# Patient Record
Sex: FEMALE | Race: WHITE | NOT HISPANIC OR LATINO | Employment: FULL TIME | URBAN - METROPOLITAN AREA
[De-identification: names, ages, dates, MRNs, and addresses within clinical notes are randomized per-mention and may not be internally consistent; named-entity substitution may affect disease eponyms.]

---

## 2017-01-05 ENCOUNTER — POSTPARTUM VISIT (OUTPATIENT)
Dept: OBSTETRICS AND GYNECOLOGY | Facility: CLINIC | Age: 32
End: 2017-01-05
Attending: OBSTETRICS & GYNECOLOGY
Payer: COMMERCIAL

## 2017-01-05 VITALS
SYSTOLIC BLOOD PRESSURE: 110 MMHG | DIASTOLIC BLOOD PRESSURE: 78 MMHG | HEIGHT: 64 IN | BODY MASS INDEX: 33.69 KG/M2 | WEIGHT: 197.31 LBS

## 2017-01-05 PROCEDURE — 0503F POSTPARTUM CARE VISIT: CPT | Mod: S$GLB,,, | Performed by: OBSTETRICS & GYNECOLOGY

## 2017-01-05 PROCEDURE — 99999 PR PBB SHADOW E&M-EST. PATIENT-LVL III: CPT | Mod: PBBFAC,,, | Performed by: OBSTETRICS & GYNECOLOGY

## 2017-01-05 RX ORDER — LORATADINE 10 MG/1
10 TABLET ORAL DAILY
COMMUNITY

## 2017-01-05 NOTE — PROGRESS NOTES
Yolanda Win is a 31 y.o. female  presents for a postpartum visit.  She is status post C/S 6 weeks ago.  Her hospitalization was not complicated.  She is breastfeeding.  She desires IUD for contraception.  She denies postpartum depression.      Past Medical History   Diagnosis Date    Abnormal Pap smear of cervix     Allergy      seasonal    Anorexia     Bulimia     Fever blister     Migraine headache      Past Surgical History   Procedure Laterality Date    Shoulder surgery Left     Tonsillectomy      Bone marrow aspiration       x 3    Dilation and curettage of uterus      Colposcopy      Cervical biopsy  w/ loop electrode excision       section  2016     Review of patient's allergies indicates:   Allergen Reactions    Amoxicillin      hives    Pcn [penicillins] Rash    Valium [diazepam] Anxiety       Current Outpatient Prescriptions:     ERGOCALCIFEROL, VITAMIN D2, (VITAMIN D ORAL), Take by mouth., Disp: , Rfl:     loratadine (CLARITIN) 10 mg tablet, Take 10 mg by mouth once daily., Disp: , Rfl:     magnesium oxide (MAG-OX) 400 mg tablet, Take 400 mg by mouth once daily., Disp: , Rfl:     PRENATAL VIT/IRON FUMARATE/FA (PRENATAL 1+1 ORAL), Take by mouth., Disp: , Rfl:     valacyclovir (VALTREX) 1000 MG tablet, Take 1,000 mg by mouth continuous prn., Disp: , Rfl:     ibuprofen (ADVIL,MOTRIN) 600 MG tablet, Take 1 tablet (600 mg total) by mouth every 6 (six) hours., Disp: 30 tablet, Rfl: 2      Vitals:    17 0926   BP: 110/78       GENERAL: healthy  ABDOMEN: no masses, hepatosplenomegaly, no hernias  EXTERNAL GENITALIA POSTPARTUM: normal, well-healed, without lesions or masses   VAGINA POSTPARTUM: normal, well-healed, physiologic discharge, without lesions   CERVIX POSTPARTUM: normal, well-healed, without lesions   UTERUS POSTPARTUM: normal size, well involuted, firm, non-tender, ADNEXA POSTPARTUM: no masses palpable and nontender    Assessment:  Normal postpartum  exam    Plan:  Routine follow up.

## 2017-01-05 NOTE — MR AVS SNAPSHOT
[facility] [address]  [address]  [facility] [address]  Phone: [phone]  Fax: [phone]                  Yolanda Win   2017 9:30 AM   Postpartum Visit    Description:  Female : 1985   Provider:  Kaleigh Polanco MD   Department:  [facility] [address]           Reason for Visit     Postpartum Care                To Do List           Goals (5 Years of Data)     None      [facility]     [facility] Care [facility] -  Assistance  Registered nurses in the [facility] Call Center provide clinical advisement, health education, appointment booking, and other advisory services.  Call for this free service at [phone].             Medications           Message regarding Medications     Verify the changes and/or additions to your medication regime listed below are the same as discussed with your clinician today.  If any of these changes or additions are incorrect, please notify your healthcare provider.             Verify that the below list of medications is an accurate representation of the medications you are currently taking.  If none reported, the list may be blank. If incorrect, please contact your healthcare provider. Carry this list with you in case of emergency.           Current Medications     ERGOCALCIFEROL, VITAMIN D2, (VITAMIN D ORAL) Take by mouth.    magnesium oxide (MAG-OX) 400 mg tablet Take 400 mg by mouth once daily.    PRENATAL VIT/IRON FUMARATE/FA (PRENATAL 1+1 ORAL) Take by mouth.    valacyclovir (VALTREX) 1000 MG tablet Take 1,000 mg by mouth continuous prn.    ibuprofen (ADVIL,MOTRIN) 600 MG tablet Take 1 tablet (600 mg total) by mouth every 6 (six) hours.           Clinical Reference Information           Prenatal Vitals     Enc. Date GA Prenatal Vitals Prenatal Pulse Pain Level Urine Albumin/Glucose Edema Presentation Dilation/Effacement/Station    17 38w1d 110/78 / 89.5 kg (197 lb 5 oz)           16 38w1d 108/78  "/ 92.8 kg (204 lb 9.4 oz)           16 38w1d Admission Dept: [facility] L&D    16 38w0d Admission Dx: Normal labor and delivery Dept: [facility] ZAINAB    16 37w4d 124/78 / 103.8 kg (228 lb 13.4 oz) 39 cm / 152 / Present  0 Negative / Negative +1 / +1  0 / 20 / -4    16 37w1d 152/90 (A) / 103.9 kg (229 lb 0.9 oz) 38 cm / 150 / Present  0 Negative / Negative +2 / +2 / None / No  0 / 20 / -4    16 36w3d 152/86 (A) / 104.7 kg (230 lb 13.2 oz) 38 cm / 144 / Present  0 Negative / Negative +2 / +2  0 / 20 / -4    10/31/16 35w1d 138/80 / 103.5 kg (228 lb 2.8 oz) 37 cm / 152 / Present  0 Negative / Negative +2 / +2  0 / 10 / -4       TW.2 kg (68 lb 13.4 oz)   Pregravid weight: 72.6 kg (160 lb)   Number of babies: 1   Height: 5' 4" (1.626 m)   BMI: 27.5       Vital Signs - Last Recorded  Most recent update: 2017  9:31 AM by Brina Beverly MA    BP Ht Wt BMI       110/78 5' 4" (1.626 m) 89.5 kg (197 lb 5 oz) 33.87 kg/m2       Allergies as of 2017     Amoxicillin    Pcn [Penicillins]    Valium [Diazepam]      Immunizations Administered on Date of Encounter - 2017     None      "

## 2017-01-16 ENCOUNTER — PATIENT MESSAGE (OUTPATIENT)
Dept: OBSTETRICS AND GYNECOLOGY | Facility: CLINIC | Age: 32
End: 2017-01-16

## 2017-01-18 ENCOUNTER — PATIENT MESSAGE (OUTPATIENT)
Dept: OBSTETRICS AND GYNECOLOGY | Facility: CLINIC | Age: 32
End: 2017-01-18

## 2017-01-19 ENCOUNTER — PATIENT MESSAGE (OUTPATIENT)
Dept: OBSTETRICS AND GYNECOLOGY | Facility: CLINIC | Age: 32
End: 2017-01-19

## 2017-01-30 ENCOUNTER — PATIENT MESSAGE (OUTPATIENT)
Dept: OBSTETRICS AND GYNECOLOGY | Facility: CLINIC | Age: 32
End: 2017-01-30

## 2017-02-13 ENCOUNTER — PATIENT MESSAGE (OUTPATIENT)
Dept: OBSTETRICS AND GYNECOLOGY | Facility: CLINIC | Age: 32
End: 2017-02-13

## 2017-02-15 ENCOUNTER — PROCEDURE VISIT (OUTPATIENT)
Dept: OBSTETRICS AND GYNECOLOGY | Facility: CLINIC | Age: 32
End: 2017-02-15
Attending: OBSTETRICS & GYNECOLOGY
Payer: COMMERCIAL

## 2017-02-15 VITALS
HEIGHT: 64 IN | DIASTOLIC BLOOD PRESSURE: 80 MMHG | SYSTOLIC BLOOD PRESSURE: 126 MMHG | WEIGHT: 189.81 LBS | BODY MASS INDEX: 32.41 KG/M2

## 2017-02-15 DIAGNOSIS — Z30.9 ENCOUNTER FOR CONTRACEPTIVE MANAGEMENT, UNSPECIFIED CONTRACEPTIVE ENCOUNTER TYPE: Primary | ICD-10-CM

## 2017-02-15 DIAGNOSIS — Z30.430 ENCOUNTER FOR IUD INSERTION: ICD-10-CM

## 2017-02-15 DIAGNOSIS — Z30.431 IUD CHECK UP: ICD-10-CM

## 2017-02-15 LAB
B-HCG UR QL: NEGATIVE
CTP QC/QA: YES

## 2017-02-15 PROCEDURE — 58300 INSERT INTRAUTERINE DEVICE: CPT | Mod: S$GLB,,, | Performed by: OBSTETRICS & GYNECOLOGY

## 2017-02-15 PROCEDURE — 81025 URINE PREGNANCY TEST: CPT | Mod: S$GLB,,, | Performed by: OBSTETRICS & GYNECOLOGY

## 2017-02-15 NOTE — MR AVS SNAPSHOT
[facility] [address]  [address]  [facility] [address]  Phone: [phone]  Fax: [phone]                  Yolanda Win   2/15/2017 3:15 PM   Procedure visit    Description:  Female : 1985   Provider:  Kaleigh Polanco MD   Department:  [facility] - [facility] [address]           Reason for Visit     Procedure           Diagnoses this Visit        Comments    Encounter for contraceptive management, unspecified contraceptive encounter type    -  Primary            To Do List           Goals (5 Years of Data)     None      [facility]     [facility] Care [facility] -  Assistance  Registered nurses in the [facility] Call Center provide clinical advisement, health education, appointment booking, and other advisory services.  Call for this free service at [phone].             Medications           Message regarding Medications     Verify the changes and/or additions to your medication regime listed below are the same as discussed with your clinician today.  If any of these changes or additions are incorrect, please notify your healthcare provider.             Verify that the below list of medications is an accurate representation of the medications you are currently taking.  If none reported, the list may be blank. If incorrect, please contact your healthcare provider. Carry this list with you in case of emergency.           Current Medications     ERGOCALCIFEROL, VITAMIN D2, (VITAMIN D ORAL) Take by mouth.    ibuprofen (ADVIL,MOTRIN) 600 MG tablet Take 1 tablet (600 mg total) by mouth every 6 (six) hours.    loratadine (CLARITIN) 10 mg tablet Take 10 mg by mouth once daily.    magnesium oxide (MAG-OX) 400 mg tablet Take 400 mg by mouth once daily.    PRENATAL VIT/IRON FUMARATE/FA (PRENATAL 1+1 ORAL) Take by mouth.    valacyclovir (VALTREX) 1000 MG tablet Take 1,000 mg by mouth continuous prn.           Clinical Reference Information           Your Vitals Were  "    BP Height Weight BMI       126/80 5' 4" (1.626 m) 86.1 kg (189 lb 13.1 oz) 32.58 kg/m2       Blood Pressure          Most Recent Value    BP  126/80      Allergies as of 2/15/2017     Amoxicillin    Pcn [Penicillins]    Valium [Diazepam]      Immunizations Administered on Date of Encounter - 2/15/2017     None      Orders Placed During Today's Visit      Normal Orders This Visit    POCT Urine Pregnancy       Language Assistance Services     ATTENTION: Language assistance services are available, free of charge. Please call [phone].      ATENCIÓN: Si habla christine, tiene a reynoso disposición servicios gratuitos de asistencia lingüística. Llame al [phone].     CHÚ Ý: N?u b?n nói Ti?ng Vi?t, có các d?ch v? h? tr? ngôn ng? mi?n phí dành cho b?n. G?i s? [phone].         [facility] [address] complies with applicable Federal civil rights laws and does not discriminate on the basis of race, color, national origin, age, disability, or sex.        "

## 2017-02-15 NOTE — PROCEDURES
Insertin of IUD-Today  Date/Time: 2/15/2017 5:01 PM  Performed by: TINA BADILLO  Authorized by: TINA BADILLO   Preparation: Patient was prepped and draped in the usual sterile fashion.  Local anesthesia used: no    Anesthesia:  Local anesthesia used: no  Sedation:  Patient sedated: no    Patient tolerance: Patient tolerated the procedure well with no immediate complications  Comments: Yolanda Win is a 31 y.o. female  presents for IUD placement.  No LMP recorded..  She desires Dorcas.  UPT is negative.      She was counseled on the risks, benefits, indications, and alternatives to IUD use.  She understands that with insertion there is a risk of bleeding, infection, and uterine perforation.  All questions are answered.  Consents signed.  Cervical cultures were not performed.    Procedure:  Time out performed.  The cervix was visualized with a speculum.  A single tooth tenaculum was placed on the anterior lip of the cervix.  The uterus sounds to 9cm using sterile technique.  A Dorcas was loaded and placed high in the uterine fundus without difficulty using sterile technique.  The string was was then cut.  The tenaculum and speculum were removed.  The patient tolerated the procedure well.  First IUD expelled when strings were cut. 2nd IUD placed without difficutly    Assessment:  1.  Contraceptive management/IUD insertion    Post IUD placement counseling:  Manage post IUD placement pain with NSAIDS, Tylenol or Rx per Medcard.  IUD danger signs and how to check for strings were discussed.  The IUD needs to be removed in 5 years for Mirena and 10 years for Copper IUD.    Counseling lasted approximately 15 minutes and all her questions were answered.    Follow up:  2 weeks.

## 2017-03-01 ENCOUNTER — PATIENT MESSAGE (OUTPATIENT)
Dept: OBSTETRICS AND GYNECOLOGY | Facility: CLINIC | Age: 32
End: 2017-03-01

## 2017-03-02 ENCOUNTER — PATIENT MESSAGE (OUTPATIENT)
Dept: OBSTETRICS AND GYNECOLOGY | Facility: CLINIC | Age: 32
End: 2017-03-02

## 2017-03-02 DIAGNOSIS — O92.5 LACTATING, SUPPRESSED: Primary | ICD-10-CM

## 2017-03-03 ENCOUNTER — PATIENT MESSAGE (OUTPATIENT)
Dept: OBSTETRICS AND GYNECOLOGY | Facility: CLINIC | Age: 32
End: 2017-03-03

## 2017-03-03 RX ORDER — METOCLOPRAMIDE 10 MG/1
TABLET ORAL
Qty: 33 TABLET | Refills: 0 | OUTPATIENT
Start: 2017-03-03 | End: 2017-03-06 | Stop reason: SDUPTHER

## 2017-03-04 ENCOUNTER — PATIENT MESSAGE (OUTPATIENT)
Dept: OBSTETRICS AND GYNECOLOGY | Facility: CLINIC | Age: 32
End: 2017-03-04

## 2017-03-04 DIAGNOSIS — O92.5 LACTATING, SUPPRESSED: Primary | ICD-10-CM

## 2017-03-06 ENCOUNTER — PATIENT MESSAGE (OUTPATIENT)
Dept: OBSTETRICS AND GYNECOLOGY | Facility: CLINIC | Age: 32
End: 2017-03-06

## 2017-03-06 ENCOUNTER — PATIENT MESSAGE (OUTPATIENT)
Dept: INTERNAL MEDICINE | Facility: CLINIC | Age: 32
End: 2017-03-06

## 2017-03-06 ENCOUNTER — OFFICE VISIT (OUTPATIENT)
Dept: INTERNAL MEDICINE | Facility: CLINIC | Age: 32
End: 2017-03-06
Payer: COMMERCIAL

## 2017-03-06 VITALS
TEMPERATURE: 98 F | HEIGHT: 64 IN | DIASTOLIC BLOOD PRESSURE: 80 MMHG | WEIGHT: 185.19 LBS | BODY MASS INDEX: 31.62 KG/M2 | HEART RATE: 67 BPM | SYSTOLIC BLOOD PRESSURE: 100 MMHG

## 2017-03-06 DIAGNOSIS — J06.9 UPPER RESPIRATORY INFECTION, ACUTE: Primary | ICD-10-CM

## 2017-03-06 LAB
DEPRECATED S PYO AG THROAT QL EIA: NEGATIVE
FLUAV AG SPEC QL IA: NEGATIVE
FLUBV AG SPEC QL IA: NEGATIVE
SPECIMEN SOURCE: NORMAL

## 2017-03-06 PROCEDURE — 99203 OFFICE O/P NEW LOW 30 MIN: CPT | Mod: S$GLB,,, | Performed by: INTERNAL MEDICINE

## 2017-03-06 PROCEDURE — 87081 CULTURE SCREEN ONLY: CPT

## 2017-03-06 PROCEDURE — 99999 PR PBB SHADOW E&M-EST. PATIENT-LVL III: CPT | Mod: PBBFAC,,, | Performed by: INTERNAL MEDICINE

## 2017-03-06 PROCEDURE — 87400 INFLUENZA A/B EACH AG IA: CPT | Mod: 59

## 2017-03-06 PROCEDURE — 87880 STREP A ASSAY W/OPTIC: CPT

## 2017-03-06 RX ORDER — METOCLOPRAMIDE 10 MG/1
TABLET ORAL
Qty: 33 TABLET | Refills: 0 | Status: SHIPPED | OUTPATIENT
Start: 2017-03-06 | End: 2017-05-17

## 2017-03-06 NOTE — PROGRESS NOTES
Subjective:       Patient ID: Yolanda Win is a 31 y.o. female.    Chief Complaint: URI and Sore Throat    HPI Comments: She is new to primary care. Presents urgently today c/o upper respiratory infection. It began four days ago with a sore throat. This has persisted, and yesterday she developed sneezing, runny nose and nasal congestion. Also has a mild cough usually non-productive. Temp peaked at 100.1 yesterday evening. Has malaise, but no chills, sweats or body aches. Sick contacts include a family member with Strep nine days ago, and a co-worker with Influenza A one week ago. She is using Tylenol Cold medicine and Robitussin DM with some relief. Missing work today, but thinks she will be able to return tomorrow.    PMH: No chronic medical conditions. Currently breastfeeding her three month old son.   Allergies: include PCN.   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   Medications: reviewed.       Review of Systems    Objective:    /80, Pulse 67, Temp 98.0  Physical Exam   Constitutional: She is oriented to person, place, and time. She appears well-developed and well-nourished. No distress.   Sounds nasally congested when speaking, mild sniffles, otherwise not ill-appearing.    HENT:   Right Ear: External ear normal.   Left Ear: External ear normal.   Oropharynx mildly injected, tonsils surgically absent, no exudate. Edema of nasal turbinates with watery discharge. Both ear canals clear and TMs normal with no injection or effusions.    Eyes: Conjunctivae are normal. Right eye exhibits no discharge. Left eye exhibits no discharge.   Neck: Normal range of motion. Neck supple.   Very mild submandibular adenopathy left >right.   Cardiovascular: Normal rate, regular rhythm and normal heart sounds.    Pulmonary/Chest: Effort normal and breath sounds normal. No accessory muscle usage. No tachypnea. No respiratory distress. She has no decreased breath sounds. She has no wheezes. She  has no rhonchi. She has no rales.   Neurological: She is alert and oriented to person, place, and time.   Skin: Skin is warm and dry. She is not diaphoretic.       Assessment:       1. Upper respiratory infection, acute        Plan:       Upper respiratory infection, acute  -     THROAT SCREEN, RAPID  -     Influenza antigen Nasopharyngeal Swab  Symptoms consistent with cold virus, OTC meds as needed for symptoms, stay well hydrated.   Work excuse provided for today.

## 2017-03-06 NOTE — MR AVS SNAPSHOT
[facility]  [address]  [facility] [address]  Phone: [phone]  Fax: [phone]                  Yolanda Win   3/6/2017 9:30 AM   Office Visit    Description:  Female : 1985   Provider:  Prisca Mullen MD   Department:  [facility]           Reason for Visit     URI     Sore Throat           Diagnoses this Visit        Comments    Upper respiratory infection, acute    -  Primary            To Do List           Future Appointments        Provider Department Dept Phone    3/10/2017 4:30 PM [facility] [phone]      Goals (5 Years of Data)     None      [facility]     [facility] Nurse Care Line -  Assistance  Registered nurses in the [facility] provide clinical advisement, health education, appointment booking, and other advisory services.  Call for this free service at [phone].             Medications           Message regarding Medications     Verify the changes and/or additions to your medication regime listed below are the same as discussed with your clinician today.  If any of these changes or additions are incorrect, please notify your healthcare provider.             Verify that the below list of medications is an accurate representation of the medications you are currently taking.  If none reported, the list may be blank. If incorrect, please contact your healthcare provider. Carry this list with you in case of emergency.           Current Medications     ERGOCALCIFEROL, VITAMIN D2, (VITAMIN D ORAL) Take by mouth.    ibuprofen (ADVIL,MOTRIN) 600 MG tablet Take 1 tablet (600 mg total) by mouth every 6 (six) hours.    loratadine (CLARITIN) 10 mg tablet Take 10 mg by mouth once daily.    magnesium oxide (MAG-OX) 400 mg tablet Take 400 mg by mouth once daily.    PRENATAL VIT/IRON FUMARATE/FA (PRENATAL 1+1 ORAL) Take by mouth.    metoclopramide HCl (REGLAN) 10 MG tablet TAKE  "REGLAN BY MOUTH AS OUTLINED BELOW FOR 13 DAYS TO INCREASE MILK PRODUCTION:  DAY 1 1 TABLET   DAY 2 1 TABLET EVERY 12 HOURS  DAY 3 1 TABLET EVERY 8 HOURS  DAY 4-11 CONTINUE 1 TABLET EVERY 8 HOURS  DAY 12 1 TABLET EVERY 12 HOURS  DAY 13 1 TABLET    valacyclovir (VALTREX) 1000 MG tablet Take 1,000 mg by mouth continuous prn.           Clinical Reference Information           Your Vitals Were     BP Pulse Temp Height Weight BMI    100/80 67 98 °F (36.7 °C) 5' 4" (1.626 m) 84 kg (185 lb 3.2 oz) 31.79 kg/m2      Blood Pressure          Most Recent Value    BP  100/80      Allergies as of 3/6/2017     Amoxicillin    Pcn [Penicillins]    Valium [Diazepam]      Immunizations Administered on Date of Encounter - 3/6/2017     None      Orders Placed During Today's Visit      Normal Orders This Visit    Influenza antigen Nasopharyngeal Swab     THROAT SCREEN, RAPID       Language Assistance Services     ATTENTION: Language assistance services are available, free of charge. Please call [phone].      ATENCIÓN: Si habla español, tiene a reynoso disposición servicios gratuitos de asistencia lingüística. Llame al [phone].     LES Ý: N?u b?n nói Ti?ng Vi?t, có các d?ch v? h? tr? ngôn ng? mi?n phí dành cho b?n. G?i s? [phone].         [facility] complies with applicable Federal civil rights laws and does not discriminate on the basis of race, color, national origin, age, disability, or sex.        "

## 2017-03-06 NOTE — LETTER
March 6, 2017      Yolanda Win   [address] [place] [address]             [facility]  [address]  [place] [address]  Phone: [phone]  Fax: [phone] Yolanda Win    Was treated here on 03/06/2017.    May Return to work/school on Tuesday, 03/07/17.    No Restrictions.            Prisca Mullen MD

## 2017-03-08 LAB — BACTERIA THROAT CULT: NORMAL

## 2017-03-10 ENCOUNTER — HOSPITAL ENCOUNTER (OUTPATIENT)
Dept: RADIOLOGY | Facility: OTHER | Age: 32
Discharge: HOME | End: 2017-03-10
Attending: OBSTETRICS & GYNECOLOGY
Payer: COMMERCIAL

## 2017-03-10 DIAGNOSIS — Z30.431 IUD CHECK UP: ICD-10-CM

## 2017-03-10 PROCEDURE — 76856 US EXAM PELVIC COMPLETE: CPT | Mod: TC

## 2017-03-10 PROCEDURE — 76830 TRANSVAGINAL US NON-OB: CPT | Mod: 26,,, | Performed by: INTERNAL MEDICINE

## 2017-03-10 PROCEDURE — 76856 US EXAM PELVIC COMPLETE: CPT | Mod: 26,,, | Performed by: INTERNAL MEDICINE

## 2017-03-15 ENCOUNTER — RESEARCH ENCOUNTER (OUTPATIENT)
Dept: RESEARCH | Facility: HOSPITAL | Age: 32
End: 2017-03-15

## 2017-03-15 NOTE — PROGRESS NOTES
Subject was consented for the  [facility] Protocol (IRB # [id number],PI -Lucita).  The study was explained to the subject in  detail, and the Informed Consent was  reviewed, and discussed with the subject prior to consenting. All risks, and benefits, were discussed. Adequate time was given for the subject to ask questions and receive answers. Subject confirmed that all questions had been answered and  verbalized desire to participate in study, understanding that participation is voluntary and he/she can withdraw at any time. Subject signed Informed consent and a signed copy was provided. This Informed Consent process along with completion of Eligibility criteria was conducted prior to inititation of any study procedures. Subject signed the employee non-coercion statement.

## 2017-04-07 ENCOUNTER — PATIENT MESSAGE (OUTPATIENT)
Dept: OBSTETRICS AND GYNECOLOGY | Facility: CLINIC | Age: 32
End: 2017-04-07

## 2017-05-17 ENCOUNTER — PATIENT MESSAGE (OUTPATIENT)
Dept: OBSTETRICS AND GYNECOLOGY | Facility: CLINIC | Age: 32
End: 2017-05-17

## 2017-05-17 ENCOUNTER — OFFICE VISIT (OUTPATIENT)
Dept: PSYCHIATRY | Facility: CLINIC | Age: 32
End: 2017-05-17
Payer: COMMERCIAL

## 2017-05-17 VITALS
HEIGHT: 64 IN | WEIGHT: 178.13 LBS | DIASTOLIC BLOOD PRESSURE: 67 MMHG | HEART RATE: 70 BPM | SYSTOLIC BLOOD PRESSURE: 106 MMHG | BODY MASS INDEX: 30.41 KG/M2

## 2017-05-17 DIAGNOSIS — Z87.898 HISTORY OF INSOMNIA: ICD-10-CM

## 2017-05-17 DIAGNOSIS — F41.9 ANXIETY DISORDER, UNSPECIFIED TYPE: ICD-10-CM

## 2017-05-17 DIAGNOSIS — Z86.59 HISTORY OF POSTTRAUMATIC STRESS DISORDER (PTSD): ICD-10-CM

## 2017-05-17 DIAGNOSIS — F90.0 ADHD (ATTENTION DEFICIT HYPERACTIVITY DISORDER), INATTENTIVE TYPE: ICD-10-CM

## 2017-05-17 PROCEDURE — 90792 PSYCH DIAG EVAL W/MED SRVCS: CPT | Mod: S$GLB,,, | Performed by: PSYCHIATRY & NEUROLOGY

## 2017-05-17 PROCEDURE — 99999 PR PBB SHADOW E&M-EST. PATIENT-LVL II: CPT | Mod: PBBFAC,,, | Performed by: PSYCHIATRY & NEUROLOGY

## 2017-05-17 RX ORDER — DEXTROAMPHETAMINE SACCHARATE, AMPHETAMINE ASPARTATE MONOHYDRATE, DEXTROAMPHETAMINE SULFATE AND AMPHETAMINE SULFATE 3.75; 3.75; 3.75; 3.75 MG/1; MG/1; MG/1; MG/1
15 CAPSULE, EXTENDED RELEASE ORAL EVERY MORNING
Qty: 30 CAPSULE | Refills: 0 | Status: SHIPPED | OUTPATIENT
Start: 2017-05-17 | End: 2017-06-28 | Stop reason: DRUGHIGH
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
## 2017-05-25 ENCOUNTER — PATIENT MESSAGE (OUTPATIENT)
Dept: PSYCHIATRY | Facility: CLINIC | Age: 32
End: 2017-05-25

## 2017-06-21 ENCOUNTER — OFFICE VISIT (OUTPATIENT)
Dept: OPTOMETRY | Facility: CLINIC | Age: 32
End: 2017-06-21
Payer: COMMERCIAL

## 2017-06-21 DIAGNOSIS — Z01.00 ROUTINE EYE EXAM: Primary | ICD-10-CM

## 2017-06-21 DIAGNOSIS — H52.13 MYOPIA, BILATERAL: Primary | ICD-10-CM

## 2017-06-21 DIAGNOSIS — H52.13 MYOPIA, BILATERAL: ICD-10-CM

## 2017-06-21 PROCEDURE — 92004 COMPRE OPH EXAM NEW PT 1/>: CPT | Mod: S$GLB,,, | Performed by: OPTOMETRIST

## 2017-06-21 PROCEDURE — 99499 UNLISTED E&M SERVICE: CPT | Mod: S$GLB,,, | Performed by: OPTOMETRIST

## 2017-06-21 PROCEDURE — 92310 CONTACT LENS FITTING OU: CPT | Mod: S$GLB,,, | Performed by: OPTOMETRIST

## 2017-06-21 PROCEDURE — 99999 PR PBB SHADOW E&M-EST. PATIENT-LVL I: CPT | Mod: PBBFAC,,, | Performed by: OPTOMETRIST

## 2017-06-21 PROCEDURE — 92015 DETERMINE REFRACTIVE STATE: CPT | Mod: S$GLB,,, | Performed by: OPTOMETRIST

## 2017-06-21 NOTE — PROGRESS NOTES
HPI     Concerns About Ocular Health    Additional comments: glasses and ctl rx           Comments   Patient's last dilated exam was: April 2016  Pt states: here for glasses and ctl rx, would like to establish care at   [facility]. Patient denies flashes, floaters, pain and double vision.   Occasional dry eyes, not using any gtts. Had a baby 7 months ago, no   change in vision. Was told she was not  A candidiate for lasik sx due to   being on topamax changing her topography. Stopped taking Topamax 6/2014           Last edited by Christen Najera, PCT on 6/21/2017  2:43 PM.   (History)        ROS     Negative for: Constitutional, Gastrointestinal, Neurological, Skin,   Genitourinary, Musculoskeletal, HENT, Endocrine, Cardiovascular, Eyes,   Respiratory, Psychiatric, Allergic/Imm, Heme/Lymph    Last edited by Swapna Espinoza, OD on 6/21/2017  3:21 PM. (History)        Assessment /Plan     For exam results, see Encounter Report.    Routine eye exam    Myopia, bilateral          1-2.  Glasses and contact lens rx given. Eye health normal OU.   Discussed LASIK.  RTC 1 year for routine exam.

## 2017-06-21 NOTE — PROGRESS NOTES
Assessment /Plan     For exam results, see Encounter Report.    Myopia, bilateral          1. See note with same date.

## 2017-06-28 ENCOUNTER — OFFICE VISIT (OUTPATIENT)
Dept: PSYCHIATRY | Facility: CLINIC | Age: 32
End: 2017-06-28
Payer: COMMERCIAL

## 2017-06-28 VITALS
HEIGHT: 64 IN | BODY MASS INDEX: 29.4 KG/M2 | DIASTOLIC BLOOD PRESSURE: 71 MMHG | SYSTOLIC BLOOD PRESSURE: 107 MMHG | WEIGHT: 172.19 LBS | HEART RATE: 72 BPM

## 2017-06-28 DIAGNOSIS — F90.0 ADHD (ATTENTION DEFICIT HYPERACTIVITY DISORDER), INATTENTIVE TYPE: Primary | ICD-10-CM

## 2017-06-28 DIAGNOSIS — Z87.898 HISTORY OF INSOMNIA: ICD-10-CM

## 2017-06-28 DIAGNOSIS — F41.9 ANXIETY DISORDER, UNSPECIFIED TYPE: ICD-10-CM

## 2017-06-28 DIAGNOSIS — Z86.59 HISTORY OF POSTTRAUMATIC STRESS DISORDER (PTSD): ICD-10-CM

## 2017-06-28 PROCEDURE — 99214 OFFICE O/P EST MOD 30 MIN: CPT | Mod: S$GLB,,, | Performed by: PSYCHIATRY & NEUROLOGY

## 2017-06-28 PROCEDURE — 99999 PR PBB SHADOW E&M-EST. PATIENT-LVL II: CPT | Mod: PBBFAC,,, | Performed by: PSYCHIATRY & NEUROLOGY

## 2017-06-28 PROCEDURE — 90833 PSYTX W PT W E/M 30 MIN: CPT | Mod: S$GLB,,, | Performed by: PSYCHIATRY & NEUROLOGY

## 2017-06-28 RX ORDER — DEXTROAMPHETAMINE SACCHARATE, AMPHETAMINE ASPARTATE MONOHYDRATE, DEXTROAMPHETAMINE SULFATE AND AMPHETAMINE SULFATE 6.25; 6.25; 6.25; 6.25 MG/1; MG/1; MG/1; MG/1
25 CAPSULE, EXTENDED RELEASE ORAL EVERY MORNING
Qty: 30 CAPSULE | Refills: 0 | Status: SHIPPED | OUTPATIENT
Start: 2017-06-28 | End: 2017-07-21 | Stop reason: SDUPTHER
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
## 2017-07-14 ENCOUNTER — PATIENT MESSAGE (OUTPATIENT)
Dept: DERMATOLOGY | Facility: CLINIC | Age: 32
End: 2017-07-14

## 2017-07-17 ENCOUNTER — OFFICE VISIT (OUTPATIENT)
Dept: PODIATRY | Facility: CLINIC | Age: 32
End: 2017-07-17
Payer: COMMERCIAL

## 2017-07-17 VITALS
BODY MASS INDEX: 29.14 KG/M2 | WEIGHT: 170.69 LBS | HEIGHT: 64 IN | SYSTOLIC BLOOD PRESSURE: 147 MMHG | HEART RATE: 88 BPM | DIASTOLIC BLOOD PRESSURE: 88 MMHG

## 2017-07-17 DIAGNOSIS — G57.53 TARSAL TUNNEL SYNDROME OF BOTH LOWER EXTREMITIES: ICD-10-CM

## 2017-07-17 DIAGNOSIS — M21.6X2 ACQUIRED EQUINUS DEFORMITY OF BOTH FEET: Primary | ICD-10-CM

## 2017-07-17 DIAGNOSIS — M21.40 ACQUIRED FLEXIBLE FLAT FOOT, UNSPECIFIED LATERALITY: ICD-10-CM

## 2017-07-17 DIAGNOSIS — M21.6X1 ACQUIRED EQUINUS DEFORMITY OF BOTH FEET: Primary | ICD-10-CM

## 2017-07-17 DIAGNOSIS — M72.2 PLANTAR FASCIITIS, BILATERAL: ICD-10-CM

## 2017-07-17 PROCEDURE — 99999 PR PBB SHADOW E&M-EST. PATIENT-LVL III: CPT | Mod: PBBFAC,,, | Performed by: PODIATRIST

## 2017-07-17 PROCEDURE — 99203 OFFICE O/P NEW LOW 30 MIN: CPT | Mod: S$GLB,,, | Performed by: PODIATRIST

## 2017-07-17 RX ORDER — METHYLPREDNISOLONE 4 MG/1
TABLET ORAL
Qty: 1 PACKAGE | Refills: 0 | Status: SHIPPED | OUTPATIENT
Start: 2017-07-17 | End: 2017-08-07

## 2017-07-17 NOTE — PROGRESS NOTES
Subjective:      Patient ID: Yolanda Win is a 32 y.o. female.    Chief Complaint: Foot Problem (bilateral) and Foot Pain    Yolanda is a 32 y.o. female who presents to the podiatry clinic  with complaint of  bilateral foot pain. Onset of the symptoms was several months ago. Precipitating event: none known, she did have a baby about 8 months ago and has been trying to get back into shape running over the last several months. Current symptoms include: ability to bear weight, but with some pain, worsening symptoms after a period of activity and Pain is described as aching throughout the medial ankle, arches as well as occasional numbness. Aggravating factors: any weight bearing. Symptoms have waxed and waned but are worse overall. Patient has had prior foot problems when she started running a few years ago and had ankle pain, after resting that resolved. Evaluation to date: none. Treatment to date: She has tried different running shoes and a spenco insert with minimal help. Patients rates pain 4/10 on pain scale.        Review of Systems   Constitution: Negative for chills, fever, weakness and malaise/fatigue.   Cardiovascular: Negative for claudication and leg swelling.   Respiratory: Negative for shortness of breath.    Skin: Negative for itching, nail changes, poor wound healing and rash.   Musculoskeletal: Negative for arthritis, muscle cramps, muscle weakness and myalgias.   Gastrointestinal: Negative for nausea and vomiting.   Neurological: Positive for numbness. Negative for focal weakness, loss of balance and paresthesias.           Objective:      Physical Exam   Constitutional: She is oriented to person, place, and time. She appears well-developed and well-nourished. No distress.   Cardiovascular:   Pulses:       Dorsalis pedis pulses are 2+ on the right side, and 2+ on the left side.        Posterior tibial pulses are 2+ on the right side, and 2+ on the left side.   < 3 sec capillary refill time to toes  1-5 bilateral. Toes and feet are warm to touch proximally with normal distal cooling b/l. There is some hair growth on the feet and toes b/l. There is no edema b/l. No spider veins or varicosities present b/l.      Musculoskeletal:   Discomfort with palpation along the tarsal tunnel, abductor muscle belly and origin of the plantar fascia on the heel bilateral. There is also mild tenderness to the PT tendon distally bilateral. Able to double and single heel rise without pain and with noted inversion of heels.     When standing she has collapsing medial arches bilateral with everting heel RCSP 5-6 degrees everted. Negative too many toes sign.     Equinus noted b/l ankles with < 5 deg DF noted with knee extended and <10 with knee flexed. MMT 5/5 in DF/PF/Inv/Ev resistance with no reproduction of pain in any direction. Passive range of motion of ankle and pedal joints is painless b/l.    Full biomechanical exam with gait analysis done by resident will be on  form in patient's media file tab.    Neurological: She is alert and oriented to person, place, and time. She has normal strength. She displays no atrophy and no tremor. No sensory deficit. She exhibits normal muscle tone.   Positive tinel sign bilateral.   Skin: Skin is warm, dry and intact. No abrasion, no bruising, no burn, no ecchymosis, no laceration, no lesion, no petechiae and no rash noted. She is not diaphoretic. No cyanosis or erythema. No pallor. Nails show no clubbing.   Skin temperature, texture and turgor within normal limits.   Psychiatric: She has a normal mood and affect. Her behavior is normal.             Assessment:       Encounter Diagnoses   Name Primary?    Acquired equinus deformity of both feet Yes    Tarsal tunnel syndrome of both lower extremities     Plantar fasciitis, bilateral     Acquired flexible flat foot, unspecified laterality          Plan:       Yolanda was seen today for foot problem and foot pain.    Diagnoses and  all orders for this visit:    Acquired equinus deformity of both feet    Tarsal tunnel syndrome of both lower extremities    Plantar fasciitis, bilateral    Acquired flexible flat foot, unspecified laterality    Other orders  -     methylPREDNISolone (MEDROL DOSEPACK) 4 mg tablet; use as directed      I counseled the patient on her conditions, their implications and medical management.    Discussed importance of wearing shoes with adequate room in toe box and with motion control arch supports such as wilder adrenaline, or Cruzito for example. Related that she may need to break it in and allow her foot to get used to the extra support.    Patient will stretch the tendo achilles complex three times daily as demonstrated in the office.  Literature was dispensed illustrating proper stretching technique.    Medrol dose pack for acute pain and inflammation    Patient instructed on adequate icing techniques. Patient should ice the affected area at least once per day x 10 minutes for 10 days .     Consider ankle brace: ASO ankle stabilizer found on [employer].     Also discussed possible steroid injection, PT, MRI as needed if the pain continues or acutely worsens in the future    Jeff Venegas DPM PGY-3    I have reviewed and concur with the resident's history, physical, assessment, and plan.  I have personally interviewed and examined the patient at bedside.  See below addendum for my evaluation and additional findings.

## 2017-07-17 NOTE — PATIENT INSTRUCTIONS
1. Stretch 10x per day for 30 sec and before getting out of bed.  2. Supportive shoes at all times (wilder, new balance, asics) motion control. Consider HOKA if all else fails.  3. Orthotic (superfeet or spenco from [employer] or Sof Sole Fit Series from [employer])  4. ICE massage with frozen water bottle 2x per day for 30 minutes.  5. Consider night splint and/or steroid injection    What Is Plantar Fasciitis?   The plantar fascia is a ligament-like band running from your heel to the ball of your foot. This band pulls on the heel bone, raising the arch of your foot as it pushes off the ground. But if your foot moves incorrectly, the plantar fascia may become strained. The fascia may swell and its tiny fibers may begin to fray, causing plantar fasciitis.  Causes  Plantar fasciitis is often caused by poor foot mechanics. If your foot flattens too much, the fascia may overstretch and swell. If your foot flattens too little, the fascia may ache from being pulled too tight.      Symptoms  With plantar fasciitis, the bottom of your foot may hurt when you stand, especially first thing in the morning. Pain usually occurs on the inside of the foot, near the spot where your heel and arch meet. Pain may lessen after a few steps, but it comes back after rest or with prolonged movement.  Related Problems  A heel spur is extra bone that may grow near the spot where the plantar fascia attaches to the heel. The heel spur may form in response to the plantar fascias tug on the heel bone.  Bursitis is the swelling of a bursa, a fluid-filled sac that reduces friction between a ligament and a bone. Bursitis may develop if a swollen plantar fascia presses against a plantar bursa.  © 2516-6837 [employer]. [address] [place] [address], [place] [address]. All rights reserved. This information is not intended as a substitute for professional medical care. Always follow your healthcare professional's  instructions.    Treating Plantar Fasciitis    First, your doctor relieves pain. Then, the cause of your problem may be found and corrected. If your pain is due to poor foot mechanics, custom-made shoe inserts (orthoses) may help.    Reduce Symptoms:  · To relieve mild symptoms, try aspirin, ibuprofen, or other medications as directed. Rubbing ice on the affected area may also help.  · To reduce severe pain and swelling, your doctor may prescribe pills or injections or a walking cast in some instances. Physical therapy, such as ultrasound or a daily stretching program, may also be recommended. Surgery is rarely required.  · To reduce symptoms caused by poor foot mechanics, your foot may be taped. This supports the arch and temporarily controls movement. Night splints may also help by stretching the fascia.    Control Movement  If taping helps, your doctor may prescribe orthoses. Built from plaster casts of your feet, these inserts control the way your foot moves. As a result, your symptoms should go away.  If Surgery Is Needed  Your doctor may consider surgery if other types of treatment don't control your pain. During surgery, the plantar fascia is partially cut to release tension. As you heal, fibrous tissue fills the space between the heel bone and the plantar fascia.   Reduce Overuse  Every time your foot strikes the ground, the plantar fascia is stretched. You can reduce the strain on the plantar fascia and the possibility of overuse by following these suggestions:  · Lose any excess weight.  · Avoid running on hard or uneven ground.  · Use orthoses at all times in your shoes and house slippers.  © 4424-1098 [employer]. [address] [place] [address], [place] [address]. All rights reserved. This information is not intended as a substitute for professional medical care. Always follow your healthcare professional's instructions.            Lower Body Exercises: Calf Stretch    This exercise both stretches  and strengthens your lower body to help your back. Do the exercise as often as suggested by your health care provider. As you work out, dont rush or strain. Use an exercise mat, pillow, or folded towel to protect your knees and other sensitive areas.  · Face a wall 2 feet away. Step toward the wall with one foot.  · Place both palms on the wall and bend your front knee.  · Lean forward, keeping the back leg straight and the heel on the floor.  · Hold for 20 seconds. Switch legs.  © 6014-0612 [employer]. [address] [place] [address]. All rights reserved. This information is not intended as a substitute for professional medical care. Always follow your healthcare professional's instructions.                                                        Consider ankle brace: ASO ankle stabilizer   https://www.[employer]/NLN-258659-R-Ankle-Stabilizer/dp/G30TR2IU6E/ref=pd_sim_200_1?ie=UTF8&dpID=[email or web address]&dpSrc=carranza&preST=[email or web address]_&refRID=[email or web address]

## 2017-07-21 RX ORDER — DEXTROAMPHETAMINE SACCHARATE, AMPHETAMINE ASPARTATE MONOHYDRATE, DEXTROAMPHETAMINE SULFATE AND AMPHETAMINE SULFATE 6.25; 6.25; 6.25; 6.25 MG/1; MG/1; MG/1; MG/1
25 CAPSULE, EXTENDED RELEASE ORAL EVERY MORNING
Qty: 30 CAPSULE | Refills: 0 | Status: SHIPPED | OUTPATIENT
Start: 2017-07-21 | End: 2017-08-24 | Stop reason: SDUPTHER

## 2017-07-26 ENCOUNTER — PATIENT MESSAGE (OUTPATIENT)
Dept: PODIATRY | Facility: CLINIC | Age: 32
End: 2017-07-26

## 2017-07-26 RX ORDER — DICLOFENAC SODIUM 10 MG/G
2 GEL TOPICAL 3 TIMES DAILY
Qty: 100 G | Refills: 3 | Status: SHIPPED | OUTPATIENT
Start: 2017-07-26 | End: 2017-08-24 | Stop reason: SDUPTHER

## 2017-08-03 ENCOUNTER — CLINICAL SUPPORT (OUTPATIENT)
Dept: REHABILITATION | Facility: HOSPITAL | Age: 32
End: 2017-08-03
Attending: PODIATRIST
Payer: COMMERCIAL

## 2017-08-03 DIAGNOSIS — M72.2 PLANTAR FASCIITIS: ICD-10-CM

## 2017-08-03 PROCEDURE — 97110 THERAPEUTIC EXERCISES: CPT

## 2017-08-03 PROCEDURE — 97161 PT EVAL LOW COMPLEX 20 MIN: CPT

## 2017-08-03 NOTE — PROGRESS NOTES
[facility]   PATIENT EVALUATION    Date: 08/03/2017  Start Time: 305  Stop Time: 415    Patient Name: Yolanda Win  Clinic Number: [id number]  Age: 32 y.o.  Gender: female    Diagnosis:   Encounter Diagnosis   Name Primary?    Plantar fasciitis        Referring Physician: Babak Chamorro DPM  Treatment Orders: PT Eval and Treat      History     Past Medical History:   Diagnosis Date    Abnormal Pap smear of cervix     Allergy     seasonal    Anorexia     Bulimia     Fever blister     Migraine headache        Current Outpatient Prescriptions   Medication Sig    dextroamphetamine-amphetamine (ADDERALL XR) 25 MG 24 hr capsule Take 1 capsule (25 mg total) by mouth every morning.    diclofenac sodium 1 % Gel Apply 2 g topically 3 (three) times daily.    ibuprofen (ADVIL,MOTRIN) 600 MG tablet Take 1 tablet (600 mg total) by mouth every 6 (six) hours.    levonorgestrel (MIRENA) 20 mcg/24 hr (5 years) IUD 1 each by Intrauterine route once.    loratadine (CLARITIN) 10 mg tablet Take 10 mg by mouth once daily.    magnesium oxide (MAG-OX) 400 mg tablet Take 400 mg by mouth once daily.    methylPREDNISolone (MEDROL DOSEPACK) 4 mg tablet use as directed    valacyclovir (VALTREX) 1000 MG tablet Take 1,000 mg by mouth continuous prn.     No current facility-administered medications for this visit.        Review of patient's allergies indicates:   Allergen Reactions    Amoxicillin      hives    Pcn [penicillins] Rash    Valium [diazepam] Anxiety         Subjective     History of Present Condition: Patient reports in 2014 after she got  she gained some weight and hurt her back. After she was cleared to start working out she started running without a warm up, stretching and started at 3.5 miles. It got to the point where she was getting swelling and stopped running. After everything calmed down she started slowly starting run again, but stopped when she was pregnant.  After she stopped breastfeeding she returned to running in bad/old shoes. She bought a neutral running shoe and after a few hours at work her feet started getting gradually worse. She saw the podiatrist and was given mid stability shoe. She states recently her feet have been a little better.    Onset Date: 3 years ago, returned a few weeks prior  Precautions: standard    Mechanism of Injury: gradual    Pain Location: B plantar fasciia  Pain Description: Aching and Throbbing  Current Pain: 1/10  Least Pain: 1/10  Worst Pain: 8/10  Aggravating Factors: running  Relieving Factors: none    Diagnostic Tests: none  Prior Therapy: yes    Occupation: research coordinator  Job Status: full time  Job Duties: prolonged standing, walking    Sports/Recreational Activities: running, swimming, lifting weights    Prior Level of Function: Independent  Functional Deficits Leading to Referral/Nature of Injury: none  Patient Therapy Goals: return to PLOF  Cultural/Environmental/Spiritual Barriers to Treatment or Learning: none      Objective     Observation: patient appears stated age  Posture: pronated feet B  Gait: increased pronation with SL stance    Good toe walking, weak heel walking    Dermatomes: WNL  Myotomes: WNL  DTRs: NT    Palpation: TTP B medial arches, TTP L ASIS    Range of Motion   Left ankle:  PF: 70 deg  DF: 15 deg  IV: 30 deg  EV: 12 deg    Right ankle:  PF: 70 deg  DF: 15 deg  IV: 38 deg  EV: 20 deg    Joint Mobility: hypermobile  Flexibility: hypermobile    Strength   R hip: 3+/5 abd, 4/5 add  L hip: 4/5 abd, 5/5 add    Left ankle:  PF: 5/5  DF: 5/5  IV: 5/5  EV: 5/5    Right ankle:  PF: 5/5  DF: 5/5  IV: 5/5  EV: 5/5      Special Tests:   + SL squat B  + R SI ant rotation        Treatment:   Foot intrinsic btb x30 B  Lateral walking otb x1 lap  Push/pull 3x10 sec (R ant rot)  *assess deep core next visit      Functional Limitations Reports - G Codes  Category: Mobility  Tool: FOTO  Score: 55       Current  ():  CK  Goal (): CI  Discharge ():  NA      History  Co-morbidities and personal factors that may impact the plan of care Low    Stable Clinical Presentation   Co-morbidities:       Personal Factors:     Body regions    Body systems    Activity Limitations    Participation Restrictons   No personal factors and/ or  comorbidites          Address 1-2 elements:     ROM impaired  MMT impaired  Gait impaired  Decreased FOTO score              Assessment     This is a 32 y.o. female referred to outpatient physical therapy and presents with a medical diagnosis of B plantar fasciitis secondary to weak hips and demonstrates limitations as described in the problem list. Pt demonstrates good rehab potential. Pt will benefit from physcial therapy services in order to maximize pain free and/or functional use of bilateral feet. The following goals were discussed with the patient and patient is in agreement with them as to be addressed in the treatment plan. Pt was given a HEP consisting of treatment this visit. Pt verbally understood the instructions as they were given and demonstrated proper form and technique during therapy. Pt was advised to perform these exercises free of pain, and to stop performing them if pain occurs.     Medical necessity is demonstrated by the following problem list:   - Pain limits function of effected part for all activities  - Unable to participate in daily activities         Short Term Goals (4 Weeks):  - Pt will increase strength to 4+/5 B hip abd  - Decrease Pain to 4/10 as worst  - Pt to self correct posture with minimal cues  - Pt independent with HEP with progressions.     Long Term Goals (6-8 Weeks):  - Pt will tolerate jogging 2 miles painfree  - Pt will increase strength to 5/5 B hip abd  - Decrease Pain to 0/10  - Pt to return to 90% PLOF      Plan     Pt will be treated by physical therapy 1 times a week for 6-8 weeks for manual therapy, therapeutic exercise, home exercise  program, patient education, and modalities PRN to achieve established goals. Yolanda may at times be seen by a PTA as part of the Rehab Team.     Therapist: Orin Montenegro, PT, DPT    I CERTIFY THE NEED FOR THESE SERVICES FURNISHED UNDER THIS PLAN OF TREATMENT AND WHILE UNDER MY CARE    Physician's comments: ____________________________________________________________________________________________________________________________________________    Physician's Name: ___________________________________

## 2017-08-04 ENCOUNTER — OFFICE VISIT (OUTPATIENT)
Dept: OBSTETRICS AND GYNECOLOGY | Facility: CLINIC | Age: 32
End: 2017-08-04
Attending: OBSTETRICS & GYNECOLOGY
Payer: COMMERCIAL

## 2017-08-04 VITALS
WEIGHT: 168 LBS | SYSTOLIC BLOOD PRESSURE: 140 MMHG | BODY MASS INDEX: 28.68 KG/M2 | DIASTOLIC BLOOD PRESSURE: 98 MMHG | HEIGHT: 64 IN

## 2017-08-04 DIAGNOSIS — Z01.419 ENCOUNTER FOR GYNECOLOGICAL EXAMINATION WITHOUT ABNORMAL FINDING: ICD-10-CM

## 2017-08-04 DIAGNOSIS — Z12.4 SCREENING FOR MALIGNANT NEOPLASM OF THE CERVIX: Primary | ICD-10-CM

## 2017-08-04 PROCEDURE — 88175 CYTOPATH C/V AUTO FLUID REDO: CPT

## 2017-08-04 PROCEDURE — 99999 PR PBB SHADOW E&M-EST. PATIENT-LVL III: CPT | Mod: PBBFAC,,, | Performed by: OBSTETRICS & GYNECOLOGY

## 2017-08-04 PROCEDURE — 99395 PREV VISIT EST AGE 18-39: CPT | Mod: S$GLB,,, | Performed by: OBSTETRICS & GYNECOLOGY

## 2017-08-04 NOTE — PROGRESS NOTES
SUBJECTIVE:   32 y.o. female   for annual routine Pap and checkup. No LMP recorded. Patient is not currently having periods (Reason: Birth Control)..  She reports having some break through bleeding with her IUD and is considering whether she should have it removed and go back to [facility].        Past Medical History:   Diagnosis Date    Abnormal Pap smear of cervix     Allergy     seasonal    Anorexia     Bulimia     Fever blister     Migraine headache      Past Surgical History:   Procedure Laterality Date    BONE MARROW ASPIRATION      x 3    CERVICAL BIOPSY  W/ LOOP ELECTRODE EXCISION       SECTION  2016    COLPOSCOPY      DILATION AND CURETTAGE OF UTERUS      SHOULDER SURGERY Left     TONSILLECTOMY       Social History     Social History    Marital status:      Spouse name: N/A    Number of children: N/A    Years of education: N/A     Occupational History    Not on file.     Social History Main Topics    Smoking status: Never Smoker    Smokeless tobacco: Never Used    Alcohol use Yes      Comment: pre pregnancy     Drug use: No    Sexual activity: Yes     Partners: Male     Birth control/ protection: None     Other Topics Concern    Not on file     Social History Narrative    No narrative on file     Family History   Problem Relation Age of Onset    Cancer Maternal Grandmother 40     cervical    Breast cancer Neg Hx     Colon cancer Neg Hx     Ovarian cancer Neg Hx     Melanoma Neg Hx      OB History    Para Term  AB Living   1 1 1     1   SAB TAB Ectopic Multiple Live Births         0 1      # Outcome Date GA Lbr Bull/2nd Weight Sex Delivery Anes PTL Lv   1 Term 16 38w1d  2.96 kg (6 lb 8.4 oz) M CS-LTranv EPI N JOLLY      Complications: Failure to Progress in First Stage            Current Outpatient Prescriptions   Medication Sig Dispense Refill    dextroamphetamine-amphetamine (ADDERALL XR) 25 MG 24 hr capsule Take 1 capsule (25 mg  total) by mouth every morning. 30 capsule 0    diclofenac sodium 1 % Gel Apply 2 g topically 3 (three) times daily. 100 g 3    loratadine (CLARITIN) 10 mg tablet Take 10 mg by mouth once daily.      magnesium oxide (MAG-OX) 400 mg tablet Take 400 mg by mouth once daily.      ibuprofen (ADVIL,MOTRIN) 600 MG tablet Take 1 tablet (600 mg total) by mouth every 6 (six) hours. 30 tablet 2    levonorgestrel (MIRENA) 20 mcg/24 hr (5 years) IUD 1 each by Intrauterine route once.      methylPREDNISolone (MEDROL DOSEPACK) 4 mg tablet use as directed 1 Package 0    valacyclovir (VALTREX) 1000 MG tablet Take 1,000 mg by mouth continuous prn.       No current facility-administered medications for this visit.      Allergies: Amoxicillin; Pcn [penicillins]; and Valium [diazepam]     ROS:  Constitutional: no weight loss, weight gain, fever, fatigue  Eyes:  No vision changes, glasses/contacts  ENT/Mouth: No ulcers, sinus problems, ears ringing, headache  Cardiovascular: No inability to lie flat, chest pain, exercise intolerance, swelling, heart palpitations  Respiratory: No wheezing, coughing blood, shortness of breath, or cough  Gastrointestinal: No diarrhea, bloody stool, nausea/vomiting, constipation, gas, hemorrhoids  Genitourinary: No blood in urine, painful urination, urgency of urination, frequency of urination, incomplete emptying, incontinence, abnormal bleeding, painful periods, heavy periods, vaginal discharge, vaginal odor, painful intercourse, sexual problems, bleeding after intercourse, +irregular bleeding.  Musculoskeletal: No muscle weakness  Skin/Breast: No painful breasts, nipple discharge, masses, rash, ulcers  Neurological: No passing out, seizures, numbness, headache  Endocrine: No diabetes, hypothyroid, hyperthyroid, hot flashes, hair loss, abnormal hair growth, acne  Psychiatric: No depression, crying  Hematologic: No bruises, bleeding, swollen lymph nodes, anemia.      Physical Exam:   Constitutional:  She is oriented to person, place, and time. She appears well-developed and well-nourished.      Neck: Normal range of motion. No tracheal deviation present. No thyromegaly present.    Cardiovascular: Exam reveals no edema.     Pulmonary/Chest: Effort normal. She exhibits no mass, no tenderness, no deformity and no retraction. Right breast exhibits no inverted nipple, no mass, no nipple discharge, no skin change, no tenderness, presence, no bleeding and no swelling. Left breast exhibits no inverted nipple, no mass, no nipple discharge, no skin change, no tenderness, presence, no bleeding and no swelling. Breasts are symmetrical.        Abdominal: Soft. She exhibits no distension and no mass. There is no tenderness. There is no rebound and no guarding. No hernia. Hernia confirmed negative in the left inguinal area.     Genitourinary: Vagina normal and uterus normal. Rectal exam shows no external hemorrhoid. There is no rash, tenderness or lesion on the right labia. There is no rash, tenderness or lesion on the left labia. Uterus is not deviated. Cervix is normal. No no adexnal prolapse. Right adnexum displays no mass, no tenderness and no fullness. Left adnexum displays no mass, no tenderness and no fullness. No tenderness, bleeding, rectocele, cystocele or unspecified prolapse of vaginal walls in the vagina. No vaginal discharge found. Cervix exhibits no motion tenderness, no discharge and no friability.           Musculoskeletal: Normal range of motion and moves all extremeties. She exhibits no edema.      Lymphadenopathy:        Right: No inguinal adenopathy present.        Left: No inguinal adenopathy present.    Neurological: She is alert and oriented to person, place, and time.    Skin: No rash noted. No erythema. No pallor.    Psychiatric: She has a normal mood and affect. Her behavior is normal. Judgment and thought content normal.     IUD strings visible at os    ASSESSMENT:   well woman    PLAN:   pap  smear  return annually or prn  Counseled patient on BTB with Mirena- this can be normal

## 2017-08-11 ENCOUNTER — CLINICAL SUPPORT (OUTPATIENT)
Dept: REHABILITATION | Facility: HOSPITAL | Age: 32
End: 2017-08-11
Attending: PODIATRIST
Payer: COMMERCIAL

## 2017-08-11 DIAGNOSIS — M72.2 PLANTAR FASCIITIS: ICD-10-CM

## 2017-08-11 PROCEDURE — 97110 THERAPEUTIC EXERCISES: CPT

## 2017-08-11 PROCEDURE — 97140 MANUAL THERAPY 1/> REGIONS: CPT

## 2017-08-11 NOTE — PROGRESS NOTES
Physical Therapy Daily Note     Date: 08/11/2017  Name: Yolanda Win  Clinic Number: [id number]  Diagnosis:   Encounter Diagnosis   Name Primary?    Plantar fasciitis      Physician: Babak Chamorro DPM    Evaluation Date: 8/3/17  Visit #: 2   Start Time:  730  Stop Time:  830  Total Treatment Time: 60    Precautions: standard      Subjective     Pt reports she has been compliant with HEP.    Pain: 0/10    Objective     Measurements taken: none this visit    Patient received group therapy to increase strength, endurance, flexibility and core stabilization with activities as follows:     Yolanda received therapeutic exercises to develop strength, endurance, flexibility and core stabilization for 60 minutes including:   Stat bike x10 min  Monster walk otb x1 lap  Foot intrinsic btb x30 B  Lateral walking otb x1 lap  Push/pull 3x10 sec (R ant rot)-np  Clamshells gtb x30 B  Reverse clams x30 1.5#  Piriformis stretch 2x 30 sec B  SL bridges 3x10 B  TA with pelvic floor 10 sec x5  STM B plantar fascia  x10  Cupping B plantar fascia x15 min    *assess deep core next visit      Written Home Exercises Provided: provided at initial eval  Pt demo good understanding of the education provided. Yolanda demonstrated good return demonstration of activities.     Education provided:  Yolanda verbalized good understanding of education provided.   No spiritual or educational barriers to learning identified.    Assessment     This is a 32 y.o. female referred to outpatient physical therapy and presents with a medical diagnosis of B plantar fasciitis and demonstrates limitations as described in the problem list Pt prognosis is Good. Pt will continue to benefit from skilled outpatient physical therapy to address the deficits listed below in the problem list, provide pt/family education and to maximize pt's level of independence in the home and community environment.    Will the patient  continue to benefit from skilled PT intervention? yes        Medical necessity is demonstrated by:   - Pain limits function of effected part for all activities  - Unable to participate in daily activities     Progress towards goals: good    New/Revised Goals:none this visit       Plan   Continue with established Plan of Care towards PT goals.      Therapist: Orin Montenegro, PT, DPT

## 2017-08-17 ENCOUNTER — CLINICAL SUPPORT (OUTPATIENT)
Dept: REHABILITATION | Facility: HOSPITAL | Age: 32
End: 2017-08-17
Attending: PODIATRIST
Payer: COMMERCIAL

## 2017-08-17 ENCOUNTER — OFFICE VISIT (OUTPATIENT)
Dept: URGENT CARE | Facility: CLINIC | Age: 32
End: 2017-08-17
Payer: COMMERCIAL

## 2017-08-17 VITALS
HEART RATE: 87 BPM | SYSTOLIC BLOOD PRESSURE: 152 MMHG | OXYGEN SATURATION: 98 % | TEMPERATURE: 98 F | BODY MASS INDEX: 27.31 KG/M2 | DIASTOLIC BLOOD PRESSURE: 98 MMHG | RESPIRATION RATE: 18 BRPM | WEIGHT: 160 LBS | HEIGHT: 64 IN

## 2017-08-17 DIAGNOSIS — L20.9 ATOPIC DERMATITIS, UNSPECIFIED TYPE: Primary | ICD-10-CM

## 2017-08-17 DIAGNOSIS — M72.2 PLANTAR FASCIITIS: ICD-10-CM

## 2017-08-17 PROCEDURE — 99203 OFFICE O/P NEW LOW 30 MIN: CPT | Mod: 25,S$GLB,, | Performed by: PHYSICIAN ASSISTANT

## 2017-08-17 PROCEDURE — 97110 THERAPEUTIC EXERCISES: CPT

## 2017-08-17 PROCEDURE — 3008F BODY MASS INDEX DOCD: CPT | Mod: S$GLB,,, | Performed by: PHYSICIAN ASSISTANT

## 2017-08-17 PROCEDURE — 97140 MANUAL THERAPY 1/> REGIONS: CPT

## 2017-08-17 RX ORDER — DOXYCYCLINE 40 MG/1
40 CAPSULE ORAL DAILY
Qty: 30 CAPSULE | Refills: 1 | Status: SHIPPED | OUTPATIENT
Start: 2017-08-17 | End: 2018-01-19

## 2017-08-17 RX ORDER — PIMECROLIMUS 10 MG/G
CREAM TOPICAL 2 TIMES DAILY
Qty: 1 TUBE | Refills: 1 | Status: ON HOLD | OUTPATIENT
Start: 2017-08-17 | End: 2019-04-04 | Stop reason: HOSPADM

## 2017-08-17 NOTE — PATIENT INSTRUCTIONS
- Rest.    - Drink plenty of fluids.    - Tylenol or Ibuprofen as directed as needed for fever/pain.    - Follow up with your PCP or specialty clinic as directed in the next 1-2 weeks if not improved or as needed.  You can call [phone] to schedule an appointment with the appropriate provider.    - Go to the ED if your symptoms worsen.    Atopic Dermatitis (Adult)  Atopic dermatitis is a dry, itchy, red rash. Its also called eczema. The rash is chronic, or ongoing. It can come and go over time. The disease is often passed down in families. It causes a problem with the skin barrier that makes the skin more sensitive to the environment and other factors. The increased skin sensitivity causes an itch, which causes scratching. Scratching can worsen the itching or also break the skin. This can put the skin at risk of infection.  The condition is most common in people with asthma, hay fever, hives, or dry or sensitive skin. The rash may be caused by extreme heat or heavy sweating. Skin irritants can cause the rash to flare up. These can include wool or silk clothing, grease, oils, some medicines, and harsh soaps and detergents. Emotional stress can also be a trigger.  Treatment is done to relieve the itching and inflammation of the skin.  Home care  Follow these tips to care for your condition:  · Keep the areas of rash clean by bathing at least every other day. Use lukewarm water to bathe. Dont use hot water, which can dry out the skin.  · Dont use soaps with strong detergents. Use mild soaps made for sensitive skin.  · Apply a cream or ointment to damp skin right after bathing.  · Avoid things that irritate your skin. Wear absorbent, soft fabrics next to the skin rather than rough or scratchy materials.  · Use mild laundry soap free of scents and perfumes. Make sure to rinse all the soap out of your clothes.  · Treat any skin infection as directed.  · Use oral diphenhydramine to help reduce itching. This is an  antihistamine you can buy at drug and grocery stores. It can make you sleepy, so use lower doses during the daytime. Or you can use loratadine. This is an antihistamine that will not make you sleepy. Do not use diphenhydramine if you have glaucoma or have trouble urinating due to an enlarged prostate.  Follow-up care  See your healthcare provider, or as advised. If your symptoms dont get better or if they get worse in the next 7 days, make an appointment with your healthcare provider.  When to seek medical advice  Call your healthcare provider right away  if any of these occur:  · Increasing area of redness or pain in the skin  · Yellow crusts or wet drainage from the rash  · Fever of 100.4°F (38°C) or higher, or as directed by your healthcare provider  Date Last Reviewed: 9/1/2016  © 0776-7103 [employer]. [address] [place] [address], [place] [address]. All rights reserved. This information is not intended as a substitute for professional medical care. Always follow your healthcare professional's instructions.

## 2017-08-17 NOTE — PROGRESS NOTES
Physical Therapy Daily Note     Date: 08/17/2017  Name: Yolanda Win  Clinic Number: [id number]  Diagnosis:   Encounter Diagnosis   Name Primary?    Plantar fasciitis      Physician: Babak Chamorro DPM    Evaluation Date: 8/3/17  Visit #: 3  Start Time:  450  Stop Time:  550  Total Treatment Time: 60    Precautions: standard      Subjective     Pt reports she felt better after last visit, but pain returned yesterday.    Pain: 0/10    Objective     Measurements taken: none this visit    Patient received group therapy to increase strength, endurance, flexibility and core stabilization with activities as follows:     Yolanda received therapeutic exercises to develop strength, endurance, flexibility and core stabilization for 60 minutes including:   Stat bike x10 min  Monster walk otb x1 lap-np  Foot intrinsic btb x30 B-np  Lateral walking gtb x1 lap  Push/pull 3x10 sec (R ant rot)-np  Clamshells tb x30 B  Reverse clagms x30 2#  Piriformis stretch 2x 30 sec B  Half knee foam balance tandem x1 min B  SL bridges 3x10 B  RDL with foam roller x20 B  TA with pelvic floor 30 sec x3  STM B plantar fascia  x10-np  Cupping B plantar fascia x15 min    *assess deep core next visit      Written Home Exercises Provided: provided at initial eval  Pt demo good understanding of the education provided. Yolanda demonstrated good return demonstration of activities.     Education provided:  Yolanda verbalized good understanding of education provided.   No spiritual or educational barriers to learning identified.    Assessment   Patient showed improved hip strengthening this visit, but continues to present with weak TA and pelvic floor. Patient progressing well with treatment and shows good response to cupping. Patient would continue to benefit from PT intervention to progress toward functional goals.  This is a 32 y.o. female referred to outpatient physical therapy and presents with a  medical diagnosis of B plantar fasciitis and demonstrates limitations as described in the problem list Pt prognosis is Good. Pt will continue to benefit from skilled outpatient physical therapy to address the deficits listed below in the problem list, provide pt/family education and to maximize pt's level of independence in the home and community environment.    Will the patient continue to benefit from skilled PT intervention? yes        Medical necessity is demonstrated by:   - Pain limits function of effected part for all activities  - Unable to participate in daily activities     Progress towards goals: good    New/Revised Goals:none this visit       Plan   Continue with established Plan of Care towards PT goals.      Therapist: Orin Montenegro, PT, DPT

## 2017-08-17 NOTE — PROGRESS NOTES
"Subjective:       Patient ID: Yolanda Win is a 32 y.o. female.    Vitals:  height is 5' 4" (1.626 m) and weight is 72.6 kg (160 lb). Her temperature is 98.2 °F (36.8 °C). Her blood pressure is 152/98 (abnormal) and her pulse is 87. Her respiration is 18 and oxygen saturation is 98%.     Chief Complaint: Rash    This is a 32 y.o. female with Past Medical History:  No date: Abnormal Pap smear of cervix  No date: Allergy      Comment: seasonal  No date: Anorexia  No date: Bulimia  No date: Fever blister  No date: Migraine headache   who presents today with a chief complaint of Rash.      Rash   This is a new problem. The current episode started more than 1 month ago. The problem has been gradually worsening since onset. The affected locations include the face. The rash is characterized by itchiness, dryness and redness. She was exposed to nothing. Pertinent negatives include no congestion, fever, joint pain, shortness of breath or sore throat.     Review of Systems   Constitution: Negative for chills and fever.   HENT: Negative for congestion and sore throat.    Cardiovascular: Negative for chest pain.   Respiratory: Negative for shortness of breath.    Skin: Positive for dry skin, itching and rash.   Musculoskeletal: Negative for joint pain.       Objective:      Physical Exam   Constitutional: She is oriented to person, place, and time. She appears well-developed and well-nourished.   HENT:   Head: Normocephalic and atraumatic.   Eyes: Conjunctivae are normal.   Neck: Normal range of motion. Neck supple. No thyromegaly present.   Cardiovascular: Normal rate and regular rhythm.  Exam reveals no gallop and no friction rub.    No murmur heard.  Pulmonary/Chest: Effort normal and breath sounds normal. She has no wheezes. She has no rales.   Musculoskeletal: Normal range of motion.   Lymphadenopathy:     She has no cervical adenopathy.   Neurological: She is alert and oriented to person, place, and time.   Skin: Skin " is warm and dry. Rash noted. Rash is papular (around the nose). No erythema.   Psychiatric: She has a normal mood and affect. Her behavior is normal. Judgment and thought content normal.   Nursing note and vitals reviewed.      Assessment:       1. Atopic dermatitis, unspecified type        Plan:         Atopic dermatitis, unspecified type  -     pimecrolimus (ELIDEL) 1 % cream; Apply topically 2 (two) times daily.  Dispense: 1 Tube; Refill: 1  -     doxycycline (ORACEA) 40 mg capsule; Take 1 capsule (40 mg total) by mouth once daily.  Dispense: 30 capsule; Refill: 1      Yolanda was seen today for rash.    Diagnoses and all orders for this visit:    Atopic dermatitis, unspecified type  -     pimecrolimus (ELIDEL) 1 % cream; Apply topically 2 (two) times daily.  -     doxycycline (ORACEA) 40 mg capsule; Take 1 capsule (40 mg total) by mouth once daily.    6:48 PM - Pharmacy called and her RX was changed to Doxycycline monohydrate 50 mg daily as her insurance would not cover oracea.    Patient Instructions   - Rest.    - Drink plenty of fluids.    - Tylenol or Ibuprofen as directed as needed for fever/pain.    - Follow up with your PCP or specialty clinic as directed in the next 1-2 weeks if not improved or as needed.  You can call [phone] to schedule an appointment with the appropriate provider.    - Go to the ED if your symptoms worsen.    Atopic Dermatitis (Adult)  Atopic dermatitis is a dry, itchy, red rash. Its also called eczema. The rash is chronic, or ongoing. It can come and go over time. The disease is often passed down in families. It causes a problem with the skin barrier that makes the skin more sensitive to the environment and other factors. The increased skin sensitivity causes an itch, which causes scratching. Scratching can worsen the itching or also break the skin. This can put the skin at risk of infection.  The condition is most common in people with asthma, hay fever, hives, or dry or  sensitive skin. The rash may be caused by extreme heat or heavy sweating. Skin irritants can cause the rash to flare up. These can include wool or silk clothing, grease, oils, some medicines, and harsh soaps and detergents. Emotional stress can also be a trigger.  Treatment is done to relieve the itching and inflammation of the skin.  Home care  Follow these tips to care for your condition:  · Keep the areas of rash clean by bathing at least every other day. Use lukewarm water to bathe. Dont use hot water, which can dry out the skin.  · Dont use soaps with strong detergents. Use mild soaps made for sensitive skin.  · Apply a cream or ointment to damp skin right after bathing.  · Avoid things that irritate your skin. Wear absorbent, soft fabrics next to the skin rather than rough or scratchy materials.  · Use mild laundry soap free of scents and perfumes. Make sure to rinse all the soap out of your clothes.  · Treat any skin infection as directed.  · Use oral diphenhydramine to help reduce itching. This is an antihistamine you can buy at drug and grocery stores. It can make you sleepy, so use lower doses during the daytime. Or you can use loratadine. This is an antihistamine that will not make you sleepy. Do not use diphenhydramine if you have glaucoma or have trouble urinating due to an enlarged prostate.  Follow-up care  See your healthcare provider, or as advised. If your symptoms dont get better or if they get worse in the next 7 days, make an appointment with your healthcare provider.  When to seek medical advice  Call your healthcare provider right away  if any of these occur:  · Increasing area of redness or pain in the skin  · Yellow crusts or wet drainage from the rash  · Fever of 100.4°F (38°C) or higher, or as directed by your healthcare provider  Date Last Reviewed: 9/1/2016 © 2000-2016 [employer]. [address] [place] [address], [place] [address]. All rights reserved. This information is not  intended as a substitute for professional medical care. Always follow your healthcare professional's instructions.

## 2017-08-22 ENCOUNTER — CLINICAL SUPPORT (OUTPATIENT)
Dept: REHABILITATION | Facility: HOSPITAL | Age: 32
End: 2017-08-22
Attending: PODIATRIST
Payer: COMMERCIAL

## 2017-08-22 DIAGNOSIS — M72.2 PLANTAR FASCIITIS: ICD-10-CM

## 2017-08-22 PROCEDURE — 97110 THERAPEUTIC EXERCISES: CPT

## 2017-08-22 PROCEDURE — 97140 MANUAL THERAPY 1/> REGIONS: CPT

## 2017-08-22 NOTE — PROGRESS NOTES
Physical Therapy Daily Note     Date: 08/22/2017  Name: Yolanda Win  Clinic Number: [id number]  Diagnosis:   Encounter Diagnosis   Name Primary?    Plantar fasciitis      Physician: Babak Chamorro DPM    Evaluation Date: 8/3/17  Visit #: 4  Start Time:  500  Stop Time:  605  Total Treatment Time: 65    Precautions: standard      Subjective     Pt reports she is doing better.    Pain: 0/10    Objective     Measurements taken:   B hip abd: 4+/5    Patient received group therapy to increase strength, endurance, flexibility and core stabilization with activities as follows:     Yolanda received therapeutic exercises to develop strength, endurance, flexibility and core stabilization for 60 minutes including:   Stat bike x10 min  Monster walk gtb x1 lap  Lateral walking gtb x1 lap  Push/pull 3x10 sec (R ant rot)-np  Clamshells btb x30 B  Reverse clagms x30 2#-np  Piriformis stretch x1 min B  Half knee foam balance tandem x1 min B-np  SL bridges 3x10 B on 1/2 foam  RDL with foam roller x20 B  TA with pelvic floor x30 sec   TA with knee falls 5x 10 sh  STM B plantar fascia  x10-np  Cupping B plantar fascia x15 min    *assess posterior tibialis next visit        Written Home Exercises Provided: provided at initial eval  Pt demo good understanding of the education provided. Yolanda demonstrated good return demonstration of activities.     Education provided:  Yolanda verbalized good understanding of education provided.   No spiritual or educational barriers to learning identified.    Assessment   Patient continues to respond well to B cupping on plantar surface. Patient did note MD telling her of posterior tibialis/tarsel tunnel symptoms that should be treated next visit. Patient demonstrated improved hip strength this visit since initial eval. Patient progressing well with treatment and shows good response to cupping. Patient would continue to benefit from PT  intervention to progress toward functional goals.  This is a 32 y.o. female referred to outpatient physical therapy and presents with a medical diagnosis of B plantar fasciitis and demonstrates limitations as described in the problem list Pt prognosis is Good. Pt will continue to benefit from skilled outpatient physical therapy to address the deficits listed below in the problem list, provide pt/family education and to maximize pt's level of independence in the home and community environment.    Will the patient continue to benefit from skilled PT intervention? yes        Medical necessity is demonstrated by:   - Pain limits function of effected part for all activities  - Unable to participate in daily activities     Progress towards goals: good    New/Revised Goals:none this visit       Plan   Continue with established Plan of Care towards PT goals.      Therapist: Orin Montenegro, PT, DPT

## 2017-08-24 DIAGNOSIS — M72.2 PLANTAR FASCIITIS, BILATERAL: ICD-10-CM

## 2017-08-24 DIAGNOSIS — F90.0 ATTENTION DEFICIT HYPERACTIVITY DISORDER, INATTENTIVE TYPE: Primary | ICD-10-CM

## 2017-08-24 RX ORDER — DEXTROAMPHETAMINE SACCHARATE, AMPHETAMINE ASPARTATE MONOHYDRATE, DEXTROAMPHETAMINE SULFATE AND AMPHETAMINE SULFATE 6.25; 6.25; 6.25; 6.25 MG/1; MG/1; MG/1; MG/1
25 CAPSULE, EXTENDED RELEASE ORAL EVERY MORNING
Qty: 30 CAPSULE | Refills: 0 | Status: SHIPPED | OUTPATIENT
Start: 2017-08-24 | End: 2017-09-29 | Stop reason: SDUPTHER

## 2017-08-24 RX ORDER — DICLOFENAC SODIUM 10 MG/G
2 GEL TOPICAL 3 TIMES DAILY
Qty: 100 G | Refills: 3 | Status: SHIPPED | OUTPATIENT
Start: 2017-08-24 | End: 2018-01-05

## 2017-08-28 ENCOUNTER — CLINICAL SUPPORT (OUTPATIENT)
Dept: REHABILITATION | Facility: HOSPITAL | Age: 32
End: 2017-08-28
Attending: PODIATRIST
Payer: COMMERCIAL

## 2017-08-28 DIAGNOSIS — M72.2 PLANTAR FASCIITIS: ICD-10-CM

## 2017-08-28 PROCEDURE — 97110 THERAPEUTIC EXERCISES: CPT

## 2017-08-28 PROCEDURE — 97140 MANUAL THERAPY 1/> REGIONS: CPT

## 2017-08-29 NOTE — PROGRESS NOTES
Physical Therapy Daily Note     Diagnosis:   Encounter Diagnosis   Name Primary?    Plantar fasciitis      Physician: Babak Chamorro DPM    Evaluation Date: 8/3/17  Visit #: 5  Precautions: standard    Subjective     Pt states increase of pain on on B feet since last PT visit.     Objective     Measurements taken:   B hip abd: 4+/5    Patient received group therapy to increase strength, endurance, flexibility and core stabilization with activities as follows:     Yolanda received therapeutic exercises to develop strength, endurance, flexibility and core stabilization for 60 minutes including:   Stat bike x10 min  Standing:gastroc stretch 2 x 1 minute   Short sitting:towel exercises   Toes flexion/extension   Ankle inversion/eversion   Pt received 10 minutes of MH followed by soft tissue mobs on B plantar fascia followed by manual stretch done by PTA   Written Home Exercises Provided: provided at initial eval  Pt demo good understanding of the education provided. Yolanda demonstrated good return demonstration of activities.     Education provided:  Yolanda verbalized good understanding of education provided.   No spiritual or educational barriers to learning identified.    Assessment   Patient continues to respond well to B cupping on plantar surface. Patient did note MD telling her of posterior tibialis/tarsel tunnel symptoms that should be treated next visit. Patient demonstrated improved hip strength this visit since initial eval. Patient progressing well with treatment and shows good response to cupping. Patient would continue to benefit from PT intervention to progress toward functional goals.  This is a 32 y.o. female referred to outpatient physical therapy and presents with a medical diagnosis of B plantar fasciitis and demonstrates limitations as described in the problem list Pt prognosis is Good. Pt will continue to benefit from skilled outpatient physical  therapy to address the deficits listed below in the problem list, provide pt/family education and to maximize pt's level of independence in the home and community environment.    Will the patient continue to benefit from skilled PT intervention? yes        Medical necessity is demonstrated by:   - Pain limits function of effected part for all activities  - Unable to participate in daily activities     Progress towards goals: good    Short Term Goals (4 Weeks):  - Pt will increase strength to 4+/5 B hip abd  - Decrease Pain to 4/10 as worst  - Pt to self correct posture with minimal cues  - Pt independent with HEP with progressions.     Long Term Goals (6-8 Weeks):  - Pt will tolerate jogging 2 miles painfree  - Pt will increase strength to 5/5 B hip abd  - Decrease Pain to 0/10  - Pt to return to 90% PLOF       Plan   Continue with established Plan of Care towards PT goals. PT/PTA face-to-face:discussed pt's POC and progress established PT goals.  Hattie Molina, PTA

## 2017-09-12 ENCOUNTER — CLINICAL SUPPORT (OUTPATIENT)
Dept: REHABILITATION | Facility: HOSPITAL | Age: 32
End: 2017-09-12
Attending: PODIATRIST
Payer: COMMERCIAL

## 2017-09-12 DIAGNOSIS — M72.2 PLANTAR FASCIITIS: ICD-10-CM

## 2017-09-12 PROCEDURE — 97140 MANUAL THERAPY 1/> REGIONS: CPT

## 2017-09-12 NOTE — PROGRESS NOTES
Physical Therapy Daily Note     Diagnosis:   Encounter Diagnosis   Name Primary?    Plantar fasciitis      Physician: Babak Chamorro DPM    Evaluation Date: 8/3/17  Visit #: 6  Precautions: standard    Subjective     Pt states she has felt amazing since her last PT session.    Objective     Measurements taken:   B hip abd: 4+/5    Patient received group therapy to increase strength, endurance, flexibility and core stabilization with activities as follows:     Yolanda received therapeutic exercises to develop strength, endurance, flexibility and core stabilization for 50 minutes including:   Stat bike x10 min  Standing:gastroc stretch 2 x 1 minute -np  Short sitting:towel exercises -np  Toes flexion/extension -np  Ankle inversion/eversion -np  Pt received 10 minutes of MH followed by soft tissue mobs on B plantar fascia followed by manual stretch done by PTA x30 min    Treatment ended with ice massage x10 min    Written Home Exercises Provided: provided at initial eval  Pt demo good understanding of the education provided. Yolanda demonstrated good return demonstration of activities.     Education provided:  Yolanda verbalized good understanding of education provided.   No spiritual or educational barriers to learning identified.    Assessment   Patient continues to respond well to STM to B post tibalis and ice massage to decrease inflammation.Patient progressing well with treatment and shows good response to cupping. Patient would continue to benefit from PT intervention to progress toward functional goals.  This is a 32 y.o. female referred to outpatient physical therapy and presents with a medical diagnosis of B plantar fasciitis and demonstrates limitations as described in the problem list Pt prognosis is Good. Pt will continue to benefit from skilled outpatient physical therapy to address the deficits listed below in the problem list, provide pt/family  education and to maximize pt's level of independence in the home and community environment.    Will the patient continue to benefit from skilled PT intervention? yes        Medical necessity is demonstrated by:   - Pain limits function of effected part for all activities  - Unable to participate in daily activities     Progress towards goals: good    Short Term Goals (4 Weeks):  - Pt will increase strength to 4+/5 B hip abd  - Decrease Pain to 4/10 as worst  - Pt to self correct posture with minimal cues  - Pt independent with HEP with progressions.     Long Term Goals (6-8 Weeks):  - Pt will tolerate jogging 2 miles painfree  - Pt will increase strength to 5/5 B hip abd  - Decrease Pain to 0/10  - Pt to return to 90% PLOF       Plan   Continue with established Plan of Care towards PT goals. PT/PTA face-to-face:discussed pt's POC and progress established PT goals.  Orin Montenegro, PT, DPT

## 2017-09-28 ENCOUNTER — OFFICE VISIT (OUTPATIENT)
Dept: PSYCHIATRY | Facility: CLINIC | Age: 32
End: 2017-09-28
Payer: COMMERCIAL

## 2017-09-28 VITALS
HEART RATE: 98 BPM | DIASTOLIC BLOOD PRESSURE: 71 MMHG | WEIGHT: 161.5 LBS | SYSTOLIC BLOOD PRESSURE: 128 MMHG | HEIGHT: 64 IN | BODY MASS INDEX: 27.57 KG/M2

## 2017-09-28 DIAGNOSIS — Z86.59 HISTORY OF POSTTRAUMATIC STRESS DISORDER (PTSD): ICD-10-CM

## 2017-09-28 DIAGNOSIS — F90.0 ADHD (ATTENTION DEFICIT HYPERACTIVITY DISORDER), INATTENTIVE TYPE: Primary | ICD-10-CM

## 2017-09-28 DIAGNOSIS — F41.9 ANXIETY DISORDER, UNSPECIFIED TYPE: ICD-10-CM

## 2017-09-28 DIAGNOSIS — Z87.898 HISTORY OF INSOMNIA: ICD-10-CM

## 2017-09-28 PROCEDURE — 99214 OFFICE O/P EST MOD 30 MIN: CPT | Mod: S$GLB,,, | Performed by: PSYCHIATRY & NEUROLOGY

## 2017-09-28 PROCEDURE — 90833 PSYTX W PT W E/M 30 MIN: CPT | Mod: S$GLB,,, | Performed by: PSYCHIATRY & NEUROLOGY

## 2017-09-28 PROCEDURE — 99999 PR PBB SHADOW E&M-EST. PATIENT-LVL III: CPT | Mod: PBBFAC,,, | Performed by: PSYCHIATRY & NEUROLOGY

## 2017-09-28 PROCEDURE — 3008F BODY MASS INDEX DOCD: CPT | Mod: S$GLB,,, | Performed by: PSYCHIATRY & NEUROLOGY
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
## 2017-09-29 ENCOUNTER — PATIENT MESSAGE (OUTPATIENT)
Dept: PSYCHIATRY | Facility: CLINIC | Age: 32
End: 2017-09-29

## 2017-09-29 DIAGNOSIS — F90.0 ATTENTION DEFICIT HYPERACTIVITY DISORDER, INATTENTIVE TYPE: ICD-10-CM

## 2017-09-29 RX ORDER — DEXTROAMPHETAMINE SACCHARATE, AMPHETAMINE ASPARTATE MONOHYDRATE, DEXTROAMPHETAMINE SULFATE AND AMPHETAMINE SULFATE 6.25; 6.25; 6.25; 6.25 MG/1; MG/1; MG/1; MG/1
25 CAPSULE, EXTENDED RELEASE ORAL EVERY MORNING
Qty: 30 CAPSULE | Refills: 0 | Status: SHIPPED | OUTPATIENT
Start: 2017-09-29 | End: 2017-10-25 | Stop reason: SDUPTHER

## 2017-10-05 ENCOUNTER — CLINICAL SUPPORT (OUTPATIENT)
Dept: REHABILITATION | Facility: HOSPITAL | Age: 32
End: 2017-10-05
Attending: PODIATRIST
Payer: COMMERCIAL

## 2017-10-05 DIAGNOSIS — M72.2 PLANTAR FASCIITIS: ICD-10-CM

## 2017-10-05 PROCEDURE — 97110 THERAPEUTIC EXERCISES: CPT

## 2017-10-05 PROCEDURE — 97140 MANUAL THERAPY 1/> REGIONS: CPT

## 2017-10-19 ENCOUNTER — CLINICAL SUPPORT (OUTPATIENT)
Dept: REHABILITATION | Facility: HOSPITAL | Age: 32
End: 2017-10-19
Attending: PODIATRIST
Payer: COMMERCIAL

## 2017-10-19 DIAGNOSIS — M72.2 PLANTAR FASCIITIS: ICD-10-CM

## 2017-10-19 PROCEDURE — 97140 MANUAL THERAPY 1/> REGIONS: CPT

## 2017-10-19 NOTE — PROGRESS NOTES
Physical Therapy Daily Note     Diagnosis:   Encounter Diagnosis   Name Primary?    Plantar fasciitis      Physician: Babak Chamorro DPM    Evaluation Date: 8/3/17  Visit #: 8  Precautions: standard    Subjective     Pt states she felt better for 3 weeks again and got better shoes.  Pain: not quantified this visit    Objective     Measurements taken:   B hip abd: 4+/5    Patient received group therapy to increase strength, endurance, flexibility and core stabilization with activities as follows:     Yolanda received therapeutic exercises to develop strength, endurance, flexibility and core stabilization for 45 minutes including:   elliptical x10 min  Standing:gastroc stretch x2 minute   Short sitting:towel exercises -np  Toes flexion/extension -np  Ankle inversion/eversion -np  Pt received 10 minutes of MH followed by soft tissue mobs on B plantar fascia followed by manual stretch done by PTA x25 min    Treatment ended with ice massage x10 min-np    DN attempted by Adriana Gamboa, PT, DPT     Written Home Exercises Provided: provided at initial eval  Pt demo good understanding of the education provided. Yolanda demonstrated good return demonstration of activities.     Education provided:  Yolanda verbalized good understanding of education provided.   No spiritual or educational barriers to learning identified.    Assessment   Patient tolerated dry needling fairly well, but tensed up and wanted to re-attempt next visit. Patient would continue to benefit from PT intervention to progress toward functional goals.  This is a 32 y.o. female referred to outpatient physical therapy and presents with a medical diagnosis of B plantar fasciitis and demonstrates limitations as described in the problem list Pt prognosis is Good. Pt will continue to benefit from skilled outpatient physical therapy to address the deficits listed below in the problem list, provide pt/family  education and to maximize pt's level of independence in the home and community environment.    Will the patient continue to benefit from skilled PT intervention? yes        Medical necessity is demonstrated by:   - Pain limits function of effected part for all activities  - Unable to participate in daily activities     Progress towards goals: good    Short Term Goals (4 Weeks):  - Pt will increase strength to 4+/5 B hip abd  - Decrease Pain to 4/10 as worst  - Pt to self correct posture with minimal cues  - Pt independent with HEP with progressions.     Long Term Goals (6-8 Weeks):  - Pt will tolerate jogging 2 miles painfree  - Pt will increase strength to 5/5 B hip abd  - Decrease Pain to 0/10  - Pt to return to 90% PLOF       Plan   Continue with established Plan of Care towards PT goals. PT/PTA face-to-face:discussed pt's POC and progress established PT goals.  Orin Montenegro, PT, DPT

## 2017-10-25 DIAGNOSIS — F90.0 ATTENTION DEFICIT HYPERACTIVITY DISORDER, INATTENTIVE TYPE: ICD-10-CM

## 2017-10-25 RX ORDER — DEXTROAMPHETAMINE SACCHARATE, AMPHETAMINE ASPARTATE MONOHYDRATE, DEXTROAMPHETAMINE SULFATE AND AMPHETAMINE SULFATE 6.25; 6.25; 6.25; 6.25 MG/1; MG/1; MG/1; MG/1
25 CAPSULE, EXTENDED RELEASE ORAL EVERY MORNING
Qty: 30 CAPSULE | Refills: 0 | Status: SHIPPED | OUTPATIENT
Start: 2017-10-25 | End: 2017-11-21 | Stop reason: SDUPTHER

## 2017-10-27 ENCOUNTER — CLINICAL SUPPORT (OUTPATIENT)
Dept: REHABILITATION | Facility: HOSPITAL | Age: 32
End: 2017-10-27
Attending: PODIATRIST
Payer: COMMERCIAL

## 2017-10-27 DIAGNOSIS — M72.2 PLANTAR FASCIITIS: ICD-10-CM

## 2017-10-27 PROCEDURE — 97140 MANUAL THERAPY 1/> REGIONS: CPT

## 2017-10-27 PROCEDURE — 97110 THERAPEUTIC EXERCISES: CPT

## 2017-10-27 NOTE — PROGRESS NOTES
Physical Therapy Daily Note     Diagnosis:   Encounter Diagnosis   Name Primary?    Plantar fasciitis      Physician: Babak Chamorro DPM    Evaluation Date: 8/3/17  Visit #: 9  Precautions: standard    Subjective     Pt states she had really intense pain after the needling attempt last visit.  Pain: not quantified this visit    Objective     Measurements taken:   B hip abd: 4+/5    Patient received group therapy to increase strength, endurance, flexibility and core stabilization with activities as follows:     Yolanda received therapeutic exercises to develop strength, endurance, flexibility and core stabilization for 45 minutes including:   elliptical x10 min  Standing:gastroc stretch x2 minute   HSS x1 min B  pilates chair core press x20   pilates soleus x20 B  Short sitting:towel exercises -np  Toes flexion/extension -np  Ankle inversion/eversion -np    Pt received 10 minutes of MH followed by soft tissue mobs on B plantar fascia  x25 min    Treatment ended with ice massage x10 min-np    DN attempted by Adriana Gamboa, PT, DPT     Written Home Exercises Provided: provided at initial eval  Pt demo good understanding of the education provided. Yolanda demonstrated good return demonstration of activities.     Education provided:  Yolanda verbalized good understanding of education provided.   No spiritual or educational barriers to learning identified.    Assessment   Patient tolerated core and soleus strengthening well and presented with decreased tension in B gastrocs, post tib and plantar fascia this visit. She continues to demonstrate a good understanding of HEP. Patient would continue to benefit from PT intervention to progress toward functional goals.  This is a 32 y.o. female referred to outpatient physical therapy and presents with a medical diagnosis of B plantar fasciitis and demonstrates limitations as described in the problem list Pt prognosis is  Good. Pt will continue to benefit from skilled outpatient physical therapy to address the deficits listed below in the problem list, provide pt/family education and to maximize pt's level of independence in the home and community environment.    Will the patient continue to benefit from skilled PT intervention? yes        Medical necessity is demonstrated by:   - Pain limits function of effected part for all activities  - Unable to participate in daily activities     Progress towards goals: good    Short Term Goals (4 Weeks):  - Pt will increase strength to 4+/5 B hip abd  - Decrease Pain to 4/10 as worst  - Pt to self correct posture with minimal cues  - Pt independent with HEP with progressions.     Long Term Goals (6-8 Weeks):  - Pt will tolerate jogging 2 miles painfree  - Pt will increase strength to 5/5 B hip abd  - Decrease Pain to 0/10  - Pt to return to 90% PLOF       Plan   Continue with established Plan of Care towards PT goals. PT/PTA face-to-face:discussed pt's POC and progress established PT goals.  Orin Montenegro, PT, DPT

## 2017-11-13 ENCOUNTER — OFFICE VISIT (OUTPATIENT)
Dept: DERMATOLOGY | Facility: CLINIC | Age: 32
End: 2017-11-13
Payer: COMMERCIAL

## 2017-11-13 DIAGNOSIS — Z12.83 SCREENING EXAM FOR SKIN CANCER: ICD-10-CM

## 2017-11-13 DIAGNOSIS — L98.9 DISEASE OF SKIN AND SUBCUTANEOUS TISSUE: Primary | ICD-10-CM

## 2017-11-13 DIAGNOSIS — D18.00 ANGIOMA: ICD-10-CM

## 2017-11-13 DIAGNOSIS — L81.4 LENTIGO: ICD-10-CM

## 2017-11-13 DIAGNOSIS — L71.0 PERIORIFICIAL DERMATITIS: ICD-10-CM

## 2017-11-13 DIAGNOSIS — D22.9 MULTIPLE BENIGN NEVI: ICD-10-CM

## 2017-11-13 DIAGNOSIS — D23.9 DERMATOFIBROMA: ICD-10-CM

## 2017-11-13 PROCEDURE — 99214 OFFICE O/P EST MOD 30 MIN: CPT | Mod: S$GLB,,, | Performed by: DERMATOLOGY

## 2017-11-13 PROCEDURE — 99999 PR PBB SHADOW E&M-EST. PATIENT-LVL II: CPT | Mod: PBBFAC,,, | Performed by: DERMATOLOGY

## 2017-11-13 RX ORDER — DOXYCYCLINE 100 MG/1
TABLET ORAL
Qty: 30 TABLET | Refills: 2 | Status: SHIPPED | OUTPATIENT
Start: 2017-11-13 | End: 2018-02-07

## 2017-11-13 NOTE — PROGRESS NOTES
Subjective:       Patient ID:  Yolanda Win is a 32 y.o. female who presents for   Chief Complaint   Patient presents with    Skin Check     TBSE    Rash     left forearm and left lower leg     Last seen 12/19/16 for TBSE.     Patient complains of lesion(s)  Location: rash on left arm   Duration: 2 weeks  Symptoms: itchy  Relieving factors/Previous treatments: none  Noticed after at gyn  Has 2 dogs        Rash  - Initial  Affected locations: face (around nose)  Duration: few years. flare 2 months ago.  Signs / symptoms: itching, scaling and redness  Treatments tried: went to UC and tx with doxy 100mg qday x 1 month and elidel cream - resolved and d/c'ed meds.        Review of Systems   Skin: Positive for itching, rash, daily sunscreen use, activity-related sunscreen use, tendency to form keloidal scars and recent sunburn (chest and face).   Hematologic/Lymphatic: Does not bruise/bleed easily.        Objective:    Physical Exam   Constitutional: She appears well-developed and well-nourished. No distress.   Neurological: She is alert and oriented to person, place, and time. She is not disoriented.   Psychiatric: She has a normal mood and affect.   Skin:   Areas Examined (abnormalities noted in diagram):   Scalp / Hair Palpated and Inspected  Head / Face Inspection Performed  Neck Inspection Performed  Chest / Axilla Inspection Performed  Abdomen Inspection Performed  Genitals / Buttocks / Groin Inspection Performed  Back Inspection Performed  RUE Inspected  LUE Inspection Performed  RLE Inspected  LLE Inspection Performed  Nails and Digits Inspection Performed                   Diagram Legend     Erythematous scaling macule/papule c/w actinic keratosis       Vascular papule c/w angioma      Pigmented verrucoid papule/plaque c/w seborrheic keratosis      Yellow umbilicated papule c/w sebaceous hyperplasia      Irregularly shaped tan macule c/w lentigo     1-2 mm smooth white papules consistent with Milia       Movable subcutaneous cyst with punctum c/w epidermal inclusion cyst      Subcutaneous movable cyst c/w pilar cyst      Firm pink to brown papule c/w dermatofibroma      Pedunculated fleshy papule(s) c/w skin tag(s)      Evenly pigmented macule c/w junctional nevus     Mildly variegated pigmented, slightly irregular-bordered macule c/w mildly atypical nevus      Flesh colored to evenly pigmented papule c/w intradermal nevus       Pink pearly papule/plaque c/w basal cell carcinoma      Erythematous hyperkeratotic cursted plaque c/w SCC      Surgical scar with no sign of skin cancer recurrence      Open and closed comedones      Inflammatory papules and pustules      Verrucoid papule consistent consistent with wart     Erythematous eczematous patches and plaques     Dystrophic onycholytic nail with subungual debris c/w onychomycosis     Umbilicated papule    Erythematous-base heme-crusted tan verrucoid plaque consistent with inflamed seborrheic keratosis     Erythematous Silvery Scaling Plaque c/w Psoriasis     See annotation      Assessment / Plan:        Disease of skin and subcutaneous tissue - KOH neg  Ddx: nummular eczema (+ itchy) vs herald patch of GA  Brochure provided and ddx discussed  Ok to use OTC HC    Multiple benign nevi   - stable and chronic      Angioma  This is a benign vascular lesion. Reassurance given. No treatment required.       Lentigo   - stable and chronic      Dermatofibroma   - stable and chronic      Screening exam for skin cancer  Total body skin examination performed today including at least 12 points as noted in physical examination. No lesions suspicious for malignancy noted.      Periorificial dermatitis  elidel cream bid  Change to aruna's toothepaste  -     doxycycline monohydrate 100 mg Tab; Take 1 po qday  Dispense: 30 tablet; Refill: 2               Return if symptoms worsen or fail to improve.

## 2017-11-21 DIAGNOSIS — F90.0 ATTENTION DEFICIT HYPERACTIVITY DISORDER, INATTENTIVE TYPE: ICD-10-CM

## 2017-11-21 RX ORDER — DEXTROAMPHETAMINE SACCHARATE, AMPHETAMINE ASPARTATE MONOHYDRATE, DEXTROAMPHETAMINE SULFATE AND AMPHETAMINE SULFATE 6.25; 6.25; 6.25; 6.25 MG/1; MG/1; MG/1; MG/1
25 CAPSULE, EXTENDED RELEASE ORAL EVERY MORNING
Qty: 30 CAPSULE | Refills: 0 | Status: SHIPPED | OUTPATIENT
Start: 2017-11-21 | End: 2017-12-26 | Stop reason: SDUPTHER

## 2017-12-26 DIAGNOSIS — F90.0 ATTENTION DEFICIT HYPERACTIVITY DISORDER, INATTENTIVE TYPE: ICD-10-CM

## 2017-12-26 RX ORDER — DEXTROAMPHETAMINE SACCHARATE, AMPHETAMINE ASPARTATE MONOHYDRATE, DEXTROAMPHETAMINE SULFATE AND AMPHETAMINE SULFATE 6.25; 6.25; 6.25; 6.25 MG/1; MG/1; MG/1; MG/1
25 CAPSULE, EXTENDED RELEASE ORAL EVERY MORNING
Qty: 30 CAPSULE | Refills: 0 | Status: SHIPPED | OUTPATIENT
Start: 2017-12-26 | End: 2018-01-22 | Stop reason: SDUPTHER

## 2018-01-02 ENCOUNTER — TELEPHONE (OUTPATIENT)
Dept: ORTHOPEDICS | Facility: CLINIC | Age: 33
End: 2018-01-02

## 2018-01-02 NOTE — TELEPHONE ENCOUNTER
----- Message from Juan Ramon Hensley sent at 12/29/2017  4:36 PM CST -----  Contact: Self/[phone]  Pt called in inquiring why Dr. Antony replaced Beatriz Coulter for pt's appt.  Pt can be reached at [phone].

## 2018-01-05 ENCOUNTER — HOSPITAL ENCOUNTER (OUTPATIENT)
Dept: RADIOLOGY | Facility: OTHER | Age: 33
Discharge: HOME | End: 2018-01-05
Attending: PODIATRIST
Payer: COMMERCIAL

## 2018-01-05 ENCOUNTER — OFFICE VISIT (OUTPATIENT)
Dept: PODIATRY | Facility: CLINIC | Age: 33
End: 2018-01-05
Payer: COMMERCIAL

## 2018-01-05 VITALS — WEIGHT: 158 LBS | BODY MASS INDEX: 26.98 KG/M2 | HEIGHT: 64 IN

## 2018-01-05 DIAGNOSIS — M79.672 FOOT PAIN, BILATERAL: ICD-10-CM

## 2018-01-05 DIAGNOSIS — M25.572 CHRONIC ANKLE PAIN, BILATERAL: ICD-10-CM

## 2018-01-05 DIAGNOSIS — M79.672 FOOT PAIN, BILATERAL: Primary | ICD-10-CM

## 2018-01-05 DIAGNOSIS — M25.571 CHRONIC ANKLE PAIN, BILATERAL: ICD-10-CM

## 2018-01-05 DIAGNOSIS — M24.573 EQUINUS CONTRACTURE OF ANKLE: ICD-10-CM

## 2018-01-05 DIAGNOSIS — M79.671 FOOT PAIN, BILATERAL: ICD-10-CM

## 2018-01-05 DIAGNOSIS — G89.29 CHRONIC ANKLE PAIN, BILATERAL: ICD-10-CM

## 2018-01-05 DIAGNOSIS — M76.829 TIBIALIS POSTERIOR TENDINITIS, UNSPECIFIED LATERALITY: ICD-10-CM

## 2018-01-05 DIAGNOSIS — M79.671 FOOT PAIN, BILATERAL: Primary | ICD-10-CM

## 2018-01-05 PROCEDURE — 73630 X-RAY EXAM OF FOOT: CPT | Mod: 50,TC,FY

## 2018-01-05 PROCEDURE — 99214 OFFICE O/P EST MOD 30 MIN: CPT | Mod: S$GLB,,, | Performed by: PODIATRIST

## 2018-01-05 PROCEDURE — 99999 PR PBB SHADOW E&M-EST. PATIENT-LVL III: CPT | Mod: PBBFAC,,, | Performed by: PODIATRIST

## 2018-01-05 PROCEDURE — 73630 X-RAY EXAM OF FOOT: CPT | Mod: 26,50,, | Performed by: RADIOLOGY

## 2018-01-05 PROCEDURE — 73610 X-RAY EXAM OF ANKLE: CPT | Mod: 26,50,, | Performed by: RADIOLOGY

## 2018-01-05 PROCEDURE — 73610 X-RAY EXAM OF ANKLE: CPT | Mod: 50,TC,FY

## 2018-01-05 RX ORDER — MELOXICAM 15 MG/1
15 TABLET ORAL DAILY
Qty: 30 TABLET | Refills: 0 | Status: SHIPPED | OUTPATIENT
Start: 2018-01-05 | End: 2018-01-25 | Stop reason: SDUPTHER

## 2018-01-05 NOTE — PROGRESS NOTES
Subjective:      Patient ID: Yolanda Win is a 32 y.o. female.    Chief Complaint: Foot Pain (pain increases as day goes on)    Sharp deep pain both feet and ankles over posterior tibialis tendon indicated with index finger. Gradual onset, worsening over past several weeks, aggravated by increased weight bearing, shoe gear, pressure.  No previous medical treatment.  OTC pain med not helping.  Denies trauma, surgery both feet.      Review of Systems   Constitution: Negative for chills, diaphoresis, fever, malaise/fatigue and night sweats.   Cardiovascular: Negative for claudication, cyanosis, leg swelling and syncope.   Skin: Negative for color change, dry skin, nail changes, rash, suspicious lesions and unusual hair distribution.   Musculoskeletal: Negative for falls, joint pain, joint swelling, muscle cramps, muscle weakness and stiffness.   Gastrointestinal: Negative for constipation, diarrhea, nausea and vomiting.   Neurological: Negative for brief paralysis, disturbances in coordination, focal weakness, numbness, paresthesias, sensory change and tremors.           Objective:      Physical Exam   Constitutional: She is oriented to person, place, and time. She appears well-developed and well-nourished. She is cooperative.   Oriented to time, place, and person.   Cardiovascular:   Pulses:       Dorsalis pedis pulses are 2+ on the right side, and 2+ on the left side.        Posterior tibial pulses are 2+ on the right side, and 2+ on the left side.   Capillary fill time 3-5 seconds.  All toes warm to touch.      Negative lower extremity edema bilateral.    Negative elevational pallor and dependent rubor bilateral.     Musculoskeletal:   Pain to palpation over course of posteiror tibialis tendon bilateral without deformity or loss of function.    Ankle dorsiflexion decreased at <10 degrees bilateral with moderate increase with knee flexion bilateral.    Otherwise, Normal angle, base, station of gait. All ten toes  without clubbing, cyanosis, or signs of ischemia.  No pain to palpation bilateral lower extremities.  Range of motion, stability, muscle strength, and muscle tone normal bilateral feet and legs.       Lymphadenopathy:   Negative lymphadenopathy bilateral popliteal fossa and tarsal tunnel.  Negative lymphangitic streaking bilateral foot/ankle bilateral.     Neurological: She is alert and oriented to person, place, and time. She has normal strength. She is not disoriented. She displays no atrophy and no tremor. No sensory deficit. She exhibits normal muscle tone.   Reflex Scores:       Patellar reflexes are 2+ on the right side and 2+ on the left side.       Achilles reflexes are 2+ on the right side and 2+ on the left side.  Negative tinel sign to percussion sural, superficial peroneal, deep peroneal, saphenous, and posterior tibial nerves right and left ankles and feet.     Skin: Skin is warm, dry and intact. No abrasion, no bruising, no burn, no ecchymosis, no laceration, no lesion, no petechiae and no rash noted. She is not diaphoretic. No cyanosis or erythema. No pallor. Nails show no clubbing.   Skin is normal age and health appropriate color, turgor, texture, and temperature bilateral lower extremities without ulceration, hyperpigmentation, discoloration, masses nodules or cords palpated.  No ecchymosis, erythema, edema, or cardinal signs of infection bilateral lower extremities.                 Assessment:       Encounter Diagnoses   Name Primary?    Foot pain, bilateral Yes    Chronic ankle pain, bilateral     Equinus contracture of ankle     Tibialis posterior tendinitis, unspecified laterality          Plan:       Yolanda was seen today for foot pain.    Diagnoses and all orders for this visit:    Foot pain, bilateral  -     X-Ray Foot Complete Bilateral; Future  -     X-Ray Ankle Complete Bilateral; Future    Chronic ankle pain, bilateral  -     X-Ray Foot Complete Bilateral; Future  -     X-Ray Ankle  Complete Bilateral; Future    Equinus contracture of ankle  -     X-Ray Foot Complete Bilateral; Future  -     X-Ray Ankle Complete Bilateral; Future    Tibialis posterior tendinitis, unspecified laterality  -     X-Ray Foot Complete Bilateral; Future  -     X-Ray Ankle Complete Bilateral; Future    Other orders  -     meloxicam (MOBIC) 15 MG tablet; Take 1 tablet (15 mg total) by mouth once daily.      I counseled the patient on her conditions, their implications and medical management.    Patient will stretch the tendo achilles complex three times daily as demonstrated in the office.  Literature was dispensed illustrating proper stretching technique.    Patient will obtain over the counter arch supports and wear them in shoes whenever possible.  Athletic shoes intended for walking or running are usually best.    The patient was advised that NSAID-type medications have two very important potential side effects: gastrointestinal irritation including hemorrhage and renal injuries. She was asked to take the medication with food and to stop if she experiences any GI upset. I asked her to call for vomiting, abdominal pain or black/bloody stools. The patient expresses understanding of these issues and questions were answered.    Discussed conservative treatment with shoes of adequate dimensions, material, and style to alleviate symptoms and delay or prevent surgical intervention.    Rx xrays, meloxicam.          Return in about 1 month (around 2/5/2018).

## 2018-01-16 ENCOUNTER — PATIENT MESSAGE (OUTPATIENT)
Dept: OBSTETRICS AND GYNECOLOGY | Facility: CLINIC | Age: 33
End: 2018-01-16

## 2018-01-16 DIAGNOSIS — B00.1 FEVER BLISTER: Primary | ICD-10-CM

## 2018-01-16 RX ORDER — VALACYCLOVIR HYDROCHLORIDE 1 G/1
1000 TABLET, FILM COATED ORAL EVERY 12 HOURS
Qty: 30 TABLET | Refills: 6 | Status: SHIPPED | OUTPATIENT
Start: 2018-01-16 | End: 2018-06-28 | Stop reason: SDUPTHER

## 2018-01-19 ENCOUNTER — OFFICE VISIT (OUTPATIENT)
Dept: ORTHOPEDICS | Facility: CLINIC | Age: 33
End: 2018-01-19
Payer: COMMERCIAL

## 2018-01-19 ENCOUNTER — LAB VISIT (OUTPATIENT)
Dept: LAB | Facility: OTHER | Age: 33
End: 2018-01-19
Payer: COMMERCIAL

## 2018-01-19 VITALS — BODY MASS INDEX: 25.78 KG/M2 | WEIGHT: 151 LBS | HEIGHT: 64 IN

## 2018-01-19 DIAGNOSIS — M25.572 CHRONIC PAIN OF BOTH ANKLES: Primary | ICD-10-CM

## 2018-01-19 DIAGNOSIS — M25.572 CHRONIC PAIN OF BOTH ANKLES: ICD-10-CM

## 2018-01-19 DIAGNOSIS — M25.571 CHRONIC PAIN OF BOTH ANKLES: Primary | ICD-10-CM

## 2018-01-19 DIAGNOSIS — M25.571 CHRONIC PAIN OF BOTH ANKLES: ICD-10-CM

## 2018-01-19 DIAGNOSIS — G89.29 CHRONIC PAIN OF BOTH ANKLES: Primary | ICD-10-CM

## 2018-01-19 DIAGNOSIS — G89.29 CHRONIC PAIN OF BOTH ANKLES: ICD-10-CM

## 2018-01-19 DIAGNOSIS — G57.53 TARSAL TUNNEL SYNDROME OF BOTH LOWER EXTREMITIES: ICD-10-CM

## 2018-01-19 DIAGNOSIS — R20.0 NUMBNESS IN FEET: ICD-10-CM

## 2018-01-19 LAB
BASOPHILS # BLD AUTO: 0.02 K/UL
BASOPHILS NFR BLD: 0.4 %
CRP SERPL-MCNC: 0.4 MG/L
DIFFERENTIAL METHOD: ABNORMAL
EOSINOPHIL # BLD AUTO: 0.1 K/UL
EOSINOPHIL NFR BLD: 2 %
ERYTHROCYTE [DISTWIDTH] IN BLOOD BY AUTOMATED COUNT: 12.6 %
ERYTHROCYTE [SEDIMENTATION RATE] IN BLOOD BY WESTERGREN METHOD: 3 MM/HR
HCT VFR BLD AUTO: 35.9 %
HGB BLD-MCNC: 12 G/DL
LYMPHOCYTES # BLD AUTO: 1.9 K/UL
LYMPHOCYTES NFR BLD: 37.5 %
MCH RBC QN AUTO: 31.2 PG
MCHC RBC AUTO-ENTMCNC: 33.4 G/DL
MCV RBC AUTO: 93 FL
MONOCYTES # BLD AUTO: 0.3 K/UL
MONOCYTES NFR BLD: 6.4 %
NEUTROPHILS # BLD AUTO: 2.7 K/UL
NEUTROPHILS NFR BLD: 53.5 %
PLATELET # BLD AUTO: 274 K/UL
PMV BLD AUTO: 9.5 FL
RBC # BLD AUTO: 3.85 M/UL
RHEUMATOID FACT SERPL-ACNC: <10 IU/ML
WBC # BLD AUTO: 5.02 K/UL

## 2018-01-19 PROCEDURE — 86431 RHEUMATOID FACTOR QUANT: CPT

## 2018-01-19 PROCEDURE — 85025 COMPLETE CBC W/AUTO DIFF WBC: CPT

## 2018-01-19 PROCEDURE — 36415 COLL VENOUS BLD VENIPUNCTURE: CPT

## 2018-01-19 PROCEDURE — 86140 C-REACTIVE PROTEIN: CPT

## 2018-01-19 PROCEDURE — 85651 RBC SED RATE NONAUTOMATED: CPT

## 2018-01-19 PROCEDURE — 99203 OFFICE O/P NEW LOW 30 MIN: CPT | Mod: S$GLB,,, | Performed by: ORTHOPAEDIC SURGERY

## 2018-01-19 PROCEDURE — 99999 PR PBB SHADOW E&M-EST. PATIENT-LVL II: CPT | Mod: PBBFAC,,, | Performed by: ORTHOPAEDIC SURGERY

## 2018-01-19 RX ORDER — PROMETHAZINE HYDROCHLORIDE 50 MG/1
50 TABLET ORAL
COMMUNITY
Start: 2014-07-30 | End: 2018-11-06

## 2018-01-19 RX ORDER — KETOROLAC TROMETHAMINE 10 MG/1
10 TABLET, FILM COATED ORAL
COMMUNITY
Start: 2014-07-30 | End: 2018-08-13
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
## 2018-01-22 ENCOUNTER — PATIENT MESSAGE (OUTPATIENT)
Dept: PSYCHIATRY | Facility: CLINIC | Age: 33
End: 2018-01-22

## 2018-01-22 ENCOUNTER — OFFICE VISIT (OUTPATIENT)
Dept: PSYCHIATRY | Facility: CLINIC | Age: 33
End: 2018-01-22
Payer: COMMERCIAL

## 2018-01-22 VITALS
BODY MASS INDEX: 26.58 KG/M2 | HEIGHT: 64 IN | SYSTOLIC BLOOD PRESSURE: 138 MMHG | WEIGHT: 155.69 LBS | DIASTOLIC BLOOD PRESSURE: 74 MMHG | HEART RATE: 95 BPM

## 2018-01-22 DIAGNOSIS — Z86.59 HISTORY OF POSTTRAUMATIC STRESS DISORDER (PTSD): ICD-10-CM

## 2018-01-22 DIAGNOSIS — F90.0 ADHD (ATTENTION DEFICIT HYPERACTIVITY DISORDER), INATTENTIVE TYPE: Primary | ICD-10-CM

## 2018-01-22 DIAGNOSIS — F90.0 ATTENTION DEFICIT HYPERACTIVITY DISORDER, INATTENTIVE TYPE: ICD-10-CM

## 2018-01-22 DIAGNOSIS — Z87.898 HISTORY OF INSOMNIA: ICD-10-CM

## 2018-01-22 DIAGNOSIS — F41.9 ANXIETY DISORDER, UNSPECIFIED TYPE: ICD-10-CM

## 2018-01-22 PROCEDURE — 99214 OFFICE O/P EST MOD 30 MIN: CPT | Mod: S$GLB,,, | Performed by: PSYCHIATRY & NEUROLOGY

## 2018-01-22 PROCEDURE — 99999 PR PBB SHADOW E&M-EST. PATIENT-LVL III: CPT | Mod: PBBFAC,,, | Performed by: PSYCHIATRY & NEUROLOGY

## 2018-01-22 RX ORDER — DEXTROAMPHETAMINE SACCHARATE, AMPHETAMINE ASPARTATE MONOHYDRATE, DEXTROAMPHETAMINE SULFATE AND AMPHETAMINE SULFATE 6.25; 6.25; 6.25; 6.25 MG/1; MG/1; MG/1; MG/1
25 CAPSULE, EXTENDED RELEASE ORAL EVERY MORNING
Qty: 30 CAPSULE | Refills: 0 | Status: SHIPPED | OUTPATIENT
Start: 2018-01-23 | End: 2018-02-19 | Stop reason: SDUPTHER
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
## 2018-01-24 ENCOUNTER — HOSPITAL ENCOUNTER (OUTPATIENT)
Dept: RADIOLOGY | Facility: OTHER | Age: 33
Discharge: HOME | End: 2018-01-24
Attending: ORTHOPAEDIC SURGERY
Payer: COMMERCIAL

## 2018-01-24 DIAGNOSIS — R20.0 NUMBNESS IN FEET: ICD-10-CM

## 2018-01-24 DIAGNOSIS — M25.572 CHRONIC PAIN OF BOTH ANKLES: ICD-10-CM

## 2018-01-24 DIAGNOSIS — G89.29 CHRONIC PAIN OF BOTH ANKLES: ICD-10-CM

## 2018-01-24 DIAGNOSIS — M25.571 CHRONIC PAIN OF BOTH ANKLES: ICD-10-CM

## 2018-01-24 DIAGNOSIS — G57.53 TARSAL TUNNEL SYNDROME OF BOTH LOWER EXTREMITIES: ICD-10-CM

## 2018-01-24 PROCEDURE — 73721 MRI JNT OF LWR EXTRE W/O DYE: CPT | Mod: 26,RT,, | Performed by: RADIOLOGY

## 2018-01-24 PROCEDURE — 73721 MRI JNT OF LWR EXTRE W/O DYE: CPT | Mod: TC,RT

## 2018-01-25 ENCOUNTER — OFFICE VISIT (OUTPATIENT)
Dept: ORTHOPEDICS | Facility: CLINIC | Age: 33
End: 2018-01-25
Payer: COMMERCIAL

## 2018-01-25 DIAGNOSIS — G57.53 TARSAL TUNNEL SYNDROME OF BOTH LOWER EXTREMITIES: Primary | ICD-10-CM

## 2018-01-25 PROCEDURE — 99213 OFFICE O/P EST LOW 20 MIN: CPT | Mod: S$GLB,,, | Performed by: ORTHOPAEDIC SURGERY

## 2018-01-25 RX ORDER — MELOXICAM 15 MG/1
15 TABLET ORAL DAILY
Qty: 30 TABLET | Refills: 0 | Status: SHIPPED | OUTPATIENT
Start: 2018-01-25 | End: 2018-03-22 | Stop reason: SDUPTHER

## 2018-01-26 NOTE — PROGRESS NOTES
Ms. Win returns today with the results of her MRI and lab work.  This is a   32-year-old female who over the last several months has had essentially activity   related pain in the medial aspect of both ankles and hind feet with associated   swelling and numbness on the bottoms of her feet.  Physical exam and plain x-ray   did not show any obvious structural abnormalities.  I ordered some lab work to   rule out any inflammatory problems and also ordered an MRI of her right ankle to   rule out any structural abnormalities.  The MRI did not show any structural   abnormalities or masses that could be causing pressure on the tibial nerve.  The   lab work was negative for any inflammatory indicators.  At this point,   diagnosis would remain as activity related tarsal tunnel syndrome.  We discussed   further diagnostic and treatment options.  I explained to her that EMG and   nerve conduction studies if positive would be an indicator for a tarsal tunnel,   but negative study does not rule out tarsal tunnel.  I also told her that the   surgical outcomes are not predictable when we do tarsal tunnel releases.  I do   not think her symptoms warrant surgical intervention at this time.  She is going   to continue to modify her activity and avoid running.  I discussed with her the   possibility of physical therapy and orthotics, which she is going to hold off   on at this time.  She is going to continue to manage her symptoms with activity   modification and Mobic.  I will have her make a return appointment in eight   weeks.  If she decides she would like to do therapy or have orthotics made, she   will call sooner.      PETERSON  dd: 01/25/2018 08:21:55 (CST)  td: 01/26/2018 05:06:50 (CST)  Doc ID   #[id number]  Job ID #[id number]    CC:

## 2018-01-31 ENCOUNTER — PATIENT MESSAGE (OUTPATIENT)
Dept: OBSTETRICS AND GYNECOLOGY | Facility: CLINIC | Age: 33
End: 2018-01-31

## 2018-01-31 DIAGNOSIS — B37.9 YEAST INFECTION: Primary | ICD-10-CM

## 2018-01-31 RX ORDER — FLUCONAZOLE 150 MG/1
150 TABLET ORAL DAILY
Qty: 1 TABLET | Refills: 1 | Status: SHIPPED | OUTPATIENT
Start: 2018-01-31 | End: 2018-02-01

## 2018-02-07 ENCOUNTER — OFFICE VISIT (OUTPATIENT)
Dept: OBSTETRICS AND GYNECOLOGY | Facility: CLINIC | Age: 33
End: 2018-02-07
Attending: OBSTETRICS & GYNECOLOGY
Payer: COMMERCIAL

## 2018-02-07 VITALS
HEIGHT: 64 IN | DIASTOLIC BLOOD PRESSURE: 84 MMHG | SYSTOLIC BLOOD PRESSURE: 120 MMHG | BODY MASS INDEX: 26 KG/M2 | WEIGHT: 152.31 LBS

## 2018-02-07 DIAGNOSIS — N89.8 VAGINAL ITCHING: Primary | ICD-10-CM

## 2018-02-07 DIAGNOSIS — Z30.432 ENCOUNTER FOR IUD REMOVAL: ICD-10-CM

## 2018-02-07 DIAGNOSIS — R37 SEXUAL DYSFUNCTION: ICD-10-CM

## 2018-02-07 PROCEDURE — 87480 CANDIDA DNA DIR PROBE: CPT

## 2018-02-07 PROCEDURE — 58301 REMOVE INTRAUTERINE DEVICE: CPT | Mod: S$GLB,,, | Performed by: OBSTETRICS & GYNECOLOGY

## 2018-02-07 PROCEDURE — 99213 OFFICE O/P EST LOW 20 MIN: CPT | Mod: 25,S$GLB,, | Performed by: OBSTETRICS & GYNECOLOGY

## 2018-02-07 PROCEDURE — 3008F BODY MASS INDEX DOCD: CPT | Mod: S$GLB,,, | Performed by: OBSTETRICS & GYNECOLOGY

## 2018-02-07 RX ORDER — ETONOGESTREL AND ETHINYL ESTRADIOL VAGINAL RING .015; .12 MG/D; MG/D
1 RING VAGINAL
Qty: 1 EACH | Refills: 11 | Status: SHIPPED | OUTPATIENT
Start: 2018-02-07 | End: 2018-04-11

## 2018-02-08 LAB
CANDIDA RRNA VAG QL PROBE: NEGATIVE
G VAGINALIS RRNA GENITAL QL PROBE: NEGATIVE
T VAGINALIS RRNA GENITAL QL PROBE: NEGATIVE

## 2018-02-11 NOTE — PROGRESS NOTES
SUBJECTIVE:   32 y.o. female   For IUD removal (procedure note is separate) Patient's last menstrual period was 2018..  She reports that she has not been able to have an orgasm.She recently used a vibrator and was able to. She is concerned that the IUD was affecting her ability to orgasm. She would like to not use birth control but her  prefers that she try Nuvaring- which she has been on in the past. She reports some vaginal discharge. .        Past Medical History:   Diagnosis Date    Abnormal Pap smear of cervix     Allergy     seasonal    Anorexia     Bulimia     Fever blister     Migraine headache      Past Surgical History:   Procedure Laterality Date    BONE MARROW ASPIRATION      x 3    CERVICAL BIOPSY  W/ LOOP ELECTRODE EXCISION       SECTION  2016    COLPOSCOPY      DILATION AND CURETTAGE OF UTERUS      SHOULDER SURGERY Left     x 2    SINUS SURGERY      age 19    TONSILLECTOMY       Social History     Social History    Marital status:      Spouse name: N/A    Number of children: N/A    Years of education: N/A     Occupational History    Not on file.     Social History Main Topics    Smoking status: Never Smoker    Smokeless tobacco: Never Used    Alcohol use Yes      Comment: pre pregnancy     Drug use: No    Sexual activity: Yes     Partners: Male     Birth control/ protection: None     Other Topics Concern    Not on file     Social History Narrative    No narrative on file     Family History   Problem Relation Age of Onset    Cancer Maternal Grandmother 40     cervical    Breast cancer Neg Hx     Colon cancer Neg Hx     Ovarian cancer Neg Hx     Melanoma Neg Hx      OB History    Para Term  AB Living   1 1 1     1   SAB TAB Ectopic Multiple Live Births         0 1      # Outcome Date GA Lbr Bull/2nd Weight Sex Delivery Anes PTL Lv   1 Term 16 38w1d  2.96 kg (6 lb 8.4 oz) M CS-LTranv EPI N JOLLY      Complications:  Failure to Progress in First Stage              Current Outpatient Prescriptions   Medication Sig Dispense Refill    dextroamphetamine-amphetamine (ADDERALL XR) 25 MG 24 hr capsule Take 1 capsule (25 mg total) by mouth every morning. 30 capsule 0    etonogestrel-ethinyl estradiol (NUVARING) 0.12-0.015 mg/24 hr vaginal ring Place 1 each vaginally every 21 days. Insert one (1) ring vaginally and leave in place for three (3) weeks, then remove for one (1) week. 1 each 11    ketorolac (TORADOL) 10 mg tablet Take 10 mg by mouth.      levonorgestrel (OSCAR) 14 mcg/24 hour (3 years) IUD 1 each by Intrauterine route once. Inserted 02/15/2017      loratadine (CLARITIN) 10 mg tablet Take 10 mg by mouth once daily.      meloxicam (MOBIC) 15 MG tablet Take 1 tablet (15 mg total) by mouth once daily. 30 tablet 0    pimecrolimus (ELIDEL) 1 % cream Apply topically 2 (two) times daily. 1 Tube 1    promethazine (PHENERGAN) 50 MG tablet Take 50 mg by mouth.      valACYclovir (VALTREX) 1000 MG tablet Take 1 tablet (1,000 mg total) by mouth every 12 (twelve) hours. 30 tablet 6     No current facility-administered medications for this visit.      Allergies: Amoxicillin; Pcn [penicillins]; and Valium [diazepam]     The ASCVD Risk score ([place] LILLY Jr., et al., 2013) failed to calculate for the following reasons:    The 2013 ASCVD risk score is only valid for ages 40 to 79      ROS:  Constitutional: no weight loss, weight gain, fever, fatigue  Gastrointestinal: No diarrhea, bloody stool, nausea/vomiting, constipation, gas, hemorrhoids  Genitourinary: No blood in urine, painful urination, urgency of urination, frequency of urination, incomplete emptying, incontinence, abnormal bleeding, painful periods, heavy periods, +vaginal discharge, vaginal odor, painful intercourse, +sexual problems, bleeding after intercourse.  Musculoskeletal: No muscle weakness  Skin/Breast: No painful breasts, nipple discharge, masses, rash,  ulcers  Neurological: No passing out, seizures, numbness, headache  Endocrine: No diabetes, hypothyroid, hyperthyroid, hot flashes, hair loss, abnormal hair growth, acne  Psychiatric: No depression, crying  Hematologic: No bruises, bleeding, swollen lymph nodes, anemia.      Physical Exam  See procedure note for IUD removal    ASSESSMENT:   Sexual dysfunction    PLAN:   Counseled patient that it is reassuring that she was able to have fernandez orgasm- she may need self stimulation prior to intercourse with her .  Counseled her that Nuvaring may impact her sex drive

## 2018-02-11 NOTE — PROCEDURES
Removal of Intrauterine Device-Today  Date/Time: 2/7/2018 1:15 PM  Performed by: TINA BADILLO  Authorized by: TINA BADILLO   Preparation: Patient was prepped and draped in the usual sterile fashion.  Local anesthesia used: no    Anesthesia:  Local anesthesia used: no    Sedation:  Patient sedated: no  Patient tolerance: Patient tolerated the procedure well with no immediate complications  Comments:   PROCEDURE:     PRE-IUD REMOVAL COUNSELING:  The patient was advised of minimal risks of bleeding and pain and she agrees to proceed.    PROCEDURE:  TIME OUT PERFORMED.  IUD strings were visualized at the os and grasped. IUD removed with gentle traction.  The patient tolerated the procedure well      POST IUD REMOVAL COUNSELING:  Expect period-like flow to occur after Mirena IUD removal and periods to return to pre-IUD pattern.  Manage post IUD removal cramping with NSAIDs, Tylenol or Rx per [facility].    POST IUD REMOVAL CONTRACEPTION:(Post IUD removal contraception)    Counseling lasted approximately 15 minutes and all her questions were answered.    FOLLOW-UP:

## 2018-02-18 ENCOUNTER — PATIENT MESSAGE (OUTPATIENT)
Dept: PSYCHIATRY | Facility: CLINIC | Age: 33
End: 2018-02-18

## 2018-02-19 DIAGNOSIS — F90.0 ATTENTION DEFICIT HYPERACTIVITY DISORDER, INATTENTIVE TYPE: ICD-10-CM

## 2018-02-19 RX ORDER — DEXTROAMPHETAMINE SACCHARATE, AMPHETAMINE ASPARTATE MONOHYDRATE, DEXTROAMPHETAMINE SULFATE AND AMPHETAMINE SULFATE 6.25; 6.25; 6.25; 6.25 MG/1; MG/1; MG/1; MG/1
25 CAPSULE, EXTENDED RELEASE ORAL EVERY MORNING
Qty: 30 CAPSULE | Refills: 0 | OUTPATIENT
Start: 2018-02-19

## 2018-02-19 RX ORDER — DEXTROAMPHETAMINE SACCHARATE, AMPHETAMINE ASPARTATE MONOHYDRATE, DEXTROAMPHETAMINE SULFATE AND AMPHETAMINE SULFATE 6.25; 6.25; 6.25; 6.25 MG/1; MG/1; MG/1; MG/1
25 CAPSULE, EXTENDED RELEASE ORAL EVERY MORNING
Qty: 30 CAPSULE | Refills: 0 | Status: SHIPPED | OUTPATIENT
Start: 2018-02-19 | End: 2018-03-20 | Stop reason: SDUPTHER

## 2018-03-02 ENCOUNTER — PATIENT MESSAGE (OUTPATIENT)
Dept: OBSTETRICS AND GYNECOLOGY | Facility: CLINIC | Age: 33
End: 2018-03-02

## 2018-03-20 DIAGNOSIS — F90.0 ATTENTION DEFICIT HYPERACTIVITY DISORDER, INATTENTIVE TYPE: ICD-10-CM

## 2018-03-20 RX ORDER — DEXTROAMPHETAMINE SACCHARATE, AMPHETAMINE ASPARTATE MONOHYDRATE, DEXTROAMPHETAMINE SULFATE AND AMPHETAMINE SULFATE 6.25; 6.25; 6.25; 6.25 MG/1; MG/1; MG/1; MG/1
25 CAPSULE, EXTENDED RELEASE ORAL EVERY MORNING
Qty: 30 CAPSULE | Refills: 0 | Status: SHIPPED | OUTPATIENT
Start: 2018-03-20 | End: 2018-04-11 | Stop reason: SDUPTHER

## 2018-03-22 ENCOUNTER — OFFICE VISIT (OUTPATIENT)
Dept: ORTHOPEDICS | Facility: CLINIC | Age: 33
End: 2018-03-22
Payer: COMMERCIAL

## 2018-03-22 ENCOUNTER — PATIENT MESSAGE (OUTPATIENT)
Dept: PSYCHIATRY | Facility: CLINIC | Age: 33
End: 2018-03-22

## 2018-03-22 DIAGNOSIS — G89.29 CHRONIC PAIN OF BOTH ANKLES: ICD-10-CM

## 2018-03-22 DIAGNOSIS — G57.53 TARSAL TUNNEL SYNDROME OF BOTH LOWER EXTREMITIES: Primary | ICD-10-CM

## 2018-03-22 DIAGNOSIS — M25.572 CHRONIC PAIN OF BOTH ANKLES: ICD-10-CM

## 2018-03-22 DIAGNOSIS — M25.571 CHRONIC PAIN OF BOTH ANKLES: ICD-10-CM

## 2018-03-22 DIAGNOSIS — R20.0 NUMBNESS IN FEET: ICD-10-CM

## 2018-03-22 PROCEDURE — 99213 OFFICE O/P EST LOW 20 MIN: CPT | Mod: S$GLB,,, | Performed by: ORTHOPAEDIC SURGERY

## 2018-03-22 RX ORDER — MELOXICAM 15 MG/1
15 TABLET ORAL DAILY
Qty: 30 TABLET | Refills: 2 | Status: SHIPPED | OUTPATIENT
Start: 2018-03-22 | End: 2018-06-28 | Stop reason: SDUPTHER

## 2018-03-23 NOTE — PROGRESS NOTES
Ms. Win returns today.  This is a 32-year-old active female who has had   problems with her feet.  Workup with MRI and plain x-rays and physical exam has   not shown any obvious structural abnormalities.  Blood work did not suggest any   inflammatory problems.  Her symptoms seemed to be more neurogenic and possibly   an indicator of a tarsal tunnel problem that was mainly associated with   prolonged running activities.  She has made some activity modification changes   and her symptoms are less.  She is able to do some limited running without any   problems and she is doing other exercise activities.  So, at this point, I think   this is probably her best option.  I do not think any further workup is   necessary for a tarsal tunnel syndrome unless we were going to consider surgical   intervention.  She also indicated to me for the last several months, she has   been having some intermittent tightness in the back of her knee, which is not   really associated with any significant pain and there has been no history of   injury.  She does report that she has tight hamstrings.  Examination does not   reveal any obvious abnormalities.  I suggested if this continues, we could refer   her over to Sports Medicine for an evaluation.  Otherwise, she can continue   with her activity as tolerated.  I am going to have her return to see me as   needed.      TERESA/IN  dd: 03/22/2018 07:55:08 (LEEANN)  td: 03/23/2018 02:01:34 (LEEANN)  Doc ID   #[id number]  Job ID #[id number]    CC:

## 2018-04-11 ENCOUNTER — OFFICE VISIT (OUTPATIENT)
Dept: PSYCHIATRY | Facility: CLINIC | Age: 33
End: 2018-04-11
Payer: COMMERCIAL

## 2018-04-11 VITALS
HEART RATE: 82 BPM | SYSTOLIC BLOOD PRESSURE: 138 MMHG | DIASTOLIC BLOOD PRESSURE: 71 MMHG | HEIGHT: 64 IN | WEIGHT: 153 LBS | BODY MASS INDEX: 26.12 KG/M2

## 2018-04-11 DIAGNOSIS — F90.0 ADHD (ATTENTION DEFICIT HYPERACTIVITY DISORDER), INATTENTIVE TYPE: Primary | ICD-10-CM

## 2018-04-11 DIAGNOSIS — Z86.59 HISTORY OF POSTTRAUMATIC STRESS DISORDER (PTSD): ICD-10-CM

## 2018-04-11 DIAGNOSIS — Z87.898 HISTORY OF INSOMNIA: ICD-10-CM

## 2018-04-11 DIAGNOSIS — F41.9 ANXIETY DISORDER, UNSPECIFIED TYPE: ICD-10-CM

## 2018-04-11 DIAGNOSIS — F90.0 ATTENTION DEFICIT HYPERACTIVITY DISORDER, INATTENTIVE TYPE: ICD-10-CM

## 2018-04-11 PROCEDURE — 99214 OFFICE O/P EST MOD 30 MIN: CPT | Mod: S$GLB,,, | Performed by: PSYCHIATRY & NEUROLOGY

## 2018-04-11 PROCEDURE — 99999 PR PBB SHADOW E&M-EST. PATIENT-LVL III: CPT | Mod: PBBFAC,,, | Performed by: PSYCHIATRY & NEUROLOGY

## 2018-04-11 RX ORDER — DEXTROAMPHETAMINE SACCHARATE, AMPHETAMINE ASPARTATE MONOHYDRATE, DEXTROAMPHETAMINE SULFATE AND AMPHETAMINE SULFATE 6.25; 6.25; 6.25; 6.25 MG/1; MG/1; MG/1; MG/1
25 CAPSULE, EXTENDED RELEASE ORAL EVERY MORNING
Qty: 30 CAPSULE | Refills: 0 | Status: SHIPPED | OUTPATIENT
Start: 2018-04-17 | End: 2018-05-14 | Stop reason: SDUPTHER
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
## 2018-05-07 ENCOUNTER — PATIENT MESSAGE (OUTPATIENT)
Dept: OBSTETRICS AND GYNECOLOGY | Facility: CLINIC | Age: 33
End: 2018-05-07

## 2018-05-09 ENCOUNTER — TELEPHONE (OUTPATIENT)
Dept: OBSTETRICS AND GYNECOLOGY | Facility: CLINIC | Age: 33
End: 2018-05-09

## 2018-05-09 DIAGNOSIS — N92.6 IRREGULAR MENSES: Primary | ICD-10-CM

## 2018-05-09 NOTE — TELEPHONE ENCOUNTER
Pt called c/o irregular menses and wanted to know what to do. Advised pt per Dr Polanco will get a day 21 progesterone and a AMH

## 2018-05-14 DIAGNOSIS — F90.0 ATTENTION DEFICIT HYPERACTIVITY DISORDER, INATTENTIVE TYPE: ICD-10-CM

## 2018-05-14 RX ORDER — DEXTROAMPHETAMINE SACCHARATE, AMPHETAMINE ASPARTATE MONOHYDRATE, DEXTROAMPHETAMINE SULFATE AND AMPHETAMINE SULFATE 6.25; 6.25; 6.25; 6.25 MG/1; MG/1; MG/1; MG/1
25 CAPSULE, EXTENDED RELEASE ORAL EVERY MORNING
Qty: 30 CAPSULE | Refills: 0 | Status: SHIPPED | OUTPATIENT
Start: 2018-05-14 | End: 2018-06-11 | Stop reason: SDUPTHER

## 2018-05-24 ENCOUNTER — PATIENT MESSAGE (OUTPATIENT)
Dept: OBSTETRICS AND GYNECOLOGY | Facility: CLINIC | Age: 33
End: 2018-05-24

## 2018-05-24 ENCOUNTER — LAB VISIT (OUTPATIENT)
Dept: LAB | Facility: OTHER | Age: 33
End: 2018-05-24
Attending: OBSTETRICS & GYNECOLOGY
Payer: COMMERCIAL

## 2018-05-24 DIAGNOSIS — N92.6 IRREGULAR MENSES: ICD-10-CM

## 2018-05-24 LAB — PROGEST SERPL-MCNC: 2.7 NG/ML

## 2018-05-24 PROCEDURE — 84144 ASSAY OF PROGESTERONE: CPT

## 2018-05-24 PROCEDURE — 36415 COLL VENOUS BLD VENIPUNCTURE: CPT

## 2018-05-24 PROCEDURE — 83520 IMMUNOASSAY QUANT NOS NONAB: CPT

## 2018-05-29 ENCOUNTER — PATIENT MESSAGE (OUTPATIENT)
Dept: OBSTETRICS AND GYNECOLOGY | Facility: CLINIC | Age: 33
End: 2018-05-29

## 2018-05-29 LAB — MIS SERPL-MCNC: 0.7 NG/ML (ref 0.9–9.5)

## 2018-06-01 ENCOUNTER — PATIENT MESSAGE (OUTPATIENT)
Dept: OBSTETRICS AND GYNECOLOGY | Facility: CLINIC | Age: 33
End: 2018-06-01

## 2018-06-08 DIAGNOSIS — F90.0 ATTENTION DEFICIT HYPERACTIVITY DISORDER, INATTENTIVE TYPE: ICD-10-CM

## 2018-06-08 RX ORDER — DEXTROAMPHETAMINE SACCHARATE, AMPHETAMINE ASPARTATE MONOHYDRATE, DEXTROAMPHETAMINE SULFATE AND AMPHETAMINE SULFATE 6.25; 6.25; 6.25; 6.25 MG/1; MG/1; MG/1; MG/1
25 CAPSULE, EXTENDED RELEASE ORAL EVERY MORNING
Qty: 30 CAPSULE | Refills: 0 | OUTPATIENT
Start: 2018-06-08

## 2018-06-08 NOTE — TELEPHONE ENCOUNTER
Patient has an established follow up appointment 8/13/2018. Last office visit 4/11/2018 noted to return for follow up in 4 months  Viviana

## 2018-06-11 DIAGNOSIS — F90.0 ATTENTION DEFICIT HYPERACTIVITY DISORDER, INATTENTIVE TYPE: ICD-10-CM

## 2018-06-11 RX ORDER — DEXTROAMPHETAMINE SACCHARATE, AMPHETAMINE ASPARTATE MONOHYDRATE, DEXTROAMPHETAMINE SULFATE AND AMPHETAMINE SULFATE 6.25; 6.25; 6.25; 6.25 MG/1; MG/1; MG/1; MG/1
25 CAPSULE, EXTENDED RELEASE ORAL EVERY MORNING
Qty: 30 CAPSULE | Refills: 0 | Status: SHIPPED | OUTPATIENT
Start: 2018-06-11 | End: 2018-07-06 | Stop reason: SDUPTHER

## 2018-06-19 DIAGNOSIS — Z01.82 ENCOUNTER FOR ALLERGY TESTING: ICD-10-CM

## 2018-06-19 DIAGNOSIS — Z11.9 SCREENING EXAMINATION FOR INFECTIOUS DISEASE: ICD-10-CM

## 2018-06-19 DIAGNOSIS — Z11.59 SCREENING EXAMINATION FOR RUBELLA: ICD-10-CM

## 2018-06-19 DIAGNOSIS — Z11.3 SCREENING EXAMINATION FOR VENEREAL DISEASE: ICD-10-CM

## 2018-06-19 DIAGNOSIS — Z31.49 PROCREATION MANAGEMENT INVESTIGATION AND TESTING: ICD-10-CM

## 2018-06-19 DIAGNOSIS — Z11.8 SPECIAL SCREENING EXAMINATION FOR CHLAMYDIAL DISEASE: Primary | ICD-10-CM

## 2018-06-20 ENCOUNTER — LAB VISIT (OUTPATIENT)
Dept: LAB | Facility: OTHER | Age: 33
End: 2018-06-20
Attending: OBSTETRICS & GYNECOLOGY
Payer: COMMERCIAL

## 2018-06-20 DIAGNOSIS — Z11.3 SCREENING EXAMINATION FOR VENEREAL DISEASE: ICD-10-CM

## 2018-06-20 DIAGNOSIS — Z11.59 SCREENING EXAMINATION FOR RUBELLA: ICD-10-CM

## 2018-06-20 DIAGNOSIS — Z11.8 SPECIAL SCREENING EXAMINATION FOR CHLAMYDIAL DISEASE: ICD-10-CM

## 2018-06-20 DIAGNOSIS — Z31.49 PROCREATION MANAGEMENT INVESTIGATION AND TESTING: ICD-10-CM

## 2018-06-20 DIAGNOSIS — Z01.82 ENCOUNTER FOR ALLERGY TESTING: ICD-10-CM

## 2018-06-20 DIAGNOSIS — Z11.9 SCREENING EXAMINATION FOR INFECTIOUS DISEASE: ICD-10-CM

## 2018-06-20 LAB
ABO + RH BLD: NORMAL
BASOPHILS # BLD AUTO: 0.02 K/UL
BASOPHILS NFR BLD: 0.3 %
BLD GP AB SCN CELLS X3 SERPL QL: NORMAL
DIFFERENTIAL METHOD: ABNORMAL
EOSINOPHIL # BLD AUTO: 0.1 K/UL
EOSINOPHIL NFR BLD: 0.9 %
ERYTHROCYTE [DISTWIDTH] IN BLOOD BY AUTOMATED COUNT: 11.8 %
HCT VFR BLD AUTO: 39.8 %
HGB BLD-MCNC: 13.4 G/DL
LYMPHOCYTES # BLD AUTO: 2.2 K/UL
LYMPHOCYTES NFR BLD: 33.3 %
MCH RBC QN AUTO: 31 PG
MCHC RBC AUTO-ENTMCNC: 33.7 G/DL
MCV RBC AUTO: 92 FL
MONOCYTES # BLD AUTO: 0.4 K/UL
MONOCYTES NFR BLD: 5.8 %
NEUTROPHILS # BLD AUTO: 3.9 K/UL
NEUTROPHILS NFR BLD: 59.5 %
PLATELET # BLD AUTO: 258 K/UL
PMV BLD AUTO: 9.1 FL
PROLACTIN SERPL IA-MCNC: 10 NG/ML
RBC # BLD AUTO: 4.32 M/UL
TSH SERPL DL<=0.005 MIU/L-ACNC: 0.72 UIU/ML
WBC # BLD AUTO: 6.58 K/UL

## 2018-06-20 PROCEDURE — 84146 ASSAY OF PROLACTIN: CPT

## 2018-06-20 PROCEDURE — 86592 SYPHILIS TEST NON-TREP QUAL: CPT

## 2018-06-20 PROCEDURE — 85025 COMPLETE CBC W/AUTO DIFF WBC: CPT

## 2018-06-20 PROCEDURE — 86704 HEP B CORE ANTIBODY TOTAL: CPT

## 2018-06-20 PROCEDURE — 86803 HEPATITIS C AB TEST: CPT

## 2018-06-20 PROCEDURE — 87340 HEPATITIS B SURFACE AG IA: CPT

## 2018-06-20 PROCEDURE — 86762 RUBELLA ANTIBODY: CPT

## 2018-06-20 PROCEDURE — 86901 BLOOD TYPING SEROLOGIC RH(D): CPT

## 2018-06-20 PROCEDURE — 84443 ASSAY THYROID STIM HORMONE: CPT

## 2018-06-20 PROCEDURE — 86703 HIV-1/HIV-2 1 RESULT ANTBDY: CPT

## 2018-06-20 PROCEDURE — 86787 VARICELLA-ZOSTER ANTIBODY: CPT

## 2018-06-21 LAB
HBV CORE AB SERPL QL IA: NEGATIVE
HBV SURFACE AG SERPL QL IA: NEGATIVE
HCV AB SERPL QL IA: NEGATIVE
HIV 1+2 AB+HIV1 P24 AG SERPL QL IA: NEGATIVE
RPR SER QL: NORMAL
RUBV IGG SER-ACNC: 20.2 IU/ML
RUBV IGG SER-IMP: REACTIVE
VARICELLA INTERPRETATION: POSITIVE
VARICELLA ZOSTER IGG: 2.53 ISR

## 2018-06-28 DIAGNOSIS — G57.53 TARSAL TUNNEL SYNDROME OF BOTH LOWER EXTREMITIES: ICD-10-CM

## 2018-06-28 DIAGNOSIS — B00.1 FEVER BLISTER: ICD-10-CM

## 2018-06-28 DIAGNOSIS — M25.572 CHRONIC PAIN OF BOTH ANKLES: ICD-10-CM

## 2018-06-28 DIAGNOSIS — M25.571 CHRONIC PAIN OF BOTH ANKLES: ICD-10-CM

## 2018-06-28 DIAGNOSIS — G89.29 CHRONIC PAIN OF BOTH ANKLES: ICD-10-CM

## 2018-06-28 RX ORDER — VALACYCLOVIR HYDROCHLORIDE 1 G/1
1000 TABLET, FILM COATED ORAL EVERY 12 HOURS
Qty: 30 TABLET | Refills: 6 | Status: ON HOLD | OUTPATIENT
Start: 2018-06-28 | End: 2019-04-04 | Stop reason: HOSPADM

## 2018-06-28 RX ORDER — MELOXICAM 15 MG/1
15 TABLET ORAL DAILY
Qty: 30 TABLET | Refills: 2 | Status: SHIPPED | OUTPATIENT
Start: 2018-06-28 | End: 2018-08-13

## 2018-07-06 ENCOUNTER — PATIENT MESSAGE (OUTPATIENT)
Dept: PSYCHIATRY | Facility: CLINIC | Age: 33
End: 2018-07-06

## 2018-07-06 DIAGNOSIS — F90.0 ATTENTION DEFICIT HYPERACTIVITY DISORDER, INATTENTIVE TYPE: ICD-10-CM

## 2018-07-06 RX ORDER — DEXTROAMPHETAMINE SACCHARATE, AMPHETAMINE ASPARTATE MONOHYDRATE, DEXTROAMPHETAMINE SULFATE AND AMPHETAMINE SULFATE 3.75; 3.75; 3.75; 3.75 MG/1; MG/1; MG/1; MG/1
15 CAPSULE, EXTENDED RELEASE ORAL EVERY MORNING
Qty: 30 CAPSULE | Refills: 0 | Status: SHIPPED | OUTPATIENT
Start: 2018-07-06 | End: 2018-07-30

## 2018-07-09 ENCOUNTER — TELEPHONE (OUTPATIENT)
Dept: PHARMACY | Facility: CLINIC | Age: 33
End: 2018-07-09

## 2018-07-11 ENCOUNTER — TELEPHONE (OUTPATIENT)
Dept: PHARMACY | Facility: CLINIC | Age: 33
End: 2018-07-11

## 2018-07-11 NOTE — TELEPHONE ENCOUNTER
Patient informed of Gonal and Pregnyl pricing and approval - $302.82 copay(total). She will  on 7/12/18. She was given a run down of the benefits and verbalized understanding. Consultation declined by patient b/c she has discussed it in detail with [facility] and has actually been on the Gonal for a few days - office supply. No further questions or concerns. patient understands to call [facility] should further refills are needed in the future. TTN

## 2018-07-16 ENCOUNTER — TELEPHONE (OUTPATIENT)
Dept: PHARMACY | Facility: CLINIC | Age: 33
End: 2018-07-16

## 2018-07-16 DIAGNOSIS — Z32.00 PREGNANCY EXAMINATION OR TEST, PREGNANCY UNCONFIRMED: Primary | ICD-10-CM

## 2018-07-18 NOTE — TELEPHONE ENCOUNTER
Endometrin approval and pricing - $112.39 copay.  Spoke to patient.  Name and  confirmed.  Patient declined consultation; provider went over the medication and gave samples.  She is on day 2 of Endometrin.  Patient plans to  Endometrin on 18.  Medication list and allergies reviewed - No longer on ketorolac.  Comorbidities reviewed. All questions answered and addressed to patients satisfaction.  Advised to call [facility] and provider if any issues arise.

## 2018-07-22 ENCOUNTER — OFFICE VISIT (OUTPATIENT)
Dept: URGENT CARE | Facility: CLINIC | Age: 33
End: 2018-07-22
Payer: COMMERCIAL

## 2018-07-22 VITALS
WEIGHT: 153 LBS | HEIGHT: 64 IN | SYSTOLIC BLOOD PRESSURE: 144 MMHG | DIASTOLIC BLOOD PRESSURE: 88 MMHG | TEMPERATURE: 99 F | BODY MASS INDEX: 26.12 KG/M2 | HEART RATE: 92 BPM | OXYGEN SATURATION: 99 %

## 2018-07-22 DIAGNOSIS — S91.302A AVULSION OF SKIN OF LEFT FOOT, INITIAL ENCOUNTER: Primary | ICD-10-CM

## 2018-07-22 PROCEDURE — 12002 RPR S/N/AX/GEN/TRNK2.6-7.5CM: CPT | Mod: S$GLB,,, | Performed by: PHYSICIAN ASSISTANT

## 2018-07-22 PROCEDURE — 99214 OFFICE O/P EST MOD 30 MIN: CPT | Mod: 25,S$GLB,, | Performed by: PHYSICIAN ASSISTANT

## 2018-07-22 NOTE — PATIENT INSTRUCTIONS
- Rest.    - Drink plenty of fluids.    - Tylenol or Ibuprofen as directed as needed for fever/pain.    - Keep the wound clean and dry.  Keep the wound completely dry for 24 hours.  - Wash daily with soap and water after 24 hours.    - Change dressing daily.  The dressing placed today can stay on for 24 hours.  - No soaking the wound (No swimming, no bath, no dishwashing).  - Do not pick at the glue. It will fall off on its own.   - Follow up with your PCP or specialty clinic as directed in the next 1-2 weeks if not improved or as needed.  You can call [phone] to schedule an appointment with the appropriate provider.    - Go to the ED if your symptoms worsen.    Skin Tear (Skin Avulsion)  A skin avulsion is a tearing of the top layer of skin. This commonly happens after a fall or other injury. It also tends to be more common in older people, or those taking blood thinners or steroids for long periods of time.  Home care  These guidelines will help you care for your wound at home:  · Keep the wound clean and dry for the first 24 to 48 hours, or as your healthcare provider advises.  · If there is a dressing or bandage, change it when it gets wet or dirty. Otherwise, leave it on for the first 24 hours, then change it once a day or as often as the doctor says.  · If stitches or staples were used, check the wound every day.  · After taking off the dressing, wash the area gently with soap and water. Clean as close to the stitches as you can. Avoid washing or rubbing the stitches directly.  · After 3 days you can keep the bandages off the wound, unless told otherwise, or there is continued drainage.  Allow the wound to be open to the air.  · Keep a thin layer of antibiotic ointment on the cut. This will keep the wound clean, make it easier to remove the stitches, and reduce scarring.  · If your wound is oozing, you can put a nonstick dressing over it. Then, reapply the bandage or dressing as you were told.  · You  can shower as usual after the first 24 hours, but don't soak the area in water (no baths or swimming) until the stitches or staples are taken out.  · If surgical tape was used, keep the area clean and dry. If it becomes wet, blot it dry with a clean towel.  · If skin glue was used, don't put any creams, lotions, or antibiotic ointments on it. These can dissolve the glue. Usually the glue will flake off in about 5 to 10 days by itself. Try to resist picking it off before that so the wound doesn't open up. When it gets wet, pat it dry.  Here is some information about medicine:  · You may use over-the-counter medicine such as acetaminophen or ibuprofen to control pain, unless another pain medicine was given. If you have chronic liver or kidney disease or ever had a stomach ulcer or gastrointestinal bleeding, talk with your doctor before using these medicines.  · If you were given antibiotics, take them until they are all used up. It is important to finish the antibiotics even if the wound looks better. This will ensure that the infection has cleared.  Follow-up care  Follow up with your healthcare provider, or as advised.  · Watch for any signs of infection, such as increasing redness, swelling, or pus coming out. If this happens, don't wait for your scheduled visit. Instead, see a doctor sooner.  · Stitches or staples are usually taken out within 5 to 14 days. This varies depending on what part of your body they are on, and the type of wound. The doctor will tell you how long stitches should be left in.   · If surgical tape was used, it is usually left on for 7 to 10 days. You can remove surgical tape after that unless you were told otherwise. If you try to remove it, and it is too hard, soaking can help. Surgical tape strips will eventually fall off on their own. If the edges of the cut pull apart, stop removing the tape or strips and follow up with your doctor  · As mentioned above, skin glue will flake off by  itself in 5 to 10 days, so you don't need to pull it off.  If any X-rays were done, you will be notified of any changes that may affect your care.  When to seek medical advice  Call your healthcare provider right away if any of these occur:  · Increasing pain in the wound  · Redness, swelling, or pus coming from the wound  · Fever of 100.4ºF (38ºC) or higher, or as directed by your healthcare provider  · Sutures or staples come apart or fall out before your next appointment and the wound edges look as if they will re-open  · Surgical tape closures fall off before 7 days, and the wound edges look as if they will re-open  · Bleeding not controlled by direct pressure  Date Last Reviewed: 9/1/2016  © 4919-4827 [employer]. [address] [place] [address], [place] [address]. All rights reserved. This information is not intended as a substitute for professional medical care. Always follow your healthcare professional's instructions.
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
## 2018-07-22 NOTE — PROCEDURES
Laceration Repair  Date/Time: 7/22/2018 10:23 AM  Performed by: HELGA KHANNA  Authorized by: HELGA KHANNA   Consent Done: Not Needed  Body area: lower extremity  Location details: left foot  Laceration length: 3 cm  Foreign bodies: no foreign bodies  Tendon involvement: none  Nerve involvement: none  Vascular damage: no  Patient sedated: no  Preparation: Patient was prepped and draped in the usual sterile fashion.  Irrigation solution: saline  Amount of cleaning: standard  Debridement: none  Degree of undermining: none  Skin closure: glue  Approximation: close  Approximation difficulty: simple  Dressing: open (no dressing)  Patient tolerance: Patient tolerated the procedure well with no immediate complications

## 2018-07-23 ENCOUNTER — PATIENT MESSAGE (OUTPATIENT)
Dept: PSYCHIATRY | Facility: CLINIC | Age: 33
End: 2018-07-23

## 2018-07-24 ENCOUNTER — PATIENT MESSAGE (OUTPATIENT)
Dept: PSYCHIATRY | Facility: CLINIC | Age: 33
End: 2018-07-24

## 2018-07-30 ENCOUNTER — LAB VISIT (OUTPATIENT)
Dept: LAB | Facility: OTHER | Age: 33
End: 2018-07-30
Attending: OBSTETRICS & GYNECOLOGY
Payer: COMMERCIAL

## 2018-07-30 ENCOUNTER — PATIENT MESSAGE (OUTPATIENT)
Dept: OBSTETRICS AND GYNECOLOGY | Facility: CLINIC | Age: 33
End: 2018-07-30

## 2018-07-30 ENCOUNTER — PATIENT MESSAGE (OUTPATIENT)
Dept: PSYCHIATRY | Facility: CLINIC | Age: 33
End: 2018-07-30

## 2018-07-30 DIAGNOSIS — Z32.01 PREGNANCY EXAMINATION OR TEST, POSITIVE RESULT: Primary | ICD-10-CM

## 2018-07-30 DIAGNOSIS — Z32.00 PREGNANCY EXAMINATION OR TEST, PREGNANCY UNCONFIRMED: ICD-10-CM

## 2018-07-30 LAB
HCG INTACT+B SERPL-ACNC: 150 MIU/ML
PROGEST SERPL-MCNC: 29.2 NG/ML

## 2018-07-30 PROCEDURE — 84144 ASSAY OF PROGESTERONE: CPT

## 2018-07-30 PROCEDURE — 36415 COLL VENOUS BLD VENIPUNCTURE: CPT

## 2018-07-30 PROCEDURE — 84702 CHORIONIC GONADOTROPIN TEST: CPT

## 2018-08-01 ENCOUNTER — LAB VISIT (OUTPATIENT)
Dept: LAB | Facility: OTHER | Age: 33
End: 2018-08-01
Attending: OBSTETRICS & GYNECOLOGY
Payer: COMMERCIAL

## 2018-08-01 DIAGNOSIS — Z32.01 PREGNANCY EXAMINATION OR TEST, POSITIVE RESULT: ICD-10-CM

## 2018-08-01 LAB
HCG INTACT+B SERPL-ACNC: 359 MIU/ML
PROGEST SERPL-MCNC: 30.9 NG/ML

## 2018-08-01 PROCEDURE — 36415 COLL VENOUS BLD VENIPUNCTURE: CPT

## 2018-08-01 PROCEDURE — 84702 CHORIONIC GONADOTROPIN TEST: CPT

## 2018-08-01 PROCEDURE — 84144 ASSAY OF PROGESTERONE: CPT

## 2018-08-03 ENCOUNTER — OFFICE VISIT (OUTPATIENT)
Dept: PSYCHIATRY | Facility: CLINIC | Age: 33
End: 2018-08-03
Payer: COMMERCIAL

## 2018-08-03 DIAGNOSIS — R69 PSYCHIATRIC DIAGNOSIS DEFERRED: Primary | ICD-10-CM

## 2018-08-03 PROCEDURE — 90791 PSYCH DIAGNOSTIC EVALUATION: CPT | Mod: S$GLB,,, | Performed by: SOCIAL WORKER

## 2018-08-13 ENCOUNTER — OFFICE VISIT (OUTPATIENT)
Dept: PSYCHIATRY | Facility: CLINIC | Age: 33
End: 2018-08-13
Payer: COMMERCIAL

## 2018-08-13 VITALS
HEART RATE: 90 BPM | SYSTOLIC BLOOD PRESSURE: 114 MMHG | HEIGHT: 64 IN | DIASTOLIC BLOOD PRESSURE: 66 MMHG | WEIGHT: 162.38 LBS | BODY MASS INDEX: 27.72 KG/M2

## 2018-08-13 DIAGNOSIS — F41.9 ANXIETY DISORDER, UNSPECIFIED TYPE: ICD-10-CM

## 2018-08-13 DIAGNOSIS — F90.0 ADHD (ATTENTION DEFICIT HYPERACTIVITY DISORDER), INATTENTIVE TYPE: Primary | ICD-10-CM

## 2018-08-13 DIAGNOSIS — Z87.898 HISTORY OF INSOMNIA: ICD-10-CM

## 2018-08-13 DIAGNOSIS — Z3A.01 LESS THAN 8 WEEKS GESTATION OF PREGNANCY: ICD-10-CM

## 2018-08-13 DIAGNOSIS — Z86.59 HISTORY OF POSTTRAUMATIC STRESS DISORDER (PTSD): ICD-10-CM

## 2018-08-13 PROCEDURE — 99999 PR PBB SHADOW E&M-EST. PATIENT-LVL III: CPT | Mod: PBBFAC,,, | Performed by: PSYCHIATRY & NEUROLOGY

## 2018-08-13 PROCEDURE — 99214 OFFICE O/P EST MOD 30 MIN: CPT | Mod: S$GLB,,, | Performed by: PSYCHIATRY & NEUROLOGY

## 2018-08-13 PROCEDURE — 3008F BODY MASS INDEX DOCD: CPT | Mod: CPTII,S$GLB,, | Performed by: PSYCHIATRY & NEUROLOGY

## 2018-08-13 PROCEDURE — 90833 PSYTX W PT W E/M 30 MIN: CPT | Mod: S$GLB,,, | Performed by: PSYCHIATRY & NEUROLOGY
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
## 2018-08-17 ENCOUNTER — TELEPHONE (OUTPATIENT)
Dept: OBSTETRICS AND GYNECOLOGY | Facility: CLINIC | Age: 33
End: 2018-08-17

## 2018-08-17 ENCOUNTER — PATIENT MESSAGE (OUTPATIENT)
Dept: OBSTETRICS AND GYNECOLOGY | Facility: CLINIC | Age: 33
End: 2018-08-17

## 2018-08-17 DIAGNOSIS — Z36.89 ENCOUNTER TO ESTABLISH GESTATIONAL AGE USING ULTRASOUND: Primary | ICD-10-CM

## 2018-09-14 ENCOUNTER — LAB VISIT (OUTPATIENT)
Dept: LAB | Facility: OTHER | Age: 33
End: 2018-09-14
Payer: COMMERCIAL

## 2018-09-14 ENCOUNTER — INITIAL PRENATAL (OUTPATIENT)
Dept: OBSTETRICS AND GYNECOLOGY | Facility: CLINIC | Age: 33
End: 2018-09-14
Payer: COMMERCIAL

## 2018-09-14 ENCOUNTER — RESEARCH ENCOUNTER (OUTPATIENT)
Dept: RESEARCH | Facility: HOSPITAL | Age: 33
End: 2018-09-14

## 2018-09-14 ENCOUNTER — PROCEDURE VISIT (OUTPATIENT)
Dept: OBSTETRICS AND GYNECOLOGY | Facility: CLINIC | Age: 33
End: 2018-09-14
Attending: OBSTETRICS & GYNECOLOGY
Payer: COMMERCIAL

## 2018-09-14 VITALS
WEIGHT: 168.19 LBS | DIASTOLIC BLOOD PRESSURE: 80 MMHG | BODY MASS INDEX: 28.87 KG/M2 | SYSTOLIC BLOOD PRESSURE: 100 MMHG

## 2018-09-14 DIAGNOSIS — Z34.81 ENCOUNTER FOR SUPERVISION OF OTHER NORMAL PREGNANCY IN FIRST TRIMESTER: ICD-10-CM

## 2018-09-14 DIAGNOSIS — Z36.82 ENCOUNTER FOR (NT) NUCHAL TRANSLUCENCY SCAN: ICD-10-CM

## 2018-09-14 DIAGNOSIS — O21.9 NAUSEA/VOMITING IN PREGNANCY: ICD-10-CM

## 2018-09-14 DIAGNOSIS — Z34.81 ENCOUNTER FOR SUPERVISION OF OTHER NORMAL PREGNANCY IN FIRST TRIMESTER: Primary | ICD-10-CM

## 2018-09-14 DIAGNOSIS — Z36.89 ENCOUNTER TO ESTABLISH GESTATIONAL AGE USING ULTRASOUND: ICD-10-CM

## 2018-09-14 LAB
ABO + RH BLD: NORMAL
BASOPHILS # BLD AUTO: 0.01 K/UL
BASOPHILS NFR BLD: 0.2 %
BLD GP AB SCN CELLS X3 SERPL QL: NORMAL
DIFFERENTIAL METHOD: ABNORMAL
EOSINOPHIL # BLD AUTO: 0.1 K/UL
EOSINOPHIL NFR BLD: 1.8 %
ERYTHROCYTE [DISTWIDTH] IN BLOOD BY AUTOMATED COUNT: 12 %
HCT VFR BLD AUTO: 36.6 %
HGB BLD-MCNC: 12.4 G/DL
LYMPHOCYTES # BLD AUTO: 2.2 K/UL
LYMPHOCYTES NFR BLD: 33.5 %
MCH RBC QN AUTO: 30.6 PG
MCHC RBC AUTO-ENTMCNC: 33.9 G/DL
MCV RBC AUTO: 90 FL
MONOCYTES # BLD AUTO: 0.5 K/UL
MONOCYTES NFR BLD: 8 %
NEUTROPHILS # BLD AUTO: 3.7 K/UL
NEUTROPHILS NFR BLD: 56.2 %
PLATELET # BLD AUTO: 243 K/UL
PMV BLD AUTO: 9.6 FL
RBC # BLD AUTO: 4.05 M/UL
WBC # BLD AUTO: 6.51 K/UL

## 2018-09-14 PROCEDURE — 0502F SUBSEQUENT PRENATAL CARE: CPT | Mod: S$GLB,,, | Performed by: NURSE PRACTITIONER

## 2018-09-14 PROCEDURE — 76801 OB US < 14 WKS SINGLE FETUS: CPT | Mod: S$GLB,,, | Performed by: OBSTETRICS & GYNECOLOGY

## 2018-09-14 PROCEDURE — 86592 SYPHILIS TEST NON-TREP QUAL: CPT

## 2018-09-14 PROCEDURE — 87491 CHLMYD TRACH DNA AMP PROBE: CPT

## 2018-09-14 PROCEDURE — 86850 RBC ANTIBODY SCREEN: CPT

## 2018-09-14 PROCEDURE — 87340 HEPATITIS B SURFACE AG IA: CPT

## 2018-09-14 PROCEDURE — 86703 HIV-1/HIV-2 1 RESULT ANTBDY: CPT

## 2018-09-14 PROCEDURE — 86762 RUBELLA ANTIBODY: CPT

## 2018-09-14 PROCEDURE — 99999 PR PBB SHADOW E&M-EST. PATIENT-LVL II: CPT | Mod: PBBFAC,,, | Performed by: NURSE PRACTITIONER

## 2018-09-14 PROCEDURE — 85025 COMPLETE CBC W/AUTO DIFF WBC: CPT

## 2018-09-14 PROCEDURE — 87086 URINE CULTURE/COLONY COUNT: CPT

## 2018-09-14 RX ORDER — ONDANSETRON 4 MG/1
4 TABLET, ORALLY DISINTEGRATING ORAL
Qty: 30 TABLET | Refills: 0 | Status: SHIPPED | OUTPATIENT
Start: 2018-09-14 | End: 2018-11-06 | Stop reason: SDUPTHER

## 2018-09-14 NOTE — PROGRESS NOTES
Yolanda Win is a 33 y.o. female, ,  Presents today for a routine exam complaining of amenorrhea and positive home urine pregnancy test.  Patient's last menstrual period was 2018.   She is not currently on any contraception.  Reports nausea. Reports breast tenderness. Denies vaginal bleeding and pelvic pain. Reports HAs.  Medical h/o oral HSV1.   Prior C/S X 1 for FTP, pregnancy was complicated by GHTN (no meds).   Pt conceived  with IUI at [facility].         ROS:  GENERAL: No weight changes. No swelling. No fatigue. No fever.  CARDIOVASCULAR: No chest pain. No shortness of breath. No leg cramps.   NEUROLOGICAL: No headaches. No vision changes.  BREASTS: No pain. No lumps. No discharge.  ABDOMEN: No pain. No diarrhea. No constipation.  REPRODUCTIVE: No abnormal bleeding.   VULVA: No pain. No lesions. No itching.  VAGINA: No relaxation. No itching. No odor. No discharge. No lesions.  URINARY: No incontinence. No nocturia. No frequency. No dysuria.    MEDICATIONS AND ALLERGIES:  Reviewed        COMPREHENSIVE GYN HISTORY:  PAP History: Denies abnormal Paps.  Infection History: Denies STDs. Denies PID.  Benign History: Denies uterine fibroids. Denies ovarian cysts. Denies endometriosis. Denies other conditions.  Cancer History: Denies cervical cancer. Denies uterine cancer or hyperplasia. Denies ovarian cancer. Denies vulvar cancer or pre-cancer. Denies vaginal cancer or pre-cancer. Denies breast cancer. Denies colon cancer.  Sexual Activity History: Reports currently being sexually active  Menstrual History: None.  Contraception: None    /80   Wt 76.3 kg (168 lb 3.4 oz)   LMP 2018   BMI 28.87 kg/m²     PE:  AFFECT: Calm, alert and oriented X 3. Interactive during exam  GENERAL: Appears well-nourished, well-developed, in no acute distress.  HEAD: Normocephalic, atruamatic  TEETH: Good dentition.  THYROID: No thyromegally   BREASTS: No masses, skin changes, nipple discharge or  adenopathy bilaterally.  SKIN: Normal for race, warm, & dry. No lesions or rashes.  LUNGS: Easy and unlabored, clear to auscultation bilaterally.  HEART: Regular rate and rhythm   ABDOMEN: Soft and nontender without masses or organomegally.  VULVA: No lesions, masses or tenderness.  VAGINA: Moist and well rugated without lesions or discharge.  CERVIX: Moist and pink without lesions, discharge or tenderness.      UTERUS SIZE: 10 week size, nontender and without masses.  ADNEXA: No masses or tenderness.  ESTIMATE OF PELVIC CAPACITY: Adequate  EXTREMITIES: No cyanosis, clubbing or edema. No calf tenderness.  LYMPH NODES: No axillary or inguinal adenopathy.    PROCEDURES:  UPT Positive  Genprobe  Pap      ASSESSMENT/PLAN:  Amenorrhea  Positive urine pregnancy test (ARELI: 18 per IUI)    -  Routine prenatal care    Nausea and vomiting in pregnancy    -  Education regarding lifestyle and dietary modifications    -  Advised use of B6/Unisom. Pt will notify us if no relief/worsening symptoms, will consider Zofran if needed.      1st TRIMESTER COUNSELING: Discussed all, booklet provided:  Common complaints of pregnancy  HIV and other routine prenatal tests including  genetic screening  Risk factors identified by prenatal history  Oriented to practice - discussed anticipated course of prenatal care & indications for Ultrasound  Childbirth classes/Hospital facilities   Nutrition and weight gain counseling  Toxoplasmosis precautions (Cats/Raw Meat)  Sexual activity and exercise  Environmental/Work hazards  Travel  Tobacco (Ask, Advise, Assess, Assist, and Arrange), as well as alcohol and drug use  Use of any medications (Including supplements, Vitamins, Herbs, or OTC Drugs)  Domestic violence  Seat belt use      TERATOLOGY COUNSELING:   Discussed indications and options for aneuploidy screening - pamphlets given    -  Pt desires NT and orders placed    Dating US later todau  FOLLOW-UP in 4 weeks with   Sherri Suero, NP    OB/GYN

## 2018-09-14 NOTE — PROGRESS NOTES
..2016.165.B TREETOP Consent.     I met with Yolanda Audelia. She is pregnant and here for a visit.  We discussed the study in detail, including the purpose, numbers/length, procedures, risk/benefit, cost/payment, contacts (investigators/IRB), alternatives/voluntary nature/withdrawal, confidentiality/HIPPA. Dr. Cueto  was available for questions. She verbalized understanding. She did not consent to optional blood storage and genetic sequencing. The consent form was signed on 9/14/2018 at 1510pm. She also signed a non-coercion statement. Patient was given a copy of the signed informed consent.

## 2018-09-16 LAB — BACTERIA UR CULT: NORMAL

## 2018-09-17 ENCOUNTER — PATIENT MESSAGE (OUTPATIENT)
Dept: OBSTETRICS AND GYNECOLOGY | Facility: CLINIC | Age: 33
End: 2018-09-17

## 2018-09-17 LAB
C TRACH DNA SPEC QL NAA+PROBE: NOT DETECTED
HBV SURFACE AG SERPL QL IA: NEGATIVE
HIV 1+2 AB+HIV1 P24 AG SERPL QL IA: NEGATIVE
N GONORRHOEA DNA SPEC QL NAA+PROBE: NOT DETECTED
RPR SER QL: NORMAL
RUBV IGG SER-ACNC: 22.6 IU/ML
RUBV IGG SER-IMP: REACTIVE

## 2018-09-20 ENCOUNTER — PATIENT MESSAGE (OUTPATIENT)
Dept: OBSTETRICS AND GYNECOLOGY | Facility: CLINIC | Age: 33
End: 2018-09-20

## 2018-09-20 DIAGNOSIS — O26.891 PREGNANCY HEADACHE IN FIRST TRIMESTER: Primary | ICD-10-CM

## 2018-09-20 DIAGNOSIS — R51.9 PREGNANCY HEADACHE IN FIRST TRIMESTER: Primary | ICD-10-CM

## 2018-09-20 RX ORDER — BUTALBITAL, ACETAMINOPHEN AND CAFFEINE 50; 325; 40 MG/1; MG/1; MG/1
1 TABLET ORAL EVERY 4 HOURS PRN
Qty: 30 TABLET | Refills: 0 | Status: SHIPPED | OUTPATIENT
Start: 2018-09-20 | End: 2018-10-20

## 2018-09-21 ENCOUNTER — OFFICE VISIT (OUTPATIENT)
Dept: PSYCHIATRY | Facility: CLINIC | Age: 33
End: 2018-09-21
Payer: COMMERCIAL

## 2018-09-21 DIAGNOSIS — R69 PSYCHIATRIC DIAGNOSIS DEFERRED: Primary | ICD-10-CM

## 2018-09-21 PROCEDURE — 90834 PSYTX W PT 45 MINUTES: CPT | Mod: S$GLB,,, | Performed by: SOCIAL WORKER

## 2018-09-26 ENCOUNTER — LAB VISIT (OUTPATIENT)
Dept: LAB | Facility: OTHER | Age: 33
End: 2018-09-26
Attending: OBSTETRICS & GYNECOLOGY
Payer: COMMERCIAL

## 2018-09-26 ENCOUNTER — PROCEDURE VISIT (OUTPATIENT)
Dept: MATERNAL FETAL MEDICINE | Facility: CLINIC | Age: 33
End: 2018-09-26
Payer: COMMERCIAL

## 2018-09-26 DIAGNOSIS — Z36.82 ENCOUNTER FOR (NT) NUCHAL TRANSLUCENCY SCAN: ICD-10-CM

## 2018-09-26 DIAGNOSIS — Z36.89 ENCOUNTER FOR FETAL ANATOMIC SURVEY: Primary | ICD-10-CM

## 2018-09-26 PROCEDURE — 81508 FTL CGEN ABNOR TWO PROTEINS: CPT

## 2018-09-26 PROCEDURE — 76813 OB US NUCHAL MEAS 1 GEST: CPT | Mod: S$GLB,,, | Performed by: OBSTETRICS & GYNECOLOGY

## 2018-09-26 PROCEDURE — 99499 UNLISTED E&M SERVICE: CPT | Mod: S$GLB,,, | Performed by: OBSTETRICS & GYNECOLOGY

## 2018-09-26 PROCEDURE — 36415 COLL VENOUS BLD VENIPUNCTURE: CPT

## 2018-09-28 LAB
# FETUSES US: NORMAL
AGE AT DELIVERY: 31
B-HCG MOM SERPL: NORMAL
B-HCG SERPL-ACNC: 54.2 IU/ML
FET CRL US.MEAS: 59.7 MM
FET NASAL BONE LENGTH US.MEAS: NORMAL MM
FET NUCHAL FOLD MOM THICKNESS US.MEAS: NORMAL
FET NUCHAL FOLD THICKNESS US.MEAS: 1.7 MM
FET TS 21 RISK FROM MAT AGE: NORMAL
GA (DAYS): 3 D
GA (WEEKS): 12 WK
IDDM PATIENT QL: NORMAL
INTEGRATED SCN PATIENT-IMP: NEGATIVE
PAPP-A MOM SERPL: NORMAL
PAPP-A SERPL-MCNC: NORMAL NG/ML
SEQUENTIAL SCREEN I INTERP.: NORMAL
SMOKING STATUS FTND: NO
TS 18 RISK FETUS: NORMAL
TS 21 RISK FETUS: NORMAL
US DATE: NORMAL

## 2018-09-30 ENCOUNTER — PATIENT MESSAGE (OUTPATIENT)
Dept: OBSTETRICS AND GYNECOLOGY | Facility: CLINIC | Age: 33
End: 2018-09-30

## 2018-10-08 ENCOUNTER — ROUTINE PRENATAL (OUTPATIENT)
Dept: OBSTETRICS AND GYNECOLOGY | Facility: CLINIC | Age: 33
End: 2018-10-08
Attending: OBSTETRICS & GYNECOLOGY
Payer: COMMERCIAL

## 2018-10-08 VITALS
WEIGHT: 172.19 LBS | SYSTOLIC BLOOD PRESSURE: 122 MMHG | BODY MASS INDEX: 29.55 KG/M2 | DIASTOLIC BLOOD PRESSURE: 72 MMHG

## 2018-10-08 DIAGNOSIS — Z34.82 PRENATAL CARE, SUBSEQUENT PREGNANCY, SECOND TRIMESTER: Primary | ICD-10-CM

## 2018-10-08 PROCEDURE — 0502F SUBSEQUENT PRENATAL CARE: CPT | Mod: S$GLB,,, | Performed by: OBSTETRICS & GYNECOLOGY

## 2018-10-08 NOTE — PROGRESS NOTES
No cramping, no VB. Constipation has improved. She is getting occasional headaches. She is anxious about weight gain- working out 5-6 times per week. She is working out at [place]. History of PTSD - sees psychiatry and psychologist   Prior [facility] x3- will order [facility] scan. Follow up in 4 weeks.

## 2018-10-10 NOTE — PROGRESS NOTES
Physical Therapy Daily Note     Diagnosis:   Encounter Diagnosis   Name Primary?    Plantar fasciitis      Physician: Babak Chamorro DPM    Evaluation Date: 8/3/17  Visit #: 7  Precautions: standard    Subjective     Pt states she felt better for 3 weeks.  Pain: not quantified this visit    Objective     Measurements taken:   B hip abd: 4+/5    Patient received group therapy to increase strength, endurance, flexibility and core stabilization with activities as follows:     Yolanda received therapeutic exercises to develop strength, endurance, flexibility and core stabilization for 45 minutes including:   elliptical x10 min  Standing:gastroc stretch 2 x 1 minute -np  Short sitting:towel exercises -np  Toes flexion/extension -np  Ankle inversion/eversion -np  Pt received 10 minutes of MH followed by soft tissue mobs on B plantar fascia followed by manual stretch done by PTA x35 min    Treatment ended with ice massage x10 min    Written Home Exercises Provided: provided at initial eval  Pt demo good understanding of the education provided. Yolanda demonstrated good return demonstration of activities.     Education provided:  Yolanda verbalized good understanding of education provided.   No spiritual or educational barriers to learning identified.    Assessment   Patient continues to respond well to manual therapy applied to B posterior tib. Patient educated to continue icing and get better fitting shoes. Patient would continue to benefit from PT intervention to progress toward functional goals.  This is a 32 y.o. female referred to outpatient physical therapy and presents with a medical diagnosis of B plantar fasciitis and demonstrates limitations as described in the problem list Pt prognosis is Good. Pt will continue to benefit from skilled outpatient physical therapy to address the deficits listed below in the problem list, provide pt/family education and to  Requested Prescriptions     Pending Prescriptions Disp Refills    ALPRAZolam (XANAX) 0.25 mg tablet [Pharmacy Med Name: ALPRAZOLAM 0.25 MG TABLET] 60 Tab 2     Sig: take 1 tablet by mouth twice a day if needed for STRESS maximize pt's level of independence in the home and community environment.    Will the patient continue to benefit from skilled PT intervention? yes        Medical necessity is demonstrated by:   - Pain limits function of effected part for all activities  - Unable to participate in daily activities     Progress towards goals: good    Short Term Goals (4 Weeks):  - Pt will increase strength to 4+/5 B hip abd  - Decrease Pain to 4/10 as worst  - Pt to self correct posture with minimal cues  - Pt independent with HEP with progressions.     Long Term Goals (6-8 Weeks):  - Pt will tolerate jogging 2 miles painfree  - Pt will increase strength to 5/5 B hip abd  - Decrease Pain to 0/10  - Pt to return to 90% PLOF       Plan   Continue with established Plan of Care towards PT goals. PT/PTA face-to-face:discussed pt's POC and progress established PT goals.  Orin Montenegro, PT, DPT

## 2018-10-22 ENCOUNTER — PATIENT MESSAGE (OUTPATIENT)
Dept: PSYCHIATRY | Facility: CLINIC | Age: 33
End: 2018-10-22

## 2018-10-24 ENCOUNTER — LAB VISIT (OUTPATIENT)
Dept: LAB | Facility: OTHER | Age: 33
End: 2018-10-24
Attending: OBSTETRICS & GYNECOLOGY
Payer: COMMERCIAL

## 2018-10-24 ENCOUNTER — OFFICE VISIT (OUTPATIENT)
Dept: OPTOMETRY | Facility: CLINIC | Age: 33
End: 2018-10-24
Payer: COMMERCIAL

## 2018-10-24 ENCOUNTER — PROCEDURE VISIT (OUTPATIENT)
Dept: MATERNAL FETAL MEDICINE | Facility: CLINIC | Age: 33
End: 2018-10-24
Payer: COMMERCIAL

## 2018-10-24 DIAGNOSIS — H52.13 MYOPIA, BILATERAL: ICD-10-CM

## 2018-10-24 DIAGNOSIS — H52.13 MYOPIA, BILATERAL: Primary | ICD-10-CM

## 2018-10-24 DIAGNOSIS — Z98.890 H/O LEEP: Primary | ICD-10-CM

## 2018-10-24 DIAGNOSIS — Z36.9 ANTENATAL SCREENING ENCOUNTER: ICD-10-CM

## 2018-10-24 DIAGNOSIS — Z01.00 ROUTINE EYE EXAM: Primary | ICD-10-CM

## 2018-10-24 PROCEDURE — 92499 UNLISTED OPH SVC/PROCEDURE: CPT | Mod: ,,, | Performed by: OPTOMETRIST

## 2018-10-24 PROCEDURE — 92014 COMPRE OPH EXAM EST PT 1/>: CPT | Mod: S$GLB,,, | Performed by: OPTOMETRIST

## 2018-10-24 PROCEDURE — 99499 UNLISTED E&M SERVICE: CPT | Mod: S$GLB,,, | Performed by: OBSTETRICS & GYNECOLOGY

## 2018-10-24 PROCEDURE — 92015 DETERMINE REFRACTIVE STATE: CPT | Mod: S$GLB,,, | Performed by: OPTOMETRIST

## 2018-10-24 PROCEDURE — 76816 OB US FOLLOW-UP PER FETUS: CPT | Mod: S$GLB,,, | Performed by: OBSTETRICS & GYNECOLOGY

## 2018-10-24 PROCEDURE — 76817 TRANSVAGINAL US OBSTETRIC: CPT | Mod: S$GLB,,, | Performed by: OBSTETRICS & GYNECOLOGY

## 2018-10-24 PROCEDURE — 36415 COLL VENOUS BLD VENIPUNCTURE: CPT

## 2018-10-24 PROCEDURE — 99999 PR PBB SHADOW E&M-EST. PATIENT-LVL II: CPT | Mod: PBBFAC,,, | Performed by: OPTOMETRIST

## 2018-10-24 PROCEDURE — 81511 FTL CGEN ABNOR FOUR ANAL: CPT

## 2018-10-24 NOTE — PROGRESS NOTES
HPI     Pt presents for general eye exam and new CL fit. No complaints with   vision with current contacts or glasses.    Pt reports not sleeping in lenses. Replaces lenses monthly.  Uses BioTrue solution.     Last edited by Swapna Espinoza, OD on 10/24/2018  1:31 PM. (History)            Assessment /Plan     For exam results, see Encounter Report.    Routine eye exam    Myopia, bilateral          1-2.  Contact lens rx given.  Eye health normal OU.   RTC 1 year for routine exam.

## 2018-10-26 ENCOUNTER — OFFICE VISIT (OUTPATIENT)
Dept: PSYCHIATRY | Facility: CLINIC | Age: 33
End: 2018-10-26
Payer: COMMERCIAL

## 2018-10-26 DIAGNOSIS — R69 PSYCHIATRIC DIAGNOSIS DEFERRED: Primary | ICD-10-CM

## 2018-10-26 PROCEDURE — 90834 PSYTX W PT 45 MINUTES: CPT | Mod: S$GLB,,, | Performed by: SOCIAL WORKER

## 2018-10-27 LAB
# FETUSES US: NORMAL
AFP MOM SERPL: 1.07
AFP SERPL-MCNC: 33.8 NG/ML
AGE AT DELIVERY: 31
B-HCG MOM SERPL: 0.81
B-HCG SERPL-ACNC: 24.1 IU/ML
COLLECT DATE BLD: NORMAL
COLLECT DATE: NORMAL
FET NASAL BONE LENGTH US.MEAS: NORMAL MM
FET NUCHAL FOLD MOM THICKNESS US.MEAS: 1.44
FET NUCHAL FOLD THICKNESS US.MEAS: 1.7 MM
FET TS 21 RISK FROM MAT AGE: NORMAL
GA (DAYS): 3 D
GA (WEEKS): 16 WK
GA METHOD: NORMAL
GEST. AGE (DAYS) 2ND SAMPLE (SS2): 3
GEST. AGE (WKS) 1ST SAMPLE (SS2): 12
IDDM PATIENT QL: NORMAL
INHIBIN A MOM SERPL: 0.87
INHIBIN A SERPL-MCNC: 136.6 PG/ML
INTEGRATED SCN PATIENT-IMP: NEGATIVE
PAPP-A MOM SERPL: 1.92
PAPP-A SERPL-MCNC: NORMAL NG/ML
SEQUENTIAL SCREEN PART 2 INTERP: NORMAL
TS 18 RISK FETUS: NORMAL
TS 21 RISK FETUS: NORMAL
U ESTRIOL MOM SERPL: 0.86
U ESTRIOL SERPL-MCNC: 0.74 NG/ML

## 2018-11-06 ENCOUNTER — ROUTINE PRENATAL (OUTPATIENT)
Dept: OBSTETRICS AND GYNECOLOGY | Facility: CLINIC | Age: 33
End: 2018-11-06
Payer: COMMERCIAL

## 2018-11-06 VITALS
BODY MASS INDEX: 30.01 KG/M2 | DIASTOLIC BLOOD PRESSURE: 78 MMHG | SYSTOLIC BLOOD PRESSURE: 108 MMHG | WEIGHT: 174.81 LBS

## 2018-11-06 DIAGNOSIS — Z34.80 SUPERVISION OF OTHER NORMAL PREGNANCY, ANTEPARTUM: Primary | ICD-10-CM

## 2018-11-06 DIAGNOSIS — O21.9 NAUSEA/VOMITING IN PREGNANCY: ICD-10-CM

## 2018-11-06 PROCEDURE — 0502F SUBSEQUENT PRENATAL CARE: CPT | Mod: S$GLB,,, | Performed by: NURSE PRACTITIONER

## 2018-11-06 PROCEDURE — 99999 PR PBB SHADOW E&M-EST. PATIENT-LVL III: CPT | Mod: PBBFAC,,, | Performed by: NURSE PRACTITIONER

## 2018-11-06 RX ORDER — ONDANSETRON 4 MG/1
4 TABLET, ORALLY DISINTEGRATING ORAL
Qty: 30 TABLET | Refills: 0 | Status: ON HOLD | OUTPATIENT
Start: 2018-11-06 | End: 2019-04-04 | Stop reason: HOSPADM

## 2018-11-06 NOTE — PROGRESS NOTES
Here for routine OB appt at 18w1d, with complaints of wrist pain.  Discussed carpel tunnel- use of wrist splints/ kinesiology tape.  Pt also c/o nausea- uses Zofran PRN. Reports + FM.   Denies VB and cramping.  Pain, bleeding, and PTL precautions given.  Declines connected MOM.  MFM scan on 11/9/18 for cervical length  F/U scheduled 4 weeks

## 2018-11-08 ENCOUNTER — PATIENT MESSAGE (OUTPATIENT)
Dept: OBSTETRICS AND GYNECOLOGY | Facility: CLINIC | Age: 33
End: 2018-11-08

## 2018-11-09 ENCOUNTER — INITIAL CONSULT (OUTPATIENT)
Dept: MATERNAL FETAL MEDICINE | Facility: CLINIC | Age: 33
End: 2018-11-09
Attending: OBSTETRICS & GYNECOLOGY
Payer: COMMERCIAL

## 2018-11-09 VITALS
BODY MASS INDEX: 30.08 KG/M2 | DIASTOLIC BLOOD PRESSURE: 88 MMHG | SYSTOLIC BLOOD PRESSURE: 120 MMHG | WEIGHT: 175.25 LBS

## 2018-11-09 DIAGNOSIS — O99.891 CURRENT MATERNAL CONDITION AFFECTING PREGNANCY: ICD-10-CM

## 2018-11-09 DIAGNOSIS — Z36.89 ENCOUNTER FOR FETAL ANATOMIC SURVEY: ICD-10-CM

## 2018-11-09 DIAGNOSIS — Z98.890 HX OF LEEP (LOOP ELECTROSURGICAL EXCISION PROCEDURE) OF CERVIX COMPLICATING PREGNANCY, SECOND TRIMESTER: ICD-10-CM

## 2018-11-09 DIAGNOSIS — F90.0 ADHD (ATTENTION DEFICIT HYPERACTIVITY DISORDER), INATTENTIVE TYPE: ICD-10-CM

## 2018-11-09 DIAGNOSIS — O34.42 HX OF LEEP (LOOP ELECTROSURGICAL EXCISION PROCEDURE) OF CERVIX COMPLICATING PREGNANCY, SECOND TRIMESTER: ICD-10-CM

## 2018-11-09 DIAGNOSIS — Z36.89 ENCOUNTER FOR ULTRASOUND TO CHECK FETAL GROWTH: Primary | ICD-10-CM

## 2018-11-09 DIAGNOSIS — F41.9 ANXIETY DISORDER, UNSPECIFIED TYPE: ICD-10-CM

## 2018-11-09 PROCEDURE — 76817 TRANSVAGINAL US OBSTETRIC: CPT | Mod: S$GLB,,, | Performed by: OBSTETRICS & GYNECOLOGY

## 2018-11-09 PROCEDURE — 99499 UNLISTED E&M SERVICE: CPT | Mod: S$GLB,,, | Performed by: OBSTETRICS & GYNECOLOGY

## 2018-11-09 PROCEDURE — 99999 PR PBB SHADOW E&M-EST. PATIENT-LVL III: CPT | Mod: PBBFAC,,, | Performed by: OBSTETRICS & GYNECOLOGY

## 2018-11-09 PROCEDURE — 76805 OB US >/= 14 WKS SNGL FETUS: CPT | Mod: S$GLB,,, | Performed by: OBSTETRICS & GYNECOLOGY

## 2018-11-09 PROCEDURE — 99499 NO LOS: ICD-10-PCS | Mod: S$GLB,,, | Performed by: OBSTETRICS & GYNECOLOGY

## 2018-11-09 PROCEDURE — 99999 PR PBB SHADOW E&M-EST. PATIENT-LVL III: ICD-10-PCS | Mod: PBBFAC,,, | Performed by: OBSTETRICS & GYNECOLOGY

## 2018-11-09 NOTE — LETTER
November 11, 2018      Kaleigh Polanco MD  [address]  [address]  [place] [address]           [facility]  [address]  [place] [facility] [address]  Phone: [phone]          Patient: Yolanda Win   MR Number: [id number]   YOB: 1985   Date of Visit: 11/9/2018       Dear Dr. Kaleigh Polanco:    Thank you for referring Yolanda Win to me for evaluation. Attached you will find relevant portions of my assessment and plan of care.    If you have questions, please do not hesitate to call me. I look forward to following Yolanda Win along with you.    Sincerely,    Emiliana Cueto MD    Enclosure  CC:  No Recipients    If you would like to receive this communication electronically, please contact [email or web address] or [phone] to request more information on [employer] Link access.    For providers and/or their staff who would like to refer a patient to [facility], please contact us through our one-stop-shop provider referral line, [facility], at [phone].    If you feel you have received this communication in error or would no longer like to receive these types of communications, please e-mail [email or web address]

## 2018-11-19 ENCOUNTER — PROCEDURE VISIT (OUTPATIENT)
Dept: MATERNAL FETAL MEDICINE | Facility: HOSPITAL | Age: 33
End: 2018-11-19
Payer: COMMERCIAL

## 2018-11-19 VITALS — SYSTOLIC BLOOD PRESSURE: 118 MMHG | BODY MASS INDEX: 29.87 KG/M2 | WEIGHT: 174 LBS | DIASTOLIC BLOOD PRESSURE: 72 MMHG

## 2018-11-19 DIAGNOSIS — Z36.89 ENCOUNTER FOR ULTRASOUND TO CHECK FETAL GROWTH: ICD-10-CM

## 2018-11-19 PROCEDURE — 76817 TRANSVAGINAL US OBSTETRIC: CPT

## 2018-11-19 PROCEDURE — 76817 TRANSVAGINAL US OBSTETRIC: CPT | Mod: 26,,, | Performed by: PEDIATRICS

## 2018-11-19 PROCEDURE — 99499 UNLISTED E&M SERVICE: CPT | Mod: ,,, | Performed by: PEDIATRICS

## 2018-11-19 NOTE — PROGRESS NOTES
Indication  ========    Cervical Length.    History  ======    Risk Factors  Details: hx LEEP x3  Details: IUI    Maternal Assessment  =================    Weight 79 kg  Weight (lb) 174 lb  BP syst 118 mmHg  BP diast 72 mmHg    Method  ======    Transvaginal ultrasound examination. View: Good view.    Pregnancy  =========    Srivastava pregnancy. Number of fetuses: 1.    Dating  ======    Cycle: regular cycle  Conception on: 7/16/2018  Conception: AIH  GA by conception 20 w + 0 d  ARELI by conception: 4/8/2019  Assigned: Dating performed on 09/14/2018, based on the conception date  Assigned GA 20 w + 0 d  Assigned ARELI: 4/8/2019    General Evaluation  ==============    Cardiac activity: present.  bpm.  Fetal movements: visualized.  Presentation: cephalic left.  Placenta:  Placental site: anterior.      Fetal Biometry  ============    Fetal Biometry  Calculated by: Hadlock (BPD-HC-AC-FL)   bpm    Maternal Structures  ===============    Uterus / Cervix  Cervix: Visualized  Approach: Transvaginal  Cervical length 43.4 mm    Impression  =========    TV CL is normal at 4.3 cm without funneling.  AFV appears normal.      Recommendation  ==============    Repeat ultrasound study as clinically indicated    [facility] staff scheduled patient for a follow up transvaginal ultrasound of cervix in 2 wks with fetal growth    .

## 2018-11-29 ENCOUNTER — PATIENT MESSAGE (OUTPATIENT)
Dept: OBSTETRICS AND GYNECOLOGY | Facility: CLINIC | Age: 33
End: 2018-11-29

## 2018-12-07 ENCOUNTER — CLINICAL SUPPORT (OUTPATIENT)
Dept: OBSTETRICS AND GYNECOLOGY | Facility: CLINIC | Age: 33
End: 2018-12-07
Attending: OBSTETRICS & GYNECOLOGY
Payer: COMMERCIAL

## 2018-12-07 ENCOUNTER — LAB VISIT (OUTPATIENT)
Dept: LAB | Facility: OTHER | Age: 33
End: 2018-12-07
Attending: OBSTETRICS & GYNECOLOGY
Payer: COMMERCIAL

## 2018-12-07 ENCOUNTER — PROCEDURE VISIT (OUTPATIENT)
Dept: MATERNAL FETAL MEDICINE | Facility: CLINIC | Age: 33
End: 2018-12-07
Payer: COMMERCIAL

## 2018-12-07 VITALS — SYSTOLIC BLOOD PRESSURE: 116 MMHG | DIASTOLIC BLOOD PRESSURE: 74 MMHG | BODY MASS INDEX: 31.07 KG/M2 | WEIGHT: 181 LBS

## 2018-12-07 DIAGNOSIS — O26.899 RH NEGATIVE STATE IN ANTEPARTUM PERIOD: ICD-10-CM

## 2018-12-07 DIAGNOSIS — Z67.91 RH NEGATIVE STATE IN ANTEPARTUM PERIOD: ICD-10-CM

## 2018-12-07 DIAGNOSIS — Z34.82 PRENATAL CARE, SUBSEQUENT PREGNANCY, SECOND TRIMESTER: Primary | ICD-10-CM

## 2018-12-07 DIAGNOSIS — Z36.89 ENCOUNTER FOR ULTRASOUND TO CHECK FETAL GROWTH: ICD-10-CM

## 2018-12-07 DIAGNOSIS — Z34.82 PRENATAL CARE, SUBSEQUENT PREGNANCY, SECOND TRIMESTER: ICD-10-CM

## 2018-12-07 LAB
ABO + RH BLD: NORMAL
BLD GP AB SCN CELLS X3 SERPL QL: NORMAL

## 2018-12-07 PROCEDURE — 76816 OB US FOLLOW-UP PER FETUS: CPT | Mod: S$GLB,,, | Performed by: OBSTETRICS & GYNECOLOGY

## 2018-12-07 PROCEDURE — 86901 BLOOD TYPING SEROLOGIC RH(D): CPT

## 2018-12-07 PROCEDURE — 0502F SUBSEQUENT PRENATAL CARE: CPT | Mod: S$GLB,,, | Performed by: OBSTETRICS & GYNECOLOGY

## 2018-12-07 PROCEDURE — 99499 UNLISTED E&M SERVICE: CPT | Mod: S$GLB,,, | Performed by: OBSTETRICS & GYNECOLOGY

## 2018-12-07 PROCEDURE — 99999 PR PBB SHADOW E&M-EST. PATIENT-LVL II: CPT | Mod: PBBFAC,,,

## 2018-12-07 PROCEDURE — 36415 COLL VENOUS BLD VENIPUNCTURE: CPT

## 2018-12-07 PROCEDURE — 76817 TRANSVAGINAL US OBSTETRIC: CPT | Mod: S$GLB,,, | Performed by: OBSTETRICS & GYNECOLOGY

## 2018-12-07 PROCEDURE — 96372 THER/PROPH/DIAG INJ SC/IM: CPT | Mod: S$GLB,,, | Performed by: OBSTETRICS & GYNECOLOGY

## 2018-12-07 NOTE — PROGRESS NOTES
No cramping, no VB, good FM, no LOF  OB glucose and CBC, Type and screen, Rhogam today- patient had spotting  Follow up in 4 weeks.

## 2018-12-26 ENCOUNTER — PATIENT MESSAGE (OUTPATIENT)
Dept: OBSTETRICS AND GYNECOLOGY | Facility: CLINIC | Age: 33
End: 2018-12-26

## 2019-01-04 ENCOUNTER — PATIENT MESSAGE (OUTPATIENT)
Dept: OBSTETRICS AND GYNECOLOGY | Facility: CLINIC | Age: 34
End: 2019-01-04

## 2019-01-04 ENCOUNTER — ROUTINE PRENATAL (OUTPATIENT)
Dept: OBSTETRICS AND GYNECOLOGY | Facility: CLINIC | Age: 34
End: 2019-01-04
Payer: COMMERCIAL

## 2019-01-04 ENCOUNTER — LAB VISIT (OUTPATIENT)
Dept: LAB | Facility: OTHER | Age: 34
End: 2019-01-04
Attending: OBSTETRICS & GYNECOLOGY
Payer: COMMERCIAL

## 2019-01-04 VITALS
SYSTOLIC BLOOD PRESSURE: 124 MMHG | BODY MASS INDEX: 32.88 KG/M2 | WEIGHT: 191.56 LBS | DIASTOLIC BLOOD PRESSURE: 80 MMHG

## 2019-01-04 DIAGNOSIS — Z34.82 PRENATAL CARE, SUBSEQUENT PREGNANCY, SECOND TRIMESTER: ICD-10-CM

## 2019-01-04 DIAGNOSIS — O26.899 RH NEGATIVE STATE IN ANTEPARTUM PERIOD: ICD-10-CM

## 2019-01-04 DIAGNOSIS — Z67.91 RH NEGATIVE STATE IN ANTEPARTUM PERIOD: ICD-10-CM

## 2019-01-04 DIAGNOSIS — Z34.82 PRENATAL CARE, SUBSEQUENT PREGNANCY, SECOND TRIMESTER: Primary | ICD-10-CM

## 2019-01-04 DIAGNOSIS — Z23 NEED FOR DIPHTHERIA-TETANUS-PERTUSSIS (TDAP) VACCINE: ICD-10-CM

## 2019-01-04 LAB
BASOPHILS # BLD AUTO: 0.01 K/UL
BASOPHILS NFR BLD: 0.1 %
DIFFERENTIAL METHOD: ABNORMAL
EOSINOPHIL # BLD AUTO: 0.1 K/UL
EOSINOPHIL NFR BLD: 1.3 %
ERYTHROCYTE [DISTWIDTH] IN BLOOD BY AUTOMATED COUNT: 13.3 %
GLUCOSE SERPL-MCNC: 87 MG/DL
HCT VFR BLD AUTO: 33.2 %
HGB BLD-MCNC: 11.2 G/DL
LYMPHOCYTES # BLD AUTO: 1.6 K/UL
LYMPHOCYTES NFR BLD: 17.4 %
MCH RBC QN AUTO: 30.8 PG
MCHC RBC AUTO-ENTMCNC: 33.7 G/DL
MCV RBC AUTO: 91 FL
MONOCYTES # BLD AUTO: 0.4 K/UL
MONOCYTES NFR BLD: 4.5 %
NEUTROPHILS # BLD AUTO: 6.8 K/UL
NEUTROPHILS NFR BLD: 75.8 %
PLATELET # BLD AUTO: 233 K/UL
PMV BLD AUTO: 9.4 FL
RBC # BLD AUTO: 3.64 M/UL
WBC # BLD AUTO: 8.98 K/UL

## 2019-01-04 PROCEDURE — 0502F PR SUBSEQUENT PRENATAL CARE: ICD-10-PCS | Mod: S$GLB,,, | Performed by: NURSE PRACTITIONER

## 2019-01-04 PROCEDURE — 36415 COLL VENOUS BLD VENIPUNCTURE: CPT

## 2019-01-04 PROCEDURE — 85025 COMPLETE CBC W/AUTO DIFF WBC: CPT

## 2019-01-04 PROCEDURE — 0502F SUBSEQUENT PRENATAL CARE: CPT | Mod: S$GLB,,, | Performed by: NURSE PRACTITIONER

## 2019-01-04 PROCEDURE — 82950 GLUCOSE TEST: CPT

## 2019-01-04 PROCEDURE — 99999 PR PBB SHADOW E&M-EST. PATIENT-LVL III: ICD-10-PCS | Mod: PBBFAC,,, | Performed by: NURSE PRACTITIONER

## 2019-01-04 PROCEDURE — 99999 PR PBB SHADOW E&M-EST. PATIENT-LVL III: CPT | Mod: PBBFAC,,, | Performed by: NURSE PRACTITIONER

## 2019-01-04 NOTE — PROGRESS NOTES
Here for routine OB appt at 26w4d, with complaints of round ligament pain and fatigue.  Discussed maternity belt for support and Tylenol PRN.  Reports + FM. Denies VB and cramping.  Pain, bleeding, and PTL precautions given.  OB glucose and CBC today.  Rhogam, Type and screen and Tdap with next appt.   F/U scheduled 4 weeks

## 2019-01-21 ENCOUNTER — TELEPHONE (OUTPATIENT)
Dept: OBSTETRICS AND GYNECOLOGY | Facility: CLINIC | Age: 34
End: 2019-01-21

## 2019-01-21 ENCOUNTER — HOSPITAL ENCOUNTER (EMERGENCY)
Facility: OTHER | Age: 34
Discharge: HOME | End: 2019-01-21
Attending: OBSTETRICS & GYNECOLOGY
Payer: COMMERCIAL

## 2019-01-21 VITALS
OXYGEN SATURATION: 95 % | SYSTOLIC BLOOD PRESSURE: 109 MMHG | TEMPERATURE: 98 F | DIASTOLIC BLOOD PRESSURE: 65 MMHG | RESPIRATION RATE: 18 BRPM | HEART RATE: 77 BPM

## 2019-01-21 DIAGNOSIS — G43.009 MIGRAINE WITHOUT AURA AND WITHOUT STATUS MIGRAINOSUS, NOT INTRACTABLE: Primary | ICD-10-CM

## 2019-01-21 DIAGNOSIS — G44.209 TENSION-TYPE HEADACHE, NOT INTRACTABLE, UNSPECIFIED CHRONICITY PATTERN: ICD-10-CM

## 2019-01-21 DIAGNOSIS — Z3A.29 29 WEEKS GESTATION OF PREGNANCY: ICD-10-CM

## 2019-01-21 LAB
ALBUMIN SERPL BCP-MCNC: 3.1 G/DL
ALP SERPL-CCNC: 77 U/L
ALT SERPL W/O P-5'-P-CCNC: 10 U/L
ANION GAP SERPL CALC-SCNC: 7 MMOL/L
AST SERPL-CCNC: 12 U/L
BASOPHILS # BLD AUTO: 0.02 K/UL
BASOPHILS NFR BLD: 0.2 %
BILIRUB SERPL-MCNC: 0.4 MG/DL
BUN SERPL-MCNC: 9 MG/DL
CALCIUM SERPL-MCNC: 8.9 MG/DL
CHLORIDE SERPL-SCNC: 112 MMOL/L
CO2 SERPL-SCNC: 20 MMOL/L
CREAT SERPL-MCNC: 0.7 MG/DL
CREAT UR-MCNC: 9.2 MG/DL
DIFFERENTIAL METHOD: ABNORMAL
EOSINOPHIL # BLD AUTO: 0.1 K/UL
EOSINOPHIL NFR BLD: 0.7 %
ERYTHROCYTE [DISTWIDTH] IN BLOOD BY AUTOMATED COUNT: 13.1 %
EST. GFR  (AFRICAN AMERICAN): >60 ML/MIN/1.73 M^2
EST. GFR  (NON AFRICAN AMERICAN): >60 ML/MIN/1.73 M^2
GLUCOSE SERPL-MCNC: 77 MG/DL
HCT VFR BLD AUTO: 32.9 %
HGB BLD-MCNC: 11.4 G/DL
LYMPHOCYTES # BLD AUTO: 1.7 K/UL
LYMPHOCYTES NFR BLD: 20.3 %
MCH RBC QN AUTO: 31.1 PG
MCHC RBC AUTO-ENTMCNC: 34.7 G/DL
MCV RBC AUTO: 90 FL
MONOCYTES # BLD AUTO: 0.4 K/UL
MONOCYTES NFR BLD: 5.1 %
NEUTROPHILS # BLD AUTO: 6.1 K/UL
NEUTROPHILS NFR BLD: 73.1 %
PLATELET # BLD AUTO: 219 K/UL
PMV BLD AUTO: 9.2 FL
POTASSIUM SERPL-SCNC: 4 MMOL/L
PROT SERPL-MCNC: 6.6 G/DL
PROT UR-MCNC: <7 MG/DL
PROT/CREAT UR: ABNORMAL MG/G{CREAT}
RBC # BLD AUTO: 3.67 M/UL
SODIUM SERPL-SCNC: 139 MMOL/L
WBC # BLD AUTO: 8.39 K/UL

## 2019-01-21 PROCEDURE — 80053 COMPREHEN METABOLIC PANEL: CPT

## 2019-01-21 PROCEDURE — 82570 ASSAY OF URINE CREATININE: CPT

## 2019-01-21 PROCEDURE — 59025 PR FETAL 2N-STRESS TEST: ICD-10-PCS | Mod: 26,,, | Performed by: OBSTETRICS & GYNECOLOGY

## 2019-01-21 PROCEDURE — 25000003 PHARM REV CODE 250: Performed by: STUDENT IN AN ORGANIZED HEALTH CARE EDUCATION/TRAINING PROGRAM

## 2019-01-21 PROCEDURE — 99284 EMERGENCY DEPT VISIT MOD MDM: CPT | Mod: 25

## 2019-01-21 PROCEDURE — 63600175 PHARM REV CODE 636 W HCPCS: Performed by: STUDENT IN AN ORGANIZED HEALTH CARE EDUCATION/TRAINING PROGRAM

## 2019-01-21 PROCEDURE — 59025 FETAL NON-STRESS TEST: CPT | Mod: 26,,, | Performed by: OBSTETRICS & GYNECOLOGY

## 2019-01-21 PROCEDURE — 99284 EMERGENCY DEPT VISIT MOD MDM: CPT | Mod: 25,,, | Performed by: OBSTETRICS & GYNECOLOGY

## 2019-01-21 PROCEDURE — 96365 THER/PROPH/DIAG IV INF INIT: CPT

## 2019-01-21 PROCEDURE — 59025 FETAL NON-STRESS TEST: CPT

## 2019-01-21 PROCEDURE — 99284 PR EMERGENCY DEPT VISIT,LEVEL IV: ICD-10-PCS | Mod: 25,,, | Performed by: OBSTETRICS & GYNECOLOGY

## 2019-01-21 PROCEDURE — 85025 COMPLETE CBC W/AUTO DIFF WBC: CPT

## 2019-01-21 RX ORDER — PROCHLORPERAZINE MALEATE 5 MG
10 TABLET ORAL ONCE
Status: COMPLETED | OUTPATIENT
Start: 2019-01-21 | End: 2019-01-21

## 2019-01-21 RX ORDER — SUMATRIPTAN 50 MG/1
50 TABLET, FILM COATED ORAL DAILY PRN
Qty: 5 TABLET | Refills: 0 | Status: SHIPPED | OUTPATIENT
Start: 2019-01-21 | End: 2019-04-24 | Stop reason: SDUPTHER

## 2019-01-21 RX ORDER — SUMATRIPTAN 50 MG/1
50 TABLET, FILM COATED ORAL ONCE
Status: COMPLETED | OUTPATIENT
Start: 2019-01-21 | End: 2019-01-21

## 2019-01-21 RX ADMIN — PROMETHAZINE HYDROCHLORIDE 12.5 MG: 25 INJECTION INTRAMUSCULAR; INTRAVENOUS at 12:01

## 2019-01-21 RX ADMIN — SUMATRIPTAN SUCCINATE 50 MG: 50 TABLET, FILM COATED ORAL at 12:01

## 2019-01-21 RX ADMIN — SODIUM CHLORIDE, SODIUM LACTATE, POTASSIUM CHLORIDE, AND CALCIUM CHLORIDE 500 ML: .6; .31; .03; .02 INJECTION, SOLUTION INTRAVENOUS at 12:01

## 2019-01-21 RX ADMIN — PROCHLORPERAZINE MALEATE 10 MG: 5 TABLET, FILM COATED ORAL at 10:01

## 2019-01-21 NOTE — ED PROVIDER NOTES
Encounter Date: 2019       History     Chief Complaint   Patient presents with    Headache     34 yo  at 29w0d presents for a headache that is unrelieved by tylenol and fioricet. She has been checking her blood pressure daily and it has been normal. She has no history of hypertensive disease. She denies vision changes, chest pain, shortness of breath, RUQ pain. No contractions, vaginal bleeding, leakage of fluid. Reports good fetal movement.           Review of patient's allergies indicates:   Allergen Reactions    Amoxicillin      hives    Pcn [penicillins] Rash    Valium [diazepam] Anxiety     Past Medical History:   Diagnosis Date    Abnormal Pap smear of cervix     Allergy     seasonal    Anorexia     Bulimia     Depression     Fever blister     Hypertension     Migraine headache      Past Surgical History:   Procedure Laterality Date    BONE MARROW ASPIRATION      x 3    CERVICAL BIOPSY  W/ LOOP ELECTRODE EXCISION       SECTION  2016    COLPOSCOPY      DELIVERY- SECTION N/A 2016    Performed by [facility]aleigh [facility]anco MD at [facility] L&D    DILATION AND CURETTAGE OF UTERUS      SHOULDER SURGERY Left     x 2    SINUS SURGERY      age 19    TONSILLECTOMY       Family History   Problem Relation Age of Onset    Cancer Maternal Grandmother 40        cervical    Breast cancer Neg Hx     Colon cancer Neg Hx     Ovarian cancer Neg Hx     Melanoma Neg Hx      Social History     Tobacco Use    Smoking status: Never Smoker    Smokeless tobacco: Never Used   Substance Use Topics    Alcohol use: Yes     Comment: pre pregnancy     Drug use: No     Review of Systems   Constitutional: Negative for activity change, appetite change, chills and fever.   Eyes: Negative for photophobia and visual disturbance.   Respiratory: Negative for chest tightness and shortness of breath.    Cardiovascular: Negative for chest pain.   Gastrointestinal: Negative for abdominal pain,  constipation, diarrhea, nausea and vomiting.   Genitourinary: Negative for pelvic pain, vaginal bleeding and vaginal discharge.   Neurological: Positive for headaches. Negative for dizziness, seizures and light-headedness.   Psychiatric/Behavioral: Negative for dysphoric mood, self-injury and suicidal ideas.       Physical Exam     Initial Vitals   BP Pulse Resp Temp SpO2   01/21/19 1031 01/21/19 1030 01/21/19 1031 01/21/19 1031 01/21/19 1030   127/78 82 18 97.7 °F (36.5 °C) 97 %      MAP       --                BP: (109-127)/(65-78) 109/65    Physical Exam    Vitals reviewed.  Constitutional: She appears well-developed and well-nourished. She is not diaphoretic. No distress.   HENT:   Head: Normocephalic and atraumatic.   Nose: Nose normal.   Eyes: Conjunctivae are normal.   Neck: Normal range of motion.   Cardiovascular: Normal rate.   Pulmonary/Chest: No respiratory distress.   Abdominal: Soft. She exhibits no distension. There is no tenderness. There is no rebound.   Gravid   Musculoskeletal: She exhibits no edema or tenderness.   Neurological: She is alert and oriented to person, place, and time.   Skin: Skin is warm and dry. No rash noted. No erythema.   Psychiatric: She has a normal mood and affect. Her behavior is normal. Judgment and thought content normal.         ED Course   Procedures  Labs Reviewed   COMPREHENSIVE METABOLIC PANEL - Abnormal; Notable for the following components:       Result Value    Chloride 112 (*)     CO2 20 (*)     Albumin 3.1 (*)     Anion Gap 7 (*)     All other components within normal limits   CBC W/ AUTO DIFFERENTIAL - Abnormal; Notable for the following components:    RBC 3.67 (*)     Hemoglobin 11.4 (*)     Hematocrit 32.9 (*)     MCH 31.1 (*)     Gran% 73.1 (*)     All other components within normal limits   PROTEIN / CREATININE RATIO, URINE - Abnormal; Notable for the following components:    Creatinine, Random Ur 9.2 (*)     All other components within normal limits           Imaging Results    None          Medical Decision Making:   ED Management:  Vital signs stable. NST reactive and reassuring. Compazine 10 mg PO given for headache   Headache did not resolve following compazine 10   CBC, CMP within normal limits. P/C ratio too low to calculate.  LR bolus   IV phenergan and sumatriptan administered with resolution of headache. All BP's within normal range.  Patient stable for discharge. Will prescribe a few sumatripan tab to be used only if other medications fail to provide relief. Patient verbalized understanding. All questions answered. Patient has follow up appt with Dr. Polanco next week.              Attending Attestation:   Physician Attestation Statement for Resident:  As the supervising MD   Physician Attestation Statement: I have personally seen and examined this patient.   I agree with the above history. -:   As the supervising MD I agree with the above PE.    As the supervising MD I agree with the above treatment, course, plan, and disposition.  I was personally present during the critical portions of the procedure(s) performed by the resident and was immediately available in the ED to provide services and assistance as needed during the entire procedure.  I have reviewed and agree with the residents interpretation of the following: rhythm strips and lab data.  I have reviewed the following: old records at this facility.                    ED Course as of Jan 21 1327   Mon Jan 21, 2019   1052 I evaluated Ms. Win and discussed plan with Dr. Marion.  Pt presents at 29w0d with HA since Wednesday. She has tried fioricet multiple times over last couple of days that did not resolve symptoms.  Without fioricet HA is 9/10, with about 7/10.  She has h/o migraines, but in early 20's.  She's unsure if this is like her migraines.  Will get serial BP's, pre-e labs and compazine  [HU]   1212 Pre-e labs negative, compazine didn't relieve HA - although slightly improved.  Will give  fluid bolus, phenergan and sumatriptan for likely migraine HA  [HU]      ED Course User Index  [HU] Cyndi Torres DO     Clinical Impression:   The primary encounter diagnosis was Tension-type headache, not intractable, unspecified chronicity pattern. A diagnosis of 29 weeks gestation of pregnancy was also pertinent to this visit.      Disposition:   Disposition: Discharged  Condition: Stable                        Risa Marion MD  Resident  01/21/19 1329       Cyndi Torres DO  01/26/19 4783

## 2019-01-28 ENCOUNTER — ROUTINE PRENATAL (OUTPATIENT)
Dept: OBSTETRICS AND GYNECOLOGY | Facility: CLINIC | Age: 34
End: 2019-01-28
Attending: OBSTETRICS & GYNECOLOGY
Payer: COMMERCIAL

## 2019-01-28 ENCOUNTER — CLINICAL SUPPORT (OUTPATIENT)
Dept: OBSTETRICS AND GYNECOLOGY | Facility: CLINIC | Age: 34
End: 2019-01-28
Payer: COMMERCIAL

## 2019-01-28 ENCOUNTER — LAB VISIT (OUTPATIENT)
Dept: LAB | Facility: OTHER | Age: 34
End: 2019-01-28
Payer: COMMERCIAL

## 2019-01-28 VITALS
DIASTOLIC BLOOD PRESSURE: 58 MMHG | WEIGHT: 199.06 LBS | BODY MASS INDEX: 34.17 KG/M2 | SYSTOLIC BLOOD PRESSURE: 114 MMHG

## 2019-01-28 DIAGNOSIS — O26.899 RH NEGATIVE STATE IN ANTEPARTUM PERIOD: ICD-10-CM

## 2019-01-28 DIAGNOSIS — Z67.91 RH NEGATIVE STATE IN ANTEPARTUM PERIOD: ICD-10-CM

## 2019-01-28 DIAGNOSIS — Z34.83 PRENATAL CARE, SUBSEQUENT PREGNANCY, THIRD TRIMESTER: Primary | ICD-10-CM

## 2019-01-28 LAB
ABO + RH BLD: NORMAL
BLD GP AB SCN CELLS X3 SERPL QL: NORMAL
BLOOD GROUP ANTIBODIES SERPL: NORMAL

## 2019-01-28 PROCEDURE — 0502F SUBSEQUENT PRENATAL CARE: CPT | Mod: S$GLB,,, | Performed by: OBSTETRICS & GYNECOLOGY

## 2019-01-28 PROCEDURE — 96372 RHO (D) IMMUNE GLOBULIN: ICD-10-PCS | Mod: 59,S$GLB,, | Performed by: NURSE PRACTITIONER

## 2019-01-28 PROCEDURE — 86870 RBC ANTIBODY IDENTIFICATION: CPT

## 2019-01-28 PROCEDURE — 99999 PR PBB SHADOW E&M-EST. PATIENT-LVL II: ICD-10-PCS | Mod: PBBFAC,,,

## 2019-01-28 PROCEDURE — 0502F PR SUBSEQUENT PRENATAL CARE: ICD-10-PCS | Mod: S$GLB,,, | Performed by: OBSTETRICS & GYNECOLOGY

## 2019-01-28 PROCEDURE — 99999 PR PBB SHADOW E&M-EST. PATIENT-LVL II: CPT | Mod: PBBFAC,,,

## 2019-01-28 PROCEDURE — 36415 COLL VENOUS BLD VENIPUNCTURE: CPT

## 2019-01-28 PROCEDURE — 86901 BLOOD TYPING SEROLOGIC RH(D): CPT

## 2019-01-28 PROCEDURE — 90715 TDAP VACCINE GREATER THAN OR EQUAL TO 7YO IM: ICD-10-PCS | Mod: S$GLB,,, | Performed by: NURSE PRACTITIONER

## 2019-01-28 PROCEDURE — 90471 IMMUNIZATION ADMIN: CPT | Mod: S$GLB,,, | Performed by: NURSE PRACTITIONER

## 2019-01-28 PROCEDURE — 96372 THER/PROPH/DIAG INJ SC/IM: CPT | Mod: 59,S$GLB,, | Performed by: NURSE PRACTITIONER

## 2019-01-28 PROCEDURE — 90471 TDAP VACCINE GREATER THAN OR EQUAL TO 7YO IM: ICD-10-PCS | Mod: S$GLB,,, | Performed by: NURSE PRACTITIONER

## 2019-01-28 PROCEDURE — 90715 TDAP VACCINE 7 YRS/> IM: CPT | Mod: S$GLB,,, | Performed by: NURSE PRACTITIONER

## 2019-01-28 NOTE — PROGRESS NOTES
Here for TDAP injection. Patient without complaint of pain at this time ,Injection given. Tolerated well. No pain noted after injection.  Advised to wait in lobby 15 minutes and report any adverse reactions.         Site: [facility]    Baby Friendly Handout Given to Patient      Here for rhogam injection , no complaints at this time. Verified patient is RH negative.   No complaint of pain before or after injection. Patient advised to wait 15 minutes before leaving and report any adverse reactions.      Site: [facility]

## 2019-01-28 NOTE — PROGRESS NOTES
No cramping, no VB, good FM, no LOF  She was seen in OB ED for migraine and was given Imitrex and Phenergan. She desires repeat C/S- requests no residents for surgery. PTL/bleeding precautions Follow up in 2 weeks. Type and screen, Rhogam and Tdap today

## 2019-01-30 ENCOUNTER — OFFICE VISIT (OUTPATIENT)
Dept: PSYCHIATRY | Facility: CLINIC | Age: 34
End: 2019-01-30
Payer: COMMERCIAL

## 2019-01-30 VITALS
DIASTOLIC BLOOD PRESSURE: 61 MMHG | WEIGHT: 197.88 LBS | BODY MASS INDEX: 33.78 KG/M2 | HEART RATE: 94 BPM | SYSTOLIC BLOOD PRESSURE: 112 MMHG | HEIGHT: 64 IN

## 2019-01-30 DIAGNOSIS — Z86.59 HISTORY OF POSTTRAUMATIC STRESS DISORDER (PTSD): ICD-10-CM

## 2019-01-30 DIAGNOSIS — Z3A.30 30 WEEKS GESTATION OF PREGNANCY: ICD-10-CM

## 2019-01-30 DIAGNOSIS — F41.9 ANXIETY DISORDER, UNSPECIFIED TYPE: ICD-10-CM

## 2019-01-30 DIAGNOSIS — F90.0 ADHD (ATTENTION DEFICIT HYPERACTIVITY DISORDER), INATTENTIVE TYPE: Primary | ICD-10-CM

## 2019-01-30 DIAGNOSIS — Z87.898 HISTORY OF INSOMNIA: ICD-10-CM

## 2019-01-30 PROCEDURE — 3008F BODY MASS INDEX DOCD: CPT | Mod: CPTII,S$GLB,, | Performed by: PSYCHIATRY & NEUROLOGY

## 2019-01-30 PROCEDURE — 99214 OFFICE O/P EST MOD 30 MIN: CPT | Mod: S$GLB,,, | Performed by: PSYCHIATRY & NEUROLOGY

## 2019-01-30 PROCEDURE — 99214 PR OFFICE/OUTPT VISIT, EST, LEVL IV, 30-39 MIN: ICD-10-PCS | Mod: S$GLB,,, | Performed by: PSYCHIATRY & NEUROLOGY

## 2019-01-30 PROCEDURE — 99999 PR PBB SHADOW E&M-EST. PATIENT-LVL III: CPT | Mod: PBBFAC,,, | Performed by: PSYCHIATRY & NEUROLOGY

## 2019-01-30 PROCEDURE — 99999 PR PBB SHADOW E&M-EST. PATIENT-LVL III: ICD-10-PCS | Mod: PBBFAC,,, | Performed by: PSYCHIATRY & NEUROLOGY

## 2019-01-30 PROCEDURE — 3008F PR BODY MASS INDEX (BMI) DOCUMENTED: ICD-10-PCS | Mod: CPTII,S$GLB,, | Performed by: PSYCHIATRY & NEUROLOGY

## 2019-01-30 PROCEDURE — 90833 PR PSYCHOTHERAPY W/PATIENT W/E&M, 30 MIN (ADD ON): ICD-10-PCS | Mod: S$GLB,,, | Performed by: PSYCHIATRY & NEUROLOGY

## 2019-01-30 PROCEDURE — 90833 PSYTX W PT W E/M 30 MIN: CPT | Mod: S$GLB,,, | Performed by: PSYCHIATRY & NEUROLOGY
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
## 2019-02-11 ENCOUNTER — PROCEDURE VISIT (OUTPATIENT)
Dept: MATERNAL FETAL MEDICINE | Facility: HOSPITAL | Age: 34
End: 2019-02-11
Payer: COMMERCIAL

## 2019-02-11 VITALS — WEIGHT: 197 LBS | SYSTOLIC BLOOD PRESSURE: 130 MMHG | BODY MASS INDEX: 33.81 KG/M2 | DIASTOLIC BLOOD PRESSURE: 78 MMHG

## 2019-02-11 DIAGNOSIS — Z36.89 ENCOUNTER FOR ULTRASOUND TO CHECK FETAL GROWTH: ICD-10-CM

## 2019-02-11 PROCEDURE — 76816 PR  US,PREGNANT UTERUS,F/U,TRANSABD APP: ICD-10-PCS | Mod: 26,,, | Performed by: PEDIATRICS

## 2019-02-11 PROCEDURE — 76816 OB US FOLLOW-UP PER FETUS: CPT

## 2019-02-11 PROCEDURE — 76816 OB US FOLLOW-UP PER FETUS: CPT | Mod: 26,,, | Performed by: PEDIATRICS

## 2019-02-11 PROCEDURE — 99499 NO LOS: ICD-10-PCS | Mod: ,,, | Performed by: PEDIATRICS

## 2019-02-11 PROCEDURE — 99499 UNLISTED E&M SERVICE: CPT | Mod: ,,, | Performed by: PEDIATRICS

## 2019-02-11 NOTE — PROGRESS NOTES
Indication  ========    Evaluation of fetal growth.    History  ======    Risk Factors  Details: hx LEEP x3  Details: IUI    Maternal Assessment  =================    Weight 89 kg  Weight (lb) 197 lb  BP syst 130 mmHg  BP diast 78 mmHg    Method  ======    2D Color Doppler, , Anita iU22, Transabdominal ultrasound examination.    Pregnancy  =========    Srivastava pregnancy. Number of fetuses: 1.    Dating  ======    Cycle: regular cycle  Conception on: 7/16/2018  Conception: AIH  GA by conception 32 w + 0 d  ARELI by conception: 4/8/2019  Ultrasound examination on: 2/11/2019  GA by U/S based upon: AC, BPD, Femur, HC  GA by U/S 32 w + 3 d  ARELI by U/S: 4/5/2019  Assigned: Dating performed on 09/14/2018, based on the conception date  Assigned GA 32 w + 0 d  Assigned ARELI: 4/8/2019    General Evaluation  ==============    Cardiac activity: present.  bpm.  Fetal movements: visualized.  Presentation: cephalic.  Placenta:  Placental site: anterior, high.  Umbilical cord: Cord vessels: 3 vessel cord.  Amniotic fluid: Amount of AF: normal amount. MVP 7.9 cm.    Fetal Biometry  ============    Fetal Biometry  BPD 80.8 mm 32w 3d Hadlock  .2 mm 33w 0d Hadlock  .2 mm 32w 6d Hadlock  Femur 60.0 mm 31w 2d Hadlock  EFW 1,965 g 33% Beck  Calculated by: Hadlock (BPD-HC-AC-FL)  EFW (lb) 4 lb  EFW (oz) 5 oz  HC / AC 1.03  FL / BPD 0.74  FL / AC 0.21  MVP 7.9 cm   bpm    Fetal Anatomy  ============    Cranium: normal  Posterior fossa: normal  4-chamber view: normal  Stomach: normal  Kidneys: normal  Bladder: normal  Wants to know gender: no  Other: A full anatomy survey previously performed.        Impression  =========    Fetal size is AGA with the EFW at the 33rd percentile.    Normal repeat limited fetal anatomic survey.  AFV is normal.          Recommendation  ==============    Follow-up ultrasound as clinically indicated.

## 2019-02-12 ENCOUNTER — ROUTINE PRENATAL (OUTPATIENT)
Dept: OBSTETRICS AND GYNECOLOGY | Facility: CLINIC | Age: 34
End: 2019-02-12
Payer: COMMERCIAL

## 2019-02-12 VITALS
DIASTOLIC BLOOD PRESSURE: 60 MMHG | WEIGHT: 201.75 LBS | SYSTOLIC BLOOD PRESSURE: 110 MMHG | BODY MASS INDEX: 34.63 KG/M2

## 2019-02-12 DIAGNOSIS — Z34.83 PRENATAL CARE, SUBSEQUENT PREGNANCY, THIRD TRIMESTER: Primary | ICD-10-CM

## 2019-02-12 PROCEDURE — 99999 PR PBB SHADOW E&M-EST. PATIENT-LVL III: ICD-10-PCS | Mod: PBBFAC,,, | Performed by: NURSE PRACTITIONER

## 2019-02-12 PROCEDURE — 99999 PR PBB SHADOW E&M-EST. PATIENT-LVL III: CPT | Mod: PBBFAC,,, | Performed by: NURSE PRACTITIONER

## 2019-02-12 PROCEDURE — 0502F PR SUBSEQUENT PRENATAL CARE: ICD-10-PCS | Mod: CPTII,S$GLB,, | Performed by: NURSE PRACTITIONER

## 2019-02-12 PROCEDURE — 0502F SUBSEQUENT PRENATAL CARE: CPT | Mod: CPTII,S$GLB,, | Performed by: NURSE PRACTITIONER

## 2019-02-12 NOTE — PROGRESS NOTES
Here for routine OB appt at 32w1d, with no complaints.  Reports good FM.  Denies LOF, denies VB, and reports occasional contractions.  Reviewed warning signs of Labor and Preeclampsia.  Daily FM counts reinforced.  Peds- Dr. Rapp.  Plans to breastfeed- has pump. Pt desires prenatal lactation consult- she had difficulty with putting her last baby to breast. Number given for warm line to schedule consult.    F/U scheduled 2 weeks

## 2019-02-13 ENCOUNTER — TELEPHONE (OUTPATIENT)
Dept: LACTATION | Facility: CLINIC | Age: 34
End: 2019-02-13

## 2019-02-25 ENCOUNTER — ROUTINE PRENATAL (OUTPATIENT)
Dept: OBSTETRICS AND GYNECOLOGY | Facility: CLINIC | Age: 34
End: 2019-02-25
Attending: OBSTETRICS & GYNECOLOGY
Payer: COMMERCIAL

## 2019-02-25 ENCOUNTER — LACTATION CONSULT (OUTPATIENT)
Dept: LACTATION | Facility: CLINIC | Age: 34
End: 2019-02-25
Payer: COMMERCIAL

## 2019-02-25 VITALS
WEIGHT: 202.63 LBS | DIASTOLIC BLOOD PRESSURE: 78 MMHG | SYSTOLIC BLOOD PRESSURE: 118 MMHG | BODY MASS INDEX: 34.78 KG/M2

## 2019-02-25 DIAGNOSIS — Z34.83 PRENATAL CARE, SUBSEQUENT PREGNANCY, THIRD TRIMESTER: Primary | ICD-10-CM

## 2019-02-25 DIAGNOSIS — Z71.9 HEALTH EDUCATION/COUNSELING: Primary | ICD-10-CM

## 2019-02-25 PROCEDURE — 0502F SUBSEQUENT PRENATAL CARE: CPT | Mod: CPTII,S$GLB,, | Performed by: OBSTETRICS & GYNECOLOGY

## 2019-02-25 PROCEDURE — S9443 PR LACTATION CLASS: ICD-10-PCS | Mod: S$GLB,,, | Performed by: OBSTETRICS & GYNECOLOGY

## 2019-02-25 PROCEDURE — 0502F PR SUBSEQUENT PRENATAL CARE: ICD-10-PCS | Mod: CPTII,S$GLB,, | Performed by: OBSTETRICS & GYNECOLOGY

## 2019-02-25 PROCEDURE — S9443 LACTATION CLASS: HCPCS | Mod: S$GLB,,, | Performed by: OBSTETRICS & GYNECOLOGY

## 2019-02-25 NOTE — PROGRESS NOTES
Pt here to discuss breastfeeding baby. Pt had difficulty with breastfeeding 1st child and pumped and bottle feed baby. Pt discussed her flat nipples the cause of baby unable to latch without assistance from the lactation staff. Discuss use of breast shells (wearing 2 days  Before c/s) prior to delivery and to consult with Dr Castaneda. Encouraged pt to also use breastpump as a tool to enma nipple prior to breastfeeding baby. Pt aware of outpatient services for assistance with breastfeeding. Recommended pt schedule f/u help.

## 2019-02-28 ENCOUNTER — PATIENT MESSAGE (OUTPATIENT)
Dept: OBSTETRICS AND GYNECOLOGY | Facility: CLINIC | Age: 34
End: 2019-02-28

## 2019-03-01 NOTE — PROGRESS NOTES
No cramping, no VB, good FM, no LOF  PTL/bleeding precautions. Follow up in 1 week. Discussed with patient that [place] be [place] of [place] when she is due for her C section. She desires repeat C section.

## 2019-03-04 ENCOUNTER — PATIENT MESSAGE (OUTPATIENT)
Dept: OBSTETRICS AND GYNECOLOGY | Facility: CLINIC | Age: 34
End: 2019-03-04

## 2019-03-07 ENCOUNTER — TELEPHONE (OUTPATIENT)
Dept: OBSTETRICS AND GYNECOLOGY | Facility: CLINIC | Age: 34
End: 2019-03-07

## 2019-03-07 ENCOUNTER — PATIENT MESSAGE (OUTPATIENT)
Dept: OBSTETRICS AND GYNECOLOGY | Facility: CLINIC | Age: 34
End: 2019-03-07

## 2019-03-12 ENCOUNTER — PATIENT MESSAGE (OUTPATIENT)
Dept: OBSTETRICS AND GYNECOLOGY | Facility: CLINIC | Age: 34
End: 2019-03-12

## 2019-03-12 ENCOUNTER — LAB VISIT (OUTPATIENT)
Dept: LAB | Facility: OTHER | Age: 34
End: 2019-03-12
Payer: COMMERCIAL

## 2019-03-12 ENCOUNTER — ROUTINE PRENATAL (OUTPATIENT)
Dept: OBSTETRICS AND GYNECOLOGY | Facility: CLINIC | Age: 34
End: 2019-03-12
Payer: COMMERCIAL

## 2019-03-12 VITALS
SYSTOLIC BLOOD PRESSURE: 110 MMHG | BODY MASS INDEX: 35.08 KG/M2 | DIASTOLIC BLOOD PRESSURE: 66 MMHG | WEIGHT: 204.38 LBS

## 2019-03-12 DIAGNOSIS — Z34.83 PRENATAL CARE, SUBSEQUENT PREGNANCY, THIRD TRIMESTER: Primary | ICD-10-CM

## 2019-03-12 DIAGNOSIS — Z34.83 PRENATAL CARE, SUBSEQUENT PREGNANCY, THIRD TRIMESTER: ICD-10-CM

## 2019-03-12 LAB
BASOPHILS # BLD AUTO: 0.02 K/UL
BASOPHILS NFR BLD: 0.2 %
DIFFERENTIAL METHOD: ABNORMAL
EOSINOPHIL # BLD AUTO: 0.1 K/UL
EOSINOPHIL NFR BLD: 1.1 %
ERYTHROCYTE [DISTWIDTH] IN BLOOD BY AUTOMATED COUNT: 13.2 %
HCT VFR BLD AUTO: 34.9 %
HGB BLD-MCNC: 12 G/DL
LYMPHOCYTES # BLD AUTO: 2.1 K/UL
LYMPHOCYTES NFR BLD: 20.3 %
MCH RBC QN AUTO: 31.3 PG
MCHC RBC AUTO-ENTMCNC: 34.4 G/DL
MCV RBC AUTO: 91 FL
MONOCYTES # BLD AUTO: 0.8 K/UL
MONOCYTES NFR BLD: 7.5 %
NEUTROPHILS # BLD AUTO: 7.1 K/UL
NEUTROPHILS NFR BLD: 69.4 %
PLATELET # BLD AUTO: 222 K/UL
PMV BLD AUTO: 9.8 FL
RBC # BLD AUTO: 3.84 M/UL
WBC # BLD AUTO: 10.18 K/UL

## 2019-03-12 PROCEDURE — 0502F SUBSEQUENT PRENATAL CARE: CPT | Mod: CPTII,S$GLB,, | Performed by: NURSE PRACTITIONER

## 2019-03-12 PROCEDURE — 99999 PR PBB SHADOW E&M-EST. PATIENT-LVL III: ICD-10-PCS | Mod: PBBFAC,,, | Performed by: NURSE PRACTITIONER

## 2019-03-12 PROCEDURE — 86592 SYPHILIS TEST NON-TREP QUAL: CPT

## 2019-03-12 PROCEDURE — 0502F PR SUBSEQUENT PRENATAL CARE: ICD-10-PCS | Mod: CPTII,S$GLB,, | Performed by: NURSE PRACTITIONER

## 2019-03-12 PROCEDURE — 86703 HIV-1/HIV-2 1 RESULT ANTBDY: CPT

## 2019-03-12 PROCEDURE — 85025 COMPLETE CBC W/AUTO DIFF WBC: CPT

## 2019-03-12 PROCEDURE — 99999 PR PBB SHADOW E&M-EST. PATIENT-LVL III: CPT | Mod: PBBFAC,,, | Performed by: NURSE PRACTITIONER

## 2019-03-12 PROCEDURE — 87081 CULTURE SCREEN ONLY: CPT

## 2019-03-12 PROCEDURE — 36415 COLL VENOUS BLD VENIPUNCTURE: CPT

## 2019-03-12 NOTE — PROGRESS NOTES
Here for routine OB appt at 36w1d, with no complaints.  Reports good FM.  Denies LOF, denies VB, and reports occasional contractions.  Reviewed warning signs of Labor and Preeclampsia.  Daily FM counts reinforced.  GBS and T3 labs today.   F/U scheduled 1 week

## 2019-03-13 LAB — HIV 1+2 AB+HIV1 P24 AG SERPL QL IA: NEGATIVE

## 2019-03-14 LAB
BACTERIA SPEC AEROBE CULT: NORMAL
RPR SER QL: NORMAL

## 2019-03-18 ENCOUNTER — PATIENT MESSAGE (OUTPATIENT)
Dept: OBSTETRICS AND GYNECOLOGY | Facility: CLINIC | Age: 34
End: 2019-03-18

## 2019-03-18 ENCOUNTER — ROUTINE PRENATAL (OUTPATIENT)
Dept: OBSTETRICS AND GYNECOLOGY | Facility: CLINIC | Age: 34
End: 2019-03-18
Payer: COMMERCIAL

## 2019-03-18 VITALS
WEIGHT: 193.56 LBS | BODY MASS INDEX: 33.23 KG/M2 | DIASTOLIC BLOOD PRESSURE: 70 MMHG | SYSTOLIC BLOOD PRESSURE: 108 MMHG

## 2019-03-18 DIAGNOSIS — Z34.83 PRENATAL CARE, SUBSEQUENT PREGNANCY, THIRD TRIMESTER: Primary | ICD-10-CM

## 2019-03-18 PROCEDURE — 0502F SUBSEQUENT PRENATAL CARE: CPT | Mod: CPTII,S$GLB,, | Performed by: NURSE PRACTITIONER

## 2019-03-18 PROCEDURE — 99999 PR PBB SHADOW E&M-EST. PATIENT-LVL III: ICD-10-PCS | Mod: PBBFAC,,, | Performed by: NURSE PRACTITIONER

## 2019-03-18 PROCEDURE — 99999 PR PBB SHADOW E&M-EST. PATIENT-LVL III: CPT | Mod: PBBFAC,,, | Performed by: NURSE PRACTITIONER

## 2019-03-18 PROCEDURE — 0502F PR SUBSEQUENT PRENATAL CARE: ICD-10-PCS | Mod: CPTII,S$GLB,, | Performed by: NURSE PRACTITIONER

## 2019-03-18 NOTE — PROGRESS NOTES
Patient reports to [facility] with complaint of pelvic pressure/pain that began two weeks ago and has worsened in the past 2 days. Pain worsens with walking or standing and is relieved by laying down. Denies VB, LoF, regular contractions, reports good fetal movement. Reports episodic, irregular contractions lasting 10-15 seconds for the past 2 days. She has F/U scheduled on 3/20/19 with Dr. Polanco.    Encouraged to rest at home and avoid strenuous activity, continue fetal kick counts, wearing pregnancy belt, OTC Tylenol prn. Instructed to go to L&D for signs of active labor. Will F/U with Dr. Polanco on 3/20/19.

## 2019-03-20 ENCOUNTER — ROUTINE PRENATAL (OUTPATIENT)
Dept: OBSTETRICS AND GYNECOLOGY | Facility: CLINIC | Age: 34
End: 2019-03-20
Attending: OBSTETRICS & GYNECOLOGY
Payer: COMMERCIAL

## 2019-03-20 VITALS
WEIGHT: 207.25 LBS | DIASTOLIC BLOOD PRESSURE: 72 MMHG | BODY MASS INDEX: 35.57 KG/M2 | SYSTOLIC BLOOD PRESSURE: 116 MMHG

## 2019-03-20 DIAGNOSIS — Z34.83 PRENATAL CARE, SUBSEQUENT PREGNANCY, THIRD TRIMESTER: Primary | ICD-10-CM

## 2019-03-20 PROCEDURE — 0502F PR SUBSEQUENT PRENATAL CARE: ICD-10-PCS | Mod: CPTII,S$GLB,, | Performed by: OBSTETRICS & GYNECOLOGY

## 2019-03-20 PROCEDURE — 0502F SUBSEQUENT PRENATAL CARE: CPT | Mod: CPTII,S$GLB,, | Performed by: OBSTETRICS & GYNECOLOGY

## 2019-03-20 NOTE — PROGRESS NOTES
"No cramping, no VB, good FM, no LOF  Patient reports that she is having a lot of "vaginal pressure." She would like to stop working on Friday.   Labor/bleeding precautions. Follow up in 1 week. C section scheduled.   "

## 2019-03-21 ENCOUNTER — PATIENT MESSAGE (OUTPATIENT)
Dept: OBSTETRICS AND GYNECOLOGY | Facility: CLINIC | Age: 34
End: 2019-03-21

## 2019-03-26 ENCOUNTER — PATIENT MESSAGE (OUTPATIENT)
Dept: OBSTETRICS AND GYNECOLOGY | Facility: CLINIC | Age: 34
End: 2019-03-26

## 2019-03-27 ENCOUNTER — ROUTINE PRENATAL (OUTPATIENT)
Dept: OBSTETRICS AND GYNECOLOGY | Facility: CLINIC | Age: 34
End: 2019-03-27
Attending: OBSTETRICS & GYNECOLOGY
Payer: COMMERCIAL

## 2019-03-27 VITALS
WEIGHT: 207.69 LBS | DIASTOLIC BLOOD PRESSURE: 74 MMHG | SYSTOLIC BLOOD PRESSURE: 118 MMHG | BODY MASS INDEX: 35.65 KG/M2

## 2019-03-27 DIAGNOSIS — Z3A.38 38 WEEKS GESTATION OF PREGNANCY: Primary | ICD-10-CM

## 2019-03-27 PROCEDURE — 0502F PR SUBSEQUENT PRENATAL CARE: ICD-10-PCS | Mod: CPTII,S$GLB,, | Performed by: NURSE PRACTITIONER

## 2019-03-27 PROCEDURE — 0502F SUBSEQUENT PRENATAL CARE: CPT | Mod: CPTII,S$GLB,, | Performed by: NURSE PRACTITIONER

## 2019-03-27 PROCEDURE — 99999 PR PBB SHADOW E&M-EST. PATIENT-LVL III: ICD-10-PCS | Mod: PBBFAC,,, | Performed by: NURSE PRACTITIONER

## 2019-03-27 PROCEDURE — 99999 PR PBB SHADOW E&M-EST. PATIENT-LVL III: CPT | Mod: PBBFAC,,, | Performed by: NURSE PRACTITIONER

## 2019-03-27 NOTE — PROGRESS NOTES
Here for routine OB appt at 38w2dd, with no complaints. Ready for baby. Stopped working this week. Having general discomforts of pregnancy- some hip pain, lots of vaginal pressure. SPADD appt today. GBS was negative. Reports good FM.  Denies LOF, denies VB, denies contractions.  Reviewed warning signs of Labor and Preeclampsia.  Daily FM counts reinforced.  Repeat c/s on 4/2/19

## 2019-04-02 ENCOUNTER — HOSPITAL ENCOUNTER (INPATIENT)
Facility: OTHER | Age: 34
LOS: 2 days | Discharge: HOME | End: 2019-04-04
Attending: OBSTETRICS & GYNECOLOGY | Admitting: OBSTETRICS & GYNECOLOGY
Payer: COMMERCIAL

## 2019-04-02 ENCOUNTER — ANESTHESIA EVENT (OUTPATIENT)
Dept: OBSTETRICS AND GYNECOLOGY | Facility: OTHER | Age: 34
End: 2019-04-02
Payer: COMMERCIAL

## 2019-04-02 ENCOUNTER — ANESTHESIA (OUTPATIENT)
Dept: OBSTETRICS AND GYNECOLOGY | Facility: OTHER | Age: 34
End: 2019-04-02
Payer: COMMERCIAL

## 2019-04-02 DIAGNOSIS — Z98.891 S/P CESAREAN SECTION: Primary | ICD-10-CM

## 2019-04-02 DIAGNOSIS — Z3A.39 39 WEEKS GESTATION OF PREGNANCY: ICD-10-CM

## 2019-04-02 LAB
ABO + RH BLD: NORMAL
ABO + RH BLD: NORMAL
BASOPHILS # BLD AUTO: 0.01 K/UL (ref 0–0.2)
BASOPHILS # BLD AUTO: 0.01 K/UL (ref 0–0.2)
BASOPHILS NFR BLD: 0.1 % (ref 0–1.9)
BASOPHILS NFR BLD: 0.1 % (ref 0–1.9)
BLD GP AB SCN CELLS X3 SERPL QL: NORMAL
BLD GP AB SCN CELLS X3 SERPL QL: NORMAL
BLOOD GROUP ANTIBODIES SERPL: NORMAL
BLOOD GROUP ANTIBODIES SERPL: NORMAL
DIFFERENTIAL METHOD: ABNORMAL
DIFFERENTIAL METHOD: ABNORMAL
EOSINOPHIL # BLD AUTO: 0.1 K/UL (ref 0–0.5)
EOSINOPHIL # BLD AUTO: 0.2 K/UL (ref 0–0.5)
EOSINOPHIL NFR BLD: 1 % (ref 0–8)
EOSINOPHIL NFR BLD: 1.9 % (ref 0–8)
ERYTHROCYTE [DISTWIDTH] IN BLOOD BY AUTOMATED COUNT: 13 % (ref 11.5–14.5)
ERYTHROCYTE [DISTWIDTH] IN BLOOD BY AUTOMATED COUNT: 13.1 % (ref 11.5–14.5)
FETAL CELL SCN BLD QL ROSETTE: NORMAL
HCT VFR BLD AUTO: 33.8 % (ref 37–48.5)
HCT VFR BLD AUTO: 35.4 % (ref 37–48.5)
HGB BLD-MCNC: 11.5 G/DL (ref 12–16)
HGB BLD-MCNC: 12.4 G/DL (ref 12–16)
LYMPHOCYTES # BLD AUTO: 2.1 K/UL (ref 1–4.8)
LYMPHOCYTES # BLD AUTO: 2.1 K/UL (ref 1–4.8)
LYMPHOCYTES NFR BLD: 18.2 % (ref 18–48)
LYMPHOCYTES NFR BLD: 22.9 % (ref 18–48)
MCH RBC QN AUTO: 31.3 PG (ref 27–31)
MCH RBC QN AUTO: 32 PG (ref 27–31)
MCHC RBC AUTO-ENTMCNC: 34 G/DL (ref 32–36)
MCHC RBC AUTO-ENTMCNC: 35 G/DL (ref 32–36)
MCV RBC AUTO: 91 FL (ref 82–98)
MCV RBC AUTO: 92 FL (ref 82–98)
MONOCYTES # BLD AUTO: 0.7 K/UL (ref 0.3–1)
MONOCYTES # BLD AUTO: 0.8 K/UL (ref 0.3–1)
MONOCYTES NFR BLD: 6.6 % (ref 4–15)
MONOCYTES NFR BLD: 7.2 % (ref 4–15)
NEUTROPHILS # BLD AUTO: 6 K/UL (ref 1.8–7.7)
NEUTROPHILS # BLD AUTO: 8.4 K/UL (ref 1.8–7.7)
NEUTROPHILS NFR BLD: 66.5 % (ref 38–73)
NEUTROPHILS NFR BLD: 73.5 % (ref 38–73)
PLATELET # BLD AUTO: 182 K/UL (ref 150–350)
PLATELET # BLD AUTO: 204 K/UL (ref 150–350)
PMV BLD AUTO: 9.6 FL (ref 9.2–12.9)
PMV BLD AUTO: 9.7 FL (ref 9.2–12.9)
RBC # BLD AUTO: 3.67 M/UL (ref 4–5.4)
RBC # BLD AUTO: 3.88 M/UL (ref 4–5.4)
WBC # BLD AUTO: 11.46 K/UL (ref 3.9–12.7)
WBC # BLD AUTO: 9.04 K/UL (ref 3.9–12.7)

## 2019-04-02 PROCEDURE — 86870 RBC ANTIBODY IDENTIFICATION: CPT

## 2019-04-02 PROCEDURE — 36415 COLL VENOUS BLD VENIPUNCTURE: CPT

## 2019-04-02 PROCEDURE — 71000039 HC RECOVERY, EACH ADD'L HOUR: Performed by: OBSTETRICS & GYNECOLOGY

## 2019-04-02 PROCEDURE — 86901 BLOOD TYPING SEROLOGIC RH(D): CPT

## 2019-04-02 PROCEDURE — 25000003 PHARM REV CODE 250: Performed by: ANESTHESIOLOGY

## 2019-04-02 PROCEDURE — 63600175 PHARM REV CODE 636 W HCPCS: Performed by: ANESTHESIOLOGY

## 2019-04-02 PROCEDURE — 25000003 PHARM REV CODE 250: Performed by: STUDENT IN AN ORGANIZED HEALTH CARE EDUCATION/TRAINING PROGRAM

## 2019-04-02 PROCEDURE — 36004725 HC OB OR TIME LEV III - EA ADD 15 MIN: Performed by: OBSTETRICS & GYNECOLOGY

## 2019-04-02 PROCEDURE — 37000009 HC ANESTHESIA EA ADD 15 MINS: Performed by: OBSTETRICS & GYNECOLOGY

## 2019-04-02 PROCEDURE — 25000003 PHARM REV CODE 250

## 2019-04-02 PROCEDURE — 11000001 HC ACUTE MED/SURG PRIVATE ROOM

## 2019-04-02 PROCEDURE — 71000033 HC RECOVERY, INTIAL HOUR: Performed by: OBSTETRICS & GYNECOLOGY

## 2019-04-02 PROCEDURE — 85461 HEMOGLOBIN FETAL: CPT

## 2019-04-02 PROCEDURE — 63600175 PHARM REV CODE 636 W HCPCS: Performed by: STUDENT IN AN ORGANIZED HEALTH CARE EDUCATION/TRAINING PROGRAM

## 2019-04-02 PROCEDURE — S0020 INJECTION, BUPIVICAINE HYDRO: HCPCS | Performed by: ANESTHESIOLOGY

## 2019-04-02 PROCEDURE — S0077 INJECTION, CLINDAMYCIN PHOSP: HCPCS | Performed by: STUDENT IN AN ORGANIZED HEALTH CARE EDUCATION/TRAINING PROGRAM

## 2019-04-02 PROCEDURE — 37000008 HC ANESTHESIA 1ST 15 MINUTES: Performed by: OBSTETRICS & GYNECOLOGY

## 2019-04-02 PROCEDURE — 51702 INSERT TEMP BLADDER CATH: CPT

## 2019-04-02 PROCEDURE — 86901 BLOOD TYPING SEROLOGIC RH(D): CPT | Mod: 91

## 2019-04-02 PROCEDURE — 59510 PR FULL ROUT OBSTE CARE,CESAREAN DELIV: ICD-10-PCS | Mod: GB,,, | Performed by: OBSTETRICS & GYNECOLOGY

## 2019-04-02 PROCEDURE — S0028 INJECTION, FAMOTIDINE, 20 MG: HCPCS

## 2019-04-02 PROCEDURE — 36004724 HC OB OR TIME LEV III - 1ST 15 MIN: Performed by: OBSTETRICS & GYNECOLOGY

## 2019-04-02 PROCEDURE — 59510 PRA FULL ROUT OBSTE CARE,CESAREAN DELIV: ICD-10-PCS | Mod: ,,, | Performed by: ANESTHESIOLOGY

## 2019-04-02 PROCEDURE — 85025 COMPLETE CBC W/AUTO DIFF WBC: CPT | Mod: 91

## 2019-04-02 PROCEDURE — 59510 CESAREAN DELIVERY: CPT | Mod: GB,,, | Performed by: OBSTETRICS & GYNECOLOGY

## 2019-04-02 PROCEDURE — 59510 CESAREAN DELIVERY: CPT | Mod: ,,, | Performed by: ANESTHESIOLOGY

## 2019-04-02 RX ORDER — SODIUM CITRATE AND CITRIC ACID MONOHYDRATE 334; 500 MG/5ML; MG/5ML
30 SOLUTION ORAL
Status: DISCONTINUED | OUTPATIENT
Start: 2019-04-02 | End: 2019-04-02

## 2019-04-02 RX ORDER — MUPIROCIN 20 MG/G
OINTMENT TOPICAL
Status: DISCONTINUED | OUTPATIENT
Start: 2019-04-02 | End: 2019-04-02

## 2019-04-02 RX ORDER — DOCUSATE SODIUM 100 MG/1
200 CAPSULE, LIQUID FILLED ORAL 2 TIMES DAILY
Status: DISCONTINUED | OUTPATIENT
Start: 2019-04-02 | End: 2019-04-04 | Stop reason: HOSPADM

## 2019-04-02 RX ORDER — CLINDAMYCIN PHOSPHATE 900 MG/50ML
900 INJECTION, SOLUTION INTRAVENOUS ONCE
Status: COMPLETED | OUTPATIENT
Start: 2019-04-02 | End: 2019-04-02

## 2019-04-02 RX ORDER — OXYCODONE AND ACETAMINOPHEN 10; 325 MG/1; MG/1
1 TABLET ORAL EVERY 4 HOURS PRN
Status: DISCONTINUED | OUTPATIENT
Start: 2019-04-03 | End: 2019-04-04 | Stop reason: HOSPADM

## 2019-04-02 RX ORDER — SODIUM CHLORIDE, SODIUM LACTATE, POTASSIUM CHLORIDE, CALCIUM CHLORIDE 600; 310; 30; 20 MG/100ML; MG/100ML; MG/100ML; MG/100ML
INJECTION, SOLUTION INTRAVENOUS CONTINUOUS
Status: DISCONTINUED | OUTPATIENT
Start: 2019-04-02 | End: 2019-04-02

## 2019-04-02 RX ORDER — ONDANSETRON 8 MG/1
8 TABLET, ORALLY DISINTEGRATING ORAL EVERY 8 HOURS PRN
Status: DISCONTINUED | OUTPATIENT
Start: 2019-04-03 | End: 2019-04-04 | Stop reason: HOSPADM

## 2019-04-02 RX ORDER — OXYTOCIN/RINGER'S LACTATE 20/1000 ML
333 PLASTIC BAG, INJECTION (ML) INTRAVENOUS CONTINUOUS
Status: DISCONTINUED | OUTPATIENT
Start: 2019-04-02 | End: 2019-04-02

## 2019-04-02 RX ORDER — SIMETHICONE 80 MG
1 TABLET,CHEWABLE ORAL EVERY 6 HOURS PRN
Status: DISCONTINUED | OUTPATIENT
Start: 2019-04-02 | End: 2019-04-04 | Stop reason: HOSPADM

## 2019-04-02 RX ORDER — BUPIVACAINE HYDROCHLORIDE 7.5 MG/ML
INJECTION, SOLUTION EPIDURAL; RETROBULBAR
Status: COMPLETED | OUTPATIENT
Start: 2019-04-02 | End: 2019-04-02

## 2019-04-02 RX ORDER — PHENYLEPHRINE HYDROCHLORIDE 10 MG/ML
INJECTION INTRAVENOUS
Status: DISCONTINUED | OUTPATIENT
Start: 2019-04-02 | End: 2019-04-02

## 2019-04-02 RX ORDER — OXYCODONE HYDROCHLORIDE 5 MG/1
5 TABLET ORAL EVERY 4 HOURS PRN
Status: DISPENSED | OUTPATIENT
Start: 2019-04-02 | End: 2019-04-03

## 2019-04-02 RX ORDER — FAMOTIDINE 10 MG/ML
INJECTION INTRAVENOUS
Status: COMPLETED
Start: 2019-04-02 | End: 2019-04-02

## 2019-04-02 RX ORDER — OXYTOCIN 10 [USP'U]/ML
INJECTION, SOLUTION INTRAMUSCULAR; INTRAVENOUS
Status: DISCONTINUED | OUTPATIENT
Start: 2019-04-02 | End: 2019-04-02

## 2019-04-02 RX ORDER — OXYTOCIN/RINGER'S LACTATE 20/1000 ML
333 PLASTIC BAG, INJECTION (ML) INTRAVENOUS CONTINUOUS
Status: ACTIVE | OUTPATIENT
Start: 2019-04-02 | End: 2019-04-02

## 2019-04-02 RX ORDER — AMOXICILLIN 250 MG
1 CAPSULE ORAL NIGHTLY PRN
Status: DISCONTINUED | OUTPATIENT
Start: 2019-04-02 | End: 2019-04-04 | Stop reason: HOSPADM

## 2019-04-02 RX ORDER — ADHESIVE BANDAGE
30 BANDAGE TOPICAL 2 TIMES DAILY PRN
Status: DISCONTINUED | OUTPATIENT
Start: 2019-04-03 | End: 2019-04-04 | Stop reason: HOSPADM

## 2019-04-02 RX ORDER — MISOPROSTOL 200 UG/1
800 TABLET ORAL
Status: DISCONTINUED | OUTPATIENT
Start: 2019-04-02 | End: 2019-04-02

## 2019-04-02 RX ORDER — OXYCODONE AND ACETAMINOPHEN 5; 325 MG/1; MG/1
1 TABLET ORAL EVERY 4 HOURS PRN
Status: DISCONTINUED | OUTPATIENT
Start: 2019-04-03 | End: 2019-04-04 | Stop reason: HOSPADM

## 2019-04-02 RX ORDER — FENTANYL CITRATE 50 UG/ML
INJECTION, SOLUTION INTRAMUSCULAR; INTRAVENOUS
Status: DISCONTINUED | OUTPATIENT
Start: 2019-04-02 | End: 2019-04-02

## 2019-04-02 RX ORDER — IBUPROFEN 600 MG/1
600 TABLET ORAL EVERY 6 HOURS
Status: DISCONTINUED | OUTPATIENT
Start: 2019-04-03 | End: 2019-04-02

## 2019-04-02 RX ORDER — ONDANSETRON HYDROCHLORIDE 2 MG/ML
INJECTION, SOLUTION INTRAMUSCULAR; INTRAVENOUS
Status: DISCONTINUED | OUTPATIENT
Start: 2019-04-02 | End: 2019-04-02

## 2019-04-02 RX ORDER — OXYCODONE HYDROCHLORIDE 5 MG/1
10 TABLET ORAL EVERY 4 HOURS PRN
Status: DISPENSED | OUTPATIENT
Start: 2019-04-02 | End: 2019-04-03

## 2019-04-02 RX ORDER — OXYTOCIN/RINGER'S LACTATE 20/1000 ML
41.65 PLASTIC BAG, INJECTION (ML) INTRAVENOUS CONTINUOUS
Status: ACTIVE | OUTPATIENT
Start: 2019-04-02 | End: 2019-04-02

## 2019-04-02 RX ORDER — ACETAMINOPHEN 10 MG/ML
INJECTION, SOLUTION INTRAVENOUS
Status: DISCONTINUED | OUTPATIENT
Start: 2019-04-02 | End: 2019-04-02

## 2019-04-02 RX ORDER — ONDANSETRON 2 MG/ML
4 INJECTION INTRAMUSCULAR; INTRAVENOUS EVERY 6 HOURS PRN
Status: ACTIVE | OUTPATIENT
Start: 2019-04-02 | End: 2019-04-03

## 2019-04-02 RX ORDER — MUPIROCIN 20 MG/G
1 OINTMENT TOPICAL 2 TIMES DAILY
Status: DISCONTINUED | OUTPATIENT
Start: 2019-04-02 | End: 2019-04-04 | Stop reason: HOSPADM

## 2019-04-02 RX ORDER — OXYTOCIN/RINGER'S LACTATE 20/1000 ML
41.7 PLASTIC BAG, INJECTION (ML) INTRAVENOUS CONTINUOUS
Status: DISCONTINUED | OUTPATIENT
Start: 2019-04-02 | End: 2019-04-02

## 2019-04-02 RX ORDER — KETOROLAC TROMETHAMINE 30 MG/ML
30 INJECTION, SOLUTION INTRAMUSCULAR; INTRAVENOUS EVERY 6 HOURS
Status: DISPENSED | OUTPATIENT
Start: 2019-04-02 | End: 2019-04-03

## 2019-04-02 RX ORDER — IBUPROFEN 600 MG/1
600 TABLET ORAL EVERY 6 HOURS
Status: DISCONTINUED | OUTPATIENT
Start: 2019-04-03 | End: 2019-04-03

## 2019-04-02 RX ORDER — ACETAMINOPHEN 325 MG/1
650 TABLET ORAL EVERY 6 HOURS
Status: DISPENSED | OUTPATIENT
Start: 2019-04-02 | End: 2019-04-03

## 2019-04-02 RX ORDER — SODIUM CHLORIDE, SODIUM LACTATE, POTASSIUM CHLORIDE, CALCIUM CHLORIDE 600; 310; 30; 20 MG/100ML; MG/100ML; MG/100ML; MG/100ML
INJECTION, SOLUTION INTRAVENOUS CONTINUOUS
Status: ACTIVE | OUTPATIENT
Start: 2019-04-02 | End: 2019-04-02

## 2019-04-02 RX ORDER — BISACODYL 10 MG
10 SUPPOSITORY, RECTAL RECTAL ONCE AS NEEDED
Status: DISCONTINUED | OUTPATIENT
Start: 2019-04-02 | End: 2019-04-04 | Stop reason: HOSPADM

## 2019-04-02 RX ORDER — MORPHINE SULFATE 0.5 MG/ML
INJECTION, SOLUTION EPIDURAL; INTRATHECAL; INTRAVENOUS
Status: COMPLETED | OUTPATIENT
Start: 2019-04-02 | End: 2019-04-02

## 2019-04-02 RX ORDER — CLINDAMYCIN PHOSPHATE 900 MG/50ML
900 INJECTION, SOLUTION INTRAVENOUS
Status: DISCONTINUED | OUTPATIENT
Start: 2019-04-02 | End: 2019-04-02

## 2019-04-02 RX ORDER — OXYTOCIN/RINGER'S LACTATE 20/1000 ML
41.7 PLASTIC BAG, INJECTION (ML) INTRAVENOUS CONTINUOUS
Status: ACTIVE | OUTPATIENT
Start: 2019-04-02 | End: 2019-04-02

## 2019-04-02 RX ORDER — KETOROLAC TROMETHAMINE 30 MG/ML
INJECTION, SOLUTION INTRAMUSCULAR; INTRAVENOUS
Status: DISCONTINUED | OUTPATIENT
Start: 2019-04-02 | End: 2019-04-02

## 2019-04-02 RX ORDER — DIPHENHYDRAMINE HCL 25 MG
25 CAPSULE ORAL EVERY 4 HOURS PRN
Status: DISCONTINUED | OUTPATIENT
Start: 2019-04-02 | End: 2019-04-04 | Stop reason: HOSPADM

## 2019-04-02 RX ORDER — HYDROCORTISONE 25 MG/G
CREAM TOPICAL 3 TIMES DAILY PRN
Status: DISCONTINUED | OUTPATIENT
Start: 2019-04-02 | End: 2019-04-04 | Stop reason: HOSPADM

## 2019-04-02 RX ADMIN — OXYTOCIN 2 UNITS: 10 INJECTION, SOLUTION INTRAMUSCULAR; INTRAVENOUS at 07:04

## 2019-04-02 RX ADMIN — PHENYLEPHRINE HYDROCHLORIDE 100 MCG: 10 INJECTION INTRAVENOUS at 06:04

## 2019-04-02 RX ADMIN — ACETAMINOPHEN 1000 MG: 10 INJECTION, SOLUTION INTRAVENOUS at 07:04

## 2019-04-02 RX ADMIN — PHENYLEPHRINE HYDROCHLORIDE 100 MCG: 10 INJECTION INTRAVENOUS at 07:04

## 2019-04-02 RX ADMIN — DOCUSATE SODIUM 200 MG: 100 CAPSULE, LIQUID FILLED ORAL at 08:04

## 2019-04-02 RX ADMIN — KETOROLAC TROMETHAMINE 30 MG: 30 INJECTION, SOLUTION INTRAMUSCULAR at 01:04

## 2019-04-02 RX ADMIN — ACETAMINOPHEN 650 MG: 325 TABLET ORAL at 08:04

## 2019-04-02 RX ADMIN — CLINDAMYCIN IN 5 PERCENT DEXTROSE 900 MG: 18 INJECTION, SOLUTION INTRAVENOUS at 06:04

## 2019-04-02 RX ADMIN — ACETAMINOPHEN 650 MG: 325 TABLET ORAL at 01:04

## 2019-04-02 RX ADMIN — SODIUM CHLORIDE, SODIUM LACTATE, POTASSIUM CHLORIDE, AND CALCIUM CHLORIDE: 600; 310; 30; 20 INJECTION, SOLUTION INTRAVENOUS at 07:04

## 2019-04-02 RX ADMIN — SODIUM CITRATE AND CITRIC ACID MONOHYDRATE 30 ML: 500; 334 SOLUTION ORAL at 06:04

## 2019-04-02 RX ADMIN — GENTAMICIN SULFATE 471 MG: 40 INJECTION, SOLUTION INTRAMUSCULAR; INTRAVENOUS at 06:04

## 2019-04-02 RX ADMIN — FAMOTIDINE 20 MG: 10 INJECTION, SOLUTION INTRAVENOUS at 06:04

## 2019-04-02 RX ADMIN — BUPIVACAINE HYDROCHLORIDE 1.6 ML: 7.5 INJECTION, SOLUTION EPIDURAL; RETROBULBAR at 06:04

## 2019-04-02 RX ADMIN — FENTANYL CITRATE 10 MCG: 50 INJECTION, SOLUTION INTRAMUSCULAR; INTRAVENOUS at 06:04

## 2019-04-02 RX ADMIN — SODIUM CHLORIDE, SODIUM LACTATE, POTASSIUM CHLORIDE, AND CALCIUM CHLORIDE: 600; 310; 30; 20 INJECTION, SOLUTION INTRAVENOUS at 06:04

## 2019-04-02 RX ADMIN — OXYCODONE HYDROCHLORIDE 5 MG: 5 TABLET ORAL at 11:04

## 2019-04-02 RX ADMIN — OXYCODONE HYDROCHLORIDE 10 MG: 5 TABLET ORAL at 04:04

## 2019-04-02 RX ADMIN — ONDANSETRON 4 MG: 2 INJECTION, SOLUTION INTRAMUSCULAR; INTRAVENOUS at 06:04

## 2019-04-02 RX ADMIN — KETOROLAC TROMETHAMINE 30 MG: 30 INJECTION, SOLUTION INTRAMUSCULAR at 08:04

## 2019-04-02 RX ADMIN — KETOROLAC TROMETHAMINE 30 MG: 30 INJECTION, SOLUTION INTRAMUSCULAR; INTRAVENOUS at 07:04

## 2019-04-02 RX ADMIN — Medication 0.15 MG: at 06:04

## 2019-04-02 NOTE — OP NOTE
DATE OF PROCEDURE:  2019    PREOPERATIVE DIAGNOSES:  1.  Pregnancy at 39 weeks 1 day gestation.  2.  Prior .  3.  Desires repeat  section.    POSTOPERATIVE DIAGNOSES:    1.  Pregnancy at 39 weeks 1 day gestation.  2.  Prior .  3.  Desires repeat  section.  4.  Viable infant.    PROCEDURE:  Repeat low-transverse  section.    SURGEON:  Kirit Hernandez M.D.    ASSISTANT:  Emiliana Cueto M.D. (no qualified resident available).    ANESTHESIA:  Spinal.    INDICATIONS:  The patient is a 33-year-old  2, para 1 at 39 weeks 1 day   gestation who presents today for a scheduled repeat  section.  She had   been receiving obstetrical care with Dr. Polanco.  However, with Dr. Polanco   being out of the office this week, she had asked me to perform the .     risks and benefits have been explained to the patient.  Questions have   been answered and consent signed and witnessed this morning.    PROCEDURE IN DETAIL:  Having obtained adequate informed consent, the patient was   taken to the Operating Room.  She was placed on the operating table.  After an   adequate level of spinal anesthesia was obtained, the abdomen was prepped and   draped in the usual sterile manner.  A Milligan catheter had already been inserted   into her bladder.  Her old Pfannenstiel skin incision was then excised and the   incision carried down sharply through the subcutaneous tissue to the fascia.    The fascia was incised bilaterally.  The fascia was then dissected superiorly   and inferiorly off the rectus muscles, which were divided in the midline.    Peritoneum was identified and entered and incised superiorly and inferiorly.    This allowed exposure to the lower uterine segment.  It was noted that the   bladder flap was fairly high on the lower uterine segment.  Therefore, a bladder   flap was taken down with sharp dissection.  Bladder blade was placed inside the   bladder flap  to protect the bladder.  A low transverse uterine incision was   then made and carried out bilaterally with a finger fracture technique.    Membranes were ruptured with recovery of clear fluid.  A viable male infant was   delivered from the vertex presentation.  The cord was clamped and cut and the   infant handed off to the nurses in attendance.  Cord blood gases were obtained.    The placenta was allowed to expel spontaneously as the uterus was exteriorized.    The uterine cavity was wiped clean with a moist lap.  The uterine incision was   then closed with running locking #1 chromic stitch.  Several interrupted   sutures were placed in the midline to create complete hemostasis.  The uterus   was then replaced into the abdominal cavity.  Normal tubes and ovaries were   noted.  Lateral gutters were irrigated, all clots and debris removed.  It was   noted that there was some heme-tinged urine in her Milligan catheter.  Therefore,   the bladder was filled with 180 mL of sterile milk retrograde through the Milligan   catheter.  The bladder distended well.  There was absolutely no leakage of milk   from the bladder.  The bladder was now allowed to drain via the Milligan catheter.    The peritoneum was closed with a running 2-0 Vicryl.  The rectus muscles were   placed in the midline with several interrupted 0 chromics.  Inspection of the   fascia noted all areas to be dry.  The fascia was then reapproximated with   running #1 PDS suture.  Two sutures were used, each starting in the angles and   meeting in the midline.  Subcutaneous tissue was irrigated and made hemostatic   with electrocautery.  Subcutaneous tissue was then reapproximated with   interrupted 2-0 Vicryls.  Finally, the skin was reapproximated with running 4-0   Monocryl stitch.  It was hemostatic.  A pressure dressing was placed atop the   incision.  Estimated blood loss was 390 mL.  There were no complications.  All   counts were correct.      WTS/HN  dd:  04/02/2019 08:33:24 (CDT)  td: 04/02/2019 08:50:16 (LEEANN)  Doc ID   #[id number]  Job ID #[id number]    CC:

## 2019-04-02 NOTE — ANESTHESIA PREPROCEDURE EVALUATION
2019  Yolanda Win is a 33 y.o., female  at 39w1d here for scheduled . Pregnancy complicated by prior c-s for arrest of dilation, anxiety     Yolanda Win is a 33 y.o. female     OB History    Para Term  AB Living   2 1 1     1   SAB TAB Ectopic Multiple Live Births         0 1      # Outcome Date GA Lbr Bull/2nd Weight Sex Delivery Anes PTL Lv   2 Current            1 Term 16 38w1d  2.96 kg (6 lb 8.4 oz) M CS-LTranv EPI N JOLLY      Complications: Failure to Progress in First Stage       Wt Readings from Last 1 Encounters:   19 0536 94.2 kg (207 lb 10.8 oz)   19 0515 94.2 kg (207 lb 10.8 oz)   19 0500 94.2 kg (207 lb 10.8 oz)       BP Readings from Last 3 Encounters:   19 116/78   19 118/74   19 116/72       Patient Active Problem List   Diagnosis    Rh negative status during pregnancy in third trimester    Anxiety disorder    ADHD (attention deficit hyperactivity disorder), inattentive type    History of posttraumatic stress disorder (PTSD)    History of insomnia    Plantar fasciitis    30 weeks gestation of pregnancy    39 weeks gestation of pregnancy       Past Surgical History:   Procedure Laterality Date    BONE MARROW ASPIRATION      x 3    CERVICAL BIOPSY  W/ LOOP ELECTRODE EXCISION       SECTION  2016    COLPOSCOPY      DELIVERY- SECTION N/A 2016    Performed [facility] at [facility] L[facility]D    SHOULDER SURGERY Left     x 2    SINUS SURGERY      age 19    TONSILLECTOMY         Social History     Socioeconomic History    Marital status:      Spouse name: Not on file    Number of children: Not on file    Years of education: Not on file    Highest education level: Not on file   Occupational History    Not on file   Social Needs    Financial resource strain: Not  on file    Food insecurity:     Worry: Not on file     Inability: Not on file    Transportation needs:     Medical: Not on file     Non-medical: Not on file   Tobacco Use    Smoking status: Never Smoker    Smokeless tobacco: Never Used   Substance and Sexual Activity    Alcohol use: No     Frequency: Never     Comment: pre pregnancy     Drug use: No    Sexual activity: Yes     Partners: Male     Birth control/protection: None   Lifestyle    Physical activity:     Days per week: Not on file     Minutes per session: Not on file    Stress: Not on file   Relationships    Social connections:     Talks on phone: Not on file     Gets together: Not on file     Attends Mandaeism service: Not on file     Active member of club or organization: Not on file     Attends meetings of clubs or organizations: Not on file     Relationship status: Not on file    Intimate partner violence:     Fear of current or ex partner: Not on file     Emotionally abused: Not on file     Physically abused: Not on file     Forced sexual activity: Not on file   Other Topics Concern    Are you pregnant or think you may be? Not Asked    Breast-feeding Not Asked   Social History Narrative    Not on file         Chemistry        Component Value Date/Time     01/21/2019 1038    K 4.0 01/21/2019 1038     (H) 01/21/2019 1038    CO2 20 (L) 01/21/2019 1038    BUN 9 01/21/2019 1038    CREATININE 0.7 01/21/2019 1038    GLU 77 01/21/2019 1038        Component Value Date/Time    CALCIUM 8.9 01/21/2019 1038    ALKPHOS 77 01/21/2019 1038    AST 12 01/21/2019 1038    ALT 10 01/21/2019 1038    BILITOT 0.4 01/21/2019 1038    ESTGFRAFRICA >60 01/21/2019 1038    EGFRNONAA >60 01/21/2019 1038            Lab Results   Component Value Date    WBC 9.04 04/02/2019    HGB 12.4 04/02/2019    HCT 35.4 (L) 04/02/2019    MCV 91 04/02/2019     04/02/2019       No results for input(s): PT, INR, PROTIME, APTT in the last 72  hours.                  Anesthesia Evaluation    I have reviewed the Patient Summary Reports.    I have reviewed the Nursing Notes.   I have reviewed the Medications.     Review of Systems  Anesthesia Hx:  No problems with previous Anesthesia  History of prior surgery of interest to airway management or planning: Denies Family Hx of Anesthesia complications.   Denies Personal Hx of Anesthesia complications.   Hematology/Oncology:  Hematology Normal        Cardiovascular:   Hypertension Denies CAD.       Pulmonary:  Pulmonary Normal    Renal/:  Renal/ Normal     Hepatic/GI:  Hepatic/GI Normal    Neurological:  Neurology Normal    Endocrine:  Endocrine Normal        Physical Exam  General:  Well nourished    Airway/Jaw/Neck:  Airway Findings: Mouth Opening: Normal Tongue: Normal  General Airway Assessment: Adult  Mallampati: II  Improves to II with phonation.  TM Distance: Normal, at least 6 cm  Jaw/Neck Findings:  Neck ROM: Normal ROM      Dental:  Dental Findings: In tact   Chest/Lungs:  Chest/Lungs Findings: Normal Respiratory Rate     Heart/Vascular:  Heart Findings: Rate: Normal  Rhythm: Regular Rhythm        Mental Status:  Mental Status Findings:  Cooperative, Alert and Oriented         Anesthesia Plan  Type of Anesthesia, risks & benefits discussed:  Anesthesia Type:  spinal  Patient's Preference:   Intra-op Monitoring Plan: standard ASA monitors  Intra-op Monitoring Plan Comments:   Post Op Pain Control Plan: IV/PO Opioids PRN, per primary service following discharge from PACU and multimodal analgesia  Post Op Pain Control Plan Comments:   Induction:   IV  Beta Blocker:  Patient is not currently on a Beta-Blocker (No further documentation required).       Informed Consent: Patient understands risks and agrees with Anesthesia plan.  Questions answered. Anesthesia consent signed with patient.  ASA Score: 2     Day of Surgery Review of History & Physical:    H&P update referred to the provider.         Ready  For Surgery From Anesthesia Perspective.

## 2019-04-02 NOTE — PROGRESS NOTES
Pt has flat nipples and requested to use a pump to enma nipples before latching .   [employer] pump, tubing, collections containers and labels brought to bedside.  Discussed proper pump setting of initiation phase.  Instructed on proper usage of pump and to adjust suction according to maximum comfort level.  Verified appropriate flange fit.  Educated on the frequency and duration of pumping in order to promote and maintain a full milk supply.  Hands on pumping technique reviewed.  Encouraged hand expression after pumping.  Instructed on cleaning of breast pump parts.  Written instructions also given.  Pt verbalized understanding and appropriate recall for proper milk handling, collection, labeling, storage and transportation.

## 2019-04-02 NOTE — PROGRESS NOTES
Pt transferred from rec 1 to [facility] rm 638 via stretcher. Pt oriented to room, call bell within reach. Report given to TAYLER Rivera

## 2019-04-02 NOTE — TRANSFER OF CARE
"Anesthesia Transfer of Care Note    Patient: Yolanda Win    Procedure(s) Performed: Procedure(s) (LRB):   SECTION (N/A)    Patient location: PACU    Anesthesia Type: spinal    Transport from OR: Transported from OR on room air with adequate spontaneous ventilation    Post pain: adequate analgesia    Post assessment: no apparent anesthetic complications    Post vital signs: stable    Level of consciousness: awake, alert and oriented    Nausea/Vomiting: no nausea/vomiting    Complications: none    Transfer of care protocol was followed      Last vitals:   Visit Vitals  /78 (BP Location: Left arm, Patient Position: Lying)   Pulse 89   Temp 36.3 °C (97.3 °F) (Temporal)   Resp 18   Ht 5' 4" (1.626 m)   Wt 94.2 kg (207 lb 10.8 oz)   LMP 2018   Breastfeeding? No   BMI 35.65 kg/m²     "

## 2019-04-02 NOTE — ANESTHESIA PROCEDURE NOTES
Spinal    Diagnosis: iup  Patient location during procedure: OR  Start time: 4/2/2019 6:42 AM  Timeout: 4/2/2019 6:39 AM  End time: 4/2/2019 6:49 AM  Staffing  Anesthesiologist: Rebecca Lopez MD  Resident/CRNA: Anastasia Ch MD  Performed: resident/CRNA   Preanesthetic Checklist  Completed: patient identified, site marked, surgical consent, pre-op evaluation, timeout performed, IV checked, risks and benefits discussed and monitors and equipment checked  Spinal Block  Patient position: sitting  Prep: ChloraPrep  Patient monitoring: heart rate, continuous pulse ox and frequent blood pressure checks  Approach: midline  Location: L4-5  Injection technique: single shot  CSF Fluid: clear free-flowing CSF  Needle  Needle type: pencil-tip   Needle gauge: 25 G  Needle length: 3.5 in  Additional Documentation: negative aspiration for heme, no paresthesia on injection and incremental injection  Needle localization: anatomical landmarks  Assessment  Sensory level: T5   Dermatomal levels determined by pinch or prick  Ease of block: easy  Patient's tolerance of the procedure: comfortable throughout block

## 2019-04-02 NOTE — H&P
HISTORY AND PHYSICAL                                                OBSTETRICS          Subjective:       Yolanda Win is a 33 y.o.  female with IUP at 39w1d weeks gestation who presents for RLTCS.    Patient denies contractions, denies vaginal bleeding, denies LOF.   Fetal Movement: normal.    This IUP is complicated by h/o of 3 leeps and prior LTCS and Rh negative status.     Review of Systems   Constitutional: Negative for chills and fever.   Eyes: Negative for visual disturbance.   Respiratory: Negative for shortness of breath.    Cardiovascular: Negative for chest pain and palpitations.   Gastrointestinal: Negative for abdominal pain, constipation, diarrhea, nausea and vomiting.   Genitourinary: Negative for vaginal bleeding and vaginal discharge.   Musculoskeletal: Negative for back pain.   Integumentary:  Negative for rash.   Neurological: Negative for seizures, syncope and headaches.   Hematological: Does not bruise/bleed easily.   Psychiatric/Behavioral: Negative for depression. The patient is not nervous/anxious.        PMHx:   Past Medical History:   Diagnosis Date    Abnormal Pap smear of cervix     Allergy     seasonal    Anorexia     Bulimia     Depression     Fever blister     Hypertension     Migraine headache        PSHx:   Past Surgical History:   Procedure Laterality Date    BONE MARROW ASPIRATION      x 3    CERVICAL BIOPSY  W/ LOOP ELECTRODE EXCISION       SECTION  2016    COLPOSCOPY      DELIVERY- SECTION N/A 2016    [facility] at [facility]D    SHOULDER SURGERY Left     x 2    SINUS SURGERY      age 19    TONSILLECTOMY         All:   Review of patient's allergies indicates:   Allergen Reactions    Amoxicillin      hives    Pcn [penicillins] Rash    Valium [diazepam] Anxiety       Meds:   Medications Prior to Admission   Medication Sig Dispense Refill Last Dose    butalb/acetaminophen/caffeine (FIORICET  ORAL) Take by mouth.   Taking    loratadine (CLARITIN) 10 mg tablet Take 10 mg by mouth once daily.   Taking    ondansetron (ZOFRAN-ODT) 4 MG TbDL Dissolve 1 tablet (4 mg total) by mouth every 6 to 8 hours as needed. 30 tablet 0 Not Taking    pimecrolimus (ELIDEL) 1 % cream Apply topically 2 (two) times daily. 1 Tube 1 Not Taking    sumatriptan (IMITREX) 50 MG tablet Take 1 tablet (50 mg total) by mouth daily as needed for Migraine. 5 tablet 0 Taking    valACYclovir (VALTREX) 1000 MG tablet Take 1 tablet (1,000 mg total) by mouth every 12 (twelve) hours. 30 tablet 6 Not Taking       SH:   Social History     Socioeconomic History    Marital status:      Spouse name: Not on file    Number of children: Not on file    Years of education: Not on file    Highest education level: Not on file   Occupational History    Not on file   Social Needs    Financial resource strain: Not on file    Food insecurity:     Worry: Not on file     Inability: Not on file    Transportation needs:     Medical: Not on file     Non-medical: Not on file   Tobacco Use    Smoking status: Never Smoker    Smokeless tobacco: Never Used   Substance and Sexual Activity    Alcohol use: No     Frequency: Never     Comment: pre pregnancy     Drug use: No    Sexual activity: Yes     Partners: Male     Birth control/protection: None   Lifestyle    Physical activity:     Days per week: Not on file     Minutes per session: Not on file    Stress: Not on file   Relationships    Social connections:     Talks on phone: Not on file     Gets together: Not on file     Attends Lutheran service: Not on file     Active member of club or organization: Not on file     Attends meetings of clubs or organizations: Not on file     Relationship status: Not on file    Intimate partner violence:     Fear of current or ex partner: Not on file     Emotionally abused: Not on file     Physically abused: Not on file     Forced sexual activity: Not on  "file   Other Topics Concern    Are you pregnant or think you may be? Not Asked    Breast-feeding Not Asked   Social History Narrative    Not on file       FH:   Family History   Problem Relation Age of Onset    Cancer Maternal Grandmother 40        cervical    Breast cancer Neg Hx     Colon cancer Neg Hx     Ovarian cancer Neg Hx     Melanoma Neg Hx        OBHx:   OB History    Para Term  AB Living   2 1 1 0 0 1   SAB TAB Ectopic Multiple Live Births   0 0 0 0 1      # Outcome Date GA Lbr Bull/2nd Weight Sex Delivery Anes PTL Lv   2 Current            1 Term 16 38w1d  2.96 kg (6 lb 8.4 oz) M CS-LTranv EPI N JOLLY      Complications: Failure to Progress in First Stage      Name: DOMI MTZ      Apgar1: 8  Apgar5: 9       Objective:       /78 (BP Location: Left arm, Patient Position: Lying)   Pulse 89   Temp 97.3 °F (36.3 °C) (Temporal)   Resp 18   Ht 5' 4" (1.626 m)   Wt 94.2 kg (207 lb 10.8 oz)   LMP 2018   Breastfeeding? No   BMI 35.65 kg/m²     Vitals:    19 0500 19 0515 19 0536   BP:   116/78   BP Location:   Left arm   Patient Position:   Lying   Pulse:   89   Resp:   18   Temp:   97.3 °F (36.3 °C)   TempSrc:   Temporal   Weight: 94.2 kg (207 lb 10.8 oz) 94.2 kg (207 lb 10.8 oz) 94.2 kg (207 lb 10.8 oz)   Height:   5' 4" (1.626 m)       General:   alert, appears stated age and cooperative, no apparent distress   HENT:  normocephalic, atraumatic   Eyes:  extraocular movements and conjunctivae normal   Neck:  supple, range of motion normal, no thyromegaly   Lungs:   no respiratory distress   Heart:   regular rate   Abdomen:  soft, non-tender, non-distended but gravid, no rebound or guarding    Extremities negative edema, negative erythema   FHT: 135, moderate BTBV, +accels, -decels;  Cat 2 (reassuring)       Presentations: cephalic by ultrasound   Cervix:     Deferred                             EFW by Leopold's: 7#8oz    Lab Review  Blood " Type O NEG  GBBS: negative  Rubella: Immune  RPR: NR  HIV: negative  HepB: negative       Assessment:       39w1d weeks gestation with scheduled RLTCS.    Active Hospital Problems    Diagnosis  POA    39 weeks gestation of pregnancy [Z3A.39]  Not Applicable      Resolved Hospital Problems   No resolved problems to display.          Plan:   Planned RLTCS:  - Consents signed and to chart  - Admit to Labor and Delivery unit  - Epidural per Anesthesia  - Draw CBC, T&S  - Ancef OCTOR  - To OR for C/S. Case Request is in.   - Notify Staff  - Ultrasound performed, infant in vertex position.   - Post-Partum Hemorrhage risk - medium     Rh negative status:  - s/p rhogam  - needs rhogam studies pp     Marybel OCONNOR  PGY2

## 2019-04-03 LAB — INJECT RH IG VOL PATIENT: NORMAL ML

## 2019-04-03 PROCEDURE — 25000003 PHARM REV CODE 250: Performed by: STUDENT IN AN ORGANIZED HEALTH CARE EDUCATION/TRAINING PROGRAM

## 2019-04-03 PROCEDURE — 11000001 HC ACUTE MED/SURG PRIVATE ROOM

## 2019-04-03 PROCEDURE — 63600519 RHOGAM PHARM REV CODE 636 ALT 250 W HCPCS: Performed by: STUDENT IN AN ORGANIZED HEALTH CARE EDUCATION/TRAINING PROGRAM

## 2019-04-03 RX ORDER — IBUPROFEN 600 MG/1
600 TABLET ORAL EVERY 6 HOURS
Status: DISCONTINUED | OUTPATIENT
Start: 2019-04-03 | End: 2019-04-04 | Stop reason: HOSPADM

## 2019-04-03 RX ORDER — CETIRIZINE HYDROCHLORIDE 5 MG/1
5 TABLET ORAL DAILY
Status: DISCONTINUED | OUTPATIENT
Start: 2019-04-03 | End: 2019-04-04 | Stop reason: HOSPADM

## 2019-04-03 RX ADMIN — DOCUSATE SODIUM 200 MG: 100 CAPSULE, LIQUID FILLED ORAL at 08:04

## 2019-04-03 RX ADMIN — OXYCODONE HYDROCHLORIDE 10 MG: 5 TABLET ORAL at 02:04

## 2019-04-03 RX ADMIN — IBUPROFEN 600 MG: 600 TABLET ORAL at 01:04

## 2019-04-03 RX ADMIN — OXYCODONE AND ACETAMINOPHEN 1 TABLET: 10; 325 TABLET ORAL at 12:04

## 2019-04-03 RX ADMIN — ACETAMINOPHEN 650 MG: 325 TABLET ORAL at 02:04

## 2019-04-03 RX ADMIN — HUMAN RHO(D) IMMUNE GLOBULIN 300 MCG: 300 INJECTION, SOLUTION INTRAMUSCULAR at 05:04

## 2019-04-03 RX ADMIN — CETIRIZINE HYDROCHLORIDE 5 MG: 5 TABLET ORAL at 11:04

## 2019-04-03 RX ADMIN — IBUPROFEN 600 MG: 600 TABLET ORAL at 08:04

## 2019-04-03 RX ADMIN — OXYCODONE AND ACETAMINOPHEN 1 TABLET: 10; 325 TABLET ORAL at 04:04

## 2019-04-03 RX ADMIN — OXYCODONE AND ACETAMINOPHEN 1 TABLET: 5; 325 TABLET ORAL at 08:04

## 2019-04-03 RX ADMIN — IBUPROFEN 600 MG: 600 TABLET ORAL at 02:04

## 2019-04-03 NOTE — ANESTHESIA POSTPROCEDURE EVALUATION
Anesthesia Post Evaluation    Patient: Yolanda Win    Procedure(s) Performed: Procedure(s) (LRB):   SECTION (N/A)    Final Anesthesia Type: epidural  Patient location during evaluation: floor  Patient participation: Yes- Able to Participate  Level of consciousness: awake and alert  Post-procedure vital signs: reviewed and stable  Pain management: adequate  Airway patency: patent  PONV status at discharge: No PONV  Anesthetic complications: no      Cardiovascular status: blood pressure returned to baseline and hemodynamically stable  Respiratory status: unassisted, spontaneous ventilation and room air  Hydration status: euvolemic  Follow-up not needed.          Vitals Value Taken Time   /59 4/3/2019  8:00 AM   Temp 36.7 °C (98.1 °F) 4/3/2019  8:00 AM   Pulse 66 4/3/2019  8:00 AM   Resp 18 4/3/2019  8:00 AM   SpO2 97 % 4/3/2019  8:00 AM         Event Time     Out of Recovery 10:45:00          Pain/David Score: Pain Rating Prior to Med Admin: 4 (4/3/2019  1:31 PM)  Pain Rating Post Med Admin: 2 (4/3/2019  3:25 AM)

## 2019-04-03 NOTE — PLAN OF CARE
Problem: Adult Inpatient Plan of Care  Goal: Plan of Care Review  Outcome: Ongoing (interventions implemented as appropriate)  Assisted with breastfeeding and hand expression, care of set up and breastshells; developed the following breastfeeding plan of care with patient: She will breastfeed baby on cue until content at least 8 times in 24 hours observing for signs of milk transfer; she will wake baby prn; she will avoid bottles, formula and pacifiers;

## 2019-04-03 NOTE — PROGRESS NOTES
POSTPARTUM PROGRESS NOTE     Yolanda Win is a 33 y.o. female PPD #1 status post Repeat  section at 39w1d in a pregnancy complicated by h/o of 3 leeps and prior LTCS and Rh negative status. Patient is doing well this morning. She denies nausea, vomiting, fever or chills.  Patient reports mild abdominal pain that is adequately relieved by oral pain medications. Lochia is mild and stable. Patient is voiding without difficulty and ambulating with no difficulty. She has passed flatus, and has not had BM.  Patient does plan to breast feed. Primary OB for contraception. She desires circumcision.     Objective:       Temp:  [97.8 °F (36.6 °C)-98 °F (36.7 °C)] 97.8 °F (36.6 °C)  Pulse:  [61-75] 62  Resp:  [16-18] 16  SpO2:  [97 %-100 %] 97 %  BP: ()/(51-70) 97/55    General:   alert, appears stated age and cooperative   Lungs:   clear to auscultation bilaterally   Heart:   regular rate and rhythm, S1, S2 normal, no murmur, click, rub or gallop   Abdomen:  soft, non-tender; bowel sounds normal; no masses,  no organomegaly   Uterus:  firm located at the umblicus.        Incision: Bandage in place, clean, dry and intact   Extremities: peripheral pulses normal, no pedal edema, no clubbing or cyanosis     Lab Review  No results found for this or any previous visit (from the past 4 hour(s)).    I/O    Intake/Output Summary (Last 24 hours) at 4/3/2019 0648  Last data filed at 4/3/2019 0130  Gross per 24 hour   Intake 2180 ml   Output 2585 ml   Net -405 ml        Assessment:     Patient Active Problem List   Diagnosis    Rh negative status during pregnancy in third trimester    Anxiety disorder    ADHD (attention deficit hyperactivity disorder), inattentive type    History of posttraumatic stress disorder (PTSD)    History of insomnia    Plantar fasciitis    30 weeks gestation of pregnancy    S/P  section    39 weeks gestation of pregnancy        Plan:   1. Postpartum care:  - Patient doing well.  Continue routine management and advances.  - Continue PO pain meds. Pain well controlled.  - Heme: H/h 12.4/35.4 > 11.5/33.8  - Encourage ambulation  - Circumcision consents signed and placed in chart  - Contraception per primary OB  - Lactation consult PRN    2. Rh negative status  - mom O NEG  - baby A Pos  - Rhogam prior to discharge        Dispo: As patient meets milestones, will plan to discharge POD2-4.      Zonia Condon MD   OBGYN, PGY-1

## 2019-04-03 NOTE — PLAN OF CARE
Problem: Adult Inpatient Plan of Care  Goal: Plan of Care Review  Outcome: Ongoing (interventions implemented as appropriate)  Pt ambulating, voiding, and passing flatus. Pt tolerating PO well and there is no SS of distress at this time. Pt's pain well controlled throughout shift by oral pain medication. Pt's bleeding has been light throughout shift and fundus is firm. Vss.

## 2019-04-03 NOTE — LACTATION NOTE
04/03/19 1020   Maternal Infant Feeding   Maternal Emotional State assist needed   Infant Positioning cradle;cross-cradle   Comfort Measures Following Feeding expressed milk applied   Latch Assistance yes   Equipment Type   Breast Pump Type double electric, hospital grade   Breast Pump Flange Type hard   Breast Pump Flange Size 24 mm   Breast Pumping   Breast Pumping Interventions   (prn to enma nipples)   With patient's permission assisted with breastfeeding; baby sleepy, did not latch; hand expressed and spoonfed baby colostrum; washed breastpump set up and breastshells;

## 2019-04-04 VITALS
OXYGEN SATURATION: 98 % | HEIGHT: 64 IN | TEMPERATURE: 98 F | SYSTOLIC BLOOD PRESSURE: 110 MMHG | DIASTOLIC BLOOD PRESSURE: 72 MMHG | HEART RATE: 68 BPM | RESPIRATION RATE: 18 BRPM | WEIGHT: 207.69 LBS | BODY MASS INDEX: 35.46 KG/M2

## 2019-04-04 PROCEDURE — 25000003 PHARM REV CODE 250: Performed by: STUDENT IN AN ORGANIZED HEALTH CARE EDUCATION/TRAINING PROGRAM

## 2019-04-04 PROCEDURE — 99024 PR POST-OP FOLLOW-UP VISIT: ICD-10-PCS | Mod: ,,, | Performed by: OBSTETRICS & GYNECOLOGY

## 2019-04-04 PROCEDURE — 99024 POSTOP FOLLOW-UP VISIT: CPT | Mod: ,,, | Performed by: OBSTETRICS & GYNECOLOGY

## 2019-04-04 RX ORDER — IBUPROFEN 600 MG/1
600 TABLET ORAL EVERY 6 HOURS
Qty: 30 TABLET | Refills: 0 | Status: SHIPPED | OUTPATIENT
Start: 2019-04-04 | End: 2019-08-08

## 2019-04-04 RX ORDER — OXYCODONE AND ACETAMINOPHEN 5; 325 MG/1; MG/1
1 TABLET ORAL EVERY 4 HOURS PRN
Qty: 30 TABLET | Refills: 0 | Status: SHIPPED | OUTPATIENT
Start: 2019-04-04 | End: 2019-08-08

## 2019-04-04 RX ADMIN — OXYCODONE AND ACETAMINOPHEN 1 TABLET: 10; 325 TABLET ORAL at 10:04

## 2019-04-04 RX ADMIN — OXYCODONE AND ACETAMINOPHEN 1 TABLET: 10; 325 TABLET ORAL at 12:04

## 2019-04-04 RX ADMIN — CETIRIZINE HYDROCHLORIDE 5 MG: 5 TABLET ORAL at 08:04

## 2019-04-04 RX ADMIN — DOCUSATE SODIUM 200 MG: 100 CAPSULE, LIQUID FILLED ORAL at 08:04

## 2019-04-04 RX ADMIN — OXYCODONE AND ACETAMINOPHEN 1 TABLET: 10; 325 TABLET ORAL at 04:04

## 2019-04-04 RX ADMIN — IBUPROFEN 600 MG: 600 TABLET ORAL at 02:04

## 2019-04-04 NOTE — PROGRESS NOTES
POSTPARTUM PROGRESS NOTE     Yolanda Win is a 33 y.o. female PPD #2 status post Repeat  section at 39w1d in a pregnancy complicated by h/o of 3 leeps and prior LTCS and Rh negative status. Patient is doing well this morning. She denies nausea, vomiting, fever or chills. Pt desires discharge home today.  Patient reports mild abdominal pain that is adequately relieved by oral pain medications. Lochia is mild and stable. Patient is voiding without difficulty and ambulating with no difficulty. She has passed flatus, and has not had BM.  Patient does plan to breast feed. Primary OB for contraception. She desires circumcision.     Objective:       Temp:  [98.1 °F (36.7 °C)] 98.1 °F (36.7 °C)  Pulse:  [66-77] 77  Resp:  [18] 18  SpO2:  [97 %] 97 %  BP: (105-119)/(59-63) 110/59    General:   alert, appears stated age and cooperative   Lungs:   clear to auscultation bilaterally   Heart:   regular rate and rhythm, S1, S2 normal, no murmur, click, rub or gallop   Abdomen:  soft, non-tender; bowel sounds normal; no masses,  no organomegaly   Uterus:  firm located at the umblicus.        Incision: Incision healing well, no erythema, drainage   Extremities: peripheral pulses normal, no pedal edema, no clubbing or cyanosis     Lab Review  No results found for this or any previous visit (from the past 4 hour(s)).    I/O  No intake or output data in the 24 hours ending 19 0704     Assessment:     Patient Active Problem List   Diagnosis    Rh negative status during pregnancy in third trimester    Anxiety disorder    ADHD (attention deficit hyperactivity disorder), inattentive type    History of posttraumatic stress disorder (PTSD)    History of insomnia    Plantar fasciitis    30 weeks gestation of pregnancy    S/P  section    39 weeks gestation of pregnancy        Plan:   1. Postpartum care:  - Patient doing well. Continue routine management and advances.  - Continue PO pain meds. Pain well  controlled.  - Heme: H/h 12.4/35.4 > 11.5/33.8  - Encourage ambulation  - Circumcision consents signed and placed in chart  - Contraception per primary OB  - Lactation consult PRN    2. Rh negative status  - mom O NEG  - baby A Pos  - Rhogam prior to discharge        Dispo: As patient meets milestones, will plan to discharge today.      Zonia Condon MD   OBGYN, PGY-1

## 2019-04-04 NOTE — PLAN OF CARE
Problem: Adult Inpatient Plan of Care  Goal: Plan of Care Review  Provided lactation discharge instructions;  Requested patient call lactation for latch assistance; developed the following breastfeeding plan of care with patient: She will breastfeed baby on cue until content at least 8 times in 24 hours observing for signs of milk transfer; she will wake baby prn; she will avoid bottles, formula and pacifiers;

## 2019-04-04 NOTE — DISCHARGE INSTRUCTIONS
Breastfeeding Discharge Instructions       Feed the baby at the earliest sign of hunger or comfort  o Hands to mouth, sucking motions  o Rooting or searching for something to suck on  o Dont wait for crying - it is a sign of distress     The feedings may be 8-12 times per 24hrs and will not follow a schedule   Avoid pacifiers and bottles for the first 4 weeks   Alternate the breast you start the feeding with, or start with the breast that feels the fullest   Switch breasts when the baby takes himself off the breast or falls asleep   Keep offering breasts until the baby looks full, no longer gives hunger signs, and stays asleep when placed on his back in the crib   If the baby is sleepy and wont wake for a feeding, put the baby skin-to-skin dressed in a diaper against the mothers bare chest   Sleep near your baby   The baby should be positioned and latched on to the breast correctly  o Chest-to-chest, chin in the breast  o Babys lips are flipped outward  o Babys mouth is stretched open wide like a shout  o Babys sucking should feel like tugging to the mother  - The baby should be drinking at the breast:  o You should hear swallowing or gulping throughout the feeding  o You should see milk on the babys lips when he comes off the breast  o Your breasts should be softer when the baby is finished feeding  o The baby should look relaxed at the end of feedings  o After the 4th day and your milk is in:  o The babys poop should turn bright yellow and be loose, watery, and seedy  o The baby should have at least 3-4 poops the size of the palm of your hand per day  o The baby should have at least 5-6 wet diapers per day  o The urine should be light yellow in color  You should drink when you are thirsty and eat a healthy diet when you are    hungry.     Take naps to get the rest you need.   Take medications and/or drink alcohol only with permission of your obstetrician    or the babys pediatrician.  You can  also call the [facility],   ([phone]), Monday-Friday, 8am-5pm Central time, to get the most   up-to-date evidence-based information on the use of medications during   pregnancy and breastfeeding.      The baby should be examined by a pediatrician at 3-5 days of age.  Once your   milk comes in, the baby should be gaining at least ½ - 1oz each day and should be back to birthweight no later than 10-14 days of age.          Community Resources    [facility] Breastfeeding Warmline: [phone]   Local [facility] clinics: provide incentives and breastpumps to eligible mothers  [employer] ([employer]):  mother-to-mother support group website        www.[employer]  Local La Leche League mother-to-mother support groups:        www.[employer]        [employer] Leche [employer] [facility]   Dr. Adarsh Maldonado website for latch videos and general information:        www.breastfeedinginc.ca  [facility] is a call center that provides information about the safety of taking medications while breastfeeding.  Call 1-319.413.3279, M-F, 8am-5pm, [place].  International Lactation [employer]ant Association provides resources for assistance:        www.ilca.org  [place] [employer]feeding [employer] provides informationand resources for parents  and the community    www.LaBreastfeedingSupport.org     Jessika Gale is a mom-to-mom support group:                             www.nolanesting.[employer]//breastfeedng-support/  [employer] Healthy Babies:  8-156-170-BABY(9811)  [employer]: a breastfeeding support group for women of color, 264.835.1837  Instructions for use:   Assemble the shells by snapping the silicone back onto the plastic dome.  Insert foam pad inside of the dome to absorb leakage as needed.   Place assembled shell inside the bra with the hole in the silicone centered over the nipple.  The vent hole should be on the top.  Recommend to the mother  to wear a nursing bra with the shells.   Continue to wear  shells between feedings until baby latches without difficulty consistently.     Only wear shells during waking hours.     If discomfort occurs, immediately discontinue the use of the shells and notify Lactation Department or MD.  Cleaning and sterilization:   Softshells should be sterilized daily while in the hospital using Medela Microsteam bag.     FOR HOME USE:  Sanitize soft shells daily with Medela MicroSteam bag or place  shells into boiling distilled water for 10 minutes.  Drain off the water and place parts on a clean cloth to dry before reapplication.   If there is leakage of breastmilk into the shells, remove shells and separate the 2 parts, wash with mild soap or detergent in warm water, rinse thoroughly in clean cold water and dry well.     If foam inserts are saturated discard and replace with clean cotton balls or dry gauze.     DO NOT feed the baby any milk collected in the shell or the foam insert.  Discard this milk.    Nipple Shield Instructions for use:   Wash hands prior to touching the shield   Moisten the edge of the nipple shield with water or lanolin before applying to help it stay in place   Turn shield half-way inside out and center over nipple and areola   Slowly roll shield over the nipple and areola and smooth down edges.  The cut-out portion of the contact nipple shield should be positioned under the babys nose.   Hand express a little milk into the teat to facilitate latch.   Latch baby onto breast and shield so that part of the areola is in the babys mouth.  Do not latch baby only onto the teat of the shield.   Breastfeed  until baby is content   After breastfeeding with a nipple shield, mother should pump breasts for  15-20min using the most comfortable suction to maximize milk removal and to protect the milk supply.  This should be continued until the milk supply is fully established and the infant is consistently  gaining weight.     Continued use of, and  or weaning from the use of, the nipple shield should be done under the supervision of a health care provider.    Cleaning and Sanitizing:   After each use, rinse in cool water to remove breastmilk   Wash in warm, soapy water   Rinse with clear water   Sanitize daily by following the instructions on Medelas Quick Clean Micro-Steam bag or by boiling for 10min.  The nipple shield should not rest on the bottom or sides of the pot while boiling.   Allow nipple shield to air dry in a clean area and store dry with the nipple facing upward

## 2019-04-04 NOTE — DISCHARGE SUMMARY
Delivery Discharge Summary  Obstetrics      Primary OB Clinician: Kaleigh Polanco MD      Admission date: 2019  Discharge date: 2019    Disposition: To home, self care    Discharge Diagnosis List:      Patient Active Problem List   Diagnosis    Rh negative status during pregnancy in third trimester    Anxiety disorder    ADHD (attention deficit hyperactivity disorder), inattentive type    History of posttraumatic stress disorder (PTSD)    History of insomnia    Plantar fasciitis    30 weeks gestation of pregnancy    S/P  section    39 weeks gestation of pregnancy       Procedure: , due to desired repeat    Hospital Course:  Yolanda Win is a 33 y.o. now , POD #2 who was admitted on 2019 at 39w1d for scheduled RLTCS. Patient was subsequently admitted to labor and delivery unit with signed consents.      which was performed without complications.Please see delivery note for further details.     Her postpartum course was complicated by Rh negative status for which she received Rhogam prior to discharge. On discharge day, patient's pain is controlled with oral pain medications. Pt is tolerating ambulation without SOB or CP, and regular diet without N/V. Reports lochia is mild. Denies any HA, vision changes, F/C, LE swelling. Denies any breast pain/soreness.    Pt in stable condition and ready for discharge. She has been instructed to start and/or continue medications and follow up with her obstetrics provider as listed below.    Pertinent studies:  CBC  Recent Labs   Lab 19  0535 19  2038   WBC 9.04 11.46   HGB 12.4 11.5*   HCT 35.4* 33.8*   MCV 91 92    182          Immunization History   Administered Date(s) Administered    Rho (D) Immune Globulin 2018, 2019    Rho (D) Immune Globulin - IM 2016, 2019    Tdap 2019        Delivery:    Episiotomy:     Lacerations:     Repair suture:     Repair # of  packets:     Blood loss (ml): 0     Birth information:  YOB: 2017   Time of birth: 7:14 AM   Sex: male   Delivery type: , Low Transverse   Gestational Age: 39w1d    Delivery Clinician:      Other providers:       Additional  information:  Forceps:    Vacuum:    Breech:    Observed anomalies      Living?:           APGARS  One minute Five minutes Ten minutes   Skin color:         Heart rate:         Grimace:         Muscle tone:         Breathing:         Totals: 9  9        Placenta: Delivered:       appearance      Patient Instructions:   Current Discharge Medication List      START taking these medications    Details   ibuprofen (ADVIL,MOTRIN) 600 MG tablet Take 1 tablet (600 mg total) by mouth every 6 (six) hours.  Qty: 30 tablet, Refills: 0      oxyCODONE-acetaminophen (PERCOCET) 5-325 mg per tablet Take 1 tablet by mouth every 4 (four) hours as needed.  Qty: 30 tablet, Refills: 0         CONTINUE these medications which have NOT CHANGED    Details   butalb/acetaminophen/caffeine (FIORICET ORAL) Take by mouth.      loratadine (CLARITIN) 10 mg tablet Take 10 mg by mouth once daily.      sumatriptan (IMITREX) 50 MG tablet Take 1 tablet (50 mg total) by mouth daily as needed for Migraine.  Qty: 5 tablet, Refills: 0         STOP taking these medications       ondansetron (ZOFRAN-ODT) 4 MG TbDL Comments:   Reason for Stopping:         pimecrolimus (ELIDEL) 1 % cream Comments:   Reason for Stopping:         valACYclovir (VALTREX) 1000 MG tablet Comments:   Reason for Stopping:               Discharge Procedure Orders   Other restrictions (specify):   Order Comments: Nothing in vagina for 6 weeks (no intercourse, douching, tampons, etc.)     Notify your health care provider if you experience any of the following:     Notify your health care provider if you experience any of the following:  increased confusion or weakness     Notify your health care provider if you experience any of the following:   persistent dizziness, light-headedness, or visual disturbances     Notify your health care provider if you experience any of the following:  severe persistent headache     Notify your health care provider if you experience any of the following:  difficulty breathing or increased cough     Notify your health care provider if you experience any of the following:  severe uncontrolled pain     Notify your health care provider if you experience any of the following:  persistent nausea and vomiting or diarrhea     Notify your health care provider if you experience any of the following:  temperature >100.4     Notify your health care provider if you experience any of the following:  redness, tenderness, or signs of infection (pain, swelling, redness, odor or green/yellow discharge around incision site)     Activity as tolerated       Follow-up Information     Kaleigh Polanco MD. Schedule an appointment as soon as possible for a visit in 6 weeks.    Specialties:  Obstetrics, Obstetrics and Gynecology  Why:  post partum visit  Contact information:  [address] [address]  [phone]                    Zonia Condon MD   OBGYN, PGY-1

## 2019-04-04 NOTE — LACTATION NOTE
04/04/19 0835   Maternal Assessment   Breast Density filling;Bilateral:  (per patient)   Maternal Infant Feeding   Maternal Emotional State independent   Patient recently  baby; she had latched him independently; praised; requested she call lactation assistance with breastfeeding; provided lactation discharge education; reviewed Mother's Breastfeeding Guide; patient's  at bedside;

## 2019-04-06 ENCOUNTER — TELEPHONE (OUTPATIENT)
Dept: LACTATION | Facility: CLINIC | Age: 34
End: 2019-04-06

## 2019-04-07 ENCOUNTER — TELEPHONE (OUTPATIENT)
Dept: LACTATION | Facility: CLINIC | Age: 34
End: 2019-04-07

## 2019-04-07 NOTE — TELEPHONE ENCOUNTER
KALI spoke with mother. Mother states she is nursing better. Nipples are still sore but things seem to be getting better. Baby will good wet /dirty diapers. Seeing MD tomorrow.

## 2019-04-10 ENCOUNTER — TELEPHONE (OUTPATIENT)
Dept: OBSTETRICS AND GYNECOLOGY | Facility: OTHER | Age: 34
End: 2019-04-10

## 2019-04-22 ENCOUNTER — TELEPHONE (OUTPATIENT)
Dept: OBSTETRICS AND GYNECOLOGY | Facility: CLINIC | Age: 34
End: 2019-04-22

## 2019-04-22 ENCOUNTER — PATIENT MESSAGE (OUTPATIENT)
Dept: OBSTETRICS AND GYNECOLOGY | Facility: CLINIC | Age: 34
End: 2019-04-22

## 2019-04-22 ENCOUNTER — PATIENT MESSAGE (OUTPATIENT)
Dept: PSYCHIATRY | Facility: CLINIC | Age: 34
End: 2019-04-22

## 2019-04-22 NOTE — TELEPHONE ENCOUNTER
Spoke with pt  Staff informed pt that Dr Polanco was out of the office until Wed.  Pt has been having migraines and not sleeping.  Pt wanted to know if she could take Imitrex while breastfeeding.  Per Dr Ascencio, breast feeding while taking the Imitrex would be ok, but advised pt to contact her pediatrician to make sure they were ok with her breastfeeding while on the medication.    Pt also reports mood swings along with short fuse and very little patience while watching both children at same time.    Pt had depression and anxiety prior to the pregnancy and is under the care of a psychiatrist.      Pt denies wanteing to hurt herself or anyone else.  Per Dr Ascencio: pt was instructed to contact her psychiatrist for appt.  Pt is interested in some short term medication.  Staff informed pt that message would be sent to Dr Polanco.    Pt verbalized understanding.

## 2019-04-24 ENCOUNTER — TELEPHONE (OUTPATIENT)
Dept: OBSTETRICS AND GYNECOLOGY | Facility: CLINIC | Age: 34
End: 2019-04-24

## 2019-04-24 ENCOUNTER — PATIENT MESSAGE (OUTPATIENT)
Dept: OBSTETRICS AND GYNECOLOGY | Facility: CLINIC | Age: 34
End: 2019-04-24

## 2019-04-24 ENCOUNTER — POSTPARTUM VISIT (OUTPATIENT)
Dept: OBSTETRICS AND GYNECOLOGY | Facility: CLINIC | Age: 34
End: 2019-04-24
Payer: COMMERCIAL

## 2019-04-24 VITALS
TEMPERATURE: 100 F | SYSTOLIC BLOOD PRESSURE: 122 MMHG | WEIGHT: 189.81 LBS | DIASTOLIC BLOOD PRESSURE: 80 MMHG | HEIGHT: 64 IN | BODY MASS INDEX: 32.41 KG/M2

## 2019-04-24 DIAGNOSIS — N61.0 MASTITIS: Primary | ICD-10-CM

## 2019-04-24 PROCEDURE — 99999 PR PBB SHADOW E&M-EST. PATIENT-LVL III: ICD-10-PCS | Mod: PBBFAC,,, | Performed by: NURSE PRACTITIONER

## 2019-04-24 PROCEDURE — 99999 PR PBB SHADOW E&M-EST. PATIENT-LVL III: CPT | Mod: PBBFAC,,, | Performed by: NURSE PRACTITIONER

## 2019-04-24 PROCEDURE — 99213 OFFICE O/P EST LOW 20 MIN: CPT | Mod: S$GLB,,, | Performed by: NURSE PRACTITIONER

## 2019-04-24 PROCEDURE — 3008F BODY MASS INDEX DOCD: CPT | Mod: CPTII,S$GLB,, | Performed by: NURSE PRACTITIONER

## 2019-04-24 PROCEDURE — 3008F PR BODY MASS INDEX (BMI) DOCUMENTED: ICD-10-PCS | Mod: CPTII,S$GLB,, | Performed by: NURSE PRACTITIONER

## 2019-04-24 PROCEDURE — 99213 PR OFFICE/OUTPT VISIT, EST, LEVL III, 20-29 MIN: ICD-10-PCS | Mod: S$GLB,,, | Performed by: NURSE PRACTITIONER

## 2019-04-24 RX ORDER — SUMATRIPTAN 50 MG/1
50 TABLET, FILM COATED ORAL DAILY PRN
Qty: 20 TABLET | Refills: 0 | Status: SHIPPED | OUTPATIENT
Start: 2019-04-24 | End: 2019-08-27

## 2019-04-24 RX ORDER — CLINDAMYCIN HYDROCHLORIDE 300 MG/1
300 CAPSULE ORAL 4 TIMES DAILY
Qty: 40 CAPSULE | Refills: 0 | Status: SHIPPED | OUTPATIENT
Start: 2019-04-24 | End: 2019-05-04

## 2019-04-24 RX ORDER — SUMATRIPTAN 50 MG/1
50 TABLET, FILM COATED ORAL DAILY PRN
Qty: 20 TABLET | Refills: 0 | Status: SHIPPED | OUTPATIENT
Start: 2019-04-24 | End: 2019-04-24 | Stop reason: SDUPTHER

## 2019-04-24 NOTE — TELEPHONE ENCOUNTER
----- Message from Chloé Bone sent at 4/24/2019  8:46 AM CDT -----  Contact: self  Patient states that she is waking up out of her sleep with cold sweats at least 2 times a night. This has been ongoing off and on since Sat(4/20/2019).  Last week she had a tiana knot on the top of her left breast. Patient used hot compress that only eased the pain but she is still sore. Patient will like a call back at [phone].

## 2019-04-24 NOTE — PROGRESS NOTES
Yolanda Win  complains of left breast pain that began 2 days ago.  She complains of fever - temp last night was 103.7. She has been alternating tylenol with advil to help keep temp down. She is breastfeeding or pumping.  She complains of breast mass. Pt states that she feels as if she has the flu. No other complaints or concerns noted.        ROS:  GENERAL: reports fever, chills and fatigability. No weight loss.  VULVAR: No pain, no lesions and no itching.  VAGINAL: No relaxation, no itching, no discharge, no abnormal bleeding and no lesions.  ABDOMEN: No abdominal pain. Denies nausea. Denies vomiting. No diarrhea. No constipation  BREAST: reports pain and lump in left breast. No discharge.  URINARY: No incontinence, no nocturia, no frequency and no dysuria.  CARDIOVASCULAR: No chest pain. No shortness of breath. No leg cramps.  NEUROLOGICAL: no headaches. No vision changes.      Vitals:    19 1606   BP: 122/80   Temp: 99.8 °F (37.7 °C)     GENERAL: healthy, alert  Neck: normal to inspection and palpation and neck supply, no thryomegaly  LYMPH: no supraclavicular or axillary lymphadenopathy  BREAST: positive findings: edema of skin on the left upper inner quadrant, redness/streaking warmth  ABDOMEN: no masses, hepatosplenomegaly, hernias    Yolanda was seen today for mastitis.    Diagnoses and all orders for this visit:    Mastitis    Other orders  -     clindamycin (CLEOCIN) 300 MG capsule; Take 1 capsule (300 mg total) by mouth 4 (four) times daily. for 10 days  -     Discontinue: sumatriptan (IMITREX) 50 MG tablet; Take 1 tablet (50 mg total) by mouth daily as needed for Migraine.  -     sumatriptan (IMITREX) 50 MG tablet; Take 1 tablet (50 mg total) by mouth daily as needed for Migraine.      Follow-up:  RTC lack of improvement  RTC as needed

## 2019-04-24 NOTE — TELEPHONE ENCOUNTER
Pt called c/o had a clogged duct last week and where the knot was it is still very tender. Pt states it does feel better when she is feeding or pumping. Pt states she ran a fever last night that was 101.0 to 103.0 and had chills. Advised pt she should come in to be evaluated to make sure she does not have mastitis. Gave pt appointment today in the [facility] walk in clinic. Pt also wanted to know what else she can do about the migraines. Pt states taking the imitrex but last night it did not help she took the imitrex then a hour later had to take a percocet just to get some sleep. Pt states still woke up this morning with a headache. Advised pt will talk to Dr Polanco and get her recommendations.

## 2019-04-26 ENCOUNTER — OFFICE VISIT (OUTPATIENT)
Dept: PSYCHIATRY | Facility: CLINIC | Age: 34
End: 2019-04-26
Payer: COMMERCIAL

## 2019-04-26 VITALS
DIASTOLIC BLOOD PRESSURE: 77 MMHG | HEIGHT: 64 IN | SYSTOLIC BLOOD PRESSURE: 134 MMHG | WEIGHT: 188.13 LBS | BODY MASS INDEX: 32.12 KG/M2 | HEART RATE: 60 BPM

## 2019-04-26 DIAGNOSIS — Z87.898 HISTORY OF INSOMNIA: ICD-10-CM

## 2019-04-26 DIAGNOSIS — F41.9 ANXIETY DISORDER, UNSPECIFIED TYPE: Primary | ICD-10-CM

## 2019-04-26 DIAGNOSIS — F90.0 ADHD (ATTENTION DEFICIT HYPERACTIVITY DISORDER), INATTENTIVE TYPE: ICD-10-CM

## 2019-04-26 DIAGNOSIS — Z98.891 S/P CESAREAN SECTION: ICD-10-CM

## 2019-04-26 DIAGNOSIS — Z86.59 HISTORY OF POSTTRAUMATIC STRESS DISORDER (PTSD): ICD-10-CM

## 2019-04-26 PROBLEM — Z3A.39 39 WEEKS GESTATION OF PREGNANCY: Status: RESOLVED | Noted: 2019-04-02 | Resolved: 2019-04-26

## 2019-04-26 PROBLEM — Z3A.30 30 WEEKS GESTATION OF PREGNANCY: Status: RESOLVED | Noted: 2018-08-13 | Resolved: 2019-04-26

## 2019-04-26 PROCEDURE — 99999 PR PBB SHADOW E&M-EST. PATIENT-LVL III: ICD-10-PCS | Mod: PBBFAC,,, | Performed by: PSYCHIATRY & NEUROLOGY

## 2019-04-26 PROCEDURE — 90833 PR PSYCHOTHERAPY W/PATIENT W/E&M, 30 MIN (ADD ON): ICD-10-PCS | Mod: S$GLB,,, | Performed by: PSYCHIATRY & NEUROLOGY

## 2019-04-26 PROCEDURE — 99214 OFFICE O/P EST MOD 30 MIN: CPT | Mod: S$GLB,,, | Performed by: PSYCHIATRY & NEUROLOGY

## 2019-04-26 PROCEDURE — 90833 PSYTX W PT W E/M 30 MIN: CPT | Mod: S$GLB,,, | Performed by: PSYCHIATRY & NEUROLOGY

## 2019-04-26 PROCEDURE — 99214 PR OFFICE/OUTPT VISIT, EST, LEVL IV, 30-39 MIN: ICD-10-PCS | Mod: S$GLB,,, | Performed by: PSYCHIATRY & NEUROLOGY

## 2019-04-26 PROCEDURE — 99999 PR PBB SHADOW E&M-EST. PATIENT-LVL III: CPT | Mod: PBBFAC,,, | Performed by: PSYCHIATRY & NEUROLOGY

## 2019-04-26 PROCEDURE — 3008F BODY MASS INDEX DOCD: CPT | Mod: CPTII,S$GLB,, | Performed by: PSYCHIATRY & NEUROLOGY

## 2019-04-26 PROCEDURE — 3008F PR BODY MASS INDEX (BMI) DOCUMENTED: ICD-10-PCS | Mod: CPTII,S$GLB,, | Performed by: PSYCHIATRY & NEUROLOGY

## 2019-04-26 RX ORDER — CITALOPRAM 10 MG/1
10 TABLET ORAL DAILY
Qty: 30 TABLET | Refills: 0 | Status: SHIPPED | OUTPATIENT
Start: 2019-04-26 | End: 2019-05-24
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
## 2019-05-08 ENCOUNTER — PATIENT MESSAGE (OUTPATIENT)
Dept: PSYCHIATRY | Facility: CLINIC | Age: 34
End: 2019-05-08

## 2019-05-13 ENCOUNTER — POSTPARTUM VISIT (OUTPATIENT)
Dept: OBSTETRICS AND GYNECOLOGY | Facility: CLINIC | Age: 34
End: 2019-05-13
Attending: OBSTETRICS & GYNECOLOGY
Payer: COMMERCIAL

## 2019-05-13 VITALS
SYSTOLIC BLOOD PRESSURE: 108 MMHG | WEIGHT: 183.19 LBS | DIASTOLIC BLOOD PRESSURE: 76 MMHG | HEIGHT: 64 IN | BODY MASS INDEX: 31.27 KG/M2

## 2019-05-13 PROCEDURE — 0503F PR POSTPARTUM CARE VISIT: ICD-10-PCS | Mod: S$GLB,,, | Performed by: OBSTETRICS & GYNECOLOGY

## 2019-05-13 PROCEDURE — 0503F POSTPARTUM CARE VISIT: CPT | Mod: S$GLB,,, | Performed by: OBSTETRICS & GYNECOLOGY

## 2019-05-13 NOTE — PROGRESS NOTES
"Yolanda Win is a 33 y.o. female  presents for a postpartum visit.  She is status post repeat C/S  6 weeks ago.  Her hospitalization was not complicated.  She is breastfeeding.  She desires no method for contraception.  She admits to postpartum depression.She has been taking Celexa and is doing "much better."     Her last pap was 2017    Past Medical History:   Diagnosis Date    Abnormal Pap smear of cervix     Allergy     seasonal    Anorexia     Bulimia     Depression     Fever blister     Hypertension     Mastitis     Migraine headache      Past Surgical History:   Procedure Laterality Date    BONE MARROW ASPIRATION      x 3    CERVICAL BIOPSY  W/ LOOP ELECTRODE EXCISION       SECTION  2016     SECTION N/A 2019    [facility]    COLPOSCOPY      DELIVERY- SECTION N/A 2016    [facility] at [facility]    SHOULDER SURGERY Left     x 2    SINUS SURGERY      age 19    TONSILLECTOMY       Review of patient's allergies indicates:   Allergen Reactions    Amoxicillin      hives    Pcn [penicillins] Rash    Valium [diazepam] Anxiety       Current Outpatient Medications:     butalb/acetaminophen/caffeine (FIORICET ORAL), Take by mouth as needed. , Disp: , Rfl:     citalopram (CELEXA) 10 MG tablet, Take 1 tablet (10 mg total) by mouth once daily., Disp: 30 tablet, Rfl: 0    loratadine (CLARITIN) 10 mg tablet, Take 10 mg by mouth once daily., Disp: , Rfl:     -iron-folate 1-dss-dha 90 mg iron-1 mg -50 mg-200 mg Cap, Take by mouth., Disp: , Rfl:     sumatriptan (IMITREX) 50 MG tablet, Take 1 tablet (50 mg total) by mouth daily as needed for Migraine., Disp: 20 tablet, Rfl: 0    ibuprofen (ADVIL,MOTRIN) 600 MG tablet, Take 1 tablet (600 mg total) by mouth every 6 (six) hours., Disp: 30 tablet, Rfl: 0    oxyCODONE-acetaminophen (PERCOCET) 5-325 mg per tablet, Take 1 tablet by " mouth every 4 (four) hours as needed., Disp: 30 tablet, Rfl: 0      Vitals:    05/13/19 1313   BP: 108/76       GENERAL: healthy  ABDOMEN: no masses, hepatosplenomegaly, no hernias  EXTERNAL GENITALIA POSTPARTUM: normal, well-healed, without lesions or masses   VAGINA POSTPARTUM: normal, well-healed, physiologic discharge, without lesions   CERVIX POSTPARTUM: normal, well-healed, without lesions   UTERUS POSTPARTUM: normal size, well involuted, firm, non-tender, ADNEXA POSTPARTUM: no masses palpable and nontender    Assessment:  Normal postpartum exam    Plan:  Routine follow up.  Patient may desire BTL in 1 year.

## 2019-05-22 ENCOUNTER — PATIENT MESSAGE (OUTPATIENT)
Dept: OBSTETRICS AND GYNECOLOGY | Facility: CLINIC | Age: 34
End: 2019-05-22

## 2019-05-22 DIAGNOSIS — B37.0 THRUSH: Primary | ICD-10-CM

## 2019-05-22 RX ORDER — FLUCONAZOLE 100 MG/1
100 TABLET ORAL DAILY
Qty: 3 TABLET | Refills: 0 | Status: SHIPPED | OUTPATIENT
Start: 2019-05-22 | End: 2019-08-08

## 2019-05-22 NOTE — TELEPHONE ENCOUNTER
Pt c/o burning and pain on left nipple. Pt states she has white stuff on her nipple. Per Dr Polanco will call in diflucan 200mg 2 po today and 1po tomorrow. Pt also recommended to bring baby to pediatrician to be [facility] for thrush. If baby is diagnosed will send higher dose

## 2019-05-23 ENCOUNTER — OFFICE VISIT (OUTPATIENT)
Dept: NEUROLOGY | Facility: CLINIC | Age: 34
End: 2019-05-23
Payer: COMMERCIAL

## 2019-05-23 VITALS
WEIGHT: 182.13 LBS | BODY MASS INDEX: 31.09 KG/M2 | HEIGHT: 64 IN | SYSTOLIC BLOOD PRESSURE: 131 MMHG | DIASTOLIC BLOOD PRESSURE: 80 MMHG | HEART RATE: 72 BPM

## 2019-05-23 DIAGNOSIS — G43.C0 PERIODIC HEADACHE SYNDROME, NOT INTRACTABLE: ICD-10-CM

## 2019-05-23 PROCEDURE — 99205 OFFICE O/P NEW HI 60 MIN: CPT | Mod: S$GLB,,, | Performed by: PSYCHIATRY & NEUROLOGY

## 2019-05-23 PROCEDURE — 3008F BODY MASS INDEX DOCD: CPT | Mod: CPTII,S$GLB,, | Performed by: PSYCHIATRY & NEUROLOGY

## 2019-05-23 PROCEDURE — 99999 PR PBB SHADOW E&M-EST. PATIENT-LVL III: ICD-10-PCS | Mod: PBBFAC,,, | Performed by: PSYCHIATRY & NEUROLOGY

## 2019-05-23 PROCEDURE — 99999 PR PBB SHADOW E&M-EST. PATIENT-LVL III: CPT | Mod: PBBFAC,,, | Performed by: PSYCHIATRY & NEUROLOGY

## 2019-05-23 PROCEDURE — 3008F PR BODY MASS INDEX (BMI) DOCUMENTED: ICD-10-PCS | Mod: CPTII,S$GLB,, | Performed by: PSYCHIATRY & NEUROLOGY

## 2019-05-23 PROCEDURE — 99205 PR OFFICE/OUTPT VISIT, NEW, LEVL V, 60-74 MIN: ICD-10-PCS | Mod: S$GLB,,, | Performed by: PSYCHIATRY & NEUROLOGY

## 2019-05-23 NOTE — PATIENT INSTRUCTIONS
TALK TO YOUR DOCTOR ABOUT INCREASING CELEXA TO 20MG DAILY    START SUPPLEMENTATION WITH MAGNESIUM 400MG DAILY OR COMBINATION MIGRAINE SUPPLEMENT SUCH AS PREVENTA MIGRAINE OR MIGRAVENT    ONCE YOU ARE NO LONGER BREAST FEEDING CAN CONSIDER MONTHLY INJECTABLE SUCH AS AIMOVIG, AJOVY, OR EMGALITY    CAN CONSIDER BOTOX OR TOPAMAX IF NO IMPROVEMENT WITH CELEXA

## 2019-05-23 NOTE — LETTER
May 24, 2019      Kaleigh Polanco MD  [address] [address]           [facility]  [address]  [place] [address]  Phone: [phone]  Fax: [phone]          Patient: Yolanda Win   MR Number: [id number]   YOB: 1985   Date of Visit: 5/23/2019       Dear Dr. Kaleigh Polanco:    Thank you for referring Yolanda Win to me for evaluation. Attached you will find relevant portions of my assessment and plan of care.    If you have questions, please do not hesitate to call me. I look forward to following Yolanda Win along with you.    Sincerely,    Dillon Gonzalez MD    Enclosure  CC:  No Recipients    If you would like to receive this communication electronically, please contact [email or web address] or [phone] to request more information on [employer] Link access.    For providers and/or their staff who would like to refer a patient to [facility], please contact us through our one-stop-shop provider referral line, [facility], at [phone].    If you feel you have received this communication in error or would no longer like to receive these types of communications, please e-mail [email or web address]

## 2019-05-24 ENCOUNTER — OFFICE VISIT (OUTPATIENT)
Dept: PSYCHIATRY | Facility: CLINIC | Age: 34
End: 2019-05-24
Payer: COMMERCIAL

## 2019-05-24 VITALS
SYSTOLIC BLOOD PRESSURE: 120 MMHG | WEIGHT: 180.81 LBS | DIASTOLIC BLOOD PRESSURE: 77 MMHG | HEIGHT: 64 IN | HEART RATE: 55 BPM | BODY MASS INDEX: 30.87 KG/M2

## 2019-05-24 DIAGNOSIS — G43.C0 PERIODIC HEADACHE SYNDROME, NOT INTRACTABLE: ICD-10-CM

## 2019-05-24 DIAGNOSIS — F90.0 ADHD (ATTENTION DEFICIT HYPERACTIVITY DISORDER), INATTENTIVE TYPE: ICD-10-CM

## 2019-05-24 DIAGNOSIS — Z98.891 S/P CESAREAN SECTION: ICD-10-CM

## 2019-05-24 DIAGNOSIS — F41.9 ANXIETY DISORDER, UNSPECIFIED TYPE: Primary | ICD-10-CM

## 2019-05-24 DIAGNOSIS — Z86.59 HISTORY OF POSTTRAUMATIC STRESS DISORDER (PTSD): ICD-10-CM

## 2019-05-24 DIAGNOSIS — Z87.898 HISTORY OF INSOMNIA: ICD-10-CM

## 2019-05-24 PROCEDURE — 3008F PR BODY MASS INDEX (BMI) DOCUMENTED: ICD-10-PCS | Mod: CPTII,S$GLB,, | Performed by: PSYCHIATRY & NEUROLOGY

## 2019-05-24 PROCEDURE — 99214 OFFICE O/P EST MOD 30 MIN: CPT | Mod: S$GLB,,, | Performed by: PSYCHIATRY & NEUROLOGY

## 2019-05-24 PROCEDURE — 99999 PR PBB SHADOW E&M-EST. PATIENT-LVL II: ICD-10-PCS | Mod: PBBFAC,,, | Performed by: PSYCHIATRY & NEUROLOGY

## 2019-05-24 PROCEDURE — 99999 PR PBB SHADOW E&M-EST. PATIENT-LVL II: CPT | Mod: PBBFAC,,, | Performed by: PSYCHIATRY & NEUROLOGY

## 2019-05-24 PROCEDURE — 3008F BODY MASS INDEX DOCD: CPT | Mod: CPTII,S$GLB,, | Performed by: PSYCHIATRY & NEUROLOGY

## 2019-05-24 PROCEDURE — 99214 PR OFFICE/OUTPT VISIT, EST, LEVL IV, 30-39 MIN: ICD-10-PCS | Mod: S$GLB,,, | Performed by: PSYCHIATRY & NEUROLOGY

## 2019-05-24 RX ORDER — CITALOPRAM 20 MG/1
20 TABLET, FILM COATED ORAL DAILY
Qty: 30 TABLET | Refills: 0 | Status: SHIPPED | OUTPATIENT
Start: 2019-05-24 | End: 2019-05-28

## 2019-05-24 NOTE — PROGRESS NOTES
[facility] - NEUROLOGY  [facility] [place]    Date: May 24, 2019   Patient Name: Yolanda Win   MRN: [id number]   PCP: Primary Doctor No  Referring Provider: Kaleigh Polanco, *    Assessment:      This is Yolanda Win, 33 y.o. female with frequent migraine headaches.     Plan:      TALK TO YOUR DOCTOR ABOUT INCREASING CELEXA TO 20MG DAILY    START SUPPLEMENTATION WITH MAGNESIUM 400MG DAILY OR COMBINATION MIGRAINE SUPPLEMENT SUCH AS PREVENTA MIGRAINE OR MIGRAVENT    ONCE YOU ARE NO LONGER BREAST FEEDING CAN CONSIDER MONTHLY INJECTABLE SUCH AS AIMOVIG, AJOVY, OR EMGALITY    CAN CONSIDER BOTOX OR TOPAMAX IF NO IMPROVEMENT WITH CELEXA       I discussed side effects of the medications. I asked the patient to  stop the medication if he notices serious adverse effects as we discussed and to seek immediate medical attention at an ER.     Dillon Gonzalez MD  [facility]   Department of Neurology    Subjective:      HPI:   Ms. Yolanda Win is a 33 y.o. female who presents with a chief complaint of headaches    Patient has had significant difficulty with migraines since she was 12 years old with worsening during recent pregnancy.  Associated nausea, photo/phonophobia.  She has had difficulty with post-partum anxiety and started celexa a month ago with some improvement in headache as well as irritability noted.    Prior medications trials as below  TPM - partial relief  Pamelor, Neurontin, Seroquel - all little effect  Imitrex, phenergan - good effect  Fioricet, zanaflex - little effect    PAST MEDICAL HISTORY:  Past Medical History:   Diagnosis Date    Abnormal Pap smear of cervix 2009    Allergy     seasonal    Anorexia     Bulimia     Depression     Fever blister     Hypertension     Mastitis     Migraine headache        PAST SURGICAL HISTORY:  Past Surgical History:   Procedure Laterality Date    BONE MARROW ASPIRATION      x 3    CERVICAL BIOPSY  W/ LOOP  ELECTRODE EXCISION       SECTION  2016     SECTION N/A 2019    [facility]formed [facility]    COLPOSCOPY      DELIVERY- SECTION N/A 2016    [facility]formed by [facility] at [facility]    SHOULDER SURGERY Left     x 2    SINUS SURGERY      age 19    TONSILLECTOMY         CURRENT MEDS:  Current Outpatient Medications   Medication Sig Dispense Refill    butalb/acetaminophen/caffeine (FIORICET ORAL) Take by mouth as needed.       fluconazole (DIFLUCAN) 100 MG tablet Take 1 tablet (100 mg total) by mouth once daily. Pt to take 2 pills today and 1 pill tomorrow 3 tablet 0    ibuprofen (ADVIL,MOTRIN) 600 MG tablet Take 1 tablet (600 mg total) by mouth every 6 (six) hours. 30 tablet 0    loratadine (CLARITIN) 10 mg tablet Take 10 mg by mouth once daily.      oxyCODONE-acetaminophen (PERCOCET) 5-325 mg per tablet Take 1 tablet by mouth every 4 (four) hours as needed. 30 tablet 0    -iron-folate 1-dss-dha 90 mg iron-1 mg -50 mg-200 mg Cap Take by mouth.      sumatriptan (IMITREX) 50 MG tablet Take 1 tablet (50 mg total) by mouth daily as needed for Migraine. 20 tablet 0    citalopram (CELEXA) 20 MG tablet Take 1 tablet (20 mg total) by mouth once daily. 30 tablet 0     No current facility-administered medications for this visit.        ALLERGIES:  Review of patient's allergies indicates:   Allergen Reactions    Amoxicillin      hives    Pcn [penicillins] Rash    Valium [diazepam] Anxiety       FAMILY HISTORY:  Family History   Problem Relation Age of Onset    Cancer Maternal Grandmother 40        cervical    Breast cancer Neg Hx     Colon cancer Neg Hx     Ovarian cancer Neg Hx     Melanoma Neg Hx        SOCIAL HISTORY:  Social History     Tobacco Use    Smoking status: Never Smoker    Smokeless tobacco: Never Used   Substance Use Topics    Alcohol use: No     Frequency: Never     Comment: pre pregnancy     Drug use: No  "      Review of Systems:  12 review of systems is negative except for the symptoms mentioned in HPI.        Objective:     Vitals:    05/23/19 1356   BP: 131/80   Pulse: 72   Weight: 82.6 kg (182 lb 1.6 oz)   Height: 5' 4" (1.626 m)       General: NAD, well nourished   Eyes: no tearing, discharge, no erythema   ENT: moist mucous membranes of the oral cavity, nares patent    Neck: Supple, full range of motion  Cardiovascular: Warm and well perfused, pulses equal and symmetrical  Lungs: Normal work of breathing, normal chest wall excursions  Skin: No rash, lesions, or breakdown on exposed skin  Psychiatry: Mood and affect are appropriate   Abdomen: soft, non tender, non distended  Extremeties: No cyanosis, clubbing or edema.    Neurological   MENTAL STATUS: Alert and oriented to person, place, and time. Attention and concentration within normal limits. Speech without dysarthria, able to name and repeat without difficulty. Recent and remote memory within normal limits   CRANIAL NERVES: Visual fields intact. PERRL. EOMI. Facial sensation intact. Face symmetrical. Hearing grossly intact. Full shoulder shrug bilaterally. Tongue protrudes midline   SENSORY: Sensation is intact to light touch throughout.  Negative Romberg.   MOTOR: Normal bulk and tone. No pronator drift.  5/5 deltoid, biceps, triceps, interosseous, hand  bilaterally. 5/5 iliopsoas, knee extension/flexion, foot dorsi/plantarflexion bilaterally.    REFLEXES: Symmetric and 2+ throughout.    CEREBELLAR/COORDINATION/GAIT: Gait steady with normal arm swing and stride length.  Heel to shin intact. Finger to nose intact. Normal rapid alternating movements.     "
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
## 2019-05-27 ENCOUNTER — PATIENT MESSAGE (OUTPATIENT)
Dept: PSYCHIATRY | Facility: CLINIC | Age: 34
End: 2019-05-27

## 2019-05-28 ENCOUNTER — PATIENT MESSAGE (OUTPATIENT)
Dept: OBSTETRICS AND GYNECOLOGY | Facility: CLINIC | Age: 34
End: 2019-05-28

## 2019-05-28 RX ORDER — CITALOPRAM 10 MG/1
10 TABLET ORAL DAILY
Qty: 30 TABLET | Refills: 0 | Status: SHIPPED | OUTPATIENT
Start: 2019-05-28 | End: 2019-06-24 | Stop reason: SDUPTHER

## 2019-05-29 ENCOUNTER — PATIENT MESSAGE (OUTPATIENT)
Dept: OBSTETRICS AND GYNECOLOGY | Facility: CLINIC | Age: 34
End: 2019-05-29

## 2019-06-22 ENCOUNTER — PATIENT MESSAGE (OUTPATIENT)
Dept: PSYCHIATRY | Facility: CLINIC | Age: 34
End: 2019-06-22

## 2019-06-24 RX ORDER — CITALOPRAM 10 MG/1
10 TABLET ORAL DAILY
Qty: 30 TABLET | Refills: 0 | Status: SHIPPED | OUTPATIENT
Start: 2019-06-24 | End: 2019-07-19 | Stop reason: SDUPTHER

## 2019-07-19 ENCOUNTER — PATIENT MESSAGE (OUTPATIENT)
Dept: PSYCHIATRY | Facility: CLINIC | Age: 34
End: 2019-07-19

## 2019-07-19 RX ORDER — CITALOPRAM 10 MG/1
10 TABLET ORAL DAILY
Qty: 30 TABLET | Refills: 2 | Status: SHIPPED | OUTPATIENT
Start: 2019-07-19 | End: 2019-10-14 | Stop reason: SDUPTHER

## 2019-08-07 ENCOUNTER — PATIENT MESSAGE (OUTPATIENT)
Dept: PSYCHIATRY | Facility: CLINIC | Age: 34
End: 2019-08-07

## 2019-08-08 ENCOUNTER — OFFICE VISIT (OUTPATIENT)
Dept: PSYCHIATRY | Facility: CLINIC | Age: 34
End: 2019-08-08
Payer: COMMERCIAL

## 2019-08-08 VITALS
HEIGHT: 64 IN | WEIGHT: 179.38 LBS | DIASTOLIC BLOOD PRESSURE: 63 MMHG | HEART RATE: 73 BPM | BODY MASS INDEX: 30.63 KG/M2 | SYSTOLIC BLOOD PRESSURE: 108 MMHG

## 2019-08-08 DIAGNOSIS — Z86.59 HISTORY OF POSTTRAUMATIC STRESS DISORDER (PTSD): ICD-10-CM

## 2019-08-08 DIAGNOSIS — Z87.898 HISTORY OF INSOMNIA: ICD-10-CM

## 2019-08-08 DIAGNOSIS — F41.9 ANXIETY DISORDER, UNSPECIFIED TYPE: ICD-10-CM

## 2019-08-08 DIAGNOSIS — F90.0 ADHD (ATTENTION DEFICIT HYPERACTIVITY DISORDER), INATTENTIVE TYPE: Primary | ICD-10-CM

## 2019-08-08 DIAGNOSIS — G43.C0 PERIODIC HEADACHE SYNDROME, NOT INTRACTABLE: ICD-10-CM

## 2019-08-08 PROCEDURE — 3008F PR BODY MASS INDEX (BMI) DOCUMENTED: ICD-10-PCS | Mod: CPTII,S$GLB,, | Performed by: PSYCHIATRY & NEUROLOGY

## 2019-08-08 PROCEDURE — 3008F BODY MASS INDEX DOCD: CPT | Mod: CPTII,S$GLB,, | Performed by: PSYCHIATRY & NEUROLOGY

## 2019-08-08 PROCEDURE — 99999 PR PBB SHADOW E&M-EST. PATIENT-LVL III: ICD-10-PCS | Mod: PBBFAC,,, | Performed by: PSYCHIATRY & NEUROLOGY

## 2019-08-08 PROCEDURE — 99214 OFFICE O/P EST MOD 30 MIN: CPT | Mod: S$GLB,,, | Performed by: PSYCHIATRY & NEUROLOGY

## 2019-08-08 PROCEDURE — 99214 PR OFFICE/OUTPT VISIT, EST, LEVL IV, 30-39 MIN: ICD-10-PCS | Mod: S$GLB,,, | Performed by: PSYCHIATRY & NEUROLOGY

## 2019-08-08 PROCEDURE — 99999 PR PBB SHADOW E&M-EST. PATIENT-LVL III: CPT | Mod: PBBFAC,,, | Performed by: PSYCHIATRY & NEUROLOGY
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
## 2019-08-23 ENCOUNTER — OFFICE VISIT (OUTPATIENT)
Dept: NEUROLOGY | Facility: CLINIC | Age: 34
End: 2019-08-23
Payer: COMMERCIAL

## 2019-08-23 VITALS
SYSTOLIC BLOOD PRESSURE: 111 MMHG | DIASTOLIC BLOOD PRESSURE: 76 MMHG | HEART RATE: 67 BPM | WEIGHT: 181.69 LBS | HEIGHT: 64 IN | BODY MASS INDEX: 31.02 KG/M2

## 2019-08-23 DIAGNOSIS — G43.C1 INTRACTABLE PERIODIC HEADACHE SYNDROME: Primary | ICD-10-CM

## 2019-08-23 PROCEDURE — 99999 PR PBB SHADOW E&M-EST. PATIENT-LVL III: CPT | Mod: PBBFAC,,, | Performed by: PSYCHIATRY & NEUROLOGY

## 2019-08-23 PROCEDURE — 3008F BODY MASS INDEX DOCD: CPT | Mod: CPTII,S$GLB,, | Performed by: PSYCHIATRY & NEUROLOGY

## 2019-08-23 PROCEDURE — 99214 OFFICE O/P EST MOD 30 MIN: CPT | Mod: S$GLB,,, | Performed by: PSYCHIATRY & NEUROLOGY

## 2019-08-23 PROCEDURE — 3008F PR BODY MASS INDEX (BMI) DOCUMENTED: ICD-10-PCS | Mod: CPTII,S$GLB,, | Performed by: PSYCHIATRY & NEUROLOGY

## 2019-08-23 PROCEDURE — 99214 PR OFFICE/OUTPT VISIT, EST, LEVL IV, 30-39 MIN: ICD-10-PCS | Mod: S$GLB,,, | Performed by: PSYCHIATRY & NEUROLOGY

## 2019-08-23 PROCEDURE — 99999 PR PBB SHADOW E&M-EST. PATIENT-LVL III: ICD-10-PCS | Mod: PBBFAC,,, | Performed by: PSYCHIATRY & NEUROLOGY

## 2019-08-23 RX ORDER — DEXTROAMPHETAMINE SACCHARATE, AMPHETAMINE ASPARTATE MONOHYDRATE, DEXTROAMPHETAMINE SULFATE AND AMPHETAMINE SULFATE 7.5; 7.5; 7.5; 7.5 MG/1; MG/1; MG/1; MG/1
30 CAPSULE, EXTENDED RELEASE ORAL
COMMUNITY
Start: 2015-12-01 | End: 2019-08-27

## 2019-08-23 RX ORDER — AZITHROMYCIN 250 MG/1
1 TABLET, FILM COATED ORAL
COMMUNITY
Start: 2019-08-22 | End: 2019-08-27

## 2019-08-23 RX ORDER — KETOROLAC TROMETHAMINE 10 MG/1
10 TABLET, FILM COATED ORAL EVERY 4 HOURS PRN
COMMUNITY
End: 2019-08-27

## 2019-08-23 RX ORDER — VALACYCLOVIR HYDROCHLORIDE 1 G/1
1000 TABLET, FILM COATED ORAL
COMMUNITY
End: 2019-11-11 | Stop reason: SDUPTHER

## 2019-08-23 RX ORDER — TIZANIDINE HYDROCHLORIDE 2 MG/1
2 CAPSULE, GELATIN COATED ORAL DAILY PRN
COMMUNITY
End: 2019-08-23

## 2019-08-23 RX ORDER — ETONOGESTREL AND ETHINYL ESTRADIOL VAGINAL RING .015; .12 MG/D; MG/D
RING VAGINAL
COMMUNITY
End: 2019-08-27

## 2019-08-23 NOTE — PROGRESS NOTES
[facility] - NEUROLOGY  [facility] [place]    Date: August 23, 2019   Patient Name: Yolanda Win   MRN: [id number]   PCP: Primary Doctor No  Referring Provider: Self, Aaareferral    Assessment:      This is Yolanda Win, 34 y.o. female with chronic migraines (G43.719) and suffers from headaches more than 15 days a month lasting more than 4 hours a day with no relief of symptoms despite trying multiple medications listed below. Botox treatment was approved for chronic migraines in October 2010.  We are planning for 3 treatments 3 months apart and aiming for at least 50% improvement in the symptoms. If we see no improvement after 3 treatments, we will discontinue the injections.     Plan:      -  Will follow up for botox  -  Continue Mg       I discussed side effects of the medications. I asked the patient to  stop the medication if he notices serious adverse effects as we discussed and to seek immediate medical attention at an ER.     Dillon Gonzalez MD  [facility]   Department of Neurology    Subjective:   Unable to tolerate increased celexa due to fatigue, compliant with Mg with continued daily HA, unable to resume TPM as continuing to breast feed    HPI 5/2019:   Ms. Yolanda Win is a 34 y.o. female who presents with a chief complaint of headaches    Patient has had significant difficulty with migraines since she was 12 years old with worsening during recent pregnancy.  Associated nausea, photo/phonophobia.  She has had difficulty with post-partum anxiety and started celexa a month ago with some improvement in headache as well as irritability noted.    Prior medications trials as below  TPM - partial relief at 150mg /d  Pamelor, Neurontin, Seroquel - all little effect  Imitrex, phenergan - good effect  Fioricet, zanaflex - little effect    PAST MEDICAL HISTORY:  Past Medical History:   Diagnosis Date    Abnormal Pap smear of cervix 2009    Allergy      seasonal    Anorexia     Bulimia     Depression     Fever blister     Hypertension     Mastitis     Migraine headache        PAST SURGICAL HISTORY:  Past Surgical History:   Procedure Laterality Date    BONE MARROW ASPIRATION      x 3    CERVICAL BIOPSY  W/ LOOP ELECTRODE EXCISION       SECTION  2016     SECTION N/A 2019    [facility]    COLPOSCOPY      DELIVERY- SECTION N/A 2016    [facility] at [facility]    SHOULDER SURGERY Left     x 2    SINUS SURGERY      age 19    TONSILLECTOMY         CURRENT MEDS:  Current Outpatient Medications   Medication Sig Dispense Refill    azithromycin (Z-PADILLA) 250 MG tablet take 2 tablets by mouth  today then 1 tablet by mouth  daily 6 tablet 0    butalb/acetaminophen/caffeine (FIORICET ORAL) Take by mouth as needed.       citalopram (CELEXA) 10 MG tablet Take 1 tablet (10 mg total) by mouth once daily. 30 tablet 2    loratadine (CLARITIN) 10 mg tablet Take 10 mg by mouth once daily.      sumatriptan (IMITREX) 50 MG tablet Take 1 tablet (50 mg total) by mouth daily as needed for Migraine. 20 tablet 0     No current facility-administered medications for this visit.        ALLERGIES:  Review of patient's allergies indicates:   Allergen Reactions    Amoxicillin      hives    Pcn [penicillins] Rash    Valium [diazepam] Anxiety       FAMILY HISTORY:  Family History   Problem Relation Age of Onset    Cancer Maternal Grandmother 40        cervical    Breast cancer Neg Hx     Colon cancer Neg Hx     Ovarian cancer Neg Hx     Melanoma Neg Hx        SOCIAL HISTORY:  Social History     Tobacco Use    Smoking status: Never Smoker    Smokeless tobacco: Never Used   Substance Use Topics    Alcohol use: No     Frequency: Never     Comment: pre pregnancy     Drug use: No       Review of Systems:  12 review of systems is negative except for the symptoms mentioned in  HPI.        Objective:     There were no vitals filed for this visit.    General: NAD, well nourished   Eyes: no tearing, discharge, no erythema   ENT: moist mucous membranes of the oral cavity, nares patent    Neck: Supple, full range of motion  Cardiovascular: Warm and well perfused, pulses equal and symmetrical  Lungs: Normal work of breathing, normal chest wall excursions  Skin: No rash, lesions, or breakdown on exposed skin  Psychiatry: Mood and affect are appropriate   Abdomen: soft, non tender, non distended  Extremeties: No cyanosis, clubbing or edema.    Neurological   MENTAL STATUS: Alert and oriented to person, place, and time. Attention and concentration within normal limits. Speech without dysarthria, able to name and repeat without difficulty. Recent and remote memory within normal limits   CRANIAL NERVES: Visual fields intact. PERRL. EOMI. Facial sensation intact. Face symmetrical. Hearing grossly intact. Full shoulder shrug bilaterally. Tongue protrudes midline   SENSORY: Sensation is intact to light touch throughout.  Negative Romberg.   MOTOR: Normal bulk and tone. No pronator drift.    CEREBELLAR/COORDINATION/GAIT: Gait steady with normal arm swing and stride length.  Heel to shin intact. Finger to nose intact. Normal rapid alternating movements.

## 2019-08-27 ENCOUNTER — OFFICE VISIT (OUTPATIENT)
Dept: OBSTETRICS AND GYNECOLOGY | Facility: CLINIC | Age: 34
End: 2019-08-27
Payer: COMMERCIAL

## 2019-08-27 ENCOUNTER — TELEPHONE (OUTPATIENT)
Dept: NEUROLOGY | Facility: CLINIC | Age: 34
End: 2019-08-27

## 2019-08-27 VITALS
BODY MASS INDEX: 31.03 KG/M2 | DIASTOLIC BLOOD PRESSURE: 72 MMHG | WEIGHT: 180.75 LBS | SYSTOLIC BLOOD PRESSURE: 132 MMHG

## 2019-08-27 DIAGNOSIS — M25.559 ARTHRALGIA OF HIP, UNSPECIFIED LATERALITY: ICD-10-CM

## 2019-08-27 DIAGNOSIS — Z12.4 ENCOUNTER FOR PAPANICOLAOU SMEAR FOR CERVICAL CANCER SCREENING: ICD-10-CM

## 2019-08-27 DIAGNOSIS — Z11.51 SCREENING FOR HPV (HUMAN PAPILLOMAVIRUS): ICD-10-CM

## 2019-08-27 DIAGNOSIS — Z01.419 WOMEN'S ANNUAL ROUTINE GYNECOLOGICAL EXAMINATION: Primary | ICD-10-CM

## 2019-08-27 PROCEDURE — 99999 PR PBB SHADOW E&M-EST. PATIENT-LVL III: ICD-10-PCS | Mod: PBBFAC,,, | Performed by: NURSE PRACTITIONER

## 2019-08-27 PROCEDURE — 88175 CYTOPATH C/V AUTO FLUID REDO: CPT

## 2019-08-27 PROCEDURE — 87624 HPV HI-RISK TYP POOLED RSLT: CPT

## 2019-08-27 PROCEDURE — 99999 PR PBB SHADOW E&M-EST. PATIENT-LVL III: CPT | Mod: PBBFAC,,, | Performed by: NURSE PRACTITIONER

## 2019-08-27 PROCEDURE — 99395 PREV VISIT EST AGE 18-39: CPT | Mod: S$GLB,,, | Performed by: NURSE PRACTITIONER

## 2019-08-27 PROCEDURE — 99395 PR PREVENTIVE VISIT,EST,18-39: ICD-10-PCS | Mod: S$GLB,,, | Performed by: NURSE PRACTITIONER

## 2019-08-27 NOTE — PROGRESS NOTES
CC: Annual  HPI: Pt is a 34 y.o.  female who presents for routine annual exam. She is not using any contraception. She does not want STD screening.  Denies any GYN complaints.  The patient participates in regular exercise: yes.  The patient does not smoke.  The patient wears seatbelts.   Pt denies any domestic violence.  She is still pumping for her 5 month old.  She just started menses. Pt is c/o hip pain since the birth of her baby.  She desires a referral to PT.      FH:  Breast cancer: none  Colon cancer: none  Ovarian cancer: none  Endometrial cancer: none    ROS:  GENERAL: Feeling well overall. Denies fever or chills.   SKIN: Denies rash or lesions.   HEAD: Denies head injury or headache.   NODES: Denies enlarged lymph nodes.   CHEST: Denies chest pain or shortness of breath.   CARDIOVASCULAR: Denies palpitations or left sided chest pain.   ABDOMEN: No abdominal pain, constipation, diarrhea, nausea, vomiting or rectal bleeding.   URINARY: No dysuria, hematuria, or burning on urination.  REPRODUCTIVE: See HPI.   BREASTS: Denies pain, lumps, or nipple discharge.   HEMATOLOGIC: No easy bruisability or excessive bleeding.   MUSCULOSKELETAL: Denies joint pain or swelling.   NEUROLOGIC: Denies syncope or weakness.   PSYCHIATRIC: Denies depression, anxiety or mood swings.    PE:   APPEARANCE: Well nourished, well developed, White female in no acute distress.  NODES: no cervical, supraclavicular, or inguinal lymphadenopathy  BREASTS: Symmetrical, no skin changes or visible lesions. No palpable masses, nipple discharge or adenopathy bilaterally.  ABDOMEN: Soft. No tenderness or masses. No distention. No hernias palpated. No CVA tenderness.  VULVA: No lesions. Normal external female genitalia.  URETHRAL MEATUS: Normal size and location, no lesions, no prolapse.  URETHRA: No masses, tenderness, or prolapse.  VAGINA: Moist. No lesions or lacerations noted. No abnormal discharge present. No odor present.   CERVIX: No  lesions or discharge. No cervical motion tenderness.   UTERUS: Normal size, regular shape, mobile, non-tender.  ADNEXA: No tenderness. No fullness or masses palpated in the adnexal regions.   ANUS PERINEUM: Normal.      Diagnosis:  1. Women's annual routine gynecological examination    2. Encounter for Papanicolaou smear for cervical cancer screening    3. Screening for HPV (human papillomavirus)    4. Arthralgia of hip, unspecified laterality        Plan:   Pap/ HPV  Referral to PT   Orders Placed This Encounter    HPV High Risk Genotypes, PCR    Ambulatory consult to Physical Therapy    Liquid-based pap smear, screening       Patient was counseled today on the new ACS guidelines for cervical cytology screening as well as the current recommendations for breast cancer screening. She was counseled to follow up with her PCP for other routine health maintenance. Counseling session lasted approximately 10 minutes, and all her questions were answered.    Follow-up with me in 1 year for routine exam    Maddie Suero, WILY

## 2019-08-27 NOTE — TELEPHONE ENCOUNTER
----- Message from Dillon Gonzalez MD sent at 8/23/2019  8:50 AM CDT -----  Sangeeta,  Could you do authorization for botox for this lady?  CV

## 2019-08-30 ENCOUNTER — CLINICAL SUPPORT (OUTPATIENT)
Dept: REHABILITATION | Facility: HOSPITAL | Age: 34
End: 2019-08-30
Payer: COMMERCIAL

## 2019-08-30 DIAGNOSIS — G89.29 CHRONIC BILATERAL LOW BACK PAIN WITHOUT SCIATICA: ICD-10-CM

## 2019-08-30 DIAGNOSIS — M54.50 CHRONIC BILATERAL LOW BACK PAIN WITHOUT SCIATICA: ICD-10-CM

## 2019-08-30 DIAGNOSIS — R53.1 DECREASED STRENGTH: ICD-10-CM

## 2019-08-30 DIAGNOSIS — R29.3 POOR POSTURE: ICD-10-CM

## 2019-08-30 PROCEDURE — 97110 THERAPEUTIC EXERCISES: CPT | Mod: PN

## 2019-08-30 PROCEDURE — 97161 PT EVAL LOW COMPLEX 20 MIN: CPT | Mod: PN

## 2019-09-03 LAB
HPV HR 12 DNA CVX QL NAA+PROBE: NEGATIVE
HPV16 AG SPEC QL: NEGATIVE
HPV18 DNA SPEC QL NAA+PROBE: NEGATIVE

## 2019-09-04 NOTE — PLAN OF CARE
[facility]  Physical Therapy Initial Evaluation    Name: Yolanda Win  Clinic Number: [id number]    Therapy Diagnosis:   Encounter Diagnoses   Name Primary?    Decreased strength     Chronic bilateral low back pain without sciatica     Poor posture      Physician: Maddie Suero,*    Physician Orders: PT Eval and Treat   Medical Diagnosis from Referral: M25.559 (ICD-10-CM) - Arthralgia of hip, unspecified laterality  Evaluation Date: 8/30/2019  Authorization Period Expiration: 12/31/2019  Plan of Care Expiration: 10/25/19  Visit # / Visits authorized: 1/ 30  FOTO: 1/5  PTA Visit: 0/6    Time In: 1:15  Time Out: 2:00  Total Billable Time: 45 minutes (1 TE + 1 LCE)    Precautions: Standard and anorexia/bulimia, depression, HTN, migraines    Subjective   Date of onset: 2/2019  History of current condition - Juanis reports: she has pain located at her low back (L>R), lateral aspects of B thighs, medial aspects of B foot/ankle, and front and anterior aspect of B knees. Pain is about the same since it started and relates her pain from being pregnant. She has a 2 year old and a 1/2 year old currently. She did have PT in the past for B ankle pain that was thought to be coming from her hip being weak, and ankle pain never resolved. She says she had sciatica very badly when she was pregnant with her first child and that improved with chiropractic visits. She works out doing [employer] (HIIT class) and runs about 4-5 miles a week. History of tarsal tunnel syndrome in B ankles, L posterior knee pain for about a year that increases with running, and B shoulder surgeries with labral and rotator cuff repairs around 2010. 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 She thinks her left posterior leg pain is hamstring tendonitis that she has had for a long time. 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      Medical History:   Past Medical History:   Diagnosis  "Date    Abnormal Pap smear of cervix     Allergy     seasonal    Anorexia     Bulimia     Depression     Fever blister     Hypertension     Mastitis     Migraine headache        Surgical History:   Yolanda Win  has a past surgical history that includes Tonsillectomy; Bone marrow aspiration; Colposcopy; Cervical biopsy w/ loop electrode excision;  section (2016); Shoulder surgery (Left); Sinus surgery; and  section (N/A, 2019).    Medications:   Yolanda has a current medication list which includes the following prescription(s): citalopram, loratadine, and valacyclovir, and the following Facility-Administered Medications: onabotulinumtoxina.    Allergies:   Review of patient's allergies indicates:   Allergen Reactions    Amoxicillin      hives    Diazepam Anxiety and Other (See Comments)     "makes hyper"    Penicillins Rash and Hives        Imaging, See imaging section    Prior Therapy: Yes for B ankle pain  Social History: SSH with family and 2 young kids (3 years and 5 months)  Occupation: head of research in [place]  Prior Level of Function: no  Current Level of Function: squatting down to pick something up, squatting at the gym, running, prolonged standing  Pts goals: to strengthen everything up     Pain:  Current 4/10, worst 8/10, best 2/10   Location: bilateral back  and upper legs  Description: Aching, Tight and Numb  Aggravating Factors: Standing, Bending, Walking, Flexing and Lifting  Easing Factors: rest, not working out    Objective     Gait: B hip drop, excessive ipsilateral lateral shift over stance leg  Posture: poor lumbar lordosis, straight thoracic spine  Reflexes:               Clonus: not tested  DTR:     Right Left Comment   Patellar (L3-4) 2+ 2+     Achilles (S1) 2+ 2+        Sensation: light touch intact, would have B toe numbness  Palpation: +TTP at midline lower lumbar spine  Joint mobility: hypermobile lumbar spine  Flexibility: poor HS " length  Squat: poor lumbar lordosis with large butt wink     A/PROM and MMT:  * = low back pain with testing  AROM: LUMBAR   Flexion 100%   Extension 25%*   Right side bending 100%   Left side bending 100%*   Right rotation 120%   Left rotation 120%     Hip  Right   Left  Pain/Dysfunction with Movement    AROM PROM MMT AROM PROM MMT    Flexion WFL WFL 4+/5 WFL WFL 4-/5    Extension WFL WFL 4-/5 WFL WFL 3+/5    Abduction WFL WFL 4/5 WFL WFL 3+/5    Adduction WFL WFL 5/5 WFL WFL 4/5    Internal rotation 90% WFL 5/5 75% WFL 5/5    External rotation WFL WFL 4+/5 WFL WFL 4/5       Knee  Right   Left  Pain/Dysfunction with Movement    AROM PROM MMT AROM PROM MMT    Flexion WFL WFL 5/5 WFL WFL 4-/5* L HS pain*   Extension WFL WFL 5/5 WFL WFL 4/5      Ankle  Right   Left  Pain/Dysfunction with Movement    AROM PROM MMT AROM PROM MMT    Plantarflexion WFL WFL 5/5 WFL WFL 5/5    Dorsiflexion WFL WFL 5/5 WFL WFL 5/5    Inversion WFL WFL 4/5 WFL WFL 4/5    Eversion WFL WFL 5/5 WFL WFL 5/5      Lumbar Tests:  Slump test = negative B  Quadrant test = not tested  SLR Test = positive on L  SL Bridge Test (glut med strength) = low back pain B  Ely's test = negative B  Prone Instability Test = not tested  Beau Test = not tested   J-Sign= not tested  HS Length 90-90 test = poor HS length B    SI Tests:  SI distraction test = negative  SI compression test (sidelying) = negative B  Flick test = negative B  Thigh Thrust Test = not tested  Sacral Spring = negative    CMS Impairment/Limitation/Restriction for FOTO Lumbar Spine Survey    Therapist reviewed FOTO scores for Yolanda Win on 8/30/2019.   FOTO documents entered into [employer] - see Media section.    Limitation Score: 32%  Category: Mobility  Predicted: 23%     TREATMENT   Treatment Time In: 1:50  Treatment Time Out: 2:00  Total Treatment time separate from Evaluation: 10 minutes    Juanis received therapeutic exercises to develop strength, endurance, ROM, flexibility, posture and  core stabilization for 10 minutes including:    L hamstring sets: 5x5''   SL clams: 10x L, B next  Prone hip extensions: 10x B, towel roll placed under R anterior hip  Prone press ups: 10x    Next: exercises to improve lumbar extension, abdominal/hip strengthening, L HS tendinopathy    Home Exercises and Patient Education Provided:  Education provided:   - abdominal bracing, lifting and carrying body mechanics, avoid activities that increase concordant pain  - course of therapy, prognosis    Written Home Exercises Provided: yes.  Exercises were reviewed and Juanis was able to demonstrate them prior to the end of the session.  Juanis demonstrated good  understanding of the education provided.     See EMR under Media for exercises provided 8/30/2019.    Assessment   Yolanda is a 34 y.o. female referred to outpatient Physical Therapy at [facility] with a medical diagnosis of Arthralgia of L hip. Pt currently presents with L hip, B low back, B ankle, and L posterior knee pain, decreased lumbar ROM, decreased B hip ROM, decreased core and BLE strength, impaired posture, impaired balance and gait, and functional deficits with squatting, prolonged standing/walking, and lifting actvities. Pt would benefit from skilled PT consisting of gait training, muscular skeletal stretching/strengthening, manual therapy, neuro muscular re-education, and modalities prn to address limitations and increase functional mobility.    Pt prognosis is Excellent.   Pt will benefit from skilled outpatient Physical Therapy to address the deficits stated above and in the chart below, provide pt/family education, and to maximize pt's level of independence.     Plan of care discussed with patient: Yes  Pt's spiritual, cultural and educational needs considered and patient is agreeable to the plan of care and goals as stated below:     Anticipated Barriers for therapy: chronicity of L HS and ankle pain, work schedule    Medical Necessity is demonstrated  by the following  History  Co-morbidities and personal factors that may impact the plan of care Co-morbidities:   anorexia/bulimia, depression, HTN, migraines    Personal Factors:   no deficits     moderate   Examination  Body Structures and Functions, activity limitations and participation restrictions that may impact the plan of care Body Regions:   back  lower extremities  trunk    Body Systems:    gross symmetry  ROM  strength  gross coordinated movement  balance  gait  transfers  transitions  motor control    Participation Restrictions:   Community walking, running    Activity limitations:   Learning and applying knowledge  no deficits    General Tasks and Commands  no deficits    Communication  no deficits    Mobility  lifting and carrying objects  walking    Self care  no deficits    Domestic Life  shopping  doing house work (cleaning house, washing dishes, laundry)    Interactions/Relationships  no deficits    Life Areas  no deficits    Community and Social Life  no deficits         high   Clinical Presentation stable and uncomplicated low   Decision Making/ Complexity Score: low     GOALS: Short Term Goals: 4 weeks  1. Pt will demo good TA muscle contraction for improved deep abdominal strength and lumbar stability.  2. Increase lumbar ROM to WNL loss in order to improve functional mobility.    3. Pt will demo good sitting/standing posture and body mechanics for improved spine health and decreased risk of future injury.  4. Pt to tolerate HEP to improve ROM and independence with ADL's.    Long Term Goals: 8 weeks  1. Report decreased low back pain without radiculopathy to </= 2/10  to increase tolerance for ADLs and increased QoL.  2. Increase strength to >/= 4/5 MMT grade for core and BLE to increase tolerance for ADL and work activities.  3. Pt to demonstrate negative SLR and/or Slump Test in order to show improved core strength and decreased nerve/dural tension.  4. Patient's goal: To strengthen  everything up  5. Pt will report at </= 23% impaired on FOTO lumbar score for low back pain disability to demonstrate decrease in disability and improvement in back pain.      Plan   Plan of care Certification: 8/30/2019 to 10/25/19.    Outpatient Physical Therapy 2 times weekly for 8 weeks to include the following interventions: Aquatic Therapy, Cervical/Lumbar Traction, Electrical Stimulation IFC/Russian, Gait Training, Manual Therapy, Moist Heat/ Ice, Neuromuscular Re-ed, Orthotic Management and Training, Patient Education, Self Care, Therapeutic Activites and Therapeutic Exercise.     Tyron Ruelas, PT

## 2019-09-10 ENCOUNTER — CLINICAL SUPPORT (OUTPATIENT)
Dept: REHABILITATION | Facility: HOSPITAL | Age: 34
End: 2019-09-10
Payer: COMMERCIAL

## 2019-09-10 DIAGNOSIS — G89.29 CHRONIC BILATERAL LOW BACK PAIN WITHOUT SCIATICA: ICD-10-CM

## 2019-09-10 DIAGNOSIS — R29.3 POOR POSTURE: ICD-10-CM

## 2019-09-10 DIAGNOSIS — R53.1 DECREASED STRENGTH: ICD-10-CM

## 2019-09-10 DIAGNOSIS — M54.50 CHRONIC BILATERAL LOW BACK PAIN WITHOUT SCIATICA: ICD-10-CM

## 2019-09-10 PROCEDURE — 97110 THERAPEUTIC EXERCISES: CPT | Mod: PN

## 2019-09-10 NOTE — PROGRESS NOTES
"  Physical Therapy Daily Treatment Note     Name: Yolanda Win  Clinic Number: [id number]    Therapy Diagnosis:   Encounter Diagnoses   Name Primary?    Decreased strength     Chronic bilateral low back pain without sciatica     Poor posture      Physician: Maddie Suero,*    Visit Date: 9/10/2019  Physician Orders: PT Eval and Treat   Medical Diagnosis from Referral: M25.559 (ICD-10-CM) - Arthralgia of hip, unspecified laterality  Evaluation Date: 8/30/2019  Authorization Period Expiration: 12/31/2019  Plan of Care Expiration: 10/25/19  Visit # / Visits authorized: 2/ 30  FOTO: 2/5  PTA Visit: 1/6      Time In: 1700  Time Out: 1745  Total Billable Time: 45 minutes    Precautions: Standard, anorexia/bulemia, deoressuibm /HTN and migraines    Subjective     Pt reports: Nothing has changed. No pain today, Yesterday she was having hip flexor pain.  She was compliant with home exercise program.  Response to previous treatment: no adverse effects  Functional change: None    Pain: 0/10  Location: left hip      Objective     Juanis received therapeutic exercises to develop strength, endurance, ROM, flexibility, posture and core stabilization for 45 minutes including:     B hamstring sets: 10x5''   SL clams: 2x10 B  Transverse abdominus (TAs)  5"x15 (verbal and tactile cues)  Seated TAs:  5"x10  (verbal and tactile cues)  Prone hip extensions: 2x10 B (feet off mat)  Prone press ups: 10x   Sidelying hip abduction:  2x10 B  Iso abs:  Green Swiss ball forward and bilateral 5"x10  Standing lumbar extension:  1x10  Standing hip abduction:  2x10 B  Standing hip extension:  2x10 B  Heel raises:  2x10 DL     Next: exercises to improve lumbar extension, abdominal/hip strengthening, L HS tendinopathy     Home Exercises Provided and Patient Education Provided     Education provided:   - Postural awareness and TAs with daily activity  - log roll technique supine<>sit    Written Home Exercises Provided: Patient instructed to " "cont prior HEP.  Exercises were reviewed and Juanis was able to demonstrate them prior to the end of the session.  Juanis demonstrated good  understanding of the education provided.     See EMR under Media for exercises provided 8/30/19.      Assessment     Patient with great difficulty with isolation and activation of abdominals in supine, improved slightly in sitting.  Fatigued quickly with hip strengthening therex, but able to complete as noted.  Reports "some soreness" after treatment, no complaints of pain.  Juanis is progressing well towards her goals.   Pt prognosis is Excellent.     Pt will continue to benefit from skilled outpatient physical therapy to address the deficits listed in the problem list box on initial evaluation, provide pt/family education and to maximize pt's level of independence in the home and community environment.     Pt's spiritual, cultural and educational needs considered and pt agreeable to plan of care and goals.    Anticipated barriers to physical therapy: Chronicity of L hamstring and ankle pain, work schedule    GOALS: Short Term Goals: 4 weeks  1. Pt will demo good TA muscle contraction for improved deep abdominal strength and lumbar stability.  (PROGRESSING, NOT MET)  2. Increase lumbar ROM to WNL loss in order to improve functional mobility.   (PROGRESSING, NOT MET)  3. Pt will demo good sitting/standing posture and body mechanics for improved spine health and decreased risk of future injury.   (PROGRESSING, NOT MET)  4. Pt to tolerate HEP to improve ROM and independence with ADL's.   (PROGRESSING, NOT MET)    Long Term Goals: 8 weeks  1. Report decreased low back pain without radiculopathy to </= 2/10  to increase tolerance for ADLs and increased QoL.   (PROGRESSING, NOT MET)  2. Increase strength to >/= 4/5 MMT grade for core and BLE to increase tolerance for ADL and work activities.   (PROGRESSING, NOT MET)  3. Pt to demonstrate negative SLR and/or Slump Test in order to show " improved core strength and decreased nerve/dural tension.   (PROGRESSING, NOT MET)  4. Patient's goal: To strengthen everything up   (PROGRESSING, NOT MET)  5. Pt will report at </= 23% impaired on FOTO lumbar score for low back pain disability to demonstrate decrease in disability and improvement in back pain.  (PROGRESSING, NOT MET)           Plan     Continue PT per POC, focus on Abdominal activation and maintenance with progression of core stabilization and hip strengthening.      Dianna Rodríguez, PTA

## 2019-09-11 ENCOUNTER — PROCEDURE VISIT (OUTPATIENT)
Dept: NEUROLOGY | Facility: CLINIC | Age: 34
End: 2019-09-11
Payer: COMMERCIAL

## 2019-09-11 VITALS
SYSTOLIC BLOOD PRESSURE: 105 MMHG | DIASTOLIC BLOOD PRESSURE: 78 MMHG | HEIGHT: 65 IN | WEIGHT: 182.75 LBS | HEART RATE: 62 BPM | BODY MASS INDEX: 30.45 KG/M2

## 2019-09-11 DIAGNOSIS — G43.C1 INTRACTABLE PERIODIC HEADACHE SYNDROME: Primary | ICD-10-CM

## 2019-09-11 PROCEDURE — 64615 CHEMODENERV MUSC MIGRAINE: CPT | Mod: S$GLB,,, | Performed by: PSYCHIATRY & NEUROLOGY

## 2019-09-11 PROCEDURE — 64615 PR CHEMODENERVATION OF MUSCLE FOR CHRONIC MIGRAINE: ICD-10-PCS | Mod: S$GLB,,, | Performed by: PSYCHIATRY & NEUROLOGY

## 2019-09-11 NOTE — PROCEDURES
Procedures     [facility] - NEUROLOGY  [facility] [place]    Date: September 11, 2019   Patient Name: Yolanda Win   MRN: [id number]   PCP: Primary Doctor No  Referring Provider: Dlilon Gonzalez MD    Assessment:      This is Yolanda Win, 34 y.o. female with chronic migraines (G43.719) and suffers from headaches more than 15 days a month lasting more than 4 hours a day with no relief of symptoms despite trying multiple medications listed below. Botox treatment was approved for chronic migraines in October 2010.  We are planning for 3 treatments 3 months apart and aiming for at least 50% improvement in the symptoms. If we see no improvement after 3 treatments, we will discontinue the injections.     Plan:      -  Follow up for botox #2  -  Continue Mg       I discussed side effects of the medications. I asked the patient to  stop the medication if he notices serious adverse effects as we discussed and to seek immediate medical attention at an ER.     Dillon Gonzalez MD  [facility]   Department of Neurology    Subjective:   Continued daily headaches, weaning breast feeding - son 6 months old    8/2019  Unable to tolerate increased celexa due to fatigue, compliant with Mg with continued daily HA, unable to resume TPM as continuing to breast feed    HPI 5/2019:   Ms. Yolanda Win is a 34 y.o. female who presents with a chief complaint of headaches    Patient has had significant difficulty with migraines since she was 12 years old with worsening during recent pregnancy.  Associated nausea, photo/phonophobia.  She has had difficulty with post-partum anxiety and started celexa a month ago with some improvement in headache as well as irritability noted.    Prior medications trials as below  TPM - partial relief at 150mg /d  Pamelor, Neurontin, Seroquel - all little effect  Imitrex, phenergan - good effect  Fioricet, zanaflex - little effect    PAST MEDICAL  "HISTORY:  Past Medical History:   Diagnosis Date    Abnormal Pap smear of cervix 2009    Allergy     seasonal    Anorexia     Bulimia     Depression     Fever blister     Hypertension     Mastitis     Migraine headache        PAST SURGICAL HISTORY:  Past Surgical History:   Procedure Laterality Date    BONE MARROW ASPIRATION      x 3    CERVICAL BIOPSY  W/ LOOP ELECTRODE EXCISION       SECTION  2016     SECTION N/A 2019    [facility]    COLPOSCOPY      DELIVERY- SECTION N/A 2016    [facility] at [facility]    SHOULDER SURGERY Left     x 2    SINUS SURGERY      age 19    TONSILLECTOMY         CURRENT MEDS:  Current Outpatient Medications   Medication Sig Dispense Refill    citalopram (CELEXA) 10 MG tablet Take 1 tablet (10 mg total) by mouth once daily. 30 tablet 2    loratadine (CLARITIN) 10 mg tablet Take 10 mg by mouth once daily.      valACYclovir (VALTREX) 1000 MG tablet Take 1,000 mg by mouth.       Current Facility-Administered Medications   Medication Dose Route Frequency Provider Last Rate Last Dose    onabotulinumtoxina injection 200 Units  200 Units Intramuscular Q90 Days Dillon Gonzalez MD           ALLERGIES:  Review of patient's allergies indicates:   Allergen Reactions    Amoxicillin      hives    Diazepam Anxiety and Other (See Comments)     "makes hyper"    Penicillins Rash and Hives       FAMILY HISTORY:  Family History   Problem Relation Age of Onset    Cancer Maternal Grandmother 40        cervical    Breast cancer Neg Hx     Colon cancer Neg Hx     Ovarian cancer Neg Hx     Melanoma Neg Hx        SOCIAL HISTORY:  Social History     Tobacco Use    Smoking status: Never Smoker    Smokeless tobacco: Never Used   Substance Use Topics    Alcohol use: No     Frequency: Never     Comment: pre pregnancy     Drug use: No       Review of Systems:  12 review of systems " "is negative except for the symptoms mentioned in HPI.        Objective:     Vitals:    09/11/19 0821   BP: 105/78   Pulse: 62   Weight: 82.9 kg (182 lb 12.2 oz)   Height: 5' 5" (1.651 m)       General: NAD, well nourished   Eyes: no tearing, discharge, no erythema   ENT: moist mucous membranes of the oral cavity, nares patent    Neck: Supple, full range of motion  Cardiovascular: Warm and well perfused, pulses equal and symmetrical  Lungs: Normal work of breathing, normal chest wall excursions  Skin: No rash, lesions, or breakdown on exposed skin  Psychiatry: Mood and affect are appropriate   Abdomen: soft, non tender, non distended  Extremeties: No cyanosis, clubbing or edema.    Neurological   MENTAL STATUS: Alert and oriented to person, place, and time. Attention and concentration within normal limits. Speech without dysarthria, able to name and repeat without difficulty. Recent and remote memory within normal limits   CRANIAL NERVES: Visual fields intact. PERRL. EOMI. Facial sensation intact. Face symmetrical. Hearing grossly intact. Full shoulder shrug bilaterally. Tongue protrudes midline   SENSORY: Sensation is intact to light touch throughout.   MOTOR: Normal bulk and tone. No pronator drift.    CEREBELLAR/COORDINATION/GAIT: Gait steady with normal arm swing and stride length.     BOTOX was performed as an indicated therapy for intractable chronic migraine headaches given that the patient failed several prophylactic medications    Botulinum Toxin Injection Procedure   Pre-operative diagnosis: Chronic migraine   Post-operative diagnosis: Same   Procedure: Chemical neurolysis   After risks and benefits were explained including bleeding, infection, worsening of pain, damage to the areas being injected, weakness of muscles, loss of muscle control, dysphagia if injecting the head or neck, facial droop if injecting the facial area, painful injection, allergic or other reaction to the medications being injected, " and the failure of the procedure to help the problem, a signed consent was obtained.   The patient was placed in a comfortable area and the sites to be treated were identified.The area to be treated was prepped three times with alcohol and the alcohol allowed to dry. Next, a 30 gauge needle was used to inject the medication in the area to be treated.       Botox 100 units NDC 4928-5141-91    Area(s) injected:   Total Botox used: 155 Units   Botox wastage: 45 Units   Injection sites:    muscle bilaterally ( a total of 10 units divided into 2 sites)   Procerus muscle (5 units)   Frontalis muscle bilaterally (a total of 20 units divided into 4 sites)   Temporalis muscle bilaterally (a total of 40 units divided into 8 sites)   Occipitalis muscle bilaterally (a total of 30 units divided into 6 sites)   Cervical paraspinal muscles (a total of 20 units divided into 4 sites)   Trapezius muscle bilaterally (a total of 30 units divided into 6 sites)   Complications: none   RTC for the next Botox injection: 3 months

## 2019-09-16 ENCOUNTER — PATIENT MESSAGE (OUTPATIENT)
Dept: PSYCHIATRY | Facility: CLINIC | Age: 34
End: 2019-09-16

## 2019-09-16 RX ORDER — DEXTROAMPHETAMINE SACCHARATE, AMPHETAMINE ASPARTATE MONOHYDRATE, DEXTROAMPHETAMINE SULFATE AND AMPHETAMINE SULFATE 2.5; 2.5; 2.5; 2.5 MG/1; MG/1; MG/1; MG/1
10 CAPSULE, EXTENDED RELEASE ORAL EVERY MORNING
Qty: 30 CAPSULE | Refills: 0 | Status: SHIPPED | OUTPATIENT
Start: 2019-09-16 | End: 2019-10-14 | Stop reason: SDUPTHER

## 2019-09-18 ENCOUNTER — CLINICAL SUPPORT (OUTPATIENT)
Dept: REHABILITATION | Facility: HOSPITAL | Age: 34
End: 2019-09-18
Attending: NURSE PRACTITIONER
Payer: COMMERCIAL

## 2019-09-18 DIAGNOSIS — R29.3 POOR POSTURE: ICD-10-CM

## 2019-09-18 DIAGNOSIS — G89.29 CHRONIC BILATERAL LOW BACK PAIN WITHOUT SCIATICA: ICD-10-CM

## 2019-09-18 DIAGNOSIS — M54.50 CHRONIC BILATERAL LOW BACK PAIN WITHOUT SCIATICA: ICD-10-CM

## 2019-09-18 DIAGNOSIS — R53.1 DECREASED STRENGTH: ICD-10-CM

## 2019-09-18 PROCEDURE — 97110 THERAPEUTIC EXERCISES: CPT | Mod: PN

## 2019-09-18 NOTE — PROGRESS NOTES
"  Physical Therapy Daily Treatment Note     Name: Yolanda Win  Clinic Number: [id number]    Therapy Diagnosis:   Encounter Diagnoses   Name Primary?    Decreased strength     Chronic bilateral low back pain without sciatica     Poor posture      Physician: Maddie Suero,*    Visit Date: 9/18/2019  Physician Orders: PT Eval and Treat   Medical Diagnosis from Referral: M25.559 (ICD-10-CM) - Arthralgia of hip, unspecified laterality  Evaluation Date: 8/30/2019  Authorization Period Expiration: 12/31/2019  Plan of Care Expiration: 10/25/19  Visit # / Visits authorized: 3/ 30  FOTO: 3/5  PTA Visit: 2/6      Time In: 0900  Time Out: 0945  Total Billable Time: 45 minutes TEx3    Precautions: Standard, anorexia/bulemia, deoressuibm /HTN and migraines    Subjective     Pt reports:  Complaint of low back pain  "Its bad when I first stand up from sitting"  She was compliant with home exercise program.  Response to previous treatment: no adverse effects  Functional change: Improved activation of abdominals    Pain: 3/10  Location: left hip      Objective     Juanis received therapeutic exercises to develop strength, endurance, ROM, flexibility, posture and core stabilization for 45 minutes including:     B hamstring sets: 10x5''   SL clams: 3" hold 2x10 B  Transverse abdominus (TAs)  5"x15 (verbal and tactile cues)  Seated TAs:  5"x10  (verbal and tactile cues)  Prone hip extensions: 2x10 B alternate   Prone alternate UE/LE 1x10  Prone press ups: 10x to hands  Planks:  Next?  Supine alternate leg extension in 90/90 30"x2  Sidelying hip abduction:  2x10 B  Seated TAs:  5"x15  (verbal and tactile cues)  Leg Press:  5.0 3x10 DL  Standing lumbar extension:  1x10  Standing hip abduction: YTB 2x10 B  Standing hip extension:  YTB 2x10 B  Heel raises:  2x10 DL        Home Exercises Provided and Patient Education Provided     Education provided:   - Postural awareness and TAs with daily activity  - log roll technique " "supine<>sit    Written Home Exercises Provided: Patient instructed to cont prior HEP.  Exercises were reviewed and Juanis was able to demonstrate them prior to the end of the session.  Juanis demonstrated good  understanding of the education provided.     See EMR under Media for exercises provided 8/30/19.      Assessment     Patient with improved isolation and activation of abdominals in all positions.  Intermittent verbal cues required to maintain normal respiration with core stabilization.  Continued to fatigue quickly with hip strengthening therex, but able to complete increased activity including added leg press and resistance as noted.  Reports "some soreness" after treatment, "but less pain - 1-2/10 now I feel good"  Juanis is progressing well towards her goals.   Pt prognosis is Excellent.     Pt will continue to benefit from skilled outpatient physical therapy to address the deficits listed in the problem list box on initial evaluation, provide pt/family education and to maximize pt's level of independence in the home and community environment.     Pt's spiritual, cultural and educational needs considered and pt agreeable to plan of care and goals.    Anticipated barriers to physical therapy: Chronicity of L hamstring and ankle pain, work schedule    GOALS: Short Term Goals: 4 weeks  1. Pt will demo good TA muscle contraction for improved deep abdominal strength and lumbar stability.  (PROGRESSING, NOT MET)  2. Increase lumbar ROM to WNL loss in order to improve functional mobility.   (PROGRESSING, NOT MET)  3. Pt will demo good sitting/standing posture and body mechanics for improved spine health and decreased risk of future injury.   (PROGRESSING, NOT MET)  4. Pt to tolerate HEP to improve ROM and independence with ADL's.   (PROGRESSING, NOT MET)    Long Term Goals: 8 weeks  1. Report decreased low back pain without radiculopathy to </= 2/10  to increase tolerance for ADLs and increased QoL.   (PROGRESSING, NOT " MET)  2. Increase strength to >/= 4/5 MMT grade for core and BLE to increase tolerance for ADL and work activities.   (PROGRESSING, NOT MET)  3. Pt to demonstrate negative SLR and/or Slump Test in order to show improved core strength and decreased nerve/dural tension.   (PROGRESSING, NOT MET)  4. Patient's goal: To strengthen everything up   (PROGRESSING, NOT MET)  5. Pt will report at </= 23% impaired on FOTO lumbar score for low back pain disability to demonstrate decrease in disability and improvement in back pain.  (PROGRESSING, NOT MET)           Plan     Continue PT per POC, focus on Abdominal activation and maintenance with progression of core stabilization and hip strengthening.      Dianna Rodríguez, PTA

## 2019-09-20 ENCOUNTER — OFFICE VISIT (OUTPATIENT)
Dept: DERMATOLOGY | Facility: CLINIC | Age: 34
End: 2019-09-20
Payer: COMMERCIAL

## 2019-09-20 DIAGNOSIS — L98.9 DISEASE OF SKIN AND SUBCUTANEOUS TISSUE: Primary | ICD-10-CM

## 2019-09-20 PROCEDURE — 99214 PR OFFICE/OUTPT VISIT, EST, LEVL IV, 30-39 MIN: ICD-10-PCS | Mod: S$GLB,,, | Performed by: DERMATOLOGY

## 2019-09-20 PROCEDURE — 99214 OFFICE O/P EST MOD 30 MIN: CPT | Mod: S$GLB,,, | Performed by: DERMATOLOGY

## 2019-09-20 PROCEDURE — 99999 PR PBB SHADOW E&M-EST. PATIENT-LVL II: ICD-10-PCS | Mod: PBBFAC,,, | Performed by: DERMATOLOGY

## 2019-09-20 PROCEDURE — 99999 PR PBB SHADOW E&M-EST. PATIENT-LVL II: CPT | Mod: PBBFAC,,, | Performed by: DERMATOLOGY

## 2019-09-20 RX ORDER — TRIAMCINOLONE ACETONIDE 1 MG/G
CREAM TOPICAL
Qty: 454 G | Refills: 3 | Status: SHIPPED | OUTPATIENT
Start: 2019-09-20 | End: 2020-11-25

## 2019-09-20 NOTE — PATIENT INSTRUCTIONS
[facility]  Pre Patch Test Instructions    You have been scheduled for patch testing in the dermatology department. This process requires you keep three appointments in the same week. (Usually Monday, Wednesday and Friday)  Two weeks prior to testing; sun exposure on your back should be avoided. Patches cannot be placed on pink, red or peeling skin.  Female patients are asked to wear two piece outfits for convenience when being tested. Also, front hooking bras are easier to use for the week, but not a necessity.  Patients should not have any powder, lotions or ointments on their back the day of testing.  During the week of testing you must avoid showers. Only tub bathing in shallow water is allowed. We ask that you not wash your hair so there is no chance of getting the test site wet. All activities must be limited this week on order to stay dry and cool the entire week. Work schedules may need to be arranged.  Male patients may need to have their back shaved prior to testing. This is to be done in the office if needed.  The allergens to be placed are sometimes messy. Please wear garments that can be easily laundered.    Please call or office if you have any questions about your testing. [phone]

## 2019-09-20 NOTE — PROGRESS NOTES
Subjective:       Patient ID:  Yolanda Win is a 34 y.o. female who presents for   Chief Complaint   Patient presents with    Rash     upper and lower body     Has 6 month old baby    Rash  - Initial  Affected locations: left arm, right arm, left lower leg, right lower leg, left upper leg and right upper leg  Duration: 5 months  Signs / symptoms: itching  Timing: intermittent and no history of same complaint  Exacerbated by: sunscreen.  Relieving factors/Treatments tried: Rx topical steroids (TAC cream 0.025% prn )  Improvement on treatment: no relief        Review of Systems   Skin: Positive for itching and rash.        H/o AD     Allergic/Immunologic: Positive for environmental allergies.        Objective:    Physical Exam   Constitutional: She appears well-developed and well-nourished. No distress.   Neurological: She is alert and oriented to person, place, and time. She is not disoriented.   Psychiatric: She has a normal mood and affect.   Skin:   Areas Examined (abnormalities noted in diagram):   RUE Inspected  LUE Inspection Performed  RLE Inspected  LLE Inspection Performed              Diagram Legend     Erythematous scaling macule/papule c/w actinic keratosis       Vascular papule c/w angioma      Pigmented verrucoid papule/plaque c/w seborrheic keratosis      Yellow umbilicated papule c/w sebaceous hyperplasia      Irregularly shaped tan macule c/w lentigo     1-2 mm smooth white papules consistent with Milia      Movable subcutaneous cyst with punctum c/w epidermal inclusion cyst      Subcutaneous movable cyst c/w pilar cyst      Firm pink to brown papule c/w dermatofibroma      Pedunculated fleshy papule(s) c/w skin tag(s)      Evenly pigmented macule c/w junctional nevus     Mildly variegated pigmented, slightly irregular-bordered macule c/w mildly atypical nevus      Flesh colored to evenly pigmented papule c/w intradermal nevus       Pink pearly papule/plaque c/w basal cell carcinoma       Erythematous hyperkeratotic cursted plaque c/w SCC      Surgical scar with no sign of skin cancer recurrence      Open and closed comedones      Inflammatory papules and pustules      Verrucoid papule consistent consistent with wart     Erythematous eczematous patches and plaques     Dystrophic onycholytic nail with subungual debris c/w onychomycosis     Umbilicated papule    Erythematous-base heme-crusted tan verrucoid plaque consistent with inflamed seborrheic keratosis     Erythematous Silvery Scaling Plaque c/w Psoriasis     See annotation      Assessment / Plan:        Disease of skin and subcutaneous tissue - r/o ACD vs AD (pt with h/o AD but states many soaps and shampoos cause her to rash)  -     triamcinolone acetonide 0.1% (KENALOG) 0.1 % cream; AAA bid  Dispense: 454 g; Refill: 3  -     Patch Testing; Future             Follow up for for patch testing.

## 2019-09-24 ENCOUNTER — CLINICAL SUPPORT (OUTPATIENT)
Dept: REHABILITATION | Facility: HOSPITAL | Age: 34
End: 2019-09-24
Payer: COMMERCIAL

## 2019-09-24 DIAGNOSIS — G89.29 CHRONIC BILATERAL LOW BACK PAIN WITHOUT SCIATICA: ICD-10-CM

## 2019-09-24 DIAGNOSIS — R29.3 POOR POSTURE: ICD-10-CM

## 2019-09-24 DIAGNOSIS — R53.1 DECREASED STRENGTH: ICD-10-CM

## 2019-09-24 DIAGNOSIS — M54.50 CHRONIC BILATERAL LOW BACK PAIN WITHOUT SCIATICA: ICD-10-CM

## 2019-09-24 PROBLEM — M72.2 PLANTAR FASCIITIS: Status: RESOLVED | Noted: 2017-08-03 | Resolved: 2019-09-24

## 2019-09-24 PROCEDURE — 97110 THERAPEUTIC EXERCISES: CPT | Mod: PN

## 2019-09-24 NOTE — PROGRESS NOTES
"  Physical Therapy Daily Treatment Note     Name: Yolanda Win  Clinic Number: [id number]    Therapy Diagnosis:   Encounter Diagnoses   Name Primary?    Decreased strength     Chronic bilateral low back pain without sciatica     Poor posture      Physician: Maddie Suero,*    Visit Date: 9/24/2019  Physician Orders: PT Eval and Treat   Medical Diagnosis from Referral: Arthralgia of hip, unspecified laterality  Evaluation Date: 8/30/2019  Authorization Period Expiration: 12/31/2019  Plan of Care Expiration: 10/25/19  Visit # / Visits authorized: 4/ 30  FOTO: 4/5 NEXT  PTA Visit: --      Time In: 0957  Time Out: 1042  Total Billable Time: 45 minutes (TE-3)    Precautions: Standard, anorexia/bulemia, deoressuibm /HTN and migraines    Subjective     Pt reports: worked out yesterday and is sore today  She was compliant with home exercise program.  Response to previous treatment: no adverse effects  Functional change: Improved activation of abdominals    Pain: 3/10  Location: left hip      Objective     Juanis received therapeutic exercises to develop strength, endurance, ROM, flexibility, posture and core stabilization for 45 minutes including:     B hamstring sets: 10x5''   SL clams: 3" hold 2x10 B  Transverse abdominus (TAs)  5"x15 (verbal and tactile cues)  Seated TAs:  5"x10  (verbal and tactile cues)  Prone hip extensions: 2x10 B alternate   Prone alternate UE/LE 2x10  Prone press ups: 10x to hands  Planks:  3x20"  Supine alternate leg extension in 90/90 30"x2  Sidelying hip abduction:  2x10 B  Seated TAs:  5"x15  (verbal and tactile cues)  Leg Press:  5.0 3x10 DL  Standing lumbar extension:  1x10  Standing hip abduction: YTB 2x10 B  Standing hip extension:  YTB 2x10 B  Heel raises:  2x10 DL        Home Exercises Provided and Patient Education Provided     Education provided:   - Postural awareness and TAs with daily activity  - log roll technique supine<>sit    Written Home Exercises Provided: Patient " instructed to cont prior HEP.  Exercises were reviewed and Juanis was able to demonstrate them prior to the end of the session.  Juanis demonstrated good  understanding of the education provided.     See EMR under Media for exercises provided 8/30/19.      Assessment     Patient tolerated progression of exercises well with no complaints of increased pain or discomfort. Required minimal cuing for good core activation.    Juanis is progressing well towards her goals.   Pt prognosis is Excellent.     Pt will continue to benefit from skilled outpatient physical therapy to address the deficits listed in the problem list box on initial evaluation, provide pt/family education and to maximize pt's level of independence in the home and community environment.     Pt's spiritual, cultural and educational needs considered and pt agreeable to plan of care and goals.    Anticipated barriers to physical therapy: Chronicity of L hamstring and ankle pain, work schedule    GOALS: Short Term Goals: 4 weeks  1. Pt will demo good TA muscle contraction for improved deep abdominal strength and lumbar stability.  (PROGRESSING, NOT MET)  2. Increase lumbar ROM to WNL loss in order to improve functional mobility.   (PROGRESSING, NOT MET)  3. Pt will demo good sitting/standing posture and body mechanics for improved spine health and decreased risk of future injury.   (PROGRESSING, NOT MET)  4. Pt to tolerate HEP to improve ROM and independence with ADL's.   (PROGRESSING, NOT MET)    Long Term Goals: 8 weeks  1. Report decreased low back pain without radiculopathy to </= 2/10  to increase tolerance for ADLs and increased QoL.   (PROGRESSING, NOT MET)  2. Increase strength to >/= 4/5 MMT grade for core and BLE to increase tolerance for ADL and work activities.   (PROGRESSING, NOT MET)  3. Pt to demonstrate negative SLR and/or Slump Test in order to show improved core strength and decreased nerve/dural tension.   (PROGRESSING, NOT MET)  4. Patient's  goal: To strengthen everything up   (PROGRESSING, NOT MET)  5. Pt will report at </= 23% impaired on FOTO lumbar score for low back pain disability to demonstrate decrease in disability and improvement in back pain.  (PROGRESSING, NOT MET)           Plan     Continue PT per POC, focus on Abdominal activation and maintenance with progression of core stabilization and hip strengthening.      Aretha Elliott, PT

## 2019-10-02 ENCOUNTER — PATIENT MESSAGE (OUTPATIENT)
Dept: DERMATOLOGY | Facility: CLINIC | Age: 34
End: 2019-10-02

## 2019-10-14 ENCOUNTER — OFFICE VISIT (OUTPATIENT)
Dept: PSYCHIATRY | Facility: CLINIC | Age: 34
End: 2019-10-14
Payer: COMMERCIAL

## 2019-10-14 VITALS
SYSTOLIC BLOOD PRESSURE: 115 MMHG | HEIGHT: 65 IN | DIASTOLIC BLOOD PRESSURE: 75 MMHG | BODY MASS INDEX: 30.16 KG/M2 | HEART RATE: 67 BPM | WEIGHT: 181 LBS

## 2019-10-14 DIAGNOSIS — Z86.59 HISTORY OF POSTTRAUMATIC STRESS DISORDER (PTSD): ICD-10-CM

## 2019-10-14 DIAGNOSIS — F41.9 ANXIETY DISORDER, UNSPECIFIED TYPE: ICD-10-CM

## 2019-10-14 DIAGNOSIS — F90.0 ADHD (ATTENTION DEFICIT HYPERACTIVITY DISORDER), INATTENTIVE TYPE: Primary | ICD-10-CM

## 2019-10-14 DIAGNOSIS — Z87.898 HISTORY OF INSOMNIA: ICD-10-CM

## 2019-10-14 PROCEDURE — 99999 PR PBB SHADOW E&M-EST. PATIENT-LVL II: ICD-10-PCS | Mod: PBBFAC,,, | Performed by: PSYCHIATRY & NEUROLOGY

## 2019-10-14 PROCEDURE — 3008F PR BODY MASS INDEX (BMI) DOCUMENTED: ICD-10-PCS | Mod: CPTII,S$GLB,, | Performed by: PSYCHIATRY & NEUROLOGY

## 2019-10-14 PROCEDURE — 99999 PR PBB SHADOW E&M-EST. PATIENT-LVL II: CPT | Mod: PBBFAC,,, | Performed by: PSYCHIATRY & NEUROLOGY

## 2019-10-14 PROCEDURE — 3008F BODY MASS INDEX DOCD: CPT | Mod: CPTII,S$GLB,, | Performed by: PSYCHIATRY & NEUROLOGY

## 2019-10-14 PROCEDURE — 99214 OFFICE O/P EST MOD 30 MIN: CPT | Mod: S$GLB,,, | Performed by: PSYCHIATRY & NEUROLOGY

## 2019-10-14 PROCEDURE — 99214 PR OFFICE/OUTPT VISIT, EST, LEVL IV, 30-39 MIN: ICD-10-PCS | Mod: S$GLB,,, | Performed by: PSYCHIATRY & NEUROLOGY

## 2019-10-14 RX ORDER — DEXTROAMPHETAMINE SACCHARATE, AMPHETAMINE ASPARTATE MONOHYDRATE, DEXTROAMPHETAMINE SULFATE AND AMPHETAMINE SULFATE 6.25; 6.25; 6.25; 6.25 MG/1; MG/1; MG/1; MG/1
25 CAPSULE, EXTENDED RELEASE ORAL EVERY MORNING
Qty: 30 CAPSULE | Refills: 0 | Status: SHIPPED | OUTPATIENT
Start: 2019-10-14 | End: 2019-11-11 | Stop reason: SDUPTHER

## 2019-10-14 RX ORDER — CITALOPRAM 10 MG/1
10 TABLET ORAL DAILY
Qty: 30 TABLET | Refills: 2 | Status: SHIPPED | OUTPATIENT
Start: 2019-10-14 | End: 2019-12-23
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
## 2019-11-06 ENCOUNTER — OFFICE VISIT (OUTPATIENT)
Dept: PSYCHIATRY | Facility: CLINIC | Age: 34
End: 2019-11-06
Payer: COMMERCIAL

## 2019-11-06 DIAGNOSIS — Z03.89 NO DIAGNOSIS ON AXIS I: Primary | ICD-10-CM

## 2019-11-06 PROCEDURE — 90791 PSYCH DIAGNOSTIC EVALUATION: CPT | Mod: S$GLB,,, | Performed by: SOCIAL WORKER

## 2019-11-06 PROCEDURE — 99999 PR PBB SHADOW E&M-EST. PATIENT-LVL II: ICD-10-PCS | Mod: PBBFAC,,, | Performed by: SOCIAL WORKER

## 2019-11-06 PROCEDURE — 90791 PR PSYCHIATRIC DIAGNOSTIC EVALUATION: ICD-10-PCS | Mod: S$GLB,,, | Performed by: SOCIAL WORKER

## 2019-11-06 PROCEDURE — 99999 PR PBB SHADOW E&M-EST. PATIENT-LVL II: CPT | Mod: PBBFAC,,, | Performed by: SOCIAL WORKER

## 2019-11-10 ENCOUNTER — PATIENT MESSAGE (OUTPATIENT)
Dept: OBSTETRICS AND GYNECOLOGY | Facility: CLINIC | Age: 34
End: 2019-11-10

## 2019-11-11 RX ORDER — DEXTROAMPHETAMINE SACCHARATE, AMPHETAMINE ASPARTATE MONOHYDRATE, DEXTROAMPHETAMINE SULFATE AND AMPHETAMINE SULFATE 6.25; 6.25; 6.25; 6.25 MG/1; MG/1; MG/1; MG/1
25 CAPSULE, EXTENDED RELEASE ORAL EVERY MORNING
Qty: 30 CAPSULE | Refills: 0 | Status: SHIPPED | OUTPATIENT
Start: 2019-11-11 | End: 2019-12-08 | Stop reason: SDUPTHER

## 2019-11-11 RX ORDER — VALACYCLOVIR HYDROCHLORIDE 1 G/1
1000 TABLET, FILM COATED ORAL EVERY 12 HOURS
Qty: 60 TABLET | Refills: 0 | Status: SHIPPED | OUTPATIENT
Start: 2019-11-11 | End: 2021-04-30 | Stop reason: SDUPTHER

## 2019-11-19 ENCOUNTER — OFFICE VISIT (OUTPATIENT)
Dept: FAMILY MEDICINE | Facility: CLINIC | Age: 34
End: 2019-11-19
Payer: COMMERCIAL

## 2019-11-19 VITALS
DIASTOLIC BLOOD PRESSURE: 84 MMHG | HEIGHT: 65 IN | TEMPERATURE: 99 F | HEART RATE: 82 BPM | OXYGEN SATURATION: 98 % | SYSTOLIC BLOOD PRESSURE: 122 MMHG | BODY MASS INDEX: 28.61 KG/M2 | WEIGHT: 171.75 LBS

## 2019-11-19 DIAGNOSIS — F41.9 ANXIETY DISORDER, UNSPECIFIED TYPE: ICD-10-CM

## 2019-11-19 DIAGNOSIS — F90.0 ADHD (ATTENTION DEFICIT HYPERACTIVITY DISORDER), INATTENTIVE TYPE: ICD-10-CM

## 2019-11-19 DIAGNOSIS — Z86.59 HISTORY OF POSTTRAUMATIC STRESS DISORDER (PTSD): ICD-10-CM

## 2019-11-19 DIAGNOSIS — J30.2 SEASONAL ALLERGIES: ICD-10-CM

## 2019-11-19 DIAGNOSIS — J06.9 VIRAL UPPER RESPIRATORY TRACT INFECTION: Primary | ICD-10-CM

## 2019-11-19 PROCEDURE — 3008F PR BODY MASS INDEX (BMI) DOCUMENTED: ICD-10-PCS | Mod: CPTII,S$GLB,, | Performed by: FAMILY MEDICINE

## 2019-11-19 PROCEDURE — 99204 OFFICE O/P NEW MOD 45 MIN: CPT | Mod: S$GLB,,, | Performed by: FAMILY MEDICINE

## 2019-11-19 PROCEDURE — 99999 PR PBB SHADOW E&M-EST. PATIENT-LVL III: ICD-10-PCS | Mod: PBBFAC,,, | Performed by: FAMILY MEDICINE

## 2019-11-19 PROCEDURE — 3008F BODY MASS INDEX DOCD: CPT | Mod: CPTII,S$GLB,, | Performed by: FAMILY MEDICINE

## 2019-11-19 PROCEDURE — 99999 PR PBB SHADOW E&M-EST. PATIENT-LVL III: CPT | Mod: PBBFAC,,, | Performed by: FAMILY MEDICINE

## 2019-11-19 PROCEDURE — 99204 PR OFFICE/OUTPT VISIT, NEW, LEVL IV, 45-59 MIN: ICD-10-PCS | Mod: S$GLB,,, | Performed by: FAMILY MEDICINE

## 2019-11-19 RX ORDER — ALBUTEROL SULFATE 90 UG/1
2 AEROSOL, METERED RESPIRATORY (INHALATION) EVERY 6 HOURS PRN
Qty: 18 G | Refills: 0 | Status: ON HOLD | OUTPATIENT
Start: 2019-11-19 | End: 2020-03-20 | Stop reason: HOSPADM

## 2019-11-19 RX ORDER — PROMETHAZINE HYDROCHLORIDE AND DEXTROMETHORPHAN HYDROBROMIDE 6.25; 15 MG/5ML; MG/5ML
5 SYRUP ORAL EVERY 4 HOURS PRN
Qty: 240 ML | Refills: 0 | Status: SHIPPED | OUTPATIENT
Start: 2019-11-19 | End: 2019-11-29

## 2019-11-19 NOTE — PROGRESS NOTES
Subjective:       Patient ID: Yolanda Win is a 34 y.o. female.    Chief Complaint: Cough and Nasal Congestion    35 yo F pt, new to me, with PMH significant for ADHD, Anxiety, PTSD, insomnia (followed by psychiatry) and chronic low back pain without sciatica. She presents for urgent visit with c/o cough and cold symptoms x 4 days. States that cough has been progressively worsening over this time period. She reports having a sore throat last week and subsequently developed hoarsness. She then began experiencing cough that has been intermittently productive with associated chest congestion. She now reports chest pain with cough and difficulty breathing. No associated  fevers/chills/body aches. No wheezing. She's tried tessalon perles with no improvement. Complaining that she keeps her entire household awake due to the cough. Her kids have recently been ill with runny nose. She does have h/o seasonal allergies for which she takes Claritin. No h/o asthma.    Review of Systems   Constitutional: Negative for activity change, appetite change, chills, fever and unexpected weight change.   HENT: Positive for postnasal drip. Negative for congestion, rhinorrhea, sinus pressure, sinus pain, sneezing and sore throat.    Eyes: Negative for redness, itching and visual disturbance.   Respiratory: Positive for cough and shortness of breath. Negative for chest tightness.    Cardiovascular: Positive for chest pain.   Gastrointestinal: Negative for abdominal pain, constipation, diarrhea, nausea and vomiting.   Genitourinary: Negative for dysuria, frequency, hematuria, urgency, vaginal bleeding and vaginal discharge.   Skin: Negative for rash.   Neurological: Negative for dizziness, syncope and headaches.   Psychiatric/Behavioral: Negative for dysphoric mood and suicidal ideas. The patient is not nervous/anxious.          Past Medical History:   Diagnosis Date    Abnormal Pap smear of cervix 2009    Allergy     seasonal     "Anorexia     Bulimia     Depression     Fever blister     Hypertension     Mastitis     Migraine headache        Patient Active Problem List   Diagnosis    Rh negative status during pregnancy in third trimester    Anxiety disorder    ADHD (attention deficit hyperactivity disorder), inattentive type    History of posttraumatic stress disorder (PTSD)    History of insomnia    S/P  section    Intractable periodic headache syndrome    Decreased strength    Chronic bilateral low back pain without sciatica    Poor posture       Past Surgical History:   Procedure Laterality Date    BONE MARROW ASPIRATION      x 3    CERVICAL BIOPSY  W/ LOOP ELECTRODE EXCISION       SECTION  2016     SECTION N/A 2019    Procedure:  SECTION;  Surgeon: Kirit Hernandez MD;  Location: [facility] L&D;  Service: OB/GYN;  Laterality: N/A;    COLPOSCOPY      SHOULDER SURGERY Left     x 2    SINUS SURGERY      age 19    TONSILLECTOMY         Family History   Problem Relation Age of Onset    Cancer Maternal Grandmother 40        cervical    Breast cancer Neg Hx     Colon cancer Neg Hx     Ovarian cancer Neg Hx     Melanoma Neg Hx        Social History     Tobacco Use   Smoking Status Never Smoker   Smokeless Tobacco Never Used       Objective:        Vitals:    19 1052   BP: 122/84   Pulse: 82   Temp: 98.5 °F (36.9 °C)   TempSrc: Oral   SpO2: 98%   Weight: 77.9 kg (171 lb 11.8 oz)   Height: 5' 5" (1.651 m)       Physical Exam   Constitutional: She is oriented to person, place, and time. She appears well-developed and well-nourished. No distress.   HENT:   Head: Normocephalic and atraumatic.   Right Ear: Tympanic membrane, external ear and ear canal normal.   Left Ear: Tympanic membrane, external ear and ear canal normal.   Nose: Mucosal edema (+ inflamed/boggy inferior nasal turbinates) and rhinorrhea (+ white crusting present) present. Right sinus exhibits maxillary sinus " tenderness. Right sinus exhibits no frontal sinus tenderness. Left sinus exhibits maxillary sinus tenderness. Left sinus exhibits no frontal sinus tenderness.   Mouth/Throat: Oropharynx is clear and moist. No oropharyngeal exudate.   + cobblestoning present   Eyes: Conjunctivae and EOM are normal. No scleral icterus.   Neck: Normal range of motion. Neck supple.   Cardiovascular: Normal rate, regular rhythm and normal heart sounds. Exam reveals no gallop and no friction rub.   No murmur heard.  Pulmonary/Chest: Effort normal and breath sounds normal. She has no wheezes. She has no rales.   Musculoskeletal: Normal range of motion. She exhibits no edema.   Lymphadenopathy:     She has no cervical adenopathy.   Neurological: She is alert and oriented to person, place, and time. No cranial nerve deficit.   Skin: Skin is warm and dry. No rash noted. No erythema.   Psychiatric: She has a normal mood and affect. Her behavior is normal.       Assessment:       1. Viral upper respiratory tract infection    2. Seasonal allergies    3. Anxiety disorder, unspecified type    4. ADHD (attention deficit hyperactivity disorder), inattentive type    5. History of posttraumatic stress disorder (PTSD)        Plan:       Yolanda was seen today for cough and nasal congestion.    Diagnoses and all orders for this visit:    Viral upper respiratory tract infection  -     albuterol (VENTOLIN HFA) 90 mcg/actuation inhaler; Inhale 2 puffs into the lungs every 6 (six) hours as needed for Wheezing. Rescue  -     promethazine-dextromethorphan (PROMETHAZINE-DM) 6.25-15 mg/5 mL Syrp; Take 5 mLs by mouth every 4 (four) hours as needed.    Seasonal allergies    Anxiety disorder, unspecified type    ADHD (attention deficit hyperactivity disorder), inattentive type    History of posttraumatic stress disorder (PTSD)    Viral URI   Advised adequate rest and fluids  Given cough is severe and there is likely a bronchospastic component will tx with  Promethazine-DM prn + albuterol q6 prn. Can try atrovent if albuterol is not covered.  Warm tea with honey/lemon prn cough/sore throat  Nasal saline/Humdifier prn congestion  Reassured this is self-limiting, resolves within 2 weeks   Cautioned that post-viral cough can last 2-4 weeks     Seasonal Allergies  Continue Claritin prn    Anxiety   --Cont citalopram 10 mg daily  --Cont f/u with psych    ADHD   Continue Adderall XR 25 mg daily   --Cont f/u with psych    H/o PTSD  --Cont f/u with psych    HM   Flu vaccine UTD for 2019/2020 season   Tdap administered 1/28/2019  Pap/HPV neg 8/2019    Planning for return visit to establish care. Discuss waxing/waning breast masses s/p d/c'ing breastfeeding on 10/1/2019 during this visit

## 2019-11-25 ENCOUNTER — PATIENT MESSAGE (OUTPATIENT)
Dept: FAMILY MEDICINE | Facility: CLINIC | Age: 34
End: 2019-11-25

## 2019-12-02 ENCOUNTER — TELEPHONE (OUTPATIENT)
Dept: DERMATOLOGY | Facility: CLINIC | Age: 34
End: 2019-12-02

## 2019-12-02 NOTE — TELEPHONE ENCOUNTER
----- Message from Leti Pineda sent at 12/2/2019  8:18 AM CST -----  Contact: Pt#[phone]  Pt is calling in regards to the amount of copay for upcoming visits. Stated that they should be classified as nurse visits. Please advise

## 2019-12-03 ENCOUNTER — OFFICE VISIT (OUTPATIENT)
Dept: FAMILY MEDICINE | Facility: CLINIC | Age: 34
End: 2019-12-03
Payer: COMMERCIAL

## 2019-12-03 ENCOUNTER — PATIENT MESSAGE (OUTPATIENT)
Dept: OBSTETRICS AND GYNECOLOGY | Facility: CLINIC | Age: 34
End: 2019-12-03

## 2019-12-03 ENCOUNTER — TELEPHONE (OUTPATIENT)
Dept: RADIOLOGY | Facility: HOSPITAL | Age: 34
End: 2019-12-03

## 2019-12-03 VITALS
HEART RATE: 77 BPM | WEIGHT: 170.88 LBS | HEIGHT: 65 IN | OXYGEN SATURATION: 98 % | TEMPERATURE: 98 F | SYSTOLIC BLOOD PRESSURE: 116 MMHG | BODY MASS INDEX: 28.47 KG/M2 | DIASTOLIC BLOOD PRESSURE: 88 MMHG

## 2019-12-03 DIAGNOSIS — J30.2 SEASONAL ALLERGIES: ICD-10-CM

## 2019-12-03 DIAGNOSIS — F90.0 ADHD (ATTENTION DEFICIT HYPERACTIVITY DISORDER), INATTENTIVE TYPE: ICD-10-CM

## 2019-12-03 DIAGNOSIS — E66.3 OVERWEIGHT (BMI 25.0-29.9): ICD-10-CM

## 2019-12-03 DIAGNOSIS — F41.9 ANXIETY DISORDER, UNSPECIFIED TYPE: ICD-10-CM

## 2019-12-03 DIAGNOSIS — T14.8XXA BRUISING: ICD-10-CM

## 2019-12-03 DIAGNOSIS — N92.0 MENORRHAGIA WITH REGULAR CYCLE: ICD-10-CM

## 2019-12-03 DIAGNOSIS — N63.10 BREAST MASS, RIGHT: ICD-10-CM

## 2019-12-03 DIAGNOSIS — Z86.59 HISTORY OF POSTTRAUMATIC STRESS DISORDER (PTSD): ICD-10-CM

## 2019-12-03 DIAGNOSIS — B96.89 ACUTE BACTERIAL SINUSITIS: ICD-10-CM

## 2019-12-03 DIAGNOSIS — Z00.00 ANNUAL PHYSICAL EXAM: Primary | ICD-10-CM

## 2019-12-03 DIAGNOSIS — J01.90 ACUTE BACTERIAL SINUSITIS: ICD-10-CM

## 2019-12-03 DIAGNOSIS — N92.0 MENORRHAGIA WITH REGULAR CYCLE: Primary | ICD-10-CM

## 2019-12-03 PROCEDURE — 99395 PREV VISIT EST AGE 18-39: CPT | Mod: S$GLB,,, | Performed by: FAMILY MEDICINE

## 2019-12-03 PROCEDURE — 99999 PR PBB SHADOW E&M-EST. PATIENT-LVL IV: ICD-10-PCS | Mod: PBBFAC,,, | Performed by: FAMILY MEDICINE

## 2019-12-03 PROCEDURE — 99999 PR PBB SHADOW E&M-EST. PATIENT-LVL IV: CPT | Mod: PBBFAC,,, | Performed by: FAMILY MEDICINE

## 2019-12-03 PROCEDURE — 99395 PR PREVENTIVE VISIT,EST,18-39: ICD-10-PCS | Mod: S$GLB,,, | Performed by: FAMILY MEDICINE

## 2019-12-03 RX ORDER — CEFPODOXIME PROXETIL 200 MG/1
200 TABLET, FILM COATED ORAL EVERY 12 HOURS
Qty: 14 TABLET | Refills: 0 | Status: SHIPPED | OUTPATIENT
Start: 2019-12-03 | End: 2019-12-10

## 2019-12-03 RX ORDER — CLINDAMYCIN HYDROCHLORIDE 300 MG/1
300 CAPSULE ORAL EVERY 8 HOURS
Qty: 21 CAPSULE | Refills: 0 | Status: SHIPPED | OUTPATIENT
Start: 2019-12-03 | End: 2019-12-10

## 2019-12-03 RX ORDER — TRANEXAMIC ACID 650 MG/1
1300 TABLET ORAL 3 TIMES DAILY
Qty: 30 TABLET | Refills: 3 | Status: SHIPPED | OUTPATIENT
Start: 2019-12-03 | End: 2020-09-03 | Stop reason: SDUPTHER

## 2019-12-03 NOTE — PROGRESS NOTES
Subjective:       Patient ID: Yolanda Win is a 34 y.o. female.    Chief Complaint: Establish Care    33 yo F pt, new to me, with PMH significant for ADHD, Anxiety, PTSD, insomnia (followed by psychiatry) and chronic low back pain without sciatica.     BP: 116/88   BMI: 28.43  Last Eye Exam: 1 year ago.  Wears glasses/contacts  Last Dental Exam: 2019; has cleanings q6 months  LMP: 2019; comes q21 days. Reports heavy bleeding with menstrual cycle. Changes superpuls tampon q1-2 hrs x 3-4 days with blood clots  Last Pap/HPV ne2019  Contraception: None    Acute Complaints:     1.  Reports symptoms of sinusitis ongoing x4 weeks.  She did have a virtual visit with [facility] physician and was prescribed azithromycin.  She reports symptoms temporarily improved while taking azithromycin, however, they returned once medication course was completed. She reports cough that has been intermittently productive with associated chest congestion. She now reports chest pain with cough and difficulty breathing. No associated  fevers/chills/body aches. No wheezing. She's tried tessalon perles with no improvement. Complaining that she keeps her entire household awake due to the cough. Her kids have recently been ill with runny nose. She does have h/o seasonal allergies for which she takes Claritin. No h/o asthma.      2.  Additionally she has been complaining of a right breast mass that has been waxing/waning since d/c'ing breastfeeding on 10/1/2019.  Reports during her menstrual cycle last week mass enlarged, however, has now reduced in size.  She does have associated tenderness with mass.  Denies nipple discharge. Denies redness and warmth to the right breast    3.  Lastly she is concerned about unexplained bruising that she notes throughout her body.  She works with a hematologist oncologist who informed her that symptoms could be secondary to Celexa (patient takes Celexa 10 mg daily for anxiety).  She has heavy  bleeding with menstrual cycles--has been ongoing since her 2nd pregnancy.  She is working with gyn who have diagnosed her with premature ovarian insufficiency.  She is not interested in taking contraception to control vaginal bleeding    Review of Systems   Constitutional: Negative for activity change and unexpected weight change.   HENT: Positive for rhinorrhea. Negative for hearing loss and trouble swallowing.    Eyes: Negative for discharge and visual disturbance.   Respiratory: Negative for chest tightness and wheezing.    Cardiovascular: Negative for chest pain and palpitations.   Gastrointestinal: Negative for blood in stool, constipation, diarrhea and vomiting.   Endocrine: Negative for polydipsia and polyuria.   Genitourinary: Negative for difficulty urinating, dysuria, hematuria and menstrual problem.   Musculoskeletal: Negative for arthralgias, joint swelling and neck pain.   Neurological: Positive for headaches. Negative for weakness.   Psychiatric/Behavioral: Negative for confusion and dysphoric mood.         Past Medical History:   Diagnosis Date    Abnormal Pap smear of cervix     Allergy     seasonal    Anorexia     Bulimia     Depression     Fever blister     Hypertension     Mastitis     Migraine headache        Patient Active Problem List   Diagnosis    Rh negative status during pregnancy in third trimester    Anxiety disorder    ADHD (attention deficit hyperactivity disorder), inattentive type    History of posttraumatic stress disorder (PTSD)    History of insomnia    S/P  section    Intractable periodic headache syndrome    Decreased strength    Chronic bilateral low back pain without sciatica    Poor posture    Seasonal allergies    Menorrhagia with regular cycle       Past Surgical History:   Procedure Laterality Date    BONE MARROW ASPIRATION      x 3    CERVICAL BIOPSY  W/ LOOP ELECTRODE EXCISION       SECTION  2016     SECTION N/A  "2019    Procedure:  SECTION;  Surgeon: Kirit Hernandez MD;  Location: [facility] L&D;  Service: OB/GYN;  Laterality: N/A;    COLPOSCOPY      SHOULDER SURGERY Left     x 2    SINUS SURGERY      age 19    TONSILLECTOMY         Family History   Problem Relation Age of Onset    Cancer Maternal Grandmother 40        cervical    Breast cancer Neg Hx     Colon cancer Neg Hx     Ovarian cancer Neg Hx     Melanoma Neg Hx        Social History     Tobacco Use   Smoking Status Never Smoker   Smokeless Tobacco Never Used       Social History     Social History Narrative    Not on file       Objective:        Vitals:    19 0932   BP: 116/88   Pulse: 77   Temp: 98 °F (36.7 °C)   TempSrc: Oral   SpO2: 98%   Weight: 77.5 kg (170 lb 13.7 oz)   Height: 5' 5" (1.651 m)         Physical Exam   Constitutional: She is oriented to person, place, and time. She appears well-developed and well-nourished. No distress.   HENT:   Head: Normocephalic and atraumatic.   Right Ear: Tympanic membrane, external ear and ear canal normal.   Left Ear: Tympanic membrane, external ear and ear canal normal.   Nose: Mucosal edema (+ inflamed/boggy inferior nasal turbinates). No rhinorrhea present. Right sinus exhibits maxillary sinus tenderness. Right sinus exhibits frontal sinus tenderness. Left sinus exhibits maxillary sinus tenderness. Left sinus exhibits  frontal sinus tenderness.   Mouth/Throat: Oropharynx is clear and moist. No oropharyngeal exudate.   Eyes: Conjunctivae and EOM are normal. No scleral icterus.   Neck: Normal range of motion. Neck supple.   Cardiovascular: Normal rate, regular rhythm and normal heart sounds. Exam reveals no gallop and no friction rub.   No murmur heard.  Pulmonary/Chest: Effort normal and breath sounds normal. She has no wheezes. She has no rales. Right breast exhibits mass and tenderness. Right breast exhibits no inverted nipple and no skin change. Left breast exhibits no inverted nipple, no " mass, no skin change and no tenderness.   5cm x 5cm well-circumscribed, rubbery, mobile, mild tender mess to right breast.        Musculoskeletal: Normal range of motion. She exhibits no edema.   Lymphadenopathy:     She has no cervical adenopathy.   Neurological: She is alert and oriented to person, place, and time. No cranial nerve deficit.   Skin: Skin is warm and dry. No rash or bruising noted. No erythema.   Psychiatric: She has a normal mood and affect. Her behavior is normal.       Assessment:       1. Annual physical exam    2. Overweight (BMI 25.0-29.9)    3. Acute bacterial sinusitis    4. Breast mass, right    5. Bruising    6. Menorrhagia with regular cycle    7. Seasonal allergies    8. Anxiety disorder, unspecified type    9. ADHD (attention deficit hyperactivity disorder), inattentive type    10. History of posttraumatic stress disorder (PTSD)        Plan:       Yolanda was seen today for establish care.    Diagnoses and all orders for this visit:    Annual physical exam  -     TSH; Future  -     Comprehensive metabolic panel; Future  -     Hemoglobin A1c; Future  -     Lipid panel; Future  -     Vitamin D; Future    Overweight (BMI 25.0-29.9)    Acute bacterial sinusitis  -     cefpodoxime (VANTIN) 200 MG tablet; Take 1 tablet (200 mg total) by mouth every 12 (twelve) hours. for 7 days  -     clindamycin (CLEOCIN) 300 MG capsule; Take 1 capsule (300 mg total) by mouth every 8 (eight) hours. for 7 days    Breast mass, right  -     US Breast Right Limited; Future  -     Mammo Digital Diagnostic Right With CAD; Future    Bruising  -     CBC auto differential; Future  -     APTT; Future  -     Protime-INR; Future    Menorrhagia with regular cycle    Seasonal allergies    Anxiety disorder, unspecified type    ADHD (attention deficit hyperactivity disorder), inattentive type    History of posttraumatic stress disorder (PTSD)    Annual exam  BP: 116/88   BMI: 28.43--see below  Advised annual eye exams.   Dental exam is q.6 months  Last Pap/HPV ne2019  Contraception: None  Flu vaccine UTD for  season   Tdap administered 2019    Overweight  BMI 28.43  Advised at least 30 min of CV exercise 5 days a week. Encouraged plant-based diet. Advised small/frequent meals.  Also advised avoidance of sweetened beverages, limit portion sizes, and discussed healthy carbohydrate options (brown rice, 100% whole wheat bread, wheat pasta)      Acute Sinusitis   --Empirically tx'd with azithromycin with no improvement  --Gives h/o PCN allergy and non-response to trial of doxycycline in the past   --Tx with Cefpodoxine 200 mg BID x 7 days + clindamycin 300 mg q8 x 7 days.     Right Breast Mass  --Characteristics benign on exam   --Given age > 30 will refer for US right breast (focusing on Right Upper Outer Quadrant) and Diagnostic mammogram    Unexplained Bruising   --CBC, PT/PTT  --Pt is concerned this may be due to celexa--per UpToDate < 1% pt's can have blood coagulation disorder associated with use.   --She plans to contact her psychiatrist if labs normal    Menorrhagia  LMP: 2019; comes q21 days. Reports heavy bleeding with menstrual cycle. Changes superpuls tampon q1-2 hrs x 3-4 days with blood clots   Advised to follow with gyn  CBC today    Seasonal Allergies  Continue Claritin prn    Anxiety   --Cont citalopram 10 mg daily  --Cont f/u with psych    ADHD   Continue Adderall XR 25 mg daily   --Cont f/u with psych    H/o PTSD  --Cont f/u with psych    F/U plan pending laboratory and imaging results

## 2019-12-04 ENCOUNTER — LAB VISIT (OUTPATIENT)
Dept: LAB | Facility: HOSPITAL | Age: 34
End: 2019-12-04
Attending: FAMILY MEDICINE
Payer: COMMERCIAL

## 2019-12-04 DIAGNOSIS — T14.8XXA BRUISING: ICD-10-CM

## 2019-12-04 DIAGNOSIS — Z00.00 ANNUAL PHYSICAL EXAM: ICD-10-CM

## 2019-12-04 LAB
25(OH)D3+25(OH)D2 SERPL-MCNC: 29 NG/ML (ref 30–96)
ALBUMIN SERPL BCP-MCNC: 4 G/DL (ref 3.5–5.2)
ALP SERPL-CCNC: 111 U/L (ref 55–135)
ALT SERPL W/O P-5'-P-CCNC: 14 U/L (ref 10–44)
ANION GAP SERPL CALC-SCNC: 11 MMOL/L (ref 8–16)
APTT BLDCRRT: 29.2 SEC (ref 21–32)
AST SERPL-CCNC: 15 U/L (ref 10–40)
BASOPHILS # BLD AUTO: 0.03 K/UL (ref 0–0.2)
BASOPHILS NFR BLD: 0.6 % (ref 0–1.9)
BILIRUB SERPL-MCNC: 0.7 MG/DL (ref 0.1–1)
BUN SERPL-MCNC: 9 MG/DL (ref 6–20)
CALCIUM SERPL-MCNC: 9.4 MG/DL (ref 8.7–10.5)
CHLORIDE SERPL-SCNC: 104 MMOL/L (ref 95–110)
CHOLEST SERPL-MCNC: 189 MG/DL (ref 120–199)
CHOLEST/HDLC SERPL: 2.9 {RATIO} (ref 2–5)
CO2 SERPL-SCNC: 23 MMOL/L (ref 23–29)
CREAT SERPL-MCNC: 1 MG/DL (ref 0.5–1.4)
DIFFERENTIAL METHOD: ABNORMAL
EOSINOPHIL # BLD AUTO: 0.2 K/UL (ref 0–0.5)
EOSINOPHIL NFR BLD: 3.2 % (ref 0–8)
ERYTHROCYTE [DISTWIDTH] IN BLOOD BY AUTOMATED COUNT: 12.7 % (ref 11.5–14.5)
EST. GFR  (AFRICAN AMERICAN): >60 ML/MIN/1.73 M^2
EST. GFR  (NON AFRICAN AMERICAN): >60 ML/MIN/1.73 M^2
ESTIMATED AVG GLUCOSE: 97 MG/DL (ref 68–131)
GLUCOSE SERPL-MCNC: 97 MG/DL (ref 70–110)
HBA1C MFR BLD HPLC: 5 % (ref 4–5.6)
HCT VFR BLD AUTO: 42.1 % (ref 37–48.5)
HDLC SERPL-MCNC: 65 MG/DL (ref 40–75)
HDLC SERPL: 34.4 % (ref 20–50)
HGB BLD-MCNC: 13.7 G/DL (ref 12–16)
INR PPP: 1.1 (ref 0.8–1.2)
LDLC SERPL CALC-MCNC: 108.8 MG/DL (ref 63–159)
LYMPHOCYTES # BLD AUTO: 1.7 K/UL (ref 1–4.8)
LYMPHOCYTES NFR BLD: 32.3 % (ref 18–48)
MCH RBC QN AUTO: 30.3 PG (ref 27–31)
MCHC RBC AUTO-ENTMCNC: 32.5 G/DL (ref 32–36)
MCV RBC AUTO: 93 FL (ref 82–98)
MONOCYTES # BLD AUTO: 0.4 K/UL (ref 0.3–1)
MONOCYTES NFR BLD: 7.3 % (ref 4–15)
NEUTROPHILS # BLD AUTO: 3 K/UL (ref 1.8–7.7)
NEUTROPHILS NFR BLD: 56.6 % (ref 38–73)
NONHDLC SERPL-MCNC: 124 MG/DL
PLATELET # BLD AUTO: 276 K/UL (ref 150–350)
PMV BLD AUTO: 9 FL (ref 9.2–12.9)
POTASSIUM SERPL-SCNC: 4.7 MMOL/L (ref 3.5–5.1)
PROT SERPL-MCNC: 7.4 G/DL (ref 6–8.4)
PROTHROMBIN TIME: 11.8 SEC (ref 9–12.5)
RBC # BLD AUTO: 4.52 M/UL (ref 4–5.4)
SODIUM SERPL-SCNC: 138 MMOL/L (ref 136–145)
TRIGL SERPL-MCNC: 76 MG/DL (ref 30–150)
TSH SERPL DL<=0.005 MIU/L-ACNC: 1.03 UIU/ML (ref 0.4–4)
WBC # BLD AUTO: 5.36 K/UL (ref 3.9–12.7)

## 2019-12-04 PROCEDURE — 36415 COLL VENOUS BLD VENIPUNCTURE: CPT

## 2019-12-04 PROCEDURE — 83036 HEMOGLOBIN GLYCOSYLATED A1C: CPT

## 2019-12-04 PROCEDURE — 85025 COMPLETE CBC W/AUTO DIFF WBC: CPT

## 2019-12-04 PROCEDURE — 80053 COMPREHEN METABOLIC PANEL: CPT

## 2019-12-04 PROCEDURE — 85610 PROTHROMBIN TIME: CPT

## 2019-12-04 PROCEDURE — 82306 VITAMIN D 25 HYDROXY: CPT

## 2019-12-04 PROCEDURE — 85730 THROMBOPLASTIN TIME PARTIAL: CPT

## 2019-12-04 PROCEDURE — 84443 ASSAY THYROID STIM HORMONE: CPT

## 2019-12-04 PROCEDURE — 80061 LIPID PANEL: CPT

## 2019-12-06 ENCOUNTER — PROCEDURE VISIT (OUTPATIENT)
Dept: NEUROLOGY | Facility: CLINIC | Age: 34
End: 2019-12-06
Payer: COMMERCIAL

## 2019-12-06 VITALS
HEART RATE: 79 BPM | HEIGHT: 65 IN | SYSTOLIC BLOOD PRESSURE: 121 MMHG | BODY MASS INDEX: 28.32 KG/M2 | DIASTOLIC BLOOD PRESSURE: 72 MMHG | WEIGHT: 170 LBS

## 2019-12-06 DIAGNOSIS — G43.C1 INTRACTABLE PERIODIC HEADACHE SYNDROME: Primary | ICD-10-CM

## 2019-12-06 PROCEDURE — 64615 CHEMODENERV MUSC MIGRAINE: CPT | Mod: S$GLB,,, | Performed by: PSYCHIATRY & NEUROLOGY

## 2019-12-06 PROCEDURE — 64615 PR CHEMODENERVATION OF MUSCLE FOR CHRONIC MIGRAINE: ICD-10-PCS | Mod: S$GLB,,, | Performed by: PSYCHIATRY & NEUROLOGY

## 2019-12-06 NOTE — PROCEDURES
Procedures       [facility] - NEUROLOGY  [facility] [place]    Date: December 6, 2019   Patient Name: Yolanda Win   MRN: [id number]   PCP: Cruzito Craven  Referring Provider: Dillon Gonzalez MD    Assessment:      This is Yolanda Win, 34 y.o. female with chronic migraines (G43.719) and suffers from headaches more than 15 days a month lasting more than 4 hours a day with no relief of symptoms despite trying multiple medications listed below.    Plan:      -  Follow up for botox #3  -  Continue Mg       I discussed side effects of the medications. I asked the patient to  stop the medication if he notices serious adverse effects as we discussed and to seek immediate medical attention at an ER.     Dillon Gonzalez MD  [facility]   Department of Neurology    Subjective:   EXCELLENT response to botox, estimates 1 headache per week    9/2019  Continued daily headaches, weaning breast feeding - son 6 months old  8/2019  Unable to tolerate increased celexa due to fatigue, compliant with Mg with continued daily HA, unable to resume TPM as continuing to breast feed    HPI 5/2019:   Ms. Yolanda Win is a 34 y.o. female who presents with a chief complaint of headaches    Patient has had significant difficulty with migraines since she was 12 years old with worsening during recent pregnancy.  Associated nausea, photo/phonophobia.  She has had difficulty with post-partum anxiety and started celexa a month ago with some improvement in headache as well as irritability noted.    Prior medications trials as below  TPM - partial relief at 150mg /d  Pamelor, Neurontin, Seroquel - all little effect  Imitrex, phenergan - good effect  Fioricet, zanaflex - little effect    PAST MEDICAL HISTORY:  Past Medical History:   Diagnosis Date    Abnormal Pap smear of cervix 2009    Allergy     seasonal    Anorexia     Bulimia     Depression     Fever blister     Hypertension      Mastitis     Migraine headache        PAST SURGICAL HISTORY:  Past Surgical History:   Procedure Laterality Date    BONE MARROW ASPIRATION      x 3    CERVICAL BIOPSY  W/ LOOP ELECTRODE EXCISION       SECTION  2016     SECTION N/A 2019    Procedure:  SECTION;  Surgeon: Kirit Hernandez MD;  [facility];  Service: OB/GYN;  Laterality: N/A;    COLPOSCOPY      SHOULDER SURGERY Left     x 2    SINUS SURGERY      age 19    TONSILLECTOMY         CURRENT MEDS:  Current Outpatient Medications   Medication Sig Dispense Refill    albuterol (VENTOLIN HFA) 90 mcg/actuation inhaler Inhale 2 puffs into the lungs every 6 (six) hours as needed for Wheezing. Rescue 18 g 0    azithromycin (Z-PADILLA) 250 MG tablet TAKE 2 TABLETS BY MOUTH ON DAY 1, THEN 1 TABLET BY MOUTH ONCE DAILY ON DAYS 2-4 6 tablet 0    cefpodoxime (VANTIN) 200 MG tablet Take 1 tablet (200 mg total) by mouth every 12 (twelve) hours. for 7 days 14 tablet 0    citalopram (CELEXA) 10 MG tablet Take 1 tablet (10 mg total) by mouth once daily. 30 tablet 2    clindamycin (CLEOCIN) 300 MG capsule Take 1 capsule (300 mg total) by mouth every 8 (eight) hours. for 7 days 21 capsule 0    dextroamphetamine-amphetamine (ADDERALL XR) 25 MG 24 hr capsule Take 1 capsule (25 mg total) by mouth every morning. 30 capsule 0    loratadine (CLARITIN) 10 mg tablet Take 10 mg by mouth once daily.      OPW TEST CLAIM - DO NOT FILL Take by mouth. OPW test claim. Do not fill. 1 each 0    predniSONE (DELTASONE) 20 MG tablet TAKE 2 TABLETS BY MOUTH ONCE DAILY 6 tablet 0    tranexamic acid (LYSTEDA) 650 mg tablet Take 2 tablets (1,300 mg total) by mouth 3 (three) times daily. 30 tablet 3    triamcinolone acetonide 0.1% (KENALOG) 0.1 % cream apply to affected area twice daily 454 g 3    valACYclovir (VALTREX) 1000 MG tablet Take 1 tablet (1,000 mg total) by mouth every 12 (twelve) hours. 60 tablet 0     Current Facility-Administered  "Medications   Medication Dose Route Frequency Provider Last Rate Last Dose    onabotulinumtoxina injection 200 Units  200 Units Intramuscular Q90 Days Dillon Gonzalez MD   200 Units at 09/11/19 0842       ALLERGIES:  Review of patient's allergies indicates:   Allergen Reactions    Amoxicillin      hives    Diazepam Anxiety and Other (See Comments)     "makes hyper"    Penicillins Rash and Hives       FAMILY HISTORY:  Family History   Problem Relation Age of Onset    Cancer Maternal Grandmother 40        cervical    Breast cancer Neg Hx     Colon cancer Neg Hx     Ovarian cancer Neg Hx     Melanoma Neg Hx        SOCIAL HISTORY:  Social History     Tobacco Use    Smoking status: Never Smoker    Smokeless tobacco: Never Used   Substance Use Topics    Alcohol use: No     Frequency: 2-3 times a week     Drinks per session: 1 or 2     Binge frequency: Monthly     Comment: pre pregnancy     Drug use: No       Review of Systems:  12 review of systems is negative except for the symptoms mentioned in HPI.        Objective:     Vitals:    12/06/19 1433   BP: 121/72   Pulse: 79   Weight: 77.1 kg (170 lb)   Height: 5' 5" (1.651 m)       General: NAD, well nourished   Eyes: no tearing, discharge, no erythema   ENT: moist mucous membranes of the oral cavity, nares patent    Neck: Supple, full range of motion  Cardiovascular: Warm and well perfused, pulses equal and symmetrical  Lungs: Normal work of breathing, normal chest wall excursions  Skin: No rash, lesions, or breakdown on exposed skin  Psychiatry: Mood and affect are appropriate   Abdomen: soft, non tender, non distended  Extremeties: No cyanosis, clubbing or edema.    Neurological   MENTAL STATUS: Alert and oriented to person, place, and time. Attention and concentration within normal limits. Speech without dysarthria, able to name and repeat without difficulty. Recent and remote memory within normal limits   CRANIAL NERVES: Visual fields intact. PERRL. " EOMI. Facial sensation intact. Face symmetrical. Hearing grossly intact. Full shoulder shrug bilaterally. Tongue protrudes midline   SENSORY: Sensation is intact to light touch throughout.   MOTOR: Normal bulk and tone. No pronator drift.    CEREBELLAR/COORDINATION/GAIT: Gait steady with normal arm swing and stride length.     BOTOX was performed as an indicated therapy for intractable chronic migraine headaches given that the patient failed several prophylactic medications    Botulinum Toxin Injection Procedure   Pre-operative diagnosis: Chronic migraine   Post-operative diagnosis: Same   Procedure: Chemical neurolysis   After risks and benefits were explained including bleeding, infection, worsening of pain, damage to the areas being injected, weakness of muscles, loss of muscle control, dysphagia if injecting the head or neck, facial droop if injecting the facial area, painful injection, allergic or other reaction to the medications being injected, and the failure of the procedure to help the problem, a signed consent was obtained.   The patient was placed in a comfortable area and the sites to be treated were identified.The area to be treated was prepped three times with alcohol and the alcohol allowed to dry. Next, a 30 gauge needle was used to inject the medication in the area to be treated.       Botox 100 units NDC 1395-4013-57    Area(s) injected:   Total Botox used: 155 Units   Botox wastage: 45 Units   Injection sites:    muscle bilaterally ( a total of 10 units divided into 2 sites)   Procerus muscle (5 units)   Frontalis muscle bilaterally (a total of 20 units divided into 4 sites)   Temporalis muscle bilaterally (a total of 40 units divided into 8 sites)   Occipitalis muscle bilaterally (a total of 30 units divided into 6 sites)   Cervical paraspinal muscles (a total of 20 units divided into 4 sites)   Trapezius muscle bilaterally (a total of 30 units divided into 6 sites)   Complications:  none   RTC for the next Botox injection: 3 months

## 2019-12-09 RX ORDER — DEXTROAMPHETAMINE SACCHARATE, AMPHETAMINE ASPARTATE MONOHYDRATE, DEXTROAMPHETAMINE SULFATE AND AMPHETAMINE SULFATE 6.25; 6.25; 6.25; 6.25 MG/1; MG/1; MG/1; MG/1
25 CAPSULE, EXTENDED RELEASE ORAL EVERY MORNING
Qty: 30 CAPSULE | Refills: 0 | Status: SHIPPED | OUTPATIENT
Start: 2019-12-09 | End: 2020-01-09 | Stop reason: SDUPTHER

## 2019-12-11 ENCOUNTER — HOSPITAL ENCOUNTER (OUTPATIENT)
Dept: RADIOLOGY | Facility: HOSPITAL | Age: 34
Discharge: HOME | End: 2019-12-11
Attending: FAMILY MEDICINE
Payer: COMMERCIAL

## 2019-12-11 DIAGNOSIS — N63.10 BREAST MASS, RIGHT: ICD-10-CM

## 2019-12-11 PROBLEM — J30.2 SEASONAL ALLERGIES: Status: ACTIVE | Noted: 2019-12-11

## 2019-12-11 PROBLEM — N92.0 MENORRHAGIA WITH REGULAR CYCLE: Status: ACTIVE | Noted: 2019-12-11

## 2019-12-11 PROCEDURE — 77065 MAMMO DIGITAL DIAGNOSTIC RIGHT WITH CAD: ICD-10-PCS | Mod: 26,RT,, | Performed by: RADIOLOGY

## 2019-12-11 PROCEDURE — 77065 DX MAMMO INCL CAD UNI: CPT | Mod: 26,RT,, | Performed by: RADIOLOGY

## 2019-12-11 PROCEDURE — 76642 ULTRASOUND BREAST LIMITED: CPT | Mod: TC,RT

## 2019-12-11 PROCEDURE — 76642 US BREAST RIGHT LIMITED: ICD-10-PCS | Mod: 26,RT,, | Performed by: RADIOLOGY

## 2019-12-11 PROCEDURE — 77065 DX MAMMO INCL CAD UNI: CPT | Mod: TC,RT

## 2019-12-11 PROCEDURE — 76642 ULTRASOUND BREAST LIMITED: CPT | Mod: 26,RT,, | Performed by: RADIOLOGY

## 2019-12-12 ENCOUNTER — PATIENT MESSAGE (OUTPATIENT)
Dept: FAMILY MEDICINE | Facility: CLINIC | Age: 34
End: 2019-12-12

## 2019-12-13 ENCOUNTER — HOSPITAL ENCOUNTER (OUTPATIENT)
Dept: RADIOLOGY | Facility: HOSPITAL | Age: 34
Discharge: HOME | End: 2019-12-13
Attending: FAMILY MEDICINE
Payer: COMMERCIAL

## 2019-12-13 DIAGNOSIS — N63.0 BREAST LUMP: ICD-10-CM

## 2019-12-13 PROCEDURE — 88342 IMHCHEM/IMCYTCHM 1ST ANTB: CPT | Mod: 26,,, | Performed by: PATHOLOGY

## 2019-12-13 PROCEDURE — 88360 TUMOR IMMUNOHISTOCHEM/MANUAL: CPT | Mod: 59 | Performed by: PATHOLOGY

## 2019-12-13 PROCEDURE — 88341 IMHCHEM/IMCYTCHM EA ADD ANTB: CPT | Mod: 26,,, | Performed by: PATHOLOGY

## 2019-12-13 PROCEDURE — 19083 US BREAST BIOPSY WITH IMAGING 1ST SITE RIGHT: ICD-10-PCS | Mod: RT,,, | Performed by: RADIOLOGY

## 2019-12-13 PROCEDURE — 88341 PR IHC OR ICC EACH ADD'L SINGLE ANTIBODY  STAINPR: ICD-10-PCS | Mod: 26,,, | Performed by: PATHOLOGY

## 2019-12-13 PROCEDURE — 88305 TISSUE EXAM BY PATHOLOGIST: CPT | Performed by: PATHOLOGY

## 2019-12-13 PROCEDURE — 19083 BX BREAST 1ST LESION US IMAG: CPT | Mod: TC,RT

## 2019-12-13 PROCEDURE — 88305 TISSUE EXAM BY PATHOLOGIST: CPT | Mod: 26,,, | Performed by: PATHOLOGY

## 2019-12-13 PROCEDURE — 88342 CHG IMMUNOCYTOCHEMISTRY: ICD-10-PCS | Mod: 26,,, | Performed by: PATHOLOGY

## 2019-12-13 PROCEDURE — 19083 BX BREAST 1ST LESION US IMAG: CPT | Mod: RT,,, | Performed by: RADIOLOGY

## 2019-12-13 PROCEDURE — 88305 TISSUE EXAM BY PATHOLOGIST: ICD-10-PCS | Mod: 26,,, | Performed by: PATHOLOGY

## 2019-12-17 ENCOUNTER — TELEPHONE (OUTPATIENT)
Dept: FAMILY MEDICINE | Facility: CLINIC | Age: 34
End: 2019-12-17

## 2019-12-17 ENCOUNTER — PATIENT MESSAGE (OUTPATIENT)
Dept: FAMILY MEDICINE | Facility: CLINIC | Age: 34
End: 2019-12-17

## 2019-12-17 NOTE — TELEPHONE ENCOUNTER
Returned pt phone call, she is requesting her biopsy results. Let her know that she can call the main line and ask for the mammography department, pt verbalized understanding.

## 2019-12-17 NOTE — TELEPHONE ENCOUNTER
----- Message from Andrei Torres sent at 12/17/2019  3:59 PM CST -----  Contact: [phone] / self  Patient states she would like to speak with your office about test results. Please Advise.

## 2019-12-18 ENCOUNTER — PATIENT MESSAGE (OUTPATIENT)
Dept: FAMILY MEDICINE | Facility: CLINIC | Age: 34
End: 2019-12-18

## 2019-12-18 ENCOUNTER — DOCUMENTATION ONLY (OUTPATIENT)
Dept: SURGERY | Facility: CLINIC | Age: 34
End: 2019-12-18

## 2019-12-18 ENCOUNTER — INITIAL CONSULT (OUTPATIENT)
Dept: HEMATOLOGY/ONCOLOGY | Facility: CLINIC | Age: 34
End: 2019-12-18
Payer: COMMERCIAL

## 2019-12-18 ENCOUNTER — PATIENT OUTREACH (OUTPATIENT)
Dept: ADMINISTRATIVE | Facility: OTHER | Age: 34
End: 2019-12-18

## 2019-12-18 VITALS
RESPIRATION RATE: 16 BRPM | BODY MASS INDEX: 27.99 KG/M2 | WEIGHT: 168 LBS | DIASTOLIC BLOOD PRESSURE: 97 MMHG | HEIGHT: 65 IN | SYSTOLIC BLOOD PRESSURE: 136 MMHG | OXYGEN SATURATION: 100 % | HEART RATE: 86 BPM | TEMPERATURE: 98 F

## 2019-12-18 DIAGNOSIS — N92.0 MENORRHAGIA WITH REGULAR CYCLE: ICD-10-CM

## 2019-12-18 DIAGNOSIS — F41.9 ANXIETY DISORDER, UNSPECIFIED TYPE: ICD-10-CM

## 2019-12-18 DIAGNOSIS — C50.911 INVASIVE DUCTAL CARCINOMA OF RIGHT BREAST: Primary | ICD-10-CM

## 2019-12-18 DIAGNOSIS — Z71.89 ADVANCE CARE PLANNING: ICD-10-CM

## 2019-12-18 PROCEDURE — 99205 OFFICE O/P NEW HI 60 MIN: CPT | Mod: S$GLB,,, | Performed by: INTERNAL MEDICINE

## 2019-12-18 PROCEDURE — 99999 PR PBB SHADOW E&M-EST. PATIENT-LVL IV: ICD-10-PCS | Mod: PBBFAC,,, | Performed by: INTERNAL MEDICINE

## 2019-12-18 PROCEDURE — 99205 PR OFFICE/OUTPT VISIT, NEW, LEVL V, 60-74 MIN: ICD-10-PCS | Mod: S$GLB,,, | Performed by: INTERNAL MEDICINE

## 2019-12-18 PROCEDURE — 3008F BODY MASS INDEX DOCD: CPT | Mod: CPTII,S$GLB,, | Performed by: INTERNAL MEDICINE

## 2019-12-18 PROCEDURE — 99999 PR PBB SHADOW E&M-EST. PATIENT-LVL IV: CPT | Mod: PBBFAC,,, | Performed by: INTERNAL MEDICINE

## 2019-12-18 PROCEDURE — 3008F PR BODY MASS INDEX (BMI) DOCUMENTED: ICD-10-PCS | Mod: CPTII,S$GLB,, | Performed by: INTERNAL MEDICINE

## 2019-12-18 PROCEDURE — 99497 PR ADVNCD CARE PLAN 30 MIN: ICD-10-PCS | Mod: S$GLB,,, | Performed by: INTERNAL MEDICINE

## 2019-12-18 PROCEDURE — 99497 ADVNCD CARE PLAN 30 MIN: CPT | Mod: S$GLB,,, | Performed by: INTERNAL MEDICINE

## 2019-12-18 NOTE — PROGRESS NOTES
"PATIENT: Yolanda Win  MRN: [id number]  DATE: 2019    Diagnosis:   1. Invasive ductal carcinoma of right breast    2. Menorrhagia with regular cycle    3. Anxiety disorder, unspecified type    4. Advance care planning      Chief Complaint: Breast Cancer    Oncologic History:      Oncologic History 1. Invasive ductal carcinoma of the right breast - stage IIA - cT2,cN0,cM0      Oncologic Treatment To be determined      Pathology 19:  RIGHT BREAST MASS, NEEDLE CORE BIOPSIES:   -  Invasive ductal carcinoma, Michelle histologic grade 3, with medullary   features.        Glandular (acinar)/tubular differentiation:  Score 3.        Nuclear pleomorphism:  Score 3.        Mitotic rate:  Score 3.        Overall grade:  Grade 3.        Size of tumor as measured from the H&E stain slides:  1.0 cm.     ER:  Positive, intermediate staining, 25% of cells   NH:  Negative   HER2:  Negative (0+)   Ki-67 proliferation rate:  Positive, 90% of cells         Subjective:    History of Present Illness: Ms. Win is a 34 y.o. female who presents for evaluation and management of invasive ductal carcinoma of the right breast.    - presenting symptoms was a palpable mass in the right breast. She notes that she had a clinical breast exam in 2019, but nothing was palpated at this time. The mass appears to have developed in the last 2-3 months.  - mammogram (19) revealed a mass in the right breast. Ultrasound (19) revealed a "33 mm x 32 mm x 21 mm mass with microlobulated margins seen in the right breast, 7 cm from the nipple."  - ultrasound-guided biopsy of the mass (19) revealed an ER-positive, NH/HER2-negative invasive ductal carcinoma, grade 3, with ki-67 of 90%  - she underwent a  section on 19 for birth of her second child.  - today, she is very anxious about her pathology results. She denies shortness of breath, chest pain, nausea, vomiting, diarrhea, constipation. She " "denies unintentional weight loss, back pain.    Gynecologic history:  - age of menarche: 14  - history of oral contraceptives: yes, but discontinued in early 20s  - age of first live birth: 31    Family history:  - she reports multiple paternal aunts with breast cancer.    Past medical, surgical, family, and social histories have been reviewed and updated below.    Past Medical History:   Past Medical History:   Diagnosis Date    Abnormal Pap smear of cervix     Allergy     seasonal    Anorexia     Bulimia     Depression     Fever blister     Hypertension     Mastitis     Migraine headache        Past Surgical History:   Past Surgical History:   Procedure Laterality Date    BONE MARROW ASPIRATION      x 3    CERVICAL BIOPSY  W/ LOOP ELECTRODE EXCISION       SECTION  2016     SECTION N/A 2019    Procedure:  SECTION;  Surgeon: Kirit Hernandez MD;  Location: [facility];  Service: OB/GYN;  Laterality: N/A;    COLPOSCOPY      SHOULDER SURGERY Left     x 2    SINUS SURGERY      age 19    TONSILLECTOMY         Family History:   Family History   Problem Relation Age of Onset    Cancer Maternal Grandmother 40        cervical    Cervical cancer Mother     Breast cancer Neg Hx     Colon cancer Neg Hx     Ovarian cancer Neg Hx     Melanoma Neg Hx        Social History:  reports that she has never smoked. She has never used smokeless tobacco. She reports that she does not drink alcohol or use drugs.    Allergies:  Review of patient's allergies indicates:   Allergen Reactions    Amoxicillin      hives    Diazepam Anxiety and Other (See Comments)     "makes hyper"    Penicillins Rash and Hives       Medications:  Current Outpatient Medications   Medication Sig Dispense Refill    albuterol (VENTOLIN HFA) 90 mcg/actuation inhaler Inhale 2 puffs into the lungs every 6 (six) hours as needed for Wheezing. Rescue 18 g 0    azithromycin (Z-PADILLA) 250 MG tablet TAKE 2 " TABLETS BY MOUTH ON DAY 1, THEN 1 TABLET BY MOUTH ONCE DAILY ON DAYS 2-4 6 tablet 0    citalopram (CELEXA) 10 MG tablet Take 1 tablet (10 mg total) by mouth once daily. 30 tablet 2    dextroamphetamine-amphetamine (ADDERALL XR) 25 MG 24 hr capsule Take 1 capsule (25 mg total) by mouth every morning. 30 capsule 0    loratadine (CLARITIN) 10 mg tablet Take 10 mg by mouth once daily.      OPW TEST CLAIM - DO NOT FILL Take by mouth. OPW test claim. Do not fill. 1 each 0    predniSONE (DELTASONE) 20 MG tablet TAKE 2 TABLETS BY MOUTH ONCE DAILY 6 tablet 0    tranexamic acid (LYSTEDA) 650 mg tablet Take 2 tablets (1,300 mg total) by mouth 3 (three) times daily. 30 tablet 3    triamcinolone acetonide 0.1% (KENALOG) 0.1 % cream apply to affected area twice daily 454 g 3    valACYclovir (VALTREX) 1000 MG tablet Take 1 tablet (1,000 mg total) by mouth every 12 (twelve) hours. 60 tablet 0     Current Facility-Administered Medications   Medication Dose Route Frequency Provider Last Rate Last Dose    onabotulinumtoxina injection 200 Units  200 Units Intramuscular Q90 Days Dillon Gonzalez MD   200 Units at 12/06/19 1456       Review of Systems   Constitutional: Negative for fatigue.   HENT: Negative for sore throat.    Eyes: Negative for visual disturbance.   Respiratory: Negative for cough and shortness of breath.    Cardiovascular: Negative for chest pain.   Gastrointestinal: Negative for abdominal pain, constipation, diarrhea, nausea and vomiting.   Genitourinary: Negative for dysuria.   Musculoskeletal: Negative for back pain.   Skin: Negative for rash.        Right breast mass   Neurological: Negative for headaches.   Hematological: Negative for adenopathy.   Psychiatric/Behavioral: The patient is not nervous/anxious.        ECOG Performance Status:   ECOG SCORE 0       Objective:      Vitals:   Vitals:    12/18/19 0859   BP: (!) 136/97   Pulse: 86   Resp: 16   Temp: 97.7 °F (36.5 °C)   SpO2: 100%   Weight: 76.2  "kg (168 lb)   Height: 5' 5" (1.651 m)     BMI: Body mass index is 27.96 kg/m².    Physical Exam   Constitutional: She is oriented to person, place, and time. She appears well-developed and well-nourished.   HENT:   Head: Normocephalic and atraumatic.   Eyes: Pupils are equal, round, and reactive to light. EOM are normal.   Neck: Normal range of motion. Neck supple.   Cardiovascular: Normal rate and regular rhythm.   Pulmonary/Chest: Effort normal and breath sounds normal.       Abdominal: Soft. Bowel sounds are normal.   Musculoskeletal: Normal range of motion. She exhibits no edema.   Lymphadenopathy:     She has no axillary adenopathy.        Right: No supraclavicular adenopathy present.        Left: No supraclavicular adenopathy present.   Neurological: She is alert and oriented to person, place, and time.   Skin: Skin is warm and dry.   Psychiatric: She has a normal mood and affect. Her behavior is normal. Judgment and thought content normal.   Nursing note and vitals reviewed.      Laboratory Data:  Labs have been reviewed.        Imaging:     Mammogram (12/11/19):  - There is a mass with microlobulated margins seen in the right breast, 7 cm from the nipple.   - US Breast Right Limited  There is a 33 mm x 32 mm x 21 mm mass with microlobulated margins seen in the right breast, 7 cm from the nipple. The mass correlates with the palpable mass reported by the patient.   No axillary adenopathy.  - Impression:  Right  Mass: Right breast mass. Assessment: 4 - Suspicious finding. Biopsy is recommended.      BI-RADS Category:   Right: 4 - Suspicious  Overall: 4 - Suspicious    Assessment:       1. Invasive ductal carcinoma of right breast    2. Menorrhagia with regular cycle    3. Anxiety disorder, unspecified type    4. Advance care planning           Plan:     1. Invasive ductal carcinoma of the right breast - stage IIA - cT2,cN0,cM0  - mammogram (12/11/19) revealed a mass in the right breast. Ultrasound (12/11/19) " "revealed a "33 mm x 32 mm x 21 mm mass with microlobulated margins seen in the right breast, 7 cm from the nipple."  - ultrasound-guided biopsy of the mass (12/13/19) revealed an ER-positive, ID/HER2-negative invasive ductal carcinoma, grade 3, with ki-67 of 90%  - she is scheduled to see breast surgeon Dr. Ayers tomorrow, 12/19/19. Definitive therapeutic plan will be determined after her appointment with him. The main question is whether to proceed with upfront surgery vs neoadjuvant chemotherapy.  - refer to cancer genetics as well. She qualifies for genetic testing due to her age at time of diagnosis.  - return to clinic within a week to determine next steps.    2. Advance Care Planning     Power of   I initiated the process of advance care planning today and explained the importance of this process to the patient.  I introduced the concept of advance directives to the patient, as well. Then the patient received detailed information about the importance of designating a Health Care Power of  (HCPOA). She was also instructed to communicate with this person about their wishes for future healthcare, should she become sick and lose decision-making capacity. The patient has not previously appointed a HCPOA. After our discussion, the patient has decided to complete a HCPOA and has appointed her  Stewart Norman ([phone]), mother iRchie Win ([phone]), and friend/coworker Mary Ann Suet ([phone]). I spent a total time of 16 minutes discussing this issue with the patient.        - return to clinic within a week to determine next steps.    Raman Mcpherson M.D.  Hematology/Oncology  [facility] [address], [address]  [place] [address]  Phone: [phone]  Fax: [phone]  "

## 2019-12-18 NOTE — LETTER
December 18, 2019      Cruzito Craven MD  [address]  [address]  [facility] [address]           [facility] Hematology Oncology  [address]  [facility] [address]  Phone: [phone]          Patient: Yolanda Win   MR Number: [id number]   YOB: 1985   Date of Visit: 12/18/2019       Dear Dr. Cruzito Craven:    Thank you for referring Yolanda Win to me for evaluation. Attached you will find relevant portions of my assessment and plan of care.    If you have questions, please do not hesitate to call me. I look forward to following Yolanda Win along with you.    Sincerely,    Raman Mcpherson MD    Enclosure  CC:  No Recipients    If you would like to receive this communication electronically, please contact [email or web address] or [phone] to request more information on [employer] Link access.    For providers and/or their staff who would like to refer a patient to [facility], please contact us through our one-stop-shop provider referral line, [facility] at [phone].    If you feel you have received this communication in error or would no longer like to receive these types of communications, please e-mail [email or web address]

## 2019-12-18 NOTE — PROGRESS NOTES
Friend of patient April reached out for appt for patient with cancer diagnosis. Pt was scheduled for 12/19/2019 to see Dr. Ayers. E mailed information and appt information to patient. Reviewed location with April.

## 2019-12-19 ENCOUNTER — PATIENT MESSAGE (OUTPATIENT)
Dept: OBSTETRICS AND GYNECOLOGY | Facility: CLINIC | Age: 34
End: 2019-12-19

## 2019-12-19 ENCOUNTER — LAB VISIT (OUTPATIENT)
Dept: LAB | Facility: HOSPITAL | Age: 34
End: 2019-12-19
Attending: SURGERY
Payer: COMMERCIAL

## 2019-12-19 ENCOUNTER — TELEPHONE (OUTPATIENT)
Dept: OBSTETRICS AND GYNECOLOGY | Facility: CLINIC | Age: 34
End: 2019-12-19

## 2019-12-19 ENCOUNTER — DOCUMENTATION ONLY (OUTPATIENT)
Dept: SURGERY | Facility: CLINIC | Age: 34
End: 2019-12-19

## 2019-12-19 ENCOUNTER — PATIENT MESSAGE (OUTPATIENT)
Dept: PSYCHIATRY | Facility: CLINIC | Age: 34
End: 2019-12-19

## 2019-12-19 ENCOUNTER — OFFICE VISIT (OUTPATIENT)
Dept: SURGERY | Facility: CLINIC | Age: 34
End: 2019-12-19
Payer: COMMERCIAL

## 2019-12-19 VITALS
DIASTOLIC BLOOD PRESSURE: 97 MMHG | HEART RATE: 83 BPM | WEIGHT: 169.31 LBS | BODY MASS INDEX: 28.18 KG/M2 | TEMPERATURE: 97 F | SYSTOLIC BLOOD PRESSURE: 154 MMHG

## 2019-12-19 DIAGNOSIS — C50.911 INFILTRATING DUCTAL CARCINOMA OF RIGHT BREAST: Primary | ICD-10-CM

## 2019-12-19 DIAGNOSIS — C50.911 INFILTRATING DUCTAL CARCINOMA OF RIGHT BREAST: ICD-10-CM

## 2019-12-19 DIAGNOSIS — Z17.0 MALIGNANT NEOPLASM OF UPPER-OUTER QUADRANT OF RIGHT BREAST IN FEMALE, ESTROGEN RECEPTOR POSITIVE: ICD-10-CM

## 2019-12-19 DIAGNOSIS — C50.411 MALIGNANT NEOPLASM OF UPPER-OUTER QUADRANT OF RIGHT BREAST IN FEMALE, ESTROGEN RECEPTOR POSITIVE: ICD-10-CM

## 2019-12-19 PROCEDURE — 99245 OFF/OP CONSLTJ NEW/EST HI 55: CPT | Mod: S$GLB,,, | Performed by: SURGERY

## 2019-12-19 PROCEDURE — 99999 PR PBB SHADOW E&M-EST. PATIENT-LVL III: ICD-10-PCS | Mod: PBBFAC,,, | Performed by: SURGERY

## 2019-12-19 PROCEDURE — 99999 PR PBB SHADOW E&M-EST. PATIENT-LVL III: CPT | Mod: PBBFAC,,, | Performed by: SURGERY

## 2019-12-19 PROCEDURE — 36415 COLL VENOUS BLD VENIPUNCTURE: CPT

## 2019-12-19 PROCEDURE — 99245 PR OFFICE CONSULTATION,LEVEL V: ICD-10-PCS | Mod: S$GLB,,, | Performed by: SURGERY

## 2019-12-19 NOTE — PROGRESS NOTES
Subjective:       Patient ID: Yolanda Win is a 34 y.o. female right handed who presents with no significant medical history who presents today with a newly felt right breast mass 11 o'clock position.  This mass was discovered by the patient with self examination.  She states it was there but she was not concerning because she felt it was engorgement associated with breastfeeding. She subsequently underwent a mammogram and US on 19 reviling a 33mm X 32mm x 21mm mass.  The mass was biopsied and sent to pathology for receptor testing.  She is a nonsmoker.     Family HX: significant for breast cancer on paternal side and mother was treated for irregular findings on endometrial biopsy     Menstrual Hx: She is a  children are 3 years and 8 months old and were both breast fed. Menarche began at age 12 and she has had irregular periods ranging from 17-25 days. She received fertility treatment for her second pregnancy but does not remember the treatment she received.         Pathology: Invasive ductal carcinoma grade 3. ER positive (25%) AL negative Her2/erlin negative Ki-67 90%     Chief Complaint: Breast Cancer      Past Medical History:   Diagnosis Date    Abnormal Pap smear of cervix     Allergy     seasonal    Anorexia     Bulimia     Depression     Fever blister     Hypertension     Invasive ductal carcinoma of right breast 2019    Mastitis     Migraine headache      Past Surgical History:   Procedure Laterality Date    BONE MARROW ASPIRATION      x 3    CERVICAL BIOPSY  W/ LOOP ELECTRODE EXCISION       SECTION  2016     SECTION N/A 2019    Procedure:  SECTION;  Surgeon: Kirit Hernandez MD;  [facility]  Service: OB/GYN;  Laterality: N/A;    COLPOSCOPY      SHOULDER SURGERY Left     x 2    SINUS SURGERY      age 19    TONSILLECTOMY       Family History   Problem Relation Age of Onset    Cancer Maternal Grandmother 40        cervical     Cervical cancer Mother     Breast cancer Neg Hx     Colon cancer Neg Hx     Ovarian cancer Neg Hx     Melanoma Neg Hx      Social History     Socioeconomic History    Marital status:      Spouse name: Not on file    Number of children: Not on file    Years of education: Not on file    Highest education level: Not on file   Occupational History    Not on file   Social Needs    Financial resource strain: Not hard at all    Food insecurity:     Worry: Never true     Inability: Never true    Transportation needs:     Medical: No     Non-medical: No   Tobacco Use    Smoking status: Never Smoker    Smokeless tobacco: Never Used   Substance and Sexual Activity    Alcohol use: No     Frequency: 2-3 times a week     Drinks per session: 1 or 2     Binge frequency: Monthly     Comment: pre pregnancy     Drug use: No    Sexual activity: Yes     Partners: Male     Birth control/protection: None   Lifestyle    Physical activity:     Days per week: 6 days     Minutes per session: 80 min    Stress: Only a little   Relationships    Social connections:     Talks on phone: More than three times a week     Gets together: Once a week     Attends [employer] service: Not on file     Active member of club or organization: No     Attends meetings of clubs or organizations: Never     Relationship status:    Other Topics Concern    Are you pregnant or think you may be? Not Asked    Breast-feeding Not Asked   Social History Narrative    Not on file       Current Outpatient Medications   Medication Sig Dispense Refill    albuterol (VENTOLIN HFA) 90 mcg/actuation inhaler Inhale 2 puffs into the lungs every 6 (six) hours as needed for Wheezing. Rescue 18 g 0    azithromycin (Z-PADILLA) 250 MG tablet TAKE 2 TABLETS BY MOUTH ON DAY 1, THEN 1 TABLET BY MOUTH ONCE DAILY ON DAYS 2-4 6 tablet 0    citalopram (CELEXA) 10 MG tablet Take 1 tablet (10 mg total) by mouth once daily. 30 tablet 2     "dextroamphetamine-amphetamine (ADDERALL XR) 25 MG 24 hr capsule Take 1 capsule (25 mg total) by mouth every morning. 30 capsule 0    loratadine (CLARITIN) 10 mg tablet Take 10 mg by mouth once daily.      OPW TEST CLAIM - DO NOT FILL Take by mouth. OPW test claim. Do not fill. 1 each 0    predniSONE (DELTASONE) 20 MG tablet TAKE 2 TABLETS BY MOUTH ONCE DAILY 6 tablet 0    tranexamic acid (LYSTEDA) 650 mg tablet Take 2 tablets (1,300 mg total) by mouth 3 (three) times daily. 30 tablet 3    triamcinolone acetonide 0.1% (KENALOG) 0.1 % cream apply to affected area twice daily 454 g 3    valACYclovir (VALTREX) 1000 MG tablet Take 1 tablet (1,000 mg total) by mouth every 12 (twelve) hours. 60 tablet 0     Current Facility-Administered Medications   Medication Dose Route Frequency Provider Last Rate Last Dose    onabotulinumtoxina injection 200 Units  200 Units Intramuscular Q90 Days Dillon Gonzalez MD   200 Units at 12/06/19 1456     Review of patient's allergies indicates:   Allergen Reactions    Amoxicillin      hives    Diazepam Anxiety and Other (See Comments)     "makes hyper"    Penicillins Rash and Hives       Review of Systems    Objective:      Vitals:    12/19/19 1015   BP: (!) 154/97   BP Location: Right arm   Patient Position: Sitting   BP Method: Medium (Automatic)   Pulse: 83   Temp: 96.6 °F (35.9 °C)   TempSrc: Oral   Weight: 76.8 kg (169 lb 5 oz)     Physical Exam    Mammogram and diagnostic images and reports reviewed.  Pathology reviewed.     Assessment:       No diagnosis found.    Plan:     1.) breast MRI  2.)genetic testing/oncotyping     3.)Refer to Dr Ny for neoadjuvant chemotherapy  4.)Left port placement Friday 12/27/19   5.) PET CT/ CT chest abdomin pelvis   6.) Refer for preoperative physical therapy       I have personally taken the history and examined this patient, and I agree with the history, physical exam, assessment, and plan as written and outlined and stated above per " the medical student.  The patient presents with her  Stewart and her mother Richie for consultation.  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  The patient felt the mass in the upper outer quadrant of the right breast after she stopped breast feeding her 2nd child.  The masses in the right upper outer quadrant 11:00 position.  Diagnostic imaging suggests the mass to be 3.3 cm in size approximately 7 cm from the nipple.  Clinically it measures approximately 5 cm in size.    Needle biopsy revealed a grade 3 essentially triple negative breast which was estrogen intermediately staining positive with only 25% staining.  It was progesterone receptor negative and her 2 Barbara negative with the Ki-67 proliferative index of 90%.  Given the low amount of estrogen intermediate staining positivity a 25%, I discussed the case with Dr. Gokul Ny and we agreed to treat this as an aggressive subtype of breast cancer especially with the patient's young age of 34 and she will be offered neoadjuvant up-front primary chemotherapy essentially in the triple negative type setting with anticipated ACT chemotherapy preoperatively.    She denies any weight loss or bony or hip pain on review of systems.  She is asymptomatic upon review of systems.  Family history reveal several family members on the paternal side with breast cancer so she will need to undergo genetic testing because of that and because of her young age.    She states that she does not want anymore children at this point so she is not interested in fertility preservation and preparation of the anticipated neoadjuvant chemotherapy.    A/P:  The patient at this point feels that she will ultimately want and desire a bilateral mastectomy.  She currently is a triple D size breasts and would want to be smaller.  We discussed the potential need for postmastectomy radiotherapy in a young patient with essentially a triple negative type behaving tumor especially if she  has any residual disease following neoadjuvant chemotherapy.  If we anticipate potential postmastectomy radiotherapy she may ultimately be better served with autologous tissue reconstruction.    At this point she will be referred to Dr. Gokul Ny who will see her next week to initiate planning for neoadjuvant primary upfront chemotherapy.  We will order genetic testing and refer her for genetic counseling.  Will obtain a breast MRI to assess the true size of the breast mass which I feel is approximately 5 cm clinically.  She states it has subjectively and objectively increased in size over the past several weeks as well.  Will order an Oncotype Tx study. I had discussed this with Dr. Ny and would not influence the use of neoadjuvant chemotherapy given her young age and low percentage of estrogen positivity but the patient asked for it and would like to have that result for prognostic information and she states that she would take the neoadjuvant chemotherapy even if her Oncotype Dx were to reveal a low risk score.  She will be consented and scheduled for a left-sided chest wall Port-A-Cath insertion on Friday 1227 2019  Will stage or with either a PET-CT whole-body scan or CT scans of the chest abdomen and pelvis with bone scan based on her insurance status and network provisions.  Will refer her to physical therapy preoperatively as well  The patient will also see Dr. Karoline Polanco in addition to Dr. Gokul Ny who will initiate her neoadjuvant chemotherapy.  Time spent with the patient and her family was greater than 60 min with greater than 50% of that time in counseling.    Addendum:  Maddie, the patient was informed by me today that she has a BRCA1 POSITIVE genetic mutation.  Please refer her to [facility] for a formal genetic counseling consultation.  She will continue on with her pre-op chemo and then pursue bilateral mastectomies as recommend now based on genetic testing results.  Patient is scheduled for  "consultation at [facility].    Maddie, could you e-mail her her pathology report by scanning it and emailing it, or somehow figure a way to release it to her.  I cannot figure out how to "result release" it since I did not order the original biopsy.    Thanks, [facility]    JENNIFER Hodgson,  I told her that her PET was denied with the negative chest CT and bone scan in an attempt to evaluate the 1.3 cm area seen in her sternum on breast MRI.  Pt states she had had a hx of a benign cyst of the humerus in her remote past and she does not seem to concerned about the finding in her sternum.  I think if a 1.3 cm area was real it would have shown up in either the CT or bone scan so I am not to worried about it nor is the patient.    [facility]    Addendum:    Oncotype DX score was high risk at 57.  The patient was called and informed of the result confirming the rationale for neoadjuvant chemotherapy.  "

## 2019-12-19 NOTE — LETTER
December 22, 2019      Raman Mcpherson MD  [address]  [facility] [address]           [facility]  [address], [address]  [facility] [address]  Phone: [phone]  Fax: [phone]          Patient: Yolanda Win   MR Number: [id number]   YOB: 1985   Date of Visit: 12/19/2019       Dear Dr. Raman Mcpherson:    Thank you for referring Yolanda Win to me for evaluation. Attached you will find relevant portions of my assessment and plan of care.    If you have questions, please do not hesitate to call me. I look forward to following Yolanda Win along with you.    Sincerely,    Percy Ayers MD    Enclosure  CC:  No Recipients    If you would like to receive this communication electronically, please contact [email or web address] or [phone] to request more information on [email or web address] Link access.    For providers and/or their staff who would like to refer a patient to [facility], please contact us through our one-stop-shop provider referral line, [facility], at [phone].    If you feel you have received this communication in error or would no longer like to receive these types of communications, please e-mail [email or web address]

## 2019-12-19 NOTE — TELEPHONE ENCOUNTER
RN Navigator left a voicemail for patient requesting a return call to schedule an appointment with Dr. Polanco. Contact information provided. TAYLER Wright.

## 2019-12-19 NOTE — H&P (VIEW-ONLY)
Subjective:       Patient ID: Yolanda Win is a 34 y.o. female right handed who presents with no significant medical history who presents today with a newly felt right breast mass 11 o'clock position.  This mass was discovered by the patient with self examination.  She states it was there but she was not concerning because she felt it was engorgement associated with breastfeeding. She subsequently underwent a mammogram and US on 19 reviling a 33mm X 32mm x 21mm mass.  The mass was biopsied and sent to pathology for receptor testing.  She is a nonsmoker.     Family HX: significant for breast cancer on paternal side and mother was treated for irregular findings on endometrial biopsy     Menstrual Hx: She is a  children are 3 years and 8 months old and were both breast fed. Menarche began at age 12 and she has had irregular periods ranging from 17-25 days. She received fertility treatment for her second pregnancy but does not remember the treatment she received.         Pathology: Invasive ductal carcinoma grade 3. ER positive (25%) [place] negative Her2/erlin negative Ki-67 90%     Chief Complaint: Breast Cancer      Past Medical History:   Diagnosis Date    Abnormal Pap smear of cervix     Allergy     seasonal    Anorexia     Bulimia     Depression     Fever blister     Hypertension     Invasive ductal carcinoma of right breast 2019    Mastitis     Migraine headache      Past Surgical History:   Procedure Laterality Date    BONE MARROW ASPIRATION      x 3    CERVICAL BIOPSY  W/ LOOP ELECTRODE EXCISION       SECTION  2016     SECTION N/A 2019    Procedure:  SECTION;  Surgeon: Kirit Hernandez MD;  [facility]: [facility];  Service: OB/GYN;  Laterality: N/A;    COLPOSCOPY      SHOULDER SURGERY Left     x 2    SINUS SURGERY      age 19    TONSILLECTOMY       Family History   Problem Relation Age of Onset    Cancer Maternal Grandmother 40        cervical     Cervical cancer Mother     Breast cancer Neg Hx     Colon cancer Neg Hx     Ovarian cancer Neg Hx     Melanoma Neg Hx      Social History     Socioeconomic History    Marital status:      Spouse name: Not on file    Number of children: Not on file    Years of education: Not on file    Highest education level: Not on file   Occupational History    Not on file   Social Needs    Financial resource strain: Not hard at all    Food insecurity:     Worry: Never true     Inability: Never true    Transportation needs:     Medical: No     Non-medical: No   Tobacco Use    Smoking status: Never Smoker    Smokeless tobacco: Never Used   Substance and Sexual Activity    Alcohol use: No     Frequency: 2-3 times a week     Drinks per session: 1 or 2     Binge frequency: Monthly     Comment: pre pregnancy     Drug use: No    Sexual activity: Yes     Partners: Male     Birth control/protection: None   Lifestyle    Physical activity:     Days per week: 6 days     Minutes per session: 80 min    Stress: Only a little   Relationships    Social connections:     Talks on phone: More than three times a week     Gets together: Once a week     Attends [employer] service: Not on file     Active member of club or organization: No     Attends meetings of clubs or organizations: Never     Relationship status:    Other Topics Concern    Are you pregnant or think you may be? Not Asked    Breast-feeding Not Asked   Social History Narrative    Not on file       Current Outpatient Medications   Medication Sig Dispense Refill    albuterol (VENTOLIN HFA) 90 mcg/actuation inhaler Inhale 2 puffs into the lungs every 6 (six) hours as needed for Wheezing. Rescue 18 g 0    azithromycin (Z-PADILLA) 250 MG tablet TAKE 2 TABLETS BY MOUTH ON DAY 1, THEN 1 TABLET BY MOUTH ONCE DAILY ON DAYS 2-4 6 tablet 0    citalopram (CELEXA) 10 MG tablet Take 1 tablet (10 mg total) by mouth once daily. 30 tablet 2     "dextroamphetamine-amphetamine (ADDERALL XR) 25 MG 24 hr capsule Take 1 capsule (25 mg total) by mouth every morning. 30 capsule 0    loratadine (CLARITIN) 10 mg tablet Take 10 mg by mouth once daily.      OPW TEST CLAIM - DO NOT FILL Take by mouth. OPW test claim. Do not fill. 1 each 0    predniSONE (DELTASONE) 20 MG tablet TAKE 2 TABLETS BY MOUTH ONCE DAILY 6 tablet 0    tranexamic acid (LYSTEDA) 650 mg tablet Take 2 tablets (1,300 mg total) by mouth 3 (three) times daily. 30 tablet 3    triamcinolone acetonide 0.1% (KENALOG) 0.1 % cream apply to affected area twice daily 454 g 3    valACYclovir (VALTREX) 1000 MG tablet Take 1 tablet (1,000 mg total) by mouth every 12 (twelve) hours. 60 tablet 0     Current Facility-Administered Medications   Medication Dose Route Frequency Provider Last Rate Last Dose    onabotulinumtoxina injection 200 Units  200 Units Intramuscular Q90 Days Dillon Gonzalez MD   200 Units at 12/06/19 1456     Review of patient's allergies indicates:   Allergen Reactions    Amoxicillin      hives    Diazepam Anxiety and Other (See Comments)     "makes hyper"    Penicillins Rash and Hives       Review of Systems    Objective:      Vitals:    12/19/19 1015   BP: (!) 154/97   BP Location: Right arm   Patient Position: Sitting   BP Method: Medium (Automatic)   Pulse: 83   Temp: 96.6 °F (35.9 °C)   TempSrc: Oral   Weight: 76.8 kg (169 lb 5 oz)     Physical Exam    Mammogram and diagnostic images and reports reviewed.  Pathology reviewed.     Assessment:       No diagnosis found.    Plan:     1.) breast MRI  2.)genetic testing/oncotyping     3.)Refer to Dr Ny for neoadjuvant chemotherapy  4.)Left port placement Friday 12/27/19   5.) PET CT/ CT chest abdomin pelvis   6.) Refer for preoperative physical therapy       I have personally taken the history and examined this patient, and I agree with the history, physical exam, assessment, and plan as written and outlined and stated above per " the medical student.  The patient presents with her  Stewart and her mother Richie for consultation.  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  The patient felt the mass in the upper outer quadrant of the right breast after she stopped breast feeding her 2nd child.  The masses in the right upper outer quadrant 11:00 position.  Diagnostic imaging suggests the mass to be 3.3 cm in size approximately 7 cm from the nipple.  Clinically it measures approximately 5 cm in size.    Needle biopsy revealed a grade 3 essentially triple negative breast which was estrogen intermediately staining positive with only 25% staining.  It was progesterone receptor negative and her 2 Barbara negative with the Ki-67 proliferative index of 90%.  Given the low amount of estrogen intermediate staining positivity a 25%, I discussed the case with Dr. Gokul Ny and we agreed to treat this as an aggressive subtype of breast cancer especially with the patient's young age of 34 and she will be offered neoadjuvant up-front primary chemotherapy essentially in the triple negative type setting with anticipated ACT chemotherapy preoperatively.    She denies any weight loss or bony or hip pain on review of systems.  She is asymptomatic upon review of systems.  Family history reveal several family members on the paternal side with breast cancer so she will need to undergo genetic testing because of that and because of her young age.    She states that she does not want anymore children at this point so she is not interested in fertility preservation and preparation of the anticipated neoadjuvant chemotherapy.    A/P:  The patient at this point feels that she will ultimately want and desire a bilateral mastectomy.  She currently is a triple D size breasts and would want to be smaller.  We discussed the potential need for postmastectomy radiotherapy in a young patient with essentially a triple negative type behaving tumor especially if she  has any residual disease following neoadjuvant chemotherapy.  If we anticipate potential postmastectomy radiotherapy she may ultimately be better served with autologous tissue reconstruction.    At this point she will be referred to Dr. Gokul Ny who will see her next week to initiate planning for neoadjuvant primary upfront chemotherapy.  We will order genetic testing and refer her for genetic counseling.  Will obtain a breast MRI to assess the true size of the breast mass which I feel is approximately 5 cm clinically.  She states it has subjectively and objectively increased in size over the past several weeks as well.  Will order an Oncotype Tx study. I had discussed this with Dr. Ny and would not influence the use of neoadjuvant chemotherapy given her young age and low percentage of estrogen positivity but the patient asked for it and would like to have that result for prognostic information and she states that she would take the neoadjuvant chemotherapy even if her Oncotype Dx were to reveal a low risk score.  She will be consented and scheduled for a left-sided chest wall Port-A-Cath insertion on Friday 1227 2019  Will stage or with either a PET-CT whole-body scan or CT scans of the chest abdomen and pelvis with bone scan based on her insurance status and network provisions.  Will refer her to physical therapy preoperatively as well  The patient will also see Dr. Karoline Polanco in addition to Dr. Gokul Ny who will initiate her neoadjuvant chemotherapy.  Time spent with the patient and her family was greater than 60 min with greater than 50% of that time in counseling.

## 2019-12-19 NOTE — PROGRESS NOTES
Nurse Navigator Note:     Met with patient during her consult with Dr. Ayers.  Patient and I reviewed the information she discussed with Dr. Ayers, including treatment options, diagnosis, and future plans for workup. Patient and I went through the new patient binder, explained some of the information and why it is provided.     Also offered patient consults with our other specialty clinics: Dr. Polanco for gynecological health during treatment, our breast physical therapy department for pre-op and post-operative assessments, Dr. Johnson for psychological support, and Veena Ramirez for nutritional counseling. Explained to patient that all of these support services are completely optional. Discussed that physical therapy may call patient to offer pre-op appt, and what that appt would entail.     Patient was given a copy of her appointments, Dr. Ayers's card, and my card. Encouraged her to call me if she has any questions or concerns or would like to schedule any additional appointments. Verbalized understanding of all information.     Referral placed and message sent for Dr. Barahona or Dr. Johnson.

## 2019-12-20 DIAGNOSIS — C50.911 INFILTRATING DUCTAL CARCINOMA OF RIGHT BREAST: Primary | ICD-10-CM

## 2019-12-22 ENCOUNTER — PATIENT MESSAGE (OUTPATIENT)
Dept: SURGERY | Facility: HOSPITAL | Age: 34
End: 2019-12-22

## 2019-12-22 PROBLEM — C50.411 MALIGNANT NEOPLASM OF UPPER-OUTER QUADRANT OF RIGHT BREAST IN FEMALE, ESTROGEN RECEPTOR POSITIVE: Status: ACTIVE | Noted: 2019-12-22

## 2019-12-22 PROBLEM — Z17.0 MALIGNANT NEOPLASM OF UPPER-OUTER QUADRANT OF RIGHT BREAST IN FEMALE, ESTROGEN RECEPTOR POSITIVE: Status: ACTIVE | Noted: 2019-12-22

## 2019-12-23 ENCOUNTER — PATIENT MESSAGE (OUTPATIENT)
Dept: SURGERY | Facility: HOSPITAL | Age: 34
End: 2019-12-23

## 2019-12-23 ENCOUNTER — OFFICE VISIT (OUTPATIENT)
Dept: PSYCHIATRY | Facility: CLINIC | Age: 34
End: 2019-12-23
Payer: COMMERCIAL

## 2019-12-23 ENCOUNTER — INITIAL CONSULT (OUTPATIENT)
Dept: PSYCHIATRY | Facility: CLINIC | Age: 34
End: 2019-12-23
Payer: COMMERCIAL

## 2019-12-23 VITALS
SYSTOLIC BLOOD PRESSURE: 125 MMHG | BODY MASS INDEX: 27.85 KG/M2 | HEIGHT: 65 IN | HEART RATE: 95 BPM | WEIGHT: 167.13 LBS | DIASTOLIC BLOOD PRESSURE: 81 MMHG

## 2019-12-23 DIAGNOSIS — Z86.59 HISTORY OF BULIMIA NERVOSA: ICD-10-CM

## 2019-12-23 DIAGNOSIS — F41.1 GENERALIZED ANXIETY DISORDER: Primary | ICD-10-CM

## 2019-12-23 DIAGNOSIS — Z86.59 HISTORY OF ANOREXIA NERVOSA: ICD-10-CM

## 2019-12-23 DIAGNOSIS — Z17.0 MALIGNANT NEOPLASM OF UPPER-OUTER QUADRANT OF RIGHT BREAST IN FEMALE, ESTROGEN RECEPTOR POSITIVE: ICD-10-CM

## 2019-12-23 DIAGNOSIS — F90.0 ADHD (ATTENTION DEFICIT HYPERACTIVITY DISORDER), INATTENTIVE TYPE: ICD-10-CM

## 2019-12-23 DIAGNOSIS — C50.411 MALIGNANT NEOPLASM OF UPPER-OUTER QUADRANT OF RIGHT BREAST IN FEMALE, ESTROGEN RECEPTOR POSITIVE: ICD-10-CM

## 2019-12-23 DIAGNOSIS — Z86.59 HISTORY OF POSTTRAUMATIC STRESS DISORDER (PTSD): ICD-10-CM

## 2019-12-23 DIAGNOSIS — Z87.898 HISTORY OF INSOMNIA: ICD-10-CM

## 2019-12-23 DIAGNOSIS — C50.911 INFILTRATING DUCTAL CARCINOMA OF RIGHT BREAST: ICD-10-CM

## 2019-12-23 DIAGNOSIS — C50.911 INFILTRATING DUCTAL CARCINOMA OF RIGHT BREAST: Primary | ICD-10-CM

## 2019-12-23 PROCEDURE — 99999 PR PBB SHADOW E&M-EST. PATIENT-LVL III: ICD-10-PCS | Mod: PBBFAC,,, | Performed by: PSYCHIATRY & NEUROLOGY

## 2019-12-23 PROCEDURE — 90791 PR PSYCHIATRIC DIAGNOSTIC EVALUATION: ICD-10-PCS | Mod: S$GLB,,, | Performed by: PSYCHOLOGIST

## 2019-12-23 PROCEDURE — 99215 OFFICE O/P EST HI 40 MIN: CPT | Mod: S$GLB,,, | Performed by: PSYCHIATRY & NEUROLOGY

## 2019-12-23 PROCEDURE — 99215 PR OFFICE/OUTPT VISIT, EST, LEVL V, 40-54 MIN: ICD-10-PCS | Mod: S$GLB,,, | Performed by: PSYCHIATRY & NEUROLOGY

## 2019-12-23 PROCEDURE — 99999 PR PBB SHADOW E&M-EST. PATIENT-LVL II: CPT | Mod: PBBFAC,,, | Performed by: PSYCHOLOGIST

## 2019-12-23 PROCEDURE — 99999 PR PBB SHADOW E&M-EST. PATIENT-LVL III: CPT | Mod: PBBFAC,,, | Performed by: PSYCHIATRY & NEUROLOGY

## 2019-12-23 PROCEDURE — 99999 PR PBB SHADOW E&M-EST. PATIENT-LVL II: ICD-10-PCS | Mod: PBBFAC,,, | Performed by: PSYCHOLOGIST

## 2019-12-23 PROCEDURE — 3008F BODY MASS INDEX DOCD: CPT | Mod: CPTII,S$GLB,, | Performed by: PSYCHIATRY & NEUROLOGY

## 2019-12-23 PROCEDURE — 3008F PR BODY MASS INDEX (BMI) DOCUMENTED: ICD-10-PCS | Mod: CPTII,S$GLB,, | Performed by: PSYCHIATRY & NEUROLOGY

## 2019-12-23 PROCEDURE — 90791 PSYCH DIAGNOSTIC EVALUATION: CPT | Mod: S$GLB,,, | Performed by: PSYCHOLOGIST

## 2019-12-23 RX ORDER — CITALOPRAM 20 MG/1
20 TABLET, FILM COATED ORAL DAILY
Qty: 30 TABLET | Refills: 2 | Status: SHIPPED | OUTPATIENT
Start: 2019-12-23 | End: 2020-01-27 | Stop reason: SDUPTHER

## 2019-12-23 RX ORDER — ALPRAZOLAM 0.25 MG/1
0.25 TABLET ORAL 3 TIMES DAILY
Qty: 20 TABLET | Refills: 0 | Status: SHIPPED | OUTPATIENT
Start: 2019-12-23 | End: 2020-01-24

## 2019-12-23 NOTE — Clinical Note
Thank you for the referral. Will continue to follow this patient for anxiety. Her psych meds will be managed by her regular psychiatrist. TNM

## 2019-12-23 NOTE — PROGRESS NOTES
OUTPATIENT PHYSICAL THERAPY  PRE-OP EVALUATION    Name: Yolanda Win  Clinic Number: [id number]    Therapy Diagnosis:   Encounter Diagnoses   Name Primary?    Infiltrating ductal carcinoma of right breast Yes    Malignant neoplasm of upper-outer quadrant of right breast in female, estrogen receptor positive     At risk for lymphedema      Physician: Percy Ayers MD    Physician Orders: PT Eval and Treat   Medical Diagnosis: right breast cancer  Evaluation Date: 12/26/2019  Authorization period Expiration: 12/31/19  Plan of Care Certification Period: 12/26/19  Insurance: [facility] [employer]    Visit #: 1/ Visits authorized: 1  Time In:8:00 AM  Time Out: 8:45 AM  Total Billable Time: 45 minutes    Precautions: cancer    History   History of Present Illness: Yolanda is a 34 y.o. female that presents to  [facility] at [facility] secondary to dx of right breast cancer.    Dx: right breast IDC, grade 3, ER (+), DE (-), HER2 (-)  Surgery date: 12/27/19 for insertion of port  Chemotherapy: Jesús-Adjuvant chemotherapy to start next week    Pt presents today for baseline measurements to aid in the early detection of lymphedema, UE muscle testing, postural and ROM assessment along with education of risk of lymphedema and surgical precautions post surgery. Circumferential measurements will be taken today of BL UEs for early detection of lymphedema post surgery. Pt will also be instructed in exercises to perform pre and post-surgery to insure best outcomes.     Past Medical History:   Past Medical History:   Diagnosis Date    Abnormal Pap smear of cervix 2009    Allergy     seasonal    Anorexia     Bulimia     Depression     Fever blister     Hypertension     Invasive ductal carcinoma of right breast 12/18/2019    Mastitis     Migraine headache        Past Surgical History:   Yolanda Win  has a past surgical history that includes Tonsillectomy; Bone marrow  "aspiration; Colposcopy; Cervical biopsy w/ loop electrode excision;  section (2016); Shoulder surgery (Left); Sinus surgery; and  section (N/A, 2019).    Medications:  Yolanda has a current medication list which includes the following prescription(s): albuterol, alprazolam, azithromycin, citalopram, dextroamphetamine-amphetamine, loratadine, OPW TEST CLAIM - DO NOT FILL, prednisone, tranexamic acid, triamcinolone acetonide 0.1%, and valacyclovir, and the following Facility-Administered Medications: onabotulinumtoxina.    Allergies:  Review of patient's allergies indicates:   Allergen Reactions    Amoxicillin      hives    Diazepam Anxiety and Other (See Comments)     "makes hyper"    Penicillins Rash and Hives          Hand dominance: Right handed  Prior Therapy: left shoulder surgeries; LBP, bilateral tarsal tunnel; pelvic floor PT  Nutrition:  Normal  Social History: Lives with family  Place of Residence (Steps/Adaptations): one story  Current functional status:  Independent with all aDL's  Exercise routine prior to onset : HIT workouts/After burn/ running  DME owned: None  Work:  Cancer Research at [employer]                         Subjective   Pt states: having intermittent pain right breast   Pain: 1/10 on VAS.     Objective   Mental status :oriented    Posture/Alignment   Postural examination/scapula alignment: Good Posture  Joint integrity: WFLs  Skin integrity: intact  Edema: none noted    Sensation: Light Touch: Intact           Proprioception: Intact  - appearance: well groomed     ROM:   UPPER EXTREMITY--AROM/PROM  (R) UE: WNLs  (L) UE: WNLs     Shoulder Range of Motion:   ACTIVE ROM LEFT RIGHT   Flexion 180 180   Abduction 180 180   Extension 80 80   IR/90deg 80 85   ER/90deg 90 90     Strength: manual muscle test grades below   Upper Extremity Strength   (L) UE (R) UE   Shoulder flexion: 5/5 5/5   Shoulder Abduction: 5/ 5/5   Shoulder IR 5/5 5/5   Shoulder ER 5/ 5/5 " "  Elbow flexion: 5/5 5/5   Elbow extension: 5/5 5/5   Lower Trap: 5/5 5/5   Middle Trap: 5/5 5/5    5/5 5/5       Baseline Measurements of BL UE's for early detection of Lymphedema:     LANDMARK RIGHT UE LEFT UE DIFFERENCE   E + 8" 37 cm 37 cm 0 cm   E + 6" 33.5 cm 33. cm 0.5 cm   E + 4" 31 cm 30.5 cm 0.5 cm   E + 2" 28.5 cm 28.5 cm 0 cm   Elbow 26 cm 26 cm 0 cm   W+ 8" 26.5 cm 26 cm 0.5 cm   W +  6" 25.5 cm 25 cm 0.5 cm   W + 4" 21 cm 21 cm 0 cm   Wrist 16.5 cm 16 cm 0.5 cm   DPC 21 cm 21 cm 0 cm   IP Thumb 6.5 cm 6.5 cm 0 cm         Coordination:   - fine motor: WFL  - UE coordination: intact     - LE coordination:  Not tested     Functional Mobility (Bed mobility, transfers)  Bed mobility: I =  independent   Roll to left: I  Roll to right: I  Supine to prone: I  Scooting to edge of bed: I  Supine to sit: I  Sit to supine: I  Transfers to bed: I  Transfers to toilet: I  Sit to stand:  I  Stand pivot:  I  Car transfers: I      ADL's:  Feeding: I = independent   Grooming: I  Hygiene: I  UB Dressing: I  LB Dressing: I  Toileting: I  Bathing: I    Gait Assessment:   - AD used: none  - Assistance: independent  - Distance: community distances       Endurance Deficit: none      Patient Education   - role of PT in multi - disciplinary team, goals for PT  - Pt was educated in lymphedema etiology and management plans.    - Pt was provided with written risk reductions and precautions for managing lymphedema.   - Reviewed DM drain care instructions.     ROM/lifting Precautions post surgery discussed -  until drains have been removed:  - do not lift affected arm above 90 degrees of shoulder flexion  - do not lift over 5 lbs  - do not pull or push heavy objects  - do not sleep on your stomach or surgery side     Written Home Exercises Provided and Patient Education: Handouts given   Pt was instructed in and performed therapeutic exercise for postural correction and alignment, stretching and soft tissue mobility, and " strengthening.     Exercises included: handout given    - exaggerated deep breathing and relaxation  - scapular retractions  - wrist circles  - elbow flexion/extension      Pt was able to demonstrate and report understanding and performance    Pt has no cultural, educational or language barriers to learning provided.    Functional Limitations Reporting     Category: mobility  Score: 0% Limitation       Assessment   This is a 34 y.o. female referred to outpatient physical therapy and presents with a medical diagnosis of right breast cancer and was seen today pre-operatively to assess strength and ROM of BL UEs, to take baseline circumferential measurements of BL UEs to aid in the early detection of lymphedema and provide pt education on exercises/precations post breast surgery. Pt does not exhibit any ROM impairments  Pt educated in lymphedema risks/precautions as well as ROM/lifting precautions post surgery - pt demonstrated/verbalized understanding. No goals established this visit as goals for PT will be established post surgery at follow up.      Anticipated barriers to physical therapy: None     Pt's spiritual, cultural and educational needs considered and pt agreeable to plan of care and goals as stated below:     Medical necessity is demonstrated by the following IMPAIRMENTS/PROMBLEM LIST:  History  Co-morbidities and personal factors that may impact the plan of care Co-morbidities:   history of cancer    Personal Factors:   no deficits     moderate   Examination  Body Structures and Functions, activity limitations and participation restrictions that may impact the plan of care Body Regions:   No Deficits    Body Systems:    No Deficits    Participation Restrictions:   No Deficits    Activity limitations:   Learning and applying knowledge  no deficits    General Tasks and Commands  no deficits    Communication  no deficits    Mobility  no deficits    Self care  no deficits    Domestic Life  no  deficits    Interactions/Relationships  no deficits    Life Areas  no deficits    Community and Social Life  no deficits         low   Clinical Presentation stable and uncomplicated low   Decision Making/ Complexity Score: low           Plan   Schedule patient for follow up with Physical therapy post surgery. Goals for therapy post surgery will be established at that time.     Therapist: Rosmery Mendoza, PT  12/26/2019

## 2019-12-23 NOTE — Clinical Note
Hey there! I will be follow Juanis for psychotherapy for anxiety and the influence of her history of eating disorders on her current functioning and outcomes of cancer treatment. I recommended that she talk to you about increasing her SSRI, and adding a different benzo. She has been taking an old Xanax script.  Please let me know any updates. Happy Holidays!Dr. Espitia
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
## 2019-12-23 NOTE — PATIENT INSTRUCTIONS
[email or web address]  Guided Meditation  Free Guided Meditation  Breathing meditation

## 2019-12-23 NOTE — PROGRESS NOTES
INFORMED CONSENT/ LIMITS of CONFIDENTIALITY: Prior to beginning the interview, the patient's identification was confirmed via name and date of birth. Yolanda Win  was informed of the possible risks and benefits of psychological interventions (e.g., counseling, psychotherapy, testing) and provided information regarding the handling of protected health records and   the limits of confidentiality, including the importance of reporting any suicidal or homicidal ideation to ensure safety of all parties. This provider explained the purpose of today's appointment and the patient was provided with time to ask questions regarding this information.  Acceptance and understanding of these conditions was expressed, and Yolanda Win freely consented to this evaluation.     PSYCHO-ONCOLOGY INTAKE    Diagnostic Interview - CPT 16063    Date: 2019  Site: [facility]     Evaluation Length (direct face-to-face time):  1 hour     Referral Source: Percy Ayers MD   Oncologist:   PCP: Cruzito Craven MD    Clinical status of patient: Outpatient    Yolanda Win, a 34 y.o. female, seen for initial evaluation visit.  Met with patient.    Chief complaint/reason for encounter: adjustment to illness, attention deficit, and anxiety    Medical/Surgical History:    Patient Active Problem List   Diagnosis    Rh negative status during pregnancy in third trimester    Anxiety disorder    ADHD (attention deficit hyperactivity disorder), inattentive type    History of posttraumatic stress disorder (PTSD)    History of insomnia    S/P  section    Intractable periodic headache syndrome    Decreased strength    Chronic bilateral low back pain without sciatica    Poor posture    Seasonal allergies    Menorrhagia with regular cycle    Infiltrating ductal carcinoma of right breast    Malignant neoplasm of upper-outer quadrant of right breast in female, estrogen receptor positive       Health Behaviors:        ETOH Use: Yes Past social, most recent excessive      Tobacco Use: No   Illicit Drug Use:  No     Prescription Misuse:No   Caffeine: minimal, Coffee- 3 shot in [place]; 2 Diet Cokes   Exercise:The patient engages in an overly vigorous exercise regimen.   Firearms:  No   Advanced directives:Yes     Family History:   Psychiatric illness: Yes Brother with schizophrenia, Mother with anxiety    Alcohol/Drug Abuse: No     Suicide: Yes  Cousin    Past Psychiatric History:   Inpatient treatment: No     Outpatient treatment: Yes     Prior substance abuse treatment: No     Suicide Attempts: No     Psychotropic Medications:  Current: Celexa and Adderall       Past: multiple psychotropics (could not recall names)    Current medications as per below, allergies reviewed in chart.    Current Outpatient Medications   Medication    albuterol (VENTOLIN HFA) 90 mcg/actuation inhaler    azithromycin (Z-PADILLA) 250 MG tablet    citalopram (CELEXA) 10 MG tablet    dextroamphetamine-amphetamine (ADDERALL XR) 25 MG 24 hr capsule    loratadine (CLARITIN) 10 mg tablet    OPW TEST CLAIM - DO NOT FILL    predniSONE (DELTASONE) 20 MG tablet    tranexamic acid (LYSTEDA) 650 mg tablet    triamcinolone acetonide 0.1% (KENALOG) 0.1 % cream    valACYclovir (VALTREX) 1000 MG tablet     Current Facility-Administered Medications   Medication Frequency    onabotulinumtoxina injection 200 Units Q90 Days       CAM Therapies: None     Screening: Depression: Denies  (Standardized depression screening used- PHQ-9= does not meet screener)  Bailee: Denies Psychosis: Denies    Generalized anxiety: Positive      Panic Disorder: Positive; Last occurrence Saturday    Social/specific phobia: Denies  OCD: Denies   Sexual Dysfunction:  Denies Trauma: Grandmother was murdered by Uncle with Schizophrenia- 4th grade (learned about it); Was inundated with details via news outlets; 2x Cervical Cancer cells;       4 LEEP procedures   Eating Disorder: H/O  Bulimia/ Anorexia      Social situation/Stressors: Yolanda Win lives with family in [place].  She is a full-time Clinical Research Supervisor.  She has been in her job for 11 years.    Yloanda Win has been  5 years and has two children.  Her spouse is supervisor for [employer].   The patient reports great social support. Yolanda Win is not Pentecostal.  Yolanda Win's hobbies include exercise, reading, tanning, pool, time with family.    Additional stressors:    Strengths:Steady employment, financial stability, Housing stability, Motivation, readiness for change, Setting and pursuing goals, hopes, dreams, aspirations, Resources - social, interpersonal, monetary and Interpersonal relationships and supports available - family, relatives, friends  Liabilities: Complicated medical illness and Other: Complex History of Psych Issues    Current Evaluation:     Mental Status Exam: Yolanda Win arrived promptly for the assessment session. The patient was fully cooperative throughout the interview and was an adequate historian   Appearance: age appropriate, appropriately  dressed, adequately  groomed  Behavior/Cooperation: friendly and cooperative  Speech: normal in rate, volume, and tone and appropriate quality, quantity and organization of sentences  Mood: anxious  Affect: anxious  Thought Process: goal-directed, logical  Thought Content: normal, no suicidality, no homicidality, delusions, or paranoia;did not appear to be responding to internal stimuli during the interview.   Orientation: grossly intact  Memory: Grossly intact  Attention Span/Concentration: Attends to interview without distraction; reports no difficulty  Fund of Knowledge: average  Estimate of Intelligence: average from verbal skills and history  Cognition: grossly intact  Insight: patient has awareness of illness; good insight into own behavior and behavior of others  Judgment: the patient's behavior is  adequate to circumstances    Distress Score    Distress Score: 5        Practical Problems Physical Problems   : No Appearance: No   Housing: No Bathing / Dressing: No   Insurance / Financial: No Breathing: No    Transportation: No  Changes in Urination: No    Work / School: No  Constipation: No   Treatment Decisions: No  Diarrhea: No     Eating: No    Family Problems Fatigue: No    Dealing with Children: No Feeling Swollen: No    Dealing with Partner: No Fevers: No    Ability to Have Children: No  Getting Around: No    Family Health Issues: No  Indigestion: No     Memory / Concentration: No   Emotional Problems Mouth Sores: No    Depression: No  Nausea: No    Fears: Yes  Nose Dry / Congested: No    Nervousness: Yes  Pain: No    Sadness: Yes Sexual: No    Worry: Yes Skin Dry / Itchy: No    Loss of Interest in Usual Activities: No Sleep: No     Substance Abuse: No    Spiritual/Religions Concerns Tingling in Hands / Feet: No   Spritual / Confucianism Concerns: No         Other Problems              Patient Health Questionnaire-9 (PHQ-9)     1.  Little interest or pleasure in doing things: Not at all                       = 0   2.  Feeling down, depressed or hopeless: Not at all                       = 0   3.  Trouble falling or staying asleep, or sleeping too much: Not at all                       = 0   4.  Feeling tired or having little energy: Not at all                       = 0   5.  Poor appetite or overeating: Not at all                       = 0   6.  Feeling bad about yourself - or that you are a failure or have let yourself or your family down: Not at all                       = 0   7.  Trouble concentrating on things, such as reading the newspaper or watching television: Not at all                       = 0   8.  Moving or speaking so slowly that other people could have noticed.  Or the opposite - being fidgety or restless that you have been moving around a lot more than usual: Not at all                        = 0   9.  Thoughts that you would be better off dead, or of hurting yourself: Not at all                       = 0    PHQ-9 Total:  0     Generalized Anxiety Disorder Questionnaire-7 (THIERRY-7)  The patient completed the General Anxiety Disorder, 7 Items (GAD7)and received a score of 5, indicating mild anxiety symptoms presently impacting current functioning.     History of present illness:    Patient has a significant psychological history including THIERRY, Depression, ADHD, Trauma sequela and Bulimia/Anorexia. Patient current concerns involve increased anxiety related to current diagnosis of cancer and  potential influences of cancer treatment on her body image. Patient history of eating disorders may complicate her response to cancer treatment (I.e., weight loss/gain, alopecia, inability to exercise).      Yolanda Win has adjusted to illness with difficulty primarily through active coping strategies. She has engaged in appropriate information gathering.  The patient has excellent family/friend support.  Her support system is coping well with the diagnosis/treatment/prognosis. Illness-related psychosocial stressors include none.  The patient has a good partnership with her [facility] [place] treatment team. The patient reports the following barriers to cancer care:none.   Symptoms:   · Mood: denied;  prior depression:episodic; last instance was in high school and no SI/HI  · Anxiety: Feeling nervous, anxious, or on edge, Uncontrollable worry (about health, gaining weight), Excessive worry (interfering with functioning), Difficulty relaxing, Restlessness, Irritability, Fear of unknown, muscle tension and panic attacks; lifelong anxiety  · Substance abuse: denied  · Cognitive functioning: denied  · Health behaviors: Excessive Exercise  · Sleep:  The patient reports 10 hours of uninterrupted sleep per night. restful sleep  until recently, no sleep onset difficulty  and no sleep maintenance difficulty, no EDS , AM  caffeine and PM caffeine (+) use of benzodiazepines Xanax, Klonopin    Pain: Ms. Win reports right breast pain.     Assessment - Diagnosis - Goals:       ICD-10-CM ICD-9-CM   1. Generalized anxiety disorder F41.1 300.02   2. History of bulimia nervosa Z86.59 V11.8   3. History of anorexia nervosa Z86.59 V11.8   4. ADHD (attention deficit hyperactivity disorder), inattentive type F90.0 314.00   5. History of posttraumatic stress disorder (PTSD) Z86.59 V11.8   6. Malignant neoplasm of upper-outer quadrant of right breast in female, estrogen receptor positive C50.411 174.4    Z17.0 V86.0       Plan:individual psychotherapy and consult psychiatrist for medication evaluation    Summary and Recommendations  Yolanda Win is a 34 y.o. female referred by Percy Ayers MD for psychological evaluation and treatment.  Ms. Win appears to be coping well with her diagnosis and proposed treatment course.  Patient was encouraged to speak to her primary care physician or oncologist regarding psychotropic medication options. She is interested in CBT to address depression/anxiety/insomnia and will follow up with me for that purpose. Mood protective strategies during cancer treatment were discussed. .    GOALS:   Decrease Caffeine intake  Practice breathing meditation daily    Alem Espitia, PhD  Clinical Psychologist  [place] License #[id number]

## 2019-12-24 ENCOUNTER — TELEPHONE (OUTPATIENT)
Dept: HEMATOLOGY/ONCOLOGY | Facility: CLINIC | Age: 34
End: 2019-12-24

## 2019-12-24 ENCOUNTER — PATIENT OUTREACH (OUTPATIENT)
Dept: ADMINISTRATIVE | Facility: OTHER | Age: 34
End: 2019-12-24

## 2019-12-24 ENCOUNTER — TELEPHONE (OUTPATIENT)
Dept: SURGERY | Facility: CLINIC | Age: 34
End: 2019-12-24

## 2019-12-24 ENCOUNTER — HOSPITAL ENCOUNTER (OUTPATIENT)
Dept: RADIOLOGY | Facility: HOSPITAL | Age: 34
Discharge: HOME | End: 2019-12-24
Attending: SURGERY
Payer: COMMERCIAL

## 2019-12-24 DIAGNOSIS — C50.911 INFILTRATING DUCTAL CARCINOMA OF RIGHT BREAST: Primary | ICD-10-CM

## 2019-12-24 DIAGNOSIS — C50.911 INFILTRATING DUCTAL CARCINOMA OF RIGHT BREAST: ICD-10-CM

## 2019-12-24 PROCEDURE — 74177 CT ABD & PELVIS W/CONTRAST: CPT | Mod: TC

## 2019-12-24 PROCEDURE — 74177 CT CHEST ABDOMEN PELVIS WITH CONTRAST (XPD): ICD-10-PCS | Mod: 26,,, | Performed by: RADIOLOGY

## 2019-12-24 PROCEDURE — 71260 CT CHEST ABDOMEN PELVIS WITH CONTRAST (XPD): ICD-10-PCS | Mod: 26,,, | Performed by: RADIOLOGY

## 2019-12-24 PROCEDURE — 71260 CT THORAX DX C+: CPT | Mod: 26,,, | Performed by: RADIOLOGY

## 2019-12-24 PROCEDURE — 74177 CT ABD & PELVIS W/CONTRAST: CPT | Mod: 26,,, | Performed by: RADIOLOGY

## 2019-12-24 PROCEDURE — 25500020 PHARM REV CODE 255: Performed by: SURGERY

## 2019-12-24 RX ADMIN — IOHEXOL 1000 ML: 9 SOLUTION ORAL at 10:12

## 2019-12-24 RX ADMIN — IOHEXOL 75 ML: 350 INJECTION, SOLUTION INTRAVENOUS at 11:12

## 2019-12-24 NOTE — TELEPHONE ENCOUNTER
Spoke with patient regarding scans. CT chest abd  Pelvis scheduled for 12/30/19 at 3:45 at the [facility] building. Location, time, and date reviewed. Patient advised not to eat or drink 4 hours prior to scan. Contacting  from [facility] to reschedule bone scan. Patient verbalized understanding.     ----- Message from Anibal Nye sent at 12/24/2019  8:27 AM CST -----  Contact: Pt      The Pt would like the nurse to call her back please.  The Pt was wondering when the bone scan and CT Scan would be.  Please contact the PT.    Phone # [phone]

## 2019-12-24 NOTE — TELEPHONE ENCOUNTER
----- Message from Florida Malik RN sent at 12/24/2019  8:40 AM CST -----  Contact: Patient  She can come here to see Dr Ny as a consult if he has openings    ----- Message -----  From: Hodan Sherman  Sent: 12/24/2019   8:33 AM CST  To: Florida Malik RN    Hey,     Schedule in Breast isn't open after 12/26. Pls advise.   ----- Message -----  From: Florida Malik RN  Sent: 12/24/2019   8:15 AM CST  To: Hodan Sherman    Yes, I think that would be best just so he can have all the results before we see her, so maybe just until the next week     ----- Message -----  From: Hodan Sherman  Sent: 12/24/2019   8:13 AM CST  To: Florida Malik RN    BM scheduled for 12/26. Should we move appointment with Anant?  ----- Message -----  From: Florida Malik RN  Sent: 12/24/2019   7:47 AM CST  To: [facility]  Pool    Can we get patient scheduled for CT/Bone Scan  Instead of PET Scan?      ----- Message -----  From: Gokul Ny MD  Sent: 12/23/2019   5:09 PM CST  To: Florida Malik RN    Done  ----- Message -----  From: Florida Malik RN  Sent: 12/23/2019   4:57 PM CST  To: Gokul Ny MD    Can you place orders for CT or bone scan please?  Thank you!  ----- Message -----  From: Gokul Ny MD  Sent: 12/23/2019   4:56 PM CST  To: Florida Malik RN    Yes    ----- Message -----  From: Florida Malik RN  Sent: 12/23/2019   4:43 PM CST  To: Gokul Ny MD        ----- Message -----  From: Willie Orantes  Sent: 12/23/2019   4:41 PM CST  To: Anant Gokul T Staff    Staff Message     Caller name: Pt    Reason for call: Pt calling for clarification, says her PET CT was denied. Wants to know if Dr Ny wants to order a CT and bone scan instead, so there won't be any delay in treatment.         Communication Preference: [phone]    Additional Information:

## 2019-12-24 NOTE — TELEPHONE ENCOUNTER
Called PT to inform her of changed appointments. She states she is working with Elias to get appointments changed.

## 2019-12-26 ENCOUNTER — PATIENT MESSAGE (OUTPATIENT)
Dept: SURGERY | Facility: HOSPITAL | Age: 34
End: 2019-12-26

## 2019-12-26 ENCOUNTER — CLINICAL SUPPORT (OUTPATIENT)
Dept: REHABILITATION | Facility: HOSPITAL | Age: 34
End: 2019-12-26
Attending: SURGERY
Payer: COMMERCIAL

## 2019-12-26 ENCOUNTER — HOSPITAL ENCOUNTER (OUTPATIENT)
Dept: RADIOLOGY | Facility: HOSPITAL | Age: 34
Discharge: HOME | End: 2019-12-26
Attending: SURGERY
Payer: COMMERCIAL

## 2019-12-26 ENCOUNTER — TELEPHONE (OUTPATIENT)
Dept: HEMATOLOGY/ONCOLOGY | Facility: CLINIC | Age: 34
End: 2019-12-26

## 2019-12-26 ENCOUNTER — HOSPITAL ENCOUNTER (OUTPATIENT)
Dept: RADIOLOGY | Facility: HOSPITAL | Age: 34
Discharge: HOME | End: 2019-12-26
Attending: INTERNAL MEDICINE
Payer: COMMERCIAL

## 2019-12-26 ENCOUNTER — PATIENT MESSAGE (OUTPATIENT)
Dept: HEMATOLOGY/ONCOLOGY | Facility: CLINIC | Age: 34
End: 2019-12-26

## 2019-12-26 DIAGNOSIS — Z17.0 MALIGNANT NEOPLASM OF UPPER-OUTER QUADRANT OF RIGHT BREAST IN FEMALE, ESTROGEN RECEPTOR POSITIVE: ICD-10-CM

## 2019-12-26 DIAGNOSIS — C50.411 MALIGNANT NEOPLASM OF UPPER-OUTER QUADRANT OF RIGHT BREAST IN FEMALE, ESTROGEN RECEPTOR POSITIVE: ICD-10-CM

## 2019-12-26 DIAGNOSIS — C50.911 INFILTRATING DUCTAL CARCINOMA OF RIGHT BREAST: ICD-10-CM

## 2019-12-26 DIAGNOSIS — Z91.89 AT RISK FOR LYMPHEDEMA: ICD-10-CM

## 2019-12-26 DIAGNOSIS — C50.911 INFILTRATING DUCTAL CARCINOMA OF RIGHT BREAST: Primary | ICD-10-CM

## 2019-12-26 PROCEDURE — 78306 BONE IMAGING WHOLE BODY: CPT | Mod: 26,,, | Performed by: RADIOLOGY

## 2019-12-26 PROCEDURE — A9503 TC99M MEDRONATE: HCPCS

## 2019-12-26 PROCEDURE — 25500020 PHARM REV CODE 255: Performed by: SURGERY

## 2019-12-26 PROCEDURE — 78306 NM BONE SCAN WHOLE BODY: ICD-10-PCS | Mod: 26,,, | Performed by: RADIOLOGY

## 2019-12-26 PROCEDURE — 77049 MRI BREAST C-+ W/CAD BI: CPT | Mod: TC

## 2019-12-26 PROCEDURE — 97161 PT EVAL LOW COMPLEX 20 MIN: CPT | Performed by: PHYSICAL MEDICINE & REHABILITATION

## 2019-12-26 PROCEDURE — 77049 MRI BREAST W/WO CONTRAST, W/CAD, BILATERAL: ICD-10-PCS | Mod: 26,,, | Performed by: RADIOLOGY

## 2019-12-26 PROCEDURE — 77049 MRI BREAST C-+ W/CAD BI: CPT | Mod: 26,,, | Performed by: RADIOLOGY

## 2019-12-26 PROCEDURE — A9577 INJ MULTIHANCE: HCPCS | Performed by: SURGERY

## 2019-12-26 RX ADMIN — GADOBENATE DIMEGLUMINE 16 ML: 529 INJECTION, SOLUTION INTRAVENOUS at 08:12

## 2019-12-26 NOTE — PRE-PROCEDURE INSTRUCTIONS
PREOP INSTRUCTIONS:No solid food ,milk or milk products for 8 hours prior to procedure.Clear liquids are allowed up to 2 hours before procedure.Clear liquids are:water,apple juice,pedialyte,gatorade,& jello.Shower instructions as well as directions to the Surgery Center were given.Patient encouraged to wear loose fitting,comfortable clothing.Medication instructions for pm prior to and am of procedure reviewed.Instructed patient to avoid taking vitamins,supplements,aspirin and ibuprofen the morning of surgery.Patient stated an understanding.Patient also stated that her Mother would accompany her to the hospital tomorrow.    Patient denies any side effects or issues with anesthesia or sedation.

## 2019-12-26 NOTE — PATIENT INSTRUCTIONS
PRE/POST OP PATIENT EDUCATION    Post Operative Instructions     Range of Motion/lifting Precautions post surgery  The following activities should be avoided until your drain(s) have been removed  - do not lift affected arm above 90 degrees of shoulder flexion  - do not lift over 5 lbs  - do not pull or push heavy objects  - do not sleep on your stomach or surgery side       After surgery, you may begin self-care tasks including grooming, dressing, feeding and simple hygiene as soon as you feel up to it.    Schedule your post-op therapy follow-up after your drains have been removed     When to call your doctor   - if any part of your affected arm or axilla feels hot, is reddened or has increased swelling   - if you develop a temperature over 101 degrees Fahrenheit      Lymphedema - Identification and Prevention     Lymphedema - is the swelling of a body area or extremity caused by the accumulation of lymphatic fluid.  There is a risk for lymphedema with the removal of lymph nodes, trauma or radiation therapy.  Treatment of breast cancer often involves surgery: mastectomy or lumpectomy. Some of the lymph nodes in the underarm (called axillary lymph nodes) may be removed and checked to see if they contain cancer cells.     During breast surgery when axillary lymph nodes are removed (with sentinel node biopsy or axillary dissection) or are treated with radiation therapy, the lymphatic system may become impaired. This may prevent lymphatic fluid from leaving the area therefore, causing lymphedema.     Lymphatic fluid is a normal part of the circulatory system. Its function is to remove waste products and to produce cells vital to fighting infection. Swelling occurs when the vessels become restricted and the lymphatic fluid is unable to freely flow through them.  If lymphedema is left untreated, the affected limb could progressively become more swollen, which could lead to hardening  of the skin, bulkiness in the limb, infection and impaired wound healing.         There are things you can do to decrease the chance of developing lymphedema.                                          [email or web address]                                                                                                                                                                          POST OP EXERCISES - SAFE TO DO THE FIRST 2 WEEKS AFTER SURGERY UNTIL YOUR FOLLOW UP APPOINTMENT WITH PHYSICAL/OCCUPATIONAL THERAPY    Scapular Retraction (Standing)    With arms at sides, squeeze shoulder blades together. Do not shrug shoulders and do not hold your breath. Hold 5 seconds. Repeat 10 times 1 sessions 1-2 x day.       Exaggerated Breathing and Relaxation      Practice deep breathing frequently in the first few days following surgery even before you begin exercising. This exercise helps with tissue extensibility in the chest wall.  Inhale slowly and deeply through the nose and exhale through pursed lips. Concentrate on relaxing as you let the air out of your lungs. Repeat three (3) to four (4) times, remembering to breath in deeply and then relaxing. This exercise helps to ease the sensation of pulling and discomfort that may be experienced while exercising.      Ball Squeeze OR Hand pumps       Perform this exercise three (3) times a day for 10 reps    The ball squeeze or hand pumps helps to prevent or reduce temporary swelling that may occur in the affected arm. This exercise may be performed standing, sitting or while lying in bed. During this exercise the affected arm should be slightly bent and held upward. Support your arm with a pillow if you are uncomfortable holding it up.                AROM: Elbow Flexion / Extension        With left hand palm up, gently bend elbow as far as possible. Then straighten arm as far as possible. Do this in standing.   Repeat 10 times per set. Do13 sets per session. Do 1-3  sessions per day.

## 2019-12-27 ENCOUNTER — ANESTHESIA EVENT (OUTPATIENT)
Dept: SURGERY | Facility: HOSPITAL | Age: 34
End: 2019-12-27
Payer: COMMERCIAL

## 2019-12-27 ENCOUNTER — ANESTHESIA (OUTPATIENT)
Dept: SURGERY | Facility: HOSPITAL | Age: 34
End: 2019-12-27
Payer: COMMERCIAL

## 2019-12-27 ENCOUNTER — TELEPHONE (OUTPATIENT)
Dept: HEMATOLOGY/ONCOLOGY | Facility: CLINIC | Age: 34
End: 2019-12-27

## 2019-12-27 ENCOUNTER — INITIAL CONSULT (OUTPATIENT)
Dept: HEMATOLOGY/ONCOLOGY | Facility: CLINIC | Age: 34
End: 2019-12-27
Payer: COMMERCIAL

## 2019-12-27 ENCOUNTER — HOSPITAL ENCOUNTER (OUTPATIENT)
Facility: HOSPITAL | Age: 34
Discharge: HOME | End: 2019-12-27
Attending: SURGERY | Admitting: SURGERY
Payer: COMMERCIAL

## 2019-12-27 VITALS
TEMPERATURE: 98 F | DIASTOLIC BLOOD PRESSURE: 61 MMHG | SYSTOLIC BLOOD PRESSURE: 117 MMHG | RESPIRATION RATE: 16 BRPM | WEIGHT: 167 LBS | OXYGEN SATURATION: 97 % | HEART RATE: 78 BPM | BODY MASS INDEX: 23.91 KG/M2 | HEIGHT: 70 IN

## 2019-12-27 VITALS
SYSTOLIC BLOOD PRESSURE: 121 MMHG | TEMPERATURE: 98 F | BODY MASS INDEX: 24.83 KG/M2 | RESPIRATION RATE: 16 BRPM | HEIGHT: 65 IN | OXYGEN SATURATION: 99 % | DIASTOLIC BLOOD PRESSURE: 71 MMHG | HEART RATE: 71 BPM | WEIGHT: 149.06 LBS

## 2019-12-27 DIAGNOSIS — Z17.0 MALIGNANT NEOPLASM OF UPPER-OUTER QUADRANT OF RIGHT BREAST IN FEMALE, ESTROGEN RECEPTOR POSITIVE: Primary | ICD-10-CM

## 2019-12-27 DIAGNOSIS — C50.411 MALIGNANT NEOPLASM OF UPPER-OUTER QUADRANT OF RIGHT BREAST IN FEMALE, ESTROGEN RECEPTOR POSITIVE: Primary | ICD-10-CM

## 2019-12-27 DIAGNOSIS — C50.911 BREAST CANCER, RIGHT: Primary | ICD-10-CM

## 2019-12-27 DIAGNOSIS — C50.911 INFILTRATING DUCTAL CARCINOMA OF RIGHT BREAST: Primary | ICD-10-CM

## 2019-12-27 PROCEDURE — 99215 OFFICE O/P EST HI 40 MIN: CPT | Mod: S$GLB,,, | Performed by: INTERNAL MEDICINE

## 2019-12-27 PROCEDURE — 25000003 PHARM REV CODE 250: Performed by: STUDENT IN AN ORGANIZED HEALTH CARE EDUCATION/TRAINING PROGRAM

## 2019-12-27 PROCEDURE — 77001 FLUOROGUIDE FOR VEIN DEVICE: CPT | Mod: 26,,, | Performed by: SURGERY

## 2019-12-27 PROCEDURE — D9220A PRA ANESTHESIA: Mod: ANES,,, | Performed by: ANESTHESIOLOGY

## 2019-12-27 PROCEDURE — 37000008 HC ANESTHESIA 1ST 15 MINUTES: Performed by: SURGERY

## 2019-12-27 PROCEDURE — 63600175 PHARM REV CODE 636 W HCPCS: Performed by: STUDENT IN AN ORGANIZED HEALTH CARE EDUCATION/TRAINING PROGRAM

## 2019-12-27 PROCEDURE — 36561 INSERT TUNNELED CV CATH: CPT | Mod: LT,,, | Performed by: SURGERY

## 2019-12-27 PROCEDURE — 77001 CHG FLUOROGUIDE CNTRL VEN ACCESS,PLACE,REPLACE,REMOVE: ICD-10-PCS | Mod: 26,,, | Performed by: SURGERY

## 2019-12-27 PROCEDURE — 99999 PR PBB SHADOW E&M-EST. PATIENT-LVL III: ICD-10-PCS | Mod: PBBFAC,,, | Performed by: INTERNAL MEDICINE

## 2019-12-27 PROCEDURE — 63600175 PHARM REV CODE 636 W HCPCS: Performed by: SURGERY

## 2019-12-27 PROCEDURE — 25000003 PHARM REV CODE 250: Performed by: NURSE ANESTHETIST, CERTIFIED REGISTERED

## 2019-12-27 PROCEDURE — 63600175 PHARM REV CODE 636 W HCPCS: Performed by: NURSE ANESTHETIST, CERTIFIED REGISTERED

## 2019-12-27 PROCEDURE — 71000015 HC POSTOP RECOV 1ST HR: Performed by: SURGERY

## 2019-12-27 PROCEDURE — 36561 PR INSERT TUNNELED CV CATH WITH PORT: ICD-10-PCS | Mod: LT,,, | Performed by: SURGERY

## 2019-12-27 PROCEDURE — 99215 PR OFFICE/OUTPT VISIT, EST, LEVL V, 40-54 MIN: ICD-10-PCS | Mod: S$GLB,,, | Performed by: INTERNAL MEDICINE

## 2019-12-27 PROCEDURE — 71000039 HC RECOVERY, EACH ADD'L HOUR: Performed by: SURGERY

## 2019-12-27 PROCEDURE — 36000707: Performed by: SURGERY

## 2019-12-27 PROCEDURE — 71000033 HC RECOVERY, INTIAL HOUR: Performed by: SURGERY

## 2019-12-27 PROCEDURE — S0020 INJECTION, BUPIVICAINE HYDRO: HCPCS | Performed by: SURGERY

## 2019-12-27 PROCEDURE — D9220A PRA ANESTHESIA: ICD-10-PCS | Mod: CRNA,,, | Performed by: NURSE ANESTHETIST, CERTIFIED REGISTERED

## 2019-12-27 PROCEDURE — 37000009 HC ANESTHESIA EA ADD 15 MINS: Performed by: SURGERY

## 2019-12-27 PROCEDURE — D9220A PRA ANESTHESIA: ICD-10-PCS | Mod: ANES,,, | Performed by: ANESTHESIOLOGY

## 2019-12-27 PROCEDURE — 36000706: Performed by: SURGERY

## 2019-12-27 PROCEDURE — 99999 PR PBB SHADOW E&M-EST. PATIENT-LVL III: CPT | Mod: PBBFAC,,, | Performed by: INTERNAL MEDICINE

## 2019-12-27 PROCEDURE — S0077 INJECTION, CLINDAMYCIN PHOSP: HCPCS | Performed by: STUDENT IN AN ORGANIZED HEALTH CARE EDUCATION/TRAINING PROGRAM

## 2019-12-27 PROCEDURE — 3008F PR BODY MASS INDEX (BMI) DOCUMENTED: ICD-10-PCS | Mod: CPTII,S$GLB,, | Performed by: INTERNAL MEDICINE

## 2019-12-27 PROCEDURE — 25000003 PHARM REV CODE 250: Performed by: SURGERY

## 2019-12-27 PROCEDURE — D9220A PRA ANESTHESIA: Mod: CRNA,,, | Performed by: NURSE ANESTHETIST, CERTIFIED REGISTERED

## 2019-12-27 PROCEDURE — C1788 PORT, INDWELLING, IMP: HCPCS | Performed by: SURGERY

## 2019-12-27 PROCEDURE — 3008F BODY MASS INDEX DOCD: CPT | Mod: CPTII,S$GLB,, | Performed by: INTERNAL MEDICINE

## 2019-12-27 DEVICE — KIT POWERPORT SINGLE 8FR: Type: IMPLANTABLE DEVICE | Site: CHEST  WALL | Status: FUNCTIONAL

## 2019-12-27 RX ORDER — CLINDAMYCIN PHOSPHATE 900 MG/50ML
900 INJECTION, SOLUTION INTRAVENOUS
Status: COMPLETED | OUTPATIENT
Start: 2019-12-27 | End: 2019-12-27

## 2019-12-27 RX ORDER — SODIUM CHLORIDE 9 MG/ML
INJECTION, SOLUTION INTRAVENOUS CONTINUOUS
Status: DISCONTINUED | OUTPATIENT
Start: 2019-12-27 | End: 2019-12-27 | Stop reason: HOSPADM

## 2019-12-27 RX ORDER — PROPOFOL 10 MG/ML
VIAL (ML) INTRAVENOUS
Status: DISCONTINUED | OUTPATIENT
Start: 2019-12-27 | End: 2019-12-27

## 2019-12-27 RX ORDER — SODIUM CHLORIDE 0.9 % (FLUSH) 0.9 %
10 SYRINGE (ML) INJECTION
Status: CANCELLED | OUTPATIENT
Start: 2019-12-31

## 2019-12-27 RX ORDER — DOXORUBICIN HYDROCHLORIDE 2 MG/ML
60 INJECTION, SOLUTION INTRAVENOUS
Status: CANCELLED | OUTPATIENT
Start: 2019-12-31

## 2019-12-27 RX ORDER — EPHEDRINE SULFATE 50 MG/ML
INJECTION, SOLUTION INTRAVENOUS
Status: DISCONTINUED | OUTPATIENT
Start: 2019-12-27 | End: 2019-12-27

## 2019-12-27 RX ORDER — LIDOCAINE HYDROCHLORIDE 10 MG/ML
1 INJECTION, SOLUTION EPIDURAL; INFILTRATION; INTRACAUDAL; PERINEURAL ONCE
Status: DISCONTINUED | OUTPATIENT
Start: 2019-12-27 | End: 2019-12-27 | Stop reason: HOSPADM

## 2019-12-27 RX ORDER — MIDAZOLAM HYDROCHLORIDE 1 MG/ML
INJECTION, SOLUTION INTRAMUSCULAR; INTRAVENOUS
Status: DISCONTINUED | OUTPATIENT
Start: 2019-12-27 | End: 2019-12-27

## 2019-12-27 RX ORDER — FENTANYL CITRATE 50 UG/ML
INJECTION, SOLUTION INTRAMUSCULAR; INTRAVENOUS
Status: DISCONTINUED | OUTPATIENT
Start: 2019-12-27 | End: 2019-12-27

## 2019-12-27 RX ORDER — FENTANYL CITRATE 50 UG/ML
INJECTION, SOLUTION INTRAMUSCULAR; INTRAVENOUS
Status: DISCONTINUED
Start: 2019-12-27 | End: 2019-12-27 | Stop reason: HOSPADM

## 2019-12-27 RX ORDER — FENTANYL CITRATE 50 UG/ML
25 INJECTION, SOLUTION INTRAMUSCULAR; INTRAVENOUS EVERY 5 MIN PRN
Status: COMPLETED | OUTPATIENT
Start: 2019-12-27 | End: 2019-12-27

## 2019-12-27 RX ORDER — ONDANSETRON 2 MG/ML
4 INJECTION INTRAMUSCULAR; INTRAVENOUS EVERY 12 HOURS PRN
Status: DISCONTINUED | OUTPATIENT
Start: 2019-12-27 | End: 2019-12-27 | Stop reason: HOSPADM

## 2019-12-27 RX ORDER — BUPIVACAINE HYDROCHLORIDE 5 MG/ML
INJECTION, SOLUTION EPIDURAL; INTRACAUDAL
Status: DISCONTINUED | OUTPATIENT
Start: 2019-12-27 | End: 2019-12-27 | Stop reason: HOSPADM

## 2019-12-27 RX ORDER — LIDOCAINE HCL/PF 100 MG/5ML
SYRINGE (ML) INTRAVENOUS
Status: DISCONTINUED | OUTPATIENT
Start: 2019-12-27 | End: 2019-12-27

## 2019-12-27 RX ORDER — LIDOCAINE HYDROCHLORIDE 10 MG/ML
INJECTION, SOLUTION INTRAVENOUS
Status: DISCONTINUED | OUTPATIENT
Start: 2019-12-27 | End: 2019-12-27

## 2019-12-27 RX ORDER — HYDROCODONE BITARTRATE AND ACETAMINOPHEN 5; 325 MG/1; MG/1
1 TABLET ORAL EVERY 6 HOURS PRN
Qty: 20 TABLET | Refills: 0 | Status: SHIPPED | OUTPATIENT
Start: 2019-12-27 | End: 2020-01-24

## 2019-12-27 RX ORDER — HEPARIN 100 UNIT/ML
500 SYRINGE INTRAVENOUS
Status: CANCELLED | OUTPATIENT
Start: 2019-12-31

## 2019-12-27 RX ORDER — HEPARIN SODIUM (PORCINE) LOCK FLUSH IV SOLN 100 UNIT/ML 100 UNIT/ML
SOLUTION INTRAVENOUS
Status: DISCONTINUED | OUTPATIENT
Start: 2019-12-27 | End: 2019-12-27 | Stop reason: HOSPADM

## 2019-12-27 RX ORDER — HYDROCODONE BITARTRATE AND ACETAMINOPHEN 5; 325 MG/1; MG/1
1 TABLET ORAL EVERY 6 HOURS PRN
Status: DISCONTINUED | OUTPATIENT
Start: 2019-12-27 | End: 2019-12-27 | Stop reason: HOSPADM

## 2019-12-27 RX ADMIN — FENTANYL CITRATE 25 MCG: 50 INJECTION INTRAMUSCULAR; INTRAVENOUS at 01:12

## 2019-12-27 RX ADMIN — EPHEDRINE SULFATE 10 MG: 50 INJECTION, SOLUTION INTRAMUSCULAR; INTRAVENOUS; SUBCUTANEOUS at 12:12

## 2019-12-27 RX ADMIN — ONDANSETRON 4 MG: 2 INJECTION INTRAMUSCULAR; INTRAVENOUS at 11:12

## 2019-12-27 RX ADMIN — MIDAZOLAM HYDROCHLORIDE 2 MG: 1 INJECTION, SOLUTION INTRAMUSCULAR; INTRAVENOUS at 11:12

## 2019-12-27 RX ADMIN — LIDOCAINE HYDROCHLORIDE 100 MG: 10 INJECTION, SOLUTION INTRAVENOUS at 11:12

## 2019-12-27 RX ADMIN — FENTANYL CITRATE 50 MCG: 50 INJECTION, SOLUTION INTRAMUSCULAR; INTRAVENOUS at 11:12

## 2019-12-27 RX ADMIN — FENTANYL CITRATE 25 MCG: 50 INJECTION INTRAMUSCULAR; INTRAVENOUS at 02:12

## 2019-12-27 RX ADMIN — HYDROCODONE BITARTRATE AND ACETAMINOPHEN 1 TABLET: 5; 325 TABLET ORAL at 01:12

## 2019-12-27 RX ADMIN — PROPOFOL 150 MG: 10 INJECTION, EMULSION INTRAVENOUS at 11:12

## 2019-12-27 RX ADMIN — SODIUM CHLORIDE: 0.9 INJECTION, SOLUTION INTRAVENOUS at 09:12

## 2019-12-27 RX ADMIN — SODIUM CHLORIDE, SODIUM GLUCONATE, SODIUM ACETATE, POTASSIUM CHLORIDE, MAGNESIUM CHLORIDE, SODIUM PHOSPHATE, DIBASIC, AND POTASSIUM PHOSPHATE: .53; .5; .37; .037; .03; .012; .00082 INJECTION, SOLUTION INTRAVENOUS at 12:12

## 2019-12-27 RX ADMIN — CLINDAMYCIN PHOSPHATE 900 MG: 18 INJECTION, SOLUTION INTRAVENOUS at 12:12

## 2019-12-27 NOTE — Clinical Note
Start dose dense Adriamycin Cytoxan with day to Neulasta January 2nd or 3rd, day 2 Neulasta.  Needs echocardiogram and EKG 1st, CBC, CMP, troponin on the day of chemotherapy

## 2019-12-27 NOTE — PLAN OF CARE
START ON PATHWAY REGIMEN - Breast    BSM501        Doxorubicin (Adriamycin(R))       Cyclophosphamide (Cytoxan(R))       Pegfilgrastim-xxxx     **Always confirm dose/schedule in your pharmacy ordering system**        Paclitaxel (Taxol(R))     **Always confirm dose/schedule in your pharmacy ordering system**    Patient Characteristics:  Preoperative or Nonsurgical Candidate (Clinical Staging), Neoadjuvant Therapy   followed by Surgery, Invasive Disease, Chemotherapy, HER2   Negative/Unknown/Equivocal, ER Positive  Therapeutic Status: Preoperative or Nonsurgical Candidate (Clinical Staging)  AJCC M Category: cM0  AJCC Grade: G3  Breast Surgical Plan: Neoadjuvant Therapy followed by Surgery  ER Status: Positive (+)  AJCC 8 Stage Grouping: IIB  HER2 Status: Negative (-)  AJCC T Category: cT2  AJCC N Category: cN0  IL Status: Negative (-)  Intent of Therapy:  Curative Intent, Discussed with Patient

## 2019-12-27 NOTE — DISCHARGE INSTRUCTIONS
Vascular Access Port Implantation   Port implantation is surgery to place (implant) a port under the skin. For vascular access, it is placed into a vein. The port allows medicines or nutrition to be sent right into your bloodstream. Blood can also be taken or given through the port. During the procedure, a long, thin tube called a catheter is threaded into one of your large veins. The tube is then attached to the port. This usually sits under the skin of your chest and causes a small bump. To use the port, a special needle is passed through your skin and into the port. The needle can stay in your skin for up to 7 days, if needed. A port can stay in place for weeks or months or longer.    Why is a vascular access port needed?  A vascular access port may allow healthcare providers to give you:  · Chemotherapy or other cancer-fighting drugs  · IV treatments, such as antibiotics or nutrition  · Hemodialysis (for kidney failure)  The port may also be used to draw blood.  Before the procedure  Follow any instructions you are given on how to prepare.  Tell your provider about any medicines you are taking. This includes:  · All prescription medicines  · Over-the-counter medicines such as aspirin or ibuprofen  · Herbs, vitamins, and other supplements  Also be sure your provider knows:  · If you are pregnant or think you may be pregnant  · If you are allergic to any medicines or substances, especially local anesthetics or iodine  · Your full medical history, including why you will need the port  · If you plan on doing any contact sports  During the procedure  · Before the procedure, an IV may be put into a vein in your arm or hand. This gives you fluids and medicines. You may be given medicine through the IV to help you relax during the procedure. This is called sedation. But some surgeons place ports using general anesthesia.  · The chest is used most often for the port. In some cases, your belly (abdomen) or arm will be  used instead.  · The skin over the insertion area is numbed with local anesthetic.  · Ultrasound or X-rays are used to help the healthcare provider guide the catheter into the proper location during the procedure.  · A cut (incision) is made in the skin where the port will be placed. A small pocket for the port is formed under the skin.  · A second small incision is made in the skin near the first incision. A tunnel under the skin is created. The catheter is put through the tunnel and into the blood vessel.  · The skin is closed over the port. It is held shut with stitches (sutures) or surgical glue or tape. The second small incision is also closed.  · A chest X-ray may be done to make sure the port is placed properly.  After the procedure  You may be taken to a recovery room where youll recover from the sedation. Nurses will check on you as you rest. If you have pain, nurses can give you medicine. If you are not staying in the hospital overnight, you will be sent home a few hours after the procedure is done. A healthcare provider will tell you when you can go home. An adult family member or friend will need to drive you home.  Recovering at home  · Take pain medicine as directed by your healthcare provider.  · Take it easy for 24 hours after the procedure. Avoid physical activity and heavy lifting until your healthcare provider says its OK.  · Keep the port clean and dry. Ask when you can shower again. You will need to keep the port dry by covering it when you shower.  · Care for the insertion site as you are directed.  · Dont swim, bathe, or do other activities that cause water to cover the insertion site.  · To keep the port from getting blocked with blood clots, flush it as often as directed. You should be shown the proper way to flush the port before you go home. It is important to follow these directions.     Risks and possible complications of implantation  · Bleeding  · Infection of the insertion  site  · Damage to a blood vessel  · Nerve injury or irritation  · Collapsed lung (for chest port placements)  · Skin breakdown over the port  Risks and possible complications of having a port  · Blocked  port or catheter  · Leakage or breakage of the port or catheter  · The port moves out of position  · Blood clot  · Skin or bloodstream infection  · Skin breakdown over the port      When to seek medical care  Call your healthcare provider right away if you have any of the following:  · A fever of 100.4°F (38.0°C) or higher  · You can't access or use the port properly  · You can't flush the port or get a blood return  · The skin near the port is red, warm, swollen, or broken  · You have shoulder pain on the side where the port is located  · You feel a heart flutter or racing heart   · Swollen arm, if the port is placed in your arm   Date Last Reviewed: 7/1/2016  © 4242-9866 [employer] [address] [place] [address], [place] [address]. All rights reserved. This information is not intended as a substitute for professional medical care. Always follow your healthcare professional's instructions.

## 2019-12-27 NOTE — PROGRESS NOTES
Per resident for Dr. Ayers, will start antibiotic in OR. Do not start now.   0940- Spoke w/ Dr. Felix , anesthesia to notify Pt has ear piercing. Pt states can not take out. Dr. Felix stated will speak w/ PT.

## 2019-12-27 NOTE — PROGRESS NOTES
Subjective:       Patient ID: Yolanda Win is a 34 y.o. female.    Chief Complaint: No chief complaint on file.    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  She had a baby in April and had been breast feeding.  In October, she  1st noted a palpable abnormality in her right breast which seemed to wax and wane initially.    Mammogram and ultrasound on December 11th, 2019 showed right breast mass 7 cm from the nipple.  By ultrasound this mass measured 33 x 32 x 21 mm.  There was no associated axillary lymphadenopathy noted.    Right breast biopsy on December 13 showed high-grade infiltrating ductal carcinoma (histologic grade 3, nuclear grade 3, mitotic index 3) there were medullary features.  Cancer was 25% ER positive and NE and HER2 negative.  Ki-67 was 90%.        Other imaging:  MRI from December 26, results pending    Menstrual History:    Menarche -    G - 2  P - 2  First birth age - 31 ,BCP -7-8 years    , still actively menstruating    Family History -                                 Breast - several maternal great aunts                                Ovarian - paternal cousin       Other -mother with cervical cancer    Social History :    Smoking -  never    ETOH - no  Research supervisor at [employer]    PMH:  Hypertension, eating disorder is on Adderall and is followed by Psychiatry  Review of Systems   Constitutional: Negative for appetite change and unexpected weight change.        Does high intensity exercise   HENT: Negative.    Eyes: Negative for visual disturbance.   Respiratory: Negative for cough and shortness of breath.    Cardiovascular: Negative for chest pain.   Gastrointestinal: Negative for abdominal pain and diarrhea.   Genitourinary: Negative for dysuria, frequency and urgency.   Musculoskeletal: Negative for back pain.   Skin: Negative for rash.   Neurological: Negative for headaches.   Hematological: Negative for  adenopathy.   Psychiatric/Behavioral: The patient is nervous/anxious.        Objective:      Physical Exam   Constitutional: She is oriented to person, place, and time. She appears well-developed and well-nourished. No distress.   HENT:   Head: Normocephalic.   Mouth/Throat: No oropharyngeal exudate.   Eyes: EOM are normal. No scleral icterus.   Neck: No thyromegaly present.   Cardiovascular: Normal rate and regular rhythm.   Pulmonary/Chest: Effort normal and breath sounds normal. Right breast exhibits mass. Right breast exhibits no nipple discharge and no skin change. Left breast exhibits no mass, no nipple discharge and no skin change.       Abdominal: Soft. She exhibits no mass. There is no tenderness.   Musculoskeletal: She exhibits no edema.   Lymphadenopathy:     She has no cervical adenopathy.     She has axillary adenopathy.        Right axillary: Pectoral adenopathy present. No lateral adenopathy present.        Left axillary: No pectoral and no lateral adenopathy present.       Right: No supraclavicular adenopathy present.        Left: No supraclavicular adenopathy present.   Neurological: She is alert and oriented to person, place, and time. No cranial nerve deficit.   Psychiatric: She has a normal mood and affect. Her behavior is normal. Thought content normal.   Vitals reviewed.      Assessment:       1. Malignant neoplasm of upper-outer quadrant of right breast in female, estrogen receptor positive        Plan:       I discussed the rationale for neoadjuvant chemotherapy.  She has a T2, high-grade tumor which is weakly estrogen receptor positive.        I recommended 4 cycles of Adriamycin Cytoxan given in dose dense fashion followed by 12 weeks of weekly Taxol.  Side effects of chemotherapy were discussed including bone marrow suppression alopecia, GI effects, cardiac effects, and ovarian damage.  She will need to have an echocardiogram and port placement.    She has an appointment with Gynecology on  January 2nd.  She has already met with Psychology.  Offered dietary consult as well.

## 2019-12-27 NOTE — ANESTHESIA PREPROCEDURE EVALUATION
"[facility]  Anesthesia Pre-Operative Evaluation       Patient Name: Yolanda Win  YOB: 1985  MRN: [id number]  CSN: [id number]      Code Status: Full Code   Date of Procedure: 12/27/2019  Procedure: Procedure(s) (LRB):  [id number]-Left neck or chest wall (Left)  Anesthesia: General  Pre-Operative Diagnosis: Final diagnoses:  [C50.911] Breast cancer, right  Proceduralist/Surgeon(s) and Role:     * Percy Ayers MD - Primary    SUBJECTIVE:   Yolanda Win is a 34 y.o. female w/ a significant PMHx of R breast CA, to undergo 12 weeks of chemo    Patient now presents for the above procedure(s). Pt appropriately NPO.     LDA:       Anesthesia Evaluation      Airway   Mallampati: II  TM distance: Normal, at least 6 cm  Neck ROM: Normal ROM  Dental    (+) In tact    Pulmonary    Cardiovascular   (+) hypertension,   (-) CAD    Rate: Normal    Neuro/Psych      GI/Hepatic/Renal      Endo/Other    Abdominal                     ALLERGIES:     Review of patient's allergies indicates:   Allergen Reactions    Amoxicillin Hives    Penicillins Hives and Rash    Diazepam Anxiety and Other (See Comments)     "makes hyper"     MEDICATIONS:     Current Outpatient Medications on File Prior to Encounter   Medication Sig Dispense Refill Last Dose    dextroamphetamine-amphetamine (ADDERALL XR) 25 MG 24 hr capsule Take 1 capsule (25 mg total) by mouth every morning. 30 capsule 0 Taking    loratadine (CLARITIN) 10 mg tablet Take 10 mg by mouth once daily.   Taking    tranexamic acid (LYSTEDA) 650 mg tablet Take 2 tablets (1,300 mg total) by mouth 3 (three) times daily. 30 tablet 3 Taking    albuterol (VENTOLIN HFA) 90 mcg/actuation inhaler Inhale 2 puffs into the lungs every 6 (six) hours as needed for Wheezing. Rescue 18 g 0 More than a month at Unknown time    OPW TEST CLAIM - DO NOT FILL Take by mouth. OPW test claim. Do not fill. 1 each 0 Taking    triamcinolone acetonide 0.1% " (KENALOG) 0.1 % cream apply to affected area twice daily 454 g 3 Taking    valACYclovir (VALTREX) 1000 MG tablet Take 1 tablet (1,000 mg total) by mouth every 12 (twelve) hours. (Patient taking differently: Take 1,000 mg by mouth every 12 (twelve) hours. Patient uses prn) 60 tablet 0 Taking     Current Facility-Administered Medications   Medication Dose Route Frequency Provider Last Rate Last Dose    0.9%  NaCl infusion   Intravenous Continuous Cam Villarreal MD        clindamycin 900 MG/50 ML D5W 900 mg/50 mL IVPB 900 mg  900 mg Intravenous On Call Procedure Cam Villarreal MD        lidocaine (PF) 10 mg/ml (1%) injection 10 mg  1 mL Intradermal Once Cam Villarreal MD        ondansetron injection 4 mg  4 mg Intravenous Q12H PRN Cam Villarreal MD        promethazine (PHENERGAN) 6.25 mg in dextrose 5 % 50 mL IVPB  6.25 mg Intravenous Q6H PRN Cam Villarreal MD              History:     Active Hospital Problems    Diagnosis  POA    Breast cancer, right [C50.911]  Yes      Resolved Hospital Problems   No resolved problems to display.     Patient Active Problem List   Diagnosis    Rh negative status during pregnancy in third trimester    Generalized anxiety disorder    ADHD (attention deficit hyperactivity disorder), inattentive type    History of posttraumatic stress disorder (PTSD)    History of insomnia    S/P  section    Intractable periodic headache syndrome    Decreased strength    Chronic bilateral low back pain without sciatica    Poor posture    Seasonal allergies    Menorrhagia with regular cycle    Infiltrating ductal carcinoma of right breast    Malignant neoplasm of upper-outer quadrant of right breast in female, estrogen receptor positive    History of bulimia nervosa    History of anorexia nervosa    At risk for lymphedema    Breast cancer, right      Past Medical History:   Diagnosis Date    Abnormal Pap smear of cervix     Allergy     seasonal    Anorexia     Bulimia     Depression     Fever  "blister     Hypertension     Gestational Hypertension    Invasive ductal carcinoma of right breast 2019    Mastitis     Migraine headache      Past Surgical History:   Procedure Laterality Date    BONE MARROW ASPIRATION      x 3    CERVICAL BIOPSY  W/ LOOP ELECTRODE EXCISION       SECTION  2016     SECTION N/A 2019    Procedure:  SECTION;  Surgeon: Kirit Hernandez MD;  [facility];  Service: OB/GYN;  Laterality: N/A;    COLPOSCOPY      SHOULDER SURGERY Left     x 2    SINUS SURGERY      age 19    TONSILLECTOMY       Alcohol use:    Social History     Substance and Sexual Activity   Alcohol Use No    Frequency: 2-3 times a week    Drinks per session: 1 or 2    Binge frequency: Monthly    Comment: pre pregnancy           OBJECTIVE:   Last 3 sets of Vitals    Vitals - 1 value per visit 2019   SYSTOLIC 136 154 121   DIASTOLIC 97 97 71   PULSE 86 83 71   TEMPERATURE 97.7 96.6 98   RESPIRATIONS 16 - 16   SPO2 100 - 99   Weight (lb) 168 169.31 149.03   Weight (kg) 76.204 76.8 67.6   HEIGHT 5' 5" - 5' 5"   BODY MASS INDEX 27.96 28.18 24.8   VISIT REPORT - - -   Pain Score  0 0 0   Some encounter information is confidential and restricted. Go to Review Flowsheets activity to see all data.   Some recent data might be hidden       Vital Signs (Most Recent):    Vital Signs Range (Last 24H):  Temp:  [36.7 °C (98 °F)]   Pulse:  [71]   Resp:  [16]   BP: (121)/(71)   SpO2:  [99 %]      No intake or output data in the 24 hours ending 19 0856    There is no height or weight on file to calculate BMI.     Significant Labs:  Lab Results   Component Value Date    WBC 5.36 2019    HGB 13.7 2019    HCT 42.1 2019     2019    CHOL 189 2019    TRIG 76 2019    HDL 65 2019    ALT 14 2019    AST 15 2019     2019    K 4.7 2019     2019    CREATININE 1.0 " 12/04/2019    BUN 9 12/04/2019    CO2 23 12/04/2019    TSH 1.035 12/04/2019    INR 1.1 12/04/2019    HGBA1C 5.0 12/04/2019     Lab Results   Component Value Date    HCG 24.1 10/24/2018    HCGQUANT 359 08/01/2018      Patient's last menstrual period was 12/17/2019 (approximate).    EKG:   No results found for this or any previous visit.    TTE:  No results found for this or any previous visit.  No results found for: EF  No results found for this or any previous visit.  HANDY:  No results found for this or any previous visit.  Stress Test:  No results found for this or any previous visit.   LHC:  No results found for this or any previous visit.   PFT:  No results found for: FEV1, FVC, AZE7ORT, TLC, DLCO     ASSESSMENT/PLAN:       Pre-op Assessment    I have reviewed the Patient Summary Reports.    I have reviewed the Nursing Notes.   I have reviewed the Medications.     Review of Systems  Anesthesia Hx:  No problems with previous Anesthesia  History of prior surgery of interest to airway management or planning: Denies Family Hx of Anesthesia complications.   Denies Personal Hx of Anesthesia complications.   Hematology/Oncology:  Hematology Normal        Cardiovascular:   Hypertension Denies CAD.       Pulmonary:  Pulmonary Normal    Renal/:  Renal/ Normal     Hepatic/GI:  Hepatic/GI Normal    Neurological:  Neurology Normal    Endocrine:  Endocrine Normal        Physical Exam  General:  Well nourished    Airway/Jaw/Neck:  Airway Findings: Mouth Opening: Normal Tongue: Normal  General Airway Assessment: Adult  Mallampati: II  TM Distance: Normal, at least 6 cm  Jaw/Neck Findings:  Neck ROM: Normal ROM      Dental:  Dental Findings: In tact   Chest/Lungs:  Chest/Lungs Findings: Normal Respiratory Rate     Heart/Vascular:  Heart Findings: Rate: Normal  Rhythm: Regular Rhythm        Mental Status:  Mental Status Findings:  Cooperative, Alert and Oriented         Anesthesia Plan  Type of Anesthesia, risks & benefits  discussed:  Anesthesia Type:  general, MAC  Patient's Preference:   Intra-op Monitoring Plan: standard ASA monitors  Intra-op Monitoring Plan Comments:   Post Op Pain Control Plan: per primary service following discharge from PACU  Post Op Pain Control Plan Comments:   Induction:   IV  Beta Blocker:  Patient is not currently on a Beta-Blocker (No further documentation required).       Informed Consent: Patient understands risks and agrees with Anesthesia plan.  Questions answered. Anesthesia consent signed with patient.  ASA Score: 2     Day of Surgery Review of History & Physical:    H&P update referred to the provider.  H&P completed by Anesthesiologist.   Anesthesia Plan Notes: Chart reviewed, patient interviewed and examined.  The anesthetic plan was explained.  Risks, benefits, and alternatives were discussed. Questions were answered and the consent was signed.        [facility] From Anesthesia Perspective.

## 2019-12-27 NOTE — Clinical Note
Needs echocardiogram and labs, start chemotherapy with dose dense Adriamycin Cytoxan and day 2 Neulasta after port is placed.

## 2019-12-27 NOTE — TRANSFER OF CARE
"Anesthesia Transfer of Care Note    Patient: Yolanda Win    Procedure(s) Performed: Procedure(s) (LRB):  [id number]-Left neck or chest wall (Left)    Patient location: PACU    Anesthesia Type: general    Transport from OR: Transported from OR on 6-10 L/min O2 by face mask with adequate spontaneous ventilation    Post pain: adequate analgesia    Post assessment: no apparent anesthetic complications    Post vital signs: stable    Level of consciousness: awake    Nausea/Vomiting: no nausea/vomiting    Complications: none    Transfer of care protocol was followed      Last vitals:   Visit Vitals  /72 (BP Location: Right arm, Patient Position: Lying)   Pulse 107   Temp 36.5 °C (97.7 °F) (Axillary)   Resp 16   Ht 5' 10" (1.778 m)   Wt 75.8 kg (167 lb)   LMP 12/17/2019 (Approximate)   SpO2 96%   BMI 23.96 kg/m²     "

## 2019-12-27 NOTE — OP NOTE
DATE OF PROCEDURE:  12/27/2019    PRIMARY SURGEON:  Percy Ayers M.D.    ASSISTANT SURGEON:  Cam Villarreal M.D. (RES), Surgery resident.    PREOPERATIVE DIAGNOSIS:  Locally advanced right breast upper outer quadrant   invasive breast carcinoma with need for central venous access for anticipated   preoperative neoadjuvant chemotherapy.    POSTOPERATIVE DIAGNOSIS:  Locally advanced right breast upper outer quadrant   invasive breast carcinoma with need for central venous access for anticipated   preoperative neoadjuvant chemotherapy.    PROCEDURE PERFORMED:  Insertion of left 8-Irish MRI compatible PowerPort   Port-A-Cath with intraoperative fluoroscopy by Seldinger technique.    ANESTHESIA:  General.    PROCEDURE IN DETAIL:  The patient underwent informed consent.  The left chest   wall was marked.  She was brought to the Operating Room.  She was placed in a   supine position.  A vertical roll was placed parallel to her spine between her   shoulder blades.  Both arms were tucked.  The left anterior neck and chest were   prepped and draped in a sterile fashion.  Percutaneously at the mid clavicular   line in the infraclavicular region, we cannulated and accessed the left   subclavian vein system.  A guidewire was advanced into the central venous   system.  The tract was enlarged to 3 to 4 cm to create a subcutaneous pocket.    The port was anchored to the catheter with the locking hub and flushed with   injectable saline.  The port was anchored into the pocket with interrupted   Vicryl sutures.  We advanced the peel-away sheath and dilator over the guidewire   under fluoroscopic guidance.  The dilator and guidewire were removed.  The   catheter was then advanced through the peel-away sheath without difficulty.  The   peel-away sheath was withdrawn.  Fluoroscopy confirmed the catheter tip in the   appropriate position.  The port was accessed with the Aguilar needle.  It flushed   and aspirated easily.  It was flushed  with a final solution of heparinized   saline at 100 units/mL.  The deep dermal and subcutaneous layers were closed   with Vicryl suture and the skin closed with a running 4-0 Monocryl subcuticular   skin closure.  Sterile skin Dermabond dressing was applied.  Estimated blood   loss was minimal.  All needle, instrument and sponge counts were correct.    Postop chest x-ray would be pending.  She tolerated the procedure well without   complication.  Again, estimated blood loss was minimal and all needle,   instrument and sponge counts were correct.      SUDHIR/IN  dd: 12/27/2019 14:00:05 (CST)  td: 12/27/2019 14:21:49 (CST)  Doc ID   #[id number]  Job ID #[id number]    CC:

## 2019-12-27 NOTE — TELEPHONE ENCOUNTER
Spoke with PT. Provided date and times for Echo and EKG. Will callback to schedule office visit and treatment once approved.

## 2019-12-27 NOTE — BRIEF OP NOTE
[facility]  Brief Operative Note    Surgery Date: 12/27/2019     Surgeon(s) and Role:     * Percy Ayers MD - Primary     * Cam Villarreal MD - Resident - Assisting    Pre-op Diagnosis:  Infiltrating ductal carcinoma of right breast [C50.911]    Post-op Diagnosis:  Post-Op Diagnosis Codes:     * Infiltrating ductal carcinoma of right breast [C50.911]    Procedure(s) (LRB):  [id number]-Left neck or chest wall (Left)    Anesthesia: General    Description of the findings of the procedure(s): Left subclavian port-a-cath placement with flouroscopy  8Fr Powerport placed without apparent complication  CXR pending    Estimated Blood Loss: 10mL         Specimens:   Specimen (12h ago, onward)    None            Discharge Note    OUTCOME: Patient tolerated treatment/procedure well without complication and is now ready for discharge.    DISPOSITION: Home or Self Care    FINAL DIAGNOSIS:  <principal problem not specified>    FOLLOWUP: In clinic

## 2019-12-27 NOTE — INTERVAL H&P NOTE
The patient has been examined and the H&P has been reviewed:    I concur with the findings and no changes have occurred since H&P was written.   Left port-a-cath placement today.     Anesthesia/Surgery risks, benefits and alternative options discussed and understood by patient/family.          Active Hospital Problems    Diagnosis  POA    Breast cancer, right [C50.911]  Yes      Resolved Hospital Problems   No resolved problems to display.

## 2019-12-27 NOTE — LETTER
December 27, 2019      Percy Ayers MD  [address]  [facility] [address]           [facility] Hematology Oncology  [address]  [facility] [address]  Phone: [phone]          Patient: Yolanda Win   MR Number: [id number]   YOB: 1985   Date of Visit: 12/27/2019       Dear Dr. Percy Ayers:    Thank you for referring Yolanda Win to me for evaluation. Attached you will find relevant portions of my assessment and plan of care.    If you have questions, please do not hesitate to call me. I look forward to following Yolanda Win along with you.    Sincerely,    Gokul Ny MD    Enclosure  CC:  No Recipients    If you would like to receive this communication electronically, please contact [email or web address] or [phone] to request more information on [employer] access.    For providers and/or their staff who would like to refer a patient to [facility], please contact us through our one-stop-shop provider referral line, [facility], at [phone].    If you feel you have received this communication in error or would no longer like to receive these types of communications, please e-mail [email or web address]

## 2019-12-30 ENCOUNTER — HOSPITAL ENCOUNTER (OUTPATIENT)
Dept: CARDIOLOGY | Facility: CLINIC | Age: 34
Discharge: HOME | End: 2019-12-30
Attending: INTERNAL MEDICINE
Payer: COMMERCIAL

## 2019-12-30 ENCOUNTER — PATIENT MESSAGE (OUTPATIENT)
Dept: HEMATOLOGY/ONCOLOGY | Facility: CLINIC | Age: 34
End: 2019-12-30

## 2019-12-30 ENCOUNTER — HOSPITAL ENCOUNTER (OUTPATIENT)
Dept: CARDIOLOGY | Facility: CLINIC | Age: 34
Discharge: HOME | End: 2019-12-30
Payer: COMMERCIAL

## 2019-12-30 ENCOUNTER — TELEPHONE (OUTPATIENT)
Dept: SURGERY | Facility: CLINIC | Age: 34
End: 2019-12-30

## 2019-12-30 ENCOUNTER — TELEPHONE (OUTPATIENT)
Dept: HEMATOLOGY/ONCOLOGY | Facility: CLINIC | Age: 34
End: 2019-12-30

## 2019-12-30 ENCOUNTER — PATIENT OUTREACH (OUTPATIENT)
Dept: ADMINISTRATIVE | Facility: OTHER | Age: 34
End: 2019-12-30

## 2019-12-30 VITALS
DIASTOLIC BLOOD PRESSURE: 71 MMHG | SYSTOLIC BLOOD PRESSURE: 121 MMHG | HEART RATE: 70 BPM | BODY MASS INDEX: 24.83 KG/M2 | HEIGHT: 65 IN | WEIGHT: 149 LBS

## 2019-12-30 DIAGNOSIS — C50.411 MALIGNANT NEOPLASM OF UPPER-OUTER QUADRANT OF RIGHT BREAST IN FEMALE, ESTROGEN RECEPTOR POSITIVE: ICD-10-CM

## 2019-12-30 DIAGNOSIS — Z17.0 MALIGNANT NEOPLASM OF UPPER-OUTER QUADRANT OF RIGHT BREAST IN FEMALE, ESTROGEN RECEPTOR POSITIVE: ICD-10-CM

## 2019-12-30 LAB
AV INDEX (PROSTH): 0.86
AV MEAN GRADIENT: 3 MMHG
AV PEAK GRADIENT: 7 MMHG
AV VALVE AREA: 3.31 CM2
AV VELOCITY RATIO: 0.77
BSA FOR ECHO PROCEDURE: 1.76 M2
CV ECHO LV RWT: 0.43 CM
DOP CALC AO PEAK VEL: 1.32 M/S
DOP CALC AO VTI: 25.07 CM
DOP CALC LVOT AREA: 3.9 CM2
DOP CALC LVOT DIAMETER: 2.22 CM
DOP CALC LVOT PEAK VEL: 1.02 M/S
DOP CALC LVOT STROKE VOLUME: 82.95 CM3
DOP CALCLVOT PEAK VEL VTI: 21.44 CM
E WAVE DECELERATION TIME: 114.04 MSEC
E/A RATIO: 1.68
E/E' RATIO: 5.2 M/S
ECHO LV POSTERIOR WALL: 0.97 CM (ref 0.6–1.1)
FRACTIONAL SHORTENING: 27 % (ref 28–44)
INTERVENTRICULAR SEPTUM: 0.64 CM (ref 0.6–1.1)
LA MAJOR: 5.28 CM
LA MINOR: 4 CM
LA WIDTH: 5.19 CM
LEFT ATRIUM SIZE: 3.66 CM
LEFT ATRIUM VOLUME INDEX: 42.1 ML/M2
LEFT ATRIUM VOLUME: 73.49 CM3
LEFT INTERNAL DIMENSION IN SYSTOLE: 3.25 CM (ref 2.1–4)
LEFT VENTRICLE DIASTOLIC VOLUME INDEX: 52.08 ML/M2
LEFT VENTRICLE DIASTOLIC VOLUME: 90.9 ML
LEFT VENTRICLE MASS INDEX: 65 G/M2
LEFT VENTRICLE SYSTOLIC VOLUME INDEX: 24.4 ML/M2
LEFT VENTRICLE SYSTOLIC VOLUME: 42.58 ML
LEFT VENTRICULAR INTERNAL DIMENSION IN DIASTOLE: 4.47 CM (ref 3.5–6)
LEFT VENTRICULAR MASS: 113.29 G
LV LATERAL E/E' RATIO: 5.2 M/S
LV SEPTAL E/E' RATIO: 5.2 M/S
MV PEAK A VEL: 0.31 M/S
MV PEAK E VEL: 0.52 M/S
RA MAJOR: 5.01 CM
RA PRESSURE: 3 MMHG
RA WIDTH: 4.5 CM
RV TISSUE DOPPLER FREE WALL SYSTOLIC VELOCITY 1 (APICAL 4 CHAMBER VIEW): 10.55 CM/S
SINUS: 3.45 CM
STJ: 3.21 CM
TDI LATERAL: 0.1 M/S
TDI SEPTAL: 0.1 M/S
TDI: 0.1 M/S

## 2019-12-30 PROCEDURE — 93306 TTE W/DOPPLER COMPLETE: CPT | Mod: 26,,, | Performed by: INTERNAL MEDICINE

## 2019-12-30 PROCEDURE — 93306 TTE W/DOPPLER COMPLETE: CPT

## 2019-12-30 PROCEDURE — 93306 ECHO (CUPID ONLY): ICD-10-PCS | Mod: 26,,, | Performed by: INTERNAL MEDICINE

## 2019-12-30 PROCEDURE — 93005 EKG 12-LEAD: ICD-10-PCS | Mod: S$GLB,,, | Performed by: INTERNAL MEDICINE

## 2019-12-30 PROCEDURE — 93010 ELECTROCARDIOGRAM REPORT: CPT | Mod: S$GLB,,, | Performed by: INTERNAL MEDICINE

## 2019-12-30 PROCEDURE — 93005 ELECTROCARDIOGRAM TRACING: CPT | Mod: S$GLB,,, | Performed by: INTERNAL MEDICINE

## 2019-12-30 PROCEDURE — 93010 EKG 12-LEAD: ICD-10-PCS | Mod: S$GLB,,, | Performed by: INTERNAL MEDICINE

## 2019-12-30 NOTE — TELEPHONE ENCOUNTER
----- Message from Hodan Sherman sent at 12/27/2019  8:45 AM CST -----        Needs echocardiogram and labs, start chemotherapy with dose dense Adriamycin Cytoxan and day 2 Neulasta after port is placed.       Routing Comments:  Start dose dense Adriamycin Cytoxan with day to Neulasta January 2nd or 3rd, day 2 Neulasta.  Needs echocardiogram and EKG 1st, CBC, CMP, troponin on the day of chemotherapy

## 2019-12-30 NOTE — TELEPHONE ENCOUNTER
Called and spoke w/ pt and confirmed chemo appt. Scheduled appt with ansley and labs. Scheduled injection for Thursday.

## 2019-12-30 NOTE — ANESTHESIA POSTPROCEDURE EVALUATION
Anesthesia Post Evaluation    Patient: Yolanda Win    Procedure(s) Performed: Procedure(s) (LRB):  [id number]-Left neck or chest wall (Left)    Final Anesthesia Type: general    Patient location during evaluation: PACU  Patient participation: Yes- Able to Participate  Level of consciousness: awake and alert  Post-procedure vital signs: reviewed and stable  Pain management: adequate  Airway patency: patent    PONV status at discharge: No PONV  Anesthetic complications: no      Cardiovascular status: blood pressure returned to baseline  Respiratory status: unassisted  Hydration status: euvolemic  Follow-up not needed.          Vitals Value Taken Time   /71 12/30/2019  8:55 AM   Temp 36.4 °C (97.5 °F) 12/27/2019  2:51 PM   Pulse 70 12/30/2019  8:55 AM   Resp 16 12/27/2019  3:00 PM   SpO2 98 % 12/27/2019  3:09 PM   Vitals shown include unvalidated device data.      Event Time     Out of Recovery 14:51:27          Pain/David Score: No data recorded

## 2019-12-30 NOTE — TELEPHONE ENCOUNTER
Spoke with patient to get her scheduled for PET/CT. Patient scheduled for 1/7/20 at 0930 at [facility]. Location, date, and time reviewed. Patient verbalized understanding.

## 2019-12-31 ENCOUNTER — LAB VISIT (OUTPATIENT)
Dept: LAB | Facility: HOSPITAL | Age: 34
End: 2019-12-31
Payer: COMMERCIAL

## 2019-12-31 ENCOUNTER — PATIENT MESSAGE (OUTPATIENT)
Dept: HEMATOLOGY/ONCOLOGY | Facility: CLINIC | Age: 34
End: 2019-12-31

## 2019-12-31 ENCOUNTER — INFUSION (OUTPATIENT)
Dept: INFUSION THERAPY | Facility: HOSPITAL | Age: 34
End: 2019-12-31
Attending: NURSE PRACTITIONER
Payer: COMMERCIAL

## 2019-12-31 ENCOUNTER — OFFICE VISIT (OUTPATIENT)
Dept: HEMATOLOGY/ONCOLOGY | Facility: CLINIC | Age: 34
End: 2019-12-31
Payer: COMMERCIAL

## 2019-12-31 VITALS
HEART RATE: 78 BPM | RESPIRATION RATE: 18 BRPM | SYSTOLIC BLOOD PRESSURE: 129 MMHG | DIASTOLIC BLOOD PRESSURE: 90 MMHG | TEMPERATURE: 98 F

## 2019-12-31 VITALS
RESPIRATION RATE: 18 BRPM | OXYGEN SATURATION: 99 % | TEMPERATURE: 98 F | BODY MASS INDEX: 27.95 KG/M2 | SYSTOLIC BLOOD PRESSURE: 130 MMHG | DIASTOLIC BLOOD PRESSURE: 95 MMHG | WEIGHT: 167.75 LBS | HEART RATE: 97 BPM | HEIGHT: 65 IN

## 2019-12-31 DIAGNOSIS — Z17.0 MALIGNANT NEOPLASM OF UPPER-OUTER QUADRANT OF RIGHT BREAST IN FEMALE, ESTROGEN RECEPTOR POSITIVE: Primary | ICD-10-CM

## 2019-12-31 DIAGNOSIS — C50.411 MALIGNANT NEOPLASM OF UPPER-OUTER QUADRANT OF RIGHT BREAST IN FEMALE, ESTROGEN RECEPTOR POSITIVE: Primary | ICD-10-CM

## 2019-12-31 DIAGNOSIS — Z17.0 MALIGNANT NEOPLASM OF UPPER-OUTER QUADRANT OF RIGHT BREAST IN FEMALE, ESTROGEN RECEPTOR POSITIVE: ICD-10-CM

## 2019-12-31 DIAGNOSIS — C50.911 INFILTRATING DUCTAL CARCINOMA OF RIGHT BREAST: Primary | ICD-10-CM

## 2019-12-31 DIAGNOSIS — C50.411 MALIGNANT NEOPLASM OF UPPER-OUTER QUADRANT OF RIGHT BREAST IN FEMALE, ESTROGEN RECEPTOR POSITIVE: ICD-10-CM

## 2019-12-31 LAB
ALBUMIN SERPL BCP-MCNC: 4.1 G/DL (ref 3.5–5.2)
ALP SERPL-CCNC: 97 U/L (ref 55–135)
ALT SERPL W/O P-5'-P-CCNC: 11 U/L (ref 10–44)
ANION GAP SERPL CALC-SCNC: 9 MMOL/L (ref 8–16)
AST SERPL-CCNC: 17 U/L (ref 10–40)
BASOPHILS # BLD AUTO: 0.03 K/UL (ref 0–0.2)
BASOPHILS NFR BLD: 0.6 % (ref 0–1.9)
BILIRUB SERPL-MCNC: 0.7 MG/DL (ref 0.1–1)
BUN SERPL-MCNC: 12 MG/DL (ref 6–20)
CALCIUM SERPL-MCNC: 9.2 MG/DL (ref 8.7–10.5)
CHLORIDE SERPL-SCNC: 106 MMOL/L (ref 95–110)
CO2 SERPL-SCNC: 24 MMOL/L (ref 23–29)
CREAT SERPL-MCNC: 0.9 MG/DL (ref 0.5–1.4)
DIFFERENTIAL METHOD: ABNORMAL
EOSINOPHIL # BLD AUTO: 0.2 K/UL (ref 0–0.5)
EOSINOPHIL NFR BLD: 4.4 % (ref 0–8)
ERYTHROCYTE [DISTWIDTH] IN BLOOD BY AUTOMATED COUNT: 12.3 % (ref 11.5–14.5)
EST. GFR  (AFRICAN AMERICAN): >60 ML/MIN/1.73 M^2
EST. GFR  (NON AFRICAN AMERICAN): >60 ML/MIN/1.73 M^2
GLUCOSE SERPL-MCNC: 121 MG/DL (ref 70–110)
HCT VFR BLD AUTO: 42.4 % (ref 37–48.5)
HGB BLD-MCNC: 14.6 G/DL (ref 12–16)
IMM GRANULOCYTES # BLD AUTO: 0.01 K/UL (ref 0–0.04)
IMM GRANULOCYTES NFR BLD AUTO: 0.2 % (ref 0–0.5)
LYMPHOCYTES # BLD AUTO: 1.6 K/UL (ref 1–4.8)
LYMPHOCYTES NFR BLD: 31.3 % (ref 18–48)
MCH RBC QN AUTO: 31.5 PG (ref 27–31)
MCHC RBC AUTO-ENTMCNC: 34.4 G/DL (ref 32–36)
MCV RBC AUTO: 91 FL (ref 82–98)
MONOCYTES # BLD AUTO: 0.5 K/UL (ref 0.3–1)
MONOCYTES NFR BLD: 9 % (ref 4–15)
NEUTROPHILS # BLD AUTO: 2.7 K/UL (ref 1.8–7.7)
NEUTROPHILS NFR BLD: 54.5 % (ref 38–73)
NRBC BLD-RTO: 0 /100 WBC
PLATELET # BLD AUTO: 246 K/UL (ref 150–350)
PMV BLD AUTO: 8.8 FL (ref 9.2–12.9)
POTASSIUM SERPL-SCNC: 4.3 MMOL/L (ref 3.5–5.1)
PROT SERPL-MCNC: 7.5 G/DL (ref 6–8.4)
RBC # BLD AUTO: 4.64 M/UL (ref 4–5.4)
SODIUM SERPL-SCNC: 139 MMOL/L (ref 136–145)
TROPONIN I SERPL DL<=0.01 NG/ML-MCNC: 0.01 NG/ML (ref 0–0.03)
WBC # BLD AUTO: 5.01 K/UL (ref 3.9–12.7)

## 2019-12-31 PROCEDURE — 63600175 PHARM REV CODE 636 W HCPCS: Performed by: INTERNAL MEDICINE

## 2019-12-31 PROCEDURE — 96375 TX/PRO/DX INJ NEW DRUG ADDON: CPT

## 2019-12-31 PROCEDURE — 84484 ASSAY OF TROPONIN QUANT: CPT

## 2019-12-31 PROCEDURE — 85025 COMPLETE CBC W/AUTO DIFF WBC: CPT

## 2019-12-31 PROCEDURE — 99999 PR PBB SHADOW E&M-EST. PATIENT-LVL IV: CPT | Mod: PBBFAC,,, | Performed by: NURSE PRACTITIONER

## 2019-12-31 PROCEDURE — 96413 CHEMO IV INFUSION 1 HR: CPT

## 2019-12-31 PROCEDURE — 99214 PR OFFICE/OUTPT VISIT, EST, LEVL IV, 30-39 MIN: ICD-10-PCS | Mod: S$GLB,,, | Performed by: NURSE PRACTITIONER

## 2019-12-31 PROCEDURE — 99999 PR PBB SHADOW E&M-EST. PATIENT-LVL IV: ICD-10-PCS | Mod: PBBFAC,,, | Performed by: NURSE PRACTITIONER

## 2019-12-31 PROCEDURE — 96367 TX/PROPH/DG ADDL SEQ IV INF: CPT

## 2019-12-31 PROCEDURE — 36415 COLL VENOUS BLD VENIPUNCTURE: CPT

## 2019-12-31 PROCEDURE — 80053 COMPREHEN METABOLIC PANEL: CPT

## 2019-12-31 PROCEDURE — 96411 CHEMO IV PUSH ADDL DRUG: CPT

## 2019-12-31 PROCEDURE — 99214 OFFICE O/P EST MOD 30 MIN: CPT | Mod: S$GLB,,, | Performed by: NURSE PRACTITIONER

## 2019-12-31 RX ORDER — SODIUM CHLORIDE 0.9 % (FLUSH) 0.9 %
10 SYRINGE (ML) INJECTION
Status: DISCONTINUED | OUTPATIENT
Start: 2019-12-31 | End: 2019-12-31 | Stop reason: HOSPADM

## 2019-12-31 RX ORDER — HEPARIN 100 UNIT/ML
500 SYRINGE INTRAVENOUS
Status: DISCONTINUED | OUTPATIENT
Start: 2019-12-31 | End: 2019-12-31 | Stop reason: HOSPADM

## 2019-12-31 RX ORDER — CHOLECALCIFEROL (VITAMIN D3) 25 MCG
1000 TABLET ORAL DAILY
COMMUNITY
End: 2020-10-01 | Stop reason: CLARIF

## 2019-12-31 RX ORDER — LORAZEPAM 0.5 MG/1
1 TABLET ORAL EVERY 6 HOURS PRN
Qty: 30 TABLET | Refills: 0 | Status: ON HOLD | OUTPATIENT
Start: 2019-12-31 | End: 2020-03-20 | Stop reason: HOSPADM

## 2019-12-31 RX ORDER — ONDANSETRON 4 MG/1
4 TABLET, ORALLY DISINTEGRATING ORAL EVERY 8 HOURS PRN
Qty: 30 TABLET | Refills: 1 | Status: SHIPPED | OUTPATIENT
Start: 2019-12-31 | End: 2020-01-24 | Stop reason: SDUPTHER

## 2019-12-31 RX ORDER — DEXAMETHASONE 4 MG/1
TABLET ORAL
Qty: 40 TABLET | Refills: 0 | Status: SHIPPED | OUTPATIENT
Start: 2019-12-31 | End: 2020-06-02

## 2019-12-31 RX ORDER — PROMETHAZINE HYDROCHLORIDE 12.5 MG/1
12.5 TABLET ORAL EVERY 6 HOURS PRN
Qty: 30 TABLET | Refills: 1 | Status: SHIPPED | OUTPATIENT
Start: 2019-12-31 | End: 2020-10-01 | Stop reason: CLARIF

## 2019-12-31 RX ORDER — DOXORUBICIN HYDROCHLORIDE 2 MG/ML
60 INJECTION, SOLUTION INTRAVENOUS
Status: COMPLETED | OUTPATIENT
Start: 2019-12-31 | End: 2019-12-31

## 2019-12-31 RX ADMIN — DOXORUBICIN HYDROCHLORIDE 112 MG: 2 INJECTION, SOLUTION INTRAVENOUS at 02:12

## 2019-12-31 RX ADMIN — CYCLOPHOSPHAMIDE 1115 MG: 1 INJECTION, POWDER, FOR SOLUTION INTRAVENOUS; ORAL at 02:12

## 2019-12-31 RX ADMIN — HEPARIN 500 UNITS: 100 SYRINGE at 03:12

## 2019-12-31 RX ADMIN — SODIUM CHLORIDE: 9 INJECTION, SOLUTION INTRAVENOUS at 01:12

## 2019-12-31 RX ADMIN — DEXAMETHASONE SODIUM PHOSPHATE: 4 INJECTION, SOLUTION INTRA-ARTICULAR; INTRALESIONAL; INTRAMUSCULAR; INTRAVENOUS; SOFT TISSUE at 01:12

## 2019-12-31 RX ADMIN — APREPITANT 130 MG: 130 INJECTION, EMULSION INTRAVENOUS at 02:12

## 2019-12-31 NOTE — PROGRESS NOTES
Answers for HPI/ROS submitted by the patient on 12/30/2019   appetite change : No  unexpected weight change: No  visual disturbance: No  cough: No  shortness of breath: No  chest pain: No  abdominal pain: No  diarrhea: No  frequency: No  back pain: No  rash: No  headaches: Yes  adenopathy: No  nervous/ anxious: Yes  Subjective:       Patient ID: Yolanda Win is a 34 y.o. female.    Chief Complaint: No chief complaint on file.    HPI Patient present today for cycle 1 for A/C. She present with friend, April. She does have some anxiety about treatment today.     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  She had a baby in April and had been breast feeding.  In October, she 1st noted a palpable abnormality in her right breast which seemed to wax and wane initially.    Mammogram and ultrasound on December 11th, 2019 showed right breast mass 7 cm from the nipple.  By ultrasound this mass measured 33 x 32 x 21 mm.  There was no associated axillary lymphadenopathy noted.    Right breast biopsy on December 13 showed high-grade infiltrating ductal carcinoma (histologic grade 3, nuclear grade 3, mitotic index 3) there were medullary features.  Cancer was 25% ER positive and AK and HER2 negative.  Ki-67 was 90%.    Other imaging:  MRI from December 26, results: There is a 1.3 cm non enhancing focus in the right sternum.  PET scan ordered for 1/7/20.   Bone Scan 12/26/19: Negative  CT Chest, abdomen, pelvis 12/24/19: No evidence of distant metastatic disease in the chest, abdomen, or pelvis.      Review of Systems   Constitutional: Negative for activity change, appetite change, chills, diaphoresis, fatigue, fever and unexpected weight change.        Does high intensity exercise   HENT: Negative for mouth sores, nosebleeds, sore throat and trouble swallowing.    Eyes: Negative for visual disturbance.   Respiratory: Negative for cough and shortness of breath.     Cardiovascular: Negative for chest pain, palpitations and leg swelling.   Gastrointestinal: Negative for abdominal distention, abdominal pain, blood in stool, constipation, diarrhea, nausea and vomiting.   Genitourinary: Negative for dysuria, frequency, hematuria, urgency and vaginal bleeding.   Musculoskeletal: Negative for arthralgias, back pain and myalgias.   Skin: Negative for pallor and rash.   Allergic/Immunologic: Negative for immunocompromised state.   Neurological: Positive for headaches (history of migraines). Negative for dizziness, weakness, light-headedness and numbness.   Hematological: Negative for adenopathy. Does not bruise/bleed easily.   Psychiatric/Behavioral: Negative for confusion. The patient is nervous/anxious.        Objective:      Physical Exam   Constitutional: She is oriented to person, place, and time. She appears well-developed and well-nourished. No distress.   HENT:   Head: Normocephalic.   Mouth/Throat: No oropharyngeal exudate.   Eyes: EOM are normal. No scleral icterus.   Neck: No thyromegaly present.   Cardiovascular: Normal rate and regular rhythm.   Pulmonary/Chest: Effort normal and breath sounds normal. Right breast exhibits mass. Right breast exhibits no nipple discharge and no skin change. Left breast exhibits no mass, no nipple discharge and no skin change.       Abdominal: Soft. She exhibits no mass. There is no tenderness.   Musculoskeletal: She exhibits no edema.   Lymphadenopathy:     She has no cervical adenopathy.     She has axillary adenopathy.        Right axillary: Pectoral adenopathy present. No lateral adenopathy present.        Left axillary: No pectoral and no lateral adenopathy present.       Right: No supraclavicular adenopathy present.        Left: No supraclavicular adenopathy present.   Neurological: She is alert and oriented to person, place, and time. No cranial nerve deficit.   Psychiatric: She has a normal mood and affect. Her behavior is normal.  Thought content normal.   Vitals reviewed.      Assessment:       1. Malignant neoplasm of upper-outer quadrant of right breast in female, estrogen receptor positive        Plan:       1. She has a T2, high-grade tumor which is weakly estrogen receptor positive.      Recommended 4 cycles of Adriamycin Cytoxan given in dose dense fashion followed by 12 weeks of weekly Taxol - will proceed with this recommendation   Plan for C1 AC today with Udencya injection on 1/2/20 - consent signed in clinic  BRCA Risk pending from 12/19/19   Echo 12/30/19 EF showed 60%  Prescriptions sent for: zofran, phenergan, ativan and dexamethasone       She has an appointment with Gynecology on January 2nd; she has two children and is okay to proceed with chemotherapy despite being educated that she could have infertility.  She has already met with Psychology.      Patient is in agreement with the proposed treatment plan. All questions were answered to the patient's satisfaction. Patient knows to call clinic for any new or worsening symptoms and if anything is needed before the next clinic visit.          Vika Calderón, FNP-C  Hematology & Medical Oncology   [facility] [place] [address]  ph. [phone]  Fax. [phone]    Patient dicussed with collaborating physician, Dr. Ny.

## 2019-12-31 NOTE — PLAN OF CARE
Pt educated on treatment plan and potential side effects. Pt tolerated AC with no complications. VSS. Pt instructed to call MD with any problems. NAD. Pt discharged home independently.

## 2020-01-02 ENCOUNTER — INFUSION (OUTPATIENT)
Dept: INFUSION THERAPY | Facility: HOSPITAL | Age: 35
End: 2020-01-02
Attending: NURSE PRACTITIONER
Payer: COMMERCIAL

## 2020-01-02 ENCOUNTER — OFFICE VISIT (OUTPATIENT)
Dept: OBSTETRICS AND GYNECOLOGY | Facility: CLINIC | Age: 35
End: 2020-01-02
Attending: OBSTETRICS & GYNECOLOGY
Payer: COMMERCIAL

## 2020-01-02 VITALS
SYSTOLIC BLOOD PRESSURE: 154 MMHG | DIASTOLIC BLOOD PRESSURE: 92 MMHG | HEIGHT: 65 IN | BODY MASS INDEX: 27.95 KG/M2 | RESPIRATION RATE: 18 BRPM | WEIGHT: 167.75 LBS

## 2020-01-02 VITALS
DIASTOLIC BLOOD PRESSURE: 81 MMHG | TEMPERATURE: 98 F | HEART RATE: 71 BPM | RESPIRATION RATE: 18 BRPM | SYSTOLIC BLOOD PRESSURE: 132 MMHG

## 2020-01-02 DIAGNOSIS — C50.411 MALIGNANT NEOPLASM OF UPPER-OUTER QUADRANT OF RIGHT BREAST IN FEMALE, ESTROGEN RECEPTOR POSITIVE: Primary | ICD-10-CM

## 2020-01-02 DIAGNOSIS — Z17.0 MALIGNANT NEOPLASM OF UPPER-OUTER QUADRANT OF RIGHT BREAST IN FEMALE, ESTROGEN RECEPTOR POSITIVE: Primary | ICD-10-CM

## 2020-01-02 DIAGNOSIS — C50.919 MALIGNANT NEOPLASM OF BREAST IN FEMALE, ESTROGEN RECEPTOR POSITIVE, UNSPECIFIED LATERALITY, UNSPECIFIED SITE OF BREAST: Primary | ICD-10-CM

## 2020-01-02 DIAGNOSIS — N95.1 MENOPAUSAL SYMPTOM: ICD-10-CM

## 2020-01-02 DIAGNOSIS — Z17.0 MALIGNANT NEOPLASM OF BREAST IN FEMALE, ESTROGEN RECEPTOR POSITIVE, UNSPECIFIED LATERALITY, UNSPECIFIED SITE OF BREAST: Primary | ICD-10-CM

## 2020-01-02 DIAGNOSIS — Z30.09 COUNSELING FOR BIRTH CONTROL REGARDING INTRAUTERINE DEVICE (IUD): ICD-10-CM

## 2020-01-02 PROCEDURE — 99214 PR OFFICE/OUTPT VISIT, EST, LEVL IV, 30-39 MIN: ICD-10-PCS | Mod: S$GLB,,, | Performed by: OBSTETRICS & GYNECOLOGY

## 2020-01-02 PROCEDURE — 63600175 PHARM REV CODE 636 W HCPCS: Performed by: INTERNAL MEDICINE

## 2020-01-02 PROCEDURE — 99214 OFFICE O/P EST MOD 30 MIN: CPT | Mod: S$GLB,,, | Performed by: OBSTETRICS & GYNECOLOGY

## 2020-01-02 PROCEDURE — 3008F BODY MASS INDEX DOCD: CPT | Mod: CPTII,S$GLB,, | Performed by: OBSTETRICS & GYNECOLOGY

## 2020-01-02 PROCEDURE — 3008F PR BODY MASS INDEX (BMI) DOCUMENTED: ICD-10-PCS | Mod: CPTII,S$GLB,, | Performed by: OBSTETRICS & GYNECOLOGY

## 2020-01-02 PROCEDURE — 96372 THER/PROPH/DIAG INJ SC/IM: CPT

## 2020-01-02 RX ADMIN — PEGFILGRASTIM-CBQV 6 MG: 6 INJECTION, SOLUTION SUBCUTANEOUS at 08:01

## 2020-01-02 NOTE — PROGRESS NOTES
SUBJECTIVE:   34 y.o. female   Presents today to establish care in survivorship and to discuss contraception. . Patient's last menstrual period was 2019 (approximate)..  She was recenlty diagnosed with breast cancer and started her first round of chemotherapy. She reports nausea, fatiuge. She does see Dr. Oksana Hutson and was increased to 20mg of Celexa. She reports some hot flashes and vaginal dryness.   She has discussed vasectomy with her  - he does not want to do this.She wants to discuss options for contraception.  .        Past Medical History:   Diagnosis Date    Abnormal Pap smear of cervix     Allergy     seasonal    Anorexia     Bulimia     Depression     Fever blister     Hypertension     Gestational Hypertension    Invasive ductal carcinoma of right breast 2019    Mastitis     Migraine headache      Past Surgical History:   Procedure Laterality Date    BONE MARROW ASPIRATION      x 3    CERVICAL BIOPSY  W/ LOOP ELECTRODE EXCISION       SECTION  2016     SECTION N/A 2019    Procedure:  SECTION;  Surgeon: Kirit Hernandez MD;  Location: [facility] L&D;  Service: OB/GYN;  Laterality: N/A;    COLPOSCOPY      INSERTION OF TUNNELED CENTRAL VENOUS CATHETER (CVC) WITH SUBCUTANEOUS PORT Left 2019    Procedure: [id number]-Left neck or chest wall;  Surgeon: Percy Ayers MD;  Location: [facility] OR [address] [place] [address];  Service: General;  Laterality: Left;    SHOULDER SURGERY Left     x 2    SINUS SURGERY      age 19    TONSILLECTOMY       Social History     Socioeconomic History    Marital status:      Spouse name: Not on file    Number of children: Not on file    Years of education: Not on file    Highest education level: Not on file   Occupational History    Not on file   Social Needs    Financial resource strain: Not hard at all    Food insecurity:     Worry: Never true     Inability: Never true     Transportation needs:     Medical: No     Non-medical: No   Tobacco Use    Smoking status: Never Smoker    Smokeless tobacco: Never Used   Substance and Sexual Activity    Alcohol use: No     Frequency: 2-3 times a week     Drinks per session: 1 or 2     Binge frequency: Monthly     Comment: pre pregnancy     Drug use: No    Sexual activity: Yes     Partners: Male     Birth control/protection: None   Lifestyle    Physical activity:     Days per week: 6 days     Minutes per session: 80 min    Stress: Only a little   Relationships    Social connections:     Talks on phone: More than three times a week     Gets together: Once a week     Attends Hinduism service: Not on file     Active member of club or organization: No     Attends meetings of clubs or organizations: Never     Relationship status:    Other Topics Concern    Are you pregnant or think you may be? Not Asked    Breast-feeding Not Asked   Social History Narrative    Not on file     Family History   Problem Relation Age of Onset    Cancer Maternal Grandmother 40        cervical    Cervical cancer Mother     Breast cancer Other     Ovarian cancer Other     Colon cancer Neg Hx     Melanoma Neg Hx      OB History    Para Term  AB Living   2 2 2     2   SAB TAB Ectopic Multiple Live Births         0 2      # Outcome Date GA Lbr Bull/2nd Weight Sex Delivery Anes PTL Lv   2 Term 19 39w1d  3.43 kg (7 lb 9 oz) M CS-LTranv Spinal N JOLLY   1 Term 16 38w1d  2.96 kg (6 lb 8.4 oz) M CS-LTranv EPI N JOLLY      Complications: Failure to Progress in First Stage           Current Outpatient Medications   Medication Sig Dispense Refill    ALPRAZolam (XANAX) 0.25 MG tablet Take 1 tablet (0.25 mg total) by mouth 3 (three) times daily. 20 tablet 0    citalopram (CELEXA) 20 MG tablet Take 1 tablet (20 mg total) by mouth once daily. 30 tablet 2    dexAMETHasone (DECADRON) 4 MG Tab Take 2 tablets (8 mg total) by mouth 2 (two) times  daily with meals. Take for 2 days after chemotherapy then stop for 10 days 40 tablet 0    dextroamphetamine-amphetamine (ADDERALL XR) 25 MG 24 hr capsule Take 1 capsule (25 mg total) by mouth every morning. 30 capsule 0    loratadine (CLARITIN) 10 mg tablet Take 10 mg by mouth once daily.      LORazepam (ATIVAN) 0.5 MG tablet Take 2 tablets (1 mg total) by mouth every 6 (six) hours as needed for Anxiety. 30 tablet 0    ondansetron (ZOFRAN-ODT) 4 MG TbDL Dissolve 1 tablet (4 mg total) by mouth every 8 (eight) hours as needed. 30 tablet 1    OPW TEST CLAIM - DO NOT FILL Take by mouth. OPW test claim. Do not fill. 1 each 0    promethazine (PHENERGAN) 12.5 MG Tab Take 1 tablet (12.5 mg total) by mouth every 6 (six) hours as needed. 30 tablet 1    tranexamic acid (LYSTEDA) 650 mg tablet Take 2 tablets (1,300 mg total) by mouth 3 (three) times daily. 30 tablet 3    triamcinolone acetonide 0.1% (KENALOG) 0.1 % cream apply to affected area twice daily 454 g 3    valACYclovir (VALTREX) 1000 MG tablet Take 1 tablet (1,000 mg total) by mouth every 12 (twelve) hours. (Patient taking differently: Take 1,000 mg by mouth every 12 (twelve) hours. Patient uses prn) 60 tablet 0    vitamin D (VITAMIN D3) 1000 units Tab Take 1,000 Units by mouth once daily.      albuterol (VENTOLIN HFA) 90 mcg/actuation inhaler Inhale 2 puffs into the lungs every 6 (six) hours as needed for Wheezing. Rescue (Patient not taking: Reported on 1/2/2020) 18 g 0    HYDROcodone-acetaminophen (NORCO) 5-325 mg per tablet Take 1 tablet by mouth every 6 (six) hours as needed for Pain. (Patient not taking: Reported on 1/2/2020) 20 tablet 0     Current Facility-Administered Medications   Medication Dose Route Frequency Provider Last Rate Last Dose    onabotulinumtoxina injection 200 Units  200 Units Intramuscular Q90 Days Dillon Gonzalez MD   200 Units at 12/06/19 1456     Allergies: Amoxicillin; Penicillins; and Diazepam     The ASCVD Risk score  (Yesy CARR Jr., et al., 2013) failed to calculate for the following reasons:    The 2013 ASCVD risk score is only valid for ages 40 to 79      ROS:  Constitutional: no weight loss, weight gain, fever, fatigue  Eyes:  No vision changes, glasses/contacts  ENT/Mouth: No ulcers, sinus problems, ears ringing, headache  Cardiovascular: No inability to lie flat, chest pain, exercise intolerance, swelling, heart palpitations  Respiratory: No wheezing, coughing blood, shortness of breath, or cough  Gastrointestinal: No diarrhea, bloody stool, nausea/vomiting, constipation, gas, hemorrhoids  Genitourinary: No blood in urine, painful urination, urgency of urination, frequency of urination, incomplete emptying, incontinence, abnormal bleeding, painful periods, heavy periods, vaginal discharge, vaginal odor, painful intercourse, sexual problems, bleeding after intercourse.  Musculoskeletal: No muscle weakness  Skin/Breast: +breast cancer  Neurological: No passing out, seizures, numbness, headache  Endocrine: No diabetes, hypothyroid, hyperthyroid, +hot flashes, hair loss, abnormal hair growth, acne  Psychiatric: + depression, crying  Hematologic: No bruises, bleeding, swollen lymph nodes, anemia.      Physical Exam  deferred    ASSESSMENT:   Breast cancer  Contraceptive counseling  Menopausal symptoms    PLAN:   Face to face time 25 minutes- majority spent in counseling and arranging follow up  Counseled her on contraception options- Paragard IUD and vasectomy were reviewed with her. She does not desire a BTL at this time  Will  Order Paragard IUD  Recommend consult with Dr. White for patient and her   Will consider Intrarosa in the future  Counseled her on diet and exercise

## 2020-01-06 ENCOUNTER — PATIENT MESSAGE (OUTPATIENT)
Dept: SURGERY | Facility: CLINIC | Age: 35
End: 2020-01-06

## 2020-01-06 NOTE — PROGRESS NOTES
Subjective:       Patient ID: Yolanda Win is a 34 y.o. female.    Chief Complaint: No chief complaint on file.    HPI Patient present today for cycle 2 for A/C. She present alone today. She is continuing to work out.     Post cycle 1 nausea, fatigue and muscle aches the night of chemotherapy. Wednesday (after chemotherapy) she felt like she achy, Thursday (after chemotherapy) she was extremely fatigued. On Friday she her symptoms started to improve, but took a week to be resolved. Also noted to have insomnia. She also notes that food does not taste good. She has lost about 7 lbs since starting treatment. Losing her hair and itching scalp.      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  She had a baby in April and had been breast feeding.  In October, she 1st noted a palpable abnormality in her right breast which seemed to wax and wane initially.    Mammogram and ultrasound on December 11th, 2019 showed right breast mass 7 cm from the nipple.  By ultrasound this mass measured 33 x 32 x 21 mm.  There was no associated axillary lymphadenopathy noted.    Right breast biopsy on December 13 showed high-grade infiltrating ductal carcinoma (histologic grade 3, nuclear grade 3, mitotic index 3) there were medullary features.  Cancer was 25% ER positive and [place] and HER2 negative.  Ki-67 was 90%.    Other imaging:  MRI from December 26, results: There is a 1.3 cm non enhancing focus in the right sternum.  PET scan ordered for 1/7/20.   Bone Scan 12/26/19: Negative  CT Chest, abdomen, pelvis 12/24/19: No evidence of distant metastatic disease in the chest, abdomen, or pelvis.      Review of Systems   Constitutional: Positive for fatigue. Negative for activity change, appetite change, chills, diaphoresis, fever and unexpected weight change.        Does high intensity exercise   HENT: Negative for mouth sores and nosebleeds.         Mouth tenderness  Taste changes    Eyes: Negative for visual disturbance.   Respiratory: Negative for cough and shortness of breath.    Cardiovascular: Negative for chest pain, palpitations and leg swelling.   Gastrointestinal: Positive for constipation (due to taking zofran) and nausea (takes zofran and phenergan). Negative for abdominal distention, abdominal pain, blood in stool, diarrhea and vomiting.   Genitourinary: Negative for dysuria, frequency, hematuria, urgency and vaginal bleeding.   Musculoskeletal: Negative for arthralgias, back pain and myalgias.   Skin: Negative for pallor and rash.   Allergic/Immunologic: Negative for immunocompromised state.   Neurological: Positive for headaches (history of migraines). Negative for dizziness, weakness, light-headedness and numbness.   Hematological: Negative for adenopathy. Does not bruise/bleed easily.   Psychiatric/Behavioral: Negative for confusion. The patient is not nervous/anxious.        Objective:      Physical Exam   Constitutional: She is oriented to person, place, and time. She appears well-developed and well-nourished. No distress.   HENT:   Head: Normocephalic.   Mouth/Throat: Oropharynx is clear and moist. No oropharyngeal exudate.   Eyes: Pupils are equal, round, and reactive to light. EOM are normal.   Neck: Normal range of motion.   Cardiovascular: Normal rate, regular rhythm and normal heart sounds.   Pulmonary/Chest: Effort normal and breath sounds normal. Right breast exhibits mass. Right breast exhibits no nipple discharge and no skin change. Left breast exhibits no mass, no nipple discharge and no skin change.       Abdominal: Soft. Normal appearance and bowel sounds are normal. She exhibits no distension and no mass. There is no tenderness.   Musculoskeletal: She exhibits no edema.   Lymphadenopathy:     She has no cervical adenopathy.     She has axillary adenopathy.        Right axillary: Pectoral adenopathy present. No lateral adenopathy present.        Left axillary: No  pectoral and no lateral adenopathy present.       Right: No supraclavicular adenopathy present.        Left: No supraclavicular adenopathy present.   Neurological: She is alert and oriented to person, place, and time.   Skin: Skin is warm and dry. No rash noted.   Psychiatric: She has a normal mood and affect. Her behavior is normal. Thought content normal.   Nursing note and vitals reviewed.      Lab Results   Component Value Date    WBC 7.19 01/16/2020    HGB 13.0 01/16/2020    HCT 39.6 01/16/2020    MCV 95 01/16/2020     01/16/2020        CMP  Sodium   Date Value Ref Range Status   01/16/2020 136 136 - 145 mmol/L Final     Potassium   Date Value Ref Range Status   01/16/2020 4.4 3.5 - 5.1 mmol/L Final     Chloride   Date Value Ref Range Status   01/16/2020 104 95 - 110 mmol/L Final     CO2   Date Value Ref Range Status   01/16/2020 25 23 - 29 mmol/L Final     Glucose   Date Value Ref Range Status   01/16/2020 97 70 - 110 mg/dL Final     BUN, Bld   Date Value Ref Range Status   01/16/2020 17 6 - 20 mg/dL Final     Creatinine   Date Value Ref Range Status   01/16/2020 1.0 0.5 - 1.4 mg/dL Final     Calcium   Date Value Ref Range Status   01/16/2020 9.3 8.7 - 10.5 mg/dL Final     Total Protein   Date Value Ref Range Status   01/16/2020 7.2 6.0 - 8.4 g/dL Final     Albumin   Date Value Ref Range Status   01/16/2020 4.1 3.5 - 5.2 g/dL Final     Total Bilirubin   Date Value Ref Range Status   01/16/2020 0.3 0.1 - 1.0 mg/dL Final     Comment:     For infants and newborns, interpretation of results should be based  on gestational age, weight and in agreement with clinical  observations.  Premature Infant recommended reference ranges:  Up to 24 hours.............<8.0 mg/dL  Up to 48 hours............<12.0 mg/dL  3-5 days..................<15.0 mg/dL  6-29 days.................<15.0 mg/dL       Alkaline Phosphatase   Date Value Ref Range Status   01/16/2020 125 55 - 135 U/L Final     AST   Date Value Ref Range Status    01/16/2020 15 10 - 40 U/L Final     ALT   Date Value Ref Range Status   01/16/2020 12 10 - 44 U/L Final     Anion Gap   Date Value Ref Range Status   01/16/2020 7 (L) 8 - 16 mmol/L Final     eGFR if    Date Value Ref Range Status   01/16/2020 >60.0 >60 mL/min/1.73 m^2 Final     eGFR if non    Date Value Ref Range Status   01/16/2020 >60.0 >60 mL/min/1.73 m^2 Final     Comment:     Calculation used to obtain the estimated glomerular filtration  rate (eGFR) is the CKD-EPI equation.        Results for orders placed during the hospital encounter of 12/30/19   Echo    Narrative · Unable to obtain apical windows despite multiple attempts, unable to   perform strain or quantitative LVEF.  · Normal left ventricular systolic function. The estimated ejection   fraction is 60%  · Concentric left ventricular remodeling.  · Normal LV diastolic function.  · Mild left atrial enlargement.  · Normal central venous pressure (3 mm Hg).        Labs reviewed   Assessment:       1. Malignant neoplasm of upper-outer quadrant of right breast in female, estrogen receptor positive    2. Infiltrating ductal carcinoma of right breast        Plan:       1. She has a T2, high-grade tumor which is weakly estrogen receptor positive.      Proceed with  cycle 2 of A/C followed by [facility]   Echo due in March 2020     Current treatment plan: 4 cycles of Adriamycin Cytoxan given in dose dense fashion followed by 12 weeks of weekly Taxol - will proceed with this recommendation     Return to clinic in 2 weeks with MD appointment and labs.       Patient is in agreement with the proposed treatment plan. All questions were answered to the patient's satisfaction. Patient knows to call clinic for any new or worsening symptoms and if anything is needed before the next clinic visit.          Vika Calderón, FNP-C  Hematology & Medical Oncology   [address] [place] [address]  ph. [phone]  Fax.  [phone]    Patient dicussed with collaborating physician, Dr. Ny.

## 2020-01-07 ENCOUNTER — OFFICE VISIT (OUTPATIENT)
Dept: PSYCHIATRY | Facility: CLINIC | Age: 35
End: 2020-01-07
Payer: COMMERCIAL

## 2020-01-07 DIAGNOSIS — C50.411 MALIGNANT NEOPLASM OF UPPER-OUTER QUADRANT OF RIGHT BREAST IN FEMALE, ESTROGEN RECEPTOR POSITIVE: ICD-10-CM

## 2020-01-07 DIAGNOSIS — F90.0 ADHD (ATTENTION DEFICIT HYPERACTIVITY DISORDER), INATTENTIVE TYPE: ICD-10-CM

## 2020-01-07 DIAGNOSIS — F41.1 GENERALIZED ANXIETY DISORDER: Primary | ICD-10-CM

## 2020-01-07 DIAGNOSIS — Z17.0 MALIGNANT NEOPLASM OF UPPER-OUTER QUADRANT OF RIGHT BREAST IN FEMALE, ESTROGEN RECEPTOR POSITIVE: ICD-10-CM

## 2020-01-07 PROCEDURE — 90837 PR PSYCHOTHERAPY W/PATIENT, 60 MIN: ICD-10-PCS | Mod: S$GLB,,, | Performed by: PSYCHOLOGIST

## 2020-01-07 PROCEDURE — 90837 PSYTX W PT 60 MINUTES: CPT | Mod: S$GLB,,, | Performed by: PSYCHOLOGIST

## 2020-01-07 PROCEDURE — 99999 PR PBB SHADOW E&M-EST. PATIENT-LVL II: ICD-10-PCS | Mod: PBBFAC,,, | Performed by: PSYCHOLOGIST

## 2020-01-07 PROCEDURE — 99999 PR PBB SHADOW E&M-EST. PATIENT-LVL II: CPT | Mod: PBBFAC,,, | Performed by: PSYCHOLOGIST

## 2020-01-07 NOTE — PROGRESS NOTES
INFORMED CONSENT: Yolanda Win   is known to this provider and identity was confirmed via NAME and .  The patient has been informed of the risks and benefits associated with engaging in psychotherapy, the handling of protected health information, the rights of privacy and the limits of confidentiality. The patient has also been informed of the importance of reporting any suicidal or homicidal ideation to this or any provider to ensure safety of all parties, and the Yolanda Win expressed understanding. The patient was agreeable to these terms and freely participates in individual psychotherapy.    PSYCHO-ONCOLOGY NOTE/ Individual Psychotherapy     Date: 2020   Site:  [facility]        Therapeutic Intervention: Met with patient.  Outpatient - Behavior modifying psychotherapy 60 min - CPT code 33999      Patient was last seen by me on 2019    Problem list  Patient Active Problem List   Diagnosis    Rh negative status during pregnancy in third trimester    Generalized anxiety disorder    ADHD (attention deficit hyperactivity disorder), inattentive type    History of posttraumatic stress disorder (PTSD)    History of insomnia    S/P  section    Intractable periodic headache syndrome    Decreased strength    Chronic bilateral low back pain without sciatica    Poor posture    Seasonal allergies    Menorrhagia with regular cycle    Infiltrating ductal carcinoma of right breast    Malignant neoplasm of upper-outer quadrant of right breast in female, estrogen receptor positive    History of bulimia nervosa    History of anorexia nervosa    At risk for lymphedema       Chief complaint/reason for encounter: anxiety   Met with patient to evaluate psychosocial adaptation to diagnosis/treatment/survivorship of Stage III Breast Cancer    Current Medications  Current Outpatient Medications   Medication    albuterol (VENTOLIN HFA) 90 mcg/actuation inhaler    ALPRAZolam (XANAX) 0.25  MG tablet    citalopram (CELEXA) 20 MG tablet    dexAMETHasone (DECADRON) 4 MG Tab    dextroamphetamine-amphetamine (ADDERALL XR) 25 MG 24 hr capsule    HYDROcodone-acetaminophen (NORCO) 5-325 mg per tablet    loratadine (CLARITIN) 10 mg tablet    LORazepam (ATIVAN) 0.5 MG tablet    ondansetron (ZOFRAN-ODT) 4 MG TbDL    OPW TEST CLAIM - DO NOT FILL    promethazine (PHENERGAN) 12.5 MG Tab    tranexamic acid (LYSTEDA) 650 mg tablet    triamcinolone acetonide 0.1% (KENALOG) 0.1 % cream    valACYclovir (VALTREX) 1000 MG tablet    vitamin D (VITAMIN D3) 1000 units Tab     Current Facility-Administered Medications   Medication Frequency    onabotulinumtoxina injection 200 Units Q90 Days       Objective:  Yolanda Win arrived promptly for the session.    Ms. Win was independently ambulatory at the time of session. The patient was fully cooperative throughout the session.  Appearance: age appropriate, appropriately  dressed, adequately  groomed  Behavior/Cooperation: friendly and cooperative  Speech: normal in rate, volume, and tone and appropriate quality, quantity and organization of sentences  Mood: euthymic  Affect: full range and appropriate  Thought Process: goal-directed, logical  Thought Content: normal,  No delusions or paranoia; did not appear to be responding to internal stimuli during the session  Orientation: grossly intact  Memory: Grossly intact  Attention Span/Concentration: Attends to session without distraction; reports no difficulty  Fund of Knowledge: average  Estimate of Intelligence: average from verbal skills and history  Cognition: grossly intact  Insight: patient has awareness of illness; good insight into own behavior and behavior of others  Judgment: the patient's behavior is adequate to circumstances    Interval history and content of current session: Patient recently shaved her head due to loss of hair. Patient reported that since meeting with psychiatry for med management  and increasing Celexa, she feels that her symptoms are better managed.  Discussed current adaptation to disease and treatment status and family's adaptation to disease and treatment status. Reports to be coping in an adequate manner. Evaluated cognitive response, paying particular attention to negative intrusive thoughts of a persistent and detrimental nature. Thoughts of this type are in evidence with mild distress. Provided cognitive behavioral therapy to address negative cognitions. Identified and evaluated psychosocial and environmental stressors secondary to diagnosis and treatment.  Examined proactive behaviors that may be implemented to minimize or ameliorate psychosocial stressors secondary to diagnosis and treatment.     Risk parameters:   Patient reports no suicidal ideation  Patient reports no homicidal ideation  Patient reports no self-injurious behavior  Patient reports no violent behavior   Safety needs:  None at this time      Verbal deficits: None     Patient's response to intervention:The patient's response to intervention is accepting.     Progress toward goals and other mental status changes:  The patient's progress toward goals is good.      Progress to date:Progress as Expected      Goals from last visit: Met     Patient reported outcomes:    Distress Thermometer: 1   PHQ-9= 2, no SI   THIERRY-7= 2      Client Strengths: verbal, intelligent, successful, good social support, good insight, commitment to wellness, strong wong, strong cultural traditions     Diagnosis:       ICD-10-CM ICD-9-CM   1. Generalized anxiety disorder F41.1 300.02   2. ADHD (attention deficit hyperactivity disorder), inattentive type F90.0 314.00   3. Malignant neoplasm of upper-outer quadrant of right breast in female, estrogen receptor positive C50.411 174.4    Z17.0 V86.0       Treatment Plan:individual psychotherapy and medication management by physician  · Target symptoms: anxiety   · Why chosen therapy is appropriate  versus another modality: relevant to diagnosis, patient responds to this modality, evidence based practice  · Outcome monitoring methods: self-report, observation, checklist/rating scale  · Therapeutic intervention type: behavior modifying psychotherapy  · Prognosis: Good      Behavioral goals:   Assertiveness with family members  Discuss with psych med prescriber regarding double benzo prescription    Return to clinic: 2 weeks    Next Session:      Length of Service (minutes direct face-to-face contact): 60    Alem Espitia, PhD  [place] License #[id number]

## 2020-01-09 RX ORDER — DEXTROAMPHETAMINE SACCHARATE, AMPHETAMINE ASPARTATE MONOHYDRATE, DEXTROAMPHETAMINE SULFATE AND AMPHETAMINE SULFATE 6.25; 6.25; 6.25; 6.25 MG/1; MG/1; MG/1; MG/1
25 CAPSULE, EXTENDED RELEASE ORAL EVERY MORNING
Qty: 30 CAPSULE | Refills: 0 | Status: SHIPPED | OUTPATIENT
Start: 2020-01-09 | End: 2020-02-10 | Stop reason: SDUPTHER

## 2020-01-10 LAB — INTEGRATED BRAC ANALYSIS: NORMAL

## 2020-01-13 ENCOUNTER — TELEPHONE (OUTPATIENT)
Dept: GYNECOLOGIC ONCOLOGY | Facility: CLINIC | Age: 35
End: 2020-01-13

## 2020-01-13 ENCOUNTER — PATIENT MESSAGE (OUTPATIENT)
Dept: HEMATOLOGY/ONCOLOGY | Facility: CLINIC | Age: 35
End: 2020-01-13

## 2020-01-13 ENCOUNTER — PATIENT MESSAGE (OUTPATIENT)
Dept: SURGERY | Facility: CLINIC | Age: 35
End: 2020-01-13

## 2020-01-13 NOTE — TELEPHONE ENCOUNTER
----- Message from Emili Smith MD sent at 1/13/2020  8:02 AM CST -----  Yes, absolutely.   Rodolfo--please reach out to patient for consult BRCA mutation. Referral from Dr. Polanco.     Thanks everyone.   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  Thank you!

## 2020-01-14 ENCOUNTER — PATIENT MESSAGE (OUTPATIENT)
Dept: HEMATOLOGY/ONCOLOGY | Facility: CLINIC | Age: 35
End: 2020-01-14

## 2020-01-14 ENCOUNTER — PATIENT MESSAGE (OUTPATIENT)
Dept: OBSTETRICS AND GYNECOLOGY | Facility: CLINIC | Age: 35
End: 2020-01-14

## 2020-01-15 ENCOUNTER — INITIAL CONSULT (OUTPATIENT)
Dept: GYNECOLOGIC ONCOLOGY | Facility: CLINIC | Age: 35
End: 2020-01-15
Payer: COMMERCIAL

## 2020-01-15 VITALS
BODY MASS INDEX: 27.22 KG/M2 | HEART RATE: 94 BPM | DIASTOLIC BLOOD PRESSURE: 83 MMHG | HEIGHT: 65 IN | WEIGHT: 163.38 LBS | SYSTOLIC BLOOD PRESSURE: 126 MMHG

## 2020-01-15 DIAGNOSIS — Z15.09 BRCA1 POSITIVE: Primary | ICD-10-CM

## 2020-01-15 DIAGNOSIS — Z15.01 BRCA1 POSITIVE: Primary | ICD-10-CM

## 2020-01-15 PROCEDURE — 99999 PR PBB SHADOW E&M-EST. PATIENT-LVL III: ICD-10-PCS | Mod: PBBFAC,,, | Performed by: OBSTETRICS & GYNECOLOGY

## 2020-01-15 PROCEDURE — 99244 PR OFFICE CONSULTATION,LEVEL IV: ICD-10-PCS | Mod: S$GLB,,, | Performed by: OBSTETRICS & GYNECOLOGY

## 2020-01-15 PROCEDURE — 99999 PR PBB SHADOW E&M-EST. PATIENT-LVL III: CPT | Mod: PBBFAC,,, | Performed by: OBSTETRICS & GYNECOLOGY

## 2020-01-15 PROCEDURE — 99244 OFF/OP CNSLTJ NEW/EST MOD 40: CPT | Mod: S$GLB,,, | Performed by: OBSTETRICS & GYNECOLOGY

## 2020-01-15 NOTE — Clinical Note
January 30, 2020      Kaleigh Polanco MD  [address]  [address]  [facility] [address]           [facility] G[facility]  [address]  [place] [address]  Phone: [phone]          Patient: Yolanda Win   MR Number: [id number]   YOB: 1985   Date of Visit: 1/15/2020       Dear Dr. Kaleigh Polanco:    Thank you for referring Yolanda Win to me for evaluation. Attached you will find relevant portions of my assessment and plan of care.    If you have questions, please do not hesitate to call me. I look forward to following Yolanda Win along with you.    Sincerely,    Donna Curiel  CC:  No Recipients    If you would like to receive this communication electronically, please contact [email or web address] or [phone] to request more information on [employer] access.    For providers and/or their staff who would like to refer a patient to [employer], please contact us through our one-stop-shop provider referral line, [facility] , at [phone].    If you feel you have received this communication in error or would no longer like to receive these types of communications, please e-mail [email or web address]

## 2020-01-15 NOTE — LETTER
February 2, 2020        Kaleigh Polanco MD  [address]  [address]  [facility] [address]             [facility] GYN Oncology  [address]  [place] [address]  Phone: [phone]   Patient: Yolanda Win   MR Number: [id number]   YOB: 1985   Date of Visit: 1/15/2020       Dear Dr. Polanco:    Thank you for referring Yolanda Win to me for evaluation. Below are the relevant portions of my assessment and plan of care.            If you have questions, please do not hesitate to call me. I look forward to following Yolanda along with you.    Sincerely,      Emili Smith MD           CC  MD Gokul Hayden MD

## 2020-01-15 NOTE — PROGRESS NOTES
Subjective:      Patient ID: Yolanda Win is a 34 y.o. female.    Chief Complaint: brca 1 mutation      HPI     34 yr old para 2 referred from Dr. Ayers for recently diagnosed BRCA 1 gene mutation. She presents today to establish care and discuss recommendations for surveillance moving forward.    12/2019 R breast biopsy resulted high-grade infiltrating ductal carcinoma (histologic grade 3, nuclear grade 3, mitotic index 3) with medullary features.  Cancer was 25% ER positive and MI and HER2 negative.  Ki-67 was 90%.     CT C/A/P 12/24/19: No evidence of distant metastatic disease  MRI from December 26, results: There is a 1.3 cm non enhancing focus in the right sternum.  PET scan ordered for 1/7/20.   Bone Scan 12/26/19: Negative  PET scan ordered.    Started DD Adriamycin/Cytoxan on 12/31/2019 (for 4 cycles) and is to receive 12 cycles of weekly Taxol to follow.    Prior surgeries include LEEP, c section x 2 (David).    Family history significant for cervical CA - M and MGM. No breast, uterine, ovarian or colon cancer.    Established in survivorship with Dr. Polanco and is to have a Paragard IUD placed at the end of the month.      Review of Systems   Constitutional: Negative for appetite change, chills, fatigue and fever.   HENT: Negative for mouth sores.    Respiratory: Negative for cough and shortness of breath.    Cardiovascular: Negative for leg swelling.   Gastrointestinal: Negative for abdominal pain, blood in stool, constipation and diarrhea.   Endocrine: Negative for cold intolerance.   Genitourinary: Negative for dysuria and vaginal bleeding.   Musculoskeletal: Negative for myalgias.   Skin: Negative for rash.   Allergic/Immunologic: Negative.    Neurological: Negative for weakness and numbness.   Hematological: Negative for adenopathy. Does not bruise/bleed easily.   Psychiatric/Behavioral: Negative for confusion.       Past Medical History:   Diagnosis Date    Abnormal Pap smear of cervix  2009    Allergy     seasonal    Anorexia     Bulimia     Depression     Fever blister     Hypertension     Gestational Hypertension    Invasive ductal carcinoma of right breast 2019    Mastitis     Migraine headache      Past Surgical History:   Procedure Laterality Date    BONE MARROW ASPIRATION      x 3    CERVICAL BIOPSY  W/ LOOP ELECTRODE EXCISION       SECTION  2016     SECTION N/A 2019    Procedure:  SECTION;  Surgeon: Kirit Hernandez MD;  [facility];  Service: OB/GYN;  Laterality: N/A;    COLPOSCOPY      INSERTION OF TUNNELED CENTRAL VENOUS CATHETER (CVC) WITH SUBCUTANEOUS PORT Left 2019    Procedure: [id number]-Left neck or chest wall;  Surgeon: Percy Ayers MD;  Location: [facility] OR [address] [place] [address];  Service: General;  Laterality: Left;    SHOULDER SURGERY Left     x 2    SINUS SURGERY      age 19    TONSILLECTOMY       Family History   Problem Relation Age of Onset    Cancer Maternal Grandmother 40        cervical    Cervical cancer Mother     Breast cancer Other     Ovarian cancer Other     Colon cancer Neg Hx     Melanoma Neg Hx      Social History     Socioeconomic History    Marital status:      Spouse name: Not on file    Number of children: Not on file    Years of education: Not on file    Highest education level: Not on file   Occupational History    Not on file   Social Needs    Financial resource strain: Not hard at all    Food insecurity:     Worry: Never true     Inability: Never true    Transportation needs:     Medical: No     Non-medical: No   Tobacco Use    Smoking status: Never Smoker    Smokeless tobacco: Never Used   Substance and Sexual Activity    Alcohol use: No     Frequency: 2-3 times a week     Drinks per session: 1 or 2     Binge frequency: Monthly     Comment: pre pregnancy     Drug use: No    Sexual activity: Yes     Partners: Male     Birth control/protection: None    Lifestyle    Physical activity:     Days per week: 6 days     Minutes per session: 80 min    Stress: Only a little   Relationships    Social connections:     Talks on phone: More than three times a week     Gets together: Once a week     Attends Jain service: Not on file     Active member of club or organization: No     Attends meetings of clubs or organizations: Never     Relationship status:    Other Topics Concern    Are you pregnant or think you may be? Not Asked    Breast-feeding Not Asked   Social History Narrative    Not on file     Current Outpatient Medications   Medication Sig    ALPRAZolam (XANAX) 0.25 MG tablet Take 1 tablet (0.25 mg total) by mouth 3 (three) times daily.    citalopram (CELEXA) 20 MG tablet Take 1 tablet (20 mg total) by mouth once daily.    dexAMETHasone (DECADRON) 4 MG Tab Take 2 tablets (8 mg total) by mouth 2 (two) times daily with meals. Take for 2 days after chemotherapy then stop for 10 days    dextroamphetamine-amphetamine (ADDERALL XR) 25 MG 24 hr capsule Take 1 capsule (25 mg total) by mouth every morning.    loratadine (CLARITIN) 10 mg tablet Take 10 mg by mouth once daily.    LORazepam (ATIVAN) 0.5 MG tablet Take 2 tablets (1 mg total) by mouth every 6 (six) hours as needed for Anxiety.    ondansetron (ZOFRAN-ODT) 4 MG TbDL Dissolve 1 tablet (4 mg total) by mouth every 8 (eight) hours as needed.    promethazine (PHENERGAN) 12.5 MG Tab Take 1 tablet (12.5 mg total) by mouth every 6 (six) hours as needed.    tranexamic acid (LYSTEDA) 650 mg tablet Take 2 tablets (1,300 mg total) by mouth 3 (three) times daily.    triamcinolone acetonide 0.1% (KENALOG) 0.1 % cream apply to affected area twice daily    valACYclovir (VALTREX) 1000 MG tablet Take 1 tablet (1,000 mg total) by mouth every 12 (twelve) hours. (Patient taking differently: Take 1,000 mg by mouth every 12 (twelve) hours. Patient uses prn)    vitamin D (VITAMIN D3) 1000 units Tab Take 1,000  "Units by mouth once daily.    albuterol (VENTOLIN HFA) 90 mcg/actuation inhaler Inhale 2 puffs into the lungs every 6 (six) hours as needed for Wheezing. Rescue (Patient not taking: Reported on 1/2/2020)    HYDROcodone-acetaminophen (NORCO) 5-325 mg per tablet Take 1 tablet by mouth every 6 (six) hours as needed for Pain. (Patient not taking: Reported on 1/2/2020)    OPW TEST CLAIM - DO NOT FILL Take by mouth. OPW test claim. Do not fill.     Current Facility-Administered Medications   Medication    onabotulinumtoxina injection 200 Units     Review of patient's allergies indicates:   Allergen Reactions    Amoxicillin Hives    Penicillins Hives and Rash    Diazepam Anxiety and Other (See Comments)     "makes hyper"       Objective:   Physical Exam:   Constitutional: She is oriented to person, place, and time. She appears well-developed and well-nourished.    HENT:   Head: Normocephalic and atraumatic.    Eyes: Pupils are equal, round, and reactive to light. EOM are normal.    Neck: Normal range of motion. Neck supple. No thyromegaly present.    Cardiovascular: Normal rate, regular rhythm and intact distal pulses.     Pulmonary/Chest: Effort normal and breath sounds normal. No respiratory distress. She has no wheezes.        Abdominal: Soft. Bowel sounds are normal. She exhibits no distension, no ascites and no mass. There is no tenderness.             Musculoskeletal: Normal range of motion and moves all extremeties.      Lymphadenopathy:     She has no cervical adenopathy.        Right: No supraclavicular adenopathy present.        Left: No supraclavicular adenopathy present.    Neurological: She is alert and oriented to person, place, and time.    Skin: Skin is warm and dry. No rash noted.    Psychiatric: She has a normal mood and affect.       Assessment:     1. BRCA1 positive        Plan:       Patient was counseled today on hereditary breast and ovarian cancer syndrome. The lifetime risk for ovarian " cancer is about 40 to 45 percent for BRCA1 mutation carriers. Studies have demonstrated that bilateral salpingo-oophorectomy reduces ovarian cancer risk and mortality by approximately 90 percent. Major recommendations for unaffected female BRCA1 mutation carriers is RRBSO typically between age 35-40 and when childbearing is completed. For mutation carriers who have not undergone RRBSO we recommend ovarian cancer screening with concurrent TVUS and  q6 months beginning at age 30 or 5 to 10 years before the earliest age of first diagnosis of ovarian cancer in the family. However, we acknowledged and discussed the limitations of screening for ovarian cancer.   TVUS and  basline  RTC 6 months for follow up   Will continue to address risk reducing strategies while addressed breast cancer.

## 2020-01-16 ENCOUNTER — INFUSION (OUTPATIENT)
Dept: INFUSION THERAPY | Facility: HOSPITAL | Age: 35
End: 2020-01-16
Attending: NURSE PRACTITIONER
Payer: COMMERCIAL

## 2020-01-16 ENCOUNTER — OFFICE VISIT (OUTPATIENT)
Dept: HEMATOLOGY/ONCOLOGY | Facility: CLINIC | Age: 35
End: 2020-01-16
Payer: COMMERCIAL

## 2020-01-16 ENCOUNTER — LAB VISIT (OUTPATIENT)
Dept: LAB | Facility: HOSPITAL | Age: 35
End: 2020-01-16
Attending: INTERNAL MEDICINE
Payer: COMMERCIAL

## 2020-01-16 ENCOUNTER — PATIENT MESSAGE (OUTPATIENT)
Dept: PLASTIC SURGERY | Facility: CLINIC | Age: 35
End: 2020-01-16

## 2020-01-16 VITALS
SYSTOLIC BLOOD PRESSURE: 110 MMHG | TEMPERATURE: 98 F | RESPIRATION RATE: 18 BRPM | HEIGHT: 65 IN | OXYGEN SATURATION: 98 % | WEIGHT: 166 LBS | DIASTOLIC BLOOD PRESSURE: 80 MMHG | HEART RATE: 99 BPM | BODY MASS INDEX: 27.66 KG/M2

## 2020-01-16 VITALS
HEIGHT: 65 IN | HEART RATE: 96 BPM | TEMPERATURE: 98 F | RESPIRATION RATE: 18 BRPM | WEIGHT: 166 LBS | BODY MASS INDEX: 27.66 KG/M2 | DIASTOLIC BLOOD PRESSURE: 69 MMHG | SYSTOLIC BLOOD PRESSURE: 125 MMHG

## 2020-01-16 DIAGNOSIS — Z15.09 BRCA1 POSITIVE: ICD-10-CM

## 2020-01-16 DIAGNOSIS — C50.411 MALIGNANT NEOPLASM OF UPPER-OUTER QUADRANT OF RIGHT BREAST IN FEMALE, ESTROGEN RECEPTOR POSITIVE: ICD-10-CM

## 2020-01-16 DIAGNOSIS — C50.411 MALIGNANT NEOPLASM OF UPPER-OUTER QUADRANT OF RIGHT BREAST IN FEMALE, ESTROGEN RECEPTOR POSITIVE: Primary | ICD-10-CM

## 2020-01-16 DIAGNOSIS — Z17.0 MALIGNANT NEOPLASM OF UPPER-OUTER QUADRANT OF RIGHT BREAST IN FEMALE, ESTROGEN RECEPTOR POSITIVE: ICD-10-CM

## 2020-01-16 DIAGNOSIS — C50.911 INFILTRATING DUCTAL CARCINOMA OF RIGHT BREAST: ICD-10-CM

## 2020-01-16 DIAGNOSIS — Z15.01 BRCA1 POSITIVE: ICD-10-CM

## 2020-01-16 DIAGNOSIS — Z17.0 MALIGNANT NEOPLASM OF UPPER-OUTER QUADRANT OF RIGHT BREAST IN FEMALE, ESTROGEN RECEPTOR POSITIVE: Primary | ICD-10-CM

## 2020-01-16 LAB
ALBUMIN SERPL BCP-MCNC: 4.1 G/DL (ref 3.5–5.2)
ALP SERPL-CCNC: 125 U/L (ref 55–135)
ALT SERPL W/O P-5'-P-CCNC: 12 U/L (ref 10–44)
ANION GAP SERPL CALC-SCNC: 7 MMOL/L (ref 8–16)
ANISOCYTOSIS BLD QL SMEAR: SLIGHT
AST SERPL-CCNC: 15 U/L (ref 10–40)
BASOPHILS # BLD AUTO: ABNORMAL K/UL (ref 0–0.2)
BASOPHILS NFR BLD: 0 % (ref 0–1.9)
BILIRUB SERPL-MCNC: 0.3 MG/DL (ref 0.1–1)
BUN SERPL-MCNC: 17 MG/DL (ref 6–20)
CALCIUM SERPL-MCNC: 9.3 MG/DL (ref 8.7–10.5)
CANCER AG125 SERPL-ACNC: 31 U/ML (ref 0–30)
CHLORIDE SERPL-SCNC: 104 MMOL/L (ref 95–110)
CO2 SERPL-SCNC: 25 MMOL/L (ref 23–29)
CREAT SERPL-MCNC: 1 MG/DL (ref 0.5–1.4)
DIFFERENTIAL METHOD: ABNORMAL
EOSINOPHIL # BLD AUTO: ABNORMAL K/UL (ref 0–0.5)
EOSINOPHIL NFR BLD: 0 % (ref 0–8)
ERYTHROCYTE [DISTWIDTH] IN BLOOD BY AUTOMATED COUNT: 11.9 % (ref 11.5–14.5)
EST. GFR  (AFRICAN AMERICAN): >60 ML/MIN/1.73 M^2
EST. GFR  (NON AFRICAN AMERICAN): >60 ML/MIN/1.73 M^2
GLUCOSE SERPL-MCNC: 97 MG/DL (ref 70–110)
HCT VFR BLD AUTO: 39.6 % (ref 37–48.5)
HGB BLD-MCNC: 13 G/DL (ref 12–16)
IMM GRANULOCYTES # BLD AUTO: ABNORMAL K/UL (ref 0–0.04)
IMM GRANULOCYTES NFR BLD AUTO: ABNORMAL % (ref 0–0.5)
LYMPHOCYTES # BLD AUTO: ABNORMAL K/UL (ref 1–4.8)
LYMPHOCYTES NFR BLD: 23 % (ref 18–48)
MCH RBC QN AUTO: 31.2 PG (ref 27–31)
MCHC RBC AUTO-ENTMCNC: 32.8 G/DL (ref 32–36)
MCV RBC AUTO: 95 FL (ref 82–98)
METAMYELOCYTES NFR BLD MANUAL: 1 %
MONOCYTES # BLD AUTO: ABNORMAL K/UL (ref 0.3–1)
MONOCYTES NFR BLD: 5 % (ref 4–15)
NEUTROPHILS NFR BLD: 71 % (ref 38–73)
NRBC BLD-RTO: 0 /100 WBC
PLATELET # BLD AUTO: 261 K/UL (ref 150–350)
PLATELET BLD QL SMEAR: ABNORMAL
PMV BLD AUTO: 9 FL (ref 9.2–12.9)
POTASSIUM SERPL-SCNC: 4.4 MMOL/L (ref 3.5–5.1)
PROT SERPL-MCNC: 7.2 G/DL (ref 6–8.4)
RBC # BLD AUTO: 4.17 M/UL (ref 4–5.4)
SODIUM SERPL-SCNC: 136 MMOL/L (ref 136–145)
WBC # BLD AUTO: 7.19 K/UL (ref 3.9–12.7)

## 2020-01-16 PROCEDURE — 80053 COMPREHEN METABOLIC PANEL: CPT

## 2020-01-16 PROCEDURE — 96367 TX/PROPH/DG ADDL SEQ IV INF: CPT

## 2020-01-16 PROCEDURE — 96411 CHEMO IV PUSH ADDL DRUG: CPT

## 2020-01-16 PROCEDURE — A4216 STERILE WATER/SALINE, 10 ML: HCPCS | Performed by: INTERNAL MEDICINE

## 2020-01-16 PROCEDURE — 99214 PR OFFICE/OUTPT VISIT, EST, LEVL IV, 30-39 MIN: ICD-10-PCS | Mod: S$GLB,,, | Performed by: NURSE PRACTITIONER

## 2020-01-16 PROCEDURE — 85007 BL SMEAR W/DIFF WBC COUNT: CPT

## 2020-01-16 PROCEDURE — 25000003 PHARM REV CODE 250: Performed by: INTERNAL MEDICINE

## 2020-01-16 PROCEDURE — 86304 IMMUNOASSAY TUMOR CA 125: CPT

## 2020-01-16 PROCEDURE — 99214 OFFICE O/P EST MOD 30 MIN: CPT | Mod: S$GLB,,, | Performed by: NURSE PRACTITIONER

## 2020-01-16 PROCEDURE — 96375 TX/PRO/DX INJ NEW DRUG ADDON: CPT

## 2020-01-16 PROCEDURE — 36415 COLL VENOUS BLD VENIPUNCTURE: CPT

## 2020-01-16 PROCEDURE — 96413 CHEMO IV INFUSION 1 HR: CPT

## 2020-01-16 PROCEDURE — 63600175 PHARM REV CODE 636 W HCPCS: Mod: JG | Performed by: INTERNAL MEDICINE

## 2020-01-16 PROCEDURE — 85027 COMPLETE CBC AUTOMATED: CPT

## 2020-01-16 PROCEDURE — 99999 PR PBB SHADOW E&M-EST. PATIENT-LVL V: ICD-10-PCS | Mod: PBBFAC,,, | Performed by: NURSE PRACTITIONER

## 2020-01-16 PROCEDURE — 99999 PR PBB SHADOW E&M-EST. PATIENT-LVL V: CPT | Mod: PBBFAC,,, | Performed by: NURSE PRACTITIONER

## 2020-01-16 RX ORDER — DOXORUBICIN HYDROCHLORIDE 2 MG/ML
60 INJECTION, SOLUTION INTRAVENOUS
Status: CANCELLED | OUTPATIENT
Start: 2020-01-16

## 2020-01-16 RX ORDER — HEPARIN 100 UNIT/ML
500 SYRINGE INTRAVENOUS
Status: DISCONTINUED | OUTPATIENT
Start: 2020-01-16 | End: 2020-01-16 | Stop reason: HOSPADM

## 2020-01-16 RX ORDER — HEPARIN 100 UNIT/ML
500 SYRINGE INTRAVENOUS
Status: CANCELLED | OUTPATIENT
Start: 2020-01-16

## 2020-01-16 RX ORDER — SODIUM CHLORIDE 0.9 % (FLUSH) 0.9 %
10 SYRINGE (ML) INJECTION
Status: CANCELLED | OUTPATIENT
Start: 2020-01-16

## 2020-01-16 RX ORDER — SODIUM CHLORIDE 0.9 % (FLUSH) 0.9 %
10 SYRINGE (ML) INJECTION
Status: DISCONTINUED | OUTPATIENT
Start: 2020-01-16 | End: 2020-01-16 | Stop reason: HOSPADM

## 2020-01-16 RX ORDER — DOXORUBICIN HYDROCHLORIDE 2 MG/ML
60 INJECTION, SOLUTION INTRAVENOUS
Status: COMPLETED | OUTPATIENT
Start: 2020-01-16 | End: 2020-01-16

## 2020-01-16 RX ADMIN — DEXAMETHASONE SODIUM PHOSPHATE: 4 INJECTION, SOLUTION INTRA-ARTICULAR; INTRALESIONAL; INTRAMUSCULAR; INTRAVENOUS; SOFT TISSUE at 11:01

## 2020-01-16 RX ADMIN — Medication 10 ML: at 01:01

## 2020-01-16 RX ADMIN — HEPARIN 500 UNITS: 100 SYRINGE at 01:01

## 2020-01-16 RX ADMIN — DOXORUBICIN HYDROCHLORIDE 112 MG: 2 INJECTION, SOLUTION INTRAVENOUS at 11:01

## 2020-01-16 RX ADMIN — CYCLOPHOSPHAMIDE 1115 MG: 2 INJECTION, POWDER, FOR SOLUTION INTRAVENOUS; ORAL at 12:01

## 2020-01-16 RX ADMIN — SODIUM CHLORIDE: 0.9 INJECTION, SOLUTION INTRAVENOUS at 10:01

## 2020-01-16 RX ADMIN — ALTEPLASE 2 MG: 2.2 INJECTION, POWDER, LYOPHILIZED, FOR SOLUTION INTRAVENOUS at 10:01

## 2020-01-16 RX ADMIN — APREPITANT 130 MG: 130 INJECTION, EMULSION INTRAVENOUS at 11:01

## 2020-01-16 NOTE — PLAN OF CARE
1322  Infusion completed, pt tolerated well; pt instructed to remain hydrated; discussed dietary modifications r/t nausea; discussed when to contact MD, when to report to ER; pt declined AVS, verbalized understanding of all discussed and when to report next

## 2020-01-16 NOTE — NURSING
1015  Pt here for AC, accompanied by friend, April RN, reports continued diminished appetite but able to eat; discussed treatment plan for today, all questions answered and pt agrees to proceed

## 2020-01-17 ENCOUNTER — DOCUMENTATION ONLY (OUTPATIENT)
Dept: HEMATOLOGY/ONCOLOGY | Facility: CLINIC | Age: 35
End: 2020-01-17

## 2020-01-17 ENCOUNTER — PATIENT MESSAGE (OUTPATIENT)
Dept: HEMATOLOGY/ONCOLOGY | Facility: CLINIC | Age: 35
End: 2020-01-17

## 2020-01-17 ENCOUNTER — INFUSION (OUTPATIENT)
Dept: INFUSION THERAPY | Facility: HOSPITAL | Age: 35
End: 2020-01-17
Attending: NURSE PRACTITIONER
Payer: COMMERCIAL

## 2020-01-17 VITALS — HEART RATE: 88 BPM | RESPIRATION RATE: 18 BRPM | DIASTOLIC BLOOD PRESSURE: 72 MMHG | SYSTOLIC BLOOD PRESSURE: 120 MMHG

## 2020-01-17 DIAGNOSIS — C50.411 MALIGNANT NEOPLASM OF UPPER-OUTER QUADRANT OF RIGHT BREAST IN FEMALE, ESTROGEN RECEPTOR POSITIVE: Primary | ICD-10-CM

## 2020-01-17 DIAGNOSIS — Z17.0 MALIGNANT NEOPLASM OF UPPER-OUTER QUADRANT OF RIGHT BREAST IN FEMALE, ESTROGEN RECEPTOR POSITIVE: Primary | ICD-10-CM

## 2020-01-17 PROCEDURE — 63600175 PHARM REV CODE 636 W HCPCS: Performed by: INTERNAL MEDICINE

## 2020-01-17 PROCEDURE — 96372 THER/PROPH/DIAG INJ SC/IM: CPT

## 2020-01-17 RX ADMIN — PEGFILGRASTIM-CBQV 6 MG: 6 INJECTION, SOLUTION SUBCUTANEOUS at 03:01

## 2020-01-17 NOTE — NURSING
Pt admitted for udenyca injection. Pt states she takes Claritin daily. Pt tolerated SQ injection well.

## 2020-01-17 NOTE — PROGRESS NOTES
Patient presented with her mother today to mother's appointment with me and verbally provided me with her permission to access her chart to print her own genetic test results to be included with her mother's specimen collection.

## 2020-01-22 ENCOUNTER — OFFICE VISIT (OUTPATIENT)
Dept: PSYCHIATRY | Facility: CLINIC | Age: 35
End: 2020-01-22
Payer: COMMERCIAL

## 2020-01-22 DIAGNOSIS — F41.1 GENERALIZED ANXIETY DISORDER WITH PANIC ATTACKS: ICD-10-CM

## 2020-01-22 DIAGNOSIS — F33.1 MODERATE EPISODE OF RECURRENT MAJOR DEPRESSIVE DISORDER: Primary | ICD-10-CM

## 2020-01-22 DIAGNOSIS — F41.0 GENERALIZED ANXIETY DISORDER WITH PANIC ATTACKS: ICD-10-CM

## 2020-01-22 DIAGNOSIS — F90.0 ADHD (ATTENTION DEFICIT HYPERACTIVITY DISORDER), INATTENTIVE TYPE: ICD-10-CM

## 2020-01-22 DIAGNOSIS — F43.10 POSTTRAUMATIC STRESS DISORDER: ICD-10-CM

## 2020-01-22 PROCEDURE — 99999 PR PBB SHADOW E&M-EST. PATIENT-LVL II: CPT | Mod: PBBFAC,,, | Performed by: PSYCHOLOGIST

## 2020-01-22 PROCEDURE — 99999 PR PBB SHADOW E&M-EST. PATIENT-LVL II: ICD-10-PCS | Mod: PBBFAC,,, | Performed by: PSYCHOLOGIST

## 2020-01-22 PROCEDURE — 90791 PR PSYCHIATRIC DIAGNOSTIC EVALUATION: ICD-10-PCS | Mod: S$GLB,,, | Performed by: PSYCHOLOGIST

## 2020-01-22 PROCEDURE — 90791 PSYCH DIAGNOSTIC EVALUATION: CPT | Mod: S$GLB,,, | Performed by: PSYCHOLOGIST

## 2020-01-23 ENCOUNTER — PROCEDURE VISIT (OUTPATIENT)
Dept: OBSTETRICS AND GYNECOLOGY | Facility: CLINIC | Age: 35
End: 2020-01-23
Attending: OBSTETRICS & GYNECOLOGY
Payer: COMMERCIAL

## 2020-01-23 ENCOUNTER — PATIENT MESSAGE (OUTPATIENT)
Dept: HEMATOLOGY/ONCOLOGY | Facility: CLINIC | Age: 35
End: 2020-01-23

## 2020-01-23 VITALS — WEIGHT: 168.44 LBS | BODY MASS INDEX: 28.76 KG/M2 | HEIGHT: 64 IN

## 2020-01-23 DIAGNOSIS — Z30.430 ENCOUNTER FOR IUD INSERTION: Primary | ICD-10-CM

## 2020-01-23 LAB
B-HCG UR QL: NEGATIVE
CTP QC/QA: YES

## 2020-01-23 PROCEDURE — 58300 PR INSERT INTRAUTERINE DEVICE: ICD-10-PCS | Mod: S$GLB,,, | Performed by: OBSTETRICS & GYNECOLOGY

## 2020-01-23 PROCEDURE — 58300 INSERT INTRAUTERINE DEVICE: CPT | Mod: S$GLB,,, | Performed by: OBSTETRICS & GYNECOLOGY

## 2020-01-23 PROCEDURE — 81025 URINE PREGNANCY TEST: CPT | Mod: S$GLB,,, | Performed by: OBSTETRICS & GYNECOLOGY

## 2020-01-23 PROCEDURE — 81025 POCT URINE PREGNANCY: ICD-10-PCS | Mod: S$GLB,,, | Performed by: OBSTETRICS & GYNECOLOGY

## 2020-01-23 NOTE — PROCEDURES
Insertion of IUD-Today  Date/Time: 1/23/2020 2:00 PM  Performed by: Kaleigh Polanco MD  Authorized by: Kaleigh Polanco MD     Consent:     Consent obtained:  Written    Consent given by:  Patient    Procedure risks and benefits discussed: yes      Patient questions answered: yes      Patient agrees, verbalizes understanding, and wants to proceed: yes      Educational handouts given: no      Instructions and paperwork completed: yes    Procedure:     Pelvic exam performed: yes      Negative GC/chlamydia test: no      Negative urine pregnancy test: yes      Negative serum pregnancy test: no      Cervix cleaned and prepped: yes      Speculum placed in vagina: yes      Tenaculum applied to cervix: yes      Uterus sounded: yes      Uterus sound depth (cm):  8    IUD inserted with no complications: yes      IUD type:  ParaGard    Strings trimmed: yes    Post-procedure:     Patient tolerated procedure well: yes      Patient will follow up after next period: no    Comments:      Patient has ultrasound scheduled- BRCA 2+   Will check IUD position

## 2020-01-24 ENCOUNTER — OFFICE VISIT (OUTPATIENT)
Dept: PSYCHIATRY | Facility: CLINIC | Age: 35
End: 2020-01-24
Payer: COMMERCIAL

## 2020-01-24 VITALS
WEIGHT: 166.75 LBS | HEIGHT: 64 IN | DIASTOLIC BLOOD PRESSURE: 80 MMHG | BODY MASS INDEX: 28.47 KG/M2 | SYSTOLIC BLOOD PRESSURE: 115 MMHG | HEART RATE: 96 BPM

## 2020-01-24 DIAGNOSIS — Z86.59 HISTORY OF POSTTRAUMATIC STRESS DISORDER (PTSD): ICD-10-CM

## 2020-01-24 DIAGNOSIS — F41.1 GENERALIZED ANXIETY DISORDER: Primary | ICD-10-CM

## 2020-01-24 DIAGNOSIS — C50.911 INFILTRATING DUCTAL CARCINOMA OF RIGHT BREAST: ICD-10-CM

## 2020-01-24 DIAGNOSIS — Z87.898 HISTORY OF INSOMNIA: ICD-10-CM

## 2020-01-24 DIAGNOSIS — Z17.0 MALIGNANT NEOPLASM OF UPPER-OUTER QUADRANT OF RIGHT BREAST IN FEMALE, ESTROGEN RECEPTOR POSITIVE: ICD-10-CM

## 2020-01-24 DIAGNOSIS — C50.411 MALIGNANT NEOPLASM OF UPPER-OUTER QUADRANT OF RIGHT BREAST IN FEMALE, ESTROGEN RECEPTOR POSITIVE: ICD-10-CM

## 2020-01-24 DIAGNOSIS — F90.0 ADHD (ATTENTION DEFICIT HYPERACTIVITY DISORDER), INATTENTIVE TYPE: ICD-10-CM

## 2020-01-24 PROCEDURE — 99214 PR OFFICE/OUTPT VISIT, EST, LEVL IV, 30-39 MIN: ICD-10-PCS | Mod: S$GLB,,, | Performed by: PSYCHIATRY & NEUROLOGY

## 2020-01-24 PROCEDURE — 99999 PR PBB SHADOW E&M-EST. PATIENT-LVL II: CPT | Mod: PBBFAC,,, | Performed by: PSYCHIATRY & NEUROLOGY

## 2020-01-24 PROCEDURE — 99999 PR PBB SHADOW E&M-EST. PATIENT-LVL II: ICD-10-PCS | Mod: PBBFAC,,, | Performed by: PSYCHIATRY & NEUROLOGY

## 2020-01-24 PROCEDURE — 90833 PSYTX W PT W E/M 30 MIN: CPT | Mod: S$GLB,,, | Performed by: PSYCHIATRY & NEUROLOGY

## 2020-01-24 PROCEDURE — 3008F PR BODY MASS INDEX (BMI) DOCUMENTED: ICD-10-PCS | Mod: CPTII,S$GLB,, | Performed by: PSYCHIATRY & NEUROLOGY

## 2020-01-24 PROCEDURE — 90833 PR PSYCHOTHERAPY W/PATIENT W/E&M, 30 MIN (ADD ON): ICD-10-PCS | Mod: S$GLB,,, | Performed by: PSYCHIATRY & NEUROLOGY

## 2020-01-24 PROCEDURE — 99214 OFFICE O/P EST MOD 30 MIN: CPT | Mod: S$GLB,,, | Performed by: PSYCHIATRY & NEUROLOGY

## 2020-01-24 PROCEDURE — 3008F BODY MASS INDEX DOCD: CPT | Mod: CPTII,S$GLB,, | Performed by: PSYCHIATRY & NEUROLOGY

## 2020-01-24 RX ORDER — ALPRAZOLAM 0.25 MG/1
0.25 TABLET ORAL DAILY PRN
Qty: 30 TABLET | Refills: 0 | Status: ON HOLD | OUTPATIENT
Start: 2020-01-24 | End: 2020-03-20 | Stop reason: HOSPADM

## 2020-01-24 RX ORDER — ONDANSETRON 4 MG/1
4 TABLET, ORALLY DISINTEGRATING ORAL EVERY 8 HOURS PRN
Qty: 30 TABLET | Refills: 1 | Status: SHIPPED | OUTPATIENT
Start: 2020-01-24 | End: 2020-10-01 | Stop reason: CLARIF
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
## 2020-01-27 ENCOUNTER — PATIENT MESSAGE (OUTPATIENT)
Dept: NEUROLOGY | Facility: CLINIC | Age: 35
End: 2020-01-27

## 2020-01-27 DIAGNOSIS — K12.31 MUCOSITIS DUE TO ANTINEOPLASTIC THERAPY: Primary | ICD-10-CM

## 2020-01-27 DIAGNOSIS — G43.C1 INTRACTABLE PERIODIC HEADACHE SYNDROME: Primary | ICD-10-CM

## 2020-01-27 RX ORDER — CITALOPRAM 20 MG/1
20 TABLET, FILM COATED ORAL DAILY
Qty: 30 TABLET | Refills: 2 | Status: SHIPPED | OUTPATIENT
Start: 2020-01-27 | End: 2020-03-23 | Stop reason: SDUPTHER

## 2020-01-27 RX ORDER — SUMATRIPTAN SUCCINATE 100 MG/1
TABLET ORAL
Qty: 9 TABLET | Refills: 11 | Status: SHIPPED | OUTPATIENT
Start: 2020-01-27 | End: 2023-05-18 | Stop reason: ALTCHOICE

## 2020-01-28 ENCOUNTER — PATIENT MESSAGE (OUTPATIENT)
Dept: DERMATOLOGY | Facility: CLINIC | Age: 35
End: 2020-01-28

## 2020-01-30 ENCOUNTER — LAB VISIT (OUTPATIENT)
Dept: LAB | Facility: HOSPITAL | Age: 35
End: 2020-01-30
Attending: INTERNAL MEDICINE
Payer: COMMERCIAL

## 2020-01-30 ENCOUNTER — OFFICE VISIT (OUTPATIENT)
Dept: HEMATOLOGY/ONCOLOGY | Facility: CLINIC | Age: 35
End: 2020-01-30
Payer: COMMERCIAL

## 2020-01-30 ENCOUNTER — INFUSION (OUTPATIENT)
Dept: INFUSION THERAPY | Facility: HOSPITAL | Age: 35
End: 2020-01-30
Attending: NURSE PRACTITIONER
Payer: COMMERCIAL

## 2020-01-30 VITALS
TEMPERATURE: 98 F | RESPIRATION RATE: 18 BRPM | HEART RATE: 88 BPM | SYSTOLIC BLOOD PRESSURE: 122 MMHG | DIASTOLIC BLOOD PRESSURE: 70 MMHG | OXYGEN SATURATION: 98 %

## 2020-01-30 VITALS
TEMPERATURE: 98 F | HEIGHT: 64 IN | BODY MASS INDEX: 28.12 KG/M2 | DIASTOLIC BLOOD PRESSURE: 89 MMHG | RESPIRATION RATE: 20 BRPM | HEART RATE: 105 BPM | WEIGHT: 164.69 LBS | OXYGEN SATURATION: 99 % | SYSTOLIC BLOOD PRESSURE: 119 MMHG

## 2020-01-30 DIAGNOSIS — T45.1X5A CHEMOTHERAPY-INDUCED NAUSEA: ICD-10-CM

## 2020-01-30 DIAGNOSIS — Z17.0 MALIGNANT NEOPLASM OF UPPER-OUTER QUADRANT OF RIGHT BREAST IN FEMALE, ESTROGEN RECEPTOR POSITIVE: ICD-10-CM

## 2020-01-30 DIAGNOSIS — C50.411 MALIGNANT NEOPLASM OF UPPER-OUTER QUADRANT OF RIGHT BREAST IN FEMALE, ESTROGEN RECEPTOR POSITIVE: Primary | ICD-10-CM

## 2020-01-30 DIAGNOSIS — Z17.0 MALIGNANT NEOPLASM OF UPPER-OUTER QUADRANT OF RIGHT BREAST IN FEMALE, ESTROGEN RECEPTOR POSITIVE: Primary | ICD-10-CM

## 2020-01-30 DIAGNOSIS — C50.411 MALIGNANT NEOPLASM OF UPPER-OUTER QUADRANT OF RIGHT BREAST IN FEMALE, ESTROGEN RECEPTOR POSITIVE: ICD-10-CM

## 2020-01-30 DIAGNOSIS — R11.0 CHEMOTHERAPY-INDUCED NAUSEA: ICD-10-CM

## 2020-01-30 LAB
ALBUMIN SERPL BCP-MCNC: 4 G/DL (ref 3.5–5.2)
ALP SERPL-CCNC: 110 U/L (ref 55–135)
ALT SERPL W/O P-5'-P-CCNC: 10 U/L (ref 10–44)
ANION GAP SERPL CALC-SCNC: 7 MMOL/L (ref 8–16)
ANISOCYTOSIS BLD QL SMEAR: SLIGHT
AST SERPL-CCNC: 12 U/L (ref 10–40)
BASOPHILS # BLD AUTO: ABNORMAL K/UL (ref 0–0.2)
BASOPHILS NFR BLD: 0 % (ref 0–1.9)
BILIRUB SERPL-MCNC: 0.3 MG/DL (ref 0.1–1)
BUN SERPL-MCNC: 15 MG/DL (ref 6–20)
CALCIUM SERPL-MCNC: 9.1 MG/DL (ref 8.7–10.5)
CHLORIDE SERPL-SCNC: 108 MMOL/L (ref 95–110)
CO2 SERPL-SCNC: 25 MMOL/L (ref 23–29)
CREAT SERPL-MCNC: 0.8 MG/DL (ref 0.5–1.4)
DIFFERENTIAL METHOD: ABNORMAL
EOSINOPHIL # BLD AUTO: ABNORMAL K/UL (ref 0–0.5)
EOSINOPHIL NFR BLD: 1 % (ref 0–8)
ERYTHROCYTE [DISTWIDTH] IN BLOOD BY AUTOMATED COUNT: 12.5 % (ref 11.5–14.5)
EST. GFR  (AFRICAN AMERICAN): >60 ML/MIN/1.73 M^2
EST. GFR  (NON AFRICAN AMERICAN): >60 ML/MIN/1.73 M^2
GLUCOSE SERPL-MCNC: 94 MG/DL (ref 70–110)
HCT VFR BLD AUTO: 37 % (ref 37–48.5)
HGB BLD-MCNC: 12.2 G/DL (ref 12–16)
HYPOCHROMIA BLD QL SMEAR: ABNORMAL
IMM GRANULOCYTES # BLD AUTO: ABNORMAL K/UL (ref 0–0.04)
IMM GRANULOCYTES NFR BLD AUTO: ABNORMAL % (ref 0–0.5)
LYMPHOCYTES # BLD AUTO: ABNORMAL K/UL (ref 1–4.8)
LYMPHOCYTES NFR BLD: 19 % (ref 18–48)
MCH RBC QN AUTO: 31.3 PG (ref 27–31)
MCHC RBC AUTO-ENTMCNC: 33 G/DL (ref 32–36)
MCV RBC AUTO: 95 FL (ref 82–98)
METAMYELOCYTES NFR BLD MANUAL: 4 %
MONOCYTES # BLD AUTO: ABNORMAL K/UL (ref 0.3–1)
MONOCYTES NFR BLD: 7 % (ref 4–15)
MYELOCYTES NFR BLD MANUAL: 2 %
NEUTROPHILS NFR BLD: 65 % (ref 38–73)
NEUTS BAND NFR BLD MANUAL: 2 %
NRBC BLD-RTO: 0 /100 WBC
OVALOCYTES BLD QL SMEAR: ABNORMAL
PLATELET # BLD AUTO: 170 K/UL (ref 150–350)
PMV BLD AUTO: 8.7 FL (ref 9.2–12.9)
POIKILOCYTOSIS BLD QL SMEAR: SLIGHT
POLYCHROMASIA BLD QL SMEAR: ABNORMAL
POTASSIUM SERPL-SCNC: 4.2 MMOL/L (ref 3.5–5.1)
PROT SERPL-MCNC: 7.2 G/DL (ref 6–8.4)
RBC # BLD AUTO: 3.9 M/UL (ref 4–5.4)
SODIUM SERPL-SCNC: 140 MMOL/L (ref 136–145)
WBC # BLD AUTO: 6.3 K/UL (ref 3.9–12.7)

## 2020-01-30 PROCEDURE — 99999 PR PBB SHADOW E&M-EST. PATIENT-LVL IV: ICD-10-PCS | Mod: PBBFAC,,, | Performed by: INTERNAL MEDICINE

## 2020-01-30 PROCEDURE — 99214 OFFICE O/P EST MOD 30 MIN: CPT | Mod: S$GLB,,, | Performed by: INTERNAL MEDICINE

## 2020-01-30 PROCEDURE — 36415 COLL VENOUS BLD VENIPUNCTURE: CPT

## 2020-01-30 PROCEDURE — 99214 PR OFFICE/OUTPT VISIT, EST, LEVL IV, 30-39 MIN: ICD-10-PCS | Mod: S$GLB,,, | Performed by: INTERNAL MEDICINE

## 2020-01-30 PROCEDURE — 85027 COMPLETE CBC AUTOMATED: CPT

## 2020-01-30 PROCEDURE — 3008F PR BODY MASS INDEX (BMI) DOCUMENTED: ICD-10-PCS | Mod: CPTII,S$GLB,, | Performed by: INTERNAL MEDICINE

## 2020-01-30 PROCEDURE — 80053 COMPREHEN METABOLIC PANEL: CPT

## 2020-01-30 PROCEDURE — 96367 TX/PROPH/DG ADDL SEQ IV INF: CPT

## 2020-01-30 PROCEDURE — 63600175 PHARM REV CODE 636 W HCPCS: Mod: TB | Performed by: INTERNAL MEDICINE

## 2020-01-30 PROCEDURE — 3008F BODY MASS INDEX DOCD: CPT | Mod: CPTII,S$GLB,, | Performed by: INTERNAL MEDICINE

## 2020-01-30 PROCEDURE — 85007 BL SMEAR W/DIFF WBC COUNT: CPT

## 2020-01-30 PROCEDURE — 96413 CHEMO IV INFUSION 1 HR: CPT

## 2020-01-30 PROCEDURE — 96411 CHEMO IV PUSH ADDL DRUG: CPT

## 2020-01-30 PROCEDURE — 99999 PR PBB SHADOW E&M-EST. PATIENT-LVL IV: CPT | Mod: PBBFAC,,, | Performed by: INTERNAL MEDICINE

## 2020-01-30 PROCEDURE — 96375 TX/PRO/DX INJ NEW DRUG ADDON: CPT

## 2020-01-30 RX ORDER — DOXORUBICIN HYDROCHLORIDE 2 MG/ML
60 INJECTION, SOLUTION INTRAVENOUS
Status: CANCELLED | OUTPATIENT
Start: 2020-01-30

## 2020-01-30 RX ORDER — HEPARIN 100 UNIT/ML
500 SYRINGE INTRAVENOUS
Status: DISCONTINUED | OUTPATIENT
Start: 2020-01-30 | End: 2020-01-30 | Stop reason: HOSPADM

## 2020-01-30 RX ORDER — DOXORUBICIN HYDROCHLORIDE 2 MG/ML
60 INJECTION, SOLUTION INTRAVENOUS
Status: COMPLETED | OUTPATIENT
Start: 2020-01-30 | End: 2020-01-30

## 2020-01-30 RX ORDER — HEPARIN 100 UNIT/ML
500 SYRINGE INTRAVENOUS
Status: CANCELLED | OUTPATIENT
Start: 2020-01-30

## 2020-01-30 RX ORDER — SODIUM CHLORIDE 0.9 % (FLUSH) 0.9 %
10 SYRINGE (ML) INJECTION
Status: DISCONTINUED | OUTPATIENT
Start: 2020-01-30 | End: 2020-01-30 | Stop reason: HOSPADM

## 2020-01-30 RX ORDER — SODIUM CHLORIDE 0.9 % (FLUSH) 0.9 %
10 SYRINGE (ML) INJECTION
Status: CANCELLED | OUTPATIENT
Start: 2020-01-30

## 2020-01-30 RX ORDER — OLANZAPINE 5 MG/1
5 TABLET ORAL EVERY 6 HOURS PRN
Qty: 30 TABLET | Refills: 2 | Status: SHIPPED | OUTPATIENT
Start: 2020-01-30 | End: 2020-10-01 | Stop reason: CLARIF

## 2020-01-30 RX ADMIN — CYCLOPHOSPHAMIDE 1115 MG: 1 INJECTION, POWDER, FOR SOLUTION INTRAVENOUS; ORAL at 02:01

## 2020-01-30 RX ADMIN — DEXAMETHASONE SODIUM PHOSPHATE: 4 INJECTION, SOLUTION INTRA-ARTICULAR; INTRALESIONAL; INTRAMUSCULAR; INTRAVENOUS; SOFT TISSUE at 02:01

## 2020-01-30 RX ADMIN — APREPITANT 130 MG: 130 INJECTION, EMULSION INTRAVENOUS at 01:01

## 2020-01-30 RX ADMIN — HEPARIN 500 UNITS: 100 SYRINGE at 04:01

## 2020-01-30 RX ADMIN — DOXORUBICIN HYDROCHLORIDE 112 MG: 2 INJECTION, SOLUTION INTRAVENOUS at 02:01

## 2020-01-30 RX ADMIN — SODIUM CHLORIDE: 0.9 INJECTION, SOLUTION INTRAVENOUS at 01:01

## 2020-01-30 NOTE — PROGRESS NOTES
Subjective:       Patient ID: Yolanda Win is a 34 y.o. female.    Chief Complaint: No chief complaint on file.    HPI 34 year old female, who returns for follow-up of carcinoma of the right breast.  She has initiated neoadjuvant chemotherapy and is status post 2 cycles of Adriamycin Cytoxan.  Her treatment has been associated with some fatigue and altered taste.  Her last cycle was associated with more nausea which last more than a week.  She had fatigue which was significant for several days in the improved.  Appetite remains off as does her taste but she is eating a bit better.  She did have some constipation which was relieved by milk of magnesia.  She reports some mild mouth sores for 2 days resolved with Magic mouthwash.  She denies any shortness of breath.      Mammogram and ultrasound on December 11th, 2019 showed right breast mass 7 cm from the nipple.  By ultrasound this mass measured 33 x 32 x 21 mm.  There was no associated axillary lymphadenopathy noted.    Right breast biopsy on December 13 showed high-grade infiltrating ductal carcinoma (histologic grade 3, nuclear grade 3, mitotic index 3) there were medullary features.  Cancer was 25% ER positive and MT and HER2 negative.  Ki-67 was 90%.    MRI showed a 5.2 cm x 2.6 cm x 4.6 cm irregularly shaped mass with rim enhancement seen in the right breast at 11 o'clock.    CT scan of the chest abdomen and pelvis and bone scan showed no evidence of metastatic disease.  Review of Systems   Constitutional: Negative for appetite change and unexpected weight change.   Eyes: Positive for visual disturbance.   Respiratory: Negative for cough and shortness of breath.    Cardiovascular: Negative for chest pain.   Gastrointestinal: Negative for abdominal pain and diarrhea.   Genitourinary: Negative for frequency.   Musculoskeletal: Negative for back pain.   Skin: Negative for rash.   Neurological: Negative for headaches.   Hematological: Negative for adenopathy.    Psychiatric/Behavioral: The patient is not nervous/anxious.        Objective:      Physical Exam   Constitutional: She is oriented to person, place, and time. She appears well-developed and well-nourished. No distress.   HENT:   Mouth/Throat: Oropharynx is clear and moist. No oropharyngeal exudate.   Eyes: No scleral icterus.   Cardiovascular: Normal rate and regular rhythm.   Pulmonary/Chest: Effort normal and breath sounds normal. She has no wheezes. She has no rales.       Abdominal: Soft. She exhibits no mass. There is no tenderness.   Lymphadenopathy:     She has no cervical adenopathy.   Neurological: She is alert and oriented to person, place, and time.   Skin: No rash noted. No erythema.   Psychiatric: She has a normal mood and affect. Her behavior is normal. Thought content normal.   Vitals reviewed.      Assessment:       1. Malignant neoplasm of upper-outer quadrant of right breast in female, estrogen receptor positive        Plan:       Continue current therapy.  At olanzapine to her antiemetic regimen.

## 2020-01-30 NOTE — PLAN OF CARE
Pt admitted for C3D1 Doxorubicin/Cytoxan. Labs reviewed. Side effects and self care tips discussed. Neuropathy and fatigue addressed. Pt tolerated treatment well. Plan of care reviewed and Pt discharged @ 16:10

## 2020-01-31 ENCOUNTER — INFUSION (OUTPATIENT)
Dept: INFUSION THERAPY | Facility: HOSPITAL | Age: 35
End: 2020-01-31
Attending: NURSE PRACTITIONER
Payer: COMMERCIAL

## 2020-01-31 DIAGNOSIS — Z17.0 MALIGNANT NEOPLASM OF UPPER-OUTER QUADRANT OF RIGHT BREAST IN FEMALE, ESTROGEN RECEPTOR POSITIVE: Primary | ICD-10-CM

## 2020-01-31 DIAGNOSIS — C50.411 MALIGNANT NEOPLASM OF UPPER-OUTER QUADRANT OF RIGHT BREAST IN FEMALE, ESTROGEN RECEPTOR POSITIVE: Primary | ICD-10-CM

## 2020-01-31 PROCEDURE — 63600175 PHARM REV CODE 636 W HCPCS: Performed by: INTERNAL MEDICINE

## 2020-01-31 PROCEDURE — 96372 THER/PROPH/DIAG INJ SC/IM: CPT

## 2020-01-31 RX ADMIN — PEGFILGRASTIM-CBQV 6 MG: 6 INJECTION, SOLUTION SUBCUTANEOUS at 04:01

## 2020-02-06 LAB
FINAL PATHOLOGIC DIAGNOSIS: NORMAL
GROSS: NORMAL
Lab: NORMAL
SUPPLEMENTAL DIAGNOSIS: NORMAL

## 2020-02-10 ENCOUNTER — TELEPHONE (OUTPATIENT)
Dept: HEMATOLOGY/ONCOLOGY | Facility: CLINIC | Age: 35
End: 2020-02-10

## 2020-02-10 RX ORDER — DEXTROAMPHETAMINE SACCHARATE, AMPHETAMINE ASPARTATE MONOHYDRATE, DEXTROAMPHETAMINE SULFATE AND AMPHETAMINE SULFATE 6.25; 6.25; 6.25; 6.25 MG/1; MG/1; MG/1; MG/1
25 CAPSULE, EXTENDED RELEASE ORAL EVERY MORNING
Qty: 30 CAPSULE | Refills: 0 | Status: SHIPPED | OUTPATIENT
Start: 2020-02-10 | End: 2020-03-06 | Stop reason: SDUPTHER

## 2020-02-10 NOTE — TELEPHONE ENCOUNTER
Patient advised me that I have her permission to send her genetic testing results report to the genetic testing lab for her father's case so that it can be ensured that the appropriate variant is looked for in her father.

## 2020-02-11 ENCOUNTER — PATIENT MESSAGE (OUTPATIENT)
Dept: GYNECOLOGIC ONCOLOGY | Facility: CLINIC | Age: 35
End: 2020-02-11

## 2020-02-11 ENCOUNTER — OFFICE VISIT (OUTPATIENT)
Dept: PSYCHIATRY | Facility: CLINIC | Age: 35
End: 2020-02-11
Payer: COMMERCIAL

## 2020-02-11 ENCOUNTER — TELEPHONE (OUTPATIENT)
Dept: OBSTETRICS AND GYNECOLOGY | Facility: CLINIC | Age: 35
End: 2020-02-11

## 2020-02-11 ENCOUNTER — HOSPITAL ENCOUNTER (OUTPATIENT)
Dept: RADIOLOGY | Facility: OTHER | Age: 35
Discharge: HOME | End: 2020-02-11
Attending: OBSTETRICS & GYNECOLOGY
Payer: COMMERCIAL

## 2020-02-11 DIAGNOSIS — Z30.430 ENCOUNTER FOR IUD INSERTION: ICD-10-CM

## 2020-02-11 DIAGNOSIS — F90.0 ADHD (ATTENTION DEFICIT HYPERACTIVITY DISORDER), INATTENTIVE TYPE: ICD-10-CM

## 2020-02-11 DIAGNOSIS — N93.9 VAGINAL BLEEDING: ICD-10-CM

## 2020-02-11 DIAGNOSIS — Z17.0 MALIGNANT NEOPLASM OF UPPER-OUTER QUADRANT OF RIGHT BREAST IN FEMALE, ESTROGEN RECEPTOR POSITIVE: ICD-10-CM

## 2020-02-11 DIAGNOSIS — F43.10 POSTTRAUMATIC STRESS DISORDER: ICD-10-CM

## 2020-02-11 DIAGNOSIS — F41.1 GENERALIZED ANXIETY DISORDER WITH PANIC ATTACKS: ICD-10-CM

## 2020-02-11 DIAGNOSIS — F41.0 GENERALIZED ANXIETY DISORDER WITH PANIC ATTACKS: ICD-10-CM

## 2020-02-11 DIAGNOSIS — C50.411 MALIGNANT NEOPLASM OF UPPER-OUTER QUADRANT OF RIGHT BREAST IN FEMALE, ESTROGEN RECEPTOR POSITIVE: Primary | ICD-10-CM

## 2020-02-11 DIAGNOSIS — Z51.11 ENCOUNTER FOR ANTINEOPLASTIC CHEMOTHERAPY: ICD-10-CM

## 2020-02-11 DIAGNOSIS — Z17.0 MALIGNANT NEOPLASM OF UPPER-OUTER QUADRANT OF RIGHT BREAST IN FEMALE, ESTROGEN RECEPTOR POSITIVE: Primary | ICD-10-CM

## 2020-02-11 DIAGNOSIS — C50.411 MALIGNANT NEOPLASM OF UPPER-OUTER QUADRANT OF RIGHT BREAST IN FEMALE, ESTROGEN RECEPTOR POSITIVE: ICD-10-CM

## 2020-02-11 DIAGNOSIS — F33.1 MODERATE EPISODE OF RECURRENT MAJOR DEPRESSIVE DISORDER: Primary | ICD-10-CM

## 2020-02-11 PROCEDURE — 76830 US PELVIS COMP WITH TRANSVAG NON-OB (XPD): ICD-10-PCS | Mod: 26,,, | Performed by: RADIOLOGY

## 2020-02-11 PROCEDURE — 76830 TRANSVAGINAL US NON-OB: CPT | Mod: 26,,, | Performed by: RADIOLOGY

## 2020-02-11 PROCEDURE — 76830 TRANSVAGINAL US NON-OB: CPT | Mod: TC

## 2020-02-11 PROCEDURE — 76856 US PELVIS COMP WITH TRANSVAG NON-OB (XPD): ICD-10-PCS | Mod: 26,,, | Performed by: RADIOLOGY

## 2020-02-11 PROCEDURE — 76856 US EXAM PELVIC COMPLETE: CPT | Mod: 26,,, | Performed by: RADIOLOGY

## 2020-02-11 PROCEDURE — 99999 PR PBB SHADOW E&M-EST. PATIENT-LVL I: CPT | Mod: PBBFAC,,, | Performed by: PSYCHOLOGIST

## 2020-02-11 PROCEDURE — 90847 FAMILY PSYTX W/PT 50 MIN: CPT | Mod: S$GLB,,, | Performed by: PSYCHOLOGIST

## 2020-02-11 PROCEDURE — 99999 PR PBB SHADOW E&M-EST. PATIENT-LVL I: ICD-10-PCS | Mod: PBBFAC,,, | Performed by: PSYCHOLOGIST

## 2020-02-11 PROCEDURE — 90847 PR FAMILY PSYCHOTHERAPY W/ PT, 50 MIN: ICD-10-PCS | Mod: S$GLB,,, | Performed by: PSYCHOLOGIST
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
## 2020-02-11 NOTE — TELEPHONE ENCOUNTER
Dr. Kaleigh Polanco reviewed CBC and Ultrasound. MD recommends patient report any persistent heavy bleeding to our office as this can dislodge IUD. IUD placement is efficacious at current position per pelvic ultrasound today. Patient notified of the above with recommendations to track her vaginal bleeding and report any changes to our office. Reassurance provided that vaginal bleeding post IUD placement is expected, but should be tracked w/appropriate communication btwn pt and provider's office. All questions and concerns addressed. TAYLER Wright.

## 2020-02-12 NOTE — PROGRESS NOTES
Subjective:       Patient ID: Yolanda Win is a 34 y.o. female.    Chief Complaint: No chief complaint on file.    HPI 34 year old female, who returns for follow-up of carcinoma of the right breast.  She has initiated neoadjuvant chemotherapy and is status post 3 cycles of Adriamycin Cytoxan.  Her last cycle was very good and she had much less nausea while using the olanzapine.  She has had some recent abnormal vaginal bleeding.  Follow-up CBC from February 11th showed hemoglobin 10.4.  She had some shortness of breath and lightheadedness which is associated with those events.  Subsequently, she has been feeling better and continues to work out.        Mammogram and ultrasound on December 11th, 2019 showed right breast mass 7 cm from the nipple.  By ultrasound this mass measured 33 x 32 x 21 mm.  There was no associated axillary lymphadenopathy noted.    Right breast biopsy on December 13 showed high-grade infiltrating ductal carcinoma (histologic grade 3, nuclear grade 3, mitotic index 3) there were medullary features.  Cancer was 25% ER positive and LA and HER2 negative.  Ki-67 was 90%.    MRI showed a 5.2 cm x 2.6 cm x 4.6 cm irregularly shaped mass with rim enhancement seen in the right breast at 11 o'clock.    CT scan of the chest abdomen and pelvis and bone scan showed no evidence of metastatic disease.  Review of Systems   Constitutional: Negative for appetite change and unexpected weight change.   Eyes: Negative for visual disturbance.   Respiratory: Negative for cough and shortness of breath.    Cardiovascular: Negative for chest pain.   Gastrointestinal: Negative for abdominal pain and diarrhea.   Genitourinary: Positive for vaginal bleeding. Negative for frequency.   Musculoskeletal: Negative for back pain.   Skin: Negative for rash.   Neurological: Negative for headaches.   Hematological: Negative for adenopathy.   Psychiatric/Behavioral: The patient is not nervous/anxious.        Objective:       Physical Exam   Constitutional: She is oriented to person, place, and time. She appears well-developed and well-nourished. No distress.   HENT:   Mouth/Throat: No oropharyngeal exudate.   Cardiovascular: Normal rate and regular rhythm.   Pulmonary/Chest: Effort normal and breath sounds normal. Right breast exhibits no mass, no nipple discharge and no skin change. Left breast exhibits no mass, no nipple discharge and no skin change.       Abdominal: Soft. She exhibits no mass. There is no tenderness.   Lymphadenopathy:     She has no cervical adenopathy.   Neurological: She is alert and oriented to person, place, and time.   Skin: No rash noted.   Psychiatric: She has a normal mood and affect. Her behavior is normal. Thought content normal.   Vitals reviewed.      Assessment:       1. Malignant neoplasm of upper-outer quadrant of right breast in female, estrogen receptor positive        Plan:       Continue current chemotherapy.  Return to start Taxol therapy in 2 weeks.

## 2020-02-13 ENCOUNTER — INFUSION (OUTPATIENT)
Dept: INFUSION THERAPY | Facility: HOSPITAL | Age: 35
End: 2020-02-13
Attending: NURSE PRACTITIONER
Payer: COMMERCIAL

## 2020-02-13 ENCOUNTER — LAB VISIT (OUTPATIENT)
Dept: LAB | Facility: HOSPITAL | Age: 35
End: 2020-02-13
Attending: INTERNAL MEDICINE
Payer: COMMERCIAL

## 2020-02-13 ENCOUNTER — OFFICE VISIT (OUTPATIENT)
Dept: HEMATOLOGY/ONCOLOGY | Facility: CLINIC | Age: 35
End: 2020-02-13
Payer: COMMERCIAL

## 2020-02-13 VITALS
OXYGEN SATURATION: 98 % | DIASTOLIC BLOOD PRESSURE: 78 MMHG | TEMPERATURE: 98 F | TEMPERATURE: 98 F | HEIGHT: 65 IN | HEART RATE: 96 BPM | RESPIRATION RATE: 18 BRPM | HEART RATE: 95 BPM | WEIGHT: 168.44 LBS | SYSTOLIC BLOOD PRESSURE: 132 MMHG | SYSTOLIC BLOOD PRESSURE: 128 MMHG | RESPIRATION RATE: 18 BRPM | BODY MASS INDEX: 28.06 KG/M2 | DIASTOLIC BLOOD PRESSURE: 74 MMHG

## 2020-02-13 DIAGNOSIS — Z17.0 MALIGNANT NEOPLASM OF UPPER-OUTER QUADRANT OF RIGHT BREAST IN FEMALE, ESTROGEN RECEPTOR POSITIVE: ICD-10-CM

## 2020-02-13 DIAGNOSIS — C50.411 MALIGNANT NEOPLASM OF UPPER-OUTER QUADRANT OF RIGHT BREAST IN FEMALE, ESTROGEN RECEPTOR POSITIVE: ICD-10-CM

## 2020-02-13 DIAGNOSIS — Z17.0 MALIGNANT NEOPLASM OF UPPER-OUTER QUADRANT OF RIGHT BREAST IN FEMALE, ESTROGEN RECEPTOR POSITIVE: Primary | ICD-10-CM

## 2020-02-13 DIAGNOSIS — C50.411 MALIGNANT NEOPLASM OF UPPER-OUTER QUADRANT OF RIGHT BREAST IN FEMALE, ESTROGEN RECEPTOR POSITIVE: Primary | ICD-10-CM

## 2020-02-13 LAB
ALBUMIN SERPL BCP-MCNC: 3.7 G/DL (ref 3.5–5.2)
ALP SERPL-CCNC: 120 U/L (ref 55–135)
ALT SERPL W/O P-5'-P-CCNC: 21 U/L (ref 10–44)
ANION GAP SERPL CALC-SCNC: 8 MMOL/L (ref 8–16)
ANISOCYTOSIS BLD QL SMEAR: SLIGHT
AST SERPL-CCNC: 23 U/L (ref 10–40)
BASOPHILS # BLD AUTO: ABNORMAL K/UL (ref 0–0.2)
BASOPHILS NFR BLD: 0 % (ref 0–1.9)
BILIRUB SERPL-MCNC: 0.3 MG/DL (ref 0.1–1)
BUN SERPL-MCNC: 20 MG/DL (ref 6–20)
CALCIUM SERPL-MCNC: 9.3 MG/DL (ref 8.7–10.5)
CHLORIDE SERPL-SCNC: 107 MMOL/L (ref 95–110)
CO2 SERPL-SCNC: 23 MMOL/L (ref 23–29)
CREAT SERPL-MCNC: 0.8 MG/DL (ref 0.5–1.4)
DIFFERENTIAL METHOD: ABNORMAL
EOSINOPHIL # BLD AUTO: ABNORMAL K/UL (ref 0–0.5)
EOSINOPHIL NFR BLD: 1 % (ref 0–8)
ERYTHROCYTE [DISTWIDTH] IN BLOOD BY AUTOMATED COUNT: 14.1 % (ref 11.5–14.5)
EST. GFR  (AFRICAN AMERICAN): >60 ML/MIN/1.73 M^2
EST. GFR  (NON AFRICAN AMERICAN): >60 ML/MIN/1.73 M^2
GLUCOSE SERPL-MCNC: 99 MG/DL (ref 70–110)
HCT VFR BLD AUTO: 30.6 % (ref 37–48.5)
HGB BLD-MCNC: 9.8 G/DL (ref 12–16)
IMM GRANULOCYTES # BLD AUTO: ABNORMAL K/UL (ref 0–0.04)
IMM GRANULOCYTES NFR BLD AUTO: ABNORMAL % (ref 0–0.5)
LYMPHOCYTES # BLD AUTO: ABNORMAL K/UL (ref 1–4.8)
LYMPHOCYTES NFR BLD: 8 % (ref 18–48)
MCH RBC QN AUTO: 30.8 PG (ref 27–31)
MCHC RBC AUTO-ENTMCNC: 32 G/DL (ref 32–36)
MCV RBC AUTO: 96 FL (ref 82–98)
METAMYELOCYTES NFR BLD MANUAL: 4 %
MONOCYTES # BLD AUTO: ABNORMAL K/UL (ref 0.3–1)
MONOCYTES NFR BLD: 5 % (ref 4–15)
MYELOCYTES NFR BLD MANUAL: 4 %
NEUTROPHILS NFR BLD: 72 % (ref 38–73)
NEUTS BAND NFR BLD MANUAL: 6 %
NRBC BLD-RTO: 0 /100 WBC
PLATELET # BLD AUTO: 273 K/UL (ref 150–350)
PLATELET BLD QL SMEAR: ABNORMAL
PMV BLD AUTO: 8.9 FL (ref 9.2–12.9)
POLYCHROMASIA BLD QL SMEAR: ABNORMAL
POTASSIUM SERPL-SCNC: 4.5 MMOL/L (ref 3.5–5.1)
PROT SERPL-MCNC: 6.6 G/DL (ref 6–8.4)
RBC # BLD AUTO: 3.18 M/UL (ref 4–5.4)
SODIUM SERPL-SCNC: 138 MMOL/L (ref 136–145)
TROPONIN I SERPL DL<=0.01 NG/ML-MCNC: 0.01 NG/ML (ref 0–0.03)
WBC # BLD AUTO: 14.07 K/UL (ref 3.9–12.7)

## 2020-02-13 PROCEDURE — 96413 CHEMO IV INFUSION 1 HR: CPT

## 2020-02-13 PROCEDURE — 99999 PR PBB SHADOW E&M-EST. PATIENT-LVL V: CPT | Mod: PBBFAC,,, | Performed by: INTERNAL MEDICINE

## 2020-02-13 PROCEDURE — 85027 COMPLETE CBC AUTOMATED: CPT

## 2020-02-13 PROCEDURE — 3008F BODY MASS INDEX DOCD: CPT | Mod: CPTII,S$GLB,, | Performed by: INTERNAL MEDICINE

## 2020-02-13 PROCEDURE — 99999 PR PBB SHADOW E&M-EST. PATIENT-LVL V: ICD-10-PCS | Mod: PBBFAC,,, | Performed by: INTERNAL MEDICINE

## 2020-02-13 PROCEDURE — 99214 PR OFFICE/OUTPT VISIT, EST, LEVL IV, 30-39 MIN: ICD-10-PCS | Mod: S$GLB,,, | Performed by: INTERNAL MEDICINE

## 2020-02-13 PROCEDURE — 3008F PR BODY MASS INDEX (BMI) DOCUMENTED: ICD-10-PCS | Mod: CPTII,S$GLB,, | Performed by: INTERNAL MEDICINE

## 2020-02-13 PROCEDURE — 96375 TX/PRO/DX INJ NEW DRUG ADDON: CPT

## 2020-02-13 PROCEDURE — 25000003 PHARM REV CODE 250: Performed by: INTERNAL MEDICINE

## 2020-02-13 PROCEDURE — 80053 COMPREHEN METABOLIC PANEL: CPT

## 2020-02-13 PROCEDURE — 96367 TX/PROPH/DG ADDL SEQ IV INF: CPT

## 2020-02-13 PROCEDURE — A4216 STERILE WATER/SALINE, 10 ML: HCPCS | Performed by: INTERNAL MEDICINE

## 2020-02-13 PROCEDURE — 96411 CHEMO IV PUSH ADDL DRUG: CPT

## 2020-02-13 PROCEDURE — 85007 BL SMEAR W/DIFF WBC COUNT: CPT

## 2020-02-13 PROCEDURE — 84484 ASSAY OF TROPONIN QUANT: CPT

## 2020-02-13 PROCEDURE — 99214 OFFICE O/P EST MOD 30 MIN: CPT | Mod: S$GLB,,, | Performed by: INTERNAL MEDICINE

## 2020-02-13 PROCEDURE — 63600175 PHARM REV CODE 636 W HCPCS: Mod: TB | Performed by: INTERNAL MEDICINE

## 2020-02-13 PROCEDURE — 36415 COLL VENOUS BLD VENIPUNCTURE: CPT

## 2020-02-13 RX ORDER — HEPARIN 100 UNIT/ML
500 SYRINGE INTRAVENOUS
Status: CANCELLED | OUTPATIENT
Start: 2020-02-13

## 2020-02-13 RX ORDER — DOXORUBICIN HYDROCHLORIDE 2 MG/ML
60 INJECTION, SOLUTION INTRAVENOUS
Status: COMPLETED | OUTPATIENT
Start: 2020-02-13 | End: 2020-02-13

## 2020-02-13 RX ORDER — SODIUM CHLORIDE 0.9 % (FLUSH) 0.9 %
10 SYRINGE (ML) INJECTION
Status: CANCELLED | OUTPATIENT
Start: 2020-02-13

## 2020-02-13 RX ORDER — DOXORUBICIN HYDROCHLORIDE 2 MG/ML
60 INJECTION, SOLUTION INTRAVENOUS
Status: CANCELLED | OUTPATIENT
Start: 2020-02-13

## 2020-02-13 RX ORDER — HEPARIN 100 UNIT/ML
500 SYRINGE INTRAVENOUS
Status: DISCONTINUED | OUTPATIENT
Start: 2020-02-13 | End: 2020-02-13 | Stop reason: HOSPADM

## 2020-02-13 RX ORDER — SODIUM CHLORIDE 0.9 % (FLUSH) 0.9 %
10 SYRINGE (ML) INJECTION
Status: DISCONTINUED | OUTPATIENT
Start: 2020-02-13 | End: 2020-02-13 | Stop reason: HOSPADM

## 2020-02-13 RX ADMIN — Medication 10 ML: at 12:02

## 2020-02-13 RX ADMIN — CYCLOPHOSPHAMIDE 1115 MG: 1 INJECTION, POWDER, FOR SOLUTION INTRAVENOUS; ORAL at 11:02

## 2020-02-13 RX ADMIN — HEPARIN 500 UNITS: 100 SYRINGE at 12:02

## 2020-02-13 RX ADMIN — DOXORUBICIN HYDROCHLORIDE 112 MG: 2 INJECTION, SOLUTION INTRAVENOUS at 11:02

## 2020-02-13 RX ADMIN — SODIUM CHLORIDE: 0.9 INJECTION, SOLUTION INTRAVENOUS at 10:02

## 2020-02-13 RX ADMIN — DEXAMETHASONE SODIUM PHOSPHATE: 4 INJECTION, SOLUTION INTRA-ARTICULAR; INTRALESIONAL; INTRAMUSCULAR; INTRAVENOUS; SOFT TISSUE at 10:02

## 2020-02-13 RX ADMIN — APREPITANT 130 MG: 130 INJECTION, EMULSION INTRAVENOUS at 11:02

## 2020-02-13 NOTE — PLAN OF CARE
1247 patient completed and tolerated treatment well, pt voiced no new complaints or concerns at this time. Pt d/c home, NAD

## 2020-02-14 ENCOUNTER — INFUSION (OUTPATIENT)
Dept: INFUSION THERAPY | Facility: HOSPITAL | Age: 35
End: 2020-02-14
Attending: NURSE PRACTITIONER
Payer: COMMERCIAL

## 2020-02-14 DIAGNOSIS — Z17.0 MALIGNANT NEOPLASM OF UPPER-OUTER QUADRANT OF RIGHT BREAST IN FEMALE, ESTROGEN RECEPTOR POSITIVE: Primary | ICD-10-CM

## 2020-02-14 DIAGNOSIS — C50.411 MALIGNANT NEOPLASM OF UPPER-OUTER QUADRANT OF RIGHT BREAST IN FEMALE, ESTROGEN RECEPTOR POSITIVE: Primary | ICD-10-CM

## 2020-02-14 PROCEDURE — 63600175 PHARM REV CODE 636 W HCPCS: Performed by: INTERNAL MEDICINE

## 2020-02-14 PROCEDURE — 96372 THER/PROPH/DIAG INJ SC/IM: CPT

## 2020-02-14 RX ADMIN — PEGFILGRASTIM-CBQV 6 MG: 6 INJECTION, SOLUTION SUBCUTANEOUS at 12:02

## 2020-02-14 NOTE — NURSING
Patient received Unenyca injection SQ to ABD.  Tolerated well.  No complaints today.  Ambulated off unit with steady gait.

## 2020-02-18 ENCOUNTER — PATIENT MESSAGE (OUTPATIENT)
Dept: HEMATOLOGY/ONCOLOGY | Facility: CLINIC | Age: 35
End: 2020-02-18

## 2020-02-25 ENCOUNTER — PATIENT MESSAGE (OUTPATIENT)
Dept: PSYCHIATRY | Facility: CLINIC | Age: 35
End: 2020-02-25

## 2020-02-26 ENCOUNTER — OFFICE VISIT (OUTPATIENT)
Dept: PSYCHIATRY | Facility: CLINIC | Age: 35
End: 2020-02-26
Payer: COMMERCIAL

## 2020-02-26 DIAGNOSIS — F41.0 GENERALIZED ANXIETY DISORDER WITH PANIC ATTACKS: ICD-10-CM

## 2020-02-26 DIAGNOSIS — F90.0 ADHD (ATTENTION DEFICIT HYPERACTIVITY DISORDER), INATTENTIVE TYPE: ICD-10-CM

## 2020-02-26 DIAGNOSIS — F41.1 GENERALIZED ANXIETY DISORDER WITH PANIC ATTACKS: ICD-10-CM

## 2020-02-26 DIAGNOSIS — F43.10 POSTTRAUMATIC STRESS DISORDER: ICD-10-CM

## 2020-02-26 DIAGNOSIS — F33.1 MODERATE EPISODE OF RECURRENT MAJOR DEPRESSIVE DISORDER: Primary | ICD-10-CM

## 2020-02-26 PROCEDURE — 99999 PR PBB SHADOW E&M-EST. PATIENT-LVL I: ICD-10-PCS | Mod: PBBFAC,,, | Performed by: PSYCHOLOGIST

## 2020-02-26 PROCEDURE — 90847 PR FAMILY PSYCHOTHERAPY W/ PT, 50 MIN: ICD-10-PCS | Mod: S$GLB,,, | Performed by: PSYCHOLOGIST

## 2020-02-26 PROCEDURE — 99999 PR PBB SHADOW E&M-EST. PATIENT-LVL I: CPT | Mod: PBBFAC,,, | Performed by: PSYCHOLOGIST

## 2020-02-26 PROCEDURE — 90847 FAMILY PSYTX W/PT 50 MIN: CPT | Mod: S$GLB,,, | Performed by: PSYCHOLOGIST

## 2020-02-27 NOTE — PROGRESS NOTES
Subjective:       Patient ID: Yolanda Win is a 34 y.o. female.    Chief Complaint: No chief complaint on file.    HPI 34 year old female, who returns for follow-up of carcinoma of the right breast.  She has initiated neoadjuvant chemotherapy and is status post 4 cycles of Adriamycin Cytoxan.  She has had significant improvement in her right breast mass on clinical exam.  She is here to start weekly Taxol.    She had about a week of nausea and some fatigue but then has felt very well with excellent energy.  She has been eating well with return of her appetite and taste.  She had some mild mouth sores which did not interfere with her eating.  She has no shortness of breath.  She has had a mild URI.    Breast history:  Mammogram and ultrasound on December 11th, 2019 showed right breast mass 7 cm from the nipple.  By ultrasound this mass measured 33 x 32 x 21 mm.  There was no associated axillary lymphadenopathy noted.    Right breast biopsy on December 13 showed high-grade infiltrating ductal carcinoma (histologic grade 3, nuclear grade 3, mitotic index 3) there were medullary features.  The cancer was 25% ER positive and AK and HER2 negative.  Ki-67 was 90%.    MRI showed a 5.2 cm x 2.6 cm x 4.6 cm irregularly shaped mass with rim enhancement seen in the right breast at 11 o'clock.    CT scan of the chest abdomen and pelvis and bone scan showed no evidence of metastatic disease.  Review of Systems   Constitutional: Positive for fatigue. Negative for activity change, appetite change, fever and unexpected weight change.   HENT: Positive for mouth sores. Negative for trouble swallowing.    Eyes: Negative for visual disturbance.   Respiratory: Negative for cough and shortness of breath.    Cardiovascular: Negative for chest pain.   Gastrointestinal: Positive for nausea. Negative for abdominal pain and diarrhea.   Genitourinary: Negative for frequency.   Musculoskeletal: Negative for back pain.   Skin: Negative for  rash.   Neurological: Negative for headaches.   Hematological: Negative for adenopathy.   Psychiatric/Behavioral: The patient is not nervous/anxious.        Objective:      Physical Exam   Constitutional: She is oriented to person, place, and time. She appears well-developed and well-nourished. No distress.   HENT:   Mouth/Throat: Oropharynx is clear and moist. No oropharyngeal exudate.   Eyes: No scleral icterus.   Cardiovascular: Normal rate and regular rhythm.   Pulmonary/Chest: Effort normal and breath sounds normal. She has no wheezes. She has no rales. Right breast exhibits no mass, no nipple discharge and no skin change. Left breast exhibits no mass, no nipple discharge and no skin change.       Abdominal: Soft. She exhibits no mass. There is no tenderness.   Lymphadenopathy:     She has no cervical adenopathy.   Neurological: She is alert and oriented to person, place, and time.   Skin: No rash noted. No erythema.   Psychiatric: She has a normal mood and affect. Her behavior is normal. Thought content normal.   Vitals reviewed.      Assessment:       1. Malignant neoplasm of upper-outer quadrant of right breast in female, estrogen receptor positive        Plan:       Began Taxol therapy and return in 3 weeks.

## 2020-02-28 ENCOUNTER — INFUSION (OUTPATIENT)
Dept: INFUSION THERAPY | Facility: HOSPITAL | Age: 35
End: 2020-02-28
Attending: NURSE PRACTITIONER
Payer: COMMERCIAL

## 2020-02-28 ENCOUNTER — OFFICE VISIT (OUTPATIENT)
Dept: HEMATOLOGY/ONCOLOGY | Facility: CLINIC | Age: 35
End: 2020-02-28
Payer: COMMERCIAL

## 2020-02-28 ENCOUNTER — PATIENT MESSAGE (OUTPATIENT)
Dept: HEMATOLOGY/ONCOLOGY | Facility: CLINIC | Age: 35
End: 2020-02-28

## 2020-02-28 ENCOUNTER — PATIENT MESSAGE (OUTPATIENT)
Dept: SURGERY | Facility: CLINIC | Age: 35
End: 2020-02-28

## 2020-02-28 ENCOUNTER — LAB VISIT (OUTPATIENT)
Dept: LAB | Facility: HOSPITAL | Age: 35
End: 2020-02-28
Attending: INTERNAL MEDICINE
Payer: COMMERCIAL

## 2020-02-28 VITALS
SYSTOLIC BLOOD PRESSURE: 114 MMHG | TEMPERATURE: 98 F | RESPIRATION RATE: 18 BRPM | DIASTOLIC BLOOD PRESSURE: 84 MMHG | HEART RATE: 88 BPM

## 2020-02-28 VITALS
BODY MASS INDEX: 28.69 KG/M2 | WEIGHT: 172.19 LBS | OXYGEN SATURATION: 98 % | RESPIRATION RATE: 18 BRPM | HEIGHT: 65 IN | TEMPERATURE: 98 F | DIASTOLIC BLOOD PRESSURE: 87 MMHG | HEART RATE: 104 BPM | SYSTOLIC BLOOD PRESSURE: 117 MMHG

## 2020-02-28 DIAGNOSIS — C50.411 MALIGNANT NEOPLASM OF UPPER-OUTER QUADRANT OF RIGHT BREAST IN FEMALE, ESTROGEN RECEPTOR POSITIVE: Primary | ICD-10-CM

## 2020-02-28 DIAGNOSIS — Z17.0 MALIGNANT NEOPLASM OF UPPER-OUTER QUADRANT OF RIGHT BREAST IN FEMALE, ESTROGEN RECEPTOR POSITIVE: ICD-10-CM

## 2020-02-28 DIAGNOSIS — C50.411 MALIGNANT NEOPLASM OF UPPER-OUTER QUADRANT OF RIGHT BREAST IN FEMALE, ESTROGEN RECEPTOR POSITIVE: ICD-10-CM

## 2020-02-28 DIAGNOSIS — Z17.0 MALIGNANT NEOPLASM OF UPPER-OUTER QUADRANT OF RIGHT BREAST IN FEMALE, ESTROGEN RECEPTOR POSITIVE: Primary | ICD-10-CM

## 2020-02-28 LAB
ALBUMIN SERPL BCP-MCNC: 3.6 G/DL (ref 3.5–5.2)
ALP SERPL-CCNC: 148 U/L (ref 55–135)
ALT SERPL W/O P-5'-P-CCNC: 14 U/L (ref 10–44)
ANION GAP SERPL CALC-SCNC: 8 MMOL/L (ref 8–16)
ANISOCYTOSIS BLD QL SMEAR: ABNORMAL
AST SERPL-CCNC: 16 U/L (ref 10–40)
BASOPHILS # BLD AUTO: ABNORMAL K/UL (ref 0–0.2)
BASOPHILS NFR BLD: 1 % (ref 0–1.9)
BILIRUB SERPL-MCNC: 0.6 MG/DL (ref 0.1–1)
BUN SERPL-MCNC: 12 MG/DL (ref 6–20)
CALCIUM SERPL-MCNC: 9.4 MG/DL (ref 8.7–10.5)
CHLORIDE SERPL-SCNC: 107 MMOL/L (ref 95–110)
CO2 SERPL-SCNC: 26 MMOL/L (ref 23–29)
CREAT SERPL-MCNC: 0.8 MG/DL (ref 0.5–1.4)
DIFFERENTIAL METHOD: ABNORMAL
EOSINOPHIL # BLD AUTO: ABNORMAL K/UL (ref 0–0.5)
EOSINOPHIL NFR BLD: 0 % (ref 0–8)
ERYTHROCYTE [DISTWIDTH] IN BLOOD BY AUTOMATED COUNT: 17.1 % (ref 11.5–14.5)
EST. GFR  (AFRICAN AMERICAN): >60 ML/MIN/1.73 M^2
EST. GFR  (NON AFRICAN AMERICAN): >60 ML/MIN/1.73 M^2
GLUCOSE SERPL-MCNC: 104 MG/DL (ref 70–110)
HCT VFR BLD AUTO: 31.1 % (ref 37–48.5)
HGB BLD-MCNC: 10 G/DL (ref 12–16)
IMM GRANULOCYTES # BLD AUTO: ABNORMAL K/UL (ref 0–0.04)
IMM GRANULOCYTES NFR BLD AUTO: ABNORMAL % (ref 0–0.5)
LYMPHOCYTES # BLD AUTO: ABNORMAL K/UL (ref 1–4.8)
LYMPHOCYTES NFR BLD: 6 % (ref 18–48)
MCH RBC QN AUTO: 32.3 PG (ref 27–31)
MCHC RBC AUTO-ENTMCNC: 32.2 G/DL (ref 32–36)
MCV RBC AUTO: 100 FL (ref 82–98)
METAMYELOCYTES NFR BLD MANUAL: 1 %
MONOCYTES # BLD AUTO: ABNORMAL K/UL (ref 0.3–1)
MONOCYTES NFR BLD: 6 % (ref 4–15)
MYELOCYTES NFR BLD MANUAL: 1 %
NEUTROPHILS # BLD AUTO: ABNORMAL K/UL (ref 1.8–7.7)
NEUTROPHILS NFR BLD: 85 % (ref 38–73)
NRBC BLD-RTO: 0 /100 WBC
PLATELET # BLD AUTO: 204 K/UL (ref 150–350)
PLATELET BLD QL SMEAR: ABNORMAL
PMV BLD AUTO: 8.9 FL (ref 9.2–12.9)
POLYCHROMASIA BLD QL SMEAR: ABNORMAL
POTASSIUM SERPL-SCNC: 4.5 MMOL/L (ref 3.5–5.1)
PROT SERPL-MCNC: 6.9 G/DL (ref 6–8.4)
RBC # BLD AUTO: 3.1 M/UL (ref 4–5.4)
SODIUM SERPL-SCNC: 141 MMOL/L (ref 136–145)
WBC # BLD AUTO: 11.97 K/UL (ref 3.9–12.7)

## 2020-02-28 PROCEDURE — 99214 PR OFFICE/OUTPT VISIT, EST, LEVL IV, 30-39 MIN: ICD-10-PCS | Mod: S$GLB,,, | Performed by: INTERNAL MEDICINE

## 2020-02-28 PROCEDURE — 3008F PR BODY MASS INDEX (BMI) DOCUMENTED: ICD-10-PCS | Mod: CPTII,S$GLB,, | Performed by: INTERNAL MEDICINE

## 2020-02-28 PROCEDURE — 85007 BL SMEAR W/DIFF WBC COUNT: CPT

## 2020-02-28 PROCEDURE — 96375 TX/PRO/DX INJ NEW DRUG ADDON: CPT

## 2020-02-28 PROCEDURE — 85027 COMPLETE CBC AUTOMATED: CPT

## 2020-02-28 PROCEDURE — 99999 PR PBB SHADOW E&M-EST. PATIENT-LVL V: CPT | Mod: PBBFAC,,, | Performed by: INTERNAL MEDICINE

## 2020-02-28 PROCEDURE — 36415 COLL VENOUS BLD VENIPUNCTURE: CPT

## 2020-02-28 PROCEDURE — 99214 OFFICE O/P EST MOD 30 MIN: CPT | Mod: S$GLB,,, | Performed by: INTERNAL MEDICINE

## 2020-02-28 PROCEDURE — 3008F BODY MASS INDEX DOCD: CPT | Mod: CPTII,S$GLB,, | Performed by: INTERNAL MEDICINE

## 2020-02-28 PROCEDURE — 96413 CHEMO IV INFUSION 1 HR: CPT

## 2020-02-28 PROCEDURE — 25000003 PHARM REV CODE 250: Performed by: INTERNAL MEDICINE

## 2020-02-28 PROCEDURE — 99999 PR PBB SHADOW E&M-EST. PATIENT-LVL V: ICD-10-PCS | Mod: PBBFAC,,, | Performed by: INTERNAL MEDICINE

## 2020-02-28 PROCEDURE — 80053 COMPREHEN METABOLIC PANEL: CPT

## 2020-02-28 PROCEDURE — 96367 TX/PROPH/DG ADDL SEQ IV INF: CPT

## 2020-02-28 PROCEDURE — S0028 INJECTION, FAMOTIDINE, 20 MG: HCPCS | Performed by: INTERNAL MEDICINE

## 2020-02-28 PROCEDURE — 63600175 PHARM REV CODE 636 W HCPCS: Performed by: INTERNAL MEDICINE

## 2020-02-28 RX ORDER — DIPHENHYDRAMINE HYDROCHLORIDE 50 MG/ML
50 INJECTION INTRAMUSCULAR; INTRAVENOUS ONCE AS NEEDED
Status: CANCELLED | OUTPATIENT
Start: 2020-02-28

## 2020-02-28 RX ORDER — DIPHENHYDRAMINE HYDROCHLORIDE 50 MG/ML
50 INJECTION INTRAMUSCULAR; INTRAVENOUS ONCE AS NEEDED
Status: DISCONTINUED | OUTPATIENT
Start: 2020-02-28 | End: 2020-02-28 | Stop reason: HOSPADM

## 2020-02-28 RX ORDER — EPINEPHRINE 0.3 MG/.3ML
0.3 INJECTION SUBCUTANEOUS ONCE AS NEEDED
Status: CANCELLED | OUTPATIENT
Start: 2020-02-28

## 2020-02-28 RX ORDER — SODIUM CHLORIDE 0.9 % (FLUSH) 0.9 %
10 SYRINGE (ML) INJECTION
Status: DISCONTINUED | OUTPATIENT
Start: 2020-02-28 | End: 2020-02-28 | Stop reason: HOSPADM

## 2020-02-28 RX ORDER — HEPARIN 100 UNIT/ML
500 SYRINGE INTRAVENOUS
Status: DISCONTINUED | OUTPATIENT
Start: 2020-02-28 | End: 2020-02-28 | Stop reason: HOSPADM

## 2020-02-28 RX ORDER — FAMOTIDINE 10 MG/ML
20 INJECTION INTRAVENOUS
Status: CANCELLED | OUTPATIENT
Start: 2020-02-28

## 2020-02-28 RX ORDER — EPINEPHRINE 0.3 MG/.3ML
0.3 INJECTION SUBCUTANEOUS ONCE AS NEEDED
Status: DISCONTINUED | OUTPATIENT
Start: 2020-02-28 | End: 2020-02-28 | Stop reason: HOSPADM

## 2020-02-28 RX ORDER — SODIUM CHLORIDE 0.9 % (FLUSH) 0.9 %
10 SYRINGE (ML) INJECTION
Status: CANCELLED | OUTPATIENT
Start: 2020-02-28

## 2020-02-28 RX ORDER — FAMOTIDINE 10 MG/ML
20 INJECTION INTRAVENOUS
Status: COMPLETED | OUTPATIENT
Start: 2020-02-28 | End: 2020-02-28

## 2020-02-28 RX ORDER — HEPARIN 100 UNIT/ML
500 SYRINGE INTRAVENOUS
Status: CANCELLED | OUTPATIENT
Start: 2020-02-28

## 2020-02-28 RX ADMIN — DEXAMETHASONE SODIUM PHOSPHATE 10 MG: 4 INJECTION, SOLUTION INTRA-ARTICULAR; INTRALESIONAL; INTRAMUSCULAR; INTRAVENOUS; SOFT TISSUE at 11:02

## 2020-02-28 RX ADMIN — FAMOTIDINE 20 MG: 10 INJECTION, SOLUTION INTRAVENOUS at 11:02

## 2020-02-28 RX ADMIN — DIPHENHYDRAMINE HYDROCHLORIDE 25 MG: 50 INJECTION INTRAMUSCULAR; INTRAVENOUS at 11:02

## 2020-02-28 RX ADMIN — SODIUM CHLORIDE: 0.9 INJECTION, SOLUTION INTRAVENOUS at 11:02

## 2020-02-28 RX ADMIN — PACLITAXEL 150 MG: 300 INJECTION, SOLUTION INTRAVENOUS at 11:02

## 2020-02-28 RX ADMIN — HEPARIN 500 UNITS: 100 SYRINGE at 01:02

## 2020-02-28 NOTE — PLAN OF CARE
Patient tolerated Taxol with no complications. VSS. Pt instructed to call MD with any problems. NAD. Pt discharged home independently.

## 2020-03-02 RX ORDER — HEPARIN 100 UNIT/ML
500 SYRINGE INTRAVENOUS
Status: CANCELLED | OUTPATIENT
Start: 2020-03-06

## 2020-03-02 RX ORDER — SODIUM CHLORIDE 0.9 % (FLUSH) 0.9 %
10 SYRINGE (ML) INJECTION
Status: CANCELLED | OUTPATIENT
Start: 2020-03-06

## 2020-03-02 RX ORDER — DIPHENHYDRAMINE HYDROCHLORIDE 50 MG/ML
50 INJECTION INTRAMUSCULAR; INTRAVENOUS ONCE AS NEEDED
Status: CANCELLED | OUTPATIENT
Start: 2020-03-06

## 2020-03-02 RX ORDER — EPINEPHRINE 0.3 MG/.3ML
0.3 INJECTION SUBCUTANEOUS ONCE AS NEEDED
Status: CANCELLED | OUTPATIENT
Start: 2020-03-06

## 2020-03-02 RX ORDER — FAMOTIDINE 10 MG/ML
20 INJECTION INTRAVENOUS
Status: CANCELLED | OUTPATIENT
Start: 2020-03-06

## 2020-03-06 ENCOUNTER — INFUSION (OUTPATIENT)
Dept: INFUSION THERAPY | Facility: HOSPITAL | Age: 35
End: 2020-03-06
Attending: NURSE PRACTITIONER
Payer: COMMERCIAL

## 2020-03-06 ENCOUNTER — PATIENT MESSAGE (OUTPATIENT)
Dept: HEMATOLOGY/ONCOLOGY | Facility: CLINIC | Age: 35
End: 2020-03-06

## 2020-03-06 ENCOUNTER — LAB VISIT (OUTPATIENT)
Dept: LAB | Facility: HOSPITAL | Age: 35
End: 2020-03-06
Payer: COMMERCIAL

## 2020-03-06 VITALS
SYSTOLIC BLOOD PRESSURE: 132 MMHG | OXYGEN SATURATION: 99 % | TEMPERATURE: 98 F | DIASTOLIC BLOOD PRESSURE: 66 MMHG | HEIGHT: 65 IN | WEIGHT: 172.19 LBS | RESPIRATION RATE: 18 BRPM | BODY MASS INDEX: 28.69 KG/M2 | HEART RATE: 70 BPM

## 2020-03-06 DIAGNOSIS — R52 PAIN: ICD-10-CM

## 2020-03-06 DIAGNOSIS — R52 PAIN: Primary | ICD-10-CM

## 2020-03-06 DIAGNOSIS — C50.411 MALIGNANT NEOPLASM OF UPPER-OUTER QUADRANT OF RIGHT BREAST IN FEMALE, ESTROGEN RECEPTOR POSITIVE: Primary | ICD-10-CM

## 2020-03-06 DIAGNOSIS — Z17.0 MALIGNANT NEOPLASM OF UPPER-OUTER QUADRANT OF RIGHT BREAST IN FEMALE, ESTROGEN RECEPTOR POSITIVE: Primary | ICD-10-CM

## 2020-03-06 DIAGNOSIS — C50.411 MALIGNANT NEOPLASM OF UPPER-OUTER QUADRANT OF RIGHT BREAST IN FEMALE, ESTROGEN RECEPTOR POSITIVE: ICD-10-CM

## 2020-03-06 DIAGNOSIS — Z17.0 MALIGNANT NEOPLASM OF UPPER-OUTER QUADRANT OF RIGHT BREAST IN FEMALE, ESTROGEN RECEPTOR POSITIVE: ICD-10-CM

## 2020-03-06 PROBLEM — Z98.891 S/P CESAREAN SECTION: Status: RESOLVED | Noted: 2019-04-02 | Resolved: 2020-03-06

## 2020-03-06 PROBLEM — Z91.89 AT RISK FOR LYMPHEDEMA: Status: RESOLVED | Noted: 2019-12-26 | Resolved: 2020-03-06

## 2020-03-06 LAB
ALBUMIN SERPL BCP-MCNC: 3.7 G/DL (ref 3.5–5.2)
ALP SERPL-CCNC: 106 U/L (ref 55–135)
ALT SERPL W/O P-5'-P-CCNC: 14 U/L (ref 10–44)
ANION GAP SERPL CALC-SCNC: 5 MMOL/L (ref 8–16)
AST SERPL-CCNC: 15 U/L (ref 10–40)
BASOPHILS # BLD AUTO: 0.07 K/UL (ref 0–0.2)
BASOPHILS NFR BLD: 1.2 % (ref 0–1.9)
BILIRUB SERPL-MCNC: 0.6 MG/DL (ref 0.1–1)
BUN SERPL-MCNC: 15 MG/DL (ref 6–20)
CALCIUM SERPL-MCNC: 9.4 MG/DL (ref 8.7–10.5)
CHLORIDE SERPL-SCNC: 106 MMOL/L (ref 95–110)
CO2 SERPL-SCNC: 26 MMOL/L (ref 23–29)
CREAT SERPL-MCNC: 0.8 MG/DL (ref 0.5–1.4)
DIFFERENTIAL METHOD: ABNORMAL
EOSINOPHIL # BLD AUTO: 0.1 K/UL (ref 0–0.5)
EOSINOPHIL NFR BLD: 2.1 % (ref 0–8)
ERYTHROCYTE [DISTWIDTH] IN BLOOD BY AUTOMATED COUNT: 17.2 % (ref 11.5–14.5)
EST. GFR  (AFRICAN AMERICAN): >60 ML/MIN/1.73 M^2
EST. GFR  (NON AFRICAN AMERICAN): >60 ML/MIN/1.73 M^2
GLUCOSE SERPL-MCNC: 102 MG/DL (ref 70–110)
HCT VFR BLD AUTO: 28.5 % (ref 37–48.5)
HGB BLD-MCNC: 9.3 G/DL (ref 12–16)
IMM GRANULOCYTES # BLD AUTO: 0.09 K/UL (ref 0–0.04)
IMM GRANULOCYTES NFR BLD AUTO: 1.6 % (ref 0–0.5)
LYMPHOCYTES # BLD AUTO: 0.6 K/UL (ref 1–4.8)
LYMPHOCYTES NFR BLD: 9.7 % (ref 18–48)
MCH RBC QN AUTO: 32.7 PG (ref 27–31)
MCHC RBC AUTO-ENTMCNC: 32.6 G/DL (ref 32–36)
MCV RBC AUTO: 100 FL (ref 82–98)
MONOCYTES # BLD AUTO: 0.5 K/UL (ref 0.3–1)
MONOCYTES NFR BLD: 8.8 % (ref 4–15)
NEUTROPHILS # BLD AUTO: 4.4 K/UL (ref 1.8–7.7)
NEUTROPHILS NFR BLD: 76.6 % (ref 38–73)
NRBC BLD-RTO: 0 /100 WBC
PLATELET # BLD AUTO: 301 K/UL (ref 150–350)
PMV BLD AUTO: 8.6 FL (ref 9.2–12.9)
POTASSIUM SERPL-SCNC: 4.3 MMOL/L (ref 3.5–5.1)
PROT SERPL-MCNC: 6.9 G/DL (ref 6–8.4)
RBC # BLD AUTO: 2.84 M/UL (ref 4–5.4)
SODIUM SERPL-SCNC: 137 MMOL/L (ref 136–145)
WBC # BLD AUTO: 5.69 K/UL (ref 3.9–12.7)

## 2020-03-06 PROCEDURE — 96367 TX/PROPH/DG ADDL SEQ IV INF: CPT

## 2020-03-06 PROCEDURE — 36415 COLL VENOUS BLD VENIPUNCTURE: CPT

## 2020-03-06 PROCEDURE — 85025 COMPLETE CBC W/AUTO DIFF WBC: CPT

## 2020-03-06 PROCEDURE — 80053 COMPREHEN METABOLIC PANEL: CPT

## 2020-03-06 PROCEDURE — 63600175 PHARM REV CODE 636 W HCPCS: Performed by: INTERNAL MEDICINE

## 2020-03-06 PROCEDURE — 25000003 PHARM REV CODE 250: Performed by: INTERNAL MEDICINE

## 2020-03-06 PROCEDURE — 96413 CHEMO IV INFUSION 1 HR: CPT

## 2020-03-06 PROCEDURE — S0028 INJECTION, FAMOTIDINE, 20 MG: HCPCS | Performed by: INTERNAL MEDICINE

## 2020-03-06 PROCEDURE — 96375 TX/PRO/DX INJ NEW DRUG ADDON: CPT

## 2020-03-06 RX ORDER — EPINEPHRINE 0.3 MG/.3ML
0.3 INJECTION SUBCUTANEOUS ONCE AS NEEDED
Status: CANCELLED | OUTPATIENT
Start: 2020-03-13

## 2020-03-06 RX ORDER — HEPARIN 100 UNIT/ML
500 SYRINGE INTRAVENOUS
Status: CANCELLED | OUTPATIENT
Start: 2020-03-13

## 2020-03-06 RX ORDER — HYDROCODONE BITARTRATE AND ACETAMINOPHEN 7.5; 325 MG/1; MG/1
1 TABLET ORAL EVERY 6 HOURS PRN
Qty: 40 TABLET | Refills: 0 | Status: SHIPPED | OUTPATIENT
Start: 2020-03-06 | End: 2020-03-06 | Stop reason: SDUPTHER

## 2020-03-06 RX ORDER — SODIUM CHLORIDE 0.9 % (FLUSH) 0.9 %
10 SYRINGE (ML) INJECTION
Status: CANCELLED | OUTPATIENT
Start: 2020-03-13

## 2020-03-06 RX ORDER — DIPHENHYDRAMINE HYDROCHLORIDE 50 MG/ML
50 INJECTION INTRAMUSCULAR; INTRAVENOUS ONCE AS NEEDED
Status: DISCONTINUED | OUTPATIENT
Start: 2020-03-06 | End: 2020-03-06 | Stop reason: HOSPADM

## 2020-03-06 RX ORDER — EPINEPHRINE 0.3 MG/.3ML
0.3 INJECTION SUBCUTANEOUS ONCE AS NEEDED
Status: DISCONTINUED | OUTPATIENT
Start: 2020-03-06 | End: 2020-03-06 | Stop reason: HOSPADM

## 2020-03-06 RX ORDER — FAMOTIDINE 10 MG/ML
20 INJECTION INTRAVENOUS
Status: COMPLETED | OUTPATIENT
Start: 2020-03-06 | End: 2020-03-06

## 2020-03-06 RX ORDER — DIPHENHYDRAMINE HYDROCHLORIDE 50 MG/ML
50 INJECTION INTRAMUSCULAR; INTRAVENOUS ONCE AS NEEDED
Status: CANCELLED | OUTPATIENT
Start: 2020-03-13

## 2020-03-06 RX ORDER — FAMOTIDINE 10 MG/ML
20 INJECTION INTRAVENOUS
Status: CANCELLED | OUTPATIENT
Start: 2020-03-13

## 2020-03-06 RX ORDER — DEXTROAMPHETAMINE SACCHARATE, AMPHETAMINE ASPARTATE MONOHYDRATE, DEXTROAMPHETAMINE SULFATE AND AMPHETAMINE SULFATE 6.25; 6.25; 6.25; 6.25 MG/1; MG/1; MG/1; MG/1
25 CAPSULE, EXTENDED RELEASE ORAL EVERY MORNING
Qty: 30 CAPSULE | Refills: 0 | Status: SHIPPED | OUTPATIENT
Start: 2020-03-06 | End: 2020-03-11 | Stop reason: SDUPTHER

## 2020-03-06 RX ORDER — SODIUM CHLORIDE 0.9 % (FLUSH) 0.9 %
10 SYRINGE (ML) INJECTION
Status: DISCONTINUED | OUTPATIENT
Start: 2020-03-06 | End: 2020-03-06 | Stop reason: HOSPADM

## 2020-03-06 RX ORDER — HYDROCODONE BITARTRATE AND ACETAMINOPHEN 7.5; 325 MG/1; MG/1
1 TABLET ORAL EVERY 6 HOURS PRN
Qty: 40 TABLET | Refills: 0 | Status: ON HOLD | OUTPATIENT
Start: 2020-03-06 | End: 2020-03-20 | Stop reason: HOSPADM

## 2020-03-06 RX ORDER — HEPARIN 100 UNIT/ML
500 SYRINGE INTRAVENOUS
Status: DISCONTINUED | OUTPATIENT
Start: 2020-03-06 | End: 2020-03-06 | Stop reason: HOSPADM

## 2020-03-06 RX ADMIN — FAMOTIDINE 20 MG: 10 INJECTION, SOLUTION INTRAVENOUS at 10:03

## 2020-03-06 RX ADMIN — DEXAMETHASONE SODIUM PHOSPHATE 10 MG: 4 INJECTION, SOLUTION INTRA-ARTICULAR; INTRALESIONAL; INTRAMUSCULAR; INTRAVENOUS; SOFT TISSUE at 10:03

## 2020-03-06 RX ADMIN — PACLITAXEL 150 MG: 6 INJECTION, SOLUTION INTRAVENOUS at 11:03

## 2020-03-07 NOTE — PLAN OF CARE
Pt admitted for C2 Taxol. Labs reviewed and side effects and self care tips discussed, fatigue and nausea addressed. Pt tolerated treatment well. Plan of care reviewed and pt instructed to contact MD with further concerns. Pt discharged @ 12:30

## 2020-03-09 ENCOUNTER — OFFICE VISIT (OUTPATIENT)
Dept: PSYCHIATRY | Facility: CLINIC | Age: 35
End: 2020-03-09
Payer: COMMERCIAL

## 2020-03-09 DIAGNOSIS — F43.10 POSTTRAUMATIC STRESS DISORDER: ICD-10-CM

## 2020-03-09 DIAGNOSIS — F41.1 GENERALIZED ANXIETY DISORDER WITH PANIC ATTACKS: ICD-10-CM

## 2020-03-09 DIAGNOSIS — F33.1 MODERATE EPISODE OF RECURRENT MAJOR DEPRESSIVE DISORDER: Primary | ICD-10-CM

## 2020-03-09 DIAGNOSIS — F90.0 ADHD (ATTENTION DEFICIT HYPERACTIVITY DISORDER), INATTENTIVE TYPE: ICD-10-CM

## 2020-03-09 DIAGNOSIS — F41.0 GENERALIZED ANXIETY DISORDER WITH PANIC ATTACKS: ICD-10-CM

## 2020-03-09 PROCEDURE — 90834 PR PSYCHOTHERAPY W/PATIENT, 45 MIN: ICD-10-PCS | Mod: S$GLB,,, | Performed by: PSYCHOLOGIST

## 2020-03-09 PROCEDURE — 90834 PSYTX W PT 45 MINUTES: CPT | Mod: S$GLB,,, | Performed by: PSYCHOLOGIST

## 2020-03-09 PROCEDURE — 99999 PR PBB SHADOW E&M-EST. PATIENT-LVL I: ICD-10-PCS | Mod: PBBFAC,,, | Performed by: PSYCHOLOGIST

## 2020-03-09 PROCEDURE — 99999 PR PBB SHADOW E&M-EST. PATIENT-LVL I: CPT | Mod: PBBFAC,,, | Performed by: PSYCHOLOGIST

## 2020-03-11 ENCOUNTER — PATIENT OUTREACH (OUTPATIENT)
Dept: ADMINISTRATIVE | Facility: OTHER | Age: 35
End: 2020-03-11

## 2020-03-11 ENCOUNTER — PATIENT MESSAGE (OUTPATIENT)
Dept: PSYCHIATRY | Facility: CLINIC | Age: 35
End: 2020-03-11

## 2020-03-11 RX ORDER — DEXTROAMPHETAMINE SACCHARATE, AMPHETAMINE ASPARTATE MONOHYDRATE, DEXTROAMPHETAMINE SULFATE AND AMPHETAMINE SULFATE 6.25; 6.25; 6.25; 6.25 MG/1; MG/1; MG/1; MG/1
25 CAPSULE, EXTENDED RELEASE ORAL EVERY MORNING
Qty: 30 CAPSULE | Refills: 0 | Status: CANCELLED | OUTPATIENT
Start: 2020-03-11

## 2020-03-11 RX ORDER — DEXTROAMPHETAMINE SACCHARATE, AMPHETAMINE ASPARTATE MONOHYDRATE, DEXTROAMPHETAMINE SULFATE AND AMPHETAMINE SULFATE 6.25; 6.25; 6.25; 6.25 MG/1; MG/1; MG/1; MG/1
25 CAPSULE, EXTENDED RELEASE ORAL EVERY MORNING
Qty: 30 CAPSULE | Refills: 0 | Status: SHIPPED | OUTPATIENT
Start: 2020-03-11 | End: 2020-03-23 | Stop reason: SDUPTHER

## 2020-03-11 NOTE — PROGRESS NOTES
"[facility]  [address]  [place], [place]  [phone]    PSYCHOLOGICAL INTAKE REPORT    Name: Yolanda Win (Kate )  /AGE: 1985, 33 y/o  DATE: 2020    CPT code: 96844    Referred by: Dr. Kaleigh Polanco, OB/Gyn.    Marital Status:  x 6 years    Children: 2: 3 y/o and 10 month old sons    Patient's Occupation: Supervisor for Clinical Research at [employer]    Presenting Complaint:  Patient is a 34 year-old   woman who was referred for initial Psychological Intake Evaluation of her mood and anxiety symptoms following her diagnosis of breast cancer in 2019.  Patient was seen today in the presence of her , Gokul Norman, a  39-year-old  man, for evaluation of marital therapy.  Patient reports that she was diagnosed with breast cancer in 2019 when a 5 cm tumor was discovered. To date, she reports that she has had the port placed, 2 doses of chemotherapy (14 more scheduled over the next 5 months), and radiation is undecided. She states that there is a plan to schedule a hysterectomy, ovaryectomy and a double mastectomy (in 2020).  Her breast cancer is estrogen receptor positive, therefore she is also taking hormone suppression.  Patient has a lifelong history of multiple mental illnesses, and the diagnosis of the breast cancer and resulting procedures have triggered recurrent episodes of these disorders at present.  Patient has significant symptoms of major depressive disorder including: feeling sad, blue, and teary more often than not since the diagnosis, difficulty sleeping, loss of 7 lb in 2 weeks, nausea, and decreased energy.  Patient denies any suicidal ideation or intent.  Patient reports a lifetime prevalence of 3-4 episodes of major depressive disorder, usually triggered by a traumatic event.  Her first episode of major depressive disorder was when she was 16 " years old and was bullied in high school.  She was also treated for anorexia nervosa and bulimia during that period through outpatient treatment.  She also has a history of postpartum depression, experienced after the birth of her 2nd child.  Patient also experiences significant symptoms of generalized anxiety disorder with panic attacks, with lifetime prevalence episodic and beginning in the 4th grade.  Patient reports current symptoms of posttraumatic stress disorder with onset in the 4th grade when her uncle with paranoid schizophrenia murdered her grandmother. She reports current symptoms of flashbacks and nightmares.  Patient also reports attention deficit hyperactivity disorder, inattentive type, that was diagnosed in college when she became unable to focus and had difficulty with forgetting.  Patient has been prescribed Celexa 20 mg tablets, Xanax 0.25 mg tablets, and Adderall XR 25 mg 24 hr capsules.    Marital History:   Patient was in session with her  today, Gokul Norman, who is employed as a supervisor for a construction company.  They have been  for 6 years and have 2 children:  3-year-old and 10-month-old sons. Patient experienced secondary infertility and had an IU I procedure for her 2nd child.  She reports some difficulties in the marital relationship which is exacerbated by the stress of the cancer diagnosis and resultant procedures, which is the cause for requesting couples therapy at this time.  She states that her  has poor communication and tends to withhold his feelings. She would like increased affectional-expression, and reports that although he has been supportive through the cancer diagnosis, she wants more attention and compassion.    Family History:  Patient states that she has a family history of anxiety: both her father and grandmother experienced severe panic attacks. Her uncle was diagnosed with paranoid schizophrenia and murdered her grandmother.     Medical  History:   Patient has an extensive medical and psychiatric history, with the most recent diagnosis being a malignant neoplasm of the upper-outer quadrant of the right breast that is estrogen receptor positive.  (* refer to medical progress note for problem list and medications)    Psychological History:   Patient has a significant history of psychiatric disorders; including 3-4 episodes of major depressive disorder with onset at 16 years old; generalized anxiety with panic attacks with onset in the 4th grade; posttraumatic stress disorder with onset in the 4th grade; and attention deficit hyperactivity disorder with onset in college.  She has taken psychotropic medications lifelong including at present Celexa 20 mg tablets, Xanax 0.25 mg tablets, Adderall XR 25 mg 24 hr capsule. She is also prescribed Zofran-ODT 4 mg and Phenergan 12.5 mg tab.  Patient has previously been in therapy with a psychiatrist, Oksana Hutson, for 6-7 years who was treating her for generalized anxiety disorder, attention deficit hyperactivity disorder, and insomnia.    Behavioral Formulation:   Patient has a significant lifelong history of multiple mental disorders including recurrent major depressive disorder with onset at age 16, generalized anxiety disorder with panic attacks with onset in the 4th grade, posttraumatic stress disorder with onset in the 4th grade, and attention deficit hyperactivity disorder with onset in college. She also experienced postpartum depression following the birth of her 2nd child. She has a significant family history in her father and grandmother of of severe panic attacks. When patient experiences severe psychosocial stressors or traumatic events, her predisposition for psychological disorders is exhibited.  Her current major depressive disorder and generalized anxiety has been triggered by her diagnosis of breast cancer and the initiation of the numerous procedures that have begun for treatment. In addition  to treatment for her individual psychological disorders, the couple has requested marital therapy to work through the 8 months - 1 year of cancer treatments, difficulties with her body image, and maintaining their interpersonal quality of life with affection and an active sex life.    Treatment Plan:  1. Supportive Psychotherapy for the couple during cancer treatment and recovery  2. Grief Therapy  3. Communications skills training  4. Behavior Exchange techniques to facilitate increased affectional-expression  5. Body Image exercises  6. Broadening their definition of and repertoire of sexual behavior  7. Relaxation techniques (diaphramatic breathing, PMR, mindfulness, meditation, yoga)      Diagnostic impression:   Moderate episode of recurrent major depressive disorder  Generalized anxiety disorder with panic attacks  Posttraumatic stress disorder  ADHD (attention deficit hyperactivity disorder (inattentive type)    Next Appointment: in 2 weeks

## 2020-03-12 ENCOUNTER — LAB VISIT (OUTPATIENT)
Dept: LAB | Facility: HOSPITAL | Age: 35
End: 2020-03-12
Payer: COMMERCIAL

## 2020-03-12 DIAGNOSIS — Z17.0 MALIGNANT NEOPLASM OF UPPER-OUTER QUADRANT OF RIGHT BREAST IN FEMALE, ESTROGEN RECEPTOR POSITIVE: ICD-10-CM

## 2020-03-12 DIAGNOSIS — C50.411 MALIGNANT NEOPLASM OF UPPER-OUTER QUADRANT OF RIGHT BREAST IN FEMALE, ESTROGEN RECEPTOR POSITIVE: ICD-10-CM

## 2020-03-12 LAB
ALBUMIN SERPL BCP-MCNC: 3.7 G/DL (ref 3.5–5.2)
ALP SERPL-CCNC: 96 U/L (ref 55–135)
ALT SERPL W/O P-5'-P-CCNC: 14 U/L (ref 10–44)
ANION GAP SERPL CALC-SCNC: 8 MMOL/L (ref 8–16)
ANISOCYTOSIS BLD QL SMEAR: SLIGHT
AST SERPL-CCNC: 17 U/L (ref 10–40)
BASOPHILS # BLD AUTO: 0.04 K/UL (ref 0–0.2)
BASOPHILS NFR BLD: 1.6 % (ref 0–1.9)
BILIRUB SERPL-MCNC: 0.5 MG/DL (ref 0.1–1)
BUN SERPL-MCNC: 12 MG/DL (ref 6–20)
CALCIUM SERPL-MCNC: 9.5 MG/DL (ref 8.7–10.5)
CHLORIDE SERPL-SCNC: 107 MMOL/L (ref 95–110)
CO2 SERPL-SCNC: 26 MMOL/L (ref 23–29)
CREAT SERPL-MCNC: 0.8 MG/DL (ref 0.5–1.4)
DACRYOCYTES BLD QL SMEAR: ABNORMAL
DIFFERENTIAL METHOD: ABNORMAL
EOSINOPHIL # BLD AUTO: 0.2 K/UL (ref 0–0.5)
EOSINOPHIL NFR BLD: 7.1 % (ref 0–8)
ERYTHROCYTE [DISTWIDTH] IN BLOOD BY AUTOMATED COUNT: 16.1 % (ref 11.5–14.5)
EST. GFR  (AFRICAN AMERICAN): >60 ML/MIN/1.73 M^2
EST. GFR  (NON AFRICAN AMERICAN): >60 ML/MIN/1.73 M^2
GLUCOSE SERPL-MCNC: 107 MG/DL (ref 70–110)
HCT VFR BLD AUTO: 29.1 % (ref 37–48.5)
HGB BLD-MCNC: 9.5 G/DL (ref 12–16)
IMM GRANULOCYTES # BLD AUTO: 0.02 K/UL (ref 0–0.04)
IMM GRANULOCYTES NFR BLD AUTO: 0.8 % (ref 0–0.5)
LYMPHOCYTES # BLD AUTO: 0.6 K/UL (ref 1–4.8)
LYMPHOCYTES NFR BLD: 24.8 % (ref 18–48)
MCH RBC QN AUTO: 32.9 PG (ref 27–31)
MCHC RBC AUTO-ENTMCNC: 32.6 G/DL (ref 32–36)
MCV RBC AUTO: 101 FL (ref 82–98)
MONOCYTES # BLD AUTO: 0.2 K/UL (ref 0.3–1)
MONOCYTES NFR BLD: 7.1 % (ref 4–15)
NEUTROPHILS # BLD AUTO: 1.5 K/UL (ref 1.8–7.7)
NEUTROPHILS NFR BLD: 58.6 % (ref 38–73)
NRBC BLD-RTO: 0 /100 WBC
OVALOCYTES BLD QL SMEAR: ABNORMAL
PLATELET # BLD AUTO: 239 K/UL (ref 150–350)
PLATELET BLD QL SMEAR: ABNORMAL
PMV BLD AUTO: 8.4 FL (ref 9.2–12.9)
POIKILOCYTOSIS BLD QL SMEAR: SLIGHT
POLYCHROMASIA BLD QL SMEAR: ABNORMAL
POTASSIUM SERPL-SCNC: 4 MMOL/L (ref 3.5–5.1)
PROT SERPL-MCNC: 6.7 G/DL (ref 6–8.4)
RBC # BLD AUTO: 2.89 M/UL (ref 4–5.4)
SODIUM SERPL-SCNC: 141 MMOL/L (ref 136–145)
WBC # BLD AUTO: 2.54 K/UL (ref 3.9–12.7)

## 2020-03-12 PROCEDURE — 36415 COLL VENOUS BLD VENIPUNCTURE: CPT

## 2020-03-12 PROCEDURE — 85025 COMPLETE CBC W/AUTO DIFF WBC: CPT

## 2020-03-12 PROCEDURE — 80053 COMPREHEN METABOLIC PANEL: CPT

## 2020-03-12 NOTE — PROGRESS NOTES
"  [facility]  [address]  [place], [place]  [phone]    Marital Psychotherapy (PhD)    Name: Yolanda ("Juanis") CORDELIA Win  /AGE: 1985, 35 y/o  DATE: 2020    CPT code: 38445    Therapeutic Intervention:  Patient was seen this date in the presence of her , Gokul Norman, for their first regular cognitive-behavioral marital psychotherapy session following her initial Psychological Intake Evaluation.    Chief complaint/reason for encounter:   Moderate episode of recurrent major depressive disorder  Generalized anxiety disorder with panic attacks  Posttraumatic stress disorder  ADHD (attention deficit hyperactivity disorder (inattentive type)    Interval history and content of current session:  Patient was last seen 3 weeks ago for her initial Psychological Intake Evaluation.  Content of today's session:  1. Assessment of mood and anxiety symptoms  2. Introduction to Cognitive-Physiological Model of Panic  3. Discussion of patient's 's coping techniques  4. Brief discussion of parent-training model to deal with difficult child     Treatment Plan:  1. Supportive Psychotherapy for the couple during cancer treatment and recovery  2. Grief Therapy  3. Communications skills training  4. Behavior Exchange techniques to facilitate increased affectional-expression  5. Body Image exercises  6. Broadening their definition of and repertoire of sexual behavior  7. Relaxation techniques (diaphramatic breathing, PMR, mindfulness, meditation, yoga)                 Progress toward goals:   Patient reports that she has had severe panic attacks since her previous session 3 weeks ago. Last , patient claims that she had a severe panic attack lasting 1 hr and treated by taking a Xanax 0.25 mg. Her symptoms were feeling terrified, crying, hyperventilating, accompanied by numerous catastrophic and automatic thoughts such as I am afraid of dying.  " "Majority of session spent giving the patient an introduction to the Cognitive-Physiological Model of Panic so that she can learn to identify the interaction between catastrophic and automatic thoughts and the physiological response of increased sympathetic nervous system activity that provides a feedback loop to panic.  Patient is making numerous catastrophic statements that are triggering panic such as I do not want to die," "I do not want to lose my ," "I am going to lose my parents," and I won't see my children grow up."  Had patient map out the effect of the her catastrophic statements on increasing sympathetic activity which causes a feedback loop between cognitive and physiological arousal leading to panic. Will give patient homework to begin keeping an Automatic Thoughts Diary to identify her automatic and catastrophic thoughts so that we can replace them with more adaptive and realistic statements.  Patient claims that her depressed mood has improved over the past 3 weeks and that she is feeling only short bouts of irritability. This is an improvement over her previous visit.  When asked what they have done to improve her mood, patient and her  state that they have been engaged in pleasurable activities including going out to dinner and dates, working out, painting, building, and engaging in numerous social activities.  Encouraged patient to continue these activities in order to improve her mood.  Finally, both patient and her  are concerned about their son who is severely acting-out and with some aggression.  Discussed Parent-Training model to help the couple to deal with their child.      Homework:  Patient's homework is to began automatic thoughts diary, where she will identify numerous catastrophic and automatic thoughts such as those reported in session today, and write them in a diary where we can begin to revise them in subsequent sessions, to replace them with realistic and " adaptive statements that will not trigger the cognitive-physiological panic loop.    Return to clinic: in 2 weeks    Length of Service (minutes): 50 minutes

## 2020-03-12 NOTE — PROGRESS NOTES
"  [facility]  [address]  [place], [place]  [phone]    Marital Psychotherapy (PhD)    Name: Yolanda ("Juanis") CORDELIA Win  /AGE: 1985, 35 y/o  DATE: 2020    CPT code: 19677    Therapeutic Intervention:  Patient was seen this date in the presence of her , Gokul Norman, for their regular cognitive-behavioral marital psychotherapy session.    Chief complaint/reason for encounter:   Moderate episode of recurrent major depressive disorder  Generalized anxiety disorder with panic attacks  Posttraumatic stress disorder  ADHD (attention deficit hyperactivity disorder (inattentive type)    Interval history and content of current session:  Patient was last seen 2 weeks ago.  Content of today's session:  1. Assessment of mood and anxiety symptoms  2. Discussion and treatment options for decreased sex drive (HSDD)  3. Automatic Thoughts Diary for Cognitive-Physiological Model of Panic     Treatment Plan:  1. Supportive Psychotherapy for the couple during cancer treatment and recovery  2. Grief Therapy  3. Communications skills training  4. Behavior Exchange techniques to facilitate increased affectional-expression  5. Body Image exercises  6. Broadening their definition of and repertoire of sexual behavior  7. Relaxation techniques (diaphramatic breathing, PMR, mindfulness, meditation, yoga)                 Progress toward goals:   Patient reports that her mood is "very good" today. She states that she feels normal - even pre-chemo normal!  She states that she has completed some of her most difficult breast cancer procedures and will be starting with new weekly treatments on Friday. She is sleeping well without Xanax for the 1st time and is no longer having insomnia.  Patient stated that she has been active and social, and went all of the [employer] parades! Her  is very excited and enthusiastic about her progress and good mood, and " states that he also is feeling good.  Patient's only complaint today is that due to the hormone suppression for her breast cancer, she has completely lost all of her sex drive.  She is having difficulty with hypoactive sexual desire disorder (HSDD) with no fantasies, dreams or interest in sexual activity. 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  She is also having difficulty reaching orgasm, and reports that her last orgasm was prior to Maya.  The couple continues to have sexual activity 1-2 times per week, but she is feeling that there is a great deal of performance pressure on her to be sexually active when she is not enjoying sexual activity.  Educated her  on the causes of sexual dysfunction following hormone suppression and discussed alternative methods for the couple to remain intimate and engage in a broader range of sexual activities while she goes through the procedures for breast cancer.  For example, discussed using alternate methods of sexual behavior such as oral or manual, in order to help her with arousal and decrease performance pressure.  Alternatively, if she is not interested in sexual activity, she could relieve her 's frustration by using oral or manual sex without the pressure of intercourse.  Patient's  was happy with this solution. Long-term, testosterone implants can be discussed with Dr. Polanco following completion of her breast cancer treatments to increase sex drive. Patient is continuing to collect automatic and catastrophic thoughts for her automatic thoughts diary. Will meet with her individually next session to review the diary and begin cognitive restructuring to replace her negative automatic thoughts with more adaptive and realistic self-statements to prevent anxiety and panic.    Homework:  1.  Couple will increase their  affectional-expression through touching, massage, kissing, etc., and if patient is not interested in engaging in intercourse at this time due to her HSDD, FSAD and  inorgasmia, she will try oral or manual stimulation of her partner to decrease his frustration and to avoid performance pressure on her.  2.  Patient will continue to collect her automatic/catastrophic thoughts for her automatic thoughts diary to begin cognitive restructuring at next session.    Return to clinic: in 2 weeks    Length of Service (minutes): 50 minutes

## 2020-03-12 NOTE — PROGRESS NOTES
"  [facility]  [address]  [place], [place]  [phone]    Marital Psychotherapy (PhD)    Name: Yolanda ("Juanis") CORDELIA Win  /AGE: 1985, 33 y/o  DATE: 2020    CPT code: 83417    Therapeutic Intervention:  Patient was seen individually this date for her regular cognitive-behavioral marital psychotherapy session.    Chief complaint/reason for encounter:   Moderate episode of recurrent major depressive disorder  Generalized anxiety disorder with panic attacks  Posttraumatic stress disorder  ADHD (attention deficit hyperactivity disorder (inattentive type)    Interval history and content of current session:  Patient was last seen 2 weeks ago.  Content of today's session:  1. Assessment of mood and anxiety symptoms  2. Processed argument with patient's 's family  3. Discussed Setting Personal Boundaries and Splitting  4. Discussed Self-Care and Self-Nurturing     Treatment Plan:  1. Supportive Psychotherapy for the couple during cancer treatment and recovery  2. Grief Therapy  3. Communications skills training  4. Behavior Exchange techniques to facilitate increased affectional-expression  5. Body Image exercises  6. Broadening their definition of and repertoire of sexual behavior  7. Relaxation techniques (diaphramatic breathing, PMR, mindfulness, meditation, yoga)                 Progress toward goals:   Patient reports that her mood has worsened since her last session as a result of weight gain from the steroids and conflict with her 's family. She reports that she has completed 3/12 chemotherapy sessions and is noticing that she is feeling increasingly depressed.  Processed an argument that the patient had with her 's family. She describes them as being self-entitled, impolite to her, demanding, and neglectful of her illness and need for nurturing during her current cancer treatments.  She is also angry with her  " "because he is not backing her with his family. Majority of session spent explaining family dynamics to the patient, including the technique of "splitting," which is happening between the patient, her  and his family.  Also, helped the patient learn how to set personal boundaries so that she can engage in self-care and self-nurturing and not have further distress during her cancer treatments due to family conflict.  Discussed extensively with patient to not tolerate splitting, setting and enforcing her personal boundaries, and if she cannot acquire nurturing from family, engaging in self-care and self-nurturing for her own mental and physical health while she goes through the stress of her cancer treatments.    Homework:  1.  Patient will practice skills in dealing with family dynamics including avoiding splitting, setting and enforcing personal boundaries, asking for nurturing from her , and attending to  self-care and self-nurturing in order to facilitate coping skills, her mental and physical health, and decrease stress.  2.  Patient will continue to collect her automatic/catastrophic thoughts for her automatic thoughts diary to begin cognitive restructuring.    Return to clinic: in 1 week    Length of Service (minutes): 45 minutes                                                      "

## 2020-03-13 ENCOUNTER — INFUSION (OUTPATIENT)
Dept: INFUSION THERAPY | Facility: HOSPITAL | Age: 35
End: 2020-03-13
Attending: INTERNAL MEDICINE
Payer: COMMERCIAL

## 2020-03-13 VITALS
BODY MASS INDEX: 29.57 KG/M2 | SYSTOLIC BLOOD PRESSURE: 140 MMHG | HEART RATE: 63 BPM | TEMPERATURE: 98 F | DIASTOLIC BLOOD PRESSURE: 93 MMHG | HEIGHT: 65 IN | WEIGHT: 177.5 LBS | RESPIRATION RATE: 20 BRPM

## 2020-03-13 DIAGNOSIS — C50.411 MALIGNANT NEOPLASM OF UPPER-OUTER QUADRANT OF RIGHT BREAST IN FEMALE, ESTROGEN RECEPTOR POSITIVE: Primary | ICD-10-CM

## 2020-03-13 DIAGNOSIS — Z17.0 MALIGNANT NEOPLASM OF UPPER-OUTER QUADRANT OF RIGHT BREAST IN FEMALE, ESTROGEN RECEPTOR POSITIVE: Primary | ICD-10-CM

## 2020-03-13 PROCEDURE — S0028 INJECTION, FAMOTIDINE, 20 MG: HCPCS | Performed by: INTERNAL MEDICINE

## 2020-03-13 PROCEDURE — 63600175 PHARM REV CODE 636 W HCPCS: Performed by: INTERNAL MEDICINE

## 2020-03-13 PROCEDURE — 25000003 PHARM REV CODE 250: Performed by: INTERNAL MEDICINE

## 2020-03-13 PROCEDURE — 96413 CHEMO IV INFUSION 1 HR: CPT

## 2020-03-13 PROCEDURE — 96375 TX/PRO/DX INJ NEW DRUG ADDON: CPT

## 2020-03-13 PROCEDURE — 96367 TX/PROPH/DG ADDL SEQ IV INF: CPT

## 2020-03-13 RX ORDER — HEPARIN 100 UNIT/ML
500 SYRINGE INTRAVENOUS
Status: DISCONTINUED | OUTPATIENT
Start: 2020-03-13 | End: 2020-03-13 | Stop reason: HOSPADM

## 2020-03-13 RX ORDER — EPINEPHRINE 0.3 MG/.3ML
0.3 INJECTION SUBCUTANEOUS ONCE AS NEEDED
Status: DISCONTINUED | OUTPATIENT
Start: 2020-03-13 | End: 2020-03-13 | Stop reason: HOSPADM

## 2020-03-13 RX ORDER — FAMOTIDINE 10 MG/ML
20 INJECTION INTRAVENOUS
Status: COMPLETED | OUTPATIENT
Start: 2020-03-13 | End: 2020-03-13

## 2020-03-13 RX ORDER — DIPHENHYDRAMINE HYDROCHLORIDE 50 MG/ML
50 INJECTION INTRAMUSCULAR; INTRAVENOUS ONCE AS NEEDED
Status: DISCONTINUED | OUTPATIENT
Start: 2020-03-13 | End: 2020-03-13 | Stop reason: HOSPADM

## 2020-03-13 RX ORDER — SODIUM CHLORIDE 0.9 % (FLUSH) 0.9 %
10 SYRINGE (ML) INJECTION
Status: DISCONTINUED | OUTPATIENT
Start: 2020-03-13 | End: 2020-03-13 | Stop reason: HOSPADM

## 2020-03-13 RX ADMIN — DIPHENHYDRAMINE HYDROCHLORIDE 12.5 MG: 50 INJECTION INTRAMUSCULAR; INTRAVENOUS at 07:03

## 2020-03-13 RX ADMIN — FAMOTIDINE 20 MG: 10 INJECTION, SOLUTION INTRAVENOUS at 07:03

## 2020-03-13 RX ADMIN — SODIUM CHLORIDE: 0.9 INJECTION, SOLUTION INTRAVENOUS at 07:03

## 2020-03-13 RX ADMIN — HEPARIN 500 UNITS: 100 SYRINGE at 09:03

## 2020-03-13 RX ADMIN — PACLITAXEL 150 MG: 6 INJECTION, SOLUTION INTRAVENOUS at 08:03

## 2020-03-13 RX ADMIN — DEXAMETHASONE SODIUM PHOSPHATE 10 MG: 4 INJECTION, SOLUTION INTRAMUSCULAR; INTRAVENOUS at 07:03

## 2020-03-13 NOTE — PLAN OF CARE
Patient tolerated Taxol infusion without complications. VS stable. Labs reviewed prior to treatment ANC=1.5; Dr. Ny stated ok to proceed with treatment. Patient requesting half doses of ordered Benadryl and Dexamethasone due to unpleasant side effects - Dr. Ny stated ok to give half doses. Port flushed, heparin locked, and de-accessed prior to discharge. AVS declined. Discharged home.

## 2020-03-15 ENCOUNTER — PATIENT MESSAGE (OUTPATIENT)
Dept: SURGERY | Facility: CLINIC | Age: 35
End: 2020-03-15

## 2020-03-15 ENCOUNTER — PATIENT MESSAGE (OUTPATIENT)
Dept: NEUROLOGY | Facility: CLINIC | Age: 35
End: 2020-03-15

## 2020-03-16 ENCOUNTER — PATIENT MESSAGE (OUTPATIENT)
Dept: HEMATOLOGY/ONCOLOGY | Facility: CLINIC | Age: 35
End: 2020-03-16

## 2020-03-17 ENCOUNTER — PATIENT MESSAGE (OUTPATIENT)
Dept: HEMATOLOGY/ONCOLOGY | Facility: CLINIC | Age: 35
End: 2020-03-17

## 2020-03-17 NOTE — PROGRESS NOTES
Subjective:       Patient ID: Yolanda Win is a 34 y.o. female.    Chief Complaint: Follow-up and Breast Cancer    HPI 34 year old female, who returns for follow-up of carcinoma of the right breast.  She has initiated neoadjuvant chemotherapy and is status post 4 cycles of Adriamycin Cytoxan.    She has had a cold since [place]ardi [place] - this seems to be getting worse. She is having cough (x 2 days), head congestion, headaches. Taking Sudafed and phenergan cough medicine at night. Denies fevers and chills, hot flashes. Eating and drinking well. She has gained weight.   Pain with taxol, muscle and bone pain, she was given Norco 7.5, without relief.     Breast history:  Mammogram and ultrasound on December 11th, 2019 showed right breast mass 7 cm from the nipple.  By ultrasound this mass measured 33 x 32 x 21 mm.  There was no associated axillary lymphadenopathy noted.    Right breast biopsy on December 13 showed high-grade infiltrating ductal carcinoma (histologic grade 3, nuclear grade 3, mitotic index 3) there were medullary features.  The cancer was 25% ER positive and NC and HER2 negative.  Ki-67 was 90%.    MRI showed a 5.2 cm x 2.6 cm x 4.6 cm irregularly shaped mass with rim enhancement seen in the right breast at 11 o'clock.    CT scan of the chest abdomen and pelvis and bone scan showed no evidence of metastatic disease.    Review of Systems   Constitutional: Positive for fatigue. Negative for activity change, appetite change, fever and unexpected weight change.   HENT: Positive for congestion. Negative for mouth sores and trouble swallowing.    Eyes: Negative for visual disturbance.   Respiratory: Positive for cough. Negative for shortness of breath.    Cardiovascular: Negative for chest pain.   Gastrointestinal: Negative for abdominal pain, diarrhea and nausea.   Genitourinary: Negative for frequency.   Musculoskeletal: Positive for myalgias. Negative for back pain.        Bone pain   Skin: Negative for  rash.   Neurological: Positive for headaches.   Hematological: Negative for adenopathy.   Psychiatric/Behavioral: The patient is not nervous/anxious.        Objective:      Physical Exam   Constitutional: She is oriented to person, place, and time. She appears well-developed and well-nourished. No distress.   Pulmonary/Chest: Effort normal.       Neurological: She is alert and oriented to person, place, and time.   Psychiatric: She has a normal mood and affect. Her behavior is normal. Thought content normal.     Limited due to virtual visit  Assessment:       1. Malignant neoplasm of upper-outer quadrant of right breast in female, estrogen receptor positive        Plan:       1. Will delay chemotherapy this week due to ANC of 950. Will recheck labs on Tuesday of next week and proceed with chemo and neupogen to follow.    Return to clinic in 1 week with  labs.     The patient location is: her home  The chief complaint leading to consultation is: breast cancer - chemotherapy scheduled tomorrow  Visit type: Virtual visit with synchronous audio and video  Total time spent with patient: 15 minutes; total prep and post visit time 15 minutes - total time on patient case 30 minutes  Each patient to whom he or she provides medical services by telemedicine is:  (1) informed of the relationship between the physician and patient and the respective role of any other health care provider with respect to management of the patient; and (2) notified that he or she may decline to receive medical services by telemedicine and may withdraw from such care at any time.          Patient is in agreement with the proposed treatment plan. All questions were answered to the patient's satisfaction. Patient knows to call clinic for any new or worsening symptoms and if anything is needed before the next clinic visit.          Vika Calderón, FNP-C  Hematology & Medical Oncology   [facility] [place] [address]  ph.  [phone]  Fax. [phone]    Patient dicussed with collaborating physician, Dr. Ny.

## 2020-03-19 ENCOUNTER — DOCUMENTATION ONLY (OUTPATIENT)
Dept: HEMATOLOGY/ONCOLOGY | Facility: CLINIC | Age: 35
End: 2020-03-19

## 2020-03-19 ENCOUNTER — LAB VISIT (OUTPATIENT)
Dept: LAB | Facility: HOSPITAL | Age: 35
End: 2020-03-19
Attending: INTERNAL MEDICINE
Payer: COMMERCIAL

## 2020-03-19 ENCOUNTER — OFFICE VISIT (OUTPATIENT)
Dept: HEMATOLOGY/ONCOLOGY | Facility: CLINIC | Age: 35
End: 2020-03-19
Payer: COMMERCIAL

## 2020-03-19 ENCOUNTER — PATIENT MESSAGE (OUTPATIENT)
Dept: HEMATOLOGY/ONCOLOGY | Facility: CLINIC | Age: 35
End: 2020-03-19

## 2020-03-19 DIAGNOSIS — Z17.0 MALIGNANT NEOPLASM OF UPPER-OUTER QUADRANT OF RIGHT BREAST IN FEMALE, ESTROGEN RECEPTOR POSITIVE: ICD-10-CM

## 2020-03-19 DIAGNOSIS — C50.411 MALIGNANT NEOPLASM OF UPPER-OUTER QUADRANT OF RIGHT BREAST IN FEMALE, ESTROGEN RECEPTOR POSITIVE: Primary | ICD-10-CM

## 2020-03-19 DIAGNOSIS — Z17.0 MALIGNANT NEOPLASM OF UPPER-OUTER QUADRANT OF RIGHT BREAST IN FEMALE, ESTROGEN RECEPTOR POSITIVE: Primary | ICD-10-CM

## 2020-03-19 DIAGNOSIS — C50.411 MALIGNANT NEOPLASM OF UPPER-OUTER QUADRANT OF RIGHT BREAST IN FEMALE, ESTROGEN RECEPTOR POSITIVE: ICD-10-CM

## 2020-03-19 LAB
ALBUMIN SERPL BCP-MCNC: 3.8 G/DL (ref 3.5–5.2)
ALP SERPL-CCNC: 109 U/L (ref 55–135)
ALT SERPL W/O P-5'-P-CCNC: 15 U/L (ref 10–44)
ANION GAP SERPL CALC-SCNC: 8 MMOL/L (ref 8–16)
ANISOCYTOSIS BLD QL SMEAR: SLIGHT
AST SERPL-CCNC: 14 U/L (ref 10–40)
BASOPHILS NFR BLD: 3 % (ref 0–1.9)
BILIRUB SERPL-MCNC: 0.6 MG/DL (ref 0.1–1)
BUN SERPL-MCNC: 15 MG/DL (ref 6–20)
CALCIUM SERPL-MCNC: 9.1 MG/DL (ref 8.7–10.5)
CHLORIDE SERPL-SCNC: 104 MMOL/L (ref 95–110)
CO2 SERPL-SCNC: 25 MMOL/L (ref 23–29)
CREAT SERPL-MCNC: 0.8 MG/DL (ref 0.5–1.4)
DIFFERENTIAL METHOD: ABNORMAL
EOSINOPHIL NFR BLD: 18 % (ref 0–8)
ERYTHROCYTE [DISTWIDTH] IN BLOOD BY AUTOMATED COUNT: 15.8 % (ref 11.5–14.5)
EST. GFR  (AFRICAN AMERICAN): >60 ML/MIN/1.73 M^2
EST. GFR  (NON AFRICAN AMERICAN): >60 ML/MIN/1.73 M^2
GLUCOSE SERPL-MCNC: 109 MG/DL (ref 70–110)
HCT VFR BLD AUTO: 31 % (ref 37–48.5)
HGB BLD-MCNC: 10 G/DL (ref 12–16)
IMM GRANULOCYTES # BLD AUTO: ABNORMAL K/UL (ref 0–0.04)
IMM GRANULOCYTES NFR BLD AUTO: ABNORMAL % (ref 0–0.5)
LYMPHOCYTES NFR BLD: 21 % (ref 18–48)
MCH RBC QN AUTO: 33 PG (ref 27–31)
MCHC RBC AUTO-ENTMCNC: 32.3 G/DL (ref 32–36)
MCV RBC AUTO: 102 FL (ref 82–98)
MONOCYTES NFR BLD: 5 % (ref 4–15)
NEUTROPHILS NFR BLD: 52 % (ref 38–73)
NEUTS BAND NFR BLD MANUAL: 1 %
NRBC BLD-RTO: 0 /100 WBC
OVALOCYTES BLD QL SMEAR: ABNORMAL
PLATELET # BLD AUTO: 197 K/UL (ref 150–350)
PMV BLD AUTO: 8.7 FL (ref 9.2–12.9)
POIKILOCYTOSIS BLD QL SMEAR: SLIGHT
POLYCHROMASIA BLD QL SMEAR: ABNORMAL
POTASSIUM SERPL-SCNC: 4.3 MMOL/L (ref 3.5–5.1)
PROT SERPL-MCNC: 7.2 G/DL (ref 6–8.4)
RBC # BLD AUTO: 3.03 M/UL (ref 4–5.4)
SODIUM SERPL-SCNC: 137 MMOL/L (ref 136–145)
WBC # BLD AUTO: 1.8 K/UL (ref 3.9–12.7)

## 2020-03-19 PROCEDURE — 85027 COMPLETE CBC AUTOMATED: CPT

## 2020-03-19 PROCEDURE — 80053 COMPREHEN METABOLIC PANEL: CPT

## 2020-03-19 PROCEDURE — 36415 COLL VENOUS BLD VENIPUNCTURE: CPT

## 2020-03-19 PROCEDURE — 99214 PR OFFICE/OUTPT VISIT, EST, LEVL IV, 30-39 MIN: ICD-10-PCS | Mod: 95,,, | Performed by: NURSE PRACTITIONER

## 2020-03-19 PROCEDURE — 99214 OFFICE O/P EST MOD 30 MIN: CPT | Mod: 95,,, | Performed by: NURSE PRACTITIONER

## 2020-03-19 PROCEDURE — 85007 BL SMEAR W/DIFF WBC COUNT: CPT

## 2020-03-19 RX ORDER — OXYCODONE HYDROCHLORIDE 5 MG/1
5 TABLET ORAL EVERY 8 HOURS PRN
Qty: 60 TABLET | Refills: 0 | Status: SHIPPED | OUTPATIENT
Start: 2020-03-19 | End: 2020-04-28 | Stop reason: SDUPTHER

## 2020-03-20 ENCOUNTER — PATIENT MESSAGE (OUTPATIENT)
Dept: HEMATOLOGY/ONCOLOGY | Facility: CLINIC | Age: 35
End: 2020-03-20

## 2020-03-20 ENCOUNTER — HOSPITAL ENCOUNTER (OUTPATIENT)
Facility: HOSPITAL | Age: 35
LOS: 1 days | Discharge: HOME | End: 2020-03-20
Attending: EMERGENCY MEDICINE | Admitting: INTERNAL MEDICINE
Payer: COMMERCIAL

## 2020-03-20 VITALS
BODY MASS INDEX: 29.79 KG/M2 | DIASTOLIC BLOOD PRESSURE: 70 MMHG | RESPIRATION RATE: 17 BRPM | SYSTOLIC BLOOD PRESSURE: 109 MMHG | OXYGEN SATURATION: 96 % | WEIGHT: 179 LBS | TEMPERATURE: 99 F | HEART RATE: 95 BPM

## 2020-03-20 DIAGNOSIS — R05.9 COUGH: ICD-10-CM

## 2020-03-20 DIAGNOSIS — D70.9 NEUTROPENIC FEVER: Primary | ICD-10-CM

## 2020-03-20 DIAGNOSIS — R50.81 NEUTROPENIC FEVER: Primary | ICD-10-CM

## 2020-03-20 DIAGNOSIS — Z20.822 SUSPECTED COVID-19 VIRUS INFECTION: ICD-10-CM

## 2020-03-20 LAB
ALBUMIN SERPL BCP-MCNC: 3.3 G/DL (ref 3.5–5.2)
ALBUMIN SERPL BCP-MCNC: 3.7 G/DL (ref 3.5–5.2)
ALP SERPL-CCNC: 90 U/L (ref 55–135)
ALP SERPL-CCNC: 95 U/L (ref 55–135)
ALT SERPL W/O P-5'-P-CCNC: 13 U/L (ref 10–44)
ALT SERPL W/O P-5'-P-CCNC: 13 U/L (ref 10–44)
ANION GAP SERPL CALC-SCNC: 8 MMOL/L (ref 8–16)
ANION GAP SERPL CALC-SCNC: 9 MMOL/L (ref 8–16)
ANISOCYTOSIS BLD QL SMEAR: SLIGHT
ANISOCYTOSIS BLD QL SMEAR: SLIGHT
AST SERPL-CCNC: 13 U/L (ref 10–40)
AST SERPL-CCNC: 14 U/L (ref 10–40)
BASO STIPL BLD QL SMEAR: ABNORMAL
BASOPHILS # BLD AUTO: 0.05 K/UL (ref 0–0.2)
BASOPHILS # BLD AUTO: 0.05 K/UL (ref 0–0.2)
BASOPHILS NFR BLD: 1.8 % (ref 0–1.9)
BASOPHILS NFR BLD: 2 % (ref 0–1.9)
BILIRUB SERPL-MCNC: 0.4 MG/DL (ref 0.1–1)
BILIRUB SERPL-MCNC: 0.5 MG/DL (ref 0.1–1)
BILIRUB UR QL STRIP: NEGATIVE
BUN SERPL-MCNC: 15 MG/DL (ref 6–20)
BUN SERPL-MCNC: 17 MG/DL (ref 6–20)
CALCIUM SERPL-MCNC: 8.4 MG/DL (ref 8.7–10.5)
CALCIUM SERPL-MCNC: 9.1 MG/DL (ref 8.7–10.5)
CHLORIDE SERPL-SCNC: 105 MMOL/L (ref 95–110)
CHLORIDE SERPL-SCNC: 109 MMOL/L (ref 95–110)
CK SERPL-CCNC: 41 U/L (ref 20–180)
CLARITY UR REFRACT.AUTO: CLEAR
CO2 SERPL-SCNC: 21 MMOL/L (ref 23–29)
CO2 SERPL-SCNC: 22 MMOL/L (ref 23–29)
COLOR UR AUTO: NORMAL
CREAT SERPL-MCNC: 0.8 MG/DL (ref 0.5–1.4)
CREAT SERPL-MCNC: 1 MG/DL (ref 0.5–1.4)
CRP SERPL-MCNC: 51.7 MG/L (ref 0–8.2)
CRP SERPL-MCNC: 51.7 MG/L (ref 0–8.2)
DACRYOCYTES BLD QL SMEAR: ABNORMAL
DIFFERENTIAL METHOD: ABNORMAL
DIFFERENTIAL METHOD: ABNORMAL
EOSINOPHIL # BLD AUTO: 0.3 K/UL (ref 0–0.5)
EOSINOPHIL # BLD AUTO: 0.3 K/UL (ref 0–0.5)
EOSINOPHIL NFR BLD: 10.8 % (ref 0–8)
EOSINOPHIL NFR BLD: 11 % (ref 0–8)
ERYTHROCYTE [DISTWIDTH] IN BLOOD BY AUTOMATED COUNT: 15.5 % (ref 11.5–14.5)
ERYTHROCYTE [DISTWIDTH] IN BLOOD BY AUTOMATED COUNT: 15.5 % (ref 11.5–14.5)
EST. GFR  (AFRICAN AMERICAN): >60 ML/MIN/1.73 M^2
EST. GFR  (AFRICAN AMERICAN): >60 ML/MIN/1.73 M^2
EST. GFR  (NON AFRICAN AMERICAN): >60 ML/MIN/1.73 M^2
EST. GFR  (NON AFRICAN AMERICAN): >60 ML/MIN/1.73 M^2
GLUCOSE SERPL-MCNC: 103 MG/DL (ref 70–110)
GLUCOSE SERPL-MCNC: 120 MG/DL (ref 70–110)
GLUCOSE UR QL STRIP: NEGATIVE
HCT VFR BLD AUTO: 25.8 % (ref 37–48.5)
HCT VFR BLD AUTO: 28.6 % (ref 37–48.5)
HGB BLD-MCNC: 8.4 G/DL (ref 12–16)
HGB BLD-MCNC: 9.2 G/DL (ref 12–16)
HGB UR QL STRIP: NEGATIVE
HYPOCHROMIA BLD QL SMEAR: ABNORMAL
IMM GRANULOCYTES # BLD AUTO: 0.03 K/UL (ref 0–0.04)
IMM GRANULOCYTES # BLD AUTO: 0.03 K/UL (ref 0–0.04)
IMM GRANULOCYTES NFR BLD AUTO: 1.1 % (ref 0–0.5)
IMM GRANULOCYTES NFR BLD AUTO: 1.2 % (ref 0–0.5)
INFLUENZA A, MOLECULAR: NEGATIVE
INFLUENZA B, MOLECULAR: NEGATIVE
KETONES UR QL STRIP: NEGATIVE
LACTATE SERPL-SCNC: 0.8 MMOL/L (ref 0.5–2.2)
LACTATE SERPL-SCNC: 0.9 MMOL/L (ref 0.5–2.2)
LDH SERPL L TO P-CCNC: 232 U/L (ref 110–260)
LEUKOCYTE ESTERASE UR QL STRIP: NEGATIVE
LYMPHOCYTES # BLD AUTO: 0.9 K/UL (ref 1–4.8)
LYMPHOCYTES # BLD AUTO: 1.2 K/UL (ref 1–4.8)
LYMPHOCYTES NFR BLD: 36.1 % (ref 18–48)
LYMPHOCYTES NFR BLD: 41 % (ref 18–48)
MAGNESIUM SERPL-MCNC: 1.8 MG/DL (ref 1.6–2.6)
MCH RBC QN AUTO: 32.9 PG (ref 27–31)
MCH RBC QN AUTO: 33.6 PG (ref 27–31)
MCHC RBC AUTO-ENTMCNC: 32.2 G/DL (ref 32–36)
MCHC RBC AUTO-ENTMCNC: 32.6 G/DL (ref 32–36)
MCV RBC AUTO: 102 FL (ref 82–98)
MCV RBC AUTO: 103 FL (ref 82–98)
MONOCYTES # BLD AUTO: 0.5 K/UL (ref 0.3–1)
MONOCYTES # BLD AUTO: 0.6 K/UL (ref 0.3–1)
MONOCYTES NFR BLD: 20.1 % (ref 4–15)
MONOCYTES NFR BLD: 21.9 % (ref 4–15)
NEUTROPHILS # BLD AUTO: 0.7 K/UL (ref 1.8–7.7)
NEUTROPHILS # BLD AUTO: 0.7 K/UL (ref 1.8–7.7)
NEUTROPHILS NFR BLD: 23.2 % (ref 38–73)
NEUTROPHILS NFR BLD: 29.8 % (ref 38–73)
NITRITE UR QL STRIP: NEGATIVE
NRBC BLD-RTO: 0 /100 WBC
NRBC BLD-RTO: 0 /100 WBC
OVALOCYTES BLD QL SMEAR: ABNORMAL
PH UR STRIP: 6 [PH] (ref 5–8)
PHOSPHATE SERPL-MCNC: 3.6 MG/DL (ref 2.7–4.5)
PLATELET # BLD AUTO: 213 K/UL (ref 150–350)
PLATELET # BLD AUTO: 250 K/UL (ref 150–350)
PLATELET BLD QL SMEAR: ABNORMAL
PLATELET BLD QL SMEAR: ABNORMAL
PMV BLD AUTO: 8.7 FL (ref 9.2–12.9)
PMV BLD AUTO: 8.8 FL (ref 9.2–12.9)
POIKILOCYTOSIS BLD QL SMEAR: SLIGHT
POIKILOCYTOSIS BLD QL SMEAR: SLIGHT
POLYCHROMASIA BLD QL SMEAR: ABNORMAL
POTASSIUM SERPL-SCNC: 3.9 MMOL/L (ref 3.5–5.1)
POTASSIUM SERPL-SCNC: 4.1 MMOL/L (ref 3.5–5.1)
PROT SERPL-MCNC: 6.2 G/DL (ref 6–8.4)
PROT SERPL-MCNC: 7 G/DL (ref 6–8.4)
PROT UR QL STRIP: NEGATIVE
RBC # BLD AUTO: 2.5 M/UL (ref 4–5.4)
RBC # BLD AUTO: 2.8 M/UL (ref 4–5.4)
SODIUM SERPL-SCNC: 136 MMOL/L (ref 136–145)
SODIUM SERPL-SCNC: 138 MMOL/L (ref 136–145)
SP GR UR STRIP: 1 (ref 1–1.03)
SPECIMEN SOURCE: NORMAL
URN SPEC COLLECT METH UR: NORMAL
WBC # BLD AUTO: 2.49 K/UL (ref 3.9–12.7)
WBC # BLD AUTO: 2.83 K/UL (ref 3.9–12.7)

## 2020-03-20 PROCEDURE — 87040 BLOOD CULTURE FOR BACTERIA: CPT

## 2020-03-20 PROCEDURE — 84100 ASSAY OF PHOSPHORUS: CPT

## 2020-03-20 PROCEDURE — 87502 INFLUENZA DNA AMP PROBE: CPT

## 2020-03-20 PROCEDURE — 99285 EMERGENCY DEPT VISIT HI MDM: CPT | Mod: ,,, | Performed by: EMERGENCY MEDICINE

## 2020-03-20 PROCEDURE — 63600175 PHARM REV CODE 636 W HCPCS: Performed by: EMERGENCY MEDICINE

## 2020-03-20 PROCEDURE — 81003 URINALYSIS AUTO W/O SCOPE: CPT

## 2020-03-20 PROCEDURE — G0378 HOSPITAL OBSERVATION PER HR: HCPCS

## 2020-03-20 PROCEDURE — 83615 LACTATE (LD) (LDH) ENZYME: CPT

## 2020-03-20 PROCEDURE — 99217 PR OBSERVATION CARE DISCHARGE: CPT | Mod: ,,, | Performed by: INTERNAL MEDICINE

## 2020-03-20 PROCEDURE — 96361 HYDRATE IV INFUSION ADD-ON: CPT

## 2020-03-20 PROCEDURE — 82550 ASSAY OF CK (CPK): CPT

## 2020-03-20 PROCEDURE — 86140 C-REACTIVE PROTEIN: CPT

## 2020-03-20 PROCEDURE — 83605 ASSAY OF LACTIC ACID: CPT | Mod: 91

## 2020-03-20 PROCEDURE — 36415 COLL VENOUS BLD VENIPUNCTURE: CPT

## 2020-03-20 PROCEDURE — 63600175 PHARM REV CODE 636 W HCPCS: Performed by: STUDENT IN AN ORGANIZED HEALTH CARE EDUCATION/TRAINING PROGRAM

## 2020-03-20 PROCEDURE — 85025 COMPLETE CBC W/AUTO DIFF WBC: CPT

## 2020-03-20 PROCEDURE — U0002 COVID-19 LAB TEST NON-CDC: HCPCS

## 2020-03-20 PROCEDURE — 99217 PR OBSERVATION CARE DISCHARGE: ICD-10-PCS | Mod: ,,, | Performed by: INTERNAL MEDICINE

## 2020-03-20 PROCEDURE — 99285 EMERGENCY DEPT VISIT HI MDM: CPT | Mod: 25

## 2020-03-20 PROCEDURE — 25000003 PHARM REV CODE 250: Performed by: STUDENT IN AN ORGANIZED HEALTH CARE EDUCATION/TRAINING PROGRAM

## 2020-03-20 PROCEDURE — 80053 COMPREHEN METABOLIC PANEL: CPT

## 2020-03-20 PROCEDURE — 80053 COMPREHEN METABOLIC PANEL: CPT | Mod: 91

## 2020-03-20 PROCEDURE — 96360 HYDRATION IV INFUSION INIT: CPT

## 2020-03-20 PROCEDURE — 99285 PR EMERGENCY DEPT VISIT,LEVEL V: ICD-10-PCS | Mod: ,,, | Performed by: EMERGENCY MEDICINE

## 2020-03-20 PROCEDURE — 83735 ASSAY OF MAGNESIUM: CPT

## 2020-03-20 RX ORDER — MOXIFLOXACIN HYDROCHLORIDE 400 MG/1
400 TABLET ORAL DAILY
Qty: 7 TABLET | Refills: 0 | Status: SHIPPED | OUTPATIENT
Start: 2020-03-20 | End: 2020-04-17 | Stop reason: SDUPTHER

## 2020-03-20 RX ORDER — ONDANSETRON 2 MG/ML
4 INJECTION INTRAMUSCULAR; INTRAVENOUS EVERY 8 HOURS PRN
Status: DISCONTINUED | OUTPATIENT
Start: 2020-03-20 | End: 2020-03-20 | Stop reason: HOSPADM

## 2020-03-20 RX ORDER — CETIRIZINE HYDROCHLORIDE 5 MG/1
5 TABLET ORAL DAILY
Status: DISCONTINUED | OUTPATIENT
Start: 2020-03-20 | End: 2020-03-20 | Stop reason: HOSPADM

## 2020-03-20 RX ORDER — IBUPROFEN 200 MG
24 TABLET ORAL
Status: DISCONTINUED | OUTPATIENT
Start: 2020-03-20 | End: 2020-03-20 | Stop reason: HOSPADM

## 2020-03-20 RX ORDER — FLUTICASONE PROPIONATE 50 MCG
1 SPRAY, SUSPENSION (ML) NASAL DAILY PRN
Status: DISCONTINUED | OUTPATIENT
Start: 2020-03-20 | End: 2020-03-20 | Stop reason: HOSPADM

## 2020-03-20 RX ORDER — IBUPROFEN 200 MG
16 TABLET ORAL
Status: DISCONTINUED | OUTPATIENT
Start: 2020-03-20 | End: 2020-03-20 | Stop reason: HOSPADM

## 2020-03-20 RX ORDER — CEFEPIME HYDROCHLORIDE 2 G/1
2 INJECTION, POWDER, FOR SOLUTION INTRAVENOUS
Status: DISCONTINUED | OUTPATIENT
Start: 2020-03-20 | End: 2020-03-20 | Stop reason: HOSPADM

## 2020-03-20 RX ORDER — ACETAMINOPHEN 325 MG/1
650 TABLET ORAL EVERY 6 HOURS PRN
Status: DISCONTINUED | OUTPATIENT
Start: 2020-03-20 | End: 2020-03-20 | Stop reason: HOSPADM

## 2020-03-20 RX ORDER — CITALOPRAM 20 MG/1
20 TABLET, FILM COATED ORAL DAILY
Status: DISCONTINUED | OUTPATIENT
Start: 2020-03-20 | End: 2020-03-20 | Stop reason: HOSPADM

## 2020-03-20 RX ORDER — SODIUM CHLORIDE 0.9 % (FLUSH) 0.9 %
10 SYRINGE (ML) INJECTION
Status: CANCELLED | OUTPATIENT
Start: 2020-03-20

## 2020-03-20 RX ORDER — OXYCODONE HYDROCHLORIDE 5 MG/1
5 TABLET ORAL EVERY 6 HOURS PRN
Status: DISCONTINUED | OUTPATIENT
Start: 2020-03-20 | End: 2020-03-20 | Stop reason: HOSPADM

## 2020-03-20 RX ORDER — ENOXAPARIN SODIUM 100 MG/ML
40 INJECTION SUBCUTANEOUS EVERY 24 HOURS
Status: DISCONTINUED | OUTPATIENT
Start: 2020-03-20 | End: 2020-03-20

## 2020-03-20 RX ORDER — SODIUM CHLORIDE 9 MG/ML
INJECTION, SOLUTION INTRAVENOUS CONTINUOUS
Status: DISCONTINUED | OUTPATIENT
Start: 2020-03-20 | End: 2020-03-20

## 2020-03-20 RX ORDER — GLUCAGON 1 MG
1 KIT INJECTION
Status: DISCONTINUED | OUTPATIENT
Start: 2020-03-20 | End: 2020-03-20 | Stop reason: HOSPADM

## 2020-03-20 RX ADMIN — CEFEPIME 2 G: 2 INJECTION, POWDER, FOR SOLUTION INTRAVENOUS at 05:03

## 2020-03-20 RX ADMIN — SODIUM CHLORIDE 1000 ML: 0.9 INJECTION, SOLUTION INTRAVENOUS at 02:03

## 2020-03-20 RX ADMIN — SODIUM CHLORIDE 1000 ML: 0.9 INJECTION, SOLUTION INTRAVENOUS at 01:03

## 2020-03-20 RX ADMIN — CETIRIZINE HYDROCHLORIDE 5 MG: 5 TABLET ORAL at 08:03

## 2020-03-20 NOTE — DISCHARGE SUMMARY
[facility]  Hematology/Oncology  Discharge Summary      Patient Name: Yolanda Win  MRN: [id number]  Admission Date: 3/20/2020  Hospital Length of Stay: 1 days  Discharge Date and Time:  03/20/2020 10:42 AM  Attending Physician: Gokul Ny MD   Discharging Provider: Sandra Ramos MD  Primary Care Provider: Cruzito Craven MD    HPI: Ms. 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 She notes that she began feeling unwell around fat Tuesday with cold symptoms which seemed to resolve. She then developed cough and congestion on 3/13 with worsening symptoms of severe bone pain, arthralgias, myalgias, unproductive cough, congestion, fever with Tmax of 100.7, although this temperature was obtained following 4 ibuprofen. She has also been utilizing hydrocodone/APAP, phenergan cough syrup and mucinex at home with minimal relief. She was seen via a virtual visit yesterday with onc NP Stephane who prescribed patient oxycodone for her bone pain. Patient's last dose of taxol was on 3/13.      She explicitly denies any visual changes, mouth sores, dysphagia, neck pain, nausea, vomiting, shortness of breath, chest pain, abdominal pain, constipation, diarrhea, dysuria, hematuria, leg swelling.      In the ED patient was worked up for viral causes in addition to blood stream infection or urine infection. IVF given - 2L NS. Patient swabbed for influenza which was negative. COVID sampling also performed.      Patient information was obtained from patient, past medical records and ER records.      Oncology History:         Mammogram and ultrasound on December 11th, 2019 showed right breast mass 7 cm from the nipple.  By ultrasound this mass measured 33 x 32 x 21  mm.  There was no associated axillary lymphadenopathy noted.     Right breast biopsy on December 13 showed high-grade infiltrating ductal carcinoma (histologic grade 3, nuclear grade 3, mitotic index 3) there were medullary features.  The cancer was 25% ER positive and KY and HER2 negative.  Ki-67 was 90%.     MRI showed a 5.2 cm x 2.6 cm x 4.6 cm irregularly shaped mass with rim enhancement seen in the right breast at 11 o'clock.     She has initiated neoadjuvant chemotherapy and is status post 4 cycles of Adriamycin Cytoxan.     She is s/p 3 cycles of taxol.      * No surgery found *     Hospital Course: Patient tested for COVID and placed on isolation precautions. Pulse 113 upon initial presentation. Afebrile with normal pulse and normotensive throughout hospitalization. Blood cultures drawn and NGTD. Flu negative. CXR without acute process. Urinalysis negative for UTI. Lactic acid WNL. Received one dose of cefepime and 1 L bolus of normal saline. Patient deferred further IV fluids. Patient discharged with 7 day prescription for moxifloxacin and to follow-up with CBC on 3/23 at the cancer center and to follow-up with Dr. Ny on 3/26.     ROS and Physical Exam: Of note, unable to obtain ROS and physical exam due to COVID isolation status. Please see attending attestation.    Consults:     Significant Diagnostic Studies: Labs: All labs within the past 24 hours have been reviewed  Microbiology:   Blood Culture   Lab Results   Component Value Date    LABBLOO No Growth to date 03/20/2020       Pending Diagnostic Studies:     Procedure Component Value Units Date/Time    SARS- CoV-2 (COVID-19) QUALITATIVE PCR [[id number]] Collected:  03/20/20 0247    Order Status:  Sent Lab Status:  In process Updated:  03/20/20 0308    Specimen:  Nasopharyngeal         Final Active Diagnoses:    Diagnosis Date Noted POA    PRINCIPAL PROBLEM:  Neutropenic fever [D70.9, R50.81] 03/20/2020 Yes    Malignant neoplasm of upper-outer  quadrant of right breast in female, estrogen receptor positive [C50.411, Z17.0] 12/22/2019 Not Applicable      Problems Resolved During this Admission:      Discharged Condition: stable    Disposition: Home or Self Care    Follow Up:    Patient Instructions:      Notify your health care provider if you experience any of the following:  persistent nausea and vomiting or diarrhea     Notify your health care provider if you experience any of the following:  temperature >100.4     Notify your health care provider if you experience any of the following:  severe uncontrolled pain     Notify your health care provider if you experience any of the following:  redness, tenderness, or signs of infection (pain, swelling, redness, odor or green/yellow discharge around incision site)     Notify your health care provider if you experience any of the following:  difficulty breathing or increased cough     Notify your health care provider if you experience any of the following:  severe persistent headache     Notify your health care provider if you experience any of the following:  worsening rash     Notify your health care provider if you experience any of the following:  persistent dizziness, light-headedness, or visual disturbances     Notify your health care provider if you experience any of the following:  increased confusion or weakness     Notify your health care provider if you experience any of the following:     Activity as tolerated     Medications:  Reconciled Home Medications:      Medication List      START taking these medications    moxifloxacin 400 mg tablet  Commonly known as:  AVELOX  Take 1 tablet (400 mg total) by mouth once daily.        CHANGE how you take these medications    valACYclovir 1000 MG tablet  Commonly known as:  VALTREX  Take 1 tablet (1,000 mg total) by mouth every 12 (twelve) hours.  What changed:  additional instructions        CONTINUE taking these medications    citalopram 20 MG  tablet  Commonly known as:  CELEXA  Take 1 tablet (20 mg total) by mouth once daily.     dexAMETHasone 4 MG Tab  Commonly known as:  DECADRON  Take 2 tablets (8 mg total) by mouth 2 (two) times daily with meals. Take for 2 days after chemotherapy then stop for 10 days     dextroamphetamine-amphetamine 25 MG 24 hr capsule  Commonly known as:  ADDERALL XR  Take 1 capsule (25 mg total) by mouth every morning.     loratadine 10 mg tablet  Commonly known as:  CLARITIN  Take 10 mg by mouth once daily.     magic mouthwash diphen/antac/lidoc/nysta  Take 10 mLs by mouth 4 (four) times daily.     OLANZapine 5 MG tablet  Commonly known as:  ZyPREXA  Take 1 tablet (5 mg total) by mouth every 6 (six) hours as needed. Nausea     ondansetron 4 MG Tbdl  Commonly known as:  ZOFRAN-ODT  Dissolve 1 tablet (4 mg total) by mouth every 8 (eight) hours as needed.     oxyCODONE 5 MG immediate release tablet  Commonly known as:  ROXICODONE  Take 1 tablet (5 mg total) by mouth every 8 (eight) hours as needed for Pain.     promethazine 12.5 MG Tab  Commonly known as:  PHENERGAN  Take 1 tablet (12.5 mg total) by mouth every 6 (six) hours as needed.     sumatriptan 100 MG tablet  Commonly known as:  IMITREX  Take 1/2 to 1 tab at onset of headache.  If no improvement in 2 hours, take another.  Do not take more than 2 in 24 hours.     tranexamic acid 650 mg tablet  Commonly known as:  LYSTEDA  Take 2 tablets (1,300 mg total) by mouth 3 (three) times daily.     triamcinolone acetonide 0.1% 0.1 % cream  Commonly known as:  KENALOG  apply to affected area twice daily     vitamin D 1000 units Tab  Commonly known as:  VITAMIN D3  Take 1,000 Units by mouth once daily.        STOP taking these medications    albuterol 90 mcg/actuation inhaler  Commonly known as:  PROVENTIL/VENTOLIN HFA     ALPRAZolam 0.25 MG tablet  Commonly known as:  XANAX     HYDROcodone-acetaminophen 7.5-325 mg per tablet  Commonly known as:  NORCO     LORazepam 0.5 MG  tablet  Commonly known as:  ATIVAN     OPW TEST CLAIM - DO NOT FILL            Sandra Ramos MD  Hematology/Oncology  [facility]

## 2020-03-20 NOTE — HPI
Ms. 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 She notes that she began feeling unwell around fat Tuesday with cold symptoms which seemed to resolve. She then developed cough and congestion on 3/13 with worsening symptoms of severe bone pain, arthralgias, myalgias, unproductive cough, congestion, fever with Tmax of 100.7, although this temperature was obtained following 4 ibuprofen. She has also been utilizing hydrocodone/APAP, phenergan cough syrup and mucinex at home with minimal relief. She was seen via a virtual visit yesterday with onc NP Stephane who prescribed patient oxycodone for her bone pain. Patient's last dose of taxol was on 3/13.     She explicitly denies any visual changes, mouth sores, dysphagia, neck pain, nausea, vomiting, shortness of breath, chest pain, abdominal pain, constipation, diarrhea, dysuria, hematuria, leg swelling.     In the ED patient was worked up for viral causes in addition to blood stream infection or urine infection. IVF given - 2L NS. Patient swabbed for influenza which was negative. COVID sampling also performed.

## 2020-03-20 NOTE — HPI
Ms. 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 She notes that she began feeling unwell around fat Tuesday with cold symptoms which seemed to resolve. She then developed cough and congestion on 3/13 with worsening symptoms of severe bone pain, arthralgias, myalgias, unproductive cough, congestion, fever with Tmax of 100.7, although this temperature was obtained following 4 ibuprofen. She has also been utilizing hydrocodone/APAP, phenergan cough syrup and mucinex at home with minimal relief. She was seen via a virtual visit yesterday with onc NP Stephane who prescribed patient oxycodone for her bone pain. Patient's last dose of taxol was on 3/13.      She explicitly denies any visual changes, mouth sores, dysphagia, neck pain, nausea, vomiting, shortness of breath, chest pain, abdominal pain, constipation, diarrhea, dysuria, hematuria, leg swelling.      In the ED patient was worked up for viral causes in addition to blood stream infection or urine infection. IVF given - 2L NS. Patient swabbed for influenza which was negative. COVID sampling also performed.      Patient information was obtained from patient, past medical records and ER records.      Oncology History:         Mammogram and ultrasound on December 11th, 2019 showed right breast mass 7 cm from the nipple.  By ultrasound this mass measured 33 x 32 x 21 mm.  There was no associated axillary lymphadenopathy noted.     Right breast biopsy on December 13 showed high-grade infiltrating ductal carcinoma (histologic grade 3, nuclear grade 3, mitotic index 3) there were medullary features.  The cancer was 25% ER positive and KY and HER2 negative.  Ki-67 was 90%.     MRI showed a 5.2 cm x 2.6 cm x 4.6 cm irregularly  shaped mass with rim enhancement seen in the right breast at 11 o'clock.     She has initiated neoadjuvant chemotherapy and is status post 4 cycles of Adriamycin Cytoxan.     She is s/p 3 cycles of taxol.

## 2020-03-20 NOTE — ASSESSMENT & PLAN NOTE
Patient with  and recently received taxol on 3/13 who presents with fever to 100.7 after taking 4 ibuprofen at home. She experienced bone pain, myalgias, shaking chills and severe fatigue in the last 24 hours with continued cough, congestion which she has been experiencing for the last week. In ED pt given 2L NS, 1 set blood cultures obtained. CBC with anemia Hb 9.2 and platelets of 250. CMP WNL, CRP 51.7. CXR without evidence of acute intrathoracic process.     Plan:  Cefepime 2g Q8H as she tolerates cephalosporins  Obtain second set of blood culture  Tylenol for fever - patient advised to avoid NSAIDS in setting of possible COVID  Normal saline at 100cc/hr for 10 hours  Follow culture data including COVID

## 2020-03-20 NOTE — HOSPITAL COURSE
Patient tested for COVID and placed on isolation precautions. Pulse 113 upon initial presentation. Afebrile with normal pulse and normotensive throughout hospitalization. Received one dose of cefepime and 1 L bolus of normal saline. Patient deferred further IV fluids. Patient discharged with 7 day prescription for moxifloxacin and to follow-up with CBC on 3/23 at the cancer center and to follow-up with Dr. Ny on 3/26.

## 2020-03-20 NOTE — ED PROVIDER NOTES
"Encounter Date: 3/20/2020       History     Chief Complaint   Patient presents with    flu like symptoms     Pt has had flu like symptoms since last thursday, w/ worsening symptoms that started 2 days ago, including sever body aches. Pt reports Tmax of 100.7 before taking 4 ibuoprofen around 2330. Pt has hx of Cancer, with last Chemo tx being last Friday     34-year-old female past medical history of breast cancer on CTX (last chemo 1 week ago)  Patient seen yesterday by oncologist with recommendation to delay chemotherapy due to     Patient reports cold symptoms since [place] across with 2 days of cough, congestion, and headaches.        Review of patient's allergies indicates:   Allergen Reactions    Amoxicillin Hives    Penicillins Hives and Rash    Diazepam Anxiety and Other (See Comments)     "makes hyper"     Past Medical History:   Diagnosis Date    Abnormal Pap smear of cervix     Allergy     seasonal    Anorexia     Bulimia     Depression     Fever blister     Hypertension     Gestational Hypertension    Invasive ductal carcinoma of right breast 2019    Mastitis     Migraine headache      Past Surgical History:   Procedure Laterality Date    BONE MARROW ASPIRATION      x 3    CERVICAL BIOPSY  W/ LOOP ELECTRODE EXCISION       SECTION  2016     SECTION N/A 2019    Procedure:  SECTION;  Surgeon: Kirit Hernandez MD;  Location: [facility] L&[facility];  Service: OB/GYN;  Laterality: N/A;    COLPOSCOPY      INSERTION OF TUNNELED CENTRAL VENOUS CATHETER (CVC) WITH SUBCUTANEOUS PORT Left 2019    Procedure: [id number]-Left neck or chest wall;  Surgeon: Percy Ayers MD;  Location: [address];  Service: General;  Laterality: Left;    SHOULDER SURGERY Left     x 2    SINUS SURGERY      age 19    TONSILLECTOMY       Family History   Problem Relation Age of Onset    Cancer Maternal Grandmother 40        cervical    Cervical cancer " Mother     Breast cancer Other     Ovarian cancer Other     Colon cancer Neg Hx     Melanoma Neg Hx      Social History     Tobacco Use    Smoking status: Never Smoker    Smokeless tobacco: Never Used   Substance Use Topics    Alcohol use: No     Frequency: 2-3 times a week     Drinks per session: 1 or 2     Binge frequency: Monthly     Comment: pre pregnancy     Drug use: No     Review of Systems    Physical Exam     Initial Vitals [03/20/20 0011]   BP Pulse Resp Temp SpO2   123/79 (!) 113 16 99.8 °F (37.7 °C) 98 %      MAP       --         Physical Exam    Nursing note and vitals reviewed.  Constitutional: She appears well-developed and well-nourished. She is not diaphoretic. No distress.   HENT:   Head: Normocephalic and atraumatic.   Nose: Nose normal.   Mouth/Throat: Oropharynx is clear and moist.   Hoarse voice, dry cough   Eyes: Conjunctivae and EOM are normal. Pupils are equal, round, and reactive to light. No scleral icterus.   Neck: Normal range of motion. Neck supple.   Cardiovascular: Normal rate, regular rhythm and intact distal pulses.   No murmur heard.  Pulmonary/Chest: Breath sounds normal. No stridor. No respiratory distress. She has no wheezes. She has no rhonchi. She has no rales. She exhibits no tenderness.   Left port without evidence of infection   Abdominal: Soft. Bowel sounds are normal. She exhibits no distension. There is no tenderness. There is no rebound.   Musculoskeletal: Normal range of motion. She exhibits no edema or tenderness.   Neurological: She is alert and oriented to person, place, and time. She has normal strength. No cranial nerve deficit or sensory deficit.   Skin: Skin is warm and dry. Capillary refill takes less than 2 seconds. No rash noted. No pallor.   Psychiatric: She has a normal mood and affect. Her behavior is normal. Judgment and thought content normal.         ED Course   Procedures  Labs Reviewed   CBC W/ AUTO DIFFERENTIAL - Abnormal; Notable for the  following components:       Result Value    WBC 2.49 (*)     RBC 2.80 (*)     Hemoglobin 9.2 (*)     Hematocrit 28.6 (*)     Mean Corpuscular Volume 102 (*)     Mean Corpuscular Hemoglobin 32.9 (*)     RDW 15.5 (*)     MPV 8.7 (*)     Immature Granulocytes 1.2 (*)     Gran # (ANC) 0.7 (*)     Lymph # 0.9 (*)     Gran% 29.8 (*)     Mono% 20.1 (*)     Eosinophil% 10.8 (*)     Basophil% 2.0 (*)     All other components within normal limits   COMPREHENSIVE METABOLIC PANEL - Abnormal; Notable for the following components:    CO2 22 (*)     Glucose 120 (*)     All other components within normal limits   C-REACTIVE PROTEIN - Abnormal; Notable for the following components:    CRP 51.7 (*)     All other components within normal limits   C-REACTIVE PROTEIN - Abnormal; Notable for the following components:    CRP 51.7 (*)     All other components within normal limits   INFLUENZA A & B BY MOLECULAR   LACTATE DEHYDROGENASE   LACTIC ACID, PLASMA   CK   URINALYSIS, REFLEX TO URINE CULTURE    Narrative:     Preferred Collection Type->Urine, Clean Catch   MAGNESIUM   PHOSPHORUS   PHOSPHORUS    Narrative:     ADD ON PHOS AND MAGNESIUM PER DR SHELLY BUNDY/ORDER# [id number]   AND [id number] @ 4:06AM 3/20/2020   MAGNESIUM    Narrative:     ADD ON PHOS AND MAGNESIUM PER DR SHELLY BUNDY/ORDER# [id number]   AND [id number] @ 4:06AM 3/20/2020   SARS-COV-2 (COVID-19) QUALITATIVE PCR          Imaging Results          X-Ray Chest AP Portable (Final result)  Result time 03/20/20 01:39:10    Final result by Cristóbal Rene MD (03/20/20 01:39:10)                 Impression:      No convincing radiographic evidence of acute intrathoracic process.      Electronically signed by: Cristóbal Rene MD  Date:    03/20/2020  Time:    01:39             Narrative:    EXAMINATION:  XR CHEST AP PORTABLE    CLINICAL HISTORY:  Cough    TECHNIQUE:  Single frontal view of the chest was performed.    COMPARISON:  Chest radiograph 12/27/2019, CT chest  12/24/2019    FINDINGS:  There is a left-sided MediPort catheter in stable position.  The cardiomediastinal silhouette appears within normal limits.  Lungs appear symmetrically expanded without evidence of confluent airspace consolidation.  There is no evidence of pleural effusion or pneumothorax.  The visualized osseous structures appear grossly intact.                                 Medical Decision Making:   History:   Old Medical Records: I decided to obtain old medical records.  Old Records Summarized: records from clinic visits and records from previous admission(s).       <> Summary of Records: Seen by Onc FNP yesterday who delayed CTX this week due to   Initial Assessment:   Pt is tachycardic with hoarse voice but otherwise NAD  Differential Diagnosis:   Neutropenic fever, influenza, covid, PNA, electrolyte or metabolic abnormality, pleural effusion  Independently Interpreted Test(s):   I have ordered and independently interpreted X-rays - see summary below.       <> Summary of X-Ray Reading(s): No consolidation  Clinical Tests:   Lab Tests: Ordered and Reviewed  Radiological Study: Ordered and Reviewed  Medical Tests: Ordered and Reviewed  Sepsis Perfusion Assessment: I attest, a sepsis perfusion exam was performed within 6 hours of Septic Shock presentation, following fluid resuscitation.  ED Management:  Patient is leukopenic and anemic, with , concern for neutropenic fever versus drug reaction versus Covid/viral syndrome. Pt has hive allergy to PCNs, will defer cefepime abx for neutropenic fever pending d/w Oncology. Given 1L NS with resolution of tachycardia.     Pt is reluctant to be admitted, however given documented fever with neutropenia I advised her that admission for potential abx and close monitoring is advised. Oncology will d/w patient.     Pt agrees to hospitalization, admitted.   Other:   I have discussed this case with another health care provider.       <> Summary of the  Discussion: Oncology will see pt, no recs for abx at this time  MDM Complexity Points:     Data Points:  Review or order clinical lab tests, Review or order radiology test, Review or order medicine test (PFTs, EKG, cardiac echo or catheterization), Independent review of image, tracing, or specimen, Decision to obtain old records (in the EHR), Review and summarization of old records and Discuss test with performing physician/consulting physician    Risk:  High Risk                                   Clinical Impression:       ICD-10-CM ICD-9-CM   1. Neutropenic fever D70.9 288.00    R50.81 780.61   2. Cough R05 786.2   3. Suspected Covid-19 Virus Infection R68.89              ED Disposition Condition    Admit                           Dhara Marques MD  03/20/20 2023

## 2020-03-20 NOTE — PLAN OF CARE
GENARO sent the following message to Dr. Ny's clinic scheduler:    Dr. Ny is discharging her today and requested the following:    - CBC Monday 3/23/20  - follow-up appt with Dr. Ny Thursday 3/26/20    Karey Ramirez, RN, BSN, CM  Utilization Management  [facility]

## 2020-03-20 NOTE — ED TRIAGE NOTES
"Yolanda Win, a 34 y.o. female presents to the ED w/ complaint of body aches    Triage note: Pt presents with body aches and fever since last Thursday that worsened. 2 days ago. Pt reports fever at home of 100.7. Pt has breast cancer and is scheduled for chemo tomorrow. Pt reports she called her MD and they suggested she come here   Chief Complaint   Patient presents with    flu like symptoms     Pt has had flu like symptoms since last thursday, w/ worsening symptoms that started 2 days ago, including sever body aches. Pt reports Tmax of 100.7 before taking 4 ibuoprofen around 2330. Pt has hx of Cancer, with last Chemo tx being last Friday     Review of patient's allergies indicates:   Allergen Reactions    Amoxicillin Hives    Penicillins Hives and Rash    Diazepam Anxiety and Other (See Comments)     "makes hyper"     Past Medical History:   Diagnosis Date    Abnormal Pap smear of cervix 2009    Allergy     seasonal    Anorexia     Bulimia     Depression     Fever blister     Hypertension     Gestational Hypertension    Invasive ductal carcinoma of right breast 12/18/2019    Mastitis     Migraine headache        Patient Identifiers for Yolanda Win checked and correct  LOC: The patient is awake, alert and aware of environment with an appropriate affect, the patient is oriented x 3 and speaking appropriate.  APPEARANCE: Patient resting comfortably and in no acute distress, patient is clean and well groomed, patient's clothing is properly fastened.  SKIN: The skin is warm and dry, patient has normal skin turgor and moist mucus membranes,no rashes or lesions.Skin Intact , No Breakdown Noted. Pt skin pale   Musculoskeletal :  Normal range of motion noted. Moves all extremeties well, No swelling or tenderness noted  RESPIRATORY: Airway is open and patent, respirations are spontaneous, patient has a normal effort and rate.  CARDIAC: Patient has a normal rate and rhythm, no periphreal edema noted, " capillary refill < 3 seconds.   ABDOMEN: Soft and non tender to palpation, no distention noted.   PULSES: 2+  And symmetrical in all extremeties  NEUROLOGIC: PERRL,  facial expression is symmetrical, patient moving all extremities, normal sensation in all extremities when touched with a finger.The patient is awake, alert and cooperative with a calm affect, patient is aware of environment.    Will continue to monitor

## 2020-03-20 NOTE — ASSESSMENT & PLAN NOTE
Stage IIIA, ER+ right breast infiltrating ductal carcinoma  Taxol therapy currently held due to neutropenia  Follows with Dr. Ny OP - will need to be seen shortly prior to re-initiating taxol  Lovenox for dvt ppx

## 2020-03-20 NOTE — H&P
[facility]  Hematology  Bone Marrow Transplant  H&P    Subjective:     Principal Problem: Neutropenic fever    HPI: Ms. Yolanda Win is a 34 year old female who is s/p 4 cycles of neoadjuvant 4 cycles of Adriamycin Cytoxan and is currently C3D of paclitaxol for ER+ infilitrating ductal carcinoma with medullary features of the right breast who presents to [facility] ED due to fever, shaking chills, severe body aches. She notes that she began feeling unwell around fat Tuesday with cold symptoms which seemed to resolve. She then developed cough and congestion on 3/13 with worsening symptoms of severe bone pain, arthralgias, myalgias, unproductive cough, congestion, fever with Tmax of 100.7, although this temperature was obtained following 4 ibuprofen. She has also been utilizing hydrocodone/APAP, phenergan cough syrup and mucinex at home with minimal relief. She was seen via a virtual visit yesterday with onc NP Stephane who prescribed patient oxycodone for her bone pain. Patient's last dose of taxol was on 3/13.     She explicitly denies any visual changes, mouth sores, dysphagia, neck pain, nausea, vomiting, shortness of breath, chest pain, abdominal pain, constipation, diarrhea, dysuria, hematuria, leg swelling.     In the ED patient was worked up for viral causes in addition to blood stream infection or urine infection. IVF given - 2L NS. Patient swabbed for influenza which was negative. COVID sampling also performed.     Patient information was obtained from patient, past medical records and ER records.     Oncology History:       Mammogram and ultrasound on December 11th, 2019 showed right breast mass 7 cm from the nipple.  By ultrasound this mass measured 33 x 32 x 21 mm.  There was no associated axillary lymphadenopathy noted.     Right breast biopsy on December 13 showed high-grade infiltrating ductal carcinoma (histologic grade 3, nuclear grade 3, mitotic index 3) there were medullary  features.  The cancer was 25% ER positive and VT and HER2 negative.  Ki-67 was 90%.     MRI showed a 5.2 cm x 2.6 cm x 4.6 cm irregularly shaped mass with rim enhancement seen in the right breast at 11 o'clock.     She has initiated neoadjuvant chemotherapy and is status post 4 cycles of Adriamycin Cytoxan.    She is s/p 3 cycles of taxol.         (Not in a hospital admission)    Amoxicillin; Penicillins; and Diazepam     Past Medical History:   Diagnosis Date    Abnormal Pap smear of cervix     Allergy     seasonal    Anorexia     Bulimia     Depression     Fever blister     Hypertension     Gestational Hypertension    Invasive ductal carcinoma of right breast 2019    Mastitis     Migraine headache      Past Surgical History:   Procedure Laterality Date    BONE MARROW ASPIRATION      x 3    CERVICAL BIOPSY  W/ LOOP ELECTRODE EXCISION       SECTION  2016     SECTION N/A 2019    Procedure:  SECTION;  Surgeon: Kirit Hernandez MD;  [facility];  Service: OB/GYN;  Laterality: N/A;    COLPOSCOPY      INSERTION OF TUNNELED CENTRAL VENOUS CATHETER (CVC) WITH SUBCUTANEOUS PORT Left 2019    Procedure: [id number]-Left neck or chest wall;  Surgeon: Percy Ayers MD;  Location: [facility] OR [address] [place] [address];  Service: General;  Laterality: Left;    SHOULDER SURGERY Left     x 2    SINUS SURGERY      age 19    TONSILLECTOMY       Family History     Problem Relation (Age of Onset)    Breast cancer Other    Cancer Maternal Grandmother (40)    Cervical cancer Mother    Ovarian cancer Other        Tobacco Use    Smoking status: Never Smoker    Smokeless tobacco: Never Used   Substance and Sexual Activity    Alcohol use: No     Frequency: 2-3 times a week     Drinks per session: 1 or 2     Binge frequency: Monthly     Comment: pre pregnancy     Drug use: No    Sexual activity: Yes     Partners: Male     Birth control/protection: None       Review  of Systems   Constitutional: Positive for activity change, chills, fatigue and fever.   HENT: Negative for ear discharge, ear pain, sinus pressure and trouble swallowing.    Eyes: Negative for visual disturbance.   Respiratory: Positive for cough. Negative for chest tightness and shortness of breath.    Cardiovascular: Negative for chest pain, palpitations and leg swelling.   Gastrointestinal: Negative for abdominal pain, blood in stool, constipation, diarrhea, nausea and vomiting.   Genitourinary: Negative for difficulty urinating, dysuria and hematuria.   Musculoskeletal: Negative for arthralgias, back pain and myalgias.        Bone pain  Arthralgias in ankles and knees   Skin: Negative for color change and rash.   Allergic/Immunologic: Positive for immunocompromised state.   Neurological: Negative for weakness and headaches.   Psychiatric/Behavioral: Positive for sleep disturbance. Negative for suicidal ideas. The patient is not nervous/anxious.      Objective:     Vital Signs (Most Recent):  Temp: 99.8 °F (37.7 °C) (03/20/20 0011)  Pulse: 102 (03/20/20 0020)  Resp: 16 (03/20/20 0011)  BP: 119/72 (03/20/20 0020)  SpO2: 97 % (03/20/20 0020) Vital Signs (24h Range):  Temp:  [99.8 °F (37.7 °C)] 99.8 °F (37.7 °C)  Pulse:  [102-113] 102  Resp:  [16] 16  SpO2:  [97 %-98 %] 97 %  BP: (119-123)/(72-79) 119/72        There is no height or weight on file to calculate BMI.  There is no height or weight on file to calculate BSA.    ECOG SCORE         [unfilled]    Lines/Drains/Airways     Central Venous Catheter Line                 PowerPort A Cath Single Lumen 12/27/19 1246 left subclavian 83 days          Epidural Line                 Neuraxial Analgesia/Anesthesia Assessment (using dermatomes) Epidural 11/21/16 0915 1214 days          Peripheral Intravenous Line                 Peripheral IV - Single Lumen 03/20/20 0107 20 G Left Antecubital less than 1 day                Physical Exam   Constitutional: She is oriented to  person, place, and time. She appears well-developed and well-nourished. No distress.   HENT:   Head: Normocephalic and atraumatic.   Right Ear: External ear normal.   Left Ear: External ear normal.   Eyes: Pupils are equal, round, and reactive to light. Conjunctivae and EOM are normal.   Neck: No JVD present.   Cardiovascular: Normal rate, regular rhythm, S1 normal, S2 normal, normal heart sounds and intact distal pulses.   No murmur heard.  Pulses:       Radial pulses are 2+ on the right side, and 2+ on the left side.   Pulmonary/Chest: Effort normal and breath sounds normal. She has no wheezes. She has no rales.   Abdominal: Soft. Bowel sounds are normal. She exhibits no distension and no mass. There is no tenderness.   Musculoskeletal: Normal range of motion. She exhibits no edema.   Lymphadenopathy:        Head (right side): No submental, no submandibular, no tonsillar, no preauricular, no posterior auricular and no occipital adenopathy present.        Head (left side): No submental, no submandibular, no tonsillar, no preauricular, no posterior auricular and no occipital adenopathy present.     She has no cervical adenopathy.   Neurological: She is alert and oriented to person, place, and time. She has normal strength. No cranial nerve deficit.   Skin: Skin is warm and dry.   Psychiatric: She has a normal mood and affect. Her behavior is normal.   Nursing note and vitals reviewed.      Significant Labs:   All pertinent labs from the last 24 hours have been reviewed.    Diagnostic Results:  I have reviewed and interpreted all pertinent imaging results/findings within the past 24 hours.    Assessment/Plan:     * Neutropenic fever  Patient with  and recently received taxol on 3/13 who presents with fever to 100.7 after taking 4 ibuprofen at home. She experienced bone pain, myalgias, shaking chills and severe fatigue in the last 24 hours with continued cough, congestion which she has been experiencing for the  last week. In ED pt given 2L NS, 1 set blood cultures obtained. CBC with anemia Hb 9.2 and platelets of 250. CMP WNL, CRP 51.7. CXR without evidence of acute intrathoracic process.     Plan:  Cefepime 2g Q8H as she tolerates cephalosporins  Obtain second set of blood culture  Tylenol for fever - patient advised to avoid NSAIDS in setting of possible COVID  Normal saline at 100cc/hr for 10 hours  Follow culture data including COVID    Malignant neoplasm of upper-outer quadrant of right breast in female, estrogen receptor positive  Stage IIIA, ER+ right breast infiltrating ductal carcinoma  Taxol therapy currently held due to neutropenia  Follows with Dr. Ny OP - will need to be seen shortly prior to re-initiating taxol  Lovenox for dvt ppx      VTE Risk Mitigation (From admission, onward)         Ordered     enoxaparin injection 40 mg  Daily      03/20/20 0346     IP VTE HIGH RISK PATIENT  Once      03/20/20 0316     Reason for no Mechanical VTE Prophylaxis  Once     Question:  Reasons:  Answer:  Physician Provided (leave comment)    03/20/20 0316                Disposition:   Home following attending evaluation     Jed Link MD  Bone Marrow Transplant  Hematology  [facility]

## 2020-03-20 NOTE — PLAN OF CARE
Future Appointments   Date Time Provider Department Center   3/23/2020  8:10 AM LAB, HEMONC CANCER BLDG [facility]   3/24/2020  8:00 AM Gokul Ny MD [facility] HEM ONC [facility]   3/25/2020  1:00 PM NURSE 10, [facility]MO [facility]   3/26/2020  8:00 AM INJECTION, [facility] INFUSION [facility]   3/27/2020  8:15 AM INJECTION, [facility] INFUSION [facility]   4/13/2020  3:00 PM Loretta White, PhD [facility] PSYCH [facility]   4/22/2020  9:30 AM Oksana Hutson MD [facility] PSYCH [place]   5/12/2020  2:10 PM Hortensia Hamm MD [facility] DERM [facility] [place]   6/11/2020 10:20 AM Dillon Gonzalez MD [facility] NEURO [facility] [place] [facility], RN, BSN, CM  Utilization Management  [facility]

## 2020-03-20 NOTE — PROGRESS NOTES
Road Test  Oxygen-Patient tolerating room air, no distress.  Ambulation-Patient up with no assistance.  Devices-Patient going home with no devices  Tolerating-Regular diet.  Elimination-Patient voiding without difficulty.  Self Care-Performs self care without assistance.  Teaching-Verbal and written discharge teaching given.     Patient tolerates room air, ambulates with no assistance, regular diet, voiding without difficulty, and is going home with no devices. Peripheral IV removed, catheter intact, dressed with dry gauze and coban. No bleeding present. Patient going home. Instructed to wear surgical mask. Discharge paperwork discussed. Medications reviewed. Patient declined transport; nurse educated regarding the safety of utilizing transport. Patient has all belongings and has no questions at this time.

## 2020-03-20 NOTE — SUBJECTIVE & OBJECTIVE
Patient information was obtained from patient, past medical records and ER records.     Oncology History:       Mammogram and ultrasound on 2019 showed right breast mass 7 cm from the nipple.  By ultrasound this mass measured 33 x 32 x 21 mm.  There was no associated axillary lymphadenopathy noted.     Right breast biopsy on  showed high-grade infiltrating ductal carcinoma (histologic grade 3, nuclear grade 3, mitotic index 3) there were medullary features.  The cancer was 25% ER positive and [place] and HER2 negative.  Ki-67 was 90%.     MRI showed a 5.2 cm x 2.6 cm x 4.6 cm irregularly shaped mass with rim enhancement seen in the right breast at 11 o'clock.     She has initiated neoadjuvant chemotherapy and is status post 4 cycles of Adriamycin Cytoxan.    She is s/p 3 cycles of taxol.         (Not in a hospital admission)    Amoxicillin; Penicillins; and Diazepam     Past Medical History:   Diagnosis Date    Abnormal Pap smear of cervix     Allergy     seasonal    Anorexia     Bulimia     Depression     Fever blister     Hypertension     Gestational Hypertension    Invasive ductal carcinoma of right breast 2019    Mastitis     Migraine headache      Past Surgical History:   Procedure Laterality Date    BONE MARROW ASPIRATION      x 3    CERVICAL BIOPSY  W/ LOOP ELECTRODE EXCISION       SECTION  2016     SECTION N/A 2019    Procedure:  SECTION;  Surgeon: Kirit Hernandez MD;  Location: [facility];  Service: OB/GYN;  Laterality: N/A;    COLPOSCOPY      INSERTION OF TUNNELED CENTRAL VENOUS CATHETER (CVC) WITH SUBCUTANEOUS PORT Left 2019    Procedure: [id number]-Left neck or chest wall;  Surgeon: Percy Ayers MD;  Location: [facility] OR [address] [place] [address];  Service: General;  Laterality: Left;    SHOULDER SURGERY Left     x 2    SINUS SURGERY      age 19    TONSILLECTOMY       Family History     Problem Relation (Age of Onset)     Breast cancer Other    Cancer Maternal Grandmother (40)    Cervical cancer Mother    Ovarian cancer Other        Tobacco Use    Smoking status: Never Smoker    Smokeless tobacco: Never Used   Substance and Sexual Activity    Alcohol use: No     Frequency: 2-3 times a week     Drinks per session: 1 or 2     Binge frequency: Monthly     Comment: pre pregnancy     Drug use: No    Sexual activity: Yes     Partners: Male     Birth control/protection: None       Review of Systems   Constitutional: Positive for activity change, chills, fatigue and fever.   HENT: Negative for ear discharge, ear pain, sinus pressure and trouble swallowing.    Eyes: Negative for visual disturbance.   Respiratory: Positive for cough. Negative for chest tightness and shortness of breath.    Cardiovascular: Negative for chest pain, palpitations and leg swelling.   Gastrointestinal: Negative for abdominal pain, blood in stool, constipation, diarrhea, nausea and vomiting.   Genitourinary: Negative for difficulty urinating, dysuria and hematuria.   Musculoskeletal: Negative for arthralgias, back pain and myalgias.        Bone pain  Arthralgias in ankles and knees   Skin: Negative for color change and rash.   Allergic/Immunologic: Positive for immunocompromised state.   Neurological: Negative for weakness and headaches.   Psychiatric/Behavioral: Positive for sleep disturbance. Negative for suicidal ideas. The patient is not nervous/anxious.      Objective:     Vital Signs (Most Recent):  Temp: 99.8 °F (37.7 °C) (03/20/20 0011)  Pulse: 102 (03/20/20 0020)  Resp: 16 (03/20/20 0011)  BP: 119/72 (03/20/20 0020)  SpO2: 97 % (03/20/20 0020) Vital Signs (24h Range):  Temp:  [99.8 °F (37.7 °C)] 99.8 °F (37.7 °C)  Pulse:  [102-113] 102  Resp:  [16] 16  SpO2:  [97 %-98 %] 97 %  BP: (119-123)/(72-79) 119/72        There is no height or weight on file to calculate BMI.  There is no height or weight on file to calculate BSA.    ECOG SCORE          @[facility] [place]@    Lines/Drains/Airways     Central Venous Catheter Line                 PowerPort A Cath Single Lumen 12/27/19 1246 left subclavian 83 days          Epidural Line                 Neuraxial Analgesia/Anesthesia Assessment (using dermatomes) Epidural 11/21/16 0915 1214 days          Peripheral Intravenous Line                 Peripheral IV - Single Lumen 03/20/20 0107 20 G Left Antecubital less than 1 day                Physical Exam   Constitutional: She is oriented to person, place, and time. She appears well-developed and well-nourished. No distress.   HENT:   Head: Normocephalic and atraumatic.   Right Ear: External ear normal.   Left Ear: External ear normal.   Eyes: Pupils are equal, round, and reactive to light. Conjunctivae and EOM are normal.   Neck: No JVD present.   Cardiovascular: Normal rate, regular rhythm, S1 normal, S2 normal, normal heart sounds and intact distal pulses.   No murmur heard.  Pulses:       Radial pulses are 2+ on the right side, and 2+ on the left side.   Pulmonary/Chest: Effort normal and breath sounds normal. She has no wheezes. She has no rales.   Abdominal: Soft. Bowel sounds are normal. She exhibits no distension and no mass. There is no tenderness.   Musculoskeletal: Normal range of motion. She exhibits no edema.   Lymphadenopathy:        Head (right side): No submental, no submandibular, no tonsillar, no preauricular, no posterior auricular and no occipital adenopathy present.        Head (left side): No submental, no submandibular, no tonsillar, no preauricular, no posterior auricular and no occipital adenopathy present.     She has no cervical adenopathy.   Neurological: She is alert and oriented to person, place, and time. She has normal strength. No cranial nerve deficit.   Skin: Skin is warm and dry.   Psychiatric: She has a normal mood and affect. Her behavior is normal.   Nursing note and vitals reviewed.      Significant Labs:   All pertinent labs from the  last 24 hours have been reviewed.    Diagnostic Results:  I have reviewed and interpreted all pertinent imaging results/findings within the past 24 hours.

## 2020-03-22 ENCOUNTER — PATIENT MESSAGE (OUTPATIENT)
Dept: PSYCHIATRY | Facility: CLINIC | Age: 35
End: 2020-03-22

## 2020-03-22 LAB — SARS-COV-2 RNA RESP QL NAA+PROBE: NOT DETECTED

## 2020-03-23 DIAGNOSIS — R50.81 FEBRILE NEUTROPENIA: Primary | ICD-10-CM

## 2020-03-23 DIAGNOSIS — D70.9 FEBRILE NEUTROPENIA: Primary | ICD-10-CM

## 2020-03-23 RX ORDER — CITALOPRAM 20 MG/1
20 TABLET, FILM COATED ORAL DAILY
Qty: 30 TABLET | Refills: 2 | Status: SHIPPED | OUTPATIENT
Start: 2020-03-23 | End: 2020-06-22 | Stop reason: SDUPTHER

## 2020-03-23 RX ORDER — ALPRAZOLAM 0.25 MG/1
0.25 TABLET ORAL DAILY PRN
Qty: 20 TABLET | Refills: 0 | Status: SHIPPED | OUTPATIENT
Start: 2020-03-23 | End: 2020-05-25 | Stop reason: SDUPTHER

## 2020-03-23 RX ORDER — DEXTROAMPHETAMINE SACCHARATE, AMPHETAMINE ASPARTATE MONOHYDRATE, DEXTROAMPHETAMINE SULFATE AND AMPHETAMINE SULFATE 6.25; 6.25; 6.25; 6.25 MG/1; MG/1; MG/1; MG/1
25 CAPSULE, EXTENDED RELEASE ORAL EVERY MORNING
Qty: 30 CAPSULE | Refills: 0 | Status: SHIPPED | OUTPATIENT
Start: 2020-03-23 | End: 2020-04-08 | Stop reason: SDUPTHER

## 2020-03-24 ENCOUNTER — OFFICE VISIT (OUTPATIENT)
Dept: HEMATOLOGY/ONCOLOGY | Facility: CLINIC | Age: 35
End: 2020-03-24
Payer: COMMERCIAL

## 2020-03-24 DIAGNOSIS — C50.411 MALIGNANT NEOPLASM OF UPPER-OUTER QUADRANT OF RIGHT BREAST IN FEMALE, ESTROGEN RECEPTOR POSITIVE: Primary | ICD-10-CM

## 2020-03-24 DIAGNOSIS — Z17.0 MALIGNANT NEOPLASM OF UPPER-OUTER QUADRANT OF RIGHT BREAST IN FEMALE, ESTROGEN RECEPTOR POSITIVE: Primary | ICD-10-CM

## 2020-03-24 PROCEDURE — 99213 PR OFFICE/OUTPT VISIT, EST, LEVL III, 20-29 MIN: ICD-10-PCS | Mod: 95,,, | Performed by: INTERNAL MEDICINE

## 2020-03-24 PROCEDURE — 99213 OFFICE O/P EST LOW 20 MIN: CPT | Mod: 95,,, | Performed by: INTERNAL MEDICINE

## 2020-03-24 NOTE — PROGRESS NOTES
Subjective:       Patient ID: Yolanda Win is a 34 y.o. female.    Chief Complaint: No chief complaint on file.    HPI 34 year old female, who returns for follow-up of carcinoma of the right breast.     .The patient location is:  home.  The chief complaint leading to consultation is:  Breast cancer  Visit type: Virtual visit with synchronous audio and video  Total time spent with patient:  15 min including prep time.       She is receiving neoadjuvant chemotherapy and is status post 4 cycles of Adriamycin Cytoxan.  She has had 3 weekly treatments with Taxol.  That has been complicated by admission for neutropenic fever last week.  She had associated symptoms of headache and cough.  Her cultures were negative as was test for Covid 19 and she was discharged on oral antibiotics with moxifloxacin.    Today she reports that she has been feeling well with no more fever.  Her sinus congestion is better and she has been draining lots of mucus.  She is having no shortness of breath.      Breast history:  Mammogram and ultrasound on December 11th, 2019 showed right breast mass 7 cm from the nipple.  By ultrasound this mass measured 33 x 32 x 21 mm.  There was no associated axillary lymphadenopathy noted.    Right breast biopsy on December 13 showed high-grade infiltrating ductal carcinoma (histologic grade 3, nuclear grade 3, mitotic index 3) there were medullary features.  The cancer was 25% ER positive and [place] and HER2 negative.  Ki-67 was 90%.    MRI showed a 5.2 cm x 2.6 cm x 4.6 cm irregularly shaped mass with rim enhancement seen in the right breast at 11 o'clock.    CT scan of the chest abdomen and pelvis and bone scan showed no evidence of metastatic disease.    She completed 4 cycles of neoadjuvant Adriamycin Cytoxan February 13, 2020.  Review of Systems   Constitutional: Negative for appetite change and unexpected weight change.   Eyes: Negative for visual disturbance.   Respiratory: Negative for cough and  shortness of breath.    Cardiovascular: Negative for chest pain.   Gastrointestinal: Negative for abdominal pain and diarrhea.   Genitourinary: Negative for frequency.   Musculoskeletal: Negative for back pain.   Skin: Negative for rash.   Neurological: Negative for headaches.   Hematological: Negative for adenopathy.   Psychiatric/Behavioral: The patient is not nervous/anxious.        Objective:      Physical Exam   Constitutional: She is oriented to person, place, and time. She appears well-developed and well-nourished. No distress.   Neurological: She is alert and oriented to person, place, and time.   Psychiatric: She has a normal mood and affect. Her behavior is normal. Thought content normal.       Assessment:       1. Malignant neoplasm of upper-outer quadrant of right breast in female, estrogen receptor positive        Plan:       She is due for her next weekly treatment with Taxol tomorrow if her counts are acceptable.  She will need G-CSF therapy for her subsequent treatments.  Another option would be to change her treatment to dose dense or every 3 weeks.  Return in 2 weeks for her next visit.

## 2020-03-25 ENCOUNTER — PATIENT MESSAGE (OUTPATIENT)
Dept: HEMATOLOGY/ONCOLOGY | Facility: CLINIC | Age: 35
End: 2020-03-25

## 2020-03-25 ENCOUNTER — INFUSION (OUTPATIENT)
Dept: INFUSION THERAPY | Facility: HOSPITAL | Age: 35
End: 2020-03-25
Attending: NURSE PRACTITIONER
Payer: COMMERCIAL

## 2020-03-25 ENCOUNTER — LAB VISIT (OUTPATIENT)
Dept: LAB | Facility: HOSPITAL | Age: 35
End: 2020-03-25
Attending: INTERNAL MEDICINE
Payer: COMMERCIAL

## 2020-03-25 VITALS
RESPIRATION RATE: 18 BRPM | HEART RATE: 61 BPM | TEMPERATURE: 98 F | SYSTOLIC BLOOD PRESSURE: 125 MMHG | DIASTOLIC BLOOD PRESSURE: 76 MMHG

## 2020-03-25 DIAGNOSIS — D70.9 FEBRILE NEUTROPENIA: ICD-10-CM

## 2020-03-25 DIAGNOSIS — C50.411 MALIGNANT NEOPLASM OF UPPER-OUTER QUADRANT OF RIGHT BREAST IN FEMALE, ESTROGEN RECEPTOR POSITIVE: Primary | ICD-10-CM

## 2020-03-25 DIAGNOSIS — Z17.0 MALIGNANT NEOPLASM OF UPPER-OUTER QUADRANT OF RIGHT BREAST IN FEMALE, ESTROGEN RECEPTOR POSITIVE: Primary | ICD-10-CM

## 2020-03-25 DIAGNOSIS — R50.81 FEBRILE NEUTROPENIA: ICD-10-CM

## 2020-03-25 LAB
ANISOCYTOSIS BLD QL SMEAR: SLIGHT
BACTERIA BLD CULT: NORMAL
BACTERIA BLD CULT: NORMAL
BASOPHILS # BLD AUTO: ABNORMAL K/UL (ref 0–0.2)
BASOPHILS NFR BLD: 3 % (ref 0–1.9)
DIFFERENTIAL METHOD: ABNORMAL
EOSINOPHIL # BLD AUTO: ABNORMAL K/UL (ref 0–0.5)
EOSINOPHIL NFR BLD: 9 % (ref 0–8)
ERYTHROCYTE [DISTWIDTH] IN BLOOD BY AUTOMATED COUNT: 15.1 % (ref 11.5–14.5)
HCT VFR BLD AUTO: 31.6 % (ref 37–48.5)
HGB BLD-MCNC: 10.4 G/DL (ref 12–16)
HYPOCHROMIA BLD QL SMEAR: ABNORMAL
IMM GRANULOCYTES # BLD AUTO: ABNORMAL K/UL (ref 0–0.04)
IMM GRANULOCYTES NFR BLD AUTO: ABNORMAL % (ref 0–0.5)
LYMPHOCYTES # BLD AUTO: ABNORMAL K/UL (ref 1–4.8)
LYMPHOCYTES NFR BLD: 40 % (ref 18–48)
MCH RBC QN AUTO: 33.1 PG (ref 27–31)
MCHC RBC AUTO-ENTMCNC: 32.9 G/DL (ref 32–36)
MCV RBC AUTO: 101 FL (ref 82–98)
MONOCYTES # BLD AUTO: ABNORMAL K/UL (ref 0.3–1)
MONOCYTES NFR BLD: 9 % (ref 4–15)
MYELOCYTES NFR BLD MANUAL: 4 %
NEUTROPHILS # BLD AUTO: ABNORMAL K/UL (ref 1.8–7.7)
NEUTROPHILS NFR BLD: 35 % (ref 38–73)
NRBC BLD-RTO: 0 /100 WBC
OVALOCYTES BLD QL SMEAR: ABNORMAL
PLATELET # BLD AUTO: 262 K/UL (ref 150–350)
PMV BLD AUTO: 7.9 FL (ref 9.2–12.9)
POIKILOCYTOSIS BLD QL SMEAR: SLIGHT
POLYCHROMASIA BLD QL SMEAR: ABNORMAL
RBC # BLD AUTO: 3.14 M/UL (ref 4–5.4)
WBC # BLD AUTO: 4.19 K/UL (ref 3.9–12.7)

## 2020-03-25 PROCEDURE — 85007 BL SMEAR W/DIFF WBC COUNT: CPT

## 2020-03-25 PROCEDURE — 63600175 PHARM REV CODE 636 W HCPCS: Performed by: INTERNAL MEDICINE

## 2020-03-25 PROCEDURE — 96367 TX/PROPH/DG ADDL SEQ IV INF: CPT

## 2020-03-25 PROCEDURE — 85027 COMPLETE CBC AUTOMATED: CPT

## 2020-03-25 PROCEDURE — 25000003 PHARM REV CODE 250: Performed by: INTERNAL MEDICINE

## 2020-03-25 PROCEDURE — 36415 COLL VENOUS BLD VENIPUNCTURE: CPT

## 2020-03-25 PROCEDURE — 96375 TX/PRO/DX INJ NEW DRUG ADDON: CPT

## 2020-03-25 PROCEDURE — 96413 CHEMO IV INFUSION 1 HR: CPT

## 2020-03-25 PROCEDURE — S0028 INJECTION, FAMOTIDINE, 20 MG: HCPCS | Performed by: INTERNAL MEDICINE

## 2020-03-25 RX ORDER — SODIUM CHLORIDE 0.9 % (FLUSH) 0.9 %
10 SYRINGE (ML) INJECTION
Status: CANCELLED | OUTPATIENT
Start: 2020-03-25

## 2020-03-25 RX ORDER — SODIUM CHLORIDE 0.9 % (FLUSH) 0.9 %
10 SYRINGE (ML) INJECTION
Status: DISCONTINUED | OUTPATIENT
Start: 2020-03-25 | End: 2020-03-25 | Stop reason: HOSPADM

## 2020-03-25 RX ORDER — DIPHENHYDRAMINE HYDROCHLORIDE 50 MG/ML
50 INJECTION INTRAMUSCULAR; INTRAVENOUS ONCE AS NEEDED
Status: DISCONTINUED | OUTPATIENT
Start: 2020-03-25 | End: 2020-03-25 | Stop reason: HOSPADM

## 2020-03-25 RX ORDER — DIPHENHYDRAMINE HYDROCHLORIDE 50 MG/ML
50 INJECTION INTRAMUSCULAR; INTRAVENOUS ONCE AS NEEDED
Status: CANCELLED | OUTPATIENT
Start: 2020-03-25

## 2020-03-25 RX ORDER — FAMOTIDINE 10 MG/ML
20 INJECTION INTRAVENOUS
Status: CANCELLED | OUTPATIENT
Start: 2020-03-25

## 2020-03-25 RX ORDER — HEPARIN 100 UNIT/ML
500 SYRINGE INTRAVENOUS
Status: DISCONTINUED | OUTPATIENT
Start: 2020-03-25 | End: 2020-03-25 | Stop reason: HOSPADM

## 2020-03-25 RX ORDER — HEPARIN 100 UNIT/ML
500 SYRINGE INTRAVENOUS
Status: CANCELLED | OUTPATIENT
Start: 2020-03-25

## 2020-03-25 RX ORDER — FAMOTIDINE 10 MG/ML
20 INJECTION INTRAVENOUS
Status: COMPLETED | OUTPATIENT
Start: 2020-03-25 | End: 2020-03-25

## 2020-03-25 RX ORDER — EPINEPHRINE 0.3 MG/.3ML
0.3 INJECTION SUBCUTANEOUS ONCE AS NEEDED
Status: CANCELLED | OUTPATIENT
Start: 2020-03-25

## 2020-03-25 RX ORDER — EPINEPHRINE 0.3 MG/.3ML
0.3 INJECTION SUBCUTANEOUS ONCE AS NEEDED
Status: DISCONTINUED | OUTPATIENT
Start: 2020-03-25 | End: 2020-03-25 | Stop reason: HOSPADM

## 2020-03-25 RX ADMIN — HEPARIN 500 UNITS: 100 SYRINGE at 02:03

## 2020-03-25 RX ADMIN — DIPHENHYDRAMINE HYDROCHLORIDE 12.5 MG: 50 INJECTION INTRAMUSCULAR; INTRAVENOUS at 01:03

## 2020-03-25 RX ADMIN — PACLITAXEL 150 MG: 6 INJECTION, SOLUTION INTRAVENOUS at 01:03

## 2020-03-25 RX ADMIN — SODIUM CHLORIDE: 0.9 INJECTION, SOLUTION INTRAVENOUS at 01:03

## 2020-03-25 RX ADMIN — FAMOTIDINE 20 MG: 10 INJECTION INTRAVENOUS at 01:03

## 2020-03-25 RX ADMIN — DEXAMETHASONE SODIUM PHOSPHATE 10 MG: 4 INJECTION, SOLUTION INTRA-ARTICULAR; INTRALESIONAL; INTRAMUSCULAR; INTRAVENOUS; SOFT TISSUE at 01:03

## 2020-03-25 NOTE — PLAN OF CARE
Taxol infusion tolerated well vitals stable, PAC flushed hep locked de accessed site covered. Avs declined. D/c'd home independent no assist needed. Encouraged to contact providers clinic with concerns.

## 2020-03-26 ENCOUNTER — INFUSION (OUTPATIENT)
Dept: INFUSION THERAPY | Facility: HOSPITAL | Age: 35
End: 2020-03-26
Attending: NURSE PRACTITIONER
Payer: COMMERCIAL

## 2020-03-26 DIAGNOSIS — Z17.0 MALIGNANT NEOPLASM OF UPPER-OUTER QUADRANT OF RIGHT BREAST IN FEMALE, ESTROGEN RECEPTOR POSITIVE: Primary | ICD-10-CM

## 2020-03-26 DIAGNOSIS — C50.411 MALIGNANT NEOPLASM OF UPPER-OUTER QUADRANT OF RIGHT BREAST IN FEMALE, ESTROGEN RECEPTOR POSITIVE: Primary | ICD-10-CM

## 2020-03-26 PROCEDURE — 96372 THER/PROPH/DIAG INJ SC/IM: CPT

## 2020-03-26 PROCEDURE — 63600175 PHARM REV CODE 636 W HCPCS: Mod: JG | Performed by: INTERNAL MEDICINE

## 2020-03-26 RX ORDER — SODIUM CHLORIDE 0.9 % (FLUSH) 0.9 %
10 SYRINGE (ML) INJECTION
Status: CANCELLED | OUTPATIENT
Start: 2020-04-01

## 2020-03-26 RX ORDER — HEPARIN 100 UNIT/ML
500 SYRINGE INTRAVENOUS
Status: CANCELLED | OUTPATIENT
Start: 2020-04-01

## 2020-03-26 RX ORDER — EPINEPHRINE 0.3 MG/.3ML
0.3 INJECTION SUBCUTANEOUS ONCE AS NEEDED
Status: CANCELLED | OUTPATIENT
Start: 2020-04-01

## 2020-03-26 RX ORDER — DIPHENHYDRAMINE HYDROCHLORIDE 50 MG/ML
50 INJECTION INTRAMUSCULAR; INTRAVENOUS ONCE AS NEEDED
Status: CANCELLED | OUTPATIENT
Start: 2020-04-01

## 2020-03-26 RX ORDER — FAMOTIDINE 10 MG/ML
20 INJECTION INTRAVENOUS
Status: CANCELLED | OUTPATIENT
Start: 2020-04-01

## 2020-03-26 RX ADMIN — FILGRASTIM 480 MCG: 480 INJECTION, SOLUTION INTRAVENOUS; SUBCUTANEOUS at 08:03

## 2020-03-27 ENCOUNTER — INFUSION (OUTPATIENT)
Dept: INFUSION THERAPY | Facility: HOSPITAL | Age: 35
End: 2020-03-27
Attending: INTERNAL MEDICINE
Payer: COMMERCIAL

## 2020-03-27 VITALS — TEMPERATURE: 98 F

## 2020-03-27 DIAGNOSIS — C50.411 MALIGNANT NEOPLASM OF UPPER-OUTER QUADRANT OF RIGHT BREAST IN FEMALE, ESTROGEN RECEPTOR POSITIVE: Primary | ICD-10-CM

## 2020-03-27 DIAGNOSIS — Z17.0 MALIGNANT NEOPLASM OF UPPER-OUTER QUADRANT OF RIGHT BREAST IN FEMALE, ESTROGEN RECEPTOR POSITIVE: Primary | ICD-10-CM

## 2020-03-27 PROCEDURE — 63600175 PHARM REV CODE 636 W HCPCS: Mod: JG | Performed by: INTERNAL MEDICINE

## 2020-03-27 PROCEDURE — 96372 THER/PROPH/DIAG INJ SC/IM: CPT

## 2020-03-27 RX ADMIN — FILGRASTIM 480 MCG: 480 INJECTION, SOLUTION INTRAVENOUS; SUBCUTANEOUS at 08:03

## 2020-03-27 NOTE — NURSING
0809-Neupogen injection administered SQ to abdomen, pt tolerated well. Band-aid applied to site. Pt discharged with no distress noted, ambulating independently. RTC tomorrow, 3/28/20 for neupogen, pt verbalized understanding.

## 2020-03-28 ENCOUNTER — INFUSION (OUTPATIENT)
Dept: INFUSION THERAPY | Facility: HOSPITAL | Age: 35
End: 2020-03-28
Attending: NURSE PRACTITIONER
Payer: COMMERCIAL

## 2020-03-28 DIAGNOSIS — C50.411 MALIGNANT NEOPLASM OF UPPER-OUTER QUADRANT OF RIGHT BREAST IN FEMALE, ESTROGEN RECEPTOR POSITIVE: Primary | ICD-10-CM

## 2020-03-28 DIAGNOSIS — Z17.0 MALIGNANT NEOPLASM OF UPPER-OUTER QUADRANT OF RIGHT BREAST IN FEMALE, ESTROGEN RECEPTOR POSITIVE: Primary | ICD-10-CM

## 2020-03-28 PROCEDURE — 63600175 PHARM REV CODE 636 W HCPCS: Mod: JG | Performed by: INTERNAL MEDICINE

## 2020-03-28 PROCEDURE — 96372 THER/PROPH/DIAG INJ SC/IM: CPT

## 2020-03-28 RX ADMIN — FILGRASTIM 480 MCG: 480 INJECTION, SOLUTION INTRAVENOUS; SUBCUTANEOUS at 08:03

## 2020-03-30 ENCOUNTER — LAB VISIT (OUTPATIENT)
Dept: LAB | Facility: HOSPITAL | Age: 35
End: 2020-03-30
Attending: INTERNAL MEDICINE
Payer: COMMERCIAL

## 2020-03-30 ENCOUNTER — INFUSION (OUTPATIENT)
Dept: INFUSION THERAPY | Facility: HOSPITAL | Age: 35
End: 2020-03-30
Attending: NURSE PRACTITIONER
Payer: COMMERCIAL

## 2020-03-30 DIAGNOSIS — C50.411 MALIGNANT NEOPLASM OF UPPER-OUTER QUADRANT OF RIGHT BREAST IN FEMALE, ESTROGEN RECEPTOR POSITIVE: ICD-10-CM

## 2020-03-30 DIAGNOSIS — Z17.0 MALIGNANT NEOPLASM OF UPPER-OUTER QUADRANT OF RIGHT BREAST IN FEMALE, ESTROGEN RECEPTOR POSITIVE: Primary | ICD-10-CM

## 2020-03-30 DIAGNOSIS — Z17.0 MALIGNANT NEOPLASM OF UPPER-OUTER QUADRANT OF RIGHT BREAST IN FEMALE, ESTROGEN RECEPTOR POSITIVE: ICD-10-CM

## 2020-03-30 DIAGNOSIS — C50.411 MALIGNANT NEOPLASM OF UPPER-OUTER QUADRANT OF RIGHT BREAST IN FEMALE, ESTROGEN RECEPTOR POSITIVE: Primary | ICD-10-CM

## 2020-03-30 LAB
ALBUMIN SERPL BCP-MCNC: 4 G/DL (ref 3.5–5.2)
ALP SERPL-CCNC: 147 U/L (ref 55–135)
ALT SERPL W/O P-5'-P-CCNC: 14 U/L (ref 10–44)
ANION GAP SERPL CALC-SCNC: 9 MMOL/L (ref 8–16)
AST SERPL-CCNC: 16 U/L (ref 10–40)
BASOPHILS # BLD AUTO: 0.14 K/UL (ref 0–0.2)
BASOPHILS NFR BLD: 1.6 % (ref 0–1.9)
BILIRUB SERPL-MCNC: 0.4 MG/DL (ref 0.1–1)
BUN SERPL-MCNC: 10 MG/DL (ref 6–20)
CALCIUM SERPL-MCNC: 9 MG/DL (ref 8.7–10.5)
CHLORIDE SERPL-SCNC: 108 MMOL/L (ref 95–110)
CO2 SERPL-SCNC: 23 MMOL/L (ref 23–29)
CREAT SERPL-MCNC: 0.8 MG/DL (ref 0.5–1.4)
DIFFERENTIAL METHOD: ABNORMAL
EOSINOPHIL # BLD AUTO: 0.9 K/UL (ref 0–0.5)
EOSINOPHIL NFR BLD: 10.6 % (ref 0–8)
ERYTHROCYTE [DISTWIDTH] IN BLOOD BY AUTOMATED COUNT: 14.6 % (ref 11.5–14.5)
EST. GFR  (AFRICAN AMERICAN): >60 ML/MIN/1.73 M^2
EST. GFR  (NON AFRICAN AMERICAN): >60 ML/MIN/1.73 M^2
GLUCOSE SERPL-MCNC: 99 MG/DL (ref 70–110)
HCT VFR BLD AUTO: 33.1 % (ref 37–48.5)
HGB BLD-MCNC: 10.7 G/DL (ref 12–16)
IMM GRANULOCYTES # BLD AUTO: 0.34 K/UL (ref 0–0.04)
IMM GRANULOCYTES NFR BLD AUTO: 4 % (ref 0–0.5)
LYMPHOCYTES # BLD AUTO: 1.7 K/UL (ref 1–4.8)
LYMPHOCYTES NFR BLD: 19.9 % (ref 18–48)
MCH RBC QN AUTO: 33.3 PG (ref 27–31)
MCHC RBC AUTO-ENTMCNC: 32.3 G/DL (ref 32–36)
MCV RBC AUTO: 103 FL (ref 82–98)
MONOCYTES # BLD AUTO: 0.5 K/UL (ref 0.3–1)
MONOCYTES NFR BLD: 6.2 % (ref 4–15)
NEUTROPHILS # BLD AUTO: 4.9 K/UL (ref 1.8–7.7)
NEUTROPHILS NFR BLD: 57.7 % (ref 38–73)
NRBC BLD-RTO: 0 /100 WBC
PLATELET # BLD AUTO: 209 K/UL (ref 150–350)
PMV BLD AUTO: 9 FL (ref 9.2–12.9)
POTASSIUM SERPL-SCNC: 4.5 MMOL/L (ref 3.5–5.1)
PROT SERPL-MCNC: 7.1 G/DL (ref 6–8.4)
RBC # BLD AUTO: 3.21 M/UL (ref 4–5.4)
SODIUM SERPL-SCNC: 140 MMOL/L (ref 136–145)
WBC # BLD AUTO: 8.51 K/UL (ref 3.9–12.7)

## 2020-03-30 PROCEDURE — 36415 COLL VENOUS BLD VENIPUNCTURE: CPT

## 2020-03-30 PROCEDURE — 85025 COMPLETE CBC W/AUTO DIFF WBC: CPT

## 2020-03-30 PROCEDURE — 96372 THER/PROPH/DIAG INJ SC/IM: CPT

## 2020-03-30 PROCEDURE — 80053 COMPREHEN METABOLIC PANEL: CPT

## 2020-03-30 PROCEDURE — 63600175 PHARM REV CODE 636 W HCPCS: Mod: JG | Performed by: INTERNAL MEDICINE

## 2020-03-30 RX ADMIN — FILGRASTIM 300 MCG: 300 INJECTION, SOLUTION INTRAVENOUS; SUBCUTANEOUS at 08:03

## 2020-04-01 ENCOUNTER — INFUSION (OUTPATIENT)
Dept: INFUSION THERAPY | Facility: HOSPITAL | Age: 35
End: 2020-04-01
Attending: INTERNAL MEDICINE
Payer: COMMERCIAL

## 2020-04-01 VITALS
TEMPERATURE: 98 F | HEART RATE: 77 BPM | WEIGHT: 175.5 LBS | DIASTOLIC BLOOD PRESSURE: 69 MMHG | BODY MASS INDEX: 29.24 KG/M2 | HEIGHT: 65 IN | RESPIRATION RATE: 18 BRPM | SYSTOLIC BLOOD PRESSURE: 118 MMHG

## 2020-04-01 DIAGNOSIS — C50.411 MALIGNANT NEOPLASM OF UPPER-OUTER QUADRANT OF RIGHT BREAST IN FEMALE, ESTROGEN RECEPTOR POSITIVE: Primary | ICD-10-CM

## 2020-04-01 DIAGNOSIS — Z17.0 MALIGNANT NEOPLASM OF UPPER-OUTER QUADRANT OF RIGHT BREAST IN FEMALE, ESTROGEN RECEPTOR POSITIVE: Primary | ICD-10-CM

## 2020-04-01 PROCEDURE — S0028 INJECTION, FAMOTIDINE, 20 MG: HCPCS | Performed by: INTERNAL MEDICINE

## 2020-04-01 PROCEDURE — 96413 CHEMO IV INFUSION 1 HR: CPT

## 2020-04-01 PROCEDURE — 96375 TX/PRO/DX INJ NEW DRUG ADDON: CPT

## 2020-04-01 PROCEDURE — 96367 TX/PROPH/DG ADDL SEQ IV INF: CPT

## 2020-04-01 PROCEDURE — 25000003 PHARM REV CODE 250: Performed by: INTERNAL MEDICINE

## 2020-04-01 PROCEDURE — 63600175 PHARM REV CODE 636 W HCPCS: Performed by: INTERNAL MEDICINE

## 2020-04-01 RX ORDER — EPINEPHRINE 0.3 MG/.3ML
0.3 INJECTION SUBCUTANEOUS ONCE AS NEEDED
Status: DISCONTINUED | OUTPATIENT
Start: 2020-04-01 | End: 2020-04-01 | Stop reason: HOSPADM

## 2020-04-01 RX ORDER — HEPARIN 100 UNIT/ML
500 SYRINGE INTRAVENOUS
Status: DISCONTINUED | OUTPATIENT
Start: 2020-04-01 | End: 2020-04-01 | Stop reason: HOSPADM

## 2020-04-01 RX ORDER — DIPHENHYDRAMINE HYDROCHLORIDE 50 MG/ML
50 INJECTION INTRAMUSCULAR; INTRAVENOUS ONCE AS NEEDED
Status: DISCONTINUED | OUTPATIENT
Start: 2020-04-01 | End: 2020-04-01 | Stop reason: HOSPADM

## 2020-04-01 RX ORDER — FAMOTIDINE 10 MG/ML
20 INJECTION INTRAVENOUS
Status: COMPLETED | OUTPATIENT
Start: 2020-04-01 | End: 2020-04-01

## 2020-04-01 RX ORDER — SODIUM CHLORIDE 0.9 % (FLUSH) 0.9 %
10 SYRINGE (ML) INJECTION
Status: DISCONTINUED | OUTPATIENT
Start: 2020-04-01 | End: 2020-04-01 | Stop reason: HOSPADM

## 2020-04-01 RX ADMIN — SODIUM CHLORIDE: 9 INJECTION, SOLUTION INTRAVENOUS at 02:04

## 2020-04-01 RX ADMIN — DIPHENHYDRAMINE HYDROCHLORIDE 12.5 MG: 50 INJECTION INTRAMUSCULAR; INTRAVENOUS at 02:04

## 2020-04-01 RX ADMIN — Medication 10 MG: at 02:04

## 2020-04-01 RX ADMIN — HEPARIN 500 UNITS: 100 SYRINGE at 04:04

## 2020-04-01 RX ADMIN — PACLITAXEL 150 MG: 6 INJECTION, SOLUTION INTRAVENOUS at 03:04

## 2020-04-01 RX ADMIN — FAMOTIDINE 20 MG: 10 INJECTION INTRAVENOUS at 02:04

## 2020-04-02 ENCOUNTER — INFUSION (OUTPATIENT)
Dept: INFUSION THERAPY | Facility: HOSPITAL | Age: 35
End: 2020-04-02
Attending: INTERNAL MEDICINE
Payer: COMMERCIAL

## 2020-04-02 DIAGNOSIS — C50.411 MALIGNANT NEOPLASM OF UPPER-OUTER QUADRANT OF RIGHT BREAST IN FEMALE, ESTROGEN RECEPTOR POSITIVE: Primary | ICD-10-CM

## 2020-04-02 DIAGNOSIS — Z17.0 MALIGNANT NEOPLASM OF UPPER-OUTER QUADRANT OF RIGHT BREAST IN FEMALE, ESTROGEN RECEPTOR POSITIVE: Primary | ICD-10-CM

## 2020-04-02 PROCEDURE — 96372 THER/PROPH/DIAG INJ SC/IM: CPT

## 2020-04-02 PROCEDURE — 63600175 PHARM REV CODE 636 W HCPCS: Mod: JG | Performed by: INTERNAL MEDICINE

## 2020-04-02 RX ADMIN — FILGRASTIM 480 MCG: 480 INJECTION, SOLUTION INTRAVENOUS; SUBCUTANEOUS at 09:04

## 2020-04-03 ENCOUNTER — INFUSION (OUTPATIENT)
Dept: INFUSION THERAPY | Facility: HOSPITAL | Age: 35
End: 2020-04-03
Attending: INTERNAL MEDICINE
Payer: COMMERCIAL

## 2020-04-03 DIAGNOSIS — Z17.0 MALIGNANT NEOPLASM OF UPPER-OUTER QUADRANT OF RIGHT BREAST IN FEMALE, ESTROGEN RECEPTOR POSITIVE: Primary | ICD-10-CM

## 2020-04-03 DIAGNOSIS — C50.411 MALIGNANT NEOPLASM OF UPPER-OUTER QUADRANT OF RIGHT BREAST IN FEMALE, ESTROGEN RECEPTOR POSITIVE: Primary | ICD-10-CM

## 2020-04-03 PROCEDURE — 96372 THER/PROPH/DIAG INJ SC/IM: CPT

## 2020-04-03 PROCEDURE — 63600175 PHARM REV CODE 636 W HCPCS: Mod: JG | Performed by: INTERNAL MEDICINE

## 2020-04-03 RX ADMIN — FILGRASTIM 300 MCG: 300 INJECTION, SOLUTION INTRAVENOUS; SUBCUTANEOUS at 08:04

## 2020-04-03 NOTE — NURSING
Patient received Neupogen injection SQ to ABD.  Tolerated well. Return to clinic tomorrow for C5D4.

## 2020-04-04 ENCOUNTER — INFUSION (OUTPATIENT)
Dept: INFUSION THERAPY | Facility: HOSPITAL | Age: 35
End: 2020-04-04
Attending: INTERNAL MEDICINE
Payer: COMMERCIAL

## 2020-04-04 VITALS
SYSTOLIC BLOOD PRESSURE: 121 MMHG | DIASTOLIC BLOOD PRESSURE: 80 MMHG | RESPIRATION RATE: 18 BRPM | TEMPERATURE: 98 F | HEART RATE: 83 BPM

## 2020-04-04 DIAGNOSIS — Z17.0 MALIGNANT NEOPLASM OF UPPER-OUTER QUADRANT OF RIGHT BREAST IN FEMALE, ESTROGEN RECEPTOR POSITIVE: Primary | ICD-10-CM

## 2020-04-04 DIAGNOSIS — C50.411 MALIGNANT NEOPLASM OF UPPER-OUTER QUADRANT OF RIGHT BREAST IN FEMALE, ESTROGEN RECEPTOR POSITIVE: Primary | ICD-10-CM

## 2020-04-04 PROCEDURE — 96372 THER/PROPH/DIAG INJ SC/IM: CPT

## 2020-04-04 PROCEDURE — 63600175 PHARM REV CODE 636 W HCPCS: Mod: JG | Performed by: INTERNAL MEDICINE

## 2020-04-04 RX ADMIN — FILGRASTIM 480 MCG: 480 INJECTION, SOLUTION INTRAVENOUS; SUBCUTANEOUS at 09:04

## 2020-04-04 NOTE — NURSING
0917-Neupogen injection administered SQ to abdomen, pt tolerated well. Band-aid applied to site. Pt discharged with no distress noted, ambulating independently. RTC 4/6/20 for labs, pt verbalized understanding.

## 2020-04-05 ENCOUNTER — PATIENT MESSAGE (OUTPATIENT)
Dept: PSYCHIATRY | Facility: CLINIC | Age: 35
End: 2020-04-05

## 2020-04-06 ENCOUNTER — DOCUMENTATION ONLY (OUTPATIENT)
Dept: HEMATOLOGY/ONCOLOGY | Facility: CLINIC | Age: 35
End: 2020-04-06

## 2020-04-06 ENCOUNTER — LAB VISIT (OUTPATIENT)
Dept: LAB | Facility: HOSPITAL | Age: 35
End: 2020-04-06
Attending: INTERNAL MEDICINE
Payer: COMMERCIAL

## 2020-04-06 DIAGNOSIS — Z17.0 MALIGNANT NEOPLASM OF UPPER-OUTER QUADRANT OF RIGHT BREAST IN FEMALE, ESTROGEN RECEPTOR POSITIVE: ICD-10-CM

## 2020-04-06 DIAGNOSIS — C50.411 MALIGNANT NEOPLASM OF UPPER-OUTER QUADRANT OF RIGHT BREAST IN FEMALE, ESTROGEN RECEPTOR POSITIVE: ICD-10-CM

## 2020-04-06 LAB
ALBUMIN SERPL BCP-MCNC: 4.2 G/DL (ref 3.5–5.2)
ALP SERPL-CCNC: 170 U/L (ref 55–135)
ALT SERPL W/O P-5'-P-CCNC: 17 U/L (ref 10–44)
ANION GAP SERPL CALC-SCNC: 11 MMOL/L (ref 8–16)
AST SERPL-CCNC: 15 U/L (ref 10–40)
BASOPHILS # BLD AUTO: 0.07 K/UL (ref 0–0.2)
BASOPHILS NFR BLD: 1 % (ref 0–1.9)
BILIRUB SERPL-MCNC: 0.6 MG/DL (ref 0.1–1)
BUN SERPL-MCNC: 11 MG/DL (ref 6–20)
CALCIUM SERPL-MCNC: 9.6 MG/DL (ref 8.7–10.5)
CHLORIDE SERPL-SCNC: 105 MMOL/L (ref 95–110)
CO2 SERPL-SCNC: 23 MMOL/L (ref 23–29)
CREAT SERPL-MCNC: 0.9 MG/DL (ref 0.5–1.4)
DIFFERENTIAL METHOD: ABNORMAL
EOSINOPHIL # BLD AUTO: 0.9 K/UL (ref 0–0.5)
EOSINOPHIL NFR BLD: 11.6 % (ref 0–8)
ERYTHROCYTE [DISTWIDTH] IN BLOOD BY AUTOMATED COUNT: 14.1 % (ref 11.5–14.5)
EST. GFR  (AFRICAN AMERICAN): >60 ML/MIN/1.73 M^2
EST. GFR  (NON AFRICAN AMERICAN): >60 ML/MIN/1.73 M^2
GLUCOSE SERPL-MCNC: 102 MG/DL (ref 70–110)
HCT VFR BLD AUTO: 36.2 % (ref 37–48.5)
HGB BLD-MCNC: 11.6 G/DL (ref 12–16)
IMM GRANULOCYTES # BLD AUTO: 0.1 K/UL (ref 0–0.04)
IMM GRANULOCYTES NFR BLD AUTO: 1.4 % (ref 0–0.5)
LYMPHOCYTES # BLD AUTO: 1.1 K/UL (ref 1–4.8)
LYMPHOCYTES NFR BLD: 15.5 % (ref 18–48)
MCH RBC QN AUTO: 33.6 PG (ref 27–31)
MCHC RBC AUTO-ENTMCNC: 32 G/DL (ref 32–36)
MCV RBC AUTO: 105 FL (ref 82–98)
MONOCYTES # BLD AUTO: 0.3 K/UL (ref 0.3–1)
MONOCYTES NFR BLD: 3.8 % (ref 4–15)
NEUTROPHILS # BLD AUTO: 4.9 K/UL (ref 1.8–7.7)
NEUTROPHILS NFR BLD: 66.7 % (ref 38–73)
NRBC BLD-RTO: 0 /100 WBC
PLATELET # BLD AUTO: 245 K/UL (ref 150–350)
PMV BLD AUTO: 9.4 FL (ref 9.2–12.9)
POTASSIUM SERPL-SCNC: 4.5 MMOL/L (ref 3.5–5.1)
PROT SERPL-MCNC: 7.5 G/DL (ref 6–8.4)
RBC # BLD AUTO: 3.45 M/UL (ref 4–5.4)
SODIUM SERPL-SCNC: 139 MMOL/L (ref 136–145)
WBC # BLD AUTO: 7.35 K/UL (ref 3.9–12.7)

## 2020-04-06 PROCEDURE — 85025 COMPLETE CBC W/AUTO DIFF WBC: CPT

## 2020-04-06 PROCEDURE — 80053 COMPREHEN METABOLIC PANEL: CPT

## 2020-04-06 PROCEDURE — 36415 COLL VENOUS BLD VENIPUNCTURE: CPT

## 2020-04-06 RX ORDER — SODIUM CHLORIDE 0.9 % (FLUSH) 0.9 %
10 SYRINGE (ML) INJECTION
Status: CANCELLED | OUTPATIENT
Start: 2020-04-08

## 2020-04-06 RX ORDER — FAMOTIDINE 10 MG/ML
20 INJECTION INTRAVENOUS
Status: CANCELLED | OUTPATIENT
Start: 2020-04-08

## 2020-04-06 RX ORDER — EPINEPHRINE 0.3 MG/.3ML
0.3 INJECTION SUBCUTANEOUS ONCE AS NEEDED
Status: CANCELLED | OUTPATIENT
Start: 2020-04-08

## 2020-04-06 RX ORDER — HEPARIN 100 UNIT/ML
500 SYRINGE INTRAVENOUS
Status: CANCELLED | OUTPATIENT
Start: 2020-04-08

## 2020-04-06 RX ORDER — DIPHENHYDRAMINE HYDROCHLORIDE 50 MG/ML
50 INJECTION INTRAMUSCULAR; INTRAVENOUS ONCE AS NEEDED
Status: CANCELLED | OUTPATIENT
Start: 2020-04-08

## 2020-04-06 NOTE — PROGRESS NOTES
SW called pt to assess coping and needs related to Covid restrictions.  Pt has two minor children at home (3yr old and 1ys old).  She is an [employer] employee and working remotely.  She said she is coping well and did not identify any SW needs at this time.  She was provided with SW name and contact information and encouraged to call as needed.

## 2020-04-06 NOTE — PROGRESS NOTES
Subjective:       Patient ID: Yolanda Win is a 34 y.o. female.    Chief Complaint: No chief complaint on file.    HPI 34 year old female, who returns for follow-up of carcinoma of the right breast.     The patient location is:  home.  The chief complaint leading to consultation is:  Breast cancer  Visit type: Virtual visit with synchronous audio and video  Total time spent with patient:  15 min including .    Each patient to whom he or she provides medical services by telemedicine is:  (1) informed of the relationship between the physician and patient and the respective role of any other health care provider with respect to management of the patient; and (2) notified that he or she may decline to receive medical services by telemedicine and may withdraw from such care at any time.       She is receiving neoadjuvant chemotherapy and is status post 4 cycles of Adriamycin Cytoxan.  She has had 5 weekly treatments with Taxol.  That was complicated by admission for neutropenic fever after cycle 3.  Because of that G-CSF therapy was added.    Today she reports she is having some fatigue but is able to do most of her normal activities.  She does have a decline in her appetite generally around day 5 otherwise has been eating good.  Been no change in her bowels.  She has no shortness of breath but she does have some cough which she relates to allergies and postnasal drip.          Breast history:  Mammogram and ultrasound on December 11th, 2019 showed right breast mass 7 cm from the nipple.  By ultrasound this mass measured 33 x 32 x 21 mm.  There was no associated axillary lymphadenopathy noted.    Right breast biopsy on December 13 showed high-grade infiltrating ductal carcinoma (histologic grade 3, nuclear grade 3, mitotic index 3) there were medullary features.  The cancer was 25% ER positive and [place] and HER2 negative.  Ki-67 was 90%.    MRI showed a 5.2 cm x 2.6 cm x 4.6 cm irregularly shaped mass with rim  enhancement seen in the right breast at 11 o'clock.    CT scan of the chest abdomen and pelvis and bone scan showed no evidence of metastatic disease.    She completed 4 cycles of neoadjuvant Adriamycin Cytoxan February 13, 2020.  Review of Systems   Constitutional: Negative for appetite change and unexpected weight change.   HENT: Positive for postnasal drip. Negative for trouble swallowing.    Eyes: Negative for visual disturbance.   Respiratory: Positive for cough. Negative for shortness of breath.    Cardiovascular: Negative for chest pain.   Gastrointestinal: Negative for abdominal pain and diarrhea.   Genitourinary: Negative for frequency.   Musculoskeletal: Negative for back pain.   Skin: Negative for rash.   Neurological: Negative for headaches.   Hematological: Negative for adenopathy.   Psychiatric/Behavioral: The patient is not nervous/anxious.        Objective:      Physical Exam   Constitutional: She is oriented to person, place, and time. She appears well-developed and well-nourished. No distress.   Neurological: She is alert and oriented to person, place, and time.   Psychiatric: She has a normal mood and affect. Her behavior is normal. Thought content normal.       Assessment:       1. Malignant neoplasm of upper-outer quadrant of right breast in female, estrogen receptor positive        Plan:       Continue weekly Taxol and return to clinic in 3 weeks for follow-up exam.

## 2020-04-07 ENCOUNTER — PATIENT MESSAGE (OUTPATIENT)
Dept: SURGERY | Facility: CLINIC | Age: 35
End: 2020-04-07

## 2020-04-07 ENCOUNTER — OFFICE VISIT (OUTPATIENT)
Dept: HEMATOLOGY/ONCOLOGY | Facility: CLINIC | Age: 35
End: 2020-04-07
Payer: COMMERCIAL

## 2020-04-07 ENCOUNTER — TELEPHONE (OUTPATIENT)
Dept: HEMATOLOGY/ONCOLOGY | Facility: CLINIC | Age: 35
End: 2020-04-07

## 2020-04-07 DIAGNOSIS — Z17.0 MALIGNANT NEOPLASM OF UPPER-OUTER QUADRANT OF RIGHT BREAST IN FEMALE, ESTROGEN RECEPTOR POSITIVE: Primary | ICD-10-CM

## 2020-04-07 DIAGNOSIS — C50.411 MALIGNANT NEOPLASM OF UPPER-OUTER QUADRANT OF RIGHT BREAST IN FEMALE, ESTROGEN RECEPTOR POSITIVE: Primary | ICD-10-CM

## 2020-04-07 PROCEDURE — 99213 OFFICE O/P EST LOW 20 MIN: CPT | Mod: 95,,, | Performed by: INTERNAL MEDICINE

## 2020-04-07 PROCEDURE — 99213 PR OFFICE/OUTPT VISIT, EST, LEVL III, 20-29 MIN: ICD-10-PCS | Mod: 95,,, | Performed by: INTERNAL MEDICINE

## 2020-04-08 ENCOUNTER — PATIENT MESSAGE (OUTPATIENT)
Dept: PSYCHIATRY | Facility: CLINIC | Age: 35
End: 2020-04-08

## 2020-04-08 ENCOUNTER — INFUSION (OUTPATIENT)
Dept: INFUSION THERAPY | Facility: HOSPITAL | Age: 35
End: 2020-04-08
Attending: FAMILY MEDICINE
Payer: COMMERCIAL

## 2020-04-08 VITALS
HEART RATE: 79 BPM | WEIGHT: 175.69 LBS | SYSTOLIC BLOOD PRESSURE: 116 MMHG | BODY MASS INDEX: 29.27 KG/M2 | RESPIRATION RATE: 18 BRPM | TEMPERATURE: 98 F | HEIGHT: 65 IN | DIASTOLIC BLOOD PRESSURE: 68 MMHG

## 2020-04-08 DIAGNOSIS — C50.411 MALIGNANT NEOPLASM OF UPPER-OUTER QUADRANT OF RIGHT BREAST IN FEMALE, ESTROGEN RECEPTOR POSITIVE: Primary | ICD-10-CM

## 2020-04-08 DIAGNOSIS — Z17.0 MALIGNANT NEOPLASM OF UPPER-OUTER QUADRANT OF RIGHT BREAST IN FEMALE, ESTROGEN RECEPTOR POSITIVE: Primary | ICD-10-CM

## 2020-04-08 PROCEDURE — 96375 TX/PRO/DX INJ NEW DRUG ADDON: CPT

## 2020-04-08 PROCEDURE — A4216 STERILE WATER/SALINE, 10 ML: HCPCS | Performed by: INTERNAL MEDICINE

## 2020-04-08 PROCEDURE — 96413 CHEMO IV INFUSION 1 HR: CPT

## 2020-04-08 PROCEDURE — S0028 INJECTION, FAMOTIDINE, 20 MG: HCPCS | Performed by: INTERNAL MEDICINE

## 2020-04-08 PROCEDURE — 96367 TX/PROPH/DG ADDL SEQ IV INF: CPT

## 2020-04-08 PROCEDURE — 25000003 PHARM REV CODE 250: Performed by: INTERNAL MEDICINE

## 2020-04-08 PROCEDURE — 63600175 PHARM REV CODE 636 W HCPCS: Performed by: INTERNAL MEDICINE

## 2020-04-08 RX ORDER — HEPARIN 100 UNIT/ML
500 SYRINGE INTRAVENOUS
Status: DISCONTINUED | OUTPATIENT
Start: 2020-04-08 | End: 2020-04-08 | Stop reason: HOSPADM

## 2020-04-08 RX ORDER — DEXTROAMPHETAMINE SACCHARATE, AMPHETAMINE ASPARTATE MONOHYDRATE, DEXTROAMPHETAMINE SULFATE AND AMPHETAMINE SULFATE 6.25; 6.25; 6.25; 6.25 MG/1; MG/1; MG/1; MG/1
25 CAPSULE, EXTENDED RELEASE ORAL EVERY MORNING
Qty: 30 CAPSULE | Refills: 0 | Status: SHIPPED | OUTPATIENT
Start: 2020-04-08 | End: 2020-06-05 | Stop reason: SDUPTHER

## 2020-04-08 RX ORDER — EPINEPHRINE 0.3 MG/.3ML
0.3 INJECTION SUBCUTANEOUS ONCE AS NEEDED
Status: DISCONTINUED | OUTPATIENT
Start: 2020-04-08 | End: 2020-04-08 | Stop reason: HOSPADM

## 2020-04-08 RX ORDER — DIPHENHYDRAMINE HYDROCHLORIDE 50 MG/ML
50 INJECTION INTRAMUSCULAR; INTRAVENOUS ONCE AS NEEDED
Status: DISCONTINUED | OUTPATIENT
Start: 2020-04-08 | End: 2020-04-08 | Stop reason: HOSPADM

## 2020-04-08 RX ORDER — SODIUM CHLORIDE 0.9 % (FLUSH) 0.9 %
10 SYRINGE (ML) INJECTION
Status: DISCONTINUED | OUTPATIENT
Start: 2020-04-08 | End: 2020-04-08 | Stop reason: HOSPADM

## 2020-04-08 RX ORDER — FAMOTIDINE 10 MG/ML
20 INJECTION INTRAVENOUS
Status: COMPLETED | OUTPATIENT
Start: 2020-04-08 | End: 2020-04-08

## 2020-04-08 RX ADMIN — PACLITAXEL 150 MG: 6 INJECTION, SOLUTION INTRAVENOUS at 02:04

## 2020-04-08 RX ADMIN — DIPHENHYDRAMINE HYDROCHLORIDE 12.5 MG: 50 INJECTION, SOLUTION INTRAMUSCULAR; INTRAVENOUS at 01:04

## 2020-04-08 RX ADMIN — DEXAMETHASONE SODIUM PHOSPHATE 10 MG: 4 INJECTION, SOLUTION INTRA-ARTICULAR; INTRALESIONAL; INTRAMUSCULAR; INTRAVENOUS; SOFT TISSUE at 01:04

## 2020-04-08 RX ADMIN — Medication 10 ML: at 03:04

## 2020-04-08 RX ADMIN — SODIUM CHLORIDE: 9 INJECTION, SOLUTION INTRAVENOUS at 01:04

## 2020-04-08 RX ADMIN — HEPARIN 500 UNITS: 100 SYRINGE at 03:04

## 2020-04-08 RX ADMIN — FAMOTIDINE 20 MG: 10 INJECTION INTRAVENOUS at 01:04

## 2020-04-09 ENCOUNTER — INFUSION (OUTPATIENT)
Dept: INFUSION THERAPY | Facility: HOSPITAL | Age: 35
End: 2020-04-09
Attending: INTERNAL MEDICINE
Payer: COMMERCIAL

## 2020-04-09 ENCOUNTER — PATIENT MESSAGE (OUTPATIENT)
Dept: HEMATOLOGY/ONCOLOGY | Facility: CLINIC | Age: 35
End: 2020-04-09

## 2020-04-09 DIAGNOSIS — C50.411 MALIGNANT NEOPLASM OF UPPER-OUTER QUADRANT OF RIGHT BREAST IN FEMALE, ESTROGEN RECEPTOR POSITIVE: Primary | ICD-10-CM

## 2020-04-09 DIAGNOSIS — Z17.0 MALIGNANT NEOPLASM OF UPPER-OUTER QUADRANT OF RIGHT BREAST IN FEMALE, ESTROGEN RECEPTOR POSITIVE: Primary | ICD-10-CM

## 2020-04-09 PROCEDURE — 63600175 PHARM REV CODE 636 W HCPCS: Mod: JG | Performed by: INTERNAL MEDICINE

## 2020-04-09 PROCEDURE — 96372 THER/PROPH/DIAG INJ SC/IM: CPT

## 2020-04-09 RX ORDER — DIPHENHYDRAMINE HYDROCHLORIDE 50 MG/ML
50 INJECTION INTRAMUSCULAR; INTRAVENOUS ONCE AS NEEDED
Status: CANCELLED | OUTPATIENT
Start: 2020-04-17

## 2020-04-09 RX ORDER — FAMOTIDINE 10 MG/ML
20 INJECTION INTRAVENOUS
Status: CANCELLED | OUTPATIENT
Start: 2020-04-17

## 2020-04-09 RX ORDER — EPINEPHRINE 0.3 MG/.3ML
0.3 INJECTION SUBCUTANEOUS ONCE AS NEEDED
Status: CANCELLED | OUTPATIENT
Start: 2020-04-17

## 2020-04-09 RX ORDER — HEPARIN 100 UNIT/ML
500 SYRINGE INTRAVENOUS
Status: CANCELLED | OUTPATIENT
Start: 2020-04-17

## 2020-04-09 RX ORDER — SODIUM CHLORIDE 0.9 % (FLUSH) 0.9 %
10 SYRINGE (ML) INJECTION
Status: CANCELLED | OUTPATIENT
Start: 2020-04-17

## 2020-04-09 RX ADMIN — FILGRASTIM 300 MCG: 300 INJECTION, SOLUTION INTRAVENOUS; SUBCUTANEOUS at 08:04

## 2020-04-11 ENCOUNTER — INFUSION (OUTPATIENT)
Dept: INFUSION THERAPY | Facility: HOSPITAL | Age: 35
End: 2020-04-11
Attending: INTERNAL MEDICINE
Payer: COMMERCIAL

## 2020-04-11 DIAGNOSIS — Z17.0 MALIGNANT NEOPLASM OF UPPER-OUTER QUADRANT OF RIGHT BREAST IN FEMALE, ESTROGEN RECEPTOR POSITIVE: Primary | ICD-10-CM

## 2020-04-11 DIAGNOSIS — C50.411 MALIGNANT NEOPLASM OF UPPER-OUTER QUADRANT OF RIGHT BREAST IN FEMALE, ESTROGEN RECEPTOR POSITIVE: Primary | ICD-10-CM

## 2020-04-11 PROCEDURE — 96372 THER/PROPH/DIAG INJ SC/IM: CPT

## 2020-04-11 PROCEDURE — 63600175 PHARM REV CODE 636 W HCPCS: Mod: JG | Performed by: INTERNAL MEDICINE

## 2020-04-11 RX ADMIN — FILGRASTIM 480 MCG: 480 INJECTION, SOLUTION INTRAVENOUS; SUBCUTANEOUS at 08:04

## 2020-04-11 NOTE — NURSING
Patient in clinic for Neupogen injection. Medication given SQ into neupogen with no complications. Patient has no c/o pain or discomfort. Reports no changes since last appointment. Patient educated on side effects of medication. AVS provided. Discharged home.

## 2020-04-13 ENCOUNTER — TELEPHONE (OUTPATIENT)
Dept: HEMATOLOGY/ONCOLOGY | Facility: CLINIC | Age: 35
End: 2020-04-13

## 2020-04-13 ENCOUNTER — INFUSION (OUTPATIENT)
Dept: INFUSION THERAPY | Facility: HOSPITAL | Age: 35
End: 2020-04-13
Attending: INTERNAL MEDICINE
Payer: COMMERCIAL

## 2020-04-13 ENCOUNTER — CLINICAL SUPPORT (OUTPATIENT)
Dept: INTERNAL MEDICINE | Facility: CLINIC | Age: 35
End: 2020-04-13
Payer: COMMERCIAL

## 2020-04-13 DIAGNOSIS — Z17.0 MALIGNANT NEOPLASM OF UPPER-OUTER QUADRANT OF RIGHT BREAST IN FEMALE, ESTROGEN RECEPTOR POSITIVE: Primary | ICD-10-CM

## 2020-04-13 DIAGNOSIS — C50.411 MALIGNANT NEOPLASM OF UPPER-OUTER QUADRANT OF RIGHT BREAST IN FEMALE, ESTROGEN RECEPTOR POSITIVE: Primary | ICD-10-CM

## 2020-04-13 DIAGNOSIS — Z71.89 ADVICE GIVEN ABOUT COVID-19 VIRUS BY TELEPHONE: ICD-10-CM

## 2020-04-13 DIAGNOSIS — Z71.89 ADVICE GIVEN ABOUT COVID-19 VIRUS BY TELEPHONE: Primary | ICD-10-CM

## 2020-04-13 LAB — SARS-COV-2 RNA RESP QL NAA+PROBE: NOT DETECTED

## 2020-04-13 PROCEDURE — 96372 THER/PROPH/DIAG INJ SC/IM: CPT

## 2020-04-13 PROCEDURE — 99999 PR PBB SHADOW E&M-EST. PATIENT-LVL I: CPT | Mod: PBBFAC,,,

## 2020-04-13 PROCEDURE — 99999 PR PBB SHADOW E&M-EST. PATIENT-LVL I: ICD-10-PCS | Mod: PBBFAC,,,

## 2020-04-13 PROCEDURE — U0002 COVID-19 LAB TEST NON-CDC: HCPCS

## 2020-04-13 PROCEDURE — 63600175 PHARM REV CODE 636 W HCPCS: Mod: JG | Performed by: INTERNAL MEDICINE

## 2020-04-13 RX ADMIN — FILGRASTIM 480 MCG: 480 INJECTION, SOLUTION INTRAVENOUS; SUBCUTANEOUS at 08:04

## 2020-04-13 NOTE — TELEPHONE ENCOUNTER
Cancer Services COVID-19 Testing  [facility] [address], [place]  [address]    04/13/2020    Phoned pt regarding COVID-19 testing.  No answer; LVM with my contact info, and asked pt to return my call.    Signed,        Shemar Spain, DNP, APRN, FNP-BC, AOCNP  Department of Hematology and Oncology  [facility], 3rd Floor  [facility] [address], [place]  [address]  Office phone   [phone]    Date:  04/13/2020

## 2020-04-13 NOTE — TELEPHONE ENCOUNTER
Cancer Services COVID-19 Testing  [facility] [address], [place]  [address]    04/13/2020    Phoned the patient.    Discussed the following with the patient:  In an effort to keep its suites free of COVID-19 for the safety of all of its patients and staff, [facility] is now performing screening for COVID-19 for all patients receiving an infusion, an injection, and/or radiation therapy prior to their appointment.  Regardless of the screening result, all infusion/injection/radiation therapy patients will be tested for COVID-19 once per week.  The COVID-19 test must occur within a 72-hour window prior to the treatment appointment.    Symptom Patient's Response   Fever YES/NO: no   Cough YES/NO: no   Shortness of breath or difficulty breathing YES/NO: no     The patient's 24-hour COVID-19 test was ordered and scheduled.  Reviewed the appointment date, time, and location with the patient.    Advised the patient that she can expect the results to take approximately 24 hours and that someone will reach out to her regarding her results.  Advised the patient that her treatment appointment will be rescheduled if she tests positive for COVID-19.    Questions were answered to the patient's satisfaction, and the patient verbalized understanding of information and agreement with the plan.     The above was completed in accordance with instructions and guidelines set forth by [facility].    Shemar Almeida, DNP, APRN, FNP-BC, AOCNP  Department of Hematology and Oncology  [facility], 3rd Floor  [facility] [address], [place]  [address]  Office phone   [phone]    Date:  04/13/2020      ----- Message from Florida Malik RN sent at 4/13/2020  1:23 PM CDT -----  Contact: PT       ----- Message -----  From: Claudia Resendez  Sent: 4/13/2020   1:19 PM CDT  To: Grabiel Staton Staff    PT is returning your missed call.  Said she has already been tested for the virus a month ago and it was negative and she hasn't left her house since then.    Callback: [phone]

## 2020-04-13 NOTE — NURSING
Patient in clinic for Neupogen injection. Medication given SQ into abdomen with no complications. Patient has no c/o pain or discomfort. Reports no changes since last appointment. Patient educated on side effects of medication. Discharged home.

## 2020-04-14 ENCOUNTER — TELEPHONE (OUTPATIENT)
Dept: HEMATOLOGY/ONCOLOGY | Facility: CLINIC | Age: 35
End: 2020-04-14

## 2020-04-14 ENCOUNTER — PATIENT MESSAGE (OUTPATIENT)
Dept: HEMATOLOGY/ONCOLOGY | Facility: CLINIC | Age: 35
End: 2020-04-14

## 2020-04-14 NOTE — TELEPHONE ENCOUNTER
04/14/2020    - COVID-19 testing Collection Date: 4/13/2020 Collection Time:   2:30 PM  - This result was communicated to the patient by Shemar Spain DNP on 04/14/2020 as below.     The following was discussed with the patient via telephone call:  The test was NEGATIVE for COVID-19 (coronavirus), and based on those negative results, treatment at the cancer center can proceed as planned.  Questions were encouraged and answered to patient's satisfaction, and patient verbalized understanding of information and agreement with the plan.     Symptom Patient's Response   Fever YES/NO: no   Cough YES/NO: no   Shortness of breath  YES/NO: no   Difficulty breathing YES/NO: no   GI symptoms such as diarrhea or nausea YES/NO: no   Loss of taste YES/NO: no   Loss of smell YES/NO: no     Other Screening Patient's Response   Has the patient previously undergone COVID-19 testing? YES/NO: yes     If yes to the question directly above, what was the result? negative   Has the patient been in close contact with someone who has undergone COVID-19 testing? YES/NO: no     If yes to the question directly above, what was the result? not applicable           The above was completed in accordance with instructions and guidelines set forth by [facility].     Patient reported a headache, constant, for the past two days, currently a 5-6/10.  Advised her I would notify Dr. Ny and ask him to follow up with her.  Advised patient to contact Dr. Ny or call on-call nurse line if it worsens/persists.      Questions were answered to the patient's satisfaction, and the patient verbalized understanding of information and agreement with the plan.    Signed,        Shemar Spain DNP     Date:  04/14/2020      Signed,    Shemar Spain DNP

## 2020-04-15 ENCOUNTER — INFUSION (OUTPATIENT)
Dept: INFUSION THERAPY | Facility: HOSPITAL | Age: 35
End: 2020-04-15
Attending: INTERNAL MEDICINE
Payer: COMMERCIAL

## 2020-04-15 ENCOUNTER — LAB VISIT (OUTPATIENT)
Dept: LAB | Facility: HOSPITAL | Age: 35
End: 2020-04-15
Attending: INTERNAL MEDICINE
Payer: COMMERCIAL

## 2020-04-15 VITALS
HEIGHT: 65 IN | HEART RATE: 75 BPM | RESPIRATION RATE: 18 BRPM | DIASTOLIC BLOOD PRESSURE: 79 MMHG | SYSTOLIC BLOOD PRESSURE: 120 MMHG | WEIGHT: 175.69 LBS | TEMPERATURE: 98 F | BODY MASS INDEX: 29.27 KG/M2

## 2020-04-15 DIAGNOSIS — C50.411 MALIGNANT NEOPLASM OF UPPER-OUTER QUADRANT OF RIGHT BREAST IN FEMALE, ESTROGEN RECEPTOR POSITIVE: Primary | ICD-10-CM

## 2020-04-15 DIAGNOSIS — Z17.0 MALIGNANT NEOPLASM OF UPPER-OUTER QUADRANT OF RIGHT BREAST IN FEMALE, ESTROGEN RECEPTOR POSITIVE: ICD-10-CM

## 2020-04-15 DIAGNOSIS — Z17.0 MALIGNANT NEOPLASM OF UPPER-OUTER QUADRANT OF RIGHT BREAST IN FEMALE, ESTROGEN RECEPTOR POSITIVE: Primary | ICD-10-CM

## 2020-04-15 DIAGNOSIS — C50.411 MALIGNANT NEOPLASM OF UPPER-OUTER QUADRANT OF RIGHT BREAST IN FEMALE, ESTROGEN RECEPTOR POSITIVE: ICD-10-CM

## 2020-04-15 LAB
ALBUMIN SERPL BCP-MCNC: 4.1 G/DL (ref 3.5–5.2)
ALP SERPL-CCNC: 145 U/L (ref 55–135)
ALT SERPL W/O P-5'-P-CCNC: 15 U/L (ref 10–44)
ANION GAP SERPL CALC-SCNC: 9 MMOL/L (ref 8–16)
ANISOCYTOSIS BLD QL SMEAR: SLIGHT
AST SERPL-CCNC: 17 U/L (ref 10–40)
BASO STIPL BLD QL SMEAR: ABNORMAL
BASOPHILS NFR BLD: 1 % (ref 0–1.9)
BILIRUB SERPL-MCNC: 0.3 MG/DL (ref 0.1–1)
BUN SERPL-MCNC: 7 MG/DL (ref 6–20)
CALCIUM SERPL-MCNC: 9.7 MG/DL (ref 8.7–10.5)
CHLORIDE SERPL-SCNC: 107 MMOL/L (ref 95–110)
CO2 SERPL-SCNC: 25 MMOL/L (ref 23–29)
CREAT SERPL-MCNC: 0.9 MG/DL (ref 0.5–1.4)
DIFFERENTIAL METHOD: ABNORMAL
EOSINOPHIL NFR BLD: 7 % (ref 0–8)
ERYTHROCYTE [DISTWIDTH] IN BLOOD BY AUTOMATED COUNT: 13.5 % (ref 11.5–14.5)
EST. GFR  (AFRICAN AMERICAN): >60 ML/MIN/1.73 M^2
EST. GFR  (NON AFRICAN AMERICAN): >60 ML/MIN/1.73 M^2
GLUCOSE SERPL-MCNC: 97 MG/DL (ref 70–110)
HCT VFR BLD AUTO: 34.3 % (ref 37–48.5)
HGB BLD-MCNC: 11 G/DL (ref 12–16)
IMM GRANULOCYTES # BLD AUTO: ABNORMAL K/UL (ref 0–0.04)
IMM GRANULOCYTES NFR BLD AUTO: ABNORMAL % (ref 0–0.5)
LYMPHOCYTES NFR BLD: 15 % (ref 18–48)
MCH RBC QN AUTO: 33.6 PG (ref 27–31)
MCHC RBC AUTO-ENTMCNC: 32.1 G/DL (ref 32–36)
MCV RBC AUTO: 105 FL (ref 82–98)
METAMYELOCYTES NFR BLD MANUAL: 1 %
MONOCYTES NFR BLD: 3 % (ref 4–15)
MYELOCYTES NFR BLD MANUAL: 1 %
NEUTROPHILS NFR BLD: 71 % (ref 38–73)
NEUTS BAND NFR BLD MANUAL: 1 %
NRBC BLD-RTO: 0 /100 WBC
PLATELET # BLD AUTO: 273 K/UL (ref 150–350)
PLATELET BLD QL SMEAR: ABNORMAL
PMV BLD AUTO: 8.8 FL (ref 9.2–12.9)
POLYCHROMASIA BLD QL SMEAR: ABNORMAL
POTASSIUM SERPL-SCNC: 4.2 MMOL/L (ref 3.5–5.1)
PROT SERPL-MCNC: 7.1 G/DL (ref 6–8.4)
RBC # BLD AUTO: 3.27 M/UL (ref 4–5.4)
SMUDGE CELLS BLD QL SMEAR: PRESENT
SODIUM SERPL-SCNC: 141 MMOL/L (ref 136–145)
WBC # BLD AUTO: 9.49 K/UL (ref 3.9–12.7)

## 2020-04-15 PROCEDURE — 36415 COLL VENOUS BLD VENIPUNCTURE: CPT

## 2020-04-15 PROCEDURE — S0028 INJECTION, FAMOTIDINE, 20 MG: HCPCS | Performed by: INTERNAL MEDICINE

## 2020-04-15 PROCEDURE — 96413 CHEMO IV INFUSION 1 HR: CPT

## 2020-04-15 PROCEDURE — A4216 STERILE WATER/SALINE, 10 ML: HCPCS | Performed by: INTERNAL MEDICINE

## 2020-04-15 PROCEDURE — 63600175 PHARM REV CODE 636 W HCPCS: Performed by: INTERNAL MEDICINE

## 2020-04-15 PROCEDURE — 85027 COMPLETE CBC AUTOMATED: CPT

## 2020-04-15 PROCEDURE — 85007 BL SMEAR W/DIFF WBC COUNT: CPT

## 2020-04-15 PROCEDURE — 25000003 PHARM REV CODE 250: Performed by: INTERNAL MEDICINE

## 2020-04-15 PROCEDURE — 96367 TX/PROPH/DG ADDL SEQ IV INF: CPT

## 2020-04-15 PROCEDURE — 96375 TX/PRO/DX INJ NEW DRUG ADDON: CPT

## 2020-04-15 PROCEDURE — 80053 COMPREHEN METABOLIC PANEL: CPT

## 2020-04-15 RX ORDER — EPINEPHRINE 0.3 MG/.3ML
0.3 INJECTION SUBCUTANEOUS ONCE AS NEEDED
Status: DISCONTINUED | OUTPATIENT
Start: 2020-04-15 | End: 2020-04-15 | Stop reason: HOSPADM

## 2020-04-15 RX ORDER — HEPARIN 100 UNIT/ML
500 SYRINGE INTRAVENOUS
Status: DISCONTINUED | OUTPATIENT
Start: 2020-04-15 | End: 2020-04-15 | Stop reason: HOSPADM

## 2020-04-15 RX ORDER — FAMOTIDINE 10 MG/ML
20 INJECTION INTRAVENOUS
Status: COMPLETED | OUTPATIENT
Start: 2020-04-15 | End: 2020-04-15

## 2020-04-15 RX ORDER — DIPHENHYDRAMINE HYDROCHLORIDE 50 MG/ML
50 INJECTION INTRAMUSCULAR; INTRAVENOUS ONCE AS NEEDED
Status: DISCONTINUED | OUTPATIENT
Start: 2020-04-15 | End: 2020-04-15 | Stop reason: HOSPADM

## 2020-04-15 RX ORDER — SODIUM CHLORIDE 0.9 % (FLUSH) 0.9 %
10 SYRINGE (ML) INJECTION
Status: DISCONTINUED | OUTPATIENT
Start: 2020-04-15 | End: 2020-04-15 | Stop reason: HOSPADM

## 2020-04-15 RX ADMIN — PACLITAXEL 150 MG: 300 INJECTION, SOLUTION INTRAVENOUS at 02:04

## 2020-04-15 RX ADMIN — DEXAMETHASONE SODIUM PHOSPHATE 10 MG: 4 INJECTION, SOLUTION INTRAMUSCULAR; INTRAVENOUS at 01:04

## 2020-04-15 RX ADMIN — Medication 10 ML: at 03:04

## 2020-04-15 RX ADMIN — SODIUM CHLORIDE: 9 INJECTION, SOLUTION INTRAVENOUS at 01:04

## 2020-04-15 RX ADMIN — HEPARIN 500 UNITS: 100 SYRINGE at 03:04

## 2020-04-15 RX ADMIN — FAMOTIDINE 20 MG: 10 INJECTION INTRAVENOUS at 02:04

## 2020-04-15 RX ADMIN — DIPHENHYDRAMINE HYDROCHLORIDE 12.5 MG: 50 INJECTION INTRAMUSCULAR; INTRAVENOUS at 01:04

## 2020-04-16 ENCOUNTER — TELEPHONE (OUTPATIENT)
Dept: HEMATOLOGY/ONCOLOGY | Facility: CLINIC | Age: 35
End: 2020-04-16

## 2020-04-16 ENCOUNTER — INFUSION (OUTPATIENT)
Dept: INFUSION THERAPY | Facility: HOSPITAL | Age: 35
End: 2020-04-16
Attending: INTERNAL MEDICINE
Payer: COMMERCIAL

## 2020-04-16 DIAGNOSIS — C50.411 MALIGNANT NEOPLASM OF UPPER-OUTER QUADRANT OF RIGHT BREAST IN FEMALE, ESTROGEN RECEPTOR POSITIVE: Primary | ICD-10-CM

## 2020-04-16 DIAGNOSIS — Z17.0 MALIGNANT NEOPLASM OF UPPER-OUTER QUADRANT OF RIGHT BREAST IN FEMALE, ESTROGEN RECEPTOR POSITIVE: Primary | ICD-10-CM

## 2020-04-16 PROCEDURE — 96372 THER/PROPH/DIAG INJ SC/IM: CPT

## 2020-04-16 PROCEDURE — 63600175 PHARM REV CODE 636 W HCPCS: Mod: JG | Performed by: INTERNAL MEDICINE

## 2020-04-16 RX ADMIN — FILGRASTIM 480 MCG: 480 INJECTION, SOLUTION INTRAVENOUS; SUBCUTANEOUS at 08:04

## 2020-04-16 NOTE — TELEPHONE ENCOUNTER
"Call made to patient. No answer. Will discuss with Dr Ny and call back with recommendations      ----- Message from Aggie Azul sent at 4/16/2020  1:00 PM CDT -----  Contact: Yolanda  Consult/Advisory:    Name Of Caller: Yolanda  Provider Name: Dr Ny  Does patient feel the need to be seen today? No  Relationship to the Pt?: Self  Contact Preference?: [phone]  What is the nature of the call?:  Patient states she been running a fever for the past hour or so...current temp 100.8.  Please contact to discuss.    Additional Notes:  "Thank you for all that you do for our patients'"        "

## 2020-04-16 NOTE — TELEPHONE ENCOUNTER
"Patient spoke to Dr Ny about this. Counts were normal yesterday so patient will take tylenol and watch her temp   ----- Message from Gokul Ny MD sent at 4/16/2020  1:15 PM CDT -----  Contact: Yolanda  She messaged me - counts normal yesterday so she will take tylenol   ----- Message -----  From: Florida Malik RN  Sent: 4/16/2020   1:09 PM CDT  To: Gokul Ny MD    What would you recommend for Ms Win?    ----- Message -----  From: Aggie Azul  Sent: 4/16/2020   1:00 PM CDT  To: Anant [facility] Staff    Consult/Advisory:    Name Of Caller: Yolanda  Provider Name: Dr Ny  Does patient feel the need to be seen today? No  Relationship to the Pt?: Self  Contact Preference?: [phone]  What is the nature of the call?:  Patient states she been running a fever for the past hour or so...current temp 100.8.  Please contact to discuss.    Additional Notes:  "Thank you for all that you do for our patients'"            "

## 2020-04-16 NOTE — NURSING
Pt tolerated Neupogen Injection to the abdomen today. NAD. declined AVS. Uses my [facility]. Discharged home. Ambulated independently.

## 2020-04-17 ENCOUNTER — PATIENT MESSAGE (OUTPATIENT)
Dept: HEMATOLOGY/ONCOLOGY | Facility: CLINIC | Age: 35
End: 2020-04-17

## 2020-04-17 ENCOUNTER — INFUSION (OUTPATIENT)
Dept: INFUSION THERAPY | Facility: HOSPITAL | Age: 35
End: 2020-04-17
Attending: INTERNAL MEDICINE
Payer: COMMERCIAL

## 2020-04-17 DIAGNOSIS — C50.411 MALIGNANT NEOPLASM OF UPPER-OUTER QUADRANT OF RIGHT BREAST IN FEMALE, ESTROGEN RECEPTOR POSITIVE: Primary | ICD-10-CM

## 2020-04-17 DIAGNOSIS — Z17.0 MALIGNANT NEOPLASM OF UPPER-OUTER QUADRANT OF RIGHT BREAST IN FEMALE, ESTROGEN RECEPTOR POSITIVE: Primary | ICD-10-CM

## 2020-04-17 DIAGNOSIS — J32.9 RECURRENT SINUS INFECTIONS: Primary | ICD-10-CM

## 2020-04-17 PROCEDURE — 63600175 PHARM REV CODE 636 W HCPCS: Mod: JG | Performed by: INTERNAL MEDICINE

## 2020-04-17 PROCEDURE — 96372 THER/PROPH/DIAG INJ SC/IM: CPT

## 2020-04-17 RX ORDER — MOXIFLOXACIN HYDROCHLORIDE 400 MG/1
400 TABLET ORAL DAILY
Qty: 7 TABLET | Refills: 0 | Status: SHIPPED | OUTPATIENT
Start: 2020-04-17 | End: 2020-06-02

## 2020-04-17 RX ORDER — MOXIFLOXACIN HYDROCHLORIDE 400 MG/1
400 TABLET ORAL DAILY
Qty: 7 TABLET | Refills: 0 | Status: SHIPPED | OUTPATIENT
Start: 2020-04-17 | End: 2020-04-17

## 2020-04-17 RX ORDER — SODIUM CHLORIDE 0.9 % (FLUSH) 0.9 %
10 SYRINGE (ML) INJECTION
Status: CANCELLED | OUTPATIENT
Start: 2020-04-22

## 2020-04-17 RX ORDER — DOXYCYCLINE 100 MG/1
100 CAPSULE ORAL 2 TIMES DAILY
Qty: 14 CAPSULE | Refills: 0 | Status: SHIPPED | OUTPATIENT
Start: 2020-04-17 | End: 2020-06-02

## 2020-04-17 RX ORDER — EPINEPHRINE 0.3 MG/.3ML
0.3 INJECTION SUBCUTANEOUS ONCE AS NEEDED
Status: CANCELLED | OUTPATIENT
Start: 2020-04-22

## 2020-04-17 RX ORDER — FAMOTIDINE 10 MG/ML
20 INJECTION INTRAVENOUS
Status: CANCELLED | OUTPATIENT
Start: 2020-04-22

## 2020-04-17 RX ORDER — HEPARIN 100 UNIT/ML
500 SYRINGE INTRAVENOUS
Status: CANCELLED | OUTPATIENT
Start: 2020-04-22

## 2020-04-17 RX ORDER — DIPHENHYDRAMINE HYDROCHLORIDE 50 MG/ML
50 INJECTION INTRAMUSCULAR; INTRAVENOUS ONCE AS NEEDED
Status: CANCELLED | OUTPATIENT
Start: 2020-04-22

## 2020-04-17 RX ADMIN — FILGRASTIM 480 MCG: 480 INJECTION, SOLUTION INTRAVENOUS; SUBCUTANEOUS at 09:04

## 2020-04-18 ENCOUNTER — INFUSION (OUTPATIENT)
Dept: INFUSION THERAPY | Facility: HOSPITAL | Age: 35
End: 2020-04-18
Attending: INTERNAL MEDICINE
Payer: COMMERCIAL

## 2020-04-18 DIAGNOSIS — C50.411 MALIGNANT NEOPLASM OF UPPER-OUTER QUADRANT OF RIGHT BREAST IN FEMALE, ESTROGEN RECEPTOR POSITIVE: Primary | ICD-10-CM

## 2020-04-18 DIAGNOSIS — Z17.0 MALIGNANT NEOPLASM OF UPPER-OUTER QUADRANT OF RIGHT BREAST IN FEMALE, ESTROGEN RECEPTOR POSITIVE: Primary | ICD-10-CM

## 2020-04-18 PROCEDURE — 96372 THER/PROPH/DIAG INJ SC/IM: CPT

## 2020-04-18 PROCEDURE — 63600175 PHARM REV CODE 636 W HCPCS: Mod: JG | Performed by: INTERNAL MEDICINE

## 2020-04-18 RX ADMIN — FILGRASTIM 480 MCG: 480 INJECTION, SOLUTION INTRAVENOUS; SUBCUTANEOUS at 08:04

## 2020-04-20 ENCOUNTER — PATIENT OUTREACH (OUTPATIENT)
Dept: ADMINISTRATIVE | Facility: OTHER | Age: 35
End: 2020-04-20

## 2020-04-20 ENCOUNTER — PATIENT MESSAGE (OUTPATIENT)
Dept: HEMATOLOGY/ONCOLOGY | Facility: CLINIC | Age: 35
End: 2020-04-20

## 2020-04-20 ENCOUNTER — INFUSION (OUTPATIENT)
Dept: INFUSION THERAPY | Facility: HOSPITAL | Age: 35
End: 2020-04-20
Attending: INTERNAL MEDICINE
Payer: COMMERCIAL

## 2020-04-20 VITALS — BODY MASS INDEX: 29.16 KG/M2 | HEIGHT: 65 IN | WEIGHT: 175 LBS

## 2020-04-20 DIAGNOSIS — C50.411 MALIGNANT NEOPLASM OF UPPER-OUTER QUADRANT OF RIGHT BREAST IN FEMALE, ESTROGEN RECEPTOR POSITIVE: Primary | ICD-10-CM

## 2020-04-20 DIAGNOSIS — Z17.0 MALIGNANT NEOPLASM OF UPPER-OUTER QUADRANT OF RIGHT BREAST IN FEMALE, ESTROGEN RECEPTOR POSITIVE: Primary | ICD-10-CM

## 2020-04-20 PROCEDURE — 63600175 PHARM REV CODE 636 W HCPCS: Mod: JG | Performed by: INTERNAL MEDICINE

## 2020-04-20 PROCEDURE — 96372 THER/PROPH/DIAG INJ SC/IM: CPT

## 2020-04-20 RX ADMIN — FILGRASTIM 300 MCG: 300 INJECTION, SOLUTION INTRAVENOUS; SUBCUTANEOUS at 09:04

## 2020-04-21 ENCOUNTER — PATIENT MESSAGE (OUTPATIENT)
Dept: SURGERY | Facility: CLINIC | Age: 35
End: 2020-04-21

## 2020-04-21 ENCOUNTER — OFFICE VISIT (OUTPATIENT)
Dept: SURGERY | Facility: CLINIC | Age: 35
End: 2020-04-21
Payer: COMMERCIAL

## 2020-04-21 ENCOUNTER — TELEPHONE (OUTPATIENT)
Dept: HEMATOLOGY/ONCOLOGY | Facility: CLINIC | Age: 35
End: 2020-04-21

## 2020-04-21 DIAGNOSIS — C50.411 MALIGNANT NEOPLASM OF UPPER-OUTER QUADRANT OF RIGHT BREAST IN FEMALE, ESTROGEN RECEPTOR POSITIVE: Primary | ICD-10-CM

## 2020-04-21 DIAGNOSIS — Z17.0 MALIGNANT NEOPLASM OF UPPER-OUTER QUADRANT OF RIGHT BREAST IN FEMALE, ESTROGEN RECEPTOR POSITIVE: Primary | ICD-10-CM

## 2020-04-21 DIAGNOSIS — Z01.84 ANTIBODY RESPONSE EXAMINATION: ICD-10-CM

## 2020-04-21 PROCEDURE — 99213 PR OFFICE/OUTPT VISIT, EST, LEVL III, 20-29 MIN: ICD-10-PCS | Mod: 95,,, | Performed by: SURGERY

## 2020-04-21 PROCEDURE — 99213 OFFICE O/P EST LOW 20 MIN: CPT | Mod: 95,,, | Performed by: SURGERY

## 2020-04-21 NOTE — PROGRESS NOTES
The patient location is: virtual video visit  The chief complaint leading to consultation is: R breast cancer UOQ s/p pre-op chemo  Visit type: audiovisual  Total time spent with patient: 45 minutes  Each patient to whom he or she provides medical services by telemedicine is:  (1) informed of the relationship between the physician and patient and the respective role of any other health care provider with respect to management of the patient; and (2) notified that he or she may decline to receive medical services by telemedicine and may withdraw from such care at any time.    Notes:   The patient is well known to me as a 34-year-old  female diagnosed with a essentially triple negative type behaving tumor of the right breast upper outer quadrant 11:00 position for which she underwent neoadjuvant chemotherapy with Dr. Gokul Ny.  She did not present with any abnormal clinical axillary lymphadenopathy.  The mass measured approximately 3.3 cm in size on imaging but felt about 5 cm on clinical exam prior to chemotherapy in the 11 o'clock position of the right breast upper outer quadrant approximately 7 cm from the nipple..    She is due to complete her neoadjuvant preoperative chemotherapy with Dr. Ny on May 20, 2020.  Her Oncotype score had been high risk of 57.  She is BRCA 1 genetic mutation positive.  She desires bilateral mastectomies with immediate bilateral autologous tissue reconstruction with DORIAN flaps.  She has seen Dr. Kiko Aranda for plastic surgery and this case will be coordinated at [facility] for approximately June 20, 2021 month following her completion of chemotherapy.  My portion of the procedure will be a bilateral non nipple sparing, skin sparing mastectomy with right sided sentinel lymph node biopsy.  She had no pretreatment abnormalities in the left breast.  She is not interested nipple preservation.    She had had a 1.3 cm sternal cyst noted on the MRI and will repeat an MRI the  1st week of June or at the end of May following completion of her chemotherapy to assess for the clinical and radiographic response to the neoadjuvant chemotherapy in her right breast as well as to reassess the 1.3 cm sternal cyst.  The sternum had been normal on CT scan and bone scan prior to treatment with chemotherapy.    She will follow up with me 3-4 days after the MRI preoperatively to sign the informed consents as well as review the MRI.  Again, she will be consented for a bilateral skin sparing mastectomy without nipple preservation with right-sided sentinel lymph node biopsy with anticipated autologous tissue reconstruction.  Time spent with the patient today on the virtual video visit was approximately 45 min.  We were unable to perform a physical examination today since this was a video visit.  The patient states she has had a subjective complete response clinically to the chemotherapy as she can no longer feel tumor in her right breast.  Will obviously perform a physical examination at the time of the next visit following the MRI when we obtain consents and review the MRI with her.

## 2020-04-22 ENCOUNTER — OFFICE VISIT (OUTPATIENT)
Dept: PSYCHIATRY | Facility: CLINIC | Age: 35
End: 2020-04-22
Payer: COMMERCIAL

## 2020-04-22 ENCOUNTER — PATIENT MESSAGE (OUTPATIENT)
Dept: HEMATOLOGY/ONCOLOGY | Facility: CLINIC | Age: 35
End: 2020-04-22

## 2020-04-22 ENCOUNTER — INFUSION (OUTPATIENT)
Dept: INFUSION THERAPY | Facility: HOSPITAL | Age: 35
End: 2020-04-22
Payer: COMMERCIAL

## 2020-04-22 ENCOUNTER — LAB VISIT (OUTPATIENT)
Dept: LAB | Facility: HOSPITAL | Age: 35
End: 2020-04-22
Attending: INTERNAL MEDICINE
Payer: COMMERCIAL

## 2020-04-22 VITALS
DIASTOLIC BLOOD PRESSURE: 79 MMHG | SYSTOLIC BLOOD PRESSURE: 119 MMHG | HEART RATE: 76 BPM | RESPIRATION RATE: 18 BRPM | TEMPERATURE: 99 F | OXYGEN SATURATION: 98 %

## 2020-04-22 DIAGNOSIS — Z86.59 HISTORY OF POSTTRAUMATIC STRESS DISORDER (PTSD): ICD-10-CM

## 2020-04-22 DIAGNOSIS — C50.411 MALIGNANT NEOPLASM OF UPPER-OUTER QUADRANT OF RIGHT BREAST IN FEMALE, ESTROGEN RECEPTOR POSITIVE: Primary | ICD-10-CM

## 2020-04-22 DIAGNOSIS — F41.1 GENERALIZED ANXIETY DISORDER: Primary | ICD-10-CM

## 2020-04-22 DIAGNOSIS — F90.0 ADHD (ATTENTION DEFICIT HYPERACTIVITY DISORDER), INATTENTIVE TYPE: ICD-10-CM

## 2020-04-22 DIAGNOSIS — Z17.0 MALIGNANT NEOPLASM OF UPPER-OUTER QUADRANT OF RIGHT BREAST IN FEMALE, ESTROGEN RECEPTOR POSITIVE: ICD-10-CM

## 2020-04-22 DIAGNOSIS — C50.411 MALIGNANT NEOPLASM OF UPPER-OUTER QUADRANT OF RIGHT BREAST IN FEMALE, ESTROGEN RECEPTOR POSITIVE: ICD-10-CM

## 2020-04-22 DIAGNOSIS — Z87.898 HISTORY OF INSOMNIA: ICD-10-CM

## 2020-04-22 DIAGNOSIS — Z17.0 MALIGNANT NEOPLASM OF UPPER-OUTER QUADRANT OF RIGHT BREAST IN FEMALE, ESTROGEN RECEPTOR POSITIVE: Primary | ICD-10-CM

## 2020-04-22 LAB
ALBUMIN SERPL BCP-MCNC: 4.1 G/DL (ref 3.5–5.2)
ALP SERPL-CCNC: 165 U/L (ref 55–135)
ALT SERPL W/O P-5'-P-CCNC: 18 U/L (ref 10–44)
ANION GAP SERPL CALC-SCNC: 8 MMOL/L (ref 8–16)
ANISOCYTOSIS BLD QL SMEAR: SLIGHT
AST SERPL-CCNC: 22 U/L (ref 10–40)
BASOPHILS NFR BLD: 0 % (ref 0–1.9)
BILIRUB SERPL-MCNC: 0.4 MG/DL (ref 0.1–1)
BUN SERPL-MCNC: 12 MG/DL (ref 6–20)
CALCIUM SERPL-MCNC: 9.4 MG/DL (ref 8.7–10.5)
CHLORIDE SERPL-SCNC: 104 MMOL/L (ref 95–110)
CO2 SERPL-SCNC: 25 MMOL/L (ref 23–29)
CREAT SERPL-MCNC: 0.9 MG/DL (ref 0.5–1.4)
DIFFERENTIAL METHOD: ABNORMAL
EOSINOPHIL NFR BLD: 3 % (ref 0–8)
ERYTHROCYTE [DISTWIDTH] IN BLOOD BY AUTOMATED COUNT: 13.8 % (ref 11.5–14.5)
EST. GFR  (AFRICAN AMERICAN): >60 ML/MIN/1.73 M^2
EST. GFR  (NON AFRICAN AMERICAN): >60 ML/MIN/1.73 M^2
GLUCOSE SERPL-MCNC: 110 MG/DL (ref 70–110)
HCT VFR BLD AUTO: 34.7 % (ref 37–48.5)
HGB BLD-MCNC: 10.9 G/DL (ref 12–16)
IMM GRANULOCYTES # BLD AUTO: ABNORMAL K/UL (ref 0–0.04)
IMM GRANULOCYTES NFR BLD AUTO: ABNORMAL % (ref 0–0.5)
LYMPHOCYTES NFR BLD: 8 % (ref 18–48)
MCH RBC QN AUTO: 33.2 PG (ref 27–31)
MCHC RBC AUTO-ENTMCNC: 31.4 G/DL (ref 32–36)
MCV RBC AUTO: 106 FL (ref 82–98)
METAMYELOCYTES NFR BLD MANUAL: 1 %
MONOCYTES NFR BLD: 2 % (ref 4–15)
MYELOCYTES NFR BLD MANUAL: 2 %
NEUTROPHILS NFR BLD: 79 % (ref 38–73)
NEUTS BAND NFR BLD MANUAL: 5 %
NRBC BLD-RTO: 0 /100 WBC
PLATELET # BLD AUTO: 248 K/UL (ref 150–350)
PLATELET BLD QL SMEAR: ABNORMAL
PMV BLD AUTO: 9.5 FL (ref 9.2–12.9)
POLYCHROMASIA BLD QL SMEAR: ABNORMAL
POTASSIUM SERPL-SCNC: 3.9 MMOL/L (ref 3.5–5.1)
PROT SERPL-MCNC: 6.8 G/DL (ref 6–8.4)
RBC # BLD AUTO: 3.28 M/UL (ref 4–5.4)
SODIUM SERPL-SCNC: 137 MMOL/L (ref 136–145)
WBC # BLD AUTO: 12.69 K/UL (ref 3.9–12.7)

## 2020-04-22 PROCEDURE — 99214 OFFICE O/P EST MOD 30 MIN: CPT | Mod: 95,,, | Performed by: PSYCHIATRY & NEUROLOGY

## 2020-04-22 PROCEDURE — 85027 COMPLETE CBC AUTOMATED: CPT

## 2020-04-22 PROCEDURE — 36415 COLL VENOUS BLD VENIPUNCTURE: CPT

## 2020-04-22 PROCEDURE — 90833 PSYTX W PT W E/M 30 MIN: CPT | Mod: 95,,, | Performed by: PSYCHIATRY & NEUROLOGY

## 2020-04-22 PROCEDURE — 85007 BL SMEAR W/DIFF WBC COUNT: CPT

## 2020-04-22 PROCEDURE — 96413 CHEMO IV INFUSION 1 HR: CPT

## 2020-04-22 PROCEDURE — 25000003 PHARM REV CODE 250: Performed by: INTERNAL MEDICINE

## 2020-04-22 PROCEDURE — 63600175 PHARM REV CODE 636 W HCPCS: Performed by: INTERNAL MEDICINE

## 2020-04-22 PROCEDURE — 96367 TX/PROPH/DG ADDL SEQ IV INF: CPT

## 2020-04-22 PROCEDURE — 96375 TX/PRO/DX INJ NEW DRUG ADDON: CPT

## 2020-04-22 PROCEDURE — 90833 PR PSYCHOTHERAPY W/PATIENT W/E&M, 30 MIN (ADD ON): ICD-10-PCS | Mod: 95,,, | Performed by: PSYCHIATRY & NEUROLOGY

## 2020-04-22 PROCEDURE — 80053 COMPREHEN METABOLIC PANEL: CPT

## 2020-04-22 PROCEDURE — 99214 PR OFFICE/OUTPT VISIT, EST, LEVL IV, 30-39 MIN: ICD-10-PCS | Mod: 95,,, | Performed by: PSYCHIATRY & NEUROLOGY

## 2020-04-22 PROCEDURE — S0028 INJECTION, FAMOTIDINE, 20 MG: HCPCS | Performed by: INTERNAL MEDICINE

## 2020-04-22 RX ORDER — EPINEPHRINE 0.3 MG/.3ML
0.3 INJECTION SUBCUTANEOUS ONCE AS NEEDED
Status: DISCONTINUED | OUTPATIENT
Start: 2020-04-22 | End: 2020-04-22 | Stop reason: HOSPADM

## 2020-04-22 RX ORDER — HEPARIN 100 UNIT/ML
500 SYRINGE INTRAVENOUS
Status: DISCONTINUED | OUTPATIENT
Start: 2020-04-22 | End: 2020-04-22 | Stop reason: HOSPADM

## 2020-04-22 RX ORDER — DIPHENHYDRAMINE HYDROCHLORIDE 50 MG/ML
50 INJECTION INTRAMUSCULAR; INTRAVENOUS ONCE AS NEEDED
Status: DISCONTINUED | OUTPATIENT
Start: 2020-04-22 | End: 2020-04-22 | Stop reason: HOSPADM

## 2020-04-22 RX ORDER — FAMOTIDINE 10 MG/ML
20 INJECTION INTRAVENOUS
Status: COMPLETED | OUTPATIENT
Start: 2020-04-22 | End: 2020-04-22

## 2020-04-22 RX ORDER — SODIUM CHLORIDE 0.9 % (FLUSH) 0.9 %
10 SYRINGE (ML) INJECTION
Status: DISCONTINUED | OUTPATIENT
Start: 2020-04-22 | End: 2020-04-22 | Stop reason: HOSPADM

## 2020-04-22 RX ADMIN — SODIUM CHLORIDE: 9 INJECTION, SOLUTION INTRAVENOUS at 01:04

## 2020-04-22 RX ADMIN — PACLITAXEL 150 MG: 300 INJECTION, SOLUTION INTRAVENOUS at 02:04

## 2020-04-22 RX ADMIN — HEPARIN 500 UNITS: 100 SYRINGE at 03:04

## 2020-04-22 RX ADMIN — DIPHENHYDRAMINE HYDROCHLORIDE 12.5 MG: 50 INJECTION, SOLUTION INTRAMUSCULAR; INTRAVENOUS at 02:04

## 2020-04-22 RX ADMIN — FAMOTIDINE 20 MG: 10 INJECTION INTRAVENOUS at 01:04
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
## 2020-04-22 NOTE — PLAN OF CARE
Pt ambulatory to clinic without diffiuculty alone for Taxol infusion. Pleasant and talkative. Denies any sig c/o since last infusion. Reports biking and running. States just tired. Port accessed without difficulty. Good blood return with movement. Taxol infusion complete. Clarified with Dr Ny that pt is to have four days of neupogen. Added to injections schedule.  Port de accessed after flushing with heparin. Pt ambulatory from clinic in [facility].

## 2020-04-23 ENCOUNTER — INFUSION (OUTPATIENT)
Dept: INFUSION THERAPY | Facility: HOSPITAL | Age: 35
End: 2020-04-23
Attending: INTERNAL MEDICINE
Payer: COMMERCIAL

## 2020-04-23 DIAGNOSIS — Z17.0 MALIGNANT NEOPLASM OF UPPER-OUTER QUADRANT OF RIGHT BREAST IN FEMALE, ESTROGEN RECEPTOR POSITIVE: Primary | ICD-10-CM

## 2020-04-23 DIAGNOSIS — C50.411 MALIGNANT NEOPLASM OF UPPER-OUTER QUADRANT OF RIGHT BREAST IN FEMALE, ESTROGEN RECEPTOR POSITIVE: Primary | ICD-10-CM

## 2020-04-23 PROCEDURE — 96372 THER/PROPH/DIAG INJ SC/IM: CPT

## 2020-04-23 PROCEDURE — 63600175 PHARM REV CODE 636 W HCPCS: Mod: JG | Performed by: INTERNAL MEDICINE

## 2020-04-23 RX ADMIN — FILGRASTIM 480 MCG: 480 INJECTION, SOLUTION INTRAVENOUS; SUBCUTANEOUS at 08:04

## 2020-04-24 ENCOUNTER — INFUSION (OUTPATIENT)
Dept: INFUSION THERAPY | Facility: HOSPITAL | Age: 35
End: 2020-04-24
Attending: INTERNAL MEDICINE
Payer: COMMERCIAL

## 2020-04-24 DIAGNOSIS — Z17.0 MALIGNANT NEOPLASM OF UPPER-OUTER QUADRANT OF RIGHT BREAST IN FEMALE, ESTROGEN RECEPTOR POSITIVE: Primary | ICD-10-CM

## 2020-04-24 DIAGNOSIS — C50.411 MALIGNANT NEOPLASM OF UPPER-OUTER QUADRANT OF RIGHT BREAST IN FEMALE, ESTROGEN RECEPTOR POSITIVE: Primary | ICD-10-CM

## 2020-04-24 PROCEDURE — 63600175 PHARM REV CODE 636 W HCPCS: Mod: JG | Performed by: INTERNAL MEDICINE

## 2020-04-24 PROCEDURE — 96372 THER/PROPH/DIAG INJ SC/IM: CPT

## 2020-04-24 RX ADMIN — FILGRASTIM 480 MCG: 480 INJECTION, SOLUTION INTRAVENOUS; SUBCUTANEOUS at 09:04

## 2020-04-25 ENCOUNTER — INFUSION (OUTPATIENT)
Dept: INFUSION THERAPY | Facility: HOSPITAL | Age: 35
End: 2020-04-25
Attending: INTERNAL MEDICINE
Payer: COMMERCIAL

## 2020-04-25 VITALS
TEMPERATURE: 98 F | SYSTOLIC BLOOD PRESSURE: 123 MMHG | HEART RATE: 88 BPM | DIASTOLIC BLOOD PRESSURE: 92 MMHG | RESPIRATION RATE: 18 BRPM

## 2020-04-25 DIAGNOSIS — Z17.0 MALIGNANT NEOPLASM OF UPPER-OUTER QUADRANT OF RIGHT BREAST IN FEMALE, ESTROGEN RECEPTOR POSITIVE: Primary | ICD-10-CM

## 2020-04-25 DIAGNOSIS — C50.411 MALIGNANT NEOPLASM OF UPPER-OUTER QUADRANT OF RIGHT BREAST IN FEMALE, ESTROGEN RECEPTOR POSITIVE: Primary | ICD-10-CM

## 2020-04-25 PROCEDURE — 96372 THER/PROPH/DIAG INJ SC/IM: CPT

## 2020-04-25 PROCEDURE — 63600175 PHARM REV CODE 636 W HCPCS: Mod: JG | Performed by: INTERNAL MEDICINE

## 2020-04-25 RX ADMIN — FILGRASTIM 480 MCG: 480 INJECTION, SOLUTION INTRAVENOUS; SUBCUTANEOUS at 08:04

## 2020-04-27 ENCOUNTER — INFUSION (OUTPATIENT)
Dept: INFUSION THERAPY | Facility: HOSPITAL | Age: 35
End: 2020-04-27
Attending: INTERNAL MEDICINE
Payer: COMMERCIAL

## 2020-04-27 DIAGNOSIS — Z17.0 MALIGNANT NEOPLASM OF UPPER-OUTER QUADRANT OF RIGHT BREAST IN FEMALE, ESTROGEN RECEPTOR POSITIVE: Primary | ICD-10-CM

## 2020-04-27 DIAGNOSIS — C50.411 MALIGNANT NEOPLASM OF UPPER-OUTER QUADRANT OF RIGHT BREAST IN FEMALE, ESTROGEN RECEPTOR POSITIVE: Primary | ICD-10-CM

## 2020-04-27 PROCEDURE — 96372 THER/PROPH/DIAG INJ SC/IM: CPT

## 2020-04-27 PROCEDURE — 63600175 PHARM REV CODE 636 W HCPCS: Mod: JG | Performed by: INTERNAL MEDICINE

## 2020-04-27 RX ADMIN — FILGRASTIM 300 MCG: 300 INJECTION, SOLUTION INTRAVENOUS; SUBCUTANEOUS at 09:04

## 2020-04-28 ENCOUNTER — PATIENT MESSAGE (OUTPATIENT)
Dept: HEMATOLOGY/ONCOLOGY | Facility: CLINIC | Age: 35
End: 2020-04-28

## 2020-04-28 ENCOUNTER — TELEPHONE (OUTPATIENT)
Dept: HEMATOLOGY/ONCOLOGY | Facility: CLINIC | Age: 35
End: 2020-04-28

## 2020-04-28 ENCOUNTER — OFFICE VISIT (OUTPATIENT)
Dept: HEMATOLOGY/ONCOLOGY | Facility: CLINIC | Age: 35
End: 2020-04-28
Payer: COMMERCIAL

## 2020-04-28 ENCOUNTER — LAB VISIT (OUTPATIENT)
Dept: LAB | Facility: HOSPITAL | Age: 35
End: 2020-04-28
Attending: INTERNAL MEDICINE
Payer: COMMERCIAL

## 2020-04-28 DIAGNOSIS — Z17.0 MALIGNANT NEOPLASM OF UPPER-OUTER QUADRANT OF RIGHT BREAST IN FEMALE, ESTROGEN RECEPTOR POSITIVE: ICD-10-CM

## 2020-04-28 DIAGNOSIS — Z17.0 MALIGNANT NEOPLASM OF UPPER-OUTER QUADRANT OF RIGHT BREAST IN FEMALE, ESTROGEN RECEPTOR POSITIVE: Primary | ICD-10-CM

## 2020-04-28 DIAGNOSIS — C50.411 MALIGNANT NEOPLASM OF UPPER-OUTER QUADRANT OF RIGHT BREAST IN FEMALE, ESTROGEN RECEPTOR POSITIVE: ICD-10-CM

## 2020-04-28 DIAGNOSIS — C50.411 MALIGNANT NEOPLASM OF UPPER-OUTER QUADRANT OF RIGHT BREAST IN FEMALE, ESTROGEN RECEPTOR POSITIVE: Primary | ICD-10-CM

## 2020-04-28 LAB
ALBUMIN SERPL BCP-MCNC: 4.1 G/DL (ref 3.5–5.2)
ALP SERPL-CCNC: 190 U/L (ref 55–135)
ALT SERPL W/O P-5'-P-CCNC: 14 U/L (ref 10–44)
ANION GAP SERPL CALC-SCNC: 10 MMOL/L (ref 8–16)
AST SERPL-CCNC: 15 U/L (ref 10–40)
BASOPHILS # BLD AUTO: 0.19 K/UL (ref 0–0.2)
BASOPHILS NFR BLD: 0.9 % (ref 0–1.9)
BILIRUB SERPL-MCNC: 0.4 MG/DL (ref 0.1–1)
BUN SERPL-MCNC: 10 MG/DL (ref 6–20)
CALCIUM SERPL-MCNC: 9.5 MG/DL (ref 8.7–10.5)
CHLORIDE SERPL-SCNC: 106 MMOL/L (ref 95–110)
CO2 SERPL-SCNC: 25 MMOL/L (ref 23–29)
CREAT SERPL-MCNC: 0.9 MG/DL (ref 0.5–1.4)
DIFFERENTIAL METHOD: ABNORMAL
EOSINOPHIL # BLD AUTO: 0.6 K/UL (ref 0–0.5)
EOSINOPHIL NFR BLD: 3.1 % (ref 0–8)
ERYTHROCYTE [DISTWIDTH] IN BLOOD BY AUTOMATED COUNT: 13.6 % (ref 11.5–14.5)
EST. GFR  (AFRICAN AMERICAN): >60 ML/MIN/1.73 M^2
EST. GFR  (NON AFRICAN AMERICAN): >60 ML/MIN/1.73 M^2
GLUCOSE SERPL-MCNC: 97 MG/DL (ref 70–110)
HCT VFR BLD AUTO: 35.3 % (ref 37–48.5)
HGB BLD-MCNC: 11.2 G/DL (ref 12–16)
IMM GRANULOCYTES # BLD AUTO: 0.64 K/UL (ref 0–0.04)
IMM GRANULOCYTES NFR BLD AUTO: 3.1 % (ref 0–0.5)
LYMPHOCYTES # BLD AUTO: 1.9 K/UL (ref 1–4.8)
LYMPHOCYTES NFR BLD: 9 % (ref 18–48)
MCH RBC QN AUTO: 33.3 PG (ref 27–31)
MCHC RBC AUTO-ENTMCNC: 31.7 G/DL (ref 32–36)
MCV RBC AUTO: 105 FL (ref 82–98)
MONOCYTES # BLD AUTO: 0.8 K/UL (ref 0.3–1)
MONOCYTES NFR BLD: 3.7 % (ref 4–15)
NEUTROPHILS # BLD AUTO: 16.5 K/UL (ref 1.8–7.7)
NEUTROPHILS NFR BLD: 80.2 % (ref 38–73)
NRBC BLD-RTO: 0 /100 WBC
PLATELET # BLD AUTO: 228 K/UL (ref 150–350)
PMV BLD AUTO: 9.2 FL (ref 9.2–12.9)
POTASSIUM SERPL-SCNC: 4 MMOL/L (ref 3.5–5.1)
PROT SERPL-MCNC: 6.9 G/DL (ref 6–8.4)
RBC # BLD AUTO: 3.36 M/UL (ref 4–5.4)
SODIUM SERPL-SCNC: 141 MMOL/L (ref 136–145)
WBC # BLD AUTO: 20.57 K/UL (ref 3.9–12.7)

## 2020-04-28 PROCEDURE — 99213 OFFICE O/P EST LOW 20 MIN: CPT | Mod: 95,,, | Performed by: INTERNAL MEDICINE

## 2020-04-28 PROCEDURE — 36415 COLL VENOUS BLD VENIPUNCTURE: CPT

## 2020-04-28 PROCEDURE — 85025 COMPLETE CBC W/AUTO DIFF WBC: CPT

## 2020-04-28 PROCEDURE — 99213 PR OFFICE/OUTPT VISIT, EST, LEVL III, 20-29 MIN: ICD-10-PCS | Mod: 95,,, | Performed by: INTERNAL MEDICINE

## 2020-04-28 PROCEDURE — 80053 COMPREHEN METABOLIC PANEL: CPT

## 2020-04-28 RX ORDER — HEPARIN 100 UNIT/ML
500 SYRINGE INTRAVENOUS
Status: CANCELLED | OUTPATIENT
Start: 2020-04-29

## 2020-04-28 RX ORDER — SODIUM CHLORIDE 0.9 % (FLUSH) 0.9 %
10 SYRINGE (ML) INJECTION
Status: CANCELLED | OUTPATIENT
Start: 2020-04-29

## 2020-04-28 RX ORDER — DIPHENHYDRAMINE HYDROCHLORIDE 50 MG/ML
50 INJECTION INTRAMUSCULAR; INTRAVENOUS ONCE AS NEEDED
Status: CANCELLED | OUTPATIENT
Start: 2020-04-29

## 2020-04-28 RX ORDER — EPINEPHRINE 0.3 MG/.3ML
0.3 INJECTION SUBCUTANEOUS ONCE AS NEEDED
Status: CANCELLED | OUTPATIENT
Start: 2020-04-29

## 2020-04-28 RX ORDER — OXYCODONE HYDROCHLORIDE 5 MG/1
5 TABLET ORAL EVERY 8 HOURS PRN
Qty: 60 TABLET | Refills: 0 | Status: SHIPPED | OUTPATIENT
Start: 2020-04-28 | End: 2020-10-01 | Stop reason: CLARIF

## 2020-04-28 RX ORDER — FAMOTIDINE 10 MG/ML
20 INJECTION INTRAVENOUS
Status: CANCELLED | OUTPATIENT
Start: 2020-04-29

## 2020-04-28 NOTE — PROGRESS NOTES
Subjective:       Patient ID: Yolanda Win is a 34 y.o. female.    Chief Complaint: No chief complaint on file.    HPI   34 year old female, who returns for follow-up of carcinoma of the right breast.   She is receiving neoadjuvant chemotherapy and is status post 4 cycles of Adriamycin Cytoxan.  She has had 8 weekly treatments with Taxol.  That was complicated by admission for neutropenic fever after cycle 3.  Because of that G-CSF therapy was added.    The patient location is: home  The chief complaint leading to consultation is: breast cancer  Visit type: audiovisual  Total time spent with patient: 15 minutes  Each patient to whom he or she provides medical services by telemedicine is:  (1) informed of the relationship between the physician and patient and the respective role of any other health care provider with respect to management of the patient; and (2) notified that he or she may decline to receive medical services by telemedicine and may withdraw from such care at any time.    Notes:  Today she reports that she has been doing well with her chemotherapy.  She has developed some mild numbness in her left great toe.  She did have some in her fingertips but that is resolved.  Her only other complaint has been some headaches.  Appetite and bowel function has been good.  Her activity level has been fairly good as well.  She reports that she root 20 miles yesterday on her bicycle.            Breast history:  Mammogram and ultrasound on December 11th, 2019 showed right breast mass 7 cm from the nipple.  By ultrasound this mass measured 33 x 32 x 21 mm.  There was no associated axillary lymphadenopathy noted.    Right breast biopsy on December 13 showed high-grade infiltrating ductal carcinoma (histologic grade 3, nuclear grade 3, mitotic index 3) there were medullary features.  The cancer was 25% ER positive and GA and HER2 negative.  Ki-67 was 90%.    MRI showed a 5.2 cm x 2.6 cm x 4.6 cm irregularly shaped  mass with rim enhancement seen in the right breast at 11 o'clock.    CT scan of the chest abdomen and pelvis and bone scan showed no evidence of metastatic disease.    She completed 4 cycles of neoadjuvant Adriamycin Cytoxan February 13, 2020.  Review of Systems   Constitutional: Negative for appetite change and unexpected weight change.   Eyes: Negative for visual disturbance.   Respiratory: Negative for cough and shortness of breath.    Cardiovascular: Negative for chest pain.   Gastrointestinal: Negative for abdominal pain and diarrhea.   Genitourinary: Negative for frequency.   Musculoskeletal: Negative for back pain.   Skin: Negative for rash.   Neurological: Positive for numbness (Left great toe) and headaches.   Hematological: Negative for adenopathy.   Psychiatric/Behavioral: The patient is not nervous/anxious.        Objective:      Physical Exam   Constitutional: She is oriented to person, place, and time. She appears well-developed and well-nourished. No distress.   Neurological: She is alert and oriented to person, place, and time.   Psychiatric: She has a normal mood and affect. Her behavior is normal. Thought content normal.       Assessment:     CBC [place] 99[address], hemoglobin 11.2, platelet count is normal  Metabolic profile shows normal hepatic and renal function.  1. Malignant neoplasm of upper-outer quadrant of right breast in female, estrogen receptor positive        Plan:       She will continue weekly Taxol.  We will reduce her Neupogen to 2 days instead of 4.  Return in 3 weeks.

## 2020-04-28 NOTE — TELEPHONE ENCOUNTER
----- Message from Gokul Ny MD sent at 4/28/2020 10:53 AM CDT -----  Weekly taxol with cbc cmp  Needs real visit in 3 weeks

## 2020-04-29 ENCOUNTER — PATIENT MESSAGE (OUTPATIENT)
Dept: HEMATOLOGY/ONCOLOGY | Facility: CLINIC | Age: 35
End: 2020-04-29

## 2020-04-29 ENCOUNTER — CLINICAL SUPPORT (OUTPATIENT)
Dept: HEMATOLOGY/ONCOLOGY | Facility: CLINIC | Age: 35
End: 2020-04-29
Payer: COMMERCIAL

## 2020-04-29 ENCOUNTER — INFUSION (OUTPATIENT)
Dept: INFUSION THERAPY | Facility: HOSPITAL | Age: 35
End: 2020-04-29
Attending: INTERNAL MEDICINE
Payer: COMMERCIAL

## 2020-04-29 VITALS
HEART RATE: 84 BPM | RESPIRATION RATE: 18 BRPM | TEMPERATURE: 99 F | BODY MASS INDEX: 29.02 KG/M2 | WEIGHT: 174.19 LBS | SYSTOLIC BLOOD PRESSURE: 132 MMHG | DIASTOLIC BLOOD PRESSURE: 80 MMHG | HEIGHT: 65 IN

## 2020-04-29 DIAGNOSIS — Z17.0 MALIGNANT NEOPLASM OF UPPER-OUTER QUADRANT OF RIGHT BREAST IN FEMALE, ESTROGEN RECEPTOR POSITIVE: Primary | ICD-10-CM

## 2020-04-29 DIAGNOSIS — Z51.11 ENCOUNTER FOR CHEMOTHERAPY MANAGEMENT: Primary | ICD-10-CM

## 2020-04-29 DIAGNOSIS — Z13.9 SCREENING FOR UNSPECIFIED CONDITION: ICD-10-CM

## 2020-04-29 DIAGNOSIS — C50.411 MALIGNANT NEOPLASM OF UPPER-OUTER QUADRANT OF RIGHT BREAST IN FEMALE, ESTROGEN RECEPTOR POSITIVE: Primary | ICD-10-CM

## 2020-04-29 LAB — SARS-COV-2 RDRP RESP QL NAA+PROBE: NEGATIVE

## 2020-04-29 PROCEDURE — 63600175 PHARM REV CODE 636 W HCPCS: Performed by: INTERNAL MEDICINE

## 2020-04-29 PROCEDURE — 96375 TX/PRO/DX INJ NEW DRUG ADDON: CPT

## 2020-04-29 PROCEDURE — S0028 INJECTION, FAMOTIDINE, 20 MG: HCPCS | Performed by: INTERNAL MEDICINE

## 2020-04-29 PROCEDURE — U0002 COVID-19 LAB TEST NON-CDC: HCPCS

## 2020-04-29 PROCEDURE — 96413 CHEMO IV INFUSION 1 HR: CPT

## 2020-04-29 PROCEDURE — 25000003 PHARM REV CODE 250: Performed by: INTERNAL MEDICINE

## 2020-04-29 PROCEDURE — A4216 STERILE WATER/SALINE, 10 ML: HCPCS | Performed by: INTERNAL MEDICINE

## 2020-04-29 PROCEDURE — 96367 TX/PROPH/DG ADDL SEQ IV INF: CPT

## 2020-04-29 RX ORDER — DIPHENHYDRAMINE HYDROCHLORIDE 50 MG/ML
50 INJECTION INTRAMUSCULAR; INTRAVENOUS ONCE AS NEEDED
Status: DISCONTINUED | OUTPATIENT
Start: 2020-04-29 | End: 2020-04-29 | Stop reason: HOSPADM

## 2020-04-29 RX ORDER — HEPARIN 100 UNIT/ML
500 SYRINGE INTRAVENOUS
Status: DISCONTINUED | OUTPATIENT
Start: 2020-04-29 | End: 2020-04-29 | Stop reason: HOSPADM

## 2020-04-29 RX ORDER — FAMOTIDINE 10 MG/ML
20 INJECTION INTRAVENOUS
Status: COMPLETED | OUTPATIENT
Start: 2020-04-29 | End: 2020-04-29

## 2020-04-29 RX ORDER — EPINEPHRINE 0.3 MG/.3ML
0.3 INJECTION SUBCUTANEOUS ONCE AS NEEDED
Status: DISCONTINUED | OUTPATIENT
Start: 2020-04-29 | End: 2020-04-29 | Stop reason: HOSPADM

## 2020-04-29 RX ORDER — SODIUM CHLORIDE 0.9 % (FLUSH) 0.9 %
10 SYRINGE (ML) INJECTION
Status: DISCONTINUED | OUTPATIENT
Start: 2020-04-29 | End: 2020-04-29 | Stop reason: HOSPADM

## 2020-04-29 RX ADMIN — DEXAMETHASONE SODIUM PHOSPHATE 10 MG: 4 INJECTION, SOLUTION INTRA-ARTICULAR; INTRALESIONAL; INTRAMUSCULAR; INTRAVENOUS; SOFT TISSUE at 11:04

## 2020-04-29 RX ADMIN — Medication 10 ML: at 01:04

## 2020-04-29 RX ADMIN — DIPHENHYDRAMINE HYDROCHLORIDE 12.5 MG: 50 INJECTION INTRAMUSCULAR; INTRAVENOUS at 11:04

## 2020-04-29 RX ADMIN — HEPARIN 500 UNITS: 100 SYRINGE at 01:04

## 2020-04-29 RX ADMIN — SODIUM CHLORIDE: 0.9 INJECTION, SOLUTION INTRAVENOUS at 11:04

## 2020-04-29 RX ADMIN — FAMOTIDINE 20 MG: 10 INJECTION INTRAVENOUS at 11:04

## 2020-04-29 RX ADMIN — PACLITAXEL 150 MG: 300 INJECTION, SOLUTION INTRAVENOUS at 11:04

## 2020-04-29 NOTE — PLAN OF CARE
1000 pt here for taxol infusion D1C9, labs, hx, meds, allergies reviewed, pt with no complaints at this time, reclined in chair, continue to monitor

## 2020-04-29 NOTE — PROGRESS NOTES
Phoned the patient to let her know that her COVID-19 test came back negative and that, based on that result, she can proceed with treatment.        Shemar Spain, DNP, APRN, FNP-BC, AOCNP  [facility], 3rd Floor [facility] [address], [place]  [address]  [phone]

## 2020-04-29 NOTE — PROGRESS NOTES
COVID Screening specimen collected.    Negative for the following symptoms:  Fever       Cough  Shortness of breath      Difficulty breathing     GI symptoms such as diarrhea or nausea    Loss of taste      Loss of smell

## 2020-04-29 NOTE — PLAN OF CARE
1310 pt tolerated taxol infusion without issue, pt to rtc 4/30/20 for neupogen injection, no distress noted upon d/c to home

## 2020-04-30 ENCOUNTER — INFUSION (OUTPATIENT)
Dept: INFUSION THERAPY | Facility: HOSPITAL | Age: 35
End: 2020-04-30
Attending: INTERNAL MEDICINE
Payer: COMMERCIAL

## 2020-04-30 DIAGNOSIS — Z17.0 MALIGNANT NEOPLASM OF UPPER-OUTER QUADRANT OF RIGHT BREAST IN FEMALE, ESTROGEN RECEPTOR POSITIVE: ICD-10-CM

## 2020-04-30 DIAGNOSIS — Z17.0 MALIGNANT NEOPLASM OF UPPER-OUTER QUADRANT OF RIGHT BREAST IN FEMALE, ESTROGEN RECEPTOR POSITIVE: Primary | ICD-10-CM

## 2020-04-30 DIAGNOSIS — Z51.11 ENCOUNTER FOR CHEMOTHERAPY MANAGEMENT: Primary | ICD-10-CM

## 2020-04-30 DIAGNOSIS — C50.411 MALIGNANT NEOPLASM OF UPPER-OUTER QUADRANT OF RIGHT BREAST IN FEMALE, ESTROGEN RECEPTOR POSITIVE: Primary | ICD-10-CM

## 2020-04-30 DIAGNOSIS — C50.411 MALIGNANT NEOPLASM OF UPPER-OUTER QUADRANT OF RIGHT BREAST IN FEMALE, ESTROGEN RECEPTOR POSITIVE: ICD-10-CM

## 2020-04-30 PROCEDURE — 96372 THER/PROPH/DIAG INJ SC/IM: CPT

## 2020-04-30 PROCEDURE — 63600175 PHARM REV CODE 636 W HCPCS: Mod: JG | Performed by: INTERNAL MEDICINE

## 2020-04-30 RX ORDER — FAMOTIDINE 10 MG/ML
20 INJECTION INTRAVENOUS
Status: CANCELLED | OUTPATIENT
Start: 2020-05-06

## 2020-04-30 RX ORDER — EPINEPHRINE 0.3 MG/.3ML
0.3 INJECTION SUBCUTANEOUS ONCE AS NEEDED
Status: CANCELLED | OUTPATIENT
Start: 2020-05-06

## 2020-04-30 RX ORDER — DIPHENHYDRAMINE HYDROCHLORIDE 50 MG/ML
50 INJECTION INTRAMUSCULAR; INTRAVENOUS ONCE AS NEEDED
Status: CANCELLED | OUTPATIENT
Start: 2020-05-06

## 2020-04-30 RX ORDER — SODIUM CHLORIDE 0.9 % (FLUSH) 0.9 %
10 SYRINGE (ML) INJECTION
Status: CANCELLED | OUTPATIENT
Start: 2020-05-06

## 2020-04-30 RX ORDER — HEPARIN 100 UNIT/ML
500 SYRINGE INTRAVENOUS
Status: CANCELLED | OUTPATIENT
Start: 2020-05-06

## 2020-04-30 RX ADMIN — FILGRASTIM 480 MCG: 480 INJECTION, SOLUTION INTRAVENOUS; SUBCUTANEOUS at 09:04

## 2020-05-01 ENCOUNTER — INFUSION (OUTPATIENT)
Dept: INFUSION THERAPY | Facility: HOSPITAL | Age: 35
End: 2020-05-01
Attending: INTERNAL MEDICINE
Payer: COMMERCIAL

## 2020-05-01 DIAGNOSIS — C50.411 MALIGNANT NEOPLASM OF UPPER-OUTER QUADRANT OF RIGHT BREAST IN FEMALE, ESTROGEN RECEPTOR POSITIVE: Primary | ICD-10-CM

## 2020-05-01 DIAGNOSIS — Z17.0 MALIGNANT NEOPLASM OF UPPER-OUTER QUADRANT OF RIGHT BREAST IN FEMALE, ESTROGEN RECEPTOR POSITIVE: Primary | ICD-10-CM

## 2020-05-01 PROCEDURE — 63600175 PHARM REV CODE 636 W HCPCS: Mod: JG | Performed by: INTERNAL MEDICINE

## 2020-05-01 PROCEDURE — 96372 THER/PROPH/DIAG INJ SC/IM: CPT

## 2020-05-01 RX ADMIN — FILGRASTIM 480 MCG: 480 INJECTION, SOLUTION INTRAVENOUS; SUBCUTANEOUS at 09:05

## 2020-05-01 NOTE — NURSING
Patient arrived ambulatory to the unit for neupogen injection.  Patient reports feeling swollen but no other new complaints today.  Injection to abdomen tolerated well and patient discharged to home setting.

## 2020-05-03 ENCOUNTER — PATIENT MESSAGE (OUTPATIENT)
Dept: HEMATOLOGY/ONCOLOGY | Facility: CLINIC | Age: 35
End: 2020-05-03

## 2020-05-04 DIAGNOSIS — Z51.11 ENCOUNTER FOR ANTINEOPLASTIC CHEMOTHERAPY: ICD-10-CM

## 2020-05-04 DIAGNOSIS — Z13.9 SCREENING FOR UNSPECIFIED CONDITION: Primary | ICD-10-CM

## 2020-05-04 DIAGNOSIS — U07.1 COVID-19: ICD-10-CM

## 2020-05-05 ENCOUNTER — LAB VISIT (OUTPATIENT)
Dept: LAB | Facility: HOSPITAL | Age: 35
End: 2020-05-05
Attending: INTERNAL MEDICINE
Payer: COMMERCIAL

## 2020-05-05 DIAGNOSIS — C50.411 MALIGNANT NEOPLASM OF UPPER-OUTER QUADRANT OF RIGHT BREAST IN FEMALE, ESTROGEN RECEPTOR POSITIVE: ICD-10-CM

## 2020-05-05 DIAGNOSIS — Z17.0 MALIGNANT NEOPLASM OF UPPER-OUTER QUADRANT OF RIGHT BREAST IN FEMALE, ESTROGEN RECEPTOR POSITIVE: ICD-10-CM

## 2020-05-05 DIAGNOSIS — Z51.11 ENCOUNTER FOR CHEMOTHERAPY MANAGEMENT: ICD-10-CM

## 2020-05-05 LAB
ALBUMIN SERPL BCP-MCNC: 4.5 G/DL (ref 3.5–5.2)
ALP SERPL-CCNC: 150 U/L (ref 55–135)
ALT SERPL W/O P-5'-P-CCNC: 15 U/L (ref 10–44)
ANION GAP SERPL CALC-SCNC: 13 MMOL/L (ref 8–16)
AST SERPL-CCNC: 16 U/L (ref 10–40)
BILIRUB SERPL-MCNC: 0.7 MG/DL (ref 0.1–1)
BUN SERPL-MCNC: 13 MG/DL (ref 6–20)
CALCIUM SERPL-MCNC: 10.2 MG/DL (ref 8.7–10.5)
CHLORIDE SERPL-SCNC: 104 MMOL/L (ref 95–110)
CO2 SERPL-SCNC: 21 MMOL/L (ref 23–29)
CREAT SERPL-MCNC: 1 MG/DL (ref 0.5–1.4)
ERYTHROCYTE [DISTWIDTH] IN BLOOD BY AUTOMATED COUNT: 13.1 % (ref 11.5–14.5)
EST. GFR  (AFRICAN AMERICAN): >60 ML/MIN/1.73 M^2
EST. GFR  (NON AFRICAN AMERICAN): >60 ML/MIN/1.73 M^2
GLUCOSE SERPL-MCNC: 146 MG/DL (ref 70–110)
HCT VFR BLD AUTO: 39.8 % (ref 37–48.5)
HGB BLD-MCNC: 13.2 G/DL (ref 12–16)
IMM GRANULOCYTES # BLD AUTO: 0.19 K/UL (ref 0–0.04)
MCH RBC QN AUTO: 34 PG (ref 27–31)
MCHC RBC AUTO-ENTMCNC: 33.2 G/DL (ref 32–36)
MCV RBC AUTO: 103 FL (ref 82–98)
NEUTROPHILS # BLD AUTO: 1.8 K/UL (ref 1.8–7.7)
PLATELET # BLD AUTO: 297 K/UL (ref 150–350)
PMV BLD AUTO: 9.4 FL (ref 9.2–12.9)
POTASSIUM SERPL-SCNC: 4.3 MMOL/L (ref 3.5–5.1)
PROT SERPL-MCNC: 7.8 G/DL (ref 6–8.4)
RBC # BLD AUTO: 3.88 M/UL (ref 4–5.4)
SODIUM SERPL-SCNC: 138 MMOL/L (ref 136–145)
WBC # BLD AUTO: 4.09 K/UL (ref 3.9–12.7)

## 2020-05-05 PROCEDURE — 80053 COMPREHEN METABOLIC PANEL: CPT

## 2020-05-05 PROCEDURE — 36415 COLL VENOUS BLD VENIPUNCTURE: CPT

## 2020-05-05 PROCEDURE — 85027 COMPLETE CBC AUTOMATED: CPT

## 2020-05-06 ENCOUNTER — PATIENT MESSAGE (OUTPATIENT)
Dept: HEMATOLOGY/ONCOLOGY | Facility: CLINIC | Age: 35
End: 2020-05-06

## 2020-05-06 ENCOUNTER — INFUSION (OUTPATIENT)
Dept: INFUSION THERAPY | Facility: HOSPITAL | Age: 35
End: 2020-05-06
Attending: FAMILY MEDICINE
Payer: COMMERCIAL

## 2020-05-06 ENCOUNTER — PATIENT MESSAGE (OUTPATIENT)
Dept: NEUROLOGY | Facility: CLINIC | Age: 35
End: 2020-05-06

## 2020-05-06 VITALS
DIASTOLIC BLOOD PRESSURE: 79 MMHG | WEIGHT: 176.56 LBS | BODY MASS INDEX: 29.42 KG/M2 | TEMPERATURE: 99 F | RESPIRATION RATE: 18 BRPM | HEIGHT: 65 IN | HEART RATE: 95 BPM | SYSTOLIC BLOOD PRESSURE: 148 MMHG

## 2020-05-06 DIAGNOSIS — Z17.0 MALIGNANT NEOPLASM OF UPPER-OUTER QUADRANT OF RIGHT BREAST IN FEMALE, ESTROGEN RECEPTOR POSITIVE: Primary | ICD-10-CM

## 2020-05-06 DIAGNOSIS — C50.411 MALIGNANT NEOPLASM OF UPPER-OUTER QUADRANT OF RIGHT BREAST IN FEMALE, ESTROGEN RECEPTOR POSITIVE: Primary | ICD-10-CM

## 2020-05-06 PROCEDURE — 25000003 PHARM REV CODE 250: Performed by: INTERNAL MEDICINE

## 2020-05-06 PROCEDURE — S0028 INJECTION, FAMOTIDINE, 20 MG: HCPCS | Performed by: INTERNAL MEDICINE

## 2020-05-06 PROCEDURE — A4216 STERILE WATER/SALINE, 10 ML: HCPCS | Performed by: INTERNAL MEDICINE

## 2020-05-06 PROCEDURE — 96375 TX/PRO/DX INJ NEW DRUG ADDON: CPT

## 2020-05-06 PROCEDURE — 96367 TX/PROPH/DG ADDL SEQ IV INF: CPT

## 2020-05-06 PROCEDURE — 63600175 PHARM REV CODE 636 W HCPCS: Performed by: INTERNAL MEDICINE

## 2020-05-06 PROCEDURE — 96413 CHEMO IV INFUSION 1 HR: CPT

## 2020-05-06 RX ORDER — DIPHENHYDRAMINE HYDROCHLORIDE 50 MG/ML
50 INJECTION INTRAMUSCULAR; INTRAVENOUS ONCE AS NEEDED
Status: DISCONTINUED | OUTPATIENT
Start: 2020-05-06 | End: 2020-05-06 | Stop reason: HOSPADM

## 2020-05-06 RX ORDER — HEPARIN 100 UNIT/ML
500 SYRINGE INTRAVENOUS
Status: DISCONTINUED | OUTPATIENT
Start: 2020-05-06 | End: 2020-05-06 | Stop reason: HOSPADM

## 2020-05-06 RX ORDER — SODIUM CHLORIDE 0.9 % (FLUSH) 0.9 %
10 SYRINGE (ML) INJECTION
Status: DISCONTINUED | OUTPATIENT
Start: 2020-05-06 | End: 2020-05-06 | Stop reason: HOSPADM

## 2020-05-06 RX ORDER — FAMOTIDINE 10 MG/ML
20 INJECTION INTRAVENOUS
Status: COMPLETED | OUTPATIENT
Start: 2020-05-06 | End: 2020-05-06

## 2020-05-06 RX ORDER — EPINEPHRINE 0.3 MG/.3ML
0.3 INJECTION SUBCUTANEOUS ONCE AS NEEDED
Status: DISCONTINUED | OUTPATIENT
Start: 2020-05-06 | End: 2020-05-06 | Stop reason: HOSPADM

## 2020-05-06 RX ADMIN — FAMOTIDINE 20 MG: 10 INJECTION INTRAVENOUS at 01:05

## 2020-05-06 RX ADMIN — Medication 10 ML: at 03:05

## 2020-05-06 RX ADMIN — HEPARIN 500 UNITS: 100 SYRINGE at 03:05

## 2020-05-06 RX ADMIN — DEXAMETHASONE SODIUM PHOSPHATE 10 MG: 4 INJECTION, SOLUTION INTRA-ARTICULAR; INTRALESIONAL; INTRAMUSCULAR; INTRAVENOUS; SOFT TISSUE at 01:05

## 2020-05-06 RX ADMIN — PACLITAXEL 150 MG: 6 INJECTION, SOLUTION INTRAVENOUS at 02:05

## 2020-05-06 RX ADMIN — DIPHENHYDRAMINE HYDROCHLORIDE 12.5 MG: 50 INJECTION INTRAMUSCULAR; INTRAVENOUS at 01:05

## 2020-05-06 RX ADMIN — SODIUM CHLORIDE: 9 INJECTION, SOLUTION INTRAVENOUS at 01:05

## 2020-05-06 NOTE — PLAN OF CARE
1526 patient completed and tolerated treatment well, pt voiced no new complaints or concerns at this time. VSS. Pt d/c home, NAD

## 2020-05-07 ENCOUNTER — INFUSION (OUTPATIENT)
Dept: INFUSION THERAPY | Facility: HOSPITAL | Age: 35
End: 2020-05-07
Attending: INTERNAL MEDICINE
Payer: COMMERCIAL

## 2020-05-07 DIAGNOSIS — Z17.0 MALIGNANT NEOPLASM OF UPPER-OUTER QUADRANT OF RIGHT BREAST IN FEMALE, ESTROGEN RECEPTOR POSITIVE: Primary | ICD-10-CM

## 2020-05-07 DIAGNOSIS — C50.411 MALIGNANT NEOPLASM OF UPPER-OUTER QUADRANT OF RIGHT BREAST IN FEMALE, ESTROGEN RECEPTOR POSITIVE: Primary | ICD-10-CM

## 2020-05-07 PROCEDURE — 63600175 PHARM REV CODE 636 W HCPCS: Mod: JG | Performed by: INTERNAL MEDICINE

## 2020-05-07 PROCEDURE — 96372 THER/PROPH/DIAG INJ SC/IM: CPT

## 2020-05-07 RX ORDER — FAMOTIDINE 10 MG/ML
20 INJECTION INTRAVENOUS
Status: CANCELLED | OUTPATIENT
Start: 2020-05-13

## 2020-05-07 RX ORDER — SODIUM CHLORIDE 0.9 % (FLUSH) 0.9 %
10 SYRINGE (ML) INJECTION
Status: CANCELLED | OUTPATIENT
Start: 2020-05-13

## 2020-05-07 RX ORDER — EPINEPHRINE 0.3 MG/.3ML
0.3 INJECTION SUBCUTANEOUS ONCE AS NEEDED
Status: CANCELLED | OUTPATIENT
Start: 2020-05-13

## 2020-05-07 RX ORDER — HEPARIN 100 UNIT/ML
500 SYRINGE INTRAVENOUS
Status: CANCELLED | OUTPATIENT
Start: 2020-05-13

## 2020-05-07 RX ORDER — DIPHENHYDRAMINE HYDROCHLORIDE 50 MG/ML
50 INJECTION INTRAMUSCULAR; INTRAVENOUS ONCE AS NEEDED
Status: CANCELLED | OUTPATIENT
Start: 2020-05-13

## 2020-05-07 RX ADMIN — FILGRASTIM 480 MCG: 480 INJECTION, SOLUTION INTRAVENOUS; SUBCUTANEOUS at 10:05

## 2020-05-08 ENCOUNTER — INFUSION (OUTPATIENT)
Dept: INFUSION THERAPY | Facility: HOSPITAL | Age: 35
End: 2020-05-08
Attending: INTERNAL MEDICINE
Payer: COMMERCIAL

## 2020-05-08 DIAGNOSIS — Z17.0 MALIGNANT NEOPLASM OF UPPER-OUTER QUADRANT OF RIGHT BREAST IN FEMALE, ESTROGEN RECEPTOR POSITIVE: Primary | ICD-10-CM

## 2020-05-08 DIAGNOSIS — C50.411 MALIGNANT NEOPLASM OF UPPER-OUTER QUADRANT OF RIGHT BREAST IN FEMALE, ESTROGEN RECEPTOR POSITIVE: Primary | ICD-10-CM

## 2020-05-08 PROCEDURE — 96372 THER/PROPH/DIAG INJ SC/IM: CPT

## 2020-05-08 PROCEDURE — 63600175 PHARM REV CODE 636 W HCPCS: Mod: JG | Performed by: INTERNAL MEDICINE

## 2020-05-08 RX ADMIN — FILGRASTIM 480 MCG: 480 INJECTION, SOLUTION INTRAVENOUS; SUBCUTANEOUS at 09:05

## 2020-05-10 ENCOUNTER — PATIENT MESSAGE (OUTPATIENT)
Dept: HEMATOLOGY/ONCOLOGY | Facility: CLINIC | Age: 35
End: 2020-05-10

## 2020-05-11 ENCOUNTER — TELEPHONE (OUTPATIENT)
Dept: PSYCHIATRY | Facility: CLINIC | Age: 35
End: 2020-05-11

## 2020-05-11 ENCOUNTER — PATIENT MESSAGE (OUTPATIENT)
Dept: PSYCHIATRY | Facility: CLINIC | Age: 35
End: 2020-05-11

## 2020-05-11 NOTE — TELEPHONE ENCOUNTER
"Patient called to inform  she never started taking trial prescription trazodone.  States, " my treatment is really catching up to me now, more fatigue than usual so I didn't start the trazodone."    States, having double mastectomy surgery on 6/22/2020 I'm hoping reconstructive surgery soon after.    Patient wanting to know if she still needs an office visit in one month or could we schedule her 3 to 4 months out from her last visit on 4/22/2020.  Please advice-  Viviana"

## 2020-05-12 ENCOUNTER — CLINICAL SUPPORT (OUTPATIENT)
Dept: HEMATOLOGY/ONCOLOGY | Facility: CLINIC | Age: 35
End: 2020-05-12
Payer: COMMERCIAL

## 2020-05-12 ENCOUNTER — LAB VISIT (OUTPATIENT)
Dept: LAB | Facility: HOSPITAL | Age: 35
End: 2020-05-12
Payer: COMMERCIAL

## 2020-05-12 DIAGNOSIS — Z51.11 ENCOUNTER FOR ANTINEOPLASTIC CHEMOTHERAPY: ICD-10-CM

## 2020-05-12 DIAGNOSIS — C50.411 MALIGNANT NEOPLASM OF UPPER-OUTER QUADRANT OF RIGHT BREAST IN FEMALE, ESTROGEN RECEPTOR POSITIVE: ICD-10-CM

## 2020-05-12 DIAGNOSIS — Z13.9 SCREENING FOR UNSPECIFIED CONDITION: ICD-10-CM

## 2020-05-12 DIAGNOSIS — U07.1 COVID-19: ICD-10-CM

## 2020-05-12 DIAGNOSIS — Z17.0 MALIGNANT NEOPLASM OF UPPER-OUTER QUADRANT OF RIGHT BREAST IN FEMALE, ESTROGEN RECEPTOR POSITIVE: ICD-10-CM

## 2020-05-12 LAB
ALBUMIN SERPL BCP-MCNC: 4 G/DL (ref 3.5–5.2)
ALP SERPL-CCNC: 128 U/L (ref 55–135)
ALT SERPL W/O P-5'-P-CCNC: 17 U/L (ref 10–44)
ANION GAP SERPL CALC-SCNC: 8 MMOL/L (ref 8–16)
AST SERPL-CCNC: 35 U/L (ref 10–40)
BASOPHILS # BLD AUTO: 0.07 K/UL (ref 0–0.2)
BASOPHILS NFR BLD: 1.1 % (ref 0–1.9)
BILIRUB SERPL-MCNC: 0.4 MG/DL (ref 0.1–1)
BUN SERPL-MCNC: 13 MG/DL (ref 6–20)
CALCIUM SERPL-MCNC: 9.5 MG/DL (ref 8.7–10.5)
CHLORIDE SERPL-SCNC: 106 MMOL/L (ref 95–110)
CO2 SERPL-SCNC: 26 MMOL/L (ref 23–29)
CREAT SERPL-MCNC: 0.8 MG/DL (ref 0.5–1.4)
DIFFERENTIAL METHOD: ABNORMAL
EOSINOPHIL # BLD AUTO: 0.4 K/UL (ref 0–0.5)
EOSINOPHIL NFR BLD: 6 % (ref 0–8)
ERYTHROCYTE [DISTWIDTH] IN BLOOD BY AUTOMATED COUNT: 13.1 % (ref 11.5–14.5)
EST. GFR  (AFRICAN AMERICAN): >60 ML/MIN/1.73 M^2
EST. GFR  (NON AFRICAN AMERICAN): >60 ML/MIN/1.73 M^2
GLUCOSE SERPL-MCNC: 107 MG/DL (ref 70–110)
HCT VFR BLD AUTO: 36.9 % (ref 37–48.5)
HGB BLD-MCNC: 12.1 G/DL (ref 12–16)
IMM GRANULOCYTES # BLD AUTO: 0.16 K/UL (ref 0–0.04)
IMM GRANULOCYTES NFR BLD AUTO: 2.5 % (ref 0–0.5)
LYMPHOCYTES # BLD AUTO: 1.2 K/UL (ref 1–4.8)
LYMPHOCYTES NFR BLD: 18.2 % (ref 18–48)
MCH RBC QN AUTO: 33.4 PG (ref 27–31)
MCHC RBC AUTO-ENTMCNC: 32.8 G/DL (ref 32–36)
MCV RBC AUTO: 102 FL (ref 82–98)
MONOCYTES # BLD AUTO: 0.3 K/UL (ref 0.3–1)
MONOCYTES NFR BLD: 4.9 % (ref 4–15)
NEUTROPHILS # BLD AUTO: 4.3 K/UL (ref 1.8–7.7)
NEUTROPHILS NFR BLD: 67.3 % (ref 38–73)
NRBC BLD-RTO: 0 /100 WBC
PLATELET # BLD AUTO: 303 K/UL (ref 150–350)
PMV BLD AUTO: 9 FL (ref 9.2–12.9)
POTASSIUM SERPL-SCNC: 4.4 MMOL/L (ref 3.5–5.1)
PROT SERPL-MCNC: 7.2 G/DL (ref 6–8.4)
RBC # BLD AUTO: 3.62 M/UL (ref 4–5.4)
SARS-COV-2 RNA RESP QL NAA+PROBE: NOT DETECTED
SODIUM SERPL-SCNC: 140 MMOL/L (ref 136–145)
WBC # BLD AUTO: 6.37 K/UL (ref 3.9–12.7)

## 2020-05-12 PROCEDURE — 85025 COMPLETE CBC W/AUTO DIFF WBC: CPT

## 2020-05-12 PROCEDURE — 36415 COLL VENOUS BLD VENIPUNCTURE: CPT

## 2020-05-12 PROCEDURE — 80053 COMPREHEN METABOLIC PANEL: CPT

## 2020-05-12 PROCEDURE — U0002 COVID-19 LAB TEST NON-CDC: HCPCS

## 2020-05-12 NOTE — PROGRESS NOTES
COVID Screening specimen collected.    Negative for the following symptoms:  Fever       Shortness of breath      Difficulty breathing     GI symptoms such as diarrhea or nausea    Loss of taste      Loss of smell    Positive for productive cough x 3 weeks with yellow sputum and fatigue that patient feels may be due to chemo.  Pt has tested negative for COVID 2 weeks ago after cough started and has not reported symptoms to provider.  Epic message sent today.

## 2020-05-13 ENCOUNTER — TELEPHONE (OUTPATIENT)
Dept: SURGERY | Facility: CLINIC | Age: 35
End: 2020-05-13

## 2020-05-13 ENCOUNTER — INFUSION (OUTPATIENT)
Dept: INFUSION THERAPY | Facility: HOSPITAL | Age: 35
End: 2020-05-13
Attending: INTERNAL MEDICINE
Payer: COMMERCIAL

## 2020-05-13 ENCOUNTER — PATIENT MESSAGE (OUTPATIENT)
Dept: SURGERY | Facility: CLINIC | Age: 35
End: 2020-05-13

## 2020-05-13 VITALS
SYSTOLIC BLOOD PRESSURE: 123 MMHG | DIASTOLIC BLOOD PRESSURE: 76 MMHG | BODY MASS INDEX: 29.9 KG/M2 | HEIGHT: 65 IN | WEIGHT: 179.44 LBS | OXYGEN SATURATION: 97 % | RESPIRATION RATE: 18 BRPM | HEART RATE: 77 BPM

## 2020-05-13 DIAGNOSIS — Z17.0 MALIGNANT NEOPLASM OF UPPER-OUTER QUADRANT OF RIGHT BREAST IN FEMALE, ESTROGEN RECEPTOR POSITIVE: Primary | ICD-10-CM

## 2020-05-13 DIAGNOSIS — C50.411 MALIGNANT NEOPLASM OF UPPER-OUTER QUADRANT OF RIGHT BREAST IN FEMALE, ESTROGEN RECEPTOR POSITIVE: Primary | ICD-10-CM

## 2020-05-13 DIAGNOSIS — J20.2 ACUTE BRONCHITIS DUE TO STREPTOCOCCUS: Primary | ICD-10-CM

## 2020-05-13 PROCEDURE — S0028 INJECTION, FAMOTIDINE, 20 MG: HCPCS | Performed by: INTERNAL MEDICINE

## 2020-05-13 PROCEDURE — 63600175 PHARM REV CODE 636 W HCPCS: Performed by: INTERNAL MEDICINE

## 2020-05-13 PROCEDURE — 96375 TX/PRO/DX INJ NEW DRUG ADDON: CPT

## 2020-05-13 PROCEDURE — 96413 CHEMO IV INFUSION 1 HR: CPT

## 2020-05-13 PROCEDURE — 25000003 PHARM REV CODE 250: Performed by: INTERNAL MEDICINE

## 2020-05-13 PROCEDURE — 96367 TX/PROPH/DG ADDL SEQ IV INF: CPT

## 2020-05-13 PROCEDURE — A4216 STERILE WATER/SALINE, 10 ML: HCPCS | Performed by: INTERNAL MEDICINE

## 2020-05-13 RX ORDER — AZITHROMYCIN 250 MG/1
TABLET, FILM COATED ORAL
Qty: 6 TABLET | Refills: 0 | Status: SHIPPED | OUTPATIENT
Start: 2020-05-13 | End: 2020-05-19

## 2020-05-13 RX ORDER — SODIUM CHLORIDE 0.9 % (FLUSH) 0.9 %
10 SYRINGE (ML) INJECTION
Status: DISCONTINUED | OUTPATIENT
Start: 2020-05-13 | End: 2020-05-13 | Stop reason: HOSPADM

## 2020-05-13 RX ORDER — HEPARIN 100 UNIT/ML
500 SYRINGE INTRAVENOUS
Status: DISCONTINUED | OUTPATIENT
Start: 2020-05-13 | End: 2020-05-13 | Stop reason: HOSPADM

## 2020-05-13 RX ORDER — EPINEPHRINE 0.3 MG/.3ML
0.3 INJECTION SUBCUTANEOUS ONCE AS NEEDED
Status: DISCONTINUED | OUTPATIENT
Start: 2020-05-13 | End: 2020-05-13 | Stop reason: HOSPADM

## 2020-05-13 RX ORDER — DIPHENHYDRAMINE HYDROCHLORIDE 50 MG/ML
50 INJECTION INTRAMUSCULAR; INTRAVENOUS ONCE AS NEEDED
Status: DISCONTINUED | OUTPATIENT
Start: 2020-05-13 | End: 2020-05-13 | Stop reason: HOSPADM

## 2020-05-13 RX ORDER — FAMOTIDINE 10 MG/ML
20 INJECTION INTRAVENOUS
Status: COMPLETED | OUTPATIENT
Start: 2020-05-13 | End: 2020-05-13

## 2020-05-13 RX ADMIN — FAMOTIDINE 20 MG: 10 INJECTION INTRAVENOUS at 02:05

## 2020-05-13 RX ADMIN — Medication 10 ML: at 03:05

## 2020-05-13 RX ADMIN — DIPHENHYDRAMINE HYDROCHLORIDE 12.5 MG: 50 INJECTION, SOLUTION INTRAMUSCULAR; INTRAVENOUS at 01:05

## 2020-05-13 RX ADMIN — HEPARIN 500 UNITS: 100 SYRINGE at 03:05

## 2020-05-13 RX ADMIN — SODIUM CHLORIDE: 0.9 INJECTION, SOLUTION INTRAVENOUS at 01:05

## 2020-05-13 RX ADMIN — PACLITAXEL 150 MG: 6 INJECTION, SOLUTION INTRAVENOUS at 02:05

## 2020-05-13 RX ADMIN — DEXAMETHASONE SODIUM PHOSPHATE 10 MG: 4 INJECTION, SOLUTION INTRA-ARTICULAR; INTRALESIONAL; INTRAMUSCULAR; INTRAVENOUS; SOFT TISSUE at 01:05

## 2020-05-13 NOTE — TELEPHONE ENCOUNTER
Left message with , Angi, for Lorena to call me back at my direct number to discuss picking a surgery date for patient.     ----- Message from Kary Russ MA sent at 5/13/2020 10:29 AM CDT -----  Contact: Lorena Aranda office @ [phone]      ----- Message -----  From: Riki France  Sent: 5/13/2020  10:01 AM CDT  To: [facility]ria BENDER Staff    Caller calling to schedule surgery, pls call

## 2020-05-13 NOTE — PLAN OF CARE
Pt tolerated C11D1 taxol without adverse effects. VSS. Verbalized understanding of RTC date tomorrow for injection. DC home ambulating independently.

## 2020-05-14 ENCOUNTER — INFUSION (OUTPATIENT)
Dept: INFUSION THERAPY | Facility: HOSPITAL | Age: 35
End: 2020-05-14
Attending: INTERNAL MEDICINE
Payer: COMMERCIAL

## 2020-05-14 DIAGNOSIS — C50.411 MALIGNANT NEOPLASM OF UPPER-OUTER QUADRANT OF RIGHT BREAST IN FEMALE, ESTROGEN RECEPTOR POSITIVE: Primary | ICD-10-CM

## 2020-05-14 DIAGNOSIS — Z17.0 MALIGNANT NEOPLASM OF UPPER-OUTER QUADRANT OF RIGHT BREAST IN FEMALE, ESTROGEN RECEPTOR POSITIVE: Primary | ICD-10-CM

## 2020-05-14 PROCEDURE — 63600175 PHARM REV CODE 636 W HCPCS: Mod: JG | Performed by: INTERNAL MEDICINE

## 2020-05-14 PROCEDURE — 96372 THER/PROPH/DIAG INJ SC/IM: CPT

## 2020-05-14 RX ADMIN — FILGRASTIM 480 MCG: 480 INJECTION, SOLUTION INTRAVENOUS; SUBCUTANEOUS at 10:05

## 2020-05-15 ENCOUNTER — INFUSION (OUTPATIENT)
Dept: INFUSION THERAPY | Facility: HOSPITAL | Age: 35
End: 2020-05-15
Attending: INTERNAL MEDICINE
Payer: COMMERCIAL

## 2020-05-15 DIAGNOSIS — Z17.0 MALIGNANT NEOPLASM OF UPPER-OUTER QUADRANT OF RIGHT BREAST IN FEMALE, ESTROGEN RECEPTOR POSITIVE: Primary | ICD-10-CM

## 2020-05-15 DIAGNOSIS — C50.411 MALIGNANT NEOPLASM OF UPPER-OUTER QUADRANT OF RIGHT BREAST IN FEMALE, ESTROGEN RECEPTOR POSITIVE: Primary | ICD-10-CM

## 2020-05-15 PROCEDURE — 63600175 PHARM REV CODE 636 W HCPCS: Mod: JG | Performed by: INTERNAL MEDICINE

## 2020-05-15 PROCEDURE — 96372 THER/PROPH/DIAG INJ SC/IM: CPT

## 2020-05-15 RX ADMIN — FILGRASTIM 480 MCG: 480 INJECTION, SOLUTION INTRAVENOUS; SUBCUTANEOUS at 09:05

## 2020-05-17 DIAGNOSIS — T45.1X5A CHEMOTHERAPY-INDUCED NEUROPATHY: Primary | ICD-10-CM

## 2020-05-17 DIAGNOSIS — G62.0 CHEMOTHERAPY-INDUCED NEUROPATHY: Primary | ICD-10-CM

## 2020-05-17 RX ORDER — GABAPENTIN 300 MG/1
300 CAPSULE ORAL 3 TIMES DAILY
Qty: 90 CAPSULE | Refills: 2 | Status: SHIPPED | OUTPATIENT
Start: 2020-05-17 | End: 2020-06-02

## 2020-05-18 ENCOUNTER — LAB VISIT (OUTPATIENT)
Dept: LAB | Facility: HOSPITAL | Age: 35
End: 2020-05-18
Attending: INTERNAL MEDICINE
Payer: COMMERCIAL

## 2020-05-18 DIAGNOSIS — Z17.0 MALIGNANT NEOPLASM OF UPPER-OUTER QUADRANT OF RIGHT BREAST IN FEMALE, ESTROGEN RECEPTOR POSITIVE: ICD-10-CM

## 2020-05-18 DIAGNOSIS — C50.411 MALIGNANT NEOPLASM OF UPPER-OUTER QUADRANT OF RIGHT BREAST IN FEMALE, ESTROGEN RECEPTOR POSITIVE: ICD-10-CM

## 2020-05-18 PROBLEM — G62.0 CHEMOTHERAPY-INDUCED NEUROPATHY: Status: ACTIVE | Noted: 2020-05-18

## 2020-05-18 PROBLEM — T45.1X5A CHEMOTHERAPY-INDUCED NEUROPATHY: Status: ACTIVE | Noted: 2020-05-18

## 2020-05-18 LAB
ALBUMIN SERPL BCP-MCNC: 4.1 G/DL (ref 3.5–5.2)
ALP SERPL-CCNC: 139 U/L (ref 55–135)
ALT SERPL W/O P-5'-P-CCNC: 16 U/L (ref 10–44)
ANION GAP SERPL CALC-SCNC: 7 MMOL/L (ref 8–16)
AST SERPL-CCNC: 14 U/L (ref 10–40)
BILIRUB SERPL-MCNC: 0.5 MG/DL (ref 0.1–1)
BUN SERPL-MCNC: 16 MG/DL (ref 6–20)
CALCIUM SERPL-MCNC: 10 MG/DL (ref 8.7–10.5)
CHLORIDE SERPL-SCNC: 107 MMOL/L (ref 95–110)
CO2 SERPL-SCNC: 24 MMOL/L (ref 23–29)
CREAT SERPL-MCNC: 0.8 MG/DL (ref 0.5–1.4)
ERYTHROCYTE [DISTWIDTH] IN BLOOD BY AUTOMATED COUNT: 12.8 % (ref 11.5–14.5)
EST. GFR  (AFRICAN AMERICAN): >60 ML/MIN/1.73 M^2
EST. GFR  (NON AFRICAN AMERICAN): >60 ML/MIN/1.73 M^2
GLUCOSE SERPL-MCNC: 105 MG/DL (ref 70–110)
HCT VFR BLD AUTO: 40.1 % (ref 37–48.5)
HGB BLD-MCNC: 13 G/DL (ref 12–16)
IMM GRANULOCYTES # BLD AUTO: 0.19 K/UL (ref 0–0.04)
MCH RBC QN AUTO: 33.2 PG (ref 27–31)
MCHC RBC AUTO-ENTMCNC: 32.4 G/DL (ref 32–36)
MCV RBC AUTO: 103 FL (ref 82–98)
NEUTROPHILS # BLD AUTO: 3.6 K/UL (ref 1.8–7.7)
PLATELET # BLD AUTO: 336 K/UL (ref 150–350)
PMV BLD AUTO: 9 FL (ref 9.2–12.9)
POTASSIUM SERPL-SCNC: 4.3 MMOL/L (ref 3.5–5.1)
PROT SERPL-MCNC: 7.4 G/DL (ref 6–8.4)
RBC # BLD AUTO: 3.91 M/UL (ref 4–5.4)
SODIUM SERPL-SCNC: 138 MMOL/L (ref 136–145)
WBC # BLD AUTO: 6.04 K/UL (ref 3.9–12.7)

## 2020-05-18 PROCEDURE — 36415 COLL VENOUS BLD VENIPUNCTURE: CPT

## 2020-05-18 PROCEDURE — 85027 COMPLETE CBC AUTOMATED: CPT

## 2020-05-18 PROCEDURE — 80053 COMPREHEN METABOLIC PANEL: CPT

## 2020-05-18 NOTE — PROGRESS NOTES
Subjective:       Patient ID: Yolanda Win is a 34 y.o. female.    Chief Complaint: No chief complaint on file.    HPI 34 year old female, who returns for follow-up of carcinoma of the right breast.   She is receiving neoadjuvant chemotherapy and is status post 4 cycles of Adriamycin Cytoxan.  She has had 11 weekly treatments with Taxol.  That was complicated by admission for neutropenic fever after cycle 3.  Because of that G-CSF therapy was added.    Today she reports that her major problem has been some seen sacral area with some numbness radiating down her left leg.  That was especially bothersome over the weekend today the numbness has resolved.  She only has residual numbness in her left great toe.  She has no symptoms in her right leg.  Appetite has been good.  She has had some diarrhea since starting Neurontin several days ago.  She has a chronic cough but has had no fever.  That did improve after recent course of antibiotics.      Breast history:  Mammogram and ultrasound on December 11th, 2019 showed right breast mass 7 cm from the nipple.  By ultrasound this mass measured 33 x 32 x 21 mm.  There was no associated axillary lymphadenopathy noted.    Right breast biopsy on December 13 showed high-grade infiltrating ductal carcinoma (histologic grade 3, nuclear grade 3, mitotic index 3) there were medullary features.  The cancer was 25% ER positive and AL and HER2 negative.  Ki-67 was 90%.    MRI showed a 5.2 cm x 2.6 cm x 4.6 cm irregularly shaped mass with rim enhancement seen in the right breast at 11 o'clock.    CT scan of the chest abdomen and pelvis and bone scan showed no evidence of metastatic disease.    She completed 4 cycles of neoadjuvant Adriamycin Cytoxan February 13, 2020.  Review of Systems   Constitutional: Positive for activity change (Some decrease over the last week). Negative for appetite change, fever and unexpected weight change.   Eyes: Negative for visual disturbance.    Respiratory: Positive for cough. Negative for shortness of breath.    Cardiovascular: Negative for chest pain.   Gastrointestinal: Negative for abdominal pain and diarrhea.   Genitourinary: Negative for frequency.   Musculoskeletal: Positive for back pain.        Some sacral discomfort   Skin: Negative for rash.   Neurological: Positive for numbness (Left great toe). Negative for headaches.   Hematological: Negative for adenopathy.   Psychiatric/Behavioral: The patient is not nervous/anxious.        Objective:      Physical Exam   Constitutional: She is oriented to person, place, and time. She appears well-developed and well-nourished. No distress.   HENT:   Head: Normocephalic.   Eyes: Pupils are equal, round, and reactive to light. Conjunctivae are normal. No scleral icterus.   Cardiovascular: Normal rate and regular rhythm.   Pulmonary/Chest: Effort normal and breath sounds normal. She has no wheezes. She has no rales. Right breast exhibits no mass, no nipple discharge and no skin change. Left breast exhibits no mass, no nipple discharge and no skin change.   Abdominal: Soft. She exhibits no mass. There is no tenderness.   Lymphadenopathy:     She has no cervical adenopathy.     She has no axillary adenopathy.        Right: No supraclavicular adenopathy present.        Left: No supraclavicular adenopathy present.   Neurological: She is alert and oriented to person, place, and time.   Skin: No rash noted.   Psychiatric: She has a normal mood and affect. Her behavior is normal. Thought content normal.   Vitals reviewed.      Assessment:     CBC shows a white blood cell count 6040, hemoglobin 13, platelet count 336,000, metabolic profile is unremarkable  1. Malignant neoplasm of upper-outer quadrant of right breast in female, estrogen receptor positive    2. Chemotherapy-induced neuropathy        Plan:       She will complete her Taxol this week.  She has MRI in surgical follow-up scheduled for the end of May and  in early June.  Return in 3 weeks.

## 2020-05-19 ENCOUNTER — OFFICE VISIT (OUTPATIENT)
Dept: HEMATOLOGY/ONCOLOGY | Facility: CLINIC | Age: 35
End: 2020-05-19
Payer: COMMERCIAL

## 2020-05-19 VITALS
HEART RATE: 91 BPM | BODY MASS INDEX: 29.53 KG/M2 | HEIGHT: 65 IN | OXYGEN SATURATION: 98 % | WEIGHT: 177.25 LBS | RESPIRATION RATE: 18 BRPM | SYSTOLIC BLOOD PRESSURE: 136 MMHG | DIASTOLIC BLOOD PRESSURE: 97 MMHG

## 2020-05-19 DIAGNOSIS — T45.1X5A CHEMOTHERAPY-INDUCED NEUROPATHY: ICD-10-CM

## 2020-05-19 DIAGNOSIS — Z17.0 MALIGNANT NEOPLASM OF UPPER-OUTER QUADRANT OF RIGHT BREAST IN FEMALE, ESTROGEN RECEPTOR POSITIVE: Primary | ICD-10-CM

## 2020-05-19 DIAGNOSIS — G62.0 CHEMOTHERAPY-INDUCED NEUROPATHY: ICD-10-CM

## 2020-05-19 DIAGNOSIS — C50.411 MALIGNANT NEOPLASM OF UPPER-OUTER QUADRANT OF RIGHT BREAST IN FEMALE, ESTROGEN RECEPTOR POSITIVE: Primary | ICD-10-CM

## 2020-05-19 PROCEDURE — 99999 PR PBB SHADOW E&M-EST. PATIENT-LVL III: CPT | Mod: PBBFAC,,, | Performed by: INTERNAL MEDICINE

## 2020-05-19 PROCEDURE — 99999 PR PBB SHADOW E&M-EST. PATIENT-LVL III: ICD-10-PCS | Mod: PBBFAC,,, | Performed by: INTERNAL MEDICINE

## 2020-05-19 PROCEDURE — 99214 OFFICE O/P EST MOD 30 MIN: CPT | Mod: S$GLB,,, | Performed by: INTERNAL MEDICINE

## 2020-05-19 PROCEDURE — 99214 PR OFFICE/OUTPT VISIT, EST, LEVL IV, 30-39 MIN: ICD-10-PCS | Mod: S$GLB,,, | Performed by: INTERNAL MEDICINE

## 2020-05-19 PROCEDURE — 3008F PR BODY MASS INDEX (BMI) DOCUMENTED: ICD-10-PCS | Mod: CPTII,S$GLB,, | Performed by: INTERNAL MEDICINE

## 2020-05-19 PROCEDURE — 3008F BODY MASS INDEX DOCD: CPT | Mod: CPTII,S$GLB,, | Performed by: INTERNAL MEDICINE

## 2020-05-19 RX ORDER — DIPHENHYDRAMINE HYDROCHLORIDE 50 MG/ML
50 INJECTION INTRAMUSCULAR; INTRAVENOUS ONCE AS NEEDED
Status: CANCELLED | OUTPATIENT
Start: 2020-05-20

## 2020-05-19 RX ORDER — HEPARIN 100 UNIT/ML
500 SYRINGE INTRAVENOUS
Status: CANCELLED | OUTPATIENT
Start: 2020-05-20

## 2020-05-19 RX ORDER — EPINEPHRINE 0.3 MG/.3ML
0.3 INJECTION SUBCUTANEOUS ONCE AS NEEDED
Status: CANCELLED | OUTPATIENT
Start: 2020-05-20

## 2020-05-19 RX ORDER — SODIUM CHLORIDE 0.9 % (FLUSH) 0.9 %
10 SYRINGE (ML) INJECTION
Status: CANCELLED | OUTPATIENT
Start: 2020-05-20

## 2020-05-19 RX ORDER — FAMOTIDINE 10 MG/ML
20 INJECTION INTRAVENOUS
Status: CANCELLED | OUTPATIENT
Start: 2020-05-20

## 2020-05-19 NOTE — Clinical Note
Complete chemotherapy weekly Taxol this weekCBC on May 27thSee me in 3 weeks with CBC CMP and echocardiogram

## 2020-05-20 ENCOUNTER — INFUSION (OUTPATIENT)
Dept: INFUSION THERAPY | Facility: HOSPITAL | Age: 35
End: 2020-05-20
Attending: FAMILY MEDICINE
Payer: COMMERCIAL

## 2020-05-20 VITALS
TEMPERATURE: 99 F | DIASTOLIC BLOOD PRESSURE: 79 MMHG | SYSTOLIC BLOOD PRESSURE: 121 MMHG | HEART RATE: 88 BPM | RESPIRATION RATE: 18 BRPM

## 2020-05-20 DIAGNOSIS — Z17.0 MALIGNANT NEOPLASM OF UPPER-OUTER QUADRANT OF RIGHT BREAST IN FEMALE, ESTROGEN RECEPTOR POSITIVE: Primary | ICD-10-CM

## 2020-05-20 DIAGNOSIS — C50.411 MALIGNANT NEOPLASM OF UPPER-OUTER QUADRANT OF RIGHT BREAST IN FEMALE, ESTROGEN RECEPTOR POSITIVE: Primary | ICD-10-CM

## 2020-05-20 PROCEDURE — S0028 INJECTION, FAMOTIDINE, 20 MG: HCPCS | Performed by: INTERNAL MEDICINE

## 2020-05-20 PROCEDURE — 63600175 PHARM REV CODE 636 W HCPCS: Performed by: INTERNAL MEDICINE

## 2020-05-20 PROCEDURE — 96375 TX/PRO/DX INJ NEW DRUG ADDON: CPT

## 2020-05-20 PROCEDURE — 96413 CHEMO IV INFUSION 1 HR: CPT

## 2020-05-20 PROCEDURE — 96367 TX/PROPH/DG ADDL SEQ IV INF: CPT

## 2020-05-20 PROCEDURE — 25000003 PHARM REV CODE 250: Performed by: INTERNAL MEDICINE

## 2020-05-20 RX ORDER — FAMOTIDINE 10 MG/ML
20 INJECTION INTRAVENOUS
Status: COMPLETED | OUTPATIENT
Start: 2020-05-20 | End: 2020-05-20

## 2020-05-20 RX ORDER — DIPHENHYDRAMINE HYDROCHLORIDE 50 MG/ML
50 INJECTION INTRAMUSCULAR; INTRAVENOUS ONCE AS NEEDED
Status: DISCONTINUED | OUTPATIENT
Start: 2020-05-20 | End: 2020-05-20 | Stop reason: HOSPADM

## 2020-05-20 RX ORDER — HEPARIN 100 UNIT/ML
500 SYRINGE INTRAVENOUS
Status: DISCONTINUED | OUTPATIENT
Start: 2020-05-20 | End: 2020-05-20 | Stop reason: HOSPADM

## 2020-05-20 RX ORDER — EPINEPHRINE 0.3 MG/.3ML
0.3 INJECTION SUBCUTANEOUS ONCE AS NEEDED
Status: DISCONTINUED | OUTPATIENT
Start: 2020-05-20 | End: 2020-05-20 | Stop reason: HOSPADM

## 2020-05-20 RX ORDER — SODIUM CHLORIDE 0.9 % (FLUSH) 0.9 %
10 SYRINGE (ML) INJECTION
Status: DISCONTINUED | OUTPATIENT
Start: 2020-05-20 | End: 2020-05-20 | Stop reason: HOSPADM

## 2020-05-20 RX ADMIN — DIPHENHYDRAMINE HYDROCHLORIDE 12.5 MG: 50 INJECTION, SOLUTION INTRAMUSCULAR; INTRAVENOUS at 01:05

## 2020-05-20 RX ADMIN — SODIUM CHLORIDE: 9 INJECTION, SOLUTION INTRAVENOUS at 01:05

## 2020-05-20 RX ADMIN — PACLITAXEL 150 MG: 6 INJECTION, SOLUTION INTRAVENOUS at 02:05

## 2020-05-20 RX ADMIN — DEXAMETHASONE SODIUM PHOSPHATE 10 MG: 4 INJECTION, SOLUTION INTRA-ARTICULAR; INTRALESIONAL; INTRAMUSCULAR; INTRAVENOUS; SOFT TISSUE at 01:05

## 2020-05-20 RX ADMIN — FAMOTIDINE 20 MG: 10 INJECTION INTRAVENOUS at 01:05

## 2020-05-20 RX ADMIN — HEPARIN SODIUM (PORCINE) LOCK FLUSH IV SOLN 100 UNIT/ML 500 UNITS: 100 SOLUTION at 03:05

## 2020-05-20 NOTE — PLAN OF CARE
Pt received Taxol; tolerated well. VSS and NAD. Pt instructed to call MD with any concerns. Pt discharged home independently.       Problem: Anemia (Chemotherapy Effects)  Goal: Anemia Symptom Improvement  Outcome: Ongoing, Progressing     Problem: Adult Inpatient Plan of Care  Goal: Plan of Care Review  Outcome: Ongoing, Progressing     Problem: Adult Inpatient Plan of Care  Goal: Patient-Specific Goal (Individualization)  Outcome: Ongoing, Progressing     Problem: Adult Inpatient Plan of Care  Goal: Absence of Hospital-Acquired Illness or Injury  Outcome: Ongoing, Progressing

## 2020-05-21 DIAGNOSIS — Z01.84 ANTIBODY RESPONSE EXAMINATION: ICD-10-CM

## 2020-05-22 ENCOUNTER — HOSPITAL ENCOUNTER (OUTPATIENT)
Dept: RADIOLOGY | Facility: HOSPITAL | Age: 35
Discharge: HOME | End: 2020-05-22
Attending: SPECIALIST
Payer: COMMERCIAL

## 2020-05-22 DIAGNOSIS — C50.919 BREAST CANCER: ICD-10-CM

## 2020-05-22 PROCEDURE — 25500020 PHARM REV CODE 255: Performed by: SPECIALIST

## 2020-05-22 PROCEDURE — 74174 CTA ABD&PLVS W/CONTRAST: CPT | Mod: TC

## 2020-05-22 PROCEDURE — 74174 CTA ABDOMEN AND PELVIS: ICD-10-PCS | Mod: 26,,, | Performed by: RADIOLOGY

## 2020-05-22 PROCEDURE — 74174 CTA ABD&PLVS W/CONTRAST: CPT | Mod: 26,,, | Performed by: RADIOLOGY

## 2020-05-22 RX ADMIN — IOHEXOL 125 ML: 350 INJECTION, SOLUTION INTRAVENOUS at 05:05

## 2020-05-25 ENCOUNTER — PATIENT MESSAGE (OUTPATIENT)
Dept: PSYCHIATRY | Facility: CLINIC | Age: 35
End: 2020-05-25

## 2020-05-25 ENCOUNTER — HOSPITAL ENCOUNTER (OUTPATIENT)
Dept: RADIOLOGY | Facility: HOSPITAL | Age: 35
Discharge: HOME | End: 2020-05-25
Attending: SURGERY
Payer: COMMERCIAL

## 2020-05-25 DIAGNOSIS — C50.411 MALIGNANT NEOPLASM OF UPPER-OUTER QUADRANT OF RIGHT BREAST IN FEMALE, ESTROGEN RECEPTOR POSITIVE: ICD-10-CM

## 2020-05-25 DIAGNOSIS — Z17.0 MALIGNANT NEOPLASM OF UPPER-OUTER QUADRANT OF RIGHT BREAST IN FEMALE, ESTROGEN RECEPTOR POSITIVE: ICD-10-CM

## 2020-05-25 PROCEDURE — 25500020 PHARM REV CODE 255: Performed by: SURGERY

## 2020-05-25 PROCEDURE — 77049 MRI BREAST W/WO CONTRAST, W/CAD, BILATERAL: ICD-10-PCS | Mod: 26,,, | Performed by: RADIOLOGY

## 2020-05-25 PROCEDURE — A9577 INJ MULTIHANCE: HCPCS | Performed by: SURGERY

## 2020-05-25 PROCEDURE — 77049 MRI BREAST C-+ W/CAD BI: CPT | Mod: TC

## 2020-05-25 PROCEDURE — 77049 MRI BREAST C-+ W/CAD BI: CPT | Mod: 26,,, | Performed by: RADIOLOGY

## 2020-05-25 RX ORDER — ALPRAZOLAM 0.25 MG/1
0.25 TABLET ORAL DAILY PRN
Qty: 20 TABLET | Refills: 0 | Status: SHIPPED | OUTPATIENT
Start: 2020-05-25 | End: 2020-08-03 | Stop reason: SDUPTHER

## 2020-05-25 RX ADMIN — GADOBENATE DIMEGLUMINE 18 ML: 529 INJECTION, SOLUTION INTRAVENOUS at 02:05

## 2020-05-26 ENCOUNTER — PATIENT MESSAGE (OUTPATIENT)
Dept: SURGERY | Facility: CLINIC | Age: 35
End: 2020-05-26

## 2020-05-27 ENCOUNTER — LAB VISIT (OUTPATIENT)
Dept: LAB | Facility: HOSPITAL | Age: 35
End: 2020-05-27
Attending: INTERNAL MEDICINE
Payer: COMMERCIAL

## 2020-05-27 DIAGNOSIS — Z17.0 MALIGNANT NEOPLASM OF UPPER-OUTER QUADRANT OF RIGHT BREAST IN FEMALE, ESTROGEN RECEPTOR POSITIVE: ICD-10-CM

## 2020-05-27 DIAGNOSIS — C50.411 MALIGNANT NEOPLASM OF UPPER-OUTER QUADRANT OF RIGHT BREAST IN FEMALE, ESTROGEN RECEPTOR POSITIVE: ICD-10-CM

## 2020-05-27 LAB
BASOPHILS # BLD AUTO: 0.08 K/UL (ref 0–0.2)
BASOPHILS NFR BLD: 2.4 % (ref 0–1.9)
DIFFERENTIAL METHOD: ABNORMAL
EOSINOPHIL # BLD AUTO: 0.3 K/UL (ref 0–0.5)
EOSINOPHIL NFR BLD: 9.8 % (ref 0–8)
ERYTHROCYTE [DISTWIDTH] IN BLOOD BY AUTOMATED COUNT: 12.7 % (ref 11.5–14.5)
HCT VFR BLD AUTO: 34.3 % (ref 37–48.5)
HGB BLD-MCNC: 11 G/DL (ref 12–16)
IMM GRANULOCYTES # BLD AUTO: 0.02 K/UL (ref 0–0.04)
IMM GRANULOCYTES NFR BLD AUTO: 0.6 % (ref 0–0.5)
LYMPHOCYTES # BLD AUTO: 1 K/UL (ref 1–4.8)
LYMPHOCYTES NFR BLD: 30.2 % (ref 18–48)
MCH RBC QN AUTO: 32.6 PG (ref 27–31)
MCHC RBC AUTO-ENTMCNC: 32.1 G/DL (ref 32–36)
MCV RBC AUTO: 102 FL (ref 82–98)
MONOCYTES # BLD AUTO: 0.2 K/UL (ref 0.3–1)
MONOCYTES NFR BLD: 7.3 % (ref 4–15)
NEUTROPHILS # BLD AUTO: 1.6 K/UL (ref 1.8–7.7)
NEUTROPHILS NFR BLD: 49.7 % (ref 38–73)
NRBC BLD-RTO: 0 /100 WBC
PLATELET # BLD AUTO: 250 K/UL (ref 150–350)
PMV BLD AUTO: 9.1 FL (ref 9.2–12.9)
RBC # BLD AUTO: 3.37 M/UL (ref 4–5.4)
WBC # BLD AUTO: 3.28 K/UL (ref 3.9–12.7)

## 2020-05-27 PROCEDURE — 85025 COMPLETE CBC W/AUTO DIFF WBC: CPT

## 2020-05-27 PROCEDURE — 36415 COLL VENOUS BLD VENIPUNCTURE: CPT

## 2020-05-29 ENCOUNTER — PATIENT MESSAGE (OUTPATIENT)
Dept: HEMATOLOGY/ONCOLOGY | Facility: CLINIC | Age: 35
End: 2020-05-29

## 2020-05-29 DIAGNOSIS — J01.90 ACUTE NON-RECURRENT SINUSITIS, UNSPECIFIED LOCATION: Primary | ICD-10-CM

## 2020-05-29 RX ORDER — AZITHROMYCIN 250 MG/1
TABLET, FILM COATED ORAL
Qty: 2 TABLET | Refills: 0 | Status: SHIPPED | OUTPATIENT
Start: 2020-05-29 | End: 2020-06-02

## 2020-05-31 ENCOUNTER — PATIENT OUTREACH (OUTPATIENT)
Dept: ADMINISTRATIVE | Facility: OTHER | Age: 35
End: 2020-05-31

## 2020-06-01 DIAGNOSIS — R05.9 COUGH: Primary | ICD-10-CM

## 2020-06-01 NOTE — H&P (VIEW-ONLY)
Date of Service: 06-    DIAGNOSIS:   This is a 34 y.o. female with a history of triple negative  of the right breast.  Her Oncotype score had been high risk of 57.  She is BRCA 1 genetic mutation positive.    TREATMENT:     1. Chemotherapy, 16 cycles, completed 05/20/20 ,M.D. Medical Oncology w/ complete radiographic response 05/25/20.   Following Dr. Ny   2. Scheduled to see radiation oncology        HISTORY OF PRESENT ILLNESS:   Yolanda Win is a 34 y.o. female comes in for oncological follow up and pre operative planning. . She did not present with any abnormal clinical axillary lymphadenopathy.  The mass measured approximately 3.3 cm in size on imaging but felt about 5 cm on clinical exam prior to chemotherapy in the 11 o'clock position of the right breast upper outer quadrant approximately 7 cm from the nipple.She had had a 1.3 cm sternal cyst noted on the MRI and will repeat an MRI the 1st week of June or at the end of May following completion of her chemotherapy to assess for the clinical and radiographic response to the neoadjuvant chemotherapy in her right breast as well as to reassess the 1.3 cm sternal cyst.  The sternum had been normal on CT scan and bone scan prior to treatment with chemotherapy. She initially desired bilateral mastectomies with immediate bilateral autologous tissue reconstruction with DORIAN flaps However, this will be determined at a later date after rad/onc consultation.      She denies change in her breast self-exam specifically denying new masses, skin or nipple changes, or nipple discharge.  Past medical and surgical history is updated with no new changes. There have been no changes to family history.     The patient endorsed new SOB starting Saturday 5/30/20 that worsened w/ exertion and worsened with rest this is associated with fatigue. Reports inability to take deep breaths significantly impacting her quality of life due to her high activity level (20 mile bike ride  "daily). She also endorses a non productive cough for the last month and new onset diarrhea and frontal sinus pressure headaches. 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 Constitutional symptoms of night sweats, visual changes,  chest pain.      IMAGING:   MRI w/wout contrast bilateral 05/25/20    Findings:  Heterogeneous fibroglandular tissue.  Mild background parenchymal enhancement.      Left chest wall port catheter.      Since MRI December 26, 2019, the right breast upper outer quadrant middle depth mass representing known malignancy has resolved.  No abnormal enhancement in either breast.      No abnormal skin or nipple enhancement.      No adenopathy.      The previously described sternal lesion is unchanged, measuring 1.3 cm long axis.  This lesion is T1 and T2 hypointense, and demonstrates no associated enhancement on the post-contrast images.  Associated narrow zone of transition.  No cortical destruction or bone lysis.  No periosteal reaction.  No CT or bone scan correlate on December 26, 2019.      Impression:  Since December 26, 2019, right breast upper outer quadrant mass representing known malignancy has resolved. No abnormal enhancement. BI-RADS 6: Known malignancy.      Since September 26, 2019, previously described sternal lesion is unchanged, benign. No aggressive MR imaging features. No CT or bone scan correlate on December 26, 2019.      BI-RADS Category:   Overall: 6 - Known Biopsy-Proven Malignancy       MEDICATIONS/ALLERGIES:   [unfilled]  Review of patient's allergies indicates:   Allergen Reactions    Amoxicillin Hives    Penicillins Hives and Rash    Diazepam Anxiety and Other (See Comments)     "makes hyper"       PHYSICAL EXAM:   Ht 5' 5" (1.651 m)   Wt 80.4 kg (177 lb 4 oz)   BMI 29.50 kg/m²   General: The patient appears well and is in no acute distress.   Neuro: Alert & oriented x3. HEENT: PERRLA, EOMI, sclerae nonicteric. Mucous membranes " moist.   Cardiovascular: RRR, no g/r/m.  Breasts: The exam was done with the patient seated and supine. Left breast - within normal limits. No palpable masses and no abnormal skin or nipple findings. No supraclavicular or axillary lymphadenopathy on the left side.   Right breast - within normal limits. No palpable masses and no abnormal skin or nipple findings. No supraclavicular or axillary lymphadenopathy on the right side.  Pulmonary: clear to auscultation bilaterally   Abdomen: soft, nontender, nondistended. No masses.    Extremities: No clubbing or cyanosis. Bilateral full arm range of motion without  lymphedema.     ASSESSMENT:   This is a 34 y.o. female w/ triple negative breast cancer status post neoadjuvant therapy here for preop consultation.     PLAN:    1) Triple negative type behaving tumor of the right breast RUQ 11 oclock  She has been consented for:    - Unilateral non nipple sparing, skin sparing mastectomy w/ possible tissue expander.    - Right sided sentinel lymph node biopsy.   - To be done 07/01/20 @ [facility]- coordinating w/ Dr. Kiko Aranda    Return to clinic as needed p.r.n    2) New Onset Dyspnea    - CT Scan 06/02/20   - Chest X Ray     I have personally taken the history and examined this patient, and I agree with the history, physical exam, assessment, and plan as written and outlined and stated above per the medical student.  The patient was seen in clinic today and we reviewed her MRI which appears to have shown a complete radiographic response to her neoadjuvant chemotherapy.  Her chemotherapy was completed on May 20, 2020.  She is currently scheduled for surgery on Wednesday July 1, 2020 in combination with myself and Dr. Hoover tendon her plastic surgeon.  She was initially scheduled for bilateral skin sparing mastectomy with right-sided sentinel lymph node biopsy.  She has never had a known positive or involved right axillary node.    She is currently complaining of a dry  nonproductive cough with some shortness of breath but denies any fever and states she has ongoing fatigue which is likely related to her systemic cytotoxic chemotherapy    She is getting a chest x-ray this Friday for her pulmonary symptoms at [place] and will also get a follow-up echocardiogram.    Most of the time today in consultation was discussing the possibility of her requiring adjuvant postmastectomy radiotherapy.  I told her that I could not definitively tell her whether she would get radiation therapy or not and that we will have her see radiation oncology for consultation prior surgery to discuss the possibility and possible scenarios that may lead to postmastectomy radiotherapy.  Certainly if she has residual disease or a positive lymph node she was certainly be considered for postmastectomy radiotherapy.  Even if she had a complete response she is young and had started with a radiographic T3 tumor greater than 5 cm at initial presentation so she may be a selective relative indication for postmastectomy radiotherapy based on that alone.    Since there is no sign area were I can definitively tell her that she would definitely not get postmastectomy radiotherapy she would like to proceed and I agree that a reasonable approach would be with right skin sparing mastectomy with right axillary sentinel lymph node biopsy possible axillary dissection with patient having a temporary tissue expander placed and await final pathology.    If she does not require postmastectomy radiotherapy we could then proceed with contralateral left prophylactic mastectomy and immediate autologous tissue reconstruction with autologous tissue DORIAN flap with Dr. Aranda a few weeks after the initial surgery.  She would like to change and plan the approach with ipsilateral right-sided surgery as outlined above and await final pathology to see whether not she definitively needs postmastectomy radiotherapy..  I will speak to the plastic  surgeon to discuss the plan which I think is reasonable.  The patient does have a known positive BRCA 1 genetic mutation.    Will also order a follow-up CT scan of the chest without and with IV contrast given the pulmonary symptoms to make sure she has no surgical contraindications to proceeding with surgery.  Time spent with the patient today was approximately 45 min with greater than 50% of that time in counseling, thank you.    Plan will be for a right skin sparing mastectomy with right sentinel lymph node biopsy possible axillary dissection with tissue expander placement on Wednesday July 1, 2020 at [facility].    Addendum:  I call the patient with her CT scan of the chest result which was essentially normal with nothing to explain her pulmonary symptoms so we will proceed with surgery on July 1, 2020  Also, after the patient discussed having tissue expander placed at the time of the right mastectomy, the Plastic surgery team feels that will be better to perform the prophylactic mastectomy at that setting and placed a tissue expander on that side and then ultimately perform delayed bilateral autologous tissue reconstruction so we will change her consent to a right skin sparing mastectomy with right sentinel lymph node biopsy to a consent which will include also a left prophylactic skin sparing mastectomy at the same setting.  No role for sentinel lymph node biopsy on the left side so it will be a bilateral skin sparing mastectomy with right-sided sentinel lymph node biopsy.

## 2020-06-01 NOTE — PROGRESS NOTES
Date of Service: 06-    DIAGNOSIS:   This is a 34 y.o. female with a history of triple negative  of the right breast.  Her Oncotype score had been high risk of 57.  She is BRCA 1 genetic mutation positive.    TREATMENT:     1. Chemotherapy, 16 cycles, completed 05/20/20 ,M.D. Medical Oncology w/ complete radiographic response 05/25/20.   Following Dr. Ny   2. Scheduled to see radiation oncology        HISTORY OF PRESENT ILLNESS:   Yolanda Win is a 34 y.o. female comes in for oncological follow up and pre operative planning. . She did not present with any abnormal clinical axillary lymphadenopathy.  The mass measured approximately 3.3 cm in size on imaging but felt about 5 cm on clinical exam prior to chemotherapy in the 11 o'clock position of the right breast upper outer quadrant approximately 7 cm from the nipple.She had had a 1.3 cm sternal cyst noted on the MRI and will repeat an MRI the 1st week of June or at the end of May following completion of her chemotherapy to assess for the clinical and radiographic response to the neoadjuvant chemotherapy in her right breast as well as to reassess the 1.3 cm sternal cyst.  The sternum had been normal on CT scan and bone scan prior to treatment with chemotherapy. She initially desired bilateral mastectomies with immediate bilateral autologous tissue reconstruction with DORIAN flaps However, this will be determined at a later date after rad/onc consultation.      She denies change in her breast self-exam specifically denying new masses, skin or nipple changes, or nipple discharge.  Past medical and surgical history is updated with no new changes. There have been no changes to family history.     The patient endorsed new SOB starting Saturday 5/30/20 that worsened w/ exertion and worsened with rest this is associated with fatigue. Reports inability to take deep breaths significantly impacting her quality of life due to her high activity level (20 mile bike ride  "daily). She also endorses a non productive cough for the last month and new onset diarrhea and frontal sinus pressure headaches. 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 Constitutional symptoms of night sweats, visual changes,  chest pain.      IMAGING:   MRI w/wout contrast bilateral 05/25/20    Findings:  Heterogeneous fibroglandular tissue.  Mild background parenchymal enhancement.      Left chest wall port catheter.      Since MRI December 26, 2019, the right breast upper outer quadrant middle depth mass representing known malignancy has resolved.  No abnormal enhancement in either breast.      No abnormal skin or nipple enhancement.      No adenopathy.      The previously described sternal lesion is unchanged, measuring 1.3 cm long axis.  This lesion is T1 and T2 hypointense, and demonstrates no associated enhancement on the post-contrast images.  Associated narrow zone of transition.  No cortical destruction or bone lysis.  No periosteal reaction.  No CT or bone scan correlate on December 26, 2019.      Impression:  Since December 26, 2019, right breast upper outer quadrant mass representing known malignancy has resolved. No abnormal enhancement. BI-RADS 6: Known malignancy.      Since September 26, 2019, previously described sternal lesion is unchanged, benign. No aggressive MR imaging features. No CT or bone scan correlate on December 26, 2019.      BI-RADS Category:   Overall: 6 - Known Biopsy-Proven Malignancy       MEDICATIONS/ALLERGIES:   [unfilled]  Review of patient's allergies indicates:   Allergen Reactions    Amoxicillin Hives    Penicillins Hives and Rash    Diazepam Anxiety and Other (See Comments)     "makes hyper"       PHYSICAL EXAM:   Ht 5' 5" (1.651 m)   Wt 80.4 kg (177 lb 4 oz)   BMI 29.50 kg/m²   General: The patient appears well and is in no acute distress.   Neuro: Alert & oriented x3. HEENT: PERRLA, EOMI, sclerae nonicteric. Mucous membranes " moist.   Cardiovascular: RRR, no g/r/m.  Breasts: The exam was done with the patient seated and supine. Left breast - within normal limits. No palpable masses and no abnormal skin or nipple findings. No supraclavicular or axillary lymphadenopathy on the left side.   Right breast - within normal limits. No palpable masses and no abnormal skin or nipple findings. No supraclavicular or axillary lymphadenopathy on the right side.  Pulmonary: clear to auscultation bilaterally   Abdomen: soft, nontender, nondistended. No masses.    Extremities: No clubbing or cyanosis. Bilateral full arm range of motion without  lymphedema.     ASSESSMENT:   This is a 34 y.o. female w/ triple negative breast cancer status post neoadjuvant therapy here for preop consultation.     PLAN:    1) Triple negative type behaving tumor of the right breast RUQ 11 oclock  She has been consented for:    - Unilateral non nipple sparing, skin sparing mastectomy w/ possible tissue expander.    - Right sided sentinel lymph node biopsy.   - To be done 07/01/20 @ [facility]- coordinating w/ Dr. Kiko Aranda    Return to clinic as needed p.r.n    2) New Onset Dyspnea    - CT Scan 06/02/20   - Chest X Ray     I have personally taken the history and examined this patient, and I agree with the history, physical exam, assessment, and plan as written and outlined and stated above per the medical student.  The patient was seen in clinic today and we reviewed her MRI which appears to have shown a complete radiographic response to her neoadjuvant chemotherapy.  Her chemotherapy was completed on May 20, 2020.  She is currently scheduled for surgery on Wednesday July 1, 2020 in combination with myself and Dr. Hoover tendon her plastic surgeon.  She was initially scheduled for bilateral skin sparing mastectomy with right-sided sentinel lymph node biopsy.  She has never had a known positive or involved right axillary node.    She is currently complaining of a dry  nonproductive cough with some shortness of breath but denies any fever and states she has ongoing fatigue which is likely related to her systemic cytotoxic chemotherapy    She is getting a chest x-ray this Friday for her pulmonary symptoms at [place] and will also get a follow-up echocardiogram.    Most of the time today in consultation was discussing the possibility of her requiring adjuvant postmastectomy radiotherapy.  I told her that I could not definitively tell her whether she would get radiation therapy or not and that we will have her see radiation oncology for consultation prior surgery to discuss the possibility and possible scenarios that may lead to postmastectomy radiotherapy.  Certainly if she has residual disease or a positive lymph node she was certainly be considered for postmastectomy radiotherapy.  Even if she had a complete response she is young and had started with a radiographic T3 tumor greater than 5 cm at initial presentation so she may be a selective relative indication for postmastectomy radiotherapy based on that alone.    Since there is no sign area were I can definitively tell her that she would definitely not get postmastectomy radiotherapy she would like to proceed and I agree that a reasonable approach would be with right skin sparing mastectomy with right axillary sentinel lymph node biopsy possible axillary dissection with patient having a temporary tissue expander placed and await final pathology.    If she does not require postmastectomy radiotherapy we could then proceed with contralateral left prophylactic mastectomy and immediate autologous tissue reconstruction with autologous tissue DORIAN flap with Dr. Aranda a few weeks after the initial surgery.  She would like to change and plan the approach with ipsilateral right-sided surgery as outlined above and await final pathology to see whether not she definitively needs postmastectomy radiotherapy..  I will speak to the plastic  surgeon to discuss the plan which I think is reasonable.  The patient does have a known positive BRCA 1 genetic mutation.    Will also order a follow-up CT scan of the chest without and with IV contrast given the pulmonary symptoms to make sure she has no surgical contraindications to proceeding with surgery.  Time spent with the patient today was approximately 45 min with greater than 50% of that time in counseling, thank you.    Plan will be for a right skin sparing mastectomy with right sentinel lymph node biopsy possible axillary dissection with tissue expander placement on Wednesday July 1, 2020 at [facility].    Addendum:  I call the patient with her CT scan of the chest result which was essentially normal with nothing to explain her pulmonary symptoms so we will proceed with surgery on July 1, 2020  Also, after the patient discussed having tissue expander placed at the time of the right mastectomy, the Plastic surgery team feels that will be better to perform the prophylactic mastectomy at that setting and placed a tissue expander on that side and then ultimately perform delayed bilateral autologous tissue reconstruction so we will change her consent to a right skin sparing mastectomy with right sentinel lymph node biopsy to a consent which will include also a left prophylactic skin sparing mastectomy at the same setting.  No role for sentinel lymph node biopsy on the left side so it will be a bilateral skin sparing mastectomy with right-sided sentinel lymph node biopsy.

## 2020-06-02 ENCOUNTER — OFFICE VISIT (OUTPATIENT)
Dept: SURGERY | Facility: CLINIC | Age: 35
End: 2020-06-02
Payer: COMMERCIAL

## 2020-06-02 VITALS — HEIGHT: 65 IN | BODY MASS INDEX: 29.53 KG/M2 | WEIGHT: 177.25 LBS

## 2020-06-02 DIAGNOSIS — Z01.818 PRE-OP TESTING: ICD-10-CM

## 2020-06-02 DIAGNOSIS — R06.02 SHORTNESS OF BREATH: ICD-10-CM

## 2020-06-02 DIAGNOSIS — Z17.0 MALIGNANT NEOPLASM OF UPPER-OUTER QUADRANT OF RIGHT BREAST IN FEMALE, ESTROGEN RECEPTOR POSITIVE: Primary | ICD-10-CM

## 2020-06-02 DIAGNOSIS — C50.411 MALIGNANT NEOPLASM OF UPPER-OUTER QUADRANT OF RIGHT BREAST IN FEMALE, ESTROGEN RECEPTOR POSITIVE: Primary | ICD-10-CM

## 2020-06-02 PROCEDURE — 99999 PR PBB SHADOW E&M-EST. PATIENT-LVL III: CPT | Mod: PBBFAC,,, | Performed by: SURGERY

## 2020-06-02 PROCEDURE — 99999 PR PBB SHADOW E&M-EST. PATIENT-LVL III: ICD-10-PCS | Mod: PBBFAC,,, | Performed by: SURGERY

## 2020-06-02 PROCEDURE — 3008F BODY MASS INDEX DOCD: CPT | Mod: CPTII,S$GLB,, | Performed by: SURGERY

## 2020-06-02 PROCEDURE — 3008F PR BODY MASS INDEX (BMI) DOCUMENTED: ICD-10-PCS | Mod: CPTII,S$GLB,, | Performed by: SURGERY

## 2020-06-02 PROCEDURE — 99214 PR OFFICE/OUTPT VISIT, EST, LEVL IV, 30-39 MIN: ICD-10-PCS | Mod: S$GLB,,, | Performed by: SURGERY

## 2020-06-02 PROCEDURE — 99214 OFFICE O/P EST MOD 30 MIN: CPT | Mod: S$GLB,,, | Performed by: SURGERY

## 2020-06-05 ENCOUNTER — HOSPITAL ENCOUNTER (OUTPATIENT)
Dept: RADIOLOGY | Facility: HOSPITAL | Age: 35
Discharge: HOME | End: 2020-06-05
Attending: INTERNAL MEDICINE
Payer: COMMERCIAL

## 2020-06-05 ENCOUNTER — PATIENT MESSAGE (OUTPATIENT)
Dept: HEMATOLOGY/ONCOLOGY | Facility: CLINIC | Age: 35
End: 2020-06-05

## 2020-06-05 ENCOUNTER — HOSPITAL ENCOUNTER (OUTPATIENT)
Dept: CARDIOLOGY | Facility: HOSPITAL | Age: 35
Discharge: HOME | End: 2020-06-05
Attending: INTERNAL MEDICINE
Payer: COMMERCIAL

## 2020-06-05 ENCOUNTER — PATIENT MESSAGE (OUTPATIENT)
Dept: PSYCHIATRY | Facility: CLINIC | Age: 35
End: 2020-06-05

## 2020-06-05 ENCOUNTER — LAB VISIT (OUTPATIENT)
Dept: LAB | Facility: HOSPITAL | Age: 35
End: 2020-06-05
Attending: INTERNAL MEDICINE
Payer: COMMERCIAL

## 2020-06-05 VITALS — BODY MASS INDEX: 29.49 KG/M2 | WEIGHT: 177 LBS | HEIGHT: 65 IN

## 2020-06-05 DIAGNOSIS — R05.9 COUGH: ICD-10-CM

## 2020-06-05 DIAGNOSIS — Z17.0 MALIGNANT NEOPLASM OF UPPER-OUTER QUADRANT OF RIGHT BREAST IN FEMALE, ESTROGEN RECEPTOR POSITIVE: ICD-10-CM

## 2020-06-05 DIAGNOSIS — C50.411 MALIGNANT NEOPLASM OF UPPER-OUTER QUADRANT OF RIGHT BREAST IN FEMALE, ESTROGEN RECEPTOR POSITIVE: ICD-10-CM

## 2020-06-05 LAB
ALBUMIN SERPL BCP-MCNC: 4.4 G/DL (ref 3.5–5.2)
ALP SERPL-CCNC: 105 U/L (ref 55–135)
ALT SERPL W/O P-5'-P-CCNC: 13 U/L (ref 10–44)
ANION GAP SERPL CALC-SCNC: 8 MMOL/L (ref 8–16)
AST SERPL-CCNC: 15 U/L (ref 10–40)
BILIRUB SERPL-MCNC: 0.6 MG/DL (ref 0.1–1)
BUN SERPL-MCNC: 20 MG/DL (ref 6–20)
CALCIUM SERPL-MCNC: 9.8 MG/DL (ref 8.7–10.5)
CHLORIDE SERPL-SCNC: 104 MMOL/L (ref 95–110)
CO2 SERPL-SCNC: 25 MMOL/L (ref 23–29)
CREAT SERPL-MCNC: 0.9 MG/DL (ref 0.5–1.4)
ERYTHROCYTE [DISTWIDTH] IN BLOOD BY AUTOMATED COUNT: 11.9 % (ref 11.5–14.5)
EST. GFR  (AFRICAN AMERICAN): >60 ML/MIN/1.73 M^2
EST. GFR  (NON AFRICAN AMERICAN): >60 ML/MIN/1.73 M^2
GLUCOSE SERPL-MCNC: 114 MG/DL (ref 70–110)
HCT VFR BLD AUTO: 37.8 % (ref 37–48.5)
HGB BLD-MCNC: 12.8 G/DL (ref 12–16)
IMM GRANULOCYTES # BLD AUTO: 0.01 K/UL (ref 0–0.04)
MCH RBC QN AUTO: 32.2 PG (ref 27–31)
MCHC RBC AUTO-ENTMCNC: 33.9 G/DL (ref 32–36)
MCV RBC AUTO: 95 FL (ref 82–98)
NEUTROPHILS # BLD AUTO: 1.4 K/UL (ref 1.8–7.7)
PLATELET # BLD AUTO: 232 K/UL (ref 150–350)
PMV BLD AUTO: 9 FL (ref 9.2–12.9)
POTASSIUM SERPL-SCNC: 4.6 MMOL/L (ref 3.5–5.1)
PROT SERPL-MCNC: 7.6 G/DL (ref 6–8.4)
RBC # BLD AUTO: 3.97 M/UL (ref 4–5.4)
SODIUM SERPL-SCNC: 137 MMOL/L (ref 136–145)
WBC # BLD AUTO: 3.42 K/UL (ref 3.9–12.7)

## 2020-06-05 PROCEDURE — 85027 COMPLETE CBC AUTOMATED: CPT

## 2020-06-05 PROCEDURE — 36415 COLL VENOUS BLD VENIPUNCTURE: CPT

## 2020-06-05 PROCEDURE — 71046 XR CHEST PA AND LATERAL: ICD-10-PCS | Mod: 26,,, | Performed by: RADIOLOGY

## 2020-06-05 PROCEDURE — 71046 X-RAY EXAM CHEST 2 VIEWS: CPT | Mod: 26,,, | Performed by: RADIOLOGY

## 2020-06-05 PROCEDURE — 93306 TTE W/DOPPLER COMPLETE: CPT | Mod: 26,,, | Performed by: INTERNAL MEDICINE

## 2020-06-05 PROCEDURE — 93356 ECHO (CUPID ONLY): ICD-10-PCS | Mod: ,,, | Performed by: INTERNAL MEDICINE

## 2020-06-05 PROCEDURE — 71046 X-RAY EXAM CHEST 2 VIEWS: CPT | Mod: TC,FY

## 2020-06-05 PROCEDURE — 93356 MYOCRD STRAIN IMG SPCKL TRCK: CPT | Mod: ,,, | Performed by: INTERNAL MEDICINE

## 2020-06-05 PROCEDURE — 93306 ECHO (CUPID ONLY): ICD-10-PCS | Mod: 26,,, | Performed by: INTERNAL MEDICINE

## 2020-06-05 PROCEDURE — 93306 TTE W/DOPPLER COMPLETE: CPT

## 2020-06-05 PROCEDURE — 80053 COMPREHEN METABOLIC PANEL: CPT

## 2020-06-05 RX ORDER — DEXTROAMPHETAMINE SACCHARATE, AMPHETAMINE ASPARTATE MONOHYDRATE, DEXTROAMPHETAMINE SULFATE AND AMPHETAMINE SULFATE 6.25; 6.25; 6.25; 6.25 MG/1; MG/1; MG/1; MG/1
25 CAPSULE, EXTENDED RELEASE ORAL EVERY MORNING
Qty: 30 CAPSULE | Refills: 0 | Status: SHIPPED | OUTPATIENT
Start: 2020-06-05 | End: 2020-07-09 | Stop reason: SDUPTHER

## 2020-06-07 NOTE — PROGRESS NOTES
Subjective:       Patient ID: Yolanda Win is a 34 y.o. female.    Chief Complaint: No chief complaint on file.    HPI 34 year old female, who returns for follow-up of carcinoma of the right breast.  She has completed adjuvant chemotherapy with Adriamycin Cytoxan and for 4 cycles followed by 12 weeks of weekly Taxol    Follow-up breast MRI from May 25th showed complete resolution of the right breast mass.  A previously noted sternal lesion was unchanged, suggesting a benign nature.    She has had some recent problems with persistent cough dyspnea.  Chest x-ray last week showed no evidence of any abnormality and her O2 saturations in clinic were in the mid 90s.  She gets short of breath with limited activity.  He recent developed some diarrhea with 4-5 watery stools in the morning.  She has had no cramping or nausea.  Her energy level remains low.  Appetite is normal.        Breast history:  Mammogram and ultrasound on December 11th, 2019 showed right breast mass 7 cm from the nipple.  By ultrasound this mass measured 33 x 32 x 21 mm.  There was no associated axillary lymphadenopathy noted.    Right breast biopsy on December 13 showed high-grade infiltrating ductal carcinoma (histologic grade 3, nuclear grade 3, mitotic index 3) there were medullary features.  The cancer was 25% ER positive and PA and HER2 negative.  Ki-67 was 90%.    MRI showed a 5.2 cm x 2.6 cm x 4.6 cm irregularly shaped mass with rim enhancement seen in the right breast at 11 o'clock.    CT scan of the chest abdomen and pelvis and bone scan showed no evidence of metastatic disease.    She completed 4 cycles of neoadjuvant Adriamycin Cytoxan February 13, 2020.  She completed 12 weeks of weekly Taxol on May 20, 2020.  Review of Systems   Constitutional: Positive for activity change (Decreased) and fatigue. Negative for appetite change, fever and unexpected weight change.   Eyes: Negative for visual disturbance.   Respiratory: Positive for cough  and shortness of breath.    Cardiovascular: Negative for chest pain.   Gastrointestinal: Positive for diarrhea. Negative for abdominal pain, constipation, nausea and rectal pain.   Genitourinary: Negative for frequency.   Musculoskeletal: Negative for back pain.   Skin: Negative for rash.   Neurological: Positive for numbness. Negative for headaches.   Hematological: Negative for adenopathy.   Psychiatric/Behavioral: Negative for dysphoric mood. The patient is not nervous/anxious.        Objective:      Physical Exam   Constitutional: She is oriented to person, place, and time. She appears well-developed and well-nourished. No distress.   Eyes: No scleral icterus.   Cardiovascular: Normal rate and regular rhythm.   Pulmonary/Chest: Effort normal and breath sounds normal. She has no wheezes. She has no rales. Right breast exhibits no mass, no nipple discharge and no skin change. Left breast exhibits no mass, no nipple discharge and no skin change.   Abdominal: Soft. She exhibits no mass. There is no tenderness.   Lymphadenopathy:     She has no cervical adenopathy.     She has no axillary adenopathy.        Right: No supraclavicular adenopathy present.        Left: No supraclavicular adenopathy present.   Neurological: She is alert and oriented to person, place, and time.   Psychiatric: She has a normal mood and affect. Her behavior is normal. Thought content normal.       Assessment:     labs from June 5th-CBC shows white count 3420 with an ANC of 1400, hemoglobin 12.8 and platelet count 325800  Metabolic profile shows normal hepatic and renal function.    Echocardiogram-final results pending but preliminary results apparently normal.  1. Malignant neoplasm of upper-outer quadrant of right breast in female, estrogen receptor positive        Plan:       CT scan of the chest is planned to evaluate her pulmonary symptoms more completely.  Will check stool for C diff.  Surgery is planned for July 1st.  Return  approximately  3 weeks after that procedure.

## 2020-06-08 ENCOUNTER — OFFICE VISIT (OUTPATIENT)
Dept: HEMATOLOGY/ONCOLOGY | Facility: CLINIC | Age: 35
End: 2020-06-08
Payer: COMMERCIAL

## 2020-06-08 VITALS
OXYGEN SATURATION: 93 % | SYSTOLIC BLOOD PRESSURE: 135 MMHG | HEIGHT: 65 IN | RESPIRATION RATE: 18 BRPM | BODY MASS INDEX: 28.99 KG/M2 | HEART RATE: 98 BPM | DIASTOLIC BLOOD PRESSURE: 93 MMHG | TEMPERATURE: 97 F | WEIGHT: 174 LBS

## 2020-06-08 DIAGNOSIS — C50.411 MALIGNANT NEOPLASM OF UPPER-OUTER QUADRANT OF RIGHT BREAST IN FEMALE, ESTROGEN RECEPTOR POSITIVE: Primary | ICD-10-CM

## 2020-06-08 DIAGNOSIS — Z17.0 MALIGNANT NEOPLASM OF UPPER-OUTER QUADRANT OF RIGHT BREAST IN FEMALE, ESTROGEN RECEPTOR POSITIVE: Primary | ICD-10-CM

## 2020-06-08 DIAGNOSIS — R19.7 DIARRHEA, UNSPECIFIED TYPE: ICD-10-CM

## 2020-06-08 PROCEDURE — 99999 PR PBB SHADOW E&M-EST. PATIENT-LVL IV: ICD-10-PCS | Mod: PBBFAC,,, | Performed by: INTERNAL MEDICINE

## 2020-06-08 PROCEDURE — 3008F PR BODY MASS INDEX (BMI) DOCUMENTED: ICD-10-PCS | Mod: CPTII,S$GLB,, | Performed by: INTERNAL MEDICINE

## 2020-06-08 PROCEDURE — 99214 PR OFFICE/OUTPT VISIT, EST, LEVL IV, 30-39 MIN: ICD-10-PCS | Mod: S$GLB,,, | Performed by: INTERNAL MEDICINE

## 2020-06-08 PROCEDURE — 99214 OFFICE O/P EST MOD 30 MIN: CPT | Mod: S$GLB,,, | Performed by: INTERNAL MEDICINE

## 2020-06-08 PROCEDURE — 3008F BODY MASS INDEX DOCD: CPT | Mod: CPTII,S$GLB,, | Performed by: INTERNAL MEDICINE

## 2020-06-08 PROCEDURE — 99999 PR PBB SHADOW E&M-EST. PATIENT-LVL IV: CPT | Mod: PBBFAC,,, | Performed by: INTERNAL MEDICINE

## 2020-06-09 ENCOUNTER — HOSPITAL ENCOUNTER (OUTPATIENT)
Dept: RADIOLOGY | Facility: HOSPITAL | Age: 35
Discharge: HOME | End: 2020-06-09
Attending: SURGERY
Payer: COMMERCIAL

## 2020-06-09 ENCOUNTER — LAB VISIT (OUTPATIENT)
Dept: LAB | Facility: HOSPITAL | Age: 35
End: 2020-06-09
Attending: INTERNAL MEDICINE
Payer: COMMERCIAL

## 2020-06-09 DIAGNOSIS — R19.7 DIARRHEA, UNSPECIFIED TYPE: ICD-10-CM

## 2020-06-09 DIAGNOSIS — Z13.9 SCREENING FOR UNSPECIFIED CONDITION: Primary | ICD-10-CM

## 2020-06-09 DIAGNOSIS — U07.1 COVID-19: ICD-10-CM

## 2020-06-09 DIAGNOSIS — R06.02 SHORTNESS OF BREATH: ICD-10-CM

## 2020-06-09 DIAGNOSIS — R06.02 SHORTNESS OF BREATH: Primary | ICD-10-CM

## 2020-06-09 LAB
C DIFF GDH STL QL: NEGATIVE
C DIFF TOX A+B STL QL IA: NEGATIVE

## 2020-06-09 PROCEDURE — 71260 CT THORAX DX C+: CPT | Mod: TC

## 2020-06-09 PROCEDURE — 87449 NOS EACH ORGANISM AG IA: CPT

## 2020-06-09 PROCEDURE — 71260 CT CHEST WITH CONTRAST: ICD-10-PCS | Mod: 26,,, | Performed by: RADIOLOGY

## 2020-06-09 PROCEDURE — 71260 CT THORAX DX C+: CPT | Mod: 26,,, | Performed by: RADIOLOGY

## 2020-06-09 PROCEDURE — 87324 CLOSTRIDIUM AG IA: CPT

## 2020-06-09 PROCEDURE — 25500020 PHARM REV CODE 255: Performed by: SURGERY

## 2020-06-09 RX ADMIN — IOHEXOL 75 ML: 350 INJECTION, SOLUTION INTRAVENOUS at 10:06

## 2020-06-10 ENCOUNTER — CLINICAL SUPPORT (OUTPATIENT)
Dept: HEMATOLOGY/ONCOLOGY | Facility: CLINIC | Age: 35
End: 2020-06-10
Payer: COMMERCIAL

## 2020-06-10 DIAGNOSIS — U07.1 COVID-19: ICD-10-CM

## 2020-06-10 DIAGNOSIS — Z13.9 SCREENING FOR UNSPECIFIED CONDITION: ICD-10-CM

## 2020-06-10 LAB — SARS-COV-2 RNA RESP QL NAA+PROBE: NOT DETECTED

## 2020-06-10 PROCEDURE — U0003 INFECTIOUS AGENT DETECTION BY NUCLEIC ACID (DNA OR RNA); SEVERE ACUTE RESPIRATORY SYNDROME CORONAVIRUS 2 (SARS-COV-2) (CORONAVIRUS DISEASE [COVID-19]), AMPLIFIED PROBE TECHNIQUE, MAKING USE OF HIGH THROUGHPUT TECHNOLOGIES AS DESCRIBED BY CMS-2020-01-R: HCPCS

## 2020-06-12 ENCOUNTER — HOSPITAL ENCOUNTER (OUTPATIENT)
Dept: PULMONOLOGY | Facility: CLINIC | Age: 35
Discharge: HOME | End: 2020-06-12
Payer: COMMERCIAL

## 2020-06-12 DIAGNOSIS — R06.02 SHORTNESS OF BREATH: ICD-10-CM

## 2020-06-12 PROCEDURE — 94727 PR PULM FUNCTION TEST BY GAS: ICD-10-PCS | Mod: S$GLB,,, | Performed by: INTERNAL MEDICINE

## 2020-06-12 PROCEDURE — 94727 GAS DIL/WSHOT DETER LNG VOL: CPT | Mod: S$GLB,,, | Performed by: INTERNAL MEDICINE

## 2020-06-12 PROCEDURE — 94729 PR C02/MEMBANE DIFFUSE CAPACITY: ICD-10-PCS | Mod: S$GLB,,, | Performed by: INTERNAL MEDICINE

## 2020-06-12 PROCEDURE — 94060 PR EVAL OF BRONCHOSPASM: ICD-10-PCS | Mod: S$GLB,,, | Performed by: INTERNAL MEDICINE

## 2020-06-12 PROCEDURE — 94729 DIFFUSING CAPACITY: CPT | Mod: S$GLB,,, | Performed by: INTERNAL MEDICINE

## 2020-06-12 PROCEDURE — 94060 EVALUATION OF WHEEZING: CPT | Mod: S$GLB,,, | Performed by: INTERNAL MEDICINE

## 2020-06-13 LAB
AORTIC ROOT ANNULUS: 2.6 CM
ASCENDING AORTA: 2.52 CM
AV INDEX (PROSTH): 0.84
AV MEAN GRADIENT: 4 MMHG
AV PEAK GRADIENT: 5 MMHG
AV VALVE AREA: 2.9 CM2
AV VELOCITY RATIO: 0.78
BSA FOR ECHO PROCEDURE: 1.92 M2
CV ECHO LV RWT: 0.4 CM
DOP CALC AO PEAK VEL: 1.17 M/S
DOP CALC AO VTI: 17.51 CM
DOP CALC LVOT AREA: 3.5 CM2
DOP CALC LVOT DIAMETER: 2.1 CM
DOP CALC LVOT PEAK VEL: 0.91 M/S
DOP CALC LVOT STROKE VOLUME: 50.79 CM3
DOP CALCLVOT PEAK VEL VTI: 14.67 CM
E WAVE DECELERATION TIME: 170.36 MSEC
E/A RATIO: 1.03
E/E' RATIO: 6.84 M/S
ECHO LV POSTERIOR WALL: 0.8 CM (ref 0.6–1.1)
FRACTIONAL SHORTENING: 50 % (ref 28–44)
INTERVENTRICULAR SEPTUM: 0.83 CM (ref 0.6–1.1)
IVRT: 79.92 MSEC
LA MAJOR: 4.51 CM
LA MINOR: 4.51 CM
LA WIDTH: 3.55 CM
LEFT ATRIUM SIZE: 2.98 CM
LEFT ATRIUM VOLUME INDEX: 21.6 ML/M2
LEFT ATRIUM VOLUME: 40.55 CM3
LEFT INTERNAL DIMENSION IN SYSTOLE: 2 CM (ref 2.1–4)
LEFT VENTRICLE DIASTOLIC VOLUME INDEX: 72.6 ML/M2
LEFT VENTRICLE DIASTOLIC VOLUME: 136.34 ML
LEFT VENTRICLE MASS INDEX: 51 G/M2
LEFT VENTRICLE SYSTOLIC VOLUME INDEX: 34.4 ML/M2
LEFT VENTRICLE SYSTOLIC VOLUME: 64.64 ML
LEFT VENTRICULAR INTERNAL DIMENSION IN DIASTOLE: 4 CM (ref 3.5–6)
LEFT VENTRICULAR MASS: 95.83 G
LV LATERAL E/E' RATIO: 5.91 M/S
LV SEPTAL E/E' RATIO: 8.13 M/S
MV PEAK A VEL: 0.63 M/S
MV PEAK E VEL: 0.65 M/S
PISA TR MAX VEL: 1.82 M/S
PULM VEIN S/D RATIO: 0.98
PV PEAK D VEL: 0.46 M/S
PV PEAK S VEL: 0.45 M/S
RA MAJOR: 4.24 CM
RA PRESSURE: 3 MMHG
RA WIDTH: 2.87 CM
STJ: 2.44 CM
TDI LATERAL: 0.11 M/S
TDI SEPTAL: 0.08 M/S
TDI: 0.1 M/S
TR MAX PG: 13 MMHG
TV REST PULMONARY ARTERY PRESSURE: 16 MMHG

## 2020-06-15 ENCOUNTER — OFFICE VISIT (OUTPATIENT)
Dept: RADIATION ONCOLOGY | Facility: CLINIC | Age: 35
End: 2020-06-15
Payer: COMMERCIAL

## 2020-06-15 DIAGNOSIS — Z17.0 MALIGNANT NEOPLASM OF UPPER-OUTER QUADRANT OF RIGHT BREAST IN FEMALE, ESTROGEN RECEPTOR POSITIVE: ICD-10-CM

## 2020-06-15 DIAGNOSIS — C50.411 MALIGNANT NEOPLASM OF UPPER-OUTER QUADRANT OF RIGHT BREAST IN FEMALE, ESTROGEN RECEPTOR POSITIVE: ICD-10-CM

## 2020-06-15 PROCEDURE — 99203 OFFICE O/P NEW LOW 30 MIN: CPT | Mod: 95,,, | Performed by: RADIOLOGY

## 2020-06-15 PROCEDURE — 99203 PR OFFICE/OUTPT VISIT, NEW, LEVL III, 30-44 MIN: ICD-10-PCS | Mod: 95,,, | Performed by: RADIOLOGY

## 2020-06-15 NOTE — LETTER
Rubi 15, 2020      Percy Ayers MD  [address]  2nd Floor  Multi-Specialty Clinic  [facility] [address]            [facility] - Radiation Oncology  [id number] [place] [address]  Phone: [phone]  Fax: [phone]          Patient: Yolanda Win   MR Number: [id number]   YOB: 1985   Date of Visit: 6/15/2020       Dear Dr. Percy Ayers:    Thank you for referring Yolanda Win to me for evaluation. Attached you will find relevant portions of my assessment and plan of care.    If you have questions, please do not hesitate to call me. I look forward to following Yolanda Win along with you.    Sincerely,    Filipe Gallagher Jr., MD    Enclosure  CC:  No Recipients    If you would like to receive this communication electronically, please contact [email or web address] or [phone] to request more information on [employer] Link access.    For providers and/or their staff who would like to refer a patient to [facility], please contact us through our one-stop-shop provider referral line, [facility], at [phone].    If you feel you have received this communication in error or would no longer like to receive these types of communications, please e-mail [email or web address]

## 2020-06-15 NOTE — PROGRESS NOTES
HISTORY OF PRESENT ILLNESS:   The patient location is: home  The chief complaint leading to consultation is: breast cancer     Visit type: audiovisual    Face to Face time with patient: 30 minutes   45 minutes of total time spent on the encounter, which includes face to face time and non-face to face time preparing to see the patient (eg, review of tests), Obtaining and/or reviewing separately obtained history, Documenting clinical information in the electronic or other health record, Independently interpreting results (not separately reported) and communicating results to the patient/family/caregiver, or Care coordination (not separately reported).     Each patient to whom he or she provides medical services by telemedicine is:  (1) informed of the relationship between the physician and patient and the respective role of any other health care provider with respect to management of the patient; and (2) notified that he or she may decline to receive medical services by telemedicine and may withdraw from such care at any time.    This patient presents for discussion of possible post mastectomy irradiation.     Mrs. Win's history dates back to December of 2019 when she presented for evaluation of a Rt. breast mass which was waxing /waning in size.  She had noted the mass following discontinuing breast feeding in October.  MMG on 12/11/19 revealed a microlobulated Rt. breast mass measuring 3.3 cm on ultrasound.  Biopsies on 12/13/19 revealed infiltrating ductal carcinoma, histologic grade 3, nuclear grade 3 and mitotic index 3.  25% of the cells had intermediate staining for ER.  ME and Her - 2 were negative.  Ki-67 was 90%.  Oncotype score returned at 57.  MRI of the breast revealed a 5.2 x 2.6 x 4.6 cm irregularly shaped mass with rim enhancement in the Rt. breast at the 11 o'clock position.  There was no lymphadenopathy.  Bone scan and CT of the chest, abdomen and pelvis were negative. There was a non specific  prominent Rt. axillary node which was felt to be reactive given her earlier MRI finding.  The patient was referred for chemotherapy and completed 4 cycles of AC followed by 12 cycles of Taxol.  She now presents for discussion of possible radiotherapy.  Today the patient states she feels fairly well.  Notes fatigue and recent cough with some SOB but this is improved.     REVIEW OF SYSTEMS:   Review of Systems   Constitutional: Positive for malaise/fatigue. Negative for chills, fever and weight loss.   Respiratory: Positive for cough. Negative for sputum production.    Cardiovascular: Negative for chest pain and palpitations.   Gastrointestinal: Negative for abdominal pain, nausea and vomiting.       GYN HISTORY:  Menarche -    G - 2  P - 2  First birth age - 31 ,BCP -7-8 years    , still actively menstruating  BRCA1 positive     PAST MEDICAL HISTORY:  Past Medical History:   Diagnosis Date    Abnormal Pap smear of cervix     Allergy     seasonal    Anorexia     Bulimia     Depression     Fever blister     Hypertension     Gestational Hypertension    Invasive ductal carcinoma of right breast 2019    Mastitis     Migraine headache        PAST SURGICAL HISTORY:  Past Surgical History:   Procedure Laterality Date    BONE MARROW ASPIRATION      x 3    CERVICAL BIOPSY  W/ LOOP ELECTRODE EXCISION       SECTION  2016     SECTION N/A 2019    Procedure:  SECTION;  Surgeon: Kirit Hernandez MD;  [facility]: [facility];  Service: OB/GYN;  Laterality: N/A;    COLPOSCOPY      INSERTION OF TUNNELED CENTRAL VENOUS CATHETER (CVC) WITH SUBCUTANEOUS PORT Left 2019    Procedure: [id number]-Left neck or chest wall;  Surgeon: Percy Ayers MD;  [facility] [address] [place] [address];  Service: General;  Laterality: Left;    SHOULDER SURGERY Left     x 2    SINUS SURGERY      age 19    TONSILLECTOMY         ALLERGIES:   Review of patient's allergies indicates:  "  Allergen Reactions    Amoxicillin Hives    Penicillins Hives and Rash    Diazepam Anxiety and Other (See Comments)     "makes hyper"       MEDICATIONS:  Current Outpatient Medications   Medication Sig    ALPRAZolam (XANAX) 0.25 MG tablet Take 1 tablet (0.25 mg total) by mouth daily as needed for Anxiety.    citalopram (CELEXA) 20 MG tablet Take 1 tablet (20 mg total) by mouth once daily.    dextroamphetamine-amphetamine (ADDERALL XR) 25 MG 24 hr capsule Take 1 capsule (25 mg total) by mouth every morning.    loratadine (CLARITIN) 10 mg tablet Take 10 mg by mouth once daily.    magic mouthwash diphen/antac/lidoc/nysta Take 10 mLs by mouth 4 (four) times daily.    OLANZapine (ZYPREXA) 5 MG tablet Take 1 tablet (5 mg total) by mouth every 6 (six) hours as needed. Nausea    ondansetron (ZOFRAN-ODT) 4 MG TbDL Dissolve 1 tablet (4 mg total) by mouth every 8 (eight) hours as needed.    oxyCODONE (ROXICODONE) 5 MG immediate release tablet Take 1 tablet (5 mg total) by mouth every 8 (eight) hours as needed for Pain.    promethazine (PHENERGAN) 12.5 MG Tab Take 1 tablet (12.5 mg total) by mouth every 6 (six) hours as needed.    sumatriptan (IMITREX) 100 MG tablet Take 1/2 to 1 tab at onset of headache.  If no improvement in 2 hours, take another.  Do not take more than 2 in 24 hours.    tranexamic acid (LYSTEDA) 650 mg tablet Take 2 tablets (1,300 mg total) by mouth 3 (three) times daily.    triamcinolone acetonide 0.1% (KENALOG) 0.1 % cream apply to affected area twice daily    valACYclovir (VALTREX) 1000 MG tablet Take 1 tablet (1,000 mg total) by mouth every 12 (twelve) hours. (Patient taking differently: Take 1,000 mg by mouth every 12 (twelve) hours. Patient uses prn)    vitamin D (VITAMIN D3) 1000 units Tab Take 1,000 Units by mouth once daily.     Current Facility-Administered Medications   Medication    copper intrauterine device 380 square mm  mm    onabotulinumtoxina injection 200 Units "       SOCIAL HISTORY:  Social History     Socioeconomic History    Marital status:      Spouse name: Not on file    Number of children: Not on file    Years of education: Not on file    Highest education level: Not on file   Occupational History    Not on file   Social Needs    Financial resource strain: Not hard at all    Food insecurity     Worry: Never true     Inability: Never true    Transportation needs     Medical: No     Non-medical: No   Tobacco Use    Smoking status: Never Smoker    Smokeless tobacco: Never Used   Substance and Sexual Activity    Alcohol use: No     Frequency: 2-3 times a week     Drinks per session: 1 or 2     Binge frequency: Monthly     Comment: pre pregnancy     Drug use: No    Sexual activity: Yes     Partners: Male     Birth control/protection: None   Lifestyle    Physical activity     Days per week: 6 days     Minutes per session: 80 min    Stress: Only a little   Relationships    Social connections     Talks on phone: More than three times a week     Gets together: Once a week     Attends [employer] service: Not on file     Active member of club or organization: No     Attends meetings of clubs or organizations: Never     Relationship status:    Other Topics Concern    Are you pregnant or think you may be? Not Asked    Breast-feeding Not Asked   Social History Narrative    Not on file       FAMILY HISTORY:  Family History   Problem Relation Age of Onset    Cancer Maternal Grandmother 40        cervical    Cervical cancer Mother     Breast cancer Other     Ovarian cancer Other     Colon cancer Neg Hx     Melanoma Neg Hx          PHYSICAL EXAMINATION:  There were no vitals filed for this visit.  Physical Exam   Constitutional: She is oriented to person, place, and time and well-developed, well-nourished, and in no distress.   Neurological: She is alert and oriented to person, place, and time.   Psychiatric: Affect and judgment normal.        ASSESSMENT/PLAN:  Clinical Stage T3, N0, M0  breast cancer.        ECO    I had a long discussion with the patient.  Discussed the indications of post mastectomy radiotherapy in the neoadjuvant chemotherapy setting.  Discussed the significance of her pathology findings after her planned mastectomy.  Explained that residual disease in her breast or in a lymph node would be an definite indication for radiotherapy.  Explained she currently has a number of relative indications, namely T3 tumor, high grade tumor and young age. We discussed the procedures, risks and benefits of radiotherapy.  Discussed the acute and long term side effects of therapy.  Based on her relatively indications, I would recommend post mastectomy irradiation.  The patient is agreeable to proceeding with radiotherapy.  Will plan follow up 2 weeks after her surgery.

## 2020-06-16 ENCOUNTER — OFFICE VISIT (OUTPATIENT)
Dept: PSYCHIATRY | Facility: CLINIC | Age: 35
End: 2020-06-16
Payer: COMMERCIAL

## 2020-06-16 DIAGNOSIS — F41.1 GENERALIZED ANXIETY DISORDER WITH PANIC ATTACKS: ICD-10-CM

## 2020-06-16 DIAGNOSIS — F90.0 ADHD (ATTENTION DEFICIT HYPERACTIVITY DISORDER), INATTENTIVE TYPE: ICD-10-CM

## 2020-06-16 DIAGNOSIS — F43.10 POSTTRAUMATIC STRESS DISORDER: ICD-10-CM

## 2020-06-16 DIAGNOSIS — F41.0 GENERALIZED ANXIETY DISORDER WITH PANIC ATTACKS: ICD-10-CM

## 2020-06-16 DIAGNOSIS — F33.1 MODERATE EPISODE OF RECURRENT MAJOR DEPRESSIVE DISORDER: Primary | ICD-10-CM

## 2020-06-16 PROCEDURE — 90834 PSYTX W PT 45 MINUTES: CPT | Mod: 95,,, | Performed by: PSYCHOLOGIST

## 2020-06-16 PROCEDURE — 90834 PR PSYCHOTHERAPY W/PATIENT, 45 MIN: ICD-10-PCS | Mod: 95,,, | Performed by: PSYCHOLOGIST

## 2020-06-17 ENCOUNTER — PATIENT OUTREACH (OUTPATIENT)
Dept: ADMINISTRATIVE | Facility: OTHER | Age: 35
End: 2020-06-17

## 2020-06-17 ENCOUNTER — PROCEDURE VISIT (OUTPATIENT)
Dept: NEUROLOGY | Facility: CLINIC | Age: 35
End: 2020-06-17
Payer: COMMERCIAL

## 2020-06-17 VITALS
BODY MASS INDEX: 28.96 KG/M2 | SYSTOLIC BLOOD PRESSURE: 135 MMHG | DIASTOLIC BLOOD PRESSURE: 92 MMHG | HEIGHT: 65 IN | HEART RATE: 102 BPM

## 2020-06-17 DIAGNOSIS — R06.02 SHORTNESS OF BREATH: Primary | ICD-10-CM

## 2020-06-17 DIAGNOSIS — G43.C1 INTRACTABLE PERIODIC HEADACHE SYNDROME: Primary | ICD-10-CM

## 2020-06-17 PROCEDURE — 64615 PR CHEMODENERVATION OF MUSCLE FOR CHRONIC MIGRAINE: ICD-10-PCS | Mod: S$GLB,,, | Performed by: PSYCHIATRY & NEUROLOGY

## 2020-06-17 PROCEDURE — 99213 PR OFFICE/OUTPT VISIT, EST, LEVL III, 20-29 MIN: ICD-10-PCS | Mod: 25,S$GLB,, | Performed by: PSYCHIATRY & NEUROLOGY

## 2020-06-17 PROCEDURE — 64615 CHEMODENERV MUSC MIGRAINE: CPT | Mod: S$GLB,,, | Performed by: PSYCHIATRY & NEUROLOGY

## 2020-06-17 PROCEDURE — 99213 OFFICE O/P EST LOW 20 MIN: CPT | Mod: 25,S$GLB,, | Performed by: PSYCHIATRY & NEUROLOGY

## 2020-06-17 RX ORDER — ALBUTEROL SULFATE 90 UG/1
2 AEROSOL, METERED RESPIRATORY (INHALATION) EVERY 6 HOURS PRN
Qty: 18 G | Refills: 0 | Status: SHIPPED | OUTPATIENT
Start: 2020-06-17 | End: 2020-10-20

## 2020-06-17 NOTE — PATIENT INSTRUCTIONS
CONSIDERS SUPPLEMENTATION WITH ALPHA-LIPOIC ACID (OVER THE COUNTER) 600-1200MG DAILY OR TOPICAL LIDODERM/ASPERCREME TO HELP WITH NEUROPATHY PAIN

## 2020-06-17 NOTE — PROCEDURES
Procedures       [facility] - NEUROLOGY  [facility] [place]    Date: June 17, 2020   Patient Name: Yolanda Win   MRN: [id number]   PCP: Cruzito Craven  Referring Provider: Dillon Gonzalez MD    Assessment:      This is Yolanda Win, 34 y.o. female with chronic migraines (G43.719) and suffers from headaches more than 15 days a month lasting more than 4 hours a day with no relief of symptoms despite trying multiple medications listed below.    Plan:      -  Follow up for botox   -  Continue Mg       I discussed side effects of the medications. I asked the patient to  stop the medication if he notices serious adverse effects as we discussed and to seek immediate medical attention at an ER.     Dillon Gonzalez MD  [facility]   Department of Neurology    Subjective:   -  Recent diagnosis of breast cancer, s/p chemo therapy with upcoming surgery and radiation  -  Development of neuropathy pain in feet, excess sedation with GBP 300mg tid, pain/altered sensation in fingers of right hand for the past day  -  Previously unable to tolerate cymbalta    12/2019  EXCELLENT response to botox, estimates 1 headache per week  9/2019  Continued daily headaches, weaning breast feeding - son 6 months old  8/2019  Unable to tolerate increased celexa due to fatigue, compliant with Mg with continued daily HA, unable to resume TPM as continuing to breast feed    HPI 5/2019:   Ms. Yolanda Win is a 34 y.o. female who presents with a chief complaint of headaches    Patient has had significant difficulty with migraines since she was 12 years old with worsening during recent pregnancy.  Associated nausea, photo/phonophobia.  She has had difficulty with post-partum anxiety and started celexa a month ago with some improvement in headache as well as irritability noted.    Prior medications trials as below  TPM - partial relief at 150mg /d  Pamelor, Neurontin, Seroquel - all little  effect  Imitrex, phenergan - good effect  Fioricet, zanaflex - little effect    PAST MEDICAL HISTORY:  Past Medical History:   Diagnosis Date    Abnormal Pap smear of cervix 2009    Allergy     seasonal    Anorexia     Bulimia     Depression     Fever blister     Hypertension     Gestational Hypertension    Invasive ductal carcinoma of right breast 2019    Mastitis     Migraine headache        PAST SURGICAL HISTORY:  Past Surgical History:   Procedure Laterality Date    BONE MARROW ASPIRATION      x 3    CERVICAL BIOPSY  W/ LOOP ELECTRODE EXCISION       SECTION  2016     SECTION N/A 2019    Procedure:  SECTION;  Surgeon: Kirit Hernandez MD;  Location: [facility] [place];  Service: OB/GYN;  Laterality: N/A;    COLPOSCOPY      INSERTION OF TUNNELED CENTRAL VENOUS CATHETER (CVC) WITH SUBCUTANEOUS PORT Left 2019    Procedure: [id number]-Left neck or chest wall;  Surgeon: Percy Ayers MD;  Location: [facility] OR [address] [place] [address];  Service: General;  Laterality: Left;    SHOULDER SURGERY Left     x 2    SINUS SURGERY      age 19    TONSILLECTOMY         CURRENT MEDS:  Current Outpatient Medications   Medication Sig Dispense Refill    ALPRAZolam (XANAX) 0.25 MG tablet Take 1 tablet (0.25 mg total) by mouth daily as needed for Anxiety. 20 tablet 0    citalopram (CELEXA) 20 MG tablet Take 1 tablet (20 mg total) by mouth once daily. 30 tablet 2    dextroamphetamine-amphetamine (ADDERALL XR) 25 MG 24 hr capsule Take 1 capsule (25 mg total) by mouth every morning. 30 capsule 0    loratadine (CLARITIN) 10 mg tablet Take 10 mg by mouth once daily.      magic mouthwash diphen/antac/lidoc/nysta Take 10 mLs by mouth 4 (four) times daily. 120 mL 0    OLANZapine (ZYPREXA) 5 MG tablet Take 1 tablet (5 mg total) by mouth every 6 (six) hours as needed. Nausea 30 tablet 2    ondansetron (ZOFRAN-ODT) 4 MG TbDL Dissolve 1 tablet (4 mg total) by mouth every 8 (eight) hours  "as needed. 30 tablet 1    oxyCODONE (ROXICODONE) 5 MG immediate release tablet Take 1 tablet (5 mg total) by mouth every 8 (eight) hours as needed for Pain. 60 tablet 0    promethazine (PHENERGAN) 12.5 MG Tab Take 1 tablet (12.5 mg total) by mouth every 6 (six) hours as needed. 30 tablet 1    sumatriptan (IMITREX) 100 MG tablet Take 1/2 to 1 tab at onset of headache.  If no improvement in 2 hours, take another.  Do not take more than 2 in 24 hours. 9 tablet 11    tranexamic acid (LYSTEDA) 650 mg tablet Take 2 tablets (1,300 mg total) by mouth 3 (three) times daily. 30 tablet 3    triamcinolone acetonide 0.1% (KENALOG) 0.1 % cream apply to affected area twice daily 454 g 3    valACYclovir (VALTREX) 1000 MG tablet Take 1 tablet (1,000 mg total) by mouth every 12 (twelve) hours. (Patient taking differently: Take 1,000 mg by mouth every 12 (twelve) hours. Patient uses prn) 60 tablet 0    vitamin D (VITAMIN D3) 1000 units Tab Take 1,000 Units by mouth once daily.       Current Facility-Administered Medications   Medication Dose Route Frequency Provider Last Rate Last Dose    copper intrauterine device 380 square mm  mm  380 mm Intrauterine  Kaleigh Polanco MD   380 mm at 01/23/20 1400    onabotulinumtoxina injection 200 Units  200 Units Intramuscular Q90 Days Dillon Gonzalez MD           ALLERGIES:  Review of patient's allergies indicates:   Allergen Reactions    Amoxicillin Hives    Penicillins Hives and Rash    Diazepam Anxiety and Other (See Comments)     "makes hyper"       FAMILY HISTORY:  Family History   Problem Relation Age of Onset    Cancer Maternal Grandmother 40        cervical    Cervical cancer Mother     Breast cancer Other     Ovarian cancer Other     Colon cancer Neg Hx     Melanoma Neg Hx        SOCIAL HISTORY:  Social History     Tobacco Use    Smoking status: Never Smoker    Smokeless tobacco: Never Used   Substance Use Topics    Alcohol use: No     Frequency: 2-3 " "times a week     Drinks per session: 1 or 2     Binge frequency: Monthly     Comment: pre pregnancy     Drug use: No       Review of Systems:  12 review of systems is negative except for the symptoms mentioned in HPI.        Objective:     Vitals:    06/17/20 1507   BP: (!) 135/92   Pulse: 102   Height: 5' 5" (1.651 m)       General: NAD, well nourished   Eyes: no tearing, discharge, no erythema   ENT: moist mucous membranes of the oral cavity, nares patent    Neck: Supple, full range of motion  Cardiovascular: Warm and well perfused, pulses equal and symmetrical  Lungs: Normal work of breathing, normal chest wall excursions  Skin: No rash, lesions, or breakdown on exposed skin  Psychiatry: Mood and affect are appropriate   Abdomen: soft, non tender, non distended  Extremeties: No cyanosis, clubbing or edema.    Neurological   MENTAL STATUS: Alert and oriented to person, place, and time. Attention and concentration within normal limits. Speech without dysarthria, able to name and repeat without difficulty. Recent and remote memory within normal limits   CRANIAL NERVES: Visual fields intact. PERRL. EOMI. Facial sensation intact. Face symmetrical. Hearing grossly intact. Full shoulder shrug bilaterally. Tongue protrudes midline   SENSORY: Sensation is intact to light touch throughout.   MOTOR: Normal bulk and tone. No pronator drift.    CEREBELLAR/COORDINATION/GAIT: Gait steady with normal arm swing and stride length.     BOTOX was performed as an indicated therapy for intractable chronic migraine headaches given that the patient failed several prophylactic medications    Botulinum Toxin Injection Procedure   Pre-operative diagnosis: Chronic migraine   Post-operative diagnosis: Same   Procedure: Chemical neurolysis   After risks and benefits were explained including bleeding, infection, worsening of pain, damage to the areas being injected, weakness of muscles, loss of muscle control, dysphagia if injecting the head " or neck, facial droop if injecting the facial area, painful injection, allergic or other reaction to the medications being injected, and the failure of the procedure to help the problem, a signed consent was obtained.   The patient was placed in a comfortable area and the sites to be treated were identified.The area to be treated was prepped three times with alcohol and the alcohol allowed to dry. Next, a 30 gauge needle was used to inject the medication in the area to be treated.       Botox 100 units NDC 9496-5740-54    Area(s) injected:   Total Botox used: 155 Units   Botox wastage: 45 Units   Injection sites:    muscle bilaterally ( a total of 10 units divided into 2 sites)   Procerus muscle (5 units)   Frontalis muscle bilaterally (a total of 20 units divided into 4 sites)   Temporalis muscle bilaterally (a total of 40 units divided into 8 sites)   Occipitalis muscle bilaterally (a total of 30 units divided into 6 sites)   Cervical paraspinal muscles (a total of 20 units divided into 4 sites)   Trapezius muscle bilaterally (a total of 30 units divided into 6 sites)   Complications: none   RTC for the next Botox injection: 3 months

## 2020-06-20 DIAGNOSIS — Z01.84 ANTIBODY RESPONSE EXAMINATION: ICD-10-CM

## 2020-06-22 RX ORDER — CITALOPRAM 20 MG/1
20 TABLET, FILM COATED ORAL DAILY
Qty: 30 TABLET | Refills: 2 | Status: SHIPPED | OUTPATIENT
Start: 2020-06-22 | End: 2020-07-27 | Stop reason: SDUPTHER

## 2020-06-25 ENCOUNTER — LAB VISIT (OUTPATIENT)
Dept: LAB | Facility: HOSPITAL | Age: 35
End: 2020-06-25
Attending: OBSTETRICS & GYNECOLOGY
Payer: COMMERCIAL

## 2020-06-25 ENCOUNTER — OFFICE VISIT (OUTPATIENT)
Dept: GYNECOLOGIC ONCOLOGY | Facility: CLINIC | Age: 35
End: 2020-06-25
Payer: COMMERCIAL

## 2020-06-25 VITALS
WEIGHT: 183.88 LBS | BODY MASS INDEX: 30.64 KG/M2 | DIASTOLIC BLOOD PRESSURE: 102 MMHG | HEART RATE: 104 BPM | HEIGHT: 65 IN | SYSTOLIC BLOOD PRESSURE: 147 MMHG

## 2020-06-25 DIAGNOSIS — Z15.09 BRCA1 POSITIVE: Primary | ICD-10-CM

## 2020-06-25 DIAGNOSIS — Z15.01 BRCA1 POSITIVE: ICD-10-CM

## 2020-06-25 DIAGNOSIS — Z15.09 BRCA1 POSITIVE: ICD-10-CM

## 2020-06-25 DIAGNOSIS — Z15.01 BRCA1 POSITIVE: Primary | ICD-10-CM

## 2020-06-25 LAB — CANCER AG125 SERPL-ACNC: 31 U/ML (ref 0–30)

## 2020-06-25 PROCEDURE — 36415 COLL VENOUS BLD VENIPUNCTURE: CPT

## 2020-06-25 PROCEDURE — 86304 IMMUNOASSAY TUMOR CA 125: CPT

## 2020-06-25 PROCEDURE — 3008F BODY MASS INDEX DOCD: CPT | Mod: CPTII,S$GLB,, | Performed by: OBSTETRICS & GYNECOLOGY

## 2020-06-25 PROCEDURE — 99999 PR PBB SHADOW E&M-EST. PATIENT-LVL IV: CPT | Mod: PBBFAC,,, | Performed by: OBSTETRICS & GYNECOLOGY

## 2020-06-25 PROCEDURE — 99213 OFFICE O/P EST LOW 20 MIN: CPT | Mod: S$GLB,,, | Performed by: OBSTETRICS & GYNECOLOGY

## 2020-06-25 PROCEDURE — 3008F PR BODY MASS INDEX (BMI) DOCUMENTED: ICD-10-PCS | Mod: CPTII,S$GLB,, | Performed by: OBSTETRICS & GYNECOLOGY

## 2020-06-25 PROCEDURE — 99213 PR OFFICE/OUTPT VISIT, EST, LEVL III, 20-29 MIN: ICD-10-PCS | Mod: S$GLB,,, | Performed by: OBSTETRICS & GYNECOLOGY

## 2020-06-25 PROCEDURE — 99999 PR PBB SHADOW E&M-EST. PATIENT-LVL IV: ICD-10-PCS | Mod: PBBFAC,,, | Performed by: OBSTETRICS & GYNECOLOGY

## 2020-06-25 NOTE — PROGRESS NOTES
Subjective:      Patient ID: Yolanda Win is a 35 y.o. female.    Chief Complaint: No chief complaint on file.      HPI  Presents today for follow up of ovarian cancer risk management and BRCA1 mutation.   6/2020:   33 and pelvic US shows unremarkable ovaries.  Upcoming surgery with Dr. Ayers.      Referral history:  34 yr old para 2 referred from Dr. Ayers for recently diagnosed BRCA 1 gene mutation. She presents today to establish care and discuss recommendations for surveillance moving forward.     12/2019 R breast biopsy resulted high-grade infiltrating ductal carcinoma (histologic grade 3, nuclear grade 3, mitotic index 3) with medullary features.  Cancer was 25% ER positive and MI and HER2 negative.  Ki-67 was 90%.     CT C/A/P 12/24/19: No evidence of distant metastatic disease  MRI from December 26, results: There is a 1.3 cm non enhancing focus in the right sternum.  PET scan ordered for 1/7/20.   Bone Scan 12/26/19: Negative  PET scan ordered.     Started DD Adriamycin/Cytoxan on 12/31/2019 (for 4 cycles) and is to receive 12 cycles of weekly Taxol to follow.     Prior surgeries include LEEP, c section x 2 ([place]).     Family history significant for cervical CA - M and MGM. No breast, uterine, ovarian or colon cancer.     Established in survivorship with Dr. Polanco. Paragard IUD   Review of Systems   Constitutional: Negative for appetite change, chills, fatigue and fever.   HENT: Negative for mouth sores.    Respiratory: Negative for cough and shortness of breath.    Cardiovascular: Negative for leg swelling.   Gastrointestinal: Negative for abdominal pain, blood in stool, constipation and diarrhea.   Endocrine: Negative for cold intolerance.   Genitourinary: Negative for dysuria and vaginal bleeding.   Musculoskeletal: Negative for myalgias.   Skin: Negative for rash.   Allergic/Immunologic: Negative.    Neurological: Negative for weakness and numbness.   Hematological: Negative  "for adenopathy. Does not bruise/bleed easily.   Psychiatric/Behavioral: Negative for confusion.       Objective:   Physical Exam:   Constitutional: She is oriented to person, place, and time. She appears well-developed and well-nourished.    HENT:   Head: Normocephalic and atraumatic.    Eyes: Pupils are equal, round, and reactive to light. EOM are normal.    Neck: Normal range of motion. Neck supple. No thyromegaly present.    Cardiovascular: Normal rate, regular rhythm and intact distal pulses.     Pulmonary/Chest: Effort normal and breath sounds normal. No respiratory distress. She has no wheezes.        Abdominal: Soft. Bowel sounds are normal. She exhibits no distension and no mass. There is no abdominal tenderness.             Musculoskeletal: Normal range of motion and moves all extremeties.      Lymphadenopathy:     She has no cervical adenopathy.        Right: No supraclavicular adenopathy present.        Left: No supraclavicular adenopathy present.    Neurological: She is alert and oriented to person, place, and time.    Skin: Skin is warm and dry. No rash noted.    Psychiatric: She has a normal mood and affect.       Assessment:     1. BRCA1 positive        Plan:     Orders Placed This Encounter   Procedures          and pelvic US reviewed. Reassuring though we discussed the limitations of "screening".   Will plan for continued surveillance until ready for risk reducing surgery.   RTC 6 months with  and pelvic US or sooner if needed.          "

## 2020-06-25 NOTE — LETTER
July 11, 2020        Percy Ayers MD  [address] [facility]  2nd Floor  Multi-Specialty Clinic  [facility] [address]             [facility] - GYN Oncology  [address] [facility] [address]  Phone: [phone]   Patient: Yolanda Wni   MR Number: [id number]   YOB: 1985   Date of Visit: 6/25/2020       Dear Dr. Ayers:    Thank you for referring Yolanda Win to me for evaluation. Below are the relevant portions of my assessment and plan of care.            If you have questions, please do not hesitate to call me. I look forward to following Yolanda along with you.    Sincerely,      MD KENNA Valentine MD Elizabeth R. Lapeyre, MD

## 2020-06-29 ENCOUNTER — HOSPITAL ENCOUNTER (OUTPATIENT)
Dept: PREADMISSION TESTING | Facility: OTHER | Age: 35
Discharge: HOME | End: 2020-06-29
Attending: SURGERY
Payer: COMMERCIAL

## 2020-06-29 ENCOUNTER — ANESTHESIA EVENT (OUTPATIENT)
Dept: SURGERY | Facility: OTHER | Age: 35
End: 2020-06-29
Payer: COMMERCIAL

## 2020-06-29 VITALS
DIASTOLIC BLOOD PRESSURE: 83 MMHG | TEMPERATURE: 98 F | OXYGEN SATURATION: 98 % | WEIGHT: 180 LBS | BODY MASS INDEX: 29.99 KG/M2 | SYSTOLIC BLOOD PRESSURE: 127 MMHG | HEIGHT: 65 IN | HEART RATE: 89 BPM

## 2020-06-29 DIAGNOSIS — Z01.818 PRE-OP TESTING: ICD-10-CM

## 2020-06-29 LAB — SARS-COV-2 RNA RESP QL NAA+PROBE: NOT DETECTED

## 2020-06-29 PROCEDURE — U0003 INFECTIOUS AGENT DETECTION BY NUCLEIC ACID (DNA OR RNA); SEVERE ACUTE RESPIRATORY SYNDROME CORONAVIRUS 2 (SARS-COV-2) (CORONAVIRUS DISEASE [COVID-19]), AMPLIFIED PROBE TECHNIQUE, MAKING USE OF HIGH THROUGHPUT TECHNOLOGIES AS DESCRIBED BY CMS-2020-01-R: HCPCS

## 2020-06-29 RX ORDER — ACETAMINOPHEN 500 MG
1000 TABLET ORAL
Status: CANCELLED | OUTPATIENT
Start: 2020-06-29 | End: 2020-06-29

## 2020-06-29 RX ORDER — LIDOCAINE HYDROCHLORIDE 10 MG/ML
0.5 INJECTION, SOLUTION EPIDURAL; INFILTRATION; INTRACAUDAL; PERINEURAL ONCE
Status: CANCELLED | OUTPATIENT
Start: 2020-06-29 | End: 2020-06-29

## 2020-06-29 RX ORDER — ALBUTEROL SULFATE 0.83 MG/ML
2.5 SOLUTION RESPIRATORY (INHALATION)
Status: CANCELLED | OUTPATIENT
Start: 2020-06-29 | End: 2020-06-29

## 2020-06-29 RX ORDER — PREGABALIN 50 MG/1
50 CAPSULE ORAL
Status: CANCELLED | OUTPATIENT
Start: 2020-06-29 | End: 2020-06-29

## 2020-06-29 RX ORDER — SODIUM CHLORIDE, SODIUM LACTATE, POTASSIUM CHLORIDE, CALCIUM CHLORIDE 600; 310; 30; 20 MG/100ML; MG/100ML; MG/100ML; MG/100ML
INJECTION, SOLUTION INTRAVENOUS CONTINUOUS
Status: CANCELLED | OUTPATIENT
Start: 2020-06-29

## 2020-06-29 NOTE — DISCHARGE INSTRUCTIONS
Information to Prepare you for your Surgery    PRE-ADMIT TESTING -  [phone]    [address]  [place] [facility]          Your surgery has been scheduled at [facility]. We are pleased to have the opportunity to serve you. For Further Information please call [phone].    On the day of surgery please report to the Information Desk on the 1st floor.    · CONTACT YOUR PHYSICIAN'S OFFICE THE DAY PRIOR TO YOUR SURGERY TO OBTAIN YOUR ARRIVAL TIME.     · The evening before surgery do not eat anything after 9 p.m. ( this includes hard candy, chewing gum and mints).  You may only have GATORADE, POWERADE AND WATER  from 9 p.m. until you leave your home.   DO NOT DRINK ANY LIQUIDS ON THE WAY TO THE HOSPITAL.      SPECIAL MEDICATION INSTRUCTIONS: TAKE medications checked off by the Anesthesiologist on your Medication List.    Angiogram Patients: Take medications as instructed by your physician, including aspirin.     Surgery Patients:    If you take ASPIRIN - Your PHYSICIAN/SURGEON will need to inform you IF/OR when you need to stop taking aspirin prior to your surgery.     Do Not take any medications containing IBUPROFEN.  Do Not Wear any make-up or dark nail polish   (especially eye make-up) to surgery. If you come to surgery with makeup on you will be required to remove the makeup or nail polish.    Do not shave your surgical area at least 5 days prior to your surgery. The surgical prep will be performed at the hospital according to Infection Control regulations.    Leave all valuables at home.   Do Not wear any jewelry or watches, including any metal in body piercings. Jewelry must be removed prior to coming to the hospital.  There is a possibility that rings that are unable to be removed may be cut off if they are on the surgical extremity.    Contact Lens must be removed before surgery. Either do not wear the contact lens or bring a case and solution for  storage.  Please bring a container for eyeglasses or dentures as required.  Bring any paperwork your physician has provided, such as consent forms,  history and physicals, doctor's orders, etc.   Bring comfortable clothes that are loose fitting to wear upon discharge. Take into consideration the type of surgery being performed.  Maintain your diet as advised per your physician the day prior to surgery.      Adequate rest the night before surgery is advised.   Park in the Parking lot behind the hospital or in the [facility] across the street from the parking lot. Parking is complimentary.  If you will be discharged the same day as your procedure, please arrange for a responsible adult to drive you home or to accompany you if traveling by taxi.   YOU WILL NOT BE PERMITTED TO DRIVE OR TO LEAVE THE HOSPITAL ALONE AFTER SURGERY.   If you are being discharged the same day, it is strongly recommended that you arrange for someone to remain with you for the first 24 hrs following your surgery.    The Surgeon will speak to your family/visitor after your surgery regarding the outcome of your surgery and post op care.  The Surgeon may speak to you after your surgery, but there is a possibility you may not remember the details.  Please check with your family members regarding the conversation with the Surgeon.    We strongly recommend whoever is bringing you home be present for discharge instructions.  This will ensure a thorough understanding for your post op home care.    ALL CHILDREN MUST ALWAYS BE ACCOMPANIED BY AN ADULT.    Visitors-Refer to current Visitor policy handouts.    Thank you for your cooperation.  The Staff of [facility].                Bathing Instructions with Hibiclens     Shower the evening before and morning of your procedure with Hibiclens:   Wash your face with water and your regular face wash/soap   Apply Hibiclens directly on your skin or on a wet washcloth and wash  gently. When showering: Move away from the shower stream when applying Hibiclens to avoid rinsing off too soon.   Rinse thoroughly with warm water   Do not dilute Hibiclens         Dry off as usual, do not use any deodorant, powder, body lotions, perfume, after shave or cologne.

## 2020-06-29 NOTE — ANESTHESIA PREPROCEDURE EVALUATION
06/29/2020  Yolanda Win is a 34 y.o., female.    Anesthesia Evaluation    I have reviewed the Patient Summary Reports.    I have reviewed the Nursing Notes. I have reviewed the NPO Status.      Review of Systems  Anesthesia Hx:  No problems with previous Anesthesia  History of prior surgery of interest to airway management or planning: Denies Family Hx of Anesthesia complications.  Personal Hx of Anesthesia complications  Difficult Intubation  Severe Sore Throat after Anesthesia   Social:  Non-Smoker    Hematology/Oncology:  Hematology Normal      Current/Recent Cancer. Breast right chemotherapy Oncology Comments: Finished chemo 6 weeksago     EENT/Dental:EENT/Dental Normal   Cardiovascular:   Denies Hypertension.     Pulmonary:   Recent cough for 2 months   Renal/:  Renal/ Normal     Hepatic/GI:  Hepatic/GI Normal    Musculoskeletal:  Musculoskeletal Normal    Neurological:   Denies Neuromuscular Disease. Headaches    Endocrine:  Endocrine Normal    Dermatological:  Skin Normal    Psych:   Psychiatric History          Physical Exam  General:  Well nourished    Airway/Jaw/Neck:  Airway Findings:     Dental:  Dental Findings: In tact        Mental Status:  Mental Status Findings:  Cooperative, Alert and Oriented, Anxious         Anesthesia Plan  Type of Anesthesia, risks & benefits discussed:  Anesthesia Type:  general  Patient's Preference:   Intra-op Monitoring Plan: standard ASA monitors  Intra-op Monitoring Plan Comments:   Post Op Pain Control Plan: per primary service following discharge from PACU and multimodal analgesia  Post Op Pain Control Plan Comments:   Induction:   IV  Beta Blocker:         Informed Consent: Patient understands risks and agrees with Anesthesia plan.  Questions answered. Anesthesia consent signed with patient.  ASA Score: 2     Day of Surgery Review of History &  Physical:    H&P update referred to the surgeon.     Anesthesia Plan Notes: Nurse at [facility],traumatic intubation at main campus for port a cath Dec 2019,all devices on left side        Ready For Surgery From Anesthesia Perspective.

## 2020-06-30 ENCOUNTER — HOSPITAL ENCOUNTER (OUTPATIENT)
Dept: RADIOLOGY | Facility: HOSPITAL | Age: 35
Discharge: HOME | End: 2020-06-30
Attending: OBSTETRICS & GYNECOLOGY
Payer: COMMERCIAL

## 2020-06-30 DIAGNOSIS — Z15.09 BRCA1 POSITIVE: ICD-10-CM

## 2020-06-30 DIAGNOSIS — Z15.01 BRCA1 POSITIVE: ICD-10-CM

## 2020-06-30 PROCEDURE — 76830 US PELVIS COMP WITH TRANSVAG NON-OB (XPD): ICD-10-PCS | Mod: 26,,, | Performed by: RADIOLOGY

## 2020-06-30 PROCEDURE — 76856 US PELVIS COMP WITH TRANSVAG NON-OB (XPD): ICD-10-PCS | Mod: 26,,, | Performed by: RADIOLOGY

## 2020-06-30 PROCEDURE — 76830 TRANSVAGINAL US NON-OB: CPT | Mod: TC

## 2020-06-30 PROCEDURE — 76856 US EXAM PELVIC COMPLETE: CPT | Mod: 26,,, | Performed by: RADIOLOGY

## 2020-06-30 PROCEDURE — 76830 TRANSVAGINAL US NON-OB: CPT | Mod: 26,,, | Performed by: RADIOLOGY

## 2020-07-01 ENCOUNTER — HOSPITAL ENCOUNTER (OUTPATIENT)
Facility: OTHER | Age: 35
Discharge: HOME | End: 2020-07-01
Attending: SURGERY | Admitting: SURGERY
Payer: COMMERCIAL

## 2020-07-01 ENCOUNTER — HOSPITAL ENCOUNTER (OUTPATIENT)
Dept: RADIOLOGY | Facility: OTHER | Age: 35
Discharge: HOME | End: 2020-07-01
Attending: SURGERY
Payer: COMMERCIAL

## 2020-07-01 ENCOUNTER — ANESTHESIA (OUTPATIENT)
Dept: SURGERY | Facility: OTHER | Age: 35
End: 2020-07-01
Payer: COMMERCIAL

## 2020-07-01 VITALS
TEMPERATURE: 98 F | DIASTOLIC BLOOD PRESSURE: 71 MMHG | HEART RATE: 95 BPM | HEIGHT: 65 IN | WEIGHT: 180 LBS | BODY MASS INDEX: 29.99 KG/M2 | RESPIRATION RATE: 18 BRPM | SYSTOLIC BLOOD PRESSURE: 116 MMHG | OXYGEN SATURATION: 95 %

## 2020-07-01 DIAGNOSIS — Z17.0 MALIGNANT NEOPLASM OF UPPER-OUTER QUADRANT OF RIGHT BREAST IN FEMALE, ESTROGEN RECEPTOR POSITIVE: ICD-10-CM

## 2020-07-01 DIAGNOSIS — C50.411 BREAST CANCER OF UPPER-OUTER QUADRANT OF RIGHT FEMALE BREAST: ICD-10-CM

## 2020-07-01 DIAGNOSIS — C50.411 MALIGNANT NEOPLASM OF UPPER-OUTER QUADRANT OF RIGHT BREAST IN FEMALE, ESTROGEN RECEPTOR POSITIVE: ICD-10-CM

## 2020-07-01 DIAGNOSIS — Z17.0 MALIGNANT NEOPLASM OF UPPER-OUTER QUADRANT OF RIGHT BREAST IN FEMALE, ESTROGEN RECEPTOR POSITIVE: Primary | ICD-10-CM

## 2020-07-01 DIAGNOSIS — C50.411 MALIGNANT NEOPLASM OF UPPER-OUTER QUADRANT OF RIGHT BREAST IN FEMALE, ESTROGEN RECEPTOR POSITIVE: Primary | ICD-10-CM

## 2020-07-01 LAB
B-HCG UR QL: NEGATIVE
CTP QC/QA: YES

## 2020-07-01 PROCEDURE — 38900 PR INTRAOPERATIVE SENTINEL LYMPH NODE ID W DYE INJECTION: ICD-10-PCS | Mod: RT,,, | Performed by: SURGERY

## 2020-07-01 PROCEDURE — 63600175 PHARM REV CODE 636 W HCPCS: Mod: JW,JG | Performed by: SURGERY

## 2020-07-01 PROCEDURE — 88342 IMHCHEM/IMCYTCHM 1ST ANTB: CPT | Performed by: PATHOLOGY

## 2020-07-01 PROCEDURE — 94640 AIRWAY INHALATION TREATMENT: CPT

## 2020-07-01 PROCEDURE — 88307 PR  SURG PATH,LEVEL V: ICD-10-PCS | Mod: 26,,, | Performed by: PATHOLOGY

## 2020-07-01 PROCEDURE — 71000016 HC POSTOP RECOV ADDL HR: Performed by: SURGERY

## 2020-07-01 PROCEDURE — 88341 IMHCHEM/IMCYTCHM EA ADD ANTB: CPT | Mod: 26,,, | Performed by: PATHOLOGY

## 2020-07-01 PROCEDURE — 81025 URINE PREGNANCY TEST: CPT | Performed by: ANESTHESIOLOGY

## 2020-07-01 PROCEDURE — 25000003 PHARM REV CODE 250: Performed by: ANESTHESIOLOGY

## 2020-07-01 PROCEDURE — 36590 REMOVAL TUNNELED CV CATH: CPT | Mod: RT,,, | Performed by: SURGERY

## 2020-07-01 PROCEDURE — C1729 CATH, DRAINAGE: HCPCS | Performed by: SURGERY

## 2020-07-01 PROCEDURE — 25000003 PHARM REV CODE 250: Performed by: SPECIALIST

## 2020-07-01 PROCEDURE — 36590 PR REMOVAL TUNNELED CV CATH W SUBQ PORT OR PUMP: ICD-10-PCS | Mod: RT,,, | Performed by: SURGERY

## 2020-07-01 PROCEDURE — 88332 PATH CONSLTJ SURG EA ADD BLK: CPT | Performed by: PATHOLOGY

## 2020-07-01 PROCEDURE — 25000003 PHARM REV CODE 250: Performed by: STUDENT IN AN ORGANIZED HEALTH CARE EDUCATION/TRAINING PROGRAM

## 2020-07-01 PROCEDURE — 38900 IO MAP OF SENT LYMPH NODE: CPT | Mod: RT,,, | Performed by: SURGERY

## 2020-07-01 PROCEDURE — 38792 PR IDENTIFY SENTINEL 2DE: ICD-10-PCS | Mod: 59,RT,, | Performed by: SURGERY

## 2020-07-01 PROCEDURE — 37000008 HC ANESTHESIA 1ST 15 MINUTES: Performed by: SURGERY

## 2020-07-01 PROCEDURE — 63600175 PHARM REV CODE 636 W HCPCS: Performed by: ANESTHESIOLOGY

## 2020-07-01 PROCEDURE — 38525 BIOPSY/REMOVAL LYMPH NODES: CPT | Mod: 51,RT,, | Performed by: SURGERY

## 2020-07-01 PROCEDURE — A9520 TC99 TILMANOCEPT DIAG 0.5MCI: HCPCS

## 2020-07-01 PROCEDURE — 71000039 HC RECOVERY, EACH ADD'L HOUR: Performed by: SURGERY

## 2020-07-01 PROCEDURE — G0378 HOSPITAL OBSERVATION PER HR: HCPCS

## 2020-07-01 PROCEDURE — 88305 TISSUE EXAM BY PATHOLOGIST: CPT | Performed by: DERMATOLOGY

## 2020-07-01 PROCEDURE — C1789 PROSTHESIS, BREAST, IMP: HCPCS | Performed by: SURGERY

## 2020-07-01 PROCEDURE — 88331 PR  PATH CONSULT IN SURG,W FRZ SEC: ICD-10-PCS | Mod: 26,,, | Performed by: PATHOLOGY

## 2020-07-01 PROCEDURE — 94761 N-INVAS EAR/PLS OXIMETRY MLT: CPT

## 2020-07-01 PROCEDURE — 63600175 PHARM REV CODE 636 W HCPCS: Performed by: SPECIALIST

## 2020-07-01 PROCEDURE — 88342 IMHCHEM/IMCYTCHM 1ST ANTB: CPT | Mod: 26,,, | Performed by: PATHOLOGY

## 2020-07-01 PROCEDURE — 88341 IMHCHEM/IMCYTCHM EA ADD ANTB: CPT | Mod: 59 | Performed by: PATHOLOGY

## 2020-07-01 PROCEDURE — 38792 RA TRACER ID OF SENTINL NODE: CPT | Mod: 59,RT,, | Performed by: SURGERY

## 2020-07-01 PROCEDURE — 88307 TISSUE EXAM BY PATHOLOGIST: CPT | Performed by: PATHOLOGY

## 2020-07-01 PROCEDURE — 27201423 OPTIME MED/SURG SUP & DEVICES STERILE SUPPLY: Performed by: SURGERY

## 2020-07-01 PROCEDURE — 38525 PR BIOPSY/REM LYMPH NODES, AXILLARY: ICD-10-PCS | Mod: 51,RT,, | Performed by: SURGERY

## 2020-07-01 PROCEDURE — 25000003 PHARM REV CODE 250: Performed by: SURGERY

## 2020-07-01 PROCEDURE — 25000242 PHARM REV CODE 250 ALT 637 W/ HCPCS: Performed by: ANESTHESIOLOGY

## 2020-07-01 PROCEDURE — 88307 TISSUE EXAM BY PATHOLOGIST: CPT | Mod: 26,,, | Performed by: PATHOLOGY

## 2020-07-01 PROCEDURE — 19303 PR MASTECTOMY, SIMPLE, COMPLETE: ICD-10-PCS | Mod: 50,,, | Performed by: SURGERY

## 2020-07-01 PROCEDURE — 63600175 PHARM REV CODE 636 W HCPCS: Performed by: NURSE ANESTHETIST, CERTIFIED REGISTERED

## 2020-07-01 PROCEDURE — 71000033 HC RECOVERY, INTIAL HOUR: Performed by: SURGERY

## 2020-07-01 PROCEDURE — 25000003 PHARM REV CODE 250: Performed by: NURSE ANESTHETIST, CERTIFIED REGISTERED

## 2020-07-01 PROCEDURE — 37000009 HC ANESTHESIA EA ADD 15 MINS: Performed by: SURGERY

## 2020-07-01 PROCEDURE — 19303 MAST SIMPLE COMPLETE: CPT | Mod: 50,,, | Performed by: SURGERY

## 2020-07-01 PROCEDURE — 36000706: Performed by: SURGERY

## 2020-07-01 PROCEDURE — 71000015 HC POSTOP RECOV 1ST HR: Performed by: SURGERY

## 2020-07-01 PROCEDURE — 88331 PATH CONSLTJ SURG 1 BLK 1SPC: CPT | Mod: 26,,, | Performed by: PATHOLOGY

## 2020-07-01 PROCEDURE — 88341 PR IHC OR ICC EACH ADD'L SINGLE ANTIBODY  STAINPR: ICD-10-PCS | Mod: 26,,, | Performed by: PATHOLOGY

## 2020-07-01 PROCEDURE — C9290 INJ, BUPIVACAINE LIPOSOME: HCPCS | Performed by: SPECIALIST

## 2020-07-01 PROCEDURE — 36000707: Performed by: SURGERY

## 2020-07-01 PROCEDURE — 88342 CHG IMMUNOCYTOCHEMISTRY: ICD-10-PCS | Mod: 26,,, | Performed by: PATHOLOGY

## 2020-07-01 PROCEDURE — 88331 PATH CONSLTJ SURG 1 BLK 1SPC: CPT | Mod: 59 | Performed by: PATHOLOGY

## 2020-07-01 PROCEDURE — C1781 MESH (IMPLANTABLE): HCPCS | Performed by: SURGERY

## 2020-07-01 DEVICE — MESH VICRYL KNITTED 12X12IN: Type: IMPLANTABLE DEVICE | Site: BREAST | Status: FUNCTIONAL

## 2020-07-01 RX ORDER — MEPERIDINE HYDROCHLORIDE 25 MG/ML
12.5 INJECTION INTRAMUSCULAR; INTRAVENOUS; SUBCUTANEOUS ONCE AS NEEDED
Status: DISCONTINUED | OUTPATIENT
Start: 2020-07-01 | End: 2020-07-01 | Stop reason: HOSPADM

## 2020-07-01 RX ORDER — FENTANYL CITRATE 50 UG/ML
INJECTION, SOLUTION INTRAMUSCULAR; INTRAVENOUS
Status: DISCONTINUED | OUTPATIENT
Start: 2020-07-01 | End: 2020-07-01

## 2020-07-01 RX ORDER — DEXAMETHASONE SODIUM PHOSPHATE 4 MG/ML
INJECTION, SOLUTION INTRA-ARTICULAR; INTRALESIONAL; INTRAMUSCULAR; INTRAVENOUS; SOFT TISSUE
Status: DISCONTINUED | OUTPATIENT
Start: 2020-07-01 | End: 2020-07-01

## 2020-07-01 RX ORDER — PROPOFOL 10 MG/ML
VIAL (ML) INTRAVENOUS CONTINUOUS PRN
Status: DISCONTINUED | OUTPATIENT
Start: 2020-07-01 | End: 2020-07-01

## 2020-07-01 RX ORDER — BUPIVACAINE HYDROCHLORIDE 2.5 MG/ML
INJECTION, SOLUTION EPIDURAL; INFILTRATION; INTRACAUDAL
Status: DISCONTINUED | OUTPATIENT
Start: 2020-07-01 | End: 2020-07-01 | Stop reason: HOSPADM

## 2020-07-01 RX ORDER — OXYCODONE HYDROCHLORIDE 5 MG/1
5 TABLET ORAL
Status: DISCONTINUED | OUTPATIENT
Start: 2020-07-01 | End: 2020-07-01 | Stop reason: HOSPADM

## 2020-07-01 RX ORDER — ONDANSETRON 2 MG/ML
4 INJECTION INTRAMUSCULAR; INTRAVENOUS DAILY PRN
Status: DISCONTINUED | OUTPATIENT
Start: 2020-07-01 | End: 2020-07-01 | Stop reason: HOSPADM

## 2020-07-01 RX ORDER — ALBUTEROL SULFATE 0.83 MG/ML
2.5 SOLUTION RESPIRATORY (INHALATION)
Status: COMPLETED | OUTPATIENT
Start: 2020-07-01 | End: 2020-07-01

## 2020-07-01 RX ORDER — HYDROMORPHONE HYDROCHLORIDE 2 MG/ML
0.4 INJECTION, SOLUTION INTRAMUSCULAR; INTRAVENOUS; SUBCUTANEOUS EVERY 5 MIN PRN
Status: DISCONTINUED | OUTPATIENT
Start: 2020-07-01 | End: 2020-07-01 | Stop reason: HOSPADM

## 2020-07-01 RX ORDER — MIDAZOLAM HYDROCHLORIDE 1 MG/ML
INJECTION INTRAMUSCULAR; INTRAVENOUS
Status: DISCONTINUED | OUTPATIENT
Start: 2020-07-01 | End: 2020-07-01

## 2020-07-01 RX ORDER — LIDOCAINE HYDROCHLORIDE 10 MG/ML
0.5 INJECTION, SOLUTION EPIDURAL; INFILTRATION; INTRACAUDAL; PERINEURAL ONCE
Status: DISCONTINUED | OUTPATIENT
Start: 2020-07-01 | End: 2020-07-01 | Stop reason: HOSPADM

## 2020-07-01 RX ORDER — BACITRACIN 50000 [IU]/1
INJECTION, POWDER, FOR SOLUTION INTRAMUSCULAR
Status: DISCONTINUED | OUTPATIENT
Start: 2020-07-01 | End: 2020-07-01 | Stop reason: HOSPADM

## 2020-07-01 RX ORDER — PREGABALIN 50 MG/1
50 CAPSULE ORAL
Status: COMPLETED | OUTPATIENT
Start: 2020-07-01 | End: 2020-07-01

## 2020-07-01 RX ORDER — ISOSULFAN BLUE 50 MG/5ML
INJECTION, SOLUTION SUBCUTANEOUS
Status: DISCONTINUED | OUTPATIENT
Start: 2020-07-01 | End: 2020-07-01 | Stop reason: HOSPADM

## 2020-07-01 RX ORDER — CLINDAMYCIN PHOSPHATE 900 MG/50ML
900 INJECTION, SOLUTION INTRAVENOUS
Status: COMPLETED | OUTPATIENT
Start: 2020-07-01 | End: 2020-07-01

## 2020-07-01 RX ORDER — ROCURONIUM BROMIDE 10 MG/ML
INJECTION, SOLUTION INTRAVENOUS
Status: DISCONTINUED | OUTPATIENT
Start: 2020-07-01 | End: 2020-07-01

## 2020-07-01 RX ORDER — SODIUM CHLORIDE 0.9 % (FLUSH) 0.9 %
3 SYRINGE (ML) INJECTION
Status: DISCONTINUED | OUTPATIENT
Start: 2020-07-01 | End: 2020-07-01 | Stop reason: HOSPADM

## 2020-07-01 RX ORDER — GENTAMICIN SULFATE 40 MG/ML
INJECTION, SOLUTION INTRAMUSCULAR; INTRAVENOUS
Status: DISCONTINUED | OUTPATIENT
Start: 2020-07-01 | End: 2020-07-01 | Stop reason: HOSPADM

## 2020-07-01 RX ORDER — SODIUM CHLORIDE, SODIUM LACTATE, POTASSIUM CHLORIDE, CALCIUM CHLORIDE 600; 310; 30; 20 MG/100ML; MG/100ML; MG/100ML; MG/100ML
INJECTION, SOLUTION INTRAVENOUS CONTINUOUS
Status: DISCONTINUED | OUTPATIENT
Start: 2020-07-01 | End: 2020-07-01 | Stop reason: HOSPADM

## 2020-07-01 RX ORDER — SODIUM CHLORIDE 9 MG/ML
INJECTION, SOLUTION INTRAVENOUS CONTINUOUS
Status: DISCONTINUED | OUTPATIENT
Start: 2020-07-01 | End: 2020-07-01 | Stop reason: HOSPADM

## 2020-07-01 RX ORDER — PHENYLEPHRINE HYDROCHLORIDE 10 MG/ML
INJECTION INTRAVENOUS
Status: DISCONTINUED | OUTPATIENT
Start: 2020-07-01 | End: 2020-07-01

## 2020-07-01 RX ORDER — LIDOCAINE HCL/PF 100 MG/5ML
SYRINGE (ML) INTRAVENOUS
Status: DISCONTINUED | OUTPATIENT
Start: 2020-07-01 | End: 2020-07-01

## 2020-07-01 RX ORDER — ACETAMINOPHEN 500 MG
1000 TABLET ORAL
Status: COMPLETED | OUTPATIENT
Start: 2020-07-01 | End: 2020-07-01

## 2020-07-01 RX ORDER — GLYCOPYRROLATE 0.2 MG/ML
INJECTION INTRAMUSCULAR; INTRAVENOUS
Status: DISCONTINUED | OUTPATIENT
Start: 2020-07-01 | End: 2020-07-01

## 2020-07-01 RX ORDER — PROPOFOL 10 MG/ML
VIAL (ML) INTRAVENOUS
Status: DISCONTINUED | OUTPATIENT
Start: 2020-07-01 | End: 2020-07-01

## 2020-07-01 RX ADMIN — HYDROMORPHONE HYDROCHLORIDE 0.4 MG: 2 INJECTION, SOLUTION INTRAMUSCULAR; INTRAVENOUS; SUBCUTANEOUS at 12:07

## 2020-07-01 RX ADMIN — PHENYLEPHRINE HYDROCHLORIDE 100 MCG: 10 INJECTION INTRAVENOUS at 09:07

## 2020-07-01 RX ADMIN — PHENYLEPHRINE HYDROCHLORIDE 100 MCG: 10 INJECTION INTRAVENOUS at 10:07

## 2020-07-01 RX ADMIN — SODIUM CHLORIDE, SODIUM LACTATE, POTASSIUM CHLORIDE, AND CALCIUM CHLORIDE: 600; 310; 30; 20 INJECTION, SOLUTION INTRAVENOUS at 09:07

## 2020-07-01 RX ADMIN — PHENYLEPHRINE HYDROCHLORIDE 100 MCG: 10 INJECTION INTRAVENOUS at 11:07

## 2020-07-01 RX ADMIN — FENTANYL CITRATE 100 MCG: 50 INJECTION, SOLUTION INTRAMUSCULAR; INTRAVENOUS at 08:07

## 2020-07-01 RX ADMIN — ROCURONIUM BROMIDE 30 MG: 10 INJECTION, SOLUTION INTRAVENOUS at 08:07

## 2020-07-01 RX ADMIN — PROPOFOL 200 MG: 10 INJECTION, EMULSION INTRAVENOUS at 08:07

## 2020-07-01 RX ADMIN — PROPOFOL 50 MCG/KG/MIN: 10 INJECTION, EMULSION INTRAVENOUS at 08:07

## 2020-07-01 RX ADMIN — FENTANYL CITRATE 50 MCG: 50 INJECTION, SOLUTION INTRAMUSCULAR; INTRAVENOUS at 12:07

## 2020-07-01 RX ADMIN — CARBOXYMETHYLCELLULOSE SODIUM 2 DROP: 2.5 SOLUTION/ DROPS OPHTHALMIC at 08:07

## 2020-07-01 RX ADMIN — PROPOFOL 40 MG: 10 INJECTION, EMULSION INTRAVENOUS at 10:07

## 2020-07-01 RX ADMIN — PHENYLEPHRINE HYDROCHLORIDE 100 MCG: 10 INJECTION INTRAVENOUS at 08:07

## 2020-07-01 RX ADMIN — FENTANYL CITRATE 50 MCG: 50 INJECTION, SOLUTION INTRAMUSCULAR; INTRAVENOUS at 08:07

## 2020-07-01 RX ADMIN — DEXAMETHASONE SODIUM PHOSPHATE 8 MG: 4 INJECTION, SOLUTION INTRAMUSCULAR; INTRAVENOUS at 08:07

## 2020-07-01 RX ADMIN — LIDOCAINE HYDROCHLORIDE 50 MG: 20 INJECTION, SOLUTION INTRAVENOUS at 08:07

## 2020-07-01 RX ADMIN — PREGABALIN 50 MG: 50 CAPSULE ORAL at 06:07

## 2020-07-01 RX ADMIN — ACETAMINOPHEN 1000 MG: 500 TABLET, FILM COATED ORAL at 06:07

## 2020-07-01 RX ADMIN — GLYCOPYRROLATE 0.2 MG: 0.2 INJECTION, SOLUTION INTRAMUSCULAR; INTRAVENOUS at 09:07

## 2020-07-01 RX ADMIN — GLYCOPYRROLATE 0.2 MG: 0.2 INJECTION, SOLUTION INTRAMUSCULAR; INTRAVENOUS at 08:07

## 2020-07-01 RX ADMIN — ALBUTEROL SULFATE 2.5 MG: 2.5 SOLUTION RESPIRATORY (INHALATION) at 06:07

## 2020-07-01 RX ADMIN — CLINDAMYCIN PHOSPHATE 900 MG: 18 INJECTION, SOLUTION INTRAVENOUS at 08:07

## 2020-07-01 RX ADMIN — MIDAZOLAM HYDROCHLORIDE 2 MG: 1 INJECTION, SOLUTION INTRAMUSCULAR; INTRAVENOUS at 08:07

## 2020-07-01 RX ADMIN — SODIUM CHLORIDE, SODIUM LACTATE, POTASSIUM CHLORIDE, AND CALCIUM CHLORIDE: 600; 310; 30; 20 INJECTION, SOLUTION INTRAVENOUS at 08:07

## 2020-07-01 RX ADMIN — OXYCODONE HYDROCHLORIDE 5 MG: 5 TABLET ORAL at 02:07

## 2020-07-01 NOTE — DISCHARGE INSTRUCTIONS
INSTRUCTIONS    Drain care as instructed. Record outputs and bring notes to office visit  Call Dr Aranda's office with any issues/questions  Dressings can stay on  No strenuous activity      Anesthesia: After Your Surgery  Youve just had surgery. During surgery, you received medication called anesthesia to keep you comfortable and pain-free. After surgery, you may experience some pain or nausea. This is common. Here are some tips for feeling better and recovering after surgery.    Going home  Your doctor or nurse will show you how to take care of yourself when you go home. He or she will also answer your questions. Have an adult family member or friend drive you home. For the first 24 hours after your surgery:  · Do not drive or use heavy equipment.  · Do not make important decisions or sign legal documents.  · Avoid alcohol.  · Have someone stay with you, if needed. He or she can watch for problems and help keep you safe.  · Take your time getting up from a seated or lying position. You may experience dizziness for 24 hours  Be sure to keep all follow-up appointments with your doctor. And rest after your procedure for as long as your doctor tells you to.    Coping with pain  If you have pain after surgery, pain medication will help you feel better. Take it as directed, before pain becomes severe. Also, ask your doctor or pharmacist about other ways to control pain, such as with heat, ice, and relaxation. And follow any other instructions your surgeon or nurse gives you.    URINARY RETENTION  Should you experience a decrease in your urine output or are unable to urinate following surgery, this can be due to the medications given during surgery.  We recommend you going to the nearest Emergency Department.    Tips for taking pain medication  To get the best relief possible, remember these points:  · Pain medications can upset your stomach. Taking them with a little food may help.  · Most pain relievers taken by mouth  need at least 20 to 30 minutes to take effect.  · Taking medication on a schedule can help you remember to take it. Try to time your medication so that you can take it before beginning an activity, such as dressing, walking, or sitting down for dinner.  · Constipation is a common side effect of pain medications. Contact your doctor before taking any medications like laxatives or stool softeners to help relieve constipation. Also ask about any dietary restrictions, because drinking lots of fluids and eating foods like fruits and vegetables that are high in fiber can also help. Remember, dont take laxatives unless your surgeon has prescribed them.  · Mixing alcohol and pain medication can cause dizziness and slow your breathing. It can even be fatal. Dont drink alcohol while taking pain medication.  · Pain medication can slow your reflexes. Dont drive or operate machinery while taking pain medication.  If your health care provider tells you to take acetaminophen to help relieve your pain, ask him or her how much you are supposed to take each day. (Acetaminophen is the generic name for Tylenol and other brand-name pain relievers.) Acetaminophen or other pain relievers may interact with your prescription medicines or other over-the-counter (OTC) drugs. Some prescription medications contain acetaminophen along with other active ingredients. Using both prescription and OTC acetaminophen for pain can cause you to overdose. The FDA recommends that you read the labels on your OTC medications carefully. This will help you to clearly understand the list of active ingredients, dosing instructions, and any warnings. It may also help you avoid taking too much acetaminophen. If you have questions or don't understand the information, ask your pharmacist or health care provider to explain it to you before you take the OTC medication.    Managing nausea  Some people have an upset stomach after surgery. This is often due to  "anesthesia, pain, pain medications, or the stress of surgery. The following tips will help you manage nausea and get good nutrition as you recover. If you were on a special diet before surgery, ask your doctor if you should follow it during recovery. These tips may help:  · Dont push yourself to eat. Your body will tell you when to eat and how much.  · Start off with clear liquids and soup. They are easier to digest.  · Progress to semi-solid foods (mashed potatoes, applesauce, and gelatin) as you feel ready.  · Slowly move to solid foods. Dont eat fatty, rich, or spicy foods at first.  · Dont force yourself to have three large meals a day. Instead, eat smaller amounts more often.  · Take pain medications with a small amount of solid food, such as crackers or toast to avoid nausea.      Call your surgeon if    · You feel too sleepy, dizzy, or groggy (medication may be too strong).  · You have side effects like nausea, vomiting, or skin changes (rash, itching, or hives).   © 2842-0651 [employer]. [address] [place] [address]. All rights reserved. This information is not intended as a substitute for professional medical care. Always follow your healthcare professional's instructions.        Caring for a Closed Suction Drainage Tube  A drainage tube removes fluid from around an incision. This helps prevent infection and promotes healing. The collection bulb at the end of the tube is squeezed and plugged to create suction. The bulb should be emptied and reset when half full to maintain adequate suction. You need to empty the bulb and clean the skin around the drain as often as your healthcare provider tells you to. Follow the steps below.     What youll need  Have the following items ready:  · Disposable gloves  · Measuring cup  · Record sheet  · Gauze or paper towel  · Sterile cotton swabs or 4" x 4" gauze pads  · Sterile saline or soap and water       Step 1. Empty the bulb  · Wash " "your hands and put on a new pair of disposable gloves.  · Point the top of the bulb away from you and remove the stopper.  · Turn the bulb upside down over a measuring cup. Squeeze the fluid into the cup. Make sure the bulb is totally empty.  · Put the cup to one side. You can record the volume of liquid in the cup after you clean and reconnect the bulb in step 2.    Step 2. Clean and reconnect the bulb  · Clean the top of the bulb with clean gauze or a paper towel, if needed.  · Squeeze the bulb tight, and put the stopper back on the top.  · Record the amount of fluid in the cup. Then, empty the cup as directed.    Step 3. Clean the site  · Remove your disposable gloves and wash your hands before cleaning the site.  · Put on a new pair of disposable gloves.  · Wet a sterile cotton swab or 4" x 4" gauze pad with sterile saline or soap and water.  · Gently clean the skin around the drain. Always wipe away from the incision.  · Apply an antibacterial ointment if directed.   When to call your healthcare provider  Call your healthcare provider if you notice any of these changes:  · The amount of fluid increases or decreases suddenly  · Large amount of blood or a clot in drainage  · Color, odor, or thickness of the fluid changes  · Tube falls out or the incision opens  · Skin around the drain is red, swollen, painful, or seeping pus  · You have a fever of 100.4°F (38°C) or higher, or as directed by your healthcare provider     If the tube isn't draining  Here are tips to drain the tube:  · Uncurl any kinks in the tube.  · With one hand, firmly hold the base of the tube between your thumb and index finger. Do not touch the incision.  · Put the thumb and index finger of your other hand on the tube, next to the first hand. Pinch your fingers together. Then pull them along the tube toward the bag. This will help push any clogged fluid through the tube. This is called "stripping the tube." You may find it helpful to hold an " alcohol swab between your fingers and the tube to lubricate the tubing.  · If the tube still does not drain, call your healthcare provider.   Date Last Reviewed: 12/1/2016 © 2000-2017 [employer]. [address] [place] [address], [place] [address]. All rights reserved. This information is not intended as a substitute for professional medical care. Always follow your healthcare professional's instructions.              Your Surgeon Gave You EXPAREL® (bupivacaine liposome injectable suspension)    To help control your pain after surgery, your surgeon injected EXPAREL into your surgical incision just before the end of the procedure.   EXPAREL is a local analgesic that contains the local anesthetic bupivacaine. Local anesthetics provide pain relief by numbing the tissue  around the surgical site.   EXPAREL is specifically designed to release pain medication over time and can control pain for up to 72 hours.   In addition to EXPAREL, your surgeon may provide other pain medications to control your pain.   Each patient is different and responds differently to painmedication. Depending on how you respond to EXPAREL, you may require less  additional pain medication during your recovery.    Possible Side Effects    Side effects can occur with any medication and it is important not to ignore anything you may be experiencing. Some patients who  received EXPAREL experienced nausea, vomiting, or constipation. Rarely, patients who receive bupivacaine (the active ingredient in  EXPAREL) have experienced numbness and tingling in their mouth or lips, lightheadedness, or anxiety. Speak with your doctor right  away if you think you may be experiencing any of these sensations, or if you have other questions regarding possible side effects.    Your Recovery    When your pain is under control, your body can better focus on healing. This is not the time to test your pain tolerance, or grin and  bear it.Work with your surgeon and  nurse to make your recovery as speedy and pain-free as possible.   Follow the post-op orders your nurse gave you.   Eat a healthy diet and drink plenty of water. Surgery stresses your body; your body responds by needing more energy to heal.      Important Information About EXPAREL  Products that contain bupivacaine, like EXPAREL, may cause a temporary loss of  sensation or the ability to move in the area where bupivacaine was injected.    Date Administered: 7/1/2020  Time Administered: 11:30AM    Other Forms of Bupivicaine should not be administered within 96hrs following administration of EXPAREL.

## 2020-07-01 NOTE — BRIEF OP NOTE
[facility]  Brief Operative Note    SUMMARY     Surgery Date: 7/1/2020     Surgeon(s) and Role:  Panel 1:     * Percy Ayers MD - Primary     * Nicole Rocha MD - Resident, Assisting  Panel 2:     * Kiko Aranda MD - Primary    Assisting Surgeon: None    Pre-op Diagnosis:  Malignant neoplasm of right female breast, unspecified estrogen receptor status, unspecified site of breast [C50.911]    Post-op Diagnosis:  Post-Op Diagnosis Codes:     * Malignant neoplasm of right female breast, unspecified estrogen receptor status, unspecified site of breast [C50.911]    Procedure(s) (LRB):  MASTECTOMY, BILATERAL - BILATERAL SKIN SPARING MASTECTOMY (Bilateral)  BIOPSY, LYMPH NODE, AXILLARY (Right)  INJECTION, FOR SENTINEL NODE IDENTIFICATION (Right)  REMOVAL, CATHETER, CENTRAL VENOUS, TUNNELED, WITH PORT (N/A)  INSERTION, TISSUE EXPANDER, BREAST (Bilateral)    Anesthesia: General    Description of Procedure: Bilateral skin-sparing mastectomy. Right axillary sentinel lymph node biopsy. 4 hot nodes (not blue), frozen section negative for malignancy in all 4 nodes. Left subclavian port removal. Case then turned over to plastic surgery for bilateral tissue expander placement.    Estimated Blood Loss: 20cc         Specimens:   Specimen (12h ago, onward)    None

## 2020-07-01 NOTE — PLAN OF CARE
Yolanda Win has met all discharge criteria from Phase II. Vital Signs are stable, ambulating  without difficulty. Discharge instructions given, patient verbalized understanding. Discharged from facility via wheelchair in stable condition.

## 2020-07-01 NOTE — OP NOTE
Operative Note     7/1/2020    PRE-OP DIAGNOSIS: Malignant neoplasm of right female breast, unspecified estrogen receptor status, unspecified site of breast [C50.911] ; known positive BRCA 1 positive genetic mutation.     POST-OP DIAGNOSIS: Post-Op Diagnosis Codes:     * Malignant neoplasm of right female breast, unspecified estrogen receptor status, unspecified site of breast [C50.911] ;        known positive BRCA 1 positive genetic mutation.    Procedure(s):  MASTECTOMY, BILATERAL - BILATERAL SKIN SPARING MASTECTOMY  BIOPSY, LYMPH NODE, AXILLARY of the right axilla times four  INJECTION, FOR SENTINEL NODE IDENTIFICATION of the right breast.  REMOVAL, CATHETER, CENTRAL VENOUS, TUNNELED, WITH PORT       SURGEON: Surgeon(s) and Role:  Panel 1:     * Percy Ayers MD - Primary        Nicole Rocha MD - Asssistant    ANESTHESIA: General     OPERATIVE FINDINGS:  For benign negative sentinel lymph nodes from the right axilla on frozen section.  Bilateral skin sparing mastectomies were performed through Wise pattern reduction incisions.    INDICATION FOR PROCEDURE:  Status post neoadjuvant primary chemotherapy for what was essentially a triple negative breast cancer with low estrogen receptor positivity of the right breast clinically presenting as a T3 N0 at initial presentation with complete clinical and radiographic response to neoadjuvant chemotherapy;  Known BRCA 1 positive genetic mutation    PROCEDURE IN DETAIL:  Yolanda Win is a 34 y.o. female brought to the operating room for definitive surgery.  The patient was informed of the possible risks and complications of the procedure, including but not limited to anesthetic risks, bleeding, infection, and need for additional surgery.  The patient concurred with the proposed plan, and has given informed consent.  The site of surgery was properly noted/marked in the preoperative holding area.       The patient underwent informed consent and the  history and physical examination was reviewed.  The patient was consented for bilateral skin sparing mastectomies and was marked by the Plastic surgery team for Wise pattern reductions.  Patient was also consented for Port-A-Cath removal and sentinel lymph node biopsy of the right axilla.  She was brought to the operating room where she underwent general endotracheal anesthesia.  She was in a supine position and the right breast was injected in the anterior subareolar region with both blue dye and technetium labeled radio colloid.  The bilateral breasts were prepped and draped in a sterile fashion along with the bilateral arms and axillae.  A hot spot was noted with the hand-held gamma probe.  We initiated the procedure on the right side.  Skin incisions were made with the plasma blade plasma knife through the Wise pattern reductions to excise the entire nipple-areolar complex and the inferior breast skin.  The superior flap was raised cephalad to the clavicle, medially to the midline to the lateral border of the sternum preserving the intercostal perforators from the internal mammary vessels.  The inferior flap was carried down to 1 cm or so below the inframammary fold identifying and preserving the fascia of the rectus abdominus muscle.  The lateral flap was carried to the lateral mammary fold identifying the anterior border of latissimus dorsi muscle and the serratus anterior muscle.  The superior lateral upper outer dissection was carried up and over the upper outer quadrant axillary tail of Davis breast tissue to the level of the clavicle pectoral fascia which was opened.  We identified 4 sentinel nodes with the 1st 1 being hot and colorless with an ex Vivo count of 300, the 2nd 1 was hot and colorless with an ex Vivo count of a 1000, the 3rd 1 was hot and colorless with an ex Vivo count of 250, and the 4th sentinel node was hot and colorless with an ex Vivo count of 400.  All of these were removed from the  level 1 region of the axilla.  They were not adherent to each other and there was no suspicious clinical lymphadenopathy and no palpable lymph nodes in the level 1 2 or 3 region once the sentinel lymph nodes were removed.  There was no blue dye staining noted in the axilla and once the 4 nodes were removed the background residual radio activity counts were essentially absent and negligible indicating adequate and appropriate sentinel lymph node mapping and identification for sentinel lymph node procedure and axillary staging.  All 4 lymph nodes were negative on frozen section for any evidence of metastatic carcinoma so we did not perform a completion axillary dissection.  The breast tissue was removed from a medial to lateral direction taking the pectoralis fascia with it to the level 1 region of the axilla to the point where the sentinel lymph nodes had been removed preserving the intercostal brachial nerves in the axilla.  The breast was oriented with a short stitch superiorly and a long stitch laterally and it weighed approximately 1200 g and was submitted as the 5th specimen for permanent sectioning.  Skin flaps were viable without evidence of ischemia approximately 6-7 mm in thickness leaving only skin and subcutaneous tissue having performed the entire dissection outside of the superficial investing fascial layer of the breast.  Hemostasis was achieved throughout the skin sparing mastectomy flaps with clips and the plasma blade.  The chest wall and axilla was made hemostatic with clips and standard electrocautery.  With hemostasis achieved the right side was turned over to the Plastic surgery team for anticipated tissue expander placement.    I then turned my attention to the contralateral left breast for prophylactic mastectomy, Port-A-Cath removal for the known positive BRCA 1 genetic mutation.  The skin incision was again made through the same symmetrical Rhodes pattern reduction taking the entire  nipple-areolar complex as well as the inferior breast skin on the left.  The dissection was performed again with the plasma blade as it had been performed on the right side.  The superior dissection was carried cephalad to the clavicle where we open the fibrous capsule from around the Port-A-Cath which was removed from within the mastectomy dissection.  A 2 0 Vicryl figure-of-eight suture was placed around the catheter tract as the port was removed.  The Vicryl suture was tied to close off the catheter tract to minimize any potential risks for back bleeding or air embolism.  The port was removed intact and the operating room team confirmed it for gross identification.  We then completed the prophylactic mastectomy taking the medial dissection to the lateral border the sternum preserving the intercostal perforators from the internal mammary vessels.  The lateral dissection was carried to the lateral mammary fold identifying the anterior border of the latissimus dorsi muscle as well as the serratus anterior muscle.  The superior lateral dissection was carried up and over the upper outer quadrant axillary tail of Davis breast tissue to the level of the clavicle pectoral fascia.  No lymph nodes were intentionally taken as we dissected to the cloudy pectoral fascia.  The inferior dissection was taken down 1 cm or so below the inframammary fold identifying the fascia of the rectus abdominus muscle which was preserved.  The breast was removed from medial to lateral direction taking the pectoralis fascia with it to the level of the clavicle pectoral fascia.  This breast was also oriented with a short stitch superiorly and a long stitch laterally and submitted as the 6 specimen for permanent sectioning.  This breast also weighed approximately 1200 g.  Skin flaps were again uniform in thickness approximately 6-7 mm in thickness leaving only skin and subcutaneous tissue as we had on the contralateral right side.  Hemostasis  was achieved on the skin flaps with clips and the plasma blade.  The hemostasis was achieved on the chest wall with the standard electrocautery and clips the left side was then also turned over to the Plastic surgery team once hemostasis was achieved.    She tolerated this portion of procedure well without complication.  Estimated blood loss had been minimal.  All needle instrument sponge counts were accounted for at this portion of the procedure.  The drains would be placed by the Plastic surgery team of Dr. Aranda.  Dictated by Dr. Ayers.  Thank you.    ESTIMATED BLOOD LOSS: Minimal    COMPLICATIONS: none    DISPOSITION: PACU - hemodynamically stable.    ATTESTATION:   I was present and scrubbed for the entire procedure.

## 2020-07-01 NOTE — ANESTHESIA PROCEDURE NOTES
Intubation  Performed by: Mana Porter CRNA  Authorized by: Roger Dinero MD     Intubation:     Induction:  Intravenous    Intubated:  Postinduction    Mask Ventilation:  Easy mask    Attempts:  1    Attempted By:  CRNA    Method of Intubation:  Video laryngoscopy    Blade:  Ortiz 3    Laryngeal View Grade: Grade I - full view of chords      Difficult Airway Encountered?: No      Complications:  None    Airway Device:  Oral endotracheal tube    Airway Device Size:  7.0    Style/Cuff Inflation:  Cuffed    Inflation Amount (mL):  5    Tube secured:  22    Secured at:  The lips    Placement Verified By:  Capnometry    Complicating Factors:  None    Findings Post-Intubation:  BS equal bilateral and atraumatic/condition of teeth unchanged

## 2020-07-01 NOTE — TRANSFER OF CARE
"Anesthesia Transfer of Care Note    Patient: Yolanda Win    Procedure(s) Performed: Procedure(s) (LRB):  MASTECTOMY, BILATERAL - BILATERAL SKIN SPARING MASTECTOMY (Bilateral)  BIOPSY, LYMPH NODE, AXILLARY (Right)  INJECTION, FOR SENTINEL NODE IDENTIFICATION (Right)  REMOVAL, CATHETER, CENTRAL VENOUS, TUNNELED, WITH PORT (N/A)  INSERTION, TISSUE EXPANDER, BREAST (Bilateral)    Patient location: PACU    Anesthesia Type: general    Transport from OR: Transported from OR on 2-3 L/min O2 by NC with adequate spontaneous ventilation    Post pain: adequate analgesia    Post assessment: no apparent anesthetic complications    Post vital signs: stable    Level of consciousness: awake and alert    Nausea/Vomiting: no nausea/vomiting    Complications: none    Transfer of care protocol was followed      Last vitals:   Visit Vitals  /65   Pulse 87   Temp 36.1 °C (97 °F) (Oral)   Resp 18   Ht 5' 5" (1.651 m)   Wt 81.6 kg (180 lb)   SpO2 100%   Breastfeeding No   BMI 29.95 kg/m²     "

## 2020-07-01 NOTE — INTERVAL H&P NOTE
The patient has been examined and the H&P has been reviewed:    I concur with the findings and no changes have occurred since H&P was written. Shortness of breath much improved.     Plan to proceed with bilateral skin sparing mastectomy, right sentinel LN biopsy, possible right axillary dissection.     Anesthesia/Surgery risks, benefits and alternative options discussed and understood by patient/family.          Active Hospital Problems    Diagnosis  POA    Breast cancer of upper-outer quadrant of right female breast [C50.411]  Yes      Resolved Hospital Problems   No resolved problems to display.

## 2020-07-01 NOTE — ANESTHESIA POSTPROCEDURE EVALUATION
Anesthesia Post Evaluation    Patient: Yolanda Win    Procedure(s) Performed: Procedure(s) (LRB):  MASTECTOMY, BILATERAL - BILATERAL SKIN SPARING MASTECTOMY (Bilateral)  BIOPSY, LYMPH NODE, AXILLARY (Right)  INJECTION, FOR SENTINEL NODE IDENTIFICATION (Right)  REMOVAL, CATHETER, CENTRAL VENOUS, TUNNELED, WITH PORT (N/A)  INSERTION, TISSUE EXPANDER, BREAST (Bilateral)    Final Anesthesia Type: general    Patient location during evaluation: PACU  Patient participation: Yes- Able to Participate  Level of consciousness: awake and alert  Post-procedure vital signs: reviewed and stable  Pain management: adequate  Airway patency: patent    PONV status at discharge: No PONV  Anesthetic complications: no      Cardiovascular status: blood pressure returned to baseline  Respiratory status: unassisted and spontaneous ventilation  Hydration status: euvolemic  Follow-up not needed.          Vitals Value Taken Time   /67 07/01/20 1322   Temp 36.9 °C (98.4 °F) 07/01/20 1315   Pulse 99 07/01/20 1323   Resp 18 07/01/20 1432   SpO2 94 % 07/01/20 1323   Vitals shown include unvalidated device data.      Event Time   Out of Recovery 13:24:17         Pain/David Score: Pain Rating Prior to Med Admin: 5 (7/1/2020  2:32 PM)  David Score: 8 (7/1/2020 12:47 PM)

## 2020-07-02 ENCOUNTER — HOSPITAL ENCOUNTER (OUTPATIENT)
Dept: RADIOLOGY | Facility: OTHER | Age: 35
Discharge: HOME | End: 2020-07-02
Attending: PATHOLOGY
Payer: COMMERCIAL

## 2020-07-02 DIAGNOSIS — Z85.3 HISTORY OF BREAST CANCER: ICD-10-CM

## 2020-07-02 PROCEDURE — 76098 X-RAY EXAM SURGICAL SPECIMEN: CPT | Mod: 26,,, | Performed by: RADIOLOGY

## 2020-07-02 PROCEDURE — 76098 MAMMO BREAST SPECIMEN: ICD-10-PCS | Mod: 26,,, | Performed by: RADIOLOGY

## 2020-07-02 PROCEDURE — 76098 X-RAY EXAM SURGICAL SPECIMEN: CPT | Mod: TC

## 2020-07-06 NOTE — OP NOTE
[facility]  Plastic Surgery  Operative Note    SUMMARY     Date of Procedure: 7/1/2020     Surgeon: Kiko Aranda MD    Procedure:  1. Right breast immediate reconstruction using tissue expander, mentor Artouro, 850cc  2. Left breast immediate reconstruction using tissue expander, mentor Artouro, 850cc  3. Right breast prepectoral breast reconstruction with total anterior coverage of the expander using vicryl mesh combined with a superiorly based pectoralis muscle drape.   4. Left breast prepectoral breast reconstruction with total anterior coverage of the expander using vicryl mesh combined with a superiorly based pectoralis muscle drape.   5. Intercostal nerve blocks.    Surgeon(s) and Role:  Panel 1:     * Percy Ayers MD - Primary  Panel 2:     * Kiko Aranda MD - Primary    Assisting Surgeon: None    Pre-Operative Diagnosis:   1. Right breast cancer  2. BRCA 1 gene   3. Acquired absence of bilateral breasts    Post-Operative Diagnosis:  1. Right breast cancer  2. BRCA 1 gene   3. Acquired absence of bilateral breasts    Anesthesia: General    Indications: The patient is a 34 year old female diagnosed with right breast cancer. She underwent neoadjuvant chemotherapy with an excellent clinical response. She presents for bilateral skin sparing mastectomies and tissue expander placement for the first stage of her reconstruction. Risks, benefits, alternatives were discussed. She understands, and wishes to proceed.     Operative Details: The patient was marked in the preoperative holding area. A wise pattern was designed. She was taken to the operating room where general anesthesia was administered and preoperative antibiotics were give. The chest was prepped and draped in normal sterile fashion.  Dr. Ayers performed bilateral skin sparing mastectomies and right sentinal node biopsy. Please see his operative note for detailis. Once completed, I began the reconstruction.     I started on the  right chest. I inspected the pocket and found it free of bleeding. The chest pocket was irrigated with triple antibiotic solution. The chest was measured and the appropriate expander chosen, 850cc. Of note her mastectomies were 1158g left chest, and 1218g right chest. The expanders were opened and prepared on the back table with triple antibiotic solution. A piece of vicryl mesh, [id number] was opened and prepared on the back table with triple antibiotic solution. The borders of the breast were defined using the preoperative marking. The inframammary fold was transposed onto the internal portion of the mastectomy incision, it was marked with a marking pen and then based on the dimensions of the chest cavity, a 30 x 300 piece of vicryl mesh was brought onto the field and based on the dimensions of the expander was determined to be the proper expander for the patient. The mesh was contoured to conform to the dimensions of the expander and since the patient had favorable anatomy, it was decided that a prepectoral placement of the expander would be a good option for this patient. The mesh was cut to an appropriate size based on the dimensions of the expander and it was sutured to the inframammary fold using several interrupted 2-0 monocryl sutures. Then superiorly the small cuff of pectoralis major muscle was raised in the fashion of a P1 technique. The expander was placed superficial to the pectoralis major muscle and the mesh was used to cover the anterior portion of the expander as well as a superior pectoralis muscle draped for the superior portion of the expander, thereby a total anterior prepectoral reconstruction was performed by suturing the mesh to the chest wall. This was done using several interrupted 2-0 monocryl sutures. Intercostal nerve blocks were performed with a mixture of marcaine and experal. A 19 Italian jose g drain was placed and secured. This was closed temporarily and the same procedure performed on  the left chest. Closure was performed in multiple layers using 2.0 monocryl for the deep layer. 2.0 quill and 3.0 monocryl for the subcuticular skin layer.  No air was injected into bilateral expanders. Dermabond and steristrips applied. Sterile dressings and surgical bra placed. The patient was extubated and taken to PACU in stable condition. All surgical counts correct.                          Complications: No    Estimated Blood Loss (EBL): less than 50cc         Implants:   Implant Name Type Inv. Item Serial No.  Lot No. LRB No. Used Action   MENTOR ARTOURA TISSUE EXPANDER    [id number]   Right 1 Implanted   MESH VICRYL KNITTED [id number] - [id number]  MESH VICRYL KNITTED [id number]  [employer] [facility] [id number] Bilateral 1 Implanted   MENTOR ARTUORA TISSUE EXPANDER  850CC    [id number]   Left 1 Implanted               Condition: Good    Disposition: PACU - hemodynamically stable.    Attestation: I was present and scrubbed for the entire procedure.

## 2020-07-07 LAB
FINAL PATHOLOGIC DIAGNOSIS: NORMAL
FROZEN SECTION DIAGNOSIS: NORMAL
FROZEN SECTION FOOTNOTE: NORMAL
GROSS: NORMAL

## 2020-07-09 RX ORDER — DEXTROAMPHETAMINE SACCHARATE, AMPHETAMINE ASPARTATE MONOHYDRATE, DEXTROAMPHETAMINE SULFATE AND AMPHETAMINE SULFATE 6.25; 6.25; 6.25; 6.25 MG/1; MG/1; MG/1; MG/1
25 CAPSULE, EXTENDED RELEASE ORAL EVERY MORNING
Qty: 30 CAPSULE | Refills: 0 | Status: SHIPPED | OUTPATIENT
Start: 2020-07-09 | End: 2020-08-10 | Stop reason: SDUPTHER

## 2020-07-14 ENCOUNTER — TUMOR BOARD CONFERENCE (OUTPATIENT)
Dept: SURGERY | Facility: CLINIC | Age: 35
End: 2020-07-14

## 2020-07-14 ENCOUNTER — OFFICE VISIT (OUTPATIENT)
Dept: PSYCHIATRY | Facility: CLINIC | Age: 35
End: 2020-07-14
Payer: COMMERCIAL

## 2020-07-14 ENCOUNTER — DOCUMENTATION ONLY (OUTPATIENT)
Dept: SURGERY | Facility: CLINIC | Age: 35
End: 2020-07-14

## 2020-07-14 DIAGNOSIS — F41.0 GENERALIZED ANXIETY DISORDER WITH PANIC ATTACKS: ICD-10-CM

## 2020-07-14 DIAGNOSIS — F43.10 POSTTRAUMATIC STRESS DISORDER: ICD-10-CM

## 2020-07-14 DIAGNOSIS — F41.1 GENERALIZED ANXIETY DISORDER WITH PANIC ATTACKS: ICD-10-CM

## 2020-07-14 DIAGNOSIS — F90.0 ADHD (ATTENTION DEFICIT HYPERACTIVITY DISORDER), INATTENTIVE TYPE: ICD-10-CM

## 2020-07-14 DIAGNOSIS — F33.1 MODERATE EPISODE OF RECURRENT MAJOR DEPRESSIVE DISORDER: Primary | ICD-10-CM

## 2020-07-14 PROCEDURE — 90834 PSYTX W PT 45 MINUTES: CPT | Mod: 95,,, | Performed by: PSYCHOLOGIST

## 2020-07-14 PROCEDURE — 90834 PR PSYCHOTHERAPY W/PATIENT, 45 MIN: ICD-10-PCS | Mod: 95,,, | Performed by: PSYCHOLOGIST

## 2020-07-14 NOTE — PROGRESS NOTES
Oncology History   Malignant neoplasm of upper-outer quadrant of right breast in female, estrogen receptor positive   12/11/2019 Imaging Significant Findings    Mammogram and US  Impression:  Right  Mass: Right breast mass. Assessment: 4 - Suspicious finding. Biopsy is recommended.      BI-RADS Category:   Right: 4 - Suspicious  Overall: 4 - Suspicious     Recommendation:  Biopsy is recommended.      I discussed the findings with the patient. She will be scheduled for her biopsy     12/13/2019 Biopsy    RIGHT BREAST MASS, NEEDLE CORE BIOPSIES:  - Invasive ductal carcinoma, [place] histologic grade 3,     12/13/2019 Initial Diagnosis    Malignant neoplasm of upper-outer quadrant of right breast in female, estrogen receptor positive     12/13/2019 Breast Tumor Markers    Estrogen: Positive  Progesterone: Negative  HER2: Negative  Ki67: 90     12/19/2019 Genetic Testing    MyRisk: positive BRCA1     12/24/2019 Imaging Significant Findings    CT     12/26/2019 Imaging Significant Findings    Breast MRI  Impression:  Right  Mass: Right breast 5.2 cm x 2.6 cm x 4.6 cm mass at the 11 o'clock position. Assessment: 6 - Known biopsy, proven malignancy.      1.3 cm focus in the the right sternum with no correlate on bone scan or recent CT.   This area can be assessed on upcoming PET/ CT.       Left  There is no mammographic evidence of malignancy.     BI-RADS Category:   Overall: 6 - Known Biopsy-Proven Malignancy     Recommendation:  The patient is currently under the management of Breast Surgery.  Further recommendations for the patient's care will be guided by this team.  Imaging will be performed as clinically indicated.           12/26/2019 Imaging Significant Findings    Bone scan  Impression:     There is no scintigraphic evidence of osteoblastic metastatic disease.     There is prominent uptake at the medial aspect of the tibiae consistent with shin splints.        12/27/2019 Cancer Staged    Staging form: Breast,  AJCC 8th Edition  - Clinical stage from 12/27/2019: Stage IIIA (cT2, cN1, cM0, G3, ER+, IA-, HER2-)       12/31/2019 - 2/26/2020 Chemotherapy    Treatment Summary   Plan Name: OP BREAST DOSE-DENSE AC - DOXORUBICIN CYCLOPHOSPHAMIDE Q2W  Treatment Goal: Curative  Status: Inactive  Start Date: 12/31/2019  End Date: 2/14/2020  Provider: Gokul Ny MD  Chemotherapy: DOXOrubicin chemo injection 112 mg, 60 mg/m2 = 112 mg, Intravenous, Clinic/HOD 1 time, 4 of 4 cycles  Administration: 112 mg (12/31/2019), 112 mg (1/16/2020), 112 mg (1/30/2020), 112 mg (2/13/2020)  cyclophosphamide (CYTOXAN) 600 mg/m2 = 1,115 mg in sodium chloride 0.9% 250 mL chemo infusion, 600 mg/m2 = 1,115 mg, Intravenous, Clinic/HOD 1 time, 4 of 4 cycles  Administration: 1,115 mg (12/31/2019), 1,115 mg (1/16/2020), 1,115 mg (1/30/2020), 1,115 mg (2/13/2020)     1/28/2020 Tumor Genotyping    Oncotype : 57     2/28/2020 - 5/20/2020 Chemotherapy    Treatment Summary   Plan Name: OP PACLITAXEL QW  Treatment Goal: Curative  Status: Active  Start Date: 2/28/2020  End Date: 5/23/2020 (Planned)  Provider: Gokul Ny MD  Chemotherapy: PACLitaxel (TAXOL) 80 mg/m2 = 150 mg in sodium chloride 0.9% 250 mL chemo infusion, 80 mg/m2 = 150 mg, Intravenous, Clinic/HOD 1 time, 12 of 12 cycles  Administration: 150 mg (2/28/2020), 150 mg (3/6/2020), 150 mg (3/13/2020), 150 mg (3/25/2020), 150 mg (4/1/2020), 150 mg (4/8/2020), 150 mg (4/15/2020), 150 mg (4/22/2020), 150 mg (4/29/2020), 150 mg (5/6/2020), 150 mg (5/13/2020), 150 mg (5/20/2020)       5/25/2020 Imaging Significant Findings    Breast MRI  Impression:  Since December 26, 2019, right breast upper outer quadrant mass representing known malignancy has resolved. No abnormal enhancement. BI-RADS 6: Known malignancy.      Since September 26, 2019, previously described sternal lesion is unchanged, benign. No aggressive MR imaging features. No CT or bone scan correlate on December 26, 2019.      BI-RADS Category:    Overall: 6 - Known Biopsy-Proven Malignancy     Recommendation:  The patient is already under Breast Surgical and Oncologic care.     7/1/2020 Breast Surgery    Surgery: Dr Percy Ayers, [phone] performed bilateral mastectomy with sentinel lymph node biopsy  showing no residual carcinoma.     7/1/2020 Cancer Staged    [id number]     7/14/2020 Tumor Conference      Post mastectomy radiation therapy. Eligible for Hyperfractionated trial. Endocrine therapy. Delayed Flap after radiation.

## 2020-07-16 ENCOUNTER — OFFICE VISIT (OUTPATIENT)
Dept: SURGERY | Facility: CLINIC | Age: 35
End: 2020-07-16
Payer: COMMERCIAL

## 2020-07-16 ENCOUNTER — TELEPHONE (OUTPATIENT)
Dept: SURGERY | Facility: CLINIC | Age: 35
End: 2020-07-16

## 2020-07-16 VITALS
HEIGHT: 65 IN | DIASTOLIC BLOOD PRESSURE: 65 MMHG | SYSTOLIC BLOOD PRESSURE: 143 MMHG | BODY MASS INDEX: 28.99 KG/M2 | HEART RATE: 105 BPM | WEIGHT: 174 LBS

## 2020-07-16 DIAGNOSIS — Z90.13 ACQUIRED ABSENCE OF BOTH BREASTS AND NIPPLES: ICD-10-CM

## 2020-07-16 DIAGNOSIS — Z15.09 BRCA1 GENE MUTATION POSITIVE: ICD-10-CM

## 2020-07-16 DIAGNOSIS — C50.911 MALIGNANT NEOPLASM OF RIGHT FEMALE BREAST, UNSPECIFIED ESTROGEN RECEPTOR STATUS, UNSPECIFIED SITE OF BREAST: ICD-10-CM

## 2020-07-16 DIAGNOSIS — Z15.01 BRCA1 GENE MUTATION POSITIVE: ICD-10-CM

## 2020-07-16 DIAGNOSIS — Z90.13 STATUS POST MASTECTOMY, BILATERAL: Primary | ICD-10-CM

## 2020-07-16 PROCEDURE — 99024 PR POST-OP FOLLOW-UP VISIT: ICD-10-PCS | Mod: S$GLB,,, | Performed by: PHYSICIAN ASSISTANT

## 2020-07-16 PROCEDURE — 99024 POSTOP FOLLOW-UP VISIT: CPT | Mod: S$GLB,,, | Performed by: PHYSICIAN ASSISTANT

## 2020-07-16 PROCEDURE — 99999 PR PBB SHADOW E&M-EST. PATIENT-LVL V: CPT | Mod: PBBFAC,,, | Performed by: PHYSICIAN ASSISTANT

## 2020-07-16 PROCEDURE — 99999 PR PBB SHADOW E&M-EST. PATIENT-LVL V: ICD-10-PCS | Mod: PBBFAC,,, | Performed by: PHYSICIAN ASSISTANT

## 2020-07-16 NOTE — TELEPHONE ENCOUNTER
Left a voicemail stating that patient's appointment with Dr. Ayers this morning will have to be canceled and left direct call back number to reschedule.

## 2020-07-17 ENCOUNTER — OFFICE VISIT (OUTPATIENT)
Dept: HEMATOLOGY/ONCOLOGY | Facility: CLINIC | Age: 35
End: 2020-07-17
Payer: COMMERCIAL

## 2020-07-17 VITALS
HEIGHT: 65 IN | BODY MASS INDEX: 29.05 KG/M2 | RESPIRATION RATE: 18 BRPM | DIASTOLIC BLOOD PRESSURE: 79 MMHG | SYSTOLIC BLOOD PRESSURE: 123 MMHG | WEIGHT: 174.38 LBS

## 2020-07-17 DIAGNOSIS — G62.9 NEUROPATHY: ICD-10-CM

## 2020-07-17 DIAGNOSIS — F41.9 ANXIETY: ICD-10-CM

## 2020-07-17 DIAGNOSIS — C50.919 MALIGNANT NEOPLASM OF BREAST IN FEMALE, ESTROGEN RECEPTOR POSITIVE, UNSPECIFIED LATERALITY, UNSPECIFIED SITE OF BREAST: ICD-10-CM

## 2020-07-17 DIAGNOSIS — Z15.09 BRCA1-ASSOCIATED PROTEIN-1 TUMOR PREDISPOSITION SYNDROME: ICD-10-CM

## 2020-07-17 DIAGNOSIS — Z15.01 BRCA1-ASSOCIATED PROTEIN-1 TUMOR PREDISPOSITION SYNDROME: ICD-10-CM

## 2020-07-17 DIAGNOSIS — N89.8 VAGINAL DRYNESS: ICD-10-CM

## 2020-07-17 DIAGNOSIS — Z15.02 BRCA1-ASSOCIATED PROTEIN-1 TUMOR PREDISPOSITION SYNDROME: ICD-10-CM

## 2020-07-17 DIAGNOSIS — Z17.0 MALIGNANT NEOPLASM OF BREAST IN FEMALE, ESTROGEN RECEPTOR POSITIVE, UNSPECIFIED LATERALITY, UNSPECIFIED SITE OF BREAST: ICD-10-CM

## 2020-07-17 DIAGNOSIS — R32 URINARY INCONTINENCE, UNSPECIFIED TYPE: Primary | ICD-10-CM

## 2020-07-17 DIAGNOSIS — R53.0 NEOPLASTIC MALIGNANT RELATED FATIGUE: ICD-10-CM

## 2020-07-17 PROCEDURE — 99214 PR OFFICE/OUTPT VISIT, EST, LEVL IV, 30-39 MIN: ICD-10-PCS | Mod: S$GLB,,, | Performed by: OBSTETRICS & GYNECOLOGY

## 2020-07-17 PROCEDURE — 3008F PR BODY MASS INDEX (BMI) DOCUMENTED: ICD-10-PCS | Mod: CPTII,S$GLB,, | Performed by: OBSTETRICS & GYNECOLOGY

## 2020-07-17 PROCEDURE — 99999 PR PBB SHADOW E&M-EST. PATIENT-LVL V: CPT | Mod: PBBFAC,,, | Performed by: OBSTETRICS & GYNECOLOGY

## 2020-07-17 PROCEDURE — 99999 PR PBB SHADOW E&M-EST. PATIENT-LVL V: ICD-10-PCS | Mod: PBBFAC,,, | Performed by: OBSTETRICS & GYNECOLOGY

## 2020-07-17 PROCEDURE — 3008F BODY MASS INDEX DOCD: CPT | Mod: CPTII,S$GLB,, | Performed by: OBSTETRICS & GYNECOLOGY

## 2020-07-17 PROCEDURE — 99214 OFFICE O/P EST MOD 30 MIN: CPT | Mod: S$GLB,,, | Performed by: OBSTETRICS & GYNECOLOGY

## 2020-07-17 RX ORDER — PRASTERONE 6.5 MG/1
6.5 INSERT VAGINAL NIGHTLY
Qty: 30 EACH | Refills: 11 | Status: SHIPPED | OUTPATIENT
Start: 2020-07-17 | End: 2020-11-13 | Stop reason: SDUPTHER

## 2020-07-17 NOTE — PROGRESS NOTES
SUBJECTIVE:   35 y.o. female  presents today to discuss timing of her risk reducing surgery,concerns about radiation, vaginal dryness and urinary incontinence. . Patient's last menstrual period was 2020..  She had infiltrating ductal carcinoma of the right breast ER positive,, ME negative her 2-, stage IIIA.  She is status post neoadjuvant DD AC x4 and weekly Taxol times 12 then bilateral mastectomy on 2020.  No residual tumor.    She is BRCA 1 positive- had planned to do risk reducing surgery but now would like to postpone until next summer. She has had right breast wound break down and may have to undergo skin graft.   She is getting  and pelvic ultrasound for BRCA 1 surveillance with Dr. Smith while she waits risk reducing surgery  She met with Dr. pooja mcghee 06/15/2020 and the plan was for post mastectomy radiation  She is uncertain if she should do radiation secondary to her fear of radiation and to her recent wound breakdown  She reports hot flashes, night sweats, vaginal dryness, painful intercourse.  She does report the vaginal lubrication returned after chemo which she is worried that it will come back  She does report some incontinence-weeks with cough sneeze and when she jumps.  She would like to get this evaluated prior to her hysterectomy  She reports that she does have neuropathy primarily in her right hand and her fatigue is currently at 3 to 4/10    Past Medical History:   Diagnosis Date    Abnormal Pap smear of cervix     ADHD     Allergy     seasonal    Anorexia     Anxiety     Bulimia     Depression     Fever blister     History of posttraumatic stress disorder (PTSD)     Hypertension     Gestational Hypertension    Invasive ductal carcinoma of right breast 2019    Mastitis     Migraine headache      Past Surgical History:   Procedure Laterality Date    BILATERAL MASTECTOMY Bilateral 2020    Procedure: MASTECTOMY, BILATERAL - BILATERAL SKIN  SPARING MASTECTOMY;  Surgeon: Percy Ayers MD;  Location: [facility];  Service: General;  Laterality: Bilateral;    BIOPSY OF AXILLARY NODE Right 2020    Procedure: BIOPSY, LYMPH NODE, AXILLARY;  Surgeon: Percy Ayers MD;  Location: [facility];  Service: General;  Laterality: Right;    BONE MARROW ASPIRATION      x 3    CERVICAL BIOPSY  W/ LOOP ELECTRODE EXCISION       SECTION  2016     SECTION N/A 2019    Procedure:  SECTION;  Surgeon: Kirit Hernandez MD;  Location: [facility];  Service: OB/GYN;  Laterality: N/A;    COLPOSCOPY      INJECTION FOR SENTINEL NODE IDENTIFICATION Right 2020    Procedure: INJECTION, FOR SENTINEL NODE IDENTIFICATION;  Surgeon: Percy Ayers MD;  Location: [facility];  Service: General;  Laterality: Right;    INSERTION OF BREAST TISSUE EXPANDER Bilateral 2020    Procedure: INSERTION, TISSUE EXPANDER, BREAST;  Surgeon: Kiko Aranda MD;  Location: [facility];  Service: Plastics;  Laterality: Bilateral;    INSERTION OF TUNNELED CENTRAL VENOUS CATHETER (CVC) WITH SUBCUTANEOUS PORT Left 2019    Procedure: [id number]-Left neck or chest wall;  Surgeon: Percy Ayers MD;  Location: [address];  Service: General;  Laterality: Left;    MEDIPORT REMOVAL N/A 2020    Procedure: REMOVAL, CATHETER, CENTRAL VENOUS, TUNNELED, WITH PORT;  Surgeon: Percy Ayers MD;  Location: [facility];  Service: General;  Laterality: N/A;    SHOULDER SURGERY Left     x 2    SINUS SURGERY      age 19    TONSILLECTOMY       Social History     Socioeconomic History    Marital status:      Spouse name: Not on file    Number of children: Not on file    Years of education: Not on file    Highest education level: Not on file   Occupational History    Not on file   Social Needs    Financial resource strain: Not hard at all    Food insecurity     Worry: Never true     Inability: Never true    Transportation needs      Medical: No     Non-medical: No   Tobacco Use    Smoking status: Never Smoker    Smokeless tobacco: Never Used   Substance and Sexual Activity    Alcohol use: Yes     Frequency: 2-3 times a week     Drinks per session: 1 or 2     Binge frequency: Monthly     Comment: socially    Drug use: No    Sexual activity: Yes     Partners: Male     Birth control/protection: None   Lifestyle    Physical activity     Days per week: 6 days     Minutes per session: 80 min    Stress: Only a little   Relationships    Social connections     Talks on phone: More than three times a week     Gets together: Once a week     Attends Judaism service: Not on file     Active member of club or organization: No     Attends meetings of clubs or organizations: Never     Relationship status:    Other Topics Concern    Are you pregnant or think you may be? Not Asked    Breast-feeding Not Asked   Social History Narrative    Not on file     Family History   Problem Relation Age of Onset    Cancer Maternal Grandmother 40        cervical    Cervical cancer Mother     Breast cancer Other     Ovarian cancer Other     Colon cancer Neg Hx     Melanoma Neg Hx      OB History    Para Term  AB Living   2 2 2     2   SAB TAB Ectopic Multiple Live Births         0 2      # Outcome Date GA Lbr Bull/2nd Weight Sex Delivery Anes PTL Lv   2 Term 19 39w1d  3.43 kg (7 lb 9 oz) M CS-LTranv Spinal N JOLLY   1 Term 18 38w1d  2.96 kg (6 lb 8.4 oz) M CS-LTranv EPI N JOLLY      Complications: Failure to Progress in First Stage           Current Outpatient Medications   Medication Sig Dispense Refill    albuterol (PROVENTIL HFA) 90 mcg/actuation inhaler Inhale 2 puffs by mouth into the lungs every 6 (six) hours as needed for Wheezing. Rescue 18 g 0    citalopram (CELEXA) 20 MG tablet Take 1 tablet (20 mg total) by mouth once daily. 30 tablet 2    clindamycin (CLEOCIN) 300 MG capsule Take 1 capsule (300 mg total) by mouth 2  (two) times a day. 20 capsule 0    dextroamphetamine-amphetamine (ADDERALL XR) 25 MG 24 hr capsule Take 1 capsule (25 mg total) by mouth every morning. 30 capsule 0    loratadine (CLARITIN) 10 mg tablet Take 10 mg by mouth once daily.      magic mouthwash diphen/antac/lidoc/nysta Take 10 mLs by mouth 4 (four) times daily. 120 mL 0    mupirocin (BACTROBAN) 2 % ointment Apply to affected area as directed. 22 g 0    OLANZapine (ZYPREXA) 5 MG tablet Take 1 tablet (5 mg total) by mouth every 6 (six) hours as needed. Nausea 30 tablet 2    ondansetron (ZOFRAN-ODT) 4 MG TbDL Dissolve 1 tablet (4 mg total) by mouth every 8 (eight) hours as needed. 30 tablet 1    oxyCODONE (ROXICODONE) 5 MG immediate release tablet Take 1 tablet (5 mg total) by mouth every 8 (eight) hours as needed for Pain. 60 tablet 0    oxyCODONE-acetaminophen (PERCOCET) 7.5-325 mg per tablet Take 1-2 tablets by mouth every 4 to 6 hours as needed for pain. 28 tablet 0    promethazine (PHENERGAN) 12.5 MG Tab Take 1 tablet (12.5 mg total) by mouth every 6 (six) hours as needed. 30 tablet 1    sumatriptan (IMITREX) 100 MG tablet Take 1/2 to 1 tab at onset of headache.  If no improvement in 2 hours, take another.  Do not take more than 2 in 24 hours. 9 tablet 11    tranexamic acid (LYSTEDA) 650 mg tablet Take 2 tablets (1,300 mg total) by mouth 3 (three) times daily. 30 tablet 3    triamcinolone acetonide 0.1% (KENALOG) 0.1 % cream apply to affected area twice daily 454 g 3    valACYclovir (VALTREX) 1000 MG tablet Take 1 tablet (1,000 mg total) by mouth every 12 (twelve) hours. (Patient taking differently: Take 1,000 mg by mouth every 12 (twelve) hours. Patient uses prn) 60 tablet 0    vitamin D (VITAMIN D3) 1000 units Tab Take 1,000 Units by mouth once daily.      ALPRAZolam (XANAX) 0.25 MG tablet Take 1 tablet (0.25 mg total) by mouth daily as needed for Anxiety. 20 tablet 0    prasterone, dhea, (INTRAROSA) 6.5 mg Inst Place 6.5 mg vaginally  every evening. 30 each 11     Current Facility-Administered Medications   Medication Dose Route Frequency Provider Last Rate Last Dose    copper intrauterine device 380 square mm  mm  380 mm Intrauterine  Kaleigh Polanco MD   380 mm at 01/23/20 1400    onabotulinumtoxina injection 200 Units  200 Units Intramuscular Q90 Days Dillon Gonzalez MD   200 Units at 06/17/20 1522     Allergies: Amoxicillin, Penicillins, and Diazepam     The ASCVD Risk score (Denver LILLY Jr., et al., 2013) failed to calculate for the following reasons:    The 2013 ASCVD risk score is only valid for ages 40 to 79      ROS:  Constitutional: no weight loss, weight gain, fever, +fatigue  Eyes:  No vision changes, glasses/contacts  ENT/Mouth: No ulcers, sinus problems, ears ringing, headache  Cardiovascular: No inability to lie flat, chest pain, exercise intolerance, swelling, heart palpitations  Respiratory: No wheezing, coughing blood, shortness of breath, or cough  Gastrointestinal: No diarrhea, bloody stool, nausea/vomiting, constipation, gas, hemorrhoids  Genitourinary: No blood in urine, painful urination, urgency of urination, frequency of urination, incomplete emptying, incontinence, abnormal bleeding, painful periods, heavy periods, vaginal discharge, vaginal odor, painful intercourse, sexual problems, bleeding after intercourse.  Musculoskeletal: No muscle weakness  Skin/Breast: +breast cancer- s/p bilateral mastectomy- wound breakdown on right  Neurological: No passing out, seizures, +numbness, headache  Endocrine: No diabetes, hypothyroid, hyperthyroid, +hot flashes, +hair loss, abnormal hair growth, acne  Psychiatric: +depression, crying, +anxiety  Hematologic: No bruises, bleeding, swollen lymph nodes, anemia.      Physical Exam  deferred    ASSESSMENT:   Breast cancer  Menopausal symptoms  Vaginal dryness  Fatigue  Anxiety  Urinary incontinence  BRCA 1    PLAN:   Face to face time 35 minutes majority spent in  counseling and arranging follow up  Continue  and Pelvic ultrasound surveillance with Dr Smith until she has risk reducing surgery  Counseled her on possible benefits of Acupuncture for neuropathy, menopausal symptoms, anxiety  Counseled her on risks/benefits and side effect profile of Intrarosa    Discussed possible use of testosterone in the future for menopausal symptoms once she is on endocrine therapy  Encouraged her to discuss XRT with dr. Gallagher in light of recent wound breakdown and possible skin graft  Follow up with Dr. Ny  Consult with uro GYN for urinary incontinence

## 2020-07-17 NOTE — PROGRESS NOTES
[facility]  [facility]  7/16/2020      SUBJECTIVE:   Ms. Yolanda Win is a 35 y.o. female with a BRCA1 mutation and right breast cancer who presents today for follow up, s/p bilateral mastectomy and right SLNB with bilateral tissue expander placement on 7/1/20.  Patient states her drain output has been about 50 ml/day on both sides.     History of Present Illness:  Patient is status post neoadjuvant chemotherapy for what was essentially a triple negative breast cancer with low estrogen receptor positivity of the right breast. She clinically presented as a T3N0 at initial presentation with complete clinical and radiographic response to neoadjuvant chemotherapy. She also has a known BRCA 1 positive genetic mutation and a high oncotype score.   On 7/1/20 she had a right completion mastectomy, right axillary sentinel lymph node biopsy, left prophylactic mastectomy, and bilateral tissue expander reconstruction by Dr. Ayers and Dr Aranda. Her port was removed at that time as well.      Review of Systems: Denies any chest pain or shortness of breath.  Denies any fever or chills.  See HPI/ Interval History for other systems reviewed.    OBJECTIVE:   Vitals:    07/16/20 1049   BP: (!) 143/65   Pulse: 105      Physical Exam   Constitutional: She is well-developed, well-nourished, and in no distress.   Pulmonary/Chest:       Well healed scars bilaterally with no erythema or signs of infection. No tenderness noted. No edema. Drains intact with normal serosanguinous output.     Final Surgical Pathology:  1. Right axillary sentinel node #1 (hot count 300), biopsy:  - One lymph node, negative for metastatic carcinoma (0/1)  - Immunohistochemical stains for CAM 5.2, AE1/AE3 and wide spectrum keratin are negative  2. Right axillary sentinel node #2 (hot count 1000), biopsy:  - One lymph node, negative for metastatic carcinoma (0/1)  - Immunohistochemical stains for CAM 5.2, AE1/AE3 and wide  spectrum keratin are negative  3. Right axillary sentinel node #3 (hot count 250), biopsy:  - Two lymph nodes, negative for metastatic carcinoma (0/2)  - Immunohistochemical stains for CAM 5.2, AE1/AE3 and wide spectrum keratin are negative  4. Right axillary sentinel node #4 (hot count 400), biopsy:  - One lymph node, negative for metastatic carcinoma (0/1)  - Immunohistochemical stains for CAM 5.2, AE1/AE3 and wide spectrum keratin are negative  5. Right breast, simple mastectomy (1218 grams):  - Benign nipple, skin and underlying breast tissue with mild fibrocystic change and sequela of  previous biopsy  - Negative for atypia or residual carcinoma  6. Left breast, simple mastectomy (1175 grams):  - Benign nipple, skin and underlying breast tissue with mild fibrocystic change and focal  lactational change  - Negative for atypia or malignancy    ASSESSMENT:  Ms. Yolanda Win is a 35 y.o. year old female who presents today for follow up, s/p bilateral mastectomy and right SLNB with bilateral tissue expander placement on 7/1/20.    PLAN:   We discussed that she had a complete response to treatment and no residual tumor was noted on final pathology in the breast or lymph nodes. She is scheduled to meet with radiation oncology tomorrow. She may or may not benefit from radiation therapy. She did have a great response to treatment; however, she originally had a T3 tumor, diagnosed at a young age, in addition to a BRCA mutation. This criteria alone may be recommended for additional treatment.   She also met with medical oncology and is supposed to start Tamoxifen soon since she was mildly ER+.   She is scheduled to follow up with Dr. Colindres in plastic surgery tomorrow. She will eventually undergo DORIAN flap reconstruction.   I referred her to PT/OT and gave her a prescription for a mastectomy bra.  I also gave her a copy of her final pathology report.  She requested that Dr. Ayers call her so she can further  discuss adjuvant treatment options with him.    She can follow up with him in 6 months.    ~Yoli Rojas PA-C      Surgical Oncology            7/16/2020

## 2020-07-20 DIAGNOSIS — Z01.84 ANTIBODY RESPONSE EXAMINATION: ICD-10-CM

## 2020-07-20 NOTE — PROGRESS NOTES
Subjective:       Patient ID: Yolanda Win is a 35 y.o. female.    Chief Complaint: No chief complaint on file.    HPI 35 year old female, who returns for follow-up of carcinoma of the right breast.     She was taken to the operating room by Dr. Ayers on July 1, 2020 and underwent bilateral mastectomy.    The right breast showed no residual malignancy, 5 right axillary sentinel lymph nodes were all negative for malignancy.  Final pathological stage yp T0 N0.  The left breast showed no atypia or malignancy.      She is healing and still has some drains in.   She will have post mastectomy radiation.      Breast history:  Mammogram and ultrasound on December 11th, 2019 showed right breast mass 7 cm from the nipple.  By ultrasound this mass measured 33 x 32 x 21 mm.  There was no associated axillary lymphadenopathy noted.    Right breast biopsy on December 13 showed high-grade infiltrating ductal carcinoma (histologic grade 3, nuclear grade 3, mitotic index 3) there were medullary features.  The cancer was 25% ER positive and UT and HER2 negative.  Ki-67 was 90%.    MRI showed a 5.2 cm x 2.6 cm x 4.6 cm irregularly shaped mass with rim enhancement seen in the right breast at 11 o'clock.    CT scan of the chest abdomen and pelvis and bone scan showed no evidence of metastatic disease.    She completed 4 cycles of neoadjuvant Adriamycin Cytoxan February 13, 2020.  She completed 12 weeks of weekly Taxol on May 20, 2020.  Review of Systems   Constitutional: Positive for fatigue. Negative for activity change, appetite change, fever and unexpected weight change.   Eyes: Negative for visual disturbance.   Respiratory: Negative for cough and shortness of breath.    Cardiovascular: Positive for chest pain (post-op).   Gastrointestinal: Negative for abdominal pain, constipation, diarrhea, nausea and rectal pain.   Genitourinary: Negative for frequency.   Musculoskeletal: Negative for back pain.   Skin: Negative for  rash.   Neurological: Positive for numbness. Negative for headaches.   Hematological: Negative for adenopathy.   Psychiatric/Behavioral: Negative for dysphoric mood. The patient is not nervous/anxious.        Objective:      Physical Exam  Constitutional:       General: She is not in acute distress.     Appearance: She is well-developed.   Eyes:      General: No scleral icterus.  Cardiovascular:      Rate and Rhythm: Normal rate and regular rhythm.   Pulmonary:      Effort: Pulmonary effort is normal.      Breath sounds: Normal breath sounds. No wheezing or rales.   Chest:       Abdominal:      Palpations: Abdomen is soft. There is no mass.      Tenderness: There is no abdominal tenderness.   Lymphadenopathy:      Cervical: No cervical adenopathy.      Upper Body:      Right upper body: No supraclavicular adenopathy.      Left upper body: No supraclavicular adenopathy.   Neurological:      Mental Status: She is alert and oriented to person, place, and time.   Psychiatric:         Behavior: Behavior normal.         Thought Content: Thought content normal.         Assessment:       1. Malignant neoplasm of upper-outer quadrant of right breast in female, estrogen receptor positive        Plan:      Her initial biopsy did indicate that she her tumor was ER positive and I recommended that she have adjuvant endocrine therapy.  I will see her after radiation is completed.

## 2020-07-21 ENCOUNTER — TELEPHONE (OUTPATIENT)
Dept: HEMATOLOGY/ONCOLOGY | Facility: CLINIC | Age: 35
End: 2020-07-21

## 2020-07-22 ENCOUNTER — OFFICE VISIT (OUTPATIENT)
Dept: HEMATOLOGY/ONCOLOGY | Facility: CLINIC | Age: 35
End: 2020-07-22
Payer: COMMERCIAL

## 2020-07-22 VITALS
DIASTOLIC BLOOD PRESSURE: 81 MMHG | TEMPERATURE: 98 F | HEIGHT: 65 IN | WEIGHT: 173.06 LBS | RESPIRATION RATE: 16 BRPM | SYSTOLIC BLOOD PRESSURE: 130 MMHG | OXYGEN SATURATION: 98 % | BODY MASS INDEX: 28.83 KG/M2 | HEART RATE: 103 BPM

## 2020-07-22 DIAGNOSIS — Z17.0 MALIGNANT NEOPLASM OF UPPER-OUTER QUADRANT OF RIGHT BREAST IN FEMALE, ESTROGEN RECEPTOR POSITIVE: Primary | ICD-10-CM

## 2020-07-22 DIAGNOSIS — C50.411 MALIGNANT NEOPLASM OF UPPER-OUTER QUADRANT OF RIGHT BREAST IN FEMALE, ESTROGEN RECEPTOR POSITIVE: Primary | ICD-10-CM

## 2020-07-22 PROCEDURE — 99999 PR PBB SHADOW E&M-EST. PATIENT-LVL V: ICD-10-PCS | Mod: PBBFAC,,, | Performed by: INTERNAL MEDICINE

## 2020-07-22 PROCEDURE — 99213 PR OFFICE/OUTPT VISIT, EST, LEVL III, 20-29 MIN: ICD-10-PCS | Mod: S$GLB,,, | Performed by: INTERNAL MEDICINE

## 2020-07-22 PROCEDURE — 3008F BODY MASS INDEX DOCD: CPT | Mod: CPTII,S$GLB,, | Performed by: INTERNAL MEDICINE

## 2020-07-22 PROCEDURE — 99213 OFFICE O/P EST LOW 20 MIN: CPT | Mod: S$GLB,,, | Performed by: INTERNAL MEDICINE

## 2020-07-22 PROCEDURE — 99999 PR PBB SHADOW E&M-EST. PATIENT-LVL V: CPT | Mod: PBBFAC,,, | Performed by: INTERNAL MEDICINE

## 2020-07-22 PROCEDURE — 3008F PR BODY MASS INDEX (BMI) DOCUMENTED: ICD-10-PCS | Mod: CPTII,S$GLB,, | Performed by: INTERNAL MEDICINE

## 2020-07-22 RX ORDER — SULFAMETHOXAZOLE AND TRIMETHOPRIM 800; 160 MG/1; MG/1
TABLET ORAL
Status: ON HOLD | COMMUNITY
Start: 2020-07-14 | End: 2020-10-03 | Stop reason: HOSPADM

## 2020-07-26 NOTE — PROGRESS NOTES
"[facility]  Physical Therapy Initial Evaluation    Name: Yolanda Win  Clinic Number: [id number]    Therapy Diagnosis:   Encounter Diagnoses   Name Primary?    Status post mastectomy, bilateral     Malignant neoplasm of upper-outer quadrant of right breast in female, estrogen receptor positive Yes    Decreased shoulder mobility, unspecified laterality     At risk for lymphedema      Physician: Yoli Rojas PA    Physician Orders: PT Eval and Treat   Medical Diagnosis: right breast cancer  Evaluation Date: 2020  Authorization Period Expiration: 2020  Plan of Care Certification Period: 2020 (6 weeks)  Visit # / Visits authorized:   Insurance: [employer]    Time In: 9:00 AM  Time Out: 9:50 AM  Total Billable Time: 50 minutes    Precautions: cancer      History   History of Present Illness: Yolanda is a 35 y.o. female that presents to  [facility] at the [facility] s/p right breast surgery.   Diagnosis: right breast IDC, grade 3, ER (+), NH (-), HER2 (-); BRCA 1 (+)  Chief complaint:  still has both drains in place and should have drains removed this week.  was not given any restrictions by Dr. Timmons with moving her arms. States having tightness in bilateral anterior chest and getting upper back spasms. States gets occasional "shooting" pain.   Surgical History: 2020 bilateral mastectomies with right ALND (4) and tissue expander reconstruction; 19 for insertion of port  Yolanda Win  has a past surgical history that includes Tonsillectomy; Bone marrow aspiration; Colposcopy; Cervical biopsy w/ loop electrode excision;  section (2016); Shoulder surgery (Left); Sinus surgery;  section (N/A, 2019); Insertion of tunneled central venous catheter (CVC) with subcutaneous port (Left, 2019); Bilateral mastectomy (Bilateral, 2020); Biopsy of axillary node " "(Right, 7/1/2020); Injection for sentinel node identification (Right, 7/1/2020); Mediport removal (N/A, 7/1/2020); and Insertion of breast tissue expander (Bilateral, 7/1/2020).    Chemotherapy: Jesús-Adjuvant chemotherapy 1/20 to 5/20  Radiation: pending    Past Medical History:   Diagnosis Date    Abnormal Pap smear of cervix 2009    ADHD     Allergy     seasonal    Anorexia     Anxiety     Bulimia     Depression     Fever blister     History of posttraumatic stress disorder (PTSD)     Hypertension     Gestational Hypertension    Invasive ductal carcinoma of right breast 12/18/2019    Mastitis     Migraine headache           Medications:  Yolanda has a current medication list which includes the following prescription(s): albuterol, alprazolam, citalopram, clindamycin, dextroamphetamine-amphetamine, loratadine, magic mouthwash diphen/antac/lidoc/nysta, mupirocin, olanzapine, ondansetron, oxycodone, oxycodone-acetaminophen, intrarosa, prasterone (dhea), promethazine, sulfamethoxazole-trimethoprim 800-160mg, sumatriptan, tranexamic acid, trazodone, triamcinolone acetonide 0.1%, valacyclovir, and vitamin d, and the following Facility-Administered Medications: copper intrauterine device and onabotulinumtoxina.    Allergies:  Review of patient's allergies indicates:   Allergen Reactions    Amoxicillin Hives    Penicillins Hives and Rash    Diazepam Anxiety and Other (See Comments)     "makes hyper"        Prior Therapy: seen 12/26/19 for pre-op eval  Social History:  lives with their family  Occupation: Cancer Research at [employer]                       Prior Level of Function: Independent  Current Level of Function: pulling with reaching with both UE's    Other Past Medical History: See Above    Patient's Goals: I want to get my arm motion back and decrease the tightness    Hand dominance: Right handed    Subjective   Pt states: having aching and occasional shooting pains  Pain: 0/10 on VAS currently. " "  Best: 0/10  Worst: 4/10   Pain location: bilateral anterior chest   Objective   Mental status :oriented    Postural examination/scapula alignment: Rounded shoulder and Head forward     Appearance: Drains in place below each breast    Skin Integrity:   Scar Location: bilateral breasts  Appearance: healing  Signs of infection: No  Drainage:yellow, minimal  Color:yellow    Edema: Mild  Location: bilateral breasts    Axillary Web Syndrome/Cording:   Location: None seen today  Degree of Cording: NA (mild, moderate etc...)   Number of cords present: NA    Sensation: numbness noted bilateral breasts        Range of Motion:      Shoulder Range of Motion:   Active /Passive ROM Right Left   Flexion 155 145   Abduction 150 145   Extension 70 70   IR/90deg 85 85   ER/90deg 75 70          Strength: manual muscle test grades below   Upper Extremity Strength   (R) UE (L) UE   Shoulder flexion: 5/5 5/5   Shoulder Abduction: 5/5 5/5   Shoulder IR 5/5 5/5   Shoulder ER 5/5 5/5   Elbow flexion: 5/5 5/5   Elbow extension: 5/5 5/5   Wrist flexion: 5/5 5/5   Wrist extension: 5/5 5/5    5/5 5/5       Baseline Measurements of BL UE's for early detection of Lymphedema:     LANDMARK RIGHT UE LEFT UE DIFFERENCE   E + 8" 39 cm 40 cm 1 cm   E + 6" 35.5 cm 36 cm 0.5 cm   E + 4" 32.5 cm 32.5 cm 0 cm   E + 2" 30.5 cm 30 cm 0.5 cm   Elbow 26 cm 26.5 cm 0.5 cm   W+ 8" 26 cm 26 cm 0 cm   W +  6" 25 cm 25.5 cm 0.5 cm   W + 4" 21.5 cm 21 cm 0.5 cm   Wrist 16.5 cm 16.5 cm 0 cm   DPC 20 cm 20.5 cm 0.5 cm   IP Thumb 6.5 cm 6.5 cm 0 cm     Functional Mobility (Bed mobility, transfers)  Independent    ADL's:  Difficulty raising both arms upward    Gait Assessment:   - AD used: none  - Assistance: independent  - Distance: community distances     Patient Education Provided   - role of therapy in multi - disciplinary team, goals for therapy  - Pt was educated in lymphedema etiology and management plans-given in pre-op eval and reviewed today.    - Pt was " "provided with written risk reductions and precautions for managing lymphedema-given in pre-op eval and reviewed today.     Pt has no cultural, educational or language barriers to learning provided.  Treatment and Instruction of Home Exercise Program    Time In: 9:30 AM  Time Out: 9:50 AM    Yolanda received individual therapeutic exercises to improve postural correction and alignment, stretching and soft tissue mobility, and strengthening for 20 minutes including the following:   Supine Shld Flexion with wand      1 x 10  Butterflies                                       10 x 5"  Supine Shld ER with wand           1 x 10  Side Lying Shld Abd  (B)              1 x 10  Scap Rows                                   1 x 10 GTB  Wall Climbs Forward (B)              1 x 10                       Sideways ()              1 x 10  ER on Wall (B)                              5 x 5"                           Written Home Exercises Provided and Patient Education: Handouts given   See EMR under patient instructions for program given  Pt demonstrated good understanding of the education provided. Patient demo good return demo of skill of exercises.    Functional Limitations Reporting      CMS Impairment/Limitation/Restriction for FOTO Outcome Survey    Therapist reviewed FOTO scores for Yolanda Win on 7/29/2020.   FOTO documents entered into [employer] - see Media section.    Limitations Score: 44%  Category: Carrying           Assessment   This is a 35 y.o. female referred to outpatient physical therapy and presents with a medical diagnosis of right breast cancer and was seen today post-operatively to establish PT plan of care for impairments following surgery including: decreased AROM bilateral shoulders.     Pt instructed in HEP this session and was able to perform all exercises given independently. Pt instructed to follow up with therapist if any concerns arise with program established. Pt will continue to benefit from " skilled physical therapy to address the impairments stated in chart below, provide patient/family education and to maximize pt's level of independence in home and community environment     Anticipated barriers to physical therapy: none     Pt's spiritual, cultural and educational needs considered and pt agreeable to plan of care and goals as stated below:     Medical necessity is demonstrated by the following IMPAIRMENTS/PROMBLEM LIST:    History  Co-morbidities and personal factors that may impact the plan of care Co-morbidities:   history of cancer    Personal Factors:   no deficits     moderate   Examination  Body Structures and Functions, activity limitations and participation restrictions that may impact the plan of care Body Regions:   upper extremities    Body Systems:    ROM    Participation Restrictions:   Difficulty raising both UE's upward    Activity limitations:   Learning and applying knowledge  no deficits    General Tasks and Commands  no deficits    Communication  no deficits    Mobility  lifting and carrying objects    Self care  no deficits    Domestic Life  cooking  doing house work (cleaning house, washing dishes, laundry)    Interactions/Relationships  no deficits    Life Areas  no deficits    Community and Social Life  no deficits         moderate   Clinical Presentation evolving clinical presentation with changing clinical characteristics moderate   Decision Making/ Complexity Score: moderate       Goals: Pt agrees with goals set    Short Term goals: 3 weeks  1. Patient will demonstrate 100% understanding of lymphedema risk reduction practices to include self monitoring for lymphedema. (progressing, not met)  2. Patient will demonstrate independence with Home Exercise program established. (progressing, not met)  3. Pt will increase AROM/PROM in shoulder abduction ROM to 160 degrees bilaterally to improve functional reach, carry, push, pull pain free. (progressing, not met)  4. Pt will  increase AROM/PROM in shoulder flexion to 160 degrees bilaterally to improve functional reach, carry, push, pull pain free.(progressing, not met)      Long Term Goals: 6 weeks   1.  Pt will increase AROM/PROM in shoulder flexion to 170 degrees bilaterally to improve functional reach, carry, push, pull pain free. (progressing, not met)  2. Pt will demonstrate full/maximized tissue mobility to increase ROM and promote healthy tissue to be pain free at discharge. (progressing, not met)  3. Pt will report decrease in overall worst pain to 2/10 at discharge. (progressing, not met)  4. Pt will increase AROM/PROM in shoulder abduction ROM to 170 degrees bilaterally to improve functional reach, carry, push, pull pain free. (progressing, not met)  5. Patient will report compliance with walking program 5x week for 10 min each day to improve overall cardiovascular function and decrease cancer related fatigue at discharge. (progressing, not met)      Plan   Outpatient physical therapy 2x week for 6 weeks to include the following:   Manual Therapy, Patient Education and Therapeutic Exercise.    Plan of care Certification Period: 7/29/2020 to 9/11/2020.    Therapist: Rosmery Mendoza, PT  7/29/2020

## 2020-07-27 ENCOUNTER — OFFICE VISIT (OUTPATIENT)
Dept: PSYCHIATRY | Facility: CLINIC | Age: 35
End: 2020-07-27
Payer: COMMERCIAL

## 2020-07-27 DIAGNOSIS — R06.02 SHORTNESS OF BREATH: ICD-10-CM

## 2020-07-27 DIAGNOSIS — F41.1 GENERALIZED ANXIETY DISORDER: ICD-10-CM

## 2020-07-27 DIAGNOSIS — Z17.0 MALIGNANT NEOPLASM OF UPPER-OUTER QUADRANT OF RIGHT BREAST IN FEMALE, ESTROGEN RECEPTOR POSITIVE: ICD-10-CM

## 2020-07-27 DIAGNOSIS — Z86.59 HISTORY OF POSTTRAUMATIC STRESS DISORDER (PTSD): ICD-10-CM

## 2020-07-27 DIAGNOSIS — C50.411 MALIGNANT NEOPLASM OF UPPER-OUTER QUADRANT OF RIGHT BREAST IN FEMALE, ESTROGEN RECEPTOR POSITIVE: ICD-10-CM

## 2020-07-27 DIAGNOSIS — F90.0 ADHD (ATTENTION DEFICIT HYPERACTIVITY DISORDER), INATTENTIVE TYPE: Primary | ICD-10-CM

## 2020-07-27 PROCEDURE — 99214 PR OFFICE/OUTPT VISIT, EST, LEVL IV, 30-39 MIN: ICD-10-PCS | Mod: 95,,, | Performed by: PSYCHIATRY & NEUROLOGY

## 2020-07-27 PROCEDURE — 99214 OFFICE O/P EST MOD 30 MIN: CPT | Mod: 95,,, | Performed by: PSYCHIATRY & NEUROLOGY

## 2020-07-27 PROCEDURE — 90833 PSYTX W PT W E/M 30 MIN: CPT | Mod: 95,,, | Performed by: PSYCHIATRY & NEUROLOGY

## 2020-07-27 PROCEDURE — 90833 PR PSYCHOTHERAPY W/PATIENT W/E&M, 30 MIN (ADD ON): ICD-10-PCS | Mod: 95,,, | Performed by: PSYCHIATRY & NEUROLOGY

## 2020-07-27 PROCEDURE — 88305 TISSUE EXAM BY PATHOLOGIST: CPT | Mod: 26,,, | Performed by: DERMATOLOGY

## 2020-07-27 PROCEDURE — 88305 TISSUE EXAM BY PATHOLOGIST: ICD-10-PCS | Mod: 26,,, | Performed by: DERMATOLOGY

## 2020-07-27 RX ORDER — TRAZODONE HYDROCHLORIDE 50 MG/1
50 TABLET ORAL NIGHTLY
Qty: 30 TABLET | Refills: 0 | Status: SHIPPED | OUTPATIENT
Start: 2020-07-27 | End: 2020-09-29 | Stop reason: SDUPTHER

## 2020-07-27 RX ORDER — CITALOPRAM 20 MG/1
20 TABLET, FILM COATED ORAL DAILY
Qty: 30 TABLET | Refills: 2 | Status: SHIPPED | OUTPATIENT
Start: 2020-07-27 | End: 2020-08-03
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
## 2020-07-28 ENCOUNTER — OFFICE VISIT (OUTPATIENT)
Dept: PSYCHIATRY | Facility: CLINIC | Age: 35
End: 2020-07-28
Payer: COMMERCIAL

## 2020-07-28 DIAGNOSIS — F90.0 ADHD (ATTENTION DEFICIT HYPERACTIVITY DISORDER), INATTENTIVE TYPE: ICD-10-CM

## 2020-07-28 DIAGNOSIS — F33.1 MODERATE EPISODE OF RECURRENT MAJOR DEPRESSIVE DISORDER: Primary | ICD-10-CM

## 2020-07-28 DIAGNOSIS — F43.10 POSTTRAUMATIC STRESS DISORDER: ICD-10-CM

## 2020-07-28 DIAGNOSIS — F41.1 GENERALIZED ANXIETY DISORDER: ICD-10-CM

## 2020-07-28 PROCEDURE — 90834 PSYTX W PT 45 MINUTES: CPT | Mod: 95,,, | Performed by: PSYCHOLOGIST

## 2020-07-28 PROCEDURE — 90834 PR PSYCHOTHERAPY W/PATIENT, 45 MIN: ICD-10-PCS | Mod: 95,,, | Performed by: PSYCHOLOGIST

## 2020-07-29 ENCOUNTER — CLINICAL SUPPORT (OUTPATIENT)
Dept: REHABILITATION | Facility: HOSPITAL | Age: 35
End: 2020-07-29
Attending: PHYSICIAN ASSISTANT
Payer: COMMERCIAL

## 2020-07-29 DIAGNOSIS — Z91.89 AT RISK FOR LYMPHEDEMA: ICD-10-CM

## 2020-07-29 DIAGNOSIS — C50.411 MALIGNANT NEOPLASM OF UPPER-OUTER QUADRANT OF RIGHT BREAST IN FEMALE, ESTROGEN RECEPTOR POSITIVE: Primary | ICD-10-CM

## 2020-07-29 DIAGNOSIS — Z17.0 MALIGNANT NEOPLASM OF UPPER-OUTER QUADRANT OF RIGHT BREAST IN FEMALE, ESTROGEN RECEPTOR POSITIVE: Primary | ICD-10-CM

## 2020-07-29 DIAGNOSIS — Z90.13 STATUS POST MASTECTOMY, BILATERAL: ICD-10-CM

## 2020-07-29 DIAGNOSIS — M25.619 DECREASED SHOULDER MOBILITY, UNSPECIFIED LATERALITY: ICD-10-CM

## 2020-07-29 LAB
FINAL PATHOLOGIC DIAGNOSIS: NORMAL
GROSS: NORMAL
MICROSCOPIC EXAM: NORMAL

## 2020-07-29 PROCEDURE — 97162 PT EVAL MOD COMPLEX 30 MIN: CPT | Performed by: PHYSICAL MEDICINE & REHABILITATION

## 2020-07-29 PROCEDURE — 97110 THERAPEUTIC EXERCISES: CPT | Performed by: PHYSICAL MEDICINE & REHABILITATION

## 2020-07-29 NOTE — PROGRESS NOTES
"The patient location is: [place]  The chief complaint leading to consultation is: Moderate episode of recurrent major depressive disorder  .  Visit type: audiovisual    Face to Face time with patient: 45 minutes of total time spent on the encounter, which includes face to face time and non-face to face time preparing to see the patient (eg, review of tests), Obtaining and/or reviewing separately obtained history, Documenting clinical information in the electronic or other health record, Independently interpreting results (not separately reported) and communicating results to the patient/family/caregiver, or Care coordination (not separately reported).     Each patient to whom he or she provides medical services by telemedicine is:  (1) informed of the relationship between the physician and patient and the respective role of any other health care provider with respect to management of the patient; and (2) notified that he or she may decline to receive medical services by telemedicine and may withdraw from such care at any time.    [facility]  [address], [address]  [place], [place]  [phone]    Individual Psychotherapy (PhD)    Name: Yolanda Sierra"Juanis"Dustin Win  /AGE: 1985, 35 y/o  DATE: 2020    CPT code: 83473    Therapeutic Intervention:  Patient was seen individually this date for her regular cognitive-behavioral marital psychotherapy session.    Chief complaint/reason for encounter:   Moderate episode of recurrent major depressive disorder  Generalized anxiety disorder with panic attacks  Posttraumatic stress disorder  ADHD (attention deficit hyperactivity disorder (inattentive type)    Interval history and content of current session:  Patient was last seen 14 weeks ago.  Content of today's session:  1. Assessment of mood and anxiety symptoms  2. Processed argument with patient's 's family  3. Discussed Setting Personal Boundaries and " Splitting  4. Discussed Self-Care and Self-Nurturing     Treatment Plan:  1. Supportive Psychotherapy for the couple during cancer treatment and recovery  2. Grief Therapy  3. Communications skills training  4. Behavior Exchange techniques to facilitate increased affectional-expression  5. Body Image exercises  6. Broadening their definition of and repertoire of sexual behavior  7. Relaxation techniques (diaphramatic breathing, PMR, mindfulness, meditation, yoga)                 Progress toward goals:   Patient has completed her 15 rounds of chemotherapy.  Her surgery for double mastectomy scheduled for July 1st.  She expects radiation at the end of July through August, and breast reconstruction in January.  She is feeling very fatigued at the processes and surgeries she has in the future.  She reports significant anxiety I do not want to do this anymore.  She has however been bike riding for exercise, and session was spent discussing methods to deal with her stress including cognitive restructuring with more positive self talk, diaphragmatic breathing, continuing bike riding, and self-care and self nurturing.  Her homework is to talk with Dr. Polanco about the future possibilities of testosterone, and Interosa for her vaginal dryness.  Will follow-up with her in 3 weeks.    Homework:  1.  Patient will practice skills in dealing with family dynamics including avoiding splitting, setting and enforcing personal boundaries, asking for nurturing from her , and attending to self-care and self-nurturing in order to facilitate coping skills, her mental and physical health, and decrease stress.  2.  Patient will continue to collect her automatic/catastrophic thoughts for her automatic thoughts diary to begin cognitive restructuring.    Return to clinic: in 3 weeks    Length of Service (minutes): 45 minutes

## 2020-07-29 NOTE — PATIENT INSTRUCTIONS
ROM: Flexion - Wand (Supine)        Lie on back holding wand. Raise arms over head.   Repeat _10___ times per set. Do __1__ sets per session. Do _2___ sessions per day.  Copyright © [employer]. All rights reserved.           Butterflies    Lie on your back with your hands behind your head. Open your chest by  your elbows apart and gently down. Hold for 5 seconds. Then, bring  your elbows back together. Repeat 2x daily  10 reps   Copyright © 8239-7985 [employer]      ROM: External Rotation - Wand (supine)    Lie on back holding wand with elbows bent to 90°. Rotate forearms over head as far as possible.   Repeat _10___ times per set. Do __1__ sets per session. Do _2___ sessions per day.     [email or web address]     Copyright © [employer]. All rights reserved.       Sidelying Shoulder Abduction            Sidelying Shoulder Abduction    Lie on your right/left side with right/left arm on top. Keep your elbow straight. Lift your arm upward toward your head.  Perform _10___ times. Perform __1__sets.   Perform __2__ times per day.  Copyright © 3088-2843 [employer]ROM:       Scapular Retraction: Rowing (Eccentric) - Arms - Side (Resistance Band)        Hold end of band in each hand. Pull elbows back and squeeze shoulder blades. Hold for count of 5. Use green resistance band, do 1 sets of 10 reps each 1-2x day everyday.     Wall Climb    Perform this exercise two (2) times a day with ten (10) repetitions.    Stand and FACE THE WALL with your toes 10 to 12 inches away from the wall.    a. Holding a towel in both hands on the wall and slide up the wall as high as possible.  b. Hold this stretch for 5  seconds then move your fingers back down the wall.  c. Try to go a little higher each time. It may relax you a bit if you rest your head against the wall.            Wall Walk (Shoulder Abduction)  Using your affected arm, walk your hand up  a wall straight out to the side, as high as you  are able. Lean your body  inward toward the wall.  Perform 10 times/2 times a day.  Copyright © 7533-4323 [employer]      Shoulder External Rotation - on Wall    One hand on wall, rotate shoulder by stepping outward. Keep elbow straight  Hold 5 seconds - repeat 5x.  Do 2 sessions per day.

## 2020-07-29 NOTE — PROGRESS NOTES
"The patient location is: [place]  The chief complaint leading to consultation is: Moderate episode of recurrent major depressive disorder  .  Visit type: audiovisual    Face to Face time with patient: 45 minutes of total time spent on the encounter, which includes face to face time and non-face to face time preparing to see the patient (eg, review of tests), Obtaining and/or reviewing separately obtained history, Documenting clinical information in the electronic or other health record, Independently interpreting results (not separately reported) and communicating results to the patient/family/caregiver, or Care coordination (not separately reported).     Each patient to whom he or she provides medical services by telemedicine is:  (1) informed of the relationship between the physician and patient and the respective role of any other health care provider with respect to management of the patient; and (2) notified that he or she may decline to receive medical services by telemedicine and may withdraw from such care at any time.    [facility]  [address], [address]  [place], [place]  [phone]    Individual Psychotherapy (PhD)    Name: Yolanda Sierra"Juanis"Dustin Win  /AGE: 1985, 35 y/o  DATE: 2020    CPT code: 71821    Therapeutic Intervention:  Patient was seen individually this date for her regular cognitive-behavioral marital psychotherapy session.    Chief complaint/reason for encounter:   Moderate episode of recurrent major depressive disorder  Generalized anxiety disorder with panic attacks  Posttraumatic stress disorder  ADHD (attention deficit hyperactivity disorder (inattentive type)    Interval history and content of current session:  Patient was last seen 2 weeks ago.  Content of today's session:  1. Assessment of mood and anxiety symptoms  2. Processed argument with patient's 's family  3. Discussed Setting Personal Boundaries and " Splitting  4. Discussed Self-Care and Self-Nurturing     Treatment Plan:  1. Supportive Psychotherapy for the couple during cancer treatment and recovery  2. Grief Therapy  3. Communications skills training  4. Behavior Exchange techniques to facilitate increased affectional-expression  5. Body Image exercises  6. Broadening their definition of and repertoire of sexual behavior  7. Relaxation techniques (diaphramatic breathing, PMR, mindfulness, meditation, yoga)                 Progress toward goals:   Patient reports that she is feeling that her recovery is slow and is experiencing nerve pain radiating down her arms and across her chest.  She rated her pain rating as 2-3 overall.  Her doctor has recommended that she can either take gabapentin or Lyrica in order to facilitate her pain for some relief, however she has not started that at this point.  Patient will start physical therapy tomorrow and the present time is only managing her pain with Advil and oxycodone.  Her next procedure is expanders and radiation which is not scheduled until September.  She is happy about her pending breast reconstruction and looking forward to new breasts since she was unhappy with them previously and is looking forward than being smaller and in better shape.  She had an appointment with  who prescribed Intrinsa and is extremely pleased with the improvement.  She says that she feels as if she is 22 years old again. Patient is very happy about her new sexual health and vaginal comfort.  After radiation, she is discussing with Dr. Polanco the possibility of testosterone pellets to increase her sex drive.  Overall, patient is in good spirits, good mood and doing extremely well. She is clearly recovered from last session and seems to be moving forward with less depression and less anxiety.    Homework:  1.  Patient will practice skills in dealing with family dynamics including avoiding splitting, setting and enforcing personal  boundaries, asking for nurturing from her , and attending to self-care and self-nurturing in order to facilitate coping skills, her mental and physical health, and decrease stress.  2.  Patient will continue to collect her automatic/catastrophic thoughts for her automatic thoughts diary to begin cognitive restructuring.    Return to clinic: in 2 weeks    Length of Service (minutes): 45 minutes

## 2020-07-29 NOTE — PLAN OF CARE
"[facility]  Physical Therapy Initial Evaluation    Name: Yolanda Win  Clinic Number: [id number]    Therapy Diagnosis:   Encounter Diagnoses   Name Primary?    Status post mastectomy, bilateral     Malignant neoplasm of upper-outer quadrant of right breast in female, estrogen receptor positive Yes    Decreased shoulder mobility, unspecified laterality     At risk for lymphedema      Physician: Yoli Rojas PA    Physician Orders: PT Eval and Treat   Medical Diagnosis: right breast cancer  Evaluation Date: 2020  Authorization Period Expiration: 2020  Plan of Care Certification Period: 2020 (6 weeks)  Visit # / Visits authorized:   Insurance: [employer]    Time In: 9:00 AM  Time Out: 9:50 AM  Total Billable Time: 50 minutes    Precautions: cancer      History   History of Present Illness: Yolanda is a 35 y.o. female that presents to  [facility] at the [facility] s/p right breast surgery.   Diagnosis: right breast IDC, grade 3, ER (+), [place] (-), HER2 (-); BRCA 1 (+)  Chief complaint:  still has both drains in place and should have drains removed this week.  was not given any restrictions by Dr. Timmons with moving her arms. States having tightness in bilateral anterior chest and getting upper back spasms. States gets occasional "shooting" pain.   Surgical History: 2020 bilateral mastectomies with right ALND (4) and tissue expander reconstruction; 19 for insertion of port  Yolanda Win  has a past surgical history that includes Tonsillectomy; Bone marrow aspiration; Colposcopy; Cervical biopsy w/ loop electrode excision;  section (2016); Shoulder surgery (Left); Sinus surgery;  section (N/A, 2019); Insertion of tunneled central venous catheter (CVC) with subcutaneous port (Left, 2019); Bilateral mastectomy (Bilateral, 2020); Biopsy of axillary node " "(Right, 7/1/2020); Injection for sentinel node identification (Right, 7/1/2020); Mediport removal (N/A, 7/1/2020); and Insertion of breast tissue expander (Bilateral, 7/1/2020).    Chemotherapy: Jesús-Adjuvant chemotherapy 1/20 to 5/20  Radiation: pending    Past Medical History:   Diagnosis Date    Abnormal Pap smear of cervix 2009    ADHD     Allergy     seasonal    Anorexia     Anxiety     Bulimia     Depression     Fever blister     History of posttraumatic stress disorder (PTSD)     Hypertension     Gestational Hypertension    Invasive ductal carcinoma of right breast 12/18/2019    Mastitis     Migraine headache           Medications:  Yolanda has a current medication list which includes the following prescription(s): albuterol, alprazolam, citalopram, clindamycin, dextroamphetamine-amphetamine, loratadine, magic mouthwash diphen/antac/lidoc/nysta, mupirocin, olanzapine, ondansetron, oxycodone, oxycodone-acetaminophen, intrarosa, prasterone (dhea), promethazine, sulfamethoxazole-trimethoprim 800-160mg, sumatriptan, tranexamic acid, trazodone, triamcinolone acetonide 0.1%, valacyclovir, and vitamin d, and the following Facility-Administered Medications: copper intrauterine device and onabotulinumtoxina.    Allergies:  Review of patient's allergies indicates:   Allergen Reactions    Amoxicillin Hives    Penicillins Hives and Rash    Diazepam Anxiety and Other (See Comments)     "makes hyper"        Prior Therapy: seen 12/26/19 for pre-op eval  Social History:  lives with their family  Occupation: Cancer Research at [employer]                       Prior Level of Function: Independent  Current Level of Function: pulling with reaching with both UE's    Other Past Medical History: See Above    Patient's Goals: I want to get my arm motion back and decrease the tightness    Hand dominance: Right handed    Subjective   Pt states: having aching and occasional shooting pains  Pain: 0/10 on VAS currently. " "  Best: 0/10  Worst: 4/10   Pain location: bilateral anterior chest   Objective   Mental status :oriented    Postural examination/scapula alignment: Rounded shoulder and Head forward     Appearance: Drains in place below each breast    Skin Integrity:   Scar Location: bilateral breasts  Appearance: healing  Signs of infection: No  Drainage:yellow, minimal  Color:yellow    Edema: Mild  Location: bilateral breasts    Axillary Web Syndrome/Cording:   Location: None seen today  Degree of Cording: NA (mild, moderate etc...)   Number of cords present: NA    Sensation: numbness noted bilateral breasts        Range of Motion:      Shoulder Range of Motion:   Active /Passive ROM Right Left   Flexion 155 145   Abduction 150 145   Extension 70 70   IR/90deg 85 85   ER/90deg 75 70          Strength: manual muscle test grades below   Upper Extremity Strength   (R) UE (L) UE   Shoulder flexion: 5/5 5/5   Shoulder Abduction: 5/5 5/5   Shoulder IR 5/5 5/5   Shoulder ER 5/5 5/5   Elbow flexion: 5/5 5/5   Elbow extension: 5/5 5/5   Wrist flexion: 5/5 5/5   Wrist extension: 5/5 5/5    5/5 5/5       Baseline Measurements of BL UE's for early detection of Lymphedema:     LANDMARK RIGHT UE LEFT UE DIFFERENCE   E + 8" 39 cm 40 cm 1 cm   E + 6" 35.5 cm 36 cm 0.5 cm   E + 4" 32.5 cm 32.5 cm 0 cm   E + 2" 30.5 cm 30 cm 0.5 cm   Elbow 26 cm 26.5 cm 0.5 cm   W+ 8" 26 cm 26 cm 0 cm   W +  6" 25 cm 25.5 cm 0.5 cm   W + 4" 21.5 cm 21 cm 0.5 cm   Wrist 16.5 cm 16.5 cm 0 cm   DPC 20 cm 20.5 cm 0.5 cm   IP Thumb 6.5 cm 6.5 cm 0 cm     Functional Mobility (Bed mobility, transfers)  Independent    ADL's:  Difficulty raising both arms upward    Gait Assessment:   - AD used: none  - Assistance: independent  - Distance: community distances     Patient Education Provided   - role of therapy in multi - disciplinary team, goals for therapy  - Pt was educated in lymphedema etiology and management plans-given in pre-op eval and reviewed today.    - Pt was " "provided with written risk reductions and precautions for managing lymphedema-given in pre-op eval and reviewed today.     Pt has no cultural, educational or language barriers to learning provided.  Treatment and Instruction of Home Exercise Program    Time In: 9:30 AM  Time Out: 9:50 AM    Yolanda received individual therapeutic exercises to improve postural correction and alignment, stretching and soft tissue mobility, and strengthening for 20 minutes including the following:   Supine Shld Flexion with wand      1 x 10  Butterflies                                       10 x 5"  Supine Shld ER with wand           1 x 10  Side Lying Shld Abd  (B)              1 x 10  Scap Rows                                   1 x 10 GTB  Wall Climbs Forward (B)              1 x 10                       Sideways ()              1 x 10  ER on Wall (B)                              5 x 5"                           Written Home Exercises Provided and Patient Education: Handouts given   See EMR under patient instructions for program given  Pt demonstrated good understanding of the education provided. Patient demo good return demo of skill of exercises.    Functional Limitations Reporting      CMS Impairment/Limitation/Restriction for FOTO Outcome Survey    Therapist reviewed FOTO scores for Yolanda Win on 7/29/2020.   FOTO documents entered into [employer] - see Media section.    Limitations Score: 44%  Category: Carrying           Assessment   This is a 35 y.o. female referred to outpatient physical therapy and presents with a medical diagnosis of right breast cancer and was seen today post-operatively to establish PT plan of care for impairments following surgery including: decreased AROM bilateral shoulders.     Pt instructed in HEP this session and was able to perform all exercises given independently. Pt instructed to follow up with therapist if any concerns arise with program established. Pt will continue to benefit from " skilled physical therapy to address the impairments stated in chart below, provide patient/family education and to maximize pt's level of independence in home and community environment     Anticipated barriers to physical therapy: none     Pt's spiritual, cultural and educational needs considered and pt agreeable to plan of care and goals as stated below:     Medical necessity is demonstrated by the following IMPAIRMENTS/PROMBLEM LIST:    History  Co-morbidities and personal factors that may impact the plan of care Co-morbidities:   history of cancer    Personal Factors:   no deficits     moderate   Examination  Body Structures and Functions, activity limitations and participation restrictions that may impact the plan of care Body Regions:   upper extremities    Body Systems:    ROM    Participation Restrictions:   Difficulty raising both UE's upward    Activity limitations:   Learning and applying knowledge  no deficits    General Tasks and Commands  no deficits    Communication  no deficits    Mobility  lifting and carrying objects    Self care  no deficits    Domestic Life  cooking  doing house work (cleaning house, washing dishes, laundry)    Interactions/Relationships  no deficits    Life Areas  no deficits    Community and Social Life  no deficits         moderate   Clinical Presentation evolving clinical presentation with changing clinical characteristics moderate   Decision Making/ Complexity Score: moderate       Goals: Pt agrees with goals set    Short Term goals: 3 weeks  1. Patient will demonstrate 100% understanding of lymphedema risk reduction practices to include self monitoring for lymphedema. (progressing, not met)  2. Patient will demonstrate independence with Home Exercise program established. (progressing, not met)  3. Pt will increase AROM/PROM in shoulder abduction ROM to 160 degrees bilaterally to improve functional reach, carry, push, pull pain free. (progressing, not met)  4. Pt will  increase AROM/PROM in shoulder flexion to 160 degrees bilaterally to improve functional reach, carry, push, pull pain free.(progressing, not met)      Long Term Goals: 6 weeks   1.  Pt will increase AROM/PROM in shoulder flexion to 170 degrees bilaterally to improve functional reach, carry, push, pull pain free. (progressing, not met)  2. Pt will demonstrate full/maximized tissue mobility to increase ROM and promote healthy tissue to be pain free at discharge. (progressing, not met)  3. Pt will report decrease in overall worst pain to 2/10 at discharge. (progressing, not met)  4. Pt will increase AROM/PROM in shoulder abduction ROM to 170 degrees bilaterally to improve functional reach, carry, push, pull pain free. (progressing, not met)  5. Patient will report compliance with walking program 5x week for 10 min each day to improve overall cardiovascular function and decrease cancer related fatigue at discharge. (progressing, not met)      Plan   Outpatient physical therapy 2x week for 6 weeks to include the following:   Manual Therapy, Patient Education and Therapeutic Exercise.    Plan of care Certification Period: 7/29/2020 to 9/11/2020.    Therapist: Rosmery Mendoza, PT  7/29/2020

## 2020-07-29 NOTE — PROGRESS NOTES
"The patient location is: [place]  The chief complaint leading to consultation is: Moderate episode of recurrent major depressive disorder  .  Visit type: audiovisual    Face to Face time with patient: 45 minutes of total time spent on the encounter, which includes face to face time and non-face to face time preparing to see the patient (eg, review of tests), Obtaining and/or reviewing separately obtained history, Documenting clinical information in the electronic or other health record, Independently interpreting results (not separately reported) and communicating results to the patient/family/caregiver, or Care coordination (not separately reported).     Each patient to whom he or she provides medical services by telemedicine is:  (1) informed of the relationship between the physician and patient and the respective role of any other health care provider with respect to management of the patient; and (2) notified that he or she may decline to receive medical services by telemedicine and may withdraw from such care at any time.    [facility]  [address]  [place], [place]  [phone]    Individual Psychotherapy (PhD)    Name: Yolanda Sierra"Juanis"Dustin Win  /AGE: 1985, 33 y/o  DATE: 2020    CPT code: 02047    Therapeutic Intervention:  Patient was seen individually this date for her regular cognitive-behavioral marital psychotherapy session.    Chief complaint/reason for encounter:   Moderate episode of recurrent major depressive disorder  Generalized anxiety disorder with panic attacks  Posttraumatic stress disorder  ADHD (attention deficit hyperactivity disorder (inattentive type)    Interval history and content of current session:  Patient was last seen 4 weeks ago.  Content of today's session:  1. Assessment of mood and anxiety symptoms  2. Processed argument with patient's 's family  3. Discussed Setting Personal Boundaries and " Splitting  4. Discussed Self-Care and Self-Nurturing     Treatment Plan:  1. Supportive Psychotherapy for the couple during cancer treatment and recovery  2. Grief Therapy  3. Communications skills training  4. Behavior Exchange techniques to facilitate increased affectional-expression  5. Body Image exercises  6. Broadening their definition of and repertoire of sexual behavior  7. Relaxation techniques (diaphramatic breathing, PMR, mindfulness, meditation, yoga)                 Progress toward goals:   Patient completed her double mastectomy on July 1, 2020. She is now undergoing nerve grafting for re-sensation with breast reconstruction.  She has 28 rounds of radiation pending.  Patient is fairly discouraged with the amount of procedure she still has to undergo and she is beginning to worry about body image. Future sessions will focus on relaxation techniques, coping mechanisms to deal with her procedures, and beginning to identify with her new body image.    Homework:  1.  Patient will practice skills in dealing with family dynamics including avoiding splitting, setting and enforcing personal boundaries, asking for nurturing from her , and attending to self-care and self-nurturing in order to facilitate coping skills, her mental and physical health, and decrease stress.  2.  Patient will continue to collect her automatic/catastrophic thoughts for her automatic thoughts diary to begin cognitive restructuring.    Return to clinic: in 2 weeks    Length of Service (minutes): 45 minutes

## 2020-08-03 RX ORDER — ALPRAZOLAM 0.25 MG/1
0.25 TABLET ORAL DAILY PRN
Qty: 20 TABLET | Refills: 0 | Status: CANCELLED | OUTPATIENT
Start: 2020-08-03 | End: 2020-09-02

## 2020-08-03 RX ORDER — CITALOPRAM 20 MG/1
30 TABLET, FILM COATED ORAL DAILY
Qty: 45 TABLET | Refills: 2 | Status: SHIPPED | OUTPATIENT
Start: 2020-08-03 | End: 2020-08-28

## 2020-08-03 RX ORDER — ALPRAZOLAM 0.25 MG/1
0.25 TABLET ORAL DAILY PRN
Qty: 20 TABLET | Refills: 0 | Status: SHIPPED | OUTPATIENT
Start: 2020-08-03 | End: 2020-08-28 | Stop reason: SDUPTHER

## 2020-08-05 DIAGNOSIS — G43.C1 INTRACTABLE PERIODIC HEADACHE SYNDROME: Primary | ICD-10-CM

## 2020-08-05 RX ORDER — TOPIRAMATE 100 MG/1
100 TABLET, FILM COATED ORAL NIGHTLY
Qty: 90 TABLET | Refills: 3 | Status: SHIPPED | OUTPATIENT
Start: 2020-08-05 | End: 2020-08-06 | Stop reason: SDUPTHER

## 2020-08-06 DIAGNOSIS — G43.C1 INTRACTABLE PERIODIC HEADACHE SYNDROME: ICD-10-CM

## 2020-08-06 RX ORDER — TOPIRAMATE 100 MG/1
100 TABLET, FILM COATED ORAL NIGHTLY
Qty: 180 TABLET | Refills: 1 | Status: SHIPPED | OUTPATIENT
Start: 2020-08-06 | End: 2020-09-14 | Stop reason: SDUPTHER

## 2020-08-07 ENCOUNTER — RESEARCH ENCOUNTER (OUTPATIENT)
Dept: RESEARCH | Facility: HOSPITAL | Age: 35
End: 2020-08-07

## 2020-08-07 NOTE — PROGRESS NOTES
Friday, August 7, 2020    Protocol: [id number]  Investigator: MARK Gallagher MD  Patient Initials: SANTIAGO DE LEON  IRB #: 2018.032        [id number]: A Phase III Randomized Trial of Hypofractionated Post Mastectomy Radiation With Breast Reconstruction     Informed Consent Note:     Met with patient, who presented alone, this morning in clinic to discuss the above mentioned clinical trial. Patient was alert and oriented; mood and affect appropriate to the situation. Patient confirmed that she was not participating in any other clinical trials.  Purpose, length of study and number of participants, procedures, risks, potential side effects, potential benefits, costs, payment for participation and/or reimbursement of expenses, alternative methods/treatments, study related questions and compensation for injury, questions about your rights, voluntary participation and withdrawal from the research, confidentiality/privacy, and HIPAA authorization to release information for research all reviewed at length with the patient.  Additional Studies reviewed with the patient who answered yes, that her samples may be collected and used for laboratory studies. She also answered yes, that her samples and related information may be kept in a Biobank for future research, and that she does wish to be contacted to participate in future research.  Patient was given ample opportunity to ask questions and her questions were answered to her satisfaction. Patient willingly and independently signed informed consent on [id number]. A copy of the signed consent was emailed to the patient per patient request.

## 2020-08-10 RX ORDER — DEXTROAMPHETAMINE SACCHARATE, AMPHETAMINE ASPARTATE MONOHYDRATE, DEXTROAMPHETAMINE SULFATE AND AMPHETAMINE SULFATE 6.25; 6.25; 6.25; 6.25 MG/1; MG/1; MG/1; MG/1
25 CAPSULE, EXTENDED RELEASE ORAL EVERY MORNING
Qty: 30 CAPSULE | Refills: 0 | Status: SHIPPED | OUTPATIENT
Start: 2020-08-10 | End: 2020-09-07 | Stop reason: SDUPTHER

## 2020-08-11 NOTE — PROGRESS NOTES
"                                                    Physical Therapy Daily Treatment Note     Name: Yolanda Win  Clinic Number: [id number]  Diagnosis:   Encounter Diagnoses   Name Primary?    Malignant neoplasm of upper-outer quadrant of right breast in female, estrogen receptor positive Yes    At risk for lymphedema     Decreased shoulder mobility, unspecified laterality      Physician: Yoli Rojas PA    Precautions: none  Visit #:  20  Time In: 9:00 AM  Time Out: (;45 AM  Total Treatment Time 1:1: 45 minutes    Evaluation Date: 2020  Visit # authorized: 20  Authorization period: 2020  Plan of care Expiration: 2020  MD referral: ROWENA Grande  Insurance: [facility]      Subjective     Pt reports: drains removed 2 weeks ago (20) and developed a seroma (20)- which has been drained twice- and caused open area inferior border right breast. States this area closing, but not healed. States Dr. Colindres has okayed PT and exercising yesterday. States  having any pain in medial right upper arm and has been able to perform exercises "some".  Pain Scale: Yolanda rates pain on a scale of 5/10 on VAS.   Pain location:right medial upper arm    Fatigue: Mild  Functional change: No change  Diagnosis: right breast IDC, grade 3, ER (+), NC (-), HER2 (-); BRCA 1 (+)  Chief complaint:  still has both drains in place and should have drains removed this week.  was not given any restrictions by Dr. Timmnos with moving her arms. States having tightness in bilateral anterior chest and getting upper back spasms. States gets occasional "shooting" pain.   Surgical History: 2020 bilateral mastectomies with right ALND (4) and tissue expander reconstruction; 19 for insertion of port  Yolanda Win  has a past surgical history that includes Tonsillectomy; Bone marrow aspiration; Colposcopy; Cervical biopsy w/ loop electrode excision;  section (2016); " "Shoulder surgery (Left); Sinus surgery;  section (N/A, 2019); Insertion of tunneled central venous catheter (CVC) with subcutaneous port (Left, 2019); Bilateral mastectomy (Bilateral, 2020); Biopsy of axillary node (Right, 2020); Injection for sentinel node identification (Right, 2020); Mediport removal (N/A, 2020); and Insertion of breast tissue expander (Bilateral, 2020).     Chemotherapy: Jesús-Adjuvant chemotherapy  to   Radiation: pending    Objective     Yolanda received individual therapeutic exercises to improve postural correction and alignment, stretching and soft tissue mobility, and strengthening for 30 minutes including the following:   Shoulder scaption                          1 x 10  Butterflies                                       10 x 5"  Supine Shld ER with wand           1 x 10  Side Lying Shld Abd  (B)              1 x 10  Scap Rows                                   1 x 10 GTB  Wall Climbs Forward (B)              1 x 10                       Sideways (B)              1 x 10  ER on Wall (B)                              5 x 5"       Median nerve glides                  5 x 5"                   Yolanda received the following manual therapy techniques were performed to increased myofascial/soft tissue length, mobility and pliability, increase PROM, AROM and function as well as to decrease pain for 15 minutes  Median nerve glides RUE  Grade II G-H joint mobs (B)  Passive stretch (B) shoulders      Shoulder Range of Motion:   Active /Passive ROM Right Left   Flexion 170 170   Abduction 180 180   Extension 70 70   IR/90deg 85 85   ER/90deg 90 90      Functional Limitations Reporting       CMS Impairment/Limitation/Restriction for FOTO Outcome Survey     Therapist reviewed FOTO scores for Yolanda Win on 2020.   FOTO documents entered into [employer] - see Media section.     Limitations Score: 35%  Category: Carrying            Home Exercise " Program and Patient Education   Education provided re:  - role of PT in multi - disciplinary team, goals for PT  - progress towards goals     See EMR under notes/patient instructions for HEP given/taught this session - all sets and reps included. Pt received printed copy.     Pt was able to demonstrate and report understanding and performance  Pt has no cultural, educational or language barriers to learning provided.    Assessment     Patient is responding well to physical therapy. Is at expected function and pain level for 6 weeks post operatively.   Pain after treatment: 0/10    Pt prognosis is Good. Patient received manual therapy as above to decrease RUE median nerve neural tension and soft tissue tightness. Patient performed above exercise program and tolerated new exercises added today. Patient displays RUE median nerve neural tension. Improvements with AROM:  Right: 15 degrees with Flexion and ER, and30 degrees with Abd; Left: 25 degrees with flexion, 35 degrees with Abd, and 20 degrees with ER. AROM is WNL bilateral shoulders.  Pt will continue to benefit from skilled outpatient physical therapy to address the deficits listed in the problem list chart on initial evaluation, provide pt/family education and to maximize pt's level of independence in the home and community environment. Patient met all STG and 2 LTG this session.    Goals as follows:  Short Term goals: 3 weeks  1. Patient will demonstrate 100% understanding of lymphedema risk reduction practices to include self monitoring for lymphedema. ( met, 8/13/20)  2. Patient will demonstrate independence with Home Exercise program established. (met, 8/13/20)  3. Pt will increase AROM/PROM in shoulder abduction ROM to 160 degrees bilaterally to improve functional reach, carry, push, pull pain free. ( met, 8/13/20)  4. Pt will increase AROM/PROM in shoulder flexion to 160 degrees bilaterally to improve functional reach, carry, push, pull pain free.(met,  8/13/20)        Long Term Goals: 6 weeks   1.  Pt will increase AROM/PROM in shoulder flexion to 170 degrees bilaterally to improve functional reach, carry, push, pull pain free. (met, 8/13/20)  2. Pt will demonstrate full/maximized tissue mobility to increase ROM and promote healthy tissue to be pain free at discharge. (progressing, not met)  3. Pt will report decrease in overall worst pain to 2/10 at discharge. (progressing, not met)  4. Pt will increase AROM/PROM in shoulder abduction ROM to 170 degrees bilaterally to improve functional reach, carry, push, pull pain free. (met), 8/13/20  5. Patient will report compliance with walking program 5x week for 10 min each day to improve overall cardiovascular function and decrease cancer related fatigue at discharge. (progressing, not met)        Plan     Continue with established POC toward physical therapy goals    Therapist: Rosmery Mendoza, PT  8/13/2020

## 2020-08-13 ENCOUNTER — CLINICAL SUPPORT (OUTPATIENT)
Dept: REHABILITATION | Facility: HOSPITAL | Age: 35
End: 2020-08-13
Attending: PHYSICIAN ASSISTANT
Payer: COMMERCIAL

## 2020-08-13 DIAGNOSIS — M25.619 DECREASED SHOULDER MOBILITY, UNSPECIFIED LATERALITY: ICD-10-CM

## 2020-08-13 DIAGNOSIS — Z91.89 AT RISK FOR LYMPHEDEMA: ICD-10-CM

## 2020-08-13 DIAGNOSIS — Z17.0 MALIGNANT NEOPLASM OF UPPER-OUTER QUADRANT OF RIGHT BREAST IN FEMALE, ESTROGEN RECEPTOR POSITIVE: Primary | ICD-10-CM

## 2020-08-13 DIAGNOSIS — C50.411 MALIGNANT NEOPLASM OF UPPER-OUTER QUADRANT OF RIGHT BREAST IN FEMALE, ESTROGEN RECEPTOR POSITIVE: Primary | ICD-10-CM

## 2020-08-13 PROCEDURE — 97140 MANUAL THERAPY 1/> REGIONS: CPT | Performed by: PHYSICAL MEDICINE & REHABILITATION

## 2020-08-13 PROCEDURE — 97110 THERAPEUTIC EXERCISES: CPT | Performed by: PHYSICAL MEDICINE & REHABILITATION

## 2020-08-17 ENCOUNTER — CLINICAL SUPPORT (OUTPATIENT)
Dept: REHABILITATION | Facility: HOSPITAL | Age: 35
End: 2020-08-17
Attending: PHYSICIAN ASSISTANT
Payer: COMMERCIAL

## 2020-08-17 DIAGNOSIS — C50.411 MALIGNANT NEOPLASM OF UPPER-OUTER QUADRANT OF RIGHT BREAST IN FEMALE, ESTROGEN RECEPTOR POSITIVE: Primary | ICD-10-CM

## 2020-08-17 DIAGNOSIS — Z91.89 AT RISK FOR LYMPHEDEMA: ICD-10-CM

## 2020-08-17 DIAGNOSIS — M25.619 DECREASED SHOULDER MOBILITY, UNSPECIFIED LATERALITY: ICD-10-CM

## 2020-08-17 DIAGNOSIS — Z17.0 MALIGNANT NEOPLASM OF UPPER-OUTER QUADRANT OF RIGHT BREAST IN FEMALE, ESTROGEN RECEPTOR POSITIVE: Primary | ICD-10-CM

## 2020-08-17 PROCEDURE — 97140 MANUAL THERAPY 1/> REGIONS: CPT | Performed by: PHYSICAL MEDICINE & REHABILITATION

## 2020-08-17 PROCEDURE — 97110 THERAPEUTIC EXERCISES: CPT | Performed by: PHYSICAL MEDICINE & REHABILITATION

## 2020-08-17 NOTE — PROGRESS NOTES
"                                                    Physical Therapy Daily Treatment Note     Name: Yolanda Win  Clinic Number: [id number]  Diagnosis:   Encounter Diagnoses   Name Primary?    Malignant neoplasm of upper-outer quadrant of right breast in female, estrogen receptor positive Yes    At risk for lymphedema     Decreased shoulder mobility, unspecified laterality      Physician: Yoli Rojas PA    Precautions: none  Visit #: 3 of 20  Time In: 1:00 PM  Time Out: 1:45 PM  Total Treatment Time 1:1: 45 minutes    Evaluation Date: 2020  Visit # authorized: 20  Authorization period: 2020  Plan of care Expiration: 2020  MD referral: ROWENA Grande  Insurance: [facility]      Subjective     Pt reports: on Friday experienced increase pain (10/10) in right upper medial arm and felt cording in this area. States has been stretching and it feels better today. States still having some pain in medial right upper arm.  Pain Scale: Yolanda rates pain on a scale of 3/10 on VAS.   Pain location:right medial upper arm    Fatigue: Mild  Functional change: raising RUE upward better.  Diagnosis: right breast IDC, grade 3, ER (+), WA (-), HER2 (-); BRCA 1 (+)  Chief complaint:  still has both drains in place and should have drains removed this week.  was not given any restrictions by Dr. Timmons with moving her arms. States having tightness in bilateral anterior chest and getting upper back spasms. States gets occasional "shooting" pain.   Surgical History: 2020 bilateral mastectomies with right ALND (4) and tissue expander reconstruction; 19 for insertion of port  Yolanda Win  has a past surgical history that includes Tonsillectomy; Bone marrow aspiration; Colposcopy; Cervical biopsy w/ loop electrode excision;  section (2016); Shoulder surgery (Left); Sinus surgery;  section (N/A, 2019); Insertion of tunneled central venous catheter (CVC) " "with subcutaneous port (Left, 12/27/2019); Bilateral mastectomy (Bilateral, 7/1/2020); Biopsy of axillary node (Right, 7/1/2020); Injection for sentinel node identification (Right, 7/1/2020); Mediport removal (N/A, 7/1/2020); and Insertion of breast tissue expander (Bilateral, 7/1/2020).     Chemotherapy: Jesús-Adjuvant chemotherapy 1/20 to 5/20  Radiation: pending    Objective     Yolanda received individual therapeutic exercises to improve postural correction and alignment, stretching and soft tissue mobility, and strengthening for 15 minutes including the following:   LTR with ER                                       10 x 5"  Sidelying forward arm stretch         10 x 5'  Scap Rows                                         1 x 10 GTB  Wall Climbs Forward (B)                    10 x 5"                       Sideways (B)                  10 x 5"                Yolanda received the following manual therapy techniques were performed to increased myofascial/soft tissue length, mobility and pliability, increase PROM, AROM and function as well as to decrease pain for 30 minutes  Bending, stretching for cording right axilla, lateral pectoralis muscle, medial upper arm and antecubital fossa  Median nerve glides RUE  Lateral Chest stretch (B)  Pectoralis muscle bending with ER (B)  Passive stretch (B) shoulders      Shoulder Range of Motion:   Active /Passive ROM Right Left   Flexion 170 170   Abduction 180 180   Extension 70 70   IR/90deg 85 85   ER/90deg 90 90      Functional Limitations Reporting       CMS Impairment/Limitation/Restriction for FOTO Outcome Survey     Therapist reviewed FOTO scores for Yolanda Win on 8/13/2020.   FOTO documents entered into [employer] - see Media section.     Limitations Score: 35%  Category: Carrying            Home Exercise Program and Patient Education   Education provided re:  - role of PT in multi - disciplinary team, goals for PT  - progress towards goals     See EMR under " notes/patient instructions for HEP given/taught this session - all sets and reps included. Pt received printed copy.     Pt was able to demonstrate and report understanding and performance  Pt has no cultural, educational or language barriers to learning provided.    Assessment     Patient is responding well to physical therapy. Is at expected function and pain level for 6 weeks post operatively.   Pain after treatment: 0/10    Pt prognosis is Good. Patient received manual therapy as above to decrease RUE cording and soft tissue tightness.Moderate cording present right axilla, lateral pectoralis muscle, medial upper arm and antecubital fossa. Palpable release noted with stretching.  Patient performed above exercise program and tolerated new exercises added today. RUE median nerve neural tension no longer present.  AROM is WNL bilateral shoulders.  Pt will continue to benefit from skilled outpatient physical therapy to address the deficits listed in the problem list chart on initial evaluation, provide pt/family education and to maximize pt's level of independence in the home and community environment.     Goals as follows:  Short Term goals: 3 weeks  1. Patient will demonstrate 100% understanding of lymphedema risk reduction practices to include self monitoring for lymphedema. ( met, 8/13/20)  2. Patient will demonstrate independence with Home Exercise program established. (met, 8/13/20)  3. Pt will increase AROM/PROM in shoulder abduction ROM to 160 degrees bilaterally to improve functional reach, carry, push, pull pain free. ( met, 8/13/20)  4. Pt will increase AROM/PROM in shoulder flexion to 160 degrees bilaterally to improve functional reach, carry, push, pull pain free.(met, 8/13/20)        Long Term Goals: 6 weeks   1.  Pt will increase AROM/PROM in shoulder flexion to 170 degrees bilaterally to improve functional reach, carry, push, pull pain free. (met, 8/13/20)  2. Pt will demonstrate full/maximized tissue  mobility to increase ROM and promote healthy tissue to be pain free at discharge. (progressing, not met)  3. Pt will report decrease in overall worst pain to 2/10 at discharge. (progressing, not met)  4. Pt will increase AROM/PROM in shoulder abduction ROM to 170 degrees bilaterally to improve functional reach, carry, push, pull pain free. (met), 8/13/20  5. Patient will report compliance with walking program 5x week for 10 min each day to improve overall cardiovascular function and decrease cancer related fatigue at discharge. (progressing, not met)        Plan     Continue with established POC toward physical therapy goals    Therapist: Rosmery Mendoza, PT  8/17/2020

## 2020-08-17 NOTE — PATIENT INSTRUCTIONS
Gentle Lower trunk rotation with butterfly stretch.   1 x 10 reps each side       Lie on your side with your knees bent and  palms together. Slowly, side your top palm  forward on the bottom palm, moving your  shoulder blades and rotating your spine  and ribs. Return your hand to the starting  Perform 10 times holding for count of 5

## 2020-08-19 ENCOUNTER — TELEPHONE (OUTPATIENT)
Dept: PSYCHIATRY | Facility: CLINIC | Age: 35
End: 2020-08-19

## 2020-08-19 DIAGNOSIS — Z01.84 ANTIBODY RESPONSE EXAMINATION: ICD-10-CM

## 2020-08-19 NOTE — TELEPHONE ENCOUNTER
"Called patient to clarify where she would like to  prescription Trazodone.   States not needed at this time.     As an FYI per patient, " I had a panic attack last night but I was able to talk myself through it without any medication!" Just wanted Dr. Hutson to know" that is a big step for me"    Please disregard previous med request for Trazodone.  Viviana     "

## 2020-08-21 ENCOUNTER — OFFICE VISIT (OUTPATIENT)
Dept: HEMATOLOGY/ONCOLOGY | Facility: CLINIC | Age: 35
End: 2020-08-21
Attending: OBSTETRICS & GYNECOLOGY
Payer: COMMERCIAL

## 2020-08-21 VITALS
WEIGHT: 169 LBS | HEIGHT: 65 IN | RESPIRATION RATE: 18 BRPM | BODY MASS INDEX: 28.16 KG/M2 | DIASTOLIC BLOOD PRESSURE: 83 MMHG | SYSTOLIC BLOOD PRESSURE: 123 MMHG

## 2020-08-21 DIAGNOSIS — Z17.0 MALIGNANT NEOPLASM OF BREAST IN FEMALE, ESTROGEN RECEPTOR POSITIVE, UNSPECIFIED LATERALITY, UNSPECIFIED SITE OF BREAST: ICD-10-CM

## 2020-08-21 DIAGNOSIS — Z01.419 ENCOUNTER FOR GYNECOLOGICAL EXAMINATION WITHOUT ABNORMAL FINDING: ICD-10-CM

## 2020-08-21 DIAGNOSIS — C50.919 MALIGNANT NEOPLASM OF BREAST IN FEMALE, ESTROGEN RECEPTOR POSITIVE, UNSPECIFIED LATERALITY, UNSPECIFIED SITE OF BREAST: ICD-10-CM

## 2020-08-21 DIAGNOSIS — N92.1 MENORRHAGIA WITH IRREGULAR CYCLE: ICD-10-CM

## 2020-08-21 DIAGNOSIS — Z12.4 SCREENING FOR CERVICAL CANCER: Primary | ICD-10-CM

## 2020-08-21 PROCEDURE — 99999 PR PBB SHADOW E&M-EST. PATIENT-LVL V: ICD-10-PCS | Mod: PBBFAC,,, | Performed by: OBSTETRICS & GYNECOLOGY

## 2020-08-21 PROCEDURE — 87624 HPV HI-RISK TYP POOLED RSLT: CPT

## 2020-08-21 PROCEDURE — 99395 PREV VISIT EST AGE 18-39: CPT | Mod: S$GLB,,, | Performed by: OBSTETRICS & GYNECOLOGY

## 2020-08-21 PROCEDURE — 88175 CYTOPATH C/V AUTO FLUID REDO: CPT

## 2020-08-21 PROCEDURE — 99999 PR PBB SHADOW E&M-EST. PATIENT-LVL V: CPT | Mod: PBBFAC,,, | Performed by: OBSTETRICS & GYNECOLOGY

## 2020-08-21 PROCEDURE — 99395 PR PREVENTIVE VISIT,EST,18-39: ICD-10-PCS | Mod: S$GLB,,, | Performed by: OBSTETRICS & GYNECOLOGY

## 2020-08-21 RX ORDER — FERROUS SULFATE 324(65)MG
325 TABLET, DELAYED RELEASE (ENTERIC COATED) ORAL DAILY
COMMUNITY
End: 2020-10-01 | Stop reason: CLARIF

## 2020-08-21 NOTE — PROGRESS NOTES
"SUBJECTIVE:   35 y.o. female   for annual routine Pap and checkup. Patient's last menstrual period was 2020..  She reports that she is having heavy bleeding off and on since 2020. She reports using 16 super plus tampons , then 9-10 superplus, then 4 superlight then 3 tampons on 8/15. This is her first bleeding episode since February. She tried Lysteeda 5 days after bleeding started. She is spotting today  She would like to go back on Ovarian suppression. .    She reports Intrarosa helped "tremendously" with vaginal dryness    Past Medical History:   Diagnosis Date    Abnormal Pap smear of cervix     ADHD     Allergy     seasonal    Anorexia     Anxiety     Bulimia     Depression     Fever blister     History of posttraumatic stress disorder (PTSD)     Hypertension     Gestational Hypertension    Invasive ductal carcinoma of right breast 2019    Mastitis     Migraine headache      Past Surgical History:   Procedure Laterality Date    BILATERAL MASTECTOMY Bilateral 2020    Procedure: MASTECTOMY, BILATERAL - BILATERAL SKIN SPARING MASTECTOMY;  Surgeon: Percy Ayers MD;  Location: [facility];  Service: General;  Laterality: Bilateral;    BIOPSY OF AXILLARY NODE Right 2020    Procedure: BIOPSY, LYMPH NODE, AXILLARY;  Surgeon: Percy Ayers MD;  Location: [facility] OR;  Service: General;  Laterality: Right;    BONE MARROW ASPIRATION      x 3    CERVICAL BIOPSY  W/ LOOP ELECTRODE EXCISION       SECTION  2016     SECTION N/A 2019    Procedure:  SECTION;  Surgeon: Kirit Hernandez MD;  Location: [facility] L&D;  Service: OB/GYN;  Laterality: N/A;    COLPOSCOPY      INJECTION FOR SENTINEL NODE IDENTIFICATION Right 2020    Procedure: INJECTION, FOR SENTINEL NODE IDENTIFICATION;  Surgeon: Percy Ayers MD;  Location: [facility] OR;  Service: General;  Laterality: Right;    INSERTION OF BREAST TISSUE EXPANDER Bilateral 2020    " Procedure: INSERTION, TISSUE EXPANDER, BREAST;  Surgeon: Kiko Aranda MD;  Location: [facility];  Service: Plastics;  Laterality: Bilateral;    INSERTION OF TUNNELED CENTRAL VENOUS CATHETER (CVC) WITH SUBCUTANEOUS PORT Left 12/27/2019    Procedure: [id number]-Left neck or chest wall;  Surgeon: Percy Ayers MD;  [address];  Service: General;  Laterality: Left;    MEDIPORT REMOVAL N/A 7/1/2020    Procedure: REMOVAL, CATHETER, CENTRAL VENOUS, TUNNELED, WITH PORT;  Surgeon: Percy Ayers MD;  Location: [facility];  Service: General;  Laterality: N/A;    SHOULDER SURGERY Left     x 2    SINUS SURGERY      age 19    TONSILLECTOMY       Social History     Socioeconomic History    Marital status:      Spouse name: Not on file    Number of children: Not on file    Years of education: Not on file    Highest education level: Not on file   Occupational History    Not on file   Social Needs    Financial resource strain: Not hard at all    Food insecurity     Worry: Never true     Inability: Never true    Transportation needs     Medical: No     Non-medical: No   Tobacco Use    Smoking status: Never Smoker    Smokeless tobacco: Never Used   Substance and Sexual Activity    Alcohol use: Yes     Frequency: 2-3 times a week     Drinks per session: 1 or 2     Binge frequency: Monthly     Comment: socially    Drug use: No    Sexual activity: Yes     Partners: Male     Birth control/protection: None   Lifestyle    Physical activity     Days per week: 6 days     Minutes per session: 80 min    Stress: Only a little   Relationships    Social connections     Talks on phone: More than three times a week     Gets together: Once a week     Attends Hoahaoism service: Not on file     Active member of club or organization: No     Attends meetings of clubs or organizations: Never     Relationship status:    Other Topics Concern    Are you pregnant or think you may be? Not Asked     Breast-feeding Not Asked   Social History Narrative    Not on file     Family History   Problem Relation Age of Onset    Cancer Maternal Grandmother 40        cervical    Cervical cancer Mother     Breast cancer Other     Ovarian cancer Other     Colon cancer Neg Hx     Melanoma Neg Hx      OB History    Para Term  AB Living   2 2 2     2   SAB TAB Ectopic Multiple Live Births         0 2      # Outcome Date GA Lbr Bull/2nd Weight Sex Delivery Anes PTL Lv   2 Term 19 39w1d  3.43 kg (7 lb 9 oz) M CS-LTranv Spinal N JOLLY   1 Term 16 38w1d  2.96 kg (6 lb 8.4 oz) M CS-LTranv EPI N JOLLY      Complications: Failure to Progress in First Stage           Current Outpatient Medications   Medication Sig Dispense Refill    albuterol (PROVENTIL HFA) 90 mcg/actuation inhaler Inhale 2 puffs by mouth into the lungs every 6 (six) hours as needed for Wheezing. Rescue 18 g 0    ALPRAZolam (XANAX) 0.25 MG tablet Take 1 tablet (0.25 mg total) by mouth daily as needed for Anxiety. 20 tablet 0    citalopram (CELEXA) 20 MG tablet Take 1.5 tablets (30 mg total) by mouth once daily. 45 tablet 2    clindamycin (CLEOCIN) 300 MG capsule Take 1 capsule (300 mg total) by mouth 2 (two) times a day. 20 capsule 0    dextroamphetamine-amphetamine (ADDERALL XR) 25 MG 24 hr capsule Take 1 capsule (25 mg total) by mouth every morning. 30 capsule 0    ferrous sulfate 324 mg (65 mg iron) TbEC Take 325 mg by mouth once daily.      loratadine (CLARITIN) 10 mg tablet Take 10 mg by mouth once daily.      magic mouthwash diphen/antac/lidoc/nysta Take 10 mLs by mouth 4 (four) times daily. 120 mL 0    mupirocin (BACTROBAN) 2 % ointment Apply to affected area as directed. 22 g 0    OLANZapine (ZYPREXA) 5 MG tablet Take 1 tablet (5 mg total) by mouth every 6 (six) hours as needed. Nausea 30 tablet 2    ondansetron (ZOFRAN-ODT) 4 MG TbDL Dissolve 1 tablet (4 mg total) by mouth every 8 (eight) hours as needed. 30 tablet 1     oxyCODONE (ROXICODONE) 5 MG immediate release tablet Take 1 tablet (5 mg total) by mouth every 8 (eight) hours as needed for Pain. 60 tablet 0    oxyCODONE-acetaminophen (PERCOCET) 7.5-325 mg per tablet Take 1-2 tablets by mouth every 4 to 6 hours as needed for pain. 28 tablet 0    prasterone, dhea, (INTRAROSA) 6.5 mg Inst Place 6.5 mg vaginally every evening. 30 each 11    promethazine (PHENERGAN) 12.5 MG Tab Take 1 tablet (12.5 mg total) by mouth every 6 (six) hours as needed. 30 tablet 1    sulfamethoxazole-trimethoprim 800-160mg (BACTRIM DS) 800-160 mg Tab       sumatriptan (IMITREX) 100 MG tablet Take 1/2 to 1 tab at onset of headache.  If no improvement in 2 hours, take another.  Do not take more than 2 in 24 hours. 9 tablet 11    topiramate (TOPAMAX) 100 MG tablet Take 1 tablet (100 mg total) by mouth every evening. 180 tablet 1    tranexamic acid (LYSTEDA) 650 mg tablet Take 2 tablets (1,300 mg total) by mouth 3 (three) times daily. 30 tablet 3    traZODone (DESYREL) 50 MG tablet Take 1 tablet (50 mg total) by mouth every evening. 30 tablet 0    triamcinolone acetonide 0.1% (KENALOG) 0.1 % cream apply to affected area twice daily 454 g 3    valACYclovir (VALTREX) 1000 MG tablet Take 1 tablet (1,000 mg total) by mouth every 12 (twelve) hours. (Patient taking differently: Take 1,000 mg by mouth every 12 (twelve) hours. Patient uses prn) 60 tablet 0    vitamin D (VITAMIN D3) 1000 units Tab Take 1,000 Units by mouth once daily.       Current Facility-Administered Medications   Medication Dose Route Frequency Provider Last Rate Last Dose    copper intrauterine device 380 square mm  mm  380 mm Intrauterine  Kaleigh Polanco MD   380 mm at 01/23/20 1400    onabotulinumtoxina injection 200 Units  200 Units Intramuscular Q90 Days Dillon Gonzalez MD   200 Units at 06/17/20 1522     Allergies: Amoxicillin, Penicillins, and Diazepam     The ASCVD Risk score ([place] DC Jr., et al., 2013)  failed to calculate for the following reasons:    The 2013 ASCVD risk score is only valid for ages 40 to 79      ROS:  Constitutional: no weight loss, weight gain, fever, fatigue  Eyes:  No vision changes, glasses/contacts  ENT/Mouth: No ulcers, sinus problems, ears ringing, headache  Cardiovascular: No inability to lie flat, chest pain, exercise intolerance, swelling, heart palpitations  Respiratory: No wheezing, coughing blood, shortness of breath, or cough  Gastrointestinal: No diarrhea, bloody stool, nausea/vomiting, constipation, gas, hemorrhoids  Genitourinary: No blood in urine, painful urination, urgency of urination, frequency of urination, incomplete emptying, incontinence, abnormal bleeding, painful periods,+ heavy periods, vaginal discharge, vaginal odor, +painful intercourse, sexual problems, bleeding after intercourse.  Musculoskeletal: No muscle weakness  Skin/Breast: +breast cancer  Neurological: No passing out, seizures, numbness, headache  Endocrine: No diabetes, hypothyroid, hyperthyroid, hot flashes, hair loss, abnormal hair growth, acne  Psychiatric: No depression, crying  Hematologic: No bruises, bleeding, swollen lymph nodes, anemia.      Physical Exam:   Constitutional: She is oriented to person, place, and time. She appears well-developed and well-nourished.      Neck: Normal range of motion. No tracheal deviation present. No thyromegaly present.    Cardiovascular: Exam reveals no edema.     Pulmonary/Chest: Effort normal. She exhibits no mass, no tenderness, no deformity and no retraction. Right breast exhibits no inverted nipple, no mass, no nipple discharge, no skin change, no tenderness, presence, no bleeding and no swelling. Left breast exhibits no inverted nipple, no mass, no nipple discharge, no skin change, no tenderness, presence, no bleeding and no swelling. Breasts are symmetrical.        Abdominal: Soft. She exhibits no distension and no mass. There is no abdominal tenderness.  There is no rebound and no guarding. No hernia. Hernia confirmed negative in the left inguinal area.     Genitourinary:    Vagina and uterus normal.   Rectum:      No external hemorrhoid.   There is no rash, tenderness or lesion on the right labia. There is no rash, tenderness or lesion on the left labia. Uterus is not deviated. Cervix is normal. No no adexnal prolapse. Right adnexum displays no mass, no tenderness and no fullness. Left adnexum displays no mass, no tenderness and no fullness. No tenderness, bleeding, rectocele, cystocele or unspecified prolapse of vaginal walls in the vagina. Cervix exhibits no motion tenderness, no discharge and no friability. negative for vaginal discharge          Musculoskeletal: Normal range of motion and moves all extremeties. No edema.       Neurological: She is alert and oriented to person, place, and time.    Skin: No rash noted. No erythema. No pallor.    Psychiatric: She has a normal mood and affect. Her behavior is normal. Judgment and thought content normal.     +seroma right breast - healing  IUD strings visible at os     ASSESSMENT:   well woman  Menorrhagia  Breast cancer  PLAN:   Counseled patient to continue Lysteeda- start earlier after her bleeding starts  Will discuss restarting Zoladex with Dr. Ny  Plan for hyst is during 2nd phase of her reconstruction  pap smear  return annually or prn

## 2020-08-27 ENCOUNTER — RESEARCH ENCOUNTER (OUTPATIENT)
Dept: RESEARCH | Facility: HOSPITAL | Age: 35
End: 2020-08-27

## 2020-08-27 ENCOUNTER — LAB VISIT (OUTPATIENT)
Dept: LAB | Facility: HOSPITAL | Age: 35
End: 2020-08-27
Attending: RADIOLOGY
Payer: COMMERCIAL

## 2020-08-27 ENCOUNTER — HOSPITAL ENCOUNTER (OUTPATIENT)
Dept: RADIATION THERAPY | Facility: HOSPITAL | Age: 35
Discharge: HOME | End: 2020-08-27
Attending: RADIOLOGY
Payer: COMMERCIAL

## 2020-08-27 ENCOUNTER — OFFICE VISIT (OUTPATIENT)
Dept: RADIATION ONCOLOGY | Facility: CLINIC | Age: 35
End: 2020-08-27
Payer: COMMERCIAL

## 2020-08-27 VITALS
SYSTOLIC BLOOD PRESSURE: 123 MMHG | WEIGHT: 176.81 LBS | DIASTOLIC BLOOD PRESSURE: 73 MMHG | HEART RATE: 101 BPM | TEMPERATURE: 98 F | HEIGHT: 65 IN | BODY MASS INDEX: 29.46 KG/M2

## 2020-08-27 DIAGNOSIS — Z17.0 MALIGNANT NEOPLASM OF UPPER-OUTER QUADRANT OF RIGHT BREAST IN FEMALE, ESTROGEN RECEPTOR POSITIVE: ICD-10-CM

## 2020-08-27 DIAGNOSIS — Z17.0 MALIGNANT NEOPLASM OF UPPER-OUTER QUADRANT OF RIGHT BREAST IN FEMALE, ESTROGEN RECEPTOR POSITIVE: Primary | ICD-10-CM

## 2020-08-27 DIAGNOSIS — C50.411 MALIGNANT NEOPLASM OF UPPER-OUTER QUADRANT OF RIGHT BREAST IN FEMALE, ESTROGEN RECEPTOR POSITIVE: ICD-10-CM

## 2020-08-27 DIAGNOSIS — C50.411 MALIGNANT NEOPLASM OF UPPER-OUTER QUADRANT OF RIGHT BREAST IN FEMALE, ESTROGEN RECEPTOR POSITIVE: Primary | ICD-10-CM

## 2020-08-27 DIAGNOSIS — Z00.6 EXAMINATION OF PARTICIPANT IN CLINICAL TRIAL: ICD-10-CM

## 2020-08-27 LAB — HCG INTACT+B SERPL-ACNC: <1.2 MIU/ML

## 2020-08-27 PROCEDURE — 77014 HC CT GUIDANCE RADIATION THERAPY FLDS PLACEMENT: CPT | Mod: TC | Performed by: RADIOLOGY

## 2020-08-27 PROCEDURE — 77263 THER RADIOLOGY TX PLNG CPLX: CPT | Mod: ,,, | Performed by: RADIOLOGY

## 2020-08-27 PROCEDURE — 77263 PR  RADIATION THERAPY PLAN COMPLEX: ICD-10-PCS | Mod: ,,, | Performed by: RADIOLOGY

## 2020-08-27 PROCEDURE — 77290 PR  SET RADN THERAPY FIELD COMPLEX: ICD-10-PCS | Mod: 26,,, | Performed by: RADIOLOGY

## 2020-08-27 PROCEDURE — 99999 PR PBB SHADOW E&M-EST. PATIENT-LVL V: CPT | Mod: PBBFAC,,, | Performed by: RADIOLOGY

## 2020-08-27 PROCEDURE — 77334 RADIATION TREATMENT AID(S): CPT | Mod: 26,,, | Performed by: RADIOLOGY

## 2020-08-27 PROCEDURE — 77290 THER RAD SIMULAJ FIELD CPLX: CPT | Mod: TC | Performed by: RADIOLOGY

## 2020-08-27 PROCEDURE — 99499 UNLISTED E&M SERVICE: CPT | Mod: S$GLB,,, | Performed by: RADIOLOGY

## 2020-08-27 PROCEDURE — 77334 RADIATION TREATMENT AID(S): CPT | Mod: TC | Performed by: RADIOLOGY

## 2020-08-27 PROCEDURE — 99999 PR PBB SHADOW E&M-EST. PATIENT-LVL V: ICD-10-PCS | Mod: PBBFAC,,, | Performed by: RADIOLOGY

## 2020-08-27 PROCEDURE — 99499 NO LOS: ICD-10-PCS | Mod: S$GLB,,, | Performed by: RADIOLOGY

## 2020-08-27 PROCEDURE — 36415 COLL VENOUS BLD VENIPUNCTURE: CPT

## 2020-08-27 PROCEDURE — 84702 CHORIONIC GONADOTROPIN TEST: CPT

## 2020-08-27 PROCEDURE — 77290 THER RAD SIMULAJ FIELD CPLX: CPT | Mod: 26,,, | Performed by: RADIOLOGY

## 2020-08-27 PROCEDURE — 77334 PR  RADN TREATMENT AID(S) COMPLX: ICD-10-PCS | Mod: 26,,, | Performed by: RADIOLOGY

## 2020-08-27 RX ORDER — MULTIVITAMIN
1 TABLET ORAL DAILY
COMMUNITY

## 2020-08-27 NOTE — PROGRESS NOTES
Thursday, August 27, 2020    Protocol: DCP-001  Investigator: MARK Gallagher MD  Patient Initials: CORINA DE LEON  IRB #: [id number]    DCP-001: Use of a Clinical Trial Screening Tool to Address Cancer Disparities in the [employer] [facility] ([facility])    Informed Consent Note:    Met with patient this morning, who presented alone, to discuss the above mentioned clinical trial. Patient was alert and oriented, mood and affect appropriate to the situation. Patient confirmed she is participating in the [id number] trial.  The consent was reviewed with patient to include purpose, length of study and number of participants, procedure, risks, potential benefits, costs, payment for participation and/or reimbursement of expenses, alternative methods/treatments, study related questions and compensation for injury, questions about your rights, voluntary participation and withdrawal from the research, confidentiality/privacy, HIPAA authorization to release information for research.  Patient willingly and independently signed consent on [id number]. Patient refused a signed and dated paper copy but it was emailed to her at patient's request along with my contact information.    Eligibility Note:     Patient is eligible for DCP-001 because she was screened for [id number].

## 2020-08-27 NOTE — PROGRESS NOTES
Thursday, August 27, 2020    Protocol: [id number]  Investigator: MARK aGllagher MD  Patient Initials: SANTIAGO DE LEON  Study ID: [id number]  IRB #: [id number]        [id number]: A Phase III Randomized Trial of Hypofractionated Post Mastectomy Radiation With Breast Reconstruction    Eligibility Note:    Patient has been worked-up for the above mentioned clinical trial and been deemed eligible per protocol. Please see attached link to complete proof of eligibility.     Patient was randomized to Arm 1: Standard Radiation on 8/27/2020.

## 2020-08-27 NOTE — PROGRESS NOTES
Subjective:       Patient ID: Yolanda Win is a 35 y.o. female.    Chief Complaint: Breast Cancer (f/u)    This patient presents for follow up and to discuss further treatment.     Juanis initially presented in December of 2019 for evaluation of a Rt. breast mass.  MMG on 12/11/19 revealed a microlobulated Rt. breast mass measuring 3.3 cm on ultrasound.  Biopsies on 12/13/19 revealed infiltrating ductal carcinoma, histologic grade 3, nuclear grade 3 and mitotic index 3.  25% of the cells had intermediate staining for ER.  MD and Her - 2 were negative.  Ki-67 was 90%.  Oncotype score returned at 57.  MRI of the breast revealed a 5.2 x 2.6 x 4.6 cm irregularly shaped mass with rim enhancement in the Rt. breast at the 11 o'clock position.  There was no lymphadenopathy.  Bone scan and CT of the chest, abdomen and pelvis were negative. There was a non specific prominent Rt. axillary node which was felt to be reactive given her earlier MRI finding.  The patient was referred for chemotherapy and completed 4 cycles of AC followed by 12 cycles of Taxol. She subsequently underwent bilateral mastectomies with Rt. sentinel node biopsy.  Pathology from the Rt. breast revealed fibrocystic changes, 5 sentinel nodes were negative for tumor involvement.  The Lt. breast was negative.  Preoperatively, we discussed a number of possibilities.  After surgery and discussion at tumor board we have elected to proceed with postoperative radiotherapy.  Today the patient states she feels well.  Small area of ulceration in the infra mammary scar which is healing.      Review of Systems   Constitutional: Negative for activity change, appetite change, chills, fatigue and fever.   Respiratory: Negative for cough and shortness of breath.    Cardiovascular: Negative for chest pain and palpitations.   Gastrointestinal: Negative for abdominal pain and constipation.         Objective:      Physical Exam  Constitutional:       Appearance:  Normal appearance.   Pulmonary:      Effort: Pulmonary effort is normal. No respiratory distress.   Chest:      Breasts:         Right: No mass, nipple discharge or skin change.         Musculoskeletal:      Right upper arm: She exhibits no edema.      Right forearm: She exhibits no edema.   Lymphadenopathy:      Cervical: No cervical adenopathy.      Upper Body:      Right upper body: No supraclavicular or axillary adenopathy.   Neurological:      Mental Status: She is alert.         Assessment:       1. Malignant neoplasm of upper-outer quadrant of right breast in female, estrogen receptor positive        Plan:       Healing well from her surgery  Discussed again the indications for treatment,  T3 disease, high Ki-67, young age, intermediate ER staining.  Discussed the risks, benefits of therapy.  The patient was agreeable to proceed with therapy.  Will plan simulation today.  She is enrolled on [id number]: A Phase III Randomized Trial of Hypofractionated Post Mastectomy Radiation With Breast Reconstruction

## 2020-08-28 ENCOUNTER — TELEPHONE (OUTPATIENT)
Dept: PSYCHIATRY | Facility: CLINIC | Age: 35
End: 2020-08-28

## 2020-08-28 LAB
HPV HR 12 DNA SPEC QL NAA+PROBE: NEGATIVE
HPV16 AG SPEC QL: NEGATIVE
HPV18 DNA SPEC QL NAA+PROBE: NEGATIVE

## 2020-08-28 RX ORDER — ALPRAZOLAM 0.25 MG/1
0.25 TABLET ORAL DAILY PRN
Qty: 30 TABLET | Refills: 0 | Status: SHIPPED | OUTPATIENT
Start: 2020-08-28 | End: 2021-01-08 | Stop reason: SDUPTHER

## 2020-08-28 RX ORDER — CITALOPRAM 40 MG/1
40 TABLET, FILM COATED ORAL DAILY
Qty: 30 TABLET | Refills: 0 | Status: SHIPPED | OUTPATIENT
Start: 2020-08-28 | End: 2020-09-27 | Stop reason: SDUPTHER

## 2020-08-28 NOTE — TELEPHONE ENCOUNTER
"Spoke with the pt-     "Work has literally put me over the edge. I came back to work and on the 2nd day" an upper level spoke unprofessionally to her via email. Luckily for pt numerous people copied on email and all agree about the level of inappropriateness.    Pt forwarded to the sergio of [employer].     Following this interaction her nurse has filed complaints that have led to an HR investigation.  "so yesterday I really thought I was being fired today, but today i've spoken to my boss who said we would meet on Tuesday and work through this and basically that they have doubts about the report."     "so it will get handled but I was already anxious coming in and now i'm just bombarded with this and still trying to work on emails and just playing catch up"    Pt open to a trial of inc in celexa to 40mg and also acknowledges ability to take 0.125mg of xanax if needed for daytime anxiety-     Will schedule an appt in 2wks to f/u on these changes.   "

## 2020-09-01 ENCOUNTER — HOSPITAL ENCOUNTER (OUTPATIENT)
Dept: RADIATION THERAPY | Facility: HOSPITAL | Age: 35
Discharge: HOME | End: 2020-09-01
Attending: RADIOLOGY
Payer: COMMERCIAL

## 2020-09-02 ENCOUNTER — RESEARCH ENCOUNTER (OUTPATIENT)
Dept: RESEARCH | Facility: HOSPITAL | Age: 35
End: 2020-09-02

## 2020-09-02 ENCOUNTER — CLINICAL SUPPORT (OUTPATIENT)
Dept: REHABILITATION | Facility: HOSPITAL | Age: 35
End: 2020-09-02
Attending: PHYSICIAN ASSISTANT
Payer: COMMERCIAL

## 2020-09-02 ENCOUNTER — LAB VISIT (OUTPATIENT)
Dept: LAB | Facility: HOSPITAL | Age: 35
End: 2020-09-02
Attending: INTERNAL MEDICINE
Payer: COMMERCIAL

## 2020-09-02 ENCOUNTER — TELEPHONE (OUTPATIENT)
Dept: HEMATOLOGY/ONCOLOGY | Facility: CLINIC | Age: 35
End: 2020-09-02

## 2020-09-02 DIAGNOSIS — Z17.0 MALIGNANT NEOPLASM OF UPPER-OUTER QUADRANT OF RIGHT BREAST IN FEMALE, ESTROGEN RECEPTOR POSITIVE: Primary | ICD-10-CM

## 2020-09-02 DIAGNOSIS — C50.411 MALIGNANT NEOPLASM OF UPPER-OUTER QUADRANT OF RIGHT BREAST IN FEMALE, ESTROGEN RECEPTOR POSITIVE: ICD-10-CM

## 2020-09-02 DIAGNOSIS — C50.411 MALIGNANT NEOPLASM OF UPPER-OUTER QUADRANT OF RIGHT BREAST IN FEMALE, ESTROGEN RECEPTOR POSITIVE: Primary | ICD-10-CM

## 2020-09-02 DIAGNOSIS — Z00.6 EXAMINATION OF PARTICIPANT IN CLINICAL TRIAL: ICD-10-CM

## 2020-09-02 DIAGNOSIS — Z17.0 MALIGNANT NEOPLASM OF UPPER-OUTER QUADRANT OF RIGHT BREAST IN FEMALE, ESTROGEN RECEPTOR POSITIVE: ICD-10-CM

## 2020-09-02 DIAGNOSIS — Z91.89 AT RISK FOR LYMPHEDEMA: ICD-10-CM

## 2020-09-02 DIAGNOSIS — M25.619 DECREASED SHOULDER MOBILITY, UNSPECIFIED LATERALITY: ICD-10-CM

## 2020-09-02 LAB
DRUG STUDY SPECIMEN TYPE: NORMAL
DRUG STUDY TEST NAME: NORMAL
DRUG STUDY TEST RESULT: NORMAL
ERYTHROCYTE [DISTWIDTH] IN BLOOD BY AUTOMATED COUNT: 12.1 % (ref 11.5–14.5)
HCT VFR BLD AUTO: 37.9 % (ref 37–48.5)
HGB BLD-MCNC: 12.5 G/DL (ref 12–16)
IMM GRANULOCYTES # BLD AUTO: 0.01 K/UL (ref 0–0.04)
MCH RBC QN AUTO: 30.7 PG (ref 27–31)
MCHC RBC AUTO-ENTMCNC: 33 G/DL (ref 32–36)
MCV RBC AUTO: 93 FL (ref 82–98)
NEUTROPHILS # BLD AUTO: 4.5 K/UL (ref 1.8–7.7)
PLATELET # BLD AUTO: 285 K/UL (ref 150–350)
PMV BLD AUTO: 8.8 FL (ref 9.2–12.9)
RBC # BLD AUTO: 4.07 M/UL (ref 4–5.4)
WBC # BLD AUTO: 6.09 K/UL (ref 3.9–12.7)

## 2020-09-02 PROCEDURE — 97110 THERAPEUTIC EXERCISES: CPT | Performed by: PHYSICAL MEDICINE & REHABILITATION

## 2020-09-02 PROCEDURE — 99000 SPECIMEN HANDLING OFFICE-LAB: CPT

## 2020-09-02 PROCEDURE — 36415 COLL VENOUS BLD VENIPUNCTURE: CPT

## 2020-09-02 PROCEDURE — 97140 MANUAL THERAPY 1/> REGIONS: CPT | Performed by: PHYSICAL MEDICINE & REHABILITATION

## 2020-09-02 PROCEDURE — 85027 COMPLETE CBC AUTOMATED: CPT

## 2020-09-02 NOTE — PROGRESS NOTES
"                              Physical Therapy Daily Treatment Note & Discharge Note     Name: Yolanda Win  Clinic Number: [id number]  Diagnosis:   Encounter Diagnoses   Name Primary?    Malignant neoplasm of upper-outer quadrant of right breast in female, estrogen receptor positive Yes    At risk for lymphedema     Decreased shoulder mobility, unspecified laterality      Physician: Yoli Rojas PA    Precautions: none  Visit #:  20  Time In: 10:10 AM  Time Out: 10:50 AM  Total Treatment Time 1:1: 40 minutes    Evaluation Date: 2020  Visit # authorized: 20  Authorization period: 2020  Plan of care Expiration: 2020  MD referral: ROWENA Grande  Insurance: [facility]      Subjective     Pt reports: cording is deceasing in right axilla and medial upper arm. States still feeling slight tightness in right pectoralis muscle.  has gone back to "normal life".Patient states she will begin radiation therapy 20 for 6 weeks.   has been having a very heavy menstrual cycle and being treated for this.  Pain Scale: Yolanda rates pain on a scale of 0/10 on VAS.   Pain location:right medial upper arm    Fatigue: Mild  Functional change: raising RUE upward better.  Diagnosis: right breast IDC, grade 3, ER (+), IA (-), HER2 (-); BRCA 1 (+)  Chief complaint:  still has both drains in place and should have drains removed this week.  was not given any restrictions by Dr. Timmons with moving her arms. States having tightness in bilateral anterior chest and getting upper back spasms.  gets occasional "shooting" pain.   Surgical History: 2020 bilateral mastectomies with right ALND (4) and tissue expander reconstruction; 19 for insertion of port  Yolanda Win  has a past surgical history that includes Tonsillectomy; Bone marrow aspiration; Colposcopy; Cervical biopsy w/ loop electrode excision;  section (2016); Shoulder surgery (Left); " "Sinus surgery;  section (N/A, 2019); Insertion of tunneled central venous catheter (CVC) with subcutaneous port (Left, 2019); Bilateral mastectomy (Bilateral, 2020); Biopsy of axillary node (Right, 2020); Injection for sentinel node identification (Right, 2020); Mediport removal (N/A, 2020); and Insertion of breast tissue expander (Bilateral, 2020).     Chemotherapy: Jesús-Adjuvant chemotherapy  to   Radiation: Begins 20 for 6 weeks    Objective     Yolanda received individual therapeutic exercises to improve postural correction and alignment, stretching and soft tissue mobility, and strengthening for 20 minutes including the following:   LTR with ER                                       10 x 5"  Sidelying forward arm stretch         10 x 5'  Scap Rows                                         1 x 10 GTB  Wall Climbs Forward (B)                    10 x 5"                       Sideways (B)                  10 x 5"  Wall Push Ups                                    1 x 10                Yolanda received the following manual therapy techniques were performed to increased myofascial/soft tissue length, mobility and pliability, increase PROM, AROM and function as well as to decrease pain for 20 minutes  Bending, stretching for cording right axilla, lateral pectoralis muscle, medial upper arm and antecubital fossa  Median nerve glides RUE  Lateral Chest stretch (B)  Pectoralis muscle bending with ER (B)  Passive stretch (B) shoulders      Shoulder Range of Motion:   Active /Passive ROM Right Left   Flexion 170 170   Abduction 180 180   Extension 70 70   IR/90deg 85 85   ER/90deg 90 90      Functional Limitations Reporting       CMS Impairment/Limitation/Restriction for FOTO Outcome Survey     Therapist reviewed FOTO scores for Yolanda Win on 2020.   FOTO documents entered into [employer] - see Media section.     Limitations Score: 22%  Category: Carrying    "         Home Exercise Program and Patient Education   Education provided re:  - role of PT in multi - disciplinary team, goals for PT  - progress towards goals     See EMR under notes/patient instructions for HEP given/taught this session - all sets and reps included. Pt received printed copy.     Pt was able to demonstrate and report understanding and performance  Pt has no cultural, educational or language barriers to learning provided.    Assessment     Patient is responding well to physical therapy. Is at expected function and pain level for 8 weeks post operatively.   Pain after treatment: 0/10    Pt prognosis is Good. Patient received manual therapy as above to decrease RUE cording and soft tissue tightness.Mild cording remains in right medial upper arm; however, cording is not restricting AROM of right shoulder and elbow.  Patient performed above exercise program and is independent with all exercises.  AROM is WNL bilateral shoulders.Patient instructed to continue with HEP while receiving radiation therapy to maintain ROM and flexibility.    Goals as follows:  Short Term goals: 3 weeks  1. Patient will demonstrate 100% understanding of lymphedema risk reduction practices to include self monitoring for lymphedema. ( met, 8/13/20)  2. Patient will demonstrate independence with Home Exercise program established. (met, 8/13/20)  3. Pt will increase AROM/PROM in shoulder abduction ROM to 160 degrees bilaterally to improve functional reach, carry, push, pull pain free. ( met, 8/13/20)  4. Pt will increase AROM/PROM in shoulder flexion to 160 degrees bilaterally to improve functional reach, carry, push, pull pain free.(met, 8/13/20)        Long Term Goals: 6 weeks   1.  Pt will increase AROM/PROM in shoulder flexion to 170 degrees bilaterally to improve functional reach, carry, push, pull pain free. (met, 8/13/20)  2. Pt will demonstrate full/maximized tissue mobility to increase ROM and promote healthy tissue to be  pain free at discharge. (met, 9/2/20)  3. Pt will report decrease in overall worst pain to 2/10 at discharge. (met, 9/2/20)  4. Pt will increase AROM/PROM in shoulder abduction ROM to 170 degrees bilaterally to improve functional reach, carry, push, pull pain free. (met), 8/13/20  5. Patient will report compliance with walking program 5x week for 10 min each day to improve overall cardiovascular function and decrease cancer related fatigue at discharge. (met, 9/2/20)        Plan     Patient is discharged from physical therapy.    Therapist: Rosmery Mendoza, PT  9/2/2020

## 2020-09-02 NOTE — TELEPHONE ENCOUNTER
Returned call to pt.   Pt calling to check on Zoladex approval- informed reached out to pre service RN Lissett Clemente yesterday and just reached out again this morning and pending at this time and should hear something today.    [Yesterday 8:46 AM] Lissett Cotehn  Good morning MRN [id number] [place] was submitted Thursday, this insurance company usually takes a few days to complete the request. I am hoping to receive something today.     [8:26 AM] Chloé Campoverde  Any update on Ms. Win's Zoladex?  ?[8:26 AM] Lissett Clemente  still pending, ill call back today      Pt asked if Dr. Ny now wants to see her prior to start RT or still after as originally planned.   Informed pt would f/u with Dr. Ny and also f/u with Zoladex auth.  Pt verbalized understanding.      Message fwd.       ----- Message from Tahira Mayberry sent at 9/2/2020  8:21 AM CDT -----  Regarding: Requested Nurse  Contact: PT  PT asked to speak with [place] when she becomes available  Please contact her back at her extension: ([employer] employee) - [phone] or [phone]

## 2020-09-02 NOTE — PROGRESS NOTES
Wednesday, September 2, 2020    Protocol: [id number]  Investigator: MARK Gallagher MD  Patient Initials: SANTIAGO DE LEON  Study ID: [id number]  IRB #: [id number]        [id number]: A Phase III Randomized Trial of Hypofractionated Post Mastectomy Radiation With Breast Reconstruction    Met with patient this morning to complete pre-radiation assessments. Lymphedema measurements were taken, Booklet A completed, Appendix VII and Booklet F given to patient, and correlative blood sample collected and shipped today per protocol. Patient has an anticipated radiation start date of Tuesday, 9/8/2020.

## 2020-09-03 ENCOUNTER — TELEPHONE (OUTPATIENT)
Dept: HEMATOLOGY/ONCOLOGY | Facility: CLINIC | Age: 35
End: 2020-09-03

## 2020-09-03 DIAGNOSIS — N92.0 MENORRHAGIA WITH REGULAR CYCLE: ICD-10-CM

## 2020-09-03 DIAGNOSIS — N93.9 VAGINAL BLEEDING: Primary | ICD-10-CM

## 2020-09-03 RX ORDER — TRANEXAMIC ACID 650 MG/1
1300 TABLET ORAL 3 TIMES DAILY
Qty: 180 TABLET | Refills: 0 | Status: SHIPPED | OUTPATIENT
Start: 2020-09-03 | End: 2020-12-15 | Stop reason: CLARIF

## 2020-09-04 ENCOUNTER — INFUSION (OUTPATIENT)
Dept: INFUSION THERAPY | Facility: HOSPITAL | Age: 35
End: 2020-09-04
Attending: INTERNAL MEDICINE
Payer: COMMERCIAL

## 2020-09-04 DIAGNOSIS — C50.411 MALIGNANT NEOPLASM OF UPPER-OUTER QUADRANT OF RIGHT BREAST IN FEMALE, ESTROGEN RECEPTOR POSITIVE: Primary | ICD-10-CM

## 2020-09-04 DIAGNOSIS — Z17.0 MALIGNANT NEOPLASM OF UPPER-OUTER QUADRANT OF RIGHT BREAST IN FEMALE, ESTROGEN RECEPTOR POSITIVE: Primary | ICD-10-CM

## 2020-09-04 PROCEDURE — 77334 RADIATION TREATMENT AID(S): CPT | Mod: TC | Performed by: RADIOLOGY

## 2020-09-04 PROCEDURE — 96402 CHEMO HORMON ANTINEOPL SQ/IM: CPT

## 2020-09-04 PROCEDURE — 63600175 PHARM REV CODE 636 W HCPCS: Mod: JG | Performed by: INTERNAL MEDICINE

## 2020-09-04 PROCEDURE — 77334 PR  RADN TREATMENT AID(S) COMPLX: ICD-10-PCS | Mod: 26,,, | Performed by: RADIOLOGY

## 2020-09-04 PROCEDURE — 77334 RADIATION TREATMENT AID(S): CPT | Mod: 26,,, | Performed by: RADIOLOGY

## 2020-09-04 PROCEDURE — 77300 RADIATION THERAPY DOSE PLAN: CPT | Mod: TC | Performed by: RADIOLOGY

## 2020-09-04 PROCEDURE — 77295 3-D RADIOTHERAPY PLAN: CPT | Mod: 26,,, | Performed by: RADIOLOGY

## 2020-09-04 PROCEDURE — 77295 3-D RADIOTHERAPY PLAN: CPT | Mod: TC | Performed by: RADIOLOGY

## 2020-09-04 PROCEDURE — 77295 PR 3D RADIOTHERAPY PLAN: ICD-10-PCS | Mod: 26,,, | Performed by: RADIOLOGY

## 2020-09-04 PROCEDURE — 77300 RADIATION THERAPY DOSE PLAN: CPT | Mod: 26,,, | Performed by: RADIOLOGY

## 2020-09-04 PROCEDURE — 77300 PR RADIATION THERAPY,DOSIMETRY PLAN: ICD-10-PCS | Mod: 26,,, | Performed by: RADIOLOGY

## 2020-09-04 RX ADMIN — GOSERELIN ACETATE 3.6 MG: 3.6 IMPLANT SUBCUTANEOUS at 09:09

## 2020-09-04 NOTE — NURSING
Patient received Zoladex injection SQ to ABD.  Tolerated well.  Educated patient regarding medication prior to receiving injection.  Band-aid applied to site.  C/D/I.

## 2020-09-05 LAB
FINAL PATHOLOGIC DIAGNOSIS: NORMAL
Lab: NORMAL

## 2020-09-08 PROCEDURE — 77280 THER RAD SIMULAJ FIELD SMPL: CPT | Mod: TC | Performed by: RADIOLOGY

## 2020-09-08 PROCEDURE — 77280 THER RAD SIMULAJ FIELD SMPL: CPT | Mod: 26,,, | Performed by: RADIOLOGY

## 2020-09-08 PROCEDURE — 77280 PR  SET RADN THERAPY FIELD SIMPLE: ICD-10-PCS | Mod: 26,,, | Performed by: RADIOLOGY

## 2020-09-08 RX ORDER — DEXTROAMPHETAMINE SACCHARATE, AMPHETAMINE ASPARTATE MONOHYDRATE, DEXTROAMPHETAMINE SULFATE AND AMPHETAMINE SULFATE 6.25; 6.25; 6.25; 6.25 MG/1; MG/1; MG/1; MG/1
25 CAPSULE, EXTENDED RELEASE ORAL EVERY MORNING
Qty: 30 CAPSULE | Refills: 0 | Status: SHIPPED | OUTPATIENT
Start: 2020-09-08 | End: 2020-10-12 | Stop reason: SDUPTHER

## 2020-09-09 ENCOUNTER — OFFICE VISIT (OUTPATIENT)
Dept: SURGERY | Facility: CLINIC | Age: 35
End: 2020-09-09
Payer: COMMERCIAL

## 2020-09-09 VITALS
HEIGHT: 65 IN | BODY MASS INDEX: 29.32 KG/M2 | WEIGHT: 176 LBS | HEART RATE: 87 BPM | SYSTOLIC BLOOD PRESSURE: 125 MMHG | DIASTOLIC BLOOD PRESSURE: 79 MMHG | TEMPERATURE: 98 F

## 2020-09-09 DIAGNOSIS — C50.411 MALIGNANT NEOPLASM OF UPPER-OUTER QUADRANT OF RIGHT BREAST IN FEMALE, ESTROGEN RECEPTOR POSITIVE: Primary | ICD-10-CM

## 2020-09-09 DIAGNOSIS — Z17.0 MALIGNANT NEOPLASM OF UPPER-OUTER QUADRANT OF RIGHT BREAST IN FEMALE, ESTROGEN RECEPTOR POSITIVE: Primary | ICD-10-CM

## 2020-09-09 PROCEDURE — 99999 PR PBB SHADOW E&M-EST. PATIENT-LVL V: CPT | Mod: PBBFAC,,, | Performed by: SURGERY

## 2020-09-09 PROCEDURE — 99024 POSTOP FOLLOW-UP VISIT: CPT | Mod: S$GLB,,, | Performed by: SURGERY

## 2020-09-09 PROCEDURE — 99024 PR POST-OP FOLLOW-UP VISIT: ICD-10-PCS | Mod: S$GLB,,, | Performed by: SURGERY

## 2020-09-09 PROCEDURE — 99999 PR PBB SHADOW E&M-EST. PATIENT-LVL V: ICD-10-PCS | Mod: PBBFAC,,, | Performed by: SURGERY

## 2020-09-09 NOTE — PROGRESS NOTES
[facility]       Post-Op    REFERRING PHYSICIAN:  MD Percy Davis      MEDICAL ONCOLOGIST:    Dr. Ny  RADIATION ONCOLOGIST:   Dr. Gallagher    DIAGNOSIS:    This is a 35 y.o. female with a stage pT0 N0 M0 grade 3 ER + (25%) ND - HER2 - infiltrating ductal carcinoma of the right breast.    TREATMENT SUMMARY:  The patient is status post bilateral mastectomy and sentinel node biopsy on 7/1/2020.  Final pathology showed benign tissue bilaterally, complete pathologic response to neoadjuvant chemotherapy. 5 lymph nodes negative for metastatic carcinoma.     INTERVAL HISTORY:   Yolanda Win comes in for a post-op check. She is having drainage from her inframammary incision on her right side.  Denies fevers or chills but gives me a history of continued problems with this side.    MEDICATIONS:  Current Outpatient Medications   Medication Sig Dispense Refill    albuterol (PROVENTIL HFA) 90 mcg/actuation inhaler Inhale 2 puffs by mouth into the lungs every 6 (six) hours as needed for Wheezing. Rescue 18 g 0    ALPRAZolam (XANAX) 0.25 MG tablet Take 1 tablet (0.25 mg total) by mouth daily as needed for Anxiety. 30 tablet 0    citalopram (CELEXA) 40 MG tablet Take 1 tablet (40 mg total) by mouth once daily. 30 tablet 0    dextroamphetamine-amphetamine (ADDERALL XR) 25 MG 24 hr capsule Take 1 capsule (25 mg total) by mouth every morning. 30 capsule 0    ferrous sulfate 324 mg (65 mg iron) TbEC Take 325 mg by mouth once daily.      loratadine (CLARITIN) 10 mg tablet Take 10 mg by mouth once daily.      multivitamin (ONE DAILY MULTIVITAMIN) per tablet Take 1 tablet by mouth once daily.      multivitamin with minerals (HAIR,SKIN AND NAILS ORAL) Take by mouth.      mupirocin (BACTROBAN) 2 % ointment Apply to affected area as directed. 22 g 0    ondansetron (ZOFRAN-ODT) 4 MG TbDL Dissolve 1 tablet (4 mg total) by mouth every 8 (eight) hours as needed. 30 tablet 1     prasterone, dhea, (INTRAROSA) 6.5 mg Inst Place 6.5 mg vaginally every evening. 30 each 11    promethazine (PHENERGAN) 12.5 MG Tab Take 1 tablet (12.5 mg total) by mouth every 6 (six) hours as needed. 30 tablet 1    sumatriptan (IMITREX) 100 MG tablet Take 1/2 to 1 tab at onset of headache.  If no improvement in 2 hours, take another.  Do not take more than 2 in 24 hours. 9 tablet 11    topiramate (TOPAMAX) 100 MG tablet Take 1 tablet (100 mg total) by mouth every evening. (Patient taking differently: Take 200 mg by mouth every evening. ) 180 tablet 1    tranexamic acid (LYSTEDA) 650 mg tablet Take 2 tablets (1,300 mg total) by mouth 3 (three) times daily. 180 tablet 0    traZODone (DESYREL) 50 MG tablet Take 1 tablet (50 mg total) by mouth every evening. (Patient taking differently: Take 25 mg by mouth every evening. ) 30 tablet 0    triamcinolone acetonide 0.1% (KENALOG) 0.1 % cream apply to affected area twice daily 454 g 3    valACYclovir (VALTREX) 1000 MG tablet Take 1 tablet (1,000 mg total) by mouth every 12 (twelve) hours. (Patient taking differently: Take 1,000 mg by mouth every 12 (twelve) hours. Patient uses prn) 60 tablet 0    vitamin D (VITAMIN D3) 1000 units Tab Take 1,000 Units by mouth once daily.      clindamycin (CLEOCIN) 300 MG capsule Take 1 capsule (300 mg total) by mouth 2 (two) times a day. 20 capsule 0    magic mouthwash diphen/antac/lidoc/nysta Take 10 mLs by mouth 4 (four) times daily. 120 mL 0    OLANZapine (ZYPREXA) 5 MG tablet Take 1 tablet (5 mg total) by mouth every 6 (six) hours as needed. Nausea 30 tablet 2    oxyCODONE (ROXICODONE) 5 MG immediate release tablet Take 1 tablet (5 mg total) by mouth every 8 (eight) hours as needed for Pain. 60 tablet 0    oxyCODONE-acetaminophen (PERCOCET) 7.5-325 mg per tablet Take 1-2 tablets by mouth every 4 to 6 hours as needed for pain. 28 tablet 0    sulfamethoxazole-trimethoprim 800-160mg (BACTRIM DS) 800-160 mg Tab        Current  "Facility-Administered Medications   Medication Dose Route Frequency Provider Last Rate Last Dose    copper intrauterine device 380 square mm  mm  380 mm Intrauterine  Kaleigh Polanco MD   380 mm at 01/23/20 1400    onabotulinumtoxina injection 200 Units  200 Units Intramuscular Q90 Days Dillon Gonzalez MD   200 Units at 06/17/20 1522       ALLERGIES:   Review of patient's allergies indicates:   Allergen Reactions    Amoxicillin Hives    Penicillins Hives and Rash    Diazepam Anxiety and Other (See Comments)     "makes hyper"       PHYSICAL EXAMINATION:   General:  This is a well appearing female with appropriate speech, affect and gait.     Breast: Ulceration out of her right sided incision in the inframammary fold with drainage.     IMPRESSION:   The patient has had multiple issues with the right breast following surgery.    PLAN:   1. Needs to see Dr. Aranda for management of ulcerated wound on inferior right breast. Left a voicemail but was unable to get in touch with him today.  She has assured me that she has direct contact with his office and will get an appointment as soon as possible.  "

## 2020-09-10 ENCOUNTER — OFFICE VISIT (OUTPATIENT)
Dept: PSYCHIATRY | Facility: CLINIC | Age: 35
End: 2020-09-10
Payer: COMMERCIAL

## 2020-09-10 DIAGNOSIS — F41.1 GENERALIZED ANXIETY DISORDER: Primary | ICD-10-CM

## 2020-09-10 DIAGNOSIS — F90.0 ADHD (ATTENTION DEFICIT HYPERACTIVITY DISORDER), INATTENTIVE TYPE: ICD-10-CM

## 2020-09-10 DIAGNOSIS — Z87.898 HISTORY OF INSOMNIA: ICD-10-CM

## 2020-09-10 DIAGNOSIS — Z17.0 MALIGNANT NEOPLASM OF UPPER-OUTER QUADRANT OF RIGHT BREAST IN FEMALE, ESTROGEN RECEPTOR POSITIVE: ICD-10-CM

## 2020-09-10 DIAGNOSIS — Z86.59 HISTORY OF POSTTRAUMATIC STRESS DISORDER (PTSD): ICD-10-CM

## 2020-09-10 DIAGNOSIS — C50.411 MALIGNANT NEOPLASM OF UPPER-OUTER QUADRANT OF RIGHT BREAST IN FEMALE, ESTROGEN RECEPTOR POSITIVE: ICD-10-CM

## 2020-09-10 PROCEDURE — 99214 OFFICE O/P EST MOD 30 MIN: CPT | Mod: 95,,, | Performed by: PSYCHIATRY & NEUROLOGY

## 2020-09-10 PROCEDURE — 90833 PR PSYCHOTHERAPY W/PATIENT W/E&M, 30 MIN (ADD ON): ICD-10-PCS | Mod: 95,,, | Performed by: PSYCHIATRY & NEUROLOGY

## 2020-09-10 PROCEDURE — 99214 PR OFFICE/OUTPT VISIT, EST, LEVL IV, 30-39 MIN: ICD-10-PCS | Mod: 95,,, | Performed by: PSYCHIATRY & NEUROLOGY

## 2020-09-10 PROCEDURE — 90833 PSYTX W PT W E/M 30 MIN: CPT | Mod: 95,,, | Performed by: PSYCHIATRY & NEUROLOGY
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
## 2020-09-13 ENCOUNTER — PATIENT OUTREACH (OUTPATIENT)
Dept: ADMINISTRATIVE | Facility: OTHER | Age: 35
End: 2020-09-13

## 2020-09-14 ENCOUNTER — HOSPITAL ENCOUNTER (OUTPATIENT)
Dept: INTERVENTIONAL RADIOLOGY/VASCULAR | Facility: HOSPITAL | Age: 35
Discharge: HOME | End: 2020-09-14
Attending: SPECIALIST | Admitting: SPECIALIST
Payer: COMMERCIAL

## 2020-09-14 VITALS
HEIGHT: 65 IN | DIASTOLIC BLOOD PRESSURE: 77 MMHG | SYSTOLIC BLOOD PRESSURE: 119 MMHG | RESPIRATION RATE: 17 BRPM | WEIGHT: 165 LBS | OXYGEN SATURATION: 99 % | TEMPERATURE: 97 F | BODY MASS INDEX: 27.49 KG/M2 | HEART RATE: 92 BPM

## 2020-09-14 DIAGNOSIS — N64.89 SEROMA OF BREAST: ICD-10-CM

## 2020-09-14 PROCEDURE — 76642: ICD-10-PCS | Mod: 26,RT,, | Performed by: RADIOLOGY

## 2020-09-14 PROCEDURE — 76642 ULTRASOUND BREAST LIMITED: CPT | Mod: TC

## 2020-09-14 PROCEDURE — 76642 ULTRASOUND BREAST LIMITED: CPT | Mod: 26,RT,, | Performed by: RADIOLOGY

## 2020-09-14 PROCEDURE — 10030 IMG GID FLU COLL DRG SFT TIS: CPT

## 2020-09-14 NOTE — PROCEDURES
Real-time US performed by me over the right breast. Tissue expander in place. Mild generalized subq edema. No significant fluid collection. No aspiration performed. No drain placed.      Robe Avila MD  [employer]  Pager [phone]

## 2020-09-14 NOTE — H&P
Interventional Radiology Pre-Procedure History & Physical      Chief Complaint/Reason for Referral: Possible fluid collection    History of Present Illness:  Yolanda Win is a 35 y.o. female who presents with right breast ca s/p mastectomy and tissue expander placement now with some draining fluid adjacent to implant. Pt reports Dr. Aranda aspirated approximately 40 mL fluid from around the right breast yesterday. Referred to IR for further eval and possible drain placement.      Past Medical History:   Diagnosis Date    Abnormal Pap smear of cervix     ADHD     Allergy     seasonal    Anorexia     Anxiety     Bulimia     Depression     Fever blister     History of posttraumatic stress disorder (PTSD)     Hypertension     Gestational Hypertension    Invasive ductal carcinoma of right breast 2019    Mastitis     Migraine headache      Past Surgical History:   Procedure Laterality Date    BILATERAL MASTECTOMY Bilateral 2020    Procedure: MASTECTOMY, BILATERAL - BILATERAL SKIN SPARING MASTECTOMY;  Surgeon: Percy Ayers MD;  Location: [facility];  Service: General;  Laterality: Bilateral;    BIOPSY OF AXILLARY NODE Right 2020    Procedure: BIOPSY, LYMPH NODE, AXILLARY;  Surgeon: Percy Ayers MD;  Location: [facility];  Service: General;  Laterality: Right;    BONE MARROW ASPIRATION      x 3    BREAST SURGERY      CERVICAL BIOPSY  W/ LOOP ELECTRODE EXCISION       SECTION  2016     SECTION N/A 2019    Procedure:  SECTION;  Surgeon: Kirit Hernandez MD;  Location: [facility];  Service: OB/GYN;  Laterality: N/A;    COLPOSCOPY      INJECTION FOR SENTINEL NODE IDENTIFICATION Right 2020    Procedure: INJECTION, FOR SENTINEL NODE IDENTIFICATION;  Surgeon: Percy Ayers MD;  Location: [facility];  Service: General;  Laterality: Right;    INSERTION OF BREAST TISSUE EXPANDER Bilateral 2020    Procedure: INSERTION, TISSUE  "EXPANDER, BREAST;  Surgeon: Kiko Aranda MD;  Location: [facility];  Service: Plastics;  Laterality: Bilateral;    INSERTION OF TUNNELED CENTRAL VENOUS CATHETER (CVC) WITH SUBCUTANEOUS PORT Left 12/27/2019    Procedure: [id number]-Left neck or chest wall;  Surgeon: Percy Ayers MD;  [facility];  Service: General;  Laterality: Left;    MEDIPORT REMOVAL N/A 7/1/2020    Procedure: REMOVAL, CATHETER, CENTRAL VENOUS, TUNNELED, WITH PORT;  Surgeon: Percy Ayers MD;  Location: [facility];  Service: General;  Laterality: N/A;    SHOULDER SURGERY Left     x 2    SINUS SURGERY      age 19    TONSILLECTOMY         Allergies:   Review of patient's allergies indicates:   Allergen Reactions    Amoxicillin Hives    Penicillins Hives and Rash    Diazepam Anxiety and Other (See Comments)     "makes hyper"        Home Meds:   Prior to Admission medications    Medication Sig Start Date End Date Taking? Authorizing Provider   citalopram (CELEXA) 40 MG tablet Take 1 tablet (40 mg total) by mouth once daily. 8/28/20 8/28/21 Yes Oksana Hutson MD   dextroamphetamine-amphetamine (ADDERALL XR) 25 MG 24 hr capsule Take 1 capsule (25 mg total) by mouth every morning. 9/8/20  Yes Oksana Hutson MD   ferrous sulfate 324 mg (65 mg iron) TbEC Take 325 mg by mouth once daily.   Yes Historical Provider   loratadine (CLARITIN) 10 mg tablet Take 10 mg by mouth once daily.   Yes Historical Provider   multivitamin (ONE DAILY MULTIVITAMIN) per tablet Take 1 tablet by mouth once daily.   Yes Historical Provider   multivitamin with minerals (HAIR,SKIN AND NAILS ORAL) Take by mouth.   Yes Historical Provider   sulfamethoxazole-trimethoprim 800-160mg (BACTRIM DS) 800-160 mg Tab  7/14/20  Yes Historical Provider   sumatriptan (IMITREX) 100 MG tablet Take 1/2 to 1 tab at onset of headache.  If no improvement in 2 hours, take another.  Do not take more than 2 in 24 hours. 1/27/20  Yes Dillon Gonzalez MD "   topiramate (TOPAMAX) 100 MG tablet Take 2 tablets (200 mg total) by mouth every evening. 9/14/20 9/14/21 Yes Dillon Gonzalez MD   tranexamic acid (LYSTEDA) 650 mg tablet Take 2 tablets (1,300 mg total) by mouth 3 (three) times daily. 9/3/20  Yes Kaleigh Polanco MD   traZODone (DESYREL) 50 MG tablet Take 1 tablet (50 mg total) by mouth every evening.  Patient taking differently: Take 25 mg by mouth every evening.  7/27/20  Yes Oksana Hutson MD   albuterol (PROVENTIL HFA) 90 mcg/actuation inhaler Inhale 2 puffs by mouth into the lungs every 6 (six) hours as needed for Wheezing. Rescue 6/17/20 6/17/21  Vika Calderón NP   ALPRAZolam (XANAX) 0.25 MG tablet Take 1 tablet (0.25 mg total) by mouth daily as needed for Anxiety. 8/28/20 9/30/20  Oksana Hutson MD   clindamycin (CLEOCIN) 300 MG capsule Take 1 capsule (300 mg total) by mouth 2 (two) times a day. 6/19/20   Kiko Aranda MD   magic mouthwash diphen/antac/lidoc/nysta Take 10 mLs by mouth 4 (four) times daily. 1/27/20   Vika Calderón NP   mupirocin (BACTROBAN) 2 % ointment Apply to affected area as directed. 6/19/20   Kiko Aranda MD   OLANZapine (ZYPREXA) 5 MG tablet Take 1 tablet (5 mg total) by mouth every 6 (six) hours as needed. Nausea 1/30/20 1/29/21  Gokul Ny MD   ondansetron (ZOFRAN-ODT) 4 MG TbDL Dissolve 1 tablet (4 mg total) by mouth every 8 (eight) hours as needed. 1/24/20 1/23/21  Vika Calderón NP   oxyCODONE (ROXICODONE) 5 MG immediate release tablet Take 1 tablet (5 mg total) by mouth every 8 (eight) hours as needed for Pain. 4/28/20 4/28/21  Gokul Ny MD   oxyCODONE-acetaminophen (PERCOCET) 7.5-325 mg per tablet Take 1-2 tablets by mouth every 4 to 6 hours as needed for pain. 6/19/20   Kiko Aranda MD   prasterone, dhea, (INTRAROSA) 6.5 mg Inst Place 6.5 mg vaginally every evening. 7/17/20   Kaleigh Polanco MD   promethazine (PHENERGAN) 12.5 MG Tab Take 1 tablet (12.5 mg total) by mouth every 6 (six)  hours as needed. 12/31/19   Vika Calderón NP   triamcinolone acetonide 0.1% (KENALOG) 0.1 % cream apply to affected area twice daily 9/20/19   Hortensia Hamm MD   valACYclovir (VALTREX) 1000 MG tablet Take 1 tablet (1,000 mg total) by mouth every 12 (twelve) hours.  Patient taking differently: Take 1,000 mg by mouth every 12 (twelve) hours. Patient uses prn 11/11/19   Maddie Suero NP   vitamin D (VITAMIN D3) 1000 units Tab Take 1,000 Units by mouth once daily.    Historical Provider   topiramate (TOPAMAX) 100 MG tablet Take 1 tablet (100 mg total) by mouth every evening.  Patient taking differently: Take 200 mg by mouth every evening.  8/6/20 9/14/20  Dillon Gonzalez MD       Anticoagulation/Antiplatelet Meds: no anticoagulation    Review of Systems:   Hematological: no known coagulopathies  Respiratory: no shortness of breath  Cardiovascular: no chest pain  Gastrointestinal: no abdominal pain  Genitourinary: no dysuria  Musculoskeletal: negative  Neurological: no TIA or stroke symptoms     Physical Exam:  Temp: 97.3 °F (36.3 °C) (09/14/20 1425)  Pulse: 92 (09/14/20 1425)  Resp: 17 (09/14/20 1425)  BP: 119/77 (09/14/20 1425)  SpO2: 99 % (09/14/20 1425)    General: WNWD, NAD  HEENT: Normocephalic, sclera anicteric, oropharynx clear  Neck: Supple, no palpable lymphadenopathy  Heart/chest: RRR, post-op changes of mastectomy and tissue expander placement in both breasts, scab with adjacent sutures along the right breast at 7 o'clock, no drainage, no fluctuance, no tenderness, minimal erythema  Lungs: Symmetric excursions, breathing unlabored  Abd: NTND, soft  Extremities: ENG  Neuro: Gross nonfocal    Laboratory:  Lab Results   Component Value Date    INR 1.1 12/04/2019       Lab Results   Component Value Date    WBC 6.09 09/02/2020    HGB 12.5 09/02/2020    HCT 37.9 09/02/2020    MCV 93 09/02/2020     09/02/2020      Lab Results   Component Value Date     (H) 06/05/2020      06/05/2020    K 4.6 06/05/2020     06/05/2020    CO2 25 06/05/2020    BUN 20 06/05/2020    CREATININE 0.9 06/05/2020    CALCIUM 9.8 06/05/2020    MG 1.8 03/20/2020    ALT 13 06/05/2020    AST 15 06/05/2020    ALBUMIN 4.4 06/05/2020    BILITOT 0.6 06/05/2020       Imaging:  No relevant imaging available.    Assessment/Plan:  Possible fluid collection. Suspect that if there was one there was aspirated or has resolved. Will scan with US and if there is an amenable collection will consider aspiration and/or drain placement.    Sedation:  Sedation history: have not been any systemic reactions  ASA: 2 / Mallampati: 2  Sedation plan: Moderate (Versed, fentanyl)     Risks (including, but not limited to, pain, bleeding, infection, damage to nearby structures, failure, and the need for additional procedures), benefits, and alternatives were discussed with the patient. All questions were answered to the best of my abilities. The patient wishes to proceed. Written informed consent was obtained.      Andrew Marsala MD [facility]  Pager [phone]

## 2020-09-14 NOTE — NURSING
Procedure not performed per MD. Scans and pictures taken. See MD report. Patient ready for discharge.

## 2020-09-14 NOTE — DISCHARGE SUMMARY
Interventional Radiology Discharge Summary      Hospital Course: No complications    Admit Date: 9/14/2020  Discharge Date: 09/14/2020     Instructions Given to Patient: Yes  Diet: Resume prior diet  Activity: Activity as tolerated    Description of Condition on Discharge: Stable  Vital Signs (Most Recent): Temp: 97.3 °F (36.3 °C) (09/14/20 1425)  Pulse: 92 (09/14/20 1425)  Resp: 17 (09/14/20 1425)  BP: 119/77 (09/14/20 1425)  SpO2: 99 % (09/14/20 1425)    Discharge Disposition: Home    Discharge Diagnosis: Breast ca     Follow-up: With referring provider      Robe Avila MD  [facility]  Pager [phone]

## 2020-09-16 ENCOUNTER — PATIENT MESSAGE (OUTPATIENT)
Dept: HEMATOLOGY/ONCOLOGY | Facility: CLINIC | Age: 35
End: 2020-09-16

## 2020-09-17 ENCOUNTER — PATIENT MESSAGE (OUTPATIENT)
Dept: GYNECOLOGIC ONCOLOGY | Facility: CLINIC | Age: 35
End: 2020-09-17

## 2020-09-18 ENCOUNTER — HOSPITAL ENCOUNTER (OUTPATIENT)
Facility: HOSPITAL | Age: 35
Discharge: HOME | End: 2020-09-18
Attending: SPECIALIST | Admitting: SPECIALIST
Payer: COMMERCIAL

## 2020-09-18 ENCOUNTER — PATIENT MESSAGE (OUTPATIENT)
Dept: GYNECOLOGIC ONCOLOGY | Facility: CLINIC | Age: 35
End: 2020-09-18

## 2020-09-18 ENCOUNTER — PATIENT MESSAGE (OUTPATIENT)
Dept: RADIATION ONCOLOGY | Facility: CLINIC | Age: 35
End: 2020-09-18

## 2020-09-18 DIAGNOSIS — N64.89 SEROMA OF BREAST: ICD-10-CM

## 2020-09-18 DIAGNOSIS — Z01.84 ANTIBODY RESPONSE EXAMINATION: ICD-10-CM

## 2020-09-18 NOTE — PROGRESS NOTES
[facility]  Physical Therapy Re- Evaluation    Name: Yolanda Win  Clinic Number: [id number]    Therapy Diagnosis: No diagnosis found.  Physician: Yoli Rojas PA    Physician Orders: PT Eval and Treat   Medical Diagnosis: Right breast cancer/decreased shoulder mobility  Re-Evaluation Date: 2020  Authorization Period Expiration: 2020  Plan of Care Certification Period: 2020 (6 weeks)  Visit # / Visits authorized:   Insurance: [employer]    Time In: 9:00 AM  Time Out: 9:45 AM  Total Billable Time: 45 minutes    Precautions: cancer      History   History of Present Illness: Yolanda is a 35 y.o. female that presents to  [facility] at the [facility] s/p right breast surgery.   Diagnosis:  right breast IDC, grade 3, ER (+), [place] (-), HER2 (-); BRCA 1 (+)  Chief complaint: had infection right breast 20-scab inferior aspect right breast came off and had a lot of drainage. Area has been stitched. Patient states infection has cleared; however, still having swelling and has had physician drain her right breast 2-3 more times. States she may be having inflammatory reaction to implant. States can feel cording in right axilla and behind her implant to her sternum; also feels cording in right upper arm and into right forearm. States radiation therapy is on hold and not planning to remove implants. States having decreased ROM in right shoulder.  Surgical History:  2020 bilateral mastectomies with right ALND (4) and tissue expander reconstruction; 19 for insertion of port  Yolanda Win  has a past surgical history that includes Tonsillectomy; Bone marrow aspiration; Colposcopy; Cervical biopsy w/ loop electrode excision;  section (2016); Shoulder surgery (Left); Sinus surgery;  section (N/A, 2019); Insertion of tunneled central venous catheter (CVC) with subcutaneous port  "(Left, 12/27/2019); Bilateral mastectomy (Bilateral, 7/1/2020); Biopsy of axillary node (Right, 7/1/2020); Injection for sentinel node identification (Right, 7/1/2020); Mediport removal (N/A, 7/1/2020); Insertion of breast tissue expander (Bilateral, 7/1/2020); and Breast surgery.    Chemotherapy: Jesús-Adjuvant chemotherapy 1/20 to 5/20  Radiation: pending    Past Medical History:   Diagnosis Date    Abnormal Pap smear of cervix 2009    ADHD     Allergy     seasonal    Anorexia     Anxiety     Bulimia     Depression     Fever blister     History of posttraumatic stress disorder (PTSD)     Hypertension     Gestational Hypertension    Invasive ductal carcinoma of right breast 12/18/2019    Mastitis     Migraine headache           Medications:  Yolanda has a current medication list which includes the following prescription(s): albuterol, alprazolam, citalopram, clindamycin, dextroamphetamine-amphetamine, ferrous sulfate, loratadine, magic mouthwash diphen/antac/lidoc/nysta, multivitamin, multivitamin with minerals, mupirocin, olanzapine, ondansetron, oxycodone, oxycodone-acetaminophen, intrarosa, promethazine, sulfamethoxazole-trimethoprim 800-160mg, sumatriptan, topiramate, tranexamic acid, trazodone, triamcinolone acetonide 0.1%, valacyclovir, and vitamin d, and the following Facility-Administered Medications: copper intrauterine device and onabotulinumtoxina.    Allergies:  Review of patient's allergies indicates:   Allergen Reactions    Amoxicillin Hives    Penicillins Hives and Rash    Diazepam Anxiety and Other (See Comments)     "makes hyper"        Prior Therapy: Seen at [facility] 7/29/20 to 9/2/20  Social History:  lives with their family  Occupation: RN--Cancer research [employer]  Prior Level of Function: Independent  Current Level of Function: difficulty reaching with RUE     Other Past Medical History: See Above    Patient's Goals: I want to decrease cording and get my mobility back    Hand " "dominance: Right handed    Subjective   Pt states: soreness and tenderness in right axilla  Pain: 3/10 on VAS currently.   Best: 1/10  Worst: 3/10   Pain location: right axilla   Objective   Mental status :alert    Postural examination/scapula alignment: good posture    Skin Integrity:   Scar Location: bilateral breasts  Appearance: healing  Signs of infection: No  Drainage: very minimal  Color: red    Edema: Moderate  Location: right breast    Axillary Web Syndrome/Cording:   Location: Right axilla, right upper arm, antecubital fossa and volar forearm  Degree of Cording: moderate (mild, moderate etc...)   Number of cords present: 2-3    Sensation: WNL RUE        Range of Motion:      Shoulder Range of Motion:   Active /Passive ROM Right Left   Flexion 170 170   Abduction 180 180   Extension 80 80   IR/90deg 85 85   ER/90deg 90 90          Strength: manual muscle test grades below   Upper Extremity Strength   (R) UE (L) UE   Shoulder flexion: 4/5 5/5   Shoulder Abduction: 4/5 5/5   Shoulder IR 4/5 5/5   Shoulder ER 4/5 5/5   Elbow flexion: 4/5 5/5   Elbow extension: 4/5 5/5   Wrist flexion: 5/5 5/5   Wrist extension: 5/5 5/5    5/5 5/5       Baseline Measurements of BL UE's for early detection of Lymphedema:     LANDMARK RIGHT UE LEFT UE DIFFERENCE   E + 8" 39 cm 40 cm 1 cm   E + 6" 35.5 cm 36 cm 0.5 cm   E + 4" 32.5 cm 32.5 cm 0 cm   E + 2" 30.5 cm 30 cm 0.5 cm   Elbow 26 cm 26.5 cm 0.5 cm   W+ 8" 26 cm 26 cm 0 cm   W +  6" 25 cm 25.5 cm 0.5 cm   W + 4" 21.5 cm 21 cm 0.5 cm   Wrist 16.5 cm 16.5 cm 0 cm   DPC 20 cm 20.5 cm 0.5 cm   IP Thumb 6.5 cm 6.5 cm 0 cm        Functional Mobility (Bed mobility, transfers)  Independent    ADL's:  Difficulty reaching with RUE     Gait Assessment:   - AD used: none  - Assistance: independent  - Distance: community distances     Patient Education Provided   - role of therapy in multi - disciplinary team, goals for therapy  - Pt was educated in lymphedema etiology and " "management plans-given in previous session and reviewed today.    - Pt was provided with written risk reductions and precautions for managing lymphedema-given in previous session and reviewed today.     Pt has no cultural, educational or language barriers to learning provided.  Treatment and Instruction of Home Exercise Program    Time In: 9: 20 AM  Time Out: 9:45 AM    Yolanda received individual therapeutic exercises to improve postural correction and alignment, stretching and soft tissue mobility, and strengthening for 10 minutes including the following:   LTR with ER     1 x 10  Wall Climbs   Forward    10 x 5"                        Sideways   10 x 5"    Yolanda received the following manual therapy techniques were performed to increased myofascial/soft tissue length, mobility and pliability, increase PROM, AROM and function as well as to decrease pain for 15 minutes  Bending, stretching, twisting for right axillary and RUE cording  Pectoralis muscle bending with ER  Passive stretch right shoulder    Written Home Exercises Provided and Patient Education: Handouts given   See EMR under patient instructions for program given  Pt demonstrated good understanding of the education provided. Patient demo good return demo of skill of exercises.    Functional Limitations Reporting      CMS Impairment/Limitation/Restriction for FOTO Outcome Survey    Therapist reviewed FOTO scores for Yolanda Win on 9/21/2020.   FOTO documents entered into [employer] - see Media section.    Limitations Score: 26%  Category: Mobility           Assessment   This is a 35 y.o. female referred to outpatient physical therapy and presents with a medical diagnosis of right breast cancer and was seen today post-operatively to establish PT plan of care for impairments following surgery including: moderate cording right axilla, upper arm, antecubital fossa and volar forearm, .     Pt instructed in HEP this session and was able to perform all " exercises given independently. Pt instructed to follow up with therapist if any concerns arise with program established. Pt will continue to benefit from skilled physical therapy to address the impairments stated in chart below, provide patient/family education and to maximize pt's level of independence in home and community environment     Anticipated barriers to physical therapy: None     Pt's spiritual, cultural and educational needs considered and pt agreeable to plan of care and goals as stated below:     Medical necessity is demonstrated by the following IMPAIRMENTS/PROMBLEM LIST:    History  Co-morbidities and personal factors that may impact the plan of care Co-morbidities:   history of cancer    Personal Factors:   no deficits     moderate   Examination  Body Structures and Functions, activity limitations and participation restrictions that may impact the plan of care Body Regions:   upper extremities    Body Systems:    scar formation    Participation Restrictions:   Difficulty reaching with RUE    Activity limitations:   Learning and applying knowledge  no deficits    General Tasks and Commands  no deficits    Communication  no deficits    Mobility  lifting and carrying objects    Self care  no deficits    Domestic Life  cooking  doing house work (cleaning house, washing dishes, laundry)    Interactions/Relationships  no deficits    Life Areas  no deficits    Community and Social Life  no deficits         moderate   Clinical Presentation evolving clinical presentation with changing clinical characteristics moderate   Decision Making/ Complexity Score: moderate       Goals: Pt agrees with goals set    Short Term goals: 3 weeks  1. Patient will demonstrate 100% understanding of lymphedema risk reduction practices to include self monitoring for lymphedema. (progressing, not met)  2. Patient will demonstrate independence with Home Exercise program established. (progressing, not met)  3. Pt will demonstrate  improved  tissue mobility to increase ROM in RUE (progressing, not met)    Long Term Goals: 6 weeks   1. Pt will demonstrate full/maximized tissue mobility to increase ROM and promote healthy tissue to be pain free at discharge. (progressing, not met)  2. Pt will report decrease in overall worst pain to 2/10 at discharge. (progressing, not met)      Plan   Outpatient physical therapy 2x week for 6 weeks to include the following:   Manual Therapy, Patient Education and Therapeutic Exercise.    Plan of care Certification Period: 9/21/2020 to 11/6/2020.    Therapist: Rosmery Mendoza, PT  9/21/2020

## 2020-09-21 ENCOUNTER — PROCEDURE VISIT (OUTPATIENT)
Dept: NEUROLOGY | Facility: CLINIC | Age: 35
End: 2020-09-21
Payer: COMMERCIAL

## 2020-09-21 ENCOUNTER — CLINICAL SUPPORT (OUTPATIENT)
Dept: REHABILITATION | Facility: HOSPITAL | Age: 35
End: 2020-09-21
Attending: PHYSICIAN ASSISTANT
Payer: COMMERCIAL

## 2020-09-21 VITALS — HEIGHT: 65 IN | WEIGHT: 165 LBS | BODY MASS INDEX: 27.49 KG/M2

## 2020-09-21 DIAGNOSIS — M25.619 DECREASED SHOULDER MOBILITY, UNSPECIFIED LATERALITY: ICD-10-CM

## 2020-09-21 DIAGNOSIS — Z51.81 MEDICATION MONITORING ENCOUNTER: ICD-10-CM

## 2020-09-21 DIAGNOSIS — G43.C1 INTRACTABLE PERIODIC HEADACHE SYNDROME: Primary | ICD-10-CM

## 2020-09-21 DIAGNOSIS — C50.411 MALIGNANT NEOPLASM OF UPPER-OUTER QUADRANT OF RIGHT BREAST IN FEMALE, ESTROGEN RECEPTOR POSITIVE: Primary | ICD-10-CM

## 2020-09-21 DIAGNOSIS — Z91.89 AT RISK FOR LYMPHEDEMA: ICD-10-CM

## 2020-09-21 DIAGNOSIS — Z17.0 MALIGNANT NEOPLASM OF UPPER-OUTER QUADRANT OF RIGHT BREAST IN FEMALE, ESTROGEN RECEPTOR POSITIVE: Primary | ICD-10-CM

## 2020-09-21 PROCEDURE — 64615 PR CHEMODENERVATION OF MUSCLE FOR CHRONIC MIGRAINE: ICD-10-PCS | Mod: S$GLB,,, | Performed by: PSYCHIATRY & NEUROLOGY

## 2020-09-21 PROCEDURE — 97164 PT RE-EVAL EST PLAN CARE: CPT | Performed by: PHYSICAL MEDICINE & REHABILITATION

## 2020-09-21 PROCEDURE — 64615 CHEMODENERV MUSC MIGRAINE: CPT | Mod: S$GLB,,, | Performed by: PSYCHIATRY & NEUROLOGY

## 2020-09-21 PROCEDURE — 99499 NO LOS: ICD-10-PCS | Mod: S$GLB,,, | Performed by: PSYCHIATRY & NEUROLOGY

## 2020-09-21 PROCEDURE — 97110 THERAPEUTIC EXERCISES: CPT | Performed by: PHYSICAL MEDICINE & REHABILITATION

## 2020-09-21 PROCEDURE — 97140 MANUAL THERAPY 1/> REGIONS: CPT | Performed by: PHYSICAL MEDICINE & REHABILITATION

## 2020-09-21 PROCEDURE — 99499 UNLISTED E&M SERVICE: CPT | Mod: S$GLB,,, | Performed by: PSYCHIATRY & NEUROLOGY

## 2020-09-21 RX ORDER — DOXYCYCLINE HYCLATE 100 MG
TABLET ORAL
COMMUNITY
Start: 2020-09-12 | End: 2020-10-01 | Stop reason: CLARIF

## 2020-09-21 NOTE — PLAN OF CARE
[facility]  Physical Therapy Re- Evaluation    Name: Yolanda Win  Clinic Number: [id number]    Therapy Diagnosis: No diagnosis found.  Physician: Yoli Rojas PA    Physician Orders: PT Eval and Treat   Medical Diagnosis: Right breast cancer/decreased shoulder mobility  Re-Evaluation Date: 2020  Authorization Period Expiration: 2020  Plan of Care Certification Period: 2020 (6 weeks)  Visit # / Visits authorized:   Insurance: [employer]    Time In: 9:00 AM  Time Out: 9:45 AM  Total Billable Time: 45 minutes    Precautions: cancer      History   History of Present Illness: Yolanda is a 35 y.o. female that presents to  [facility] at the [facility] s/p right breast surgery.   Diagnosis:  right breast IDC, grade 3, ER (+), MS (-), HER2 (-); BRCA 1 (+)  Chief complaint: had infection right breast 20-scab inferior aspect right breast came off and had a lot of drainage. Area has been stitched. Patient states infection has cleared; however, still having swelling and has had physician drain her right breast 2-3 more times. States she may be having inflammatory reaction to implant. States can feel cording in right axilla and behind her implant to her sternum; also feels cording in right upper arm and into right forearm. States radiation therapy is on hold and not planning to remove implants. States having decreased ROM in right shoulder.  Surgical History:  2020 bilateral mastectomies with right ALND (4) and tissue expander reconstruction; 19 for insertion of port  Yolanda Win  has a past surgical history that includes Tonsillectomy; Bone marrow aspiration; Colposcopy; Cervical biopsy w/ loop electrode excision;  section (2016); Shoulder surgery (Left); Sinus surgery;  section (N/A, 2019); Insertion of tunneled central venous catheter (CVC) with subcutaneous port  "(Left, 12/27/2019); Bilateral mastectomy (Bilateral, 7/1/2020); Biopsy of axillary node (Right, 7/1/2020); Injection for sentinel node identification (Right, 7/1/2020); Mediport removal (N/A, 7/1/2020); Insertion of breast tissue expander (Bilateral, 7/1/2020); and Breast surgery.    Chemotherapy: Jesús-Adjuvant chemotherapy 1/20 to 5/20  Radiation: pending    Past Medical History:   Diagnosis Date    Abnormal Pap smear of cervix 2009    ADHD     Allergy     seasonal    Anorexia     Anxiety     Bulimia     Depression     Fever blister     History of posttraumatic stress disorder (PTSD)     Hypertension     Gestational Hypertension    Invasive ductal carcinoma of right breast 12/18/2019    Mastitis     Migraine headache           Medications:  Yolanda has a current medication list which includes the following prescription(s): albuterol, alprazolam, citalopram, clindamycin, dextroamphetamine-amphetamine, ferrous sulfate, loratadine, magic mouthwash diphen/antac/lidoc/nysta, multivitamin, multivitamin with minerals, mupirocin, olanzapine, ondansetron, oxycodone, oxycodone-acetaminophen, intrarosa, promethazine, sulfamethoxazole-trimethoprim 800-160mg, sumatriptan, topiramate, tranexamic acid, trazodone, triamcinolone acetonide 0.1%, valacyclovir, and vitamin d, and the following Facility-Administered Medications: copper intrauterine device and onabotulinumtoxina.    Allergies:  Review of patient's allergies indicates:   Allergen Reactions    Amoxicillin Hives    Penicillins Hives and Rash    Diazepam Anxiety and Other (See Comments)     "makes hyper"        Prior Therapy: Seen at [facility] 7/29/20 to 9/2/20  Social History:  lives with their family  Occupation: RN--Cancer research [employer]  Prior Level of Function: Independent  Current Level of Function: difficulty reaching with RUE     Other Past Medical History: See Above    Patient's Goals: I want to decrease cording and get my mobility back    Hand " "dominance: Right handed    Subjective   Pt states: soreness and tenderness in right axilla  Pain: 3/10 on VAS currently.   Best: 1/10  Worst: 3/10   Pain location: right axilla   Objective   Mental status :alert    Postural examination/scapula alignment: good posture    Skin Integrity:   Scar Location: bilateral breasts  Appearance: healing  Signs of infection: No  Drainage: very minimal  Color: red    Edema: Moderate  Location: right breast    Axillary Web Syndrome/Cording:   Location: Right axilla, right upper arm, antecubital fossa and volar forearm  Degree of Cording: moderate (mild, moderate etc...)   Number of cords present: 2-3    Sensation: WNL RUE        Range of Motion:      Shoulder Range of Motion:   Active /Passive ROM Right Left   Flexion 170 170   Abduction 180 180   Extension 80 80   IR/90deg 85 85   ER/90deg 90 90          Strength: manual muscle test grades below   Upper Extremity Strength   (R) UE (L) UE   Shoulder flexion: 4/5 5/5   Shoulder Abduction: 4/5 5/5   Shoulder IR 4/5 5/5   Shoulder ER 4/5 5/5   Elbow flexion: 4/5 5/5   Elbow extension: 4/5 5/5   Wrist flexion: 5/5 5/5   Wrist extension: 5/5 5/5    5/5 5/5       Baseline Measurements of BL UE's for early detection of Lymphedema:     LANDMARK RIGHT UE LEFT UE DIFFERENCE   E + 8" 39 cm 40 cm 1 cm   E + 6" 35.5 cm 36 cm 0.5 cm   E + 4" 32.5 cm 32.5 cm 0 cm   E + 2" 30.5 cm 30 cm 0.5 cm   Elbow 26 cm 26.5 cm 0.5 cm   W+ 8" 26 cm 26 cm 0 cm   W +  6" 25 cm 25.5 cm 0.5 cm   W + 4" 21.5 cm 21 cm 0.5 cm   Wrist 16.5 cm 16.5 cm 0 cm   DPC 20 cm 20.5 cm 0.5 cm   IP Thumb 6.5 cm 6.5 cm 0 cm        Functional Mobility (Bed mobility, transfers)  Independent    ADL's:  Difficulty reaching with RUE     Gait Assessment:   - AD used: none  - Assistance: independent  - Distance: community distances     Patient Education Provided   - role of therapy in multi - disciplinary team, goals for therapy  - Pt was educated in lymphedema etiology and " "management plans-given in previous session and reviewed today.    - Pt was provided with written risk reductions and precautions for managing lymphedema-given in previous session and reviewed today.     Pt has no cultural, educational or language barriers to learning provided.  Treatment and Instruction of Home Exercise Program    Time In: 9: 20 AM  Time Out: 9:45 AM    Yolanda received individual therapeutic exercises to improve postural correction and alignment, stretching and soft tissue mobility, and strengthening for 10 minutes including the following:   LTR with ER     1 x 10  Wall Climbs   Forward    10 x 5"                        Sideways   10 x 5"    Yolanda received the following manual therapy techniques were performed to increased myofascial/soft tissue length, mobility and pliability, increase PROM, AROM and function as well as to decrease pain for 15 minutes  Bending, stretching, twisting for right axillary and RUE cording  Pectoralis muscle bending with ER  Passive stretch right shoulder    Written Home Exercises Provided and Patient Education: Handouts given   See EMR under patient instructions for program given  Pt demonstrated good understanding of the education provided. Patient demo good return demo of skill of exercises.    Functional Limitations Reporting      CMS Impairment/Limitation/Restriction for FOTO Outcome Survey    Therapist reviewed FOTO scores for Yolanda Win on 9/21/2020.   FOTO documents entered into [employer] - see Media section.    Limitations Score: 26%  Category: Mobility           Assessment   This is a 35 y.o. female referred to outpatient physical therapy and presents with a medical diagnosis of right breast cancer and was seen today post-operatively to establish PT plan of care for impairments following surgery including: moderate cording right axilla, upper arm, antecubital fossa and volar forearm, .     Pt instructed in HEP this session and was able to perform all " exercises given independently. Pt instructed to follow up with therapist if any concerns arise with program established. Pt will continue to benefit from skilled physical therapy to address the impairments stated in chart below, provide patient/family education and to maximize pt's level of independence in home and community environment     Anticipated barriers to physical therapy: None     Pt's spiritual, cultural and educational needs considered and pt agreeable to plan of care and goals as stated below:     Medical necessity is demonstrated by the following IMPAIRMENTS/PROMBLEM LIST:    History  Co-morbidities and personal factors that may impact the plan of care Co-morbidities:   history of cancer    Personal Factors:   no deficits     moderate   Examination  Body Structures and Functions, activity limitations and participation restrictions that may impact the plan of care Body Regions:   upper extremities    Body Systems:    scar formation    Participation Restrictions:   Difficulty reaching with RUE    Activity limitations:   Learning and applying knowledge  no deficits    General Tasks and Commands  no deficits    Communication  no deficits    Mobility  lifting and carrying objects    Self care  no deficits    Domestic Life  cooking  doing house work (cleaning house, washing dishes, laundry)    Interactions/Relationships  no deficits    Life Areas  no deficits    Community and Social Life  no deficits         moderate   Clinical Presentation evolving clinical presentation with changing clinical characteristics moderate   Decision Making/ Complexity Score: moderate       Goals: Pt agrees with goals set    Short Term goals: 3 weeks  1. Patient will demonstrate 100% understanding of lymphedema risk reduction practices to include self monitoring for lymphedema. (progressing, not met)  2. Patient will demonstrate independence with Home Exercise program established. (progressing, not met)  3. Pt will demonstrate  improved  tissue mobility to increase ROM in RUE (progressing, not met)    Long Term Goals: 6 weeks   1. Pt will demonstrate full/maximized tissue mobility to increase ROM and promote healthy tissue to be pain free at discharge. (progressing, not met)  2. Pt will report decrease in overall worst pain to 2/10 at discharge. (progressing, not met)      Plan   Outpatient physical therapy 2x week for 6 weeks to include the following:   Manual Therapy, Patient Education and Therapeutic Exercise.    Plan of care Certification Period: 9/21/2020 to 11/6/2020.    Therapist: Rosmery Mendoza, PT  9/21/2020

## 2020-09-21 NOTE — PATIENT INSTRUCTIONS
Gentle Lower trunk rotation with butterfly stretch - only bring legs 1/2 the distance of the picture   1 x 10 reps each side           Wall Climb    Perform this exercise two (2) times a day with ten (10) repetitions.    Stand and FACE THE WALL with your toes 10 to 12 inches away from the wall.    a. Place the fingers of your affected arm on the wall and slowly walk your fingers up the wall. Let your fingers climb the wall as high as possible without feeling pulling or pain.  b. Hold this stretch for 5 seconds then move your fingers back down the wall.  c. Try to go a little higher each time. It may relax you a bit if you rest your head against the wall.          Wall Walk (Shoulder Abduction)  Using your affected arm, walk your hand up  a wall straight out to the side, as high as you  are able. Lean your body in toward the wall. Hold for count of 5. Perform 10 times/2 times a day.  Copyright © 6344-5899 [employer]

## 2020-09-23 ENCOUNTER — RESEARCH ENCOUNTER (OUTPATIENT)
Dept: RESEARCH | Facility: HOSPITAL | Age: 35
End: 2020-09-23

## 2020-09-23 NOTE — PROGRESS NOTES
Wednesday, September 23, 2020    Protocol: [id number]  Investigator: MARK Gallagher MD  Patient Initials: SANTIAGO DE LEON  Study ID: [id number]  IRB #: 2018.032        [id number]: A Phase III Randomized Trial of Hypofractionated Post Mastectomy Radiation With Breast Reconstruction    Off Treatment Note:    Due to unexpected wound healing issues patient is unable to begin protocol therapy by the protocol mandated 84 days following surgery. Patient will not go on trial but will be followed for survival data.

## 2020-09-25 ENCOUNTER — PATIENT MESSAGE (OUTPATIENT)
Dept: HEMATOLOGY/ONCOLOGY | Facility: CLINIC | Age: 35
End: 2020-09-25

## 2020-09-25 ENCOUNTER — PATIENT MESSAGE (OUTPATIENT)
Dept: RADIATION ONCOLOGY | Facility: CLINIC | Age: 35
End: 2020-09-25

## 2020-09-27 RX ORDER — CITALOPRAM 40 MG/1
40 TABLET, FILM COATED ORAL DAILY
Qty: 30 TABLET | Refills: 0 | Status: SHIPPED | OUTPATIENT
Start: 2020-09-27 | End: 2020-11-09 | Stop reason: SDUPTHER

## 2020-09-29 RX ORDER — TRAZODONE HYDROCHLORIDE 50 MG/1
50 TABLET ORAL NIGHTLY
Qty: 30 TABLET | Refills: 0 | Status: SHIPPED | OUTPATIENT
Start: 2020-09-29 | End: 2020-11-09 | Stop reason: SDUPTHER

## 2020-10-02 ENCOUNTER — HOSPITAL ENCOUNTER (INPATIENT)
Facility: OTHER | Age: 35
LOS: 1 days | Discharge: HOME | DRG: 858 | End: 2020-10-03
Attending: EMERGENCY MEDICINE | Admitting: INTERNAL MEDICINE
Payer: COMMERCIAL

## 2020-10-02 ENCOUNTER — LAB VISIT (OUTPATIENT)
Dept: FAMILY MEDICINE | Facility: CLINIC | Age: 35
End: 2020-10-02
Payer: COMMERCIAL

## 2020-10-02 DIAGNOSIS — Z01.818 PREOP TESTING: ICD-10-CM

## 2020-10-02 DIAGNOSIS — Z51.89 VISIT FOR WOUND CHECK: ICD-10-CM

## 2020-10-02 DIAGNOSIS — N61.0 CELLULITIS OF RIGHT BREAST: Primary | ICD-10-CM

## 2020-10-02 DIAGNOSIS — Z90.13 HISTORY OF BILATERAL MASTECTOMY: ICD-10-CM

## 2020-10-02 PROBLEM — G62.0 CHEMOTHERAPY-INDUCED NEUROPATHY: Chronic | Status: ACTIVE | Noted: 2020-05-18

## 2020-10-02 PROBLEM — Z87.898 HISTORY OF INSOMNIA: Chronic | Status: ACTIVE | Noted: 2017-05-17

## 2020-10-02 PROBLEM — D70.9 NEUTROPENIC FEVER: Status: RESOLVED | Noted: 2020-03-20 | Resolved: 2020-10-02

## 2020-10-02 PROBLEM — G43.C1 INTRACTABLE PERIODIC HEADACHE SYNDROME: Chronic | Status: ACTIVE | Noted: 2019-05-23

## 2020-10-02 PROBLEM — R50.81 NEUTROPENIC FEVER: Status: RESOLVED | Noted: 2020-03-20 | Resolved: 2020-10-02

## 2020-10-02 PROBLEM — C50.411 MALIGNANT NEOPLASM OF UPPER-OUTER QUADRANT OF RIGHT BREAST IN FEMALE, ESTROGEN RECEPTOR POSITIVE: Chronic | Status: ACTIVE | Noted: 2019-12-22

## 2020-10-02 PROBLEM — Z86.59 HISTORY OF ANOREXIA NERVOSA: Chronic | Status: ACTIVE | Noted: 2019-12-23

## 2020-10-02 PROBLEM — J30.2 SEASONAL ALLERGIES: Chronic | Status: ACTIVE | Noted: 2019-12-11

## 2020-10-02 PROBLEM — Z86.59 HISTORY OF POSTTRAUMATIC STRESS DISORDER (PTSD): Chronic | Status: ACTIVE | Noted: 2017-05-17

## 2020-10-02 PROBLEM — F41.1 GENERALIZED ANXIETY DISORDER: Chronic | Status: ACTIVE | Noted: 2017-05-17

## 2020-10-02 PROBLEM — G47.00 INSOMNIA: Chronic | Status: ACTIVE | Noted: 2017-05-17

## 2020-10-02 PROBLEM — R53.1 DECREASED STRENGTH: Status: RESOLVED | Noted: 2019-08-30 | Resolved: 2020-10-02

## 2020-10-02 PROBLEM — Z86.59 HISTORY OF BULIMIA NERVOSA: Chronic | Status: ACTIVE | Noted: 2019-12-23

## 2020-10-02 PROBLEM — T45.1X5A CHEMOTHERAPY-INDUCED NEUROPATHY: Chronic | Status: ACTIVE | Noted: 2020-05-18

## 2020-10-02 PROBLEM — Z17.0 MALIGNANT NEOPLASM OF UPPER-OUTER QUADRANT OF RIGHT BREAST IN FEMALE, ESTROGEN RECEPTOR POSITIVE: Chronic | Status: ACTIVE | Noted: 2019-12-22

## 2020-10-02 LAB
ALBUMIN SERPL BCP-MCNC: 3.8 G/DL (ref 3.5–5.2)
ALP SERPL-CCNC: 140 U/L (ref 55–135)
ALT SERPL W/O P-5'-P-CCNC: 12 U/L (ref 10–44)
ANION GAP SERPL CALC-SCNC: 10 MMOL/L (ref 8–16)
AST SERPL-CCNC: 13 U/L (ref 10–40)
B-HCG UR QL: NEGATIVE
BASOPHILS # BLD AUTO: 0.02 K/UL (ref 0–0.2)
BASOPHILS NFR BLD: 0.4 % (ref 0–1.9)
BILIRUB SERPL-MCNC: 0.2 MG/DL (ref 0.1–1)
BILIRUB UR QL STRIP: NEGATIVE
BUN SERPL-MCNC: 10 MG/DL (ref 6–20)
CALCIUM SERPL-MCNC: 9.1 MG/DL (ref 8.7–10.5)
CHLORIDE SERPL-SCNC: 110 MMOL/L (ref 95–110)
CLARITY UR: CLEAR
CO2 SERPL-SCNC: 18 MMOL/L (ref 23–29)
COLOR UR: YELLOW
CREAT SERPL-MCNC: 0.8 MG/DL (ref 0.5–1.4)
CREAT SERPL-MCNC: 0.9 MG/DL (ref 0.5–1.4)
CTP QC/QA: YES
CTP QC/QA: YES
DIFFERENTIAL METHOD: ABNORMAL
EOSINOPHIL # BLD AUTO: 0.2 K/UL (ref 0–0.5)
EOSINOPHIL NFR BLD: 3.1 % (ref 0–8)
ERYTHROCYTE [DISTWIDTH] IN BLOOD BY AUTOMATED COUNT: 11.8 % (ref 11.5–14.5)
EST. GFR  (AFRICAN AMERICAN): >60 ML/MIN/1.73 M^2
EST. GFR  (NON AFRICAN AMERICAN): >60 ML/MIN/1.73 M^2
GLUCOSE SERPL-MCNC: 106 MG/DL (ref 70–110)
GLUCOSE UR QL STRIP: NEGATIVE
HCT VFR BLD AUTO: 37.2 % (ref 37–48.5)
HCV AB SERPL QL IA: NEGATIVE
HGB BLD-MCNC: 12.6 G/DL (ref 12–16)
HGB UR QL STRIP: ABNORMAL
HIV 1+2 AB+HIV1 P24 AG SERPL QL IA: NEGATIVE
IMM GRANULOCYTES # BLD AUTO: 0.02 K/UL (ref 0–0.04)
IMM GRANULOCYTES NFR BLD AUTO: 0.4 % (ref 0–0.5)
KETONES UR QL STRIP: NEGATIVE
LACTATE SERPL-SCNC: 0.9 MMOL/L (ref 0.5–2.2)
LEUKOCYTE ESTERASE UR QL STRIP: NEGATIVE
LYMPHOCYTES # BLD AUTO: 1 K/UL (ref 1–4.8)
LYMPHOCYTES NFR BLD: 17.6 % (ref 18–48)
MCH RBC QN AUTO: 30.2 PG (ref 27–31)
MCHC RBC AUTO-ENTMCNC: 33.9 G/DL (ref 32–36)
MCV RBC AUTO: 89 FL (ref 82–98)
MONOCYTES # BLD AUTO: 0.4 K/UL (ref 0.3–1)
MONOCYTES NFR BLD: 6.7 % (ref 4–15)
NEUTROPHILS # BLD AUTO: 4 K/UL (ref 1.8–7.7)
NEUTROPHILS NFR BLD: 71.8 % (ref 38–73)
NITRITE UR QL STRIP: NEGATIVE
NRBC BLD-RTO: 0 /100 WBC
PH UR STRIP: 6 [PH] (ref 5–8)
PLATELET # BLD AUTO: 291 K/UL (ref 150–350)
PMV BLD AUTO: 8.8 FL (ref 9.2–12.9)
POTASSIUM SERPL-SCNC: 3.7 MMOL/L (ref 3.5–5.1)
PROCALCITONIN SERPL IA-MCNC: 0.07 NG/ML
PROT SERPL-MCNC: 7.4 G/DL (ref 6–8.4)
PROT UR QL STRIP: NEGATIVE
RBC # BLD AUTO: 4.17 M/UL (ref 4–5.4)
SAMPLE: NORMAL
SARS-COV-2 RDRP RESP QL NAA+PROBE: NEGATIVE
SODIUM SERPL-SCNC: 138 MMOL/L (ref 136–145)
SP GR UR STRIP: 1.01 (ref 1–1.03)
URN SPEC COLLECT METH UR: ABNORMAL
UROBILINOGEN UR STRIP-ACNC: NEGATIVE EU/DL
WBC # BLD AUTO: 5.5 K/UL (ref 3.9–12.7)

## 2020-10-02 PROCEDURE — 86703 HIV-1/HIV-2 1 RESULT ANTBDY: CPT

## 2020-10-02 PROCEDURE — 87070 CULTURE OTHR SPECIMN AEROBIC: CPT

## 2020-10-02 PROCEDURE — 81003 URINALYSIS AUTO W/O SCOPE: CPT

## 2020-10-02 PROCEDURE — 86803 HEPATITIS C AB TEST: CPT

## 2020-10-02 PROCEDURE — 84145 PROCALCITONIN (PCT): CPT

## 2020-10-02 PROCEDURE — 87075 CULTR BACTERIA EXCEPT BLOOD: CPT

## 2020-10-02 PROCEDURE — 96365 THER/PROPH/DIAG IV INF INIT: CPT

## 2020-10-02 PROCEDURE — 85025 COMPLETE CBC W/AUTO DIFF WBC: CPT

## 2020-10-02 PROCEDURE — 96375 TX/PRO/DX INJ NEW DRUG ADDON: CPT

## 2020-10-02 PROCEDURE — U0003 INFECTIOUS AGENT DETECTION BY NUCLEIC ACID (DNA OR RNA); SEVERE ACUTE RESPIRATORY SYNDROME CORONAVIRUS 2 (SARS-COV-2) (CORONAVIRUS DISEASE [COVID-19]), AMPLIFIED PROBE TECHNIQUE, MAKING USE OF HIGH THROUGHPUT TECHNOLOGIES AS DESCRIBED BY CMS-2020-01-R: HCPCS

## 2020-10-02 PROCEDURE — 99291 CRITICAL CARE FIRST HOUR: CPT | Mod: 25

## 2020-10-02 PROCEDURE — 25000003 PHARM REV CODE 250: Performed by: NURSE PRACTITIONER

## 2020-10-02 PROCEDURE — 87040 BLOOD CULTURE FOR BACTERIA: CPT

## 2020-10-02 PROCEDURE — 99223 PR INITIAL HOSPITAL CARE,LEVL III: ICD-10-PCS | Mod: ,,, | Performed by: INTERNAL MEDICINE

## 2020-10-02 PROCEDURE — 25500020 PHARM REV CODE 255: Performed by: EMERGENCY MEDICINE

## 2020-10-02 PROCEDURE — 83605 ASSAY OF LACTIC ACID: CPT

## 2020-10-02 PROCEDURE — 11000001 HC ACUTE MED/SURG PRIVATE ROOM

## 2020-10-02 PROCEDURE — 25000003 PHARM REV CODE 250: Performed by: INTERNAL MEDICINE

## 2020-10-02 PROCEDURE — 63600175 PHARM REV CODE 636 W HCPCS: Performed by: EMERGENCY MEDICINE

## 2020-10-02 PROCEDURE — 36415 COLL VENOUS BLD VENIPUNCTURE: CPT

## 2020-10-02 PROCEDURE — 99223 1ST HOSP IP/OBS HIGH 75: CPT | Mod: ,,, | Performed by: INTERNAL MEDICINE

## 2020-10-02 PROCEDURE — 96367 TX/PROPH/DG ADDL SEQ IV INF: CPT

## 2020-10-02 PROCEDURE — U0002 COVID-19 LAB TEST NON-CDC: HCPCS | Performed by: EMERGENCY MEDICINE

## 2020-10-02 PROCEDURE — 96361 HYDRATE IV INFUSION ADD-ON: CPT

## 2020-10-02 PROCEDURE — 25000003 PHARM REV CODE 250: Performed by: EMERGENCY MEDICINE

## 2020-10-02 PROCEDURE — 63600175 PHARM REV CODE 636 W HCPCS: Performed by: INTERNAL MEDICINE

## 2020-10-02 PROCEDURE — 80053 COMPREHEN METABOLIC PANEL: CPT

## 2020-10-02 PROCEDURE — 81025 URINE PREGNANCY TEST: CPT | Performed by: EMERGENCY MEDICINE

## 2020-10-02 RX ORDER — CLINDAMYCIN PHOSPHATE 900 MG/50ML
900 INJECTION, SOLUTION INTRAVENOUS ONCE
Status: COMPLETED | OUTPATIENT
Start: 2020-10-02 | End: 2020-10-02

## 2020-10-02 RX ORDER — CEFEPIME HYDROCHLORIDE 1 G/50ML
2 INJECTION, SOLUTION INTRAVENOUS
Status: DISCONTINUED | OUTPATIENT
Start: 2020-10-02 | End: 2020-10-03 | Stop reason: HOSPADM

## 2020-10-02 RX ORDER — OXYCODONE AND ACETAMINOPHEN 10; 325 MG/1; MG/1
1 TABLET ORAL EVERY 4 HOURS PRN
Status: DISCONTINUED | OUTPATIENT
Start: 2020-10-02 | End: 2020-10-03 | Stop reason: HOSPADM

## 2020-10-02 RX ORDER — VANCOMYCIN HCL IN 5 % DEXTROSE 1G/250ML
1000 PLASTIC BAG, INJECTION (ML) INTRAVENOUS
Status: DISCONTINUED | OUTPATIENT
Start: 2020-10-03 | End: 2020-10-03 | Stop reason: HOSPADM

## 2020-10-02 RX ORDER — ALPRAZOLAM 0.25 MG/1
0.25 TABLET ORAL DAILY PRN
Status: DISCONTINUED | OUTPATIENT
Start: 2020-10-02 | End: 2020-10-03 | Stop reason: HOSPADM

## 2020-10-02 RX ORDER — CITALOPRAM 20 MG/1
40 TABLET, FILM COATED ORAL DAILY
Status: DISCONTINUED | OUTPATIENT
Start: 2020-10-03 | End: 2020-10-02

## 2020-10-02 RX ORDER — METOCLOPRAMIDE HYDROCHLORIDE 5 MG/ML
10 INJECTION INTRAMUSCULAR; INTRAVENOUS
Status: COMPLETED | OUTPATIENT
Start: 2020-10-02 | End: 2020-10-02

## 2020-10-02 RX ORDER — CITALOPRAM 20 MG/1
40 TABLET, FILM COATED ORAL DAILY
Status: DISCONTINUED | OUTPATIENT
Start: 2020-10-02 | End: 2020-10-03 | Stop reason: HOSPADM

## 2020-10-02 RX ORDER — KETOROLAC TROMETHAMINE 30 MG/ML
15 INJECTION, SOLUTION INTRAMUSCULAR; INTRAVENOUS
Status: DISCONTINUED | OUTPATIENT
Start: 2020-10-02 | End: 2020-10-02

## 2020-10-02 RX ORDER — ONDANSETRON 2 MG/ML
4 INJECTION INTRAMUSCULAR; INTRAVENOUS EVERY 8 HOURS PRN
Status: DISCONTINUED | OUTPATIENT
Start: 2020-10-02 | End: 2020-10-03 | Stop reason: HOSPADM

## 2020-10-02 RX ORDER — TOPIRAMATE 100 MG/1
200 TABLET, FILM COATED ORAL DAILY
Status: DISCONTINUED | OUTPATIENT
Start: 2020-10-03 | End: 2020-10-03 | Stop reason: HOSPADM

## 2020-10-02 RX ORDER — SODIUM CHLORIDE 0.9 % (FLUSH) 0.9 %
10 SYRINGE (ML) INJECTION
Status: CANCELLED | OUTPATIENT
Start: 2020-10-02

## 2020-10-02 RX ORDER — POLYETHYLENE GLYCOL 3350 17 G/17G
17 POWDER, FOR SOLUTION ORAL DAILY
Status: DISCONTINUED | OUTPATIENT
Start: 2020-10-03 | End: 2020-10-03 | Stop reason: HOSPADM

## 2020-10-02 RX ORDER — TOPIRAMATE 100 MG/1
200 TABLET, FILM COATED ORAL NIGHTLY
Status: DISCONTINUED | OUTPATIENT
Start: 2020-10-02 | End: 2020-10-02

## 2020-10-02 RX ORDER — CITALOPRAM 20 MG/1
40 TABLET, FILM COATED ORAL DAILY
Status: CANCELLED | OUTPATIENT
Start: 2020-10-03

## 2020-10-02 RX ORDER — SODIUM CHLORIDE 0.9 % (FLUSH) 0.9 %
5 SYRINGE (ML) INJECTION
Status: DISCONTINUED | OUTPATIENT
Start: 2020-10-02 | End: 2020-10-03 | Stop reason: HOSPADM

## 2020-10-02 RX ORDER — CEFEPIME HYDROCHLORIDE 1 G/50ML
2 INJECTION, SOLUTION INTRAVENOUS ONCE
Status: COMPLETED | OUTPATIENT
Start: 2020-10-02 | End: 2020-10-02

## 2020-10-02 RX ORDER — ONDANSETRON 2 MG/ML
4 INJECTION INTRAMUSCULAR; INTRAVENOUS EVERY 8 HOURS PRN
Status: CANCELLED | OUTPATIENT
Start: 2020-10-02

## 2020-10-02 RX ORDER — ONDANSETRON 4 MG/1
4 TABLET, ORALLY DISINTEGRATING ORAL EVERY 8 HOURS PRN
Status: DISCONTINUED | OUTPATIENT
Start: 2020-10-02 | End: 2020-10-03 | Stop reason: HOSPADM

## 2020-10-02 RX ADMIN — CITALOPRAM HYDROBROMIDE 40 MG: 20 TABLET ORAL at 09:10

## 2020-10-02 RX ADMIN — VANCOMYCIN HYDROCHLORIDE 1500 MG: 1.5 INJECTION, POWDER, LYOPHILIZED, FOR SOLUTION INTRAVENOUS at 02:10

## 2020-10-02 RX ADMIN — METOCLOPRAMIDE 10 MG: 5 INJECTION, SOLUTION INTRAMUSCULAR; INTRAVENOUS at 03:10

## 2020-10-02 RX ADMIN — TRAZODONE HYDROCHLORIDE 25 MG: 50 TABLET ORAL at 08:10

## 2020-10-02 RX ADMIN — CEFEPIME HYDROCHLORIDE 2 G: 2 INJECTION, SOLUTION INTRAVENOUS at 09:10

## 2020-10-02 RX ADMIN — OXYCODONE HYDROCHLORIDE AND ACETAMINOPHEN 1 TABLET: 10; 325 TABLET ORAL at 10:10

## 2020-10-02 RX ADMIN — CEFEPIME HYDROCHLORIDE 2 G: 2 INJECTION, SOLUTION INTRAVENOUS at 01:10

## 2020-10-02 RX ADMIN — SODIUM CHLORIDE 1000 ML: 0.9 INJECTION, SOLUTION INTRAVENOUS at 01:10

## 2020-10-02 RX ADMIN — CLINDAMYCIN IN 5 PERCENT DEXTROSE 900 MG: 18 INJECTION, SOLUTION INTRAVENOUS at 01:10

## 2020-10-02 RX ADMIN — OXYCODONE HYDROCHLORIDE AND ACETAMINOPHEN 1 TABLET: 10; 325 TABLET ORAL at 06:10

## 2020-10-02 RX ADMIN — IOHEXOL 75 ML: 350 INJECTION, SOLUTION INTRAVENOUS at 02:10

## 2020-10-02 NOTE — ED NOTES
Patient resting comfortably in bed.  Patient in NAD.  Respirations even, unlabored. Patient on continuous cardiac monitor, BP cuff, and pulse oximeter.  Patient denies needs/complaints at this time.

## 2020-10-02 NOTE — PROGRESS NOTES
"Pharmacokinetic Initial Assessment: IV Vancomycin    Assessment/Plan:    Received in ER intravenous vancomycin with loading dose of 1500 mg once followed by a maintenance dose of vancomycin 1000mg IV every 12 hours.  Desired empiric serum trough concentration is 10 to 20 mcg/mL  Draw vancomycin trough level 30 min prior to fourth dose on 10/4 at approximately 0230.  Pharmacy will continue to follow and monitor vancomycin.      Please contact pharmacy at extension [phone] with any questions regarding this assessment.     Thank you for the consult,   Delma Crews       Patient brief summary:  Yolanda Win is a 35 y.o. female initiated on antimicrobial therapy with IV Vancomycin for treatment of suspected skin & soft tissue infection    Drug Allergies:   Review of patient's allergies indicates:   Allergen Reactions    Amoxicillin Hives    Penicillins Hives and Rash    Diazepam Anxiety and Other (See Comments)     "makes hyper"       Actual Body Weight:   74.8kg    Renal Function:   Estimated Creatinine Clearance: 90 mL/min (based on SCr of 0.9 mg/dL).,     Dialysis Method (if applicable):  N/A    CBC (last 72 hours):  Recent Labs   Lab Result Units 10/02/20  1243   WBC K/uL 5.50   Hemoglobin g/dL 12.6   Hematocrit % 37.2   Platelets K/uL 291   Gran% % 71.8   Lymph% % 17.6*   Mono% % 6.7   Eosinophil% % 3.1   Basophil% % 0.4   Differential Method  Automated       Metabolic Panel (last 72 hours):  Recent Labs   Lab Result Units 10/02/20  1243   Sodium mmol/L 138   Potassium mmol/L 3.7   Chloride mmol/L 110   CO2 mmol/L 18*   Glucose mg/dL 106   Glucose, UA  Negative   BUN, Bld mg/dL 10   Creatinine mg/dL 0.9   Albumin g/dL 3.8   Total Bilirubin mg/dL 0.2   Alkaline Phosphatase U/L 140*   AST U/L 13   ALT U/L 12       Drug levels (last 3 results):  No results for input(s): VANCOMYCINRA, VANCOMYCINPE, VANCOMYCINTR in the last 72 hours.    Microbiologic Results:  Microbiology Results (last 7 days)       " Procedure Component Value Units Date/Time    Aerobic culture (Specify Source) **CANNOT BE ORDERED AS STAT** [[id number]] Collected: 10/02/20 1642    Order Status: Sent Specimen: Body Fluid from Breast, Right Updated: 10/02/20 1707    Culture, Anaerobic [[id number]] Collected: 10/02/20 1642    Order Status: Sent Specimen: Body Fluid from Breast, Right Updated: 10/02/20 1706    Aerobic culture (Specify Source) **CANNOT BE ORDERED AS STAT** [[id number]]     Order Status: Canceled Specimen: Body Fluid     Blood culture x two cultures. Draw prior to antibiotics. [[id number]] Collected: 10/02/20 1242    Order Status: Sent Specimen: Blood from Peripheral, Wrist, Left Updated: 10/02/20 1243    Blood culture x two cultures. Draw prior to antibiotics. [[id number]] Collected: 10/02/20 1241    Order Status: Sent Specimen: Blood from Peripheral, Antecubital, Left Updated: 10/02/20 1242

## 2020-10-02 NOTE — SUBJECTIVE & OBJECTIVE
Past Medical History:   Diagnosis Date    Abnormal Pap smear of cervix     ADHD     Allergy     seasonal    Anorexia     Anxiety     Asthma     Bulimia     Depression     Fever blister     History of posttraumatic stress disorder (PTSD)     Hypertension     Gestational Hypertension    Invasive ductal carcinoma of right breast 2019    Mastitis     Migraine headache     Status post mastectomy, bilateral 2020       Past Surgical History:   Procedure Laterality Date    BILATERAL MASTECTOMY Bilateral 2020    Procedure: MASTECTOMY, BILATERAL - BILATERAL SKIN SPARING MASTECTOMY;  Surgeon: Percy Ayers MD;  Location: [facility];  Service: General;  Laterality: Bilateral;    BIOPSY OF AXILLARY NODE Right 2020    Procedure: BIOPSY, LYMPH NODE, AXILLARY;  Surgeon: Percy Ayers MD;  Location: [facility];  Service: General;  Laterality: Right;    BONE MARROW ASPIRATION      x 3    BREAST SURGERY      CERVICAL BIOPSY  W/ LOOP ELECTRODE EXCISION       SECTION  2016     SECTION N/A 2019    Procedure:  SECTION;  Surgeon: Kirit Hernandez MD;  [facility];  Service: OB/GYN;  Laterality: N/A;    COLPOSCOPY      INJECTION FOR SENTINEL NODE IDENTIFICATION Right 2020    Procedure: INJECTION, FOR SENTINEL NODE IDENTIFICATION;  Surgeon: Percy Ayers MD;  Location: [facility];  Service: General;  Laterality: Right;    INSERTION OF BREAST TISSUE EXPANDER Bilateral 2020    Procedure: INSERTION, TISSUE EXPANDER, BREAST;  Surgeon: Kiko Aranda MD;  Location: [facility];  Service: Plastics;  Laterality: Bilateral;    INSERTION OF TUNNELED CENTRAL VENOUS CATHETER (CVC) WITH SUBCUTANEOUS PORT Left 2019    Procedure: [id number]-[id number]-Left neck or chest wall;  Surgeon: Percy Ayers MD;  Location: [address];  Service: General;  Laterality: Left;    MEDIPORT REMOVAL N/A 2020    Procedure: REMOVAL, CATHETER, CENTRAL  "VENOUS, TUNNELED, WITH PORT;  Surgeon: Percy Ayers MD;  Location: [facility];  Service: General;  Laterality: N/A;    SHOULDER SURGERY Left     x 2    SINUS SURGERY      age 19    TONSILLECTOMY         Review of patient's allergies indicates:   Allergen Reactions    Amoxicillin Hives    Penicillins Hives and Rash    Diazepam Anxiety and Other (See Comments)     "makes hyper"       No current facility-administered medications on file prior to encounter.      Current Outpatient Medications on File Prior to Encounter   Medication Sig    albuterol (PROVENTIL HFA) 90 mcg/actuation inhaler Inhale 2 puffs by mouth into the lungs every 6 (six) hours as needed for Wheezing. Rescue    ALPRAZolam (XANAX) 0.25 MG tablet Take 1 tablet (0.25 mg total) by mouth daily as needed for Anxiety.    citalopram (CELEXA) 40 MG tablet Take 1 tablet (40 mg total) by mouth once daily.    dextroamphetamine-amphetamine (ADDERALL XR) 25 MG 24 hr capsule Take 1 capsule (25 mg total) by mouth every morning.    doxycycline (VIBRA-TABS) 100 MG tablet take 1 tablet by mouth twice daily    goserelin (ZOLADEX) 3.6 mg injection Inject 3.6 mg into the skin every 30 days.    loratadine (CLARITIN) 10 mg tablet Take 10 mg by mouth once daily.    multivitamin (ONE DAILY MULTIVITAMIN) per tablet Take 1 tablet by mouth once daily.    multivitamin with minerals (HAIR,SKIN AND NAILS ORAL) Take by mouth.    onabotulinumtoxinA (BOTOX INJ) Inject as directed. [id number]    prasterone, dhea, (INTRAROSA) 6.5 mg Inst Place 6.5 mg vaginally every evening.    sulfamethoxazole-trimethoprim 800-160mg (BACTRIM DS) 800-160 mg Tab     sulfamethoxazole-trimethoprim 800-160mg (BACTRIM DS) 800-160 mg Tab Take 1 tablet by mouth 2 (two) times daily.    sumatriptan (IMITREX) 100 MG tablet Take 1/2 to 1 tab at onset of headache.  If no improvement in 2 hours, take another.  Do not take more than 2 in 24 hours.    topiramate (TOPAMAX) 100 MG tablet Take 2 " tablets (200 mg total) by mouth every evening.    tranexamic acid (LYSTEDA) 650 mg tablet Take 2 tablets (1,300 mg total) by mouth 3 (three) times daily. (Patient taking differently: Take 1,300 mg by mouth as needed. )    traZODone (DESYREL) 50 MG tablet Take 1 tablet (50 mg total) by mouth every evening. (Patient taking differently: Take 25 mg by mouth every evening. )    triamcinolone acetonide 0.1% (KENALOG) 0.1 % cream apply to affected area twice daily    valACYclovir (VALTREX) 1000 MG tablet Take 1 tablet (1,000 mg total) by mouth every 12 (twelve) hours. (Patient taking differently: Take 1,000 mg by mouth every 12 (twelve) hours. Patient uses prn)     Family History     Problem Relation (Age of Onset)    Breast cancer Other    Cancer Maternal Grandmother (40)    Cervical cancer Mother    Ovarian cancer Other        Tobacco Use    Smoking status: Never Smoker    Smokeless tobacco: Never Used   Substance and Sexual Activity    Alcohol use: Yes     Frequency: 2-3 times a week     Drinks per session: 1 or 2     Binge frequency: Monthly     Comment: socially    Drug use: No    Sexual activity: Yes     Partners: Male     Birth control/protection: None     Review of Systems   Constitutional: Negative for chills and fever.   HENT: Negative for sore throat and trouble swallowing.    Eyes: Negative for pain and visual disturbance.   Respiratory: Negative for cough and shortness of breath.    Cardiovascular: Negative for chest pain and palpitations.   Gastrointestinal: Negative for abdominal pain, nausea and vomiting.   Genitourinary: Negative for difficulty urinating and dysuria.   Musculoskeletal: Negative for arthralgias and myalgias.        Chest wall pain and R flank pain   Skin: Negative for rash and wound.   Neurological: Negative for weakness and numbness.     Objective:     Vital Signs (Most Recent):  Temp: 97.7 °F (36.5 °C) (10/02/20 1725)  Pulse: 77 (10/02/20 1725)  Resp: 18 (10/02/20 1725)  BP:  109/66 (10/02/20 1725)  SpO2: 97 % (10/02/20 1725) Vital Signs (24h Range):  Temp:  [97.7 °F (36.5 °C)-98.6 °F (37 °C)] 97.7 °F (36.5 °C)  Pulse:  [] 77  Resp:  [13-32] 18  SpO2:  [96 %-98 %] 97 %  BP: (109-138)/(66-78) 109/66     Weight: 74.8 kg (165 lb)  Body mass index is 27.46 kg/m².    Physical Exam  Vitals signs and nursing note reviewed.   Constitutional:       General: She is not in acute distress.     Appearance: She is well-developed.   HENT:      Head: Normocephalic and atraumatic.   Eyes:      General:         Right eye: No discharge.         Left eye: No discharge.      Conjunctiva/sclera: Conjunctivae normal.   Cardiovascular:      Rate and Rhythm: Normal rate.      Pulses: Normal pulses.   Pulmonary:      Effort: Pulmonary effort is normal. No respiratory distress.   Chest:      Comments: S/p bilateral mastectomy with tissue expander placement. Inframammary incision on R with stitches in place and scant purulent drainage. Erythema along inferior side of breast extending medially. Tenderness to palpation along mid-chest wall and along upper R abdomen.  Abdominal:      Palpations: Abdomen is soft.      Tenderness: There is no abdominal tenderness.   Musculoskeletal: Normal range of motion.      Right lower leg: No edema.      Left lower leg: No edema.   Skin:     General: Skin is warm and dry.   Neurological:      Mental Status: She is alert and oriented to person, place, and time.       Significant Labs:   CBC:  Recent Labs   Lab 10/02/20  1243   WBC 5.50   HGB 12.6   HCT 37.2      GRAN 71.8  4.0   LYMPH 17.6*  1.0   MONO 6.7  0.4   EOS 0.2   BASO 0.02      CMP:  Recent Labs   Lab 10/02/20  1243      K 3.7      CO2 18*   BUN 10   CREATININE 0.9      CALCIUM 9.1   ALKPHOS 140*   AST 13   ALT 12   BILITOT 0.2   PROT 7.4   ALBUMIN 3.8   ANIONGAP 10     Recent Labs   Lab 10/02/20  1243 10/02/20  1602   PROCAL  --  0.07   LACTATE 0.9  --      Significant Imaging:   CXR  10/2:  Cardiac wires overlie the chest. Cardiomediastinal silhouette is not enlarged.  No focal consolidation.  No sizable pleural effusion.  No pneumothorax. Postoperative changes noted in the chest wall in this patient with prior bilateral mastectomy.    CT Chest 10/2:  Findings most concerning for rupture of the right tissue expander.  Diffuse overlying soft tissue thickening and edema with no focal fluid collection.

## 2020-10-02 NOTE — ASSESSMENT & PLAN NOTE
"R breast cancer  s/p bilateral mastectomy  Rupture of R breast tissue expander  - CT without focal abscess, though does show findings consistent with rupture of tissue expander.   - Only noted potential injury would have been when her 1-year-old "headbutt" her chest on the R.  - Has been on TMP-SMX, doxycycline at home without significant improvement.  - No significant evidence of systemic involvement: WBCs WNL, afebrile.  - Does note purulent drainage from her R breast wound.  - Received cefepime, clindamycin, vancomycin in ED. Lower suspicion for strep spp at the moment and had decent coverage for community MRSA. Continue cefepime 2g IV q8hr, vancomycin IV with pharmacy dosing consult.  - Plastics contacted in ED; currently plan for OR tomorrow for removal of tissue expander. NPO order placed for midnight, hold DVT prophylaxis for tonight in anticipation of procedure tomorrow.  "

## 2020-10-02 NOTE — HPI
"Ms. Win is a 35/F with PMH THIERRY, intractable periodic headache disorder, menorrhagia, R breast cancer s/p bilateral mastectomy with tissue expander placement undergoing XRT/chemotherapy who presented to ED 10/02 with persistent erythema/tenderness to the R breast which has had minimal improvement. She has been followed with Plastics and has been on TMP-SMX and doxycycline since mid-September. Had what appeared to be a seroma initially which was drained in the office, but noted that the color/character of the drainage changed in the past ~week from serosanguinous/clear to  "greenish" color followed by more purulent appearance. Accompanied by erythema and pain; no fevers or chills at home. Pain extends along her chest wall toward her neck as well as along her R flank. Had planned for procedure on 10/05 for more definitive management initially. ED evaluation was significant for erythema and tenderness, no leukocytosis / fever, and CT demonstrating rupture of R tissue expander. Plastic surgery was contacted and plans for procedure tomorrow. Hospital medicine was contacted for admission.  "

## 2020-10-02 NOTE — ED PROVIDER NOTES
"Encounter Date: 10/2/2020    SCRIBE #1 NOTE: I, Radha Coates, am scribing for, and in the presence of, Dr. Morris.       History     Chief Complaint   Patient presents with    Wound Check     pt had double masectomy 7/1/2020. pt reports currently taking bactrim for infection, pt reports seroma to right breast since sx. pt reports pain/redness to right breast that has increased over the past week.      Time seen by provider: 12:16 PM    This is a 35 y.o. female who presents with complaint of worsening right breast erythema and edema now radiating to RUQ and right flank despite being on antibiotics for 1 week. She had a right breast wound since surgery in July. She has no fevers, but has had worsening maliase and nausea over the last several days. She notes significant amount of active drainage which was initially green in color but is now a yellowish brown color. Symptoms have gradually worsened and are severe.    The history is provided by the patient.     Review of patient's allergies indicates:   Allergen Reactions    Amoxicillin Hives    Penicillins Hives and Rash    Diazepam Anxiety and Other (See Comments)     "makes hyper"     Past Medical History:   Diagnosis Date    Abnormal Pap smear of cervix 2009    ADHD     Allergy     seasonal    Anorexia     Anxiety     Asthma     Bulimia     Depression     Fever blister     History of posttraumatic stress disorder (PTSD)     Hypertension     Gestational Hypertension    Invasive ductal carcinoma of right breast 12/18/2019    Mastitis     Migraine headache     Status post mastectomy, bilateral 7/29/2020     Past Surgical History:   Procedure Laterality Date    BILATERAL MASTECTOMY Bilateral 7/1/2020    Procedure: MASTECTOMY, BILATERAL - BILATERAL SKIN SPARING MASTECTOMY;  Surgeon: Percy Ayers MD;  Location: [facility];  Service: General;  Laterality: Bilateral;    BIOPSY OF AXILLARY NODE Right 7/1/2020    Procedure: BIOPSY, LYMPH NODE, AXILLARY;  " Surgeon: Percy Ayers MD;  Location: [facility];  Service: General;  Laterality: Right;    BONE MARROW ASPIRATION      x 3    BREAST SURGERY      CERVICAL BIOPSY  W/ LOOP ELECTRODE EXCISION       SECTION  2016     SECTION N/A 2019    Procedure:  SECTION;  Surgeon: Kirit Hernandez MD;  Location: [facility];  Service: OB/GYN;  Laterality: N/A;    COLPOSCOPY      INJECTION FOR SENTINEL NODE IDENTIFICATION Right 2020    Procedure: INJECTION, FOR SENTINEL NODE IDENTIFICATION;  Surgeon: Percy Ayers MD;  Location: [facility];  Service: General;  Laterality: Right;    INSERTION OF BREAST TISSUE EXPANDER Bilateral 2020    Procedure: INSERTION, TISSUE EXPANDER, BREAST;  Surgeon: Kiko Aranda MD;  Location: [facility];  Service: Plastics;  Laterality: Bilateral;    INSERTION OF TUNNELED CENTRAL VENOUS CATHETER (CVC) WITH SUBCUTANEOUS PORT Left 2019    Procedure: [id number]-Left neck or chest wall;  Surgeon: Percy Ayers MD;  Location: [address];  Service: General;  Laterality: Left;    MEDIPORT REMOVAL N/A 2020    Procedure: REMOVAL, CATHETER, CENTRAL VENOUS, TUNNELED, WITH PORT;  Surgeon: Percy Ayers MD;  Location: [facility];  Service: General;  Laterality: N/A;    SHOULDER SURGERY Left     x 2    SINUS SURGERY      age 19    TONSILLECTOMY       Family History   Problem Relation Age of Onset    Cancer Maternal Grandmother 40        cervical    Cervical cancer Mother     Breast cancer Other     Ovarian cancer Other     Colon cancer Neg Hx     Melanoma Neg Hx      Social History     Tobacco Use    Smoking status: Never Smoker    Smokeless tobacco: Never Used   Substance Use Topics    Alcohol use: Yes     Frequency: 2-3 times a week     Drinks per session: 1 or 2     Binge frequency: Monthly     Comment: socially    Drug use: No     Review of Systems   ROS: As per HPI and below:   General: Notes malaise. No fever.   HENT:  No facial pain.   Eyes: Negative for eye pain.   Cardiovascular: No chest pain.   Respiratory:  No dyspnea.   GI: Notes nausea. No abdominal pain. No vomiting. No diarrhea.   Skin: No rashes.   Neuro:  No syncope.  No focal deficits.   Musculoskeletal: No extremity pain.  All other systems reviewed and are negative.    Physical Exam     Initial Vitals [10/02/20 1144]   BP Pulse Resp Temp SpO2   138/75 106 18 98.6 °F (37 °C) 96 %      MAP       --         Physical Exam    Nursing note and vitals reviewed.  /78   Pulse 89   Temp 98.6 °F (37 °C) (Oral)   Resp 13   Wt 74.8 kg (165 lb)   LMP 10/01/2020   SpO2 98%   BMI 27.46 kg/m²   Constitutional: AAOx3. No distress.  Eyes: EOMI. No discharge. Anicteric.  HENT:   Mouth/Throat:   Neck: Normal range of motion. Neck supple.  Cardiovascular: Normal rate. No murmur, no gallop and no friction rub heard.   Pulmonary/Chest: No respiratory distress. Effort normal. No wheezes, no rales, no rhonchi.   R breast and chest wall tenderness extending to flank and abdominal RUQ. Well-demarcated erythema at R upper to entire right lower breast with extension past chest wall midline. Scant drainage from incision/suture line.   Abdominal: Bowel sounds normal. Soft. No distension and no mass. There is no tenderness. There is no rebound, no guarding, no tenderness at McBurney's point.  Musculoskeletal: Normal range of motion.   Neurological: GCS 15. Alert and oriented to person, place, and time. No gross cranial nerve, light touch or strength deficit. Coordination normal.   Skin: Skin is warm and dry.   EXT: 2+ radial pulses.   Psychiatric: Behavior is normal. Judgment normal.                ED Course   Procedures  Labs Reviewed   CBC W/ AUTO DIFFERENTIAL - Abnormal; Notable for the following components:       Result Value    MPV 8.8 (*)     Lymph% 17.6 (*)     All other components within normal limits   COMPREHENSIVE METABOLIC PANEL - Abnormal; Notable for the following  components:    CO2 18 (*)     Alkaline Phosphatase 140 (*)     All other components within normal limits   URINALYSIS, REFLEX TO URINE CULTURE - Abnormal; Notable for the following components:    Occult Blood UA Trace (*)     All other components within normal limits    Narrative:     Specimen Source->Urine   CULTURE, BLOOD   CULTURE, BLOOD   CULTURE, ANAEROBIC   CULTURE, AEROBIC  (SPECIFY SOURCE)   HIV 1 / 2 ANTIBODY   HEPATITIS C ANTIBODY   LACTIC ACID, PLASMA   PROCALCITONIN   PROCALCITONIN   SARS-COV-2 RDRP GENE   POCT URINE PREGNANCY   ISTAT CREATININE          Imaging Results          CT Chest Abdomen With Contrast (XPD) (Final result)  Result time 10/02/20 15:25:43    Final result by Eusebia Vera MD (10/02/20 15:25:43)                 Impression:      Findings most concerning for rupture of the right tissue expander.  Diffuse overlying soft tissue thickening and edema with no focal fluid collection.      Electronically signed by: Eusebia Vera  Date:    10/02/2020  Time:    15:25             Narrative:    EXAMINATION:  CT CHEST ABDOMEN WITH CONTRAST (XPD)    CLINICAL HISTORY:  breast cancer, cellulitis, eval for deep space infection;    TECHNIQUE:  Multislice axial CT images obtained through the chest and abdomen following the administration of intravenous contrast.    COMPARISON:  Chest CT 06/09/2020    FINDINGS:  CHEST:    Bilateral tissue expanders are present.  On the right, multiple serpiginous lines within the expander.  The expander has a mildly deformed contour.  Adjacent soft tissue edema and skin thickening.  I see no drainable abscess.    Aorta is normal in caliber.  No pericardial effusion.  No mediastinal or hilar lymph node enlargement.    Trachea and proximal airways are patent.  Lungs are clear.  No suspicious pulmonary nodule.    Lucent lesion in the proximal right humeral metaphysis previously biopsied in October 2010.  Please correlate with pathology results.    ABDOMEN:    Liver is  "homogeneous.  Gallbladder, bile ducts, spleen, pancreas, and adrenal glands are unremarkable.    Kidneys demonstrate symmetric contours.    Aorta is normal caliber.    Stomach and visualized bowel loops are normal caliber.                               X-Ray Chest AP Portable (Final result)  Result time 10/02/20 13:52:35    Final result by Enrique Melchor MD (10/02/20 13:52:35)                 Impression:      No acute cardiopulmonary finding identified on this single view.      Electronically signed by: Enrique Melchor MD  Date:    10/02/2020  Time:    13:52             Narrative:    EXAMINATION:  XR CHEST AP PORTABLE    CLINICAL HISTORY:  Provided history is "Sepsis;  ".    TECHNIQUE:  One view of the chest.    COMPARISON:  06/05/2020.    FINDINGS:  Cardiac wires overlie the chest.  Cardiomediastinal silhouette is not enlarged.  No focal consolidation.  No sizable pleural effusion.  No pneumothorax.  Postoperative changes noted in the chest wall in this patient with prior bilateral mastectomy.                                 Medical Decision Making:   History:   Old Medical Records: I decided to obtain old medical records.  Independently Interpreted Test(s):   I have ordered and independently interpreted X-rays - see prior notes.  Clinical Tests:   Lab Tests: Ordered and Reviewed  Radiological Study: Ordered and Reviewed            Scribe Attestation:   Scribe #1: I performed the above scribed service and the documentation accurately describes the services I performed. I attest to the accuracy of the note.    Attending Attestation:           Physician Attestation for Scribe:  Physician Attestation Statement for Scribe #1: I, Dr. Morris, reviewed documentation, as scribed by Radha Coates in my presence, and it is both accurate and complete.                 ED Course as of Oct 02 1608   Fri Oct 02, 2020   1326 Patient is a 35-year-old female with breast cancer status post double mastectomy and expander placement who " presents with right breast erythema, drainage for last week despite Bactrim, doxycycline course.  On exam, patient with diffuse erythema of her right breast, brown drainage on abdominal pad.  Presentation concerning for severe cellulitis.  Differential also included deep space infection, sepsis, dehydration, gross metabolic abnormality.    [RC]   1440 I paged Dr. Ayers's nurse Shirley (pt states this is the best way to reach him because he is in a very long surgical case today).     [RC]   1500 Patient history, findings, results discussed with shirley, Dr. Aranda's nurse, who confirms Dr. Aranda is in a case for several hours.     [LT]   1507 Patient history, findings, results discussed with Dr. Aranda who request admission to hospitalist. Given her symptom progression, he is considering surgery in the morning instead of as scheduled in 3 days.  CT chest read pending. I independently interpreted and reviewed the patient's CT chest/abdomen, notable for no obvious large fluid collection, deep space infection.  Radiologist report pending.           [RC]   1545 CT read as concerning for rupture of the right tissue expander.     [RC]   1549 Paged hospitalist.    [LT]   1600 I have discussed the patient history, exam, findings with Dr. Gomez of hospital medicine, who accepts the patient for Dr. Connell.      [LT]   1605   =====================================  Critical Care:  35 minutes total critical care time was personally spent by me, exclusive of procedures and separately billable time.   Critical care was necessary to treat or prevent imminent or life-threatening deterioration of the following conditions: sepsis, severe cellulitis and deep space infection.    =====================================        [RC]      ED Course User Index  [LT] Radha Coates  [RC] Odin Morris MD            Clinical Impression:     ICD-10-CM ICD-9-CM   1. Cellulitis of right breast  N61.0 611.0   2. Visit for wound check  Z51.89  V58.89   3. History of bilateral mastectomy  Z90.13 V45.71                          ED Disposition Condition    Admit                             Odin Morris MD  10/02/20 1608       Odin Morris MD  10/02/20 [id number]

## 2020-10-02 NOTE — H&P
"[facility] Hospital Medicine  History & Physical    Patient Name: Yolanda Win  MRN: [id number]  Admission Date: 10/2/2020  Attending Physician: DRAGAN Churchill MD  Primary Care Provider: Cruzito Craven MD    Patient information was obtained from patient, past medical records and ER records.     Subjective:     Principal Problem:Cellulitis of right breast    Chief Complaint:   Chief Complaint   Patient presents with    Wound Check     pt had double masectomy 7/1/2020. pt reports currently taking bactrim for infection, pt reports seroma to right breast since sx. pt reports pain/redness to right breast that has increased over the past week.         HPI: Ms. Win is a 35/F with PMH THIERRY, intractable periodic headache disorder, menorrhagia, R breast cancer s/p bilateral mastectomy with tissue expander placement undergoing XRT/chemotherapy who presented to ED 10/02 with persistent erythema/tenderness to the R breast which has had minimal improvement. She has been followed with Plastics and has been on TMP-SMX and doxycycline since mid-September. Had what appeared to be a seroma initially which was drained in the office, but noted that the color/character of the drainage changed in the past ~week from serosanguinous/clear to  "greenish" color followed by more purulent appearance. Accompanied by erythema and pain; no fevers or chills at home. Pain extends along her chest wall toward her neck as well as along her R flank. Had planned for procedure on 10/05 for more definitive management initially. ED evaluation was significant for erythema and tenderness, no leukocytosis / fever, and CT demonstrating rupture of R tissue expander. Plastic surgery was contacted and plans for procedure tomorrow. Hospital medicine was contacted for admission.    Past Medical History:   Diagnosis Date    Abnormal Pap smear of cervix 2009    ADHD     Allergy     seasonal    Anorexia     Anxiety     Asthma "     Bulimia     Depression     Fever blister     History of posttraumatic stress disorder (PTSD)     Hypertension     Gestational Hypertension    Invasive ductal carcinoma of right breast 2019    Mastitis     Migraine headache     Status post mastectomy, bilateral 2020       Past Surgical History:   Procedure Laterality Date    BILATERAL MASTECTOMY Bilateral 2020    Procedure: MASTECTOMY, BILATERAL - BILATERAL SKIN SPARING MASTECTOMY;  Surgeon: Percy Ayers MD;  Location: [facility];  Service: General;  Laterality: Bilateral;    BIOPSY OF AXILLARY NODE Right 2020    Procedure: BIOPSY, LYMPH NODE, AXILLARY;  Surgeon: Percy Ayers MD;  Location: [facility] OR;  Service: General;  Laterality: Right;    BONE MARROW ASPIRATION      x 3    BREAST SURGERY      CERVICAL BIOPSY  W/ LOOP ELECTRODE EXCISION       SECTION  2016     SECTION N/A 2019    Procedure:  SECTION;  Surgeon: Kirit Hernandez MD;  Location: [facility] L&D;  Service: OB/GYN;  Laterality: N/A;    COLPOSCOPY      INJECTION FOR SENTINEL NODE IDENTIFICATION Right 2020    Procedure: INJECTION, FOR SENTINEL NODE IDENTIFICATION;  Surgeon: Percy Ayers MD;  Location: [facility];  Service: General;  Laterality: Right;    INSERTION OF BREAST TISSUE EXPANDER Bilateral 2020    Procedure: INSERTION, TISSUE EXPANDER, BREAST;  Surgeon: Kiko Aranda MD;  Location: [facility];  Service: Plastics;  Laterality: Bilateral;    INSERTION OF TUNNELED CENTRAL VENOUS CATHETER (CVC) WITH SUBCUTANEOUS PORT Left 2019    Procedure: [id number]-Left neck or chest wall;  Surgeon: Percy Ayers MD;  Location: [address];  Service: General;  Laterality: Left;    MEDIPORT REMOVAL N/A 2020    Procedure: REMOVAL, CATHETER, CENTRAL VENOUS, TUNNELED, WITH PORT;  Surgeon: Percy Ayers MD;  Location: [facility];  Service: General;  Laterality: N/A;    SHOULDER SURGERY Left     x 2  "   SINUS SURGERY      age 19    TONSILLECTOMY         Review of patient's allergies indicates:   Allergen Reactions    Amoxicillin Hives    Penicillins Hives and Rash    Diazepam Anxiety and Other (See Comments)     "makes hyper"       No current facility-administered medications on file prior to encounter.      Current Outpatient Medications on File Prior to Encounter   Medication Sig    albuterol (PROVENTIL HFA) 90 mcg/actuation inhaler Inhale 2 puffs by mouth into the lungs every 6 (six) hours as needed for Wheezing. Rescue    ALPRAZolam (XANAX) 0.25 MG tablet Take 1 tablet (0.25 mg total) by mouth daily as needed for Anxiety.    citalopram (CELEXA) 40 MG tablet Take 1 tablet (40 mg total) by mouth once daily.    dextroamphetamine-amphetamine (ADDERALL XR) 25 MG 24 hr capsule Take 1 capsule (25 mg total) by mouth every morning.    doxycycline (VIBRA-TABS) 100 MG tablet take 1 tablet by mouth twice daily    goserelin (ZOLADEX) 3.6 mg injection Inject 3.6 mg into the skin every 30 days.    loratadine (CLARITIN) 10 mg tablet Take 10 mg by mouth once daily.    multivitamin (ONE DAILY MULTIVITAMIN) per tablet Take 1 tablet by mouth once daily.    multivitamin with minerals (HAIR,SKIN AND NAILS ORAL) Take by mouth.    onabotulinumtoxinA (BOTOX INJ) Inject as directed. [id number]    prasterone, dhea, (INTRAROSA) 6.5 mg Inst Place 6.5 mg vaginally every evening.    sulfamethoxazole-trimethoprim 800-160mg (BACTRIM DS) 800-160 mg Tab     sulfamethoxazole-trimethoprim 800-160mg (BACTRIM DS) 800-160 mg Tab Take 1 tablet by mouth 2 (two) times daily.    sumatriptan (IMITREX) 100 MG tablet Take 1/2 to 1 tab at onset of headache.  If no improvement in 2 hours, take another.  Do not take more than 2 in 24 hours.    topiramate (TOPAMAX) 100 MG tablet Take 2 tablets (200 mg total) by mouth every evening.    tranexamic acid (LYSTEDA) 650 mg tablet Take 2 tablets (1,300 mg total) by mouth 3 (three) times daily. " (Patient taking differently: Take 1,300 mg by mouth as needed. )    traZODone (DESYREL) 50 MG tablet Take 1 tablet (50 mg total) by mouth every evening. (Patient taking differently: Take 25 mg by mouth every evening. )    triamcinolone acetonide 0.1% (KENALOG) 0.1 % cream apply to affected area twice daily    valACYclovir (VALTREX) 1000 MG tablet Take 1 tablet (1,000 mg total) by mouth every 12 (twelve) hours. (Patient taking differently: Take 1,000 mg by mouth every 12 (twelve) hours. Patient uses prn)     Family History     Problem Relation (Age of Onset)    Breast cancer Other    Cancer Maternal Grandmother (40)    Cervical cancer Mother    Ovarian cancer Other        Tobacco Use    Smoking status: Never Smoker    Smokeless tobacco: Never Used   Substance and Sexual Activity    Alcohol use: Yes     Frequency: 2-3 times a week     Drinks per session: 1 or 2     Binge frequency: Monthly     Comment: socially    Drug use: No    Sexual activity: Yes     Partners: Male     Birth control/protection: None     Review of Systems   Constitutional: Negative for chills and fever.   HENT: Negative for sore throat and trouble swallowing.    Eyes: Negative for pain and visual disturbance.   Respiratory: Negative for cough and shortness of breath.    Cardiovascular: Negative for chest pain and palpitations.   Gastrointestinal: Negative for abdominal pain, nausea and vomiting.   Genitourinary: Negative for difficulty urinating and dysuria.   Musculoskeletal: Negative for arthralgias and myalgias.        Chest wall pain and R flank pain   Skin: Negative for rash and wound.   Neurological: Negative for weakness and numbness.     Objective:     Vital Signs (Most Recent):  Temp: 97.7 °F (36.5 °C) (10/02/20 1725)  Pulse: 77 (10/02/20 1725)  Resp: 18 (10/02/20 1725)  BP: 109/66 (10/02/20 1725)  SpO2: 97 % (10/02/20 1725) Vital Signs (24h Range):  Temp:  [97.7 °F (36.5 °C)-98.6 °F (37 °C)] 97.7 °F (36.5 °C)  Pulse:  []  77  Resp:  [13-32] 18  SpO2:  [96 %-98 %] 97 %  BP: (109-138)/(66-78) 109/66     Weight: 74.8 kg (165 lb)  Body mass index is 27.46 kg/m².    Physical Exam  Vitals signs and nursing note reviewed.   Constitutional:       General: She is not in acute distress.     Appearance: She is well-developed.   HENT:      Head: Normocephalic and atraumatic.   Eyes:      General:         Right eye: No discharge.         Left eye: No discharge.      Conjunctiva/sclera: Conjunctivae normal.   Cardiovascular:      Rate and Rhythm: Normal rate.      Pulses: Normal pulses.   Pulmonary:      Effort: Pulmonary effort is normal. No respiratory distress.   Chest:      Comments: S/p bilateral mastectomy with tissue expander placement. Inframammary incision on R with stitches in place and scant purulent drainage. Erythema along inferior side of breast extending medially. Tenderness to palpation along mid-chest wall and along upper R abdomen.  Abdominal:      Palpations: Abdomen is soft.      Tenderness: There is no abdominal tenderness.   Musculoskeletal: Normal range of motion.      Right lower leg: No edema.      Left lower leg: No edema.   Skin:     General: Skin is warm and dry.   Neurological:      Mental Status: She is alert and oriented to person, place, and time.       Significant Labs:   CBC:  Recent Labs   Lab 10/02/20  1243   WBC 5.50   HGB 12.6   HCT 37.2      GRAN 71.8  4.0   LYMPH 17.6*  1.0   MONO 6.7  0.4   EOS 0.2   BASO 0.02      CMP:  Recent Labs   Lab 10/02/20  1243      K 3.7      CO2 18*   BUN 10   CREATININE 0.9      CALCIUM 9.1   ALKPHOS 140*   AST 13   ALT 12   BILITOT 0.2   PROT 7.4   ALBUMIN 3.8   ANIONGAP 10     Recent Labs   Lab 10/02/20  1243 10/02/20  1602   PROCAL  --  0.07   LACTATE 0.9  --      Significant Imaging:   CXR 10/2:  Cardiac wires overlie the chest. Cardiomediastinal silhouette is not enlarged.  No focal consolidation.  No sizable pleural effusion.  No pneumothorax.  "Postoperative changes noted in the chest wall in this patient with prior bilateral mastectomy.    CT Chest 10/2:  Findings most concerning for rupture of the right tissue expander.  Diffuse overlying soft tissue thickening and edema with no focal fluid collection.    Assessment/Plan:     * Cellulitis of right breast  R breast cancer  s/p bilateral mastectomy  Rupture of R breast tissue expander  - CT without focal abscess, though does show findings consistent with rupture of tissue expander.   - Only noted potential injury would have been when her 1-year-old "headbutt" her chest on the R.  - Has been on TMP-SMX, doxycycline at home without significant improvement.  - No significant evidence of systemic involvement: WBCs WNL, afebrile.  - Does note purulent drainage from her R breast wound.  - Received cefepime, clindamycin, vancomycin in ED. Lower suspicion for strep spp at the moment and had decent coverage for community MRSA. Continue cefepime 2g IV q8hr, vancomycin IV with pharmacy dosing consult.  - Plastics contacted in ED; currently plan for OR tomorrow for removal of tissue expander. NPO order placed for midnight, hold DVT prophylaxis for tonight in anticipation of procedure tomorrow.    Intractable periodic headache syndrome  - Continuing topiramate 200mg PO qHS.    Insomnia  - Continue trazodone 25mg PO qHS.    Generalized anxiety disorder  - Continue alprazolam 0.25mg PO daily PRN, citalopram 40mg PO daily.    VTE Risk Mitigation (From admission, onward)         Ordered     IP VTE HIGH RISK PATIENT  Once      10/02/20 1730     Place sequential compression device  Until discontinued      10/02/20 1730                   DRAGAN Churchill MD  Department of Hospital Medicine   [facility]  "

## 2020-10-02 NOTE — ED NOTES
Patient presents to the ED with c/o right breast redness, swelling, and pain that is starting to radiate to abdomen.  Patient has hx of breast cancer and diagnosed December 2019.  Patient had double mastectomy September 2020.  Patient states that she has been on Bactrim and Doxycycline since beginning of September.  Patient denies fever, chills, states that she has been having some nausea.  Right breast is red and warm to touch.  Patient is AOx4, respirations even, unlabored.  Skin p/w/d.

## 2020-10-02 NOTE — NURSING
Patient transferred from ED. VSS on room air, AAOx4.  at bedside. Right breast is swollen, red, warm to touch. No drainage present. Two stitches to underside of right breast noticed. No other skin breakdown noticed. Pt complaining of breast pain radiating to upper abdomen, right side, and into back. Pt refusing toradol, requested other pain meds via secure chat to MD. Pt ambulates independently. Bed low, locked, call light in reach. Will continue to monitor.

## 2020-10-03 ENCOUNTER — ANESTHESIA (OUTPATIENT)
Dept: SURGERY | Facility: OTHER | Age: 35
DRG: 858 | End: 2020-10-03
Payer: COMMERCIAL

## 2020-10-03 ENCOUNTER — ANESTHESIA EVENT (OUTPATIENT)
Dept: SURGERY | Facility: OTHER | Age: 35
DRG: 858 | End: 2020-10-03
Payer: COMMERCIAL

## 2020-10-03 VITALS
HEIGHT: 65 IN | BODY MASS INDEX: 27.49 KG/M2 | RESPIRATION RATE: 18 BRPM | HEART RATE: 55 BPM | SYSTOLIC BLOOD PRESSURE: 98 MMHG | DIASTOLIC BLOOD PRESSURE: 53 MMHG | TEMPERATURE: 98 F | WEIGHT: 165 LBS | OXYGEN SATURATION: 97 %

## 2020-10-03 LAB
ANION GAP SERPL CALC-SCNC: 3 MMOL/L (ref 8–16)
BASOPHILS # BLD AUTO: 0.04 K/UL (ref 0–0.2)
BASOPHILS NFR BLD: 1 % (ref 0–1.9)
BUN SERPL-MCNC: 8 MG/DL (ref 6–20)
CALCIUM SERPL-MCNC: 8.2 MG/DL (ref 8.7–10.5)
CHLORIDE SERPL-SCNC: 113 MMOL/L (ref 95–110)
CO2 SERPL-SCNC: 20 MMOL/L (ref 23–29)
CREAT SERPL-MCNC: 0.9 MG/DL (ref 0.5–1.4)
DIFFERENTIAL METHOD: ABNORMAL
EOSINOPHIL # BLD AUTO: 0.4 K/UL (ref 0–0.5)
EOSINOPHIL NFR BLD: 9.8 % (ref 0–8)
ERYTHROCYTE [DISTWIDTH] IN BLOOD BY AUTOMATED COUNT: 11.8 % (ref 11.5–14.5)
EST. GFR  (AFRICAN AMERICAN): >60 ML/MIN/1.73 M^2
EST. GFR  (NON AFRICAN AMERICAN): >60 ML/MIN/1.73 M^2
GLUCOSE SERPL-MCNC: 175 MG/DL (ref 70–110)
HCT VFR BLD AUTO: 31.6 % (ref 37–48.5)
HGB BLD-MCNC: 10.1 G/DL (ref 12–16)
IMM GRANULOCYTES # BLD AUTO: 0.01 K/UL (ref 0–0.04)
IMM GRANULOCYTES NFR BLD AUTO: 0.3 % (ref 0–0.5)
LYMPHOCYTES # BLD AUTO: 1.1 K/UL (ref 1–4.8)
LYMPHOCYTES NFR BLD: 28.3 % (ref 18–48)
MAGNESIUM SERPL-MCNC: 1.8 MG/DL (ref 1.6–2.6)
MCH RBC QN AUTO: 29.4 PG (ref 27–31)
MCHC RBC AUTO-ENTMCNC: 32 G/DL (ref 32–36)
MCV RBC AUTO: 92 FL (ref 82–98)
MONOCYTES # BLD AUTO: 0.5 K/UL (ref 0.3–1)
MONOCYTES NFR BLD: 12.1 % (ref 4–15)
NEUTROPHILS # BLD AUTO: 1.9 K/UL (ref 1.8–7.7)
NEUTROPHILS NFR BLD: 48.5 % (ref 38–73)
NRBC BLD-RTO: 0 /100 WBC
PHOSPHATE SERPL-MCNC: 3.5 MG/DL (ref 2.7–4.5)
PLATELET # BLD AUTO: 230 K/UL (ref 150–350)
PMV BLD AUTO: 8.8 FL (ref 9.2–12.9)
POTASSIUM SERPL-SCNC: 4 MMOL/L (ref 3.5–5.1)
RBC # BLD AUTO: 3.43 M/UL (ref 4–5.4)
SARS-COV-2 RNA RESP QL NAA+PROBE: NOT DETECTED
SODIUM SERPL-SCNC: 136 MMOL/L (ref 136–145)
WBC # BLD AUTO: 3.89 K/UL (ref 3.9–12.7)

## 2020-10-03 PROCEDURE — 37000009 HC ANESTHESIA EA ADD 15 MINS: Performed by: SPECIALIST

## 2020-10-03 PROCEDURE — 88300 SURGICAL PATH GROSS: CPT | Mod: 26,,, | Performed by: PATHOLOGY

## 2020-10-03 PROCEDURE — 25000003 PHARM REV CODE 250: Performed by: SPECIALIST

## 2020-10-03 PROCEDURE — 25000003 PHARM REV CODE 250: Performed by: NURSE PRACTITIONER

## 2020-10-03 PROCEDURE — 63600175 PHARM REV CODE 636 W HCPCS: Performed by: SPECIALIST

## 2020-10-03 PROCEDURE — 94761 N-INVAS EAR/PLS OXIMETRY MLT: CPT

## 2020-10-03 PROCEDURE — 87205 SMEAR GRAM STAIN: CPT

## 2020-10-03 PROCEDURE — 36000706: Performed by: SPECIALIST

## 2020-10-03 PROCEDURE — 99239 HOSP IP/OBS DSCHRG MGMT >30: CPT | Mod: ,,, | Performed by: INTERNAL MEDICINE

## 2020-10-03 PROCEDURE — 36415 COLL VENOUS BLD VENIPUNCTURE: CPT

## 2020-10-03 PROCEDURE — 85025 COMPLETE CBC W/AUTO DIFF WBC: CPT

## 2020-10-03 PROCEDURE — 36000707: Performed by: SPECIALIST

## 2020-10-03 PROCEDURE — 63600175 PHARM REV CODE 636 W HCPCS: Performed by: NURSE ANESTHETIST, CERTIFIED REGISTERED

## 2020-10-03 PROCEDURE — 88300 PR  SURG PATH,GROSS,LEVEL I: ICD-10-PCS | Mod: 26,,, | Performed by: PATHOLOGY

## 2020-10-03 PROCEDURE — 84100 ASSAY OF PHOSPHORUS: CPT

## 2020-10-03 PROCEDURE — 37000008 HC ANESTHESIA 1ST 15 MINUTES: Performed by: SPECIALIST

## 2020-10-03 PROCEDURE — 87206 SMEAR FLUORESCENT/ACID STAI: CPT

## 2020-10-03 PROCEDURE — 25000003 PHARM REV CODE 250: Performed by: NURSE ANESTHETIST, CERTIFIED REGISTERED

## 2020-10-03 PROCEDURE — 87075 CULTR BACTERIA EXCEPT BLOOD: CPT

## 2020-10-03 PROCEDURE — 99239 PR HOSPITAL DISCHARGE DAY,>30 MIN: ICD-10-PCS | Mod: ,,, | Performed by: INTERNAL MEDICINE

## 2020-10-03 PROCEDURE — 25000003 PHARM REV CODE 250: Performed by: ANESTHESIOLOGY

## 2020-10-03 PROCEDURE — 63600175 PHARM REV CODE 636 W HCPCS: Performed by: INTERNAL MEDICINE

## 2020-10-03 PROCEDURE — 87116 MYCOBACTERIA CULTURE: CPT

## 2020-10-03 PROCEDURE — 80048 BASIC METABOLIC PNL TOTAL CA: CPT

## 2020-10-03 PROCEDURE — 87102 FUNGUS ISOLATION CULTURE: CPT

## 2020-10-03 PROCEDURE — 88300 SURGICAL PATH GROSS: CPT | Performed by: PATHOLOGY

## 2020-10-03 PROCEDURE — 71000033 HC RECOVERY, INTIAL HOUR: Performed by: SPECIALIST

## 2020-10-03 PROCEDURE — 87015 SPECIMEN INFECT AGNT CONCNTJ: CPT

## 2020-10-03 PROCEDURE — 87070 CULTURE OTHR SPECIMN AEROBIC: CPT

## 2020-10-03 PROCEDURE — 25000003 PHARM REV CODE 250: Performed by: INTERNAL MEDICINE

## 2020-10-03 PROCEDURE — C9290 INJ, BUPIVACAINE LIPOSOME: HCPCS | Performed by: SPECIALIST

## 2020-10-03 PROCEDURE — 83735 ASSAY OF MAGNESIUM: CPT

## 2020-10-03 PROCEDURE — 63600175 PHARM REV CODE 636 W HCPCS: Performed by: ANESTHESIOLOGY

## 2020-10-03 RX ORDER — CIPROFLOXACIN 500 MG/1
500 TABLET ORAL EVERY 12 HOURS
Qty: 14 TABLET | Refills: 0 | Status: SHIPPED | OUTPATIENT
Start: 2020-10-03 | End: 2020-10-10

## 2020-10-03 RX ORDER — FENTANYL CITRATE 50 UG/ML
INJECTION, SOLUTION INTRAMUSCULAR; INTRAVENOUS
Status: DISCONTINUED | OUTPATIENT
Start: 2020-10-03 | End: 2020-10-03

## 2020-10-03 RX ORDER — OXYCODONE AND ACETAMINOPHEN 10; 325 MG/1; MG/1
1 TABLET ORAL EVERY 6 HOURS PRN
Qty: 12 TABLET | Refills: 0 | Status: SHIPPED | OUTPATIENT
Start: 2020-10-03 | End: 2020-10-08

## 2020-10-03 RX ORDER — KETOROLAC TROMETHAMINE 30 MG/ML
INJECTION, SOLUTION INTRAMUSCULAR; INTRAVENOUS
Status: DISCONTINUED | OUTPATIENT
Start: 2020-10-03 | End: 2020-10-03

## 2020-10-03 RX ORDER — OXYCODONE HYDROCHLORIDE 5 MG/1
5 TABLET ORAL
Status: DISCONTINUED | OUTPATIENT
Start: 2020-10-03 | End: 2020-10-03 | Stop reason: HOSPADM

## 2020-10-03 RX ORDER — SODIUM CHLORIDE, SODIUM LACTATE, POTASSIUM CHLORIDE, CALCIUM CHLORIDE 600; 310; 30; 20 MG/100ML; MG/100ML; MG/100ML; MG/100ML
INJECTION, SOLUTION INTRAVENOUS CONTINUOUS PRN
Status: DISCONTINUED | OUTPATIENT
Start: 2020-10-03 | End: 2020-10-03

## 2020-10-03 RX ORDER — ONDANSETRON HYDROCHLORIDE 2 MG/ML
INJECTION, SOLUTION INTRAMUSCULAR; INTRAVENOUS
Status: DISCONTINUED | OUTPATIENT
Start: 2020-10-03 | End: 2020-10-03

## 2020-10-03 RX ORDER — DEXAMETHASONE SODIUM PHOSPHATE 4 MG/ML
INJECTION, SOLUTION INTRA-ARTICULAR; INTRALESIONAL; INTRAMUSCULAR; INTRAVENOUS; SOFT TISSUE
Status: DISCONTINUED | OUTPATIENT
Start: 2020-10-03 | End: 2020-10-03

## 2020-10-03 RX ORDER — MIDAZOLAM HYDROCHLORIDE 1 MG/ML
INJECTION, SOLUTION INTRAMUSCULAR; INTRAVENOUS
Status: DISCONTINUED | OUTPATIENT
Start: 2020-10-03 | End: 2020-10-03

## 2020-10-03 RX ORDER — PROPOFOL 10 MG/ML
VIAL (ML) INTRAVENOUS
Status: DISCONTINUED | OUTPATIENT
Start: 2020-10-03 | End: 2020-10-03

## 2020-10-03 RX ORDER — SODIUM CHLORIDE 0.9 % (FLUSH) 0.9 %
3 SYRINGE (ML) INJECTION
Status: DISCONTINUED | OUTPATIENT
Start: 2020-10-03 | End: 2020-10-03 | Stop reason: HOSPADM

## 2020-10-03 RX ORDER — LIDOCAINE HCL/PF 100 MG/5ML
SYRINGE (ML) INTRAVENOUS
Status: DISCONTINUED | OUTPATIENT
Start: 2020-10-03 | End: 2020-10-03

## 2020-10-03 RX ORDER — PHENYLEPHRINE HYDROCHLORIDE 10 MG/ML
INJECTION INTRAVENOUS
Status: DISCONTINUED | OUTPATIENT
Start: 2020-10-03 | End: 2020-10-03

## 2020-10-03 RX ORDER — MEPERIDINE HYDROCHLORIDE 25 MG/ML
12.5 INJECTION INTRAMUSCULAR; INTRAVENOUS; SUBCUTANEOUS ONCE AS NEEDED
Status: DISCONTINUED | OUTPATIENT
Start: 2020-10-03 | End: 2020-10-03 | Stop reason: HOSPADM

## 2020-10-03 RX ORDER — BUPIVACAINE HYDROCHLORIDE 2.5 MG/ML
INJECTION, SOLUTION EPIDURAL; INFILTRATION; INTRACAUDAL
Status: DISCONTINUED | OUTPATIENT
Start: 2020-10-03 | End: 2020-10-03 | Stop reason: HOSPADM

## 2020-10-03 RX ORDER — DOXYCYCLINE HYCLATE 100 MG
100 TABLET ORAL 2 TIMES DAILY
Start: 2020-10-03 | End: 2020-10-10

## 2020-10-03 RX ORDER — ONDANSETRON 2 MG/ML
4 INJECTION INTRAMUSCULAR; INTRAVENOUS DAILY PRN
Status: DISCONTINUED | OUTPATIENT
Start: 2020-10-03 | End: 2020-10-03 | Stop reason: HOSPADM

## 2020-10-03 RX ORDER — HYDROMORPHONE HYDROCHLORIDE 2 MG/ML
0.4 INJECTION, SOLUTION INTRAMUSCULAR; INTRAVENOUS; SUBCUTANEOUS EVERY 5 MIN PRN
Status: DISCONTINUED | OUTPATIENT
Start: 2020-10-03 | End: 2020-10-03 | Stop reason: HOSPADM

## 2020-10-03 RX ADMIN — PROPOFOL 50 MG: 10 INJECTION, EMULSION INTRAVENOUS at 08:10

## 2020-10-03 RX ADMIN — PROPOFOL 190 MG: 10 INJECTION, EMULSION INTRAVENOUS at 08:10

## 2020-10-03 RX ADMIN — GLYCOPYRROLATE 0.2 MG: 0.2 INJECTION, SOLUTION INTRAMUSCULAR; INTRAVITREAL at 08:10

## 2020-10-03 RX ADMIN — VANCOMYCIN HYDROCHLORIDE 1000 MG: 1 INJECTION, POWDER, LYOPHILIZED, FOR SOLUTION INTRAVENOUS at 02:10

## 2020-10-03 RX ADMIN — CEFEPIME HYDROCHLORIDE 2 G: 2 INJECTION, SOLUTION INTRAVENOUS at 04:10

## 2020-10-03 RX ADMIN — SODIUM CHLORIDE, SODIUM LACTATE, POTASSIUM CHLORIDE, AND CALCIUM CHLORIDE: 600; 310; 30; 20 INJECTION, SOLUTION INTRAVENOUS at 07:10

## 2020-10-03 RX ADMIN — PROMETHAZINE HYDROCHLORIDE 6.25 MG: 25 INJECTION INTRAMUSCULAR; INTRAVENOUS at 09:10

## 2020-10-03 RX ADMIN — TOPIRAMATE 200 MG: 100 TABLET, FILM COATED ORAL at 09:10

## 2020-10-03 RX ADMIN — MIDAZOLAM 2 MG: 1 INJECTION INTRAMUSCULAR; INTRAVENOUS at 08:10

## 2020-10-03 RX ADMIN — LIDOCAINE HYDROCHLORIDE 100 MG: 20 INJECTION, SOLUTION INTRAVENOUS at 08:10

## 2020-10-03 RX ADMIN — FENTANYL CITRATE 100 MCG: 50 INJECTION, SOLUTION INTRAMUSCULAR; INTRAVENOUS at 08:10

## 2020-10-03 RX ADMIN — KETOROLAC TROMETHAMINE 30 MG: 30 INJECTION, SOLUTION INTRAMUSCULAR; INTRAVENOUS at 08:10

## 2020-10-03 RX ADMIN — DEXAMETHASONE SODIUM PHOSPHATE 8 MG: 4 INJECTION, SOLUTION INTRAMUSCULAR; INTRAVENOUS at 08:10

## 2020-10-03 RX ADMIN — OXYCODONE HYDROCHLORIDE AND ACETAMINOPHEN 1 TABLET: 10; 325 TABLET ORAL at 09:10

## 2020-10-03 RX ADMIN — PHENYLEPHRINE HYDROCHLORIDE 200 MCG: 10 INJECTION INTRAVENOUS at 08:10

## 2020-10-03 RX ADMIN — ONDANSETRON 4 MG: 2 INJECTION, SOLUTION INTRAMUSCULAR; INTRAVENOUS at 08:10

## 2020-10-03 NOTE — NURSING
Pt transferred from PACU. VSS on room air. Dressing in place, clean dry and intact. Pain and nausea controlled with PRN meds. Will continue to monitor.

## 2020-10-03 NOTE — PROGRESS NOTES
Pharmacy to dose Vancomycin:    Patient reviewed, renal function stable- no urine output charted, cultures reviewed, no new levels, continue current therapy; Next levels due: 10/4/2020 @0230.      Thank you,     Roma Ayoub, Pharm.D.

## 2020-10-03 NOTE — DISCHARGE SUMMARY
"[facility]  Hospital Medicine  Discharge Summary      Patient Name: Yolanda Win  MRN: [id number]  Admission Date: 10/2/2020  Hospital Length of Stay: 1 days  Discharge Date and Time:  10/03/2020 11:47 AM  Attending Physician: TAISHA Churchill MD   Discharging Provider: DRAGAN Churchill MD  Primary Care Provider: Cruzito Craven MD      HPI:   Ms. Win is a 35/F with PMH THIERRY, intractable periodic headache disorder, menorrhagia, R breast cancer s/p bilateral mastectomy with tissue expander placement undergoing XRT/chemotherapy who presented to ED 10/02 with persistent erythema/tenderness to the R breast which has had minimal improvement. She has been followed with Plastics and has been on TMP-SMX and doxycycline since mid-September. Had what appeared to be a seroma initially which was drained in the office, but noted that the color/character of the drainage changed in the past ~week from serosanguinous/clear to  "greenish" color followed by more purulent appearance. Accompanied by erythema and pain; no fevers or chills at home. Pain extends along her chest wall toward her neck as well as along her R flank. Had planned for procedure on 10/05 for more definitive management initially. ED evaluation was significant for erythema and tenderness, no leukocytosis / fever, and CT demonstrating rupture of R tissue expander. Plastic surgery was contacted and plans for procedure tomorrow. Hospital medicine was contacted for admission.    Procedure(s) (LRB):  REMOVAL, TISSUE EXPANDER (Right)      Hospital Course:   Admitted and started on cefepime/vancomycin. Plastic surgery consulted and took her for tissue expander removal 10/04. Intraoperatively no significant purulence was noted. Seroma was present and a drain was placed. Her erythema and tenderness improved with antibiotic therapy. Initial cultures collected in ED showed no growth. She will continue empiric antibiotic therapy for a short " course with doxycycline and ciprofloxacin and follow-up in clinic with her plastic surgeon Dr. Aranda in the following week. With clinical improvement and vital stability, she was prepared for discharge.     Consults:   Consults (From admission, onward)        Status Ordering Provider     Inpatient consult to Plastic Surgery  Once     Provider:  Kiko Aranda MD    Acknowledged TAISHA STOVALL     Pharmacy to dose Vancomycin consult  Once     Provider:  (Not yet assigned)    Acknowledged TAISHA STOVALL          No new Assessment & Plan notes have been filed under this hospital service since the last note was generated.  Service: Hospital Medicine    Final Active Diagnoses:    Diagnosis Date Noted POA    PRINCIPAL PROBLEM:  Cellulitis of right breast [N61.0] 10/02/2020 Yes    Status post mastectomy, bilateral [Z90.13] 07/29/2020 Not Applicable     Chronic    Malignant neoplasm of upper-outer quadrant of right breast in female, estrogen receptor positive [C50.411, Z17.0] 12/22/2019 Not Applicable     Chronic    Intractable periodic headache syndrome [G43.C1] 05/23/2019 Yes     Chronic    Generalized anxiety disorder [F41.1] 05/17/2017 Yes     Chronic    Insomnia [G47.00] 05/17/2017 Yes     Chronic      Problems Resolved During this Admission:       Discharged Condition: good    Disposition: Home or Self Care    Follow Up:  Follow-up Information     Kiko Aranda MD In 1 week.    Specialty: Plastic Surgery  Why: follow-up on tissue expander removal / infection  Contact information:  [facility]  [address] [facility] [id number] [phone]                 Patient Instructions:      Diet Adult Regular     Notify your health care provider if you experience any of the following:  temperature >100.4     Notify your health care provider if you experience any of the following:  severe uncontrolled pain     Notify your health care provider if you experience any of the following:  redness,  tenderness, or signs of infection (pain, swelling, redness, odor or green/yellow discharge around incision site)     Notify your health care provider if you experience any of the following:  worsening rash     Activity as tolerated       Significant Diagnostic Studies:   CT Chest/Abd W Contrast 10/02:  CHEST:  Bilateral tissue expanders are present.  On the right, multiple serpiginous lines within the expander.  The expander has a mildly deformed contour.  Adjacent soft tissue edema and skin thickening.  I see no drainable abscess. Aorta is normal in caliber.  No pericardial effusion.  No mediastinal or hilar lymph node enlargement. Trachea and proximal airways are patent.  Lungs are clear.  No suspicious pulmonary nodule. Lucent lesion in the proximal right humeral metaphysis previously biopsied in October 2010.  Please correlate with pathology results.  ABDOMEN:  Liver is homogeneous.  Gallbladder, bile ducts, spleen, pancreas, and adrenal glands are unremarkable. Kidneys demonstrate symmetric contours. Aorta is normal caliber. Stomach and visualized bowel loops are normal caliber.    CBC:  Recent Labs   Lab 10/02/20  1243 10/03/20  0521   WBC 5.50 3.89*   HGB 12.6 10.1*   HCT 37.2 31.6*    230   GRAN 71.8  4.0 48.5  1.9   LYMPH 17.6*  1.0 28.3  1.1   MONO 6.7  0.4 12.1  0.5   EOS 0.2 0.4   BASO 0.02 0.04     CMP:  Recent Labs   Lab 10/02/20  1243 10/03/20  0521    136   K 3.7 4.0    113*   CO2 18* 20*   BUN 10 8   CREATININE 0.9 0.9    175*   CALCIUM 9.1 8.2*   MG  --  1.8   PHOS  --  3.5   ALKPHOS 140*  --    AST 13  --    ALT 12  --    BILITOT 0.2  --    PROT 7.4  --    ALBUMIN 3.8  --    ANIONGAP 10 3*       Pending Diagnostic Studies:     Procedure Component Value Units Date/Time    Specimen to Pathology, Surgery Gross only [[id number]] Collected: 10/03/20 0855    Order Status: Sent Lab Status: In process Updated: 10/03/20 0855         Medications:  Reconciled Home Medications:       Medication List      START taking these medications    ciprofloxacin HCl 500 MG tablet  Commonly known as: CIPRO  Take 1 tablet (500 mg total) by mouth every 12 (twelve) hours. for 7 days     oxyCODONE-acetaminophen  mg per tablet  Commonly known as: PERCOCET  Take 1 tablet by mouth every 6 (six) hours as needed for Pain.        CHANGE how you take these medications    doxycycline 100 MG tablet  Commonly known as: VIBRA-TABS  Take 1 tablet (100 mg total) by mouth 2 (two) times daily. for 7 days  What changed:   · how much to take  · how to take this  · when to take this     tranexamic acid 650 mg tablet  Commonly known as: LYSTEDA  Take 2 tablets (1,300 mg total) by mouth 3 (three) times daily.  What changed:   · when to take this  · reasons to take this     traZODone 50 MG tablet  Commonly known as: DESYREL  Take 1 tablet (50 mg total) by mouth every evening.  What changed: how much to take     valACYclovir 1000 MG tablet  Commonly known as: VALTREX  Take 1 tablet (1,000 mg total) by mouth every 12 (twelve) hours.  What changed: additional instructions        CONTINUE taking these medications    albuterol 90 mcg/actuation inhaler  Commonly known as: PROVENTIL HFA  Inhale 2 puffs by mouth into the lungs every 6 (six) hours as needed for Wheezing. Rescue     ALPRAZolam 0.25 MG tablet  Commonly known as: XANAX  Take 1 tablet (0.25 mg total) by mouth daily as needed for Anxiety.     BOTOX INJ  Inject as directed. [id number]     dextroamphetamine-amphetamine 25 MG 24 hr capsule  Commonly known as: ADDERALL XR  Take 1 capsule (25 mg total) by mouth every morning.     goserelin 3.6 mg injection  Commonly known as: ZOLADEX  Inject 3.6 mg into the skin every 30 days.     HAIR,SKIN AND NAILS ORAL  Take by mouth.     INTRAROSA 6.5 mg Inst  Generic drug: prasterone (dhea)  Place 6.5 mg vaginally every evening.     loratadine 10 mg tablet  Commonly known as: CLARITIN  Take 10 mg by mouth once daily.     ONE DAILY  MULTIVITAMIN per tablet  Generic drug: multivitamin  Take 1 tablet by mouth once daily.     sumatriptan 100 MG tablet  Commonly known as: IMITREX  Take 1/2 to 1 tab at onset of headache.  If no improvement in 2 hours, take another.  Do not take more than 2 in 24 hours.     topiramate 100 MG tablet  Commonly known as: TOPAMAX  Take 2 tablets (200 mg total) by mouth every evening.     triamcinolone acetonide 0.1% 0.1 % cream  Commonly known as: KENALOG  apply to affected area twice daily        STOP taking these medications    sulfamethoxazole-trimethoprim 800-160mg 800-160 mg Tab  Commonly known as: BACTRIM DS        ASK your doctor about these medications    citalopram 40 MG tablet  Commonly known as: CELEXA  Take 1 tablet (40 mg total) by mouth once daily.  Notes to patient: I would recommend holding your citalopram while taking the ciprofloxacin; can resume after completion.            Indwelling Lines/Drains at time of discharge:   Lines/Drains/Airways     Central Venous Catheter Line                 PowerPort A Cath Single Lumen 12/27/19 1246 left subclavian 280 days          Drain                 Closed/Suction Drain 10/03/20 0824 Right Breast Bulb 15 Fr. less than 1 day          Epidural Line                 Neuraxial Analgesia/Anesthesia Assessment (using dermatomes) Epidural 11/21/16 0915 1412 days                Time spent on the discharge of patient: 35 minutes  Patient was seen and examined on the date of discharge and determined to be suitable for discharge.         DRAGAN Churchill MD  Department of Hospital Medicine  [facility]

## 2020-10-03 NOTE — NURSING
Pt transferred to OR. Attempted to call holding area to give report, no answer. All belongings remain at bedside.

## 2020-10-03 NOTE — NURSING
Pt states she takes Topamax in the AM and Celexa in the PM.  Would like to take meds this way while in hospital.  States she took dose of Topamax before arriving at hospital.  Contacted Dr. Florez about making changes.  Dr. Florez states will update in the MAR.

## 2020-10-03 NOTE — BRIEF OP NOTE
[facility]  Brief Operative Note    SUMMARY     Surgery Date: 10/3/2020     Surgeon(s) and Role:     * Kiko Aranda MD - Primary    Assisting Surgeon: None    Pre-op Diagnosis:  Infection following procedure [T81.40XA]    Post-op Diagnosis:  Post-Op Diagnosis Codes:     * Infection following procedure [T81.40XA]    Procedure(s) (LRB):  REMOVAL, TISSUE EXPANDER (Right)    Anesthesia: General    Description of Procedure: removal of right tissue expander    Description of the findings of the procedure:See dictated operative note    Estimated Blood Loss: minimal

## 2020-10-03 NOTE — ANESTHESIA POSTPROCEDURE EVALUATION
Anesthesia Post Evaluation    Patient: Yolanda Win    Procedure(s) Performed: Procedure(s) (LRB):  REMOVAL, TISSUE EXPANDER (Right)    Final Anesthesia Type: general    Patient location during evaluation: PACU  Patient participation: Yes- Able to Participate  Level of consciousness: awake and alert  Post-procedure vital signs: reviewed and stable  Pain management: adequate  Airway patency: patent  JL mitigation strategies: Extubation while patient is awake  PONV status at discharge: No PONV  Anesthetic complications: no      Cardiovascular status: hemodynamically stable  Respiratory status: unassisted  Hydration status: euvolemic  Follow-up not needed.          Vitals Value Taken Time   /64 10/03/20 0919   Temp 36.8 °C (98.3 °F) 10/03/20 0845   Pulse 73 10/03/20 0920   Resp 16 10/03/20 0845   SpO2 98 % 10/03/20 0920   Vitals shown include unvalidated device data.      No case tracking events are documented in the log.      Pain/David Score: Pain Rating Prior to Med Admin: 6 (10/2/2020 10:28 PM)  Pain Rating Post Med Admin: 0 (10/2/2020 11:28 PM)  David Score: 9 (10/3/2020  9:19 AM)

## 2020-10-03 NOTE — PROGRESS NOTES
D/C instructions reviewed with patient. Patient verbalized understanding of all instructions, and demonstrated proper technique of DM drain care. DM drain ouput sheet provided. Patient to  meds from own pharmacy. All questions answered. PIV removed, tip intact. Pt left with all belongings.

## 2020-10-03 NOTE — PLAN OF CARE
Pt lying in bed in supine position, HOB elevated, skin warm and dry to touch, respirations even and unlabored.  Pt remain free from fall and injury throughout shift.  Fluids and ABTs continued per orders, tolerating well.  No new orders this shift.  18 gauge to left FA infusing.  VSS.  On RA.  Pt ambulates to bathroom independently.  No BMs this shift.  Pain controlled with PO/PRN meds per order, tolerated well.  Plan of care reviewed.  Questions answered.  No complaints at this time.  Call light in reach, bed in low locked position, side rails up x2, and nonskid socks on.  Purposeful rounding performed.  Safety maintained.  Will continue to monitor until end of shift.

## 2020-10-03 NOTE — OR NURSING
Pt continues without c/o pain at thsi time. No change from previous assessment. Prepared fo transfer to inpt room 354. Report by phone to Alyse LESTER.

## 2020-10-03 NOTE — PLAN OF CARE
Initial Discharge Planning Assessment    Patient is alert and oriented with no communication barriers.      Prior to admission patient was independent with ADLs.   Patient denies the use of HH or DME        Patients PCP is correct on the face sheet      Patient choice pharmacy is    [facility]  [address]  [place] [address]  Phone: [phone] Fax: [phone]    [id number]   [id number] [facility] [address]  Phone: [phone] Fax: [phone]      Patients family will transport home at discharge.         No CM needs identified at this time.        10/03/20 1152   Discharge Assessment   Assessment information obtained from? Patient   Communicated expected length of stay with patient/caregiver yes   Prior to hospitalization functional status: Independent   Current cognitive status: Alert/Oriented   Current Functional Status: Independent   Able to Return to Prior Arrangements yes   Is patient able to care for self after discharge? Yes   Readmission Within the Last 30 Days no previous admission in last 30 days   Patient currently being followed by outpatient case management? No   Patient currently receives any other outside agency services? No   Equipment Currently Used at Home none   Do you have any problems affording any of your prescribed medications? No   Is the patient taking medications as prescribed? yes   Does the patient have transportation home? Yes   Transportation Anticipated family or friend will provide   Does the patient receive services at the [facility]? No   Discharge Plan A Home   Discharge Plan B Home with family   DME Needed Upon Discharge  none   Patient/Family in Agreement with Plan yes

## 2020-10-03 NOTE — ANESTHESIA PREPROCEDURE EVALUATION
10/03/2020  Yolanda Win is a 35 y.o., female.    Pre-op Assessment    I have reviewed the Patient Summary Reports.     I have reviewed the Nursing Notes. I have reviewed the NPO Status.      Review of Systems  Anesthesia Hx:  No problems with previous Anesthesia  History of prior surgery of interest to airway management or planning: Denies Family Hx of Anesthesia complications.  Personal Hx of Anesthesia complications  Difficult Intubation  Severe Sore Throat after Anesthesia   Social:  Non-Smoker    Hematology/Oncology:  Hematology Normal      Current/Recent Cancer. Breast right chemotherapy Oncology Comments: Finished chemo 6 weeksago     EENT/Dental:EENT/Dental Normal   Cardiovascular:   Denies Hypertension.     Pulmonary:   Recent cough for 2 months   Renal/:  Renal/ Normal     Hepatic/GI:  Hepatic/GI Normal    Musculoskeletal:  Musculoskeletal Normal    Neurological:   Denies Neuromuscular Disease. Headaches    Endocrine:  Endocrine Normal    Dermatological:  Skin Normal    Psych:   Psychiatric History          Physical Exam  General:  Well nourished    Airway/Jaw/Neck:  Airway Findings:     Dental:  Dental Findings: In tact        Mental Status:  Mental Status Findings:  Cooperative, Alert and Oriented, Anxious         Anesthesia Plan  Type of Anesthesia, risks & benefits discussed:  Anesthesia Type:  general  Patient's Preference:   Intra-op Monitoring Plan: standard ASA monitors  Intra-op Monitoring Plan Comments:   Post Op Pain Control Plan: per primary service following discharge from PACU and multimodal analgesia  Post Op Pain Control Plan Comments:   Induction:   IV  Beta Blocker:         Informed Consent: Patient understands risks and agrees with Anesthesia plan.  Questions answered. Anesthesia consent signed with patient.  ASA Score: 2     Day of Surgery Review of History &  Physical:    H&P update referred to the surgeon.         Ready For Surgery From Anesthesia Perspective.

## 2020-10-03 NOTE — ANESTHESIA PROCEDURE NOTES
Intubation  Performed by: Cecille Forbes CRNA  Authorized by: Willie Perez MD     Intubation:     Induction:  Intravenous    Intubated:  Postinduction    Mask Ventilation:  N/a    Attempts:  1    Attempted By:  CRNA    Method of Intubation:  Other (see comments) (Manual placement.)    Difficult Airway Encountered?: No      Complications:  None    Airway Device:  Supraglottic airway/LMA    Airway Device Size:  3.5    Style/Cuff Inflation:  Cuffed    Inflation Amount (mL):  4    Secured at:  The lips    Placement Verified By:  Capnometry    Complicating Factors:  None    Findings Post-Intubation:  BS equal bilateral

## 2020-10-03 NOTE — HOSPITAL COURSE
Admitted and started on cefepime/vancomycin. Plastic surgery consulted and took her for tissue expander removal 10/04. Intraoperatively no significant purulence was noted. Seroma was present and a drain was placed. Her erythema and tenderness improved with antibiotic therapy. Initial cultures collected in ED showed no growth. She will continue empiric antibiotic therapy for a short course with doxycycline and ciprofloxacin and follow-up in clinic with her plastic surgeon Dr. Aranda in the following week. With clinical improvement and vital stability, she was prepared for discharge.

## 2020-10-03 NOTE — PLAN OF CARE
Final Note  Patient is discharged home with self-care.  Patients family will transport home.  No further DC needs from CM perspective     10/03/20 1152   Discharge Reassessment   Do you have any problems affording any of your prescribed medications? No   Discharge Plan A Home   Discharge Plan B Home with family   DME Needed Upon Discharge  none

## 2020-10-03 NOTE — TRANSFER OF CARE
"Anesthesia Transfer of Care Note    Patient: Yolanda Win    Procedure(s) Performed: Procedure(s) (LRB):  REMOVAL, TISSUE EXPANDER (Right)    Patient location: PACU    Anesthesia Type: general    Transport from OR: Transported from OR on 2-3 L/min O2 by NC with adequate spontaneous ventilation    Post pain: adequate analgesia    Post assessment: no apparent anesthetic complications    Post vital signs: stable    Level of consciousness: awake    Nausea/Vomiting: no nausea/vomiting    Complications: none    Transfer of care protocol was followed      Last vitals:   Visit Vitals  BP (!) 89/59 (BP Location: Left arm, Patient Position: Sitting)   Pulse 73   Temp 36.4 °C (97.6 °F) (Oral)   Resp 18   Ht 5' 5" (1.651 m)   Wt 74.8 kg (165 lb)   LMP 10/01/2020   SpO2 98%   Breastfeeding No   BMI 27.46 kg/m²     "

## 2020-10-04 LAB
GRAM STN SPEC: NORMAL
GRAM STN SPEC: NORMAL

## 2020-10-05 ENCOUNTER — PATIENT MESSAGE (OUTPATIENT)
Dept: RADIATION ONCOLOGY | Facility: CLINIC | Age: 35
End: 2020-10-05

## 2020-10-05 ENCOUNTER — PATIENT MESSAGE (OUTPATIENT)
Dept: HEMATOLOGY/ONCOLOGY | Facility: CLINIC | Age: 35
End: 2020-10-05

## 2020-10-05 LAB
FINAL PATHOLOGIC DIAGNOSIS: NORMAL
GROSS: NORMAL
Lab: NORMAL

## 2020-10-06 ENCOUNTER — INFUSION (OUTPATIENT)
Dept: INFUSION THERAPY | Facility: HOSPITAL | Age: 35
End: 2020-10-06
Attending: INTERNAL MEDICINE
Payer: COMMERCIAL

## 2020-10-06 VITALS
BODY MASS INDEX: 27.49 KG/M2 | HEIGHT: 65 IN | HEART RATE: 64 BPM | TEMPERATURE: 97 F | WEIGHT: 165 LBS | SYSTOLIC BLOOD PRESSURE: 142 MMHG | DIASTOLIC BLOOD PRESSURE: 88 MMHG | OXYGEN SATURATION: 97 %

## 2020-10-06 DIAGNOSIS — Z17.0 MALIGNANT NEOPLASM OF UPPER-OUTER QUADRANT OF RIGHT BREAST IN FEMALE, ESTROGEN RECEPTOR POSITIVE: Primary | ICD-10-CM

## 2020-10-06 DIAGNOSIS — C50.411 MALIGNANT NEOPLASM OF UPPER-OUTER QUADRANT OF RIGHT BREAST IN FEMALE, ESTROGEN RECEPTOR POSITIVE: Primary | ICD-10-CM

## 2020-10-06 LAB — BACTERIA SPEC AEROBE CULT: NO GROWTH

## 2020-10-06 PROCEDURE — 63600175 PHARM REV CODE 636 W HCPCS: Mod: JG | Performed by: INTERNAL MEDICINE

## 2020-10-06 PROCEDURE — 96402 CHEMO HORMON ANTINEOPL SQ/IM: CPT

## 2020-10-06 RX ADMIN — GOSERELIN ACETATE 3.6 MG: 3.6 IMPLANT SUBCUTANEOUS at 01:10

## 2020-10-07 ENCOUNTER — HOSPITAL ENCOUNTER (OUTPATIENT)
Dept: RADIOLOGY | Facility: HOSPITAL | Age: 35
Discharge: HOME | End: 2020-10-07
Attending: SPECIALIST
Payer: COMMERCIAL

## 2020-10-07 DIAGNOSIS — K81.0 GALLBLADDER ABSCESS: ICD-10-CM

## 2020-10-07 LAB
BACTERIA BLD CULT: NORMAL
BACTERIA BLD CULT: NORMAL
BACTERIA SPEC AEROBE CULT: NO GROWTH

## 2020-10-07 PROCEDURE — 76705 ECHO EXAM OF ABDOMEN: CPT | Mod: 26,,, | Performed by: RADIOLOGY

## 2020-10-07 PROCEDURE — 76705 US ABDOMEN LIMITED: ICD-10-PCS | Mod: 26,,, | Performed by: RADIOLOGY

## 2020-10-07 PROCEDURE — 76705 ECHO EXAM OF ABDOMEN: CPT | Mod: TC

## 2020-10-08 ENCOUNTER — OFFICE VISIT (OUTPATIENT)
Dept: NEUROLOGY | Facility: HOSPITAL | Age: 35
End: 2020-10-08
Attending: SURGERY
Payer: COMMERCIAL

## 2020-10-08 ENCOUNTER — HOSPITAL ENCOUNTER (OUTPATIENT)
Dept: RADIOLOGY | Facility: HOSPITAL | Age: 35
Discharge: HOME | End: 2020-10-08
Attending: SURGERY
Payer: COMMERCIAL

## 2020-10-08 VITALS
DIASTOLIC BLOOD PRESSURE: 91 MMHG | BODY MASS INDEX: 28.61 KG/M2 | WEIGHT: 171.75 LBS | TEMPERATURE: 98 F | HEIGHT: 65 IN | HEART RATE: 109 BPM | SYSTOLIC BLOOD PRESSURE: 127 MMHG

## 2020-10-08 DIAGNOSIS — R10.11 RIGHT UPPER QUADRANT ABDOMINAL PAIN: ICD-10-CM

## 2020-10-08 DIAGNOSIS — R10.11 RIGHT UPPER QUADRANT ABDOMINAL PAIN: Primary | ICD-10-CM

## 2020-10-08 PROCEDURE — 99215 OFFICE O/P EST HI 40 MIN: CPT | Mod: 25 | Performed by: SURGERY

## 2020-10-08 PROCEDURE — 78226 NM HEPATOBILIARY IMAGING INC GB (HIDA): ICD-10-PCS | Mod: 26,,, | Performed by: RADIOLOGY

## 2020-10-08 PROCEDURE — 78226 HEPATOBILIARY SYSTEM IMAGING: CPT | Mod: 26,,, | Performed by: RADIOLOGY

## 2020-10-08 PROCEDURE — A9537 TC99M MEBROFENIN: HCPCS

## 2020-10-08 RX ORDER — OXYCODONE AND ACETAMINOPHEN 7.5; 325 MG/1; MG/1
TABLET ORAL
COMMUNITY
Start: 2020-10-03 | End: 2020-10-20

## 2020-10-08 RX ORDER — METOCLOPRAMIDE 10 MG/1
10 TABLET ORAL 4 TIMES DAILY
Qty: 45 TABLET | Refills: 1 | Status: SHIPPED | OUTPATIENT
Start: 2020-10-08 | End: 2020-11-25

## 2020-10-08 RX ORDER — SUCRALFATE 1 G/1
1 TABLET ORAL 4 TIMES DAILY
Qty: 40 TABLET | Refills: 0 | Status: SHIPPED | OUTPATIENT
Start: 2020-10-08 | End: 2020-11-25

## 2020-10-08 RX ORDER — PANTOPRAZOLE SODIUM 40 MG/1
40 TABLET, DELAYED RELEASE ORAL DAILY
Qty: 30 TABLET | Refills: 11 | Status: SHIPPED | OUTPATIENT
Start: 2020-10-08 | End: 2020-11-25

## 2020-10-08 NOTE — PROGRESS NOTES
"NOLANETS:  [facility]  A collaboration between [facility] and [facility]      PATIENT: Yolanda Win  MRN: [id number]  DATE: 10/8/2020    Subjective:      Chief Complaint: surgery consult    Has been well known to us.  She underwent drainage abscess from the breast and removal of the implants last week in.  She has been noticing increasing nausea right upper quadrant pain and in convenience which she feels bloated all the time.  Denies any fever with chills.  She underwent ultrasound yesterday last week she underwent CT scan of the abdomen and chest.  At this is been going on only for the few days.    She was recently diagnosed with stage IIB breast cancer for which she has received chemotherapy followed by double mastectomy.  She is in the process of getting radiation starting from the end of the month.  Her wound got infected and she undergo incision and drainage recently.    Vitals: Blood pressure (!) 127/91, pulse 109, temperature 97.5 °F (36.4 °C), temperature source Oral, height 5' 5" (1.651 m), weight 77.9 kg (171 lb 11.8 oz), last menstrual period 10/01/2020, not currently breastfeeding. -    ECOG Score: 1 - Symptomatic but completely ambulatory    Diagnosis:   1. Right upper quadrant abdominal pain         Interval History:     Oncologic History:   Oncologic History 7/2020 breast    Oncologic Treatment 7/2020 chemo   Pathology      Past Medical History:  Past Medical History:   Diagnosis Date    Abnormal Pap smear of cervix 2009    ADHD     Allergy     seasonal    Anorexia     Anxiety     Asthma     Bulimia     Depression     Fever blister     History of posttraumatic stress disorder (PTSD)     Hypertension     Gestational Hypertension    Invasive ductal carcinoma of right breast 12/18/2019    Mastitis     Migraine headache     Status post mastectomy, bilateral 7/29/2020       Past Surgical " History:  Past Surgical History:   Procedure Laterality Date    BILATERAL MASTECTOMY Bilateral 2020    Procedure: MASTECTOMY, BILATERAL - BILATERAL SKIN SPARING MASTECTOMY;  Surgeon: Percy Ayers MD;  Location: [facility];  Service: General;  Laterality: Bilateral;    BIOPSY OF AXILLARY NODE Right 2020    Procedure: BIOPSY, LYMPH NODE, AXILLARY;  Surgeon: Percy Ayers MD;  Location: [facility];  Service: General;  Laterality: Right;    BONE MARROW ASPIRATION      x 3    BREAST SURGERY      CERVICAL BIOPSY  W/ LOOP ELECTRODE EXCISION       SECTION  2016     SECTION N/A 2019    Procedure:  SECTION;  Surgeon: Kirit Hernandez MD;  [facility];  Service: OB/GYN;  Laterality: N/A;    COLPOSCOPY      INJECTION FOR SENTINEL NODE IDENTIFICATION Right 2020    Procedure: INJECTION, FOR SENTINEL NODE IDENTIFICATION;  Surgeon: Percy Ayers MD;  Location: [facility];  Service: General;  Laterality: Right;    INSERTION OF BREAST TISSUE EXPANDER Bilateral 2020    Procedure: INSERTION, TISSUE EXPANDER, BREAST;  Surgeon: Kiko Aranda MD;  Location: [facility];  Service: Plastics;  Laterality: Bilateral;    INSERTION OF TUNNELED CENTRAL VENOUS CATHETER (CVC) WITH SUBCUTANEOUS PORT Left 2019    Procedure: [id number]-Left neck or chest wall;  Surgeon: Percy Ayers MD;  Location: [address];  Service: General;  Laterality: Left;    MEDIPORT REMOVAL N/A 2020    Procedure: REMOVAL, CATHETER, CENTRAL VENOUS, TUNNELED, WITH PORT;  Surgeon: Percy Ayers MD;  Location: [facility];  Service: General;  Laterality: N/A;    SHOULDER SURGERY Left     x 2    SINUS SURGERY      age 19    TISSUE EXPANDER REMOVAL Right 10/3/2020    Procedure: REMOVAL, TISSUE EXPANDER;  Surgeon: Kiko Aranda MD;  Location: [facility];  Service: Plastics;  Laterality: Right;    TONSILLECTOMY         Family History:  Family History   Problem Relation Age of  Onset    Cancer Maternal Grandmother 40        cervical    Cervical cancer Mother     Breast cancer Other     Ovarian cancer Other     Colon cancer Neg Hx     Melanoma Neg Hx        Allergies:  Amoxicillin, Penicillins, and Diazepam    Medications:   Current Outpatient Medications   Medication Sig    albuterol (PROVENTIL HFA) 90 mcg/actuation inhaler Inhale 2 puffs by mouth into the lungs every 6 (six) hours as needed for Wheezing. Rescue    ciprofloxacin HCl (CIPRO) 500 MG tablet Take 1 tablet (500 mg total) by mouth every 12 (twelve) hours. for 7 days    citalopram (CELEXA) 40 MG tablet Take 1 tablet (40 mg total) by mouth once daily.    dextroamphetamine-amphetamine (ADDERALL XR) 25 MG 24 hr capsule Take 1 capsule (25 mg total) by mouth every morning.    doxycycline (VIBRA-TABS) 100 MG tablet Take 1 tablet (100 mg total) by mouth 2 (two) times daily. for 7 days    goserelin (ZOLADEX) 3.6 mg injection Inject 3.6 mg into the skin every 30 days.    loratadine (CLARITIN) 10 mg tablet Take 10 mg by mouth once daily.    multivitamin (ONE DAILY MULTIVITAMIN) per tablet Take 1 tablet by mouth once daily.    multivitamin with minerals (HAIR,SKIN AND NAILS ORAL) Take by mouth.    onabotulinumtoxinA (BOTOX INJ) Inject as directed. [id number]    oxyCODONE-acetaminophen (PERCOCET) 7.5-325 mg per tablet TK 1 T PO EVERY 6 HOURS AS NEEDED FOR PAIN    prasterone, dhea, (INTRAROSA) 6.5 mg Inst Place 6.5 mg vaginally every evening.    sumatriptan (IMITREX) 100 MG tablet Take 1/2 to 1 tab at onset of headache.  If no improvement in 2 hours, take another.  Do not take more than 2 in 24 hours.    topiramate (TOPAMAX) 100 MG tablet Take 2 tablets (200 mg total) by mouth every evening.    tranexamic acid (LYSTEDA) 650 mg tablet Take 2 tablets (1,300 mg total) by mouth 3 (three) times daily. (Patient taking differently: Take 1,300 mg by mouth as needed. )    traZODone (DESYREL) 50 MG tablet Take 1 tablet (50 mg total)  by mouth every evening. (Patient taking differently: Take 25 mg by mouth every evening. )    triamcinolone acetonide 0.1% (KENALOG) 0.1 % cream apply to affected area twice daily    valACYclovir (VALTREX) 1000 MG tablet Take 1 tablet (1,000 mg total) by mouth every 12 (twelve) hours. (Patient taking differently: Take 1,000 mg by mouth every 12 (twelve) hours. Patient uses prn)    ALPRAZolam (XANAX) 0.25 MG tablet Take 1 tablet (0.25 mg total) by mouth daily as needed for Anxiety.    metoclopramide HCl (REGLAN) 10 MG tablet Take 1 tablet (10 mg total) by mouth 4 (four) times daily.    pantoprazole (PROTONIX) 40 MG tablet Take 1 tablet (40 mg total) by mouth once daily.    sucralfate (CARAFATE) 1 gram tablet Take 1 tablet (1 g total) by mouth 4 (four) times daily.     Current Facility-Administered Medications   Medication    copper intrauterine device 380 square mm  mm    onabotulinumtoxina injection 200 Units        Review of Systems   Constitutional: Positive for fatigue and unexpected weight change. Negative for activity change, appetite change, chills, diaphoresis and fever.   HENT: Negative for congestion, dental problem, drooling, ear discharge, hearing loss, mouth sores, nosebleeds, postnasal drip, rhinorrhea, sinus pressure, sinus pain, sneezing, sore throat, tinnitus, trouble swallowing and voice change.    Eyes: Negative for photophobia, pain, discharge, redness, itching and visual disturbance.   Respiratory: Negative for apnea, cough, choking, chest tightness, wheezing and stridor.    Cardiovascular: Negative for chest pain, palpitations and leg swelling.   Gastrointestinal: Positive for abdominal distention, abdominal pain and nausea. Negative for blood in stool, constipation, diarrhea, rectal pain and vomiting.   Endocrine: Negative.    Genitourinary: Negative.    Musculoskeletal: Negative for arthralgias, back pain, gait problem, joint swelling and neck pain.   Skin: Positive for pallor,  rash and wound.   Allergic/Immunologic: Negative.    Neurological: Negative for tremors, seizures, syncope, light-headedness and numbness.   Hematological: Negative.    Psychiatric/Behavioral: Negative.       Objective:      Physical Exam  Constitutional:       Appearance: Normal appearance.   HENT:      Head: Normocephalic.      Right Ear: External ear normal.      Left Ear: External ear normal.      Mouth/Throat:      Mouth: Mucous membranes are moist.   Eyes:      Pupils: Pupils are equal, round, and reactive to light.   Neck:      Musculoskeletal: Normal range of motion and neck supple.   Cardiovascular:      Rate and Rhythm: Normal rate and regular rhythm.      Pulses: Normal pulses.      Heart sounds: Normal heart sounds.   Pulmonary:      Effort: Pulmonary effort is normal.      Breath sounds: Normal breath sounds.   Abdominal:      General: Abdomen is flat.      Palpations: There is no mass.      Tenderness: There is abdominal tenderness. There is no right CVA tenderness, left CVA tenderness or guarding.      Hernia: No hernia is present.      Comments: Has right upper quadrant and epigastric tenderness   Musculoskeletal:      Comments: Incision noted at bilateral mastectomy site.  Looks stable there is no extending cellulitis to the right upper quadrant from the chest wall.   Skin:     General: Skin is warm and dry.   Neurological:      General: No focal deficit present.      Mental Status: She is alert and oriented to person, place, and time.   Psychiatric:         Mood and Affect: Mood normal.        Assessment:       1. Right upper quadrant abdominal pain        Laboratory Data:       Scans:   NM Hepatobiliary Scan (HIDA)  Narrative: EXAMINATION:  [facility]  (HIDA)    CLINICAL HISTORY:  abdominal pain;Right upper quadrant pain    TECHNIQUE:  Following the IV administration of 5 mCi of Tc-99m-mebrofenin, sequential dynamic anterior images of the abdomen were obtained for 60 minutes  followed by a right lateral view at 60 minutes.    COMPARISON:  CT chest abdomen pelvis dated 10/02/2020    FINDINGS:  The early images demonstrate homogeneous uptake of the radiopharmaceutical by the liver with no focal hepatic abnormalities.    Normal activity is present in the common bile duct, gallbladder, and small bowel.    The clearance from the liver is appropriate.  Impression: The cystic and common hema ducts are patent.    Electronically signed by: Eric Cornelio  Date:    10/08/2020  Time:    11:41     I reviewed his CT scan and ultrasound.  CT scan of the son does not show any gross abnormality.    Initially after examination I thought she may have acalculous cholecystitis therefore a HIDA scan was done at the scan shown shows normal filling of the gallbladder.    She has been through chemotherapy and surgery most likely she may have gastritis.    Will place around Protonix pump inhibitor followed by Carafate and and p.r.n. Reglan if she does not improve with this regimen she needs to undergo upper endoscopy.      Impression:  Stress gastritis, diagnose out of exclusion as other etiology have been ruled out    Plan:       Started her on Protonix and Carafate and Reglan p.r.n.    Check with her on Monday to see if she is better if she is not better she needs to see GI for upper endoscopy this was discussed with her she agrees with the plan.    She was also inquiring about the follow-up for the BRCA mutation with respect to the pancreatic lesions and esophageal lesions.  She has been followed by a gynecologist for prophylactic hysterectomy and oophorectomy          MARK Santoro MD, FACS   Associate Professor of Surgery, [facility]   Neuroendocrine Surgery, Hepatic/Pancreatic & General Surgery   [address]   [place] [address]   ph. [phone]; [phone]   fax. [phone]

## 2020-10-09 LAB — BACTERIA SPEC ANAEROBE CULT: NORMAL

## 2020-10-12 ENCOUNTER — PATIENT MESSAGE (OUTPATIENT)
Dept: HEMATOLOGY/ONCOLOGY | Facility: CLINIC | Age: 35
End: 2020-10-12

## 2020-10-12 LAB — BACTERIA SPEC ANAEROBE CULT: NORMAL

## 2020-10-13 ENCOUNTER — TELEPHONE (OUTPATIENT)
Dept: GASTROENTEROLOGY | Facility: CLINIC | Age: 35
End: 2020-10-13

## 2020-10-13 ENCOUNTER — PATIENT MESSAGE (OUTPATIENT)
Dept: PSYCHIATRY | Facility: CLINIC | Age: 35
End: 2020-10-13

## 2020-10-13 DIAGNOSIS — R10.9 ABDOMINAL PAIN, UNSPECIFIED ABDOMINAL LOCATION: Primary | ICD-10-CM

## 2020-10-13 RX ORDER — DEXTROAMPHETAMINE SACCHARATE, AMPHETAMINE ASPARTATE MONOHYDRATE, DEXTROAMPHETAMINE SULFATE AND AMPHETAMINE SULFATE 6.25; 6.25; 6.25; 6.25 MG/1; MG/1; MG/1; MG/1
25 CAPSULE, EXTENDED RELEASE ORAL EVERY MORNING
Qty: 30 CAPSULE | Refills: 0 | Status: SHIPPED | OUTPATIENT
Start: 2020-10-13 | End: 2020-11-09 | Stop reason: SDUPTHER

## 2020-10-13 NOTE — OP NOTE
[facility]  Plastic Surgery  Operative Note    SUMMARY     Date of Procedure: 10/3/2020     Procedure:   1. Removal of right breat tissue expander, wash out    Surgeon(s) and Role:     * Kiko Aranda MD - Primary    Assisting Surgeon: None    Pre-Operative Diagnosis:   1. Right breast infection s/p mastectomy and tissue expander placement    Post-Operative Diagnosis:  1. Right breast infection s/p mastectomy and tissue expander placement    Anesthesia: General    Indications: The patient is a 35 year old female with a history of right breast cancer, s/p bilateral mastectomy and tissue expander placement. She has developed cellulitis of the right breast not responding to antibiotics. She presents for removal of the right tissue expander. Risks, benefits, alternatives were discussed. All questions answered and she wishes to proceed.    Technical Procedures Used: The patient was marked in the preoperative holding area. She was taken to the operating room and placed on the table in the supine position. General anesthesia was administered and preoperative antibiotics given. The chest was prepped and draped in normal sterile fashion. The previous incision was opened. Dissection was carried down to the breast pocket. Cultures were taken. The expander removed, and found to be in good condition. No gross purulence was found. The pocket was irrigated with antibiotic solution. Hemostasis was achieved. A 15 Sami jose g drain was placed and secured. Closure was performed in multiple layers using 2.0 monocryl for the deep layer and 3.0 monocryl for the skin. Sterile dressings applied. All counts correct.       Complications: No    Estimated Blood Loss (EBL): minimal           Implants: * No implants in log *    Specimens:   Specimen (12h ago, onward)    None                  Condition: Good    Disposition: PACU - hemodynamically stable.    Attestation: I was present and scrubbed for the entire procedure.

## 2020-10-14 ENCOUNTER — PATIENT MESSAGE (OUTPATIENT)
Dept: RADIATION ONCOLOGY | Facility: CLINIC | Age: 35
End: 2020-10-14

## 2020-10-15 ENCOUNTER — PATIENT MESSAGE (OUTPATIENT)
Dept: HEMATOLOGY/ONCOLOGY | Facility: CLINIC | Age: 35
End: 2020-10-15

## 2020-10-15 ENCOUNTER — PATIENT MESSAGE (OUTPATIENT)
Dept: PSYCHIATRY | Facility: CLINIC | Age: 35
End: 2020-10-15

## 2020-10-15 ENCOUNTER — OFFICE VISIT (OUTPATIENT)
Dept: PSYCHIATRY | Facility: CLINIC | Age: 35
End: 2020-10-15
Payer: COMMERCIAL

## 2020-10-15 DIAGNOSIS — F90.0 ADHD (ATTENTION DEFICIT HYPERACTIVITY DISORDER), INATTENTIVE TYPE: ICD-10-CM

## 2020-10-15 DIAGNOSIS — Z17.0 MALIGNANT NEOPLASM OF UPPER-OUTER QUADRANT OF RIGHT BREAST IN FEMALE, ESTROGEN RECEPTOR POSITIVE: ICD-10-CM

## 2020-10-15 DIAGNOSIS — Z86.59 HISTORY OF ANOREXIA NERVOSA: Chronic | ICD-10-CM

## 2020-10-15 DIAGNOSIS — C50.411 MALIGNANT NEOPLASM OF UPPER-OUTER QUADRANT OF RIGHT BREAST IN FEMALE, ESTROGEN RECEPTOR POSITIVE: ICD-10-CM

## 2020-10-15 DIAGNOSIS — Z86.59 HISTORY OF POSTTRAUMATIC STRESS DISORDER (PTSD): Chronic | ICD-10-CM

## 2020-10-15 DIAGNOSIS — Z86.59 HISTORY OF BULIMIA NERVOSA: Chronic | ICD-10-CM

## 2020-10-15 DIAGNOSIS — F41.1 GENERALIZED ANXIETY DISORDER: Primary | Chronic | ICD-10-CM

## 2020-10-15 PROCEDURE — 90833 PSYTX W PT W E/M 30 MIN: CPT | Mod: 95,,, | Performed by: PSYCHIATRY & NEUROLOGY

## 2020-10-15 PROCEDURE — 90833 PR PSYCHOTHERAPY W/PATIENT W/E&M, 30 MIN (ADD ON): ICD-10-PCS | Mod: 95,,, | Performed by: PSYCHIATRY & NEUROLOGY

## 2020-10-15 PROCEDURE — 99214 PR OFFICE/OUTPT VISIT, EST, LEVL IV, 30-39 MIN: ICD-10-PCS | Mod: 95,,, | Performed by: PSYCHIATRY & NEUROLOGY

## 2020-10-15 PROCEDURE — 99214 OFFICE O/P EST MOD 30 MIN: CPT | Mod: 95,,, | Performed by: PSYCHIATRY & NEUROLOGY
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
## 2020-10-16 ENCOUNTER — OFFICE VISIT (OUTPATIENT)
Dept: HEMATOLOGY/ONCOLOGY | Facility: CLINIC | Age: 35
End: 2020-10-16
Payer: COMMERCIAL

## 2020-10-16 ENCOUNTER — PATIENT MESSAGE (OUTPATIENT)
Dept: GYNECOLOGIC ONCOLOGY | Facility: CLINIC | Age: 35
End: 2020-10-16

## 2020-10-16 ENCOUNTER — LAB VISIT (OUTPATIENT)
Dept: LAB | Facility: HOSPITAL | Age: 35
End: 2020-10-16
Attending: OBSTETRICS & GYNECOLOGY
Payer: COMMERCIAL

## 2020-10-16 ENCOUNTER — PATIENT MESSAGE (OUTPATIENT)
Dept: HEMATOLOGY/ONCOLOGY | Facility: CLINIC | Age: 35
End: 2020-10-16

## 2020-10-16 VITALS
TEMPERATURE: 98 F | SYSTOLIC BLOOD PRESSURE: 117 MMHG | HEART RATE: 92 BPM | HEIGHT: 65 IN | WEIGHT: 165.81 LBS | DIASTOLIC BLOOD PRESSURE: 81 MMHG | BODY MASS INDEX: 27.63 KG/M2

## 2020-10-16 DIAGNOSIS — N95.1 MENOPAUSAL SYMPTOM: ICD-10-CM

## 2020-10-16 DIAGNOSIS — D64.9 ANEMIA, UNSPECIFIED TYPE: ICD-10-CM

## 2020-10-16 DIAGNOSIS — D64.9 ANEMIA, UNSPECIFIED TYPE: Primary | ICD-10-CM

## 2020-10-16 DIAGNOSIS — N92.1 MENORRHAGIA WITH IRREGULAR CYCLE: ICD-10-CM

## 2020-10-16 DIAGNOSIS — C50.919 MALIGNANT NEOPLASM OF BREAST IN FEMALE, ESTROGEN RECEPTOR POSITIVE, UNSPECIFIED LATERALITY, UNSPECIFIED SITE OF BREAST: ICD-10-CM

## 2020-10-16 DIAGNOSIS — Z17.0 MALIGNANT NEOPLASM OF BREAST IN FEMALE, ESTROGEN RECEPTOR POSITIVE, UNSPECIFIED LATERALITY, UNSPECIFIED SITE OF BREAST: ICD-10-CM

## 2020-10-16 LAB
BASOPHILS # BLD AUTO: 0.04 K/UL (ref 0–0.2)
BASOPHILS NFR BLD: 0.9 % (ref 0–1.9)
DIFFERENTIAL METHOD: ABNORMAL
EOSINOPHIL # BLD AUTO: 0.2 K/UL (ref 0–0.5)
EOSINOPHIL NFR BLD: 4.4 % (ref 0–8)
ERYTHROCYTE [DISTWIDTH] IN BLOOD BY AUTOMATED COUNT: 11.9 % (ref 11.5–14.5)
ESTRADIOL SERPL-MCNC: 72 PG/ML
FSH SERPL-ACNC: 8.9 MIU/ML
HCT VFR BLD AUTO: 39.7 % (ref 37–48.5)
HGB BLD-MCNC: 12.8 G/DL (ref 12–16)
IMM GRANULOCYTES # BLD AUTO: 0.01 K/UL (ref 0–0.04)
IMM GRANULOCYTES NFR BLD AUTO: 0.2 % (ref 0–0.5)
LYMPHOCYTES # BLD AUTO: 1 K/UL (ref 1–4.8)
LYMPHOCYTES NFR BLD: 23.3 % (ref 18–48)
MCH RBC QN AUTO: 29.4 PG (ref 27–31)
MCHC RBC AUTO-ENTMCNC: 32.2 G/DL (ref 32–36)
MCV RBC AUTO: 91 FL (ref 82–98)
MONOCYTES # BLD AUTO: 0.4 K/UL (ref 0.3–1)
MONOCYTES NFR BLD: 8.3 % (ref 4–15)
NEUTROPHILS # BLD AUTO: 2.7 K/UL (ref 1.8–7.7)
NEUTROPHILS NFR BLD: 62.9 % (ref 38–73)
NRBC BLD-RTO: 0 /100 WBC
PLATELET # BLD AUTO: 242 K/UL (ref 150–350)
PMV BLD AUTO: 9 FL (ref 9.2–12.9)
RBC # BLD AUTO: 4.36 M/UL (ref 4–5.4)
WBC # BLD AUTO: 4.33 K/UL (ref 3.9–12.7)

## 2020-10-16 PROCEDURE — 36415 COLL VENOUS BLD VENIPUNCTURE: CPT

## 2020-10-16 PROCEDURE — 99999 PR PBB SHADOW E&M-EST. PATIENT-LVL V: ICD-10-PCS | Mod: PBBFAC,,, | Performed by: OBSTETRICS & GYNECOLOGY

## 2020-10-16 PROCEDURE — 3079F PR MOST RECENT DIASTOLIC BLOOD PRESSURE 80-89 MM HG: ICD-10-PCS | Mod: CPTII,S$GLB,, | Performed by: OBSTETRICS & GYNECOLOGY

## 2020-10-16 PROCEDURE — 3008F BODY MASS INDEX DOCD: CPT | Mod: CPTII,S$GLB,, | Performed by: OBSTETRICS & GYNECOLOGY

## 2020-10-16 PROCEDURE — 3079F DIAST BP 80-89 MM HG: CPT | Mod: CPTII,S$GLB,, | Performed by: OBSTETRICS & GYNECOLOGY

## 2020-10-16 PROCEDURE — 3074F SYST BP LT 130 MM HG: CPT | Mod: CPTII,S$GLB,, | Performed by: OBSTETRICS & GYNECOLOGY

## 2020-10-16 PROCEDURE — 85025 COMPLETE CBC W/AUTO DIFF WBC: CPT

## 2020-10-16 PROCEDURE — 99999 PR PBB SHADOW E&M-EST. PATIENT-LVL V: CPT | Mod: PBBFAC,,, | Performed by: OBSTETRICS & GYNECOLOGY

## 2020-10-16 PROCEDURE — 99215 OFFICE O/P EST HI 40 MIN: CPT | Mod: S$GLB,,, | Performed by: OBSTETRICS & GYNECOLOGY

## 2020-10-16 PROCEDURE — 83001 ASSAY OF GONADOTROPIN (FSH): CPT

## 2020-10-16 PROCEDURE — 3074F PR MOST RECENT SYSTOLIC BLOOD PRESSURE < 130 MM HG: ICD-10-PCS | Mod: CPTII,S$GLB,, | Performed by: OBSTETRICS & GYNECOLOGY

## 2020-10-16 PROCEDURE — 3008F PR BODY MASS INDEX (BMI) DOCUMENTED: ICD-10-PCS | Mod: CPTII,S$GLB,, | Performed by: OBSTETRICS & GYNECOLOGY

## 2020-10-16 PROCEDURE — 99215 PR OFFICE/OUTPT VISIT, EST, LEVL V, 40-54 MIN: ICD-10-PCS | Mod: S$GLB,,, | Performed by: OBSTETRICS & GYNECOLOGY

## 2020-10-16 PROCEDURE — 82670 ASSAY OF TOTAL ESTRADIOL: CPT

## 2020-10-16 NOTE — PROGRESS NOTES
"SUBJECTIVE:   35 y.o. female   Presents today to discuss continued issues with vaginal bleeding. . Patient's last menstrual period was 10/01/2020..  She reports bleeding most days of the month- "get a 4 day break."She reports that the bleeding is "not as bad" but she is frustrated. She is taking Lysteda "almost daily"  She will start XRT next Thursday.   She ws recently admitted with sepsis- reports doing well.   She reports that she is struggling with her mental health and has job stressors. She reports that she was recently started on Citalopram and Xanax daily. Her psychiatrist recommended decreased work hours.   She has received 2 doses of Zoladex which she hoped would have stopped the bleeding. .        Past Medical History:   Diagnosis Date    Abnormal Pap smear of cervix     ADHD     Allergy     seasonal    Anorexia     Anxiety     Asthma     Bulimia     Depression     Fever blister     History of posttraumatic stress disorder (PTSD)     Hypertension     Gestational Hypertension    Invasive ductal carcinoma of right breast 2019    Mastitis     Migraine headache     Status post mastectomy, bilateral 2020     Past Surgical History:   Procedure Laterality Date    BILATERAL MASTECTOMY Bilateral 2020    Procedure: MASTECTOMY, BILATERAL - BILATERAL SKIN SPARING MASTECTOMY;  Surgeon: Percy Ayers MD;  Location: [facility];  Service: General;  Laterality: Bilateral;    BIOPSY OF AXILLARY NODE Right 2020    Procedure: BIOPSY, LYMPH NODE, AXILLARY;  Surgeon: Percy Ayers MD;  Location: [facility] OR;  Service: General;  Laterality: Right;    BONE MARROW ASPIRATION      x 3    BREAST SURGERY      CERVICAL BIOPSY  W/ LOOP ELECTRODE EXCISION       SECTION  2016     SECTION N/A 2019    Procedure:  SECTION;  Surgeon: Kirit Hernandez MD;  Location: [facility] L&D;  Service: OB/GYN;  Laterality: N/A;    COLPOSCOPY      INJECTION FOR " SENTINEL NODE IDENTIFICATION Right 7/1/2020    Procedure: INJECTION, FOR SENTINEL NODE IDENTIFICATION;  Surgeon: Percy Ayers MD;  Location: [facility];  Service: General;  Laterality: Right;    INSERTION OF BREAST TISSUE EXPANDER Bilateral 7/1/2020    Procedure: INSERTION, TISSUE EXPANDER, BREAST;  Surgeon: Kiok Aranda MD;  Location: [facility];  Service: Plastics;  Laterality: Bilateral;    INSERTION OF TUNNELED CENTRAL VENOUS CATHETER (CVC) WITH SUBCUTANEOUS PORT Left 12/27/2019    Procedure: [id number]-Left neck or chest wall;  Surgeon: Percy Ayers MD;  [address];  Service: General;  Laterality: Left;    MEDIPORT REMOVAL N/A 7/1/2020    Procedure: REMOVAL, CATHETER, CENTRAL VENOUS, TUNNELED, WITH PORT;  Surgeon: Percy Ayers MD;  Location: [facility];  Service: General;  Laterality: N/A;    SHOULDER SURGERY Left     x 2    SINUS SURGERY      age 19    TISSUE EXPANDER REMOVAL Right 10/3/2020    Procedure: REMOVAL, TISSUE EXPANDER;  Surgeon: Kiko Aranda MD;  Location: [facility];  Service: Plastics;  Laterality: Right;    TONSILLECTOMY       Social History     Socioeconomic History    Marital status:      Spouse name: Not on file    Number of children: Not on file    Years of education: Not on file    Highest education level: Not on file   Occupational History    Not on file   Social Needs    Financial resource strain: Not hard at all    Food insecurity     Worry: Never true     Inability: Never true    Transportation needs     Medical: No     Non-medical: No   Tobacco Use    Smoking status: Never Smoker    Smokeless tobacco: Never Used   Substance and Sexual Activity    Alcohol use: Yes     Frequency: 2-3 times a week     Drinks per session: 1 or 2     Binge frequency: Monthly     Comment: socially    Drug use: No    Sexual activity: Yes     Partners: Male     Birth control/protection: None   Lifestyle    Physical activity     Days per week: 6 days      Minutes per session: 80 min    Stress: Only a little   Relationships    Social connections     Talks on phone: More than three times a week     Gets together: Once a week     Attends Synagogue service: Not on file     Active member of club or organization: No     Attends meetings of clubs or organizations: Never     Relationship status:    Other Topics Concern    Are you pregnant or think you may be? Not Asked    Breast-feeding Not Asked   Social History Narrative    Not on file     Family History   Problem Relation Age of Onset    Cancer Maternal Grandmother 40        cervical    Cervical cancer Mother     Breast cancer Other     Ovarian cancer Other     Colon cancer Neg Hx     Melanoma Neg Hx      OB History    Para Term  AB Living   2 2 2     2   SAB TAB Ectopic Multiple Live Births         0 2      # Outcome Date GA Lbr Bull/2nd Weight Sex Delivery Anes PTL Lv   2 Term 19 39w1d  3.43 kg (7 lb 9 oz) M CS-LTranv Spinal N JOLLY   1 Term 16 38w1d  2.96 kg (6 lb 8.4 oz) M CS-LTranv EPI N JOLLY      Complications: Failure to Progress in First Stage           Current Outpatient Medications   Medication Sig Dispense Refill    ALPRAZolam (XANAX) 0.25 MG tablet Take 1 tablet (0.25 mg total) by mouth daily as needed for Anxiety. 30 tablet 0    citalopram (CELEXA) 40 MG tablet Take 1 tablet (40 mg total) by mouth once daily. 30 tablet 0    dextroamphetamine-amphetamine (ADDERALL XR) 25 MG 24 hr capsule Take 1 capsule (25 mg total) by mouth every morning. 30 capsule 0    goserelin (ZOLADEX) 3.6 mg injection Inject 3.6 mg into the skin every 30 days.      loratadine (CLARITIN) 10 mg tablet Take 10 mg by mouth once daily.      metoclopramide HCl (REGLAN) 10 MG tablet Take 1 tablet (10 mg total) by mouth 4 (four) times daily. 45 tablet 1    multivitamin (ONE DAILY MULTIVITAMIN) per tablet Take 1 tablet by mouth once daily.      multivitamin with minerals (HAIR,SKIN AND NAILS  ORAL) Take by mouth.      onabotulinumtoxinA (BOTOX INJ) Inject as directed. [id number]      pantoprazole (PROTONIX) 40 MG tablet Take 1 tablet (40 mg total) by mouth once daily. 30 tablet 11    prasterone, dhea, (INTRAROSA) 6.5 mg Inst Place 6.5 mg vaginally every evening. 30 each 11    sucralfate (CARAFATE) 1 gram tablet Take 1 tablet (1 g total) by mouth 4 (four) times daily. 40 tablet 0    sumatriptan (IMITREX) 100 MG tablet Take 1/2 to 1 tab at onset of headache.  If no improvement in 2 hours, take another.  Do not take more than 2 in 24 hours. 9 tablet 11    topiramate (TOPAMAX) 100 MG tablet Take 2 tablets (200 mg total) by mouth every evening. 180 tablet 3    tranexamic acid (LYSTEDA) 650 mg tablet Take 2 tablets (1,300 mg total) by mouth 3 (three) times daily. (Patient taking differently: Take 1,300 mg by mouth as needed. ) 180 tablet 0    traZODone (DESYREL) 50 MG tablet Take 1 tablet (50 mg total) by mouth every evening. (Patient taking differently: Take 25 mg by mouth every evening. ) 30 tablet 0    triamcinolone acetonide 0.1% (KENALOG) 0.1 % cream apply to affected area twice daily 454 g 3    valACYclovir (VALTREX) 1000 MG tablet Take 1 tablet (1,000 mg total) by mouth every 12 (twelve) hours. (Patient taking differently: Take 1,000 mg by mouth every 12 (twelve) hours. Patient uses prn) 60 tablet 0    albuterol (PROVENTIL HFA) 90 mcg/actuation inhaler Inhale 2 puffs by mouth into the lungs every 6 (six) hours as needed for Wheezing. Rescue (Patient not taking: Reported on 10/16/2020) 18 g 0    oxyCODONE-acetaminophen (PERCOCET) 7.5-325 mg per tablet TK 1 T PO EVERY 6 HOURS AS NEEDED FOR PAIN       Current Facility-Administered Medications   Medication Dose Route Frequency Provider Last Rate Last Dose    copper intrauterine device 380 square mm  mm  380 mm Intrauterine  aKleigh Polanco MD   380 mm at 01/23/20 1400    onabotulinumtoxina injection 200 Units  200 Units  Intramuscular Q90 Days Dillon Gonzalez MD   200 Units at 09/21/20 1528     Allergies: Amoxicillin, Penicillins, and Diazepam     The ASCVD Risk score (Yesy DC Jr., et al., 2013) failed to calculate for the following reasons:    The 2013 ASCVD risk score is only valid for ages 40 to 79      ROS:  Constitutional: no weight loss, weight gain, fever, fatigue  Eyes:  No vision changes, glasses/contacts  ENT/Mouth: No ulcers, sinus problems, ears ringing, headache  Cardiovascular: No inability to lie flat, chest pain, exercise intolerance, swelling, heart palpitations  Respiratory: No wheezing, coughing blood, shortness of breath, or cough  Gastrointestinal: No diarrhea, bloody stool, nausea/vomiting, constipation, gas, hemorrhoids  Genitourinary: No blood in urine, painful urination, urgency of urination, frequency of urination, incomplete emptying, incontinence, +abnormal bleeding, painful periods,+heavy periods, vaginal discharge, vaginal odor, painful intercourse, sexual problems, bleeding after intercourse.  Musculoskeletal: No muscle weakness  Skin/Breast: +breast cancer  Neurological: No passing out, seizures, numbness, headache  Endocrine: No diabetes, hypothyroid, hyperthyroid, hot flashes, hair loss, abnormal hair growth, acne  Psychiatric: No depression, crying, +anxiety  Hematologic: No bruises, bleeding, swollen lymph nodes, anemia.      Physical Exam  deferred    ASSESSMENT:   Menometrorrhagia  Breast cancer  BRCA 1+  PLAN: Face to face time and  time spent discussing case with GYN ONC- total time 45 minutes  Labs today to assess anemia and if Zoladex is suppressing ovarian function  Patient is asking about ablation- since patient is planning for hyst in December would lean toward not doing ablation if H/H is stable    Reviewed case with GYN ONC- if H/H is stable and evidence that ovarian function is not suppressed- would give Zoladex more time and await hyst in December

## 2020-10-18 DIAGNOSIS — Z01.84 ANTIBODY RESPONSE EXAMINATION: ICD-10-CM

## 2020-10-19 DIAGNOSIS — Z15.09 BRCA1 POSITIVE: Primary | ICD-10-CM

## 2020-10-19 DIAGNOSIS — Z01.812 PRE-PROCEDURE LAB EXAM: ICD-10-CM

## 2020-10-19 DIAGNOSIS — Z15.01 BRCA1 POSITIVE: Primary | ICD-10-CM

## 2020-10-20 ENCOUNTER — HOSPITAL ENCOUNTER (OUTPATIENT)
Dept: RADIATION THERAPY | Facility: HOSPITAL | Age: 35
Discharge: HOME | End: 2020-10-20
Attending: RADIOLOGY
Payer: COMMERCIAL

## 2020-10-20 ENCOUNTER — OFFICE VISIT (OUTPATIENT)
Dept: RADIATION ONCOLOGY | Facility: CLINIC | Age: 35
End: 2020-10-20
Payer: COMMERCIAL

## 2020-10-20 VITALS — HEART RATE: 90 BPM | RESPIRATION RATE: 16 BRPM | BODY MASS INDEX: 27.63 KG/M2 | HEIGHT: 65 IN | WEIGHT: 165.81 LBS

## 2020-10-20 DIAGNOSIS — C50.411 MALIGNANT NEOPLASM OF UPPER-OUTER QUADRANT OF RIGHT BREAST IN FEMALE, ESTROGEN RECEPTOR POSITIVE: Primary | Chronic | ICD-10-CM

## 2020-10-20 DIAGNOSIS — Z17.0 MALIGNANT NEOPLASM OF UPPER-OUTER QUADRANT OF RIGHT BREAST IN FEMALE, ESTROGEN RECEPTOR POSITIVE: Primary | Chronic | ICD-10-CM

## 2020-10-20 PROCEDURE — 77290 THER RAD SIMULAJ FIELD CPLX: CPT | Mod: 26,,, | Performed by: RADIOLOGY

## 2020-10-20 PROCEDURE — 99999 PR PBB SHADOW E&M-EST. PATIENT-LVL IV: CPT | Mod: PBBFAC,,, | Performed by: RADIOLOGY

## 2020-10-20 PROCEDURE — 77290 THER RAD SIMULAJ FIELD CPLX: CPT | Mod: TC | Performed by: RADIOLOGY

## 2020-10-20 PROCEDURE — 77290 PR  SET RADN THERAPY FIELD COMPLEX: ICD-10-PCS | Mod: 26,,, | Performed by: RADIOLOGY

## 2020-10-20 PROCEDURE — 99499 UNLISTED E&M SERVICE: CPT | Mod: S$GLB,,, | Performed by: RADIOLOGY

## 2020-10-20 PROCEDURE — 99499 NO LOS: ICD-10-PCS | Mod: S$GLB,,, | Performed by: RADIOLOGY

## 2020-10-20 PROCEDURE — 77014 HC CT GUIDANCE RADIATION THERAPY FLDS PLACEMENT: CPT | Mod: TC | Performed by: RADIOLOGY

## 2020-10-20 PROCEDURE — 99999 PR PBB SHADOW E&M-EST. PATIENT-LVL IV: ICD-10-PCS | Mod: PBBFAC,,, | Performed by: RADIOLOGY

## 2020-10-20 NOTE — PROGRESS NOTES
Subjective:       Patient ID: Yolanda Win is a 35 y.o. female.    Chief Complaint: No chief complaint on file.    This patient presents for follow up prior to starting radiotherapy.     Juanis has a history of clinical stage T3, N0, M0, yp stage T0, N0 Rt. breast cancer.  She initially presented in December of 2019 for evaluation of a Rt. breast mass.  MMG on 12/11/19 revealed a microlobulated Rt. breast mass measuring 3.3 cm on ultrasound.  Biopsies on 12/13/19 revealed infiltrating ductal carcinoma, histologic grade 3, nuclear grade 3 and mitotic index 3.  25% of the cells had intermediate staining for ER.  AZ and Her - 2 were negative.  Ki-67 was 90%.  Oncotype score returned at 57.  MRI of the breast revealed a 5.2 x 2.6 x 4.6 cm irregularly shaped mass with rim enhancement in the Rt. breast at the 11 o'clock position.  There was no lymphadenopathy.  Bone scan and CT of the chest, abdomen and pelvis were negative. There was a non specific prominent Rt. axillary node which was felt to be reactive given her earlier MRI finding.  The patient was referred for chemotherapy and completed 4 cycles of AC followed by 12 cycles of Taxol. She subsequently underwent bilateral mastectomies with Rt. sentinel node biopsy.  Pathology from the Rt. breast revealed fibrocystic changes, 5 sentinel nodes were negative for tumor involvement.  The Lt. breast was negative.  Preoperatively, we discussed a number of possibilities.  After surgery and discussion at tumor board we have elected to proceed with postoperative radiotherapy.  The patient underwent simulation but developed a Rt. mastitis prior to treatment requiring removal of the tissue expander on 10/3/20.  She now presents for re-evaluation.  Today the patient states she feels great.  No chest wall complaints.     Review of Systems   Constitutional: Negative for activity change, appetite change, chills and fatigue.   Respiratory: Negative for cough and shortness of  breath.    Cardiovascular: Negative for chest pain and claudication.         Objective:      Physical Exam  Constitutional:       Appearance: Normal appearance.   Chest:       Lymphadenopathy:      Cervical:      Right cervical: No superficial cervical adenopathy.     Upper Body:      Right upper body: No supraclavicular or axillary adenopathy.   Neurological:      Mental Status: She is alert.         Assessment:       1. Malignant neoplasm of upper-outer quadrant of right breast in female, estrogen receptor positive        Plan:       recovered well from recent removal.  Plan simulation today with treatment to begin thereafter.

## 2020-10-22 ENCOUNTER — PATIENT OUTREACH (OUTPATIENT)
Dept: ADMINISTRATIVE | Facility: OTHER | Age: 35
End: 2020-10-22

## 2020-10-22 PROCEDURE — 77412 RADIATION TX DELIVERY LVL 3: CPT | Performed by: RADIOLOGY

## 2020-10-22 PROCEDURE — 77387 GUIDANCE FOR RADJ TX DLVR: CPT | Mod: 26,,, | Performed by: RADIOLOGY

## 2020-10-22 PROCEDURE — 77387 PR GUIDANCE FOR RADIATION TREATMENT DELIVERY: ICD-10-PCS | Mod: 26,,, | Performed by: RADIOLOGY

## 2020-10-22 PROCEDURE — 77417 THER RADIOLOGY PORT IMAGE(S): CPT | Performed by: RADIOLOGY

## 2020-10-22 PROCEDURE — 77387 GUIDANCE FOR RADJ TX DLVR: CPT | Mod: TC | Performed by: RADIOLOGY

## 2020-10-22 NOTE — PROGRESS NOTES
Health Maintenance Due   Topic Date Due    Pneumococcal Vaccine (Highest Risk) (1 of 3 - PCV13) 07/07/2006     Updates were requested from care everywhere.  Chart was reviewed for overdue Proactive [facility] Encounters (JOSE) topics (CRS, Breast Cancer Screening, Eye exam)  Health Maintenance has been updated.  LINKS immunization registry triggered.  Immunizations were reconciled.

## 2020-10-23 PROCEDURE — 77412 RADIATION TX DELIVERY LVL 3: CPT | Performed by: RADIOLOGY

## 2020-10-23 PROCEDURE — 77387 GUIDANCE FOR RADJ TX DLVR: CPT | Mod: 26,,, | Performed by: RADIOLOGY

## 2020-10-23 PROCEDURE — 77387 GUIDANCE FOR RADJ TX DLVR: CPT | Mod: TC | Performed by: RADIOLOGY

## 2020-10-23 PROCEDURE — 77387 PR GUIDANCE FOR RADIATION TREATMENT DELIVERY: ICD-10-PCS | Mod: 26,,, | Performed by: RADIOLOGY

## 2020-10-26 PROCEDURE — 77412 RADIATION TX DELIVERY LVL 3: CPT | Performed by: RADIOLOGY

## 2020-10-27 PROCEDURE — 77387 GUIDANCE FOR RADJ TX DLVR: CPT | Mod: TC | Performed by: RADIOLOGY

## 2020-10-27 PROCEDURE — 77387 GUIDANCE FOR RADJ TX DLVR: CPT | Mod: 26,,, | Performed by: RADIOLOGY

## 2020-10-27 PROCEDURE — 77387 PR GUIDANCE FOR RADIATION TREATMENT DELIVERY: ICD-10-PCS | Mod: 26,,, | Performed by: RADIOLOGY

## 2020-10-27 PROCEDURE — 77412 RADIATION TX DELIVERY LVL 3: CPT | Performed by: RADIOLOGY

## 2020-10-28 PROCEDURE — 77387 GUIDANCE FOR RADJ TX DLVR: CPT | Mod: 26,,, | Performed by: RADIOLOGY

## 2020-10-28 PROCEDURE — 77412 RADIATION TX DELIVERY LVL 3: CPT | Performed by: RADIOLOGY

## 2020-10-28 PROCEDURE — 77387 PR GUIDANCE FOR RADIATION TREATMENT DELIVERY: ICD-10-PCS | Mod: 26,,, | Performed by: RADIOLOGY

## 2020-10-28 PROCEDURE — 77387 GUIDANCE FOR RADJ TX DLVR: CPT | Mod: TC | Performed by: RADIOLOGY

## 2020-10-28 PROCEDURE — 77336 RADIATION PHYSICS CONSULT: CPT | Performed by: RADIOLOGY

## 2020-10-30 ENCOUNTER — DOCUMENTATION ONLY (OUTPATIENT)
Dept: RADIATION ONCOLOGY | Facility: CLINIC | Age: 35
End: 2020-10-30

## 2020-10-30 ENCOUNTER — PATIENT MESSAGE (OUTPATIENT)
Dept: HEMATOLOGY/ONCOLOGY | Facility: CLINIC | Age: 35
End: 2020-10-30

## 2020-10-30 PROCEDURE — 77387 GUIDANCE FOR RADJ TX DLVR: CPT | Mod: TC | Performed by: RADIOLOGY

## 2020-10-30 PROCEDURE — 77387 PR GUIDANCE FOR RADIATION TREATMENT DELIVERY: ICD-10-PCS | Mod: 26,,, | Performed by: RADIOLOGY

## 2020-10-30 PROCEDURE — 77412 RADIATION TX DELIVERY LVL 3: CPT | Performed by: RADIOLOGY

## 2020-10-30 PROCEDURE — 77387 GUIDANCE FOR RADJ TX DLVR: CPT | Mod: 26,,, | Performed by: RADIOLOGY

## 2020-10-30 PROCEDURE — 77417 THER RADIOLOGY PORT IMAGE(S): CPT | Performed by: RADIOLOGY

## 2020-10-30 NOTE — PLAN OF CARE
Pt. On day 6 of outpt. Xrt to right chest wall.  Pt. With mild erythema.  Using cream and aloe.  Skin intact.

## 2020-11-02 ENCOUNTER — TELEPHONE (OUTPATIENT)
Dept: NEUROLOGY | Facility: CLINIC | Age: 35
End: 2020-11-02

## 2020-11-02 ENCOUNTER — HOSPITAL ENCOUNTER (OUTPATIENT)
Dept: RADIATION THERAPY | Facility: HOSPITAL | Age: 35
Discharge: HOME | End: 2020-11-02
Attending: RADIOLOGY
Payer: COMMERCIAL

## 2020-11-02 PROCEDURE — 77412 RADIATION TX DELIVERY LVL 3: CPT | Performed by: RADIOLOGY

## 2020-11-02 PROCEDURE — 77387 GUIDANCE FOR RADJ TX DLVR: CPT | Mod: TC | Performed by: RADIOLOGY

## 2020-11-02 PROCEDURE — 77387 GUIDANCE FOR RADJ TX DLVR: CPT | Mod: 26,,, | Performed by: RADIOLOGY

## 2020-11-02 PROCEDURE — 77387 PR GUIDANCE FOR RADIATION TREATMENT DELIVERY: ICD-10-PCS | Mod: 26,,, | Performed by: RADIOLOGY

## 2020-11-02 NOTE — TELEPHONE ENCOUNTER
----- Message from Parveen Kirby sent at 11/2/2020  3:28 PM CST -----  Regarding: Ms. Win was returning [employer]'s call.  Contact: Yolanda  Ms. Win was returning [employer]'s call. She can be reached at [phone]

## 2020-11-03 LAB — FUNGUS SPEC CULT: NORMAL

## 2020-11-03 PROCEDURE — 77387 GUIDANCE FOR RADJ TX DLVR: CPT | Mod: 26,,, | Performed by: RADIOLOGY

## 2020-11-03 PROCEDURE — 77387 PR GUIDANCE FOR RADIATION TREATMENT DELIVERY: ICD-10-PCS | Mod: 26,,, | Performed by: RADIOLOGY

## 2020-11-03 PROCEDURE — 77412 RADIATION TX DELIVERY LVL 3: CPT | Performed by: RADIOLOGY

## 2020-11-03 PROCEDURE — 77387 GUIDANCE FOR RADJ TX DLVR: CPT | Mod: TC | Performed by: RADIOLOGY

## 2020-11-04 PROCEDURE — 77412 RADIATION TX DELIVERY LVL 3: CPT | Performed by: RADIOLOGY

## 2020-11-04 PROCEDURE — 77387 PR GUIDANCE FOR RADIATION TREATMENT DELIVERY: ICD-10-PCS | Mod: 26,,, | Performed by: RADIOLOGY

## 2020-11-04 PROCEDURE — 77387 GUIDANCE FOR RADJ TX DLVR: CPT | Mod: 26,,, | Performed by: RADIOLOGY

## 2020-11-04 PROCEDURE — 77387 GUIDANCE FOR RADJ TX DLVR: CPT | Mod: TC | Performed by: RADIOLOGY

## 2020-11-05 ENCOUNTER — DOCUMENTATION ONLY (OUTPATIENT)
Dept: RADIATION ONCOLOGY | Facility: CLINIC | Age: 35
End: 2020-11-05

## 2020-11-05 PROCEDURE — 77387 PR GUIDANCE FOR RADIATION TREATMENT DELIVERY: ICD-10-PCS | Mod: 26,,, | Performed by: RADIOLOGY

## 2020-11-05 PROCEDURE — 77336 RADIATION PHYSICS CONSULT: CPT | Performed by: RADIOLOGY

## 2020-11-05 PROCEDURE — 77387 GUIDANCE FOR RADJ TX DLVR: CPT | Mod: TC | Performed by: RADIOLOGY

## 2020-11-05 PROCEDURE — 77412 RADIATION TX DELIVERY LVL 3: CPT | Performed by: RADIOLOGY

## 2020-11-05 PROCEDURE — 77387 GUIDANCE FOR RADJ TX DLVR: CPT | Mod: 26,,, | Performed by: RADIOLOGY

## 2020-11-06 PROCEDURE — 77417 THER RADIOLOGY PORT IMAGE(S): CPT | Performed by: RADIOLOGY

## 2020-11-06 PROCEDURE — 77387 GUIDANCE FOR RADJ TX DLVR: CPT | Mod: TC | Performed by: RADIOLOGY

## 2020-11-06 PROCEDURE — 77387 GUIDANCE FOR RADJ TX DLVR: CPT | Mod: 26,,, | Performed by: RADIOLOGY

## 2020-11-06 PROCEDURE — 77387 PR GUIDANCE FOR RADIATION TREATMENT DELIVERY: ICD-10-PCS | Mod: 26,,, | Performed by: RADIOLOGY

## 2020-11-06 PROCEDURE — 77412 RADIATION TX DELIVERY LVL 3: CPT | Performed by: RADIOLOGY

## 2020-11-09 ENCOUNTER — PATIENT MESSAGE (OUTPATIENT)
Dept: PSYCHIATRY | Facility: CLINIC | Age: 35
End: 2020-11-09

## 2020-11-09 ENCOUNTER — PATIENT MESSAGE (OUTPATIENT)
Dept: GYNECOLOGIC ONCOLOGY | Facility: CLINIC | Age: 35
End: 2020-11-09

## 2020-11-09 ENCOUNTER — DOCUMENTATION ONLY (OUTPATIENT)
Dept: HEMATOLOGY/ONCOLOGY | Facility: CLINIC | Age: 35
End: 2020-11-09

## 2020-11-09 PROCEDURE — 77412 RADIATION TX DELIVERY LVL 3: CPT | Performed by: RADIOLOGY

## 2020-11-09 PROCEDURE — 77387 GUIDANCE FOR RADJ TX DLVR: CPT | Mod: TC | Performed by: RADIOLOGY

## 2020-11-09 PROCEDURE — 77387 GUIDANCE FOR RADJ TX DLVR: CPT | Mod: 26,,, | Performed by: RADIOLOGY

## 2020-11-09 PROCEDURE — 77387 PR GUIDANCE FOR RADIATION TREATMENT DELIVERY: ICD-10-PCS | Mod: 26,,, | Performed by: RADIOLOGY

## 2020-11-09 RX ORDER — CITALOPRAM 40 MG/1
40 TABLET, FILM COATED ORAL DAILY
Qty: 30 TABLET | Refills: 0 | Status: SHIPPED | OUTPATIENT
Start: 2020-11-09 | End: 2020-12-04

## 2020-11-09 RX ORDER — TRAZODONE HYDROCHLORIDE 50 MG/1
50 TABLET ORAL NIGHTLY
Qty: 30 TABLET | Refills: 0 | Status: SHIPPED | OUTPATIENT
Start: 2020-11-09 | End: 2020-12-06 | Stop reason: SDUPTHER

## 2020-11-09 RX ORDER — DEXTROAMPHETAMINE SACCHARATE, AMPHETAMINE ASPARTATE MONOHYDRATE, DEXTROAMPHETAMINE SULFATE AND AMPHETAMINE SULFATE 6.25; 6.25; 6.25; 6.25 MG/1; MG/1; MG/1; MG/1
25 CAPSULE, EXTENDED RELEASE ORAL EVERY MORNING
Qty: 30 CAPSULE | Refills: 0 | Status: SHIPPED | OUTPATIENT
Start: 2020-11-09 | End: 2020-12-04 | Stop reason: SDUPTHER

## 2020-11-09 NOTE — PROGRESS NOTES
Pt called Isamar back and stated that she spoke with HR.  They informed her she can fill out COBRA paperwork online and informed pt that COBRA will be retroactive back to her last day of employment.  ISAMAR informed TAYLER Bartholomew and remains available.

## 2020-11-10 ENCOUNTER — PATIENT MESSAGE (OUTPATIENT)
Dept: HEMATOLOGY/ONCOLOGY | Facility: CLINIC | Age: 35
End: 2020-11-10

## 2020-11-10 PROCEDURE — 77412 RADIATION TX DELIVERY LVL 3: CPT | Performed by: RADIOLOGY

## 2020-11-10 PROCEDURE — 77387 PR GUIDANCE FOR RADIATION TREATMENT DELIVERY: ICD-10-PCS | Mod: 26,,, | Performed by: RADIOLOGY

## 2020-11-10 PROCEDURE — 77387 GUIDANCE FOR RADJ TX DLVR: CPT | Mod: TC | Performed by: RADIOLOGY

## 2020-11-10 PROCEDURE — 77387 GUIDANCE FOR RADJ TX DLVR: CPT | Mod: 26,,, | Performed by: RADIOLOGY

## 2020-11-11 ENCOUNTER — DOCUMENTATION ONLY (OUTPATIENT)
Dept: REHABILITATION | Facility: HOSPITAL | Age: 35
End: 2020-11-11

## 2020-11-11 DIAGNOSIS — M25.619 DECREASED SHOULDER MOBILITY, UNSPECIFIED LATERALITY: ICD-10-CM

## 2020-11-11 DIAGNOSIS — C50.411 MALIGNANT NEOPLASM OF UPPER-OUTER QUADRANT OF RIGHT BREAST IN FEMALE, ESTROGEN RECEPTOR POSITIVE: Primary | ICD-10-CM

## 2020-11-11 DIAGNOSIS — Z91.89 AT RISK FOR LYMPHEDEMA: ICD-10-CM

## 2020-11-11 DIAGNOSIS — Z17.0 MALIGNANT NEOPLASM OF UPPER-OUTER QUADRANT OF RIGHT BREAST IN FEMALE, ESTROGEN RECEPTOR POSITIVE: Primary | ICD-10-CM

## 2020-11-11 PROCEDURE — 77387 GUIDANCE FOR RADJ TX DLVR: CPT | Mod: TC | Performed by: RADIOLOGY

## 2020-11-11 PROCEDURE — 77387 GUIDANCE FOR RADJ TX DLVR: CPT | Mod: 26,,, | Performed by: RADIOLOGY

## 2020-11-11 PROCEDURE — 77387 PR GUIDANCE FOR RADIATION TREATMENT DELIVERY: ICD-10-PCS | Mod: 26,,, | Performed by: RADIOLOGY

## 2020-11-11 PROCEDURE — 77412 RADIATION TX DELIVERY LVL 3: CPT | Performed by: RADIOLOGY

## 2020-11-11 NOTE — PROGRESS NOTES
Discharge Note  Juanis  was seen in outpatient physical therapy for an initial evaluation on 7/29/20 for decreased shoulder mobility s/p bilateral mastectomies with tissue expander reconstruction. Juanis attended 5 follow up visits and has self discharged as she has not returned for further physical therapy. POC has ended and patient is discharged from physical therapy.        Rosmery Mendoza, PT

## 2020-11-12 ENCOUNTER — DOCUMENTATION ONLY (OUTPATIENT)
Dept: RADIATION ONCOLOGY | Facility: CLINIC | Age: 35
End: 2020-11-12

## 2020-11-12 PROCEDURE — 77412 RADIATION TX DELIVERY LVL 3: CPT | Performed by: RADIOLOGY

## 2020-11-12 PROCEDURE — 77336 RADIATION PHYSICS CONSULT: CPT | Performed by: RADIOLOGY

## 2020-11-12 NOTE — PLAN OF CARE
Day 15 of outpatient radiation to the rt chestwall brisk erythema noted using miaderm and gel sheets

## 2020-11-13 ENCOUNTER — INFUSION (OUTPATIENT)
Dept: INFUSION THERAPY | Facility: HOSPITAL | Age: 35
End: 2020-11-13
Payer: COMMERCIAL

## 2020-11-13 ENCOUNTER — PATIENT MESSAGE (OUTPATIENT)
Dept: RADIATION ONCOLOGY | Facility: CLINIC | Age: 35
End: 2020-11-13

## 2020-11-13 DIAGNOSIS — Z17.0 MALIGNANT NEOPLASM OF UPPER-OUTER QUADRANT OF RIGHT BREAST IN FEMALE, ESTROGEN RECEPTOR POSITIVE: Primary | ICD-10-CM

## 2020-11-13 DIAGNOSIS — C50.411 MALIGNANT NEOPLASM OF UPPER-OUTER QUADRANT OF RIGHT BREAST IN FEMALE, ESTROGEN RECEPTOR POSITIVE: Primary | ICD-10-CM

## 2020-11-13 PROCEDURE — 77387 GUIDANCE FOR RADJ TX DLVR: CPT | Mod: 26,,, | Performed by: RADIOLOGY

## 2020-11-13 PROCEDURE — 77412 RADIATION TX DELIVERY LVL 3: CPT | Performed by: RADIOLOGY

## 2020-11-13 PROCEDURE — 77387 GUIDANCE FOR RADJ TX DLVR: CPT | Mod: TC | Performed by: RADIOLOGY

## 2020-11-13 PROCEDURE — 77417 THER RADIOLOGY PORT IMAGE(S): CPT | Performed by: RADIOLOGY

## 2020-11-13 PROCEDURE — 96402 CHEMO HORMON ANTINEOPL SQ/IM: CPT

## 2020-11-13 PROCEDURE — 63600175 PHARM REV CODE 636 W HCPCS: Mod: JG | Performed by: INTERNAL MEDICINE

## 2020-11-13 PROCEDURE — 77387 PR GUIDANCE FOR RADIATION TREATMENT DELIVERY: ICD-10-PCS | Mod: 26,,, | Performed by: RADIOLOGY

## 2020-11-13 RX ORDER — PRASTERONE 6.5 MG/1
6.5 INSERT VAGINAL NIGHTLY
Qty: 30 EACH | Refills: 11 | Status: SHIPPED | OUTPATIENT
Start: 2020-11-13 | End: 2022-05-04 | Stop reason: SDUPTHER

## 2020-11-13 RX ADMIN — GOSERELIN ACETATE 3.6 MG: 3.6 IMPLANT SUBCUTANEOUS at 10:11

## 2020-11-16 PROCEDURE — 77387 GUIDANCE FOR RADJ TX DLVR: CPT | Mod: TC | Performed by: RADIOLOGY

## 2020-11-16 PROCEDURE — 77387 GUIDANCE FOR RADJ TX DLVR: CPT | Mod: 26,,, | Performed by: RADIOLOGY

## 2020-11-16 PROCEDURE — 77387 PR GUIDANCE FOR RADIATION TREATMENT DELIVERY: ICD-10-PCS | Mod: 26,,, | Performed by: RADIOLOGY

## 2020-11-16 PROCEDURE — 77412 RADIATION TX DELIVERY LVL 3: CPT | Performed by: RADIOLOGY

## 2020-11-17 DIAGNOSIS — Z01.84 ANTIBODY RESPONSE EXAMINATION: ICD-10-CM

## 2020-11-17 PROCEDURE — 77412 RADIATION TX DELIVERY LVL 3: CPT | Performed by: RADIOLOGY

## 2020-11-17 PROCEDURE — 77387 GUIDANCE FOR RADJ TX DLVR: CPT | Mod: 26,,, | Performed by: RADIOLOGY

## 2020-11-17 PROCEDURE — 77387 GUIDANCE FOR RADJ TX DLVR: CPT | Mod: TC | Performed by: RADIOLOGY

## 2020-11-17 PROCEDURE — 77387 PR GUIDANCE FOR RADIATION TREATMENT DELIVERY: ICD-10-PCS | Mod: 26,,, | Performed by: RADIOLOGY

## 2020-11-18 PROCEDURE — 77336 RADIATION PHYSICS CONSULT: CPT | Performed by: RADIOLOGY

## 2020-11-18 PROCEDURE — 77412 RADIATION TX DELIVERY LVL 3: CPT | Performed by: RADIOLOGY

## 2020-11-18 PROCEDURE — 77387 GUIDANCE FOR RADJ TX DLVR: CPT | Mod: TC | Performed by: RADIOLOGY

## 2020-11-18 PROCEDURE — 77387 GUIDANCE FOR RADJ TX DLVR: CPT | Mod: 26,,, | Performed by: RADIOLOGY

## 2020-11-18 PROCEDURE — 77387 PR GUIDANCE FOR RADIATION TREATMENT DELIVERY: ICD-10-PCS | Mod: 26,,, | Performed by: RADIOLOGY

## 2020-11-19 ENCOUNTER — DOCUMENTATION ONLY (OUTPATIENT)
Dept: RADIATION ONCOLOGY | Facility: CLINIC | Age: 35
End: 2020-11-19

## 2020-11-19 PROCEDURE — 77412 RADIATION TX DELIVERY LVL 3: CPT | Performed by: RADIOLOGY

## 2020-11-19 PROCEDURE — 77387 PR GUIDANCE FOR RADIATION TREATMENT DELIVERY: ICD-10-PCS | Mod: 26,,, | Performed by: RADIOLOGY

## 2020-11-19 PROCEDURE — 77387 GUIDANCE FOR RADJ TX DLVR: CPT | Mod: TC | Performed by: RADIOLOGY

## 2020-11-19 PROCEDURE — 77387 GUIDANCE FOR RADJ TX DLVR: CPT | Mod: 26,,, | Performed by: RADIOLOGY

## 2020-11-20 PROCEDURE — 77387 GUIDANCE FOR RADJ TX DLVR: CPT | Mod: TC | Performed by: RADIOLOGY

## 2020-11-20 PROCEDURE — 77387 GUIDANCE FOR RADJ TX DLVR: CPT | Mod: 26,,, | Performed by: RADIOLOGY

## 2020-11-20 PROCEDURE — 77387 PR GUIDANCE FOR RADIATION TREATMENT DELIVERY: ICD-10-PCS | Mod: 26,,, | Performed by: RADIOLOGY

## 2020-11-20 PROCEDURE — 77417 THER RADIOLOGY PORT IMAGE(S): CPT | Performed by: RADIOLOGY

## 2020-11-20 PROCEDURE — 77412 RADIATION TX DELIVERY LVL 3: CPT | Performed by: RADIOLOGY

## 2020-11-22 PROCEDURE — 77387 GUIDANCE FOR RADJ TX DLVR: CPT | Mod: TC | Performed by: RADIOLOGY

## 2020-11-22 PROCEDURE — 77412 RADIATION TX DELIVERY LVL 3: CPT | Performed by: RADIOLOGY

## 2020-11-22 PROCEDURE — 77387 GUIDANCE FOR RADJ TX DLVR: CPT | Mod: 26,,, | Performed by: RADIOLOGY

## 2020-11-22 PROCEDURE — 77387 PR GUIDANCE FOR RADIATION TREATMENT DELIVERY: ICD-10-PCS | Mod: 26,,, | Performed by: RADIOLOGY

## 2020-11-23 PROCEDURE — 77387 GUIDANCE FOR RADJ TX DLVR: CPT | Mod: 26,,, | Performed by: RADIOLOGY

## 2020-11-23 PROCEDURE — 77412 RADIATION TX DELIVERY LVL 3: CPT | Performed by: RADIOLOGY

## 2020-11-23 PROCEDURE — 77387 PR GUIDANCE FOR RADIATION TREATMENT DELIVERY: ICD-10-PCS | Mod: 26,,, | Performed by: RADIOLOGY

## 2020-11-23 PROCEDURE — 77387 GUIDANCE FOR RADJ TX DLVR: CPT | Mod: TC | Performed by: RADIOLOGY

## 2020-11-23 NOTE — PROGRESS NOTES
Subjective:       Patient ID: Yolanda Win is a 35 y.o. female.    Chief Complaint: No chief complaint on file.    HPI 35 year old female, who returns for follow-up of carcinoma of the right breast.     She completed her postoperative radiation therapy today.  That was associated with some erythema and minimal desquamation.  She also developed fatigue several weeks ago in has been napping on a regular basis.  She is due to have a hysterectomy in in December.  She has been having regular periods.  Healed normal after her chest wall infection she developed right upper quadrant abdominal pain nausea and diarrhea.  She was treated with Protonix Carafate and Reglan with benefit.  The symptoms resolved but have now recurred.      Breast history:  Mammogram and ultrasound on December 11th, 2019 showed right breast mass 7 cm from the nipple.  By ultrasound this mass measured 33 x 32 x 21 mm.  There was no associated axillary lymphadenopathy noted.    Right breast biopsy on December 13 showed high-grade infiltrating ductal carcinoma (histologic grade 3, nuclear grade 3, mitotic index 3) there were medullary features.  The cancer was 25% ER positive and NM and HER2 negative.  Ki-67 was 90%.    MRI showed a 5.2 cm x 2.6 cm x 4.6 cm irregularly shaped mass with rim enhancement seen in the right breast at 11 o'clock.    CT scan of the chest abdomen and pelvis and bone scan showed no evidence of metastatic disease.    She completed 4 cycles of neoadjuvant Adriamycin Cytoxan February 13, 2020.  She completed 12 weeks of weekly Taxol on May 20, 2020.    She was taken to the operating room by Dr. Ayers on July 1, 2020 and underwent bilateral mastectomy.    The right breast showed no residual malignancy, 5 right axillary sentinel lymph nodes were all negative for malignancy.  Final pathological stage yp T0 N0.  The left breast showed no atypia or malignancy.  Review of Systems   Constitutional: Positive for fatigue.  Negative for activity change, appetite change, fever and unexpected weight change.   Eyes: Negative for visual disturbance.   Respiratory: Negative for cough and shortness of breath.    Cardiovascular: Positive for chest pain (post-op).   Gastrointestinal: Positive for abdominal pain, diarrhea and nausea. Negative for constipation and rectal pain.   Genitourinary: Negative for frequency.   Musculoskeletal: Negative for back pain.   Skin: Negative for rash.   Neurological: Positive for numbness. Negative for headaches.   Hematological: Negative for adenopathy.   Psychiatric/Behavioral: Negative for dysphoric mood. The patient is not nervous/anxious.        Objective:      Physical Exam  Constitutional:       General: She is not in acute distress.     Appearance: Normal appearance. She is well-developed.   Cardiovascular:      Rate and Rhythm: Normal rate and regular rhythm.   Pulmonary:      Effort: Pulmonary effort is normal.      Breath sounds: Normal breath sounds. No wheezing or rales.   Chest:       Abdominal:      General: There is no distension.      Palpations: Abdomen is soft. There is no mass.      Tenderness: There is no abdominal tenderness.   Lymphadenopathy:      Cervical: No cervical adenopathy.      Upper Body:      Right upper body: No supraclavicular adenopathy.      Left upper body: No supraclavicular adenopathy.   Skin:     Findings: Erythema (right chest wall) present.   Neurological:      Mental Status: She is alert and oriented to person, place, and time.   Psychiatric:         Behavior: Behavior normal.         Thought Content: Thought content normal.         Assessment:       1. Malignant neoplasm of upper-outer quadrant of right breast in female, estrogen receptor positive        Plan:        I discussed adjuvant endocrine therapy with  an aromatase inhibitor.Written information provided.    RTC in approx 1 month.      .

## 2020-11-24 PROCEDURE — 77412 RADIATION TX DELIVERY LVL 3: CPT | Performed by: RADIOLOGY

## 2020-11-24 PROCEDURE — 77387 GUIDANCE FOR RADJ TX DLVR: CPT | Mod: TC | Performed by: RADIOLOGY

## 2020-11-24 PROCEDURE — 77387 GUIDANCE FOR RADJ TX DLVR: CPT | Mod: 26,,, | Performed by: RADIOLOGY

## 2020-11-24 PROCEDURE — 77387 PR GUIDANCE FOR RADIATION TREATMENT DELIVERY: ICD-10-PCS | Mod: 26,,, | Performed by: RADIOLOGY

## 2020-11-25 ENCOUNTER — DOCUMENTATION ONLY (OUTPATIENT)
Dept: RADIATION ONCOLOGY | Facility: CLINIC | Age: 35
End: 2020-11-25

## 2020-11-25 ENCOUNTER — OFFICE VISIT (OUTPATIENT)
Dept: HEMATOLOGY/ONCOLOGY | Facility: CLINIC | Age: 35
End: 2020-11-25
Payer: COMMERCIAL

## 2020-11-25 VITALS
BODY MASS INDEX: 27.03 KG/M2 | SYSTOLIC BLOOD PRESSURE: 114 MMHG | WEIGHT: 162.25 LBS | HEART RATE: 87 BPM | DIASTOLIC BLOOD PRESSURE: 75 MMHG | RESPIRATION RATE: 16 BRPM | OXYGEN SATURATION: 95 % | TEMPERATURE: 98 F | HEIGHT: 65 IN

## 2020-11-25 DIAGNOSIS — Z17.0 MALIGNANT NEOPLASM OF UPPER-OUTER QUADRANT OF RIGHT BREAST IN FEMALE, ESTROGEN RECEPTOR POSITIVE: Primary | Chronic | ICD-10-CM

## 2020-11-25 DIAGNOSIS — C50.411 MALIGNANT NEOPLASM OF UPPER-OUTER QUADRANT OF RIGHT BREAST IN FEMALE, ESTROGEN RECEPTOR POSITIVE: Primary | Chronic | ICD-10-CM

## 2020-11-25 DIAGNOSIS — R11.0 NAUSEA: ICD-10-CM

## 2020-11-25 DIAGNOSIS — R19.7 DIARRHEA, UNSPECIFIED TYPE: ICD-10-CM

## 2020-11-25 PROCEDURE — 77387 PR GUIDANCE FOR RADIATION TREATMENT DELIVERY: ICD-10-PCS | Mod: 26,,, | Performed by: RADIOLOGY

## 2020-11-25 PROCEDURE — 3078F DIAST BP <80 MM HG: CPT | Mod: CPTII,S$GLB,, | Performed by: INTERNAL MEDICINE

## 2020-11-25 PROCEDURE — 99999 PR PBB SHADOW E&M-EST. PATIENT-LVL V: CPT | Mod: PBBFAC,,, | Performed by: INTERNAL MEDICINE

## 2020-11-25 PROCEDURE — 77387 GUIDANCE FOR RADJ TX DLVR: CPT | Mod: TC | Performed by: RADIOLOGY

## 2020-11-25 PROCEDURE — 99214 PR OFFICE/OUTPT VISIT, EST, LEVL IV, 30-39 MIN: ICD-10-PCS | Mod: S$GLB,,, | Performed by: INTERNAL MEDICINE

## 2020-11-25 PROCEDURE — 99214 OFFICE O/P EST MOD 30 MIN: CPT | Mod: S$GLB,,, | Performed by: INTERNAL MEDICINE

## 2020-11-25 PROCEDURE — 3008F BODY MASS INDEX DOCD: CPT | Mod: CPTII,S$GLB,, | Performed by: INTERNAL MEDICINE

## 2020-11-25 PROCEDURE — 99999 PR PBB SHADOW E&M-EST. PATIENT-LVL V: ICD-10-PCS | Mod: PBBFAC,,, | Performed by: INTERNAL MEDICINE

## 2020-11-25 PROCEDURE — 3078F PR MOST RECENT DIASTOLIC BLOOD PRESSURE < 80 MM HG: ICD-10-PCS | Mod: CPTII,S$GLB,, | Performed by: INTERNAL MEDICINE

## 2020-11-25 PROCEDURE — 3008F PR BODY MASS INDEX (BMI) DOCUMENTED: ICD-10-PCS | Mod: CPTII,S$GLB,, | Performed by: INTERNAL MEDICINE

## 2020-11-25 PROCEDURE — 77387 GUIDANCE FOR RADJ TX DLVR: CPT | Mod: 26,,, | Performed by: RADIOLOGY

## 2020-11-25 PROCEDURE — 77412 RADIATION TX DELIVERY LVL 3: CPT | Performed by: RADIOLOGY

## 2020-11-25 PROCEDURE — 3074F PR MOST RECENT SYSTOLIC BLOOD PRESSURE < 130 MM HG: ICD-10-PCS | Mod: CPTII,S$GLB,, | Performed by: INTERNAL MEDICINE

## 2020-11-25 PROCEDURE — 3074F SYST BP LT 130 MM HG: CPT | Mod: CPTII,S$GLB,, | Performed by: INTERNAL MEDICINE

## 2020-11-25 PROCEDURE — 1125F AMNT PAIN NOTED PAIN PRSNT: CPT | Mod: S$GLB,,, | Performed by: INTERNAL MEDICINE

## 2020-11-25 PROCEDURE — 1125F PR PAIN SEVERITY QUANTIFIED, PAIN PRESENT: ICD-10-PCS | Mod: S$GLB,,, | Performed by: INTERNAL MEDICINE

## 2020-11-25 RX ORDER — SUCRALFATE 1 G/1
1 TABLET ORAL 4 TIMES DAILY
Qty: 120 TABLET | Refills: 1 | Status: SHIPPED | OUTPATIENT
Start: 2020-11-25 | End: 2021-11-25

## 2020-11-25 RX ORDER — PANTOPRAZOLE SODIUM 40 MG/1
40 TABLET, DELAYED RELEASE ORAL DAILY
Qty: 30 TABLET | Refills: 1 | Status: SHIPPED | OUTPATIENT
Start: 2020-11-25 | End: 2021-03-01 | Stop reason: SDUPTHER

## 2020-11-25 RX ORDER — METOCLOPRAMIDE 10 MG/1
10 TABLET ORAL
Qty: 90 TABLET | Refills: 1 | Status: SHIPPED | OUTPATIENT
Start: 2020-11-25 | End: 2021-02-25 | Stop reason: SDUPTHER

## 2020-11-27 ENCOUNTER — LAB VISIT (OUTPATIENT)
Dept: LAB | Facility: HOSPITAL | Age: 35
End: 2020-11-27
Attending: INTERNAL MEDICINE
Payer: COMMERCIAL

## 2020-11-27 DIAGNOSIS — R19.7 DIARRHEA, UNSPECIFIED TYPE: ICD-10-CM

## 2020-11-27 LAB — WBC #/AREA STL HPF: NORMAL /[HPF]

## 2020-11-27 PROCEDURE — 89055 LEUKOCYTE ASSESSMENT FECAL: CPT

## 2020-11-30 ENCOUNTER — PATIENT MESSAGE (OUTPATIENT)
Dept: RADIATION ONCOLOGY | Facility: CLINIC | Age: 35
End: 2020-11-30

## 2020-11-30 ENCOUNTER — PATIENT MESSAGE (OUTPATIENT)
Dept: HEMATOLOGY/ONCOLOGY | Facility: CLINIC | Age: 35
End: 2020-11-30

## 2020-11-30 ENCOUNTER — PATIENT MESSAGE (OUTPATIENT)
Dept: GYNECOLOGIC ONCOLOGY | Facility: CLINIC | Age: 35
End: 2020-11-30

## 2020-11-30 ENCOUNTER — PATIENT OUTREACH (OUTPATIENT)
Dept: ADMINISTRATIVE | Facility: OTHER | Age: 35
End: 2020-11-30

## 2020-11-30 PROCEDURE — 77336 RADIATION PHYSICS CONSULT: CPT | Performed by: RADIOLOGY

## 2020-11-30 NOTE — PROGRESS NOTES
LINKS immunization registry not responding  Care Everywhere updated  Health Maintenance updated  Chart reviewed for overdue Proactive [facility] Encounters (JOSE) health maintenance testing (CRS, Breast Ca, Diabetic Eye Exam)   Orders entered:N/A

## 2020-12-02 NOTE — TELEPHONE ENCOUNTER
Dr. Smith,    I spoke with Mrs. Win and she is requesting to move her surgery date up if possible as mentioned in her message.  I know that you blocked off 12/9 and 12/10 as surgery days with no clinic.  Would you like me to call [facility] and see if they have availability on those days?  Alternatively, you have two patients scheduled on 12/11, would you want to move this patient up a week to that date?  Please advise.

## 2020-12-04 ENCOUNTER — OFFICE VISIT (OUTPATIENT)
Dept: PSYCHIATRY | Facility: CLINIC | Age: 35
End: 2020-12-04
Payer: COMMERCIAL

## 2020-12-04 DIAGNOSIS — F90.0 ADHD (ATTENTION DEFICIT HYPERACTIVITY DISORDER), INATTENTIVE TYPE: ICD-10-CM

## 2020-12-04 DIAGNOSIS — Z86.59 HISTORY OF POSTTRAUMATIC STRESS DISORDER (PTSD): Chronic | ICD-10-CM

## 2020-12-04 DIAGNOSIS — C50.411 MALIGNANT NEOPLASM OF UPPER-OUTER QUADRANT OF RIGHT BREAST IN FEMALE, ESTROGEN RECEPTOR POSITIVE: ICD-10-CM

## 2020-12-04 DIAGNOSIS — Z17.0 MALIGNANT NEOPLASM OF UPPER-OUTER QUADRANT OF RIGHT BREAST IN FEMALE, ESTROGEN RECEPTOR POSITIVE: ICD-10-CM

## 2020-12-04 DIAGNOSIS — Z86.59 HISTORY OF ANOREXIA NERVOSA: Chronic | ICD-10-CM

## 2020-12-04 DIAGNOSIS — G47.00 INSOMNIA, UNSPECIFIED TYPE: Chronic | ICD-10-CM

## 2020-12-04 DIAGNOSIS — F41.1 GENERALIZED ANXIETY DISORDER: Primary | Chronic | ICD-10-CM

## 2020-12-04 PROCEDURE — 99214 PR OFFICE/OUTPT VISIT, EST, LEVL IV, 30-39 MIN: ICD-10-PCS | Mod: 95,,, | Performed by: PSYCHIATRY & NEUROLOGY

## 2020-12-04 PROCEDURE — 99214 OFFICE O/P EST MOD 30 MIN: CPT | Mod: 95,,, | Performed by: PSYCHIATRY & NEUROLOGY

## 2020-12-04 PROCEDURE — 90833 PSYTX W PT W E/M 30 MIN: CPT | Mod: 95,,, | Performed by: PSYCHIATRY & NEUROLOGY

## 2020-12-04 PROCEDURE — 90833 PR PSYCHOTHERAPY W/PATIENT W/E&M, 30 MIN (ADD ON): ICD-10-PCS | Mod: 95,,, | Performed by: PSYCHIATRY & NEUROLOGY

## 2020-12-04 RX ORDER — CITALOPRAM 40 MG/1
40 TABLET, FILM COATED ORAL DAILY
Qty: 90 TABLET | Refills: 0 | Status: SHIPPED | OUTPATIENT
Start: 2020-12-04 | End: 2021-02-10 | Stop reason: SDUPTHER

## 2020-12-04 RX ORDER — DEXTROAMPHETAMINE SACCHARATE, AMPHETAMINE ASPARTATE MONOHYDRATE, DEXTROAMPHETAMINE SULFATE AND AMPHETAMINE SULFATE 6.25; 6.25; 6.25; 6.25 MG/1; MG/1; MG/1; MG/1
25 CAPSULE, EXTENDED RELEASE ORAL EVERY MORNING
Qty: 30 CAPSULE | Refills: 0 | Status: SHIPPED | OUTPATIENT
Start: 2020-12-04 | End: 2021-01-08 | Stop reason: SDUPTHER
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
## 2020-12-05 LAB
ACID FAST MOD KINY STN SPEC: NORMAL
MYCOBACTERIUM SPEC QL CULT: NORMAL

## 2020-12-08 ENCOUNTER — PATIENT MESSAGE (OUTPATIENT)
Dept: PSYCHIATRY | Facility: CLINIC | Age: 35
End: 2020-12-08

## 2020-12-08 RX ORDER — TRAZODONE HYDROCHLORIDE 50 MG/1
50 TABLET ORAL NIGHTLY
Qty: 30 TABLET | Refills: 0 | Status: SHIPPED | OUTPATIENT
Start: 2020-12-08 | End: 2021-03-11 | Stop reason: SDUPTHER

## 2020-12-09 ENCOUNTER — PATIENT MESSAGE (OUTPATIENT)
Dept: HEMATOLOGY/ONCOLOGY | Facility: CLINIC | Age: 35
End: 2020-12-09

## 2020-12-09 ENCOUNTER — TELEPHONE (OUTPATIENT)
Dept: NEUROLOGY | Facility: CLINIC | Age: 35
End: 2020-12-09

## 2020-12-09 DIAGNOSIS — G43.C0 PERIODIC HEADACHE SYNDROME, NOT INTRACTABLE: Primary | ICD-10-CM

## 2020-12-09 DIAGNOSIS — G43.C1 INTRACTABLE PERIODIC HEADACHE SYNDROME: ICD-10-CM

## 2020-12-09 NOTE — TELEPHONE ENCOUNTER
----- Message from Courtney Barker sent at 12/9/2020  8:12 AM CST -----  Contact: self @ [phone]  Pt is calling to see if she can get her Botox appt moved up from February.  Pt says she is having headaches.  She says she knows Dr Gonzalez is out but there has to be someone else who can give the injection.  Pls call.

## 2020-12-10 ENCOUNTER — PATIENT MESSAGE (OUTPATIENT)
Dept: NEUROLOGY | Facility: CLINIC | Age: 35
End: 2020-12-10

## 2020-12-10 NOTE — TELEPHONE ENCOUNTER
----- Message from Lily Sherman sent at 12/10/2020  2:01 PM CST -----  Regarding: pt advice  Contact: pt @ [phone]  Patient asking to speak with someone in Dr. Gonzalez's office regarding her appt and botox. Say Saud was working on for her. Please call.

## 2020-12-10 NOTE — TELEPHONE ENCOUNTER
Late entry for 12/10 at 1540: spoke with Saud. Advised that pt may consent to proceed with Botox procedure without ins auth but should be aware that may get a resulting bill if proceeds. Saud to call pt with info.

## 2020-12-10 NOTE — TELEPHONE ENCOUNTER
Patient was notified that she she can get the botox with out an approval but my likely get a bill was  wondering why I was calling when naga was supposed to call her and get her in next week patient was informed that the doctor will be on vacation next week was sure why he was on vacation next week when he is on vacation this week informed her that he was on a two week vacation.     Message text

## 2020-12-11 ENCOUNTER — PATIENT MESSAGE (OUTPATIENT)
Dept: GYNECOLOGIC ONCOLOGY | Facility: CLINIC | Age: 35
End: 2020-12-11

## 2020-12-11 ENCOUNTER — TELEPHONE (OUTPATIENT)
Dept: NEUROLOGY | Facility: CLINIC | Age: 35
End: 2020-12-11

## 2020-12-11 ENCOUNTER — PATIENT MESSAGE (OUTPATIENT)
Dept: NEUROLOGY | Facility: CLINIC | Age: 35
End: 2020-12-11

## 2020-12-11 NOTE — TELEPHONE ENCOUNTER
----- Message from Courtney Barker sent at 12/11/2020  8:21 AM CST -----  Contact: self @ [phone]  Pt says she is sorry she is being  pest. She says she want the appt on the 22nd at 1:00.  Pls call.      PAUL...

## 2020-12-15 ENCOUNTER — HOSPITAL ENCOUNTER (OUTPATIENT)
Dept: PREADMISSION TESTING | Facility: OTHER | Age: 35
Discharge: HOME | End: 2020-12-15
Attending: OBSTETRICS & GYNECOLOGY
Payer: COMMERCIAL

## 2020-12-15 ENCOUNTER — OFFICE VISIT (OUTPATIENT)
Dept: GYNECOLOGIC ONCOLOGY | Facility: CLINIC | Age: 35
End: 2020-12-15
Payer: COMMERCIAL

## 2020-12-15 ENCOUNTER — ANESTHESIA EVENT (OUTPATIENT)
Dept: SURGERY | Facility: OTHER | Age: 35
End: 2020-12-15
Payer: COMMERCIAL

## 2020-12-15 VITALS
RESPIRATION RATE: 16 BRPM | HEIGHT: 65 IN | BODY MASS INDEX: 26.16 KG/M2 | SYSTOLIC BLOOD PRESSURE: 111 MMHG | HEART RATE: 77 BPM | OXYGEN SATURATION: 98 % | DIASTOLIC BLOOD PRESSURE: 74 MMHG | WEIGHT: 157 LBS | TEMPERATURE: 98 F

## 2020-12-15 VITALS — BODY MASS INDEX: 26.71 KG/M2 | WEIGHT: 160.5 LBS

## 2020-12-15 DIAGNOSIS — Z01.812 PRE-PROCEDURE LAB EXAM: ICD-10-CM

## 2020-12-15 DIAGNOSIS — Z15.09 BRCA POSITIVE: ICD-10-CM

## 2020-12-15 DIAGNOSIS — Z15.01 BRCA POSITIVE: ICD-10-CM

## 2020-12-15 DIAGNOSIS — Z01.818 PREOP TESTING: Primary | ICD-10-CM

## 2020-12-15 DIAGNOSIS — Z15.09 BRCA1 POSITIVE: Primary | ICD-10-CM

## 2020-12-15 DIAGNOSIS — B37.31 YEAST VAGINITIS: ICD-10-CM

## 2020-12-15 DIAGNOSIS — Z15.01 BRCA1 POSITIVE: Primary | ICD-10-CM

## 2020-12-15 LAB
ABO + RH BLD: NORMAL
BLD GP AB SCN CELLS X3 SERPL QL: NORMAL
SARS-COV-2 RNA RESP QL NAA+PROBE: NOT DETECTED

## 2020-12-15 PROCEDURE — 3078F DIAST BP <80 MM HG: CPT | Mod: CPTII,S$GLB,, | Performed by: OBSTETRICS & GYNECOLOGY

## 2020-12-15 PROCEDURE — 99999 PR PBB SHADOW E&M-EST. PATIENT-LVL II: CPT | Mod: PBBFAC,,, | Performed by: OBSTETRICS & GYNECOLOGY

## 2020-12-15 PROCEDURE — 36415 COLL VENOUS BLD VENIPUNCTURE: CPT

## 2020-12-15 PROCEDURE — 3074F PR MOST RECENT SYSTOLIC BLOOD PRESSURE < 130 MM HG: ICD-10-PCS | Mod: CPTII,S$GLB,, | Performed by: OBSTETRICS & GYNECOLOGY

## 2020-12-15 PROCEDURE — 3074F SYST BP LT 130 MM HG: CPT | Mod: CPTII,S$GLB,, | Performed by: OBSTETRICS & GYNECOLOGY

## 2020-12-15 PROCEDURE — 3008F PR BODY MASS INDEX (BMI) DOCUMENTED: ICD-10-PCS | Mod: CPTII,S$GLB,, | Performed by: OBSTETRICS & GYNECOLOGY

## 2020-12-15 PROCEDURE — 86901 BLOOD TYPING SEROLOGIC RH(D): CPT

## 2020-12-15 PROCEDURE — 3078F PR MOST RECENT DIASTOLIC BLOOD PRESSURE < 80 MM HG: ICD-10-PCS | Mod: CPTII,S$GLB,, | Performed by: OBSTETRICS & GYNECOLOGY

## 2020-12-15 PROCEDURE — 99213 PR OFFICE/OUTPT VISIT, EST, LEVL III, 20-29 MIN: ICD-10-PCS | Mod: S$GLB,,, | Performed by: OBSTETRICS & GYNECOLOGY

## 2020-12-15 PROCEDURE — 1125F PR PAIN SEVERITY QUANTIFIED, PAIN PRESENT: ICD-10-PCS | Mod: S$GLB,,, | Performed by: OBSTETRICS & GYNECOLOGY

## 2020-12-15 PROCEDURE — U0003 INFECTIOUS AGENT DETECTION BY NUCLEIC ACID (DNA OR RNA); SEVERE ACUTE RESPIRATORY SYNDROME CORONAVIRUS 2 (SARS-COV-2) (CORONAVIRUS DISEASE [COVID-19]), AMPLIFIED PROBE TECHNIQUE, MAKING USE OF HIGH THROUGHPUT TECHNOLOGIES AS DESCRIBED BY CMS-2020-01-R: HCPCS

## 2020-12-15 PROCEDURE — 99999 PR PBB SHADOW E&M-EST. PATIENT-LVL II: ICD-10-PCS | Mod: PBBFAC,,, | Performed by: OBSTETRICS & GYNECOLOGY

## 2020-12-15 PROCEDURE — 99213 OFFICE O/P EST LOW 20 MIN: CPT | Mod: S$GLB,,, | Performed by: OBSTETRICS & GYNECOLOGY

## 2020-12-15 PROCEDURE — 1125F AMNT PAIN NOTED PAIN PRSNT: CPT | Mod: S$GLB,,, | Performed by: OBSTETRICS & GYNECOLOGY

## 2020-12-15 PROCEDURE — 3008F BODY MASS INDEX DOCD: CPT | Mod: CPTII,S$GLB,, | Performed by: OBSTETRICS & GYNECOLOGY

## 2020-12-15 RX ORDER — SODIUM CHLORIDE, SODIUM LACTATE, POTASSIUM CHLORIDE, CALCIUM CHLORIDE 600; 310; 30; 20 MG/100ML; MG/100ML; MG/100ML; MG/100ML
INJECTION, SOLUTION INTRAVENOUS CONTINUOUS
Status: CANCELLED | OUTPATIENT
Start: 2020-12-15

## 2020-12-15 RX ORDER — LIDOCAINE HYDROCHLORIDE 10 MG/ML
0.5 INJECTION, SOLUTION EPIDURAL; INFILTRATION; INTRACAUDAL; PERINEURAL ONCE
Status: CANCELLED | OUTPATIENT
Start: 2020-12-15 | End: 2020-12-15

## 2020-12-15 RX ORDER — FLUCONAZOLE 150 MG/1
150 TABLET ORAL ONCE
Qty: 3 TABLET | Refills: 0 | Status: SHIPPED | OUTPATIENT
Start: 2020-12-15 | End: 2020-12-18

## 2020-12-15 RX ORDER — PREGABALIN 50 MG/1
50 CAPSULE ORAL ONCE
Status: CANCELLED | OUTPATIENT
Start: 2020-12-15 | End: 2020-12-15

## 2020-12-15 RX ORDER — IBUPROFEN 600 MG/1
600 TABLET ORAL EVERY 8 HOURS PRN
Qty: 60 TABLET | Refills: 0 | Status: ON HOLD | OUTPATIENT
Start: 2020-12-15 | End: 2020-12-18 | Stop reason: SDUPTHER

## 2020-12-15 RX ORDER — LIDOCAINE HYDROCHLORIDE 10 MG/ML
1 INJECTION, SOLUTION EPIDURAL; INFILTRATION; INTRACAUDAL; PERINEURAL ONCE
Status: CANCELLED | OUTPATIENT
Start: 2020-12-15 | End: 2020-12-15

## 2020-12-15 RX ORDER — SODIUM CHLORIDE 9 MG/ML
INJECTION, SOLUTION INTRAVENOUS CONTINUOUS
Status: CANCELLED | OUTPATIENT
Start: 2020-12-15

## 2020-12-15 RX ORDER — ACETAMINOPHEN 500 MG
1000 TABLET ORAL
Status: CANCELLED | OUTPATIENT
Start: 2020-12-15 | End: 2020-12-15

## 2020-12-15 RX ORDER — OXYCODONE AND ACETAMINOPHEN 5; 325 MG/1; MG/1
1 TABLET ORAL EVERY 6 HOURS PRN
Qty: 30 TABLET | Refills: 0 | Status: SHIPPED | OUTPATIENT
Start: 2020-12-15 | End: 2021-01-10

## 2020-12-15 RX ORDER — MUPIROCIN 20 MG/G
OINTMENT TOPICAL
Status: CANCELLED | OUTPATIENT
Start: 2020-12-15

## 2020-12-15 NOTE — PROGRESS NOTES
Subjective:      Patient ID: Yolanda Win is a 35 y.o. female.    Chief Complaint: Follow-up (consents)      HPI  Presents today for follow up of ovarian cancer risk management and BRCA1 mutation.   6/2020:   33 and pelvic US shows unremarkable ovaries.  Has completed chemotherapy, surgery, and radiation for her personal history of breast cancer.   Desires to move forward with risk reducing surgery from a gynecologic standpoint.     Plan for risk reducing RTLH/BSO.      Referral history:  34 yr old para 2 referred from Dr. Ayers for recently diagnosed BRCA 1 gene mutation. She presents today to establish care and discuss recommendations for surveillance moving forward.     12/2019 R breast biopsy resulted high-grade infiltrating ductal carcinoma (histologic grade 3, nuclear grade 3, mitotic index 3) with medullary features.  Cancer was 25% ER positive and NM and HER2 negative.  Ki-67 was 90%.     CT C/A/P 12/24/19: No evidence of distant metastatic disease  MRI from December 26, results: There is a 1.3 cm non enhancing focus in the right sternum.  PET scan ordered for 1/7/20.   Bone Scan 12/26/19: Negative  PET scan ordered.     Started DD Adriamycin/Cytoxan on 12/31/2019 (for 4 cycles) and is to receive 12 cycles of weekly Taxol to follow.     Prior surgeries include LEEP, c section x 2 ([place]).     Family history significant for cervical CA - M and MGM. No breast, uterine, ovarian or colon cancer.     Established in survivorship with Dr. Polanco. Paragard IUD   Review of Systems   Constitutional: Negative for appetite change, chills, fatigue and fever.   HENT: Negative for mouth sores.    Respiratory: Negative for cough and shortness of breath.    Cardiovascular: Negative for leg swelling.   Gastrointestinal: Negative for abdominal pain, blood in stool, constipation and diarrhea.   Endocrine: Negative for cold intolerance.   Genitourinary: Negative for dysuria and vaginal bleeding.    Musculoskeletal: Negative for myalgias.   Skin: Negative for rash.   Allergic/Immunologic: Negative.    Neurological: Negative for weakness and numbness.   Hematological: Negative for adenopathy. Does not bruise/bleed easily.   Psychiatric/Behavioral: Negative for confusion.       Objective:   Physical Exam:   Constitutional: She is oriented to person, place, and time. She appears well-developed and well-nourished.    HENT:   Head: Normocephalic and atraumatic.    Eyes: Pupils are equal, round, and reactive to light. EOM are normal.    Neck: Normal range of motion. Neck supple. No thyromegaly present.    Cardiovascular: Normal rate, regular rhythm and intact distal pulses.     Pulmonary/Chest: Effort normal and breath sounds normal. No respiratory distress. She has no wheezes.        Abdominal: Soft. Bowel sounds are normal. She exhibits no distension and no mass. There is no abdominal tenderness.             Musculoskeletal: Normal range of motion and moves all extremeties.      Lymphadenopathy:     She has no cervical adenopathy.        Right: No supraclavicular adenopathy present.        Left: No supraclavicular adenopathy present.    Neurological: She is alert and oriented to person, place, and time.    Skin: Skin is warm and dry. No rash noted.    Psychiatric: She has a normal mood and affect.       Assessment:     1. BRCA1 positive    2. Yeast vaginitis    3. BRCA positive        Plan:   No orders of the defined types were placed in this encounter.    Plan for risk reducing surgery RTLH/BSO/any other indicated procedures.     The risks, benefits, and indications of the procedure were discussed with the patient and her family members if present.  These included bleeding, transfusion, infection, damage to surrounding tissues (bowel, bladder, ureter), wound separation, lymphedema, conversion to laparotomy if laparoscopic, perioperative cardiac events, VTE, pneumonia, and possible death.  She voiced  understanding, all questions were answered and consents were signed.    Surgery 12/18/2020 [facility]  Pre op anesthesia

## 2020-12-15 NOTE — H&P (VIEW-ONLY)
Subjective:      Patient ID: Yolanda Win is a 35 y.o. female.    Chief Complaint: Follow-up (consents)      HPI  Presents today for follow up of ovarian cancer risk management and BRCA1 mutation.   6/2020:   33 and pelvic US shows unremarkable ovaries.  Has completed chemotherapy, surgery, and radiation for her personal history of breast cancer.   Desires to move forward with risk reducing surgery from a gynecologic standpoint.     Plan for risk reducing RTLH/BSO.      Referral history:  34 yr old para 2 referred from Dr. Ayers for recently diagnosed BRCA 1 gene mutation. She presents today to establish care and discuss recommendations for surveillance moving forward.     12/2019 R breast biopsy resulted high-grade infiltrating ductal carcinoma (histologic grade 3, nuclear grade 3, mitotic index 3) with medullary features.  Cancer was 25% ER positive and WA and HER2 negative.  Ki-67 was 90%.     CT C/A/P 12/24/19: No evidence of distant metastatic disease  MRI from December 26, results: There is a 1.3 cm non enhancing focus in the right sternum.  PET scan ordered for 1/7/20.   Bone Scan 12/26/19: Negative  PET scan ordered.     Started DD Adriamycin/Cytoxan on 12/31/2019 (for 4 cycles) and is to receive 12 cycles of weekly Taxol to follow.     Prior surgeries include LEEP, c section x 2 ([place]).     Family history significant for cervical CA - M and MGM. No breast, uterine, ovarian or colon cancer.     Established in survivorship with Dr. Polanco. Paragard IUD   Review of Systems   Constitutional: Negative for appetite change, chills, fatigue and fever.   HENT: Negative for mouth sores.    Respiratory: Negative for cough and shortness of breath.    Cardiovascular: Negative for leg swelling.   Gastrointestinal: Negative for abdominal pain, blood in stool, constipation and diarrhea.   Endocrine: Negative for cold intolerance.   Genitourinary: Negative for dysuria and vaginal bleeding.    Musculoskeletal: Negative for myalgias.   Skin: Negative for rash.   Allergic/Immunologic: Negative.    Neurological: Negative for weakness and numbness.   Hematological: Negative for adenopathy. Does not bruise/bleed easily.   Psychiatric/Behavioral: Negative for confusion.       Objective:   Physical Exam:   Constitutional: She is oriented to person, place, and time. She appears well-developed and well-nourished.    HENT:   Head: Normocephalic and atraumatic.    Eyes: Pupils are equal, round, and reactive to light. EOM are normal.    Neck: Normal range of motion. Neck supple. No thyromegaly present.    Cardiovascular: Normal rate, regular rhythm and intact distal pulses.     Pulmonary/Chest: Effort normal and breath sounds normal. No respiratory distress. She has no wheezes.        Abdominal: Soft. Bowel sounds are normal. She exhibits no distension and no mass. There is no abdominal tenderness.             Musculoskeletal: Normal range of motion and moves all extremeties.      Lymphadenopathy:     She has no cervical adenopathy.        Right: No supraclavicular adenopathy present.        Left: No supraclavicular adenopathy present.    Neurological: She is alert and oriented to person, place, and time.    Skin: Skin is warm and dry. No rash noted.    Psychiatric: She has a normal mood and affect.       Assessment:     1. BRCA1 positive    2. Yeast vaginitis    3. BRCA positive        Plan:   No orders of the defined types were placed in this encounter.    Plan for risk reducing surgery RTLH/BSO/any other indicated procedures.     The risks, benefits, and indications of the procedure were discussed with the patient and her family members if present.  These included bleeding, transfusion, infection, damage to surrounding tissues (bowel, bladder, ureter), wound separation, lymphedema, conversion to laparotomy if laparoscopic, perioperative cardiac events, VTE, pneumonia, and possible death.  She voiced  understanding, all questions were answered and consents were signed.    Surgery 12/18/2020 [facility]  Pre op anesthesia

## 2020-12-15 NOTE — ANESTHESIA PREPROCEDURE EVALUATION
12/15/2020  Yolanda Win is a 35 y.o., female.    Anesthesia Evaluation    I have reviewed the Patient Summary Reports.    I have reviewed the Nursing Notes. I have reviewed the NPO Status.      Review of Systems  Anesthesia Hx:  No problems with previous Anesthesia  History of prior surgery of interest to airway management or planning: Previous anesthesia: General breast surgery x 2 2020 with general anesthesia.  Airway issues documented on chart review include mask, easy, laryngeal mask airway used, GETA, videolaryngoscope used , view on video-laryngoscopy Grade I    Denies Family Hx of Anesthesia complications.  Personal Hx of Anesthesia complications  Difficult Intubation (no problems with Ortiz #3)  Severe Sore Throat after Anesthesia   Social:  Non-Smoker    Hematology/Oncology:  Hematology Normal      Current/Recent Cancer. Breast right chemotherapy, radiation and surgery Oncology Comments: Finished chemo and radiation     EENT/Dental:EENT/Dental Normal   Cardiovascular:   Denies Hypertension.     Pulmonary:   Recent cough for 2 months   Renal/:  Renal/ Normal     Hepatic/GI:  Hepatic/GI Normal    Musculoskeletal:  Musculoskeletal Normal    Neurological:   Denies Neuromuscular Disease. Headaches    Endocrine:  Endocrine Normal    Dermatological:  Skin Normal    Psych:   Psychiatric History          Physical Exam  General:  Well nourished    Airway/Jaw/Neck:  Airway Findings:     Dental:  Dental Findings: In tact        Mental Status:  Mental Status Findings:  Cooperative, Alert and Oriented, Anxious         Anesthesia Plan  Type of Anesthesia, risks & benefits discussed:  Anesthesia Type:  general  Patient's Preference:   Intra-op Monitoring Plan: standard ASA monitors  Intra-op Monitoring Plan Comments:   Post Op Pain Control Plan: per primary service following discharge from PACU  and multimodal analgesia  Post Op Pain Control Plan Comments:   Induction:   IV  Beta Blocker:         Informed Consent: Patient understands risks and agrees with Anesthesia plan.  Questions answered. Anesthesia consent signed with patient.  ASA Score: 2     Day of Surgery Review of History & Physical:    H&P update referred to the surgeon.     Anesthesia Plan Notes: T&S, labs today    L side devices        Ready For Surgery From Anesthesia Perspective.

## 2020-12-15 NOTE — LETTER
December 15, 2020        Gokul Ny MD  [address] [facility] [address]             [facility] [address]  [address], [address]  [facility] [address]  Phone: [phone]  Fax: [phone]   Patient: Yolanda Win   MR Number: [id number]   YOB: 1985   Date of Visit: 12/15/2020     Dear Dr. Ny,     Please find attached progress note.     Sincerely,      Emili Smith MD            CC  Kaleigh Polanco MD    Enclosure

## 2020-12-15 NOTE — DISCHARGE INSTRUCTIONS
Information to Prepare you for your Surgery    PRE-ADMIT TESTING -  [phone]    [address]  [place] [facility]          Your surgery has been scheduled at [facility]. We are pleased to have the opportunity to serve you. For Further Information please call [phone].    On the day of surgery please report to the Information Desk on the 1st floor.    · CONTACT YOUR PHYSICIAN'S OFFICE THE DAY PRIOR TO YOUR SURGERY TO OBTAIN YOUR ARRIVAL TIME.     · The evening before surgery do not eat anything after 9 p.m. ( this includes hard candy, chewing gum and mints).  You may only have GATORADE, POWERADE AND WATER  from 9 p.m. until you leave your home.   DO NOT DRINK ANY LIQUIDS ON THE WAY TO THE HOSPITAL.      SPECIAL MEDICATION INSTRUCTIONS: TAKE medications checked off by the Anesthesiologist on your Medication List.    Angiogram Patients: Take medications as instructed by your physician, including aspirin.     Surgery Patients:    If you take ASPIRIN - Your PHYSICIAN/SURGEON will need to inform you IF/OR when you need to stop taking aspirin prior to your surgery.     Do Not take any medications containing IBUPROFEN.  Do Not Wear any make-up or dark nail polish   (especially eye make-up) to surgery. If you come to surgery with makeup on you will be required to remove the makeup or nail polish.    Do not shave your surgical area at least 5 days prior to your surgery. The surgical prep will be performed at the hospital according to Infection Control regulations.    Leave all valuables at home.   Do Not wear any jewelry or watches, including any metal in body piercings. Jewelry must be removed prior to coming to the hospital.  There is a possibility that rings that are unable to be removed may be cut off if they are on the surgical extremity.    Contact Lens must be removed before surgery. Either do not wear the contact lens or bring a case and solution for  storage.  Please bring a container for eyeglasses or dentures as required.  Bring any paperwork your physician has provided, such as consent forms,  history and physicals, doctor's orders, etc.   Bring comfortable clothes that are loose fitting to wear upon discharge. Take into consideration the type of surgery being performed.  Maintain your diet as advised per your physician the day prior to surgery.      Adequate rest the night before surgery is advised.   Park in the Parking lot behind the hospital or in the [facility] across the street from the parking lot. Parking is complimentary.  If you will be discharged the same day as your procedure, please arrange for a responsible adult to drive you home or to accompany you if traveling by taxi.   YOU WILL NOT BE PERMITTED TO DRIVE OR TO LEAVE THE HOSPITAL ALONE AFTER SURGERY.   If you are being discharged the same day, it is strongly recommended that you arrange for someone to remain with you for the first 24 hrs following your surgery.    The Surgeon will speak to your family/visitor after your surgery regarding the outcome of your surgery and post op care.  The Surgeon may speak to you after your surgery, but there is a possibility you may not remember the details.  Please check with your family members regarding the conversation with the Surgeon.    We strongly recommend whoever is bringing you home be present for discharge instructions.  This will ensure a thorough understanding for your post op home care.    ALL CHILDREN MUST ALWAYS BE ACCOMPANIED BY AN ADULT.    Visitors-Refer to current Visitor policy handouts.    Thank you for your cooperation.  The Staff of [facility].                Bathing Instructions with Hibiclens     Shower the evening before and morning of your procedure with Hibiclens:   Wash your face with water and your regular face wash/soap   Apply Hibiclens directly on your skin or on a wet washcloth and wash  gently. When showering: Move away from the shower stream when applying Hibiclens to avoid rinsing off too soon.   Rinse thoroughly with warm water   Do not dilute Hibiclens         Dry off as usual, do not use any deodorant, powder, body lotions, perfume, after shave or cologne.

## 2020-12-16 ENCOUNTER — PATIENT MESSAGE (OUTPATIENT)
Dept: HEMATOLOGY/ONCOLOGY | Facility: CLINIC | Age: 35
End: 2020-12-16

## 2020-12-17 ENCOUNTER — TELEPHONE (OUTPATIENT)
Dept: GYNECOLOGIC ONCOLOGY | Facility: CLINIC | Age: 35
End: 2020-12-17

## 2020-12-17 ENCOUNTER — PATIENT MESSAGE (OUTPATIENT)
Dept: SURGERY | Facility: OTHER | Age: 35
End: 2020-12-17

## 2020-12-18 ENCOUNTER — HOSPITAL ENCOUNTER (OUTPATIENT)
Facility: OTHER | Age: 35
Discharge: HOME | End: 2020-12-18
Attending: OBSTETRICS & GYNECOLOGY | Admitting: OBSTETRICS & GYNECOLOGY
Payer: COMMERCIAL

## 2020-12-18 ENCOUNTER — ANESTHESIA (OUTPATIENT)
Dept: SURGERY | Facility: OTHER | Age: 35
End: 2020-12-18
Payer: COMMERCIAL

## 2020-12-18 VITALS
SYSTOLIC BLOOD PRESSURE: 102 MMHG | BODY MASS INDEX: 26.74 KG/M2 | OXYGEN SATURATION: 100 % | HEART RATE: 68 BPM | RESPIRATION RATE: 16 BRPM | TEMPERATURE: 99 F | HEIGHT: 65 IN | WEIGHT: 160.5 LBS | DIASTOLIC BLOOD PRESSURE: 56 MMHG

## 2020-12-18 DIAGNOSIS — Z15.09 BRCA1 POSITIVE: ICD-10-CM

## 2020-12-18 DIAGNOSIS — Z15.01 BRCA1 POSITIVE: ICD-10-CM

## 2020-12-18 DIAGNOSIS — Z98.890 S/P ROBOT-ASSISTED SURGICAL PROCEDURE: Primary | ICD-10-CM

## 2020-12-18 LAB
B-HCG UR QL: NEGATIVE
CTP QC/QA: YES
POCT GLUCOSE: 111 MG/DL (ref 70–110)

## 2020-12-18 PROCEDURE — 88300 SURGICAL PATH GROSS: CPT | Performed by: PATHOLOGY

## 2020-12-18 PROCEDURE — 25000003 PHARM REV CODE 250: Performed by: ANESTHESIOLOGY

## 2020-12-18 PROCEDURE — 63600175 PHARM REV CODE 636 W HCPCS: Performed by: ANESTHESIOLOGY

## 2020-12-18 PROCEDURE — 25000003 PHARM REV CODE 250: Performed by: NURSE ANESTHETIST, CERTIFIED REGISTERED

## 2020-12-18 PROCEDURE — 88300 PR  SURG PATH,GROSS,LEVEL I: ICD-10-PCS | Mod: 26,59,, | Performed by: PATHOLOGY

## 2020-12-18 PROCEDURE — 88112 CYTOPATH CELL ENHANCE TECH: CPT | Mod: 26,,, | Performed by: PATHOLOGY

## 2020-12-18 PROCEDURE — 63600175 PHARM REV CODE 636 W HCPCS: Performed by: OBSTETRICS & GYNECOLOGY

## 2020-12-18 PROCEDURE — 71000016 HC POSTOP RECOV ADDL HR: Performed by: OBSTETRICS & GYNECOLOGY

## 2020-12-18 PROCEDURE — 58571 TLH W/T/O 250 G OR LESS: CPT | Mod: ,,, | Performed by: OBSTETRICS & GYNECOLOGY

## 2020-12-18 PROCEDURE — 88307 PR  SURG PATH,LEVEL V: ICD-10-PCS | Mod: 26,,, | Performed by: PATHOLOGY

## 2020-12-18 PROCEDURE — 63600175 PHARM REV CODE 636 W HCPCS: Performed by: NURSE ANESTHETIST, CERTIFIED REGISTERED

## 2020-12-18 PROCEDURE — 25000003 PHARM REV CODE 250: Performed by: OBSTETRICS & GYNECOLOGY

## 2020-12-18 PROCEDURE — 71000039 HC RECOVERY, EACH ADD'L HOUR: Performed by: OBSTETRICS & GYNECOLOGY

## 2020-12-18 PROCEDURE — 71000033 HC RECOVERY, INTIAL HOUR: Performed by: OBSTETRICS & GYNECOLOGY

## 2020-12-18 PROCEDURE — 88300 SURGICAL PATH GROSS: CPT | Mod: 26,59,, | Performed by: PATHOLOGY

## 2020-12-18 PROCEDURE — 88112 CYTOPATH CELL ENHANCE TECH: CPT | Performed by: PATHOLOGY

## 2020-12-18 PROCEDURE — 82962 GLUCOSE BLOOD TEST: CPT | Performed by: OBSTETRICS & GYNECOLOGY

## 2020-12-18 PROCEDURE — 58571 PR LAPAROSCOPY W TOT HYSTERECTUTERUS <=250 GRAM  W TUBE/OVARY: ICD-10-PCS | Mod: ,,, | Performed by: OBSTETRICS & GYNECOLOGY

## 2020-12-18 PROCEDURE — 88307 TISSUE EXAM BY PATHOLOGIST: CPT | Mod: 26,,, | Performed by: PATHOLOGY

## 2020-12-18 PROCEDURE — 58571 TLH W/T/O 250 G OR LESS: CPT | Mod: AS,,, | Performed by: NURSE PRACTITIONER

## 2020-12-18 PROCEDURE — 37000009 HC ANESTHESIA EA ADD 15 MINS: Performed by: OBSTETRICS & GYNECOLOGY

## 2020-12-18 PROCEDURE — 37000008 HC ANESTHESIA 1ST 15 MINUTES: Performed by: OBSTETRICS & GYNECOLOGY

## 2020-12-18 PROCEDURE — 88305 TISSUE EXAM BY PATHOLOGIST: ICD-10-PCS | Mod: 26,,, | Performed by: PATHOLOGY

## 2020-12-18 PROCEDURE — 27201423 OPTIME MED/SURG SUP & DEVICES STERILE SUPPLY: Performed by: OBSTETRICS & GYNECOLOGY

## 2020-12-18 PROCEDURE — 88305 TISSUE EXAM BY PATHOLOGIST: CPT | Performed by: PATHOLOGY

## 2020-12-18 PROCEDURE — 58571 PR LAPAROSCOPY W TOT HYSTERECTUTERUS <=250 GRAM  W TUBE/OVARY: ICD-10-PCS | Mod: AS,,, | Performed by: NURSE PRACTITIONER

## 2020-12-18 PROCEDURE — 81025 URINE PREGNANCY TEST: CPT | Performed by: ANESTHESIOLOGY

## 2020-12-18 PROCEDURE — 88305 TISSUE EXAM BY PATHOLOGIST: CPT | Mod: 26,,, | Performed by: PATHOLOGY

## 2020-12-18 PROCEDURE — 36000712 HC OR TIME LEV V 1ST 15 MIN: Performed by: OBSTETRICS & GYNECOLOGY

## 2020-12-18 PROCEDURE — 71000015 HC POSTOP RECOV 1ST HR: Performed by: OBSTETRICS & GYNECOLOGY

## 2020-12-18 PROCEDURE — 36000713 HC OR TIME LEV V EA ADD 15 MIN: Performed by: OBSTETRICS & GYNECOLOGY

## 2020-12-18 PROCEDURE — 88112 PR  CYTOPATH, CELL ENHANCE TECH: ICD-10-PCS | Mod: 26,,, | Performed by: PATHOLOGY

## 2020-12-18 PROCEDURE — 88307 TISSUE EXAM BY PATHOLOGIST: CPT | Performed by: PATHOLOGY

## 2020-12-18 PROCEDURE — P9045 ALBUMIN (HUMAN), 5%, 250 ML: HCPCS | Mod: JG | Performed by: NURSE ANESTHETIST, CERTIFIED REGISTERED

## 2020-12-18 RX ORDER — SODIUM CHLORIDE, SODIUM LACTATE, POTASSIUM CHLORIDE, CALCIUM CHLORIDE 600; 310; 30; 20 MG/100ML; MG/100ML; MG/100ML; MG/100ML
INJECTION, SOLUTION INTRAVENOUS CONTINUOUS
Status: DISCONTINUED | OUTPATIENT
Start: 2020-12-18 | End: 2020-12-18 | Stop reason: HOSPADM

## 2020-12-18 RX ORDER — OXYCODONE HYDROCHLORIDE 5 MG/1
5 TABLET ORAL
Status: DISCONTINUED | OUTPATIENT
Start: 2020-12-18 | End: 2020-12-18 | Stop reason: HOSPADM

## 2020-12-18 RX ORDER — MEPERIDINE HYDROCHLORIDE 25 MG/ML
12.5 INJECTION INTRAMUSCULAR; INTRAVENOUS; SUBCUTANEOUS ONCE AS NEEDED
Status: DISCONTINUED | OUTPATIENT
Start: 2020-12-18 | End: 2020-12-18 | Stop reason: HOSPADM

## 2020-12-18 RX ORDER — HYDROMORPHONE HYDROCHLORIDE 2 MG/ML
0.4 INJECTION, SOLUTION INTRAMUSCULAR; INTRAVENOUS; SUBCUTANEOUS EVERY 5 MIN PRN
Status: DISCONTINUED | OUTPATIENT
Start: 2020-12-18 | End: 2020-12-18 | Stop reason: HOSPADM

## 2020-12-18 RX ORDER — ONDANSETRON 2 MG/ML
4 INJECTION INTRAMUSCULAR; INTRAVENOUS DAILY PRN
Status: DISCONTINUED | OUTPATIENT
Start: 2020-12-18 | End: 2020-12-18 | Stop reason: HOSPADM

## 2020-12-18 RX ORDER — MUPIROCIN 20 MG/G
OINTMENT TOPICAL 2 TIMES DAILY
Status: CANCELLED | OUTPATIENT
Start: 2020-12-18 | End: 2020-12-23

## 2020-12-18 RX ORDER — ONDANSETRON 2 MG/ML
INJECTION INTRAMUSCULAR; INTRAVENOUS
Status: DISCONTINUED | OUTPATIENT
Start: 2020-12-18 | End: 2020-12-18

## 2020-12-18 RX ORDER — BUPIVACAINE HYDROCHLORIDE 2.5 MG/ML
INJECTION, SOLUTION EPIDURAL; INFILTRATION; INTRACAUDAL
Status: DISCONTINUED | OUTPATIENT
Start: 2020-12-18 | End: 2020-12-18 | Stop reason: HOSPADM

## 2020-12-18 RX ORDER — KETOROLAC TROMETHAMINE 30 MG/ML
15 INJECTION, SOLUTION INTRAMUSCULAR; INTRAVENOUS EVERY 6 HOURS
Status: CANCELLED | OUTPATIENT
Start: 2020-12-18 | End: 2020-12-19

## 2020-12-18 RX ORDER — ADHESIVE BANDAGE
30 BANDAGE TOPICAL 2 TIMES DAILY
Status: CANCELLED | OUTPATIENT
Start: 2020-12-18

## 2020-12-18 RX ORDER — ZOLPIDEM TARTRATE 5 MG/1
5 TABLET ORAL NIGHTLY PRN
Status: CANCELLED | OUTPATIENT
Start: 2020-12-18

## 2020-12-18 RX ORDER — NALOXONE HCL 0.4 MG/ML
0.02 VIAL (ML) INJECTION
Status: CANCELLED | OUTPATIENT
Start: 2020-12-18

## 2020-12-18 RX ORDER — HYDROMORPHONE HYDROCHLORIDE 2 MG/ML
INJECTION, SOLUTION INTRAMUSCULAR; INTRAVENOUS; SUBCUTANEOUS
Status: DISCONTINUED | OUTPATIENT
Start: 2020-12-18 | End: 2020-12-18

## 2020-12-18 RX ORDER — CITALOPRAM 20 MG/1
40 TABLET, FILM COATED ORAL DAILY
Status: CANCELLED | OUTPATIENT
Start: 2020-12-18

## 2020-12-18 RX ORDER — SODIUM CHLORIDE 9 MG/ML
INJECTION, SOLUTION INTRAVENOUS CONTINUOUS
Status: DISCONTINUED | OUTPATIENT
Start: 2020-12-18 | End: 2020-12-18 | Stop reason: HOSPADM

## 2020-12-18 RX ORDER — PROPOFOL 10 MG/ML
VIAL (ML) INTRAVENOUS
Status: DISCONTINUED | OUTPATIENT
Start: 2020-12-18 | End: 2020-12-18

## 2020-12-18 RX ORDER — IBUPROFEN 200 MG
200 TABLET ORAL EVERY 6 HOURS
Status: CANCELLED | OUTPATIENT
Start: 2020-12-19

## 2020-12-18 RX ORDER — SODIUM CHLORIDE 0.9 % (FLUSH) 0.9 %
3 SYRINGE (ML) INJECTION
Status: DISCONTINUED | OUTPATIENT
Start: 2020-12-18 | End: 2020-12-18 | Stop reason: HOSPADM

## 2020-12-18 RX ORDER — SODIUM CHLORIDE, SODIUM LACTATE, POTASSIUM CHLORIDE, CALCIUM CHLORIDE 600; 310; 30; 20 MG/100ML; MG/100ML; MG/100ML; MG/100ML
INJECTION, SOLUTION INTRAVENOUS CONTINUOUS
Status: CANCELLED | OUTPATIENT
Start: 2020-12-18

## 2020-12-18 RX ORDER — ACETAMINOPHEN 500 MG
500 TABLET ORAL EVERY 6 HOURS PRN
Qty: 30 TABLET | Refills: 0 | Status: SHIPPED | OUTPATIENT
Start: 2020-12-18 | End: 2021-01-10

## 2020-12-18 RX ORDER — LIDOCAINE HYDROCHLORIDE 10 MG/ML
INJECTION, SOLUTION INTRAVENOUS
Status: DISCONTINUED | OUTPATIENT
Start: 2020-12-18 | End: 2020-12-18

## 2020-12-18 RX ORDER — PREGABALIN 50 MG/1
50 CAPSULE ORAL ONCE
Status: COMPLETED | OUTPATIENT
Start: 2020-12-18 | End: 2020-12-18

## 2020-12-18 RX ORDER — HEPARIN SODIUM 5000 [USP'U]/ML
5000 INJECTION, SOLUTION INTRAVENOUS; SUBCUTANEOUS EVERY 8 HOURS
Status: DISCONTINUED | OUTPATIENT
Start: 2020-12-18 | End: 2020-12-18 | Stop reason: HOSPADM

## 2020-12-18 RX ORDER — LIDOCAINE HYDROCHLORIDE 10 MG/ML
1 INJECTION, SOLUTION EPIDURAL; INFILTRATION; INTRACAUDAL; PERINEURAL ONCE
Status: DISCONTINUED | OUTPATIENT
Start: 2020-12-18 | End: 2020-12-18 | Stop reason: HOSPADM

## 2020-12-18 RX ORDER — ROCURONIUM BROMIDE 10 MG/ML
INJECTION, SOLUTION INTRAVENOUS
Status: DISCONTINUED | OUTPATIENT
Start: 2020-12-18 | End: 2020-12-18

## 2020-12-18 RX ORDER — ONDANSETRON 2 MG/ML
4 INJECTION INTRAMUSCULAR; INTRAVENOUS EVERY 6 HOURS PRN
Status: CANCELLED | OUTPATIENT
Start: 2020-12-18

## 2020-12-18 RX ORDER — ACETAMINOPHEN 500 MG
1000 TABLET ORAL
Status: COMPLETED | OUTPATIENT
Start: 2020-12-18 | End: 2020-12-18

## 2020-12-18 RX ORDER — CLINDAMYCIN PHOSPHATE 900 MG/50ML
900 INJECTION, SOLUTION INTRAVENOUS
Status: COMPLETED | OUTPATIENT
Start: 2020-12-18 | End: 2020-12-18

## 2020-12-18 RX ORDER — LIDOCAINE HYDROCHLORIDE 10 MG/ML
0.5 INJECTION, SOLUTION EPIDURAL; INFILTRATION; INTRACAUDAL; PERINEURAL ONCE
Status: DISCONTINUED | OUTPATIENT
Start: 2020-12-18 | End: 2020-12-18 | Stop reason: HOSPADM

## 2020-12-18 RX ORDER — OXYCODONE HYDROCHLORIDE 5 MG/1
5 TABLET ORAL EVERY 4 HOURS PRN
Status: CANCELLED | OUTPATIENT
Start: 2020-12-18

## 2020-12-18 RX ORDER — ATROPA BELLADONNA AND OPIUM 16.2; 6 MG/1; MG/1
60 SUPPOSITORY RECTAL EVERY 6 HOURS PRN
Status: CANCELLED | OUTPATIENT
Start: 2020-12-18

## 2020-12-18 RX ORDER — HYDROMORPHONE HYDROCHLORIDE 1 MG/ML
0.4 INJECTION, SOLUTION INTRAMUSCULAR; INTRAVENOUS; SUBCUTANEOUS
Status: CANCELLED | OUTPATIENT
Start: 2020-12-18

## 2020-12-18 RX ORDER — MIDAZOLAM HYDROCHLORIDE 1 MG/ML
INJECTION INTRAMUSCULAR; INTRAVENOUS
Status: DISCONTINUED | OUTPATIENT
Start: 2020-12-18 | End: 2020-12-18

## 2020-12-18 RX ORDER — DIPHENHYDRAMINE HYDROCHLORIDE 50 MG/ML
25 INJECTION INTRAMUSCULAR; INTRAVENOUS EVERY 6 HOURS PRN
Status: DISCONTINUED | OUTPATIENT
Start: 2020-12-18 | End: 2020-12-18 | Stop reason: HOSPADM

## 2020-12-18 RX ORDER — MUPIROCIN 20 MG/G
OINTMENT TOPICAL
Status: DISCONTINUED | OUTPATIENT
Start: 2020-12-18 | End: 2020-12-18 | Stop reason: HOSPADM

## 2020-12-18 RX ORDER — ALPRAZOLAM 0.25 MG/1
0.25 TABLET ORAL DAILY PRN
Status: CANCELLED | OUTPATIENT
Start: 2020-12-18

## 2020-12-18 RX ORDER — OXYCODONE HYDROCHLORIDE 5 MG/1
5 TABLET ORAL EVERY 4 HOURS PRN
Qty: 20 TABLET | Refills: 0 | Status: SHIPPED | OUTPATIENT
Start: 2020-12-18 | End: 2021-01-10

## 2020-12-18 RX ORDER — DEXAMETHASONE SODIUM PHOSPHATE 4 MG/ML
INJECTION, SOLUTION INTRA-ARTICULAR; INTRALESIONAL; INTRAMUSCULAR; INTRAVENOUS; SOFT TISSUE
Status: DISCONTINUED | OUTPATIENT
Start: 2020-12-18 | End: 2020-12-18

## 2020-12-18 RX ORDER — DOCUSATE SODIUM 100 MG/1
100 CAPSULE, LIQUID FILLED ORAL 2 TIMES DAILY
COMMUNITY
End: 2021-01-10

## 2020-12-18 RX ORDER — FENTANYL CITRATE 50 UG/ML
INJECTION, SOLUTION INTRAMUSCULAR; INTRAVENOUS
Status: DISCONTINUED | OUTPATIENT
Start: 2020-12-18 | End: 2020-12-18

## 2020-12-18 RX ORDER — PHENYLEPHRINE HYDROCHLORIDE 10 MG/ML
INJECTION INTRAVENOUS
Status: DISCONTINUED | OUTPATIENT
Start: 2020-12-18 | End: 2020-12-18

## 2020-12-18 RX ORDER — PROCHLORPERAZINE EDISYLATE 5 MG/ML
5 INJECTION INTRAMUSCULAR; INTRAVENOUS EVERY 6 HOURS PRN
Status: CANCELLED | OUTPATIENT
Start: 2020-12-18

## 2020-12-18 RX ORDER — SENNOSIDES 8.6 MG/1
8.6 TABLET ORAL 2 TIMES DAILY
Status: CANCELLED | OUTPATIENT
Start: 2020-12-18

## 2020-12-18 RX ORDER — ATROPINE SULFATE 0.4 MG/ML
INJECTION, SOLUTION ENDOTRACHEAL; INTRAMEDULLARY; INTRAMUSCULAR; INTRAVENOUS; SUBCUTANEOUS
Status: DISCONTINUED | OUTPATIENT
Start: 2020-12-18 | End: 2020-12-18

## 2020-12-18 RX ORDER — ALBUMIN HUMAN 50 G/1000ML
SOLUTION INTRAVENOUS CONTINUOUS PRN
Status: DISCONTINUED | OUTPATIENT
Start: 2020-12-18 | End: 2020-12-18

## 2020-12-18 RX ORDER — ACETAMINOPHEN 500 MG
1000 TABLET ORAL EVERY 6 HOURS
Status: CANCELLED | OUTPATIENT
Start: 2020-12-18

## 2020-12-18 RX ORDER — IBUPROFEN 600 MG/1
600 TABLET ORAL EVERY 6 HOURS PRN
Qty: 30 TABLET | Refills: 0 | Status: SHIPPED | OUTPATIENT
Start: 2020-12-18 | End: 2021-01-10

## 2020-12-18 RX ADMIN — GLYCOPYRROLATE 0.4 MG: 0.2 INJECTION, SOLUTION INTRAMUSCULAR; INTRAVITREAL at 07:12

## 2020-12-18 RX ADMIN — ROCURONIUM BROMIDE 20 MG: 10 SOLUTION INTRAVENOUS at 07:12

## 2020-12-18 RX ADMIN — ATROPINE SULFATE 1 MG: 0.4 INJECTION, SOLUTION INTRAMUSCULAR; INTRAVENOUS; SUBCUTANEOUS at 07:12

## 2020-12-18 RX ADMIN — HYDROMORPHONE HYDROCHLORIDE 0.6 MG: 2 INJECTION, SOLUTION INTRAMUSCULAR; INTRAVENOUS; SUBCUTANEOUS at 08:12

## 2020-12-18 RX ADMIN — SUGAMMADEX 400 MG: 100 INJECTION, SOLUTION INTRAVENOUS at 08:12

## 2020-12-18 RX ADMIN — PHENYLEPHRINE HYDROCHLORIDE 200 MCG: 10 INJECTION INTRAVENOUS at 07:12

## 2020-12-18 RX ADMIN — HYDROMORPHONE HYDROCHLORIDE 0.4 MG: 2 INJECTION INTRAMUSCULAR; INTRAVENOUS; SUBCUTANEOUS at 09:12

## 2020-12-18 RX ADMIN — OXYCODONE HYDROCHLORIDE 5 MG: 5 TABLET ORAL at 01:12

## 2020-12-18 RX ADMIN — PREGABALIN 50 MG: 50 CAPSULE ORAL at 06:12

## 2020-12-18 RX ADMIN — ACETAMINOPHEN 1000 MG: 500 TABLET, FILM COATED ORAL at 06:12

## 2020-12-18 RX ADMIN — DEXAMETHASONE SODIUM PHOSPHATE 8 MG: 4 INJECTION, SOLUTION INTRAMUSCULAR; INTRAVENOUS at 07:12

## 2020-12-18 RX ADMIN — ALBUMIN (HUMAN): 2.5 SOLUTION INTRAVENOUS at 07:12

## 2020-12-18 RX ADMIN — MUPIROCIN: 20 OINTMENT TOPICAL at 06:12

## 2020-12-18 RX ADMIN — PROPOFOL 70 MG: 10 INJECTION, EMULSION INTRAVENOUS at 08:12

## 2020-12-18 RX ADMIN — GLYCOPYRROLATE 0.2 MG: 0.2 INJECTION, SOLUTION INTRAMUSCULAR; INTRAVITREAL at 07:12

## 2020-12-18 RX ADMIN — LIDOCAINE HYDROCHLORIDE 100 MG: 10 INJECTION, SOLUTION INTRAVENOUS at 07:12

## 2020-12-18 RX ADMIN — ROCURONIUM BROMIDE 10 MG: 10 SOLUTION INTRAVENOUS at 08:12

## 2020-12-18 RX ADMIN — FENTANYL CITRATE 100 MCG: 50 INJECTION, SOLUTION INTRAMUSCULAR; INTRAVENOUS at 07:12

## 2020-12-18 RX ADMIN — ONDANSETRON HYDROCHLORIDE 4 MG: 2 INJECTION INTRAMUSCULAR; INTRAVENOUS at 07:12

## 2020-12-18 RX ADMIN — PROPOFOL 200 MG: 10 INJECTION, EMULSION INTRAVENOUS at 07:12

## 2020-12-18 RX ADMIN — GENTAMICIN SULFATE 364 MG: 40 INJECTION, SOLUTION INTRAMUSCULAR; INTRAVENOUS at 07:12

## 2020-12-18 RX ADMIN — CARBOXYMETHYLCELLULOSE SODIUM 2 DROP: 2.5 SOLUTION/ DROPS OPHTHALMIC at 07:12

## 2020-12-18 RX ADMIN — ROCURONIUM BROMIDE 50 MG: 10 SOLUTION INTRAVENOUS at 07:12

## 2020-12-18 RX ADMIN — MIDAZOLAM HYDROCHLORIDE 2 MG: 1 INJECTION, SOLUTION INTRAMUSCULAR; INTRAVENOUS at 06:12

## 2020-12-18 RX ADMIN — CLINDAMYCIN PHOSPHATE 900 MG: 18 INJECTION, SOLUTION INTRAVENOUS at 07:12

## 2020-12-18 NOTE — DISCHARGE INSTRUCTIONS
Discharge Instructions for Laparoscopic Hysterectomy  You had a procedure called laparoscopic hysterectomy. A surgeon removed your uterus using instruments inserted through small incisions in your abdomen. These incisions may be tender or sore. You may also have pain in your upper back or shoulders. This is from the gas used to enlarge your abdomen to allow your doctor to see inside your pelvis and perform the procedure. This pain usually goes away in a day or two. It usually takes from 1-4 weeks to recover from laparoscopic hysterectomy. Remember, though, that recovery time varies from woman to woman. Here's what you can do to speed your recovery following surgery.    Home Care   · Continue the coughing and deep breathing exercises that you learned in the hospital.  · Take your medications exactly as directed by your doctor.  · Avoid constipation.  ¨ Eat fruits, vegetables, and whole grains.  ¨ Drink 6-8 glasses of water a day, unless told to do otherwise.  ¨ Use a laxative or a mild stool softener if your doctor says it's okay.  · Shower as usual in 24 hours. Wash your incisions with mild soap and water. Pat dry.  Avoid baths, swimming pools and hot tubs until  seen by your physician for a post-op follow up.  · Don't use oils, powders, or lotions on your incisions.  · You should not have any sexual activity for six weeks.  This can cause severe bleeding. You should not insert anything into the vagina for six weeks, no tampons or douching.  · If you had both ovaries removed, report hot flashes, mood swings, and irritability to your doctor. There may be medications that can help you.    Activity  · Ask your doctor when you can start driving again. It's usually okay to drive as soon as you are free of pain and able to move comfortably from side to side. Don't drive while you are still taking narcotic pain medications.  · Ask others to help with chores and errands while you recover.  · Dont lift anything  heavier than 10 pounds for 4 weeks.  · Dont vacuum or do other strenuous activities until the doctor says it's okay.  · Walk as often as you feel able.  · Climb stairs slowly and pause after every few steps.    Follow-Up  Make a follow-up appointment as directed by our staff.     When to Call Your Doctor  Call your doctor right away if you have any of the following:  · Fever above 100.4°F (38°C) or chills  · Bright red vaginal bleeding or vaginal bleeding that soaks more than one sanitary pad per hour  · A foul smelling discharge from the vagina  · Trouble urinating or burning when you urinate  · Severe pain or bloating in your abdomen  · Redness, swelling, or drainage at your incision sites  · Shortness of breath or chest pain   © 0467-9756 [facility], [address] [place] [address]. All rights reserved. This information is not intended as a substitute for professional medical care. Always follow your healthcare professional's instructions.        Anesthesia: After Your Surgery  Youve just had surgery. During surgery, you received medication called anesthesia to keep you comfortable and pain-free. After surgery, you may experience some pain or nausea. This is common. Here are some tips for feeling better and recovering after surgery.    Going home  Your doctor or nurse will show you how to take care of yourself when you go home. He or she will also answer your questions. Have an adult family member or friend drive you home. For the first 24 hours after your surgery:  · Do not drive or use heavy equipment.  · Do not make important decisions or sign legal documents.  · Avoid alcohol.  · Have someone stay with you, if needed. He or she can watch for problems and help keep you safe.  · Take your time getting up from a seated or lying position. You may experience dizziness for 24 hours  Be sure to keep all follow-up appointments with your doctor. And rest after your procedure for as long as your doctor  tells you to.    Coping with pain  If you have pain after surgery, pain medication will help you feel better. Take it as directed, before pain becomes severe. Also, ask your doctor or pharmacist about other ways to control pain, such as with heat, ice, and relaxation. And follow any other instructions your surgeon or nurse gives you.    URINARY RETENTION  Should you experience a decrease in your urine output or are unable to urinate following surgery, this can be due to the medications given during surgery.  We recommend you going to the nearest Emergency Department.    Tips for taking pain medication  To get the best relief possible, remember these points:  · Pain medications can upset your stomach. Taking them with a little food may help.  · Most pain relievers taken by mouth need at least 20 to 30 minutes to take effect.  · Taking medication on a schedule can help you remember to take it. Try to time your medication so that you can take it before beginning an activity, such as dressing, walking, or sitting down for dinner.  · Constipation is a common side effect of pain medications. Contact your doctor before taking any medications like laxatives or stool softeners to help relieve constipation. Also ask about any dietary restrictions, because drinking lots of fluids and eating foods like fruits and vegetables that are high in fiber can also help. Remember, dont take laxatives unless your surgeon has prescribed them.  · Mixing alcohol and pain medication can cause dizziness and slow your breathing. It can even be fatal. Dont drink alcohol while taking pain medication.  · Pain medication can slow your reflexes. Dont drive or operate machinery while taking pain medication.  If your health care provider tells you to take acetaminophen to help relieve your pain, ask him or her how much you are supposed to take each day. (Acetaminophen is the generic name for Tylenol and other brand-name pain relievers.)  Acetaminophen or other pain relievers may interact with your prescription medicines or other over-the-counter (OTC) drugs. Some prescription medications contain acetaminophen along with other active ingredients. Using both prescription and OTC acetaminophen for pain can cause you to overdose. The FDA recommends that you read the labels on your OTC medications carefully. This will help you to clearly understand the list of active ingredients, dosing instructions, and any warnings. It may also help you avoid taking too much acetaminophen. If you have questions or don't understand the information, ask your pharmacist or health care provider to explain it to you before you take the OTC medication.    Managing nausea  Some people have an upset stomach after surgery. This is often due to anesthesia, pain, pain medications, or the stress of surgery. The following tips will help you manage nausea and get good nutrition as you recover. If you were on a special diet before surgery, ask your doctor if you should follow it during recovery. These tips may help:  · Dont push yourself to eat. Your body will tell you when to eat and how much.  · Start off with clear liquids and soup. They are easier to digest.  · Progress to semi-solid foods (mashed potatoes, applesauce, and gelatin) as you feel ready.  · Slowly move to solid foods. Dont eat fatty, rich, or spicy foods at first.  · Dont force yourself to have three large meals a day. Instead, eat smaller amounts more often.  · Take pain medications with a small amount of solid food, such as crackers or toast to avoid nausea.      Call your surgeon if    · You feel too sleepy, dizzy, or groggy (medication may be too strong).  · You have side effects like nausea, vomiting, or skin changes (rash, itching, or hives).   © 8036-0532 [employer]. [address] [place] [address], [place] [address]. All rights reserved. This information is not intended as a substitute for  professional medical care. Always follow your healthcare professional's instructions.

## 2020-12-18 NOTE — DISCHARGE SUMMARY
[facility]  Brief Op Note/Discharge Note  Short Stay    Admit Date: 12/18/2020    Discharge Date: 12/18/2020    Attending Physician: Emili Smith MD     Surgery Date: 12/18/2020     Surgeon(s) and Role:     * Emili Smith MD - Primary     * Marybel Dey MD - Resident - Assisting     * Tushar Enriquez MD - Resident - Assisting        Pre-op Diagnosis:  BRCA1 positive [Z15.01, Z15.09]    Post-op Diagnosis:  Post-Op Diagnosis Codes:     * BRCA1 positive [Z15.01, Z15.09]    Procedure(s) (LRB):  XI ROBOTIC HYSTERECTOMY (N/A)  XI ROBOTIC SALPINGO-OOPHORECTOMY (Bilateral)    Anesthesia: General    Findings/Key Components:   1. Grossly normal abdominal survey. Some bladder adhesions noted, successfully taken down. Evidence of endometriosis of left fallopian tube. Routine TLH/BSO with bilateral IP taken near pelvic brim.     Estimated Blood Loss: 100 mL         Specimens:   Specimen (12h ago, onward)    None          Discharge Provider: Marybel Dey    Diagnoses:  Active Hospital Problems    Diagnosis  POA    BRCA1 positive [Z15.01, Z15.09]  Yes    s/p RA-TLH/BSO [Z98.890]  Not Applicable      Resolved Hospital Problems   No resolved problems to display.       Discharged Condition: good    Hospital Course:   Patient was admitted for outpatient procedure as above, and tolerated the procedure well with no complications. Please see operative report for further details. Following the procedure, the patient was awakened from anesthesia and transferred to the recovery area in stable condition. She was discharged to home once ambulating, voiding, tolerating PO intake, and pain was well-controlled. Patient was given routine post-op instructions and prescriptions for pain medication to take as needed. Patient instructed to follow up with Dr. Smith in 6 weeks.    Final Diagnoses: s/p RA-TLH/BSO    Disposition: Home or Self Care    Follow up/Patient Instructions:    Medications:  Reconciled Home Medications:       Medication List      START taking these medications    acetaminophen 500 MG tablet  Commonly known as: TYLENOL  Take 1 tablet (500 mg total) by mouth every 6 (six) hours as needed for Pain.     oxyCODONE 5 MG immediate release tablet  Commonly known as: ROXICODONE  Take 1 tablet (5 mg total) by mouth every 4 (four) hours as needed for Pain.        CHANGE how you take these medications    ibuprofen 600 MG tablet  Commonly known as: ADVIL,MOTRIN  Take 1 tablet (600 mg total) by mouth every 6 (six) hours as needed for Pain.  What changed: when to take this     valACYclovir 1000 MG tablet  Commonly known as: VALTREX  Take 1 tablet (1,000 mg total) by mouth every 12 (twelve) hours.  What changed: additional instructions        CONTINUE taking these medications    ALPRAZolam 0.25 MG tablet  Commonly known as: XANAX  Take 1 tablet (0.25 mg total) by mouth daily as needed for Anxiety.     BOTOX INJ  Inject as directed. [id number]     citalopram 40 MG tablet  Commonly known as: CELEXA  Take 1 tablet (40 mg total) by mouth once daily.     clobetasoL 0.05 % cream  Commonly known as: TEMOVATE  Apply to affected area twice a day for 2 weeks then stop     dextroamphetamine-amphetamine 25 MG 24 hr capsule  Commonly known as: ADDERALL XR  Take 1 capsule (25 mg total) by mouth every morning.     docusate sodium 100 MG capsule  Commonly known as: COLACE  Take 100 mg by mouth 2 (two) times daily.     fluconazole 150 MG Tab  Commonly known as: DIFLUCAN  Take 1 tablet (150 mg total) by mouth once. for 1 dose     goserelin 3.6 mg injection  Commonly known as: ZOLADEX  Inject 3.6 mg into the skin every 30 days.     HAIR,SKIN AND NAILS ORAL  Take by mouth.     INTRAROSA 6.5 mg Inst  Generic drug: prasterone (dhea)  Place 6.5 mg vaginally every evening.     loratadine 10 mg tablet  Commonly known as: CLARITIN  Take 10 mg by mouth once daily.     metoclopramide HCl 10 MG tablet  Commonly known as: REGLAN  Take 1 tablet (10 mg total) by  mouth 3 (three) times daily with meals.     ONE DAILY MULTIVITAMIN per tablet  Generic drug: multivitamin  Take 1 tablet by mouth once daily.     oxyCODONE-acetaminophen 5-325 mg per tablet  Commonly known as: PERCOCET  Take 1 tablet by mouth every 6 (six) hours as needed for Pain.     pantoprazole 40 MG tablet  Commonly known as: PROTONIX  Take 1 tablet (40 mg total) by mouth once daily.     sucralfate 1 gram tablet  Commonly known as: CARAFATE  Take 1 tablet (1 g total) by mouth 4 (four) times daily.     sumatriptan 100 MG tablet  Commonly known as: IMITREX  Take 1/2 to 1 tab at onset of headache.  If no improvement in 2 hours, take another.  Do not take more than 2 in 24 hours.     topiramate 100 MG tablet  Commonly known as: TOPAMAX  Take 2 tablets (200 mg total) by mouth every evening.     traZODone 50 MG tablet  Commonly known as: DESYREL  Take 1 tablet (50 mg total) by mouth every evening.          Discharge Procedure Orders   No driving until:   Order Comments: Not taking narcotic pain medications     Notify your health care provider if you experience any of the following:  temperature >100.4     Notify your health care provider if you experience any of the following:  severe uncontrolled pain     Notify your health care provider if you experience any of the following:  persistent nausea and vomiting or diarrhea     Notify your health care provider if you experience any of the following:  redness, tenderness, or signs of infection (pain, swelling, redness, odor or green/yellow discharge around incision site)     Notify your health care provider if you experience any of the following:  difficulty breathing or increased cough     Notify your health care provider if you experience any of the following:  severe persistent headache     Notify your health care provider if you experience any of the following:  worsening rash     Notify your health care provider if you experience any of the following:  persistent  dizziness, light-headedness, or visual disturbances     Notify your health care provider if you experience any of the following:   Order Comments: Heavy vaginal bleeding     Notify your health care provider if you experience any of the following:  increased confusion or weakness     Activity as tolerated     Follow-up Information     Emili Smith MD. Schedule an appointment as soon as possible for a visit in 4 weeks.    Specialty: Gynecologic Oncology  Why: post op visit  Contact information:  [address] [place] [id number]  [phone]                     Payton B. Olson M.D. [facility]

## 2020-12-18 NOTE — OPERATIVE NOTE ADDENDUM
Certification of Assistant at Surgery       Surgery Date: 12/18/2020     Participating Surgeons:  Surgeon(s) and Role:     * Emili Smith MD - Primary     * Marybel Dey MD - Resident - Assisting      Procedures:  Procedure(s) (LRB):  XI ROBOTIC HYSTERECTOMY (N/A)  XI ROBOTIC SALPINGO-OOPHORECTOMY (Bilateral)    Assistant Surgeon's Certification of Necessity:  I understand that section 1842 (b) (6) (d) of the Social Security Act generally prohibits Medicare Part B reasonable charge payment for the services of assistants at surgery in teaching hospitals when qualified residents are available to furnish such services. I certify that the services for which payment is claimed were medically necessary, and that no qualified resident was available to perform the services. I further understand that these services are subject to post-payment review by the Medicare carrier.      Yoli Keen NP    12/18/2020  12:08 PM

## 2020-12-18 NOTE — INTERVAL H&P NOTE
The patient has been examined and the H&P has been reviewed:    I concur with the findings and no changes have occurred since H&P was written.    Surgery risks, benefits and alternative options discussed and understood by patient/family.          Active Hospital Problems    Diagnosis  POA    BRCA1 positive [Z15.01, Z15.09]  Yes      Resolved Hospital Problems   No resolved problems to display.     FRAN Enriquez MD  OBGYN PGY-2

## 2020-12-18 NOTE — TRANSFER OF CARE
"Anesthesia Transfer of Care Note    Patient: Yolanda Win    Procedure(s) Performed: Procedure(s) (LRB):  XI ROBOTIC HYSTERECTOMY (N/A)  XI ROBOTIC SALPINGO-OOPHORECTOMY (Bilateral)    Patient location: PACU    Anesthesia Type: general    Transport from OR: Transported from OR on 2-3 L/min O2 by NC with adequate spontaneous ventilation    Post pain: adequate analgesia    Post assessment: no apparent anesthetic complications    Post vital signs: stable    Level of consciousness: awake, alert and oriented    Nausea/Vomiting: no nausea/vomiting    Complications: none    Transfer of care protocol was followed      Last vitals:   Visit Vitals  /69 (BP Location: Left arm, Patient Position: Sitting)   Pulse 81   Temp 36.3 °C (97.3 °F)   Resp 18   Ht 5' 5" (1.651 m)   Wt 72.8 kg (160 lb 7.9 oz)   LMP 12/11/2020   SpO2 99%   Breastfeeding No   BMI 26.71 kg/m²     "

## 2020-12-18 NOTE — OP NOTE
DATE OF PROCEDURE:  2020     SURGEON:  Emili Smith M.D.     ASSISTANTS: CHARY Martin (certified first assist)- no qualified resident was available for her portion of the procedure. Marybel Dey MD (RES) and Tushar Enriquez MD (RES).      PREOPERATIVE DIAGNOSIS:  1. BRCA1 mutation  2. Risk reducing surgery      POSTOPERATIVE DIAGNOSES:    1. BRCA1 mutation  2. Risk reducing surgery     PROCEDURE PERFORMED:  Robotic-assisted total laparoscopic hysterectomy, bilateral salpingo-oophorectomy     ANESTHESIA:  General endotracheal anesthesia.     SPECIMENS REMOVED:  1.  Uterus and cervix.  2.  Bilateral fallopian tubes/ovaries, IP ligaments   3.  Pelvic washings     ESTIMATED BLOOD LOSS:  100cc     COMPLICATIONS:  None.     FINDINGS: 6 week size uterus, normal cervix. Normal appearing fallopian tubes, normal right ovary, 3cm simple appearing cyst of the left ovary. No ascites. No adenopathy. No obvious evidence of intraperitoneal disease. Mild adhesions of bladder to anterior uterus from prior  sections.       PROCEDURE IN DETAIL:  The patient was taken to the Operating Room.  Informed consent had been obtained. She underwent general endotracheal anesthesia without difficulty, was prepped and draped in the normal sterile fashion in a dorsal lithotomy position.  Timeout was performed.  All parties agreed to the planned procedure. Perioperative antibiotics were administered.  Milligan catheter was placed under sterile conditions. The VCare uterine manipulator was secured in place in a standard fashion for uterine manipulation. Gloves were changed and attention was turned to the patient's abdomen.      Veress needle was gently inserted.  Intra-abdominal placement was confirmed with low CO2 pressure and water drop test.  Abdomen was insufflated and pneumoperitoneum was obtained.  Skin incision was made superior to the umbilicus.  Robotic trocar was introduced.  Intra-abdominal placement was  confirmed.  Additional trocars were placed, 2 robotic trocars to the left of the camera, 1 robotic trocar to the right of the camera and an additional 8 mm assist port to the right of the camera.  The patient was placed in steep Trendelenburg. Robot was docked and operating surgeon reported to the console.      Pelvic washings were obtained. Bilateral round ligaments were identified.  These were cauterized and transected. The posterior leaf of the broad ligament was then opened bilaterally facilitating access to the retroperitoneum. Bilateral IP ligaments were then skeletonized to the level of the pelvic brim. At the level of the pelvic brim they were cauterized and transected. The ureters were noted well below. The anterior leaf of the broad ligament was then opened circumferentially.  Proper plane for the bladder flap was identified and the bladder was gently reflected off of the anterior aspect of the uterus and cervix to the level of the cervicovaginal junction. Bilateral uterine arteries were then skeletonized.  These were cauterized and transected.  The remainder of the uterosacral and cardinal ligaments were then also serially cauterized and transected. Colpotomy was initiated and carried around circumferentially.  The uterus, cervix, bilateral fallopian tubes/ovaries and IP ligaments were then removed through the vagina. We then closed the vaginal cuff with a V-Loc running suture in a running fashion.      The robotic trocars were then removed under direct visualization and the robot was undocked. Skin incisions were then rendered hemostatic.  These were closed with 4-0 Monocryl in a subcuticular fashion and topped with sterile Dermabond.  The patient tolerated the procedure well.  Sponge, lap, needle and instrument counts were correct x2 as reported by the circulating nurse.  She was awakened from anesthesia and taken to recovery in stable condition.

## 2020-12-18 NOTE — PLAN OF CARE
Yolanda Win has met all discharge criteria from Phase II. Vital Signs are stable, ambulating  without difficulty.Pain is now under control and tolerable for the pt. Pain score 3 at this time.  Discharge instructions given, patient verbalized understanding. Discharged from facility via wheelchair in stable condition.

## 2020-12-18 NOTE — ANESTHESIA POSTPROCEDURE EVALUATION
Anesthesia Post Evaluation    Patient: Yolanda Win    Procedure(s) Performed: Procedure(s) (LRB):  XI ROBOTIC HYSTERECTOMY (N/A)  XI ROBOTIC SALPINGO-OOPHORECTOMY (Bilateral)    Final Anesthesia Type: general      Patient location during evaluation: PACU  Patient participation: Yes- Able to Participate  Level of consciousness: awake and alert  Post-procedure vital signs: reviewed and stable  Pain management: adequate  Airway patency: patent    PONV status at discharge: No PONV  Anesthetic complications: no      Cardiovascular status: blood pressure returned to baseline  Respiratory status: unassisted, spontaneous ventilation and room air  Hydration status: euvolemic  Follow-up not needed.          Vitals Value Taken Time   /58 12/18/20 1040   Temp 36.9 °C (98.5 °F) 12/18/20 1040   Pulse 95 12/18/20 1040   Resp 16 12/18/20 1040   SpO2 92 % 12/18/20 1040         Event Time   Out of Recovery 10:37:00         Pain/David Score: Pain Rating Prior to Med Admin: 7 (12/18/2020  9:30 AM)  Pain Rating Post Med Admin: 0 (12/18/2020 10:16 AM)  David Score: 9 (12/18/2020 10:40 AM)

## 2020-12-19 ENCOUNTER — TELEPHONE (OUTPATIENT)
Dept: ENDOSCOPY | Facility: HOSPITAL | Age: 35
End: 2020-12-19

## 2020-12-19 ENCOUNTER — PATIENT MESSAGE (OUTPATIENT)
Dept: ENDOSCOPY | Facility: HOSPITAL | Age: 35
End: 2020-12-19

## 2020-12-19 ENCOUNTER — TELEPHONE (OUTPATIENT)
Dept: GYNECOLOGIC ONCOLOGY | Facility: CLINIC | Age: 35
End: 2020-12-19

## 2020-12-20 ENCOUNTER — PATIENT MESSAGE (OUTPATIENT)
Dept: GYNECOLOGIC ONCOLOGY | Facility: CLINIC | Age: 35
End: 2020-12-20

## 2020-12-22 ENCOUNTER — PROCEDURE VISIT (OUTPATIENT)
Dept: NEUROLOGY | Facility: CLINIC | Age: 35
End: 2020-12-22
Payer: COMMERCIAL

## 2020-12-22 VITALS
BODY MASS INDEX: 27 KG/M2 | HEIGHT: 65 IN | DIASTOLIC BLOOD PRESSURE: 83 MMHG | WEIGHT: 162.06 LBS | SYSTOLIC BLOOD PRESSURE: 117 MMHG | HEART RATE: 83 BPM

## 2020-12-22 DIAGNOSIS — Z51.81 MEDICATION MONITORING ENCOUNTER: Primary | ICD-10-CM

## 2020-12-22 DIAGNOSIS — G43.C1 INTRACTABLE PERIODIC HEADACHE SYNDROME: Chronic | ICD-10-CM

## 2020-12-22 LAB — FINAL PATHOLOGIC DIAGNOSIS: NORMAL

## 2020-12-22 PROCEDURE — 64615 CHEMODENERV MUSC MIGRAINE: CPT | Mod: S$GLB,,, | Performed by: PSYCHIATRY & NEUROLOGY

## 2020-12-22 PROCEDURE — 99499 NO LOS: ICD-10-PCS | Mod: S$GLB,,, | Performed by: PSYCHIATRY & NEUROLOGY

## 2020-12-22 PROCEDURE — 99499 UNLISTED E&M SERVICE: CPT | Mod: S$GLB,,, | Performed by: PSYCHIATRY & NEUROLOGY

## 2020-12-22 PROCEDURE — 64615 PR CHEMODENERVATION OF MUSCLE FOR CHRONIC MIGRAINE: ICD-10-PCS | Mod: S$GLB,,, | Performed by: PSYCHIATRY & NEUROLOGY

## 2020-12-22 NOTE — PROCEDURES
Procedures   [facility] - NEUROLOGY  [facility] [place]    Date: December 22, 2020   Patient Name: Yolanda Win   MRN: [id number]   PCP: Cruzito Craven  Referring Provider: Dillon Gonzalez MD    Assessment:      This is Yolanda Win, 35 y.o. female with chronic migraines (G43.719) and suffers from headaches more than 15 days a month lasting more than 4 hours a day with no relief of symptoms despite trying multiple medications listed below.    Plan:      -  Continue TPM 200mg daily, imitrex prn  -  Follow up for botox   -  Continue Mg       I discussed side effects of the medications. I asked the patient to  stop the medication if he notices serious adverse effects as we discussed and to seek immediate medical attention at an ER.     Dillon Gonzalez MD  [facility]   Department of Neurology    Subjective:   - THACKER continues to respond well to botox, TPM, imitrex,  - About to start PhD program     9/2020  -  HA well controlled with combination of TPM and botox  -  Completed chemotherapy but has not started radiation, coping well with stress  6/2020  -  Recent diagnosis of breast cancer, s/p chemo therapy with upcoming surgery and radiation  -  Development of neuropathy pain in feet, excess sedation with GBP 300mg tid, pain/altered sensation in fingers of right hand for the past day  -  Previously unable to tolerate cymbalta  12/2019  EXCELLENT response to botox, estimates 1 headache per week  9/2019  Continued daily headaches, weaning breast feeding - son 6 months old  8/2019  Unable to tolerate increased celexa due to fatigue, compliant with Mg with continued daily HA, unable to resume TPM as continuing to breast feed    HPI 5/2019:   Ms. Yolanda Win is a 35 y.o. female who presents with a chief complaint of headaches    Patient has had significant difficulty with migraines since she was 12 years old with worsening during recent  pregnancy.  Associated nausea, photo/phonophobia.  She has had difficulty with post-partum anxiety and started celexa a month ago with some improvement in headache as well as irritability noted.    Prior medications trials as below  TPM - partial relief at 150mg /d  Pamelor, Neurontin, Seroquel - all little effect  Imitrex, phenergan - good effect  Fioricet, zanaflex - little effect    PAST MEDICAL HISTORY:  Past Medical History:   Diagnosis Date    Abnormal Pap smear of cervix     ADHD     Allergy     seasonal    Anorexia     Anxiety     Asthma     BRCA1 positive 2020    Bulimia     Depression     Fever blister     History of posttraumatic stress disorder (PTSD)     Hypertension     Gestational Hypertension    Invasive ductal carcinoma of right breast 2019    Mastitis     Migraine headache     PONV (postoperative nausea and vomiting)     Status post mastectomy, bilateral 2020       PAST SURGICAL HISTORY:  Past Surgical History:   Procedure Laterality Date    BILATERAL MASTECTOMY Bilateral 2020    Procedure: MASTECTOMY, BILATERAL - BILATERAL SKIN SPARING MASTECTOMY;  Surgeon: Percy Ayers MD;  Location: [facility];  Service: General;  Laterality: Bilateral;    BIOPSY OF AXILLARY NODE Right 2020    Procedure: BIOPSY, LYMPH NODE, AXILLARY;  Surgeon: Percy Ayers MD;  Location: [facility] OR;  Service: General;  Laterality: Right;    BONE MARROW ASPIRATION      x 3    BREAST SURGERY      CERVICAL BIOPSY  W/ LOOP ELECTRODE EXCISION       SECTION  2016     SECTION N/A 2019    Procedure:  SECTION;  Surgeon: Kirit Hernandez MD;  Location: [facility] L&D;  Service: OB/GYN;  Laterality: N/A;    COLPOSCOPY      INJECTION FOR SENTINEL NODE IDENTIFICATION Right 2020    Procedure: INJECTION, FOR SENTINEL NODE IDENTIFICATION;  Surgeon: Percy Ayers MD;  Location: [facility] OR;  Service: General;  Laterality: Right;    INSERTION OF  BREAST TISSUE EXPANDER Bilateral 7/1/2020    Procedure: INSERTION, TISSUE EXPANDER, BREAST;  Surgeon: Kiko Aranda MD;  Location: [facility];  Service: Plastics;  Laterality: Bilateral;    INSERTION OF TUNNELED CENTRAL VENOUS CATHETER (CVC) WITH SUBCUTANEOUS PORT Left 12/27/2019    Procedure: [id number]-Left neck or chest wall;  Surgeon: Percy Ayers MD;  [facility];  Service: General;  Laterality: Left;    MASTECTOMY      MEDIPORT REMOVAL N/A 7/1/2020    Procedure: REMOVAL, CATHETER, CENTRAL VENOUS, TUNNELED, WITH PORT;  Surgeon: Percy Ayers MD;  Location: [facility];  Service: General;  Laterality: N/A;    ROBOT-ASSISTED LAPAROSCOPIC ABDOMINAL HYSTERECTOMY USING DA HIMA XI N/A 12/18/2020    Procedure: XI ROBOTIC HYSTERECTOMY;  Surgeon: Emili Smith MD;  Location: [facility];  Service: OB/GYN;  Laterality: N/A;    ROBOT-ASSISTED LAPAROSCOPIC SALPINGO-OOPHORECTOMY USING DA HIMA XI Bilateral 12/18/2020    Procedure: XI ROBOTIC SALPINGO-OOPHORECTOMY;  Surgeon: Emili Smith MD;  Location: [facility];  Service: OB/GYN;  Laterality: Bilateral;    SHOULDER SURGERY Left     x 2    SINUS SURGERY      age 19    TISSUE EXPANDER REMOVAL Right 10/3/2020    Procedure: REMOVAL, TISSUE EXPANDER;  Surgeon: Kiko Aranda MD;  Location: [facility];  Service: Plastics;  Laterality: Right;    TONSILLECTOMY         CURRENT MEDS:  Current Outpatient Medications   Medication Sig Dispense Refill    acetaminophen (TYLENOL) 500 MG tablet Take 1 tablet (500 mg total) by mouth every 6 (six) hours as needed for Pain. 30 tablet 0    ALPRAZolam (XANAX) 0.25 MG tablet Take 1 tablet (0.25 mg total) by mouth daily as needed for Anxiety. 30 tablet 0    citalopram (CELEXA) 40 MG tablet Take 1 tablet (40 mg total) by mouth once daily. 90 tablet 0    clobetasoL (TEMOVATE) 0.05 % cream Apply to affected area twice a day for 2 weeks then stop 60 g 1    dextroamphetamine-amphetamine (ADDERALL XR) 25 MG 24 hr  capsule Take 1 capsule (25 mg total) by mouth every morning. 30 capsule 0    docusate sodium (COLACE) 100 MG capsule Take 100 mg by mouth 2 (two) times daily.      goserelin (ZOLADEX) 3.6 mg injection Inject 3.6 mg into the skin every 30 days.      ibuprofen (ADVIL,MOTRIN) 600 MG tablet Take 1 tablet (600 mg total) by mouth every 6 (six) hours as needed for Pain. 30 tablet 0    loratadine (CLARITIN) 10 mg tablet Take 10 mg by mouth once daily.      metoclopramide HCl (REGLAN) 10 MG tablet Take 1 tablet (10 mg total) by mouth 3 (three) times daily with meals. 90 tablet 1    multivitamin (ONE DAILY MULTIVITAMIN) per tablet Take 1 tablet by mouth once daily.      multivitamin with minerals (HAIR,SKIN AND NAILS ORAL) Take by mouth.      onabotulinumtoxinA (BOTOX INJ) Inject as directed. [id number]      oxyCODONE (ROXICODONE) 5 MG immediate release tablet Take 1 tablet (5 mg total) by mouth every 4 (four) hours as needed for Pain. 20 tablet 0    oxyCODONE-acetaminophen (PERCOCET) 5-325 mg per tablet Take 1 tablet by mouth every 6 (six) hours as needed for Pain. 30 tablet 0    pantoprazole (PROTONIX) 40 MG tablet Take 1 tablet (40 mg total) by mouth once daily. 30 tablet 1    prasterone, dhea, (INTRAROSA) 6.5 mg Inst Place 6.5 mg vaginally every evening. 30 each 11    sucralfate (CARAFATE) 1 gram tablet Take 1 tablet (1 g total) by mouth 4 (four) times daily. 120 tablet 1    sumatriptan (IMITREX) 100 MG tablet Take 1/2 to 1 tab at onset of headache.  If no improvement in 2 hours, take another.  Do not take more than 2 in 24 hours. 9 tablet 11    topiramate (TOPAMAX) 100 MG tablet Take 2 tablets (200 mg total) by mouth every evening. 180 tablet 3    traZODone (DESYREL) 50 MG tablet Take 1 tablet (50 mg total) by mouth every evening. 30 tablet 0    valACYclovir (VALTREX) 1000 MG tablet Take 1 tablet (1,000 mg total) by mouth every 12 (twelve) hours. (Patient taking differently: Take 1,000 mg by mouth every 12  "(twelve) hours. Patient uses prn) 60 tablet 0     Current Facility-Administered Medications   Medication Dose Route Frequency Provider Last Rate Last Dose    copper intrauterine device 380 square mm  mm  380 mm Intrauterine  Kaleigh Polanco MD   380 mm at 01/23/20 1400    onabotulinumtoxina injection 200 Units  200 Units Intramuscular Q90 Days Dillon Gonzalez MD   200 Units at 09/21/20 1528       ALLERGIES:  Review of patient's allergies indicates:   Allergen Reactions    Amoxicillin Hives    Penicillins Hives and Rash    Diazepam Anxiety and Other (See Comments)     "makes hyper"       FAMILY HISTORY:  Family History   Problem Relation Age of Onset    Cancer Maternal Grandmother 40        cervical    Cervical cancer Mother     Breast cancer Other     Ovarian cancer Other     Colon cancer Neg Hx     Melanoma Neg Hx        SOCIAL HISTORY:  Social History     Tobacco Use    Smoking status: Never Smoker    Smokeless tobacco: Never Used   Substance Use Topics    Alcohol use: Yes     Frequency: 2-3 times a week     Drinks per session: 1 or 2     Binge frequency: Monthly     Comment: socially    Drug use: No       Review of Systems:  12 review of systems is negative except for the symptoms mentioned in HPI.        Objective:     Vitals:    12/22/20 1304   BP: 117/83   Pulse: 83   Weight: 73.5 kg (162 lb 0.6 oz)   Height: 5' 5" (1.651 m)       General: NAD, well nourished   Eyes: no tearing, discharge, no erythema   ENT: moist mucous membranes of the oral cavity, nares patent    Neck: Supple, full range of motion  Cardiovascular: Warm and well perfused, pulses equal and symmetrical  Lungs: Normal work of breathing, normal chest wall excursions  Skin: No rash, lesions, or breakdown on exposed skin  Psychiatry: Mood and affect are appropriate   Abdomen: soft, non tender, non distended  Extremeties: No cyanosis, clubbing or edema.    Neurological   MENTAL STATUS: Alert and oriented to person, " place, and time. Attention and concentration within normal limits. Speech without dysarthria, able to name and repeat without difficulty. Recent and remote memory within normal limits   CRANIAL NERVES: Visual fields intact. PERRL. EOMI. Facial sensation intact. Face symmetrical. Hearing grossly intact. Full shoulder shrug bilaterally. Tongue protrudes midline   SENSORY: Sensation is intact to light touch throughout.   MOTOR: Normal bulk and tone. No pronator drift.    CEREBELLAR/COORDINATION/GAIT: Gait steady with normal arm swing and stride length.     BOTOX was performed as an indicated therapy for intractable chronic migraine headaches given that the patient failed several prophylactic medications    Botulinum Toxin Injection Procedure   Pre-operative diagnosis: Chronic migraine   Post-operative diagnosis: Same   Procedure: Chemical neurolysis   After risks and benefits were explained including bleeding, infection, worsening of pain, damage to the areas being injected, weakness of muscles, loss of muscle control, dysphagia if injecting the head or neck, facial droop if injecting the facial area, painful injection, allergic or other reaction to the medications being injected, and the failure of the procedure to help the problem, a signed consent was obtained.   The patient was placed in a comfortable area and the sites to be treated were identified.The area to be treated was prepped three times with alcohol and the alcohol allowed to dry. Next, a 30 gauge needle was used to inject the medication in the area to be treated.       Botox 100 units NDC 0140-6423-03    Area(s) injected:   Total Botox used: 155 Units   Botox wastage: 45 Units   Injection sites:    muscle bilaterally ( a total of 10 units divided into 2 sites)   Procerus muscle (5 units)   Frontalis muscle bilaterally (a total of 20 units divided into 4 sites)   Temporalis muscle bilaterally (a total of 40 units divided into 8 sites)    Occipitalis muscle bilaterally (a total of 30 units divided into 6 sites)   Cervical paraspinal muscles (a total of 20 units divided into 4 sites)   Trapezius muscle bilaterally (a total of 30 units divided into 6 sites)   Complications: none   RTC for the next Botox injection: 3 months

## 2020-12-22 NOTE — PROCEDURES
Procedures       [facility] - NEUROLOGY  [facility] [place]    Date: December 22, 2020   Patient Name: Yolanda Win   MRN: [id number]   PCP: Cruzito Craven  Referring Provider: Dillon Gonzalez MD    Assessment:      This is Yolanda Win, 35 y.o. female with chronic migraines (G43.719) and suffers from headaches more than 15 days a month lasting more than 4 hours a day with no relief of symptoms despite trying multiple medications listed below.    Plan:      -  Continue TPM 200mg daily, imitrex prn  -  Follow up for botox   -  Continue Mg       I discussed side effects of the medications. I asked the patient to  stop the medication if he notices serious adverse effects as we discussed and to seek immediate medical attention at an ER.     Dillon Gonzalez MD  [facility]   Department of Neurology    Subjective:       9/2020  -  HA well controlled with combination of TPM and botox  -  Completed chemotherapy but has not started radiation, coping well with stress  6/2020  -  Recent diagnosis of breast cancer, s/p chemo therapy with upcoming surgery and radiation  -  Development of neuropathy pain in feet, excess sedation with GBP 300mg tid, pain/altered sensation in fingers of right hand for the past day  -  Previously unable to tolerate cymbalta  12/2019  EXCELLENT response to botox, estimates 1 headache per week  9/2019  Continued daily headaches, weaning breast feeding - son 6 months old  8/2019  Unable to tolerate increased celexa due to fatigue, compliant with Mg with continued daily HA, unable to resume TPM as continuing to breast feed    HPI 5/2019:   Ms. Yolanda Win is a 35 y.o. female who presents with a chief complaint of headaches    Patient has had significant difficulty with migraines since she was 12 years old with worsening during recent pregnancy.  Associated nausea, photo/phonophobia.  She has had difficulty with  post-partum anxiety and started celexa a month ago with some improvement in headache as well as irritability noted.    Prior medications trials as below  TPM - partial relief at 150mg /d  Pamelor, Neurontin, Seroquel - all little effect  Imitrex, phenergan - good effect  Fioricet, zanaflex - little effect    PAST MEDICAL HISTORY:  Past Medical History:   Diagnosis Date    Abnormal Pap smear of cervix     ADHD     Allergy     seasonal    Anorexia     Anxiety     Asthma     BRCA1 positive 2020    Bulimia     Depression     Fever blister     History of posttraumatic stress disorder (PTSD)     Hypertension     Gestational Hypertension    Invasive ductal carcinoma of right breast 2019    Mastitis     Migraine headache     PONV (postoperative nausea and vomiting)     Status post mastectomy, bilateral 2020       PAST SURGICAL HISTORY:  Past Surgical History:   Procedure Laterality Date    BILATERAL MASTECTOMY Bilateral 2020    Procedure: MASTECTOMY, BILATERAL - BILATERAL SKIN SPARING MASTECTOMY;  Surgeon: Percy Ayers MD;  Location: [facility];  Service: General;  Laterality: Bilateral;    BIOPSY OF AXILLARY NODE Right 2020    Procedure: BIOPSY, LYMPH NODE, AXILLARY;  Surgeon: Percy Ayers MD;  Location: [facility];  Service: General;  Laterality: Right;    BONE MARROW ASPIRATION      x 3    BREAST SURGERY      CERVICAL BIOPSY  W/ LOOP ELECTRODE EXCISION       SECTION  2016     SECTION N/A 2019    Procedure:  SECTION;  Surgeon: Kirit Hernandez MD;  Location: [facility] L&D;  Service: OB/GYN;  Laterality: N/A;    COLPOSCOPY      INJECTION FOR SENTINEL NODE IDENTIFICATION Right 2020    Procedure: INJECTION, FOR SENTINEL NODE IDENTIFICATION;  Surgeon: Percy Ayers MD;  Location: [facility];  Service: General;  Laterality: Right;    INSERTION OF BREAST TISSUE EXPANDER Bilateral 2020    Procedure: INSERTION, TISSUE EXPANDER,  BREAST;  Surgeon: Kiko Aranda MD;  Location: [facility];  Service: Plastics;  Laterality: Bilateral;    INSERTION OF TUNNELED CENTRAL VENOUS CATHETER (CVC) WITH SUBCUTANEOUS PORT Left 12/27/2019    Procedure: [id number]-Left neck or chest wall;  Surgeon: Percy Ayers MD;  [address];  Service: General;  Laterality: Left;    MASTECTOMY      MEDIPORT REMOVAL N/A 7/1/2020    Procedure: REMOVAL, CATHETER, CENTRAL VENOUS, TUNNELED, WITH PORT;  Surgeon: Percy Ayers MD;  Location: [facility];  Service: General;  Laterality: N/A;    ROBOT-ASSISTED LAPAROSCOPIC ABDOMINAL HYSTERECTOMY USING DA HIMA XI N/A 12/18/2020    Procedure: XI ROBOTIC HYSTERECTOMY;  Surgeon: Emili Smith MD;  Location: [facility];  Service: OB/GYN;  Laterality: N/A;    ROBOT-ASSISTED LAPAROSCOPIC SALPINGO-OOPHORECTOMY USING DA HIMA XI Bilateral 12/18/2020    Procedure: XI ROBOTIC SALPINGO-OOPHORECTOMY;  Surgeon: Emili Smith MD;  Location: [facility];  Service: OB/GYN;  Laterality: Bilateral;    SHOULDER SURGERY Left     x 2    SINUS SURGERY      age 19    TISSUE EXPANDER REMOVAL Right 10/3/2020    Procedure: REMOVAL, TISSUE EXPANDER;  Surgeon: Kiko Aranda MD;  Location: [facility];  Service: Plastics;  Laterality: Right;    TONSILLECTOMY         CURRENT MEDS:  Current Outpatient Medications   Medication Sig Dispense Refill    acetaminophen (TYLENOL) 500 MG tablet Take 1 tablet (500 mg total) by mouth every 6 (six) hours as needed for Pain. 30 tablet 0    citalopram (CELEXA) 40 MG tablet Take 1 tablet (40 mg total) by mouth once daily. 90 tablet 0    clobetasoL (TEMOVATE) 0.05 % cream Apply to affected area twice a day for 2 weeks then stop 60 g 1    dextroamphetamine-amphetamine (ADDERALL XR) 25 MG 24 hr capsule Take 1 capsule (25 mg total) by mouth every morning. 30 capsule 0    docusate sodium (COLACE) 100 MG capsule Take 100 mg by mouth 2 (two) times daily.      goserelin (ZOLADEX) 3.6 mg injection  Inject 3.6 mg into the skin every 30 days.      ibuprofen (ADVIL,MOTRIN) 600 MG tablet Take 1 tablet (600 mg total) by mouth every 6 (six) hours as needed for Pain. 30 tablet 0    loratadine (CLARITIN) 10 mg tablet Take 10 mg by mouth once daily.      metoclopramide HCl (REGLAN) 10 MG tablet Take 1 tablet (10 mg total) by mouth 3 (three) times daily with meals. 90 tablet 1    multivitamin (ONE DAILY MULTIVITAMIN) per tablet Take 1 tablet by mouth once daily.      multivitamin with minerals (HAIR,SKIN AND NAILS ORAL) Take by mouth.      onabotulinumtoxinA (BOTOX INJ) Inject as directed. [id number]      oxyCODONE (ROXICODONE) 5 MG immediate release tablet Take 1 tablet (5 mg total) by mouth every 4 (four) hours as needed for Pain. 20 tablet 0    oxyCODONE-acetaminophen (PERCOCET) 5-325 mg per tablet Take 1 tablet by mouth every 6 (six) hours as needed for Pain. 30 tablet 0    pantoprazole (PROTONIX) 40 MG tablet Take 1 tablet (40 mg total) by mouth once daily. 30 tablet 1    prasterone, dhea, (INTRAROSA) 6.5 mg Inst Place 6.5 mg vaginally every evening. 30 each 11    sucralfate (CARAFATE) 1 gram tablet Take 1 tablet (1 g total) by mouth 4 (four) times daily. 120 tablet 1    sumatriptan (IMITREX) 100 MG tablet Take 1/2 to 1 tab at onset of headache.  If no improvement in 2 hours, take another.  Do not take more than 2 in 24 hours. 9 tablet 11    topiramate (TOPAMAX) 100 MG tablet Take 2 tablets (200 mg total) by mouth every evening. 180 tablet 3    traZODone (DESYREL) 50 MG tablet Take 1 tablet (50 mg total) by mouth every evening. 30 tablet 0    valACYclovir (VALTREX) 1000 MG tablet Take 1 tablet (1,000 mg total) by mouth every 12 (twelve) hours. (Patient taking differently: Take 1,000 mg by mouth every 12 (twelve) hours. Patient uses prn) 60 tablet 0    ALPRAZolam (XANAX) 0.25 MG tablet Take 1 tablet (0.25 mg total) by mouth daily as needed for Anxiety. 30 tablet 0     Current Facility-Administered  "Medications   Medication Dose Route Frequency Provider Last Rate Last Dose    onabotulinumtoxina injection 200 Units  200 Units Intramuscular Q90 Days Dillon Gonzalez MD   200 Units at 12/22/20 1306       ALLERGIES:  Review of patient's allergies indicates:   Allergen Reactions    Amoxicillin Hives    Penicillins Hives and Rash    Diazepam Anxiety and Other (See Comments)     "makes hyper"       FAMILY HISTORY:  Family History   Problem Relation Age of Onset    Cancer Maternal Grandmother 40        cervical    Cervical cancer Mother     Breast cancer Other     Ovarian cancer Other     Colon cancer Neg Hx     Melanoma Neg Hx        SOCIAL HISTORY:  Social History     Tobacco Use    Smoking status: Never Smoker    Smokeless tobacco: Never Used   Substance Use Topics    Alcohol use: Yes     Frequency: 2-3 times a week     Drinks per session: 1 or 2     Binge frequency: Monthly     Comment: socially    Drug use: No       Review of Systems:  12 review of systems is negative except for the symptoms mentioned in HPI.        Objective:     Vitals:    12/22/20 1304   BP: 117/83   Pulse: 83   Weight: 73.5 kg (162 lb 0.6 oz)   Height: 5' 5" (1.651 m)       General: NAD, well nourished   Eyes: no tearing, discharge, no erythema   ENT: moist mucous membranes of the oral cavity, nares patent    Neck: Supple, full range of motion  Cardiovascular: Warm and well perfused, pulses equal and symmetrical  Lungs: Normal work of breathing, normal chest wall excursions  Skin: No rash, lesions, or breakdown on exposed skin  Psychiatry: Mood and affect are appropriate   Abdomen: soft, non tender, non distended  Extremeties: No cyanosis, clubbing or edema.    Neurological   MENTAL STATUS: Alert and oriented to person, place, and time. Attention and concentration within normal limits. Speech without dysarthria, able to name and repeat without difficulty. Recent and remote memory within normal limits   CRANIAL NERVES: Visual " fields intact. PERRL. EOMI. Facial sensation intact. Face symmetrical. Hearing grossly intact. Full shoulder shrug bilaterally. Tongue protrudes midline   SENSORY: Sensation is intact to light touch throughout.   MOTOR: Normal bulk and tone. No pronator drift.    CEREBELLAR/COORDINATION/GAIT: Gait steady with normal arm swing and stride length.     BOTOX was performed as an indicated therapy for intractable chronic migraine headaches given that the patient failed several prophylactic medications    Botulinum Toxin Injection Procedure   Pre-operative diagnosis: Chronic migraine   Post-operative diagnosis: Same   Procedure: Chemical neurolysis   After risks and benefits were explained including bleeding, infection, worsening of pain, damage to the areas being injected, weakness of muscles, loss of muscle control, dysphagia if injecting the head or neck, facial droop if injecting the facial area, painful injection, allergic or other reaction to the medications being injected, and the failure of the procedure to help the problem, a signed consent was obtained.   The patient was placed in a comfortable area and the sites to be treated were identified.The area to be treated was prepped three times with alcohol and the alcohol allowed to dry. Next, a 30 gauge needle was used to inject the medication in the area to be treated.       Botox 100 units NDC 1847-8859-39    Area(s) injected:   Total Botox used: 155 Units   Botox wastage: 45 Units   Injection sites:    muscle bilaterally ( a total of 10 units divided into 2 sites)   Procerus muscle (5 units)   Frontalis muscle bilaterally (a total of 20 units divided into 4 sites)   Temporalis muscle bilaterally (a total of 40 units divided into 8 sites)   Occipitalis muscle bilaterally (a total of 30 units divided into 6 sites)   Cervical paraspinal muscles (a total of 20 units divided into 4 sites)   Trapezius muscle bilaterally (a total of 30 units divided into 6  sites)   Complications: none   RTC for the next Botox injection: 3 months

## 2020-12-23 ENCOUNTER — OFFICE VISIT (OUTPATIENT)
Dept: HEMATOLOGY/ONCOLOGY | Facility: CLINIC | Age: 35
End: 2020-12-23
Payer: COMMERCIAL

## 2020-12-23 ENCOUNTER — PATIENT MESSAGE (OUTPATIENT)
Dept: HEMATOLOGY/ONCOLOGY | Facility: CLINIC | Age: 35
End: 2020-12-23

## 2020-12-23 DIAGNOSIS — C50.411 MALIGNANT NEOPLASM OF UPPER-OUTER QUADRANT OF RIGHT BREAST IN FEMALE, ESTROGEN RECEPTOR POSITIVE: Primary | Chronic | ICD-10-CM

## 2020-12-23 DIAGNOSIS — Z17.0 MALIGNANT NEOPLASM OF UPPER-OUTER QUADRANT OF RIGHT BREAST IN FEMALE, ESTROGEN RECEPTOR POSITIVE: Primary | ICD-10-CM

## 2020-12-23 DIAGNOSIS — Z17.0 MALIGNANT NEOPLASM OF UPPER-OUTER QUADRANT OF RIGHT BREAST IN FEMALE, ESTROGEN RECEPTOR POSITIVE: Primary | Chronic | ICD-10-CM

## 2020-12-23 DIAGNOSIS — C50.411 MALIGNANT NEOPLASM OF UPPER-OUTER QUADRANT OF RIGHT BREAST IN FEMALE, ESTROGEN RECEPTOR POSITIVE: Primary | ICD-10-CM

## 2020-12-23 LAB
FINAL PATHOLOGIC DIAGNOSIS: NORMAL
GROSS: NORMAL
Lab: NORMAL

## 2020-12-23 PROCEDURE — 99212 PR OFFICE/OUTPT VISIT, EST, LEVL II, 10-19 MIN: ICD-10-PCS | Mod: 95,,, | Performed by: INTERNAL MEDICINE

## 2020-12-23 PROCEDURE — 99212 OFFICE O/P EST SF 10 MIN: CPT | Mod: 95,,, | Performed by: INTERNAL MEDICINE

## 2020-12-23 RX ORDER — LETROZOLE 2.5 MG/1
2.5 TABLET, FILM COATED ORAL DAILY
Qty: 90 TABLET | Refills: 3 | Status: SHIPPED | OUTPATIENT
Start: 2020-12-23 | End: 2021-01-26 | Stop reason: SINTOL

## 2020-12-24 ENCOUNTER — LAB VISIT (OUTPATIENT)
Dept: LAB | Facility: HOSPITAL | Age: 35
End: 2020-12-24
Payer: COMMERCIAL

## 2020-12-24 ENCOUNTER — OFFICE VISIT (OUTPATIENT)
Dept: GASTROENTEROLOGY | Facility: CLINIC | Age: 35
End: 2020-12-24
Payer: COMMERCIAL

## 2020-12-24 VITALS
WEIGHT: 160.5 LBS | HEIGHT: 65 IN | HEART RATE: 83 BPM | BODY MASS INDEX: 26.74 KG/M2 | SYSTOLIC BLOOD PRESSURE: 117 MMHG | DIASTOLIC BLOOD PRESSURE: 82 MMHG

## 2020-12-24 DIAGNOSIS — R14.0 BLOATING: ICD-10-CM

## 2020-12-24 DIAGNOSIS — R11.0 NAUSEA: ICD-10-CM

## 2020-12-24 DIAGNOSIS — R19.7 DIARRHEA, UNSPECIFIED TYPE: ICD-10-CM

## 2020-12-24 DIAGNOSIS — R14.0 BLOATING: Primary | ICD-10-CM

## 2020-12-24 DIAGNOSIS — R10.11 RUQ ABDOMINAL PAIN: ICD-10-CM

## 2020-12-24 LAB — IGA SERPL-MCNC: 166 MG/DL (ref 40–350)

## 2020-12-24 PROCEDURE — 3074F PR MOST RECENT SYSTOLIC BLOOD PRESSURE < 130 MM HG: ICD-10-PCS | Mod: CPTII,S$GLB,, | Performed by: NURSE PRACTITIONER

## 2020-12-24 PROCEDURE — 82784 ASSAY IGA/IGD/IGG/IGM EACH: CPT

## 2020-12-24 PROCEDURE — 1126F PR PAIN SEVERITY QUANTIFIED, NO PAIN PRESENT: ICD-10-PCS | Mod: S$GLB,,, | Performed by: NURSE PRACTITIONER

## 2020-12-24 PROCEDURE — 99204 PR OFFICE/OUTPT VISIT, NEW, LEVL IV, 45-59 MIN: ICD-10-PCS | Mod: S$GLB,,, | Performed by: NURSE PRACTITIONER

## 2020-12-24 PROCEDURE — 99999 PR PBB SHADOW E&M-EST. PATIENT-LVL V: ICD-10-PCS | Mod: PBBFAC,,, | Performed by: NURSE PRACTITIONER

## 2020-12-24 PROCEDURE — 3008F PR BODY MASS INDEX (BMI) DOCUMENTED: ICD-10-PCS | Mod: CPTII,S$GLB,, | Performed by: NURSE PRACTITIONER

## 2020-12-24 PROCEDURE — 3079F PR MOST RECENT DIASTOLIC BLOOD PRESSURE 80-89 MM HG: ICD-10-PCS | Mod: CPTII,S$GLB,, | Performed by: NURSE PRACTITIONER

## 2020-12-24 PROCEDURE — 36415 COLL VENOUS BLD VENIPUNCTURE: CPT

## 2020-12-24 PROCEDURE — 3079F DIAST BP 80-89 MM HG: CPT | Mod: CPTII,S$GLB,, | Performed by: NURSE PRACTITIONER

## 2020-12-24 PROCEDURE — 3008F BODY MASS INDEX DOCD: CPT | Mod: CPTII,S$GLB,, | Performed by: NURSE PRACTITIONER

## 2020-12-24 PROCEDURE — 83516 IMMUNOASSAY NONANTIBODY: CPT

## 2020-12-24 PROCEDURE — 99204 OFFICE O/P NEW MOD 45 MIN: CPT | Mod: S$GLB,,, | Performed by: NURSE PRACTITIONER

## 2020-12-24 PROCEDURE — 3074F SYST BP LT 130 MM HG: CPT | Mod: CPTII,S$GLB,, | Performed by: NURSE PRACTITIONER

## 2020-12-24 PROCEDURE — 99999 PR PBB SHADOW E&M-EST. PATIENT-LVL V: CPT | Mod: PBBFAC,,, | Performed by: NURSE PRACTITIONER

## 2020-12-24 PROCEDURE — 1126F AMNT PAIN NOTED NONE PRSNT: CPT | Mod: S$GLB,,, | Performed by: NURSE PRACTITIONER

## 2020-12-24 NOTE — PROGRESS NOTES
[facility] Consultation Note    Reason for Consult:  The primary encounter diagnosis was Bloating. Diagnoses of Diarrhea, unspecified type, Nausea, and RUQ abdominal pain were also pertinent to this visit.    PCP:   Cruzito Craven   [address] [facility] / [place] [address]    Referring MD:  Gokul Ny Md  [address] [facility] [place] [address]    Initial History of Present Illness (HPI):  This is a 35 y.o. female here for evaluation of nausea, bloating, diarrhea. New patient.  HX of Breast CA. She is s/p mastectomy 7/2020. Finisihed chemo 5/2020 Cellulitis of the breast R sides 9-10/2020, blood cultures negative, but reports doctor said she had a systemic infection. Then developed a RUQ abdominal pain, nausea, decrease in appetite. +Indigestion. Started feels better s/p surgery and abx. Symptoms improved for a while. Placed on Carafate, reglan and protonix. Was told to f/u with GI .  Then in November during radiation treatment, sx returned. Now she is having persistent nausea and pain that comes and goes in the RUQ. May flare with spicy foods. The bloating does get worse with fattier foods. Only taking the pantoprazole 40 mg QAM, takes Carafate PRN and she does get relief with this. Regaln PRN as well if its really bad.  LFTs normal.    Suffers with constipation. Baseline. But then also reports diarrhea that comes and goes.     ROS:  Constitutional: No fevers, chills, No weight loss  ENT:  No sore throat, no PND  CV: No chest pain, no palpitation  Pulm: No cough, No shortness of breath, no wheezing  Ophtho: No vision changes  GI: see HPI  Derm: No rash, no itching  Heme: No easy bruising  MSK: No significant arthritis  : No dysuria, No hematuria  Neuro: No syncope, No seizure  Psych: +anxiety, No uncontrolled depression    Medical History:  has a past medical history of Abnormal Pap smear of cervix (2009), ADHD, Allergy, Anorexia, Anxiety, Asthma, BRCA1 positive (12/18/2020),  "Bulimia, Depression, Fever blister, History of posttraumatic stress disorder (PTSD), Hypertension, Invasive ductal carcinoma of right breast (2019), Mastitis, Migraine headache, PONV (postoperative nausea and vomiting), and Status post mastectomy, bilateral (2020).    Surgical History:  has a past surgical history that includes Tonsillectomy; Bone marrow aspiration; Colposcopy; Cervical biopsy w/ loop electrode excision;  section (2016); Shoulder surgery (Left); Sinus surgery;  section (N/A, 2019); Insertion of tunneled central venous catheter (CVC) with subcutaneous port (Left, 2019); Bilateral mastectomy (Bilateral, 2020); Biopsy of axillary node (Right, 2020); Injection for sentinel node identification (Right, 2020); Mediport removal (N/A, 2020); Insertion of breast tissue expander (Bilateral, 2020); Breast surgery; Tissue expander removal (Right, 10/3/2020); Mastectomy; Robot-assisted laparoscopic abdominal hysterectomy using da Andie Xi (N/A, 2020); and Robot-assisted laparoscopic salpingo-oophorectomy using da Andie Xi (Bilateral, 2020).    Family History: family history includes Breast cancer in her other; Cancer (age of onset: 40) in her maternal grandmother; Cervical cancer in her mother; Ovarian cancer in her other..     Social History:  reports that she has never smoked. She has never used smokeless tobacco. She reports current alcohol use. She reports that she does not use drugs.    Review of patient's allergies indicates:   Allergen Reactions    Amoxicillin Hives    Penicillins Hives and Rash    Diazepam Anxiety and Other (See Comments)     "makes hyper"       Medication List with Changes/Refills   Current Medications    ACETAMINOPHEN (TYLENOL) 500 MG TABLET    Take 1 tablet (500 mg total) by mouth every 6 (six) hours as needed for Pain.    ALPRAZOLAM (XANAX) 0.25 MG TABLET    Take 1 tablet (0.25 mg total) by mouth daily as " needed for Anxiety.    CITALOPRAM (CELEXA) 40 MG TABLET    Take 1 tablet (40 mg total) by mouth once daily.    CLOBETASOL (TEMOVATE) 0.05 % CREAM    Apply to affected area twice a day for 2 weeks then stop    DEXTROAMPHETAMINE-AMPHETAMINE (ADDERALL XR) 25 MG 24 HR CAPSULE    Take 1 capsule (25 mg total) by mouth every morning.    DOCUSATE SODIUM (COLACE) 100 MG CAPSULE    Take 100 mg by mouth 2 (two) times daily.    GOSERELIN (ZOLADEX) 3.6 MG INJECTION    Inject 3.6 mg into the skin every 30 days.    IBUPROFEN (ADVIL,MOTRIN) 600 MG TABLET    Take 1 tablet (600 mg total) by mouth every 6 (six) hours as needed for Pain.    LETROZOLE (FEMARA) 2.5 MG TAB    Take 1 tablet (2.5 mg total) by mouth once daily.    LORATADINE (CLARITIN) 10 MG TABLET    Take 10 mg by mouth once daily.    METOCLOPRAMIDE HCL (REGLAN) 10 MG TABLET    Take 1 tablet (10 mg total) by mouth 3 (three) times daily with meals.    MULTIVITAMIN (ONE DAILY MULTIVITAMIN) PER TABLET    Take 1 tablet by mouth once daily.    MULTIVITAMIN WITH MINERALS (HAIR,SKIN AND NAILS ORAL)    Take by mouth.    ONABOTULINUMTOXINA (BOTOX INJ)    Inject as directed. [id number]    OXYCODONE (ROXICODONE) 5 MG IMMEDIATE RELEASE TABLET    Take 1 tablet (5 mg total) by mouth every 4 (four) hours as needed for Pain.    OXYCODONE-ACETAMINOPHEN (PERCOCET) 5-325 MG PER TABLET    Take 1 tablet by mouth every 6 (six) hours as needed for Pain.    PANTOPRAZOLE (PROTONIX) 40 MG TABLET    Take 1 tablet (40 mg total) by mouth once daily.    PRASTERONE, DHEA, (INTRAROSA) 6.5 MG INST    Place 6.5 mg vaginally every evening.    SUCRALFATE (CARAFATE) 1 GRAM TABLET    Take 1 tablet (1 g total) by mouth 4 (four) times daily.    SUMATRIPTAN (IMITREX) 100 MG TABLET    Take 1/2 to 1 tab at onset of headache.  If no improvement in 2 hours, take another.  Do not take more than 2 in 24 hours.    TOPIRAMATE (TOPAMAX) 100 MG TABLET    Take 2 tablets (200 mg total) by mouth every evening.    TRAZODONE  "(DESYREL) 50 MG TABLET    Take 1 tablet (50 mg total) by mouth every evening.    VALACYCLOVIR (VALTREX) 1000 MG TABLET    Take 1 tablet (1,000 mg total) by mouth every 12 (twelve) hours.         Objective Findings:    Vital Signs:  /82 (BP Location: Left arm)   Pulse 83   Ht 5' 5" (1.651 m)   Wt 72.8 kg (160 lb 7.9 oz)   LMP 12/11/2020 Comment: zoladex, copper IUD  BMI 26.71 kg/m²   Body mass index is 26.71 kg/m².    Physical Exam:  General Appearance: Well appearing, NAD noted  Eyes:    No scleral icterus  ENT:  No lesions or masses   Lungs: BBS CTA ,  no wheezes  Heart:  HRRR, S1 & S2 normal, no murmurs heard  Abdomen:  Non distended, soft, no guarding, no rebound, no tenderness, no appreciated ascites. +hyserectomy lap incisions intact  Musculoskeletal:  No major joint deformities  Neurologic:  Alert and oriented x4  Psychiatric:  Normal speech mentation and affect    Labs reviewed:  Lab Results   Component Value Date    WBC 2.84 (L) 12/15/2020    HGB 12.9 12/15/2020    HCT 38.0 12/15/2020     12/15/2020    CHOL 189 12/04/2019    TRIG 76 12/04/2019    HDL 65 12/04/2019    ALT 18 12/15/2020    AST 17 12/15/2020     12/15/2020    K 3.9 12/15/2020     12/15/2020    CREATININE 1.0 12/15/2020    BUN 14 12/15/2020    CO2 23 12/15/2020    TSH 1.035 12/04/2019    INR 1.1 12/04/2019    HGBA1C 5.0 12/04/2019           Imaging reviewed:    Normal ultrasound 10/2020   Normal HIDA scan 10/2020  Unremarkable GI findings CT abd pelv 10/2020    Endoscopy reviewed:    None  Medical Decision Making:    Assessment:    Yolanda Win is a 35 y.o. female here for:    1. Bloating    2. Diarrhea, unspecified type    3. Nausea    4. RUQ abdominal pain       Sx developed after R mastectomy that was then complicated with cellulitis 9/2020. She states the surgeon told her there was infection to her RUQ of abdomen.   She has had unremarkable, CT, HIDA, US for this pain and nausea. "     Recommendations:  1. EGD, if normal could be a neuropathic pain. She may benefit from TCA but since she is on Celexa this would need to be discussed with her Psych md  2. Continue PPI QD, carafate prn  3. Labs  4. Stool studies    F/u prn if sx worsen or change    Order summary:  Orders Placed This Encounter    Stool culture    Tissue Transglutaminase, IgA    IgA    Calprotectin, Stool    Giardia / Cryptosporidum, EIA    Stool Exam-Ova,Cysts,Parasites    WBC, Stool    Case Request Endoscopy: EGD (ESOPHAGOGASTRODUODENOSCOPY)         Thank you for allowing me to participate in the care of Yolanda Colon, MALIKP-C

## 2020-12-24 NOTE — LETTER
December 24, 2020      Gokul Ny MD  [address] [facility] [address]           [facility] [address] [address]  [address]  [facility] [address]  Phone: [phone]  Fax: [phone]          Patient: Yolanda Win   MR Number: [id number]   YOB: 1985   Date of Visit: 12/24/2020       Dear Dr. Gokul Ny:    Thank you for referring Yolanda Win to me for evaluation. Attached you will find relevant portions of my assessment and plan of care.    If you have questions, please do not hesitate to call me. I look forward to following Yolanda Win along with you.    Sincerely,    Delma Colon, MARII    Enclosure  CC:  No Recipients    If you would like to receive this communication electronically, please contact [email or web address] or [phone] to request more information on [employer] Link access.    For providers and/or their staff who would like to refer a patient to [facility], please contact us through our one-stop-shop provider referral line, [facility], at [phone].    If you feel you have received this communication in error or would no longer like to receive these types of communications, please e-mail [email or web address]

## 2020-12-27 ENCOUNTER — PATIENT MESSAGE (OUTPATIENT)
Dept: GYNECOLOGIC ONCOLOGY | Facility: CLINIC | Age: 35
End: 2020-12-27

## 2020-12-27 ENCOUNTER — PATIENT MESSAGE (OUTPATIENT)
Dept: HEMATOLOGY/ONCOLOGY | Facility: CLINIC | Age: 35
End: 2020-12-27

## 2020-12-28 ENCOUNTER — TELEPHONE (OUTPATIENT)
Dept: GYNECOLOGIC ONCOLOGY | Facility: CLINIC | Age: 35
End: 2020-12-28

## 2020-12-28 LAB — TTG IGA SER-ACNC: 4 UNITS

## 2020-12-31 ENCOUNTER — OFFICE VISIT (OUTPATIENT)
Dept: RADIATION ONCOLOGY | Facility: CLINIC | Age: 35
End: 2020-12-31
Payer: COMMERCIAL

## 2020-12-31 ENCOUNTER — TELEPHONE (OUTPATIENT)
Dept: GYNECOLOGIC ONCOLOGY | Facility: CLINIC | Age: 35
End: 2020-12-31

## 2020-12-31 ENCOUNTER — NURSE TRIAGE (OUTPATIENT)
Dept: ADMINISTRATIVE | Facility: CLINIC | Age: 35
End: 2020-12-31

## 2020-12-31 ENCOUNTER — PATIENT MESSAGE (OUTPATIENT)
Dept: ENDOSCOPY | Facility: HOSPITAL | Age: 35
End: 2020-12-31

## 2020-12-31 VITALS
RESPIRATION RATE: 16 BRPM | SYSTOLIC BLOOD PRESSURE: 127 MMHG | DIASTOLIC BLOOD PRESSURE: 87 MMHG | HEIGHT: 65 IN | WEIGHT: 163.31 LBS | HEART RATE: 92 BPM | BODY MASS INDEX: 27.21 KG/M2

## 2020-12-31 DIAGNOSIS — C50.411 MALIGNANT NEOPLASM OF UPPER-OUTER QUADRANT OF RIGHT BREAST IN FEMALE, ESTROGEN RECEPTOR POSITIVE: Primary | Chronic | ICD-10-CM

## 2020-12-31 DIAGNOSIS — Z01.818 PRE-OP TESTING: Primary | ICD-10-CM

## 2020-12-31 DIAGNOSIS — Z17.0 MALIGNANT NEOPLASM OF UPPER-OUTER QUADRANT OF RIGHT BREAST IN FEMALE, ESTROGEN RECEPTOR POSITIVE: Primary | Chronic | ICD-10-CM

## 2020-12-31 PROCEDURE — 99499 NO LOS: ICD-10-PCS | Mod: S$PBB,,, | Performed by: RADIOLOGY

## 2020-12-31 PROCEDURE — 99999 PR PBB SHADOW E&M-EST. PATIENT-LVL IV: CPT | Mod: PBBFAC,,, | Performed by: RADIOLOGY

## 2020-12-31 PROCEDURE — 99999 PR PBB SHADOW E&M-EST. PATIENT-LVL IV: ICD-10-PCS | Mod: PBBFAC,,, | Performed by: RADIOLOGY

## 2020-12-31 PROCEDURE — 99499 UNLISTED E&M SERVICE: CPT | Mod: S$PBB,,, | Performed by: RADIOLOGY

## 2020-12-31 NOTE — TELEPHONE ENCOUNTER
----- Message from Yaneli Rader sent at 12/31/2020  3:26 PM CST -----  Regarding: Patient call back  Who called:CHUCK MTZ [[id number]]    What is the request in detail: Patient is requesting a call back. She states her child jumped into her arms this morning, and she has been feeling like she has a tampon in since. She would like to know if she should be concerned.   Please advise.    Can the clinic reply by [facility]? No    Best call back number: [phone]    Additional Information: N/A

## 2020-12-31 NOTE — PROGRESS NOTES
Subjective:       Patient ID: Yolanda Win is a 35 y.o. female.    Chief Complaint: Breast Cancer (f/u after xrt)    This patient presents for follow up visit.        Juanis has a history of Stage IIIB, T3, N1, M0, G3, HER2 Neg, ER Pos, KS Neg Rt. breast cancer.  She initially presented in December of 2019 for evaluation of a Rt. breast mass. MMG on 12/11/19 revealed a microlobulated Rt. breast mass measuring 3.3 cm on ultrasound. Biopsies on 12/13/19 revealed infiltrating ductal carcinoma, histologic grade 3, nuclear grade 3 and mitotic index 3. 25% of the cells had intermediate staining for ER. KS and Her - 2 were negative. Ki-67 was 90%. Oncotype score returned at 57. MRI of the breast revealed a 5.2 x 2.6 x 4.6 cm irregularly shaped mass with rim enhancement in the Rt. breast at the 11 o'clock position. There was no lymphadenopathy. Bone scan and CT of the chest, abdomen and pelvis were negative. There was a non specific prominent Rt. axillary node which was felt to be reactive given her earlier MRI finding. The patient was referred for chemotherapy and completed 4 cycles of AC followed by 12 cycles of Taxol. She subsequently underwent bilateral mastectomies with Rt. sentinel node biopsy. Pathology from the Rt. breast revealed fibrocystic changes, 5 sentinel nodes were negative for tumor involvement. The Lt. breast was negative. We elected to offer PMRT. This was delayed somewhat due to slow healing from her surgery. The tissues expander on the Rt. required removal. She completed 50 Gy to the chest wall and regional nodes on 11/25/20.  Today the patient states she feels well  no chest wall complaints.  Hysterectomy two weeks ago.     Review of Systems   Constitutional: Negative for activity change, appetite change and fatigue.   Respiratory: Negative for cough and shortness of breath.    Cardiovascular: Negative for chest pain and leg swelling.   Gastrointestinal: Negative for abdominal pain, nausea  and vomiting.         Objective:      Physical Exam  Constitutional:       Appearance: Normal appearance.   Neck:      Musculoskeletal: Normal range of motion.   Chest:       Lymphadenopathy:      Cervical: No cervical adenopathy.      Upper Body:      Right upper body: No supraclavicular or axillary adenopathy.   Neurological:      Mental Status: She is alert.         Assessment:       1. Malignant neoplasm of upper-outer quadrant of right breast in female, estrogen receptor positive        Plan:       Doing well, recovered from the acute effects of radiotherapy.  No evidence of recurrent disease.  She is planned for follow up with plastic surgery.  Reconstruction planned in the future.  Follow up with us in some 6 - 8 months.

## 2020-12-31 NOTE — TELEPHONE ENCOUNTER
Post op patient reports she is not supposed to life anything >15 pounds. Reports today her child who is 40 # jumped into her arms out of the vehicle. Reports now feeling as if her bladder is prolapsed. She is uninterested in the triage process. Reports she is going to call her office directly. I have suggested she be seen by a physician if no success, she verbalizes understanding.    Reason for Disposition   Health Information question, no triage required and triager able to answer question    Protocols used: INFORMATION ONLY CALL - NO TRIAGE-A-        I spoke with patient. Discussed findings. No vaginal bleeding. Will continue to monitor. Has an appointment with me next week for follow up. Encouraged to contact me at anytime if needed.

## 2021-01-04 ENCOUNTER — PATIENT MESSAGE (OUTPATIENT)
Dept: ADMINISTRATIVE | Facility: HOSPITAL | Age: 36
End: 2021-01-04

## 2021-01-05 ENCOUNTER — OFFICE VISIT (OUTPATIENT)
Dept: GYNECOLOGIC ONCOLOGY | Facility: CLINIC | Age: 36
End: 2021-01-05
Payer: COMMERCIAL

## 2021-01-05 VITALS
WEIGHT: 163.38 LBS | BODY MASS INDEX: 27.18 KG/M2 | HEART RATE: 94 BPM | SYSTOLIC BLOOD PRESSURE: 140 MMHG | DIASTOLIC BLOOD PRESSURE: 92 MMHG

## 2021-01-05 DIAGNOSIS — Z15.09 BRCA1 POSITIVE: ICD-10-CM

## 2021-01-05 DIAGNOSIS — Z15.01 BRCA1 POSITIVE: ICD-10-CM

## 2021-01-05 DIAGNOSIS — Z98.890 S/P ROBOT-ASSISTED SURGICAL PROCEDURE: Primary | ICD-10-CM

## 2021-01-05 PROCEDURE — 99024 POSTOP FOLLOW-UP VISIT: CPT | Mod: S$GLB,,, | Performed by: OBSTETRICS & GYNECOLOGY

## 2021-01-05 PROCEDURE — 3008F PR BODY MASS INDEX (BMI) DOCUMENTED: ICD-10-PCS | Mod: CPTII,S$GLB,, | Performed by: OBSTETRICS & GYNECOLOGY

## 2021-01-05 PROCEDURE — 99024 PR POST-OP FOLLOW-UP VISIT: ICD-10-PCS | Mod: S$GLB,,, | Performed by: OBSTETRICS & GYNECOLOGY

## 2021-01-05 PROCEDURE — 1126F PR PAIN SEVERITY QUANTIFIED, NO PAIN PRESENT: ICD-10-PCS | Mod: S$GLB,,, | Performed by: OBSTETRICS & GYNECOLOGY

## 2021-01-05 PROCEDURE — 99999 PR PBB SHADOW E&M-EST. PATIENT-LVL II: ICD-10-PCS | Mod: PBBFAC,,, | Performed by: OBSTETRICS & GYNECOLOGY

## 2021-01-05 PROCEDURE — 1126F AMNT PAIN NOTED NONE PRSNT: CPT | Mod: S$GLB,,, | Performed by: OBSTETRICS & GYNECOLOGY

## 2021-01-05 PROCEDURE — 3008F BODY MASS INDEX DOCD: CPT | Mod: CPTII,S$GLB,, | Performed by: OBSTETRICS & GYNECOLOGY

## 2021-01-05 PROCEDURE — 99999 PR PBB SHADOW E&M-EST. PATIENT-LVL II: CPT | Mod: PBBFAC,,, | Performed by: OBSTETRICS & GYNECOLOGY

## 2021-01-05 RX ORDER — MAGNESIUM 30 MG
TABLET ORAL ONCE
COMMUNITY

## 2021-01-05 RX ORDER — VIT C/E/ZN/COPPR/LUTEIN/ZEAXAN 250MG-90MG
1000 CAPSULE ORAL 2 TIMES DAILY
COMMUNITY
End: 2023-05-18

## 2021-01-06 ENCOUNTER — PATIENT OUTREACH (OUTPATIENT)
Dept: ADMINISTRATIVE | Facility: OTHER | Age: 36
End: 2021-01-06

## 2021-01-07 ENCOUNTER — CLINICAL SUPPORT (OUTPATIENT)
Dept: OBSTETRICS AND GYNECOLOGY | Facility: CLINIC | Age: 36
End: 2021-01-07
Payer: COMMERCIAL

## 2021-01-07 ENCOUNTER — OFFICE VISIT (OUTPATIENT)
Dept: OBSTETRICS AND GYNECOLOGY | Facility: CLINIC | Age: 36
End: 2021-01-07
Attending: OBSTETRICS & GYNECOLOGY
Payer: COMMERCIAL

## 2021-01-07 ENCOUNTER — PATIENT MESSAGE (OUTPATIENT)
Dept: FAMILY MEDICINE | Facility: CLINIC | Age: 36
End: 2021-01-07

## 2021-01-07 VITALS
DIASTOLIC BLOOD PRESSURE: 70 MMHG | BODY MASS INDEX: 27.1 KG/M2 | HEIGHT: 65 IN | SYSTOLIC BLOOD PRESSURE: 108 MMHG | WEIGHT: 162.69 LBS

## 2021-01-07 DIAGNOSIS — Z17.0 MALIGNANT NEOPLASM OF UPPER-OUTER QUADRANT OF RIGHT BREAST IN FEMALE, ESTROGEN RECEPTOR POSITIVE: ICD-10-CM

## 2021-01-07 DIAGNOSIS — N95.1 MENOPAUSAL SYMPTOM: Primary | ICD-10-CM

## 2021-01-07 DIAGNOSIS — Z13.220 SCREENING FOR CHOLESTEROL LEVEL: ICD-10-CM

## 2021-01-07 DIAGNOSIS — G62.0 CHEMOTHERAPY-INDUCED NEUROPATHY: Primary | Chronic | ICD-10-CM

## 2021-01-07 DIAGNOSIS — T45.1X5A CHEMOTHERAPY-INDUCED NEUROPATHY: Primary | Chronic | ICD-10-CM

## 2021-01-07 DIAGNOSIS — Z17.0 MALIGNANT NEOPLASM OF UPPER-OUTER QUADRANT OF RIGHT BREAST IN FEMALE, ESTROGEN RECEPTOR POSITIVE: Chronic | ICD-10-CM

## 2021-01-07 DIAGNOSIS — Z79.811 USE OF AROMATASE INHIBITORS: ICD-10-CM

## 2021-01-07 DIAGNOSIS — E89.40 SURGICAL MENOPAUSE: ICD-10-CM

## 2021-01-07 DIAGNOSIS — C50.411 MALIGNANT NEOPLASM OF UPPER-OUTER QUADRANT OF RIGHT BREAST IN FEMALE, ESTROGEN RECEPTOR POSITIVE: ICD-10-CM

## 2021-01-07 DIAGNOSIS — G89.29 CHRONIC BILATERAL LOW BACK PAIN WITHOUT SCIATICA: ICD-10-CM

## 2021-01-07 DIAGNOSIS — C50.411 MALIGNANT NEOPLASM OF UPPER-OUTER QUADRANT OF RIGHT BREAST IN FEMALE, ESTROGEN RECEPTOR POSITIVE: Chronic | ICD-10-CM

## 2021-01-07 DIAGNOSIS — M54.50 CHRONIC BILATERAL LOW BACK PAIN WITHOUT SCIATICA: ICD-10-CM

## 2021-01-07 PROCEDURE — 99214 OFFICE O/P EST MOD 30 MIN: CPT | Mod: 25,24,S$GLB, | Performed by: OBSTETRICS & GYNECOLOGY

## 2021-01-07 PROCEDURE — 99214 PR OFFICE/OUTPT VISIT, EST, LEVL IV, 30-39 MIN: ICD-10-PCS | Mod: 25,24,S$GLB, | Performed by: OBSTETRICS & GYNECOLOGY

## 2021-01-07 PROCEDURE — 96372 THER/PROPH/DIAG INJ SC/IM: CPT | Mod: S$GLB,,, | Performed by: OBSTETRICS & GYNECOLOGY

## 2021-01-07 PROCEDURE — 1125F PR PAIN SEVERITY QUANTIFIED, PAIN PRESENT: ICD-10-PCS | Mod: S$GLB,,, | Performed by: OBSTETRICS & GYNECOLOGY

## 2021-01-07 PROCEDURE — 1125F AMNT PAIN NOTED PAIN PRSNT: CPT | Mod: S$GLB,,, | Performed by: OBSTETRICS & GYNECOLOGY

## 2021-01-07 PROCEDURE — 96372 PR INJECTION,THERAP/PROPH/DIAG2ST, IM OR SUBCUT: ICD-10-PCS | Mod: S$GLB,,, | Performed by: OBSTETRICS & GYNECOLOGY

## 2021-01-07 PROCEDURE — 3008F BODY MASS INDEX DOCD: CPT | Mod: CPTII,S$GLB,, | Performed by: OBSTETRICS & GYNECOLOGY

## 2021-01-07 PROCEDURE — 3008F PR BODY MASS INDEX (BMI) DOCUMENTED: ICD-10-PCS | Mod: CPTII,S$GLB,, | Performed by: OBSTETRICS & GYNECOLOGY

## 2021-01-07 RX ORDER — TESTOSTERONE CYPIONATE 200 MG/ML
50 INJECTION, SOLUTION INTRAMUSCULAR
Status: COMPLETED | OUTPATIENT
Start: 2021-01-07 | End: 2021-01-07

## 2021-01-07 RX ADMIN — TESTOSTERONE CYPIONATE 50 MG: 200 INJECTION, SOLUTION INTRAMUSCULAR at 04:01

## 2021-01-08 ENCOUNTER — OFFICE VISIT (OUTPATIENT)
Dept: INTERNAL MEDICINE | Facility: CLINIC | Age: 36
End: 2021-01-08
Payer: COMMERCIAL

## 2021-01-08 ENCOUNTER — PATIENT MESSAGE (OUTPATIENT)
Dept: PSYCHIATRY | Facility: CLINIC | Age: 36
End: 2021-01-08

## 2021-01-08 ENCOUNTER — LAB VISIT (OUTPATIENT)
Dept: INTERNAL MEDICINE | Facility: CLINIC | Age: 36
End: 2021-01-08
Payer: COMMERCIAL

## 2021-01-08 DIAGNOSIS — J32.9 RECURRENT SINUSITIS: Primary | ICD-10-CM

## 2021-01-08 DIAGNOSIS — Z01.818 PRE-OP TESTING: ICD-10-CM

## 2021-01-08 PROCEDURE — 99214 OFFICE O/P EST MOD 30 MIN: CPT | Mod: 95,,, | Performed by: INTERNAL MEDICINE

## 2021-01-08 PROCEDURE — U0003 INFECTIOUS AGENT DETECTION BY NUCLEIC ACID (DNA OR RNA); SEVERE ACUTE RESPIRATORY SYNDROME CORONAVIRUS 2 (SARS-COV-2) (CORONAVIRUS DISEASE [COVID-19]), AMPLIFIED PROBE TECHNIQUE, MAKING USE OF HIGH THROUGHPUT TECHNOLOGIES AS DESCRIBED BY CMS-2020-01-R: HCPCS

## 2021-01-08 PROCEDURE — 99214 PR OFFICE/OUTPT VISIT, EST, LEVL IV, 30-39 MIN: ICD-10-PCS | Mod: 95,,, | Performed by: INTERNAL MEDICINE

## 2021-01-08 RX ORDER — ALPRAZOLAM 0.25 MG/1
0.25 TABLET ORAL DAILY PRN
Qty: 30 TABLET | Refills: 0 | Status: SHIPPED | OUTPATIENT
Start: 2021-01-08 | End: 2021-10-29 | Stop reason: SDUPTHER

## 2021-01-08 RX ORDER — DEXTROAMPHETAMINE SACCHARATE, AMPHETAMINE ASPARTATE MONOHYDRATE, DEXTROAMPHETAMINE SULFATE AND AMPHETAMINE SULFATE 6.25; 6.25; 6.25; 6.25 MG/1; MG/1; MG/1; MG/1
25 CAPSULE, EXTENDED RELEASE ORAL EVERY MORNING
Qty: 30 CAPSULE | Refills: 0 | Status: SHIPPED | OUTPATIENT
Start: 2021-01-08 | End: 2021-02-08 | Stop reason: SDUPTHER

## 2021-01-08 RX ORDER — CEFDINIR 300 MG/1
300 CAPSULE ORAL 2 TIMES DAILY
Qty: 20 CAPSULE | Refills: 0 | Status: SHIPPED | OUTPATIENT
Start: 2021-01-08 | End: 2021-01-19

## 2021-01-09 LAB — SARS-COV-2 RNA RESP QL NAA+PROBE: NOT DETECTED

## 2021-01-11 ENCOUNTER — HOSPITAL ENCOUNTER (OUTPATIENT)
Facility: HOSPITAL | Age: 36
Discharge: HOME | End: 2021-01-11
Attending: INTERNAL MEDICINE | Admitting: INTERNAL MEDICINE
Payer: COMMERCIAL

## 2021-01-11 ENCOUNTER — ANESTHESIA EVENT (OUTPATIENT)
Dept: ENDOSCOPY | Facility: HOSPITAL | Age: 36
End: 2021-01-11
Payer: COMMERCIAL

## 2021-01-11 ENCOUNTER — ANESTHESIA (OUTPATIENT)
Dept: ENDOSCOPY | Facility: HOSPITAL | Age: 36
End: 2021-01-11
Payer: COMMERCIAL

## 2021-01-11 ENCOUNTER — TELEPHONE (OUTPATIENT)
Dept: PSYCHIATRY | Facility: CLINIC | Age: 36
End: 2021-01-11

## 2021-01-11 VITALS
RESPIRATION RATE: 14 BRPM | TEMPERATURE: 98 F | BODY MASS INDEX: 26.49 KG/M2 | WEIGHT: 159 LBS | HEIGHT: 65 IN | DIASTOLIC BLOOD PRESSURE: 90 MMHG | HEART RATE: 70 BPM | SYSTOLIC BLOOD PRESSURE: 152 MMHG | OXYGEN SATURATION: 99 %

## 2021-01-11 DIAGNOSIS — Z15.09 BRCA1 POSITIVE: ICD-10-CM

## 2021-01-11 DIAGNOSIS — Z15.01 BRCA POSITIVE: ICD-10-CM

## 2021-01-11 DIAGNOSIS — Z15.01 BRCA1 POSITIVE: ICD-10-CM

## 2021-01-11 DIAGNOSIS — G47.00 INSOMNIA, UNSPECIFIED TYPE: Chronic | ICD-10-CM

## 2021-01-11 DIAGNOSIS — R10.11 RUQ PAIN: ICD-10-CM

## 2021-01-11 DIAGNOSIS — Z15.09 BRCA POSITIVE: ICD-10-CM

## 2021-01-11 DIAGNOSIS — Z86.59 HISTORY OF BULIMIA NERVOSA: Chronic | ICD-10-CM

## 2021-01-11 DIAGNOSIS — Z86.59 HISTORY OF POSTTRAUMATIC STRESS DISORDER (PTSD): Chronic | ICD-10-CM

## 2021-01-11 DIAGNOSIS — Z17.0 MALIGNANT NEOPLASM OF UPPER-OUTER QUADRANT OF RIGHT BREAST IN FEMALE, ESTROGEN RECEPTOR POSITIVE: ICD-10-CM

## 2021-01-11 DIAGNOSIS — F41.1 GENERALIZED ANXIETY DISORDER: Chronic | ICD-10-CM

## 2021-01-11 DIAGNOSIS — C50.411 MALIGNANT NEOPLASM OF UPPER-OUTER QUADRANT OF RIGHT BREAST IN FEMALE, ESTROGEN RECEPTOR POSITIVE: ICD-10-CM

## 2021-01-11 DIAGNOSIS — Z86.59 HISTORY OF ANOREXIA NERVOSA: Chronic | ICD-10-CM

## 2021-01-11 DIAGNOSIS — F90.0 ADHD (ATTENTION DEFICIT HYPERACTIVITY DISORDER), INATTENTIVE TYPE: Primary | ICD-10-CM

## 2021-01-11 PROCEDURE — 63600175 PHARM REV CODE 636 W HCPCS: Performed by: NURSE ANESTHETIST, CERTIFIED REGISTERED

## 2021-01-11 PROCEDURE — 43239 EGD BIOPSY SINGLE/MULTIPLE: CPT | Mod: ,,, | Performed by: INTERNAL MEDICINE

## 2021-01-11 PROCEDURE — 25000003 PHARM REV CODE 250: Performed by: NURSE ANESTHETIST, CERTIFIED REGISTERED

## 2021-01-11 PROCEDURE — 88305 TISSUE EXAM BY PATHOLOGIST: CPT | Mod: 26,,, | Performed by: PATHOLOGY

## 2021-01-11 PROCEDURE — 88305 TISSUE EXAM BY PATHOLOGIST: CPT | Performed by: PATHOLOGY

## 2021-01-11 PROCEDURE — 37000008 HC ANESTHESIA 1ST 15 MINUTES: Performed by: INTERNAL MEDICINE

## 2021-01-11 PROCEDURE — 43239 EGD BIOPSY SINGLE/MULTIPLE: CPT | Performed by: INTERNAL MEDICINE

## 2021-01-11 PROCEDURE — E9220 PRA ENDO ANESTHESIA: ICD-10-PCS | Mod: ,,, | Performed by: NURSE ANESTHETIST, CERTIFIED REGISTERED

## 2021-01-11 PROCEDURE — 88305 TISSUE EXAM BY PATHOLOGIST: ICD-10-PCS | Mod: 26,,, | Performed by: PATHOLOGY

## 2021-01-11 PROCEDURE — 25000003 PHARM REV CODE 250: Performed by: INTERNAL MEDICINE

## 2021-01-11 PROCEDURE — 27201012 HC FORCEPS, HOT/COLD, DISP: Performed by: INTERNAL MEDICINE

## 2021-01-11 PROCEDURE — E9220 PRA ENDO ANESTHESIA: HCPCS | Mod: ,,, | Performed by: NURSE ANESTHETIST, CERTIFIED REGISTERED

## 2021-01-11 PROCEDURE — 37000009 HC ANESTHESIA EA ADD 15 MINS: Performed by: INTERNAL MEDICINE

## 2021-01-11 PROCEDURE — 43239 PR EGD, FLEX, W/BIOPSY, SGL/MULTI: ICD-10-PCS | Mod: ,,, | Performed by: INTERNAL MEDICINE

## 2021-01-11 RX ORDER — LIDOCAINE HYDROCHLORIDE 20 MG/ML
INJECTION INTRAVENOUS
Status: DISCONTINUED | OUTPATIENT
Start: 2021-01-11 | End: 2021-01-11

## 2021-01-11 RX ORDER — SODIUM CHLORIDE 9 MG/ML
INJECTION, SOLUTION INTRAVENOUS CONTINUOUS
Status: DISCONTINUED | OUTPATIENT
Start: 2021-01-11 | End: 2021-01-11 | Stop reason: HOSPADM

## 2021-01-11 RX ORDER — PROPOFOL 10 MG/ML
VIAL (ML) INTRAVENOUS CONTINUOUS PRN
Status: DISCONTINUED | OUTPATIENT
Start: 2021-01-11 | End: 2021-01-11

## 2021-01-11 RX ORDER — PROPOFOL 10 MG/ML
VIAL (ML) INTRAVENOUS
Status: DISCONTINUED | OUTPATIENT
Start: 2021-01-11 | End: 2021-01-11

## 2021-01-11 RX ADMIN — PROPOFOL 200 MCG/KG/MIN: 10 INJECTION, EMULSION INTRAVENOUS at 03:01

## 2021-01-11 RX ADMIN — SODIUM CHLORIDE 10 ML/HR: 0.9 INJECTION, SOLUTION INTRAVENOUS at 02:01

## 2021-01-11 RX ADMIN — PROPOFOL 100 MG: 10 INJECTION, EMULSION INTRAVENOUS at 03:01

## 2021-01-11 RX ADMIN — LIDOCAINE HYDROCHLORIDE 100 MG: 20 INJECTION, SOLUTION INTRAVENOUS at 03:01

## 2021-01-11 RX ADMIN — PROPOFOL 50 MG: 10 INJECTION, EMULSION INTRAVENOUS at 03:01

## 2021-01-11 RX ADMIN — PROPOFOL 40 MG: 10 INJECTION, EMULSION INTRAVENOUS at 03:01

## 2021-01-11 RX ADMIN — PROPOFOL 60 MG: 10 INJECTION, EMULSION INTRAVENOUS at 03:01

## 2021-01-14 ENCOUNTER — PATIENT MESSAGE (OUTPATIENT)
Dept: FAMILY MEDICINE | Facility: CLINIC | Age: 36
End: 2021-01-14

## 2021-01-15 ENCOUNTER — PATIENT MESSAGE (OUTPATIENT)
Dept: INTERNAL MEDICINE | Facility: CLINIC | Age: 36
End: 2021-01-15

## 2021-01-15 ENCOUNTER — TELEPHONE (OUTPATIENT)
Dept: INTERNAL MEDICINE | Facility: CLINIC | Age: 36
End: 2021-01-15

## 2021-01-18 ENCOUNTER — PATIENT MESSAGE (OUTPATIENT)
Dept: HEMATOLOGY/ONCOLOGY | Facility: CLINIC | Age: 36
End: 2021-01-18

## 2021-01-19 ENCOUNTER — PATIENT MESSAGE (OUTPATIENT)
Dept: GASTROENTEROLOGY | Facility: CLINIC | Age: 36
End: 2021-01-19

## 2021-01-19 LAB
FINAL PATHOLOGIC DIAGNOSIS: NORMAL
GROSS: NORMAL
Lab: NORMAL

## 2021-01-20 ENCOUNTER — TELEPHONE (OUTPATIENT)
Dept: GASTROENTEROLOGY | Facility: CLINIC | Age: 36
End: 2021-01-20

## 2021-01-21 ENCOUNTER — PATIENT MESSAGE (OUTPATIENT)
Dept: PSYCHIATRY | Facility: CLINIC | Age: 36
End: 2021-01-21

## 2021-01-22 ENCOUNTER — TELEPHONE (OUTPATIENT)
Dept: PSYCHIATRY | Facility: CLINIC | Age: 36
End: 2021-01-22

## 2021-01-22 ENCOUNTER — TELEPHONE (OUTPATIENT)
Dept: OBSTETRICS AND GYNECOLOGY | Facility: CLINIC | Age: 36
End: 2021-01-22

## 2021-01-25 ENCOUNTER — OFFICE VISIT (OUTPATIENT)
Dept: GASTROENTEROLOGY | Facility: CLINIC | Age: 36
End: 2021-01-25
Payer: COMMERCIAL

## 2021-01-25 ENCOUNTER — PATIENT MESSAGE (OUTPATIENT)
Dept: GASTROENTEROLOGY | Facility: CLINIC | Age: 36
End: 2021-01-25

## 2021-01-25 ENCOUNTER — TELEPHONE (OUTPATIENT)
Dept: FAMILY MEDICINE | Facility: CLINIC | Age: 36
End: 2021-01-25

## 2021-01-25 VITALS — BODY MASS INDEX: 27.15 KG/M2 | WEIGHT: 163.13 LBS

## 2021-01-25 DIAGNOSIS — Z01.812 PRE-PROCEDURE LAB EXAM: ICD-10-CM

## 2021-01-25 DIAGNOSIS — Z00.00 ANNUAL PHYSICAL EXAM: Primary | ICD-10-CM

## 2021-01-25 DIAGNOSIS — R10.10 UPPER ABDOMINAL PAIN: Primary | ICD-10-CM

## 2021-01-25 PROCEDURE — 99214 PR OFFICE/OUTPT VISIT, EST, LEVL IV, 30-39 MIN: ICD-10-PCS | Mod: S$GLB,,, | Performed by: INTERNAL MEDICINE

## 2021-01-25 PROCEDURE — 1125F AMNT PAIN NOTED PAIN PRSNT: CPT | Mod: S$GLB,,, | Performed by: INTERNAL MEDICINE

## 2021-01-25 PROCEDURE — 99999 PR PBB SHADOW E&M-EST. PATIENT-LVL V: ICD-10-PCS | Mod: PBBFAC,,, | Performed by: INTERNAL MEDICINE

## 2021-01-25 PROCEDURE — 3008F PR BODY MASS INDEX (BMI) DOCUMENTED: ICD-10-PCS | Mod: CPTII,S$GLB,, | Performed by: INTERNAL MEDICINE

## 2021-01-25 PROCEDURE — 3008F BODY MASS INDEX DOCD: CPT | Mod: CPTII,S$GLB,, | Performed by: INTERNAL MEDICINE

## 2021-01-25 PROCEDURE — 99999 PR PBB SHADOW E&M-EST. PATIENT-LVL V: CPT | Mod: PBBFAC,,, | Performed by: INTERNAL MEDICINE

## 2021-01-25 PROCEDURE — 1125F PR PAIN SEVERITY QUANTIFIED, PAIN PRESENT: ICD-10-PCS | Mod: S$GLB,,, | Performed by: INTERNAL MEDICINE

## 2021-01-25 PROCEDURE — 99214 OFFICE O/P EST MOD 30 MIN: CPT | Mod: S$GLB,,, | Performed by: INTERNAL MEDICINE

## 2021-01-26 ENCOUNTER — PATIENT MESSAGE (OUTPATIENT)
Dept: FAMILY MEDICINE | Facility: CLINIC | Age: 36
End: 2021-01-26

## 2021-01-26 ENCOUNTER — PATIENT MESSAGE (OUTPATIENT)
Dept: HEMATOLOGY/ONCOLOGY | Facility: CLINIC | Age: 36
End: 2021-01-26

## 2021-01-26 ENCOUNTER — PATIENT MESSAGE (OUTPATIENT)
Dept: OBSTETRICS AND GYNECOLOGY | Facility: CLINIC | Age: 36
End: 2021-01-26

## 2021-01-26 DIAGNOSIS — Z17.0 MALIGNANT NEOPLASM OF UPPER-OUTER QUADRANT OF RIGHT BREAST IN FEMALE, ESTROGEN RECEPTOR POSITIVE: Primary | Chronic | ICD-10-CM

## 2021-01-26 DIAGNOSIS — C50.411 MALIGNANT NEOPLASM OF UPPER-OUTER QUADRANT OF RIGHT BREAST IN FEMALE, ESTROGEN RECEPTOR POSITIVE: Primary | Chronic | ICD-10-CM

## 2021-01-26 RX ORDER — EXEMESTANE 25 MG/1
25 TABLET ORAL DAILY
Qty: 30 TABLET | Refills: 11 | Status: SHIPPED | OUTPATIENT
Start: 2021-01-26 | End: 2021-01-27 | Stop reason: SDUPTHER

## 2021-01-27 ENCOUNTER — LAB VISIT (OUTPATIENT)
Dept: LAB | Facility: HOSPITAL | Age: 36
End: 2021-01-27
Attending: FAMILY MEDICINE
Payer: COMMERCIAL

## 2021-01-27 ENCOUNTER — PATIENT MESSAGE (OUTPATIENT)
Dept: OBSTETRICS AND GYNECOLOGY | Facility: CLINIC | Age: 36
End: 2021-01-27

## 2021-01-27 DIAGNOSIS — Z17.0 MALIGNANT NEOPLASM OF UPPER-OUTER QUADRANT OF RIGHT BREAST IN FEMALE, ESTROGEN RECEPTOR POSITIVE: Chronic | ICD-10-CM

## 2021-01-27 DIAGNOSIS — Z00.00 ANNUAL PHYSICAL EXAM: ICD-10-CM

## 2021-01-27 DIAGNOSIS — C50.411 MALIGNANT NEOPLASM OF UPPER-OUTER QUADRANT OF RIGHT BREAST IN FEMALE, ESTROGEN RECEPTOR POSITIVE: Chronic | ICD-10-CM

## 2021-01-27 LAB
25(OH)D3+25(OH)D2 SERPL-MCNC: 39 NG/ML (ref 30–96)
ALBUMIN SERPL BCP-MCNC: 3.9 G/DL (ref 3.5–5.2)
ALP SERPL-CCNC: 105 U/L (ref 55–135)
ALT SERPL W/O P-5'-P-CCNC: 17 U/L (ref 10–44)
ANION GAP SERPL CALC-SCNC: 9 MMOL/L (ref 8–16)
AST SERPL-CCNC: 22 U/L (ref 10–40)
BASOPHILS # BLD AUTO: 0.01 K/UL (ref 0–0.2)
BASOPHILS NFR BLD: 0.3 % (ref 0–1.9)
BILIRUB SERPL-MCNC: 0.5 MG/DL (ref 0.1–1)
BUN SERPL-MCNC: 13 MG/DL (ref 6–20)
CALCIUM SERPL-MCNC: 9.1 MG/DL (ref 8.7–10.5)
CHLORIDE SERPL-SCNC: 111 MMOL/L (ref 95–110)
CHOLEST SERPL-MCNC: 188 MG/DL (ref 120–199)
CHOLEST/HDLC SERPL: 3.2 {RATIO} (ref 2–5)
CO2 SERPL-SCNC: 22 MMOL/L (ref 23–29)
CREAT SERPL-MCNC: 1.1 MG/DL (ref 0.5–1.4)
DIFFERENTIAL METHOD: ABNORMAL
EOSINOPHIL # BLD AUTO: 0.2 K/UL (ref 0–0.5)
EOSINOPHIL NFR BLD: 5.4 % (ref 0–8)
ERYTHROCYTE [DISTWIDTH] IN BLOOD BY AUTOMATED COUNT: 13 % (ref 11.5–14.5)
EST. GFR  (AFRICAN AMERICAN): >60 ML/MIN/1.73 M^2
EST. GFR  (NON AFRICAN AMERICAN): >60 ML/MIN/1.73 M^2
ESTIMATED AVG GLUCOSE: 94 MG/DL (ref 68–131)
GLUCOSE SERPL-MCNC: 106 MG/DL (ref 70–110)
HBA1C MFR BLD HPLC: 4.9 % (ref 4–5.6)
HCT VFR BLD AUTO: 35.3 % (ref 37–48.5)
HDLC SERPL-MCNC: 59 MG/DL (ref 40–75)
HDLC SERPL: 31.4 % (ref 20–50)
HGB BLD-MCNC: 11.9 G/DL (ref 12–16)
IMM GRANULOCYTES # BLD AUTO: 0 K/UL (ref 0–0.04)
IMM GRANULOCYTES NFR BLD AUTO: 0 % (ref 0–0.5)
LDLC SERPL CALC-MCNC: 107.6 MG/DL (ref 63–159)
LYMPHOCYTES # BLD AUTO: 0.8 K/UL (ref 1–4.8)
LYMPHOCYTES NFR BLD: 26.3 % (ref 18–48)
MCH RBC QN AUTO: 31.6 PG (ref 27–31)
MCHC RBC AUTO-ENTMCNC: 33.7 G/DL (ref 32–36)
MCV RBC AUTO: 94 FL (ref 82–98)
MONOCYTES # BLD AUTO: 0.3 K/UL (ref 0.3–1)
MONOCYTES NFR BLD: 10.2 % (ref 4–15)
NEUTROPHILS # BLD AUTO: 1.8 K/UL (ref 1.8–7.7)
NEUTROPHILS NFR BLD: 57.8 % (ref 38–73)
NONHDLC SERPL-MCNC: 129 MG/DL
NRBC BLD-RTO: 0 /100 WBC
PLATELET # BLD AUTO: 202 K/UL (ref 150–350)
PMV BLD AUTO: 8.8 FL (ref 9.2–12.9)
POTASSIUM SERPL-SCNC: 4.2 MMOL/L (ref 3.5–5.1)
PROT SERPL-MCNC: 6.8 G/DL (ref 6–8.4)
RBC # BLD AUTO: 3.77 M/UL (ref 4–5.4)
SODIUM SERPL-SCNC: 142 MMOL/L (ref 136–145)
TRIGL SERPL-MCNC: 107 MG/DL (ref 30–150)
TSH SERPL DL<=0.005 MIU/L-ACNC: 0.67 UIU/ML (ref 0.4–4)
WBC # BLD AUTO: 3.15 K/UL (ref 3.9–12.7)

## 2021-01-27 PROCEDURE — 85025 COMPLETE CBC W/AUTO DIFF WBC: CPT

## 2021-01-27 PROCEDURE — 82306 VITAMIN D 25 HYDROXY: CPT

## 2021-01-27 PROCEDURE — 80053 COMPREHEN METABOLIC PANEL: CPT

## 2021-01-27 PROCEDURE — 83036 HEMOGLOBIN GLYCOSYLATED A1C: CPT

## 2021-01-27 PROCEDURE — 80061 LIPID PANEL: CPT

## 2021-01-27 PROCEDURE — 36415 COLL VENOUS BLD VENIPUNCTURE: CPT

## 2021-01-27 PROCEDURE — 84443 ASSAY THYROID STIM HORMONE: CPT

## 2021-01-27 RX ORDER — EXEMESTANE 25 MG/1
25 TABLET ORAL DAILY
Qty: 30 TABLET | Refills: 11 | Status: SHIPPED | OUTPATIENT
Start: 2021-01-27 | End: 2021-08-18

## 2021-01-28 ENCOUNTER — TELEPHONE (OUTPATIENT)
Dept: FAMILY MEDICINE | Facility: CLINIC | Age: 36
End: 2021-01-28

## 2021-01-28 ENCOUNTER — OFFICE VISIT (OUTPATIENT)
Dept: FAMILY MEDICINE | Facility: CLINIC | Age: 36
End: 2021-01-28
Payer: COMMERCIAL

## 2021-01-28 ENCOUNTER — PATIENT MESSAGE (OUTPATIENT)
Dept: FAMILY MEDICINE | Facility: CLINIC | Age: 36
End: 2021-01-28

## 2021-01-28 VITALS
HEART RATE: 82 BPM | WEIGHT: 162.94 LBS | DIASTOLIC BLOOD PRESSURE: 87 MMHG | SYSTOLIC BLOOD PRESSURE: 120 MMHG | OXYGEN SATURATION: 99 % | HEIGHT: 65 IN | BODY MASS INDEX: 27.15 KG/M2

## 2021-01-28 DIAGNOSIS — Z00.00 ANNUAL PHYSICAL EXAM: Primary | ICD-10-CM

## 2021-01-28 DIAGNOSIS — J30.2 SEASONAL ALLERGIES: ICD-10-CM

## 2021-01-28 DIAGNOSIS — Z17.0 MALIGNANT NEOPLASM OF UPPER-OUTER QUADRANT OF RIGHT BREAST IN FEMALE, ESTROGEN RECEPTOR POSITIVE: ICD-10-CM

## 2021-01-28 DIAGNOSIS — G43.C1 INTRACTABLE PERIODIC HEADACHE SYNDROME: ICD-10-CM

## 2021-01-28 DIAGNOSIS — Z86.59 HISTORY OF BULIMIA NERVOSA: ICD-10-CM

## 2021-01-28 DIAGNOSIS — Z15.01 BRCA1 POSITIVE: ICD-10-CM

## 2021-01-28 DIAGNOSIS — R10.11 RUQ PAIN: ICD-10-CM

## 2021-01-28 DIAGNOSIS — Z15.09 BRCA1 POSITIVE: ICD-10-CM

## 2021-01-28 DIAGNOSIS — F90.0 ADHD (ATTENTION DEFICIT HYPERACTIVITY DISORDER), INATTENTIVE TYPE: ICD-10-CM

## 2021-01-28 DIAGNOSIS — Z01.84 ENCOUNTER FOR ANTIBODY RESPONSE EXAMINATION: Primary | ICD-10-CM

## 2021-01-28 DIAGNOSIS — Z86.59 HISTORY OF ANOREXIA NERVOSA: ICD-10-CM

## 2021-01-28 DIAGNOSIS — Z98.890 S/P ROBOT-ASSISTED SURGICAL PROCEDURE: ICD-10-CM

## 2021-01-28 DIAGNOSIS — Z86.59 HISTORY OF POSTTRAUMATIC STRESS DISORDER (PTSD): ICD-10-CM

## 2021-01-28 DIAGNOSIS — G89.29 OTHER CHRONIC PAIN: ICD-10-CM

## 2021-01-28 DIAGNOSIS — C50.411 MALIGNANT NEOPLASM OF UPPER-OUTER QUADRANT OF RIGHT BREAST IN FEMALE, ESTROGEN RECEPTOR POSITIVE: ICD-10-CM

## 2021-01-28 DIAGNOSIS — G47.00 INSOMNIA, UNSPECIFIED TYPE: ICD-10-CM

## 2021-01-28 DIAGNOSIS — F33.1 MODERATE EPISODE OF RECURRENT MAJOR DEPRESSIVE DISORDER: ICD-10-CM

## 2021-01-28 DIAGNOSIS — Z90.13 STATUS POST MASTECTOMY, BILATERAL: ICD-10-CM

## 2021-01-28 DIAGNOSIS — F41.1 GENERALIZED ANXIETY DISORDER: ICD-10-CM

## 2021-01-28 DIAGNOSIS — E66.3 OVERWEIGHT (BMI 25.0-29.9): ICD-10-CM

## 2021-01-28 PROCEDURE — 90471 PNEUMOCOCCAL POLYSACCHARIDE VACCINE 23-VALENT =>2YO SQ IM: ICD-10-PCS | Mod: S$GLB,,, | Performed by: FAMILY MEDICINE

## 2021-01-28 PROCEDURE — 99395 PREV VISIT EST AGE 18-39: CPT | Mod: 25,S$GLB,, | Performed by: FAMILY MEDICINE

## 2021-01-28 PROCEDURE — 3079F PR MOST RECENT DIASTOLIC BLOOD PRESSURE 80-89 MM HG: ICD-10-PCS | Mod: CPTII,S$GLB,, | Performed by: FAMILY MEDICINE

## 2021-01-28 PROCEDURE — 99999 PR PBB SHADOW E&M-EST. PATIENT-LVL III: CPT | Mod: PBBFAC,,, | Performed by: FAMILY MEDICINE

## 2021-01-28 PROCEDURE — 3074F SYST BP LT 130 MM HG: CPT | Mod: CPTII,S$GLB,, | Performed by: FAMILY MEDICINE

## 2021-01-28 PROCEDURE — 99395 PR PREVENTIVE VISIT,EST,18-39: ICD-10-PCS | Mod: 25,S$GLB,, | Performed by: FAMILY MEDICINE

## 2021-01-28 PROCEDURE — 3008F PR BODY MASS INDEX (BMI) DOCUMENTED: ICD-10-PCS | Mod: CPTII,S$GLB,, | Performed by: FAMILY MEDICINE

## 2021-01-28 PROCEDURE — 3074F PR MOST RECENT SYSTOLIC BLOOD PRESSURE < 130 MM HG: ICD-10-PCS | Mod: CPTII,S$GLB,, | Performed by: FAMILY MEDICINE

## 2021-01-28 PROCEDURE — 1125F PR PAIN SEVERITY QUANTIFIED, PAIN PRESENT: ICD-10-PCS | Mod: S$GLB,,, | Performed by: FAMILY MEDICINE

## 2021-01-28 PROCEDURE — 99999 PR PBB SHADOW E&M-EST. PATIENT-LVL III: ICD-10-PCS | Mod: PBBFAC,,, | Performed by: FAMILY MEDICINE

## 2021-01-28 PROCEDURE — 3079F DIAST BP 80-89 MM HG: CPT | Mod: CPTII,S$GLB,, | Performed by: FAMILY MEDICINE

## 2021-01-28 PROCEDURE — 90732 PNEUMOCOCCAL POLYSACCHARIDE VACCINE 23-VALENT =>2YO SQ IM: ICD-10-PCS | Mod: S$GLB,,, | Performed by: FAMILY MEDICINE

## 2021-01-28 PROCEDURE — 90732 PPSV23 VACC 2 YRS+ SUBQ/IM: CPT | Mod: S$GLB,,, | Performed by: FAMILY MEDICINE

## 2021-01-28 PROCEDURE — 3008F BODY MASS INDEX DOCD: CPT | Mod: CPTII,S$GLB,, | Performed by: FAMILY MEDICINE

## 2021-01-28 PROCEDURE — 90471 IMMUNIZATION ADMIN: CPT | Mod: S$GLB,,, | Performed by: FAMILY MEDICINE

## 2021-01-28 PROCEDURE — 1125F AMNT PAIN NOTED PAIN PRSNT: CPT | Mod: S$GLB,,, | Performed by: FAMILY MEDICINE

## 2021-01-29 ENCOUNTER — PATIENT MESSAGE (OUTPATIENT)
Dept: OBSTETRICS AND GYNECOLOGY | Facility: CLINIC | Age: 36
End: 2021-01-29

## 2021-01-29 ENCOUNTER — PATIENT MESSAGE (OUTPATIENT)
Dept: PSYCHIATRY | Facility: CLINIC | Age: 36
End: 2021-01-29

## 2021-01-31 PROBLEM — F33.1 MODERATE EPISODE OF RECURRENT MAJOR DEPRESSIVE DISORDER: Status: ACTIVE | Noted: 2021-01-31

## 2021-02-01 ENCOUNTER — TELEPHONE (OUTPATIENT)
Dept: HEMATOLOGY/ONCOLOGY | Facility: CLINIC | Age: 36
End: 2021-02-01

## 2021-02-01 ENCOUNTER — PATIENT MESSAGE (OUTPATIENT)
Dept: HEMATOLOGY/ONCOLOGY | Facility: CLINIC | Age: 36
End: 2021-02-01

## 2021-02-02 ENCOUNTER — PATIENT MESSAGE (OUTPATIENT)
Dept: ENDOSCOPY | Facility: HOSPITAL | Age: 36
End: 2021-02-02

## 2021-02-02 ENCOUNTER — HOSPITAL ENCOUNTER (OUTPATIENT)
Dept: RADIOLOGY | Facility: HOSPITAL | Age: 36
Discharge: HOME | End: 2021-02-02
Attending: INTERNAL MEDICINE
Payer: COMMERCIAL

## 2021-02-02 DIAGNOSIS — R10.10 UPPER ABDOMINAL PAIN: ICD-10-CM

## 2021-02-02 PROCEDURE — 78264 GASTRIC EMPTYING IMG STUDY: CPT | Mod: TC

## 2021-02-02 PROCEDURE — 78264 GASTRIC EMPTYING IMG STUDY: CPT | Mod: 26,,, | Performed by: RADIOLOGY

## 2021-02-02 PROCEDURE — 78264 NM GASTRIC EMPTYING: ICD-10-PCS | Mod: 26,,, | Performed by: RADIOLOGY

## 2021-02-03 ENCOUNTER — TELEPHONE (OUTPATIENT)
Dept: GASTROENTEROLOGY | Facility: CLINIC | Age: 36
End: 2021-02-03

## 2021-02-03 ENCOUNTER — OFFICE VISIT (OUTPATIENT)
Dept: GYNECOLOGIC ONCOLOGY | Facility: CLINIC | Age: 36
End: 2021-02-03
Payer: COMMERCIAL

## 2021-02-03 VITALS
HEART RATE: 116 BPM | WEIGHT: 164.69 LBS | SYSTOLIC BLOOD PRESSURE: 130 MMHG | DIASTOLIC BLOOD PRESSURE: 75 MMHG | BODY MASS INDEX: 27.4 KG/M2

## 2021-02-03 DIAGNOSIS — Z98.890 S/P ROBOT-ASSISTED SURGICAL PROCEDURE: ICD-10-CM

## 2021-02-03 DIAGNOSIS — Z15.09 BRCA1 POSITIVE: Primary | ICD-10-CM

## 2021-02-03 DIAGNOSIS — N89.8 VAGINAL DISCHARGE: ICD-10-CM

## 2021-02-03 DIAGNOSIS — Z15.01 BRCA1 POSITIVE: Primary | ICD-10-CM

## 2021-02-03 PROCEDURE — 3008F PR BODY MASS INDEX (BMI) DOCUMENTED: ICD-10-PCS | Mod: CPTII,S$GLB,, | Performed by: NURSE PRACTITIONER

## 2021-02-03 PROCEDURE — 1126F AMNT PAIN NOTED NONE PRSNT: CPT | Mod: S$GLB,,, | Performed by: NURSE PRACTITIONER

## 2021-02-03 PROCEDURE — 87660 TRICHOMONAS VAGIN DIR PROBE: CPT

## 2021-02-03 PROCEDURE — 1126F PR PAIN SEVERITY QUANTIFIED, NO PAIN PRESENT: ICD-10-PCS | Mod: S$GLB,,, | Performed by: NURSE PRACTITIONER

## 2021-02-03 PROCEDURE — 87510 GARDNER VAG DNA DIR PROBE: CPT

## 2021-02-03 PROCEDURE — 99999 PR PBB SHADOW E&M-EST. PATIENT-LVL IV: CPT | Mod: PBBFAC,,, | Performed by: NURSE PRACTITIONER

## 2021-02-03 PROCEDURE — 3008F BODY MASS INDEX DOCD: CPT | Mod: CPTII,S$GLB,, | Performed by: NURSE PRACTITIONER

## 2021-02-03 PROCEDURE — 99024 PR POST-OP FOLLOW-UP VISIT: ICD-10-PCS | Mod: S$GLB,,, | Performed by: NURSE PRACTITIONER

## 2021-02-03 PROCEDURE — 99024 POSTOP FOLLOW-UP VISIT: CPT | Mod: S$GLB,,, | Performed by: NURSE PRACTITIONER

## 2021-02-03 PROCEDURE — 99999 PR PBB SHADOW E&M-EST. PATIENT-LVL IV: ICD-10-PCS | Mod: PBBFAC,,, | Performed by: NURSE PRACTITIONER

## 2021-02-04 ENCOUNTER — PATIENT MESSAGE (OUTPATIENT)
Dept: ENDOSCOPY | Facility: HOSPITAL | Age: 36
End: 2021-02-04

## 2021-02-04 ENCOUNTER — PATIENT MESSAGE (OUTPATIENT)
Dept: HEMATOLOGY/ONCOLOGY | Facility: CLINIC | Age: 36
End: 2021-02-04

## 2021-02-04 ENCOUNTER — HOSPITAL ENCOUNTER (OUTPATIENT)
Dept: RADIOLOGY | Facility: CLINIC | Age: 36
Discharge: HOME | End: 2021-02-04
Attending: OBSTETRICS & GYNECOLOGY
Payer: COMMERCIAL

## 2021-02-04 DIAGNOSIS — Z79.811 USE OF AROMATASE INHIBITORS: ICD-10-CM

## 2021-02-04 PROCEDURE — 77080 DXA BONE DENSITY AXIAL: CPT | Mod: TC

## 2021-02-04 PROCEDURE — 77080 DEXA BONE DENSITY SPINE HIP: ICD-10-PCS | Mod: 26,,, | Performed by: INTERNAL MEDICINE

## 2021-02-04 PROCEDURE — 77080 DXA BONE DENSITY AXIAL: CPT | Mod: 26,,, | Performed by: INTERNAL MEDICINE

## 2021-02-05 ENCOUNTER — OFFICE VISIT (OUTPATIENT)
Dept: HEMATOLOGY/ONCOLOGY | Facility: CLINIC | Age: 36
End: 2021-02-05
Payer: COMMERCIAL

## 2021-02-05 ENCOUNTER — TELEPHONE (OUTPATIENT)
Dept: ENDOSCOPY | Facility: HOSPITAL | Age: 36
End: 2021-02-05

## 2021-02-05 VITALS
BODY MASS INDEX: 27.66 KG/M2 | SYSTOLIC BLOOD PRESSURE: 123 MMHG | RESPIRATION RATE: 18 BRPM | HEART RATE: 95 BPM | WEIGHT: 166 LBS | DIASTOLIC BLOOD PRESSURE: 85 MMHG | HEIGHT: 65 IN

## 2021-02-05 DIAGNOSIS — Z79.811 USE OF AROMATASE INHIBITORS: ICD-10-CM

## 2021-02-05 DIAGNOSIS — E89.40 SURGICAL MENOPAUSE: ICD-10-CM

## 2021-02-05 DIAGNOSIS — G62.0 CHEMOTHERAPY-INDUCED NEUROPATHY: Chronic | ICD-10-CM

## 2021-02-05 DIAGNOSIS — M54.50 CHRONIC BILATERAL LOW BACK PAIN WITHOUT SCIATICA: ICD-10-CM

## 2021-02-05 DIAGNOSIS — G89.29 CHRONIC BILATERAL LOW BACK PAIN WITHOUT SCIATICA: ICD-10-CM

## 2021-02-05 DIAGNOSIS — C50.411 MALIGNANT NEOPLASM OF UPPER-OUTER QUADRANT OF RIGHT BREAST IN FEMALE, ESTROGEN RECEPTOR POSITIVE: Chronic | ICD-10-CM

## 2021-02-05 DIAGNOSIS — Z17.0 MALIGNANT NEOPLASM OF UPPER-OUTER QUADRANT OF RIGHT BREAST IN FEMALE, ESTROGEN RECEPTOR POSITIVE: Chronic | ICD-10-CM

## 2021-02-05 DIAGNOSIS — C50.919 MALIGNANT NEOPLASM OF BREAST IN FEMALE, ESTROGEN RECEPTOR POSITIVE, UNSPECIFIED LATERALITY, UNSPECIFIED SITE OF BREAST: Primary | ICD-10-CM

## 2021-02-05 DIAGNOSIS — N95.1 MENOPAUSAL SYMPTOM: ICD-10-CM

## 2021-02-05 DIAGNOSIS — Z17.0 MALIGNANT NEOPLASM OF BREAST IN FEMALE, ESTROGEN RECEPTOR POSITIVE, UNSPECIFIED LATERALITY, UNSPECIFIED SITE OF BREAST: Primary | ICD-10-CM

## 2021-02-05 DIAGNOSIS — T45.1X5A CHEMOTHERAPY-INDUCED NEUROPATHY: Chronic | ICD-10-CM

## 2021-02-05 PROCEDURE — 99999 PR PBB SHADOW E&M-EST. PATIENT-LVL IV: CPT | Mod: PBBFAC,,, | Performed by: OBSTETRICS & GYNECOLOGY

## 2021-02-05 PROCEDURE — 99214 OFFICE O/P EST MOD 30 MIN: CPT | Mod: 24,S$GLB,, | Performed by: OBSTETRICS & GYNECOLOGY

## 2021-02-05 PROCEDURE — 3074F SYST BP LT 130 MM HG: CPT | Mod: CPTII,S$GLB,, | Performed by: OBSTETRICS & GYNECOLOGY

## 2021-02-05 PROCEDURE — 3079F DIAST BP 80-89 MM HG: CPT | Mod: CPTII,S$GLB,, | Performed by: OBSTETRICS & GYNECOLOGY

## 2021-02-05 PROCEDURE — 3008F BODY MASS INDEX DOCD: CPT | Mod: CPTII,S$GLB,, | Performed by: OBSTETRICS & GYNECOLOGY

## 2021-02-05 PROCEDURE — 99214 PR OFFICE/OUTPT VISIT, EST, LEVL IV, 30-39 MIN: ICD-10-PCS | Mod: 24,S$GLB,, | Performed by: OBSTETRICS & GYNECOLOGY

## 2021-02-05 PROCEDURE — 1126F PR PAIN SEVERITY QUANTIFIED, NO PAIN PRESENT: ICD-10-PCS | Mod: S$GLB,,, | Performed by: OBSTETRICS & GYNECOLOGY

## 2021-02-05 PROCEDURE — 1126F AMNT PAIN NOTED NONE PRSNT: CPT | Mod: S$GLB,,, | Performed by: OBSTETRICS & GYNECOLOGY

## 2021-02-05 PROCEDURE — 3079F PR MOST RECENT DIASTOLIC BLOOD PRESSURE 80-89 MM HG: ICD-10-PCS | Mod: CPTII,S$GLB,, | Performed by: OBSTETRICS & GYNECOLOGY

## 2021-02-05 PROCEDURE — 3074F PR MOST RECENT SYSTOLIC BLOOD PRESSURE < 130 MM HG: ICD-10-PCS | Mod: CPTII,S$GLB,, | Performed by: OBSTETRICS & GYNECOLOGY

## 2021-02-05 PROCEDURE — 99999 PR PBB SHADOW E&M-EST. PATIENT-LVL IV: ICD-10-PCS | Mod: PBBFAC,,, | Performed by: OBSTETRICS & GYNECOLOGY

## 2021-02-05 PROCEDURE — 3008F PR BODY MASS INDEX (BMI) DOCUMENTED: ICD-10-PCS | Mod: CPTII,S$GLB,, | Performed by: OBSTETRICS & GYNECOLOGY

## 2021-02-05 RX ORDER — POTASSIUM &MAGNESIUM ASPARTATE 250-250 MG
CAPSULE ORAL
COMMUNITY
End: 2023-03-04

## 2021-02-06 ENCOUNTER — LAB VISIT (OUTPATIENT)
Dept: INTERNAL MEDICINE | Facility: CLINIC | Age: 36
End: 2021-02-06
Payer: COMMERCIAL

## 2021-02-06 DIAGNOSIS — Z01.812 PRE-PROCEDURE LAB EXAM: ICD-10-CM

## 2021-02-06 LAB
CANDIDA RRNA VAG QL PROBE: NOT DETECTED
G VAGINALIS RRNA GENITAL QL PROBE: DETECTED
SARS-COV-2 RNA RESP QL NAA+PROBE: NOT DETECTED
T VAGINALIS RRNA GENITAL QL PROBE: NOT DETECTED

## 2021-02-06 PROCEDURE — U0003 INFECTIOUS AGENT DETECTION BY NUCLEIC ACID (DNA OR RNA); SEVERE ACUTE RESPIRATORY SYNDROME CORONAVIRUS 2 (SARS-COV-2) (CORONAVIRUS DISEASE [COVID-19]), AMPLIFIED PROBE TECHNIQUE, MAKING USE OF HIGH THROUGHPUT TECHNOLOGIES AS DESCRIBED BY CMS-2020-01-R: HCPCS

## 2021-02-07 ENCOUNTER — PATIENT MESSAGE (OUTPATIENT)
Dept: GYNECOLOGIC ONCOLOGY | Facility: CLINIC | Age: 36
End: 2021-02-07

## 2021-02-07 DIAGNOSIS — B96.89 BACTERIAL VAGINITIS: Primary | ICD-10-CM

## 2021-02-07 DIAGNOSIS — N76.0 BACTERIAL VAGINITIS: Primary | ICD-10-CM

## 2021-02-07 RX ORDER — METRONIDAZOLE 500 MG/1
500 TABLET ORAL 2 TIMES DAILY WITH MEALS
Qty: 14 TABLET | Refills: 0 | Status: SHIPPED | OUTPATIENT
Start: 2021-02-07 | End: 2021-02-07 | Stop reason: SDUPTHER

## 2021-02-08 ENCOUNTER — PATIENT MESSAGE (OUTPATIENT)
Dept: PSYCHIATRY | Facility: CLINIC | Age: 36
End: 2021-02-08

## 2021-02-08 ENCOUNTER — LAB VISIT (OUTPATIENT)
Dept: LAB | Facility: HOSPITAL | Age: 36
End: 2021-02-08
Attending: FAMILY MEDICINE
Payer: COMMERCIAL

## 2021-02-08 ENCOUNTER — OFFICE VISIT (OUTPATIENT)
Dept: CARDIOLOGY | Facility: CLINIC | Age: 36
End: 2021-02-08
Payer: COMMERCIAL

## 2021-02-08 VITALS
HEART RATE: 90 BPM | WEIGHT: 158 LBS | OXYGEN SATURATION: 97 % | BODY MASS INDEX: 26.33 KG/M2 | HEIGHT: 65 IN | DIASTOLIC BLOOD PRESSURE: 75 MMHG | SYSTOLIC BLOOD PRESSURE: 100 MMHG

## 2021-02-08 DIAGNOSIS — Z82.49 FAMILY HISTORY OF ISCHEMIC HEART DISEASE: ICD-10-CM

## 2021-02-08 DIAGNOSIS — Z79.811 USE OF AROMATASE INHIBITORS: ICD-10-CM

## 2021-02-08 DIAGNOSIS — E89.40 SURGICAL MENOPAUSE: ICD-10-CM

## 2021-02-08 DIAGNOSIS — G89.29 OTHER CHRONIC PAIN: ICD-10-CM

## 2021-02-08 DIAGNOSIS — C50.411 MALIGNANT NEOPLASM OF UPPER-OUTER QUADRANT OF RIGHT BREAST IN FEMALE, ESTROGEN RECEPTOR POSITIVE: Chronic | ICD-10-CM

## 2021-02-08 DIAGNOSIS — Z17.0 MALIGNANT NEOPLASM OF UPPER-OUTER QUADRANT OF RIGHT BREAST IN FEMALE, ESTROGEN RECEPTOR POSITIVE: Chronic | ICD-10-CM

## 2021-02-08 DIAGNOSIS — Z15.09 BRCA1 POSITIVE: Primary | ICD-10-CM

## 2021-02-08 DIAGNOSIS — Z15.01 BRCA1 POSITIVE: Primary | ICD-10-CM

## 2021-02-08 LAB
CCP AB SER IA-ACNC: <0.5 U/ML
CRP SERPL-MCNC: 2.7 MG/L (ref 0–8.2)
ERYTHROCYTE [SEDIMENTATION RATE] IN BLOOD BY WESTERGREN METHOD: 15 MM/HR (ref 0–20)
RHEUMATOID FACT SERPL-ACNC: <10 IU/ML (ref 0–15)

## 2021-02-08 PROCEDURE — 36415 COLL VENOUS BLD VENIPUNCTURE: CPT

## 2021-02-08 PROCEDURE — 99204 PR OFFICE/OUTPT VISIT, NEW, LEVL IV, 45-59 MIN: ICD-10-PCS | Mod: S$GLB,,, | Performed by: INTERNAL MEDICINE

## 2021-02-08 PROCEDURE — 86235 NUCLEAR ANTIGEN ANTIBODY: CPT | Mod: 59

## 2021-02-08 PROCEDURE — 1126F PR PAIN SEVERITY QUANTIFIED, NO PAIN PRESENT: ICD-10-PCS | Mod: S$GLB,,, | Performed by: INTERNAL MEDICINE

## 2021-02-08 PROCEDURE — 3078F PR MOST RECENT DIASTOLIC BLOOD PRESSURE < 80 MM HG: ICD-10-PCS | Mod: CPTII,S$GLB,, | Performed by: INTERNAL MEDICINE

## 2021-02-08 PROCEDURE — 86039 ANTINUCLEAR ANTIBODIES (ANA): CPT

## 2021-02-08 PROCEDURE — 99999 PR PBB SHADOW E&M-EST. PATIENT-LVL III: CPT | Mod: PBBFAC,,, | Performed by: INTERNAL MEDICINE

## 2021-02-08 PROCEDURE — 3008F BODY MASS INDEX DOCD: CPT | Mod: CPTII,S$GLB,, | Performed by: INTERNAL MEDICINE

## 2021-02-08 PROCEDURE — 1126F AMNT PAIN NOTED NONE PRSNT: CPT | Mod: S$GLB,,, | Performed by: INTERNAL MEDICINE

## 2021-02-08 PROCEDURE — 86140 C-REACTIVE PROTEIN: CPT

## 2021-02-08 PROCEDURE — 3008F PR BODY MASS INDEX (BMI) DOCUMENTED: ICD-10-PCS | Mod: CPTII,S$GLB,, | Performed by: INTERNAL MEDICINE

## 2021-02-08 PROCEDURE — 86200 CCP ANTIBODY: CPT

## 2021-02-08 PROCEDURE — 3074F SYST BP LT 130 MM HG: CPT | Mod: CPTII,S$GLB,, | Performed by: INTERNAL MEDICINE

## 2021-02-08 PROCEDURE — 99204 OFFICE O/P NEW MOD 45 MIN: CPT | Mod: S$GLB,,, | Performed by: INTERNAL MEDICINE

## 2021-02-08 PROCEDURE — 3074F PR MOST RECENT SYSTOLIC BLOOD PRESSURE < 130 MM HG: ICD-10-PCS | Mod: CPTII,S$GLB,, | Performed by: INTERNAL MEDICINE

## 2021-02-08 PROCEDURE — 99999 PR PBB SHADOW E&M-EST. PATIENT-LVL III: ICD-10-PCS | Mod: PBBFAC,,, | Performed by: INTERNAL MEDICINE

## 2021-02-08 PROCEDURE — 3078F DIAST BP <80 MM HG: CPT | Mod: CPTII,S$GLB,, | Performed by: INTERNAL MEDICINE

## 2021-02-08 PROCEDURE — 85652 RBC SED RATE AUTOMATED: CPT

## 2021-02-08 PROCEDURE — 86038 ANTINUCLEAR ANTIBODIES: CPT

## 2021-02-08 PROCEDURE — 86431 RHEUMATOID FACTOR QUANT: CPT

## 2021-02-08 RX ORDER — DEXTROAMPHETAMINE SACCHARATE, AMPHETAMINE ASPARTATE MONOHYDRATE, DEXTROAMPHETAMINE SULFATE AND AMPHETAMINE SULFATE 6.25; 6.25; 6.25; 6.25 MG/1; MG/1; MG/1; MG/1
25 CAPSULE, EXTENDED RELEASE ORAL EVERY MORNING
Qty: 30 CAPSULE | Refills: 0 | Status: SHIPPED | OUTPATIENT
Start: 2021-02-08 | End: 2021-03-20 | Stop reason: SDUPTHER

## 2021-02-09 ENCOUNTER — HOSPITAL ENCOUNTER (OUTPATIENT)
Facility: HOSPITAL | Age: 36
Discharge: HOME | End: 2021-02-09
Attending: INTERNAL MEDICINE | Admitting: INTERNAL MEDICINE
Payer: COMMERCIAL

## 2021-02-09 ENCOUNTER — PATIENT MESSAGE (OUTPATIENT)
Dept: FAMILY MEDICINE | Facility: CLINIC | Age: 36
End: 2021-02-09

## 2021-02-09 ENCOUNTER — ANESTHESIA (OUTPATIENT)
Dept: ENDOSCOPY | Facility: HOSPITAL | Age: 36
End: 2021-02-09
Payer: COMMERCIAL

## 2021-02-09 ENCOUNTER — PATIENT MESSAGE (OUTPATIENT)
Dept: HEMATOLOGY/ONCOLOGY | Facility: CLINIC | Age: 36
End: 2021-02-09

## 2021-02-09 ENCOUNTER — ANESTHESIA EVENT (OUTPATIENT)
Dept: ENDOSCOPY | Facility: HOSPITAL | Age: 36
End: 2021-02-09
Payer: COMMERCIAL

## 2021-02-09 VITALS
HEIGHT: 65 IN | DIASTOLIC BLOOD PRESSURE: 75 MMHG | SYSTOLIC BLOOD PRESSURE: 124 MMHG | TEMPERATURE: 98 F | HEART RATE: 71 BPM | RESPIRATION RATE: 17 BRPM | WEIGHT: 158 LBS | BODY MASS INDEX: 26.33 KG/M2 | OXYGEN SATURATION: 99 %

## 2021-02-09 DIAGNOSIS — R11.2 NAUSEA AND VOMITING: ICD-10-CM

## 2021-02-09 LAB
ANA PATTERN 1: NORMAL
ANA SER QL IF: POSITIVE
ANA TITR SER IF: NORMAL {TITER}

## 2021-02-09 PROCEDURE — 88305 TISSUE EXAM BY PATHOLOGIST: CPT | Performed by: PATHOLOGY

## 2021-02-09 PROCEDURE — 25000003 PHARM REV CODE 250: Performed by: NURSE ANESTHETIST, CERTIFIED REGISTERED

## 2021-02-09 PROCEDURE — 88305 TISSUE EXAM BY PATHOLOGIST: CPT | Mod: 26,,, | Performed by: PATHOLOGY

## 2021-02-09 PROCEDURE — 37000009 HC ANESTHESIA EA ADD 15 MINS: Performed by: INTERNAL MEDICINE

## 2021-02-09 PROCEDURE — 63600175 PHARM REV CODE 636 W HCPCS: Performed by: NURSE ANESTHETIST, CERTIFIED REGISTERED

## 2021-02-09 PROCEDURE — 43239 EGD BIOPSY SINGLE/MULTIPLE: CPT | Performed by: INTERNAL MEDICINE

## 2021-02-09 PROCEDURE — 43239 EGD BIOPSY SINGLE/MULTIPLE: CPT | Mod: ,,, | Performed by: INTERNAL MEDICINE

## 2021-02-09 PROCEDURE — 43239 PR EGD, FLEX, W/BIOPSY, SGL/MULTI: ICD-10-PCS | Mod: ,,, | Performed by: INTERNAL MEDICINE

## 2021-02-09 PROCEDURE — 25000003 PHARM REV CODE 250: Performed by: INTERNAL MEDICINE

## 2021-02-09 PROCEDURE — 27201012 HC FORCEPS, HOT/COLD, DISP: Performed by: INTERNAL MEDICINE

## 2021-02-09 PROCEDURE — 88305 TISSUE EXAM BY PATHOLOGIST: ICD-10-PCS | Mod: 26,,, | Performed by: PATHOLOGY

## 2021-02-09 PROCEDURE — 37000008 HC ANESTHESIA 1ST 15 MINUTES: Performed by: INTERNAL MEDICINE

## 2021-02-09 RX ORDER — PROPOFOL 10 MG/ML
VIAL (ML) INTRAVENOUS CONTINUOUS PRN
Status: DISCONTINUED | OUTPATIENT
Start: 2021-02-09 | End: 2021-02-09

## 2021-02-09 RX ORDER — PROPOFOL 10 MG/ML
VIAL (ML) INTRAVENOUS
Status: DISCONTINUED | OUTPATIENT
Start: 2021-02-09 | End: 2021-02-09

## 2021-02-09 RX ORDER — LIDOCAINE HCL/PF 100 MG/5ML
SYRINGE (ML) INTRAVENOUS
Status: DISCONTINUED | OUTPATIENT
Start: 2021-02-09 | End: 2021-02-09

## 2021-02-09 RX ORDER — SODIUM CHLORIDE 9 MG/ML
INJECTION, SOLUTION INTRAVENOUS CONTINUOUS PRN
Status: DISCONTINUED | OUTPATIENT
Start: 2021-02-09 | End: 2021-02-09

## 2021-02-09 RX ORDER — SODIUM CHLORIDE 0.9 % (FLUSH) 0.9 %
10 SYRINGE (ML) INJECTION
Status: DISCONTINUED | OUTPATIENT
Start: 2021-02-09 | End: 2021-02-09 | Stop reason: HOSPADM

## 2021-02-09 RX ORDER — SODIUM CHLORIDE 9 MG/ML
INJECTION, SOLUTION INTRAVENOUS CONTINUOUS
Status: DISCONTINUED | OUTPATIENT
Start: 2021-02-09 | End: 2021-02-09 | Stop reason: HOSPADM

## 2021-02-09 RX ADMIN — SODIUM CHLORIDE: 0.9 INJECTION, SOLUTION INTRAVENOUS at 08:02

## 2021-02-09 RX ADMIN — PROPOFOL 30 MG: 10 INJECTION, EMULSION INTRAVENOUS at 08:02

## 2021-02-09 RX ADMIN — PROPOFOL 50 MG: 10 INJECTION, EMULSION INTRAVENOUS at 08:02

## 2021-02-09 RX ADMIN — LIDOCAINE HYDROCHLORIDE 50 MG: 20 INJECTION, SOLUTION INTRAVENOUS at 08:02

## 2021-02-09 RX ADMIN — PROPOFOL 150 MCG/KG/MIN: 10 INJECTION, EMULSION INTRAVENOUS at 08:02

## 2021-02-10 ENCOUNTER — OFFICE VISIT (OUTPATIENT)
Dept: PSYCHIATRY | Facility: CLINIC | Age: 36
End: 2021-02-10
Payer: COMMERCIAL

## 2021-02-10 DIAGNOSIS — F33.1 MODERATE EPISODE OF RECURRENT MAJOR DEPRESSIVE DISORDER: ICD-10-CM

## 2021-02-10 DIAGNOSIS — C50.411 MALIGNANT NEOPLASM OF UPPER-OUTER QUADRANT OF RIGHT BREAST IN FEMALE, ESTROGEN RECEPTOR POSITIVE: ICD-10-CM

## 2021-02-10 DIAGNOSIS — F41.1 GENERALIZED ANXIETY DISORDER: Primary | Chronic | ICD-10-CM

## 2021-02-10 DIAGNOSIS — Z17.0 MALIGNANT NEOPLASM OF UPPER-OUTER QUADRANT OF RIGHT BREAST IN FEMALE, ESTROGEN RECEPTOR POSITIVE: ICD-10-CM

## 2021-02-10 DIAGNOSIS — Z86.59 HISTORY OF POSTTRAUMATIC STRESS DISORDER (PTSD): Chronic | ICD-10-CM

## 2021-02-10 DIAGNOSIS — F90.0 ADHD (ATTENTION DEFICIT HYPERACTIVITY DISORDER), INATTENTIVE TYPE: ICD-10-CM

## 2021-02-10 DIAGNOSIS — Z15.01 BRCA1 POSITIVE: ICD-10-CM

## 2021-02-10 DIAGNOSIS — Z86.59 HISTORY OF ANOREXIA NERVOSA: Chronic | ICD-10-CM

## 2021-02-10 DIAGNOSIS — G47.00 INSOMNIA, UNSPECIFIED TYPE: Chronic | ICD-10-CM

## 2021-02-10 DIAGNOSIS — Z15.09 BRCA1 POSITIVE: ICD-10-CM

## 2021-02-10 PROCEDURE — 90833 PSYTX W PT W E/M 30 MIN: CPT | Mod: 95,,, | Performed by: PSYCHIATRY & NEUROLOGY

## 2021-02-10 PROCEDURE — 90833 PR PSYCHOTHERAPY W/PATIENT W/E&M, 30 MIN (ADD ON): ICD-10-PCS | Mod: 95,,, | Performed by: PSYCHIATRY & NEUROLOGY

## 2021-02-10 PROCEDURE — 99214 OFFICE O/P EST MOD 30 MIN: CPT | Mod: 95,,, | Performed by: PSYCHIATRY & NEUROLOGY

## 2021-02-10 PROCEDURE — 99214 PR OFFICE/OUTPT VISIT, EST, LEVL IV, 30-39 MIN: ICD-10-PCS | Mod: 95,,, | Performed by: PSYCHIATRY & NEUROLOGY

## 2021-02-10 RX ORDER — CITALOPRAM 40 MG/1
40 TABLET, FILM COATED ORAL DAILY
Qty: 90 TABLET | Refills: 0 | Status: SHIPPED | OUTPATIENT
Start: 2021-02-10 | End: 2021-06-10 | Stop reason: SDUPTHER

## 2021-02-11 ENCOUNTER — PATIENT MESSAGE (OUTPATIENT)
Dept: FAMILY MEDICINE | Facility: CLINIC | Age: 36
End: 2021-02-11

## 2021-02-11 LAB
ANTI SM ANTIBODY: 0.08 RATIO (ref 0–0.99)
ANTI SM/RNP ANTIBODY: 0.1 RATIO (ref 0–0.99)
ANTI-SM INTERPRETATION: NEGATIVE
ANTI-SM/RNP INTERPRETATION: NEGATIVE
ANTI-SSA ANTIBODY: 0.04 RATIO (ref 0–0.99)
ANTI-SSA INTERPRETATION: NEGATIVE
ANTI-SSB ANTIBODY: 0.07 RATIO (ref 0–0.99)
ANTI-SSB INTERPRETATION: NEGATIVE
DSDNA AB SER-ACNC: NORMAL [IU]/ML
FINAL PATHOLOGIC DIAGNOSIS: NORMAL
GROSS: NORMAL
Lab: NORMAL

## 2021-02-17 ENCOUNTER — OFFICE VISIT (OUTPATIENT)
Dept: GYNECOLOGIC ONCOLOGY | Facility: CLINIC | Age: 36
End: 2021-02-17
Payer: COMMERCIAL

## 2021-02-17 VITALS — HEART RATE: 82 BPM | SYSTOLIC BLOOD PRESSURE: 132 MMHG | DIASTOLIC BLOOD PRESSURE: 70 MMHG

## 2021-02-17 DIAGNOSIS — N76.0 BACTERIAL VAGINITIS: Primary | ICD-10-CM

## 2021-02-17 DIAGNOSIS — Z98.890 S/P ROBOT-ASSISTED SURGICAL PROCEDURE: ICD-10-CM

## 2021-02-17 DIAGNOSIS — B96.89 BACTERIAL VAGINITIS: Primary | ICD-10-CM

## 2021-02-17 DIAGNOSIS — N89.8 VAGINAL DISCHARGE: ICD-10-CM

## 2021-02-17 DIAGNOSIS — Z15.09 BRCA1 POSITIVE: ICD-10-CM

## 2021-02-17 DIAGNOSIS — Z15.01 BRCA1 POSITIVE: ICD-10-CM

## 2021-02-17 PROCEDURE — 99024 POSTOP FOLLOW-UP VISIT: CPT | Mod: S$GLB,,, | Performed by: NURSE PRACTITIONER

## 2021-02-17 PROCEDURE — 99024 PR POST-OP FOLLOW-UP VISIT: ICD-10-PCS | Mod: S$GLB,,, | Performed by: NURSE PRACTITIONER

## 2021-02-17 PROCEDURE — 99999 PR PBB SHADOW E&M-EST. PATIENT-LVL IV: ICD-10-PCS | Mod: PBBFAC,,, | Performed by: NURSE PRACTITIONER

## 2021-02-17 PROCEDURE — 99999 PR PBB SHADOW E&M-EST. PATIENT-LVL IV: CPT | Mod: PBBFAC,,, | Performed by: NURSE PRACTITIONER

## 2021-02-17 RX ORDER — METRONIDAZOLE 7.5 MG/G
1 GEL VAGINAL NIGHTLY
Qty: 70 G | Refills: 0 | Status: SHIPPED | OUTPATIENT
Start: 2021-02-17 | End: 2021-02-25

## 2021-02-17 RX ORDER — FLUCONAZOLE 150 MG/1
150 TABLET ORAL ONCE
Qty: 1 TABLET | Refills: 2 | Status: SHIPPED | OUTPATIENT
Start: 2021-02-17 | End: 2021-02-19

## 2021-02-21 PROBLEM — Z82.49 FAMILY HISTORY OF ISCHEMIC HEART DISEASE: Status: ACTIVE | Noted: 2021-02-21

## 2021-02-21 PROBLEM — Z79.811 USE OF AROMATASE INHIBITORS: Status: ACTIVE | Noted: 2021-02-21

## 2021-02-21 PROBLEM — E89.40 SURGICAL MENOPAUSE: Status: ACTIVE | Noted: 2021-02-21

## 2021-02-24 ENCOUNTER — PATIENT MESSAGE (OUTPATIENT)
Dept: HEMATOLOGY/ONCOLOGY | Facility: CLINIC | Age: 36
End: 2021-02-24

## 2021-02-25 ENCOUNTER — TELEPHONE (OUTPATIENT)
Dept: OBSTETRICS AND GYNECOLOGY | Facility: CLINIC | Age: 36
End: 2021-02-25

## 2021-02-25 ENCOUNTER — PATIENT MESSAGE (OUTPATIENT)
Dept: GASTROENTEROLOGY | Facility: CLINIC | Age: 36
End: 2021-02-25

## 2021-02-25 DIAGNOSIS — R11.0 NAUSEA: ICD-10-CM

## 2021-02-25 RX ORDER — METOCLOPRAMIDE 10 MG/1
10 TABLET ORAL
Qty: 90 TABLET | Refills: 1 | Status: SHIPPED | OUTPATIENT
Start: 2021-02-25 | End: 2021-04-21 | Stop reason: SDUPTHER

## 2021-03-01 ENCOUNTER — OFFICE VISIT (OUTPATIENT)
Dept: GASTROENTEROLOGY | Facility: CLINIC | Age: 36
End: 2021-03-01
Payer: COMMERCIAL

## 2021-03-01 VITALS — WEIGHT: 156.5 LBS | BODY MASS INDEX: 26.05 KG/M2

## 2021-03-01 DIAGNOSIS — R11.0 NAUSEA: ICD-10-CM

## 2021-03-01 DIAGNOSIS — R10.10 UPPER ABDOMINAL PAIN: ICD-10-CM

## 2021-03-01 DIAGNOSIS — K31.84 GASTROPARESIS: ICD-10-CM

## 2021-03-01 DIAGNOSIS — K20.0 EOSINOPHILIC ESOPHAGITIS: Primary | ICD-10-CM

## 2021-03-01 PROCEDURE — 99214 PR OFFICE/OUTPT VISIT, EST, LEVL IV, 30-39 MIN: ICD-10-PCS | Mod: S$GLB,,, | Performed by: INTERNAL MEDICINE

## 2021-03-01 PROCEDURE — 99999 PR PBB SHADOW E&M-EST. PATIENT-LVL IV: CPT | Mod: PBBFAC,,, | Performed by: INTERNAL MEDICINE

## 2021-03-01 PROCEDURE — 99214 OFFICE O/P EST MOD 30 MIN: CPT | Mod: S$GLB,,, | Performed by: INTERNAL MEDICINE

## 2021-03-01 PROCEDURE — 99999 PR PBB SHADOW E&M-EST. PATIENT-LVL IV: ICD-10-PCS | Mod: PBBFAC,,, | Performed by: INTERNAL MEDICINE

## 2021-03-01 PROCEDURE — 3008F BODY MASS INDEX DOCD: CPT | Mod: CPTII,S$GLB,, | Performed by: INTERNAL MEDICINE

## 2021-03-01 PROCEDURE — 3008F PR BODY MASS INDEX (BMI) DOCUMENTED: ICD-10-PCS | Mod: CPTII,S$GLB,, | Performed by: INTERNAL MEDICINE

## 2021-03-01 RX ORDER — PANTOPRAZOLE SODIUM 40 MG/1
40 TABLET, DELAYED RELEASE ORAL DAILY
Qty: 90 TABLET | Refills: 3 | Status: SHIPPED | OUTPATIENT
Start: 2021-03-01 | End: 2022-03-01

## 2021-03-02 ENCOUNTER — OFFICE VISIT (OUTPATIENT)
Dept: ALLERGY | Facility: CLINIC | Age: 36
End: 2021-03-02
Payer: COMMERCIAL

## 2021-03-02 VITALS — HEIGHT: 65 IN | BODY MASS INDEX: 26.45 KG/M2 | WEIGHT: 158.75 LBS

## 2021-03-02 DIAGNOSIS — K21.9 GASTROESOPHAGEAL REFLUX DISEASE, UNSPECIFIED WHETHER ESOPHAGITIS PRESENT: ICD-10-CM

## 2021-03-02 DIAGNOSIS — K52.81 EOSINOPHILIC GASTROENTERITIS: Primary | ICD-10-CM

## 2021-03-02 DIAGNOSIS — G43.009 MIGRAINE WITHOUT AURA AND WITHOUT STATUS MIGRAINOSUS, NOT INTRACTABLE: ICD-10-CM

## 2021-03-02 DIAGNOSIS — J31.0 CHRONIC RHINITIS: ICD-10-CM

## 2021-03-02 DIAGNOSIS — H10.403 CHRONIC CONJUNCTIVITIS OF BOTH EYES, UNSPECIFIED CHRONIC CONJUNCTIVITIS TYPE: ICD-10-CM

## 2021-03-02 DIAGNOSIS — C50.411 MALIGNANT NEOPLASM OF UPPER-OUTER QUADRANT OF RIGHT BREAST IN FEMALE, ESTROGEN RECEPTOR POSITIVE: Chronic | ICD-10-CM

## 2021-03-02 DIAGNOSIS — K31.84 GASTROPARESIS: ICD-10-CM

## 2021-03-02 DIAGNOSIS — Z90.13 STATUS POST MASTECTOMY, BILATERAL: Chronic | ICD-10-CM

## 2021-03-02 DIAGNOSIS — Z17.0 MALIGNANT NEOPLASM OF UPPER-OUTER QUADRANT OF RIGHT BREAST IN FEMALE, ESTROGEN RECEPTOR POSITIVE: Chronic | ICD-10-CM

## 2021-03-02 PROCEDURE — 99205 OFFICE O/P NEW HI 60 MIN: CPT | Mod: S$GLB,,, | Performed by: ALLERGY & IMMUNOLOGY

## 2021-03-02 PROCEDURE — 3008F PR BODY MASS INDEX (BMI) DOCUMENTED: ICD-10-PCS | Mod: CPTII,S$GLB,, | Performed by: ALLERGY & IMMUNOLOGY

## 2021-03-02 PROCEDURE — 99205 PR OFFICE/OUTPT VISIT, NEW, LEVL V, 60-74 MIN: ICD-10-PCS | Mod: S$GLB,,, | Performed by: ALLERGY & IMMUNOLOGY

## 2021-03-02 PROCEDURE — 99999 PR PBB SHADOW E&M-EST. PATIENT-LVL III: ICD-10-PCS | Mod: PBBFAC,,, | Performed by: ALLERGY & IMMUNOLOGY

## 2021-03-02 PROCEDURE — 99999 PR PBB SHADOW E&M-EST. PATIENT-LVL III: CPT | Mod: PBBFAC,,, | Performed by: ALLERGY & IMMUNOLOGY

## 2021-03-02 PROCEDURE — 3008F BODY MASS INDEX DOCD: CPT | Mod: CPTII,S$GLB,, | Performed by: ALLERGY & IMMUNOLOGY

## 2021-03-04 ENCOUNTER — CLINICAL SUPPORT (OUTPATIENT)
Dept: OBSTETRICS AND GYNECOLOGY | Facility: CLINIC | Age: 36
End: 2021-03-04
Payer: COMMERCIAL

## 2021-03-04 DIAGNOSIS — N95.1 MENOPAUSAL SYMPTOM: Primary | ICD-10-CM

## 2021-03-04 PROCEDURE — 96372 PR INJECTION,THERAP/PROPH/DIAG2ST, IM OR SUBCUT: ICD-10-PCS | Mod: S$GLB,,, | Performed by: OBSTETRICS & GYNECOLOGY

## 2021-03-04 PROCEDURE — 96372 THER/PROPH/DIAG INJ SC/IM: CPT | Mod: S$GLB,,, | Performed by: OBSTETRICS & GYNECOLOGY

## 2021-03-04 RX ORDER — TESTOSTERONE CYPIONATE 200 MG/ML
50 INJECTION, SOLUTION INTRAMUSCULAR
Status: COMPLETED | OUTPATIENT
Start: 2021-03-04 | End: 2021-03-31

## 2021-03-04 RX ADMIN — TESTOSTERONE CYPIONATE 50 MG: 200 INJECTION, SOLUTION INTRAMUSCULAR at 12:03

## 2021-03-05 ENCOUNTER — PATIENT MESSAGE (OUTPATIENT)
Dept: HEMATOLOGY/ONCOLOGY | Facility: CLINIC | Age: 36
End: 2021-03-05

## 2021-03-05 ENCOUNTER — IMMUNIZATION (OUTPATIENT)
Dept: PRIMARY CARE CLINIC | Facility: CLINIC | Age: 36
End: 2021-03-05
Payer: COMMERCIAL

## 2021-03-05 DIAGNOSIS — Z23 NEED FOR VACCINATION: Primary | ICD-10-CM

## 2021-03-05 PROCEDURE — 0031A PR IMMUNIZ ADMIN, SARS-COV-2 COVID-19 VACC, 5X10VP/0.5ML: ICD-10-PCS | Mod: CV19,S$GLB,,

## 2021-03-05 PROCEDURE — 91303 PR SARSCOV2 VAC AD26 .5ML IM: ICD-10-PCS | Mod: S$GLB,,,

## 2021-03-05 PROCEDURE — 91303 PR SARSCOV2 VAC AD26 .5ML IM: CPT | Mod: S$GLB,,,

## 2021-03-05 PROCEDURE — 0031A PR IMMUNIZ ADMIN, SARS-COV-2 COVID-19 VACC, 5X10VP/0.5ML: CPT | Mod: CV19,S$GLB,,

## 2021-03-08 ENCOUNTER — PATIENT MESSAGE (OUTPATIENT)
Dept: HEMATOLOGY/ONCOLOGY | Facility: CLINIC | Age: 36
End: 2021-03-08

## 2021-03-11 ENCOUNTER — LAB VISIT (OUTPATIENT)
Dept: LAB | Facility: HOSPITAL | Age: 36
End: 2021-03-11
Payer: COMMERCIAL

## 2021-03-11 DIAGNOSIS — K52.81 EOSINOPHILIC GASTROENTERITIS: ICD-10-CM

## 2021-03-11 LAB
IGA SERPL-MCNC: 196 MG/DL (ref 40–350)
IGE SERPL-ACNC: <35 IU/ML (ref 0–100)

## 2021-03-11 PROCEDURE — 86003 ALLG SPEC IGE CRUDE XTRC EA: CPT | Mod: 59 | Performed by: ALLERGY & IMMUNOLOGY

## 2021-03-11 PROCEDURE — 86317 IMMUNOASSAY INFECTIOUS AGENT: CPT | Mod: 59 | Performed by: ALLERGY & IMMUNOLOGY

## 2021-03-11 PROCEDURE — 82784 ASSAY IGA/IGD/IGG/IGM EACH: CPT | Mod: 59 | Performed by: ALLERGY & IMMUNOLOGY

## 2021-03-11 PROCEDURE — 36415 COLL VENOUS BLD VENIPUNCTURE: CPT | Performed by: ALLERGY & IMMUNOLOGY

## 2021-03-11 PROCEDURE — 82785 ASSAY OF IGE: CPT | Performed by: ALLERGY & IMMUNOLOGY

## 2021-03-11 PROCEDURE — 82784 ASSAY IGA/IGD/IGG/IGM EACH: CPT | Performed by: ALLERGY & IMMUNOLOGY

## 2021-03-11 PROCEDURE — 86003 ALLG SPEC IGE CRUDE XTRC EA: CPT | Performed by: ALLERGY & IMMUNOLOGY

## 2021-03-11 RX ORDER — TRAZODONE HYDROCHLORIDE 50 MG/1
50 TABLET ORAL NIGHTLY
Qty: 30 TABLET | Refills: 0 | Status: SHIPPED | OUTPATIENT
Start: 2021-03-11 | End: 2021-06-10 | Stop reason: SDUPTHER

## 2021-03-12 LAB
IGG SERPL-MCNC: 1185 MG/DL (ref 650–1600)
IGM SERPL-MCNC: 103 MG/DL (ref 50–300)

## 2021-03-15 ENCOUNTER — PATIENT MESSAGE (OUTPATIENT)
Dept: PSYCHIATRY | Facility: CLINIC | Age: 36
End: 2021-03-15

## 2021-03-16 ENCOUNTER — PATIENT MESSAGE (OUTPATIENT)
Dept: ALLERGY | Facility: CLINIC | Age: 36
End: 2021-03-16

## 2021-03-16 LAB
A ALTERNATA IGE QN: <0.1 KU/L
A FUMIGATUS IGE QN: <0.1 KU/L
ALMOND IGE QN: <0.1 KU/L
BERMUDA GRASS IGE QN: <0.1 KU/L
BRAZIL NUT IGE QN: <0.1 KU/L
CASEIN IGE QN: <0.1 KU/L
CASHEW NUT IGE QN: <0.1 KU/L
CAT DANDER IGE QN: <0.1 KU/L
CEDAR IGE QN: <0.1 KU/L
CLAM IGE QN: <0.1 KU/L
COCONUT IGE QN: <0.1 KU/L
COW MILK IGE QN: 0.24 KU/L
CRAB IGE QN: <0.1 KU/L
CRAWFISH IGE QN: <0.1 KU/L
D FARINAE IGE QN: <0.1 KU/L
D PTERONYSS IGE QN: <0.1 KU/L
DEPRECATED A ALTERNATA IGE RAST QL: NORMAL
DEPRECATED A FUMIGATUS IGE RAST QL: NORMAL
DEPRECATED ALMOND IGE RAST QL: NORMAL
DEPRECATED BERMUDA GRASS IGE RAST QL: NORMAL
DEPRECATED BRAZIL NUT IGE RAST QL: NORMAL
DEPRECATED CASEIN IGE RAST QL: NORMAL
DEPRECATED CASHEW NUT IGE RAST QL: NORMAL
DEPRECATED CAT DANDER IGE RAST QL: NORMAL
DEPRECATED CEDAR IGE RAST QL: NORMAL
DEPRECATED CLAM IGE RAST QL: NORMAL
DEPRECATED COCONUT IGE RAST QL: NORMAL
DEPRECATED COW MILK IGE RAST QL: ABNORMAL
DEPRECATED CRAB IGE RAST QL: NORMAL
DEPRECATED CRAWFISH IGE RAST QL: NORMAL
DEPRECATED D FARINAE IGE RAST QL: NORMAL
DEPRECATED D PTERONYSS IGE RAST QL: NORMAL
DEPRECATED DOG DANDER IGE RAST QL: NORMAL
DEPRECATED EGG WHITE IGE RAST QL: ABNORMAL
DEPRECATED EGG YOLK IGE RAST QL: NORMAL
DEPRECATED ELDER IGE RAST QL: NORMAL
DEPRECATED ENGL PLANTAIN IGE RAST QL: NORMAL
DEPRECATED FLOUNDER IGE RAST QL: NORMAL
DEPRECATED GLUTEN IGE RAST QL: ABNORMAL
DEPRECATED HAZELNUT IGE RAST QL: NORMAL
DEPRECATED LOBSTER IGE RAST QL: NORMAL
DEPRECATED OYSTER IGE RAST QL: NORMAL
DEPRECATED PEANUT IGE RAST QL: ABNORMAL
DEPRECATED PECAN/HICK NUT IGE RAST QL: NORMAL
DEPRECATED PECAN/HICK TREE IGE RAST QL: NORMAL
DEPRECATED ROACH IGE RAST QL: NORMAL
DEPRECATED S PNEUM12 IGG SER-MCNC: 2.7 UG/ML
DEPRECATED S PNEUM23 IGG SER-MCNC: 0.5 UG/ML
DEPRECATED S PNEUM4 IGG SER-MCNC: 6.8 UG/ML
DEPRECATED S PNEUM8 IGG SER-MCNC: 8.9 UG/ML
DEPRECATED S PNEUM9 IGG SER-MCNC: 6.2 UG/ML
DEPRECATED SALMON IGE RAST QL: NORMAL
DEPRECATED SHRIMP IGE RAST QL: NORMAL
DEPRECATED SOYBEAN IGE RAST QL: NORMAL
DEPRECATED TIMOTHY IGE RAST QL: NORMAL
DEPRECATED TUNA IGE RAST QL: NORMAL
DEPRECATED WALNUT IGE RAST QL: NORMAL
DEPRECATED WEST RAGWEED IGE RAST QL: NORMAL
DEPRECATED WHEAT IGE RAST QL: ABNORMAL
DEPRECATED WHITE OAK IGE RAST QL: NORMAL
DOG DANDER IGE QN: <0.1 KU/L
EGG WHITE IGE QN: 0.13 KU/L
EGG YOLK IGE QN: <0.1 KU/L
ELDER IGE QN: <0.1 KU/L
ENGL PLANTAIN IGE QN: <0.1 KU/L
FLOUNDER IGE QN: <0.1 KU/L
GLUTEN IGE QN: 0.44 KU/L
HAZELNUT IGE QN: <0.1 KU/L
LOBSTER IGE QN: <0.1 KU/L
OYSTER IGE QN: <0.1 KU/L
PEANUT IGE QN: 0.19 KU/L
PECAN/HICK NUT IGE QN: <0.1 KU/L
PECAN/HICK TREE IGE QN: <0.1 KU/L
ROACH IGE QN: <0.1 KU/L
S PN DA SERO 19F IGG SER-MCNC: 6.5 UG/ML
S PNEUM DA 1 IGG SER-MCNC: 39.5 UG/ML
S PNEUM DA 14 IGG SER-MCNC: 14.1
S PNEUM DA 18C IGG SER-MCNC: 1.9
S PNEUM DA 3 IGG SER-MCNC: 3.5 UG/ML
S PNEUM DA 5 IGG SER-MCNC: 71.6 UG/ML
S PNEUM DA 6B IGG SER-MCNC: 0.3 UG/ML
S PNEUM DA 7F IGG SER-MCNC: 0.8 UG/ML
S PNEUM DA 9V IGG SER-MCNC: 1.3 UG/ML
SALMON IGE QN: <0.1 KU/L
SHRIMP IGE QN: <0.1 KU/L
SOYBEAN IGE QN: <0.1 KU/L
TIMOTHY IGE QN: <0.1 KU/L
TUNA IGE QN: <0.1 KU/L
WALNUT IGE QN: <0.1 KU/L
WEST RAGWEED IGE QN: <0.1 KU/L
WHEAT IGE QN: 0.43 KU/L
WHEY CLASS: ABNORMAL
WHEY, IGE: 0.12 KU/L
WHITE OAK IGE QN: <0.1 KU/L

## 2021-03-18 ENCOUNTER — PATIENT MESSAGE (OUTPATIENT)
Dept: ALLERGY | Facility: CLINIC | Age: 36
End: 2021-03-18

## 2021-03-20 RX ORDER — MUPIROCIN 20 MG/G
OINTMENT TOPICAL
Qty: 22 G | Refills: 0 | Status: CANCELLED | OUTPATIENT
Start: 2021-03-20

## 2021-03-22 RX ORDER — DEXTROAMPHETAMINE SACCHARATE, AMPHETAMINE ASPARTATE MONOHYDRATE, DEXTROAMPHETAMINE SULFATE AND AMPHETAMINE SULFATE 6.25; 6.25; 6.25; 6.25 MG/1; MG/1; MG/1; MG/1
25 CAPSULE, EXTENDED RELEASE ORAL EVERY MORNING
Qty: 30 CAPSULE | Refills: 0 | Status: SHIPPED | OUTPATIENT
Start: 2021-03-22 | End: 2021-04-21 | Stop reason: SDUPTHER

## 2021-03-23 ENCOUNTER — OFFICE VISIT (OUTPATIENT)
Dept: HEMATOLOGY/ONCOLOGY | Facility: CLINIC | Age: 36
End: 2021-03-23
Payer: COMMERCIAL

## 2021-03-23 ENCOUNTER — LAB VISIT (OUTPATIENT)
Dept: LAB | Facility: HOSPITAL | Age: 36
End: 2021-03-23
Payer: COMMERCIAL

## 2021-03-23 VITALS
HEIGHT: 65 IN | DIASTOLIC BLOOD PRESSURE: 78 MMHG | WEIGHT: 159.38 LBS | OXYGEN SATURATION: 99 % | BODY MASS INDEX: 26.55 KG/M2 | HEART RATE: 91 BPM | RESPIRATION RATE: 16 BRPM | SYSTOLIC BLOOD PRESSURE: 119 MMHG | TEMPERATURE: 98 F

## 2021-03-23 DIAGNOSIS — C50.411 MALIGNANT NEOPLASM OF UPPER-OUTER QUADRANT OF RIGHT BREAST IN FEMALE, ESTROGEN RECEPTOR POSITIVE: Primary | Chronic | ICD-10-CM

## 2021-03-23 DIAGNOSIS — Z17.0 MALIGNANT NEOPLASM OF UPPER-OUTER QUADRANT OF RIGHT BREAST IN FEMALE, ESTROGEN RECEPTOR POSITIVE: Primary | Chronic | ICD-10-CM

## 2021-03-23 DIAGNOSIS — Z17.0 MALIGNANT NEOPLASM OF UPPER-OUTER QUADRANT OF RIGHT BREAST IN FEMALE, ESTROGEN RECEPTOR POSITIVE: ICD-10-CM

## 2021-03-23 DIAGNOSIS — C50.411 MALIGNANT NEOPLASM OF UPPER-OUTER QUADRANT OF RIGHT BREAST IN FEMALE, ESTROGEN RECEPTOR POSITIVE: ICD-10-CM

## 2021-03-23 LAB
ALBUMIN SERPL BCP-MCNC: 3.9 G/DL (ref 3.5–5.2)
ALP SERPL-CCNC: 120 U/L (ref 55–135)
ALT SERPL W/O P-5'-P-CCNC: 13 U/L (ref 10–44)
ANION GAP SERPL CALC-SCNC: 10 MMOL/L (ref 8–16)
AST SERPL-CCNC: 16 U/L (ref 10–40)
BILIRUB SERPL-MCNC: 0.3 MG/DL (ref 0.1–1)
BUN SERPL-MCNC: 18 MG/DL (ref 6–20)
CALCIUM SERPL-MCNC: 9.4 MG/DL (ref 8.7–10.5)
CHLORIDE SERPL-SCNC: 108 MMOL/L (ref 95–110)
CO2 SERPL-SCNC: 23 MMOL/L (ref 23–29)
CREAT SERPL-MCNC: 1.1 MG/DL (ref 0.5–1.4)
ERYTHROCYTE [DISTWIDTH] IN BLOOD BY AUTOMATED COUNT: 12.1 % (ref 11.5–14.5)
EST. GFR  (AFRICAN AMERICAN): >60 ML/MIN/1.73 M^2
EST. GFR  (NON AFRICAN AMERICAN): >60 ML/MIN/1.73 M^2
GLUCOSE SERPL-MCNC: 95 MG/DL (ref 70–110)
HCT VFR BLD AUTO: 38.6 % (ref 37–48.5)
HGB BLD-MCNC: 12.6 G/DL (ref 12–16)
IMM GRANULOCYTES # BLD AUTO: 0.03 K/UL (ref 0–0.04)
MCH RBC QN AUTO: 31 PG (ref 27–31)
MCHC RBC AUTO-ENTMCNC: 32.6 G/DL (ref 32–36)
MCV RBC AUTO: 95 FL (ref 82–98)
NEUTROPHILS # BLD AUTO: 3.1 K/UL (ref 1.8–7.7)
PLATELET # BLD AUTO: 241 K/UL (ref 150–350)
PMV BLD AUTO: 8.5 FL (ref 9.2–12.9)
POTASSIUM SERPL-SCNC: 4.2 MMOL/L (ref 3.5–5.1)
PROT SERPL-MCNC: 7.8 G/DL (ref 6–8.4)
RBC # BLD AUTO: 4.07 M/UL (ref 4–5.4)
SODIUM SERPL-SCNC: 141 MMOL/L (ref 136–145)
WBC # BLD AUTO: 4.83 K/UL (ref 3.9–12.7)

## 2021-03-23 PROCEDURE — 3078F DIAST BP <80 MM HG: CPT | Mod: CPTII,S$GLB,, | Performed by: INTERNAL MEDICINE

## 2021-03-23 PROCEDURE — 80053 COMPREHEN METABOLIC PANEL: CPT | Performed by: INTERNAL MEDICINE

## 2021-03-23 PROCEDURE — 3008F BODY MASS INDEX DOCD: CPT | Mod: CPTII,S$GLB,, | Performed by: INTERNAL MEDICINE

## 2021-03-23 PROCEDURE — 3074F PR MOST RECENT SYSTOLIC BLOOD PRESSURE < 130 MM HG: ICD-10-PCS | Mod: CPTII,S$GLB,, | Performed by: INTERNAL MEDICINE

## 2021-03-23 PROCEDURE — 3074F SYST BP LT 130 MM HG: CPT | Mod: CPTII,S$GLB,, | Performed by: INTERNAL MEDICINE

## 2021-03-23 PROCEDURE — 36415 COLL VENOUS BLD VENIPUNCTURE: CPT | Performed by: INTERNAL MEDICINE

## 2021-03-23 PROCEDURE — 99999 PR PBB SHADOW E&M-EST. PATIENT-LVL V: CPT | Mod: PBBFAC,,, | Performed by: INTERNAL MEDICINE

## 2021-03-23 PROCEDURE — 99999 PR PBB SHADOW E&M-EST. PATIENT-LVL V: ICD-10-PCS | Mod: PBBFAC,,, | Performed by: INTERNAL MEDICINE

## 2021-03-23 PROCEDURE — 1125F AMNT PAIN NOTED PAIN PRSNT: CPT | Mod: S$GLB,,, | Performed by: INTERNAL MEDICINE

## 2021-03-23 PROCEDURE — 3078F PR MOST RECENT DIASTOLIC BLOOD PRESSURE < 80 MM HG: ICD-10-PCS | Mod: CPTII,S$GLB,, | Performed by: INTERNAL MEDICINE

## 2021-03-23 PROCEDURE — 99213 PR OFFICE/OUTPT VISIT, EST, LEVL III, 20-29 MIN: ICD-10-PCS | Mod: S$GLB,,, | Performed by: INTERNAL MEDICINE

## 2021-03-23 PROCEDURE — 85027 COMPLETE CBC AUTOMATED: CPT | Performed by: INTERNAL MEDICINE

## 2021-03-23 PROCEDURE — 1125F PR PAIN SEVERITY QUANTIFIED, PAIN PRESENT: ICD-10-PCS | Mod: S$GLB,,, | Performed by: INTERNAL MEDICINE

## 2021-03-23 PROCEDURE — 99213 OFFICE O/P EST LOW 20 MIN: CPT | Mod: S$GLB,,, | Performed by: INTERNAL MEDICINE

## 2021-03-23 PROCEDURE — 3008F PR BODY MASS INDEX (BMI) DOCUMENTED: ICD-10-PCS | Mod: CPTII,S$GLB,, | Performed by: INTERNAL MEDICINE

## 2021-03-23 RX ORDER — MUPIROCIN 20 MG/G
OINTMENT TOPICAL
Qty: 22 G | Refills: 0 | Status: CANCELLED | OUTPATIENT
Start: 2021-03-20

## 2021-03-25 ENCOUNTER — PROCEDURE VISIT (OUTPATIENT)
Dept: NEUROLOGY | Facility: CLINIC | Age: 36
End: 2021-03-25
Payer: COMMERCIAL

## 2021-03-25 ENCOUNTER — PATIENT MESSAGE (OUTPATIENT)
Dept: NEUROLOGY | Facility: CLINIC | Age: 36
End: 2021-03-25

## 2021-03-25 VITALS
DIASTOLIC BLOOD PRESSURE: 86 MMHG | SYSTOLIC BLOOD PRESSURE: 118 MMHG | BODY MASS INDEX: 26.52 KG/M2 | HEART RATE: 84 BPM | HEIGHT: 65 IN

## 2021-03-25 DIAGNOSIS — G43.C1 INTRACTABLE PERIODIC HEADACHE SYNDROME: ICD-10-CM

## 2021-03-25 PROCEDURE — 99499 UNLISTED E&M SERVICE: CPT | Mod: S$GLB,,, | Performed by: PSYCHIATRY & NEUROLOGY

## 2021-03-25 PROCEDURE — 64615 CHEMODENERV MUSC MIGRAINE: CPT | Mod: S$GLB,,, | Performed by: PSYCHIATRY & NEUROLOGY

## 2021-03-25 PROCEDURE — 64615 PR CHEMODENERVATION OF MUSCLE FOR CHRONIC MIGRAINE: ICD-10-PCS | Mod: S$GLB,,, | Performed by: PSYCHIATRY & NEUROLOGY

## 2021-03-25 PROCEDURE — 99499 NO LOS: ICD-10-PCS | Mod: S$GLB,,, | Performed by: PSYCHIATRY & NEUROLOGY

## 2021-03-25 RX ORDER — TOPIRAMATE 100 MG/1
100 TABLET, FILM COATED ORAL NIGHTLY
Qty: 90 TABLET | Refills: 3 | Status: SHIPPED | OUTPATIENT
Start: 2021-03-25 | End: 2021-04-21 | Stop reason: SDUPTHER

## 2021-03-25 RX ORDER — MUPIROCIN 20 MG/G
OINTMENT TOPICAL
Qty: 22 G | Refills: 0 | Status: CANCELLED | OUTPATIENT
Start: 2021-03-20

## 2021-03-26 ENCOUNTER — PATIENT MESSAGE (OUTPATIENT)
Dept: ADMINISTRATIVE | Facility: OTHER | Age: 36
End: 2021-03-26

## 2021-03-26 ENCOUNTER — PATIENT MESSAGE (OUTPATIENT)
Dept: HEMATOLOGY/ONCOLOGY | Facility: CLINIC | Age: 36
End: 2021-03-26

## 2021-03-26 ENCOUNTER — PATIENT MESSAGE (OUTPATIENT)
Dept: NEUROLOGY | Facility: CLINIC | Age: 36
End: 2021-03-26

## 2021-03-26 DIAGNOSIS — C50.411 MALIGNANT NEOPLASM OF UPPER-OUTER QUADRANT OF RIGHT BREAST IN FEMALE, ESTROGEN RECEPTOR POSITIVE: Primary | Chronic | ICD-10-CM

## 2021-03-26 DIAGNOSIS — Z17.0 MALIGNANT NEOPLASM OF UPPER-OUTER QUADRANT OF RIGHT BREAST IN FEMALE, ESTROGEN RECEPTOR POSITIVE: Primary | Chronic | ICD-10-CM

## 2021-03-26 RX ORDER — MUPIROCIN 20 MG/G
OINTMENT TOPICAL
Qty: 22 G | Refills: 0 | Status: CANCELLED | OUTPATIENT
Start: 2021-03-20

## 2021-03-30 ENCOUNTER — PATIENT MESSAGE (OUTPATIENT)
Dept: HEMATOLOGY/ONCOLOGY | Facility: CLINIC | Age: 36
End: 2021-03-30

## 2021-03-30 ENCOUNTER — PATIENT MESSAGE (OUTPATIENT)
Dept: GASTROENTEROLOGY | Facility: CLINIC | Age: 36
End: 2021-03-30

## 2021-03-30 ENCOUNTER — OFFICE VISIT (OUTPATIENT)
Dept: ALLERGY | Facility: CLINIC | Age: 36
End: 2021-03-30
Payer: COMMERCIAL

## 2021-03-30 VITALS — WEIGHT: 162.69 LBS | BODY MASS INDEX: 27.1 KG/M2 | HEIGHT: 65 IN

## 2021-03-30 DIAGNOSIS — K20.0 EOSINOPHILIC ESOPHAGITIS: Primary | ICD-10-CM

## 2021-03-30 DIAGNOSIS — G43.009 MIGRAINE WITHOUT AURA AND WITHOUT STATUS MIGRAINOSUS, NOT INTRACTABLE: ICD-10-CM

## 2021-03-30 DIAGNOSIS — J31.0 CHRONIC RHINITIS: ICD-10-CM

## 2021-03-30 DIAGNOSIS — Z88.0 HISTORY OF PENICILLIN ALLERGY: ICD-10-CM

## 2021-03-30 PROCEDURE — 1125F PR PAIN SEVERITY QUANTIFIED, PAIN PRESENT: ICD-10-PCS | Mod: S$GLB,,, | Performed by: ALLERGY & IMMUNOLOGY

## 2021-03-30 PROCEDURE — 95004 PR ALLERGY SKIN TESTS,ALLERGENS: ICD-10-PCS | Mod: S$GLB,,, | Performed by: ALLERGY & IMMUNOLOGY

## 2021-03-30 PROCEDURE — 99999 PR PBB SHADOW E&M-EST. PATIENT-LVL IV: CPT | Mod: PBBFAC,,, | Performed by: ALLERGY & IMMUNOLOGY

## 2021-03-30 PROCEDURE — 3008F PR BODY MASS INDEX (BMI) DOCUMENTED: ICD-10-PCS | Mod: CPTII,S$GLB,, | Performed by: ALLERGY & IMMUNOLOGY

## 2021-03-30 PROCEDURE — 3008F BODY MASS INDEX DOCD: CPT | Mod: CPTII,S$GLB,, | Performed by: ALLERGY & IMMUNOLOGY

## 2021-03-30 PROCEDURE — 99213 OFFICE O/P EST LOW 20 MIN: CPT | Mod: 25,S$GLB,, | Performed by: ALLERGY & IMMUNOLOGY

## 2021-03-30 PROCEDURE — 99999 PR PBB SHADOW E&M-EST. PATIENT-LVL IV: ICD-10-PCS | Mod: PBBFAC,,, | Performed by: ALLERGY & IMMUNOLOGY

## 2021-03-30 PROCEDURE — 99213 PR OFFICE/OUTPT VISIT, EST, LEVL III, 20-29 MIN: ICD-10-PCS | Mod: 25,S$GLB,, | Performed by: ALLERGY & IMMUNOLOGY

## 2021-03-30 PROCEDURE — 1125F AMNT PAIN NOTED PAIN PRSNT: CPT | Mod: S$GLB,,, | Performed by: ALLERGY & IMMUNOLOGY

## 2021-03-30 PROCEDURE — 95004 PERQ TESTS W/ALRGNC XTRCS: CPT | Mod: S$GLB,,, | Performed by: ALLERGY & IMMUNOLOGY

## 2021-03-31 ENCOUNTER — PATIENT MESSAGE (OUTPATIENT)
Dept: OBSTETRICS AND GYNECOLOGY | Facility: CLINIC | Age: 36
End: 2021-03-31

## 2021-03-31 ENCOUNTER — CLINICAL SUPPORT (OUTPATIENT)
Dept: OBSTETRICS AND GYNECOLOGY | Facility: CLINIC | Age: 36
End: 2021-03-31
Payer: COMMERCIAL

## 2021-03-31 DIAGNOSIS — C50.411 MALIGNANT NEOPLASM OF UPPER-OUTER QUADRANT OF RIGHT BREAST IN FEMALE, ESTROGEN RECEPTOR POSITIVE: ICD-10-CM

## 2021-03-31 DIAGNOSIS — Z15.09 BRCA1 POSITIVE: ICD-10-CM

## 2021-03-31 DIAGNOSIS — Z15.01 BRCA1 POSITIVE: ICD-10-CM

## 2021-03-31 DIAGNOSIS — Z17.0 MALIGNANT NEOPLASM OF UPPER-OUTER QUADRANT OF RIGHT BREAST IN FEMALE, ESTROGEN RECEPTOR POSITIVE: ICD-10-CM

## 2021-03-31 DIAGNOSIS — N95.1 MENOPAUSAL SYMPTOM: Primary | ICD-10-CM

## 2021-03-31 DIAGNOSIS — Z98.890 S/P ROBOT-ASSISTED SURGICAL PROCEDURE: Primary | ICD-10-CM

## 2021-03-31 DIAGNOSIS — R37 SEXUAL DYSFUNCTION: ICD-10-CM

## 2021-03-31 PROCEDURE — 96372 THER/PROPH/DIAG INJ SC/IM: CPT | Mod: S$GLB,,, | Performed by: OBSTETRICS & GYNECOLOGY

## 2021-03-31 PROCEDURE — 96372 PR INJECTION,THERAP/PROPH/DIAG2ST, IM OR SUBCUT: ICD-10-PCS | Mod: S$GLB,,, | Performed by: OBSTETRICS & GYNECOLOGY

## 2021-03-31 RX ORDER — SPIRONOLACTONE 50 MG/1
50 TABLET, FILM COATED ORAL DAILY
Qty: 30 TABLET | Refills: 11 | Status: SHIPPED | OUTPATIENT
Start: 2021-03-31 | End: 2021-04-30 | Stop reason: SDUPTHER

## 2021-03-31 RX ADMIN — TESTOSTERONE CYPIONATE 50 MG: 200 INJECTION, SOLUTION INTRAMUSCULAR at 08:03

## 2021-04-01 ENCOUNTER — TELEPHONE (OUTPATIENT)
Dept: GYNECOLOGIC ONCOLOGY | Facility: CLINIC | Age: 36
End: 2021-04-01

## 2021-04-05 ENCOUNTER — TELEPHONE (OUTPATIENT)
Dept: GYNECOLOGIC ONCOLOGY | Facility: CLINIC | Age: 36
End: 2021-04-05

## 2021-04-08 ENCOUNTER — PATIENT MESSAGE (OUTPATIENT)
Dept: RHEUMATOLOGY | Facility: CLINIC | Age: 36
End: 2021-04-08

## 2021-04-09 ENCOUNTER — TELEPHONE (OUTPATIENT)
Dept: OBSTETRICS AND GYNECOLOGY | Facility: CLINIC | Age: 36
End: 2021-04-09

## 2021-04-09 ENCOUNTER — TELEPHONE (OUTPATIENT)
Dept: HEMATOLOGY/ONCOLOGY | Facility: CLINIC | Age: 36
End: 2021-04-09

## 2021-04-09 ENCOUNTER — PATIENT MESSAGE (OUTPATIENT)
Dept: RHEUMATOLOGY | Facility: CLINIC | Age: 36
End: 2021-04-09

## 2021-04-12 ENCOUNTER — PATIENT MESSAGE (OUTPATIENT)
Dept: ALLERGY | Facility: CLINIC | Age: 36
End: 2021-04-12

## 2021-04-12 ENCOUNTER — OFFICE VISIT (OUTPATIENT)
Dept: RHEUMATOLOGY | Facility: CLINIC | Age: 36
End: 2021-04-12
Payer: COMMERCIAL

## 2021-04-12 VITALS
DIASTOLIC BLOOD PRESSURE: 78 MMHG | SYSTOLIC BLOOD PRESSURE: 107 MMHG | BODY MASS INDEX: 27.99 KG/M2 | HEIGHT: 65 IN | WEIGHT: 168 LBS | HEART RATE: 82 BPM

## 2021-04-12 DIAGNOSIS — R76.8 ANA POSITIVE: Primary | ICD-10-CM

## 2021-04-12 DIAGNOSIS — M25.50 POLYARTHRALGIA: ICD-10-CM

## 2021-04-12 PROCEDURE — 99999 PR PBB SHADOW E&M-EST. PATIENT-LVL V: CPT | Mod: PBBFAC,,, | Performed by: INTERNAL MEDICINE

## 2021-04-12 PROCEDURE — 99999 PR PBB SHADOW E&M-EST. PATIENT-LVL V: ICD-10-PCS | Mod: PBBFAC,,, | Performed by: INTERNAL MEDICINE

## 2021-04-12 PROCEDURE — 99205 PR OFFICE/OUTPT VISIT, NEW, LEVL V, 60-74 MIN: ICD-10-PCS | Mod: S$GLB,,, | Performed by: INTERNAL MEDICINE

## 2021-04-12 PROCEDURE — 3008F PR BODY MASS INDEX (BMI) DOCUMENTED: ICD-10-PCS | Mod: CPTII,S$GLB,, | Performed by: INTERNAL MEDICINE

## 2021-04-12 PROCEDURE — 99205 OFFICE O/P NEW HI 60 MIN: CPT | Mod: S$GLB,,, | Performed by: INTERNAL MEDICINE

## 2021-04-12 PROCEDURE — 3008F BODY MASS INDEX DOCD: CPT | Mod: CPTII,S$GLB,, | Performed by: INTERNAL MEDICINE

## 2021-04-12 PROCEDURE — 1125F AMNT PAIN NOTED PAIN PRSNT: CPT | Mod: S$GLB,,, | Performed by: INTERNAL MEDICINE

## 2021-04-12 PROCEDURE — 1125F PR PAIN SEVERITY QUANTIFIED, PAIN PRESENT: ICD-10-PCS | Mod: S$GLB,,, | Performed by: INTERNAL MEDICINE

## 2021-04-13 ENCOUNTER — OFFICE VISIT (OUTPATIENT)
Dept: GYNECOLOGIC ONCOLOGY | Facility: CLINIC | Age: 36
End: 2021-04-13
Payer: COMMERCIAL

## 2021-04-13 VITALS
BODY MASS INDEX: 27.29 KG/M2 | HEART RATE: 84 BPM | WEIGHT: 164 LBS | SYSTOLIC BLOOD PRESSURE: 116 MMHG | DIASTOLIC BLOOD PRESSURE: 75 MMHG

## 2021-04-13 DIAGNOSIS — Z98.890 S/P ROBOT-ASSISTED SURGICAL PROCEDURE: Primary | ICD-10-CM

## 2021-04-13 DIAGNOSIS — Z15.01 BRCA1 POSITIVE: ICD-10-CM

## 2021-04-13 DIAGNOSIS — N93.9 VAGINA BLEEDING: ICD-10-CM

## 2021-04-13 DIAGNOSIS — Z15.09 BRCA1 POSITIVE: ICD-10-CM

## 2021-04-13 PROCEDURE — 1126F PR PAIN SEVERITY QUANTIFIED, NO PAIN PRESENT: ICD-10-PCS | Mod: S$GLB,,, | Performed by: OBSTETRICS & GYNECOLOGY

## 2021-04-13 PROCEDURE — 3008F PR BODY MASS INDEX (BMI) DOCUMENTED: ICD-10-PCS | Mod: CPTII,S$GLB,, | Performed by: OBSTETRICS & GYNECOLOGY

## 2021-04-13 PROCEDURE — 99999 PR PBB SHADOW E&M-EST. PATIENT-LVL II: CPT | Mod: PBBFAC,,, | Performed by: OBSTETRICS & GYNECOLOGY

## 2021-04-13 PROCEDURE — 3008F BODY MASS INDEX DOCD: CPT | Mod: CPTII,S$GLB,, | Performed by: OBSTETRICS & GYNECOLOGY

## 2021-04-13 PROCEDURE — 1126F AMNT PAIN NOTED NONE PRSNT: CPT | Mod: S$GLB,,, | Performed by: OBSTETRICS & GYNECOLOGY

## 2021-04-13 PROCEDURE — 99024 PR POST-OP FOLLOW-UP VISIT: ICD-10-PCS | Mod: S$GLB,,, | Performed by: OBSTETRICS & GYNECOLOGY

## 2021-04-13 PROCEDURE — 99024 POSTOP FOLLOW-UP VISIT: CPT | Mod: S$GLB,,, | Performed by: OBSTETRICS & GYNECOLOGY

## 2021-04-13 PROCEDURE — 99999 PR PBB SHADOW E&M-EST. PATIENT-LVL II: ICD-10-PCS | Mod: PBBFAC,,, | Performed by: OBSTETRICS & GYNECOLOGY

## 2021-04-13 RX ORDER — TRETINOIN 0.25 MG/G
CREAM TOPICAL
COMMUNITY
Start: 2021-03-30 | End: 2021-09-28

## 2021-04-15 ENCOUNTER — PATIENT MESSAGE (OUTPATIENT)
Dept: RHEUMATOLOGY | Facility: CLINIC | Age: 36
End: 2021-04-15

## 2021-04-15 PROBLEM — N93.9 VAGINA BLEEDING: Status: ACTIVE | Noted: 2021-04-15

## 2021-04-20 ENCOUNTER — PATIENT MESSAGE (OUTPATIENT)
Dept: NEUROLOGY | Facility: CLINIC | Age: 36
End: 2021-04-20

## 2021-04-21 DIAGNOSIS — R11.0 NAUSEA: ICD-10-CM

## 2021-04-21 DIAGNOSIS — G43.C1 INTRACTABLE PERIODIC HEADACHE SYNDROME: ICD-10-CM

## 2021-04-21 RX ORDER — TOPIRAMATE 100 MG/1
200 TABLET, FILM COATED ORAL NIGHTLY
Qty: 180 TABLET | Refills: 3 | Status: SHIPPED | OUTPATIENT
Start: 2021-04-21 | End: 2022-04-04

## 2021-04-22 ENCOUNTER — PATIENT MESSAGE (OUTPATIENT)
Dept: FAMILY MEDICINE | Facility: CLINIC | Age: 36
End: 2021-04-22

## 2021-04-22 ENCOUNTER — LAB VISIT (OUTPATIENT)
Dept: LAB | Facility: HOSPITAL | Age: 36
End: 2021-04-22
Payer: COMMERCIAL

## 2021-04-22 DIAGNOSIS — R76.8 ANA POSITIVE: ICD-10-CM

## 2021-04-22 DIAGNOSIS — R39.15 URINARY URGENCY: Primary | ICD-10-CM

## 2021-04-22 LAB
C3 SERPL-MCNC: 112 MG/DL (ref 50–180)
C4 SERPL-MCNC: 22 MG/DL (ref 11–44)
DAT IGG-SP REAG RBC-IMP: NORMAL
LA PPP-IMP: NEGATIVE

## 2021-04-22 PROCEDURE — 85613 RUSSELL VIPER VENOM DILUTED: CPT | Performed by: INTERNAL MEDICINE

## 2021-04-22 PROCEDURE — 86160 COMPLEMENT ANTIGEN: CPT | Mod: 59 | Performed by: INTERNAL MEDICINE

## 2021-04-22 PROCEDURE — 86146 BETA-2 GLYCOPROTEIN ANTIBODY: CPT | Mod: 59 | Performed by: INTERNAL MEDICINE

## 2021-04-22 PROCEDURE — 86880 COOMBS TEST DIRECT: CPT | Performed by: INTERNAL MEDICINE

## 2021-04-22 PROCEDURE — 86160 COMPLEMENT ANTIGEN: CPT | Performed by: INTERNAL MEDICINE

## 2021-04-22 PROCEDURE — 86147 CARDIOLIPIN ANTIBODY EA IG: CPT | Mod: 59 | Performed by: INTERNAL MEDICINE

## 2021-04-22 PROCEDURE — 36415 COLL VENOUS BLD VENIPUNCTURE: CPT | Performed by: INTERNAL MEDICINE

## 2021-04-22 RX ORDER — METOCLOPRAMIDE 10 MG/1
10 TABLET ORAL
Qty: 90 TABLET | Refills: 1 | Status: SHIPPED | OUTPATIENT
Start: 2021-04-22 | End: 2022-04-20 | Stop reason: SDUPTHER

## 2021-04-22 RX ORDER — DEXTROAMPHETAMINE SACCHARATE, AMPHETAMINE ASPARTATE MONOHYDRATE, DEXTROAMPHETAMINE SULFATE AND AMPHETAMINE SULFATE 6.25; 6.25; 6.25; 6.25 MG/1; MG/1; MG/1; MG/1
25 CAPSULE, EXTENDED RELEASE ORAL EVERY MORNING
Qty: 30 CAPSULE | Refills: 0 | Status: SHIPPED | OUTPATIENT
Start: 2021-04-22 | End: 2021-05-23 | Stop reason: SDUPTHER

## 2021-04-23 ENCOUNTER — PATIENT MESSAGE (OUTPATIENT)
Dept: FAMILY MEDICINE | Facility: CLINIC | Age: 36
End: 2021-04-23

## 2021-04-23 RX ORDER — CIPROFLOXACIN 250 MG/1
250 TABLET, FILM COATED ORAL EVERY 12 HOURS
Qty: 6 TABLET | Refills: 0 | Status: SHIPPED | OUTPATIENT
Start: 2021-04-23 | End: 2021-04-26

## 2021-04-24 LAB
B2 GLYCOPROT1 IGA SER QL: <9 SAU
B2 GLYCOPROT1 IGG SER QL: <9 SGU
B2 GLYCOPROT1 IGM SER QL: <9 SMU

## 2021-04-26 LAB
CARDIOLIPIN IGG SER IA-ACNC: <9.4 GPL (ref 0–14.99)
CARDIOLIPIN IGM SER IA-ACNC: 9.7 MPL (ref 0–12.49)

## 2021-04-28 ENCOUNTER — PATIENT MESSAGE (OUTPATIENT)
Dept: FAMILY MEDICINE | Facility: CLINIC | Age: 36
End: 2021-04-28

## 2021-04-28 ENCOUNTER — TELEPHONE (OUTPATIENT)
Dept: OBSTETRICS AND GYNECOLOGY | Facility: CLINIC | Age: 36
End: 2021-04-28

## 2021-04-28 DIAGNOSIS — Z79.811 USE OF AROMATASE INHIBITORS: Primary | ICD-10-CM

## 2021-04-28 DIAGNOSIS — N95.1 MENOPAUSAL SYMPTOM: ICD-10-CM

## 2021-04-28 DIAGNOSIS — Z17.0 MALIGNANT NEOPLASM OF UPPER-OUTER QUADRANT OF RIGHT BREAST IN FEMALE, ESTROGEN RECEPTOR POSITIVE: ICD-10-CM

## 2021-04-28 DIAGNOSIS — Z98.890 S/P ROBOT-ASSISTED SURGICAL PROCEDURE: ICD-10-CM

## 2021-04-28 DIAGNOSIS — Z15.09 BRCA1 POSITIVE: ICD-10-CM

## 2021-04-28 DIAGNOSIS — C50.411 MALIGNANT NEOPLASM OF UPPER-OUTER QUADRANT OF RIGHT BREAST IN FEMALE, ESTROGEN RECEPTOR POSITIVE: ICD-10-CM

## 2021-04-28 DIAGNOSIS — Z15.01 BRCA1 POSITIVE: ICD-10-CM

## 2021-04-29 ENCOUNTER — CLINICAL SUPPORT (OUTPATIENT)
Dept: OBSTETRICS AND GYNECOLOGY | Facility: CLINIC | Age: 36
End: 2021-04-29
Payer: COMMERCIAL

## 2021-04-29 DIAGNOSIS — Z79.811 USE OF AROMATASE INHIBITORS: ICD-10-CM

## 2021-04-29 DIAGNOSIS — E89.40 SURGICAL MENOPAUSE: ICD-10-CM

## 2021-04-29 DIAGNOSIS — N95.1 MENOPAUSAL SYMPTOM: Primary | ICD-10-CM

## 2021-04-29 DIAGNOSIS — C50.411 MALIGNANT NEOPLASM OF UPPER-OUTER QUADRANT OF RIGHT BREAST IN FEMALE, ESTROGEN RECEPTOR POSITIVE: ICD-10-CM

## 2021-04-29 DIAGNOSIS — Z17.0 MALIGNANT NEOPLASM OF UPPER-OUTER QUADRANT OF RIGHT BREAST IN FEMALE, ESTROGEN RECEPTOR POSITIVE: ICD-10-CM

## 2021-04-29 PROCEDURE — 96372 THER/PROPH/DIAG INJ SC/IM: CPT | Mod: S$GLB,,, | Performed by: OBSTETRICS & GYNECOLOGY

## 2021-04-29 PROCEDURE — 96372 PR INJECTION,THERAP/PROPH/DIAG2ST, IM OR SUBCUT: ICD-10-PCS | Mod: S$GLB,,, | Performed by: OBSTETRICS & GYNECOLOGY

## 2021-04-29 RX ADMIN — TESTOSTERONE CYPIONATE 50 MG: 200 INJECTION, SOLUTION INTRAMUSCULAR at 09:04

## 2021-04-30 ENCOUNTER — TELEPHONE (OUTPATIENT)
Dept: OBSTETRICS AND GYNECOLOGY | Facility: CLINIC | Age: 36
End: 2021-04-30

## 2021-04-30 ENCOUNTER — PATIENT MESSAGE (OUTPATIENT)
Dept: OBSTETRICS AND GYNECOLOGY | Facility: CLINIC | Age: 36
End: 2021-04-30

## 2021-04-30 DIAGNOSIS — N95.1 MENOPAUSAL SYMPTOM: ICD-10-CM

## 2021-04-30 RX ORDER — SPIRONOLACTONE 50 MG/1
50 TABLET, FILM COATED ORAL DAILY
Qty: 30 TABLET | Refills: 11 | Status: SHIPPED | OUTPATIENT
Start: 2021-04-30 | End: 2021-05-03 | Stop reason: SDUPTHER

## 2021-04-30 RX ORDER — VALACYCLOVIR HYDROCHLORIDE 1 G/1
1000 TABLET, FILM COATED ORAL EVERY 12 HOURS
Qty: 60 TABLET | Refills: 0 | Status: SHIPPED | OUTPATIENT
Start: 2021-04-30 | End: 2021-05-03 | Stop reason: SDUPTHER

## 2021-04-30 RX ORDER — TESTOSTERONE CYPIONATE 200 MG/ML
50 INJECTION, SOLUTION INTRAMUSCULAR
Status: COMPLETED | OUTPATIENT
Start: 2021-04-29 | End: 2021-06-22

## 2021-05-03 ENCOUNTER — PATIENT MESSAGE (OUTPATIENT)
Dept: HEMATOLOGY/ONCOLOGY | Facility: CLINIC | Age: 36
End: 2021-05-03

## 2021-05-05 ENCOUNTER — OFFICE VISIT (OUTPATIENT)
Dept: URGENT CARE | Facility: CLINIC | Age: 36
End: 2021-05-05
Payer: COMMERCIAL

## 2021-05-05 VITALS
RESPIRATION RATE: 12 BRPM | SYSTOLIC BLOOD PRESSURE: 120 MMHG | HEIGHT: 65 IN | TEMPERATURE: 98 F | WEIGHT: 158 LBS | OXYGEN SATURATION: 98 % | BODY MASS INDEX: 26.33 KG/M2 | DIASTOLIC BLOOD PRESSURE: 82 MMHG

## 2021-05-05 DIAGNOSIS — S90.32XA CONTUSION OF LEFT FOOT, INITIAL ENCOUNTER: ICD-10-CM

## 2021-05-05 DIAGNOSIS — R52 PAIN: Primary | ICD-10-CM

## 2021-05-05 DIAGNOSIS — S99.922A INJURY OF LEFT FOOT, INITIAL ENCOUNTER: ICD-10-CM

## 2021-05-05 DIAGNOSIS — S90.812A ABRASION OF LEFT FOOT, INITIAL ENCOUNTER: ICD-10-CM

## 2021-05-05 PROCEDURE — 3008F PR BODY MASS INDEX (BMI) DOCUMENTED: ICD-10-PCS | Mod: CPTII,S$GLB,, | Performed by: NURSE PRACTITIONER

## 2021-05-05 PROCEDURE — 99204 OFFICE O/P NEW MOD 45 MIN: CPT | Mod: S$GLB,,, | Performed by: NURSE PRACTITIONER

## 2021-05-05 PROCEDURE — 73630 X-RAY EXAM OF FOOT: CPT | Mod: LT,S$GLB,, | Performed by: RADIOLOGY

## 2021-05-05 PROCEDURE — 99204 PR OFFICE/OUTPT VISIT, NEW, LEVL IV, 45-59 MIN: ICD-10-PCS | Mod: S$GLB,,, | Performed by: NURSE PRACTITIONER

## 2021-05-05 PROCEDURE — 3008F BODY MASS INDEX DOCD: CPT | Mod: CPTII,S$GLB,, | Performed by: NURSE PRACTITIONER

## 2021-05-05 PROCEDURE — 73630 XR FOOT COMPLETE 3 VIEW LEFT: ICD-10-PCS | Mod: LT,S$GLB,, | Performed by: RADIOLOGY

## 2021-05-05 RX ORDER — MUPIROCIN 20 MG/G
OINTMENT TOPICAL
Qty: 22 G | Refills: 1 | Status: SHIPPED | OUTPATIENT
Start: 2021-05-05 | End: 2021-09-28

## 2021-05-05 RX ORDER — NAPROXEN 500 MG/1
500 TABLET ORAL 2 TIMES DAILY WITH MEALS
Qty: 10 TABLET | Refills: 0 | Status: SHIPPED | OUTPATIENT
Start: 2021-05-05 | End: 2021-05-10

## 2021-05-06 ENCOUNTER — TELEPHONE (OUTPATIENT)
Dept: ORTHOPEDICS | Facility: CLINIC | Age: 36
End: 2021-05-06

## 2021-05-06 ENCOUNTER — OFFICE VISIT (OUTPATIENT)
Dept: PSYCHIATRY | Facility: CLINIC | Age: 36
End: 2021-05-06
Payer: COMMERCIAL

## 2021-05-06 VITALS
DIASTOLIC BLOOD PRESSURE: 92 MMHG | BODY MASS INDEX: 26.93 KG/M2 | SYSTOLIC BLOOD PRESSURE: 125 MMHG | WEIGHT: 161.63 LBS | HEIGHT: 65 IN | HEART RATE: 92 BPM

## 2021-05-06 DIAGNOSIS — Z15.01 BRCA1 POSITIVE: ICD-10-CM

## 2021-05-06 DIAGNOSIS — G47.00 INSOMNIA, UNSPECIFIED TYPE: Chronic | ICD-10-CM

## 2021-05-06 DIAGNOSIS — Z86.59 HISTORY OF POSTTRAUMATIC STRESS DISORDER (PTSD): Chronic | ICD-10-CM

## 2021-05-06 DIAGNOSIS — C50.411 MALIGNANT NEOPLASM OF UPPER-OUTER QUADRANT OF RIGHT BREAST IN FEMALE, ESTROGEN RECEPTOR POSITIVE: ICD-10-CM

## 2021-05-06 DIAGNOSIS — F90.0 ADHD (ATTENTION DEFICIT HYPERACTIVITY DISORDER), INATTENTIVE TYPE: ICD-10-CM

## 2021-05-06 DIAGNOSIS — F41.1 GENERALIZED ANXIETY DISORDER: Primary | Chronic | ICD-10-CM

## 2021-05-06 DIAGNOSIS — Z15.09 BRCA1 POSITIVE: ICD-10-CM

## 2021-05-06 DIAGNOSIS — G62.0 CHEMOTHERAPY-INDUCED NEUROPATHY: Chronic | ICD-10-CM

## 2021-05-06 DIAGNOSIS — T45.1X5A CHEMOTHERAPY-INDUCED NEUROPATHY: Chronic | ICD-10-CM

## 2021-05-06 DIAGNOSIS — Z17.0 MALIGNANT NEOPLASM OF UPPER-OUTER QUADRANT OF RIGHT BREAST IN FEMALE, ESTROGEN RECEPTOR POSITIVE: ICD-10-CM

## 2021-05-06 PROCEDURE — 3008F PR BODY MASS INDEX (BMI) DOCUMENTED: ICD-10-PCS | Mod: CPTII,S$GLB,, | Performed by: PSYCHIATRY & NEUROLOGY

## 2021-05-06 PROCEDURE — 99214 OFFICE O/P EST MOD 30 MIN: CPT | Mod: S$GLB,,, | Performed by: PSYCHIATRY & NEUROLOGY

## 2021-05-06 PROCEDURE — 99999 PR PBB SHADOW E&M-EST. PATIENT-LVL II: ICD-10-PCS | Mod: PBBFAC,,, | Performed by: PSYCHIATRY & NEUROLOGY

## 2021-05-06 PROCEDURE — 1125F PR PAIN SEVERITY QUANTIFIED, PAIN PRESENT: ICD-10-PCS | Mod: S$GLB,,, | Performed by: PSYCHIATRY & NEUROLOGY

## 2021-05-06 PROCEDURE — 90833 PSYTX W PT W E/M 30 MIN: CPT | Mod: S$GLB,,, | Performed by: PSYCHIATRY & NEUROLOGY

## 2021-05-06 PROCEDURE — 1125F AMNT PAIN NOTED PAIN PRSNT: CPT | Mod: S$GLB,,, | Performed by: PSYCHIATRY & NEUROLOGY

## 2021-05-06 PROCEDURE — 90833 PR PSYCHOTHERAPY W/PATIENT W/E&M, 30 MIN (ADD ON): ICD-10-PCS | Mod: S$GLB,,, | Performed by: PSYCHIATRY & NEUROLOGY

## 2021-05-06 PROCEDURE — 3008F BODY MASS INDEX DOCD: CPT | Mod: CPTII,S$GLB,, | Performed by: PSYCHIATRY & NEUROLOGY

## 2021-05-06 PROCEDURE — 99999 PR PBB SHADOW E&M-EST. PATIENT-LVL II: CPT | Mod: PBBFAC,,, | Performed by: PSYCHIATRY & NEUROLOGY

## 2021-05-06 PROCEDURE — 99214 PR OFFICE/OUTPT VISIT, EST, LEVL IV, 30-39 MIN: ICD-10-PCS | Mod: S$GLB,,, | Performed by: PSYCHIATRY & NEUROLOGY

## 2021-05-06 RX ORDER — SPIRONOLACTONE 100 MG/1
100 TABLET, FILM COATED ORAL DAILY
COMMUNITY
Start: 2021-05-05 | End: 2022-04-04

## 2021-05-07 ENCOUNTER — DOCUMENTATION ONLY (OUTPATIENT)
Dept: RESEARCH | Facility: HOSPITAL | Age: 36
End: 2021-05-07

## 2021-05-07 ENCOUNTER — PATIENT MESSAGE (OUTPATIENT)
Dept: PSYCHIATRY | Facility: CLINIC | Age: 36
End: 2021-05-07

## 2021-05-22 ENCOUNTER — PATIENT MESSAGE (OUTPATIENT)
Dept: PSYCHIATRY | Facility: CLINIC | Age: 36
End: 2021-05-22

## 2021-05-23 RX ORDER — DEXTROAMPHETAMINE SACCHARATE, AMPHETAMINE ASPARTATE MONOHYDRATE, DEXTROAMPHETAMINE SULFATE AND AMPHETAMINE SULFATE 6.25; 6.25; 6.25; 6.25 MG/1; MG/1; MG/1; MG/1
25 CAPSULE, EXTENDED RELEASE ORAL EVERY MORNING
Qty: 30 CAPSULE | Refills: 0 | Status: SHIPPED | OUTPATIENT
Start: 2021-05-23 | End: 2021-06-22 | Stop reason: SDUPTHER

## 2021-05-26 ENCOUNTER — PATIENT MESSAGE (OUTPATIENT)
Dept: HEMATOLOGY/ONCOLOGY | Facility: CLINIC | Age: 36
End: 2021-05-26

## 2021-05-26 ENCOUNTER — CLINICAL SUPPORT (OUTPATIENT)
Dept: OBSTETRICS AND GYNECOLOGY | Facility: CLINIC | Age: 36
End: 2021-05-26
Payer: COMMERCIAL

## 2021-05-26 DIAGNOSIS — N95.1 MENOPAUSAL SYMPTOM: Primary | ICD-10-CM

## 2021-05-26 PROCEDURE — 96372 THER/PROPH/DIAG INJ SC/IM: CPT | Mod: S$GLB,,, | Performed by: OBSTETRICS & GYNECOLOGY

## 2021-05-26 PROCEDURE — 96372 PR INJECTION,THERAP/PROPH/DIAG2ST, IM OR SUBCUT: ICD-10-PCS | Mod: S$GLB,,, | Performed by: OBSTETRICS & GYNECOLOGY

## 2021-05-26 RX ADMIN — TESTOSTERONE CYPIONATE 50 MG: 200 INJECTION, SOLUTION INTRAMUSCULAR at 09:05

## 2021-05-27 ENCOUNTER — PATIENT MESSAGE (OUTPATIENT)
Dept: HEMATOLOGY/ONCOLOGY | Facility: CLINIC | Age: 36
End: 2021-05-27

## 2021-06-04 ENCOUNTER — TELEPHONE (OUTPATIENT)
Dept: HEMATOLOGY/ONCOLOGY | Facility: CLINIC | Age: 36
End: 2021-06-04

## 2021-06-08 ENCOUNTER — PATIENT MESSAGE (OUTPATIENT)
Dept: HEMATOLOGY/ONCOLOGY | Facility: CLINIC | Age: 36
End: 2021-06-08

## 2021-06-09 ENCOUNTER — PATIENT MESSAGE (OUTPATIENT)
Dept: PSYCHIATRY | Facility: CLINIC | Age: 36
End: 2021-06-09

## 2021-06-10 RX ORDER — TRAZODONE HYDROCHLORIDE 50 MG/1
50 TABLET ORAL NIGHTLY
Qty: 30 TABLET | Refills: 0 | Status: SHIPPED | OUTPATIENT
Start: 2021-06-10 | End: 2021-07-15 | Stop reason: SDUPTHER

## 2021-06-10 RX ORDER — CITALOPRAM 40 MG/1
40 TABLET, FILM COATED ORAL DAILY
Qty: 30 TABLET | Refills: 0 | Status: SHIPPED | OUTPATIENT
Start: 2021-06-10 | End: 2021-07-15 | Stop reason: SDUPTHER

## 2021-06-22 ENCOUNTER — CLINICAL SUPPORT (OUTPATIENT)
Dept: OBSTETRICS AND GYNECOLOGY | Facility: CLINIC | Age: 36
End: 2021-06-22
Payer: COMMERCIAL

## 2021-06-22 ENCOUNTER — PATIENT MESSAGE (OUTPATIENT)
Dept: PSYCHIATRY | Facility: CLINIC | Age: 36
End: 2021-06-22

## 2021-06-22 DIAGNOSIS — N95.1 MENOPAUSAL SYMPTOM: Primary | ICD-10-CM

## 2021-06-22 PROCEDURE — 96372 PR INJECTION,THERAP/PROPH/DIAG2ST, IM OR SUBCUT: ICD-10-PCS | Mod: S$GLB,,, | Performed by: OBSTETRICS & GYNECOLOGY

## 2021-06-22 PROCEDURE — 96372 THER/PROPH/DIAG INJ SC/IM: CPT | Mod: S$GLB,,, | Performed by: OBSTETRICS & GYNECOLOGY

## 2021-06-22 RX ORDER — DEXTROAMPHETAMINE SACCHARATE, AMPHETAMINE ASPARTATE MONOHYDRATE, DEXTROAMPHETAMINE SULFATE AND AMPHETAMINE SULFATE 6.25; 6.25; 6.25; 6.25 MG/1; MG/1; MG/1; MG/1
25 CAPSULE, EXTENDED RELEASE ORAL EVERY MORNING
Qty: 30 CAPSULE | Refills: 0 | Status: SHIPPED | OUTPATIENT
Start: 2021-06-22 | End: 2021-07-20 | Stop reason: SDUPTHER

## 2021-06-22 RX ADMIN — TESTOSTERONE CYPIONATE 50 MG: 200 INJECTION, SOLUTION INTRAMUSCULAR at 08:06

## 2021-06-25 ENCOUNTER — PROCEDURE VISIT (OUTPATIENT)
Dept: NEUROLOGY | Facility: CLINIC | Age: 36
End: 2021-06-25
Payer: COMMERCIAL

## 2021-06-25 VITALS
BODY MASS INDEX: 26.79 KG/M2 | HEART RATE: 92 BPM | SYSTOLIC BLOOD PRESSURE: 132 MMHG | WEIGHT: 161 LBS | DIASTOLIC BLOOD PRESSURE: 89 MMHG

## 2021-06-25 PROCEDURE — 64615 CHEMODENERV MUSC MIGRAINE: CPT | Mod: S$GLB,,, | Performed by: PSYCHIATRY & NEUROLOGY

## 2021-06-25 PROCEDURE — 64615 PR CHEMODENERVATION OF MUSCLE FOR CHRONIC MIGRAINE: ICD-10-PCS | Mod: S$GLB,,, | Performed by: PSYCHIATRY & NEUROLOGY

## 2021-06-25 PROCEDURE — 99499 UNLISTED E&M SERVICE: CPT | Mod: S$GLB,,, | Performed by: PSYCHIATRY & NEUROLOGY

## 2021-06-25 PROCEDURE — 99499 NO LOS: ICD-10-PCS | Mod: S$GLB,,, | Performed by: PSYCHIATRY & NEUROLOGY

## 2021-06-25 RX ORDER — BUTALBITAL, ACETAMINOPHEN AND CAFFEINE 50; 325; 40 MG/1; MG/1; MG/1
1 TABLET ORAL EVERY 4 HOURS PRN
Qty: 12 TABLET | Refills: 11 | Status: SHIPPED | OUTPATIENT
Start: 2021-06-25 | End: 2021-07-25

## 2021-06-28 ENCOUNTER — PATIENT MESSAGE (OUTPATIENT)
Dept: HEMATOLOGY/ONCOLOGY | Facility: CLINIC | Age: 36
End: 2021-06-28

## 2021-06-28 ENCOUNTER — LAB VISIT (OUTPATIENT)
Dept: LAB | Facility: HOSPITAL | Age: 36
End: 2021-06-28
Attending: INTERNAL MEDICINE
Payer: COMMERCIAL

## 2021-06-28 ENCOUNTER — OFFICE VISIT (OUTPATIENT)
Dept: HEMATOLOGY/ONCOLOGY | Facility: CLINIC | Age: 36
End: 2021-06-28
Payer: COMMERCIAL

## 2021-06-28 VITALS
OXYGEN SATURATION: 96 % | WEIGHT: 160.5 LBS | SYSTOLIC BLOOD PRESSURE: 121 MMHG | RESPIRATION RATE: 18 BRPM | BODY MASS INDEX: 26.74 KG/M2 | TEMPERATURE: 98 F | HEART RATE: 82 BPM | HEIGHT: 65 IN | DIASTOLIC BLOOD PRESSURE: 79 MMHG

## 2021-06-28 DIAGNOSIS — Z17.0 MALIGNANT NEOPLASM OF UPPER-OUTER QUADRANT OF RIGHT BREAST IN FEMALE, ESTROGEN RECEPTOR POSITIVE: Primary | Chronic | ICD-10-CM

## 2021-06-28 DIAGNOSIS — Z17.0 MALIGNANT NEOPLASM OF UPPER-OUTER QUADRANT OF RIGHT BREAST IN FEMALE, ESTROGEN RECEPTOR POSITIVE: ICD-10-CM

## 2021-06-28 DIAGNOSIS — C50.411 MALIGNANT NEOPLASM OF UPPER-OUTER QUADRANT OF RIGHT BREAST IN FEMALE, ESTROGEN RECEPTOR POSITIVE: ICD-10-CM

## 2021-06-28 DIAGNOSIS — C50.411 MALIGNANT NEOPLASM OF UPPER-OUTER QUADRANT OF RIGHT BREAST IN FEMALE, ESTROGEN RECEPTOR POSITIVE: Primary | Chronic | ICD-10-CM

## 2021-06-28 LAB
ALBUMIN SERPL BCP-MCNC: 4.3 G/DL (ref 3.5–5.2)
ALP SERPL-CCNC: 102 U/L (ref 55–135)
ALT SERPL W/O P-5'-P-CCNC: 15 U/L (ref 10–44)
ANION GAP SERPL CALC-SCNC: 11 MMOL/L (ref 8–16)
AST SERPL-CCNC: 18 U/L (ref 10–40)
BILIRUB SERPL-MCNC: 0.6 MG/DL (ref 0.1–1)
BUN SERPL-MCNC: 12 MG/DL (ref 6–20)
CALCIUM SERPL-MCNC: 10.2 MG/DL (ref 8.7–10.5)
CHLORIDE SERPL-SCNC: 108 MMOL/L (ref 95–110)
CO2 SERPL-SCNC: 21 MMOL/L (ref 23–29)
CREAT SERPL-MCNC: 1.2 MG/DL (ref 0.5–1.4)
ERYTHROCYTE [DISTWIDTH] IN BLOOD BY AUTOMATED COUNT: 12.1 % (ref 11.5–14.5)
EST. GFR  (AFRICAN AMERICAN): >60 ML/MIN/1.73 M^2
EST. GFR  (NON AFRICAN AMERICAN): 59.5 ML/MIN/1.73 M^2
GLUCOSE SERPL-MCNC: 112 MG/DL (ref 70–110)
HCT VFR BLD AUTO: 38 % (ref 37–48.5)
HGB BLD-MCNC: 12.8 G/DL (ref 12–16)
IMM GRANULOCYTES # BLD AUTO: 0.02 K/UL (ref 0–0.04)
MCH RBC QN AUTO: 30.5 PG (ref 27–31)
MCHC RBC AUTO-ENTMCNC: 33.7 G/DL (ref 32–36)
MCV RBC AUTO: 91 FL (ref 82–98)
NEUTROPHILS # BLD AUTO: 3.2 K/UL (ref 1.8–7.7)
PLATELET # BLD AUTO: 210 K/UL (ref 150–450)
PMV BLD AUTO: 9.1 FL (ref 9.2–12.9)
POTASSIUM SERPL-SCNC: 3.9 MMOL/L (ref 3.5–5.1)
PROT SERPL-MCNC: 7.4 G/DL (ref 6–8.4)
RBC # BLD AUTO: 4.2 M/UL (ref 4–5.4)
SODIUM SERPL-SCNC: 140 MMOL/L (ref 136–145)
WBC # BLD AUTO: 4.37 K/UL (ref 3.9–12.7)

## 2021-06-28 PROCEDURE — 3008F PR BODY MASS INDEX (BMI) DOCUMENTED: ICD-10-PCS | Mod: CPTII,S$GLB,, | Performed by: INTERNAL MEDICINE

## 2021-06-28 PROCEDURE — 85027 COMPLETE CBC AUTOMATED: CPT | Performed by: INTERNAL MEDICINE

## 2021-06-28 PROCEDURE — 99213 OFFICE O/P EST LOW 20 MIN: CPT | Mod: S$GLB,,, | Performed by: INTERNAL MEDICINE

## 2021-06-28 PROCEDURE — 80053 COMPREHEN METABOLIC PANEL: CPT | Performed by: INTERNAL MEDICINE

## 2021-06-28 PROCEDURE — 99999 PR PBB SHADOW E&M-EST. PATIENT-LVL III: ICD-10-PCS | Mod: PBBFAC,,, | Performed by: INTERNAL MEDICINE

## 2021-06-28 PROCEDURE — 99213 PR OFFICE/OUTPT VISIT, EST, LEVL III, 20-29 MIN: ICD-10-PCS | Mod: S$GLB,,, | Performed by: INTERNAL MEDICINE

## 2021-06-28 PROCEDURE — 1125F PR PAIN SEVERITY QUANTIFIED, PAIN PRESENT: ICD-10-PCS | Mod: S$GLB,,, | Performed by: INTERNAL MEDICINE

## 2021-06-28 PROCEDURE — 99999 PR PBB SHADOW E&M-EST. PATIENT-LVL III: CPT | Mod: PBBFAC,,, | Performed by: INTERNAL MEDICINE

## 2021-06-28 PROCEDURE — 3008F BODY MASS INDEX DOCD: CPT | Mod: CPTII,S$GLB,, | Performed by: INTERNAL MEDICINE

## 2021-06-28 PROCEDURE — 36415 COLL VENOUS BLD VENIPUNCTURE: CPT | Performed by: INTERNAL MEDICINE

## 2021-06-28 PROCEDURE — 1125F AMNT PAIN NOTED PAIN PRSNT: CPT | Mod: S$GLB,,, | Performed by: INTERNAL MEDICINE

## 2021-06-28 RX ORDER — TRETINOIN 0.05 G/100G
GEL TOPICAL
COMMUNITY
Start: 2021-06-17 | End: 2023-08-23 | Stop reason: ALTCHOICE

## 2021-06-30 ENCOUNTER — PATIENT MESSAGE (OUTPATIENT)
Dept: PSYCHIATRY | Facility: CLINIC | Age: 36
End: 2021-06-30

## 2021-06-30 ENCOUNTER — PATIENT MESSAGE (OUTPATIENT)
Dept: OBSTETRICS AND GYNECOLOGY | Facility: CLINIC | Age: 36
End: 2021-06-30

## 2021-07-08 ENCOUNTER — OFFICE VISIT (OUTPATIENT)
Dept: OBSTETRICS AND GYNECOLOGY | Facility: CLINIC | Age: 36
End: 2021-07-08
Attending: OBSTETRICS & GYNECOLOGY
Payer: COMMERCIAL

## 2021-07-08 ENCOUNTER — PATIENT MESSAGE (OUTPATIENT)
Dept: HEMATOLOGY/ONCOLOGY | Facility: CLINIC | Age: 36
End: 2021-07-08

## 2021-07-08 DIAGNOSIS — N95.1 MENOPAUSAL SYMPTOM: ICD-10-CM

## 2021-07-08 DIAGNOSIS — Z17.0 MALIGNANT NEOPLASM OF BREAST IN FEMALE, ESTROGEN RECEPTOR POSITIVE, UNSPECIFIED LATERALITY, UNSPECIFIED SITE OF BREAST: Primary | ICD-10-CM

## 2021-07-08 DIAGNOSIS — R51.9 CHRONIC INTRACTABLE HEADACHE, UNSPECIFIED HEADACHE TYPE: ICD-10-CM

## 2021-07-08 DIAGNOSIS — G89.29 CHRONIC INTRACTABLE HEADACHE, UNSPECIFIED HEADACHE TYPE: ICD-10-CM

## 2021-07-08 DIAGNOSIS — C50.919 MALIGNANT NEOPLASM OF BREAST IN FEMALE, ESTROGEN RECEPTOR POSITIVE, UNSPECIFIED LATERALITY, UNSPECIFIED SITE OF BREAST: Primary | ICD-10-CM

## 2021-07-08 DIAGNOSIS — Z79.811 USE OF AROMATASE INHIBITORS: ICD-10-CM

## 2021-07-08 DIAGNOSIS — M54.50 LOW BACK PAIN WITHOUT SCIATICA, UNSPECIFIED BACK PAIN LATERALITY, UNSPECIFIED CHRONICITY: ICD-10-CM

## 2021-07-08 DIAGNOSIS — M25.50 ARTHRALGIA, UNSPECIFIED JOINT: ICD-10-CM

## 2021-07-08 PROCEDURE — 99214 PR OFFICE/OUTPT VISIT, EST, LEVL IV, 30-39 MIN: ICD-10-PCS | Mod: 95,,, | Performed by: OBSTETRICS & GYNECOLOGY

## 2021-07-08 PROCEDURE — 99214 OFFICE O/P EST MOD 30 MIN: CPT | Mod: 95,,, | Performed by: OBSTETRICS & GYNECOLOGY

## 2021-07-14 ENCOUNTER — PATIENT MESSAGE (OUTPATIENT)
Dept: HEMATOLOGY/ONCOLOGY | Facility: CLINIC | Age: 36
End: 2021-07-14

## 2021-07-14 ENCOUNTER — PATIENT MESSAGE (OUTPATIENT)
Dept: PSYCHIATRY | Facility: CLINIC | Age: 36
End: 2021-07-14

## 2021-07-15 RX ORDER — TRAZODONE HYDROCHLORIDE 50 MG/1
50 TABLET ORAL NIGHTLY
Qty: 30 TABLET | Refills: 0 | Status: SHIPPED | OUTPATIENT
Start: 2021-07-15 | End: 2021-08-12

## 2021-07-15 RX ORDER — CITALOPRAM 40 MG/1
40 TABLET, FILM COATED ORAL DAILY
Qty: 30 TABLET | Refills: 0 | Status: SHIPPED | OUTPATIENT
Start: 2021-07-15 | End: 2021-08-12

## 2021-07-16 ENCOUNTER — PATIENT MESSAGE (OUTPATIENT)
Dept: HEMATOLOGY/ONCOLOGY | Facility: CLINIC | Age: 36
End: 2021-07-16

## 2021-07-19 DIAGNOSIS — C50.411 MALIGNANT NEOPLASM OF UPPER-OUTER QUADRANT OF RIGHT BREAST IN FEMALE, ESTROGEN RECEPTOR POSITIVE: Chronic | ICD-10-CM

## 2021-07-19 DIAGNOSIS — Z17.0 MALIGNANT NEOPLASM OF UPPER-OUTER QUADRANT OF RIGHT BREAST IN FEMALE, ESTROGEN RECEPTOR POSITIVE: Chronic | ICD-10-CM

## 2021-07-19 RX ORDER — EXEMESTANE 25 MG/1
25 TABLET ORAL DAILY
Qty: 30 TABLET | Refills: 11 | Status: SHIPPED | OUTPATIENT
Start: 2021-07-19 | End: 2022-03-17 | Stop reason: ALTCHOICE

## 2021-07-20 ENCOUNTER — PATIENT MESSAGE (OUTPATIENT)
Dept: PSYCHIATRY | Facility: CLINIC | Age: 36
End: 2021-07-20

## 2021-07-20 ENCOUNTER — PATIENT MESSAGE (OUTPATIENT)
Dept: HEMATOLOGY/ONCOLOGY | Facility: CLINIC | Age: 36
End: 2021-07-20

## 2021-07-20 RX ORDER — DEXTROAMPHETAMINE SACCHARATE, AMPHETAMINE ASPARTATE MONOHYDRATE, DEXTROAMPHETAMINE SULFATE AND AMPHETAMINE SULFATE 6.25; 6.25; 6.25; 6.25 MG/1; MG/1; MG/1; MG/1
25 CAPSULE, EXTENDED RELEASE ORAL EVERY MORNING
Qty: 30 CAPSULE | Refills: 0 | Status: SHIPPED | OUTPATIENT
Start: 2021-07-20 | End: 2021-08-21 | Stop reason: SDUPTHER

## 2021-07-21 ENCOUNTER — PATIENT MESSAGE (OUTPATIENT)
Dept: OBSTETRICS AND GYNECOLOGY | Facility: CLINIC | Age: 36
End: 2021-07-21

## 2021-07-23 ENCOUNTER — PATIENT MESSAGE (OUTPATIENT)
Dept: PSYCHIATRY | Facility: CLINIC | Age: 36
End: 2021-07-23

## 2021-07-26 ENCOUNTER — TELEPHONE (OUTPATIENT)
Dept: OBSTETRICS AND GYNECOLOGY | Facility: CLINIC | Age: 36
End: 2021-07-26

## 2021-07-26 DIAGNOSIS — N95.1 MENOPAUSAL SYMPTOM: Primary | ICD-10-CM

## 2021-07-26 DIAGNOSIS — Z17.0 MALIGNANT NEOPLASM OF BREAST IN FEMALE, ESTROGEN RECEPTOR POSITIVE, UNSPECIFIED LATERALITY, UNSPECIFIED SITE OF BREAST: ICD-10-CM

## 2021-07-26 DIAGNOSIS — Z79.811 USE OF AROMATASE INHIBITORS: ICD-10-CM

## 2021-07-26 DIAGNOSIS — C50.919 MALIGNANT NEOPLASM OF BREAST IN FEMALE, ESTROGEN RECEPTOR POSITIVE, UNSPECIFIED LATERALITY, UNSPECIFIED SITE OF BREAST: ICD-10-CM

## 2021-07-26 RX ORDER — TESTOSTERONE CYPIONATE 200 MG/ML
76 INJECTION, SOLUTION INTRAMUSCULAR
Status: COMPLETED | OUTPATIENT
Start: 2021-07-26 | End: 2021-10-19

## 2021-07-27 ENCOUNTER — TELEPHONE (OUTPATIENT)
Dept: PSYCHIATRY | Facility: CLINIC | Age: 36
End: 2021-07-27

## 2021-07-28 ENCOUNTER — PATIENT MESSAGE (OUTPATIENT)
Dept: OBSTETRICS AND GYNECOLOGY | Facility: CLINIC | Age: 36
End: 2021-07-28

## 2021-08-04 ENCOUNTER — OFFICE VISIT (OUTPATIENT)
Dept: PSYCHIATRY | Facility: CLINIC | Age: 36
End: 2021-08-04
Payer: COMMERCIAL

## 2021-08-04 DIAGNOSIS — T45.1X5A CHEMOTHERAPY-INDUCED NEUROPATHY: Chronic | ICD-10-CM

## 2021-08-04 DIAGNOSIS — Z79.811 USE OF AROMATASE INHIBITORS: ICD-10-CM

## 2021-08-04 DIAGNOSIS — E89.40 SURGICAL MENOPAUSE: ICD-10-CM

## 2021-08-04 DIAGNOSIS — G62.0 CHEMOTHERAPY-INDUCED NEUROPATHY: Chronic | ICD-10-CM

## 2021-08-04 DIAGNOSIS — Z86.59 HISTORY OF POSTTRAUMATIC STRESS DISORDER (PTSD): Primary | Chronic | ICD-10-CM

## 2021-08-04 DIAGNOSIS — G47.00 INSOMNIA, UNSPECIFIED TYPE: Chronic | ICD-10-CM

## 2021-08-04 DIAGNOSIS — C50.411 MALIGNANT NEOPLASM OF UPPER-OUTER QUADRANT OF RIGHT BREAST IN FEMALE, ESTROGEN RECEPTOR POSITIVE: ICD-10-CM

## 2021-08-04 DIAGNOSIS — Z17.0 MALIGNANT NEOPLASM OF UPPER-OUTER QUADRANT OF RIGHT BREAST IN FEMALE, ESTROGEN RECEPTOR POSITIVE: ICD-10-CM

## 2021-08-04 PROCEDURE — 99214 OFFICE O/P EST MOD 30 MIN: CPT | Mod: 95,,, | Performed by: PSYCHIATRY & NEUROLOGY

## 2021-08-04 PROCEDURE — 99214 PR OFFICE/OUTPT VISIT, EST, LEVL IV, 30-39 MIN: ICD-10-PCS | Mod: 95,,, | Performed by: PSYCHIATRY & NEUROLOGY

## 2021-08-05 ENCOUNTER — PATIENT MESSAGE (OUTPATIENT)
Dept: RADIATION ONCOLOGY | Facility: CLINIC | Age: 36
End: 2021-08-05

## 2021-08-07 ENCOUNTER — PATIENT MESSAGE (OUTPATIENT)
Dept: OBSTETRICS AND GYNECOLOGY | Facility: CLINIC | Age: 36
End: 2021-08-07

## 2021-08-11 ENCOUNTER — CLINICAL SUPPORT (OUTPATIENT)
Dept: OBSTETRICS AND GYNECOLOGY | Facility: CLINIC | Age: 36
End: 2021-08-11
Payer: COMMERCIAL

## 2021-08-11 DIAGNOSIS — Z79.811 USE OF AROMATASE INHIBITORS: ICD-10-CM

## 2021-08-11 DIAGNOSIS — N95.1 MENOPAUSAL SYMPTOM: Primary | ICD-10-CM

## 2021-08-11 DIAGNOSIS — C50.411 MALIGNANT NEOPLASM OF UPPER-OUTER QUADRANT OF RIGHT BREAST IN FEMALE, ESTROGEN RECEPTOR POSITIVE: ICD-10-CM

## 2021-08-11 DIAGNOSIS — Z17.0 MALIGNANT NEOPLASM OF UPPER-OUTER QUADRANT OF RIGHT BREAST IN FEMALE, ESTROGEN RECEPTOR POSITIVE: ICD-10-CM

## 2021-08-11 DIAGNOSIS — E89.40 SURGICAL MENOPAUSE: ICD-10-CM

## 2021-08-11 DIAGNOSIS — Z15.09 BRCA1 POSITIVE: ICD-10-CM

## 2021-08-11 DIAGNOSIS — Z15.01 BRCA1 POSITIVE: ICD-10-CM

## 2021-08-11 PROCEDURE — 96372 PR INJECTION,THERAP/PROPH/DIAG2ST, IM OR SUBCUT: ICD-10-PCS | Mod: S$GLB,,, | Performed by: OBSTETRICS & GYNECOLOGY

## 2021-08-11 PROCEDURE — 96372 THER/PROPH/DIAG INJ SC/IM: CPT | Mod: S$GLB,,, | Performed by: OBSTETRICS & GYNECOLOGY

## 2021-08-11 RX ADMIN — TESTOSTERONE CYPIONATE 76 MG: 200 INJECTION, SOLUTION INTRAMUSCULAR at 11:08

## 2021-08-12 ENCOUNTER — PATIENT MESSAGE (OUTPATIENT)
Dept: PSYCHIATRY | Facility: CLINIC | Age: 36
End: 2021-08-12

## 2021-08-13 ENCOUNTER — PATIENT MESSAGE (OUTPATIENT)
Dept: PSYCHIATRY | Facility: CLINIC | Age: 36
End: 2021-08-13

## 2021-08-16 NOTE — PROGRESS NOTES
LINKS immunization registry triggered  Care Everywhere and Health Maintenance updated.  Chart reviewed for overdue Proactive [facility] health maintenance testing.   Implemented All Universal Safety Interventions:  [place] to call system. Call bell, personal items and telephone within reach. Instruct patient to call for assistance. Room bathroom lighting operational. Non-slip footwear when patient is off stretcher. Physically safe environment: no spills, clutter or unnecessary equipment. Stretcher in lowest position, wheels locked, appropriate side rails in place.

## 2021-08-21 ENCOUNTER — PATIENT MESSAGE (OUTPATIENT)
Dept: PSYCHIATRY | Facility: CLINIC | Age: 36
End: 2021-08-21

## 2021-08-21 DIAGNOSIS — F90.0 ADHD (ATTENTION DEFICIT HYPERACTIVITY DISORDER), INATTENTIVE TYPE: Primary | ICD-10-CM

## 2021-08-21 RX ORDER — DEXTROAMPHETAMINE SACCHARATE, AMPHETAMINE ASPARTATE MONOHYDRATE, DEXTROAMPHETAMINE SULFATE AND AMPHETAMINE SULFATE 6.25; 6.25; 6.25; 6.25 MG/1; MG/1; MG/1; MG/1
25 CAPSULE, EXTENDED RELEASE ORAL EVERY MORNING
Qty: 30 CAPSULE | Refills: 0 | Status: SHIPPED | OUTPATIENT
Start: 2021-08-21 | End: 2021-09-22 | Stop reason: SDUPTHER

## 2021-08-31 ENCOUNTER — PATIENT MESSAGE (OUTPATIENT)
Dept: OBSTETRICS AND GYNECOLOGY | Facility: CLINIC | Age: 36
End: 2021-08-31

## 2021-09-07 ENCOUNTER — TELEPHONE (OUTPATIENT)
Dept: OBSTETRICS AND GYNECOLOGY | Facility: CLINIC | Age: 36
End: 2021-09-07

## 2021-09-08 ENCOUNTER — TELEPHONE (OUTPATIENT)
Dept: OBSTETRICS AND GYNECOLOGY | Facility: CLINIC | Age: 36
End: 2021-09-08

## 2021-09-12 ENCOUNTER — PATIENT MESSAGE (OUTPATIENT)
Dept: OBSTETRICS AND GYNECOLOGY | Facility: CLINIC | Age: 36
End: 2021-09-12

## 2021-09-15 RX ORDER — CITALOPRAM 40 MG/1
40 TABLET, FILM COATED ORAL DAILY
Qty: 30 TABLET | Refills: 0 | Status: SHIPPED | OUTPATIENT
Start: 2021-09-15 | End: 2021-09-16 | Stop reason: SDUPTHER

## 2021-09-16 ENCOUNTER — PATIENT MESSAGE (OUTPATIENT)
Dept: PSYCHIATRY | Facility: CLINIC | Age: 36
End: 2021-09-16

## 2021-09-16 RX ORDER — CITALOPRAM 40 MG/1
40 TABLET, FILM COATED ORAL DAILY
Qty: 30 TABLET | Refills: 0 | Status: SHIPPED | OUTPATIENT
Start: 2021-09-16 | End: 2021-10-11 | Stop reason: SDUPTHER

## 2021-09-20 ENCOUNTER — PATIENT MESSAGE (OUTPATIENT)
Dept: OBSTETRICS AND GYNECOLOGY | Facility: CLINIC | Age: 36
End: 2021-09-20

## 2021-09-20 ENCOUNTER — TELEPHONE (OUTPATIENT)
Dept: OBSTETRICS AND GYNECOLOGY | Facility: CLINIC | Age: 36
End: 2021-09-20

## 2021-09-22 ENCOUNTER — CLINICAL SUPPORT (OUTPATIENT)
Dept: OBSTETRICS AND GYNECOLOGY | Facility: CLINIC | Age: 36
End: 2021-09-22
Payer: COMMERCIAL

## 2021-09-22 ENCOUNTER — PATIENT MESSAGE (OUTPATIENT)
Dept: PSYCHIATRY | Facility: CLINIC | Age: 36
End: 2021-09-22

## 2021-09-22 DIAGNOSIS — F90.0 ADHD (ATTENTION DEFICIT HYPERACTIVITY DISORDER), INATTENTIVE TYPE: ICD-10-CM

## 2021-09-22 DIAGNOSIS — N95.1 MENOPAUSAL SYMPTOM: Primary | ICD-10-CM

## 2021-09-22 PROCEDURE — 96372 THER/PROPH/DIAG INJ SC/IM: CPT | Mod: S$GLB,,, | Performed by: OBSTETRICS & GYNECOLOGY

## 2021-09-22 PROCEDURE — 96372 PR INJECTION,THERAP/PROPH/DIAG2ST, IM OR SUBCUT: ICD-10-PCS | Mod: S$GLB,,, | Performed by: OBSTETRICS & GYNECOLOGY

## 2021-09-22 RX ORDER — DEXTROAMPHETAMINE SACCHARATE, AMPHETAMINE ASPARTATE MONOHYDRATE, DEXTROAMPHETAMINE SULFATE AND AMPHETAMINE SULFATE 6.25; 6.25; 6.25; 6.25 MG/1; MG/1; MG/1; MG/1
25 CAPSULE, EXTENDED RELEASE ORAL EVERY MORNING
Qty: 30 CAPSULE | Refills: 0 | Status: SHIPPED | OUTPATIENT
Start: 2021-09-22 | End: 2021-10-19 | Stop reason: SDUPTHER

## 2021-09-22 RX ORDER — DOXYCYCLINE 100 MG/1
100 CAPSULE ORAL DAILY
COMMUNITY
Start: 2021-08-26 | End: 2022-04-20 | Stop reason: SDUPTHER

## 2021-09-22 RX ADMIN — TESTOSTERONE CYPIONATE 76 MG: 200 INJECTION, SOLUTION INTRAMUSCULAR at 09:09

## 2021-09-28 ENCOUNTER — LAB VISIT (OUTPATIENT)
Dept: LAB | Facility: HOSPITAL | Age: 36
End: 2021-09-28
Attending: INTERNAL MEDICINE
Payer: COMMERCIAL

## 2021-09-28 ENCOUNTER — OFFICE VISIT (OUTPATIENT)
Dept: HEMATOLOGY/ONCOLOGY | Facility: CLINIC | Age: 36
End: 2021-09-28
Payer: COMMERCIAL

## 2021-09-28 VITALS
HEIGHT: 65 IN | RESPIRATION RATE: 16 BRPM | DIASTOLIC BLOOD PRESSURE: 76 MMHG | OXYGEN SATURATION: 98 % | WEIGHT: 165.56 LBS | SYSTOLIC BLOOD PRESSURE: 110 MMHG | TEMPERATURE: 98 F | BODY MASS INDEX: 27.58 KG/M2 | HEART RATE: 80 BPM

## 2021-09-28 DIAGNOSIS — Z17.0 MALIGNANT NEOPLASM OF UPPER-OUTER QUADRANT OF RIGHT BREAST IN FEMALE, ESTROGEN RECEPTOR POSITIVE: Chronic | ICD-10-CM

## 2021-09-28 DIAGNOSIS — Z17.0 MALIGNANT NEOPLASM OF UPPER-OUTER QUADRANT OF RIGHT BREAST IN FEMALE, ESTROGEN RECEPTOR POSITIVE: Primary | ICD-10-CM

## 2021-09-28 DIAGNOSIS — C50.411 MALIGNANT NEOPLASM OF UPPER-OUTER QUADRANT OF RIGHT BREAST IN FEMALE, ESTROGEN RECEPTOR POSITIVE: Chronic | ICD-10-CM

## 2021-09-28 DIAGNOSIS — C50.411 MALIGNANT NEOPLASM OF UPPER-OUTER QUADRANT OF RIGHT BREAST IN FEMALE, ESTROGEN RECEPTOR POSITIVE: Primary | ICD-10-CM

## 2021-09-28 LAB
ALBUMIN SERPL BCP-MCNC: 4.2 G/DL (ref 3.5–5.2)
ALP SERPL-CCNC: 100 U/L (ref 55–135)
ALT SERPL W/O P-5'-P-CCNC: 15 U/L (ref 10–44)
ANION GAP SERPL CALC-SCNC: 10 MMOL/L (ref 8–16)
AST SERPL-CCNC: 21 U/L (ref 10–40)
BILIRUB SERPL-MCNC: 0.4 MG/DL (ref 0.1–1)
BUN SERPL-MCNC: 15 MG/DL (ref 6–20)
CALCIUM SERPL-MCNC: 10 MG/DL (ref 8.7–10.5)
CHLORIDE SERPL-SCNC: 108 MMOL/L (ref 95–110)
CO2 SERPL-SCNC: 21 MMOL/L (ref 23–29)
CREAT SERPL-MCNC: 1.1 MG/DL (ref 0.5–1.4)
ERYTHROCYTE [DISTWIDTH] IN BLOOD BY AUTOMATED COUNT: 11.9 % (ref 11.5–14.5)
EST. GFR  (AFRICAN AMERICAN): >60 ML/MIN/1.73 M^2
EST. GFR  (NON AFRICAN AMERICAN): >60 ML/MIN/1.73 M^2
GLUCOSE SERPL-MCNC: 96 MG/DL (ref 70–110)
HCT VFR BLD AUTO: 37.4 % (ref 37–48.5)
HGB BLD-MCNC: 13 G/DL (ref 12–16)
IMM GRANULOCYTES # BLD AUTO: 0.02 K/UL (ref 0–0.04)
MCH RBC QN AUTO: 31.6 PG (ref 27–31)
MCHC RBC AUTO-ENTMCNC: 34.8 G/DL (ref 32–36)
MCV RBC AUTO: 91 FL (ref 82–98)
NEUTROPHILS # BLD AUTO: 3.6 K/UL (ref 1.8–7.7)
PLATELET # BLD AUTO: 225 K/UL (ref 150–450)
PMV BLD AUTO: 9.2 FL (ref 9.2–12.9)
POTASSIUM SERPL-SCNC: 3.8 MMOL/L (ref 3.5–5.1)
PROT SERPL-MCNC: 7.1 G/DL (ref 6–8.4)
RBC # BLD AUTO: 4.12 M/UL (ref 4–5.4)
SODIUM SERPL-SCNC: 139 MMOL/L (ref 136–145)
WBC # BLD AUTO: 5.37 K/UL (ref 3.9–12.7)

## 2021-09-28 PROCEDURE — 99999 PR PBB SHADOW E&M-EST. PATIENT-LVL III: ICD-10-PCS | Mod: PBBFAC,,, | Performed by: INTERNAL MEDICINE

## 2021-09-28 PROCEDURE — 99999 PR PBB SHADOW E&M-EST. PATIENT-LVL III: CPT | Mod: PBBFAC,,, | Performed by: INTERNAL MEDICINE

## 2021-09-28 PROCEDURE — 80053 COMPREHEN METABOLIC PANEL: CPT | Performed by: INTERNAL MEDICINE

## 2021-09-28 PROCEDURE — 85027 COMPLETE CBC AUTOMATED: CPT | Performed by: INTERNAL MEDICINE

## 2021-09-28 PROCEDURE — 99213 OFFICE O/P EST LOW 20 MIN: CPT | Mod: S$GLB,,, | Performed by: INTERNAL MEDICINE

## 2021-09-28 PROCEDURE — 99213 PR OFFICE/OUTPT VISIT, EST, LEVL III, 20-29 MIN: ICD-10-PCS | Mod: S$GLB,,, | Performed by: INTERNAL MEDICINE

## 2021-09-28 PROCEDURE — 36415 COLL VENOUS BLD VENIPUNCTURE: CPT | Performed by: INTERNAL MEDICINE

## 2021-09-28 RX ORDER — DAPSONE 75 MG/G
GEL TOPICAL
COMMUNITY
Start: 2021-08-09 | End: 2023-03-04

## 2021-09-29 ENCOUNTER — PROCEDURE VISIT (OUTPATIENT)
Dept: NEUROLOGY | Facility: CLINIC | Age: 36
End: 2021-09-29
Payer: COMMERCIAL

## 2021-09-29 VITALS
HEART RATE: 79 BPM | HEIGHT: 65 IN | DIASTOLIC BLOOD PRESSURE: 80 MMHG | SYSTOLIC BLOOD PRESSURE: 115 MMHG | WEIGHT: 165 LBS | BODY MASS INDEX: 27.49 KG/M2

## 2021-09-29 DIAGNOSIS — G43.C1 INTRACTABLE PERIODIC HEADACHE SYNDROME: Primary | Chronic | ICD-10-CM

## 2021-09-29 PROCEDURE — 64615 CHEMODENERV MUSC MIGRAINE: CPT | Mod: S$GLB,,, | Performed by: PSYCHIATRY & NEUROLOGY

## 2021-09-29 PROCEDURE — 99499 NO LOS: ICD-10-PCS | Mod: S$GLB,,, | Performed by: PSYCHIATRY & NEUROLOGY

## 2021-09-29 PROCEDURE — 64615 PR CHEMODENERVATION OF MUSCLE FOR CHRONIC MIGRAINE: ICD-10-PCS | Mod: S$GLB,,, | Performed by: PSYCHIATRY & NEUROLOGY

## 2021-09-29 PROCEDURE — 99499 UNLISTED E&M SERVICE: CPT | Mod: S$GLB,,, | Performed by: PSYCHIATRY & NEUROLOGY

## 2021-10-15 ENCOUNTER — PATIENT MESSAGE (OUTPATIENT)
Dept: HEMATOLOGY/ONCOLOGY | Facility: CLINIC | Age: 36
End: 2021-10-15
Payer: COMMERCIAL

## 2021-10-19 ENCOUNTER — TELEPHONE (OUTPATIENT)
Dept: HEMATOLOGY/ONCOLOGY | Facility: CLINIC | Age: 36
End: 2021-10-19

## 2021-10-19 ENCOUNTER — PATIENT MESSAGE (OUTPATIENT)
Dept: HEMATOLOGY/ONCOLOGY | Facility: CLINIC | Age: 36
End: 2021-10-19

## 2021-10-19 ENCOUNTER — OFFICE VISIT (OUTPATIENT)
Dept: OBSTETRICS AND GYNECOLOGY | Facility: CLINIC | Age: 36
End: 2021-10-19
Payer: COMMERCIAL

## 2021-10-19 ENCOUNTER — CLINICAL SUPPORT (OUTPATIENT)
Dept: OBSTETRICS AND GYNECOLOGY | Facility: CLINIC | Age: 36
End: 2021-10-19
Payer: COMMERCIAL

## 2021-10-19 ENCOUNTER — PATIENT MESSAGE (OUTPATIENT)
Dept: PSYCHIATRY | Facility: CLINIC | Age: 36
End: 2021-10-19
Payer: COMMERCIAL

## 2021-10-19 ENCOUNTER — PATIENT MESSAGE (OUTPATIENT)
Dept: HEMATOLOGY/ONCOLOGY | Facility: CLINIC | Age: 36
End: 2021-10-19
Payer: COMMERCIAL

## 2021-10-19 VITALS
BODY MASS INDEX: 27.32 KG/M2 | BODY MASS INDEX: 27.29 KG/M2 | HEIGHT: 65 IN | WEIGHT: 164 LBS | DIASTOLIC BLOOD PRESSURE: 84 MMHG | HEART RATE: 81 BPM | WEIGHT: 164 LBS | SYSTOLIC BLOOD PRESSURE: 119 MMHG

## 2021-10-19 DIAGNOSIS — N76.0 VAGINOSIS: ICD-10-CM

## 2021-10-19 DIAGNOSIS — F90.0 ADHD (ATTENTION DEFICIT HYPERACTIVITY DISORDER), INATTENTIVE TYPE: ICD-10-CM

## 2021-10-19 DIAGNOSIS — N63.20 LEFT BREAST MASS: ICD-10-CM

## 2021-10-19 DIAGNOSIS — N76.0 VAGINOSIS: Primary | ICD-10-CM

## 2021-10-19 DIAGNOSIS — N63.20 LEFT BREAST MASS: Primary | ICD-10-CM

## 2021-10-19 PROCEDURE — 96372 THER/PROPH/DIAG INJ SC/IM: CPT | Mod: S$GLB,,, | Performed by: OBSTETRICS & GYNECOLOGY

## 2021-10-19 PROCEDURE — 99213 PR OFFICE/OUTPT VISIT, EST, LEVL III, 20-29 MIN: ICD-10-PCS | Mod: 25,S$GLB,, | Performed by: PHYSICIAN ASSISTANT

## 2021-10-19 PROCEDURE — 99213 OFFICE O/P EST LOW 20 MIN: CPT | Mod: 25,S$GLB,, | Performed by: PHYSICIAN ASSISTANT

## 2021-10-19 PROCEDURE — 96372 PR INJECTION,THERAP/PROPH/DIAG2ST, IM OR SUBCUT: ICD-10-PCS | Mod: S$GLB,,, | Performed by: OBSTETRICS & GYNECOLOGY

## 2021-10-19 PROCEDURE — 87481 CANDIDA DNA AMP PROBE: CPT | Mod: 59 | Performed by: PHYSICIAN ASSISTANT

## 2021-10-19 RX ORDER — METRONIDAZOLE 500 MG/1
500 TABLET ORAL EVERY 12 HOURS
Qty: 14 TABLET | Refills: 0 | Status: SHIPPED | OUTPATIENT
Start: 2021-10-19 | End: 2021-10-28

## 2021-10-19 RX ORDER — DEXTROAMPHETAMINE SACCHARATE, AMPHETAMINE ASPARTATE MONOHYDRATE, DEXTROAMPHETAMINE SULFATE AND AMPHETAMINE SULFATE 6.25; 6.25; 6.25; 6.25 MG/1; MG/1; MG/1; MG/1
25 CAPSULE, EXTENDED RELEASE ORAL EVERY MORNING
Qty: 30 CAPSULE | Refills: 0 | Status: SHIPPED | OUTPATIENT
Start: 2021-10-19 | End: 2021-11-23 | Stop reason: SDUPTHER

## 2021-10-19 RX ORDER — METRONIDAZOLE 500 MG/1
500 TABLET ORAL EVERY 12 HOURS
Qty: 14 TABLET | Refills: 0 | Status: SHIPPED | OUTPATIENT
Start: 2021-10-19 | End: 2021-10-26

## 2021-10-19 RX ADMIN — TESTOSTERONE CYPIONATE 76 MG: 200 INJECTION, SOLUTION INTRAMUSCULAR at 09:10

## 2021-10-21 LAB
BACTERIAL VAGINOSIS DNA: NEGATIVE
CANDIDA GLABRATA DNA: NEGATIVE
CANDIDA KRUSEI DNA: NEGATIVE
CANDIDA RRNA VAG QL PROBE: NEGATIVE
T VAGINALIS RRNA GENITAL QL PROBE: NEGATIVE

## 2021-10-29 ENCOUNTER — OFFICE VISIT (OUTPATIENT)
Dept: PSYCHIATRY | Facility: CLINIC | Age: 36
End: 2021-10-29
Payer: COMMERCIAL

## 2021-10-29 DIAGNOSIS — Z17.0 MALIGNANT NEOPLASM OF UPPER-OUTER QUADRANT OF RIGHT BREAST IN FEMALE, ESTROGEN RECEPTOR POSITIVE: Chronic | ICD-10-CM

## 2021-10-29 DIAGNOSIS — F90.0 ADHD (ATTENTION DEFICIT HYPERACTIVITY DISORDER), INATTENTIVE TYPE: ICD-10-CM

## 2021-10-29 DIAGNOSIS — Z86.59 HISTORY OF POSTTRAUMATIC STRESS DISORDER (PTSD): Chronic | ICD-10-CM

## 2021-10-29 DIAGNOSIS — F33.1 MODERATE EPISODE OF RECURRENT MAJOR DEPRESSIVE DISORDER: ICD-10-CM

## 2021-10-29 DIAGNOSIS — C50.411 MALIGNANT NEOPLASM OF UPPER-OUTER QUADRANT OF RIGHT BREAST IN FEMALE, ESTROGEN RECEPTOR POSITIVE: Chronic | ICD-10-CM

## 2021-10-29 DIAGNOSIS — Z15.01 BRCA1 POSITIVE: ICD-10-CM

## 2021-10-29 DIAGNOSIS — Z15.09 BRCA1 POSITIVE: ICD-10-CM

## 2021-10-29 DIAGNOSIS — F41.1 GENERALIZED ANXIETY DISORDER: Primary | ICD-10-CM

## 2021-10-29 PROCEDURE — 99214 PR OFFICE/OUTPT VISIT, EST, LEVL IV, 30-39 MIN: ICD-10-PCS | Mod: 95,,, | Performed by: PSYCHIATRY & NEUROLOGY

## 2021-10-29 PROCEDURE — 99214 OFFICE O/P EST MOD 30 MIN: CPT | Mod: 95,,, | Performed by: PSYCHIATRY & NEUROLOGY

## 2021-10-29 RX ORDER — ALPRAZOLAM 0.25 MG/1
0.25 TABLET ORAL DAILY PRN
Qty: 30 TABLET | Refills: 0 | Status: SHIPPED | OUTPATIENT
Start: 2021-10-29 | End: 2023-06-12 | Stop reason: SDUPTHER

## 2021-10-29 RX ORDER — CITALOPRAM 40 MG/1
TABLET, FILM COATED ORAL
Qty: 90 TABLET | Refills: 0 | Status: SHIPPED | OUTPATIENT
Start: 2021-10-29 | End: 2021-11-16 | Stop reason: SDUPTHER

## 2021-11-09 ENCOUNTER — CLINICAL SUPPORT (OUTPATIENT)
Dept: OBSTETRICS AND GYNECOLOGY | Facility: CLINIC | Age: 36
End: 2021-11-09
Payer: COMMERCIAL

## 2021-11-09 DIAGNOSIS — Z79.811 USE OF AROMATASE INHIBITORS: ICD-10-CM

## 2021-11-09 DIAGNOSIS — N95.1 MENOPAUSAL SYMPTOM: Primary | ICD-10-CM

## 2021-11-09 PROCEDURE — 96372 THER/PROPH/DIAG INJ SC/IM: CPT | Mod: S$GLB,,, | Performed by: OBSTETRICS & GYNECOLOGY

## 2021-11-09 PROCEDURE — 96372 PR INJECTION,THERAP/PROPH/DIAG2ST, IM OR SUBCUT: ICD-10-PCS | Mod: S$GLB,,, | Performed by: OBSTETRICS & GYNECOLOGY

## 2021-11-09 RX ORDER — TESTOSTERONE CYPIONATE 200 MG/ML
76 INJECTION, SOLUTION INTRAMUSCULAR
Status: COMPLETED | OUTPATIENT
Start: 2021-11-09 | End: 2022-03-03

## 2021-11-09 RX ADMIN — TESTOSTERONE CYPIONATE 76 MG: 200 INJECTION, SOLUTION INTRAMUSCULAR at 08:11

## 2021-11-16 ENCOUNTER — PATIENT MESSAGE (OUTPATIENT)
Dept: PSYCHIATRY | Facility: CLINIC | Age: 36
End: 2021-11-16
Payer: COMMERCIAL

## 2021-11-16 DIAGNOSIS — F41.1 GENERALIZED ANXIETY DISORDER: ICD-10-CM

## 2021-11-16 RX ORDER — CITALOPRAM 40 MG/1
TABLET, FILM COATED ORAL
Qty: 90 TABLET | Refills: 0 | Status: SHIPPED | OUTPATIENT
Start: 2021-11-16 | End: 2021-12-21 | Stop reason: SDUPTHER

## 2021-11-23 ENCOUNTER — PATIENT MESSAGE (OUTPATIENT)
Dept: PSYCHIATRY | Facility: CLINIC | Age: 36
End: 2021-11-23
Payer: COMMERCIAL

## 2021-11-23 DIAGNOSIS — F90.0 ADHD (ATTENTION DEFICIT HYPERACTIVITY DISORDER), INATTENTIVE TYPE: ICD-10-CM

## 2021-11-23 RX ORDER — DEXTROAMPHETAMINE SACCHARATE, AMPHETAMINE ASPARTATE MONOHYDRATE, DEXTROAMPHETAMINE SULFATE AND AMPHETAMINE SULFATE 6.25; 6.25; 6.25; 6.25 MG/1; MG/1; MG/1; MG/1
25 CAPSULE, EXTENDED RELEASE ORAL EVERY MORNING
Qty: 30 CAPSULE | Refills: 0 | Status: SHIPPED | OUTPATIENT
Start: 2021-11-23 | End: 2021-12-27 | Stop reason: SDUPTHER

## 2021-12-01 ENCOUNTER — TELEPHONE (OUTPATIENT)
Dept: RESEARCH | Facility: HOSPITAL | Age: 36
End: 2021-12-01
Payer: COMMERCIAL

## 2021-12-03 ENCOUNTER — PATIENT MESSAGE (OUTPATIENT)
Dept: PSYCHIATRY | Facility: CLINIC | Age: 36
End: 2021-12-03
Payer: COMMERCIAL

## 2021-12-07 ENCOUNTER — CLINICAL SUPPORT (OUTPATIENT)
Dept: OBSTETRICS AND GYNECOLOGY | Facility: CLINIC | Age: 36
End: 2021-12-07
Payer: COMMERCIAL

## 2021-12-07 DIAGNOSIS — N95.1 MENOPAUSAL SYMPTOM: Primary | ICD-10-CM

## 2021-12-07 PROCEDURE — 96372 THER/PROPH/DIAG INJ SC/IM: CPT | Mod: S$GLB,,, | Performed by: OBSTETRICS & GYNECOLOGY

## 2021-12-07 PROCEDURE — 96372 PR INJECTION,THERAP/PROPH/DIAG2ST, IM OR SUBCUT: ICD-10-PCS | Mod: S$GLB,,, | Performed by: OBSTETRICS & GYNECOLOGY

## 2021-12-07 RX ADMIN — TESTOSTERONE CYPIONATE 76 MG: 200 INJECTION, SOLUTION INTRAMUSCULAR at 08:12

## 2021-12-20 ENCOUNTER — PATIENT MESSAGE (OUTPATIENT)
Dept: PSYCHIATRY | Facility: CLINIC | Age: 36
End: 2021-12-20
Payer: COMMERCIAL

## 2021-12-21 DIAGNOSIS — F41.1 GENERALIZED ANXIETY DISORDER: ICD-10-CM

## 2021-12-21 RX ORDER — CITALOPRAM 40 MG/1
TABLET, FILM COATED ORAL
Qty: 30 TABLET | Refills: 2 | Status: SHIPPED | OUTPATIENT
Start: 2021-12-21 | End: 2022-04-04 | Stop reason: SDUPTHER

## 2021-12-27 ENCOUNTER — PATIENT MESSAGE (OUTPATIENT)
Dept: PSYCHIATRY | Facility: CLINIC | Age: 36
End: 2021-12-27
Payer: COMMERCIAL

## 2021-12-27 DIAGNOSIS — F90.0 ADHD (ATTENTION DEFICIT HYPERACTIVITY DISORDER), INATTENTIVE TYPE: ICD-10-CM

## 2021-12-27 PROBLEM — N61.0 CELLULITIS OF RIGHT BREAST: Status: RESOLVED | Noted: 2020-10-02 | Resolved: 2021-12-27

## 2021-12-27 RX ORDER — DEXTROAMPHETAMINE SACCHARATE, AMPHETAMINE ASPARTATE MONOHYDRATE, DEXTROAMPHETAMINE SULFATE AND AMPHETAMINE SULFATE 6.25; 6.25; 6.25; 6.25 MG/1; MG/1; MG/1; MG/1
25 CAPSULE, EXTENDED RELEASE ORAL EVERY MORNING
Qty: 30 CAPSULE | Refills: 0 | Status: SHIPPED | OUTPATIENT
Start: 2021-12-27 | End: 2022-01-25 | Stop reason: SDUPTHER

## 2021-12-27 NOTE — PROGRESS NOTES
Subjective:       Patient ID: Yolanda Norman is a 36 y.o. female.    Chief Complaint: No chief complaint on file.    HPI 36 year old female, who returns for follow-up of carcinoma of the right breast, ER +,HER 2 negative. She is on exemestane after initial therapy with letrozole.    She has had some fatigue with the exemestane - wonders about changing abck to letrozole.    Had additional reconstruction 12/21/21 which went well.    Breast history:  Mammogram and ultrasound on December 11th, 2019 showed right breast mass 7 cm from the nipple.  By ultrasound this mass measured 33 x 32 x 21 mm.  There was no associated axillary lymphadenopathy noted.    Right breast biopsy on December 13 showed high-grade infiltrating ductal carcinoma (histologic grade 3, nuclear grade 3, mitotic index 3) there were medullary features.  The cancer was 25% ER positive and NH and HER2 negative.  Ki-67 was 90%.    MRI showed a 5.2 cm x 2.6 cm x 4.6 cm irregularly shaped mass with rim enhancement seen in the right breast at 11 o'clock.    CT scan of the chest abdomen and pelvis and bone scan showed no evidence of metastatic disease.    She completed 4 cycles of neoadjuvant Adriamycin Cytoxan February 13, 2020.  She completed 12 weeks of weekly Taxol on May 20, 2020.    On July 1, 2020 she underwent bilateral mastectomy.    The right breast showed no residual malignancy, 5 right axillary sentinel lymph nodes were all negative for malignancy.  Final pathological stage yp T0 N0.  The left breast showed no atypia or malignancy.    Radiation therapy was completed 11/25/20.      She had a hysterectomy  December 18th, 2020.  Letrozole was started December 2020..  She was then changed to Exemestane in early 2021 due to arthralgias.    She had DORIAN reconstruction in February 2021.    Review of Systems   Constitutional: Positive for fatigue. Negative for activity change, appetite change, fever and unexpected weight change.   Eyes: Negative for  visual disturbance.   Respiratory: Negative for cough and shortness of breath.    Cardiovascular: Negative for chest pain.   Gastrointestinal: Negative for abdominal distention, abdominal pain, constipation, diarrhea and rectal pain.   Genitourinary: Negative for frequency.   Musculoskeletal: Positive for arthralgias (mild). Negative for back pain.   Skin: Negative for rash.   Neurological: Positive for numbness. Negative for headaches.   Hematological: Negative for adenopathy.   Psychiatric/Behavioral: Negative for dysphoric mood. The patient is not nervous/anxious.        Objective:      Physical Exam  Constitutional:       General: She is not in acute distress.     Appearance: Normal appearance. She is well-developed.   Cardiovascular:      Rate and Rhythm: Normal rate and regular rhythm.   Pulmonary:      Effort: Pulmonary effort is normal. No respiratory distress.      Breath sounds: Normal breath sounds. No wheezing or rales.   Chest:   Breasts:      Right: No axillary adenopathy or supraclavicular adenopathy.      Left: No axillary adenopathy or supraclavicular adenopathy.         Abdominal:      Palpations: Abdomen is soft. There is no mass.      Tenderness: There is no abdominal tenderness.   Lymphadenopathy:      Cervical: No cervical adenopathy.      Upper Body:      Right upper body: No supraclavicular or axillary adenopathy.      Left upper body: No supraclavicular or axillary adenopathy.   Skin:     Findings: Rash:       Neurological:      Mental Status: She is alert and oriented to person, place, and time.   Psychiatric:         Mood and Affect: Mood normal.         Behavior: Behavior normal.         Thought Content: Thought content normal.         Judgment: Judgment normal.         Assessment:       1. Malignant neoplasm of upper-outer quadrant of right breast in female, estrogen receptor positive        Plan:        Continue exemestane and RTC 3 M.  She will message me in 1 M if her severe fatigue  returns.

## 2022-01-04 ENCOUNTER — OFFICE VISIT (OUTPATIENT)
Dept: HEMATOLOGY/ONCOLOGY | Facility: CLINIC | Age: 37
End: 2022-01-04
Payer: COMMERCIAL

## 2022-01-04 ENCOUNTER — CLINICAL SUPPORT (OUTPATIENT)
Dept: OBSTETRICS AND GYNECOLOGY | Facility: CLINIC | Age: 37
End: 2022-01-04
Payer: COMMERCIAL

## 2022-01-04 VITALS
HEART RATE: 74 BPM | TEMPERATURE: 98 F | RESPIRATION RATE: 18 BRPM | HEIGHT: 65 IN | DIASTOLIC BLOOD PRESSURE: 93 MMHG | WEIGHT: 166.25 LBS | BODY MASS INDEX: 27.7 KG/M2 | OXYGEN SATURATION: 99 % | SYSTOLIC BLOOD PRESSURE: 133 MMHG

## 2022-01-04 DIAGNOSIS — Z17.0 MALIGNANT NEOPLASM OF UPPER-OUTER QUADRANT OF RIGHT BREAST IN FEMALE, ESTROGEN RECEPTOR POSITIVE: Primary | Chronic | ICD-10-CM

## 2022-01-04 DIAGNOSIS — N95.1 MENOPAUSAL SYMPTOM: Primary | ICD-10-CM

## 2022-01-04 DIAGNOSIS — C50.411 MALIGNANT NEOPLASM OF UPPER-OUTER QUADRANT OF RIGHT BREAST IN FEMALE, ESTROGEN RECEPTOR POSITIVE: Primary | Chronic | ICD-10-CM

## 2022-01-04 PROCEDURE — 99999 PR PBB SHADOW E&M-EST. PATIENT-LVL V: ICD-10-PCS | Mod: PBBFAC,,, | Performed by: INTERNAL MEDICINE

## 2022-01-04 PROCEDURE — 3080F PR MOST RECENT DIASTOLIC BLOOD PRESSURE >= 90 MM HG: ICD-10-PCS | Mod: CPTII,S$GLB,, | Performed by: INTERNAL MEDICINE

## 2022-01-04 PROCEDURE — 3075F SYST BP GE 130 - 139MM HG: CPT | Mod: CPTII,S$GLB,, | Performed by: INTERNAL MEDICINE

## 2022-01-04 PROCEDURE — 3075F PR MOST RECENT SYSTOLIC BLOOD PRESS GE 130-139MM HG: ICD-10-PCS | Mod: CPTII,S$GLB,, | Performed by: INTERNAL MEDICINE

## 2022-01-04 PROCEDURE — 99213 PR OFFICE/OUTPT VISIT, EST, LEVL III, 20-29 MIN: ICD-10-PCS | Mod: S$GLB,,, | Performed by: INTERNAL MEDICINE

## 2022-01-04 PROCEDURE — 3008F BODY MASS INDEX DOCD: CPT | Mod: CPTII,S$GLB,, | Performed by: INTERNAL MEDICINE

## 2022-01-04 PROCEDURE — 3080F DIAST BP >= 90 MM HG: CPT | Mod: CPTII,S$GLB,, | Performed by: INTERNAL MEDICINE

## 2022-01-04 PROCEDURE — 99213 OFFICE O/P EST LOW 20 MIN: CPT | Mod: S$GLB,,, | Performed by: INTERNAL MEDICINE

## 2022-01-04 PROCEDURE — 1159F PR MEDICATION LIST DOCUMENTED IN MEDICAL RECORD: ICD-10-PCS | Mod: CPTII,S$GLB,, | Performed by: INTERNAL MEDICINE

## 2022-01-04 PROCEDURE — 1159F MED LIST DOCD IN RCRD: CPT | Mod: CPTII,S$GLB,, | Performed by: INTERNAL MEDICINE

## 2022-01-04 PROCEDURE — 96372 PR INJECTION,THERAP/PROPH/DIAG2ST, IM OR SUBCUT: ICD-10-PCS | Mod: S$GLB,,, | Performed by: OBSTETRICS & GYNECOLOGY

## 2022-01-04 PROCEDURE — 96372 THER/PROPH/DIAG INJ SC/IM: CPT | Mod: S$GLB,,, | Performed by: OBSTETRICS & GYNECOLOGY

## 2022-01-04 PROCEDURE — 3008F PR BODY MASS INDEX (BMI) DOCUMENTED: ICD-10-PCS | Mod: CPTII,S$GLB,, | Performed by: INTERNAL MEDICINE

## 2022-01-04 PROCEDURE — 99999 PR PBB SHADOW E&M-EST. PATIENT-LVL V: CPT | Mod: PBBFAC,,, | Performed by: INTERNAL MEDICINE

## 2022-01-04 RX ADMIN — TESTOSTERONE CYPIONATE 76 MG: 200 INJECTION, SOLUTION INTRAMUSCULAR at 09:01

## 2022-01-04 NOTE — PROGRESS NOTES
Patient arrives today for her monthly hormone injection. Patient is s/p bilateral flap revision reconstruction, fat grafting, liposuction on 12/21/2021.    Maintains AI x 1 week. Cleared for injection today.      Testosterone 76mg administered IM to RIGHT upper outer quadrant of gluteus using aseptic technique and 22 gauge 1.5 inch needle.     Site secured with Band-Aid, needle tip remains intact, patient tolerated well without pain. Patient observed for 15 minutes post injection.      Patient's next injection scheduled prior to clinic departure. TAYLER Wright

## 2022-01-05 ENCOUNTER — PATIENT MESSAGE (OUTPATIENT)
Dept: PSYCHIATRY | Facility: CLINIC | Age: 37
End: 2022-01-05
Payer: COMMERCIAL

## 2022-01-05 ENCOUNTER — PATIENT MESSAGE (OUTPATIENT)
Dept: NEUROLOGY | Facility: CLINIC | Age: 37
End: 2022-01-05

## 2022-01-10 ENCOUNTER — PROCEDURE VISIT (OUTPATIENT)
Dept: NEUROLOGY | Facility: CLINIC | Age: 37
End: 2022-01-10
Payer: COMMERCIAL

## 2022-01-10 ENCOUNTER — TELEPHONE (OUTPATIENT)
Dept: OPTOMETRY | Facility: CLINIC | Age: 37
End: 2022-01-10
Payer: COMMERCIAL

## 2022-01-10 VITALS
SYSTOLIC BLOOD PRESSURE: 122 MMHG | HEART RATE: 85 BPM | BODY MASS INDEX: 27.66 KG/M2 | HEIGHT: 65 IN | DIASTOLIC BLOOD PRESSURE: 86 MMHG

## 2022-01-10 DIAGNOSIS — G43.E09 CHRONIC MIGRAINE WITH AURA: Primary | ICD-10-CM

## 2022-01-10 PROCEDURE — 99499 UNLISTED E&M SERVICE: CPT | Mod: S$GLB,,, | Performed by: PSYCHIATRY & NEUROLOGY

## 2022-01-10 PROCEDURE — 64615 PR CHEMODENERVATION OF MUSCLE FOR CHRONIC MIGRAINE: ICD-10-PCS | Mod: S$GLB,,, | Performed by: PSYCHIATRY & NEUROLOGY

## 2022-01-10 PROCEDURE — 64615 CHEMODENERV MUSC MIGRAINE: CPT | Mod: S$GLB,,, | Performed by: PSYCHIATRY & NEUROLOGY

## 2022-01-10 PROCEDURE — 99499 NO LOS: ICD-10-PCS | Mod: S$GLB,,, | Performed by: PSYCHIATRY & NEUROLOGY

## 2022-01-10 NOTE — TELEPHONE ENCOUNTER
----- Message from Aileen Painting sent at 1/10/2022 12:50 PM CST -----  Regarding: appt for 1/13  Contact: Pt  Patient need appt r/s for 1/13 ; pt need routine and contact lens. There was no contact lens appts available. Please call the pt @ [phone]

## 2022-01-10 NOTE — PROCEDURES
Procedures   [facility] - NEUROLOGY  [facility] [place]    Date: January 10, 2022   Patient Name: Yolanda Norman   MRN: [id number]   PCP: Cruzito Craven  Referring Provider: Dillon Gonzalez MD    Assessment:      This is Yolanda Norman, 36 y.o. female with chronic migraines (G43.719) and suffers from headaches more than 15 days a month lasting more than 4 hours a day with no relief of symptoms despite trying multiple medications listed below.  She is currently responding to botox.      Plan:      -  Cont TPM 200mg daily, imitrex/fioricet prn  -  Follow up for botox   -  Continue Mg      Greater than 30 minutes spent in chart review, documentation, independent review of imaging, and face to face time with patient       I discussed side effects of the medications. I asked the patient to  stop the medication if he notices serious adverse effects as we discussed and to seek immediate medical attention at an ER.     Dillon Gonzalez MD  [facility]   Department of Neurology    Subjective:     -  Ongoing response to botox for migraine and conservative measures for tension headache    9/2021  -  Recent worsening of tension HA, notes chronic right shoulder tension since breast reconstruction as well as daytime and nocturnal bruxism, provoked by screen use to some extent, upcoming vision check, maintains regular sleep and good hydration.  6/2021  -  Worsened HA following TPM decrease and increased back to 200mg, ongoing effect of botox, recent prolonged HA not terminated by imitrex  3/2021  - Continues with good response to botox with some fluctuations in HA related to changes in hormone therapy and desires to reduce TPM, holding off on grad school  12/2020  - THACKER continues to respond well to botox, TPM, imitrex,  - About to start PhD program   9/2020  -  HA well controlled with combination of TPM and botox  -  Completed chemotherapy but has not started radiation, coping well  with stress  6/2020  -  Recent diagnosis of breast cancer, s/p chemo therapy with upcoming surgery and radiation  -  Development of neuropathy pain in feet, excess sedation with GBP 300mg tid, pain/altered sensation in fingers of right hand for the past day  -  Previously unable to tolerate cymbalta  12/2019  EXCELLENT response to botox, estimates 1 headache per week  9/2019  Continued daily headaches, weaning breast feeding - son 6 months old  8/2019  Unable to tolerate increased celexa due to fatigue, compliant with Mg with continued daily HA, unable to resume TPM as continuing to breast feed    HPI 5/2019:   Ms. Yolanda Norman is a 36 y.o. female who presents with a chief complaint of headaches    Patient has had significant difficulty with migraines since she was 12 years old with worsening during recent pregnancy.  Associated nausea, photo/phonophobia.  She has had difficulty with post-partum anxiety and started celexa a month ago with some improvement in headache as well as irritability noted.    Prior medications trials as below  TPM - partial relief at 150mg /d  Pamelor, Neurontin, Seroquel - all little effect  Imitrex, phenergan - good effect  Fioricet, zanaflex - little effect    PAST MEDICAL HISTORY:  Past Medical History:   Diagnosis Date    Abnormal Pap smear of cervix 2009    ADHD     Allergy     seasonal    Anorexia     Anxiety     Asthma     BRCA1 positive 12/18/2020    Bulimia     Depression     Fever blister     Gastroparesis     GERD (gastroesophageal reflux disease)     History of posttraumatic stress disorder (PTSD)     Hypertension     Gestational Hypertension    Invasive ductal carcinoma of right breast 12/18/2019    Mastitis     Migraine headache     PONV (postoperative nausea and vomiting)     Status post mastectomy, bilateral 7/29/2020       PAST SURGICAL HISTORY:  Past Surgical History:   Procedure Laterality Date    BILATERAL MASTECTOMY Bilateral 7/1/2020     Procedure: MASTECTOMY, BILATERAL - BILATERAL SKIN SPARING MASTECTOMY;  Surgeon: Percy Ayers MD;  Location: [facility];  Service: General;  Laterality: Bilateral;    BIOPSY OF AXILLARY NODE Right 2020    Procedure: BIOPSY, LYMPH NODE, AXILLARY;  Surgeon: Percy Ayers MD;  Location: [facility];  Service: General;  Laterality: Right;    BONE MARROW ASPIRATION      x 3    BREAST SURGERY      CERVICAL BIOPSY  W/ LOOP ELECTRODE EXCISION       SECTION  2016     SECTION N/A 2019    Procedure:  SECTION;  Surgeon: Kirit Hernadnez MD;  Location: [facility];  Service: OB/GYN;  Laterality: N/A;    COLPOSCOPY      ESOPHAGOGASTRODUODENOSCOPY N/A 2021    Procedure: EGD (ESOPHAGOGASTRODUODENOSCOPY);  Surgeon: Lj Santos MD;  Location: [facility]);  Service: Endoscopy;  Laterality: N/A;  COVID test on 21 at [facility]    ESOPHAGOGASTRODUODENOSCOPY N/A 2021    Procedure: ESOPHAGOGASTRODUODENOSCOPY (EGD);  Surgeon: Camila Wiley MD;  Location: [facility];  Service: Endoscopy;  Laterality: N/A;    INJECTION FOR SENTINEL NODE IDENTIFICATION Right 2020    Procedure: INJECTION, FOR SENTINEL NODE IDENTIFICATION;  Surgeon: Percy Ayers MD;  Location: [facility];  Service: General;  Laterality: Right;    INSERTION OF BREAST TISSUE EXPANDER Bilateral 2020    Procedure: INSERTION, TISSUE EXPANDER, BREAST;  Surgeon: Kiko Aranda MD;  Location: [facility];  Service: Plastics;  Laterality: Bilateral;    INSERTION OF TUNNELED CENTRAL VENOUS CATHETER (CVC) WITH SUBCUTANEOUS PORT Left 2019    Procedure: [id number]-Left neck or chest wall;  Surgeon: Percy Ayers MD;  Location: [facility] 2ND FLR;  Service: General;  Laterality: Left;    MASTECTOMY      MEDIPORT REMOVAL N/A 2020    Procedure: REMOVAL, CATHETER, CENTRAL VENOUS, TUNNELED, WITH PORT;  Surgeon: Percy Ayers MD;  Location: [facility];  Service: General;  Laterality: N/A;     ROBOT-ASSISTED LAPAROSCOPIC ABDOMINAL HYSTERECTOMY USING DA HIMA XI N/A 12/18/2020    Procedure: XI ROBOTIC HYSTERECTOMY;  Surgeon: Emili Smith MD;  Location: [facility];  Service: OB/GYN;  Laterality: N/A;    ROBOT-ASSISTED LAPAROSCOPIC SALPINGO-OOPHORECTOMY USING DA HIMA XI Bilateral 12/18/2020    Procedure: XI ROBOTIC SALPINGO-OOPHORECTOMY;  Surgeon: Emili Smith MD;  Location: [facility];  Service: OB/GYN;  Laterality: Bilateral;    SHOULDER SURGERY Left     x 2    SINUS SURGERY      age 19    STOMACH SURGERY      TISSUE EXPANDER REMOVAL Right 10/3/2020    Procedure: REMOVAL, TISSUE EXPANDER;  Surgeon: Kiko Aranda MD;  Location: [facility];  Service: Plastics;  Laterality: Right;    TONSILLECTOMY      UPPER GASTROINTESTINAL ENDOSCOPY         CURRENT MEDS:  Current Outpatient Medications   Medication Sig Dispense Refill    ALPRAZolam (XANAX) 0.25 MG tablet Take 1 tablet (0.25 mg total) by mouth daily as needed for Anxiety. 30 tablet 0    cholecalciferol, vitamin D3, (VITAMIN D3) 25 mcg (1,000 unit) capsule Take 1,000 Units by mouth 2 (two) times a day.      citalopram (CELEXA) 40 MG tablet TAKE 1 TABLET(40 MG) BY MOUTH EVERY DAY 30 tablet 2    clobetasoL (TEMOVATE) 0.05 % cream Apply to affected area twice a day for 2 weeks then stop 60 g 1    cranberry 500 mg Cap Take by mouth.      dapsone (ACZONE) 7.5 % GlwP Apply topically.      dextroamphetamine-amphetamine (ADDERALL XR) 25 MG 24 hr capsule Take 1 capsule (25 mg total) by mouth every morning. 30 capsule 0    doxycycline (VIBRAMYCIN) 100 MG Cap Take 100 mg by mouth once daily.      doxycycline (VIBRAMYCIN) 100 MG Cap Take one tablet by mouth once daily with food 30 capsule 1    exemestane (AROMASIN) 25 mg tablet Take 1 tablet (25 mg total) by mouth once daily. (Patient taking differently: Take 25 mg by mouth once daily On Hold for surgery, last dose 12/7/2021, TAYLER Wright..) 30 tablet 11    loratadine (CLARITIN) 10 mg tablet Take 10 mg  by mouth once daily.      magnesium 30 mg Tab Take by mouth once.      metoclopramide HCl (REGLAN) 10 MG tablet Take 1 tablet (10 mg total) by mouth 3 (three) times daily with meals. 90 tablet 1    metoclopramide HCl (REGLAN) 10 MG tablet Take 1 tablet by mouth 3 (three) times daily before meals 90 tablet 0    multivitamin (THERAGRAN) per tablet Take 1 tablet by mouth once daily.      multivitamin with minerals (HAIR,SKIN AND NAILS ORAL) Take by mouth.      onabotulinumtoxinA (BOTOX INJ) Inject as directed. [id number]      pantoprazole (PROTONIX) 40 MG tablet Take 1 tablet (40 mg total) by mouth once daily. 90 tablet 3    prasterone, dhea, (INTRAROSA) 6.5 mg Inst Place 6.5 mg vaginally every evening. 30 each 11    spironolactone (ALDACTONE) 100 MG tablet Take 100 mg by mouth once daily.      spironolactone (ALDACTONE) 100 MG tablet Take 1 tablet by mouth once a day 30 tablet 2    sucralfate (CARAFATE) 1 gram tablet Take 1 tablet (1 g total) by mouth 4 (four) times daily. Can be crushed if cannot swallow. 120 tablet 0    sumatriptan (IMITREX) 100 MG tablet Take 1/2 to 1 tab at onset of headache.  If no improvement in 2 hours, take another.  Do not take more than 2 in 24 hours. 9 tablet 11    topiramate (TOPAMAX) 100 MG tablet Take 2 tablets (200 mg total) by mouth every evening. 180 tablet 3    traZODone (DESYREL) 50 MG tablet TAKE 1 TABLET(50 MG) BY MOUTH EVERY EVENING 30 tablet 0    tretinoin (ALTRALIN) 0.05 % gel SMARTSIG:Sparingly Topical Every Night      valACYclovir (VALTREX) 1000 MG tablet Take 1 tablet (1,000 mg total) by mouth every 12 (twelve) hours. 60 tablet 0    vitamin E 100 UNIT capsule Take 100 Units by mouth once daily.       Current Facility-Administered Medications   Medication Dose Route Frequency Provider Last Rate Last Admin    onabotulinumtoxina injection 200 Units  200 Units Intramuscular Q90 Days Dillon Gonzalez MD   200 Units at 06/25/21 0751    onabotulinumtoxina  "injection 200 Units  200 Units Intramuscular q12 weeks Dillon Gonzalez MD   200 Units at 01/10/22 1023    testosterone cypionate injection 76 mg  76 mg Intramuscular Q28 Days Kaleigh Polanco MD   76 mg at 01/04/22 0900       ALLERGIES:  Review of patient's allergies indicates:   Allergen Reactions    Amoxicillin Hives    Penicillins Hives and Rash    Diazepam Anxiety and Other (See Comments)     "makes hyper"       FAMILY HISTORY:  Family History   Problem Relation Age of Onset    Cancer Maternal Grandmother 40        cervical    Cervical cancer Mother     Breast cancer Other     Ovarian cancer Other     Colon polyps Father     Colon cancer Neg Hx     Melanoma Neg Hx        SOCIAL HISTORY:  Social History     Tobacco Use    Smoking status: Never Smoker    Smokeless tobacco: Never Used   Substance Use Topics    Alcohol use: Yes     Comment: socially    Drug use: No       Review of Systems:  12 review of systems is negative except for the symptoms mentioned in HPI.        Objective:     Vitals:    01/10/22 1022   BP: 122/86   Pulse: 85   Height: 5' 5" (1.651 m)       General: NAD, well nourished   Eyes: no tearing, discharge, no erythema   ENT: moist mucous membranes of the oral cavity, nares patent    Neck: Supple, full range of motion  Cardiovascular: Warm and well perfused, pulses equal and symmetrical  Lungs: Normal work of breathing, normal chest wall excursions  Skin: No rash, lesions, or breakdown on exposed skin  Psychiatry: Mood and affect are appropriate   Abdomen: soft, non tender, non distended  Extremeties: No cyanosis, clubbing or edema.    Neurological   MENTAL STATUS: Alert and oriented to person, place, and time. Attention and concentration within normal limits. Speech without dysarthria, able to name and repeat without difficulty. Recent and remote memory within normal limits   CRANIAL NERVES: Visual fields intact. PERRL. EOMI. Facial sensation intact. Face symmetrical. " Hearing grossly intact. Full shoulder shrug bilaterally. Tongue protrudes midline   SENSORY: Sensation is intact to light touch throughout.   MOTOR: Normal bulk and tone. No pronator drift.    CEREBELLAR/COORDINATION/GAIT: Gait steady with normal arm swing and stride length.     BOTOX was performed as an indicated therapy for intractable chronic migraine headaches given that the patient failed several prophylactic medications    Botulinum Toxin Injection Procedure   Pre-operative diagnosis: Chronic migraine   Post-operative diagnosis: Same   Procedure: Chemical neurolysis   After risks and benefits were explained including bleeding, infection, worsening of pain, damage to the areas being injected, weakness of muscles, loss of muscle control, dysphagia if injecting the head or neck, facial droop if injecting the facial area, painful injection, allergic or other reaction to the medications being injected, and the failure of the procedure to help the problem, a signed consent was obtained.   The patient was placed in a comfortable area and the sites to be treated were identified.The area to be treated was prepped three times with alcohol and the alcohol allowed to dry. Next, a 30 gauge needle was used to inject the medication in the area to be treated.       Botox 100 units NDC 7774-8085-81    Area(s) injected:   Total Botox used: 155 Units   Botox wastage: 45 Units   Injection sites:    muscle bilaterally ( a total of 10 units divided into 2 sites)   Procerus muscle (5 units)   Frontalis muscle bilaterally (a total of 20 units divided into 4 sites)   Temporalis muscle bilaterally (a total of 40 units divided into 8 sites)   Occipitalis muscle bilaterally (a total of 30 units divided into 6 sites)   Cervical paraspinal muscles (a total of 20 units divided into 4 sites)   Trapezius muscle bilaterally (a total of 30 units divided into 6 sites)   Complications: none   RTC for the next Botox injection: 3 months

## 2022-01-11 ENCOUNTER — OFFICE VISIT (OUTPATIENT)
Dept: PSYCHIATRY | Facility: CLINIC | Age: 37
End: 2022-01-11
Payer: COMMERCIAL

## 2022-01-11 DIAGNOSIS — Z86.59 HISTORY OF POSTTRAUMATIC STRESS DISORDER (PTSD): Chronic | ICD-10-CM

## 2022-01-11 DIAGNOSIS — F33.1 MODERATE EPISODE OF RECURRENT MAJOR DEPRESSIVE DISORDER: ICD-10-CM

## 2022-01-11 DIAGNOSIS — F90.0 ADHD (ATTENTION DEFICIT HYPERACTIVITY DISORDER), INATTENTIVE TYPE: ICD-10-CM

## 2022-01-11 DIAGNOSIS — F41.1 GENERALIZED ANXIETY DISORDER: Primary | Chronic | ICD-10-CM

## 2022-01-11 DIAGNOSIS — F51.04 PSYCHOPHYSIOLOGICAL INSOMNIA: Chronic | ICD-10-CM

## 2022-01-11 PROCEDURE — 99214 OFFICE O/P EST MOD 30 MIN: CPT | Mod: 95,,, | Performed by: PSYCHIATRY & NEUROLOGY

## 2022-01-11 PROCEDURE — 99214 PR OFFICE/OUTPT VISIT, EST, LEVL IV, 30-39 MIN: ICD-10-PCS | Mod: 95,,, | Performed by: PSYCHIATRY & NEUROLOGY
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
## 2022-01-20 ENCOUNTER — PATIENT MESSAGE (OUTPATIENT)
Dept: PSYCHIATRY | Facility: CLINIC | Age: 37
End: 2022-01-20
Payer: COMMERCIAL

## 2022-01-25 ENCOUNTER — PATIENT OUTREACH (OUTPATIENT)
Dept: ADMINISTRATIVE | Facility: HOSPITAL | Age: 37
End: 2022-01-25
Payer: COMMERCIAL

## 2022-01-25 ENCOUNTER — PATIENT MESSAGE (OUTPATIENT)
Dept: PSYCHIATRY | Facility: CLINIC | Age: 37
End: 2022-01-25
Payer: COMMERCIAL

## 2022-01-25 DIAGNOSIS — F90.0 ADHD (ATTENTION DEFICIT HYPERACTIVITY DISORDER), INATTENTIVE TYPE: ICD-10-CM

## 2022-01-25 RX ORDER — DEXTROAMPHETAMINE SACCHARATE, AMPHETAMINE ASPARTATE MONOHYDRATE, DEXTROAMPHETAMINE SULFATE AND AMPHETAMINE SULFATE 6.25; 6.25; 6.25; 6.25 MG/1; MG/1; MG/1; MG/1
25 CAPSULE, EXTENDED RELEASE ORAL EVERY MORNING
Qty: 30 CAPSULE | Refills: 0 | Status: SHIPPED | OUTPATIENT
Start: 2022-01-25 | End: 2022-02-24 | Stop reason: SDUPTHER

## 2022-01-27 ENCOUNTER — PATIENT MESSAGE (OUTPATIENT)
Dept: NEUROLOGY | Facility: CLINIC | Age: 37
End: 2022-01-27
Payer: COMMERCIAL

## 2022-01-27 ENCOUNTER — PATIENT MESSAGE (OUTPATIENT)
Dept: OBSTETRICS AND GYNECOLOGY | Facility: CLINIC | Age: 37
End: 2022-01-27
Payer: COMMERCIAL

## 2022-01-28 ENCOUNTER — PATIENT MESSAGE (OUTPATIENT)
Dept: OBSTETRICS AND GYNECOLOGY | Facility: CLINIC | Age: 37
End: 2022-01-28
Payer: COMMERCIAL

## 2022-01-31 ENCOUNTER — CLINICAL SUPPORT (OUTPATIENT)
Dept: OBSTETRICS AND GYNECOLOGY | Facility: CLINIC | Age: 37
End: 2022-01-31
Payer: COMMERCIAL

## 2022-01-31 DIAGNOSIS — N95.1 MENOPAUSAL SYMPTOM: Primary | ICD-10-CM

## 2022-01-31 PROCEDURE — 96372 PR INJECTION,THERAP/PROPH/DIAG2ST, IM OR SUBCUT: ICD-10-PCS | Mod: S$GLB,,, | Performed by: OBSTETRICS & GYNECOLOGY

## 2022-01-31 PROCEDURE — 96372 THER/PROPH/DIAG INJ SC/IM: CPT | Mod: S$GLB,,, | Performed by: OBSTETRICS & GYNECOLOGY

## 2022-01-31 RX ADMIN — TESTOSTERONE CYPIONATE 76 MG: 200 INJECTION, SOLUTION INTRAMUSCULAR at 08:01

## 2022-01-31 NOTE — PROGRESS NOTES
Patient arrives today for her monthly hormone injection. Free testosterone level pending per [facility]. Estradiol remains negative.     Patient c/o severe fatigue and vaginal dryness on daily Aromasin. She has tried Letrozole previously which left her feeling depressed.     Patient is hesitant to consider Tamoxifen given it's side effects. She is undecided in regards to Arimidex.     RN provided reassurance regarding Tamoxifen's side effect profile as many feel less side effects especially in conjunction with testosterone therapy.     RN encouraged patient to continue Intrarosa and Good Clean Love moisturizer vaginally daily.     RN will confer with Dr. Polanco and Dr. Ny. Awaiting free testosterone level to provide greater understanding if patient is therapeutic or not at this dose.    RN will inquire of virtual visit with Dr. Polanco this week once both levels are back for her review and recommendations.     Testosterone 76mg administered IM to LEFT upper outer quadrant of gluteus using aseptic technique and 22 gauge 1.5 inch needle.     Site secured with Band-Aid, needle tip remains intact, patient tolerated well without pain. Patient observed for 15 minutes post injection.      Patient's next injection scheduled prior to clinic departure. TAYLER Wright

## 2022-02-24 ENCOUNTER — PATIENT MESSAGE (OUTPATIENT)
Dept: PSYCHIATRY | Facility: CLINIC | Age: 37
End: 2022-02-24
Payer: COMMERCIAL

## 2022-02-24 DIAGNOSIS — F90.0 ADHD (ATTENTION DEFICIT HYPERACTIVITY DISORDER), INATTENTIVE TYPE: ICD-10-CM

## 2022-02-24 RX ORDER — DEXTROAMPHETAMINE SACCHARATE, AMPHETAMINE ASPARTATE MONOHYDRATE, DEXTROAMPHETAMINE SULFATE AND AMPHETAMINE SULFATE 6.25; 6.25; 6.25; 6.25 MG/1; MG/1; MG/1; MG/1
25 CAPSULE, EXTENDED RELEASE ORAL EVERY MORNING
Qty: 30 CAPSULE | Refills: 0 | Status: SHIPPED | OUTPATIENT
Start: 2022-02-24 | End: 2022-03-27 | Stop reason: SDUPTHER

## 2022-03-02 ENCOUNTER — PATIENT MESSAGE (OUTPATIENT)
Dept: OBSTETRICS AND GYNECOLOGY | Facility: CLINIC | Age: 37
End: 2022-03-02
Payer: COMMERCIAL

## 2022-03-03 ENCOUNTER — CLINICAL SUPPORT (OUTPATIENT)
Dept: OBSTETRICS AND GYNECOLOGY | Facility: CLINIC | Age: 37
End: 2022-03-03
Payer: COMMERCIAL

## 2022-03-03 DIAGNOSIS — N95.1 MENOPAUSAL SYMPTOM: Primary | ICD-10-CM

## 2022-03-03 PROCEDURE — 99999 PR PBB SHADOW E&M-EST. PATIENT-LVL III: CPT | Mod: PBBFAC,,,

## 2022-03-03 PROCEDURE — 96372 THER/PROPH/DIAG INJ SC/IM: CPT | Mod: S$GLB,,, | Performed by: OBSTETRICS & GYNECOLOGY

## 2022-03-03 PROCEDURE — 99999 PR PBB SHADOW E&M-EST. PATIENT-LVL III: ICD-10-PCS | Mod: PBBFAC,,,

## 2022-03-03 PROCEDURE — 96372 PR INJECTION,THERAP/PROPH/DIAG2ST, IM OR SUBCUT: ICD-10-PCS | Mod: S$GLB,,, | Performed by: OBSTETRICS & GYNECOLOGY

## 2022-03-03 RX ADMIN — TESTOSTERONE CYPIONATE 76 MG: 200 INJECTION, SOLUTION INTRAMUSCULAR at 11:03

## 2022-03-03 NOTE — PROGRESS NOTES
Patient arrives today for her monthly hormone injection. Notes improved libido and stamina on testosterone, helps to improve fatigue on AI.     Patient notes benefit ceases day 21 of 28. RN will confer with Dr. Polanco regarding recommendations for adjustments to frequency.      Testosterone 76mg administered IM to RIGHT upper outer quadrant of gluteus using aseptic technique and 22 gauge 1.5 inch needle.     Site secured with Band-Aid, needle tip remains intact, patient tolerated well without pain. Patient observed for 15 minutes post injection.      Patient's next injection scheduled prior to clinic departure. TAYLER Wright

## 2022-03-16 ENCOUNTER — PATIENT MESSAGE (OUTPATIENT)
Dept: HEMATOLOGY/ONCOLOGY | Facility: CLINIC | Age: 37
End: 2022-03-16
Payer: COMMERCIAL

## 2022-03-17 DIAGNOSIS — Z17.0 MALIGNANT NEOPLASM OF UPPER-OUTER QUADRANT OF RIGHT BREAST IN FEMALE, ESTROGEN RECEPTOR POSITIVE: Primary | ICD-10-CM

## 2022-03-17 DIAGNOSIS — C50.411 MALIGNANT NEOPLASM OF UPPER-OUTER QUADRANT OF RIGHT BREAST IN FEMALE, ESTROGEN RECEPTOR POSITIVE: Primary | ICD-10-CM

## 2022-03-17 RX ORDER — ANASTROZOLE 1 MG/1
1 TABLET ORAL DAILY
Qty: 30 TABLET | Refills: 11 | Status: SHIPPED | OUTPATIENT
Start: 2022-03-17 | End: 2023-03-17

## 2022-03-24 ENCOUNTER — TELEPHONE (OUTPATIENT)
Dept: NEUROLOGY | Facility: CLINIC | Age: 37
End: 2022-03-24
Payer: COMMERCIAL

## 2022-03-24 ENCOUNTER — CLINICAL SUPPORT (OUTPATIENT)
Dept: OBSTETRICS AND GYNECOLOGY | Facility: CLINIC | Age: 37
End: 2022-03-24
Payer: COMMERCIAL

## 2022-03-24 DIAGNOSIS — G43.E09 CHRONIC MIGRAINE WITH AURA: Primary | ICD-10-CM

## 2022-03-24 DIAGNOSIS — G43.C1 INTRACTABLE PERIODIC HEADACHE SYNDROME: ICD-10-CM

## 2022-03-24 DIAGNOSIS — N95.1 MENOPAUSAL SYMPTOM: Primary | ICD-10-CM

## 2022-03-24 DIAGNOSIS — G43.C1 INTRACTABLE PERIODIC HEADACHE SYNDROME: Primary | Chronic | ICD-10-CM

## 2022-03-24 PROCEDURE — 99999 PR PBB SHADOW E&M-EST. PATIENT-LVL III: ICD-10-PCS | Mod: PBBFAC,,,

## 2022-03-24 PROCEDURE — 96372 THER/PROPH/DIAG INJ SC/IM: CPT | Mod: S$GLB,,, | Performed by: OBSTETRICS & GYNECOLOGY

## 2022-03-24 PROCEDURE — 96372 PR INJECTION,THERAP/PROPH/DIAG2ST, IM OR SUBCUT: ICD-10-PCS | Mod: S$GLB,,, | Performed by: OBSTETRICS & GYNECOLOGY

## 2022-03-24 PROCEDURE — 99999 PR PBB SHADOW E&M-EST. PATIENT-LVL III: CPT | Mod: PBBFAC,,,

## 2022-03-24 RX ORDER — TESTOSTERONE CYPIONATE 200 MG/ML
50 INJECTION, SOLUTION INTRAMUSCULAR ONCE
Status: COMPLETED | OUTPATIENT
Start: 2022-03-24 | End: 2022-03-24

## 2022-03-24 RX ADMIN — TESTOSTERONE CYPIONATE 50 MG: 200 INJECTION, SOLUTION INTRAMUSCULAR at 09:03

## 2022-03-24 NOTE — TELEPHONE ENCOUNTER
I put in a neurology referral but this is odd.  I've never had to put in a neurology referral to get someone in for botox with myself.  Especially not someone who has already gotten it through me this year.

## 2022-03-24 NOTE — PROGRESS NOTES
Patient arrives today for her monthly hormone injection. Notes improved libido and stamina on testosterone, helps to improve fatigue on AI.     Recently changed to Anastrazole, estradiol remains negative.     Patient reports no adverse side effects of Testosterone. She has no complaints. No contraindications to therapy.     Testosterone 50mg administered IM to LEFT upper outer quadrant of gluteus using aseptic technique and 22 gauge 1.5 inch needle.     Site secured with Band-Aid, needle tip remains intact, patient tolerated well without pain. Patient observed for 15 minutes post injection.      Patient's next injection scheduled prior to clinic departure. TAYLER Wright

## 2022-03-26 ENCOUNTER — PATIENT MESSAGE (OUTPATIENT)
Dept: HEMATOLOGY/ONCOLOGY | Facility: CLINIC | Age: 37
End: 2022-03-26
Payer: COMMERCIAL

## 2022-03-26 ENCOUNTER — PATIENT MESSAGE (OUTPATIENT)
Dept: PSYCHIATRY | Facility: CLINIC | Age: 37
End: 2022-03-26
Payer: COMMERCIAL

## 2022-03-27 DIAGNOSIS — F90.0 ADHD (ATTENTION DEFICIT HYPERACTIVITY DISORDER), INATTENTIVE TYPE: ICD-10-CM

## 2022-03-27 RX ORDER — DEXTROAMPHETAMINE SACCHARATE, AMPHETAMINE ASPARTATE MONOHYDRATE, DEXTROAMPHETAMINE SULFATE AND AMPHETAMINE SULFATE 6.25; 6.25; 6.25; 6.25 MG/1; MG/1; MG/1; MG/1
25 CAPSULE, EXTENDED RELEASE ORAL EVERY MORNING
Qty: 30 CAPSULE | Refills: 0 | Status: SHIPPED | OUTPATIENT
Start: 2022-03-27 | End: 2022-04-20

## 2022-03-31 NOTE — PROGRESS NOTES
Subjective:       Patient ID: Yolanda Norman is a 36 y.o. female.    Chief Complaint: No chief complaint on file.    HPI 36 year old female, who returns for follow-up of carcinoma of the right breast, ER +,HER 2 negative. She  on exemestane after initial therapy with letrozole.  She changed to anastrozole in March .    She is doing much better since changing her medication - pain has resolved and her fatigue is better. Still has bouts of gastroparesis.  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    Breast history:  Mammogram and ultrasound on December 11th, 2019 showed right breast mass 7 cm from the nipple.  By ultrasound this mass measured 33 x 32 x 21 mm.  There was no associated axillary lymphadenopathy noted.    Right breast biopsy on December 13 showed high-grade infiltrating ductal carcinoma (histologic grade 3, nuclear grade 3, mitotic index 3) there were medullary features.  The cancer was 25% ER positive and NM and HER2 negative.  Ki-67 was 90%.    MRI showed a 5.2 cm x 2.6 cm x 4.6 cm irregularly shaped mass with rim enhancement seen in the right breast at 11 o'clock.    CT scan of the chest abdomen and pelvis and bone scan showed no evidence of metastatic disease.    She completed 4 cycles of neoadjuvant Adriamycin Cytoxan February 13, 2020.  She completed 12 weeks of weekly Taxol on May 20, 2020.    On July 1, 2020 she underwent bilateral mastectomy.    The right breast showed no residual malignancy, 5 right axillary sentinel lymph nodes were all negative for malignancy.  Final pathological stage yp T0 N0.  The left breast showed no atypia or malignancy.    Radiation therapy was completed 11/25/20.      She had a hysterectomy  December 18th, 2020.  Letrozole was started December 2020..  She was then changed to Exemestane in early 2021 due to arthralgias.  Changed to anastrazole in March 2022 due to arthralgias and fatigue.    She had DORIAN reconstruction in February 2021.    Review of Systems   Constitutional:  Positive for fatigue. Negative for activity change, appetite change, fever and unexpected weight change.   Eyes: Negative for visual disturbance.   Respiratory: Negative for cough and shortness of breath.    Cardiovascular: Negative for chest pain.   Gastrointestinal: Negative for abdominal distention, abdominal pain, constipation, diarrhea and rectal pain.   Genitourinary: Negative for frequency.   Musculoskeletal: Negative for arthralgias and back pain.   Skin: Negative for rash.   Neurological: Positive for numbness. Negative for headaches.   Hematological: Negative for adenopathy.   Psychiatric/Behavioral: Negative for dysphoric mood. The patient is not nervous/anxious.        Objective:      Physical Exam  Constitutional:       General: She is not in acute distress.     Appearance: Normal appearance. She is well-developed.   Eyes:      General: No scleral icterus.     Pupils: Pupils are equal, round, and reactive to light.   Cardiovascular:      Rate and Rhythm: Normal rate and regular rhythm.   Pulmonary:      Effort: Pulmonary effort is normal. No respiratory distress.      Breath sounds: Normal breath sounds. No wheezing or rales.   Chest:   Breasts:      Right: No axillary adenopathy or supraclavicular adenopathy.      Left: No axillary adenopathy or supraclavicular adenopathy.         Abdominal:      Palpations: Abdomen is soft. There is no mass.      Tenderness: There is no abdominal tenderness.   Lymphadenopathy:      Cervical: No cervical adenopathy.      Upper Body:      Right upper body: No supraclavicular or axillary adenopathy.      Left upper body: No supraclavicular or axillary adenopathy.   Skin:     Findings: Rash:       Neurological:      Mental Status: She is alert and oriented to person, place, and time.   Psychiatric:         Mood and Affect: Mood normal.         Behavior: Behavior normal.         Thought Content: Thought content normal.         Judgment: Judgment normal.         Assessment:        1. Malignant neoplasm of upper-outer quadrant of right breast in female, estrogen receptor positive        Plan:        Continue anastrozole and RTC 3 M.          Route Chart for Scheduling    Med Onc Chart Routing      Follow up with physician 3 months.   Follow up with [facility]    Labs    Imaging    Pharmacy appointment    Other referrals

## 2022-04-01 ENCOUNTER — TELEPHONE (OUTPATIENT)
Dept: HEMATOLOGY/ONCOLOGY | Facility: CLINIC | Age: 37
End: 2022-04-01
Payer: COMMERCIAL

## 2022-04-04 ENCOUNTER — PROCEDURE VISIT (OUTPATIENT)
Dept: NEUROLOGY | Facility: CLINIC | Age: 37
End: 2022-04-04
Payer: COMMERCIAL

## 2022-04-04 ENCOUNTER — OFFICE VISIT (OUTPATIENT)
Dept: HEMATOLOGY/ONCOLOGY | Facility: CLINIC | Age: 37
End: 2022-04-04
Payer: COMMERCIAL

## 2022-04-04 VITALS
SYSTOLIC BLOOD PRESSURE: 123 MMHG | DIASTOLIC BLOOD PRESSURE: 73 MMHG | OXYGEN SATURATION: 96 % | TEMPERATURE: 98 F | WEIGHT: 169.06 LBS | BODY MASS INDEX: 28.17 KG/M2 | RESPIRATION RATE: 18 BRPM | HEART RATE: 81 BPM | HEIGHT: 65 IN

## 2022-04-04 VITALS
DIASTOLIC BLOOD PRESSURE: 73 MMHG | WEIGHT: 169 LBS | HEART RATE: 81 BPM | SYSTOLIC BLOOD PRESSURE: 123 MMHG | HEIGHT: 65 IN | BODY MASS INDEX: 28.16 KG/M2

## 2022-04-04 DIAGNOSIS — G43.709 TRANSFORMED MIGRAINE WITHOUT AURA: Primary | ICD-10-CM

## 2022-04-04 DIAGNOSIS — Z17.0 MALIGNANT NEOPLASM OF UPPER-OUTER QUADRANT OF RIGHT BREAST IN FEMALE, ESTROGEN RECEPTOR POSITIVE: Primary | Chronic | ICD-10-CM

## 2022-04-04 DIAGNOSIS — Z51.81 MEDICATION MONITORING ENCOUNTER: ICD-10-CM

## 2022-04-04 DIAGNOSIS — F41.1 GENERALIZED ANXIETY DISORDER: ICD-10-CM

## 2022-04-04 DIAGNOSIS — G43.C1 INTRACTABLE PERIODIC HEADACHE SYNDROME: ICD-10-CM

## 2022-04-04 DIAGNOSIS — C50.411 MALIGNANT NEOPLASM OF UPPER-OUTER QUADRANT OF RIGHT BREAST IN FEMALE, ESTROGEN RECEPTOR POSITIVE: Primary | Chronic | ICD-10-CM

## 2022-04-04 PROCEDURE — 3078F DIAST BP <80 MM HG: CPT | Mod: CPTII,S$GLB,, | Performed by: INTERNAL MEDICINE

## 2022-04-04 PROCEDURE — 3074F SYST BP LT 130 MM HG: CPT | Mod: CPTII,S$GLB,, | Performed by: INTERNAL MEDICINE

## 2022-04-04 PROCEDURE — 99999 PR PBB SHADOW E&M-EST. PATIENT-LVL III: ICD-10-PCS | Mod: PBBFAC,,, | Performed by: INTERNAL MEDICINE

## 2022-04-04 PROCEDURE — 3008F BODY MASS INDEX DOCD: CPT | Mod: CPTII,S$GLB,, | Performed by: INTERNAL MEDICINE

## 2022-04-04 PROCEDURE — 3008F PR BODY MASS INDEX (BMI) DOCUMENTED: ICD-10-PCS | Mod: CPTII,S$GLB,, | Performed by: INTERNAL MEDICINE

## 2022-04-04 PROCEDURE — 99213 PR OFFICE/OUTPT VISIT, EST, LEVL III, 20-29 MIN: ICD-10-PCS | Mod: S$GLB,,, | Performed by: INTERNAL MEDICINE

## 2022-04-04 PROCEDURE — 3074F PR MOST RECENT SYSTOLIC BLOOD PRESSURE < 130 MM HG: ICD-10-PCS | Mod: CPTII,S$GLB,, | Performed by: INTERNAL MEDICINE

## 2022-04-04 PROCEDURE — 1159F PR MEDICATION LIST DOCUMENTED IN MEDICAL RECORD: ICD-10-PCS | Mod: CPTII,S$GLB,, | Performed by: INTERNAL MEDICINE

## 2022-04-04 PROCEDURE — 64615 CHEMODENERV MUSC MIGRAINE: CPT | Mod: S$GLB,,, | Performed by: PSYCHIATRY & NEUROLOGY

## 2022-04-04 PROCEDURE — 99499 UNLISTED E&M SERVICE: CPT | Mod: S$GLB,,, | Performed by: PSYCHIATRY & NEUROLOGY

## 2022-04-04 PROCEDURE — 64615 PR CHEMODENERVATION OF MUSCLE FOR CHRONIC MIGRAINE: ICD-10-PCS | Mod: S$GLB,,, | Performed by: PSYCHIATRY & NEUROLOGY

## 2022-04-04 PROCEDURE — 3078F PR MOST RECENT DIASTOLIC BLOOD PRESSURE < 80 MM HG: ICD-10-PCS | Mod: CPTII,S$GLB,, | Performed by: INTERNAL MEDICINE

## 2022-04-04 PROCEDURE — 99499 NO LOS: ICD-10-PCS | Mod: S$GLB,,, | Performed by: PSYCHIATRY & NEUROLOGY

## 2022-04-04 PROCEDURE — 1159F MED LIST DOCD IN RCRD: CPT | Mod: CPTII,S$GLB,, | Performed by: INTERNAL MEDICINE

## 2022-04-04 PROCEDURE — 99999 PR PBB SHADOW E&M-EST. PATIENT-LVL III: CPT | Mod: PBBFAC,,, | Performed by: INTERNAL MEDICINE

## 2022-04-04 PROCEDURE — 99213 OFFICE O/P EST LOW 20 MIN: CPT | Mod: S$GLB,,, | Performed by: INTERNAL MEDICINE

## 2022-04-04 RX ORDER — TOPIRAMATE 200 MG/1
200 TABLET ORAL NIGHTLY
Qty: 90 TABLET | Refills: 3 | Status: SHIPPED | OUTPATIENT
Start: 2022-04-04 | End: 2023-01-23 | Stop reason: SDUPTHER

## 2022-04-04 RX ORDER — CITALOPRAM 40 MG/1
TABLET, FILM COATED ORAL
Qty: 30 TABLET | Refills: 2 | Status: SHIPPED | OUTPATIENT
Start: 2022-04-04 | End: 2022-04-20 | Stop reason: SDUPTHER

## 2022-04-04 RX ORDER — TOPIRAMATE 200 MG/1
200 TABLET ORAL
COMMUNITY
End: 2022-04-20 | Stop reason: SDUPTHER

## 2022-04-04 NOTE — PROCEDURES
Procedures   [facility] - NEUROLOGY  [facility] [place]    Date: April 4, 2022   Patient Name: Yolanda Norman   MRN: [id number]   PCP: Primary Doctor No  Referring Provider: Dillon Gonzalez MD    Assessment:      This is Yolanda Norman, 36 y.o. female with chronic migraines (G43.719) and suffered from headaches more than 15 days a month lasting more than 4 hours a day with no relief of symptoms despite trying multiple medications listed below prior to initiation of botox which has resulted in >50% reduction in headaches.      Plan:      -  Cont TPM 200mg daily, imitrex/fioricet prn  -  Follow up for botox   -  Continue Mg      Greater than 30 minutes spent in chart review, documentation, independent review of imaging, and face to face time with patient       I discussed side effects of the medications. I asked the patient to  stop the medication if he notices serious adverse effects as we discussed and to seek immediate medical attention at an ER.     Dillon Gonzalez MD  [facility]   Department of Neurology    Subjective:     -  Ongoing response to botox for migraine and conservative measures for tension headache  1/2022  -  Ongoing response to botox for migraine and conservative measures for tension headache  9/2021  -  Recent worsening of tension HA, notes chronic right shoulder tension since breast reconstruction as well as daytime and nocturnal bruxism, provoked by screen use to some extent, upcoming vision check, maintains regular sleep and good hydration.  6/2021  -  Worsened HA following TPM decrease and increased back to 200mg, ongoing effect of botox, recent prolonged HA not terminated by imitrex  3/2021  - Continues with good response to botox with some fluctuations in HA related to changes in hormone therapy and desires to reduce TPM, holding off on grad school  12/2020  - THACKER continues to respond well to botox, TPM, imitrex,  - About to start PhD program    9/2020  -  HA well controlled with combination of TPM and botox  -  Completed chemotherapy but has not started radiation, coping well with stress  6/2020  -  Recent diagnosis of breast cancer, s/p chemo therapy with upcoming surgery and radiation  -  Development of neuropathy pain in feet, excess sedation with GBP 300mg tid, pain/altered sensation in fingers of right hand for the past day  -  Previously unable to tolerate cymbalta  12/2019  EXCELLENT response to botox, estimates 1 headache per week  9/2019  Continued daily headaches, weaning breast feeding - son 6 months old  8/2019  Unable to tolerate increased celexa due to fatigue, compliant with Mg with continued daily HA, unable to resume TPM as continuing to breast feed    HPI 5/2019:   Ms. Yolanda Norman is a 36 y.o. female who presents with a chief complaint of headaches    Patient has had significant difficulty with migraines since she was 12 years old with worsening during recent pregnancy.  Associated nausea, photo/phonophobia.  She has had difficulty with post-partum anxiety and started celexa a month ago with some improvement in headache as well as irritability noted.    Prior medications trials as below  TPM - partial relief at 150mg /d  Pamelor, Neurontin, Seroquel - all little effect  Imitrex, phenergan - good effect  Fioricet, zanaflex - little effect    PAST MEDICAL HISTORY:  Past Medical History:   Diagnosis Date    Abnormal Pap smear of cervix 2009    ADHD     Allergy     seasonal    Anorexia     Anxiety     Asthma     BRCA1 positive 12/18/2020    Bulimia     Depression     Fever blister     Gastroparesis     GERD (gastroesophageal reflux disease)     History of posttraumatic stress disorder (PTSD)     Hypertension     Gestational Hypertension    Invasive ductal carcinoma of right breast 12/18/2019    Mastitis     Migraine headache     PONV (postoperative nausea and vomiting)     Status post mastectomy, bilateral 7/29/2020        PAST SURGICAL HISTORY:  Past Surgical History:   Procedure Laterality Date    BILATERAL MASTECTOMY Bilateral 2020    Procedure: MASTECTOMY, BILATERAL - BILATERAL SKIN SPARING MASTECTOMY;  Surgeon: Percy Ayers MD;  Location: [facility];  Service: General;  Laterality: Bilateral;    BIOPSY OF AXILLARY NODE Right 2020    Procedure: BIOPSY, LYMPH NODE, AXILLARY;  Surgeon: Percy Ayers MD;  Location: [facility];  Service: General;  Laterality: Right;    BONE MARROW ASPIRATION      x 3    BREAST SURGERY      CERVICAL BIOPSY  W/ LOOP ELECTRODE EXCISION       SECTION  2016     SECTION N/A 2019    Procedure:  SECTION;  Surgeon: Kirit Hernandez MD;  Location: [facility];  Service: OB/GYN;  Laterality: N/A;    COLPOSCOPY      ESOPHAGOGASTRODUODENOSCOPY N/A 2021    Procedure: EGD (ESOPHAGOGASTRODUODENOSCOPY);  Surgeon: Lj Santos MD;  [facility]);  Service: Endoscopy;  Laterality: N/A;  COVID test on 21 at [facility]    ESOPHAGOGASTRODUODENOSCOPY N/A 2021    Procedure: ESOPHAGOGASTRODUODENOSCOPY (EGD);  Surgeon: Camila Wiley MD;  [facility];  Service: Endoscopy;  Laterality: N/A;    INJECTION FOR SENTINEL NODE IDENTIFICATION Right 2020    Procedure: INJECTION, FOR SENTINEL NODE IDENTIFICATION;  Surgeon: Percy Ayers MD;  Location: [facility];  Service: General;  Laterality: Right;    INSERTION OF BREAST TISSUE EXPANDER Bilateral 2020    Procedure: INSERTION, TISSUE EXPANDER, BREAST;  Surgeon: Kiko Aranda MD;  Location: [facility];  Service: Plastics;  Laterality: Bilateral;    INSERTION OF TUNNELED CENTRAL VENOUS CATHETER (CVC) WITH SUBCUTANEOUS PORT Left 2019    Procedure: [id number]-Left neck or chest wall;  Surgeon: Percy Ayers MD;  [address];  Service: General;  Laterality: Left;    MASTECTOMY      MEDIPORT REMOVAL N/A 2020    Procedure: REMOVAL, CATHETER,  CENTRAL VENOUS, TUNNELED, WITH PORT;  Surgeon: Percy Ayers MD;  Location: [facility];  Service: General;  Laterality: N/A;    ROBOT-ASSISTED LAPAROSCOPIC ABDOMINAL HYSTERECTOMY USING DA HIMA XI N/A 12/18/2020    Procedure: XI ROBOTIC HYSTERECTOMY;  Surgeon: Emili Smith MD;  Location: [facility];  Service: OB/GYN;  Laterality: N/A;    ROBOT-ASSISTED LAPAROSCOPIC SALPINGO-OOPHORECTOMY USING DA HIMA XI Bilateral 12/18/2020    Procedure: XI ROBOTIC SALPINGO-OOPHORECTOMY;  Surgeon: Emili Smith MD;  Location: [facility] OR;  Service: OB/GYN;  Laterality: Bilateral;    SHOULDER SURGERY Left     x 2    SINUS SURGERY      age 19    STOMACH SURGERY      TISSUE EXPANDER REMOVAL Right 10/3/2020    Procedure: REMOVAL, TISSUE EXPANDER;  Surgeon: Kiko Aranda MD;  Location: [facility];  Service: Plastics;  Laterality: Right;    TONSILLECTOMY      UPPER GASTROINTESTINAL ENDOSCOPY         CURRENT MEDS:  Current Outpatient Medications   Medication Sig Dispense Refill    anastrozole (ARIMIDEX) 1 mg Tab Take 1 tablet (1 mg total) by mouth once daily. 30 tablet 11    cholecalciferol, vitamin D3, (VITAMIN D3) 25 mcg (1,000 unit) capsule Take 1,000 Units by mouth 2 (two) times a day.      citalopram (CELEXA) 40 MG tablet TAKE 1 TABLET(40 MG) BY MOUTH EVERY DAY 30 tablet 2    clobetasoL (TEMOVATE) 0.05 % cream Apply to affected area twice a day for 2 weeks then stop 60 g 1    cranberry 500 mg Cap Take by mouth.      dapsone (ACZONE) 7.5 % GlwP Apply topically.      dextroamphetamine-amphetamine (ADDERALL XR) 25 MG 24 hr capsule Take 1 capsule (25 mg total) by mouth every morning. 30 capsule 0    doxycycline (VIBRAMYCIN) 100 MG Cap Take 100 mg by mouth once daily.      doxycycline (VIBRAMYCIN) 100 MG Cap Take one tablet by mouth once daily with food 30 capsule 1    loratadine (CLARITIN) 10 mg tablet Take 10 mg by mouth once daily.      magnesium 30 mg Tab Take by mouth once.      metoclopramide HCl (REGLAN) 10 MG  tablet Take 1 tablet (10 mg total) by mouth 3 (three) times daily with meals. 90 tablet 1    multivitamin (THERAGRAN) per tablet Take 1 tablet by mouth once daily.      multivitamin with minerals (HAIR,SKIN AND NAILS ORAL) Take by mouth.      onabotulinumtoxinA (BOTOX INJ) Inject as directed. [id number]      prasterone, dhea, (INTRAROSA) 6.5 mg Inst Place 6.5 mg vaginally every evening. 30 each 11    spironolactone (ALDACTONE) 100 MG tablet take 1 tablet by mouth once daily 30 tablet 2    sucralfate (CARAFATE) 1 gram tablet Take 1 tablet (1 g total) by mouth 4 (four) times daily. Can be crushed if cannot swallow. 120 tablet 0    sumatriptan (IMITREX) 100 MG tablet Take 1/2 to 1 tab at onset of headache.  If no improvement in 2 hours, take another.  Do not take more than 2 in 24 hours. 9 tablet 11    topiramate (TOPAMAX) 200 MG Tab Take 200 mg by mouth.      traZODone (DESYREL) 50 MG tablet TAKE 1 TABLET(50 MG) BY MOUTH EVERY EVENING 30 tablet 0    tretinoin (ALTRALIN) 0.05 % gel SMARTSIG:Sparingly Topical Every Night      valACYclovir (VALTREX) 1000 MG tablet Take 1 tablet (1,000 mg total) by mouth every 12 (twelve) hours. 60 tablet 0    vitamin E 100 UNIT capsule Take 100 Units by mouth once daily.      ALPRAZolam (XANAX) 0.25 MG tablet Take 1 tablet (0.25 mg total) by mouth daily as needed for Anxiety. 30 tablet 0    pantoprazole (PROTONIX) 40 MG tablet Take 1 tablet (40 mg total) by mouth once daily. 90 tablet 3     Current Facility-Administered Medications   Medication Dose Route Frequency Provider Last Rate Last Admin    onabotulinumtoxina injection 200 Units  200 Units Intramuscular Q90 Days Dillon Gonzalez MD   200 Units at 06/25/21 0751    onabotulinumtoxina injection 200 Units  200 Units Intramuscular q12 weeks Dillon Gonzalez MD   200 Units at 01/10/22 1023       ALLERGIES:  Review of patient's allergies indicates:   Allergen Reactions    Amoxicillin Hives    Penicillins Hives and  "Rash    Diazepam Anxiety and Other (See Comments)     "makes hyper"       FAMILY HISTORY:  Family History   Problem Relation Age of Onset    Cancer Maternal Grandmother 40        cervical    Cervical cancer Mother     Breast cancer Other     Ovarian cancer Other     Colon polyps Father     Colon cancer Neg Hx     Melanoma Neg Hx        SOCIAL HISTORY:  Social History     Tobacco Use    Smoking status: Never Smoker    Smokeless tobacco: Never Used   Substance Use Topics    Alcohol use: Yes     Comment: socially    Drug use: No       Review of Systems:  12 review of systems is negative except for the symptoms mentioned in HPI.        Objective:     Vitals:    04/04/22 1439   BP: 123/73   Pulse: 81   Weight: 76.7 kg (169 lb)   Height: 5' 5" (1.651 m)       General: NAD, well nourished   Eyes: no tearing, discharge, no erythema   ENT: moist mucous membranes of the oral cavity, nares patent    Neck: Supple, full range of motion  Cardiovascular: Warm and well perfused, pulses equal and symmetrical  Lungs: Normal work of breathing, normal chest wall excursions  Skin: No rash, lesions, or breakdown on exposed skin  Psychiatry: Mood and affect are appropriate   Abdomen: soft, non tender, non distended  Extremeties: No cyanosis, clubbing or edema.    Neurological   MENTAL STATUS: Alert and oriented to person, place, and time. Attention and concentration within normal limits. Speech without dysarthria, able to name and repeat without difficulty. Recent and remote memory within normal limits   CRANIAL NERVES: Visual fields intact. PERRL. EOMI. Facial sensation intact. Face symmetrical. Hearing grossly intact. Full shoulder shrug bilaterally. Tongue protrudes midline   SENSORY: Sensation is intact to light touch throughout.   MOTOR: Normal bulk and tone. No pronator drift.    CEREBELLAR/COORDINATION/GAIT: Gait steady with normal arm swing and stride length.     BOTOX was performed as an indicated therapy for " intractable chronic migraine headaches given that the patient failed several prophylactic medications    Botulinum Toxin Injection Procedure   Pre-operative diagnosis: Chronic migraine   Post-operative diagnosis: Same   Procedure: Chemical neurolysis   After risks and benefits were explained including bleeding, infection, worsening of pain, damage to the areas being injected, weakness of muscles, loss of muscle control, dysphagia if injecting the head or neck, facial droop if injecting the facial area, painful injection, allergic or other reaction to the medications being injected, and the failure of the procedure to help the problem, a signed consent was obtained.   The patient was placed in a comfortable area and the sites to be treated were identified.The area to be treated was prepped three times with alcohol and the alcohol allowed to dry. Next, a 30 gauge needle was used to inject the medication in the area to be treated.       Botox 100 units NDC 3043-2528-93    Area(s) injected:   Total Botox used: 155 Units   Botox wastage: 45 Units   Injection sites:    muscle bilaterally ( a total of 10 units divided into 2 sites)   Procerus muscle (5 units)   Frontalis muscle bilaterally (a total of 20 units divided into 4 sites)   Temporalis muscle bilaterally (a total of 40 units divided into 8 sites)   Occipitalis muscle bilaterally (a total of 30 units divided into 6 sites)   Cervical paraspinal muscles (a total of 20 units divided into 4 sites)   Trapezius muscle bilaterally (a total of 30 units divided into 6 sites)   Complications: none   RTC for the next Botox injection: 3 months

## 2022-04-12 ENCOUNTER — PATIENT MESSAGE (OUTPATIENT)
Dept: OBSTETRICS AND GYNECOLOGY | Facility: CLINIC | Age: 37
End: 2022-04-12
Payer: COMMERCIAL

## 2022-04-13 ENCOUNTER — CLINICAL SUPPORT (OUTPATIENT)
Dept: OBSTETRICS AND GYNECOLOGY | Facility: CLINIC | Age: 37
End: 2022-04-13
Payer: COMMERCIAL

## 2022-04-13 DIAGNOSIS — N95.1 MENOPAUSAL SYMPTOM: Primary | ICD-10-CM

## 2022-04-13 PROCEDURE — 99999 PR PBB SHADOW E&M-EST. PATIENT-LVL III: ICD-10-PCS | Mod: PBBFAC,,,

## 2022-04-13 PROCEDURE — 96372 THER/PROPH/DIAG INJ SC/IM: CPT | Mod: S$GLB,,, | Performed by: OBSTETRICS & GYNECOLOGY

## 2022-04-13 PROCEDURE — 99999 PR PBB SHADOW E&M-EST. PATIENT-LVL III: CPT | Mod: PBBFAC,,,

## 2022-04-13 PROCEDURE — 96372 PR INJECTION,THERAP/PROPH/DIAG2ST, IM OR SUBCUT: ICD-10-PCS | Mod: S$GLB,,, | Performed by: OBSTETRICS & GYNECOLOGY

## 2022-04-13 RX ORDER — TESTOSTERONE CYPIONATE 200 MG/ML
50 INJECTION, SOLUTION INTRAMUSCULAR
Status: COMPLETED | OUTPATIENT
Start: 2022-04-13 | End: 2022-04-13

## 2022-04-13 RX ADMIN — TESTOSTERONE CYPIONATE 50 MG: 200 INJECTION, SOLUTION INTRAMUSCULAR at 08:04

## 2022-04-13 NOTE — PROGRESS NOTES
Patient arrives today for her monthly hormone injection. Notes improved libido and stamina on testosterone, helps to improve fatigue on AI.      Recently changed to Anastrazole, estradiol remains negative.      Patient reports no adverse side effects of Testosterone. She has no complaints. No contraindications to therapy.     Testosterone 50mg administered IM to RIGHT upper outer quadrant of gluteus using aseptic technique and 22 gauge 1.5 inch needle.     Site secured with Band-Aid, needle tip remains intact, patient tolerated well without pain. Patient observed for 15 minutes post injection.      Patient's next injection scheduled prior to clinic departure. TAYLER Wright

## 2022-04-20 ENCOUNTER — OFFICE VISIT (OUTPATIENT)
Dept: PSYCHIATRY | Facility: CLINIC | Age: 37
End: 2022-04-20
Payer: COMMERCIAL

## 2022-04-20 VITALS
HEIGHT: 65 IN | WEIGHT: 169.56 LBS | BODY MASS INDEX: 28.25 KG/M2 | HEART RATE: 86 BPM | SYSTOLIC BLOOD PRESSURE: 101 MMHG | DIASTOLIC BLOOD PRESSURE: 72 MMHG

## 2022-04-20 DIAGNOSIS — F33.1 MODERATE EPISODE OF RECURRENT MAJOR DEPRESSIVE DISORDER: ICD-10-CM

## 2022-04-20 DIAGNOSIS — F90.0 ADHD (ATTENTION DEFICIT HYPERACTIVITY DISORDER), INATTENTIVE TYPE: ICD-10-CM

## 2022-04-20 DIAGNOSIS — Z86.59 HISTORY OF POSTTRAUMATIC STRESS DISORDER (PTSD): Chronic | ICD-10-CM

## 2022-04-20 DIAGNOSIS — F41.1 GENERALIZED ANXIETY DISORDER: Primary | Chronic | ICD-10-CM

## 2022-04-20 PROCEDURE — 3078F PR MOST RECENT DIASTOLIC BLOOD PRESSURE < 80 MM HG: ICD-10-PCS | Mod: CPTII,S$GLB,, | Performed by: PSYCHIATRY & NEUROLOGY

## 2022-04-20 PROCEDURE — 99999 PR PBB SHADOW E&M-EST. PATIENT-LVL III: ICD-10-PCS | Mod: PBBFAC,,, | Performed by: PSYCHIATRY & NEUROLOGY

## 2022-04-20 PROCEDURE — 90833 PR PSYCHOTHERAPY W/PATIENT W/E&M, 30 MIN (ADD ON): ICD-10-PCS | Mod: S$GLB,,, | Performed by: PSYCHIATRY & NEUROLOGY

## 2022-04-20 PROCEDURE — 99214 OFFICE O/P EST MOD 30 MIN: CPT | Mod: S$GLB,,, | Performed by: PSYCHIATRY & NEUROLOGY

## 2022-04-20 PROCEDURE — 1159F PR MEDICATION LIST DOCUMENTED IN MEDICAL RECORD: ICD-10-PCS | Mod: CPTII,S$GLB,, | Performed by: PSYCHIATRY & NEUROLOGY

## 2022-04-20 PROCEDURE — 1160F RVW MEDS BY RX/DR IN RCRD: CPT | Mod: CPTII,S$GLB,, | Performed by: PSYCHIATRY & NEUROLOGY

## 2022-04-20 PROCEDURE — 3074F PR MOST RECENT SYSTOLIC BLOOD PRESSURE < 130 MM HG: ICD-10-PCS | Mod: CPTII,S$GLB,, | Performed by: PSYCHIATRY & NEUROLOGY

## 2022-04-20 PROCEDURE — 3078F DIAST BP <80 MM HG: CPT | Mod: CPTII,S$GLB,, | Performed by: PSYCHIATRY & NEUROLOGY

## 2022-04-20 PROCEDURE — 1159F MED LIST DOCD IN RCRD: CPT | Mod: CPTII,S$GLB,, | Performed by: PSYCHIATRY & NEUROLOGY

## 2022-04-20 PROCEDURE — 90833 PSYTX W PT W E/M 30 MIN: CPT | Mod: S$GLB,,, | Performed by: PSYCHIATRY & NEUROLOGY

## 2022-04-20 PROCEDURE — 3008F BODY MASS INDEX DOCD: CPT | Mod: CPTII,S$GLB,, | Performed by: PSYCHIATRY & NEUROLOGY

## 2022-04-20 PROCEDURE — 99214 PR OFFICE/OUTPT VISIT, EST, LEVL IV, 30-39 MIN: ICD-10-PCS | Mod: S$GLB,,, | Performed by: PSYCHIATRY & NEUROLOGY

## 2022-04-20 PROCEDURE — 99999 PR PBB SHADOW E&M-EST. PATIENT-LVL III: CPT | Mod: PBBFAC,,, | Performed by: PSYCHIATRY & NEUROLOGY

## 2022-04-20 PROCEDURE — 3008F PR BODY MASS INDEX (BMI) DOCUMENTED: ICD-10-PCS | Mod: CPTII,S$GLB,, | Performed by: PSYCHIATRY & NEUROLOGY

## 2022-04-20 PROCEDURE — 1160F PR REVIEW ALL MEDS BY PRESCRIBER/CLIN PHARMACIST DOCUMENTED: ICD-10-PCS | Mod: CPTII,S$GLB,, | Performed by: PSYCHIATRY & NEUROLOGY

## 2022-04-20 PROCEDURE — 3074F SYST BP LT 130 MM HG: CPT | Mod: CPTII,S$GLB,, | Performed by: PSYCHIATRY & NEUROLOGY

## 2022-04-20 RX ORDER — CITALOPRAM 40 MG/1
TABLET, FILM COATED ORAL
Qty: 30 TABLET | Refills: 2 | Status: SHIPPED | OUTPATIENT
Start: 2022-04-20 | End: 2022-08-07 | Stop reason: SDUPTHER

## 2022-04-20 RX ORDER — DEXTROAMPHETAMINE SACCHARATE, AMPHETAMINE ASPARTATE MONOHYDRATE, DEXTROAMPHETAMINE SULFATE AND AMPHETAMINE SULFATE 7.5; 7.5; 7.5; 7.5 MG/1; MG/1; MG/1; MG/1
30 CAPSULE, EXTENDED RELEASE ORAL EVERY MORNING
Qty: 30 CAPSULE | Refills: 0 | Status: SHIPPED | OUTPATIENT
Start: 2022-04-20 | End: 2022-05-17 | Stop reason: SDUPTHER
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
## 2022-05-04 RX ORDER — PRASTERONE 6.5 MG/1
6.5 INSERT VAGINAL NIGHTLY
Qty: 30 EACH | Refills: 11 | Status: SHIPPED | OUTPATIENT
Start: 2022-05-04

## 2022-05-04 RX ORDER — PRASTERONE 6.5 MG/1
6.5 INSERT VAGINAL NIGHTLY
Qty: 30 EACH | Refills: 11 | Status: CANCELLED | OUTPATIENT
Start: 2022-05-04

## 2022-05-05 ENCOUNTER — CLINICAL SUPPORT (OUTPATIENT)
Dept: OBSTETRICS AND GYNECOLOGY | Facility: CLINIC | Age: 37
End: 2022-05-05
Payer: COMMERCIAL

## 2022-05-05 ENCOUNTER — TELEPHONE (OUTPATIENT)
Dept: PHARMACY | Facility: CLINIC | Age: 37
End: 2022-05-05
Payer: COMMERCIAL

## 2022-05-05 ENCOUNTER — LAB VISIT (OUTPATIENT)
Dept: LAB | Facility: OTHER | Age: 37
End: 2022-05-05
Attending: OBSTETRICS & GYNECOLOGY
Payer: COMMERCIAL

## 2022-05-05 DIAGNOSIS — Z13.220 SCREENING FOR CHOLESTEROL LEVEL: ICD-10-CM

## 2022-05-05 DIAGNOSIS — R53.83 FATIGUE: ICD-10-CM

## 2022-05-05 DIAGNOSIS — N95.1 MENOPAUSAL SYMPTOM: Primary | ICD-10-CM

## 2022-05-05 DIAGNOSIS — N95.1 MENOPAUSAL SYMPTOM: ICD-10-CM

## 2022-05-05 LAB
ALBUMIN SERPL BCP-MCNC: 4.6 G/DL (ref 3.5–5.2)
ALP SERPL-CCNC: 116 U/L (ref 55–135)
ALT SERPL W/O P-5'-P-CCNC: 14 U/L (ref 10–44)
ANION GAP SERPL CALC-SCNC: 10 MMOL/L (ref 8–16)
AST SERPL-CCNC: 14 U/L (ref 10–40)
BASOPHILS # BLD AUTO: 0.04 K/UL (ref 0–0.2)
BASOPHILS NFR BLD: 0.9 % (ref 0–1.9)
BILIRUB SERPL-MCNC: 0.4 MG/DL (ref 0.1–1)
BUN SERPL-MCNC: 14 MG/DL (ref 6–20)
CALCIUM SERPL-MCNC: 10.2 MG/DL (ref 8.7–10.5)
CHLORIDE SERPL-SCNC: 109 MMOL/L (ref 95–110)
CO2 SERPL-SCNC: 22 MMOL/L (ref 23–29)
CREAT SERPL-MCNC: 1.2 MG/DL (ref 0.5–1.4)
DIFFERENTIAL METHOD: ABNORMAL
EOSINOPHIL # BLD AUTO: 0.1 K/UL (ref 0–0.5)
EOSINOPHIL NFR BLD: 1.9 % (ref 0–8)
ERYTHROCYTE [DISTWIDTH] IN BLOOD BY AUTOMATED COUNT: 11.8 % (ref 11.5–14.5)
EST. GFR  (AFRICAN AMERICAN): >60 ML/MIN/1.73 M^2
EST. GFR  (NON AFRICAN AMERICAN): 59 ML/MIN/1.73 M^2
ESTRADIOL SERPL-MCNC: <10 PG/ML
GLUCOSE SERPL-MCNC: 95 MG/DL (ref 70–110)
HCT VFR BLD AUTO: 40.4 % (ref 37–48.5)
HGB BLD-MCNC: 14 G/DL (ref 12–16)
IMM GRANULOCYTES # BLD AUTO: 0.01 K/UL (ref 0–0.04)
IMM GRANULOCYTES NFR BLD AUTO: 0.2 % (ref 0–0.5)
LYMPHOCYTES # BLD AUTO: 0.9 K/UL (ref 1–4.8)
LYMPHOCYTES NFR BLD: 18.9 % (ref 18–48)
MCH RBC QN AUTO: 31.4 PG (ref 27–31)
MCHC RBC AUTO-ENTMCNC: 34.7 G/DL (ref 32–36)
MCV RBC AUTO: 91 FL (ref 82–98)
MONOCYTES # BLD AUTO: 0.3 K/UL (ref 0.3–1)
MONOCYTES NFR BLD: 6.9 % (ref 4–15)
NEUTROPHILS # BLD AUTO: 3.3 K/UL (ref 1.8–7.7)
NEUTROPHILS NFR BLD: 71.2 % (ref 38–73)
NRBC BLD-RTO: 0 /100 WBC
PLATELET # BLD AUTO: 233 K/UL (ref 150–450)
PMV BLD AUTO: 8.9 FL (ref 9.2–12.9)
POTASSIUM SERPL-SCNC: 4.4 MMOL/L (ref 3.5–5.1)
PROT SERPL-MCNC: 7.9 G/DL (ref 6–8.4)
RBC # BLD AUTO: 4.46 M/UL (ref 4–5.4)
SODIUM SERPL-SCNC: 141 MMOL/L (ref 136–145)
TSH SERPL DL<=0.005 MIU/L-ACNC: 0.93 UIU/ML (ref 0.4–4)
WBC # BLD AUTO: 4.66 K/UL (ref 3.9–12.7)

## 2022-05-05 PROCEDURE — 96372 THER/PROPH/DIAG INJ SC/IM: CPT | Mod: S$GLB,,, | Performed by: OBSTETRICS & GYNECOLOGY

## 2022-05-05 PROCEDURE — 84402 ASSAY OF FREE TESTOSTERONE: CPT | Performed by: PHYSICIAN ASSISTANT

## 2022-05-05 PROCEDURE — 80053 COMPREHEN METABOLIC PANEL: CPT | Performed by: PHYSICIAN ASSISTANT

## 2022-05-05 PROCEDURE — 99999 PR PBB SHADOW E&M-EST. PATIENT-LVL III: ICD-10-PCS | Mod: PBBFAC,,,

## 2022-05-05 PROCEDURE — 84443 ASSAY THYROID STIM HORMONE: CPT | Performed by: PHYSICIAN ASSISTANT

## 2022-05-05 PROCEDURE — 84402 ASSAY OF FREE TESTOSTERONE: CPT | Mod: 91 | Performed by: PHYSICIAN ASSISTANT

## 2022-05-05 PROCEDURE — 99999 PR PBB SHADOW E&M-EST. PATIENT-LVL III: CPT | Mod: PBBFAC,,,

## 2022-05-05 PROCEDURE — 82670 ASSAY OF TOTAL ESTRADIOL: CPT | Performed by: PHYSICIAN ASSISTANT

## 2022-05-05 PROCEDURE — 96372 PR INJECTION,THERAP/PROPH/DIAG2ST, IM OR SUBCUT: ICD-10-PCS | Mod: S$GLB,,, | Performed by: OBSTETRICS & GYNECOLOGY

## 2022-05-05 PROCEDURE — 85025 COMPLETE CBC W/AUTO DIFF WBC: CPT | Performed by: PHYSICIAN ASSISTANT

## 2022-05-05 PROCEDURE — 36415 COLL VENOUS BLD VENIPUNCTURE: CPT | Performed by: PHYSICIAN ASSISTANT

## 2022-05-05 RX ORDER — TESTOSTERONE CYPIONATE 200 MG/ML
50 INJECTION, SOLUTION INTRAMUSCULAR
Status: COMPLETED | OUTPATIENT
Start: 2022-05-05 | End: 2022-05-05

## 2022-05-05 RX ADMIN — TESTOSTERONE CYPIONATE 50 MG: 200 INJECTION, SOLUTION INTRAMUSCULAR at 08:05

## 2022-05-05 NOTE — PROGRESS NOTES
Patient arrives today for her testosterone injection. Maintains daily AI. Concerns of weight gain. Inquires of annual labs w/TSH, will defer to PCP for mgt of any abnormal values excluding hormone levels. Obtaining trough level today at 3 weeks, today is dose #3 of Testosterone 50mg q 21 days. Denies side effects, notes consistent benefit. WWE scheduled per protocol.     Testosterone 50mg administered IM to LEFT upper outer quadrant of gluteus using aseptic technique and 22 gauge 1.5 inch needle.     Site secured with Band-Aid, needle tip remains intact, patient tolerated well without pain. Patient observed for 15 minutes post injection.      Patient's next injection scheduled prior to clinic departure. TAYLER Wright

## 2022-05-06 ENCOUNTER — TELEPHONE (OUTPATIENT)
Dept: OBSTETRICS AND GYNECOLOGY | Facility: CLINIC | Age: 37
End: 2022-05-06
Payer: COMMERCIAL

## 2022-05-06 NOTE — NURSING
Patient will establish annual wellness/primary care with Dr. Aniceto [place], appointment confirmed for Thursday, 8/25/22 at 0800, waitlist added, will send message to team incase she can be seen at an earlier timeframe.     JOANNAE and yanelyt mgt under AI coordinated.     Plan of care reinforced. Patient expresses appreciation.     TAYLER Wright.

## 2022-05-09 ENCOUNTER — OFFICE VISIT (OUTPATIENT)
Dept: HEMATOLOGY/ONCOLOGY | Facility: CLINIC | Age: 37
End: 2022-05-09
Payer: COMMERCIAL

## 2022-05-09 VITALS
BODY MASS INDEX: 28.02 KG/M2 | DIASTOLIC BLOOD PRESSURE: 89 MMHG | HEART RATE: 75 BPM | SYSTOLIC BLOOD PRESSURE: 113 MMHG | HEIGHT: 65 IN | WEIGHT: 168.19 LBS

## 2022-05-09 DIAGNOSIS — Z13.1 SCREENING FOR DIABETES MELLITUS: ICD-10-CM

## 2022-05-09 DIAGNOSIS — E88.810 METABOLIC SYNDROME: Primary | ICD-10-CM

## 2022-05-09 DIAGNOSIS — E66.3 OVERWEIGHT: ICD-10-CM

## 2022-05-09 DIAGNOSIS — N95.1 MENOPAUSAL SYMPTOM: ICD-10-CM

## 2022-05-09 PROCEDURE — 1159F PR MEDICATION LIST DOCUMENTED IN MEDICAL RECORD: ICD-10-PCS | Mod: CPTII,S$GLB,, | Performed by: PHYSICIAN ASSISTANT

## 2022-05-09 PROCEDURE — 3074F SYST BP LT 130 MM HG: CPT | Mod: CPTII,S$GLB,, | Performed by: PHYSICIAN ASSISTANT

## 2022-05-09 PROCEDURE — 99999 PR PBB SHADOW E&M-EST. PATIENT-LVL V: CPT | Mod: PBBFAC,,, | Performed by: PHYSICIAN ASSISTANT

## 2022-05-09 PROCEDURE — 3008F BODY MASS INDEX DOCD: CPT | Mod: CPTII,S$GLB,, | Performed by: PHYSICIAN ASSISTANT

## 2022-05-09 PROCEDURE — 99999 PR PBB SHADOW E&M-EST. PATIENT-LVL V: ICD-10-PCS | Mod: PBBFAC,,, | Performed by: PHYSICIAN ASSISTANT

## 2022-05-09 PROCEDURE — 3079F PR MOST RECENT DIASTOLIC BLOOD PRESSURE 80-89 MM HG: ICD-10-PCS | Mod: CPTII,S$GLB,, | Performed by: PHYSICIAN ASSISTANT

## 2022-05-09 PROCEDURE — 1160F PR REVIEW ALL MEDS BY PRESCRIBER/CLIN PHARMACIST DOCUMENTED: ICD-10-PCS | Mod: CPTII,S$GLB,, | Performed by: PHYSICIAN ASSISTANT

## 2022-05-09 PROCEDURE — 3008F PR BODY MASS INDEX (BMI) DOCUMENTED: ICD-10-PCS | Mod: CPTII,S$GLB,, | Performed by: PHYSICIAN ASSISTANT

## 2022-05-09 PROCEDURE — 99214 PR OFFICE/OUTPT VISIT, EST, LEVL IV, 30-39 MIN: ICD-10-PCS | Mod: S$GLB,,, | Performed by: PHYSICIAN ASSISTANT

## 2022-05-09 PROCEDURE — 3074F PR MOST RECENT SYSTOLIC BLOOD PRESSURE < 130 MM HG: ICD-10-PCS | Mod: CPTII,S$GLB,, | Performed by: PHYSICIAN ASSISTANT

## 2022-05-09 PROCEDURE — 1160F RVW MEDS BY RX/DR IN RCRD: CPT | Mod: CPTII,S$GLB,, | Performed by: PHYSICIAN ASSISTANT

## 2022-05-09 PROCEDURE — 99214 OFFICE O/P EST MOD 30 MIN: CPT | Mod: S$GLB,,, | Performed by: PHYSICIAN ASSISTANT

## 2022-05-09 PROCEDURE — 3079F DIAST BP 80-89 MM HG: CPT | Mod: CPTII,S$GLB,, | Performed by: PHYSICIAN ASSISTANT

## 2022-05-09 PROCEDURE — 1159F MED LIST DOCD IN RCRD: CPT | Mod: CPTII,S$GLB,, | Performed by: PHYSICIAN ASSISTANT

## 2022-05-09 RX ORDER — TESTOSTERONE CYPIONATE 200 MG/ML
50 INJECTION, SOLUTION INTRAMUSCULAR
Status: COMPLETED | OUTPATIENT
Start: 2022-05-09 | End: 2022-09-07

## 2022-05-09 RX ORDER — METFORMIN HYDROCHLORIDE 500 MG/1
500 TABLET ORAL 2 TIMES DAILY WITH MEALS
Qty: 60 TABLET | Refills: 11 | Status: SHIPPED | OUTPATIENT
Start: 2022-05-09 | End: 2023-06-08 | Stop reason: SDUPTHER

## 2022-05-09 NOTE — PROGRESS NOTES
"Subjective:      Yolanda Norman is a 36 y.o. female who presents to discuss weight loss. History of breast cancer currently on Arimidex. S/p hyst/BSO. Noticed 10 pound weight gain since 11/2021. Reports that she has suppressed appetite due to gastroparesis. Tracking calories and consuming about 5463-9195 calories per day but still gaining weight. Actively trying to lose and not successful. Knows how to lose weight and eat, but no longer effective. Increased testosterone 50mg IM to Q21 days and no longer getting "dips" but still fatigued.   Denies constipation despite gastroparesis. Taking magnesium daily which helps.  She is scheduled for fasting lab at [facility] and would like to check A1c as well.    Sleep: Well. Getting 8+ hours a night     Stress: Normal stress levels    Exercise: 5 days a week with cardio and strength. Working with a .     A typical day consists of:  Breakfast: Skips or kind bar  Lunch: Salad or soup, sometimes slim fast shake  Dinner: Chicken or steak, sometimes protein bar  Snack: Fruit throughout the day  Beverages: Green tea, diet coke, "not enough water"      Lab Visit on 05/05/2022   Component Date Value Ref Range Status    Estradiol 05/05/2022 <10 (A) See Text pg/mL Final    TSH 05/05/2022 0.931  0.400 - 4.000 uIU/mL Final    WBC 05/05/2022 4.66  3.90 - 12.70 K/uL Final    RBC 05/05/2022 4.46  4.00 - 5.40 M/uL Final    Hemoglobin 05/05/2022 14.0  12.0 - 16.0 g/dL Final    Hematocrit 05/05/2022 40.4  37.0 - 48.5 % Final    MCV 05/05/2022 91  82 - 98 fL Final    MCH 05/05/2022 31.4 (A) 27.0 - 31.0 pg Final    MCHC 05/05/2022 34.7  32.0 - 36.0 g/dL Final    RDW 05/05/2022 11.8  11.5 - 14.5 % Final    Platelets 05/05/2022 233  150 - 450 K/uL Final    MPV 05/05/2022 8.9 (A) 9.2 - 12.9 fL Final    Immature Granulocytes 05/05/2022 0.2  0.0 - 0.5 % Final    Gran # (ANC) 05/05/2022 3.3  1.8 - 7.7 K/uL Final    Immature Grans (Abs) 05/05/2022 0.01  0.00 - 0.04 K/uL Final    " Lymph # 05/05/2022 0.9 (A) 1.0 - 4.8 K/uL Final    Mono # 05/05/2022 0.3  0.3 - 1.0 K/uL Final    Eos # 05/05/2022 0.1  0.0 - 0.5 K/uL Final    Baso # 05/05/2022 0.04  0.00 - 0.20 K/uL Final    nRBC 05/05/2022 0  0 /100 WBC Final    Gran % 05/05/2022 71.2  38.0 - 73.0 % Final    Lymph % 05/05/2022 18.9  18.0 - 48.0 % Final    Mono % 05/05/2022 6.9  4.0 - 15.0 % Final    Eosinophil % 05/05/2022 1.9  0.0 - 8.0 % Final    Basophil % 05/05/2022 0.9  0.0 - 1.9 % Final    Differential Method 05/05/2022 Automated   Final    Sodium 05/05/2022 141  136 - 145 mmol/L Final    Potassium 05/05/2022 4.4  3.5 - 5.1 mmol/L Final    Chloride 05/05/2022 109  95 - 110 mmol/L Final    CO2 05/05/2022 22 (A) 23 - 29 mmol/L Final    Glucose 05/05/2022 95  70 - 110 mg/dL Final    BUN 05/05/2022 14  6 - 20 mg/dL Final    Creatinine 05/05/2022 1.2  0.5 - 1.4 mg/dL Final    Calcium 05/05/2022 10.2  8.7 - 10.5 mg/dL Final    Total Protein 05/05/2022 7.9  6.0 - 8.4 g/dL Final    Albumin 05/05/2022 4.6  3.5 - 5.2 g/dL Final    Total Bilirubin 05/05/2022 0.4  0.1 - 1.0 mg/dL Final    Alkaline Phosphatase 05/05/2022 116  55 - 135 U/L Final    AST 05/05/2022 14  10 - 40 U/L Final    ALT 05/05/2022 14  10 - 44 U/L Final    Anion Gap 05/05/2022 10  8 - 16 mmol/L Final    eGFR if African American 05/05/2022 >60  >60 mL/min/1.73 m^2 Final    eGFR if non African American 05/05/2022 59 (A) >60 mL/min/1.73 m^2 Final    Testosterone, Free 05/05/2022 6  pg/mL Final       Past Medical History:   Diagnosis Date    Abnormal Pap smear of cervix 2009    ADHD     Allergy     seasonal    Anorexia     Anxiety     Asthma     BRCA1 positive 12/18/2020    Bulimia     Depression     Fever blister     Gastroparesis     GERD (gastroesophageal reflux disease)     History of posttraumatic stress disorder (PTSD)     Hypertension     Gestational Hypertension    Invasive ductal carcinoma of right breast 12/18/2019    Mastitis      Migraine headache     PONV (postoperative nausea and vomiting)     Status post mastectomy, bilateral 2020     Past Surgical History:   Procedure Laterality Date    BILATERAL MASTECTOMY Bilateral 2020    Procedure: MASTECTOMY, BILATERAL - BILATERAL SKIN SPARING MASTECTOMY;  Surgeon: Percy Ayers MD;  Location: [facility];  Service: General;  Laterality: Bilateral;    BIOPSY OF AXILLARY NODE Right 2020    Procedure: BIOPSY, LYMPH NODE, AXILLARY;  Surgeon: Percy Ayers MD;  Location: [facility];  Service: General;  Laterality: Right;    BONE MARROW ASPIRATION      x 3    BREAST SURGERY      CERVICAL BIOPSY  W/ LOOP ELECTRODE EXCISION       SECTION  2016     SECTION N/A 2019    Procedure:  SECTION;  Surgeon: Kirit Hernandez MD;  Location: [facility];  Service: OB/GYN;  Laterality: N/A;    COLPOSCOPY      ESOPHAGOGASTRODUODENOSCOPY N/A 2021    Procedure: EGD (ESOPHAGOGASTRODUODENOSCOPY);  Surgeon: Lj Santos MD;  [facility]  Service: Endoscopy;  Laterality: N/A;  C[facility]    ESOPHAGOGASTRODUODENOSCOPY N/A 2021    Procedure: ESOPHAGOGASTRODUODENOSCOPY (EGD);  Surgeon: Camila Wiley MD;  Location: [facility];  Service: Endoscopy;  Laterality: N/A;    INJECTION FOR SENTINEL NODE IDENTIFICATION Right 2020    Procedure: INJECTION, FOR SENTINEL NODE IDENTIFICATION;  Surgeon: Percy Ayers MD;  Location: [facility];  Service: General;  Laterality: Right;    INSERTION OF BREAST TISSUE EXPANDER Bilateral 2020    Procedure: INSERTION, TISSUE EXPANDER, BREAST;  Surgeon: Kiko Aranda MD;  Location: [facility];  Service: Plastics;  Laterality: Bilateral;    INSERTION OF TUNNELED CENTRAL VENOUS CATHETER (CVC) WITH SUBCUTANEOUS PORT Left 2019    Procedure: [id number]-Left neck or chest wall;  Surgeon: Percy Ayers MD;  [address]  Service: General;  Laterality:  Left;    MASTECTOMY      MEDIPORT REMOVAL N/A 2020    Procedure: REMOVAL, CATHETER, CENTRAL VENOUS, TUNNELED, WITH PORT;  Surgeon: Percy Ayers MD;  Location: [facility];  Service: General;  Laterality: N/A;    ROBOT-ASSISTED LAPAROSCOPIC ABDOMINAL HYSTERECTOMY USING DA HIMA XI N/A 2020    Procedure: XI ROBOTIC HYSTERECTOMY;  Surgeon: Emili Smith MD;  Location: [facility];  Service: OB/GYN;  Laterality: N/A;    ROBOT-ASSISTED LAPAROSCOPIC SALPINGO-OOPHORECTOMY USING DA HIMA XI Bilateral 2020    Procedure: XI ROBOTIC SALPINGO-OOPHORECTOMY;  Surgeon: Emili Smith MD;  Location: [facility];  Service: OB/GYN;  Laterality: Bilateral;    SHOULDER SURGERY Left     x 2    SINUS SURGERY      age 19    STOMACH SURGERY      TISSUE EXPANDER REMOVAL Right 10/3/2020    Procedure: REMOVAL, TISSUE EXPANDER;  Surgeon: Kiko Aranda MD;  Location: [facility];  Service: Plastics;  Laterality: Right;    TONSILLECTOMY      UPPER GASTROINTESTINAL ENDOSCOPY       Social History     Tobacco Use    Smoking status: Never Smoker    Smokeless tobacco: Never Used   Substance Use Topics    Alcohol use: Yes     Comment: socially    Drug use: No     Family History   Problem Relation Age of Onset    Cancer Maternal Grandmother 40        cervical    Cervical cancer Mother     Breast cancer Other     Ovarian cancer Other     Colon polyps Father     Colon cancer Neg Hx     Melanoma Neg Hx      OB History    Para Term  AB Living   2 2 2     2   SAB IAB Ectopic Multiple Live Births         0 2      # Outcome Date GA Lbr Bull/2nd Weight Sex Delivery Anes PTL Lv   2 Term 19 39w1d  3.43 kg (7 lb 9 oz) M CS-LTranv Spinal N JOLLY   1 Term 18 38w1d  2.96 kg (6 lb 8.4 oz) M CS-LTranv EPI N JOLLY      Complications: Failure to Progress in First Stage       Current Outpatient Medications:     anastrozole (ARIMIDEX) 1 mg Tab, Take 1 tablet (1 mg total) by mouth once daily., Disp: 30 tablet, Rfl: 11     cholecalciferol, vitamin D3, (VITAMIN D3) 25 mcg (1,000 unit) capsule, Take 1,000 Units by mouth 2 (two) times a day., Disp: , Rfl:     citalopram (CELEXA) 40 MG tablet, TAKE 1 TABLET(40 MG) BY MOUTH EVERY DAY, Disp: 30 tablet, Rfl: 2    clobetasoL (TEMOVATE) 0.05 % cream, Apply to affected area twice a day for 2 weeks then stop, Disp: 60 g, Rfl: 1    cranberry 500 mg Cap, Take by mouth., Disp: , Rfl:     dapsone (ACZONE) 7.5 % GlwP, Apply topically., Disp: , Rfl:     dextroamphetamine-amphetamine (ADDERALL XR) 30 MG 24 hr capsule, Take 1 capsule (30 mg total) by mouth every morning., Disp: 30 capsule, Rfl: 0    loratadine (CLARITIN) 10 mg tablet, Take 10 mg by mouth once daily., Disp: , Rfl:     magnesium 30 mg Tab, Take by mouth once., Disp: , Rfl:     metoclopramide HCl (REGLAN) 10 MG tablet, Take 1 tablet 3 (three) times daily before meals by mouth, Disp: 90 tablet, Rfl: 3    multivitamin (THERAGRAN) per tablet, Take 1 tablet by mouth once daily., Disp: , Rfl:     multivitamin with minerals (HAIR,SKIN AND NAILS ORAL), Take by mouth., Disp: , Rfl:     onabotulinumtoxinA (BOTOX INJ), Inject as directed. [id number], Disp: , Rfl:     prasterone, dhea, (INTRAROSA) 6.5 mg Inst, Place 6.5 mg vaginally every evening., Disp: 30 each, Rfl: 11    spironolactone (ALDACTONE) 100 MG tablet, take 1 tablet by mouth once daily, Disp: 30 tablet, Rfl: 2    sucralfate (CARAFATE) 1 gram tablet, Take 1 tablet (1 g total) by mouth 4 (four) times daily. Can be crushed if cannot swallow., Disp: 120 tablet, Rfl: 0    sumatriptan (IMITREX) 100 MG tablet, Take 1/2 to 1 tab at onset of headache.  If no improvement in 2 hours, take another.  Do not take more than 2 in 24 hours., Disp: 9 tablet, Rfl: 11    topiramate (TOPAMAX) 200 MG Tab, Take 1 tablet (200 mg total) by mouth every evening., Disp: 90 tablet, Rfl: 3    traZODone (DESYREL) 50 MG tablet, TAKE 1 TABLET(50 MG) BY MOUTH EVERY EVENING, Disp: 30 tablet, Rfl: 0     "tretinoin (ALTRALIN) 0.05 % gel, SMARTSIG:Sparingly Topical Every Night, Disp: , Rfl:     valACYclovir (VALTREX) 1000 MG tablet, Take 1 tablet (1,000 mg total) by mouth every 12 (twelve) hours., Disp: 60 tablet, Rfl: 0    vitamin E 100 UNIT capsule, Take 100 Units by mouth once daily., Disp: , Rfl:     ALPRAZolam (XANAX) 0.25 MG tablet, Take 1 tablet (0.25 mg total) by mouth daily as needed for Anxiety., Disp: 30 tablet, Rfl: 0    metFORMIN (GLUCOPHAGE) 500 MG tablet, Take 1 tablet (500 mg total) by mouth 2 (two) times daily with meals., Disp: 60 tablet, Rfl: 11    pantoprazole (PROTONIX) 40 MG tablet, Take 1 tablet (40 mg total) by mouth once daily., Disp: 90 tablet, Rfl: 3    Current Facility-Administered Medications:     onabotulinumtoxina injection 200 Units, 200 Units, Intramuscular, Q90 Days, Dillon Gonzalez MD, 200 Units at 06/25/21 0751    onabotulinumtoxina injection 200 Units, 200 Units, Intramuscular, q12 weeks, Dillon Gonzalez MD, 200 Units at 04/04/22 1501    Review of Systems:  General: No fever, chills, or weight loss.  Chest: No chest pain, shortness of breath, or palpitations.  Breast: No pain, masses, or nipple discharge.  Vulva: No pain, lesions, or itching.  Vagina: No relaxation, itching, discharge, or lesions.  Abdomen: No pain, nausea, vomiting, diarrhea, or constipation.  Urinary: No incontinence, nocturia, frequency, or dysuria.  Extremities:  No leg cramps, edema, or calf pain.  Neurologic: No headaches, dizziness, or visual changes.    Objective:     Vitals:    05/09/22 0929   BP: 113/89   Pulse: 75   Weight: 76.3 kg (168 lb 3.4 oz)   Height: 5' 5" (1.651 m)   PainSc: 0-No pain     Body mass index is 27.99 kg/m².    PHYSICAL EXAM:  APPEARANCE: Well nourished, well developed, in no acute distress.  AFFECT: WNL, alert and oriented x 3  SKIN: No acne or hirsutism  CHEST: Good respiratory effect    Assessment:      Metabolic syndrome/Overweight: Discussed body composition changes " with menopause and how this effects weight gain. BMR calculated at 1,463 calories per day. She needs to eat regular meals. Concerns that she is not eating enough and possibly some insulin resistance. Avoiding GLP-1 due to gastroparesis and do not want to suppress appetite. Can use Metformin to help but need to watch renal function. Needs to increase water intake.  -     metFORMIN (GLUCOPHAGE) 500 MG tablet; Take 1 tablet (500 mg total) by mouth 2 (two) times daily with meals.  Dispense: 60 tablet; Refill: 11  -     HEMOGLOBIN A1C; Future; Expected date: 05/09/2022    Screening for diabetes mellitus  -     HEMOGLOBIN A1C; Future; Expected date: 05/09/2022        Plan:   Encouraged 3 regular meals a day with lean protein with each meal.  Log in coretta with goal of 1250 calories a day (35% protein, 35% carbs (mostly vegetables/fruits), 30% fat)  Metformin 500mg QD with food.  Can increase to BID if tolerating well in 2 weeks.  Reviewed side effects and risks of medications.   Increase water intake and monitor renal function.  Continue regular workouts with cardio and strength.  WWE scheduled with Dr. Polanco.  Continue Testosterone 50mg IM [id number].  Follow up in 6 weeks.    Instructed patient to call if she experiences any side effects or has any questions.    I spent a total of 30 minutes on the day of the visit.This includes face to face time and non-face to face time preparing to see the patient (eg, review of tests), obtaining and/or reviewing separately obtained history, documenting clinical information in the electronic or other health record, independently interpreting results and communicating results to the patient/family/caregiver, or care coordinator.

## 2022-05-17 ENCOUNTER — PATIENT MESSAGE (OUTPATIENT)
Dept: PSYCHIATRY | Facility: CLINIC | Age: 37
End: 2022-05-17
Payer: COMMERCIAL

## 2022-05-17 DIAGNOSIS — F90.0 ADHD (ATTENTION DEFICIT HYPERACTIVITY DISORDER), INATTENTIVE TYPE: ICD-10-CM

## 2022-05-17 RX ORDER — DEXTROAMPHETAMINE SACCHARATE, AMPHETAMINE ASPARTATE MONOHYDRATE, DEXTROAMPHETAMINE SULFATE AND AMPHETAMINE SULFATE 7.5; 7.5; 7.5; 7.5 MG/1; MG/1; MG/1; MG/1
30 CAPSULE, EXTENDED RELEASE ORAL EVERY MORNING
Qty: 30 CAPSULE | Refills: 0 | Status: SHIPPED | OUTPATIENT
Start: 2022-05-17 | End: 2022-06-16 | Stop reason: SDUPTHER

## 2022-05-25 ENCOUNTER — CLINICAL SUPPORT (OUTPATIENT)
Dept: OBSTETRICS AND GYNECOLOGY | Facility: CLINIC | Age: 37
End: 2022-05-25
Payer: COMMERCIAL

## 2022-05-25 DIAGNOSIS — N95.1 MENOPAUSAL SYMPTOM: Primary | ICD-10-CM

## 2022-05-25 PROCEDURE — 96372 THER/PROPH/DIAG INJ SC/IM: CPT | Mod: S$GLB,,, | Performed by: PHYSICIAN ASSISTANT

## 2022-05-25 PROCEDURE — 96372 PR INJECTION,THERAP/PROPH/DIAG2ST, IM OR SUBCUT: ICD-10-PCS | Mod: S$GLB,,, | Performed by: PHYSICIAN ASSISTANT

## 2022-05-25 RX ADMIN — TESTOSTERONE CYPIONATE 50 MG: 200 INJECTION, SOLUTION INTRAMUSCULAR at 08:05

## 2022-05-25 NOTE — PROGRESS NOTES
Patient arrives today for her monthly hormone injection. 50 mg Testosterone injection today. Risks, benefits, side effects, administration, frequency and reasons warranting provider notification reviewed with patient. Patient verbalizes understanding.     50 mg Testosterone administered IM to right outer quadrant of gluteus using aseptic technique and 22 gauge 1.5 inch needle.     Site secured with Band-Aid, needle tip remains intact, patient tolerated well without pain.      Patient's next injection scheduled prior to clinic departure.    Dose 4 of 6     Metric Last Due   Estradiol 2/22    Free Testosterone 2/22    Progesterone (if applicable)     Well Woman Exam 8/20 Sched   Annual w/PCP  Sched   MMG 10/21        Recent/Upcoming Surgery or Admissions (last 30 days):  No   Recent Health Changes (last 30 days): No   Allergy Changes: No   Medication Changes: No   Family History Updates (message Kary for any family cancer updates):  No   Applicable Clearances Obtained: N/A   BP (if checked) under 180/100:  No

## 2022-05-27 ENCOUNTER — PATIENT MESSAGE (OUTPATIENT)
Dept: NEUROLOGY | Facility: CLINIC | Age: 37
End: 2022-05-27
Payer: COMMERCIAL

## 2022-05-27 ENCOUNTER — OFFICE VISIT (OUTPATIENT)
Dept: PSYCHIATRY | Facility: CLINIC | Age: 37
End: 2022-05-27
Payer: COMMERCIAL

## 2022-05-27 DIAGNOSIS — Z86.59 HISTORY OF POSTTRAUMATIC STRESS DISORDER (PTSD): Chronic | ICD-10-CM

## 2022-05-27 DIAGNOSIS — F90.0 ADHD (ATTENTION DEFICIT HYPERACTIVITY DISORDER), INATTENTIVE TYPE: ICD-10-CM

## 2022-05-27 DIAGNOSIS — F41.1 GENERALIZED ANXIETY DISORDER: Primary | Chronic | ICD-10-CM

## 2022-05-27 PROCEDURE — 99214 PR OFFICE/OUTPT VISIT, EST, LEVL IV, 30-39 MIN: ICD-10-PCS | Mod: 95,,, | Performed by: PSYCHIATRY & NEUROLOGY

## 2022-05-27 PROCEDURE — 99214 OFFICE O/P EST MOD 30 MIN: CPT | Mod: 95,,, | Performed by: PSYCHIATRY & NEUROLOGY
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
## 2022-05-30 ENCOUNTER — PATIENT MESSAGE (OUTPATIENT)
Dept: HEMATOLOGY/ONCOLOGY | Facility: CLINIC | Age: 37
End: 2022-05-30
Payer: COMMERCIAL

## 2022-06-02 LAB — TESTOST FREE SERPL DL<=1.0 NG/DL-MCNC: 7.5 PG/ML (ref 1.3–9.2)

## 2022-06-03 ENCOUNTER — PATIENT MESSAGE (OUTPATIENT)
Dept: PSYCHIATRY | Facility: CLINIC | Age: 37
End: 2022-06-03
Payer: COMMERCIAL

## 2022-06-03 ENCOUNTER — OFFICE VISIT (OUTPATIENT)
Dept: PRIMARY CARE CLINIC | Facility: CLINIC | Age: 37
End: 2022-06-03
Payer: COMMERCIAL

## 2022-06-03 DIAGNOSIS — R06.02 SHORTNESS OF BREATH: ICD-10-CM

## 2022-06-03 DIAGNOSIS — J20.9 ACUTE BRONCHITIS, UNSPECIFIED ORGANISM: Primary | ICD-10-CM

## 2022-06-03 DIAGNOSIS — R05.9 COUGH IN ADULT PATIENT: ICD-10-CM

## 2022-06-03 PROCEDURE — 1160F PR REVIEW ALL MEDS BY PRESCRIBER/CLIN PHARMACIST DOCUMENTED: ICD-10-PCS | Mod: CPTII,95,, | Performed by: NURSE PRACTITIONER

## 2022-06-03 PROCEDURE — 99213 OFFICE O/P EST LOW 20 MIN: CPT | Mod: 95,,, | Performed by: NURSE PRACTITIONER

## 2022-06-03 PROCEDURE — 1159F MED LIST DOCD IN RCRD: CPT | Mod: CPTII,95,, | Performed by: NURSE PRACTITIONER

## 2022-06-03 PROCEDURE — 1159F PR MEDICATION LIST DOCUMENTED IN MEDICAL RECORD: ICD-10-PCS | Mod: CPTII,95,, | Performed by: NURSE PRACTITIONER

## 2022-06-03 PROCEDURE — 99213 PR OFFICE/OUTPT VISIT, EST, LEVL III, 20-29 MIN: ICD-10-PCS | Mod: 95,,, | Performed by: NURSE PRACTITIONER

## 2022-06-03 PROCEDURE — 1160F RVW MEDS BY RX/DR IN RCRD: CPT | Mod: CPTII,95,, | Performed by: NURSE PRACTITIONER

## 2022-06-03 RX ORDER — ALBUTEROL SULFATE 1.25 MG/3ML
1.25 SOLUTION RESPIRATORY (INHALATION) EVERY 6 HOURS PRN
Qty: 75 ML | Refills: 0 | Status: SHIPPED | OUTPATIENT
Start: 2022-06-03 | End: 2022-06-03 | Stop reason: ALTCHOICE

## 2022-06-03 RX ORDER — BENZONATATE 200 MG/1
200 CAPSULE ORAL 3 TIMES DAILY PRN
Qty: 30 CAPSULE | Refills: 0 | Status: SHIPPED | OUTPATIENT
Start: 2022-06-03 | End: 2022-06-13

## 2022-06-03 RX ORDER — FLUTICASONE PROPIONATE 44 UG/1
1 AEROSOL, METERED RESPIRATORY (INHALATION) 2 TIMES DAILY
Qty: 10.6 G | Refills: 0 | Status: SHIPPED | OUTPATIENT
Start: 2022-06-03 | End: 2022-11-09

## 2022-06-03 RX ORDER — METRONIDAZOLE 500 MG/1
500 TABLET ORAL 2 TIMES DAILY
Qty: 10 TABLET | Refills: 0 | Status: SHIPPED | OUTPATIENT
Start: 2022-06-03 | End: 2022-06-08

## 2022-06-03 RX ORDER — ALBUTEROL SULFATE 90 UG/1
1-2 AEROSOL, METERED RESPIRATORY (INHALATION) EVERY 6 HOURS PRN
Qty: 18 G | Refills: 0 | Status: SHIPPED | OUTPATIENT
Start: 2022-06-03 | End: 2022-06-03 | Stop reason: ALTCHOICE

## 2022-06-03 NOTE — PROGRESS NOTES
The patient location is: [place].  The chief complaint leading to consultation is: cough and shortness of breath.    Visit type: audiovisual    Face to Face time with patient: 12  22 minutes of total time spent on the encounter, which includes face to face time and non-face to face time preparing to see the patient (eg, review of tests), Obtaining and/or reviewing separately obtained history, Documenting clinical information in the electronic or other health record, Independently interpreting results (not separately reported) and communicating results to the patient/family/caregiver, or Care coordination (not separately reported).         Each patient to whom he or she provides medical services by telemedicine is:  (1) informed of the relationship between the physician and patient and the respective role of any other health care provider with respect to management of the patient; and (2) notified that he or she may decline to receive medical services by telemedicine and may withdraw from such care at any time.    Notes:   Subjective:       Patient ID: Yolanda Norman is a 36 y.o. female.    Chief Complaint: No chief complaint on file.    Ms. Yolanda Norman is a 36 year old female, new to me, presents to the clinic with . PCP is Yolanda Norman. Medical and surgical history in addition to problem list reviewed as listed below.    Patient presents with a cough and shortness of breath over the past two weeks. She tested negative for Covid, Influenza and RSV in the past week. No OTC regimen.      Past Medical History:   Diagnosis Date    Abnormal Pap smear of cervix 2009    ADHD     Allergy     seasonal    Anorexia     Anxiety     Asthma     BRCA1 positive 12/18/2020    Bulimia     Depression     Fever blister     Gastroparesis     GERD (gastroesophageal reflux disease)     History of posttraumatic stress disorder (PTSD)     Hypertension     Gestational Hypertension    Invasive ductal carcinoma of right  breast 2019    Mastitis     Migraine headache     PONV (postoperative nausea and vomiting)     Status post mastectomy, bilateral 2020        Past Surgical History:   Procedure Laterality Date    BILATERAL MASTECTOMY Bilateral 2020    Procedure: MASTECTOMY, BILATERAL - BILATERAL SKIN SPARING MASTECTOMY;  Surgeon: Percy Ayers MD;  Location: [facility];  Service: General;  Laterality: Bilateral;    BIOPSY OF AXILLARY NODE Right 2020    Procedure: BIOPSY, LYMPH NODE, AXILLARY;  Surgeon: Percy Ayers MD;  Location: [facility];  Service: General;  Laterality: Right;    BONE MARROW ASPIRATION      x 3    BREAST SURGERY      CERVICAL BIOPSY  W/ LOOP ELECTRODE EXCISION       SECTION  2016     SECTION N/A 2019    Procedure:  SECTION;  Surgeon: Kirit Hernandez MD;  Location: [facility];  Service: OB/GYN;  Laterality: N/A;    COLPOSCOPY      ESOPHAGOGASTRODUODENOSCOPY N/A 2021    Procedure: EGD (ESOPHAGOGASTRODUODENOSCOPY);  Surgeon: Lj Santos MD;  Location: [address]);  Service: Endoscopy;  Laterality: N/A;  COVID test on 21 at [facility]    ESOPHAGOGASTRODUODENOSCOPY N/A 2021    Procedure: ESOPHAGOGASTRODUODENOSCOPY (EGD);  Surgeon: Camila Wiley MD;  Location: [facility];  Service: Endoscopy;  Laterality: N/A;    INJECTION FOR SENTINEL NODE IDENTIFICATION Right 2020    Procedure: INJECTION, FOR SENTINEL NODE IDENTIFICATION;  Surgeon: Percy Ayers MD;  Location: [facility];  Service: General;  Laterality: Right;    INSERTION OF BREAST TISSUE EXPANDER Bilateral 2020    Procedure: INSERTION, TISSUE EXPANDER, BREAST;  Surgeon: Kiko Aranda MD;  Location: [facility];  Service: Plastics;  Laterality: Bilateral;    INSERTION OF TUNNELED CENTRAL VENOUS CATHETER (CVC) WITH SUBCUTANEOUS PORT Left 2019    Procedure: [id number]-Left neck or chest wall;  Surgeon: Percy Ayers MD;  Location: [address]  "[facility];  Service: General;  Laterality: Left;    MASTECTOMY      MEDIPORT REMOVAL N/A 7/1/2020    Procedure: REMOVAL, CATHETER, CENTRAL VENOUS, TUNNELED, WITH PORT;  Surgeon: ePrcy Ayers MD;  Location: [facility];  Service: General;  Laterality: N/A;    ROBOT-ASSISTED LAPAROSCOPIC ABDOMINAL HYSTERECTOMY USING DA HIMA XI N/A 12/18/2020    Procedure: XI ROBOTIC HYSTERECTOMY;  Surgeon: Emili Smith MD;  Location: [facility];  Service: OB/GYN;  Laterality: N/A;    ROBOT-ASSISTED LAPAROSCOPIC SALPINGO-OOPHORECTOMY USING DA HIMA XI Bilateral 12/18/2020    Procedure: XI ROBOTIC SALPINGO-OOPHORECTOMY;  Surgeon: Emili Smith MD;  Location: [facility];  Service: OB/GYN;  Laterality: Bilateral;    SHOULDER SURGERY Left     x 2    SINUS SURGERY      age 19    STOMACH SURGERY      TISSUE EXPANDER REMOVAL Right 10/3/2020    Procedure: REMOVAL, TISSUE EXPANDER;  Surgeon: Kiko Aranda MD;  Location: [facility];  Service: Plastics;  Laterality: Right;    TONSILLECTOMY      UPPER GASTROINTESTINAL ENDOSCOPY          Family History   Problem Relation Age of Onset    Cancer Maternal Grandmother 40        cervical    Cervical cancer Mother     Breast cancer Other     Ovarian cancer Other     Colon polyps Father     Colon cancer Neg Hx     Melanoma Neg Hx        Social History     Tobacco Use   Smoking Status Never Smoker   Smokeless Tobacco Never Used       Social History     Social History Narrative    No longer working at [employer] as of 10/2020 as her son has been suffering with depression/anxiety       Review of patient's allergies indicates:   Allergen Reactions    Amoxicillin Hives    Penicillins Hives and Rash    Diazepam Anxiety and Other (See Comments)     "makes hyper"        Review of Systems   Constitutional: Negative for chills and fever.   HENT: Negative for congestion, postnasal drip, rhinorrhea, sinus pressure, sinus pain, sneezing and sore throat.    Respiratory: Positive for cough and shortness of " breath.    Gastrointestinal: Negative for nausea and vomiting.   Genitourinary: Negative.    Musculoskeletal: Negative.    Skin: Negative.    Neurological: Negative.    Psychiatric/Behavioral: Negative.          Objective:        There were no vitals filed for this visit.     Physical Exam      Limited physical examination due to nature of virtual/telemedicine visit.      Assessment:       1. Acute bronchitis, unspecified organism    2. Shortness of breath    3. Cough in adult patient        Plan:       Acute bronchitis, unspecified organism  -     metronidazole (FLAGYL) 500 MG tablet; Take 1 tablet (500 mg total) by mouth 2 (two) times a day. for 5 days  Dispense: 10 tablet; Refill: 0  -     benzonatate (TESSALON) 200 MG capsule; Take 1 capsule (200 mg total) by mouth 3 (three) times daily as needed for Cough.  Dispense: 30 capsule; Refill: 0  -     Discontinue: albuterol (PROVENTIL HFA) 90 mcg/actuation inhaler; Inhale 1-2 puffs into the lungs every 6 (six) hours as needed for Wheezing or Shortness of Breath. Rescue  Dispense: 18 g; Refill: 0  -     fluticasone propionate (FLOVENT HFA) 44 mcg/actuation inhaler; Inhale 1 puff into the lungs 2 (two) times daily. Controller  Dispense: 10.6 g; Refill: 0    Shortness of breath  -     Discontinue: albuterol (ACCUNEB) 1.25 mg/3 mL Nebu; Take 3 mLs (1.25 mg total) by nebulization every 6 (six) hours as needed (Wheezing, shortness of breath). Rescue  Dispense: 75 mL; Refill: 0  -       fluticasone propionate (FLOVENT HFA) 44 mcg/actuation inhaler; Inhale 1 puff into the lungs 2 (two) times daily. Controller  Dispense: 10.6 g; Refill: 0    Cough in adult patient  -     benzonatate (TESSALON) 200 MG capsule; Take 1 capsule (200 mg total) by mouth 3 (three) times daily as needed for Cough.  Dispense: 30 capsule; Refill: 0       Medication List with Changes/Refills   New Medications    BENZONATATE (TESSALON) 200 MG CAPSULE    Take 1 capsule (200 mg total) by mouth 3 (three)  times daily as needed for Cough.    FLUTICASONE PROPIONATE (FLOVENT HFA) 44 MCG/ACTUATION INHALER    Inhale 1 puff into the lungs 2 (two) times daily. Controller    METRONIDAZOLE (FLAGYL) 500 MG TABLET    Take 1 tablet (500 mg total) by mouth 2 (two) times a day. for 5 days   Current Medications    ALPRAZOLAM (XANAX) 0.25 MG TABLET    Take 1 tablet (0.25 mg total) by mouth daily as needed for Anxiety.    ANASTROZOLE (ARIMIDEX) 1 MG TAB    Take 1 tablet (1 mg total) by mouth once daily.    CHOLECALCIFEROL, VITAMIN D3, (VITAMIN D3) 25 MCG (1,000 UNIT) CAPSULE    Take 1,000 Units by mouth 2 (two) times a day.    CITALOPRAM (CELEXA) 40 MG TABLET    TAKE 1 TABLET(40 MG) BY MOUTH EVERY DAY    CLOBETASOL (TEMOVATE) 0.05 % CREAM    Apply to affected area twice a day for 2 weeks then stop    CRANBERRY 500 MG CAP    Take by mouth.    DAPSONE (ACZONE) 7.5 % GLWP    Apply topically.    DEXTROAMPHETAMINE-AMPHETAMINE (ADDERALL XR) 30 MG 24 HR CAPSULE    Take 1 capsule (30 mg total) by mouth every morning.    LORATADINE (CLARITIN) 10 MG TABLET    Take 10 mg by mouth once daily.    MAGNESIUM 30 MG TAB    Take by mouth once.    METFORMIN (GLUCOPHAGE) 500 MG TABLET    Take 1 tablet (500 mg total) by mouth 2 (two) times daily with meals.    METOCLOPRAMIDE HCL (REGLAN) 10 MG TABLET    Take 1 tablet 3 (three) times daily before meals by mouth    MULTIVITAMIN (THERAGRAN) PER TABLET    Take 1 tablet by mouth once daily.    MULTIVITAMIN WITH MINERALS (HAIR,SKIN AND NAILS ORAL)    Take by mouth.    ONABOTULINUMTOXINA (BOTOX INJ)    Inject as directed. [id number]    PANTOPRAZOLE (PROTONIX) 40 MG TABLET    Take 1 tablet by mouth daily by mouth    PRASTERONE, DHEA, (INTRAROSA) 6.5 MG INST    Place 6.5 mg vaginally every evening.    SPIRONOLACTONE (ALDACTONE) 100 MG TABLET    take 1 tablet by mouth once daily    SUCRALFATE (CARAFATE) 1 GRAM TABLET    Take 1 tablet (1 g total) by mouth 4 (four) times daily. Can be crushed if cannot swallow.     SUMATRIPTAN (IMITREX) 100 MG TABLET    Take 1/2 to 1 tab at onset of headache.  If no improvement in 2 hours, take another.  Do not take more than 2 in 24 hours.    TOPIRAMATE (TOPAMAX) 200 MG TAB    Take 1 tablet (200 mg total) by mouth every evening.    TRAZODONE (DESYREL) 50 MG TABLET    TAKE 1 TABLET(50 MG) BY MOUTH EVERY EVENING    TRETINOIN (ALTRALIN) 0.05 % GEL    SMARTSIG:Sparingly Topical Every Night    VALACYCLOVIR (VALTREX) 1000 MG TABLET    Take 1 tablet (1,000 mg total) by mouth every 12 (twelve) hours.    VITAMIN E 100 UNIT CAPSULE    Take 100 Units by mouth once daily.            Follow up if symptoms worsen or fail to improve.    Leti Kim APRN, MSN, FNP-C

## 2022-06-04 ENCOUNTER — PATIENT MESSAGE (OUTPATIENT)
Dept: HEMATOLOGY/ONCOLOGY | Facility: CLINIC | Age: 37
End: 2022-06-04
Payer: COMMERCIAL

## 2022-06-04 PROBLEM — R05.9 COUGH IN ADULT PATIENT: Status: ACTIVE | Noted: 2022-06-04

## 2022-06-04 PROBLEM — J20.9 ACUTE BRONCHITIS: Status: ACTIVE | Noted: 2022-06-04

## 2022-06-06 ENCOUNTER — PATIENT MESSAGE (OUTPATIENT)
Dept: HEMATOLOGY/ONCOLOGY | Facility: CLINIC | Age: 37
End: 2022-06-06
Payer: COMMERCIAL

## 2022-06-06 DIAGNOSIS — R05.9 COUGH: Primary | ICD-10-CM

## 2022-06-07 ENCOUNTER — PATIENT MESSAGE (OUTPATIENT)
Dept: HEMATOLOGY/ONCOLOGY | Facility: CLINIC | Age: 37
End: 2022-06-07
Payer: COMMERCIAL

## 2022-06-10 ENCOUNTER — PATIENT MESSAGE (OUTPATIENT)
Dept: HEMATOLOGY/ONCOLOGY | Facility: CLINIC | Age: 37
End: 2022-06-10
Payer: COMMERCIAL

## 2022-06-10 DIAGNOSIS — R05.9 COUGH: ICD-10-CM

## 2022-06-13 ENCOUNTER — PATIENT MESSAGE (OUTPATIENT)
Dept: NEUROLOGY | Facility: CLINIC | Age: 37
End: 2022-06-13
Payer: COMMERCIAL

## 2022-06-14 ENCOUNTER — PATIENT MESSAGE (OUTPATIENT)
Dept: NEUROLOGY | Facility: CLINIC | Age: 37
End: 2022-06-14
Payer: COMMERCIAL

## 2022-06-15 ENCOUNTER — CLINICAL SUPPORT (OUTPATIENT)
Dept: OBSTETRICS AND GYNECOLOGY | Facility: CLINIC | Age: 37
End: 2022-06-15
Payer: COMMERCIAL

## 2022-06-15 DIAGNOSIS — N95.1 MENOPAUSAL SYMPTOMS: Primary | ICD-10-CM

## 2022-06-15 PROCEDURE — 99999 PR PBB SHADOW E&M-EST. PATIENT-LVL I: ICD-10-PCS | Mod: PBBFAC,,,

## 2022-06-15 PROCEDURE — 96372 THER/PROPH/DIAG INJ SC/IM: CPT | Mod: S$GLB,,, | Performed by: PHYSICIAN ASSISTANT

## 2022-06-15 PROCEDURE — 99999 PR PBB SHADOW E&M-EST. PATIENT-LVL I: CPT | Mod: PBBFAC,,,

## 2022-06-15 PROCEDURE — 96372 PR INJECTION,THERAP/PROPH/DIAG2ST, IM OR SUBCUT: ICD-10-PCS | Mod: S$GLB,,, | Performed by: PHYSICIAN ASSISTANT

## 2022-06-15 RX ADMIN — TESTOSTERONE CYPIONATE 50 MG: 200 INJECTION, SOLUTION INTRAMUSCULAR at 09:06

## 2022-06-15 NOTE — PROGRESS NOTES
Patient arrives today for her monthly hormone injection. Risks, benefits, side effects, administration, frequency and reasons warranting provider notification reviewed with patient. Patient verbalizes understanding.     50 mg Testosterone administered IM to right outer quadrant of gluteus using aseptic technique and 22 gauge 1.5 inch needle.     Site secured with Band-Aid, needle tip remains intact, patient tolerated well without pain.      Patient's next injections scheduled prior to clinic departure.        Metric Last Due   Estradiol 5/22 11/22   Free Testosterone 5/22 11/22   Progesterone (if applicable)     Well Woman Exam     Annual w/PCP  Sched            Recent/Upcoming Surgery or Admissions (last 30 days):  No   Recent Health Changes (last 30 days): No   Allergy Changes: No   Medication Changes: No   Family History Updates (message Kary for any family cancer updates):  No   Applicable Clearances Obtained: Yes   BP (if checked) under 180/100:  No

## 2022-06-16 DIAGNOSIS — F90.0 ADHD (ATTENTION DEFICIT HYPERACTIVITY DISORDER), INATTENTIVE TYPE: ICD-10-CM

## 2022-06-16 RX ORDER — DEXTROAMPHETAMINE SACCHARATE, AMPHETAMINE ASPARTATE MONOHYDRATE, DEXTROAMPHETAMINE SULFATE AND AMPHETAMINE SULFATE 7.5; 7.5; 7.5; 7.5 MG/1; MG/1; MG/1; MG/1
30 CAPSULE, EXTENDED RELEASE ORAL EVERY MORNING
Qty: 30 CAPSULE | Refills: 0 | Status: SHIPPED | OUTPATIENT
Start: 2022-06-16 | End: 2022-07-12 | Stop reason: SDUPTHER

## 2022-06-24 ENCOUNTER — OFFICE VISIT (OUTPATIENT)
Dept: OBSTETRICS AND GYNECOLOGY | Facility: CLINIC | Age: 37
End: 2022-06-24
Payer: COMMERCIAL

## 2022-06-24 VITALS
DIASTOLIC BLOOD PRESSURE: 85 MMHG | HEIGHT: 65 IN | SYSTOLIC BLOOD PRESSURE: 119 MMHG | WEIGHT: 159.63 LBS | BODY MASS INDEX: 26.6 KG/M2

## 2022-06-24 DIAGNOSIS — B37.31 VAGINAL YEAST INFECTION: Primary | ICD-10-CM

## 2022-06-24 DIAGNOSIS — E66.3 OVERWEIGHT (BMI 25.0-29.9): ICD-10-CM

## 2022-06-24 PROCEDURE — 99999 PR PBB SHADOW E&M-EST. PATIENT-LVL V: CPT | Mod: PBBFAC,,, | Performed by: PHYSICIAN ASSISTANT

## 2022-06-24 PROCEDURE — 1159F MED LIST DOCD IN RCRD: CPT | Mod: CPTII,S$GLB,, | Performed by: PHYSICIAN ASSISTANT

## 2022-06-24 PROCEDURE — 99213 OFFICE O/P EST LOW 20 MIN: CPT | Mod: S$GLB,,, | Performed by: PHYSICIAN ASSISTANT

## 2022-06-24 PROCEDURE — 99213 PR OFFICE/OUTPT VISIT, EST, LEVL III, 20-29 MIN: ICD-10-PCS | Mod: S$GLB,,, | Performed by: PHYSICIAN ASSISTANT

## 2022-06-24 PROCEDURE — 99999 PR PBB SHADOW E&M-EST. PATIENT-LVL V: ICD-10-PCS | Mod: PBBFAC,,, | Performed by: PHYSICIAN ASSISTANT

## 2022-06-24 PROCEDURE — 3074F PR MOST RECENT SYSTOLIC BLOOD PRESSURE < 130 MM HG: ICD-10-PCS | Mod: CPTII,S$GLB,, | Performed by: PHYSICIAN ASSISTANT

## 2022-06-24 PROCEDURE — 3074F SYST BP LT 130 MM HG: CPT | Mod: CPTII,S$GLB,, | Performed by: PHYSICIAN ASSISTANT

## 2022-06-24 PROCEDURE — 1160F RVW MEDS BY RX/DR IN RCRD: CPT | Mod: CPTII,S$GLB,, | Performed by: PHYSICIAN ASSISTANT

## 2022-06-24 PROCEDURE — 1159F PR MEDICATION LIST DOCUMENTED IN MEDICAL RECORD: ICD-10-PCS | Mod: CPTII,S$GLB,, | Performed by: PHYSICIAN ASSISTANT

## 2022-06-24 PROCEDURE — 3079F PR MOST RECENT DIASTOLIC BLOOD PRESSURE 80-89 MM HG: ICD-10-PCS | Mod: CPTII,S$GLB,, | Performed by: PHYSICIAN ASSISTANT

## 2022-06-24 PROCEDURE — 3008F BODY MASS INDEX DOCD: CPT | Mod: CPTII,S$GLB,, | Performed by: PHYSICIAN ASSISTANT

## 2022-06-24 PROCEDURE — 3008F PR BODY MASS INDEX (BMI) DOCUMENTED: ICD-10-PCS | Mod: CPTII,S$GLB,, | Performed by: PHYSICIAN ASSISTANT

## 2022-06-24 PROCEDURE — 3079F DIAST BP 80-89 MM HG: CPT | Mod: CPTII,S$GLB,, | Performed by: PHYSICIAN ASSISTANT

## 2022-06-24 PROCEDURE — 1160F PR REVIEW ALL MEDS BY PRESCRIBER/CLIN PHARMACIST DOCUMENTED: ICD-10-PCS | Mod: CPTII,S$GLB,, | Performed by: PHYSICIAN ASSISTANT

## 2022-06-24 RX ORDER — FLUCONAZOLE 150 MG/1
150 TABLET ORAL DAILY
Qty: 3 TABLET | Refills: 0 | Status: SHIPPED | OUTPATIENT
Start: 2022-06-24 | End: 2022-07-02

## 2022-06-24 NOTE — PROGRESS NOTES
Subjective:      Yolanda Norman is a 36 y.o. female who presents for 6 week follow up for weight loss. History of right breast cancer s/p bilateral mastectomy on July 1, 2020 and currently on Airmidex. She is also s/p hyst/BSO. GettingTestosterone 50mg IM [id number]. She has lost 9 pounds since last seen. Taking Metformin 500mg BID and has helped with gastroparesis. Discussed this with GI as well. Getting protein with every meal. Was logging daily but eats the same thing most days. Sticking to [id number] calories. If she exercises she increases to closer to 1500 calories.  Recent antibiotic for cough/sinus infection. Developed vulvar irritation. Treated with otc Monistat-1 that helped but did not completely resolve. Denies discharge. Requesting tx. Refusing exam today.    Lab Visit on 05/05/2022   Component Date Value Ref Range Status    Estradiol 05/05/2022 <10 (A) See Text pg/mL Final    TSH 05/05/2022 0.931  0.400 - 4.000 uIU/mL Final    WBC 05/05/2022 4.66  3.90 - 12.70 K/uL Final    RBC 05/05/2022 4.46  4.00 - 5.40 M/uL Final    Hemoglobin 05/05/2022 14.0  12.0 - 16.0 g/dL Final    Hematocrit 05/05/2022 40.4  37.0 - 48.5 % Final    MCV 05/05/2022 91  82 - 98 fL Final    MCH 05/05/2022 31.4 (A) 27.0 - 31.0 pg Final    MCHC 05/05/2022 34.7  32.0 - 36.0 g/dL Final    RDW 05/05/2022 11.8  11.5 - 14.5 % Final    Platelets 05/05/2022 233  150 - 450 K/uL Final    MPV 05/05/2022 8.9 (A) 9.2 - 12.9 fL Final    Immature Granulocytes 05/05/2022 0.2  0.0 - 0.5 % Final    Gran # (ANC) 05/05/2022 3.3  1.8 - 7.7 K/uL Final    Immature Grans (Abs) 05/05/2022 0.01  0.00 - 0.04 K/uL Final    Lymph # 05/05/2022 0.9 (A) 1.0 - 4.8 K/uL Final    Mono # 05/05/2022 0.3  0.3 - 1.0 K/uL Final    Eos # 05/05/2022 0.1  0.0 - 0.5 K/uL Final    Baso # 05/05/2022 0.04  0.00 - 0.20 K/uL Final    nRBC 05/05/2022 0  0 /100 WBC Final    Gran % 05/05/2022 71.2  38.0 - 73.0 % Final    Lymph % 05/05/2022 18.9  18.0 - 48.0 %  Final    Mono % 05/05/2022 6.9  4.0 - 15.0 % Final    Eosinophil % 05/05/2022 1.9  0.0 - 8.0 % Final    Basophil % 05/05/2022 0.9  0.0 - 1.9 % Final    Differential Method 05/05/2022 Automated   Final    Sodium 05/05/2022 141  136 - 145 mmol/L Final    Potassium 05/05/2022 4.4  3.5 - 5.1 mmol/L Final    Chloride 05/05/2022 109  95 - 110 mmol/L Final    CO2 05/05/2022 22 (A) 23 - 29 mmol/L Final    Glucose 05/05/2022 95  70 - 110 mg/dL Final    BUN 05/05/2022 14  6 - 20 mg/dL Final    Creatinine 05/05/2022 1.2  0.5 - 1.4 mg/dL Final    Calcium 05/05/2022 10.2  8.7 - 10.5 mg/dL Final    Total Protein 05/05/2022 7.9  6.0 - 8.4 g/dL Final    Albumin 05/05/2022 4.6  3.5 - 5.2 g/dL Final    Total Bilirubin 05/05/2022 0.4  0.1 - 1.0 mg/dL Final    Alkaline Phosphatase 05/05/2022 116  55 - 135 U/L Final    AST 05/05/2022 14  10 - 40 U/L Final    ALT 05/05/2022 14  10 - 44 U/L Final    Anion Gap 05/05/2022 10  8 - 16 mmol/L Final    eGFR if African American 05/05/2022 >60  >60 mL/min/1.73 m^2 Final    eGFR if non African American 05/05/2022 59 (A) >60 mL/min/1.73 m^2 Final    Testosterone, Free 05/05/2022 7.5  1.3 - 9.2 pg/mL Final       Past Medical History:   Diagnosis Date    Abnormal Pap smear of cervix 2009    ADHD     Allergy     seasonal    Anorexia     Anxiety     Asthma     BRCA1 positive 12/18/2020    Bulimia     Depression     Fever blister     Gastroparesis     GERD (gastroesophageal reflux disease)     History of posttraumatic stress disorder (PTSD)     Hypertension     Gestational Hypertension    Invasive ductal carcinoma of right breast 12/18/2019    Mastitis     Migraine headache     PONV (postoperative nausea and vomiting)     Status post mastectomy, bilateral 7/29/2020     Past Surgical History:   Procedure Laterality Date    BILATERAL MASTECTOMY Bilateral 7/1/2020    Procedure: MASTECTOMY, BILATERAL - BILATERAL SKIN SPARING MASTECTOMY;  Surgeon: Percy Ayers,  MD;  Location: [facility];  Service: General;  Laterality: Bilateral;    BIOPSY OF AXILLARY NODE Right 2020    Procedure: BIOPSY, LYMPH NODE, AXILLARY;  Surgeon: Percy Ayers MD;  Location: [facility];  Service: General;  Laterality: Right;    BONE MARROW ASPIRATION      x 3    BREAST SURGERY      CERVICAL BIOPSY  W/ LOOP ELECTRODE EXCISION       SECTION  2016     SECTION N/A 2019    Procedure:  SECTION;  Surgeon: Kirit Hernandez MD;  Location: [facility];  Service: OB/GYN;  Laterality: N/A;    COLPOSCOPY      ESOPHAGOGASTRODUODENOSCOPY N/A 2021    Procedure: EGD (ESOPHAGOGASTRODUODENOSCOPY);  Surgeon: Lj Santos MD;  Location: [facility] (4TH FLR);  Service: Endoscopy;  Laterality: N/A;  COVID test on 21 at [facility]    ESOPHAGOGASTRODUODENOSCOPY N/A 2021    Procedure: ESOPHAGOGASTRODUODENOSCOPY (EGD);  Surgeon: Camila Wiley MD;  Location: [facility];  Service: Endoscopy;  Laterality: N/A;    INJECTION FOR SENTINEL NODE IDENTIFICATION Right 2020    Procedure: INJECTION, FOR SENTINEL NODE IDENTIFICATION;  Surgeon: Percy Ayers MD;  Location: [facility];  Service: General;  Laterality: Right;    INSERTION OF BREAST TISSUE EXPANDER Bilateral 2020    Procedure: INSERTION, TISSUE EXPANDER, BREAST;  Surgeon: Kiko Aranda MD;  Location: [facility];  Service: Plastics;  Laterality: Bilateral;    INSERTION OF TUNNELED CENTRAL VENOUS CATHETER (CVC) WITH SUBCUTANEOUS PORT Left 2019    Procedure: [id number]-Left neck or chest wall;  Surgeon: Percy Ayers MD;  Location: [facility] 2ND FLR;  Service: General;  Laterality: Left;    MASTECTOMY      MEDIPORT REMOVAL N/A 2020    Procedure: REMOVAL, CATHETER, CENTRAL VENOUS, TUNNELED, WITH PORT;  Surgeon: Percy Ayers MD;  Location: [facility];  Service: General;  Laterality: N/A;    ROBOT-ASSISTED LAPAROSCOPIC ABDOMINAL HYSTERECTOMY USING DA HIMA XI N/A 2020     Procedure: XI ROBOTIC HYSTERECTOMY;  Surgeon: Emili Smith MD;  Location: [facility];  Service: OB/GYN;  Laterality: N/A;    ROBOT-ASSISTED LAPAROSCOPIC SALPINGO-OOPHORECTOMY USING DA HIMA XI Bilateral 2020    Procedure: XI ROBOTIC SALPINGO-OOPHORECTOMY;  Surgeon: Emili Smith MD;  Location: [facility];  Service: OB/GYN;  Laterality: Bilateral;    SHOULDER SURGERY Left     x 2    SINUS SURGERY      age 19    STOMACH SURGERY      TISSUE EXPANDER REMOVAL Right 10/3/2020    Procedure: REMOVAL, TISSUE EXPANDER;  Surgeon: Kiko Aranda MD;  Location: [facility];  Service: Plastics;  Laterality: Right;    TONSILLECTOMY      UPPER GASTROINTESTINAL ENDOSCOPY       Social History     Tobacco Use    Smoking status: Never Smoker    Smokeless tobacco: Never Used   Substance Use Topics    Alcohol use: Yes     Comment: socially    Drug use: No     Family History   Problem Relation Age of Onset    Cancer Maternal Grandmother 40        cervical    Cervical cancer Mother     Breast cancer Other     Ovarian cancer Other     Colon polyps Father     Colon cancer Neg Hx     Melanoma Neg Hx      OB History    Para Term  AB Living   2 2 2     2   SAB IAB Ectopic Multiple Live Births         0 2      # Outcome Date GA Lbr Bull/2nd Weight Sex Delivery Anes PTL Lv   2 Term 21 39w1d  3.43 kg (7 lb 9 oz) M CS-LTranv Spinal N JOLLY   1 Term 18 38w1d  2.96 kg (6 lb 8.4 oz) M CS-LTranv EPI N JOLLY      Complications: Failure to Progress in First Stage       Current Outpatient Medications:     ALPRAZolam (XANAX) 0.25 MG tablet, Take 1 tablet (0.25 mg total) by mouth daily as needed for Anxiety., Disp: 30 tablet, Rfl: 0    anastrozole (ARIMIDEX) 1 mg Tab, Take 1 tablet (1 mg total) by mouth once daily., Disp: 30 tablet, Rfl: 11    cholecalciferol, vitamin D3, (VITAMIN D3) 25 mcg (1,000 unit) capsule, Take 1,000 Units by mouth 2 (two) times a day., Disp: , Rfl:     citalopram (CELEXA) 40 MG tablet,  TAKE 1 TABLET(40 MG) BY MOUTH EVERY DAY, Disp: 30 tablet, Rfl: 2    clobetasoL (TEMOVATE) 0.05 % cream, Apply to affected area twice a day for 2 weeks then stop, Disp: 60 g, Rfl: 1    cranberry 500 mg Cap, Take by mouth., Disp: , Rfl:     dapsone (ACZONE) 7.5 % GlwP, Apply topically., Disp: , Rfl:     dextroamphetamine-amphetamine (ADDERALL XR) 30 MG 24 hr capsule, Take 1 capsule (30 mg total) by mouth every morning., Disp: 30 capsule, Rfl: 0    fluconazole (DIFLUCAN) 150 MG Tab, Take 1 tablet (150 mg total) by mouth once daily. for 1 day, Disp: 3 tablet, Rfl: 0    fluticasone propionate (FLOVENT HFA) 44 mcg/actuation inhaler, Inhale 1 puff into the lungs 2 (two) times daily. Controller, Disp: 10.6 g, Rfl: 0    loratadine (CLARITIN) 10 mg tablet, Take 10 mg by mouth once daily., Disp: , Rfl:     magnesium 30 mg Tab, Take by mouth once., Disp: , Rfl:     metFORMIN (GLUCOPHAGE) 500 MG tablet, Take 1 tablet (500 mg total) by mouth 2 (two) times daily with meals., Disp: 60 tablet, Rfl: 11    metoclopramide HCl (REGLAN) 10 MG tablet, Take 1 tablet 3 (three) times daily before meals by mouth, Disp: 90 tablet, Rfl: 3    multivitamin (THERAGRAN) per tablet, Take 1 tablet by mouth once daily., Disp: , Rfl:     multivitamin with minerals (HAIR,SKIN AND NAILS ORAL), Take by mouth., Disp: , Rfl:     onabotulinumtoxinA (BOTOX INJ), Inject as directed. [id number], Disp: , Rfl:     pantoprazole (PROTONIX) 40 MG tablet, Take 1 tablet by mouth daily by mouth, Disp: 30 tablet, Rfl: 0    prasterone, dhea, (INTRAROSA) 6.5 mg Inst, Place 6.5 mg vaginally every evening., Disp: 30 each, Rfl: 11    spironolactone (ALDACTONE) 100 MG tablet, take 1 tablet by mouth once daily, Disp: 30 tablet, Rfl: 2    sucralfate (CARAFATE) 1 gram tablet, Take 1 tablet (1 g total) by mouth 4 (four) times daily. Can be crushed if cannot swallow., Disp: 120 tablet, Rfl: 0    sumatriptan (IMITREX) 100 MG tablet, Take 1/2 to 1 tab at onset of  "headache.  If no improvement in 2 hours, take another.  Do not take more than 2 in 24 hours., Disp: 9 tablet, Rfl: 11    topiramate (TOPAMAX) 200 MG Tab, Take 1 tablet (200 mg total) by mouth every evening., Disp: 90 tablet, Rfl: 3    traZODone (DESYREL) 50 MG tablet, TAKE 1 TABLET(50 MG) BY MOUTH EVERY EVENING, Disp: 30 tablet, Rfl: 0    tretinoin (ALTRALIN) 0.05 % gel, SMARTSIG:Sparingly Topical Every Night, Disp: , Rfl:     valACYclovir (VALTREX) 1000 MG tablet, Take 1 tablet (1,000 mg total) by mouth every 12 (twelve) hours., Disp: 60 tablet, Rfl: 0    vitamin E 100 UNIT capsule, Take 100 Units by mouth once daily., Disp: , Rfl:     Current Facility-Administered Medications:     onabotulinumtoxina injection 200 Units, 200 Units, Intramuscular, Q90 Days, Dillon Gonzalez MD, 200 Units at 06/25/21 0751    onabotulinumtoxina injection 200 Units, 200 Units, Intramuscular, q12 weeks, Dillon Gonzalez MD, 200 Units at 04/04/22 1501    testosterone cypionate injection 50 mg, 50 mg, Intramuscular, Q21 Days, Debbie Cuenca PA-C, 50 mg at 06/15/22 0903    The ASCVD Risk score (Denver LILLY Jr., et al., 2013) failed to calculate for the following reasons:    The 2013 ASCVD risk score is only valid for ages 40 to 79    Review of Systems:  General: No fever, chills.  Chest: No chest pain, shortness of breath, or palpitations.  Breast: No pain, masses, or nipple discharge.  Vulva: No pain, lesions. +irritation  Vagina: No relaxation, itching, discharge, or lesions.  Abdomen: No pain, nausea, vomiting, diarrhea, or constipation.  Urinary: No incontinence, nocturia, frequency, or dysuria.  Extremities:  No leg cramps, edema, or calf pain.  Neurologic: No headaches, dizziness, or visual changes.    Objective:     Vitals:    06/24/22 0818   BP: 119/85   Weight: 72.4 kg (159 lb 9.6 oz)   Height: 5' 5" (1.651 m)   PainSc: 0-No pain     Body mass index is 26.56 kg/m².    PHYSICAL EXAM:  APPEARANCE: Well nourished, well " developed, in no acute distress.  AFFECT: WNL, alert and oriented x 3      Assessment:      Vaginal yeast infection  -     fluconazole (DIFLUCAN) 150 MG Tab; Take 1 tablet (150 mg total) by mouth once daily. for 1 day  Dispense: 3 tablet; Refill: 0    Overweight (BMI 25.0-29.9)        Plan:   Continue 3 regular meals a day with lean protein with each meal.  Log in coretta with goal of 1250 calories a day (35% protein, 35% carbs (mostly vegetables/fruits), 30% fat)  Metformin 500mg BID with food  Continue regular workouts with cardio and strength.    Diflucan for yeast infection.  She understands if symptoms fail to improve, will need follow up with exam.    Follow up in 3 months or sooner PRN.  Instructed patient to call if she experiences any side effects or has any questions.    I spent a total o 25  minutes on the day of the visit.This includes face to face time and non-face to face time preparing to see the patient (eg, review of tests), obtaining and/or reviewing separately obtained history, documenting clinical information in the electronic or other health record, independently interpreting results and communicating results to the patient/family/caregiver, or care coordinator.

## 2022-06-29 NOTE — PROGRESS NOTES
Patient arrives today for her monthly hormone injection. Risks, benefits, side effects, administration, frequency and reasons warranting provider notification reviewed with patient. Patient verbalizes understanding.     50 mg Testosterone administered IM to left outer quadrant of gluteus using aseptic technique and 22 gauge 1.5 inch needle.     Site secured with Band-Aid, needle tip remains intact, patient tolerated well without pain.      Patient's next injections scheduled prior to clinic departure.           Metric Last Due   Estradiol 5/22 11/22   Free Testosterone 5/22 11/22   Progesterone (if applicable)       Well Woman Exam       Annual w/PCP   Sched                 Recent/Upcoming Surgery or Admissions (last 30 days):  No   Recent Health Changes (last 30 days): No   Allergy Changes: No   Medication Changes: No   Family History Updates (message Kary for any family cancer updates):  No   Applicable Clearances Obtained: Yes   BP (if checked) under 180/100:  No

## 2022-07-06 ENCOUNTER — CLINICAL SUPPORT (OUTPATIENT)
Dept: OBSTETRICS AND GYNECOLOGY | Facility: CLINIC | Age: 37
End: 2022-07-06
Payer: COMMERCIAL

## 2022-07-06 DIAGNOSIS — N95.1 MENOPAUSAL SYMPTOMS: Primary | ICD-10-CM

## 2022-07-06 PROCEDURE — 96372 PR INJECTION,THERAP/PROPH/DIAG2ST, IM OR SUBCUT: ICD-10-PCS | Mod: S$GLB,,, | Performed by: PHYSICIAN ASSISTANT

## 2022-07-06 PROCEDURE — 99999 PR PBB SHADOW E&M-EST. PATIENT-LVL II: CPT | Mod: PBBFAC,,,

## 2022-07-06 PROCEDURE — 99999 PR PBB SHADOW E&M-EST. PATIENT-LVL II: ICD-10-PCS | Mod: PBBFAC,,,

## 2022-07-06 PROCEDURE — 96372 THER/PROPH/DIAG INJ SC/IM: CPT | Mod: S$GLB,,, | Performed by: PHYSICIAN ASSISTANT

## 2022-07-06 RX ADMIN — TESTOSTERONE CYPIONATE 50 MG: 200 INJECTION, SOLUTION INTRAMUSCULAR at 08:07

## 2022-07-06 NOTE — PROGRESS NOTES
Here for hormone therapy injection, no complaints at this time, Injection given as ordered, tolerated well, no report of pain prior to or after injection. Return to clinic as scheduled.     Site - [facility]    Testosterone 50 mg      Clinic Supplied Medication

## 2022-07-12 DIAGNOSIS — F90.0 ADHD (ATTENTION DEFICIT HYPERACTIVITY DISORDER), INATTENTIVE TYPE: ICD-10-CM

## 2022-07-12 RX ORDER — DEXTROAMPHETAMINE SACCHARATE, AMPHETAMINE ASPARTATE MONOHYDRATE, DEXTROAMPHETAMINE SULFATE AND AMPHETAMINE SULFATE 7.5; 7.5; 7.5; 7.5 MG/1; MG/1; MG/1; MG/1
30 CAPSULE, EXTENDED RELEASE ORAL EVERY MORNING
Qty: 30 CAPSULE | Refills: 0 | Status: SHIPPED | OUTPATIENT
Start: 2022-07-12 | End: 2022-08-17 | Stop reason: SDUPTHER

## 2022-07-13 ENCOUNTER — OFFICE VISIT (OUTPATIENT)
Dept: FAMILY MEDICINE | Facility: CLINIC | Age: 37
End: 2022-07-13
Attending: FAMILY MEDICINE
Payer: COMMERCIAL

## 2022-07-13 ENCOUNTER — PROCEDURE VISIT (OUTPATIENT)
Dept: NEUROLOGY | Facility: CLINIC | Age: 37
End: 2022-07-13
Payer: COMMERCIAL

## 2022-07-13 VITALS
HEIGHT: 65 IN | WEIGHT: 157.06 LBS | BODY MASS INDEX: 26.17 KG/M2 | HEART RATE: 111 BPM | SYSTOLIC BLOOD PRESSURE: 115 MMHG | DIASTOLIC BLOOD PRESSURE: 89 MMHG

## 2022-07-13 VITALS — WEIGHT: 160 LBS | HEIGHT: 65 IN | TEMPERATURE: 99 F | BODY MASS INDEX: 26.66 KG/M2

## 2022-07-13 DIAGNOSIS — J01.00 ACUTE NON-RECURRENT MAXILLARY SINUSITIS: Primary | ICD-10-CM

## 2022-07-13 DIAGNOSIS — G43.E09 CHRONIC MIGRAINE WITH AURA: Primary | ICD-10-CM

## 2022-07-13 PROCEDURE — 3008F PR BODY MASS INDEX (BMI) DOCUMENTED: ICD-10-PCS | Mod: CPTII,95,, | Performed by: FAMILY MEDICINE

## 2022-07-13 PROCEDURE — 99213 OFFICE O/P EST LOW 20 MIN: CPT | Mod: 95,,, | Performed by: FAMILY MEDICINE

## 2022-07-13 PROCEDURE — 3008F BODY MASS INDEX DOCD: CPT | Mod: CPTII,95,, | Performed by: FAMILY MEDICINE

## 2022-07-13 PROCEDURE — 64615 PR CHEMODENERVATION OF MUSCLE FOR CHRONIC MIGRAINE: ICD-10-PCS | Mod: S$GLB,,, | Performed by: PSYCHIATRY & NEUROLOGY

## 2022-07-13 PROCEDURE — 99499 UNLISTED E&M SERVICE: CPT | Mod: S$GLB,,, | Performed by: PSYCHIATRY & NEUROLOGY

## 2022-07-13 PROCEDURE — 99213 PR OFFICE/OUTPT VISIT, EST, LEVL III, 20-29 MIN: ICD-10-PCS | Mod: 95,,, | Performed by: FAMILY MEDICINE

## 2022-07-13 PROCEDURE — 64615 CHEMODENERV MUSC MIGRAINE: CPT | Mod: S$GLB,,, | Performed by: PSYCHIATRY & NEUROLOGY

## 2022-07-13 PROCEDURE — 99499 NO LOS: ICD-10-PCS | Mod: S$GLB,,, | Performed by: PSYCHIATRY & NEUROLOGY

## 2022-07-13 RX ORDER — AZITHROMYCIN 250 MG/1
TABLET, FILM COATED ORAL
Qty: 6 TABLET | Refills: 0 | Status: SHIPPED | OUTPATIENT
Start: 2022-07-13 | End: 2022-07-18

## 2022-07-13 RX ORDER — METHYLPREDNISOLONE 4 MG/1
TABLET ORAL
Qty: 21 EACH | Refills: 0 | Status: SHIPPED | OUTPATIENT
Start: 2022-07-13 | End: 2022-08-03

## 2022-07-13 NOTE — PROCEDURES
Procedures   [facility] - NEUROLOGY  [facility] [place]    Date: July 13, 2022   Patient Name: Yolanda Norman   MRN: [id number]   PCP: Aniceto Toledo  Referring Provider: Dillon Gonzalez MD    Assessment:      This is Yolanda Norman, 37 y.o. female with chronic migraines (G43.719) and suffered from headaches more than 15 days a month lasting more than 4 hours a day with no relief of symptoms despite trying multiple medications listed below prior to initiation of botox which has resulted in >50% reduction in headaches.      Plan:      -  Cont TPM 200mg daily, imitrex/fioricet prn  -  Follow up for botox   -  Continue Mg      Greater than 30 minutes spent in chart review, documentation, independent review of imaging, and face to face time with patient       I discussed side effects of the medications. I asked the patient to  stop the medication if he notices serious adverse effects as we discussed and to seek immediate medical attention at an ER.     Dillon Gonzalez MD  [facility]   Department of Neurology    Subjective:     -  Ongoing response to botox for migraine and conservative measures for tension headache     4/2022  -  Ongoing response to botox for migraine and conservative measures for tension headache  1/2022  -  Ongoing response to botox for migraine and conservative measures for tension headache  9/2021  -  Recent worsening of tension HA, notes chronic right shoulder tension since breast reconstruction as well as daytime and nocturnal bruxism, provoked by screen use to some extent, upcoming vision check, maintains regular sleep and good hydration.  6/2021  -  Worsened HA following TPM decrease and increased back to 200mg, ongoing effect of botox, recent prolonged HA not terminated by imitrex  3/2021  - Continues with good response to botox with some fluctuations in HA related to changes in hormone therapy and desires to reduce TPM, holding off on grad  school  12/2020  - THACKER continues to respond well to botox, TPM, imitrex,  - About to start PhD program   9/2020  -  HA well controlled with combination of TPM and botox  -  Completed chemotherapy but has not started radiation, coping well with stress  6/2020  -  Recent diagnosis of breast cancer, s/p chemo therapy with upcoming surgery and radiation  -  Development of neuropathy pain in feet, excess sedation with GBP 300mg tid, pain/altered sensation in fingers of right hand for the past day  -  Previously unable to tolerate cymbalta  12/2019  EXCELLENT response to botox, estimates 1 headache per week  9/2019  Continued daily headaches, weaning breast feeding - son 6 months old  8/2019  Unable to tolerate increased celexa due to fatigue, compliant with Mg with continued daily HA, unable to resume TPM as continuing to breast feed    HPI 5/2019:   Ms. Yolanda Norman is a 37 y.o. female who presents with a chief complaint of headaches    Patient has had significant difficulty with migraines since she was 12 years old with worsening during recent pregnancy.  Associated nausea, photo/phonophobia.  She has had difficulty with post-partum anxiety and started celexa a month ago with some improvement in headache as well as irritability noted.    Prior medications trials as below  TPM - partial relief at 150mg /d  Pamelor, Neurontin, Seroquel - all little effect  Imitrex, phenergan - good effect  Fioricet, zanaflex - little effect    PAST MEDICAL HISTORY:  Past Medical History:   Diagnosis Date    Abnormal Pap smear of cervix 2009    ADHD     Allergy     seasonal    Anorexia     Anxiety     Asthma     BRCA1 positive 12/18/2020    Bulimia     Depression     Fever blister     Gastroparesis     GERD (gastroesophageal reflux disease)     History of posttraumatic stress disorder (PTSD)     Hypertension     Gestational Hypertension    Invasive ductal carcinoma of right breast 12/18/2019    Mastitis     Migraine  headache     PONV (postoperative nausea and vomiting)     Status post mastectomy, bilateral 2020       PAST SURGICAL HISTORY:  Past Surgical History:   Procedure Laterality Date    BILATERAL MASTECTOMY Bilateral 2020    Procedure: MASTECTOMY, BILATERAL - BILATERAL SKIN SPARING MASTECTOMY;  Surgeon: Percy Ayers MD;  Location: [facility];  Service: General;  Laterality: Bilateral;    BIOPSY OF AXILLARY NODE Right 2020    Procedure: BIOPSY, LYMPH NODE, AXILLARY;  Surgeon: Percy Ayers MD;  Location: [facility];  Service: General;  Laterality: Right;    BONE MARROW ASPIRATION      x 3    BREAST SURGERY      CERVICAL BIOPSY  W/ LOOP ELECTRODE EXCISION       SECTION  2016     SECTION N/A 2019    Procedure:  SECTION;  Surgeon: Kirit Hernandez MD;  [facility]: [facility];  Service: OB/GYN;  Laterality: N/A;    COLPOSCOPY      ESOPHAGOGASTRODUODENOSCOPY N/A 2021    Procedure: EGD (ESOPHAGOGASTRODUODENOSCOPY);  Surgeon: Lj Santos MD;  Location: [facility]);  Service: Endoscopy;  Laterality: N/A;  COVID test on 21 at [facility]    ESOPHAGOGASTRODUODENOSCOPY N/A 2021    Procedure: ESOPHAGOGASTRODUODENOSCOPY (EGD);  Surgeon: Camila Wiley MD;  Location: [facility];  Service: Endoscopy;  Laterality: N/A;    INJECTION FOR SENTINEL NODE IDENTIFICATION Right 2020    Procedure: INJECTION, FOR SENTINEL NODE IDENTIFICATION;  Surgeon: Percy Ayers MD;  Location: [facility];  Service: General;  Laterality: Right;    INSERTION OF BREAST TISSUE EXPANDER Bilateral 2020    Procedure: INSERTION, TISSUE EXPANDER, BREAST;  Surgeon: Kiko Aranda MD;  Location: [facility];  Service: Plastics;  Laterality: Bilateral;    INSERTION OF TUNNELED CENTRAL VENOUS CATHETER (CVC) WITH SUBCUTANEOUS PORT Left 2019    Procedure: [id number]-Left neck or chest wall;  Surgeon: Percy Ayers MD;  Location: [facility];  Service:  General;  Laterality: Left;    MASTECTOMY      MEDIPORT REMOVAL N/A 7/1/2020    Procedure: REMOVAL, CATHETER, CENTRAL VENOUS, TUNNELED, WITH PORT;  Surgeon: Percy Ayers MD;  Location: [facility];  Service: General;  Laterality: N/A;    ROBOT-ASSISTED LAPAROSCOPIC ABDOMINAL HYSTERECTOMY USING DA HIMA XI N/A 12/18/2020    Procedure: XI ROBOTIC HYSTERECTOMY;  Surgeon: Emili Smith MD;  Location: [facility] OR;  Service: OB/GYN;  Laterality: N/A;    ROBOT-ASSISTED LAPAROSCOPIC SALPINGO-OOPHORECTOMY USING DA HIMA XI Bilateral 12/18/2020    Procedure: XI ROBOTIC SALPINGO-OOPHORECTOMY;  Surgeon: Emili Smith MD;  Location: [facility];  Service: OB/GYN;  Laterality: Bilateral;    SHOULDER SURGERY Left     x 2    SINUS SURGERY      age 19    STOMACH SURGERY      TISSUE EXPANDER REMOVAL Right 10/3/2020    Procedure: REMOVAL, TISSUE EXPANDER;  Surgeon: Kiko Aranda MD;  Location: [facility];  Service: Plastics;  Laterality: Right;    TONSILLECTOMY      UPPER GASTROINTESTINAL ENDOSCOPY         CURRENT MEDS:  Current Outpatient Medications   Medication Sig Dispense Refill    anastrozole (ARIMIDEX) 1 mg Tab Take 1 tablet (1 mg total) by mouth once daily. 30 tablet 11    azithromycin (Z-PADILLA) 250 MG tablet Take 2 tablets by mouth on day 1; Take 1 tablet by mouth on days 2-5 6 tablet 0    cholecalciferol, vitamin D3, (VITAMIN D3) 25 mcg (1,000 unit) capsule Take 1,000 Units by mouth 2 (two) times a day.      citalopram (CELEXA) 40 MG tablet TAKE 1 TABLET(40 MG) BY MOUTH EVERY DAY 30 tablet 2    clobetasoL (TEMOVATE) 0.05 % cream Apply to affected area twice a day for 2 weeks then stop 60 g 1    cranberry 500 mg Cap Take by mouth.      dapsone (ACZONE) 7.5 % GlwP Apply topically.      dextroamphetamine-amphetamine (ADDERALL XR) 30 MG 24 hr capsule Take 1 capsule (30 mg total) by mouth every morning. 30 capsule 0    fluticasone propionate (FLOVENT HFA) 44 mcg/actuation inhaler Inhale 1 puff into the lungs 2 (two)  times daily. Controller 10.6 g 0    loratadine (CLARITIN) 10 mg tablet Take 10 mg by mouth once daily.      magnesium 30 mg Tab Take by mouth once.      metFORMIN (GLUCOPHAGE) 500 MG tablet Take 1 tablet (500 mg total) by mouth 2 (two) times daily with meals. 60 tablet 11    methylPREDNISolone (MEDROL DOSEPACK) 4 mg tablet use as directed 21 each 0    metoclopramide HCl (REGLAN) 10 MG tablet Take 1 tablet 3 (three) times daily before meals by mouth 90 tablet 3    multivitamin (THERAGRAN) per tablet Take 1 tablet by mouth once daily.      multivitamin with minerals (HAIR,SKIN AND NAILS ORAL) Take by mouth.      onabotulinumtoxinA (BOTOX INJ) Inject as directed. [id number]      pantoprazole (PROTONIX) 40 MG tablet Take 1 tablet by mouth daily by mouth 30 tablet 0    prasterone, dhea, (INTRAROSA) 6.5 mg Inst Place 6.5 mg vaginally every evening. 30 each 11    spironolactone (ALDACTONE) 100 MG tablet take 1 tablet by mouth once daily 30 tablet 2    sucralfate (CARAFATE) 1 gram tablet Take 1 tablet (1 g total) by mouth 4 (four) times daily. Can be crushed if cannot swallow. 120 tablet 0    sumatriptan (IMITREX) 100 MG tablet Take 1/2 to 1 tab at onset of headache.  If no improvement in 2 hours, take another.  Do not take more than 2 in 24 hours. 9 tablet 11    topiramate (TOPAMAX) 200 MG Tab Take 1 tablet (200 mg total) by mouth every evening. 90 tablet 3    traZODone (DESYREL) 50 MG tablet TAKE 1 TABLET(50 MG) BY MOUTH EVERY EVENING 30 tablet 0    tretinoin (ALTRALIN) 0.05 % gel SMARTSIG:Sparingly Topical Every Night      valACYclovir (VALTREX) 1000 MG tablet Take 1 tablet (1,000 mg total) by mouth every 12 (twelve) hours. 60 tablet 0    vitamin E 100 UNIT capsule Take 100 Units by mouth once daily.      ALPRAZolam (XANAX) 0.25 MG tablet Take 1 tablet (0.25 mg total) by mouth daily as needed for Anxiety. 30 tablet 0     Current Facility-Administered Medications   Medication Dose Route Frequency Provider  "Last Rate Last Admin    onabotulinumtoxina injection 200 Units  200 Units Intramuscular Q90 Days Dillon Gonzalez MD   200 Units at 06/25/21 0751    onabotulinumtoxina injection 200 Units  200 Units Intramuscular q12 weeks Dillon Gonzalez MD   200 Units at 07/13/22 1056    testosterone cypionate injection 50 mg  50 mg Intramuscular Q21 Days Debbie Cuenca PA-C   50 mg at 07/06/22 0830       ALLERGIES:  Review of patient's allergies indicates:   Allergen Reactions    Amoxicillin Hives    Penicillins Hives and Rash    Diazepam Anxiety and Other (See Comments)     "makes hyper"       FAMILY HISTORY:  Family History   Problem Relation Age of Onset    Cancer Maternal Grandmother 40        cervical    Cervical cancer Mother     Breast cancer Other     Ovarian cancer Other     Colon polyps Father     Colon cancer Neg Hx     Melanoma Neg Hx        SOCIAL HISTORY:  Social History     Tobacco Use    Smoking status: Never Smoker    Smokeless tobacco: Never Used   Substance Use Topics    Alcohol use: Yes     Comment: socially    Drug use: No       Review of Systems:  12 review of systems is negative except for the symptoms mentioned in HPI.        Objective:     Vitals:    07/13/22 1057   BP: 115/89   Pulse: (!) 111   Weight: 71.2 kg (157 lb 1.2 oz)   Height: 5' 5" (1.651 m)       General: NAD, well nourished   Eyes: no tearing, discharge, no erythema   ENT: moist mucous membranes of the oral cavity, nares patent    Neck: Supple, full range of motion  Cardiovascular: Warm and well perfused, pulses equal and symmetrical  Lungs: Normal work of breathing, normal chest wall excursions  Skin: No rash, lesions, or breakdown on exposed skin  Psychiatry: Mood and affect are appropriate   Abdomen: soft, non tender, non distended  Extremeties: No cyanosis, clubbing or edema.    Neurological   MENTAL STATUS: Alert and oriented to person, place, and time. Attention and concentration within normal limits. Speech " without dysarthria, able to name and repeat without difficulty. Recent and remote memory within normal limits   CRANIAL NERVES: Visual fields intact. PERRL. EOMI. Facial sensation intact. Face symmetrical. Hearing grossly intact. Full shoulder shrug bilaterally. Tongue protrudes midline   SENSORY: Sensation is intact to light touch throughout.   MOTOR: Normal bulk and tone. No pronator drift.    CEREBELLAR/COORDINATION/GAIT: Gait steady with normal arm swing and stride length.     BOTOX was performed as an indicated therapy for intractable chronic migraine headaches given that the patient failed several prophylactic medications    Botulinum Toxin Injection Procedure   Pre-operative diagnosis: Chronic migraine   Post-operative diagnosis: Same   Procedure: Chemical neurolysis   After risks and benefits were explained including bleeding, infection, worsening of pain, damage to the areas being injected, weakness of muscles, loss of muscle control, dysphagia if injecting the head or neck, facial droop if injecting the facial area, painful injection, allergic or other reaction to the medications being injected, and the failure of the procedure to help the problem, a signed consent was obtained.   The patient was placed in a comfortable area and the sites to be treated were identified.The area to be treated was prepped three times with alcohol and the alcohol allowed to dry. Next, a 30 gauge needle was used to inject the medication in the area to be treated.       Botox 100 units NDC 3273-6730-99    Area(s) injected:   Total Botox used: 155 Units   Botox wastage: 45 Units   Injection sites:    muscle bilaterally ( a total of 10 units divided into 2 sites)   Procerus muscle (5 units)   Frontalis muscle bilaterally (a total of 20 units divided into 4 sites)   Temporalis muscle bilaterally (a total of 40 units divided into 8 sites)   Occipitalis muscle bilaterally (a total of 30 units divided into 6 sites)    Cervical paraspinal muscles (a total of 20 units divided into 4 sites)   Trapezius muscle bilaterally (a total of 30 units divided into 6 sites)   Complications: none   RTC for the next Botox injection: 3 months

## 2022-07-13 NOTE — PROGRESS NOTES
The patient location is: home  The chief complaint leading to consultation is: sinus     Visit type: {TELE AUDIOVISUAL    Face to Face time with patient: 20 minutes of total time spent on the encounter, which includes face to face time and non-face to face time preparing to see the patient (eg, review of tests), Obtaining and/or reviewing separately obtained history, Documenting clinical information in the electronic or other health record, Independently interpreting results (not separately reported) and communicating results to the patient/family/caregiver, or Care coordination (not separately reported).         Each patient to whom he or she provides medical services by telemedicine is:  (1) informed of the relationship between the physician and patient and the respective role of any other health care provider with respect to management of the patient; and (2) notified that he or she may decline to receive medical services by telemedicine and may withdraw from such care at any time.    Notes:   Subjective:       Patient ID: Yolanda Norman is a 37 y.o. female.    Chief Complaint: Sinus Problem    HPI   Pt in virtual visit for c/o nasal congestion several weeks now with facial pressure and thick mucus no n/v/f/c/d/c taking otc with little improvement  Review of Systems   Constitutional: Negative for activity change, fatigue and unexpected weight change.   HENT: Positive for congestion, rhinorrhea and sinus pressure. Negative for hearing loss and trouble swallowing.    Eyes: Negative for discharge and visual disturbance.   Respiratory: Negative for cough, chest tightness and wheezing.    Cardiovascular: Negative for chest pain and palpitations.   Gastrointestinal: Negative for blood in stool, constipation, diarrhea and vomiting.   Endocrine: Negative for polydipsia and polyuria.   Genitourinary: Negative for difficulty urinating, dysuria, hematuria and menstrual problem.   Musculoskeletal: Negative for arthralgias,  "joint swelling and neck pain.   Neurological: Positive for headaches. Negative for weakness.   Psychiatric/Behavioral: Negative for confusion and dysphoric mood.       Objective:     Temp 98.6 °F (37 °C)   Ht 5' 5" (1.651 m)   Wt 72.6 kg (160 lb)   LMP 12/18/2020   BMI 26.63 kg/m²     Physical Exam  Constitutional:       Appearance: Normal appearance. She is not ill-appearing.   HENT:      Head: Normocephalic and atraumatic.      Nose: Congestion and rhinorrhea present.   Eyes:      Extraocular Movements: Extraocular movements intact.   Pulmonary:      Effort: Pulmonary effort is normal. No respiratory distress.   Neurological:      General: No focal deficit present.      Mental Status: She is alert and oriented to person, place, and time.      Cranial Nerves: No cranial nerve deficit.      Coordination: Coordination normal.   Psychiatric:         Mood and Affect: Mood normal.         Behavior: Behavior normal.         Thought Content: Thought content normal.         Judgment: Judgment normal.         Assessment:       1. Acute non-recurrent maxillary sinusitis        Plan:     orders declined  Start medrol dose pack  zpack if needed  Cont otc  F/u pcp        "This note will not be shared with the patient."     "

## 2022-07-14 ENCOUNTER — OFFICE VISIT (OUTPATIENT)
Dept: OBSTETRICS AND GYNECOLOGY | Facility: CLINIC | Age: 37
End: 2022-07-14
Attending: OBSTETRICS & GYNECOLOGY
Payer: COMMERCIAL

## 2022-07-14 VITALS
HEART RATE: 83 BPM | HEIGHT: 65 IN | BODY MASS INDEX: 26.33 KG/M2 | SYSTOLIC BLOOD PRESSURE: 126 MMHG | WEIGHT: 158 LBS | DIASTOLIC BLOOD PRESSURE: 86 MMHG

## 2022-07-14 DIAGNOSIS — Z17.0 MALIGNANT NEOPLASM OF UPPER-OUTER QUADRANT OF RIGHT BREAST IN FEMALE, ESTROGEN RECEPTOR POSITIVE: Chronic | ICD-10-CM

## 2022-07-14 DIAGNOSIS — Z15.01 BRCA1 POSITIVE: ICD-10-CM

## 2022-07-14 DIAGNOSIS — Z01.419 ENCOUNTER FOR GYNECOLOGICAL EXAMINATION WITHOUT ABNORMAL FINDING: Primary | ICD-10-CM

## 2022-07-14 DIAGNOSIS — C50.411 MALIGNANT NEOPLASM OF UPPER-OUTER QUADRANT OF RIGHT BREAST IN FEMALE, ESTROGEN RECEPTOR POSITIVE: Chronic | ICD-10-CM

## 2022-07-14 DIAGNOSIS — Z15.09 BRCA1 POSITIVE: ICD-10-CM

## 2022-07-14 DIAGNOSIS — Z79.811 USE OF AROMATASE INHIBITORS: ICD-10-CM

## 2022-07-14 PROCEDURE — 3079F PR MOST RECENT DIASTOLIC BLOOD PRESSURE 80-89 MM HG: ICD-10-PCS | Mod: CPTII,S$GLB,, | Performed by: OBSTETRICS & GYNECOLOGY

## 2022-07-14 PROCEDURE — 99999 PR PBB SHADOW E&M-EST. PATIENT-LVL V: CPT | Mod: PBBFAC,,, | Performed by: OBSTETRICS & GYNECOLOGY

## 2022-07-14 PROCEDURE — 3074F PR MOST RECENT SYSTOLIC BLOOD PRESSURE < 130 MM HG: ICD-10-PCS | Mod: CPTII,S$GLB,, | Performed by: OBSTETRICS & GYNECOLOGY

## 2022-07-14 PROCEDURE — 1159F PR MEDICATION LIST DOCUMENTED IN MEDICAL RECORD: ICD-10-PCS | Mod: CPTII,S$GLB,, | Performed by: OBSTETRICS & GYNECOLOGY

## 2022-07-14 PROCEDURE — 3008F PR BODY MASS INDEX (BMI) DOCUMENTED: ICD-10-PCS | Mod: CPTII,S$GLB,, | Performed by: OBSTETRICS & GYNECOLOGY

## 2022-07-14 PROCEDURE — 1159F MED LIST DOCD IN RCRD: CPT | Mod: CPTII,S$GLB,, | Performed by: OBSTETRICS & GYNECOLOGY

## 2022-07-14 PROCEDURE — 99395 PREV VISIT EST AGE 18-39: CPT | Mod: S$GLB,,, | Performed by: OBSTETRICS & GYNECOLOGY

## 2022-07-14 PROCEDURE — 3074F SYST BP LT 130 MM HG: CPT | Mod: CPTII,S$GLB,, | Performed by: OBSTETRICS & GYNECOLOGY

## 2022-07-14 PROCEDURE — 99999 PR PBB SHADOW E&M-EST. PATIENT-LVL V: ICD-10-PCS | Mod: PBBFAC,,, | Performed by: OBSTETRICS & GYNECOLOGY

## 2022-07-14 PROCEDURE — 99395 PR PREVENTIVE VISIT,EST,18-39: ICD-10-PCS | Mod: S$GLB,,, | Performed by: OBSTETRICS & GYNECOLOGY

## 2022-07-14 PROCEDURE — 3079F DIAST BP 80-89 MM HG: CPT | Mod: CPTII,S$GLB,, | Performed by: OBSTETRICS & GYNECOLOGY

## 2022-07-14 PROCEDURE — 3008F BODY MASS INDEX DOCD: CPT | Mod: CPTII,S$GLB,, | Performed by: OBSTETRICS & GYNECOLOGY

## 2022-07-14 NOTE — PROGRESS NOTES
"SUBJECTIVE:   37 y.o. female   for annual routinecheckup. Patient's last menstrual period was 2020..  She reports vaginal dryness and pain with intercourse. She is using Intrarosa and Astroglide. She has changed to another AI- reports fatigue is "a little better" but still present. She reports that she drinks caffeine until 4pm and is ready for bed at 7pm. She reports that she sleeps well but has fatigue during the day. .        Past Medical History:   Diagnosis Date    Abnormal Pap smear of cervix     ADHD     Allergy     seasonal    Anorexia     Anxiety     Asthma     BRCA1 positive 2020    Bulimia     Depression     Fever blister     Gastroparesis     GERD (gastroesophageal reflux disease)     History of posttraumatic stress disorder (PTSD)     Hypertension     Gestational Hypertension    Invasive ductal carcinoma of right breast 2019    Mastitis     Migraine headache     PONV (postoperative nausea and vomiting)     Status post mastectomy, bilateral 2020     Past Surgical History:   Procedure Laterality Date    BILATERAL MASTECTOMY Bilateral 2020    Procedure: MASTECTOMY, BILATERAL - BILATERAL SKIN SPARING MASTECTOMY;  Surgeon: Percy Ayers MD;  [facility];  Service: General;  Laterality: Bilateral;    BIOPSY OF AXILLARY NODE Right 2020    Procedure: BIOPSY, LYMPH NODE, AXILLARY;  Surgeon: Percy Ayers MD;  [facility];  Service: General;  Laterality: Right;    BONE MARROW ASPIRATION      x 3    BREAST SURGERY      CERVICAL BIOPSY  W/ LOOP ELECTRODE EXCISION       SECTION  2016     SECTION N/A 2019    Procedure:  SECTION;  Surgeon: Kirit Hernandez MD;  Location: [facility];  Service: OB/GYN;  Laterality: N/A;    COLPOSCOPY      ESOPHAGOGASTRODUODENOSCOPY N/A 2021    Procedure: EGD (ESOPHAGOGASTRODUODENOSCOPY);  Surgeon: Lj Santos MD;  Location: [facility] ([address] [place] [address]);  " Service: Endoscopy;  Laterality: N/A;  COVID test on 1/8/21 at [facility]    ESOPHAGOGASTRODUODENOSCOPY N/A 2/9/2021    Procedure: ESOPHAGOGASTRODUODENOSCOPY (EGD);  Surgeon: Camila Wiley MD;  [facility];  Service: Endoscopy;  Laterality: N/A;    INJECTION FOR SENTINEL NODE IDENTIFICATION Right 7/1/2020    Procedure: INJECTION, FOR SENTINEL NODE IDENTIFICATION;  Surgeon: Percy Ayers MD;  [facility];  Service: General;  Laterality: Right;    INSERTION OF BREAST TISSUE EXPANDER Bilateral 7/1/2020    Procedure: INSERTION, TISSUE EXPANDER, BREAST;  Surgeon: Kiko Aranda MD;  [facility];  Service: Plastics;  Laterality: Bilateral;    INSERTION OF TUNNELED CENTRAL VENOUS CATHETER (CVC) WITH SUBCUTANEOUS PORT Left 12/27/2019    Procedure: [id number]-Left neck or chest wall;  Surgeon: Percy Ayers MD;  Location: [address];  Service: General;  Laterality: Left;    MASTECTOMY      MEDIPORT REMOVAL N/A 7/1/2020    Procedure: REMOVAL, CATHETER, CENTRAL VENOUS, TUNNELED, WITH PORT;  Surgeon: Percy Ayers MD;  [facility];  Service: General;  Laterality: N/A;    ROBOT-ASSISTED LAPAROSCOPIC ABDOMINAL HYSTERECTOMY USING DA HIMA XI N/A 12/18/2020    Procedure: XI ROBOTIC HYSTERECTOMY;  Surgeon: Emili Smith MD;  [facility];  Service: OB/GYN;  Laterality: N/A;    ROBOT-ASSISTED LAPAROSCOPIC SALPINGO-OOPHORECTOMY USING DA HIMA XI Bilateral 12/18/2020    Procedure: XI ROBOTIC SALPINGO-OOPHORECTOMY;  Surgeon: Emili Smith MD;  [facility];  Service: OB/GYN;  Laterality: Bilateral;    SHOULDER SURGERY Left     x 2    SINUS SURGERY      age 19    STOMACH SURGERY      TISSUE EXPANDER REMOVAL Right 10/3/2020    Procedure: REMOVAL, TISSUE EXPANDER;  Surgeon: Kiko Aranda MD;  [facility];  Service: Plastics;  Laterality: Right;    TONSILLECTOMY      UPPER GASTROINTESTINAL ENDOSCOPY       Social History     Socioeconomic History     Marital status:    Tobacco Use    Smoking status: Never Smoker    Smokeless tobacco: Never Used   Substance and Sexual Activity    Alcohol use: Yes     Comment: socially    Drug use: No    Sexual activity: Yes     Partners: Male     Birth control/protection: None   Social History Narrative    No longer working at [employer] as of 10/2020 as her son has been suffering with depression/anxiety     Social Determinants of Health     Financial Resource Strain: Low Risk     Difficulty of Paying Living Expenses: Not hard at all   Food Insecurity: No Food Insecurity    Worried About Running Out of Food in the Last Year: Never true    Ran Out of Food in the Last Year: Never true   Transportation Needs: No Transportation Needs    Lack of Transportation (Medical): No    Lack of Transportation (Non-Medical): No   Physical Activity: Sufficiently Active    Days of Exercise per Week: 5 days    Minutes of Exercise per Session: 60 min   Stress: No Stress Concern Present    Feeling of Stress : Not at all   Social Connections: Unknown    Frequency of Communication with Friends and Family: More than three times a week    Frequency of Social Gatherings with Friends and Family: Once a week    Active Member of Clubs or Organizations: Yes    Attends Club or Organization Meetings: 1 to 4 times per year    Marital Status:    Housing Stability: Low Risk     Unable to Pay for Housing in the Last Year: No    Number of Places Lived in the Last Year: 1    Unstable Housing in the Last Year: No     Family History   Problem Relation Age of Onset    Cancer Maternal Grandmother 40        cervical    Cervical cancer Mother     Breast cancer Other     Ovarian cancer Other     Colon polyps Father     Colon cancer Neg Hx     Melanoma Neg Hx      OB History    Para Term  AB Living   2 2 2     2   SAB IAB Ectopic Multiple Live Births         0 2      # Outcome Date GA Lbr Bull/2nd Weight Sex Delivery Anes  PTL Lv   2 Term 04/02/19 39w1d  3.43 kg (7 lb 9 oz) M CS-LTranv Spinal N JOLLY   1 Term 11/21/16 38w1d  2.96 kg (6 lb 8.4 oz) M CS-LTranv EPI N JOLLY      Complications: Failure to Progress in First Stage           Current Outpatient Medications   Medication Sig Dispense Refill    ALPRAZolam (XANAX) 0.25 MG tablet Take 1 tablet (0.25 mg total) by mouth daily as needed for Anxiety. 30 tablet 0    anastrozole (ARIMIDEX) 1 mg Tab Take 1 tablet (1 mg total) by mouth once daily. 30 tablet 11    azithromycin (Z-PADILLA) 250 MG tablet Take 2 tablets by mouth on day 1; Take 1 tablet by mouth on days 2-5 6 tablet 0    cholecalciferol, vitamin D3, (VITAMIN D3) 25 mcg (1,000 unit) capsule Take 1,000 Units by mouth 2 (two) times a day.      citalopram (CELEXA) 40 MG tablet TAKE 1 TABLET(40 MG) BY MOUTH EVERY DAY 30 tablet 2    clobetasoL (TEMOVATE) 0.05 % cream Apply to affected area twice a day for 2 weeks then stop 60 g 1    cranberry 500 mg Cap Take by mouth.      dapsone (ACZONE) 7.5 % GlwP Apply topically.      dextroamphetamine-amphetamine (ADDERALL XR) 30 MG 24 hr capsule Take 1 capsule (30 mg total) by mouth every morning. 30 capsule 0    fluticasone propionate (FLOVENT HFA) 44 mcg/actuation inhaler Inhale 1 puff into the lungs 2 (two) times daily. Controller 10.6 g 0    loratadine (CLARITIN) 10 mg tablet Take 10 mg by mouth once daily.      magnesium 30 mg Tab Take by mouth once.      metFORMIN (GLUCOPHAGE) 500 MG tablet Take 1 tablet (500 mg total) by mouth 2 (two) times daily with meals. 60 tablet 11    methylPREDNISolone (MEDROL DOSEPACK) 4 mg tablet use as directed 21 each 0    metoclopramide HCl (REGLAN) 10 MG tablet Take 1 tablet 3 (three) times daily before meals by mouth 90 tablet 3    multivitamin (THERAGRAN) per tablet Take 1 tablet by mouth once daily.      multivitamin with minerals (HAIR,SKIN AND NAILS ORAL) Take by mouth.      onabotulinumtoxinA (BOTOX INJ) Inject as directed. [id number]       pantoprazole (PROTONIX) 40 MG tablet Take 1 tablet by mouth daily by mouth 30 tablet 0    prasterone, dhea, (INTRAROSA) 6.5 mg Inst Place 6.5 mg vaginally every evening. 30 each 11    spironolactone (ALDACTONE) 100 MG tablet take 1 tablet by mouth once daily 30 tablet 2    sucralfate (CARAFATE) 1 gram tablet Take 1 tablet (1 g total) by mouth 4 (four) times daily. Can be crushed if cannot swallow. 120 tablet 0    sumatriptan (IMITREX) 100 MG tablet Take 1/2 to 1 tab at onset of headache.  If no improvement in 2 hours, take another.  Do not take more than 2 in 24 hours. 9 tablet 11    topiramate (TOPAMAX) 200 MG Tab Take 1 tablet (200 mg total) by mouth every evening. 90 tablet 3    traZODone (DESYREL) 50 MG tablet TAKE 1 TABLET(50 MG) BY MOUTH EVERY EVENING 30 tablet 0    tretinoin (ALTRALIN) 0.05 % gel SMARTSIG:Sparingly Topical Every Night      valACYclovir (VALTREX) 1000 MG tablet Take 1 tablet (1,000 mg total) by mouth every 12 (twelve) hours. 60 tablet 0    vitamin E 100 UNIT capsule Take 100 Units by mouth once daily.       Current Facility-Administered Medications   Medication Dose Route Frequency Provider Last Rate Last Admin    onabotulinumtoxina injection 200 Units  200 Units Intramuscular Q90 Days Dillon Gonzalez MD   200 Units at 06/25/21 0751    onabotulinumtoxina injection 200 Units  200 Units Intramuscular q12 weeks Dillon Gonzalez MD   200 Units at 07/13/22 1056    testosterone cypionate injection 50 mg  50 mg Intramuscular Q21 Days Debbie Cuenca PA-C   50 mg at 07/06/22 0830     Allergies: Amoxicillin, Penicillins, and Diazepam     The ASCVD Risk score (Yesy LILLY Jr., et al., 2013) failed to calculate for the following reasons:    The 2013 ASCVD risk score is only valid for ages 40 to 79      ROS:  Constitutional: no weight loss, weight gain, fever, fatigue  Eyes:  No vision changes, glasses/contacts  ENT/Mouth: No ulcers, sinus problems, ears ringing, headache  Cardiovascular: No  inability to lie flat, chest pain, exercise intolerance, swelling, heart palpitations  Respiratory: No wheezing, coughing blood, shortness of breath, or cough  Gastrointestinal: No diarrhea, bloody stool, nausea/vomiting, constipation, gas, hemorrhoids  Genitourinary: No blood in urine, painful urination, urgency of urination, frequency of urination, incomplete emptying, incontinence, abnormal bleeding, painful periods, heavy periods, vaginal discharge, vaginal odor, painful intercourse, sexual problems, bleeding after intercourse.  Musculoskeletal: No muscle weakness  Skin/Breast: No painful breasts, nipple discharge, masses, rash, ulcers  Neurological: No passing out, seizures, numbness, headache  Endocrine: No diabetes, hypothyroid, hyperthyroid, hot flashes, hair loss, abnormal hair growth, acne  Psychiatric: No depression, crying  Hematologic: No bruises, bleeding, swollen lymph nodes, anemia.      Physical Exam:   Constitutional: She is oriented to person, place, and time. She appears well-developed and well-nourished.      Neck: No tracheal deviation present. No thyromegaly present.    Cardiovascular: Exam reveals no edema.          Abdominal: Soft. She exhibits no distension and no mass. There is no abdominal tenderness. There is no rebound and no guarding. No hernia. Hernia confirmed negative in the left inguinal area.     Genitourinary: Rectum:      No external hemorrhoid.   There is no rash, tenderness or lesion on the right labia. There is no rash, tenderness or lesion on the left labia. No no adexnal prolapse. Right adnexum displays no mass, no tenderness and no fullness. Left adnexum displays no mass, no tenderness and no fullness. Vaginal cuff normal.  No  no vaginal discharge, tenderness, bleeding, rectocele, cystocele or unspecified prolapse of vaginal walls in the vagina. Cervix is absent.Uterus is absent.           Musculoskeletal: Normal range of motion and moves all extremeties. No edema.        Neurological: She is alert and oriented to person, place, and time.    Skin: No rash noted. No erythema. No pallor.    Psychiatric: She has a normal mood and affect. Her behavior is normal. Judgment and thought content normal.     +vaginal atrophy    ASSESSMENT:   1. Encounter for gynecological examination without abnormal finding     2. BRCA1 positive     3. Use of aromatase inhibitors     4. Malignant neoplasm of upper-outer quadrant of right breast in female, estrogen receptor positive           PLAN:   Continue Intrarosa  Counseled her on possible benefit of intravaginal valium  Counseled her on sleep hygiene  [place] [employer] supplement for fatigue  Bionourish vaginal moisturizer  return annually or prn

## 2022-07-25 ENCOUNTER — PATIENT MESSAGE (OUTPATIENT)
Dept: PSYCHIATRY | Facility: CLINIC | Age: 37
End: 2022-07-25
Payer: COMMERCIAL

## 2022-07-27 ENCOUNTER — CLINICAL SUPPORT (OUTPATIENT)
Dept: OBSTETRICS AND GYNECOLOGY | Facility: CLINIC | Age: 37
End: 2022-07-27
Payer: COMMERCIAL

## 2022-07-27 DIAGNOSIS — N95.1 MENOPAUSAL SYMPTOMS: Primary | ICD-10-CM

## 2022-07-27 PROCEDURE — 96372 PR INJECTION,THERAP/PROPH/DIAG2ST, IM OR SUBCUT: ICD-10-PCS | Mod: S$GLB,,, | Performed by: PHYSICIAN ASSISTANT

## 2022-07-27 PROCEDURE — 96372 THER/PROPH/DIAG INJ SC/IM: CPT | Mod: S$GLB,,, | Performed by: PHYSICIAN ASSISTANT

## 2022-07-27 RX ADMIN — TESTOSTERONE CYPIONATE 50 MG: 200 INJECTION, SOLUTION INTRAMUSCULAR at 08:07

## 2022-07-27 NOTE — PROGRESS NOTES
Patient arrives today for her monthly hormone injection. Risks, benefits, side effects, administration, frequency and reasons warranting provider notification reviewed with patient. Patient verbalizes understanding.     50 mg Testosterone administered IM to right outer quadrant of gluteus using aseptic technique and 22 gauge 1.5 inch needle.     Site secured with Band-Aid, needle tip remains intact, patient tolerated well without pain.      Patient's next injections scheduled prior to clinic departure.           Metric Last Due   Estradiol 5/22 11/22   Free Testosterone 5/22 11/22   Progesterone (if applicable)       Well Woman Exam 7/22 7/23   Annual w/PCP                    Recent/Upcoming Surgery or Admissions (last 30 days):  No   Recent Health Changes (last 30 days): No   Allergy Changes: No   Medication Changes: No   Family History Updates (message Kary for any family cancer updates):  No   Applicable Clearances Obtained: Yes   BP (if checked) under 180/100:  No

## 2022-08-07 DIAGNOSIS — F41.1 GENERALIZED ANXIETY DISORDER: Chronic | ICD-10-CM

## 2022-08-07 PROBLEM — C50.411 BREAST CANCER OF UPPER-OUTER QUADRANT OF RIGHT FEMALE BREAST: Status: RESOLVED | Noted: 2020-07-01 | Resolved: 2022-08-07

## 2022-08-07 RX ORDER — CITALOPRAM 40 MG/1
TABLET, FILM COATED ORAL
Qty: 30 TABLET | Refills: 2 | Status: SHIPPED | OUTPATIENT
Start: 2022-08-07 | End: 2022-11-07

## 2022-08-07 NOTE — PROGRESS NOTES
Subjective:       Patient ID: Yolanda Norman is a 37 y.o. female.    Chief Complaint: No chief complaint on file.    HPI 37 year old female, who returns for follow-up of carcinoma of the right breast, ER +,HER 2 negative.   She changed to anastrozole in March 2022.    Still has some pain in her hands and wrists but arthralgias better overall.  Fatigue is better, more issues with sleep.  She has been exercising regularly.        Breast history:    Mammogram and ultrasound on December 11th, 2019 showed right breast mass 7 cm from the nipple.  By ultrasound this mass measured 33 x 32 x 21 mm.    Right breast biopsy on December 13 showed high-grade infiltrating ductal carcinoma (histologic grade 3, nuclear grade 3, mitotic index 3) there were medullary features.  The cancer was 25% ER positive and CA and HER2 negative.  Ki-67 was 90%.    MRI showed a 5.2 cm x 2.6 cm x 4.6 cm irregularly shaped mass with rim enhancement seen in the right breast at 11 o'clock.    CT scan of the chest abdomen and pelvis and bone scan showed no evidence of metastatic disease.    She completed 4 cycles of neoadjuvant Adriamycin Cytoxan February 13, 2020.  She completed 12 weeks of weekly Taxol on May 20, 2020.    On July 1, 2020 she underwent bilateral mastectomy.    The right breast showed no residual malignancy, 5 right axillary sentinel lymph nodes were all negative for malignancy.  Final pathological stage yp T0 N0.  The left breast showed no atypia or malignancy.    Radiation therapy was completed 11/25/20.      She had a hysterectomy  December 18th, 2020.  Letrozole was started December 2020..  She was then changed to Exemestane in early 2021 due to arthralgias.  Changed to anastrazole in March 2022 due to arthralgias and fatigue.    She had DORIAN reconstruction in February 2021.    Review of Systems   Constitutional: Positive for fatigue. Negative for activity change, appetite change, fever and unexpected weight change.   Eyes:  Negative for visual disturbance.   Respiratory: Negative for cough and shortness of breath.    Cardiovascular: Negative for chest pain.   Gastrointestinal: Negative for abdominal distention, abdominal pain, constipation, diarrhea and rectal pain.   Genitourinary: Negative for frequency.   Musculoskeletal: Positive for arthralgias (improved). Negative for back pain.   Skin: Negative for rash.   Neurological: Positive for numbness. Negative for headaches.   Hematological: Negative for adenopathy.   Psychiatric/Behavioral: Negative for dysphoric mood. The patient is not nervous/anxious.        Objective:      Physical Exam  Constitutional:       General: She is not in acute distress.     Appearance: Normal appearance. She is well-developed.   Eyes:      General: No scleral icterus.     Pupils: Pupils are equal, round, and reactive to light.   Cardiovascular:      Rate and Rhythm: Normal rate and regular rhythm.   Pulmonary:      Effort: Pulmonary effort is normal. No respiratory distress.      Breath sounds: Normal breath sounds. No wheezing or rales.   Chest:   Breasts:      Right: No axillary adenopathy or supraclavicular adenopathy.      Left: No axillary adenopathy or supraclavicular adenopathy.         Abdominal:      Palpations: Abdomen is soft. There is no mass.      Tenderness: There is no abdominal tenderness.   Lymphadenopathy:      Cervical: No cervical adenopathy.      Upper Body:      Right upper body: No supraclavicular or axillary adenopathy.      Left upper body: No supraclavicular or axillary adenopathy.   Skin:     Findings: Rash:       Neurological:      Mental Status: She is alert and oriented to person, place, and time.   Psychiatric:         Mood and Affect: Mood normal.         Behavior: Behavior normal.         Thought Content: Thought content normal.         Judgment: Judgment normal.         Assessment:       1. Malignant neoplasm of upper-outer quadrant of right breast in female, estrogen  receptor positive        Plan:        Continue anastrozole and RTC 3 M.           Route Chart for Scheduling    Med Onc Chart Routing      Follow up with physician 3 months.   Follow up with ADRIAN    Infusion scheduling note    Injection scheduling note    Labs    Imaging    Pharmacy appointment    Other referrals

## 2022-08-08 ENCOUNTER — LAB VISIT (OUTPATIENT)
Dept: LAB | Facility: HOSPITAL | Age: 37
End: 2022-08-08
Attending: INTERNAL MEDICINE
Payer: COMMERCIAL

## 2022-08-08 ENCOUNTER — OFFICE VISIT (OUTPATIENT)
Dept: HEMATOLOGY/ONCOLOGY | Facility: CLINIC | Age: 37
End: 2022-08-08
Payer: COMMERCIAL

## 2022-08-08 VITALS
RESPIRATION RATE: 18 BRPM | BODY MASS INDEX: 26.26 KG/M2 | OXYGEN SATURATION: 98 % | TEMPERATURE: 98 F | HEART RATE: 84 BPM | WEIGHT: 157.63 LBS | DIASTOLIC BLOOD PRESSURE: 81 MMHG | SYSTOLIC BLOOD PRESSURE: 116 MMHG | HEIGHT: 65 IN

## 2022-08-08 DIAGNOSIS — Z17.0 MALIGNANT NEOPLASM OF UPPER-OUTER QUADRANT OF RIGHT BREAST IN FEMALE, ESTROGEN RECEPTOR POSITIVE: Chronic | ICD-10-CM

## 2022-08-08 DIAGNOSIS — C50.411 MALIGNANT NEOPLASM OF UPPER-OUTER QUADRANT OF RIGHT BREAST IN FEMALE, ESTROGEN RECEPTOR POSITIVE: Primary | Chronic | ICD-10-CM

## 2022-08-08 DIAGNOSIS — Z17.0 MALIGNANT NEOPLASM OF UPPER-OUTER QUADRANT OF RIGHT BREAST IN FEMALE, ESTROGEN RECEPTOR POSITIVE: Primary | Chronic | ICD-10-CM

## 2022-08-08 DIAGNOSIS — C50.411 MALIGNANT NEOPLASM OF UPPER-OUTER QUADRANT OF RIGHT BREAST IN FEMALE, ESTROGEN RECEPTOR POSITIVE: Chronic | ICD-10-CM

## 2022-08-08 LAB
CHOLEST SERPL-MCNC: 211 MG/DL (ref 120–199)
CHOLEST/HDLC SERPL: 3.5 {RATIO} (ref 2–5)
HDLC SERPL-MCNC: 60 MG/DL (ref 40–75)
HDLC SERPL: 28.4 % (ref 20–50)
LDLC SERPL CALC-MCNC: 128.2 MG/DL (ref 63–159)
NONHDLC SERPL-MCNC: 151 MG/DL
TRIGL SERPL-MCNC: 114 MG/DL (ref 30–150)

## 2022-08-08 PROCEDURE — 99999 PR PBB SHADOW E&M-EST. PATIENT-LVL V: ICD-10-PCS | Mod: PBBFAC,,, | Performed by: INTERNAL MEDICINE

## 2022-08-08 PROCEDURE — 99213 PR OFFICE/OUTPT VISIT, EST, LEVL III, 20-29 MIN: ICD-10-PCS | Mod: S$GLB,,, | Performed by: INTERNAL MEDICINE

## 2022-08-08 PROCEDURE — 3074F PR MOST RECENT SYSTOLIC BLOOD PRESSURE < 130 MM HG: ICD-10-PCS | Mod: CPTII,S$GLB,, | Performed by: INTERNAL MEDICINE

## 2022-08-08 PROCEDURE — 1159F PR MEDICATION LIST DOCUMENTED IN MEDICAL RECORD: ICD-10-PCS | Mod: CPTII,S$GLB,, | Performed by: INTERNAL MEDICINE

## 2022-08-08 PROCEDURE — 1159F MED LIST DOCD IN RCRD: CPT | Mod: CPTII,S$GLB,, | Performed by: INTERNAL MEDICINE

## 2022-08-08 PROCEDURE — 99213 OFFICE O/P EST LOW 20 MIN: CPT | Mod: S$GLB,,, | Performed by: INTERNAL MEDICINE

## 2022-08-08 PROCEDURE — 3079F PR MOST RECENT DIASTOLIC BLOOD PRESSURE 80-89 MM HG: ICD-10-PCS | Mod: CPTII,S$GLB,, | Performed by: INTERNAL MEDICINE

## 2022-08-08 PROCEDURE — 3079F DIAST BP 80-89 MM HG: CPT | Mod: CPTII,S$GLB,, | Performed by: INTERNAL MEDICINE

## 2022-08-08 PROCEDURE — 3008F PR BODY MASS INDEX (BMI) DOCUMENTED: ICD-10-PCS | Mod: CPTII,S$GLB,, | Performed by: INTERNAL MEDICINE

## 2022-08-08 PROCEDURE — 36415 COLL VENOUS BLD VENIPUNCTURE: CPT | Performed by: INTERNAL MEDICINE

## 2022-08-08 PROCEDURE — 3074F SYST BP LT 130 MM HG: CPT | Mod: CPTII,S$GLB,, | Performed by: INTERNAL MEDICINE

## 2022-08-08 PROCEDURE — 3008F BODY MASS INDEX DOCD: CPT | Mod: CPTII,S$GLB,, | Performed by: INTERNAL MEDICINE

## 2022-08-08 PROCEDURE — 80061 LIPID PANEL: CPT | Performed by: INTERNAL MEDICINE

## 2022-08-08 PROCEDURE — 99999 PR PBB SHADOW E&M-EST. PATIENT-LVL V: CPT | Mod: PBBFAC,,, | Performed by: INTERNAL MEDICINE

## 2022-08-09 ENCOUNTER — OFFICE VISIT (OUTPATIENT)
Dept: PSYCHIATRY | Facility: CLINIC | Age: 37
End: 2022-08-09
Payer: COMMERCIAL

## 2022-08-09 VITALS
HEART RATE: 89 BPM | HEIGHT: 66 IN | SYSTOLIC BLOOD PRESSURE: 109 MMHG | DIASTOLIC BLOOD PRESSURE: 73 MMHG | BODY MASS INDEX: 25.74 KG/M2 | WEIGHT: 160.19 LBS | RESPIRATION RATE: 20 BRPM

## 2022-08-09 DIAGNOSIS — F98.8 ATTENTION DEFICIT DISORDER PREDOMINANT INATTENTIVE TYPE: Primary | ICD-10-CM

## 2022-08-09 DIAGNOSIS — F41.9 ANXIETY DISORDER, UNSPECIFIED TYPE: ICD-10-CM

## 2022-08-09 PROCEDURE — 3008F PR BODY MASS INDEX (BMI) DOCUMENTED: ICD-10-PCS | Mod: CPTII,S$GLB,, | Performed by: PSYCHOLOGIST

## 2022-08-09 PROCEDURE — 3078F PR MOST RECENT DIASTOLIC BLOOD PRESSURE < 80 MM HG: ICD-10-PCS | Mod: CPTII,S$GLB,, | Performed by: PSYCHOLOGIST

## 2022-08-09 PROCEDURE — 3074F PR MOST RECENT SYSTOLIC BLOOD PRESSURE < 130 MM HG: ICD-10-PCS | Mod: CPTII,S$GLB,, | Performed by: PSYCHOLOGIST

## 2022-08-09 PROCEDURE — 90792 PR PSYCHIATRIC DIAGNOSTIC EVALUATION W/MEDICAL SERVICES: ICD-10-PCS | Mod: S$GLB,,, | Performed by: PSYCHOLOGIST

## 2022-08-09 PROCEDURE — 3074F SYST BP LT 130 MM HG: CPT | Mod: CPTII,S$GLB,, | Performed by: PSYCHOLOGIST

## 2022-08-09 PROCEDURE — 1159F PR MEDICATION LIST DOCUMENTED IN MEDICAL RECORD: ICD-10-PCS | Mod: CPTII,S$GLB,, | Performed by: PSYCHOLOGIST

## 2022-08-09 PROCEDURE — 3008F BODY MASS INDEX DOCD: CPT | Mod: CPTII,S$GLB,, | Performed by: PSYCHOLOGIST

## 2022-08-09 PROCEDURE — 1159F MED LIST DOCD IN RCRD: CPT | Mod: CPTII,S$GLB,, | Performed by: PSYCHOLOGIST

## 2022-08-09 PROCEDURE — 99999 PR PBB SHADOW E&M-EST. PATIENT-LVL III: CPT | Mod: PBBFAC,,, | Performed by: PSYCHOLOGIST

## 2022-08-09 PROCEDURE — 3078F DIAST BP <80 MM HG: CPT | Mod: CPTII,S$GLB,, | Performed by: PSYCHOLOGIST

## 2022-08-09 PROCEDURE — 90792 PSYCH DIAG EVAL W/MED SRVCS: CPT | Mod: S$GLB,,, | Performed by: PSYCHOLOGIST

## 2022-08-09 PROCEDURE — 99999 PR PBB SHADOW E&M-EST. PATIENT-LVL III: ICD-10-PCS | Mod: PBBFAC,,, | Performed by: PSYCHOLOGIST

## 2022-08-09 RX ORDER — DIAZEPAM 2.5 MG/.5ML
GEL RECTAL
COMMUNITY
End: 2023-03-04

## 2022-08-09 NOTE — PROGRESS NOTES
Outpatient Psychiatric Initial Visit  2022     ID:   Patient presents for an initial evaluation to transfer services from previous provider.      Reason for encounter: Referral from Dr. Toledo and Dr. Hutson     Chief Complaint: anxiety, ADHD    History of Presenting Illness: Pt presents with concerns of anxiety and ADHD.  dx of Invasive Ductal Carcinoma of right breast (BRCA1). Pt reported several female family members who have  from breast cancer at around 40 years old. Thinks about this often but does not interfere with daily living. Other health concerns include: migraines, GERD, sinusitis, celiac dz. Pt reported a long history of mental health treatment starting in childhood. Pt noted that her maternal uncle (paranoid schizophrenia) killed her maternal grandmother. Pt reported that she has engaged in trauma but occasionally has triggers (homeless or prisoners). Pt also noted a hx of body image concerns and eating disorders (anorexia and bulimia). She stated that her body image concerns have been increasing recently due to bilateral mastectomy/reconstruction and hysterectomy. Pt also noted that in the past her eating behavior was something she could control. Notes no longer engaging in maladaptive eating patterns despite intrusive thoughts. Pt reported that her  is in therapy and her children are in family therapy (5 1/2 and 3, both with evidence of ADHD and anxiety). Pt wants to start individual therapy and marital therapy.     Depression symptoms: Pt denied     Anxiety symptoms:  Nonproductive worry, muscle tension, shortness of breath, infrequent panic attacksPt denied symptoms consistent with OCD, phobias, or social anxiety.     Bailee/Hypomania Symptoms: Pt denied current or history of related symptoms.    Psychosis Symptoms: Pt denied current or history of related symptoms.    Attention/Concentration Symptoms: Dx with ADHD in childhood. Pt reported that she was overmedicated and  "overstimulated until recent medication plan.     Disordered Eating/Body Image Concerns: Pt denied current or history of related symptoms. Hx of anorexia and bulimia (both in remission).    Suicidal Ideation and Risk: Pt denied current or history of related symptoms. Past suicide attempt in high school - slit wrists,     Homicidal/Violent Ideation and Risk: Pt denied current or history of related symptoms.    Criminal History: Pt denied.    Prior Psychiatric Treatment/Hospitalizations: Long history of therapy and psychiatry.     Current Psych Meds: celexa 40mg po qhs, adderall 30xr mg po qam, xanax 0.125mg po daily PRN anxiety (takes maybew once every 3 months), trazodone 25mg po qhs PRN insomnia (not currently taking)    Past Psych Meds: effexor xr ("my mood was the best on that but I was having panic attacks and bld press was really negar"), pristiq (for headaches- effective), cymbalta (ineffective), lexapro and zoloft (effective but worsened headaches), klonopin 1mg (effective- for sleep and anxiety)  For sleep- elavil (ineffective), trazodone (worsened h/s's), ambien (nightmares), lunesta (nightmares), seroquel, prazosin, xanax  For headache- topomax    Current Medical Conditions Per Chart Review:   Patient Active Problem List   Diagnosis    Generalized anxiety disorder    ADHD (attention deficit hyperactivity disorder), inattentive type    History of posttraumatic stress disorder (PTSD)    Insomnia    Transformed migraine without aura    Chronic bilateral low back pain without sciatica    Poor posture    Seasonal allergies    Menorrhagia with regular cycle    Malignant neoplasm of upper-outer quadrant of right breast in female, estrogen receptor positive    History of bulimia nervosa    History of anorexia nervosa    Chemotherapy-induced neuropathy    Shortness of breath    Status post mastectomy, bilateral    BRCA positive    BRCA1 positive    s/p RA-TLH/BSO    Chronic migraine with aura    " Moderate episode of recurrent major depressive disorder    Nausea and vomiting    Family history of ischemic heart disease    Surgical menopause    Use of aromatase inhibitors    Vagina bleeding    Acute bronchitis    Cough in adult patient      Family Psychiatric History:  FamPHx: mUncle- schizophrenia, father- zoloft for anxiety/depression, brother- social anxiety, sister- anxiety- zoloft    Alcohol Use: Pt reported minimal, infrequent alcohol use and denied a history of problematic drinking. About 1 drink per week to 2-3.     Tobacco and Drug Use: Pt denied tobacco or drug use.     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      Trauma history:  hx of PTSD, mat uncle murdered mat grandmother     Mental Status Exam      Physical Exam  Constitutional:       Appearance: Normal appearance. She is normal weight.   Neurological:      Mental Status: She is alert.   Psychiatric:         Attention and Perception: Attention and perception normal.         Mood and Affect: Mood and affect normal.         Speech: Speech normal.         Behavior: Behavior normal. Behavior is cooperative.         Thought Content: Thought content normal.         Cognition and Memory: Cognition and memory normal.         Judgment: Judgment normal.          Current Evaluation:  Nutritional Screening:  Considering the patient's height and weight, medications, medical history and preferences, should a referral be made to the dietitian? No  Vitals: most recent vitals signs, dated greater than 90 days prior to this appointment, were reviewed  General: age appropriate, well nourished, casually dressed, neatly groomed  MSK: muscle strength/tone : no tremor or abnormal movements. Gait/Station: no ataxic, steady    Clinical Assessment : Pt is a 37 y.o  female with two children presenting with a long hx of behavioral health concerns and treatment. Pt has been seeing Dr. Hutson for many years and  reported that her current medication plan is working well. She noted some psychosocial stress but is coping with them. She noted some occasional intrusive thoughts about engaging in maladaptive eating behaviors with some body image concerns triggered by recent cancer tx (double mastectomy and reconstruction) and hysterectomy. Pt also noted occasional triggers of past trauma but has learned to skills to cope as well. Pt appears to be insightful and knowledgeable about behavior health strategies, likely from her history in therapy. Will continue with medication plan from previous provider.     Summary     Diagnosis(es):        1) Anxiety disorder        2) Attention Deficit Hyperactivity Disorder - Inattentive Type     h/o PTSD (now in remission),    h/o insomnia,    h/o anorexia and bulimia (both in remission)    Plan      Goal #1: Maintain stable mood  Goal #2: Reinforce engagement in therapy.    Pt is to continue with celexa 40mg po qhs, adderall 30xr mg po qam, xanax 0.125mg po daily PRN anxiety, trazodone 25mg po qhs PRN insomnia     Treatment plan and medication changes will be coordinated and consulted with PCP, Dr Toledo on 8/9. All general medical concerns will be managed by the PCP.     This author reviewed limits to confidentiality and this author's collaboration with pt's physician. Pt indicated understanding and denied any questions.    Return to Clinic: 3 months    -Call to report any worsening of symptoms or problems associated with medication  - Pt instructed to go to ER if thoughts of harming self or others arise     -Supportive therapy and psychoeducation provided  -R/B/SE's of medications discussed with the pt who expresses understanding and chooses to take medications as prescribed.   -Pt instructed to call clinic, 911 or go to nearest emergency room if sxs worsen or pt is in   crisis. The pt expresses understanding.    Antidepressant/Antianxiety Medication Initiation:  Patient informed of risks,  benefits, and potential side effects of medication and accepts informed consent.  Common side effects include nausea, fatigue, headache, insomnia., Specifically discussed the possibility of new or worsening suicidal thoughts/depression.  Patient instructed to stop the medication immediately and seek urgent treatment if this occurs. Patient instructed not to abruptly discontinue medication without physician guidance except in cases of sudden onset or worsening of SI.       Stimulant Medication Initiation:  Patient advised of risks, benefits, and side effects of medication and accepts informed consent.  Common side effects include insomnia, irritability, jittery feeling, dry mouth, and agitation/hostility., Patient advised of potential addictive nature of medication and controlled substance classification.  Instructed to safeguard medication as no early refills can be given for lost or stolen medications.       Benzodiazepine Initiation:  Patient advised of the risks, benefits, and common side effects of medication and has accepted informed consent.  Common side effects include drowsiness, impaired coordination, possible memory loss. Patient advised NOT to operate a vehicle or machinery until they are sure how the medication will affect them.  Client also advised of danger of mixing this medication with alcohol. Patient advised of potential addictive nature of medication and need to safeguard medication as no early refills for lost or stolen medications can be authorized.       Pregnancy Warning:  Patient denies current pregnancy possibility.  Patient made aware that medications have not been proven safe in pregnancy and that she must maintain adequate birth control.  Patient instructed to alert us immediately if she becomes pregnant.

## 2022-08-12 NOTE — PROGRESS NOTES
Patient arrives today for her monthly hormone injection. Risks, benefits, side effects, administration, frequency and reasons warranting provider notification reviewed with patient. Patient verbalizes understanding.     50 mg Testosterone administered IM to left outer quadrant of gluteus using aseptic technique and 22 gauge 1.5 inch needle.     Site secured with Band-Aid, needle tip remains intact, patient tolerated well without pain.      Patient's next injections scheduled prior to clinic departure.           Metric Last Due   Estradiol 5/22 11/22   Free Testosterone 5/22 11/22   Progesterone (if applicable)       Well Woman Exam 7/22 7/23   Annual w/PCP                     Recent/Upcoming Surgery or Admissions (last 30 days):  No   Recent Health Changes (last 30 days): No   Allergy Changes: No   Medication Changes: No   Family History Updates (message Kary for any family cancer updates):  No   Applicable Clearances Obtained: Yes   BP (if checked) under 180/100:  No

## 2022-08-17 ENCOUNTER — PATIENT MESSAGE (OUTPATIENT)
Dept: PSYCHIATRY | Facility: CLINIC | Age: 37
End: 2022-08-17
Payer: COMMERCIAL

## 2022-08-17 ENCOUNTER — CLINICAL SUPPORT (OUTPATIENT)
Dept: OBSTETRICS AND GYNECOLOGY | Facility: CLINIC | Age: 37
End: 2022-08-17
Payer: COMMERCIAL

## 2022-08-17 DIAGNOSIS — F90.0 ADHD (ATTENTION DEFICIT HYPERACTIVITY DISORDER), INATTENTIVE TYPE: ICD-10-CM

## 2022-08-17 DIAGNOSIS — N95.1 MENOPAUSAL SYMPTOMS: Primary | ICD-10-CM

## 2022-08-17 PROCEDURE — 96372 PR INJECTION,THERAP/PROPH/DIAG2ST, IM OR SUBCUT: ICD-10-PCS | Mod: S$GLB,,, | Performed by: PHYSICIAN ASSISTANT

## 2022-08-17 PROCEDURE — 96372 THER/PROPH/DIAG INJ SC/IM: CPT | Mod: S$GLB,,, | Performed by: PHYSICIAN ASSISTANT

## 2022-08-17 RX ORDER — DEXTROAMPHETAMINE SACCHARATE, AMPHETAMINE ASPARTATE MONOHYDRATE, DEXTROAMPHETAMINE SULFATE AND AMPHETAMINE SULFATE 7.5; 7.5; 7.5; 7.5 MG/1; MG/1; MG/1; MG/1
30 CAPSULE, EXTENDED RELEASE ORAL EVERY MORNING
Qty: 30 CAPSULE | Refills: 0 | Status: SHIPPED | OUTPATIENT
Start: 2022-08-17 | End: 2022-09-14 | Stop reason: SDUPTHER

## 2022-08-17 RX ADMIN — TESTOSTERONE CYPIONATE 50 MG: 200 INJECTION, SOLUTION INTRAMUSCULAR at 08:08

## 2022-08-18 ENCOUNTER — TELEPHONE (OUTPATIENT)
Dept: PHARMACY | Facility: CLINIC | Age: 37
End: 2022-08-18
Payer: COMMERCIAL

## 2022-09-07 ENCOUNTER — CLINICAL SUPPORT (OUTPATIENT)
Dept: OBSTETRICS AND GYNECOLOGY | Facility: CLINIC | Age: 37
End: 2022-09-07
Payer: COMMERCIAL

## 2022-09-07 DIAGNOSIS — N95.1 MENOPAUSAL SYMPTOMS: Primary | ICD-10-CM

## 2022-09-07 PROCEDURE — 96372 THER/PROPH/DIAG INJ SC/IM: CPT | Mod: S$GLB,,, | Performed by: PHYSICIAN ASSISTANT

## 2022-09-07 PROCEDURE — 96372 PR INJECTION,THERAP/PROPH/DIAG2ST, IM OR SUBCUT: ICD-10-PCS | Mod: S$GLB,,, | Performed by: PHYSICIAN ASSISTANT

## 2022-09-07 RX ADMIN — TESTOSTERONE CYPIONATE 50 MG: 200 INJECTION, SOLUTION INTRAMUSCULAR at 08:09

## 2022-09-14 DIAGNOSIS — F90.0 ADHD (ATTENTION DEFICIT HYPERACTIVITY DISORDER), INATTENTIVE TYPE: ICD-10-CM

## 2022-09-14 RX ORDER — DEXTROAMPHETAMINE SACCHARATE, AMPHETAMINE ASPARTATE MONOHYDRATE, DEXTROAMPHETAMINE SULFATE AND AMPHETAMINE SULFATE 7.5; 7.5; 7.5; 7.5 MG/1; MG/1; MG/1; MG/1
30 CAPSULE, EXTENDED RELEASE ORAL EVERY MORNING
Qty: 30 CAPSULE | Refills: 0 | Status: SHIPPED | OUTPATIENT
Start: 2022-09-14 | End: 2022-10-14 | Stop reason: SDUPTHER

## 2022-09-26 NOTE — PROGRESS NOTES
Patient arrives today for her monthly hormone injection. Risks, benefits, side effects, administration, frequency and reasons warranting provider notification reviewed with patient. Patient verbalizes understanding.     50 mg Testosterone administered IM to left outer quadrant of gluteus using aseptic technique and 22 gauge 1.5 inch needle.     Site secured with Band-Aid, needle tip remains intact, patient tolerated well without pain.      Patient's next injections scheduled prior to clinic departure.           Metric Last Due   Estradiol 5/22 11/22   Free Testosterone 5/22 11/22   Progesterone (if applicable)       Well Woman Exam 7/22 7/23   Annual w/PCP  7/22 7/23                 Recent/Upcoming Surgery or Admissions (last 30 days):  No   Recent Health Changes (last 30 days): No   Allergy Changes: No   Medication Changes: No   Family History Updates (message Kary for any family cancer updates):  No   Applicable Clearances Obtained: Yes   BP (if checked) under 180/100:  No          
shortly

## 2022-09-28 ENCOUNTER — CLINICAL SUPPORT (OUTPATIENT)
Dept: OBSTETRICS AND GYNECOLOGY | Facility: CLINIC | Age: 37
End: 2022-09-28
Payer: COMMERCIAL

## 2022-09-28 ENCOUNTER — OFFICE VISIT (OUTPATIENT)
Dept: OBSTETRICS AND GYNECOLOGY | Facility: CLINIC | Age: 37
End: 2022-09-28
Payer: COMMERCIAL

## 2022-09-28 VITALS
DIASTOLIC BLOOD PRESSURE: 77 MMHG | SYSTOLIC BLOOD PRESSURE: 116 MMHG | BODY MASS INDEX: 25.33 KG/M2 | HEIGHT: 66 IN | WEIGHT: 157.63 LBS

## 2022-09-28 DIAGNOSIS — N95.1 MENOPAUSAL SYMPTOMS: Primary | ICD-10-CM

## 2022-09-28 DIAGNOSIS — C50.411 MALIGNANT NEOPLASM OF UPPER-OUTER QUADRANT OF RIGHT BREAST IN FEMALE, ESTROGEN RECEPTOR POSITIVE: ICD-10-CM

## 2022-09-28 DIAGNOSIS — E66.3 OVERWEIGHT (BMI 25.0-29.9): ICD-10-CM

## 2022-09-28 DIAGNOSIS — Z17.0 MALIGNANT NEOPLASM OF UPPER-OUTER QUADRANT OF RIGHT BREAST IN FEMALE, ESTROGEN RECEPTOR POSITIVE: ICD-10-CM

## 2022-09-28 PROCEDURE — 96372 PR INJECTION,THERAP/PROPH/DIAG2ST, IM OR SUBCUT: ICD-10-PCS | Mod: S$GLB,,, | Performed by: OBSTETRICS & GYNECOLOGY

## 2022-09-28 PROCEDURE — 99999 PR PBB SHADOW E&M-EST. PATIENT-LVL I: ICD-10-PCS | Mod: PBBFAC,,,

## 2022-09-28 PROCEDURE — 1159F MED LIST DOCD IN RCRD: CPT | Mod: CPTII,S$GLB,, | Performed by: PHYSICIAN ASSISTANT

## 2022-09-28 PROCEDURE — 96372 THER/PROPH/DIAG INJ SC/IM: CPT | Mod: S$GLB,,, | Performed by: OBSTETRICS & GYNECOLOGY

## 2022-09-28 PROCEDURE — 3074F PR MOST RECENT SYSTOLIC BLOOD PRESSURE < 130 MM HG: ICD-10-PCS | Mod: CPTII,S$GLB,, | Performed by: PHYSICIAN ASSISTANT

## 2022-09-28 PROCEDURE — 99999 PR PBB SHADOW E&M-EST. PATIENT-LVL I: CPT | Mod: PBBFAC,,,

## 2022-09-28 PROCEDURE — 3078F PR MOST RECENT DIASTOLIC BLOOD PRESSURE < 80 MM HG: ICD-10-PCS | Mod: CPTII,S$GLB,, | Performed by: PHYSICIAN ASSISTANT

## 2022-09-28 PROCEDURE — 1160F PR REVIEW ALL MEDS BY PRESCRIBER/CLIN PHARMACIST DOCUMENTED: ICD-10-PCS | Mod: CPTII,S$GLB,, | Performed by: PHYSICIAN ASSISTANT

## 2022-09-28 PROCEDURE — 3078F DIAST BP <80 MM HG: CPT | Mod: CPTII,S$GLB,, | Performed by: PHYSICIAN ASSISTANT

## 2022-09-28 PROCEDURE — 99213 PR OFFICE/OUTPT VISIT, EST, LEVL III, 20-29 MIN: ICD-10-PCS | Mod: 25,S$GLB,, | Performed by: PHYSICIAN ASSISTANT

## 2022-09-28 PROCEDURE — 99213 OFFICE O/P EST LOW 20 MIN: CPT | Mod: 25,S$GLB,, | Performed by: PHYSICIAN ASSISTANT

## 2022-09-28 PROCEDURE — 1160F RVW MEDS BY RX/DR IN RCRD: CPT | Mod: CPTII,S$GLB,, | Performed by: PHYSICIAN ASSISTANT

## 2022-09-28 PROCEDURE — 99999 PR PBB SHADOW E&M-EST. PATIENT-LVL V: ICD-10-PCS | Mod: PBBFAC,,, | Performed by: PHYSICIAN ASSISTANT

## 2022-09-28 PROCEDURE — 1159F PR MEDICATION LIST DOCUMENTED IN MEDICAL RECORD: ICD-10-PCS | Mod: CPTII,S$GLB,, | Performed by: PHYSICIAN ASSISTANT

## 2022-09-28 PROCEDURE — 3074F SYST BP LT 130 MM HG: CPT | Mod: CPTII,S$GLB,, | Performed by: PHYSICIAN ASSISTANT

## 2022-09-28 PROCEDURE — 3008F BODY MASS INDEX DOCD: CPT | Mod: CPTII,S$GLB,, | Performed by: PHYSICIAN ASSISTANT

## 2022-09-28 PROCEDURE — 99999 PR PBB SHADOW E&M-EST. PATIENT-LVL V: CPT | Mod: PBBFAC,,, | Performed by: PHYSICIAN ASSISTANT

## 2022-09-28 PROCEDURE — 3008F PR BODY MASS INDEX (BMI) DOCUMENTED: ICD-10-PCS | Mod: CPTII,S$GLB,, | Performed by: PHYSICIAN ASSISTANT

## 2022-09-28 RX ORDER — TESTOSTERONE CYPIONATE 200 MG/ML
50 INJECTION, SOLUTION INTRAMUSCULAR
Status: COMPLETED | OUTPATIENT
Start: 2022-09-28 | End: 2023-01-12

## 2022-09-28 RX ADMIN — TESTOSTERONE CYPIONATE 50 MG: 200 INJECTION, SOLUTION INTRAMUSCULAR at 08:09

## 2022-09-28 NOTE — PROGRESS NOTES
Subjective:      Yolanda Norman is a 37 y.o. female who presents for 3 month follow up for weight loss.  History of right breast cancer s/p bilateral mastectomy on July 1, 2020 and currently on Airmidex. She is also s/p hyst/BSO. GettingTestosterone 50mg IM [id number]. She has lost 9 pounds since last seen. Taking Metformin 500mg BID. Feels good with this and would like to continue. Continues to lose weight. Eating well with protein at each meal. Has not been exercising as much with  working out of town, but plans to restart soon.  Due for testosterone injection today. Getting Testosterone 50mg Q21 days. Can feel it wear off at 21 days and tired today. Has acute URI with congestion. Covid negative. Taking iburpofen and had cold medicine that she will start today.     Lab Visit on 08/08/2022   Component Date Value Ref Range Status    Cholesterol 08/08/2022 211 (H)  120 - 199 mg/dL Final    Triglycerides 08/08/2022 114  30 - 150 mg/dL Final    HDL 08/08/2022 60  40 - 75 mg/dL Final    LDL Cholesterol 08/08/2022 128.2  63.0 - 159.0 mg/dL Final    HDL/Cholesterol Ratio 08/08/2022 28.4  20.0 - 50.0 % Final    Total Cholesterol/HDL Ratio 08/08/2022 3.5  2.0 - 5.0 Final    Non-HDL Cholesterol 08/08/2022 151  mg/dL Final       Past Medical History:   Diagnosis Date    Abnormal Pap smear of cervix 2009    ADHD     Allergy     seasonal    Anorexia     Anxiety     Asthma     BRCA1 positive 12/18/2020    Bulimia     Depression     Fever blister     Gastroparesis     GERD (gastroesophageal reflux disease)     History of posttraumatic stress disorder (PTSD)     Hypertension     Gestational Hypertension    Invasive ductal carcinoma of right breast 12/18/2019    Mastitis     Migraine headache     PONV (postoperative nausea and vomiting)     Status post mastectomy, bilateral 7/29/2020     Past Surgical History:   Procedure Laterality Date    BILATERAL MASTECTOMY Bilateral 7/1/2020    Procedure: MASTECTOMY, BILATERAL -  BILATERAL SKIN SPARING MASTECTOMY;  Surgeon: Percy Ayers MD;  Location: [facility];  Service: General;  Laterality: Bilateral;    BIOPSY OF AXILLARY NODE Right 2020    Procedure: BIOPSY, LYMPH NODE, AXILLARY;  Surgeon: Percy Ayers MD;  Location: [facility];  Service: General;  Laterality: Right;    BONE MARROW ASPIRATION      x 3    BREAST SURGERY      CERVICAL BIOPSY  W/ LOOP ELECTRODE EXCISION       SECTION  2016     SECTION N/A 2019    Procedure:  SECTION;  Surgeon: Kirit Hernandez MD;  Location: [facility];  Service: OB/GYN;  Laterality: N/A;    COLPOSCOPY      ESOPHAGOGASTRODUODENOSCOPY N/A 2021    Procedure: EGD (ESOPHAGOGASTRODUODENOSCOPY);  Surgeon: Lj Santos MD;  Location: [facility] FLR);  Service: Endoscopy;  Laterality: N/A;  COVID test on 21 at [facility]    ESOPHAGOGASTRODUODENOSCOPY N/A 2021    Procedure: ESOPHAGOGASTRODUODENOSCOPY (EGD);  Surgeon: Camila Wiley MD;  Location: [facility];  Service: Endoscopy;  Laterality: N/A;    INJECTION FOR SENTINEL NODE IDENTIFICATION Right 2020    Procedure: INJECTION, FOR SENTINEL NODE IDENTIFICATION;  Surgeon: Percy Ayers MD;  Location: [facility];  Service: General;  Laterality: Right;    INSERTION OF BREAST TISSUE EXPANDER Bilateral 2020    Procedure: INSERTION, TISSUE EXPANDER, BREAST;  Surgeon: Kiko Aranda MD;  Location: [facility];  Service: Plastics;  Laterality: Bilateral;    INSERTION OF TUNNELED CENTRAL VENOUS CATHETER (CVC) WITH SUBCUTANEOUS PORT Left 2019    Procedure: [id number]-Left neck or chest wall;  Surgeon: Percy Ayers MD;  Location: [facility] 2ND FLR;  Service: General;  Laterality: Left;    MASTECTOMY      MEDIPORT REMOVAL N/A 2020    Procedure: REMOVAL, CATHETER, CENTRAL VENOUS, TUNNELED, WITH PORT;  Surgeon: Percy Ayers MD;  Location: [facility];  Service: General;  Laterality: N/A;    ROBOT-ASSISTED LAPAROSCOPIC ABDOMINAL  HYSTERECTOMY USING DA HIMA XI N/A 2020    Procedure: XI ROBOTIC HYSTERECTOMY;  Surgeon: Emili Smith MD;  Location: [facility] OR;  Service: OB/GYN;  Laterality: N/A;    ROBOT-ASSISTED LAPAROSCOPIC SALPINGO-OOPHORECTOMY USING DA HIMA XI Bilateral 2020    Procedure: XI ROBOTIC SALPINGO-OOPHORECTOMY;  Surgeon: Emili Smith MD;  Location: [facility] OR;  Service: OB/GYN;  Laterality: Bilateral;    SHOULDER SURGERY Left     x 2    SINUS SURGERY      age 19    STOMACH SURGERY      TISSUE EXPANDER REMOVAL Right 10/3/2020    Procedure: REMOVAL, TISSUE EXPANDER;  Surgeon: Kiko Aranda MD;  Location: [facility] OR;  Service: Plastics;  Laterality: Right;    TONSILLECTOMY      UPPER GASTROINTESTINAL ENDOSCOPY       Social History     Tobacco Use    Smoking status: Never    Smokeless tobacco: Never   Substance Use Topics    Alcohol use: Yes     Comment: socially    Drug use: No     Family History   Problem Relation Age of Onset    Cancer Maternal Grandmother 40        cervical    Cervical cancer Mother     Breast cancer Other     Ovarian cancer Other     Colon polyps Father     Colon cancer Neg Hx     Melanoma Neg Hx      OB History    Para Term  AB Living   2 2 2     2   SAB IAB Ectopic Multiple Live Births         0 2      # Outcome Date GA Lbr Bull/2nd Weight Sex Delivery Anes PTL Lv   2 Term 19 39w1d  3.43 kg (7 lb 9 oz) M CS-LTranv Spinal N JOLLY   1 Term 18 38w1d  2.96 kg (6 lb 8.4 oz) M CS-LTranv EPI N JOLLY      Complications: Failure to Progress in First Stage       Current Outpatient Medications:     ALPRAZolam (XANAX) 0.25 MG tablet, Take 1 tablet (0.25 mg total) by mouth daily as needed for Anxiety., Disp: 30 tablet, Rfl: 0    anastrozole (ARIMIDEX) 1 mg Tab, Take 1 tablet (1 mg total) by mouth once daily., Disp: 30 tablet, Rfl: 11    cholecalciferol, vitamin D3, (VITAMIN D3) 25 mcg (1,000 unit) capsule, Take 1,000 Units by mouth 2 (two) times a day., Disp: , Rfl:     citalopram (CELEXA) 40  MG tablet, TAKE 1 TABLET(40 MG) BY MOUTH EVERY DAY, Disp: 30 tablet, Rfl: 2    clobetasoL (TEMOVATE) 0.05 % cream, Apply to affected area twice a day for 2 weeks then stop, Disp: 60 g, Rfl: 1    cranberry 500 mg Cap, Take by mouth., Disp: , Rfl:     dapsone (ACZONE) 7.5 % GlwP, Apply topically., Disp: , Rfl:     dextroamphetamine-amphetamine (ADDERALL XR) 30 MG 24 hr capsule, Take 1 capsule (30 mg total) by mouth every morning., Disp: 30 capsule, Rfl: 0    diazePAM (DIASTAT) 2.5 mg Kit, Place rectally., Disp: , Rfl:     fluticasone propionate (FLOVENT HFA) 44 mcg/actuation inhaler, Inhale 1 puff into the lungs 2 (two) times daily. Controller, Disp: 10.6 g, Rfl: 0    loratadine (CLARITIN) 10 mg tablet, Take 10 mg by mouth once daily., Disp: , Rfl:     magnesium 30 mg Tab, Take by mouth once., Disp: , Rfl:     metFORMIN (GLUCOPHAGE) 500 MG tablet, Take 1 tablet (500 mg total) by mouth 2 (two) times daily with meals., Disp: 60 tablet, Rfl: 11    metoclopramide HCl (REGLAN) 10 MG tablet, Take 1 tablet 3 (three) times daily before meals by mouth, Disp: 90 tablet, Rfl: 3    multivitamin (THERAGRAN) per tablet, Take 1 tablet by mouth once daily., Disp: , Rfl:     multivitamin with minerals (HAIR,SKIN AND NAILS ORAL), Take by mouth., Disp: , Rfl:     onabotulinumtoxinA (BOTOX INJ), Inject as directed. [id number], Disp: , Rfl:     pantoprazole (PROTONIX) 40 MG tablet, Take 1 tablet by mouth daily, Disp: 30 tablet, Rfl: 0    phenazopyridine (PYRIDIUM) 200 MG tablet, Take 1 tablet 3 (three) times daily as needed by mouth for Pain for up to 3 days, Disp: 10 tablet, Rfl: 0    prasterone, dhea, (INTRAROSA) 6.5 mg Inst, Place 6.5 mg vaginally every evening., Disp: 30 each, Rfl: 11    spironolactone (ALDACTONE) 100 MG tablet, take 1 tablet by mouth once daily, Disp: 30 tablet, Rfl: 2    sucralfate (CARAFATE) 1 gram tablet, Take 1 tablet (1 g total) by mouth 4 (four) times daily. Can be crushed if cannot swallow., Disp: 120 tablet,  "Rfl: 0    sumatriptan (IMITREX) 100 MG tablet, Take 1/2 to 1 tab at onset of headache.  If no improvement in 2 hours, take another.  Do not take more than 2 in 24 hours., Disp: 9 tablet, Rfl: 11    topiramate (TOPAMAX) 200 MG Tab, Take 1 tablet (200 mg total) by mouth every evening., Disp: 90 tablet, Rfl: 3    traZODone (DESYREL) 50 MG tablet, TAKE 1 TABLET(50 MG) BY MOUTH EVERY EVENING, Disp: 30 tablet, Rfl: 0    tretinoin (ALTRALIN) 0.05 % gel, SMARTSIG:Sparingly Topical Every Night, Disp: , Rfl:     UNABLE TO FIND, medication name: Valuim vaginal suppository 5mg  Place one vaginally 30 mins before intercourse, Disp: 10 suppository, Rfl: 5    valACYclovir (VALTREX) 1000 MG tablet, Take 1 tablet (1,000 mg total) by mouth every 12 (twelve) hours., Disp: 60 tablet, Rfl: 0    vitamin E 100 UNIT capsule, Take 100 Units by mouth once daily., Disp: , Rfl:     Current Facility-Administered Medications:     onabotulinumtoxina injection 200 Units, 200 Units, Intramuscular, Q90 Days, Dillon Gonzalez MD, 200 Units at 06/25/21 0751    testosterone cypionate injection 50 mg, 50 mg, Intramuscular, Q28 Days, Kaleigh Polanco MD    Review of Systems:  General: No fever, chills.  Chest: No chest pain, shortness of breath, or palpitations.  Breast: No pain, masses, or nipple discharge.  Vulva: No pain, lesions, or itching.  Vagina: No relaxation, itching, discharge, or lesions.  Abdomen: No pain, nausea, vomiting, diarrhea, or constipation.  Urinary: No incontinence, nocturia, frequency, or dysuria.  Extremities:  No leg cramps, edema, or calf pain.  Neurologic: No headaches, dizziness, or visual changes.    Objective:     Vitals:    09/28/22 0817   BP: 116/77   Weight: 71.5 kg (157 lb 9.6 oz)   Height: 5' 6" (1.676 m)   PainSc: 0-No pain     Body mass index is 25.44 kg/m².    PHYSICAL EXAM:  APPEARANCE: Well nourished, well developed, in no acute distress.  AFFECT: WNL, alert and oriented x 3  SKIN: No acne or " hirsutism  EXTREMITIES: No edema.    Assessment:      Menopausal symptoms  -     Estradiol; Future; Expected date: 09/28/2022  -     Testosterone, free; Future; Expected date: 09/28/2022    Malignant neoplasm of upper-outer quadrant of right breast in female, estrogen receptor positive    Overweight (BMI 25.0-29.9)      Plan:   Continue testosterone 50mg IM Q21 days.  Repeat hormone levels 3 weeks after her next injection.  Continue 3 regular meals a day with lean protein with each meal.  Metformin 500mg BID with food  Continue regular workouts with cardio and strength.  Follow up PRN for weight loss.     Instructed patient to call if she experiences any side effects or has any questions.    I spent a total of 25 minutes on the day of the visit.This includes face to face time and non-face to face time preparing to see the patient (eg, review of tests), obtaining and/or reviewing separately obtained history, documenting clinical information in the electronic or other health record, independently interpreting results and communicating results to the patient/family/caregiver, or care coordinator.

## 2022-10-14 DIAGNOSIS — F90.0 ADHD (ATTENTION DEFICIT HYPERACTIVITY DISORDER), INATTENTIVE TYPE: ICD-10-CM

## 2022-10-16 ENCOUNTER — PATIENT MESSAGE (OUTPATIENT)
Dept: NEUROLOGY | Facility: CLINIC | Age: 37
End: 2022-10-16
Payer: COMMERCIAL

## 2022-10-17 ENCOUNTER — IMMUNIZATION (OUTPATIENT)
Dept: PHARMACY | Facility: CLINIC | Age: 37
End: 2022-10-17
Payer: COMMERCIAL

## 2022-10-17 RX ORDER — DEXTROAMPHETAMINE SACCHARATE, AMPHETAMINE ASPARTATE MONOHYDRATE, DEXTROAMPHETAMINE SULFATE AND AMPHETAMINE SULFATE 7.5; 7.5; 7.5; 7.5 MG/1; MG/1; MG/1; MG/1
30 CAPSULE, EXTENDED RELEASE ORAL EVERY MORNING
Qty: 30 CAPSULE | Refills: 0 | Status: SHIPPED | OUTPATIENT
Start: 2022-10-17 | End: 2022-11-07 | Stop reason: SDUPTHER

## 2022-10-18 NOTE — PROGRESS NOTES
Patient arrives today for her monthly hormone injection. Risks, benefits, side effects, administration, frequency and reasons warranting provider notification reviewed with patient. Patient verbalizes understanding.     50 mg Testosterone administered IM to right outer quadrant of gluteus using aseptic technique and 22 gauge 1.5 inch needle.     Site secured with Band-Aid, needle tip remains intact, patient tolerated well without pain.      Patient's next injections scheduled prior to clinic departure.           Metric Last Due   Estradiol 5/22 11/22   Free Testosterone 5/22 11/22   Progesterone (if applicable)       Well Woman Exam 7/22 7/23   Annual w/PCP  7/22 7/23                 Recent/Upcoming Surgery or Admissions (last 30 days):  No   Recent Health Changes (last 30 days): No   Allergy Changes: No   Medication Changes: No   Family History Updates (message Kary for any family cancer updates):  No   Applicable Clearances Obtained: Yes   BP (if checked) under 180/100:  No

## 2022-10-19 ENCOUNTER — CLINICAL SUPPORT (OUTPATIENT)
Dept: OBSTETRICS AND GYNECOLOGY | Facility: CLINIC | Age: 37
End: 2022-10-19
Payer: COMMERCIAL

## 2022-10-19 DIAGNOSIS — N95.1 MENOPAUSAL SYMPTOMS: Primary | ICD-10-CM

## 2022-10-19 PROCEDURE — 96372 THER/PROPH/DIAG INJ SC/IM: CPT | Mod: S$GLB,,, | Performed by: OBSTETRICS & GYNECOLOGY

## 2022-10-19 PROCEDURE — 96372 PR INJECTION,THERAP/PROPH/DIAG2ST, IM OR SUBCUT: ICD-10-PCS | Mod: S$GLB,,, | Performed by: OBSTETRICS & GYNECOLOGY

## 2022-10-19 RX ADMIN — TESTOSTERONE CYPIONATE 50 MG: 200 INJECTION, SOLUTION INTRAMUSCULAR at 08:10

## 2022-10-27 ENCOUNTER — PATIENT MESSAGE (OUTPATIENT)
Dept: OBSTETRICS AND GYNECOLOGY | Facility: CLINIC | Age: 37
End: 2022-10-27
Payer: COMMERCIAL

## 2022-10-27 ENCOUNTER — TELEPHONE (OUTPATIENT)
Dept: NEUROLOGY | Facility: CLINIC | Age: 37
End: 2022-10-27
Payer: COMMERCIAL

## 2022-10-27 NOTE — TELEPHONE ENCOUNTER
Contacted pt to offer appt, no answer, left vm instructing her to give our office a call back to proceed with scheduling.

## 2022-10-27 NOTE — TELEPHONE ENCOUNTER
Contacted pt in regards to call back request Left vm for pt to contact our office to discuss appt add on options.

## 2022-10-30 ENCOUNTER — PATIENT MESSAGE (OUTPATIENT)
Dept: HEMATOLOGY/ONCOLOGY | Facility: CLINIC | Age: 37
End: 2022-10-30
Payer: COMMERCIAL

## 2022-10-31 ENCOUNTER — PROCEDURE VISIT (OUTPATIENT)
Dept: NEUROLOGY | Facility: CLINIC | Age: 37
End: 2022-10-31
Payer: COMMERCIAL

## 2022-10-31 VITALS
HEART RATE: 101 BPM | WEIGHT: 154.31 LBS | SYSTOLIC BLOOD PRESSURE: 113 MMHG | DIASTOLIC BLOOD PRESSURE: 81 MMHG | BODY MASS INDEX: 24.91 KG/M2

## 2022-10-31 DIAGNOSIS — G43.E09 CHRONIC MIGRAINE WITH AURA: Primary | ICD-10-CM

## 2022-10-31 PROCEDURE — 64615 CHEMODENERV MUSC MIGRAINE: CPT | Mod: S$GLB,,, | Performed by: PSYCHIATRY & NEUROLOGY

## 2022-10-31 PROCEDURE — 64615 PR CHEMODENERVATION OF MUSCLE FOR CHRONIC MIGRAINE: ICD-10-PCS | Mod: S$GLB,,, | Performed by: PSYCHIATRY & NEUROLOGY

## 2022-10-31 NOTE — PROCEDURES
Procedures [facility] - NEUROLOGY  [facility] [place]    Date: October 31, 2022   Patient Name: Yolanda Norman   MRN: [id number]   PCP: Aniceto Toledo  Referring Provider: Dillon Gonzalez MD    Assessment:      This is Yolanda Norman, 37 y.o. female with chronic migraines (G43.719) and suffered from headaches more than 15 days a month lasting more than 4 hours a day with no relief of symptoms despite trying multiple medications listed below prior to initiation of botox which has resulted in >50% reduction in headaches.      Plan:      -  Cont TPM 200mg daily, imitrex/fioricet prn  -  Follow up for botox   -  Continue Mg      Greater than 30 minutes spent in chart review, documentation, independent review of imaging, and face to face time with patient       I discussed side effects of the medications. I asked the patient to  stop the medication if he notices serious adverse effects as we discussed and to seek immediate medical attention at an ER.     Dillon Gonzalez MD  [facility]   Department of Neurology    Subjective:   -  Ongoing response to botox and prn meds    7/2022  -  Ongoing response to botox for migraine and conservative measures for tension headache   4/2022  -  Ongoing response to botox for migraine and conservative measures for tension headache  1/2022  -  Ongoing response to botox for migraine and conservative measures for tension headache  9/2021  -  Recent worsening of tension HA, notes chronic right shoulder tension since breast reconstruction as well as daytime and nocturnal bruxism, provoked by screen use to some extent, upcoming vision check, maintains regular sleep and good hydration.  6/2021  -  Worsened HA following TPM decrease and increased back to 200mg, ongoing effect of botox, recent prolonged HA not terminated by imitrex  3/2021  - Continues with good response to botox with some fluctuations in HA related to changes in hormone therapy  and desires to reduce TPM, holding off on grad school  12/2020  - THACKER continues to respond well to botox, TPM, imitrex,  - About to start PhD program   9/2020  -  HA well controlled with combination of TPM and botox  -  Completed chemotherapy but has not started radiation, coping well with stress  6/2020  -  Recent diagnosis of breast cancer, s/p chemo therapy with upcoming surgery and radiation  -  Development of neuropathy pain in feet, excess sedation with GBP 300mg tid, pain/altered sensation in fingers of right hand for the past day  -  Previously unable to tolerate cymbalta  12/2019  EXCELLENT response to botox, estimates 1 headache per week  9/2019  Continued daily headaches, weaning breast feeding - son 6 months old  8/2019  Unable to tolerate increased celexa due to fatigue, compliant with Mg with continued daily HA, unable to resume TPM as continuing to breast feed    HPI 5/2019:   Ms. Yolanda Norman is a 37 y.o. female who presents with a chief complaint of headaches    Patient has had significant difficulty with migraines since she was 12 years old with worsening during recent pregnancy.  Associated nausea, photo/phonophobia.  She has had difficulty with post-partum anxiety and started celexa a month ago with some improvement in headache as well as irritability noted.    Prior medications trials as below  TPM - partial relief at 150mg /d  Pamelor, Neurontin, Seroquel - all little effect  Imitrex, phenergan - good effect  Fioricet, zanaflex - little effect    PAST MEDICAL HISTORY:  Past Medical History:   Diagnosis Date    Abnormal Pap smear of cervix 2009    ADHD     Allergy     seasonal    Anorexia     Anxiety     Asthma     BRCA1 positive 12/18/2020    Bulimia     Depression     Fever blister     Gastroparesis     GERD (gastroesophageal reflux disease)     History of posttraumatic stress disorder (PTSD)     Hypertension     Gestational Hypertension    Invasive ductal carcinoma of right breast  2019    Mastitis     Migraine headache     PONV (postoperative nausea and vomiting)     Status post mastectomy, bilateral 2020       PAST SURGICAL HISTORY:  Past Surgical History:   Procedure Laterality Date    BILATERAL MASTECTOMY Bilateral 2020    Procedure: MASTECTOMY, BILATERAL - BILATERAL SKIN SPARING MASTECTOMY;  Surgeon: Percy Ayers MD;  Location: [facility];  Service: General;  Laterality: Bilateral;    BIOPSY OF AXILLARY NODE Right 2020    Procedure: BIOPSY, LYMPH NODE, AXILLARY;  Surgeon: Percy Ayers MD;  Location: [facility];  Service: General;  Laterality: Right;    BONE MARROW ASPIRATION      x 3    BREAST SURGERY      CERVICAL BIOPSY  W/ LOOP ELECTRODE EXCISION       SECTION  2016     SECTION N/A 2019    Procedure:  SECTION;  Surgeon: Kirit Hernandez MD;  Location: [facility] L&D;  Service: OB/GYN;  Laterality: N/A;    COLPOSCOPY      ESOPHAGOGASTRODUODENOSCOPY N/A 2021    Procedure: EGD (ESOPHAGOGASTRODUODENOSCOPY);  Surgeon: Lj Santos MD;  Location: [address]);  Service: Endoscopy;  Laterality: N/A;  COVID test on 21 at [facility]    ESOPHAGOGASTRODUODENOSCOPY N/A 2021    Procedure: ESOPHAGOGASTRODUODENOSCOPY (EGD);  Surgeon: Camila Wiley MD;  Location: [facility];  Service: Endoscopy;  Laterality: N/A;    INJECTION FOR SENTINEL NODE IDENTIFICATION Right 2020    Procedure: INJECTION, FOR SENTINEL NODE IDENTIFICATION;  Surgeon: Percy Ayers MD;  Location: [facility];  Service: General;  Laterality: Right;    INSERTION OF BREAST TISSUE EXPANDER Bilateral 2020    Procedure: INSERTION, TISSUE EXPANDER, BREAST;  Surgeon: Kiko Aranda MD;  Location: [facility];  Service: Plastics;  Laterality: Bilateral;    INSERTION OF TUNNELED CENTRAL VENOUS CATHETER (CVC) WITH SUBCUTANEOUS PORT Left 2019    Procedure: [id number]-Left neck or chest wall;  Surgeon: Percy Ayers MD;  Location: [address]  [facility];  Service: General;  Laterality: Left;    MASTECTOMY      MEDIPORT REMOVAL N/A 7/1/2020    Procedure: REMOVAL, CATHETER, CENTRAL VENOUS, TUNNELED, WITH PORT;  Surgeon: Percy Ayers MD;  Location: [facility];  Service: General;  Laterality: N/A;    ROBOT-ASSISTED LAPAROSCOPIC ABDOMINAL HYSTERECTOMY USING DA HIMA XI N/A 12/18/2020    Procedure: XI ROBOTIC HYSTERECTOMY;  Surgeon: Emili Smith MD;  Location: [facility];  Service: OB/GYN;  Laterality: N/A;    ROBOT-ASSISTED LAPAROSCOPIC SALPINGO-OOPHORECTOMY USING DA HIMA XI Bilateral 12/18/2020    Procedure: XI ROBOTIC SALPINGO-OOPHORECTOMY;  Surgeon: Emili Smith MD;  Location: [facility] OR;  Service: OB/GYN;  Laterality: Bilateral;    SHOULDER SURGERY Left     x 2    SINUS SURGERY      age 19    STOMACH SURGERY      TISSUE EXPANDER REMOVAL Right 10/3/2020    Procedure: REMOVAL, TISSUE EXPANDER;  Surgeon: Kiko Aranda MD;  Location: [facility];  Service: Plastics;  Laterality: Right;    TONSILLECTOMY      UPPER GASTROINTESTINAL ENDOSCOPY         CURRENT MEDS:  Current Outpatient Medications   Medication Sig Dispense Refill    ALPRAZolam (XANAX) 0.25 MG tablet Take 1 tablet (0.25 mg total) by mouth daily as needed for Anxiety. 30 tablet 0    anastrozole (ARIMIDEX) 1 mg Tab Take 1 tablet (1 mg total) by mouth once daily. 30 tablet 11    cholecalciferol, vitamin D3, (VITAMIN D3) 25 mcg (1,000 unit) capsule Take 1,000 Units by mouth 2 (two) times a day.      citalopram (CELEXA) 40 MG tablet TAKE 1 TABLET(40 MG) BY MOUTH EVERY DAY 30 tablet 2    clobetasoL (TEMOVATE) 0.05 % cream Apply to affected area twice a day for 2 weeks then stop 60 g 1    cranberry 500 mg Cap Take by mouth.      dapsone (ACZONE) 7.5 % GlwP Apply topically.      dextroamphetamine-amphetamine (ADDERALL XR) 30 MG 24 hr capsule Take 1 capsule (30 mg total) by mouth every morning. 30 capsule 0    diazePAM (DIASTAT) 2.5 mg Kit Place rectally.      flu vacc [id number] 6mos up,PF, (FLUARIX QUAD  [id number], PF,) 60 mcg (15 mcg x 4)/0.5 mL Syrg admister as directed 0.5 mL 0    fluticasone propionate (FLOVENT HFA) 44 mcg/actuation inhaler Inhale 1 puff into the lungs 2 (two) times daily. Controller 10.6 g 0    loratadine (CLARITIN) 10 mg tablet Take 10 mg by mouth once daily.      magnesium 30 mg Tab Take by mouth once.      metFORMIN (GLUCOPHAGE) 500 MG tablet Take 1 tablet (500 mg total) by mouth 2 (two) times daily with meals. 60 tablet 11    metoclopramide HCl (REGLAN) 10 MG tablet Take 1 tablet 3 (three) times daily before meals by mouth 90 tablet 3    multivitamin (THERAGRAN) per tablet Take 1 tablet by mouth once daily.      multivitamin with minerals (HAIR,SKIN AND NAILS ORAL) Take by mouth.      onabotulinumtoxinA (BOTOX INJ) Inject as directed. [id number]      pantoprazole (PROTONIX) 40 MG tablet Take 1 tablet by mouth daily 30 tablet 0    phenazopyridine (PYRIDIUM) 200 MG tablet Take 1 tablet 3 (three) times daily as needed by mouth for Pain for up to 3 days 10 tablet 0    prasterone, dhea, (INTRAROSA) 6.5 mg Inst Place 6.5 mg vaginally every evening. 30 each 11    spironolactone (ALDACTONE) 100 MG tablet take 1 tablet by mouth once daily 30 tablet 2    sucralfate (CARAFATE) 1 gram tablet Take 1 tablet (1 g total) by mouth 4 (four) times daily. Can be crushed if cannot swallow. 120 tablet 0    sumatriptan (IMITREX) 100 MG tablet Take 1/2 to 1 tab at onset of headache.  If no improvement in 2 hours, take another.  Do not take more than 2 in 24 hours. 9 tablet 11    topiramate (TOPAMAX) 200 MG Tab Take 1 tablet (200 mg total) by mouth every evening. 90 tablet 3    traZODone (DESYREL) 50 MG tablet TAKE 1 TABLET(50 MG) BY MOUTH EVERY EVENING 30 tablet 0    tretinoin (ALTRALIN) 0.05 % gel SMARTSIG:Sparingly Topical Every Night      UNABLE TO FIND medication name: Valuim vaginal suppository 5mg  Place one vaginally 30 mins before intercourse 10 suppository 5    valACYclovir (VALTREX) 1000 MG tablet Take 1  "tablet (1,000 mg total) by mouth every 12 (twelve) hours. 60 tablet 0    vitamin E 100 UNIT capsule Take 100 Units by mouth once daily.       Current Facility-Administered Medications   Medication Dose Route Frequency Provider Last Rate Last Admin    onabotulinumtoxina injection 200 Units  200 Units Intramuscular Q90 Days Dillon Gonzalez MD   200 Units at 06/25/21 0751    onabotulinumtoxina injection 200 Units  200 Units Subcutaneous 1 time in Clinic/HOD Dillon Gonzalez MD        testosterone cypionate injection 50 mg  50 mg Intramuscular Q28 Days Kaleigh Polanco MD   50 mg at 10/19/22 0813       ALLERGIES:  Review of patient's allergies indicates:   Allergen Reactions    Amoxicillin Hives    Penicillins Hives and Rash    Diazepam Anxiety and Other (See Comments)     "makes hyper"       FAMILY HISTORY:  Family History   Problem Relation Age of Onset    Cancer Maternal Grandmother 40        cervical    Cervical cancer Mother     Breast cancer Other     Ovarian cancer Other     Colon polyps Father     Colon cancer Neg Hx     Melanoma Neg Hx        SOCIAL HISTORY:  Social History     Tobacco Use    Smoking status: Never    Smokeless tobacco: Never   Substance Use Topics    Alcohol use: Yes     Comment: socially    Drug use: No       Review of Systems:  12 review of systems is negative except for the symptoms mentioned in HPI.        Objective:     Vitals:    10/31/22 1443   BP: 113/81   Pulse: 101   Weight: 70 kg (154 lb 5.2 oz)       General: NAD, well nourished   Eyes: no tearing, discharge, no erythema   ENT: moist mucous membranes of the oral cavity, nares patent    Neck: Supple, full range of motion  Cardiovascular: Warm and well perfused, pulses equal and symmetrical  Lungs: Normal work of breathing, normal chest wall excursions  Skin: No rash, lesions, or breakdown on exposed skin  Psychiatry: Mood and affect are appropriate   Abdomen: soft, non tender, non distended  Extremeties: No cyanosis, " clubbing or edema.    Neurological   MENTAL STATUS: Alert and oriented to person, place, and time. Attention and concentration within normal limits. Speech without dysarthria, able to name and repeat without difficulty. Recent and remote memory within normal limits   CRANIAL NERVES: Visual fields intact. PERRL. EOMI. Facial sensation intact. Face symmetrical. Hearing grossly intact. Full shoulder shrug bilaterally. Tongue protrudes midline   SENSORY: Sensation is intact to light touch throughout.   MOTOR: Normal bulk and tone. No pronator drift.    CEREBELLAR/COORDINATION/GAIT: Gait steady with normal arm swing and stride length.     BOTOX was performed as an indicated therapy for intractable chronic migraine headaches given that the patient failed several prophylactic medications    Botulinum Toxin Injection Procedure   Pre-operative diagnosis: Chronic migraine   Post-operative diagnosis: Same   Procedure: Chemical neurolysis   After risks and benefits were explained including bleeding, infection, worsening of pain, damage to the areas being injected, weakness of muscles, loss of muscle control, dysphagia if injecting the head or neck, facial droop if injecting the facial area, painful injection, allergic or other reaction to the medications being injected, and the failure of the procedure to help the problem, a signed consent was obtained.   The patient was placed in a comfortable area and the sites to be treated were identified.The area to be treated was prepped three times with alcohol and the alcohol allowed to dry. Next, a 30 gauge needle was used to inject the medication in the area to be treated.       Botox 100 units NDC 7128-9811-60    Area(s) injected:   Total Botox used: 155 Units   Botox wastage: 45 Units   Injection sites:    muscle bilaterally ( a total of 10 units divided into 2 sites)   Procerus muscle (5 units)   Frontalis muscle bilaterally (a total of 20 units divided into 4 sites)    Temporalis muscle bilaterally (a total of 40 units divided into 8 sites)   Occipitalis muscle bilaterally (a total of 30 units divided into 6 sites)   Cervical paraspinal muscles (a total of 20 units divided into 4 sites)   Trapezius muscle bilaterally (a total of 30 units divided into 6 sites)   Complications: none   RTC for the next Botox injection: 3 months

## 2022-11-04 ENCOUNTER — PATIENT MESSAGE (OUTPATIENT)
Dept: PSYCHIATRY | Facility: CLINIC | Age: 37
End: 2022-11-04
Payer: COMMERCIAL

## 2022-11-06 NOTE — PROGRESS NOTES
"The patient location is:  [place], [place]  The patient location [place] is: [facility]  The patient phone number is: [phone]  Visit type: Virtual visit with synchronous audio and video  Each patient to whom he or she provides medical services by telemedicine is:  (1) informed of the relationship between the physician and patient and the respective role of any other health care provider with respect to management of the patient; and (2) notified that he or she may decline to receive medical services by telemedicine and may withdraw from such care at any time.     Outpatient Psychiatry Follow-Up Visit    Clinical Status of Patient: Outpatient (Ambulatory)  11/06/2022     Chief Complaint:  presenting today for a follow-up.       Interval History and Content of Current Session:  Interim Events/Subjective Report/Content of Current Session:  follow-up appointment.    Pt is a 38 y/o female with past psychiatric hx of anxiety and ADHD who presents for follow-up treatment. Pt reported that things are going well. She noted some occupational stress but able to manage. Pt reported mood and anxiety are stable. Pt started seeing an outpatient therapist with positive benefits. Pt denied any reoccurrence of body image concerns or disordered eating.    Past Psychiatric hx: effexor xr ("my mood was the best on that but I was having panic attacks and bld press was really negar"), pristiq (for headaches- effective), cymbalta (ineffective), lexapro and zoloft (effective but worsened headaches), klonopin 1mg (effective- for sleep and anxiety)  For sleep- elavil (ineffective), trazodone (worsened h/s's), ambien (nightmares), lunesta (nightmares), seroquel, prazosin, xanax  For headache- topomax    Past Medical hx:   Past Medical History:   Diagnosis Date    Abnormal Pap smear of cervix 2009    ADHD     Allergy     seasonal    Anorexia     Anxiety     Asthma     BRCA1 positive 12/18/2020    Bulimia     Depression     Fever blister  "    Gastroparesis     GERD (gastroesophageal reflux disease)     History of posttraumatic stress disorder (PTSD)     Hypertension     Gestational Hypertension    Invasive ductal carcinoma of right breast 12/18/2019    Mastitis     Migraine headache     PONV (postoperative nausea and vomiting)     Status post mastectomy, bilateral 7/29/2020        Interim hx:  Medication changes last visit: None  Anxiety: stable  Depression: stable     Denies suicidal/homicidal ideations.  Denies hopelessness/worthlessness.    Denies auditory/visual hallucinations      Alcohol: about 1 drink per week.  Drug: Pt denied  Caffeine: Not assessed  Tobacco: Pt denied      Review of Systems   PSYCHIATRIC: Pertinent items are noted in the narrative.        CONSTITUTIONAL: weight stable    Past Medical, Family and Social History: The patient's past medical, family and social history have been reviewed and updated as appropriate within the electronic medical record. See encounter notes.     Current Psychiatric Medication:  celexa 40mg po qhs, adderall 30xr mg po qam, xanax 0.125mg po daily PRN anxiety (takes maybew once every 3 months), trazodone 25mg po qhs PRN insomnia (not currently taking)     Compliance: yes      Side effects: Pt denied     Risk Parameters:  Patient reports no suicidal ideation  Patient reports no homicidal ideation  Patient reports no self-injurious behavior  Patient reports no violent behavior     Exam (detailed: at least 9 elements; comprehensive: all 15 elements)   Constitutional  Vitals:  Most recent vital signs, dated less than 90 days prior to this appointment, were reviewed. BP: ()/()   Arterial Line BP: ()/()       General:  unremarkable, age appropriate, casual attire, good eye contact, good rapport       Musculoskeletal  Muscle Strength/Tone:  no flaccidity, no tremor    Gait & Station:  normal      Psychiatric                       Speech:  normal tone, normal rate, rhythm, and volume   Mood & Affect:    Depressed, anxious         Thought Process:   Goal directed; Linear    Associations:   intact   Thought Content:   No SI/HI, delusions, or paranoia, no AV/VH   Insight & Judgement:   Good, adequate to circumstances   Orientation:   grossly intact; alert and oriented x 4    Memory:  intact for content of interview    Language:  grossly intact, can repeat    Attention Span  : Grossly intact for content of interview   Fund of Knowledge:   intact and appropriate to age and level of education        Assessment and Diagnosis   Status/Progress: Based on the examination today, the patient's problem(s) is/are adequately controlled.  New problems have not been presented today. Co-morbidities are not complicating management of the primary condition. There are no active rule-out diagnoses for this patient at this time.      Impression: Pt appears to be stable with current medications. Despite having some occupational stressors, she is able to manage well. Pt has started therapy with positive results. Will continue medication plan as is and monitor symptoms moving forward.     Diagnosis:   Anxiety disorder   Attention Deficit Hyperactivity Disorder - Inattentive Type   h/o PTSD (now in remission)   h/o anorexia and bulimia (both in remission)  Intervention/Counseling/Treatment Plan   Medication Management:      1. celexa 40mg po qhs    2. adderall 30xr mg po qam    3. xanax 0.125mg po daily PRN anxiety (takes maybe once every 3 months)    4. trazodone 25mg po qhs PRN insomnia (not currently taking)     5. Call to report any worsening of symptoms or problems with the medication. Pt instructed to go to ER with thoughts of harming self, others     6. Patient given contact # for psychotherapists at [facility] and also instructed to check with insurance for list of providers.     Psychotherapy: 20 minutes  Target symptoms: ADHD, anxiety  Why chosen therapy is appropriate versus another modality: CBT used; relevant to  diagnosis, patient responds to this modality  Outcome monitoring methods: self-report, observation  Therapeutic intervention type: Cognitive Behavioral Therapy  Topics discussed/themes: building skills sets for symptom management, symptom recognition, nutrition, exercise  The patient's response to the intervention is accepting  Patient's response to treatment is: good.   The patient's progress toward treatment goals: stable     Return to clinic: 3 months    -Cognitive-Behavioral/Supportive therapy and psychoeducation provided  -R/B/SE's of medications discussed with the pt who expresses understanding and chooses to take medications as prescribed.   -Pt instructed to call clinic, 911 or go to nearest emergency room if sxs worsen or pt is in   crisis. The pt expresses understanding.    Gokul Keller, PhD, MP     Antidepressant/Antianxiety Medication Initiation:  Patient informed of risks, benefits, and potential side effects of medication and accepts informed consent.  Common side effects include nausea, fatigue, headache, insomnia., Specifically discussed the possibility of new or worsening suicidal thoughts/depression.  Patient instructed to stop the medication immediately and seek urgent treatment if this occurs. Patient instructed not to abruptly discontinue medication without physician guidance except in cases of sudden onset or worsening of SI.       Stimulant Medication Initiation:  Patient advised of risks, benefits, and side effects of medication and accepts informed consent.  Common side effects include insomnia, irritability, jittery feeling, dry mouth, and agitation/hostility., Patient advised of potential addictive nature of medication and controlled substance classification.  Instructed to safeguard medication as no early refills can be given for lost or stolen medications.       Benzodiazepine Initiation:  Patient advised of the risks, benefits, and common side effects of medication and has accepted  informed consent.  Common side effects include drowsiness, impaired coordination, possible memory loss., Patient advised NOT to operate a vehicle or machinery untiil they are sure how the medication will affec them.  Client also advised of danger of mixing this medication with alcohol., Patient advised of potential addictive nature of medication and need to safeguard medication as no early refills for lost or stolen medications can be authorized.       Pregnancy Warning:  Patient denies current pregnancy possibility.  Patient made aware that medications have not been proven safe in pregnancy and that she must maintain adequate birth control.  Patient instructed to alert us immediately if she becomes pregnant.       Sleep Aid Initiation:  Patient advised of potential side effects of medication including sleep walking or other complex behaviors.  Patient advised not to mix medication with alcohol, to go to bed immediately after taking, and to stop at first sign of any unusual behaviors.

## 2022-11-07 ENCOUNTER — OFFICE VISIT (OUTPATIENT)
Dept: PSYCHIATRY | Facility: CLINIC | Age: 37
End: 2022-11-07
Payer: COMMERCIAL

## 2022-11-07 ENCOUNTER — PATIENT MESSAGE (OUTPATIENT)
Dept: HEMATOLOGY/ONCOLOGY | Facility: CLINIC | Age: 37
End: 2022-11-07
Payer: COMMERCIAL

## 2022-11-07 ENCOUNTER — TELEPHONE (OUTPATIENT)
Dept: PSYCHIATRY | Facility: CLINIC | Age: 37
End: 2022-11-07
Payer: COMMERCIAL

## 2022-11-07 DIAGNOSIS — F41.9 ANXIETY DISORDER, UNSPECIFIED TYPE: Primary | ICD-10-CM

## 2022-11-07 DIAGNOSIS — F90.0 ATTENTION DEFICIT HYPERACTIVITY DISORDER, INATTENTIVE TYPE: ICD-10-CM

## 2022-11-07 DIAGNOSIS — F41.1 GENERALIZED ANXIETY DISORDER: Chronic | ICD-10-CM

## 2022-11-07 DIAGNOSIS — F90.0 ADHD (ATTENTION DEFICIT HYPERACTIVITY DISORDER), INATTENTIVE TYPE: ICD-10-CM

## 2022-11-07 PROCEDURE — 99999 PR PBB SHADOW E&M-EST. PATIENT-LVL II: CPT | Mod: PBBFAC,,, | Performed by: PSYCHOLOGIST

## 2022-11-07 PROCEDURE — 90863 PHARMACOLOGIC MGMT W/PSYTX: CPT | Mod: 95,,, | Performed by: PSYCHOLOGIST

## 2022-11-07 PROCEDURE — 1159F MED LIST DOCD IN RCRD: CPT | Mod: CPTII,95,, | Performed by: PSYCHOLOGIST

## 2022-11-07 PROCEDURE — 90863 PR PHARMACOLOGIC MANAGEMENT: ICD-10-PCS | Mod: 95,,, | Performed by: PSYCHOLOGIST

## 2022-11-07 PROCEDURE — 1159F PR MEDICATION LIST DOCUMENTED IN MEDICAL RECORD: ICD-10-PCS | Mod: CPTII,95,, | Performed by: PSYCHOLOGIST

## 2022-11-07 PROCEDURE — 99999 PR PBB SHADOW E&M-EST. PATIENT-LVL II: ICD-10-PCS | Mod: PBBFAC,,, | Performed by: PSYCHOLOGIST

## 2022-11-07 PROCEDURE — 90832 PR PSYCHOTHERAPY W/PATIENT, 30 MIN: ICD-10-PCS | Mod: 95,,, | Performed by: PSYCHOLOGIST

## 2022-11-07 PROCEDURE — 90832 PSYTX W PT 30 MINUTES: CPT | Mod: 95,,, | Performed by: PSYCHOLOGIST

## 2022-11-07 RX ORDER — DEXTROAMPHETAMINE SACCHARATE, AMPHETAMINE ASPARTATE MONOHYDRATE, DEXTROAMPHETAMINE SULFATE AND AMPHETAMINE SULFATE 7.5; 7.5; 7.5; 7.5 MG/1; MG/1; MG/1; MG/1
30 CAPSULE, EXTENDED RELEASE ORAL EVERY MORNING
Qty: 30 CAPSULE | Refills: 0 | Status: SHIPPED | OUTPATIENT
Start: 2022-11-15 | End: 2022-12-13 | Stop reason: SDUPTHER

## 2022-11-07 RX ORDER — CITALOPRAM 40 MG/1
TABLET, FILM COATED ORAL
Qty: 90 TABLET | Refills: 2 | Status: SHIPPED | OUTPATIENT
Start: 2022-11-07 | End: 2023-06-12 | Stop reason: SDUPTHER

## 2022-11-07 NOTE — TELEPHONE ENCOUNTER
Called pt to schedule 3 mo in person f/u with Dr Keller. Pt did not answer.   LVM requesting a call back to schedule.

## 2022-11-09 ENCOUNTER — OFFICE VISIT (OUTPATIENT)
Dept: OPTOMETRY | Facility: CLINIC | Age: 37
End: 2022-11-09
Payer: COMMERCIAL

## 2022-11-09 ENCOUNTER — TELEPHONE (OUTPATIENT)
Dept: OPTOMETRY | Facility: CLINIC | Age: 37
End: 2022-11-09
Payer: COMMERCIAL

## 2022-11-09 ENCOUNTER — CLINICAL SUPPORT (OUTPATIENT)
Dept: OBSTETRICS AND GYNECOLOGY | Facility: CLINIC | Age: 37
End: 2022-11-09
Payer: COMMERCIAL

## 2022-11-09 DIAGNOSIS — H52.13 MYOPIA OF BOTH EYES: Primary | ICD-10-CM

## 2022-11-09 DIAGNOSIS — N95.1 MENOPAUSAL SYMPTOMS: Primary | ICD-10-CM

## 2022-11-09 DIAGNOSIS — Z46.0 FITTING AND ADJUSTMENT OF SPECTACLES AND CONTACT LENSES: Primary | ICD-10-CM

## 2022-11-09 PROCEDURE — 96372 THER/PROPH/DIAG INJ SC/IM: CPT | Mod: S$GLB,,, | Performed by: OBSTETRICS & GYNECOLOGY

## 2022-11-09 PROCEDURE — 99999 PR PBB SHADOW E&M-EST. PATIENT-LVL II: CPT | Mod: PBBFAC,,, | Performed by: OPTOMETRIST

## 2022-11-09 PROCEDURE — 92014 COMPRE OPH EXAM EST PT 1/>: CPT | Mod: S$GLB,,, | Performed by: OPTOMETRIST

## 2022-11-09 PROCEDURE — 99999 PR PBB SHADOW E&M-EST. PATIENT-LVL I: ICD-10-PCS | Mod: PBBFAC,,,

## 2022-11-09 PROCEDURE — 92015 PR REFRACTION: ICD-10-PCS | Mod: S$GLB,,, | Performed by: OPTOMETRIST

## 2022-11-09 PROCEDURE — 92015 DETERMINE REFRACTIVE STATE: CPT | Mod: S$GLB,,, | Performed by: OPTOMETRIST

## 2022-11-09 PROCEDURE — 99999 PR PBB SHADOW E&M-EST. PATIENT-LVL IV: CPT | Mod: PBBFAC,,, | Performed by: OPTOMETRIST

## 2022-11-09 PROCEDURE — 96372 PR INJECTION,THERAP/PROPH/DIAG2ST, IM OR SUBCUT: ICD-10-PCS | Mod: S$GLB,,, | Performed by: OBSTETRICS & GYNECOLOGY

## 2022-11-09 PROCEDURE — 92310 CONTACT LENS FITTING OU: CPT | Mod: S$GLB,,, | Performed by: OPTOMETRIST

## 2022-11-09 PROCEDURE — 92014 PR EYE EXAM, EST PATIENT,COMPREHESV: ICD-10-PCS | Mod: S$GLB,,, | Performed by: OPTOMETRIST

## 2022-11-09 PROCEDURE — 92310 PR CONTACT LENS FITTING (NO CHANGE): ICD-10-PCS | Mod: S$GLB,,, | Performed by: OPTOMETRIST

## 2022-11-09 PROCEDURE — 99999 PR PBB SHADOW E&M-EST. PATIENT-LVL I: CPT | Mod: PBBFAC,,,

## 2022-11-09 PROCEDURE — 99999 PR PBB SHADOW E&M-EST. PATIENT-LVL IV: ICD-10-PCS | Mod: PBBFAC,,, | Performed by: OPTOMETRIST

## 2022-11-09 PROCEDURE — 99999 PR PBB SHADOW E&M-EST. PATIENT-LVL II: ICD-10-PCS | Mod: PBBFAC,,, | Performed by: OPTOMETRIST

## 2022-11-09 RX ORDER — PREDNISONE 20 MG/1
40 TABLET ORAL DAILY
COMMUNITY
Start: 2022-10-10 | End: 2023-03-02

## 2022-11-09 RX ORDER — BENZONATATE 100 MG/1
100 CAPSULE ORAL 3 TIMES DAILY PRN
COMMUNITY
Start: 2022-10-10 | End: 2023-03-04

## 2022-11-09 RX ORDER — DOXYCYCLINE 100 MG/1
100 CAPSULE ORAL 2 TIMES DAILY
COMMUNITY
Start: 2022-10-10 | End: 2023-03-04 | Stop reason: CLARIF

## 2022-11-09 RX ORDER — CEFDINIR 300 MG/1
300 CAPSULE ORAL EVERY 12 HOURS
COMMUNITY
Start: 2022-10-29 | End: 2023-03-04

## 2022-11-09 RX ADMIN — TESTOSTERONE CYPIONATE 50 MG: 200 INJECTION, SOLUTION INTRAMUSCULAR at 08:11

## 2022-11-09 NOTE — PROGRESS NOTES
Here for hormone therapy injection, no complaints at this time. Injection given as ordered, tolerated well no reports of pain prior to or post injection. Advised to return to clinic as scheduled      Site:GAY    Testerone:50 mg      CLINIC SUPPLIED MEDICATION

## 2022-11-10 ENCOUNTER — PATIENT MESSAGE (OUTPATIENT)
Dept: HEMATOLOGY/ONCOLOGY | Facility: CLINIC | Age: 37
End: 2022-11-10
Payer: COMMERCIAL

## 2022-11-10 NOTE — PROGRESS NOTES
JOE KOROMA: 2016  Chief complaint (CC): Pt reports that she is not having any issues with   her vision. Blur w/o contacts   Glasses? Several years old  Contacts? Yes  H/o eye surgery, injections or laser: no  H/o eye injury: no  Known eye conditions? no  Family h/o eye conditions? PGM-glaucoma  Eye gtts? yes      (-) Flashes (-)  Floaters (-) Mucous   (-)  Tearing (-) Itching (-) Burning   (+) Headaches- h/o migraines, pt taking Botox and Topamax (-) Eye   Pain/discomfort (-) Irritation   (-)  Redness (-) Double vision (-) Blurry vision    Diabetic? no  A1c? N/a      Last edited by Ajay Daniels, OD on 11/9/2022 11:21 AM.      Pt presented today w/a 10:20a apptmt that she checked in for at 10:05. Pt approached the  several times asking about the wait time in which Tati apologized and informed her that there was a wait. Tati later asked me if the patient would be seen next because she was showing aggravation in the waiting room. Bessie was notified to bring back the patient as soon as she could. She explained that she had difficulty with her 2 previous patients which slowed down their workups. Pt was brought back by Bessie in which she reports the patient was upset about the wait and demanded that she just wanted her trial contact lenses so she can leave. Bessie confided that she did not feel comfortable seeing the patient since she was upset and aggressive. I agreed to see the patient. I greeted the patient and asked the patient what issue she was having. She states that she waited for months to get an appointment, she waited 50 minutes to be seen and she wanted to be rescheduled for next week with guarantee of no wait time and given sample contacts. I explained to the patient that I am unable to guarantee I have an opening for next week nonetheless that she won't have a wait time. I explained that her wait for an appointment availability is also outside of my control. I asked the  patient several times to calm down since she was raising her voice and becoming more demanding. She explained that she was upset, but I told her that her tone would lead to me being upset to.  I explained to the patient that I could understand her frustration, but I am trying to resolve the issue by seeing her now. I apologized to the patient about her wait. She explained that no one told her about the wait or what was going on. I mentioned that the  addressed her twice, she stated that this did not happen and my technician should have had the courtesy to come to her to explain the wait. I apologized to the patient that she was not addressed by the technician and offered to work her up myself and get her dilated. The patient stated that she has management's phone number and would be calling to speak to someone. The patient demanded that I give her trial lenses based on her 2018 prescription that was done by Dr Espinoza. I explained to the patient that I didn't feel comfortable with doing so since I have never seen her before as a patient and would like to guaranteed the fit and good vision of the lenses. The patient stated that it's not like she could die from the contacts. I explained to her that if they are improperly fit then she would be at risk for blindness as well as at risk for an accident on the road if her vision is not confirmed. The patient was told that she can contact Dr Espinoza to see if she is willing to extend the prescription or give trial lenses. The patient states that Dr Espinoza no longer works at [facility] and she could not get an appointment with her. The patient was assured that Dr Espinoza still works at [facility]. The patient insisted that she has good vision and she knows that her prescription hasn't changes and stated that she would not go blind and if she did then she would just be blind. The patient continued to demand for trials. I explained that the brand she has, we do not have in stock. The  patient said she wanted to see the trial lenses. I explained to the patient that I would not be honoring her demands for the day but offered again to see her, work her up and fit her for new lenses.. The patient was told that while we are spending time going back and forth, she could have been worked up and dilated and just as she had to wait, now another patient is waiting as she disputes with me. 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 It was 11:15am when I checked at this point. The patient stated that she would stay if her exam would be 5 minutes long. I explained to the patient that this was unrealistic since she needed a routine exam w/dilation and a contact lens fitting. The patient decided to stay for examination. The patient later apologized to me for her behavior. 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 THe paitent agreed to dilation. The patient's exam, dilation and contact lens fitting were complete around 11:45am.       Assessment /Plan     For exam results, see Encounter Report.    Myopia of both eyes      CLRx and SRx released to patient. Patient educated on lens options. Normal ocular health. RTC 1 year for routine exam.   Pt reports that she wears her lenses for 2 mo. Pt advised that lenses are 1 mo lenses.

## 2022-11-14 ENCOUNTER — PATIENT MESSAGE (OUTPATIENT)
Dept: OPTOMETRY | Facility: CLINIC | Age: 37
End: 2022-11-14
Payer: COMMERCIAL

## 2022-11-21 ENCOUNTER — PATIENT MESSAGE (OUTPATIENT)
Dept: OBSTETRICS AND GYNECOLOGY | Facility: CLINIC | Age: 37
End: 2022-11-21
Payer: COMMERCIAL

## 2022-11-21 NOTE — PROGRESS NOTES
Subjective:       Patient ID: Yolanda Norman is a 37 y.o. female.    Chief Complaint: No chief complaint on file.    HPI 37 year old female, who returns for follow-up of carcinoma of the right breast, ER +,HER 2 negative.   She has been on anastrozole since March 2022 after prior letrozole and exemestane.    Other than some fatigue and mild arthralgias she is doing well. Her gastroparesis has been fairly well controlled.        Breast history:    Mammogram and ultrasound on December 11th, 2019 showed right breast mass 7 cm from the nipple.  By ultrasound this mass measured 33 x 32 x 21 mm.    Right breast biopsy on December 13 showed high-grade infiltrating ductal carcinoma (histologic grade 3, nuclear grade 3, mitotic index 3) there were medullary features.  The cancer was 25% ER positive and DE and HER2 negative.  Ki-67 was 90%.    MRI showed a 5.2 cm x 2.6 cm x 4.6 cm irregularly shaped mass with rim enhancement seen in the right breast at 11 o'clock.    CT scan of the chest abdomen and pelvis and bone scan showed no evidence of metastatic disease.    She completed 4 cycles of neoadjuvant Adriamycin Cytoxan February 13, 2020.  She completed 12 weeks of weekly Taxol on May 20, 2020.    On July 1, 2020 she underwent bilateral mastectomy.    The right breast showed no residual malignancy, 5 right axillary sentinel lymph nodes were all negative for malignancy.  Final pathological stage yp T0 N0.  The left breast showed no atypia or malignancy.    Radiation therapy was completed 11/25/20.      She had a hysterectomy  December 18th, 2020.  Letrozole was started December 2020..  She was then changed to Exemestane in early 2021 due to arthralgias.  Changed to anastrazole in March 2022 due to arthralgias and fatigue.    She had DORIAN reconstruction in February 2021.    Review of Systems   Constitutional:  Positive for fatigue. Negative for activity change, appetite change, fever and unexpected weight change.   Eyes:   Negative for visual disturbance.   Respiratory:  Negative for cough and shortness of breath.    Cardiovascular:  Negative for chest pain.   Gastrointestinal:  Negative for abdominal distention, abdominal pain, constipation, diarrhea and rectal pain.        Gastroparesis   Genitourinary:  Negative for frequency.   Musculoskeletal:  Positive for arthralgias (improved). Negative for back pain.   Skin:  Negative for rash.   Neurological:  Positive for numbness. Negative for headaches.   Hematological:  Negative for adenopathy.   Psychiatric/Behavioral:  Negative for dysphoric mood. The patient is not nervous/anxious.      Objective:      Physical Exam  Vitals reviewed.   Constitutional:       General: She is not in acute distress.     Appearance: Normal appearance. She is well-developed.   Eyes:      General: No scleral icterus.  Cardiovascular:      Rate and Rhythm: Normal rate and regular rhythm.   Pulmonary:      Effort: Pulmonary effort is normal. No respiratory distress.      Breath sounds: Normal breath sounds. No wheezing or rales.   Chest:       Abdominal:      Palpations: Abdomen is soft. There is no mass.      Tenderness: There is no abdominal tenderness.   Lymphadenopathy:      Cervical: No cervical adenopathy.      Upper Body:      Right upper body: No supraclavicular or axillary adenopathy.      Left upper body: No supraclavicular or axillary adenopathy.   Neurological:      Mental Status: She is alert and oriented to person, place, and time.   Psychiatric:         Mood and Affect: Mood normal.         Behavior: Behavior normal.         Thought Content: Thought content normal.         Judgment: Judgment normal.       Assessment:     Recent CBC,CMP normal  1. Malignant neoplasm of upper-outer quadrant of right breast in female, estrogen receptor positive        Plan:        Continue anastrozole and RTC 4 M.          Route Chart for Scheduling    Med Onc Chart Routing      Follow up with physician 4 months.    Follow up with ADRI[facility]    Infusion scheduling note    Injection scheduling note    Labs    Imaging    Pharmacy appointment    Other referrals

## 2022-11-22 ENCOUNTER — LAB VISIT (OUTPATIENT)
Dept: LAB | Facility: HOSPITAL | Age: 37
End: 2022-11-22
Payer: COMMERCIAL

## 2022-11-22 ENCOUNTER — OFFICE VISIT (OUTPATIENT)
Dept: HEMATOLOGY/ONCOLOGY | Facility: CLINIC | Age: 37
End: 2022-11-22
Payer: COMMERCIAL

## 2022-11-22 VITALS
HEART RATE: 100 BPM | SYSTOLIC BLOOD PRESSURE: 123 MMHG | OXYGEN SATURATION: 98 % | HEIGHT: 65 IN | DIASTOLIC BLOOD PRESSURE: 72 MMHG | WEIGHT: 157.88 LBS | RESPIRATION RATE: 16 BRPM | BODY MASS INDEX: 26.3 KG/M2 | TEMPERATURE: 99 F

## 2022-11-22 DIAGNOSIS — N95.1 MENOPAUSAL SYMPTOMS: ICD-10-CM

## 2022-11-22 DIAGNOSIS — C50.411 MALIGNANT NEOPLASM OF UPPER-OUTER QUADRANT OF RIGHT BREAST IN FEMALE, ESTROGEN RECEPTOR POSITIVE: Primary | Chronic | ICD-10-CM

## 2022-11-22 DIAGNOSIS — Z17.0 MALIGNANT NEOPLASM OF UPPER-OUTER QUADRANT OF RIGHT BREAST IN FEMALE, ESTROGEN RECEPTOR POSITIVE: Primary | Chronic | ICD-10-CM

## 2022-11-22 LAB — ESTRADIOL SERPL-MCNC: <10 PG/ML

## 2022-11-22 PROCEDURE — 84402 ASSAY OF FREE TESTOSTERONE: CPT | Performed by: PHYSICIAN ASSISTANT

## 2022-11-22 PROCEDURE — 3008F BODY MASS INDEX DOCD: CPT | Mod: CPTII,S$GLB,, | Performed by: INTERNAL MEDICINE

## 2022-11-22 PROCEDURE — 3074F PR MOST RECENT SYSTOLIC BLOOD PRESSURE < 130 MM HG: ICD-10-PCS | Mod: CPTII,S$GLB,, | Performed by: INTERNAL MEDICINE

## 2022-11-22 PROCEDURE — 99999 PR PBB SHADOW E&M-EST. PATIENT-LVL V: CPT | Mod: PBBFAC,,, | Performed by: INTERNAL MEDICINE

## 2022-11-22 PROCEDURE — 3078F DIAST BP <80 MM HG: CPT | Mod: CPTII,S$GLB,, | Performed by: INTERNAL MEDICINE

## 2022-11-22 PROCEDURE — 99999 PR PBB SHADOW E&M-EST. PATIENT-LVL V: ICD-10-PCS | Mod: PBBFAC,,, | Performed by: INTERNAL MEDICINE

## 2022-11-22 PROCEDURE — 1159F MED LIST DOCD IN RCRD: CPT | Mod: CPTII,S$GLB,, | Performed by: INTERNAL MEDICINE

## 2022-11-22 PROCEDURE — 3008F PR BODY MASS INDEX (BMI) DOCUMENTED: ICD-10-PCS | Mod: CPTII,S$GLB,, | Performed by: INTERNAL MEDICINE

## 2022-11-22 PROCEDURE — 36415 COLL VENOUS BLD VENIPUNCTURE: CPT | Performed by: PHYSICIAN ASSISTANT

## 2022-11-22 PROCEDURE — 82670 ASSAY OF TOTAL ESTRADIOL: CPT | Performed by: PHYSICIAN ASSISTANT

## 2022-11-22 PROCEDURE — 99213 PR OFFICE/OUTPT VISIT, EST, LEVL III, 20-29 MIN: ICD-10-PCS | Mod: S$GLB,,, | Performed by: INTERNAL MEDICINE

## 2022-11-22 PROCEDURE — 99213 OFFICE O/P EST LOW 20 MIN: CPT | Mod: S$GLB,,, | Performed by: INTERNAL MEDICINE

## 2022-11-22 PROCEDURE — 1159F PR MEDICATION LIST DOCUMENTED IN MEDICAL RECORD: ICD-10-PCS | Mod: CPTII,S$GLB,, | Performed by: INTERNAL MEDICINE

## 2022-11-22 PROCEDURE — 3078F PR MOST RECENT DIASTOLIC BLOOD PRESSURE < 80 MM HG: ICD-10-PCS | Mod: CPTII,S$GLB,, | Performed by: INTERNAL MEDICINE

## 2022-11-22 PROCEDURE — 3074F SYST BP LT 130 MM HG: CPT | Mod: CPTII,S$GLB,, | Performed by: INTERNAL MEDICINE

## 2022-11-25 LAB — TESTOST FREE SERPL-MCNC: 2.6 PG/ML

## 2022-12-02 ENCOUNTER — PATIENT MESSAGE (OUTPATIENT)
Dept: OBSTETRICS AND GYNECOLOGY | Facility: CLINIC | Age: 37
End: 2022-12-02
Payer: COMMERCIAL

## 2022-12-06 ENCOUNTER — CLINICAL SUPPORT (OUTPATIENT)
Dept: OBSTETRICS AND GYNECOLOGY | Facility: CLINIC | Age: 37
End: 2022-12-06
Payer: COMMERCIAL

## 2022-12-06 DIAGNOSIS — N95.1 MENOPAUSAL SYMPTOMS: Primary | ICD-10-CM

## 2022-12-06 PROCEDURE — 99999 PR PBB SHADOW E&M-EST. PATIENT-LVL I: CPT | Mod: PBBFAC,,,

## 2022-12-06 PROCEDURE — 99999 PR PBB SHADOW E&M-EST. PATIENT-LVL I: ICD-10-PCS | Mod: PBBFAC,,,

## 2022-12-06 PROCEDURE — 96372 THER/PROPH/DIAG INJ SC/IM: CPT | Mod: S$GLB,,, | Performed by: OBSTETRICS & GYNECOLOGY

## 2022-12-06 PROCEDURE — 96372 PR INJECTION,THERAP/PROPH/DIAG2ST, IM OR SUBCUT: ICD-10-PCS | Mod: S$GLB,,, | Performed by: OBSTETRICS & GYNECOLOGY

## 2022-12-06 RX ADMIN — TESTOSTERONE CYPIONATE 50 MG: 200 INJECTION, SOLUTION INTRAMUSCULAR at 09:12

## 2022-12-06 NOTE — PROGRESS NOTES
...Ordering Provider  Dr. Polanco       12/6/22 Pt administered 50 mg of testosterone to the left upper outer glut. Pt tolerated well. Pt instructed next injection is due on 12/27/22. Pt verbalizes understanding.       Pain Before:  None    Pain After: None    Patient Complaints: None

## 2022-12-13 DIAGNOSIS — F90.0 ADHD (ATTENTION DEFICIT HYPERACTIVITY DISORDER), INATTENTIVE TYPE: ICD-10-CM

## 2022-12-14 ENCOUNTER — PATIENT MESSAGE (OUTPATIENT)
Dept: NEUROLOGY | Facility: CLINIC | Age: 37
End: 2022-12-14
Payer: COMMERCIAL

## 2022-12-14 ENCOUNTER — PATIENT MESSAGE (OUTPATIENT)
Dept: PSYCHIATRY | Facility: CLINIC | Age: 37
End: 2022-12-14
Payer: COMMERCIAL

## 2022-12-14 RX ORDER — DEXTROAMPHETAMINE SACCHARATE, AMPHETAMINE ASPARTATE MONOHYDRATE, DEXTROAMPHETAMINE SULFATE AND AMPHETAMINE SULFATE 7.5; 7.5; 7.5; 7.5 MG/1; MG/1; MG/1; MG/1
30 CAPSULE, EXTENDED RELEASE ORAL EVERY MORNING
Qty: 30 CAPSULE | Refills: 0 | Status: SHIPPED | OUTPATIENT
Start: 2022-12-14 | End: 2023-01-09 | Stop reason: SDUPTHER

## 2022-12-15 ENCOUNTER — TELEPHONE (OUTPATIENT)
Dept: NEUROLOGY | Facility: CLINIC | Age: 37
End: 2022-12-15
Payer: COMMERCIAL

## 2022-12-27 ENCOUNTER — PATIENT MESSAGE (OUTPATIENT)
Dept: OBSTETRICS AND GYNECOLOGY | Facility: CLINIC | Age: 37
End: 2022-12-27
Payer: COMMERCIAL

## 2022-12-27 ENCOUNTER — CLINICAL SUPPORT (OUTPATIENT)
Dept: OBSTETRICS AND GYNECOLOGY | Facility: CLINIC | Age: 37
End: 2022-12-27
Payer: COMMERCIAL

## 2022-12-27 DIAGNOSIS — N95.1 MENOPAUSAL SYMPTOMS: Primary | ICD-10-CM

## 2022-12-27 PROCEDURE — 99999 PR PBB SHADOW E&M-EST. PATIENT-LVL I: CPT | Mod: PBBFAC,,,

## 2022-12-27 PROCEDURE — 99999 PR PBB SHADOW E&M-EST. PATIENT-LVL I: ICD-10-PCS | Mod: PBBFAC,,,

## 2022-12-27 PROCEDURE — 96372 THER/PROPH/DIAG INJ SC/IM: CPT | Mod: S$GLB,,, | Performed by: OBSTETRICS & GYNECOLOGY

## 2022-12-27 PROCEDURE — 96372 PR INJECTION,THERAP/PROPH/DIAG2ST, IM OR SUBCUT: ICD-10-PCS | Mod: S$GLB,,, | Performed by: OBSTETRICS & GYNECOLOGY

## 2022-12-27 RX ADMIN — TESTOSTERONE CYPIONATE 50 MG: 200 INJECTION, SOLUTION INTRAMUSCULAR at 08:12

## 2022-12-27 NOTE — PROGRESS NOTES
...Ordering Provider  Dr. Polanco       12/27/22 Pt administered 50 mg of testosterone to the left upper outer glut. Pt tolerated well. Pt instructed next injection is due in 3 weeks, pt instructed to call Dr. Polanco'e office to schedule next injection. Pt verbalizes understanding.       Pain Before:  None    Pain After: None    Patient Complaints: None

## 2023-01-03 ENCOUNTER — TELEPHONE (OUTPATIENT)
Dept: OBSTETRICS AND GYNECOLOGY | Facility: CLINIC | Age: 38
End: 2023-01-03

## 2023-01-03 NOTE — TELEPHONE ENCOUNTER
Spoke with patient  who requested her injection appointment be rescheduled to [facility] instead of [facility]. Offered an appointment on the date requested but was unable to accommodate a morning time. Patient refused to accept the afternoon appointment and expressed frustration with scheduling process. She states needing her HRT injection every 3 weeks and it has become an issue each month. Patient has requested to speak with . Informed patient a message will be sent to discuss further.

## 2023-01-07 ENCOUNTER — PATIENT MESSAGE (OUTPATIENT)
Dept: OPTOMETRY | Facility: CLINIC | Age: 38
End: 2023-01-07
Payer: COMMERCIAL

## 2023-01-09 ENCOUNTER — PATIENT MESSAGE (OUTPATIENT)
Dept: OBSTETRICS AND GYNECOLOGY | Facility: CLINIC | Age: 38
End: 2023-01-09
Payer: COMMERCIAL

## 2023-01-09 DIAGNOSIS — F90.0 ADHD (ATTENTION DEFICIT HYPERACTIVITY DISORDER), INATTENTIVE TYPE: ICD-10-CM

## 2023-01-09 RX ORDER — DEXTROAMPHETAMINE SACCHARATE, AMPHETAMINE ASPARTATE MONOHYDRATE, DEXTROAMPHETAMINE SULFATE AND AMPHETAMINE SULFATE 7.5; 7.5; 7.5; 7.5 MG/1; MG/1; MG/1; MG/1
30 CAPSULE, EXTENDED RELEASE ORAL EVERY MORNING
Qty: 30 CAPSULE | Refills: 0 | Status: SHIPPED | OUTPATIENT
Start: 2023-01-09 | End: 2023-02-08 | Stop reason: SDUPTHER

## 2023-01-12 ENCOUNTER — CLINICAL SUPPORT (OUTPATIENT)
Dept: OBSTETRICS AND GYNECOLOGY | Facility: CLINIC | Age: 38
End: 2023-01-12
Payer: COMMERCIAL

## 2023-01-12 DIAGNOSIS — N95.1 MENOPAUSAL SYMPTOMS: Primary | ICD-10-CM

## 2023-01-12 PROCEDURE — 99999 PR PBB SHADOW E&M-EST. PATIENT-LVL I: CPT | Mod: PBBFAC,,,

## 2023-01-12 PROCEDURE — 99999 PR PBB SHADOW E&M-EST. PATIENT-LVL I: ICD-10-PCS | Mod: PBBFAC,,,

## 2023-01-12 PROCEDURE — 96372 PR INJECTION,THERAP/PROPH/DIAG2ST, IM OR SUBCUT: ICD-10-PCS | Mod: S$GLB,,, | Performed by: OBSTETRICS & GYNECOLOGY

## 2023-01-12 PROCEDURE — 96372 THER/PROPH/DIAG INJ SC/IM: CPT | Mod: S$GLB,,, | Performed by: OBSTETRICS & GYNECOLOGY

## 2023-01-12 RX ADMIN — TESTOSTERONE CYPIONATE 50 MG: 200 INJECTION, SOLUTION INTRAMUSCULAR at 10:01

## 2023-01-12 NOTE — PROGRESS NOTES
.Date 01/12/23   Ordering Provider Dr. Polanco    Pt administered 50 mg of testosterone to the right upper gluteal.  Pt tolerated well.  No complaints voiced.  Pt informed next injection is due in 4 weeks.  Pt instructed to call Dr. Polanco's office to schedule next injection.  Pt verbalizes an understanding.

## 2023-01-17 ENCOUNTER — TELEPHONE (OUTPATIENT)
Dept: NEUROLOGY | Facility: CLINIC | Age: 38
End: 2023-01-17
Payer: COMMERCIAL

## 2023-01-17 ENCOUNTER — PATIENT MESSAGE (OUTPATIENT)
Dept: NEUROLOGY | Facility: CLINIC | Age: 38
End: 2023-01-17
Payer: COMMERCIAL

## 2023-01-17 NOTE — TELEPHONE ENCOUNTER
Called pt per Amarilys to inform pt that tomorrow's botox appointment is still pending due to her insurance not approving it per preservice (Vika Hawley) and it will be an estimated 2 weeks until approved. Pt expressed displeasure in this and stated that authorization should've been approved earlier. Informed pt that authorization was started when appointment was scheduled and offered to reschedule. Pt expressed wanting to speak to a manager. Informed pt that I will alert manager of this.

## 2023-01-23 ENCOUNTER — PROCEDURE VISIT (OUTPATIENT)
Dept: NEUROLOGY | Facility: CLINIC | Age: 38
End: 2023-01-23
Payer: COMMERCIAL

## 2023-01-23 VITALS
HEART RATE: 78 BPM | HEIGHT: 65 IN | BODY MASS INDEX: 25.08 KG/M2 | SYSTOLIC BLOOD PRESSURE: 115 MMHG | DIASTOLIC BLOOD PRESSURE: 83 MMHG | WEIGHT: 150.56 LBS

## 2023-01-23 DIAGNOSIS — G43.C1 INTRACTABLE PERIODIC HEADACHE SYNDROME: ICD-10-CM

## 2023-01-23 DIAGNOSIS — G43.E09 CHRONIC MIGRAINE WITH AURA: Primary | ICD-10-CM

## 2023-01-23 PROCEDURE — 64615 CHEMODENERV MUSC MIGRAINE: CPT | Mod: S$GLB,,, | Performed by: PSYCHIATRY & NEUROLOGY

## 2023-01-23 PROCEDURE — 64615 PR CHEMODENERVATION OF MUSCLE FOR CHRONIC MIGRAINE: ICD-10-PCS | Mod: S$GLB,,, | Performed by: PSYCHIATRY & NEUROLOGY

## 2023-01-23 PROCEDURE — 99499 NO LOS: ICD-10-PCS | Mod: S$GLB,,, | Performed by: PSYCHIATRY & NEUROLOGY

## 2023-01-23 PROCEDURE — 99499 UNLISTED E&M SERVICE: CPT | Mod: S$GLB,,, | Performed by: PSYCHIATRY & NEUROLOGY

## 2023-01-23 RX ORDER — TOPIRAMATE 200 MG/1
200 TABLET ORAL NIGHTLY
Qty: 90 TABLET | Refills: 3 | Status: SHIPPED | OUTPATIENT
Start: 2023-01-23 | End: 2023-11-07

## 2023-01-23 NOTE — PROCEDURES
Procedures [facility] - NEUROLOGY  [facility] [place]    Date: January 23, 2023   Patient Name: Yolanda Norman   MRN: [id number]   PCP: Aniceto Toledo  Referring Provider: Dillon Gonzalez MD    Assessment:      This is Yolanda Norman, 37 y.o. female with chronic migraines (G43.719) and suffered from headaches more than 15 days a month lasting more than 4 hours a day with no relief of symptoms despite trying multiple medications listed below prior to initiation of botox which has resulted in >50% reduction in headaches.      Plan:      -  Cont TPM 200mg daily, imitrex/fioricet prn  -  Follow up for botox   -  Continue Mg      Greater than 30 minutes spent in chart review, documentation, independent review of imaging, and face to face time with patient       I discussed side effects of the medications. I asked the patient to stop the medication if he notices serious adverse effects as we discussed and to seek immediate medical attention at an ER.     Dillon Gonzalez MD  [facility]   Department of Neurology    Subjective:     -  Ongoing response to botox and prn meds     10/2022  -  Ongoing response to botox and prn meds  7/2022  -  Ongoing response to botox for migraine and conservative measures for tension headache   4/2022  -  Ongoing response to botox for migraine and conservative measures for tension headache  1/2022  -  Ongoing response to botox for migraine and conservative measures for tension headache  9/2021  -  Recent worsening of tension HA, notes chronic right shoulder tension since breast reconstruction as well as daytime and nocturnal bruxism, provoked by screen use to some extent, upcoming vision check, maintains regular sleep and good hydration.  6/2021  -  Worsened HA following TPM decrease and increased back to 200mg, ongoing effect of botox, recent prolonged HA not terminated by imitrex  3/2021  - Continues with good response to botox with some  fluctuations in HA related to changes in hormone therapy and desires to reduce TPM, holding off on grad school  12/2020  - THACKER continues to respond well to botox, TPM, imitrex,  - About to start PhD program   9/2020  -  HA well controlled with combination of TPM and botox  -  Completed chemotherapy but has not started radiation, coping well with stress  6/2020  -  Recent diagnosis of breast cancer, s/p chemo therapy with upcoming surgery and radiation  -  Development of neuropathy pain in feet, excess sedation with GBP 300mg tid, pain/altered sensation in fingers of right hand for the past day  -  Previously unable to tolerate cymbalta  12/2019  EXCELLENT response to botox, estimates 1 headache per week  9/2019  Continued daily headaches, weaning breast feeding - son 6 months old  8/2019  Unable to tolerate increased celexa due to fatigue, compliant with Mg with continued daily HA, unable to resume TPM as continuing to breast feed    HPI 5/2019:   Ms. Yolanda Norman is a 37 y.o. female who presents with a chief complaint of headaches    Patient has had significant difficulty with migraines since she was 12 years old with worsening during recent pregnancy.  Associated nausea, photo/phonophobia.  She has had difficulty with post-partum anxiety and started celexa a month ago with some improvement in headache as well as irritability noted.    Prior medications trials as below  TPM - partial relief at 150mg /d  Pamelor, Neurontin, Seroquel - all little effect  Imitrex, phenergan - good effect  Fioricet, zanaflex - little effect    PAST MEDICAL HISTORY:  Past Medical History:   Diagnosis Date    Abnormal Pap smear of cervix 2009    ADHD     Allergy     seasonal    Anorexia     Anxiety     Asthma     BRCA1 positive 12/18/2020    Bulimia     Depression     Fever blister     Gastroparesis     GERD (gastroesophageal reflux disease)     History of posttraumatic stress disorder (PTSD)     Hypertension     Gestational  Hypertension    Invasive ductal carcinoma of right breast 2019    Mastitis     Migraine headache     PONV (postoperative nausea and vomiting)     Status post mastectomy, bilateral 2020       PAST SURGICAL HISTORY:  Past Surgical History:   Procedure Laterality Date    BILATERAL MASTECTOMY Bilateral 2020    Procedure: MASTECTOMY, BILATERAL - BILATERAL SKIN SPARING MASTECTOMY;  Surgeon: Percy Ayers MD;  Location: [facility];  Service: General;  Laterality: Bilateral;    BIOPSY OF AXILLARY NODE Right 2020    Procedure: BIOPSY, LYMPH NODE, AXILLARY;  Surgeon: Percy Ayers MD;  Location: [facility];  Service: General;  Laterality: Right;    BONE MARROW ASPIRATION      x 3    BREAST SURGERY      CERVICAL BIOPSY  W/ LOOP ELECTRODE EXCISION       SECTION  2016     SECTION N/A 2019    Procedure:  SECTION;  Surgeon: Kirit Hernandez MD;  Location: [facility] L[facility];  Service: OB/GYN;  Laterality: N/A;    COLPOSCOPY      ESOPHAGOGASTRODUODENOSCOPY N/A 2021    Procedure: EGD (ESOPHAGOGASTRODUODENOSCOPY);  Surgeon: Lj Santos MD;  Location: [address]);  Service: Endoscopy;  Laterality: N/A;  COVID test on 21 at [facility]    ESOPHAGOGASTRODUODENOSCOPY N/A 2021    Procedure: ESOPHAGOGASTRODUODENOSCOPY (EGD);  Surgeon: Camila Wiley MD;  Location: [facility];  Service: Endoscopy;  Laterality: N/A;    INJECTION FOR SENTINEL NODE IDENTIFICATION Right 2020    Procedure: INJECTION, FOR SENTINEL NODE IDENTIFICATION;  Surgeon: Percy Ayers MD;  Location: [facility];  Service: General;  Laterality: Right;    INSERTION OF BREAST TISSUE EXPANDER Bilateral 2020    Procedure: INSERTION, TISSUE EXPANDER, BREAST;  Surgeon: Kiko Aranda MD;  Location: [facility];  Service: Plastics;  Laterality: Bilateral;    INSERTION OF TUNNELED CENTRAL VENOUS CATHETER (CVC) WITH SUBCUTANEOUS PORT Left 2019    Procedure: [id number]-Left neck or chest wall;   Surgeon: Percy Ayers MD;  Location: [address] FLR;  Service: General;  Laterality: Left;    MASTECTOMY      MEDIPORT REMOVAL N/A 7/1/2020    Procedure: REMOVAL, CATHETER, CENTRAL VENOUS, TUNNELED, WITH PORT;  Surgeon: Percy Ayers MD;  Location: [facility];  Service: General;  Laterality: N/A;    ROBOT-ASSISTED LAPAROSCOPIC ABDOMINAL HYSTERECTOMY USING DA HIMA XI N/A 12/18/2020    Procedure: XI ROBOTIC HYSTERECTOMY;  Surgeon: Emili Smith MD;  Location: [facility];  Service: OB/GYN;  Laterality: N/A;    ROBOT-ASSISTED LAPAROSCOPIC SALPINGO-OOPHORECTOMY USING DA HIMA XI Bilateral 12/18/2020    Procedure: XI ROBOTIC SALPINGO-OOPHORECTOMY;  Surgeon: Emili Smith MD;  Location: [facility];  Service: OB/GYN;  Laterality: Bilateral;    SHOULDER SURGERY Left     x 2    SINUS SURGERY      age 19    STOMACH SURGERY      TISSUE EXPANDER REMOVAL Right 10/3/2020    Procedure: REMOVAL, TISSUE EXPANDER;  Surgeon: Kiko Aranda MD;  Location: [facility];  Service: Plastics;  Laterality: Right;    TONSILLECTOMY      UPPER GASTROINTESTINAL ENDOSCOPY         CURRENT MEDS:  Current Outpatient Medications   Medication Sig Dispense Refill    anastrozole (ARIMIDEX) 1 mg Tab Take 1 tablet (1 mg total) by mouth once daily. 30 tablet 11    benzonatate (TESSALON) 100 MG capsule Take 100 mg by mouth 3 (three) times daily as needed.      cefdinir (OMNICEF) 300 MG capsule Take 300 mg by mouth every 12 (twelve) hours.      cholecalciferol, vitamin D3, (VITAMIN D3) 25 mcg (1,000 unit) capsule Take 1,000 Units by mouth 2 (two) times a day.      citalopram (CELEXA) 40 MG tablet TAKE 1 TABLET(40 MG) BY MOUTH EVERY DAY 90 tablet 2    clobetasoL (TEMOVATE) 0.05 % cream Apply to affected area twice a day for 2 weeks then stop 60 g 1    cranberry 500 mg Cap Take by mouth.      dapsone (ACZONE) 7.5 % GlwP Apply topically.      dextroamphetamine-amphetamine (ADDERALL XR) 30 MG 24 hr capsule Take 1 capsule (30 mg total) by mouth every morning.  30 capsule 0    diazePAM (DIASTAT) 2.5 mg Kit Place rectally.      doxycycline (VIBRAMYCIN) 100 MG Cap Take 100 mg by mouth 2 (two) times daily.      flu vacc [id number] 6mos up,PF, (FLUARIX QUAD [id number], PF,) 60 mcg (15 mcg x 4)/0.5 mL Syrg admister as directed 0.5 mL 0    loratadine (CLARITIN) 10 mg tablet Take 10 mg by mouth once daily.      magnesium 30 mg Tab Take by mouth once.      metFORMIN (GLUCOPHAGE) 500 MG tablet Take 1 tablet (500 mg total) by mouth 2 (two) times daily with meals. 60 tablet 11    metoclopramide HCl (REGLAN) 10 MG tablet Take 1 tablet 3 (three) times daily before meals by mouth 90 tablet 0    multivitamin (THERAGRAN) per tablet Take 1 tablet by mouth once daily.      multivitamin with minerals (HAIR,SKIN AND NAILS ORAL) Take by mouth.      onabotulinumtoxinA (BOTOX INJ) Inject as directed. [id number]      ondansetron (ZOFRAN-ODT) 4 MG TbDL Take 1 tablet every 6 (six) hours as needed by mouth for Nausea for up to 7 days 20 tablet 0    pantoprazole (PROTONIX) 40 MG tablet Take 1 tablet (40 mg total) by mouth once daily. 30 tablet 0    phenazopyridine (PYRIDIUM) 200 MG tablet Take 1 tablet 3 (three) times daily as needed by mouth for Pain for up to 3 days 10 tablet 0    prasterone, dhea, (INTRAROSA) 6.5 mg Inst Place 6.5 mg vaginally every evening. 30 each 11    predniSONE (DELTASONE) 20 MG tablet Take 40 mg by mouth once daily.      spironolactone (ALDACTONE) 100 MG tablet Take 1 tablet by mouth once a day 30 tablet 2    sucralfate (CARAFATE) 1 gram tablet Take 1 tablet (1 g total) by mouth 4 (four) times daily. Can be crushed if cannot swallow. 120 tablet 0    sumatriptan (IMITREX) 100 MG tablet Take 1/2 to 1 tab at onset of headache.  If no improvement in 2 hours, take another.  Do not take more than 2 in 24 hours. 9 tablet 11    topiramate (TOPAMAX) 200 MG Tab Take 1 tablet (200 mg total) by mouth every evening. 90 tablet 3    traZODone (DESYREL) 50 MG tablet TAKE 1 TABLET(50 MG) BY  "MOUTH EVERY EVENING 30 tablet 0    tretinoin (ALTRALIN) 0.05 % gel SMARTSIG:Sparingly Topical Every Night      UNABLE TO FIND medication name: Valuim vaginal suppository 5mg  Place one vaginally 30 mins before intercourse 10 suppository 5    valACYclovir (VALTREX) 1000 MG tablet Take 1 tablet (1,000 mg total) by mouth every 12 (twelve) hours. 60 tablet 0    vitamin E 100 UNIT capsule Take 100 Units by mouth once daily.      ALPRAZolam (XANAX) 0.25 MG tablet Take 1 tablet (0.25 mg total) by mouth daily as needed for Anxiety. 30 tablet 0     Current Facility-Administered Medications   Medication Dose Route Frequency Provider Last Rate Last Admin    onabotulinumtoxina injection 200 Units  200 Units Intramuscular Q90 Days Dillon Gonzalez MD   200 Units at 06/25/21 0751    onabotulinumtoxina injection 200 Units  200 Units Subcutaneous 1 time in Clinic/HOD Dillon Gonzalez MD           ALLERGIES:  Review of patient's allergies indicates:   Allergen Reactions    Amoxicillin Hives    Penicillins Hives and Rash    Diazepam Anxiety and Other (See Comments)     "makes hyper"       FAMILY HISTORY:  Family History   Problem Relation Age of Onset    Cancer Maternal Grandmother 40        cervical    Cervical cancer Mother     Breast cancer Other     Ovarian cancer Other     Colon polyps Father     Colon cancer Neg Hx     Melanoma Neg Hx        SOCIAL HISTORY:  Social History     Tobacco Use    Smoking status: Never    Smokeless tobacco: Never   Substance Use Topics    Alcohol use: Yes     Comment: socially    Drug use: No       Review of Systems:  12 review of systems is negative except for the symptoms mentioned in HPI.        Objective:     Vitals:    01/23/23 0802   BP: 115/83   Pulse: 78   Weight: 68.3 kg (150 lb 9.2 oz)   Height: 5' 5" (1.651 m)       General: NAD, well nourished   Eyes: no tearing, discharge, no erythema   ENT: moist mucous membranes of the oral cavity, nares patent    Neck: Supple, full range of " motion  Cardiovascular: Warm and well perfused, pulses equal and symmetrical  Lungs: Normal work of breathing, normal chest wall excursions  Skin: No rash, lesions, or breakdown on exposed skin  Psychiatry: Mood and affect are appropriate   Abdomen: soft, non tender, non distended  Extremeties: No cyanosis, clubbing or edema.    Neurological   MENTAL STATUS: Alert and oriented to person, place, and time. Attention and concentration within normal limits. Speech without dysarthria, able to name and repeat without difficulty. Recent and remote memory within normal limits   CRANIAL NERVES: Visual fields intact. PERRL. EOMI. Facial sensation intact. Face symmetrical. Hearing grossly intact. Full shoulder shrug bilaterally. Tongue protrudes midline   SENSORY: Sensation is intact to light touch throughout.   MOTOR: Normal bulk and tone. No pronator drift.    CEREBELLAR/COORDINATION/GAIT: Gait steady with normal arm swing and stride length.     BOTOX was performed as an indicated therapy for intractable chronic migraine headaches given that the patient failed several prophylactic medications    Botulinum Toxin Injection Procedure   Pre-operative diagnosis: Chronic migraine   Post-operative diagnosis: Same   Procedure: Chemical neurolysis   After risks and benefits were explained including bleeding, infection, worsening of pain, damage to the areas being injected, weakness of muscles, loss of muscle control, dysphagia if injecting the head or neck, facial droop if injecting the facial area, painful injection, allergic or other reaction to the medications being injected, and the failure of the procedure to help the problem, a signed consent was obtained.   The patient was placed in a comfortable area and the sites to be treated were identified.The area to be treated was prepped three times with alcohol and the alcohol allowed to dry. Next, a 30 gauge needle was used to inject the medication in the area to be treated.        Botox 100 units NDC 9308-1444-74    Area(s) injected:   Total Botox used: 155 Units   Botox wastage: 45 Units   Injection sites:    muscle bilaterally ( a total of 10 units divided into 2 sites)   Procerus muscle (5 units)   Frontalis muscle bilaterally (a total of 20 units divided into 4 sites)   Temporalis muscle bilaterally (a total of 40 units divided into 8 sites)   Occipitalis muscle bilaterally (a total of 30 units divided into 6 sites)   Cervical paraspinal muscles (a total of 20 units divided into 4 sites)   Trapezius muscle bilaterally (a total of 30 units divided into 6 sites)   Complications: none   RTC for the next Botox injection: 3 months

## 2023-02-02 ENCOUNTER — PATIENT MESSAGE (OUTPATIENT)
Dept: OBSTETRICS AND GYNECOLOGY | Facility: CLINIC | Age: 38
End: 2023-02-02
Payer: COMMERCIAL

## 2023-02-08 DIAGNOSIS — F90.0 ADHD (ATTENTION DEFICIT HYPERACTIVITY DISORDER), INATTENTIVE TYPE: ICD-10-CM

## 2023-02-09 RX ORDER — DEXTROAMPHETAMINE SACCHARATE, AMPHETAMINE ASPARTATE MONOHYDRATE, DEXTROAMPHETAMINE SULFATE AND AMPHETAMINE SULFATE 7.5; 7.5; 7.5; 7.5 MG/1; MG/1; MG/1; MG/1
30 CAPSULE, EXTENDED RELEASE ORAL EVERY MORNING
Qty: 30 CAPSULE | Refills: 0 | Status: SHIPPED | OUTPATIENT
Start: 2023-02-09 | End: 2023-02-13 | Stop reason: SDUPTHER

## 2023-02-10 ENCOUNTER — CLINICAL SUPPORT (OUTPATIENT)
Dept: OBSTETRICS AND GYNECOLOGY | Facility: CLINIC | Age: 38
End: 2023-02-10
Payer: COMMERCIAL

## 2023-02-10 DIAGNOSIS — N95.1 MENOPAUSAL SYMPTOMS: Primary | ICD-10-CM

## 2023-02-10 PROCEDURE — 96372 PR INJECTION,THERAP/PROPH/DIAG2ST, IM OR SUBCUT: ICD-10-PCS | Mod: S$GLB,,, | Performed by: OBSTETRICS & GYNECOLOGY

## 2023-02-10 PROCEDURE — 96372 THER/PROPH/DIAG INJ SC/IM: CPT | Mod: S$GLB,,, | Performed by: OBSTETRICS & GYNECOLOGY

## 2023-02-10 PROCEDURE — 99999 PR PBB SHADOW E&M-EST. PATIENT-LVL I: CPT | Mod: PBBFAC,,,

## 2023-02-10 PROCEDURE — 99999 PR PBB SHADOW E&M-EST. PATIENT-LVL I: ICD-10-PCS | Mod: PBBFAC,,,

## 2023-02-10 RX ORDER — TESTOSTERONE CYPIONATE 200 MG/ML
50 INJECTION, SOLUTION INTRAMUSCULAR
Status: COMPLETED | OUTPATIENT
Start: 2023-02-10 | End: 2023-05-05

## 2023-02-10 RX ADMIN — TESTOSTERONE CYPIONATE 50 MG: 200 INJECTION, SOLUTION INTRAMUSCULAR at 08:02

## 2023-02-10 NOTE — PROGRESS NOTES
Here for hormone therapy injection, no complaints at this time. Injection given as ordered, tolerated well no reports of pain prior to or post injection. Advised to return to clinic as scheduled      Site:[place]    Testerone:50 mg    CLINIC SUPPLIED MEDICATION

## 2023-02-12 ENCOUNTER — PATIENT MESSAGE (OUTPATIENT)
Dept: PSYCHIATRY | Facility: CLINIC | Age: 38
End: 2023-02-12
Payer: COMMERCIAL

## 2023-02-13 ENCOUNTER — OFFICE VISIT (OUTPATIENT)
Dept: PSYCHIATRY | Facility: CLINIC | Age: 38
End: 2023-02-13
Payer: COMMERCIAL

## 2023-02-13 DIAGNOSIS — F90.0 ATTENTION DEFICIT HYPERACTIVITY DISORDER, INATTENTIVE TYPE: ICD-10-CM

## 2023-02-13 DIAGNOSIS — F41.9 ANXIETY DISORDER, UNSPECIFIED TYPE: Primary | ICD-10-CM

## 2023-02-13 DIAGNOSIS — F90.0 ADHD (ATTENTION DEFICIT HYPERACTIVITY DISORDER), INATTENTIVE TYPE: ICD-10-CM

## 2023-02-13 PROCEDURE — 90863 PHARMACOLOGIC MGMT W/PSYTX: CPT | Mod: 95,,, | Performed by: PSYCHOLOGIST

## 2023-02-13 PROCEDURE — 90863 PR PHARMACOLOGIC MANAGEMENT: ICD-10-PCS | Mod: 95,,, | Performed by: PSYCHOLOGIST

## 2023-02-13 PROCEDURE — 90832 PR PSYCHOTHERAPY W/PATIENT, 30 MIN: ICD-10-PCS | Mod: 95,,, | Performed by: PSYCHOLOGIST

## 2023-02-13 PROCEDURE — 90832 PSYTX W PT 30 MINUTES: CPT | Mod: 95,,, | Performed by: PSYCHOLOGIST

## 2023-02-13 PROCEDURE — 1159F MED LIST DOCD IN RCRD: CPT | Mod: CPTII,95,, | Performed by: PSYCHOLOGIST

## 2023-02-13 PROCEDURE — 1159F PR MEDICATION LIST DOCUMENTED IN MEDICAL RECORD: ICD-10-PCS | Mod: CPTII,95,, | Performed by: PSYCHOLOGIST

## 2023-02-13 RX ORDER — TRAZODONE HYDROCHLORIDE 50 MG/1
50 TABLET ORAL NIGHTLY
Qty: 30 TABLET | Refills: 0 | Status: SHIPPED | OUTPATIENT
Start: 2023-02-13 | End: 2023-06-12

## 2023-02-13 RX ORDER — DEXTROAMPHETAMINE SACCHARATE, AMPHETAMINE ASPARTATE MONOHYDRATE, DEXTROAMPHETAMINE SULFATE AND AMPHETAMINE SULFATE 7.5; 7.5; 7.5; 7.5 MG/1; MG/1; MG/1; MG/1
30 CAPSULE, EXTENDED RELEASE ORAL EVERY MORNING
Qty: 30 CAPSULE | Refills: 0 | Status: SHIPPED | OUTPATIENT
Start: 2023-02-09 | End: 2023-03-07 | Stop reason: SDUPTHER

## 2023-02-13 NOTE — PROGRESS NOTES
"The patient location is:  [place], [place]  The patient location [place] is: [facility]  The patient phone number is: [phone]  Visit type: Virtual visit with synchronous audio and video  Each patient to whom he or she provides medical services by telemedicine is:  (1) informed of the relationship between the physician and patient and the respective role of any other health care provider with respect to management of the patient; and (2) notified that he or she may decline to receive medical services by telemedicine and may withdraw from such care at any time.     Outpatient Psychiatry Follow-Up Visit    Clinical Status of Patient: Outpatient (Ambulatory)  02/13/2023     Chief Complaint:  presenting today for a follow-up.       Interval History and Content of Current Session:  Interim Events/Subjective Report/Content of Current Session:  follow-up appointment.    Pt is a 36 y/o female with past psychiatric hx of anxiety and ADHD who presents for follow-up treatment. Pt reported that things are going well. No notable stressors. Pt stated that her gastroparesis flared up so pt stopped all etoh consumption. Pt reported that also decreased anxiety and improved sleep. Continues to take alprazolam rarely, stating that she has taken a tablet maybe one time in the past month. Previous time was about 3 months before that.     Past Psychiatric hx: effexor xr ("my mood was the best on that but I was having panic attacks and bld press was really negar"), pristiq (for headaches- effective), cymbalta (ineffective), lexapro and zoloft (effective but worsened headaches), klonopin 1mg (effective- for sleep and anxiety)  For sleep- elavil (ineffective), trazodone (worsened h/s's), ambien (nightmares), lunesta (nightmares), seroquel, prazosin, xanax  For headache- topomax    Past Medical hx:   Past Medical History:   Diagnosis Date    Abnormal Pap smear of cervix 2009    ADHD     Allergy     seasonal    Anorexia     Anxiety     " Asthma     BRCA1 positive 12/18/2020    Bulimia     Depression     Fever blister     Gastroparesis     GERD (gastroesophageal reflux disease)     History of posttraumatic stress disorder (PTSD)     Hypertension     Gestational Hypertension    Invasive ductal carcinoma of right breast 12/18/2019    Mastitis     Migraine headache     PONV (postoperative nausea and vomiting)     Status post mastectomy, bilateral 7/29/2020        Interim hx:  Medication changes last visit: None  Anxiety: stable  Depression: stable     Denies suicidal/homicidal ideations.  Denies hopelessness/worthlessness.    Denies auditory/visual hallucinations      Alcohol: Pt denied  Drug: Pt denied  Caffeine: Not assessed  Tobacco: Pt denied      Review of Systems   PSYCHIATRIC: Pertinent items are noted in the narrative.        CONSTITUTIONAL: weight stable    Past Medical, Family and Social History: The patient's past medical, family and social history have been reviewed and updated as appropriate within the electronic medical record. See encounter notes.     Current Psychiatric Medication:  celexa 40mg po qhs, adderall 30xr mg po qam, xanax 0.125mg po daily PRN anxiety (takes maybew once every 3 months), trazodone 25mg po qhs PRN insomnia (not currently taking)     Compliance: yes      Side effects: Pt denied     Risk Parameters:  Patient reports no suicidal ideation  Patient reports no homicidal ideation  Patient reports no self-injurious behavior  Patient reports no violent behavior     Exam (detailed: at least 9 elements; comprehensive: all 15 elements)   Constitutional  Vitals:  Most recent vital signs, dated less than 90 days prior to this appointment, were reviewed.        General:  unremarkable, age appropriate, casual attire, good eye contact, good rapport       Musculoskeletal  Muscle Strength/Tone:  no flaccidity, no tremor    Gait & Station:  normal      Psychiatric                       Speech:  normal tone, normal rate, rhythm, and  volume   Mood & Affect:   Depressed, anxious         Thought Process:   Goal directed; Linear    Associations:   intact   Thought Content:   No SI/HI, delusions, or paranoia, no AV/VH   Insight & Judgement:   Good, adequate to circumstances   Orientation:   grossly intact; alert and oriented x 4    Memory:  intact for content of interview    Language:  grossly intact, can repeat    Attention Span  : Grossly intact for content of interview   Fund of Knowledge:   intact and appropriate to age and level of education        Assessment and Diagnosis   Status/Progress: Based on the examination today, the patient's problem(s) is/are adequately controlled.  New problems have not been presented today. Co-morbidities are not complicating management of the primary condition. There are no active rule-out diagnoses for this patient at this time.      Impression: Pt continues to present as stable. Pt's abstinence from etoh appears to have reduced anxiety and improved sleep. Encouraged continuing with this plan. Will continue medication plan as is and monitor symptoms moving forward.     Diagnosis:   Anxiety disorder   Attention Deficit Hyperactivity Disorder - Inattentive Type   h/o PTSD (now in remission)   h/o anorexia and bulimia (both in remission)  Intervention/Counseling/Treatment Plan   Medication Management:      1. celexa 40mg po qhs    2. adderall 30xr mg po qam    3. xanax 0.125mg po daily PRN anxiety (takes maybe once every 3 months)    4. trazodone 25mg po qhs PRN insomnia (not currently taking)     5. Call to report any worsening of symptoms or problems with the medication. Pt instructed to go to ER with thoughts of harming self, others     6. Patient given contact # for psychotherapists at [facility] and also instructed to check with insurance for list of providers.     Psychotherapy: 20 minutes  Target symptoms: ADHD, anxiety  Why chosen therapy is appropriate versus another modality: CBT used; relevant to  diagnosis, patient responds to this modality  Outcome monitoring methods: self-report, observation  Therapeutic intervention type: Cognitive Behavioral Therapy  Topics discussed/themes: building skills sets for symptom management, symptom recognition, nutrition, exercise  The patient's response to the intervention is accepting  Patient's response to treatment is: good.   The patient's progress toward treatment goals: stable     Return to clinic: 3 months    -Cognitive-Behavioral/Supportive therapy and psychoeducation provided  -R/B/SE's of medications discussed with the pt who expresses understanding and chooses to take medications as prescribed.   -Pt instructed to call clinic, 911 or go to nearest emergency room if sxs worsen or pt is in   crisis. The pt expresses understanding.    Gokul Keller, PhD, MP     Antidepressant/Antianxiety Medication Initiation:  Patient informed of risks, benefits, and potential side effects of medication and accepts informed consent.  Common side effects include nausea, fatigue, headache, insomnia., Specifically discussed the possibility of new or worsening suicidal thoughts/depression.  Patient instructed to stop the medication immediately and seek urgent treatment if this occurs. Patient instructed not to abruptly discontinue medication without physician guidance except in cases of sudden onset or worsening of SI.       Stimulant Medication Initiation:  Patient advised of risks, benefits, and side effects of medication and accepts informed consent.  Common side effects include insomnia, irritability, jittery feeling, dry mouth, and agitation/hostility., Patient advised of potential addictive nature of medication and controlled substance classification.  Instructed to safeguard medication as no early refills can be given for lost or stolen medications.       Benzodiazepine Initiation:  Patient advised of the risks, benefits, and common side effects of medication and has accepted  informed consent.  Common side effects include drowsiness, impaired coordination, possible memory loss., Patient advised NOT to operate a vehicle or machinery untiil they are sure how the medication will affec them.  Client also advised of danger of mixing this medication with alcohol., Patient advised of potential addictive nature of medication and need to safeguard medication as no early refills for lost or stolen medications can be authorized.       Pregnancy Warning:  Patient denies current pregnancy possibility.  Patient made aware that medications have not been proven safe in pregnancy and that she must maintain adequate birth control.  Patient instructed to alert us immediately if she becomes pregnant.       Sleep Aid Initiation:  Patient advised of potential side effects of medication including sleep walking or other complex behaviors.  Patient advised not to mix medication with alcohol, to go to bed immediately after taking, and to stop at first sign of any unusual behaviors.

## 2023-02-26 ENCOUNTER — PATIENT MESSAGE (OUTPATIENT)
Dept: NEUROLOGY | Facility: CLINIC | Age: 38
End: 2023-02-26
Payer: COMMERCIAL

## 2023-02-27 DIAGNOSIS — G43.709 TRANSFORMED MIGRAINE WITHOUT AURA: Primary | ICD-10-CM

## 2023-02-27 RX ORDER — BUTALBITAL, ACETAMINOPHEN AND CAFFEINE 50; 325; 40 MG/1; MG/1; MG/1
1 TABLET ORAL EVERY 4 HOURS PRN
Qty: 12 TABLET | Refills: 11 | Status: SHIPPED | OUTPATIENT
Start: 2023-02-27 | End: 2023-03-29

## 2023-02-28 ENCOUNTER — TELEPHONE (OUTPATIENT)
Dept: ADMINISTRATIVE | Facility: OTHER | Age: 38
End: 2023-02-28
Payer: COMMERCIAL

## 2023-02-28 NOTE — TELEPHONE ENCOUNTER
"Date: Tuesday, February 28, 2023     Protocol: [id number]  IRB #: [id number]  ID: [id number]  MRN: [id number]    Follow Up 5  Patient returned call and stated she is "alive and well!" She has consented to future contact in regards to the study  "

## 2023-02-28 NOTE — TELEPHONE ENCOUNTER
Date: Tuesday, February 28, 2023    Protocol: [id number]  IRB #: [id number]  ID: [id number]  MRN: [id number]    Follow Up 5  Contacted patient regarding vital status; no answer, left voicemail

## 2023-03-01 ENCOUNTER — TELEPHONE (OUTPATIENT)
Dept: NEUROLOGY | Facility: CLINIC | Age: 38
End: 2023-03-01
Payer: COMMERCIAL

## 2023-03-01 ENCOUNTER — PATIENT MESSAGE (OUTPATIENT)
Dept: NEUROLOGY | Facility: CLINIC | Age: 38
End: 2023-03-01
Payer: COMMERCIAL

## 2023-03-01 NOTE — TELEPHONE ENCOUNTER
----- Message from Tremontana Chevalier sent at 3/1/2023  2:38 PM CST -----  Regarding: pt advice - migraines  Pt clling to spk with Dr. Gonzalez's office about a migraine tht is ongoing. Pt says she is having numbness in fingers and toes, nausea, tunnel vision, etc. Pls cll pt @ [phone].

## 2023-03-02 ENCOUNTER — PATIENT MESSAGE (OUTPATIENT)
Dept: NEUROLOGY | Facility: CLINIC | Age: 38
End: 2023-03-02

## 2023-03-02 ENCOUNTER — OFFICE VISIT (OUTPATIENT)
Dept: NEUROLOGY | Facility: CLINIC | Age: 38
End: 2023-03-02
Payer: COMMERCIAL

## 2023-03-02 ENCOUNTER — CLINICAL SUPPORT (OUTPATIENT)
Dept: OBSTETRICS AND GYNECOLOGY | Facility: CLINIC | Age: 38
End: 2023-03-02
Payer: COMMERCIAL

## 2023-03-02 ENCOUNTER — TELEPHONE (OUTPATIENT)
Dept: NEUROLOGY | Facility: CLINIC | Age: 38
End: 2023-03-02
Payer: COMMERCIAL

## 2023-03-02 VITALS
DIASTOLIC BLOOD PRESSURE: 75 MMHG | SYSTOLIC BLOOD PRESSURE: 106 MMHG | HEIGHT: 65 IN | BODY MASS INDEX: 26.21 KG/M2 | HEART RATE: 94 BPM | WEIGHT: 157.31 LBS

## 2023-03-02 DIAGNOSIS — G43.709 TRANSFORMED MIGRAINE WITHOUT AURA: Primary | ICD-10-CM

## 2023-03-02 DIAGNOSIS — N95.1 MENOPAUSAL SYMPTOMS: Primary | ICD-10-CM

## 2023-03-02 PROCEDURE — 3008F BODY MASS INDEX DOCD: CPT | Mod: CPTII,S$GLB,, | Performed by: PSYCHIATRY & NEUROLOGY

## 2023-03-02 PROCEDURE — 3078F DIAST BP <80 MM HG: CPT | Mod: CPTII,S$GLB,, | Performed by: PSYCHIATRY & NEUROLOGY

## 2023-03-02 PROCEDURE — 3074F SYST BP LT 130 MM HG: CPT | Mod: CPTII,S$GLB,, | Performed by: PSYCHIATRY & NEUROLOGY

## 2023-03-02 PROCEDURE — 99214 OFFICE O/P EST MOD 30 MIN: CPT | Mod: 25,S$GLB,, | Performed by: PSYCHIATRY & NEUROLOGY

## 2023-03-02 PROCEDURE — 96372 THER/PROPH/DIAG INJ SC/IM: CPT | Mod: S$GLB,,, | Performed by: OBSTETRICS & GYNECOLOGY

## 2023-03-02 PROCEDURE — 96372 PR INJECTION,THERAP/PROPH/DIAG2ST, IM OR SUBCUT: ICD-10-PCS | Mod: S$GLB,,, | Performed by: OBSTETRICS & GYNECOLOGY

## 2023-03-02 PROCEDURE — 1160F PR REVIEW ALL MEDS BY PRESCRIBER/CLIN PHARMACIST DOCUMENTED: ICD-10-PCS | Mod: CPTII,S$GLB,, | Performed by: PSYCHIATRY & NEUROLOGY

## 2023-03-02 PROCEDURE — 99999 PR PBB SHADOW E&M-EST. PATIENT-LVL I: CPT | Mod: PBBFAC,,,

## 2023-03-02 PROCEDURE — 99999 PR PBB SHADOW E&M-EST. PATIENT-LVL V: CPT | Mod: PBBFAC,,, | Performed by: PSYCHIATRY & NEUROLOGY

## 2023-03-02 PROCEDURE — 1160F RVW MEDS BY RX/DR IN RCRD: CPT | Mod: CPTII,S$GLB,, | Performed by: PSYCHIATRY & NEUROLOGY

## 2023-03-02 PROCEDURE — 3074F PR MOST RECENT SYSTOLIC BLOOD PRESSURE < 130 MM HG: ICD-10-PCS | Mod: CPTII,S$GLB,, | Performed by: PSYCHIATRY & NEUROLOGY

## 2023-03-02 PROCEDURE — 1159F PR MEDICATION LIST DOCUMENTED IN MEDICAL RECORD: ICD-10-PCS | Mod: CPTII,S$GLB,, | Performed by: PSYCHIATRY & NEUROLOGY

## 2023-03-02 PROCEDURE — 1159F MED LIST DOCD IN RCRD: CPT | Mod: CPTII,S$GLB,, | Performed by: PSYCHIATRY & NEUROLOGY

## 2023-03-02 PROCEDURE — 99214 PR OFFICE/OUTPT VISIT, EST, LEVL IV, 30-39 MIN: ICD-10-PCS | Mod: 25,S$GLB,, | Performed by: PSYCHIATRY & NEUROLOGY

## 2023-03-02 PROCEDURE — 64405 NJX AA&/STRD GR OCPL NRV: CPT | Mod: 50,S$GLB,, | Performed by: PSYCHIATRY & NEUROLOGY

## 2023-03-02 PROCEDURE — 3078F PR MOST RECENT DIASTOLIC BLOOD PRESSURE < 80 MM HG: ICD-10-PCS | Mod: CPTII,S$GLB,, | Performed by: PSYCHIATRY & NEUROLOGY

## 2023-03-02 PROCEDURE — 64405 PR NERVE BLOCK INJ, ANES/STEROID, OCCIPITAL: ICD-10-PCS | Mod: 50,S$GLB,, | Performed by: PSYCHIATRY & NEUROLOGY

## 2023-03-02 PROCEDURE — 99999 PR PBB SHADOW E&M-EST. PATIENT-LVL I: ICD-10-PCS | Mod: PBBFAC,,,

## 2023-03-02 PROCEDURE — 3008F PR BODY MASS INDEX (BMI) DOCUMENTED: ICD-10-PCS | Mod: CPTII,S$GLB,, | Performed by: PSYCHIATRY & NEUROLOGY

## 2023-03-02 PROCEDURE — 99999 PR PBB SHADOW E&M-EST. PATIENT-LVL V: ICD-10-PCS | Mod: PBBFAC,,, | Performed by: PSYCHIATRY & NEUROLOGY

## 2023-03-02 RX ORDER — PROCHLORPERAZINE MALEATE 10 MG
10 TABLET ORAL 3 TIMES DAILY PRN
Qty: 30 TABLET | Refills: 2 | Status: SHIPPED | OUTPATIENT
Start: 2023-03-02

## 2023-03-02 RX ORDER — PREDNISONE 20 MG/1
TABLET ORAL
Qty: 12 TABLET | Refills: 0 | Status: SHIPPED | OUTPATIENT
Start: 2023-03-02 | End: 2023-05-19

## 2023-03-02 RX ADMIN — TESTOSTERONE CYPIONATE 50 MG: 200 INJECTION, SOLUTION INTRAMUSCULAR at 08:03

## 2023-03-02 NOTE — TELEPHONE ENCOUNTER
----- Message from Hodan Crespo sent at 3/2/2023  8:13 AM CST -----  Regarding: FST  Contact: [phone]  CHUCK MONTOYA calling regarding Same Day Appointment  (message) for migraine.  Pt went to the Ed on 03/01/23.  She stated she still woke up with a migraine and other symptoms

## 2023-03-02 NOTE — PROGRESS NOTES
Here for hormone therapy injection, no complaints at this time. Injection given as ordered, tolerated well no reports of pain prior to or post injection. Advised to return to clinic as scheduled      Site:RB    Testerone:50 mg    CLINIC SUPPLIED MEDICATION

## 2023-03-02 NOTE — PROGRESS NOTES
Procedures [facility] - NEUROLOGY  [facility] [place]    Date: March 2, 2023   Patient Name: Yolanda Norman   MRN: [id number]   PCP: Aniceto Toledo  Referring Provider: No ref. provider found    Assessment:      This is Yolanda Norman, 37 y.o. female with chronic migraines (G43.719) and suffered from headaches more than 15 days a month lasting more than 4 hours a day with no relief of symptoms despite trying multiple medications listed below prior to initiation of botox which has resulted in >50% reduction in headaches.    She is currently with status migrainosis with severe pain refractory to multiple oral and intravenous medications.  She was advised that if symptoms continue, she should present to emergency room for admission for dihydroergotamine 0.5mg intravenous along with depakote 1g intravenous followed 8 hours later by dihydroergotamine 1.0mg intravenous continued every 8 hours until symptoms improve.      Plan:      -  ON in clinic   -  Prednisone taper  -  Cont TPM 200mg daily, imitrex/fioricet prn  -  Follow up for botox   -  Continue Mg      Greater than 30 minutes spent in chart review, documentation, independent review of imaging, and face to face time with patient       I discussed side effects of the medications. I asked the patient to stop the medication if he notices serious adverse effects as we discussed and to seek immediate medical attention at an ER.     Dillon Gonzalez MD  [facility]   Department of Neurology    Subjective:     Starting four days ago she developed severe headache refractory to all available prn medications, presented to ED where she had transient relief from compazine and toradol    1/2023  -  Ongoing response to botox and prn meds   10/2022  -  Ongoing response to botox and prn meds  7/2022  -  Ongoing response to botox for migraine and conservative measures for tension headache   4/2022  -  Ongoing response to botox for  migraine and conservative measures for tension headache  1/2022  -  Ongoing response to botox for migraine and conservative measures for tension headache  9/2021  -  Recent worsening of tension HA, notes chronic right shoulder tension since breast reconstruction as well as daytime and nocturnal bruxism, provoked by screen use to some extent, upcoming vision check, maintains regular sleep and good hydration.  6/2021  -  Worsened HA following TPM decrease and increased back to 200mg, ongoing effect of botox, recent prolonged HA not terminated by imitrex  3/2021  - Continues with good response to botox with some fluctuations in HA related to changes in hormone therapy and desires to reduce TPM, holding off on grad school  12/2020  - THACKER continues to respond well to botox, TPM, imitrex,  - About to start PhD program   9/2020  -  HA well controlled with combination of TPM and botox  -  Completed chemotherapy but has not started radiation, coping well with stress  6/2020  -  Recent diagnosis of breast cancer, s/p chemo therapy with upcoming surgery and radiation  -  Development of neuropathy pain in feet, excess sedation with GBP 300mg tid, pain/altered sensation in fingers of right hand for the past day  -  Previously unable to tolerate cymbalta  12/2019  EXCELLENT response to botox, estimates 1 headache per week  9/2019  Continued daily headaches, weaning breast feeding - son 6 months old  8/2019  Unable to tolerate increased celexa due to fatigue, compliant with Mg with continued daily HA, unable to resume TPM as continuing to breast feed    HPI 5/2019:   Ms. Yolanda Norman is a 37 y.o. female who presents with a chief complaint of headaches    Patient has had significant difficulty with migraines since she was 12 years old with worsening during recent pregnancy.  Associated nausea, photo/phonophobia.  She has had difficulty with post-partum anxiety and started celexa a month ago with some improvement in headache as  well as irritability noted.    Prior medications trials as below  TPM - partial relief at 150mg /d  Pamelor, Neurontin, Seroquel - all little effect  Imitrex, phenergan - good effect  Fioricet, zanaflex - little effect    PAST MEDICAL HISTORY:  Past Medical History:   Diagnosis Date    Abnormal Pap smear of cervix     ADHD     Allergy     seasonal    Anorexia     Anxiety     Asthma     BRCA1 positive 2020    Bulimia     Depression     Fever blister     Gastroparesis     GERD (gastroesophageal reflux disease)     History of posttraumatic stress disorder (PTSD)     Hypertension     Gestational Hypertension    Invasive ductal carcinoma of right breast 2019    Mastitis     Migraine headache     PONV (postoperative nausea and vomiting)     Status post mastectomy, bilateral 2020       PAST SURGICAL HISTORY:  Past Surgical History:   Procedure Laterality Date    BILATERAL MASTECTOMY Bilateral 2020    Procedure: MASTECTOMY, BILATERAL - BILATERAL SKIN SPARING MASTECTOMY;  Surgeon: Percy Ayers MD;  Location: [facility];  Service: General;  Laterality: Bilateral;    BIOPSY OF AXILLARY NODE Right 2020    Procedure: BIOPSY, LYMPH NODE, AXILLARY;  Surgeon: Percy Ayers MD;  Location: [facility];  Service: General;  Laterality: Right;    BONE MARROW ASPIRATION      x 3    BREAST SURGERY      CERVICAL BIOPSY  W/ LOOP ELECTRODE EXCISION       SECTION  2016     SECTION N/A 2019    Procedure:  SECTION;  Surgeon: Kirit Hernandez MD;  [facility];  Service: OB/GYN;  Laterality: N/A;    COLPOSCOPY      ESOPHAGOGASTRODUODENOSCOPY N/A 2021    Procedure: EGD (ESOPHAGOGASTRODUODENOSCOPY);  Surgeon: Lj Santos MD;  Location: [address];  Service: Endoscopy;  Laterality: N/A;  COVID test on 21 at [facility]    ESOPHAGOGASTRODUODENOSCOPY N/A 2021    Procedure: ESOPHAGOGASTRODUODENOSCOPY (EGD);  Surgeon: Camila Wiley MD;  Location: [facility]  ENDO;  Service: Endoscopy;  Laterality: N/A;    INJECTION FOR SENTINEL NODE IDENTIFICATION Right 7/1/2020    Procedure: INJECTION, FOR SENTINEL NODE IDENTIFICATION;  Surgeon: Percy Ayers MD;  Location: [facility];  Service: General;  Laterality: Right;    INSERTION OF BREAST TISSUE EXPANDER Bilateral 7/1/2020    Procedure: INSERTION, TISSUE EXPANDER, BREAST;  Surgeon: Kiko Aranda MD;  Location: [facility];  Service: Plastics;  Laterality: Bilateral;    INSERTION OF TUNNELED CENTRAL VENOUS CATHETER (CVC) WITH SUBCUTANEOUS PORT Left 12/27/2019    Procedure: [id number]-Left neck or chest wall;  Surgeon: Percy Ayers MD;  [facility]: [facility];  Service: General;  Laterality: Left;    MASTECTOMY      MEDIPORT REMOVAL N/A 7/1/2020    Procedure: REMOVAL, CATHETER, CENTRAL VENOUS, TUNNELED, WITH PORT;  Surgeon: Percy Ayers MD;  Location: [facility];  Service: General;  Laterality: N/A;    ROBOT-ASSISTED LAPAROSCOPIC ABDOMINAL HYSTERECTOMY USING DA HIMA XI N/A 12/18/2020    Procedure: XI ROBOTIC HYSTERECTOMY;  Surgeon: Emili Smith MD;  Location: [facility];  Service: OB/GYN;  Laterality: N/A;    ROBOT-ASSISTED LAPAROSCOPIC SALPINGO-OOPHORECTOMY USING DA HIMA XI Bilateral 12/18/2020    Procedure: XI ROBOTIC SALPINGO-OOPHORECTOMY;  Surgeon: Emili Smith MD;  Location: [facility];  Service: OB/GYN;  Laterality: Bilateral;    SHOULDER SURGERY Left     x 2    SINUS SURGERY      age 19    STOMACH SURGERY      TISSUE EXPANDER REMOVAL Right 10/3/2020    Procedure: REMOVAL, TISSUE EXPANDER;  Surgeon: Kiko Aranda MD;  Location: [facility];  Service: Plastics;  Laterality: Right;    TONSILLECTOMY      UPPER GASTROINTESTINAL ENDOSCOPY         CURRENT MEDS:  Current Outpatient Medications   Medication Sig Dispense Refill    albuterol (PROVENTIL/VENTOLIN HFA) 90 mcg/actuation inhaler Inhale 1 puff every 6 (six) hours  into the lungs for Wheezing (As Needed) for up to 60 doses 18 g 0    anastrozole (ARIMIDEX)  1 mg Tab Take 1 tablet (1 mg total) by mouth once daily. 30 tablet 11    benzonatate (TESSALON) 100 MG capsule Take 100 mg by mouth 3 (three) times daily as needed.      butalbital-acetaminophen-caffeine -40 mg (FIORICET, ESGIC) -40 mg per tablet Take 1 tablet by mouth every 4 (four) hours as needed for Pain. 12 tablet 11    cefdinir (OMNICEF) 300 MG capsule Take 300 mg by mouth every 12 (twelve) hours.      cetirizine (ZYRTEC) 10 MG tablet Take 1 tablet daily by mouth 30 tablet 0    cholecalciferol, vitamin D3, (VITAMIN D3) 25 mcg (1,000 unit) capsule Take 1,000 Units by mouth 2 (two) times a day.      citalopram (CELEXA) 40 MG tablet TAKE 1 TABLET(40 MG) BY MOUTH EVERY DAY 90 tablet 2    clobetasoL (TEMOVATE) 0.05 % cream Apply to affected area twice a day for 2 weeks then stop 60 g 1    cranberry 500 mg Cap Take by mouth.      dapsone (ACZONE) 7.5 % GlwP Apply topically.      dextroamphetamine-amphetamine (ADDERALL XR) 30 MG 24 hr capsule Take 1 capsule (30 mg total) by mouth every morning. 30 capsule 0    diazePAM (DIASTAT) 2.5 mg Kit Place rectally.      doxycycline (VIBRAMYCIN) 100 MG Cap Take 100 mg by mouth 2 (two) times daily.      flu vacc [id number] 6mos up,PF, (FLUARIX QUAD 4399-2415, PF,) 60 mcg (15 mcg x 4)/0.5 mL Syrg admister as directed 0.5 mL 0    fluticasone propionate (FLONASE) 50 mcg/actuation nasal spray 1 spray daily by Nasal route 16 g 0    loratadine (CLARITIN) 10 mg tablet Take 10 mg by mouth once daily.      magnesium 30 mg Tab Take by mouth once.      metFORMIN (GLUCOPHAGE) 500 MG tablet Take 1 tablet (500 mg total) by mouth 2 (two) times daily with meals. 60 tablet 11    metoclopramide HCl (REGLAN) 10 MG tablet Take 1 tablet 3 (three) times daily before meals by mouth 90 tablet 0    multivitamin (THERAGRAN) per tablet Take 1 tablet by mouth once daily.      multivitamin with minerals (HAIR,SKIN AND NAILS ORAL) Take by mouth.      onabotulinumtoxinA (BOTOX INJ) Inject as  directed. [id number]      ondansetron (ZOFRAN-ODT) 4 MG TbDL Take 1 tablet every 6 (six) hours as needed by mouth for Nausea for up to 7 days 20 tablet 0    pantoprazole (PROTONIX) 40 MG tablet Take 1 tablet (40 mg total) by mouth once daily. 30 tablet 0    phenazopyridine (PYRIDIUM) 200 MG tablet Take 1 tablet 3 (three) times daily as needed by mouth for Pain for up to 3 days 10 tablet 0    prasterone, dhea, (INTRAROSA) 6.5 mg Inst Place 6.5 mg vaginally every evening. 30 each 11    predniSONE (DELTASONE) 20 MG tablet Take 40 mg by mouth once daily.      spironolactone (ALDACTONE) 100 MG tablet Take 1 tablet by mouth once a day 30 tablet 2    sucralfate (CARAFATE) 1 gram tablet Take 1 tablet (1 g total) by mouth 4 (four) times daily. Can be crushed if cannot swallow. 120 tablet 0    sumatriptan (IMITREX) 100 MG tablet Take 1/2 to 1 tab at onset of headache.  If no improvement in 2 hours, take another.  Do not take more than 2 in 24 hours. 9 tablet 11    topiramate (TOPAMAX) 200 MG Tab Take 1 tablet (200 mg total) by mouth every evening. 90 tablet 3    traZODone (DESYREL) 50 MG tablet Take 1 tablet (50 mg total) by mouth every evening. 30 tablet 0    tretinoin (ALTRALIN) 0.05 % gel SMARTSIG:Sparingly Topical Every Night      UNABLE TO FIND medication name: Valuim vaginal suppository 5mg  Place one vaginally 30 mins before intercourse 10 suppository 5    valACYclovir (VALTREX) 1000 MG tablet Take 1 tablet (1,000 mg total) by mouth every 12 (twelve) hours. 60 tablet 0    vitamin E 100 UNIT capsule Take 100 Units by mouth once daily.      ALPRAZolam (XANAX) 0.25 MG tablet Take 1 tablet (0.25 mg total) by mouth daily as needed for Anxiety. 30 tablet 0     Current Facility-Administered Medications   Medication Dose Route Frequency Provider Last Rate Last Admin    onabotulinumtoxina injection 200 Units  200 Units Intramuscular Q90 Days Dillon Gonzalez MD   200 Units at 06/25/21 0751    testosterone cypionate injection 50  "mg  50 mg Intramuscular Q28 Days Kaleigh Polanco MD   50 mg at 03/02/23 0828       ALLERGIES:  Review of patient's allergies indicates:   Allergen Reactions    Amoxicillin Hives    Penicillins Hives and Rash    Diazepam Anxiety and Other (See Comments)     "makes hyper"       FAMILY HISTORY:  Family History   Problem Relation Age of Onset    Cancer Maternal Grandmother 40        cervical    Cervical cancer Mother     Breast cancer Other     Ovarian cancer Other     Colon polyps Father     Colon cancer Neg Hx     Melanoma Neg Hx        SOCIAL HISTORY:  Social History     Tobacco Use    Smoking status: Never    Smokeless tobacco: Never   Substance Use Topics    Alcohol use: Yes     Comment: socially    Drug use: No       Review of Systems:  12 review of systems is negative except for the symptoms mentioned in HPI.        Objective:     Vitals:    03/02/23 1312   BP: 106/75   Pulse: 94   Weight: 71.3 kg (157 lb 4.8 oz)   Height: 5' 5" (1.651 m)       General: NAD, well nourished   Eyes: no tearing, discharge, no erythema   ENT: moist mucous membranes of the oral cavity, nares patent    Neck: Supple, full range of motion  Cardiovascular: Warm and well perfused, pulses equal and symmetrical  Lungs: Normal work of breathing, normal chest wall excursions  Skin: No rash, lesions, or breakdown on exposed skin  Psychiatry: Mood and affect are appropriate   Abdomen: soft, non tender, non distended  Extremeties: No cyanosis, clubbing or edema.    Neurological   MENTAL STATUS: Alert and oriented to person, place, and time. Attention and concentration within normal limits. Speech without dysarthria, able to name and repeat without difficulty. Recent and remote memory within normal limits   CRANIAL NERVES: Visual fields intact. PERRL. EOMI. Facial sensation intact. Face symmetrical. Hearing grossly intact. Full shoulder shrug bilaterally. Tongue protrudes midline   SENSORY: Sensation is intact to light touch throughout. "   MOTOR: Normal bulk and tone. No pronator drift.    CEREBELLAR/COORDINATION/GAIT: Gait steady with normal arm swing and stride length.     Procedure Note:  GONB (Greater Occipital Nerve Block): the patient was asked to remain in a sitting position her head resting prone on her chest. The area was prepped using alcohol swabs. 2% lidocaine (2 mL x2 sides of neck=4 mL total) and 40 mg (1 bottle per sitex2 for total of 80 mg)depomedrol was drawn up utilizing a 21 gauge needle. The occipital trigger points were palpated utilizing latex gloves, a 27 gauge needle and aspiration occured to ensure no medication was introduced into the blood stream during the technique. The medication was delivered bilateral (all of the above was duplicated for the opposite side) in sites 1) midway between the inion and mastoid along the occipital ridge, 2) 2 finger breaths superior lateral to the first injection and 3) 2 finger breaths superior medial to the first injection. The patient tolerated the procedure well with no active bleeding, erythema, or discharge.

## 2023-03-02 NOTE — TELEPHONE ENCOUNTER
Called pt to inform that all of her [facility] eto messages have been sent to Dr. Gonzalez and offered 1:20 PM appointment today. Pt accepted appointment.

## 2023-03-03 ENCOUNTER — PATIENT MESSAGE (OUTPATIENT)
Dept: NEUROLOGY | Facility: CLINIC | Age: 38
End: 2023-03-03
Payer: COMMERCIAL

## 2023-03-04 ENCOUNTER — HOSPITAL ENCOUNTER (OUTPATIENT)
Facility: HOSPITAL | Age: 38
Discharge: HOME | End: 2023-03-05
Attending: EMERGENCY MEDICINE | Admitting: INTERNAL MEDICINE
Payer: COMMERCIAL

## 2023-03-04 DIAGNOSIS — R51.9 INTRACTABLE HEADACHE, UNSPECIFIED CHRONICITY PATTERN, UNSPECIFIED HEADACHE TYPE: Primary | ICD-10-CM

## 2023-03-04 DIAGNOSIS — R07.9 CHEST PAIN: ICD-10-CM

## 2023-03-04 DIAGNOSIS — Z86.69 HISTORY OF MIGRAINE: ICD-10-CM

## 2023-03-04 DIAGNOSIS — G44.40 MEDICATION OVERUSE HEADACHE: ICD-10-CM

## 2023-03-04 DIAGNOSIS — G43.711 INTRACTABLE CHRONIC MIGRAINE WITHOUT AURA AND WITH STATUS MIGRAINOSUS: ICD-10-CM

## 2023-03-04 PROBLEM — R11.0 NAUSEA: Status: ACTIVE | Noted: 2021-02-09

## 2023-03-04 LAB
ANION GAP SERPL CALC-SCNC: 9 MMOL/L (ref 8–16)
BASOPHILS # BLD AUTO: 0.01 K/UL (ref 0–0.2)
BASOPHILS NFR BLD: 0.1 % (ref 0–1.9)
BUN SERPL-MCNC: 12 MG/DL (ref 6–20)
CALCIUM SERPL-MCNC: 9.8 MG/DL (ref 8.7–10.5)
CHLORIDE SERPL-SCNC: 108 MMOL/L (ref 95–110)
CO2 SERPL-SCNC: 20 MMOL/L (ref 23–29)
CREAT SERPL-MCNC: 1 MG/DL (ref 0.5–1.4)
DIFFERENTIAL METHOD: ABNORMAL
EOSINOPHIL # BLD AUTO: 0 K/UL (ref 0–0.5)
EOSINOPHIL NFR BLD: 0 % (ref 0–8)
ERYTHROCYTE [DISTWIDTH] IN BLOOD BY AUTOMATED COUNT: 12.9 % (ref 11.5–14.5)
EST. GFR  (NO RACE VARIABLE): >60 ML/MIN/1.73 M^2
GLUCOSE SERPL-MCNC: 105 MG/DL (ref 70–110)
HCT VFR BLD AUTO: 39.5 % (ref 37–48.5)
HGB BLD-MCNC: 13 G/DL (ref 12–16)
IMM GRANULOCYTES # BLD AUTO: 0.06 K/UL (ref 0–0.04)
IMM GRANULOCYTES NFR BLD AUTO: 0.4 % (ref 0–0.5)
LYMPHOCYTES # BLD AUTO: 0.6 K/UL (ref 1–4.8)
LYMPHOCYTES NFR BLD: 4.4 % (ref 18–48)
MCH RBC QN AUTO: 30.4 PG (ref 27–31)
MCHC RBC AUTO-ENTMCNC: 32.9 G/DL (ref 32–36)
MCV RBC AUTO: 93 FL (ref 82–98)
MONOCYTES # BLD AUTO: 0.2 K/UL (ref 0.3–1)
MONOCYTES NFR BLD: 1.3 % (ref 4–15)
NEUTROPHILS # BLD AUTO: 12.9 K/UL (ref 1.8–7.7)
NEUTROPHILS NFR BLD: 93.8 % (ref 38–73)
NRBC BLD-RTO: 0 /100 WBC
PLATELET # BLD AUTO: 241 K/UL (ref 150–450)
PMV BLD AUTO: 9.1 FL (ref 9.2–12.9)
POTASSIUM SERPL-SCNC: 3.9 MMOL/L (ref 3.5–5.1)
RBC # BLD AUTO: 4.27 M/UL (ref 4–5.4)
SODIUM SERPL-SCNC: 137 MMOL/L (ref 136–145)
WBC # BLD AUTO: 13.71 K/UL (ref 3.9–12.7)

## 2023-03-04 PROCEDURE — 96366 THER/PROPH/DIAG IV INF ADDON: CPT

## 2023-03-04 PROCEDURE — 93010 EKG 12-LEAD: ICD-10-PCS | Mod: ,,, | Performed by: INTERNAL MEDICINE

## 2023-03-04 PROCEDURE — 96376 TX/PRO/DX INJ SAME DRUG ADON: CPT

## 2023-03-04 PROCEDURE — 96375 TX/PRO/DX INJ NEW DRUG ADDON: CPT

## 2023-03-04 PROCEDURE — 99214 OFFICE O/P EST MOD 30 MIN: CPT | Mod: ,,, | Performed by: PSYCHIATRY & NEUROLOGY

## 2023-03-04 PROCEDURE — 85025 COMPLETE CBC W/AUTO DIFF WBC: CPT | Performed by: PHYSICIAN ASSISTANT

## 2023-03-04 PROCEDURE — 25000003 PHARM REV CODE 250: Performed by: PHYSICIAN ASSISTANT

## 2023-03-04 PROCEDURE — G0378 HOSPITAL OBSERVATION PER HR: HCPCS

## 2023-03-04 PROCEDURE — 63600175 PHARM REV CODE 636 W HCPCS: Performed by: PHYSICIAN ASSISTANT

## 2023-03-04 PROCEDURE — 96361 HYDRATE IV INFUSION ADD-ON: CPT

## 2023-03-04 PROCEDURE — 99223 1ST HOSP IP/OBS HIGH 75: CPT | Mod: ,,, | Performed by: PHYSICIAN ASSISTANT

## 2023-03-04 PROCEDURE — 96368 THER/DIAG CONCURRENT INF: CPT

## 2023-03-04 PROCEDURE — 63600175 PHARM REV CODE 636 W HCPCS

## 2023-03-04 PROCEDURE — 96365 THER/PROPH/DIAG IV INF INIT: CPT

## 2023-03-04 PROCEDURE — 99214 PR OFFICE/OUTPT VISIT, EST, LEVL IV, 30-39 MIN: ICD-10-PCS | Mod: ,,, | Performed by: PSYCHIATRY & NEUROLOGY

## 2023-03-04 PROCEDURE — 99223 PR INITIAL HOSPITAL CARE,LEVL III: ICD-10-PCS | Mod: ,,, | Performed by: PHYSICIAN ASSISTANT

## 2023-03-04 PROCEDURE — 93010 ELECTROCARDIOGRAM REPORT: CPT | Mod: ,,, | Performed by: INTERNAL MEDICINE

## 2023-03-04 PROCEDURE — 25000003 PHARM REV CODE 250: Performed by: INTERNAL MEDICINE

## 2023-03-04 PROCEDURE — 80048 BASIC METABOLIC PNL TOTAL CA: CPT | Performed by: PHYSICIAN ASSISTANT

## 2023-03-04 PROCEDURE — 93005 ELECTROCARDIOGRAM TRACING: CPT

## 2023-03-04 PROCEDURE — 99285 PR EMERGENCY DEPT VISIT,LEVEL V: ICD-10-PCS | Mod: ,,, | Performed by: PHYSICIAN ASSISTANT

## 2023-03-04 PROCEDURE — 99285 EMERGENCY DEPT VISIT HI MDM: CPT | Mod: ,,, | Performed by: PHYSICIAN ASSISTANT

## 2023-03-04 PROCEDURE — 99285 EMERGENCY DEPT VISIT HI MDM: CPT

## 2023-03-04 PROCEDURE — 25000003 PHARM REV CODE 250

## 2023-03-04 PROCEDURE — A4216 STERILE WATER/SALINE, 10 ML: HCPCS | Performed by: PHYSICIAN ASSISTANT

## 2023-03-04 PROCEDURE — 96372 THER/PROPH/DIAG INJ SC/IM: CPT | Performed by: PHYSICIAN ASSISTANT

## 2023-03-04 PROCEDURE — 96374 THER/PROPH/DIAG INJ IV PUSH: CPT | Mod: 59

## 2023-03-04 RX ORDER — DEXTROSE 40 %
30 GEL (GRAM) ORAL
Status: DISCONTINUED | OUTPATIENT
Start: 2023-03-04 | End: 2023-03-05 | Stop reason: HOSPADM

## 2023-03-04 RX ORDER — HEPARIN SODIUM 5000 [USP'U]/ML
5000 INJECTION, SOLUTION INTRAVENOUS; SUBCUTANEOUS EVERY 8 HOURS
Status: DISCONTINUED | OUTPATIENT
Start: 2023-03-04 | End: 2023-03-05 | Stop reason: HOSPADM

## 2023-03-04 RX ORDER — ALPRAZOLAM 0.25 MG/1
0.25 TABLET ORAL DAILY PRN
Status: DISCONTINUED | OUTPATIENT
Start: 2023-03-04 | End: 2023-03-05 | Stop reason: HOSPADM

## 2023-03-04 RX ORDER — PROCHLORPERAZINE EDISYLATE 5 MG/ML
5 INJECTION INTRAMUSCULAR; INTRAVENOUS EVERY 6 HOURS PRN
Status: DISCONTINUED | OUTPATIENT
Start: 2023-03-04 | End: 2023-03-05 | Stop reason: HOSPADM

## 2023-03-04 RX ORDER — GLUCAGON 1 MG
1 KIT INJECTION
Status: DISCONTINUED | OUTPATIENT
Start: 2023-03-04 | End: 2023-03-05 | Stop reason: HOSPADM

## 2023-03-04 RX ORDER — DIHYDROERGOTAMINE MESYLATE 1 MG/ML
1 INJECTION, SOLUTION INTRAMUSCULAR; INTRAVENOUS; SUBCUTANEOUS EVERY 8 HOURS
Status: DISCONTINUED | OUTPATIENT
Start: 2023-03-05 | End: 2023-03-05 | Stop reason: HOSPADM

## 2023-03-04 RX ORDER — DEXTROSE 40 %
15 GEL (GRAM) ORAL
Status: DISCONTINUED | OUTPATIENT
Start: 2023-03-04 | End: 2023-03-05 | Stop reason: HOSPADM

## 2023-03-04 RX ORDER — MAGNESIUM SULFATE HEPTAHYDRATE 40 MG/ML
2 INJECTION, SOLUTION INTRAVENOUS
Status: COMPLETED | OUTPATIENT
Start: 2023-03-04 | End: 2023-03-04

## 2023-03-04 RX ORDER — SODIUM CHLORIDE 450 MG/100ML
INJECTION, SOLUTION INTRAVENOUS CONTINUOUS
Status: DISCONTINUED | OUTPATIENT
Start: 2023-03-04 | End: 2023-03-05 | Stop reason: HOSPADM

## 2023-03-04 RX ORDER — DIVALPROEX SODIUM 250 MG/1
1000 TABLET, DELAYED RELEASE ORAL EVERY 8 HOURS
Status: DISCONTINUED | OUTPATIENT
Start: 2023-03-04 | End: 2023-03-05 | Stop reason: HOSPADM

## 2023-03-04 RX ORDER — ACETAMINOPHEN 325 MG/1
650 TABLET ORAL EVERY 4 HOURS PRN
Status: DISCONTINUED | OUTPATIENT
Start: 2023-03-04 | End: 2023-03-05 | Stop reason: HOSPADM

## 2023-03-04 RX ORDER — PANTOPRAZOLE SODIUM 40 MG/1
40 TABLET, DELAYED RELEASE ORAL DAILY
Status: DISCONTINUED | OUTPATIENT
Start: 2023-03-04 | End: 2023-03-05 | Stop reason: HOSPADM

## 2023-03-04 RX ORDER — DIHYDROERGOTAMINE MESYLATE 1 MG/ML
0.75 INJECTION, SOLUTION INTRAMUSCULAR; INTRAVENOUS; SUBCUTANEOUS EVERY 8 HOURS
Status: DISCONTINUED | OUTPATIENT
Start: 2023-03-05 | End: 2023-03-04

## 2023-03-04 RX ORDER — TOPIRAMATE 200 MG/1
200 TABLET ORAL NIGHTLY
Status: DISCONTINUED | OUTPATIENT
Start: 2023-03-04 | End: 2023-03-04

## 2023-03-04 RX ORDER — NALOXONE HCL 0.4 MG/ML
0.02 VIAL (ML) INJECTION
Status: DISCONTINUED | OUTPATIENT
Start: 2023-03-04 | End: 2023-03-05 | Stop reason: HOSPADM

## 2023-03-04 RX ORDER — DIHYDROERGOTAMINE MESYLATE 1 MG/ML
0.5 INJECTION, SOLUTION INTRAMUSCULAR; INTRAVENOUS; SUBCUTANEOUS EVERY 8 HOURS
Status: COMPLETED | OUTPATIENT
Start: 2023-03-04 | End: 2023-03-04

## 2023-03-04 RX ORDER — ONDANSETRON 8 MG/1
8 TABLET, ORALLY DISINTEGRATING ORAL EVERY 8 HOURS PRN
Status: DISCONTINUED | OUTPATIENT
Start: 2023-03-04 | End: 2023-03-05 | Stop reason: HOSPADM

## 2023-03-04 RX ORDER — DIPHENHYDRAMINE HYDROCHLORIDE 50 MG/ML
25 INJECTION INTRAMUSCULAR; INTRAVENOUS EVERY 8 HOURS
Status: DISCONTINUED | OUTPATIENT
Start: 2023-03-04 | End: 2023-03-05 | Stop reason: HOSPADM

## 2023-03-04 RX ORDER — TOPIRAMATE 200 MG/1
200 TABLET ORAL DAILY
Status: DISCONTINUED | OUTPATIENT
Start: 2023-03-05 | End: 2023-03-05 | Stop reason: HOSPADM

## 2023-03-04 RX ORDER — MAG HYDROX/ALUMINUM HYD/SIMETH 200-200-20
30 SUSPENSION, ORAL (FINAL DOSE FORM) ORAL 4 TIMES DAILY PRN
Status: DISCONTINUED | OUTPATIENT
Start: 2023-03-04 | End: 2023-03-05 | Stop reason: HOSPADM

## 2023-03-04 RX ORDER — TALC
6 POWDER (GRAM) TOPICAL NIGHTLY PRN
Status: DISCONTINUED | OUTPATIENT
Start: 2023-03-04 | End: 2023-03-05 | Stop reason: HOSPADM

## 2023-03-04 RX ORDER — POLYETHYLENE GLYCOL 3350 17 G/17G
17 POWDER, FOR SOLUTION ORAL DAILY
Status: DISCONTINUED | OUTPATIENT
Start: 2023-03-04 | End: 2023-03-05 | Stop reason: HOSPADM

## 2023-03-04 RX ORDER — SPIRONOLACTONE 25 MG/1
100 TABLET ORAL DAILY
Status: DISCONTINUED | OUTPATIENT
Start: 2023-03-04 | End: 2023-03-05 | Stop reason: HOSPADM

## 2023-03-04 RX ORDER — SIMETHICONE 80 MG
1 TABLET,CHEWABLE ORAL 4 TIMES DAILY PRN
Status: DISCONTINUED | OUTPATIENT
Start: 2023-03-04 | End: 2023-03-05 | Stop reason: HOSPADM

## 2023-03-04 RX ORDER — CITALOPRAM 20 MG/1
40 TABLET, FILM COATED ORAL NIGHTLY
Status: DISCONTINUED | OUTPATIENT
Start: 2023-03-04 | End: 2023-03-05 | Stop reason: HOSPADM

## 2023-03-04 RX ORDER — PREDNISONE 20 MG/1
40 TABLET ORAL DAILY
Status: DISCONTINUED | OUTPATIENT
Start: 2023-03-05 | End: 2023-03-05 | Stop reason: HOSPADM

## 2023-03-04 RX ORDER — MAGNESIUM SULFATE 1 G/100ML
1 INJECTION INTRAVENOUS EVERY 12 HOURS
Status: DISCONTINUED | OUTPATIENT
Start: 2023-03-05 | End: 2023-03-05 | Stop reason: HOSPADM

## 2023-03-04 RX ORDER — PREDNISONE 20 MG/1
20 TABLET ORAL DAILY
Status: DISCONTINUED | OUTPATIENT
Start: 2023-03-06 | End: 2023-03-05 | Stop reason: HOSPADM

## 2023-03-04 RX ORDER — METOCLOPRAMIDE HYDROCHLORIDE 5 MG/ML
10 INJECTION INTRAMUSCULAR; INTRAVENOUS EVERY 8 HOURS
Status: DISCONTINUED | OUTPATIENT
Start: 2023-03-04 | End: 2023-03-05 | Stop reason: HOSPADM

## 2023-03-04 RX ORDER — SODIUM CHLORIDE 0.9 % (FLUSH) 0.9 %
10 SYRINGE (ML) INJECTION EVERY 8 HOURS
Status: DISCONTINUED | OUTPATIENT
Start: 2023-03-04 | End: 2023-03-05 | Stop reason: HOSPADM

## 2023-03-04 RX ORDER — ANASTROZOLE 1 MG/1
1 TABLET ORAL DAILY
Status: DISCONTINUED | OUTPATIENT
Start: 2023-03-04 | End: 2023-03-05 | Stop reason: HOSPADM

## 2023-03-04 RX ADMIN — SODIUM CHLORIDE: 4.5 INJECTION, SOLUTION INTRAVENOUS at 04:03

## 2023-03-04 RX ADMIN — DIHYDROERGOTAMINE MESYLATE 0.5 MG: 1 INJECTION, SOLUTION INTRAMUSCULAR; INTRAVENOUS; SUBCUTANEOUS at 02:03

## 2023-03-04 RX ADMIN — PROCHLORPERAZINE EDISYLATE 5 MG: 5 INJECTION INTRAMUSCULAR; INTRAVENOUS at 10:03

## 2023-03-04 RX ADMIN — DIPHENHYDRAMINE HYDROCHLORIDE 25 MG: 50 INJECTION, SOLUTION INTRAMUSCULAR; INTRAVENOUS at 10:03

## 2023-03-04 RX ADMIN — MAGNESIUM SULFATE 2 G: 2 INJECTION INTRAVENOUS at 12:03

## 2023-03-04 RX ADMIN — SPIRONOLACTONE 100 MG: 25 TABLET, FILM COATED ORAL at 04:03

## 2023-03-04 RX ADMIN — SODIUM CHLORIDE, POTASSIUM CHLORIDE, SODIUM LACTATE AND CALCIUM CHLORIDE 1000 ML: 600; 310; 30; 20 INJECTION, SOLUTION INTRAVENOUS at 12:03

## 2023-03-04 RX ADMIN — SODIUM CHLORIDE 10 ML: 9 INJECTION, SOLUTION INTRAMUSCULAR; INTRAVENOUS; SUBCUTANEOUS at 10:03

## 2023-03-04 RX ADMIN — HEPARIN SODIUM 5000 UNITS: 5000 INJECTION INTRAVENOUS; SUBCUTANEOUS at 04:03

## 2023-03-04 RX ADMIN — DIVALPROEX SODIUM 1000 MG: 250 TABLET, DELAYED RELEASE ORAL at 04:03

## 2023-03-04 RX ADMIN — PANTOPRAZOLE SODIUM 40 MG: 40 TABLET, DELAYED RELEASE ORAL at 04:03

## 2023-03-04 RX ADMIN — METOCLOPRAMIDE 10 MG: 5 INJECTION, SOLUTION INTRAMUSCULAR; INTRAVENOUS at 04:03

## 2023-03-04 RX ADMIN — CITALOPRAM HYDROBROMIDE 40 MG: 20 TABLET ORAL at 08:03

## 2023-03-04 RX ADMIN — SODIUM CHLORIDE 500 ML: 9 INJECTION, SOLUTION INTRAVENOUS at 08:03

## 2023-03-04 RX ADMIN — PROMETHAZINE HYDROCHLORIDE 12.5 MG: 25 INJECTION INTRAMUSCULAR; INTRAVENOUS at 01:03

## 2023-03-04 RX ADMIN — DIHYDROERGOTAMINE MESYLATE 0.5 MG: 1 INJECTION, SOLUTION INTRAMUSCULAR; INTRAVENOUS; SUBCUTANEOUS at 10:03

## 2023-03-04 RX ADMIN — SODIUM CHLORIDE 10 ML: 9 INJECTION, SOLUTION INTRAMUSCULAR; INTRAVENOUS; SUBCUTANEOUS at 02:03

## 2023-03-04 NOTE — PHARMACY MED REC
"Admission Medication History     The home medication history was taken by Kerry Lynch.    You may go to "Admission" then "Reconcile Home Medications" tabs to review and/or act upon these items.     The home medication list has been updated by the Pharmacy department.   Please read ALL comments highlighted in yellow.   Please address this information as you see fit.    Feel free to contact us if you have any questions or require assistance.      The medications listed below were removed from the home medication list. Please reorder if appropriate:  Patient reports no longer taking the following medication(s):  BENZONATATE 100 MG CAPSULE  CEFDINIR 300 MG CAPSULE  CLOBETASOL 0.05 % CREAM  CRANBERRY 500 MG CAPSULE  DAPSONE 7.5 % GLWP  DIAZEPAM 2.5 MG KIT  FLUTICASONE PROPIONATE 50 MCG NASAL SPRAY  MULTIVITAMIN WITH MINERALS HAIR SKIN AND NAILS  UNABLE TO FIND: VALIUM VAGINAL 5 MG SUPPOSITORY  VITAMIN E 100 UNIT CAPSULE        Current Outpatient Medications on File Prior to Encounter   Medication Sig    ALPRAZolam (XANAX) 0.25 MG tablet   Take 1 tablet (0.25 mg total) by mouth daily as needed for Anxiety.    anastrozole (ARIMIDEX) 1 mg Tab   Take 1 tablet (1 mg total) by mouth once daily.    butalbital-acetaminophen-caffeine -40 mg (FIORICET, ESGIC) -40 mg per tablet   Take 1 tablet by mouth every 4 (four) hours as needed for Pain.    citalopram (CELEXA) 40 MG tablet   TAKE 1 TABLET(40 MG) BY MOUTH EVERY DAY    dextroamphetamine-amphetamine (ADDERALL XR) 30 MG 24 hr capsule   Take 1 capsule (30 mg total) by mouth every morning.    loratadine (CLARITIN) 10 mg tablet   Take 10 mg by mouth once daily.    magnesium 30 mg Tab   Take by mouth once.    metFORMIN (GLUCOPHAGE) 500 MG tablet   Take 1 tablet (500 mg total) by mouth 2 (two) times daily with meals.    metoclopramide HCl (REGLAN) 10 MG tablet   Take 1 tablet 3 (three) times daily before meals by mouth    multivitamin (THERAGRAN) per tablet   Take 1 " tablet by mouth once daily.    onabotulinumtoxinA (BOTOX INJ)   Inject as directed. [id number]    ondansetron (ZOFRAN-ODT) 4 MG TbDL   Take 1 tablet every 6 (six) hours as needed by mouth for Nausea for up to 7 days    pantoprazole (PROTONIX) 40 MG tablet   Take 1 tablet (40 mg total) by mouth once daily.    prasterone, dhea, (INTRAROSA) 6.5 mg Inst   Place 6.5 mg vaginally every evening.    predniSONE (DELTASONE) 20 MG tablet   Take 2 tablets by mouth daily for 4 days then 1 tablet daily for 4 days    prochlorperazine (COMPAZINE) 10 MG tablet   Take 1 tablet (10 mg total) by mouth 3 (three) times daily as needed (Headache and nausea).    spironolactone (ALDACTONE) 100 MG tablet   Take 1 tablet by mouth once a day    sucralfate (CARAFATE) 1 gram tablet   Take 1 tablet (1 g total) by mouth 4 (four) times daily. Can be crushed if cannot swallow.    sumatriptan (IMITREX) 100 MG tablet     Take 1/2 to 1 tab at onset of headache.  If no improvement in 2 hours, take another.  Do not take more than 2 in 24 hours.    topiramate (TOPAMAX) 200 MG Tab   Take 1 tablet (200 mg total) by mouth every evening.    traZODone (DESYREL) 50 MG tablet   Take 1 tablet (50 mg total) by mouth every evening.    tretinoin (ALTRALIN) 0.05 % gel   SMARTSIG:Sparingly Topical Every Night    valACYclovir (VALTREX) 1000 MG tablet   Take 1 tablet (1,000 mg total) by mouth every 12 (twelve) hours.    albuterol (PROVENTIL/VENTOLIN HFA) 90 mcg/actuation inhaler   Inhale 1 puff every 6 (six) hours  into the lungs for Wheezing (As Needed) for up to 60 doses (Patient not taking: Reported on 3/4/2023)    cholecalciferol, vitamin D3, (VITAMIN D3) 25 mcg (1,000 unit) capsule   Take 1,000 Units by mouth 2 (two) times a day.    flu vacc [id number] 6mos up,PF, (FLUARIX QUAD 5950-7638, PF,) 60 mcg (15 mcg x 4)/0.5 mL Syrg admister as directed         Potential issues to be addressed PRIOR TO DISCHARGE  Please discuss with the patient barriers to adherence with  medication treatment plans  Patient requires education regarding drug therapies     Kerry Lynch  EXT [id number]                  .

## 2023-03-04 NOTE — SUBJECTIVE & OBJECTIVE
Past Medical History:   Diagnosis Date    Abnormal Pap smear of cervix     ADHD     Allergy     seasonal    Anorexia     Anxiety     Asthma     BRCA1 positive 2020    Bulimia     Depression     Fever blister     Gastroparesis     GERD (gastroesophageal reflux disease)     History of posttraumatic stress disorder (PTSD)     Hypertension     Gestational Hypertension    Invasive ductal carcinoma of right breast 2019    Mastitis     Migraine headache     PONV (postoperative nausea and vomiting)     Status post mastectomy, bilateral 2020       Past Surgical History:   Procedure Laterality Date    BILATERAL MASTECTOMY Bilateral 2020    Procedure: MASTECTOMY, BILATERAL - BILATERAL SKIN SPARING MASTECTOMY;  Surgeon: Percy Ayers MD;  Location: [facility];  Service: General;  Laterality: Bilateral;    BIOPSY OF AXILLARY NODE Right 2020    Procedure: BIOPSY, LYMPH NODE, AXILLARY;  Surgeon: Percy Ayers MD;  Location: [facility];  Service: General;  Laterality: Right;    BONE MARROW ASPIRATION      x 3    BREAST SURGERY      CERVICAL BIOPSY  W/ LOOP ELECTRODE EXCISION       SECTION  2016     SECTION N/A 2019    Procedure:  SECTION;  Surgeon: Kirit Hernandez MD;  Location: [facility];  Service: OB/GYN;  Laterality: N/A;    COLPOSCOPY      ESOPHAGOGASTRODUODENOSCOPY N/A 2021    Procedure: EGD (ESOPHAGOGASTRODUODENOSCOPY);  Surgeon: Lj Santos MD;  Location: [address]);  Service: Endoscopy;  Laterality: N/A;  [facility]    ESOPHAGOGASTRODUODENOSCOPY N/A 2021    Procedure: ESOPHAGOGASTRODUODENOSCOPY (EGD);  Surgeon: Camila Wiley MD;  Location: [facility];  Service: Endoscopy;  Laterality: N/A;    INJECTION FOR SENTINEL NODE IDENTIFICATION Right 2020    Procedure: INJECTION, FOR SENTINEL NODE IDENTIFICATION;  Surgeon: Percy Ayers MD;  Location: [facility];  Service: General;  Laterality: Right;    INSERTION  "OF BREAST TISSUE EXPANDER Bilateral 7/1/2020    Procedure: INSERTION, TISSUE EXPANDER, BREAST;  Surgeon: Kiko Aranda MD;  Location: [facility];  Service: Plastics;  Laterality: Bilateral;    INSERTION OF TUNNELED CENTRAL VENOUS CATHETER (CVC) WITH SUBCUTANEOUS PORT Left 12/27/2019    Procedure: [id number]-Left neck or chest wall;  Surgeon: Percy Ayers MD;  Location: [address];  Service: General;  Laterality: Left;    MASTECTOMY      MEDIPORT REMOVAL N/A 7/1/2020    Procedure: REMOVAL, CATHETER, CENTRAL VENOUS, TUNNELED, WITH PORT;  Surgeon: Percy Ayers MD;  Location: [facility];  Service: General;  Laterality: N/A;    ROBOT-ASSISTED LAPAROSCOPIC ABDOMINAL HYSTERECTOMY USING DA HIMA XI N/A 12/18/2020    Procedure: XI ROBOTIC HYSTERECTOMY;  Surgeon: Emili Smith MD;  Location: [facility];  Service: OB/GYN;  Laterality: N/A;    ROBOT-ASSISTED LAPAROSCOPIC SALPINGO-OOPHORECTOMY USING DA HIMA XI Bilateral 12/18/2020    Procedure: XI ROBOTIC SALPINGO-OOPHORECTOMY;  Surgeon: Emili Smith MD;  Location: [facility];  Service: OB/GYN;  Laterality: Bilateral;    SHOULDER SURGERY Left     x 2    SINUS SURGERY      age 19    STOMACH SURGERY      TISSUE EXPANDER REMOVAL Right 10/3/2020    Procedure: REMOVAL, TISSUE EXPANDER;  Surgeon: Kiko Aranda MD;  Location: [facility];  Service: Plastics;  Laterality: Right;    TONSILLECTOMY      UPPER GASTROINTESTINAL ENDOSCOPY         Review of patient's allergies indicates:   Allergen Reactions    Amoxicillin Hives    Penicillins Hives and Rash    Diazepam Anxiety and Other (See Comments)     "makes hyper"       Current Facility-Administered Medications on File Prior to Encounter   Medication    onabotulinumtoxina injection 200 Units    testosterone cypionate injection 50 mg     Current Outpatient Medications on File Prior to Encounter   Medication Sig    anastrozole (ARIMIDEX) 1 mg Tab Take 1 tablet (1 mg total) by mouth once daily.    " butalbital-acetaminophen-caffeine -40 mg (FIORICET, ESGIC) -40 mg per tablet Take 1 tablet by mouth every 4 (four) hours as needed for Pain.    citalopram (CELEXA) 40 MG tablet TAKE 1 TABLET(40 MG) BY MOUTH EVERY DAY    dextroamphetamine-amphetamine (ADDERALL XR) 30 MG 24 hr capsule Take 1 capsule (30 mg total) by mouth every morning.    diazePAM (DIASTAT) 2.5 mg Kit Place rectally.    loratadine (CLARITIN) 10 mg tablet Take 10 mg by mouth once daily.    magnesium 30 mg Tab Take by mouth once.    metoclopramide HCl (REGLAN) 10 MG tablet Take 1 tablet 3 (three) times daily before meals by mouth    ondansetron (ZOFRAN-ODT) 4 MG TbDL Take 1 tablet every 6 (six) hours as needed by mouth for Nausea for up to 7 days    pantoprazole (PROTONIX) 40 MG tablet Take 1 tablet (40 mg total) by mouth once daily.    predniSONE (DELTASONE) 20 MG tablet Take 2 tablets by mouth daily for 4 days then 1 tablet daily for 4 days    prochlorperazine (COMPAZINE) 10 MG tablet Take 1 tablet (10 mg total) by mouth 3 (three) times daily as needed (Headache and nausea).    spironolactone (ALDACTONE) 100 MG tablet Take 1 tablet by mouth once a day    sucralfate (CARAFATE) 1 gram tablet Take 1 tablet (1 g total) by mouth 4 (four) times daily. Can be crushed if cannot swallow.    sumatriptan (IMITREX) 100 MG tablet Take 1/2 to 1 tab at onset of headache.  If no improvement in 2 hours, take another.  Do not take more than 2 in 24 hours.    topiramate (TOPAMAX) 200 MG Tab Take 1 tablet (200 mg total) by mouth every evening.    albuterol (PROVENTIL/VENTOLIN HFA) 90 mcg/actuation inhaler Inhale 1 puff every 6 (six) hours  into the lungs for Wheezing (As Needed) for up to 60 doses    ALPRAZolam (XANAX) 0.25 MG tablet Take 1 tablet (0.25 mg total) by mouth daily as needed for Anxiety.    benzonatate (TESSALON) 100 MG capsule Take 100 mg by mouth 3 (three) times daily as needed.    cefdinir (OMNICEF) 300 MG capsule Take 300 mg by mouth every  12 (twelve) hours.    cholecalciferol, vitamin D3, (VITAMIN D3) 25 mcg (1,000 unit) capsule Take 1,000 Units by mouth 2 (two) times a day.    clobetasoL (TEMOVATE) 0.05 % cream Apply to affected area twice a day for 2 weeks then stop    cranberry 500 mg Cap Take by mouth.    dapsone (ACZONE) 7.5 % GlwP Apply topically.    flu vacc [id number] 6mos up,PF, (FLUARIX QUAD 8468-7649, PF,) 60 mcg (15 mcg x 4)/0.5 mL Syrg admister as directed    fluticasone propionate (FLONASE) 50 mcg/actuation nasal spray 1 spray daily by Nasal route    metFORMIN (GLUCOPHAGE) 500 MG tablet Take 1 tablet (500 mg total) by mouth 2 (two) times daily with meals.    multivitamin (THERAGRAN) per tablet Take 1 tablet by mouth once daily.    multivitamin with minerals (HAIR,SKIN AND NAILS ORAL) Take by mouth.    onabotulinumtoxinA (BOTOX INJ) Inject as directed. [id number]    prasterone, dhea, (INTRAROSA) 6.5 mg Inst Place 6.5 mg vaginally every evening.    traZODone (DESYREL) 50 MG tablet Take 1 tablet (50 mg total) by mouth every evening.    tretinoin (ALTRALIN) 0.05 % gel SMARTSIG:Sparingly Topical Every Night    UNABLE TO FIND medication name: Valuim vaginal suppository 5mg  Place one vaginally 30 mins before intercourse    valACYclovir (VALTREX) 1000 MG tablet Take 1 tablet (1,000 mg total) by mouth every 12 (twelve) hours.    vitamin E 100 UNIT capsule Take 100 Units by mouth once daily.    [DISCONTINUED] cetirizine (ZYRTEC) 10 MG tablet Take 1 tablet daily by mouth    [DISCONTINUED] doxycycline (VIBRAMYCIN) 100 MG Cap Take 100 mg by mouth 2 (two) times daily.    [DISCONTINUED] phenazopyridine (PYRIDIUM) 200 MG tablet Take 1 tablet 3 (three) times daily as needed by mouth for Pain for up to 3 days     Family History       Problem Relation (Age of Onset)    Breast cancer Other    Cancer Maternal Grandmother (40)    Cervical cancer Mother    Colon polyps Father    Ovarian cancer Other          Tobacco Use    Smoking status: Never    Smokeless  tobacco: Never   Substance and Sexual Activity    Alcohol use: Yes     Comment: socially    Drug use: No    Sexual activity: Yes     Partners: Male     Birth control/protection: None     Review of Systems   Constitutional:  Positive for activity change. Negative for chills, diaphoresis, fatigue and fever.   HENT:  Positive for tinnitus. Negative for congestion, facial swelling, rhinorrhea and sore throat.    Eyes:  Positive for photophobia and visual disturbance. Negative for itching.   Respiratory:  Negative for cough, chest tightness, shortness of breath and wheezing.    Cardiovascular:  Negative for chest pain, palpitations and leg swelling.   Gastrointestinal:  Negative for abdominal distention, abdominal pain, blood in stool, constipation, diarrhea, nausea and vomiting.   Genitourinary:  Negative for difficulty urinating, dysuria, frequency, hematuria and urgency.   Musculoskeletal:  Negative for arthralgias, back pain and neck stiffness.   Neurological:  Positive for numbness (upper and lower extremities) and headaches. Negative for dizziness, tremors, seizures, syncope, weakness and light-headedness.   Psychiatric/Behavioral:  Negative for agitation, confusion and hallucinations.    Objective:     Vital Signs (Most Recent):  Temp: 97.7 °F (36.5 °C) (03/04/23 1112)  Pulse: 85 (03/04/23 1112)  Resp: 16 (03/04/23 1112)  BP: 132/81 (03/04/23 1112)  SpO2: 100 % (03/04/23 1112) Vital Signs (24h Range):  Temp:  [97.7 °F (36.5 °C)] 97.7 °F (36.5 °C)  Pulse:  [85] 85  Resp:  [16] 16  SpO2:  [100 %] 100 %  BP: (132)/(81) 132/81     Weight: 65.8 kg (145 lb)  Body mass index is 24.13 kg/m².    Physical Exam  Vitals and nursing note reviewed.   Constitutional:       General: She is not in acute distress.     Appearance: She is well-developed. She is not diaphoretic.   HENT:      Head: Normocephalic and atraumatic.      Right Ear: External ear normal.      Left Ear: External ear normal.      Nose: Nose normal. No  congestion.      Mouth/Throat:      Pharynx: Oropharynx is clear.   Eyes:      General: No scleral icterus.     Extraocular Movements: Extraocular movements intact.   Cardiovascular:      Rate and Rhythm: Normal rate and regular rhythm.      Pulses: Normal pulses.      Heart sounds: Normal heart sounds. No murmur heard.  Pulmonary:      Effort: Pulmonary effort is normal. No respiratory distress.      Breath sounds: Normal breath sounds. No wheezing or rales.   Abdominal:      General: Bowel sounds are normal. There is no distension.      Palpations: Abdomen is soft.      Tenderness: There is no abdominal tenderness. There is no guarding or rebound.   Musculoskeletal:      Cervical back: Normal range of motion.      Right lower leg: No edema.      Left lower leg: No edema.   Skin:     General: Skin is warm and dry.      Capillary Refill: Capillary refill takes less than 2 seconds.   Neurological:      General: No focal deficit present.      Mental Status: She is alert and oriented to person, place, and time. Mental status is at baseline.   Psychiatric:         Mood and Affect: Mood normal.         Behavior: Behavior normal.         Thought Content: Thought content normal.           Significant Labs: All pertinent labs within the past 24 hours have been reviewed.  CBC:   Recent Labs   Lab 03/04/23  1223   WBC 13.71*   HGB 13.0   HCT 39.5        CMP:   Recent Labs   Lab 03/04/23  1223      K 3.9      CO2 20*      BUN 12   CREATININE 1.0   CALCIUM 9.8   ANIONGAP 9       Significant Imaging: I have reviewed all pertinent imaging results/findings within the past 24 hours.

## 2023-03-04 NOTE — PLAN OF CARE
[facility] - Emergency Dept  Initial Discharge Assessment       Primary Care Provider: Primary Doctor No    Admission Diagnosis: Intractable headache, unspecified chronicity pattern, unspecified headache type [R51.9]    Admission Date: 3/4/2023  Expected Discharge Date:     Pt stated she was independent with her ambulation and ADL's and does not use equipment or require assistance.      Pt stated she will not need post-acute services on d/c     Pt stated that she was called by her neurologist last night 03/03 to come into [facility] for direct admit for an infusion that is required.  Pt did not get the message at the time as she was sleeping.  Pt stated she came as soon as she was able to. SW discussed status with pt and explained Observation status to pt.  Pt stated she was supposed to be Inpatient status.  SW stated she would inquire about neurology, but whether pt is inpatient or observation was not in  [facility] but she could ask.      Discharge Barriers Identified: (P) None    Payor: [facility] RESOURCES / Plan: [facility] HEALTH / Product Type: Commercial /     Extended Emergency Contact Information  Primary Emergency Contact: Gokul Norman  Address: [address] [place] [address] [facility]  Home Phone: [phone]  Mobile Phone: [phone]  Relation: Spouse  Secondary Emergency Contact: Lesly Win  Address: [address] [place] [address] [address]           [address] [facility]  Home Phone: [phone]  Relation: Mother    Discharge Plan A: (P) Home  Discharge Plan B: (P) Home with family      [facility]  [address] [place] [address]  Phone: [phone] Fax: [phone]    [id number] [place] [facility] [address]  [address] [place] [address]  Phone: [phone] Fax: [phone]    [facility]  - [facility] [place] - [place], [place] - [address]  [address] [place] [address]  Phone: [phone] Fax: [phone]      Initial Assessment (most recent)       Adult Discharge Assessment - 03/04/23 1329          Discharge Assessment    Assessment Type Discharge Planning Assessment (P)      Confirmed/corrected address, phone number and insurance Yes (P)      Confirmed Demographics Correct on Facesheet (P)      Source of Information patient (P)      People in Home spouse;child(george), dependent (P)      Facility Arrived From: home (P)      Do you expect to return to your current living situation? Yes (P)      Do you have help at home or someone to help you manage your care at home? No (P)      Prior to hospitilization cognitive status: Alert/Oriented;No Deficits (P)      Current cognitive status: Alert/Oriented;No Deficits (P)      Home Accessibility wheelchair accessible (P)      Home Layout Able to live on 1st floor (P)      Equipment Currently Used at Home none (P)      Patient currently being followed by outpatient case management? No (P)      Do you currently have service(s) that help you manage your care at home? No (P)      Do you have any problems affording any of your prescribed medications? No (P)      Is the patient taking medications as prescribed? yes (P)      Who is going to help you get home at discharge? family (P)      How do you get to doctors appointments? car, drives self;family or friend will provide (P)      Are you on dialysis? No (P)      Do you take coumadin? No (P)      Discharge Plan A Home (P)      Discharge Plan B Home with family (P)      DME Needed Upon Discharge  none (P)      Discharge Plan discussed with: Patient (P)      Discharge Barriers Identified None (P)         OTHER    Name(s) of People in Home spouse Gokul and dependent children 4 and 7 yo (P)                       May Jauregui, TEMI, MSW, LMSW, RSW   Case Management  [facility]  Email: [email or web address]

## 2023-03-04 NOTE — ASSESSMENT & PLAN NOTE
Generalized anxiety disorder  Patient has recurrent depression which is moderate and is currently controlled. Will Continue anti-depressant medications. We will not consult psychiatry at this time. Patient does not display psychosis at this time. Continue to monitor closely and adjust plan of care as needed.    -citalopram 40 mg qd  -xanax 0.25 mg qd prn

## 2023-03-04 NOTE — ED PROVIDER NOTES
Encounter Date: 3/4/2023       History     Chief Complaint   Patient presents with    Headache     X 7 days, +bilateral extremity numbness, nausea, floaters, photophobia, sound sensitivity, tunnel vision; sent by Dr. Gonzalez for DHE 45 infusion and admission     12:05 PM  Patient is a 37-year-old female with a history of migraine headache, HTN, ADHD, anxiety, asthma, GERD who presents to [facility] ED for emergent evaluation of intractable headache.    Patient reports onset of this headache on Sunday, proximally 6 days ago.  States that it felt like a hangover.  She took Imitrex and Fioricet without relief.  She tried to stay hydrated and sleep but her headache would not improve.  She had nausea without vomiting.  She went to [facility] ED 3 days ago and received IV fluids, Toradol and Compazine with some improvement but her headache returned.  CT head without acute processes.  She followed up with her neurologist who performed a nerve block as well as discharged her on Compazine and prednisone which she has been compliant with.  She is on day 3 of prednisone 40 mg.    Despite medication, ER visit, and recent procedure, she still endorses 8/10 intractable headache that is mainly located to her frontal and right head.  She has associated photophobia, phonophobia, nausea, hand and feet paresthesias, floaters, tunnel vision which are typical with her headaches.  She notified her neurologist who instructed her to report to the ED for emergent evaluation and likely IV admission for DHE 45 which he has had in the past before for intractable pain.    She took prednisone and Topamax today.    CT head on 3/1 showed:  No acute ischemic changes or infarct. No intrathecal hemorrhage contusion. No mass, mass effect or midline shift, edema, extra fluid collection. Gray-white matter adjacent maintained. The cerebral sulci and basal cisterns are normal. No hydrocephalus. The globes are intact. The paranasal sinuses and mastoid air cells are  "well pneumatized.      Review of patient's allergies indicates:   Allergen Reactions    Amoxicillin Hives    Penicillins Hives and Rash    Diazepam Anxiety and Other (See Comments)     "makes hyper"     Past Medical History:   Diagnosis Date    Abnormal Pap smear of cervix     ADHD     Allergy     seasonal    Anorexia     Anxiety     Asthma     BRCA1 positive 2020    Bulimia     Depression     Fever blister     Gastroparesis     GERD (gastroesophageal reflux disease)     History of posttraumatic stress disorder (PTSD)     Hypertension     Gestational Hypertension    Invasive ductal carcinoma of right breast 2019    Mastitis     Migraine headache     PONV (postoperative nausea and vomiting)     Status post mastectomy, bilateral 2020     Past Surgical History:   Procedure Laterality Date    BILATERAL MASTECTOMY Bilateral 2020    Procedure: MASTECTOMY, BILATERAL - BILATERAL SKIN SPARING MASTECTOMY;  Surgeon: Percy Ayers MD;  Location: [facility];  Service: General;  Laterality: Bilateral;    BIOPSY OF AXILLARY NODE Right 2020    Procedure: BIOPSY, LYMPH NODE, AXILLARY;  Surgeon: Percy Ayers MD;  Location: [facility];  Service: General;  Laterality: Right;    BONE MARROW ASPIRATION      x 3    BREAST SURGERY      CERVICAL BIOPSY  W/ LOOP ELECTRODE EXCISION       SECTION  2016     SECTION N/A 2019    Procedure:  SECTION;  Surgeon: Kirit Hernandez MD;  Location: [facility] L&D;  Service: OB/GYN;  Laterality: N/A;    COLPOSCOPY      ESOPHAGOGASTRODUODENOSCOPY N/A 2021    Procedure: EGD (ESOPHAGOGASTRODUODENOSCOPY);  Surgeon: Lj Santos MD;  Location: [address]);  Service: Endoscopy;  Laterality: N/A;  COVID test on 21 at [facility]    ESOPHAGOGASTRODUODENOSCOPY N/A 2021    Procedure: ESOPHAGOGASTRODUODENOSCOPY (EGD);  Surgeon: Camila Wiley MD;  Location: [facility];  Service: Endoscopy;  Laterality: N/A;    INJECTION FOR " SENTINEL NODE IDENTIFICATION Right 7/1/2020    Procedure: INJECTION, FOR SENTINEL NODE IDENTIFICATION;  Surgeon: Percy Ayers MD;  Location: [facility];  Service: General;  Laterality: Right;    INSERTION OF BREAST TISSUE EXPANDER Bilateral 7/1/2020    Procedure: INSERTION, TISSUE EXPANDER, BREAST;  Surgeon: Kiko Aranda MD;  Location: [facility];  Service: Plastics;  Laterality: Bilateral;    INSERTION OF TUNNELED CENTRAL VENOUS CATHETER (CVC) WITH SUBCUTANEOUS PORT Left 12/27/2019    Procedure: [id number]-Left neck or chest wall;  Surgeon: Percy Ayers MD;  Location: [facility] 2ND FLR;  Service: General;  Laterality: Left;    MASTECTOMY      MEDIPORT REMOVAL N/A 7/1/2020    Procedure: REMOVAL, CATHETER, CENTRAL VENOUS, TUNNELED, WITH PORT;  Surgeon: Percy Ayers MD;  Location: [facility];  Service: General;  Laterality: N/A;    ROBOT-ASSISTED LAPAROSCOPIC ABDOMINAL HYSTERECTOMY USING DA HIMA XI N/A 12/18/2020    Procedure: XI ROBOTIC HYSTERECTOMY;  Surgeon: Emili Smith MD;  Location: [facility];  Service: OB/GYN;  Laterality: N/A;    ROBOT-ASSISTED LAPAROSCOPIC SALPINGO-OOPHORECTOMY USING DA HIMA XI Bilateral 12/18/2020    Procedure: XI ROBOTIC SALPINGO-OOPHORECTOMY;  Surgeon: Emili Smith MD;  Location: [facility];  Service: OB/GYN;  Laterality: Bilateral;    SHOULDER SURGERY Left     x 2    SINUS SURGERY      age 19    STOMACH SURGERY      TISSUE EXPANDER REMOVAL Right 10/3/2020    Procedure: REMOVAL, TISSUE EXPANDER;  Surgeon: Kiko Aranda MD;  Location: [facility];  Service: Plastics;  Laterality: Right;    TONSILLECTOMY      UPPER GASTROINTESTINAL ENDOSCOPY       Family History   Problem Relation Age of Onset    Cancer Maternal Grandmother 40        cervical    Cervical cancer Mother     Breast cancer Other     Ovarian cancer Other     Colon polyps Father     Colon cancer Neg Hx     Melanoma Neg Hx      Social History     Tobacco Use    Smoking status: Never    Smokeless tobacco: Never    Substance Use Topics    Alcohol use: Yes     Comment: socially    Drug use: No     Review of Systems   Constitutional:  Negative for activity change, appetite change, chills and fever.   HENT:  Negative for sore throat.    Eyes:  Positive for photophobia.   Respiratory:  Negative for shortness of breath.    Cardiovascular:  Negative for chest pain.   Gastrointestinal:  Positive for nausea and vomiting. Negative for abdominal pain, constipation and diarrhea.   Genitourinary:  Negative for dysuria.   Musculoskeletal:  Negative for back pain.   Skin:  Negative for rash.   Neurological:  Positive for headaches. Negative for weakness.   Hematological:  Does not bruise/bleed easily.     Physical Exam     Initial Vitals [03/04/23 1112]   BP Pulse Resp Temp SpO2   132/81 85 16 97.7 °F (36.5 °C) 100 %      MAP       --         Physical Exam    Vitals reviewed.  Constitutional: She appears well-developed and well-nourished. She is not diaphoretic. She is cooperative.  Non-toxic appearance. She does not have a sickly appearance. She does not appear ill. No distress.   HENT:   Head: Normocephalic and atraumatic.   Right Ear: Tympanic membrane, external ear and ear canal normal. Tympanic membrane is not injected, not scarred, not perforated and not bulging.   Left Ear: Tympanic membrane, external ear and ear canal normal. Tympanic membrane is not injected, not scarred, not perforated and not bulging.   Nose: Nose normal.   Mouth/Throat: Oropharynx is clear and moist. No trismus in the jaw. No oropharyngeal exudate, posterior oropharyngeal edema, posterior oropharyngeal erythema or tonsillar abscesses.   Eyes: Conjunctivae and EOM are normal. Pupils are equal, round, and reactive to light. Right conjunctiva is not injected. Left conjunctiva is not injected. No scleral icterus. Right eye exhibits no nystagmus. Left eye exhibits no nystagmus.   Neck:   Normal range of motion.  Pulmonary/Chest: No accessory muscle usage. No  tachypnea. No respiratory distress.   Abdominal: She exhibits no distension.   Musculoskeletal:         General: Normal range of motion.      Cervical back: Normal range of motion.     Neurological: She is alert. She has normal strength.   No facial asymmetry.  Clear speech.  Provides full history.  FROM throughout.   No difficulty bearing weight or ambulating independently.   Skin: Skin is warm and dry. No erythema. No pallor.       ED Course   Procedures  Labs Reviewed   CBC W/ AUTO DIFFERENTIAL - Abnormal; Notable for the following components:       Result Value    WBC 13.71 (*)     MPV 9.1 (*)     Gran # (ANC) 12.9 (*)     Immature Grans (Abs) 0.06 (*)     Lymph # 0.6 (*)     Mono # 0.2 (*)     Gran % 93.8 (*)     Lymph % 4.4 (*)     Mono % 1.3 (*)     All other components within normal limits   BASIC METABOLIC PANEL - Abnormal; Notable for the following components:    CO2 20 (*)     All other components within normal limits          Imaging Results    None          Medications   dihydroergotamine injection 0.5 mg (0.5 mg Intravenous Given 3/4/23 1420)   sodium chloride 0.9% flush 10 mL (has no administration in time range)   melatonin tablet 6 mg (has no administration in time range)   ondansetron disintegrating tablet 8 mg (has no administration in time range)   prochlorperazine injection Soln 5 mg (has no administration in time range)   polyethylene glycol packet 17 g (has no administration in time range)   simethicone chewable tablet 80 mg (has no administration in time range)   aluminum-magnesium hydroxide-simethicone 200-200-20 mg/5 mL suspension 30 mL (has no administration in time range)   acetaminophen tablet 650 mg (has no administration in time range)   naloxone 0.4 mg/mL injection 0.02 mg (has no administration in time range)   glucagon (human recombinant) injection 1 mg (has no administration in time range)   dextrose 10% bolus 125 mL 125 mL (has no administration in time range)   dextrose 10% bolus  250 mL 250 mL (has no administration in time range)   dextrose 40 % gel 15,000 mg (has no administration in time range)   dextrose 40 % gel 30,000 mg (has no administration in time range)   heparin (porcine) injection 5,000 Units (has no administration in time range)   anastrozole tablet 1 mg (has no administration in time range)   citalopram tablet 40 mg (has no administration in time range)   pantoprazole EC tablet 40 mg (has no administration in time range)   spironolactone tablet 100 mg (has no administration in time range)   topiramate tablet 200 mg (has no administration in time range)   ALPRAZolam tablet 0.25 mg (has no administration in time range)   dihydroergotamine injection 0.8 mg (has no administration in time range)   divalproex EC tablet 1,000 mg (has no administration in time range)   0.45% NaCl infusion (has no administration in time range)   diphenhydrAMINE injection 25 mg (has no administration in time range)   metoclopramide HCl injection 10 mg (has no administration in time range)   predniSONE tablet 40 mg (has no administration in time range)     Followed by   predniSONE tablet 20 mg (has no administration in time range)   lactated ringers bolus 1,000 mL (0 mLs Intravenous Stopped 3/4/23 1420)   magnesium sulfate 2g in water 50mL IVPB (premix) (0 g Intravenous Stopped 3/4/23 1420)   promethazine (PHENERGAN) 12.5 mg in dextrose 5 % (D5W) 50 mL IVPB (0 mg Intravenous Stopped 3/4/23 1340)     Medical Decision Making:   Initial Assessment:   Patient is a 37-year-old female with a history of migraine headache, HTN, ADHD, anxiety, asthma, GERD who presents [place] [facility] [place] for emergent evaluation of intractable headache.  Differential Diagnosis:   Includes but is not limited to migraine headache, complex headache, cluster headache, tension headache, sinus headache, intractable pain.  Patient had a CT scan a few days ago which was unremarkable.  She does not have any focal neural deficits.  I doubt  hemorrhage or CVA.  Clinical Tests:   Lab Tests: Reviewed and Ordered  Radiological Study: Reviewed  ED Management:  Will initiate workup, give IV fluids and magnesium for worsening headache, and reassess.  Patient has tried multiple therapies at home, at outside ED, and procedure with her neurologist.  She is still having 8/10 pain typical for her migraine headache.           ED Course as of 03/04/23 1551   Sat Mar 04, 2023   1243 BP: 132/81 [CL]   1243 Temp: 97.7 °F (36.5 °C) [CL]   1243 Pulse: 85 [CL]   1243 Resp: 16 [CL]   1243 SpO2: 100 % [CL]   1243 Case discussed with Neurology on-call.  They will evaluate patient and make official recommendations.  Refer to their consult note. [CL]   1255 WBC(!): 13.71  She is is on day 3 of prednisone.  No systemic symptoms at home.  Afebrile.  Suspect from steroid use. [CL]   1312 Sodium: 137 [CL]   1312 Potassium: 3.9 [CL]   1312 Chloride: 108 [CL]   1312 CO2(!): 20 [CL]   1312 Glucose: 105 [CL]   1312 BUN: 12 [CL]   1312 Creatinine: 1.0 [CL]   1312 Calcium: 9.8 [CL]   1312 On my independent interpretation, EKG with sinus rhythm at 68 beats per minute.  Normal intervals.  No ST changes.  No STEMI. [CL]      ED Course User Index  [CL] Anastasia Engel PA-C            Case was discussed with Hospital Medicine who will place in observation for further evaluation and management of intractable headache.  Refer to neurology's note.       Clinical Impression:   Final diagnoses:  [R51.9] Intractable headache, unspecified chronicity pattern, unspecified headache type (Primary)  [Z86.69] History of migraine        ED Disposition Condition    Observation Stable          Future Appointments   Date Time Provider Department Center   3/20/2023 10:40 AM Gokul Ny MD [id number]   3/23/2023  8:15 AM GYN, INJECTION [facility]   4/13/2023  8:15 AM GYN, INJECTION [facility]   4/27/2023 11:00 AM Dillon Gonzalez MD [facility] MILY EPI [facility]   5/4/2023   8:15 AM GYN, INJECTION [facility]   5/15/2023 11:30 AM Gokul Keller, PhD, [facility] PSYCH [place] Engel PA-C  03/04/23 4708

## 2023-03-04 NOTE — CONSULTS
[facility] - Emergency Dept  Neurology  Consult Note    Patient Name: Yolanda Norman  MRN: [id number]  Admission Date: 3/4/2023  Hospital Length of Stay: 0 days  Code Status: Full Code   Attending Provider: Jonathan Kapoor MD   Consulting Provider: Zoe Winston MD  Primary Care Physician: Primary Doctor No  Principal Problem:Intractable chronic migraine without aura and with status migrainosus    Inpatient consult to neurology  Consult performed by: Zoe Winston MD  Consult ordered by: Anastasia Engel PA-C         Subjective:     Chief Complaint:  Intractable migraine     HPI:   Yolanda Norman is a 37 y.o. female with hx of breast cancer, migraines with history of status migrainosus, HTN, ADHD, anxiety, asthma, and GERD who presents w/ complaints of intractable headache. Patient reports onset of this headache on Sunday, proximally 6 days ago. Reports 8/10 intractable headache that is mainly located to her frontal and right head. She endorses associated photophobia, phonophobia, nausea, hand and feet paresthesias, floaters, tunnel vision which are typical with her headaches. Pt took Imitrex and Fioricet without relief. She tried to stay hydrated and sleep but her headache would not improve. Then she went to [facility] ED 3 days ago and received IV fluids, IV Toradol and IV Compazine with some improvement but her headache returned. There, her CT head was without acute processes. She followed up with her neurologist who performed a nerve block as well as discharged her on Compazine and prednisone which she has been compliant with. She is on day 3 of prednisone 40 mg. She notified her neurologist, Dr. Gonzalez, who instructed her to report to the ED for emergent evaluation and likely IV admission for migraine treatment. Pt reports taking prednisone and Topamax today. Denies f/c, lightheadedness, syncope, head trauma, chest pain, SOB, vomiting, abdominal pain, diarrhea, dysuria.          Past Medical History:   Diagnosis  Date    Abnormal Pap smear of cervix     ADHD     Allergy     seasonal    Anorexia     Anxiety     Asthma     BRCA1 positive 2020    Bulimia     Depression     Fever blister     Gastroparesis     GERD (gastroesophageal reflux disease)     History of posttraumatic stress disorder (PTSD)     Hypertension     Gestational Hypertension    Invasive ductal carcinoma of right breast 2019    Mastitis     Migraine headache     PONV (postoperative nausea and vomiting)     Status post mastectomy, bilateral 2020       Past Surgical History:   Procedure Laterality Date    BILATERAL MASTECTOMY Bilateral 2020    Procedure: MASTECTOMY, BILATERAL - BILATERAL SKIN SPARING MASTECTOMY;  Surgeon: Percy Ayers MD;  Location: [facility];  Service: General;  Laterality: Bilateral;    BIOPSY OF AXILLARY NODE Right 2020    Procedure: BIOPSY, LYMPH NODE, AXILLARY;  Surgeon: Percy Ayers MD;  Location: [facility];  Service: General;  Laterality: Right;    BONE MARROW ASPIRATION      x 3    BREAST SURGERY      CERVICAL BIOPSY  W/ LOOP ELECTRODE EXCISION       SECTION  2016     SECTION N/A 2019    Procedure:  SECTION;  Surgeon: Kirit Hernandez MD;  [facility]: [facility] L[facility];  Service: OB/GYN;  Laterality: N/A;    COLPOSCOPY      ESOPHAGOGASTRODUODENOSCOPY N/A 2021    Procedure: EGD (ESOPHAGOGASTRODUODENOSCOPY);  Surgeon: Lj Santos MD;  Location: [address]);  Service: Endoscopy;  Laterality: N/A;  [facility]    ESOPHAGOGASTRODUODENOSCOPY N/A 2021    Procedure: ESOPHAGOGASTRODUODENOSCOPY (EGD);  Surgeon: Camila Wiley MD;  Location: [facility] ENDO;  Service: Endoscopy;  Laterality: N/A;    INJECTION FOR SENTINEL NODE IDENTIFICATION Right 2020    Procedure: INJECTION, FOR SENTINEL NODE IDENTIFICATION;  Surgeon: Percy Ayers MD;  Location: [facility];  Service: General;  Laterality: Right;    INSERTION OF  "BREAST TISSUE EXPANDER Bilateral 7/1/2020    Procedure: INSERTION, TISSUE EXPANDER, BREAST;  Surgeon: Kiko Aranda MD;  Location: [facility];  Service: Plastics;  Laterality: Bilateral;    INSERTION OF TUNNELED CENTRAL VENOUS CATHETER (CVC) WITH SUBCUTANEOUS PORT Left 12/27/2019    Procedure: [id number]-Left neck or chest wall;  Surgeon: Percy Ayers MD;  [address];  Service: General;  Laterality: Left;    MASTECTOMY      MEDIPORT REMOVAL N/A 7/1/2020    Procedure: REMOVAL, CATHETER, CENTRAL VENOUS, TUNNELED, WITH PORT;  Surgeon: Percy Ayers MD;  Location: [facility];  Service: General;  Laterality: N/A;    ROBOT-ASSISTED LAPAROSCOPIC ABDOMINAL HYSTERECTOMY USING DA HIMA XI N/A 12/18/2020    Procedure: XI ROBOTIC HYSTERECTOMY;  Surgeon: Emili Smith MD;  Location: [facility];  Service: OB/GYN;  Laterality: N/A;    ROBOT-ASSISTED LAPAROSCOPIC SALPINGO-OOPHORECTOMY USING DA HIMA XI Bilateral 12/18/2020    Procedure: XI ROBOTIC SALPINGO-OOPHORECTOMY;  Surgeon: Emili Smith MD;  Location: [facility];  Service: OB/GYN;  Laterality: Bilateral;    SHOULDER SURGERY Left     x 2    SINUS SURGERY      age 19    STOMACH SURGERY      TISSUE EXPANDER REMOVAL Right 10/3/2020    Procedure: REMOVAL, TISSUE EXPANDER;  Surgeon: Kiko Aranda MD;  Location: [facility];  Service: Plastics;  Laterality: Right;    TONSILLECTOMY      UPPER GASTROINTESTINAL ENDOSCOPY         Review of patient's allergies indicates:   Allergen Reactions    Amoxicillin Hives    Penicillins Hives and Rash    Diazepam Anxiety and Other (See Comments)     "makes hyper"         Current Facility-Administered Medications on File Prior to Encounter   Medication    onabotulinumtoxina injection 200 Units    testosterone cypionate injection 50 mg     Current Outpatient Medications on File Prior to Encounter   Medication Sig    anastrozole (ARIMIDEX) 1 mg Tab Take 1 tablet (1 mg total) by mouth once daily.    " butalbital-acetaminophen-caffeine -40 mg (FIORICET, ESGIC) -40 mg per tablet Take 1 tablet by mouth every 4 (four) hours as needed for Pain.    citalopram (CELEXA) 40 MG tablet TAKE 1 TABLET(40 MG) BY MOUTH EVERY DAY    dextroamphetamine-amphetamine (ADDERALL XR) 30 MG 24 hr capsule Take 1 capsule (30 mg total) by mouth every morning.    diazePAM (DIASTAT) 2.5 mg Kit Place rectally.    loratadine (CLARITIN) 10 mg tablet Take 10 mg by mouth once daily.    magnesium 30 mg Tab Take by mouth once.    metoclopramide HCl (REGLAN) 10 MG tablet Take 1 tablet 3 (three) times daily before meals by mouth    ondansetron (ZOFRAN-ODT) 4 MG TbDL Take 1 tablet every 6 (six) hours as needed by mouth for Nausea for up to 7 days    pantoprazole (PROTONIX) 40 MG tablet Take 1 tablet (40 mg total) by mouth once daily.    predniSONE (DELTASONE) 20 MG tablet Take 2 tablets by mouth daily for 4 days then 1 tablet daily for 4 days    prochlorperazine (COMPAZINE) 10 MG tablet Take 1 tablet (10 mg total) by mouth 3 (three) times daily as needed (Headache and nausea).    spironolactone (ALDACTONE) 100 MG tablet Take 1 tablet by mouth once a day    sucralfate (CARAFATE) 1 gram tablet Take 1 tablet (1 g total) by mouth 4 (four) times daily. Can be crushed if cannot swallow.    sumatriptan (IMITREX) 100 MG tablet Take 1/2 to 1 tab at onset of headache.  If no improvement in 2 hours, take another.  Do not take more than 2 in 24 hours.    topiramate (TOPAMAX) 200 MG Tab Take 1 tablet (200 mg total) by mouth every evening.    albuterol (PROVENTIL/VENTOLIN HFA) 90 mcg/actuation inhaler Inhale 1 puff every 6 (six) hours  into the lungs for Wheezing (As Needed) for up to 60 doses    ALPRAZolam (XANAX) 0.25 MG tablet Take 1 tablet (0.25 mg total) by mouth daily as needed for Anxiety.    benzonatate (TESSALON) 100 MG capsule Take 100 mg by mouth 3 (three) times daily as needed.    cefdinir (OMNICEF) 300 MG capsule Take 300  mg by mouth every 12 (twelve) hours.    cholecalciferol, vitamin D3, (VITAMIN D3) 25 mcg (1,000 unit) capsule Take 1,000 Units by mouth 2 (two) times a day.    clobetasoL (TEMOVATE) 0.05 % cream Apply to affected area twice a day for 2 weeks then stop    cranberry 500 mg Cap Take by mouth.    dapsone (ACZONE) 7.5 % GlwP Apply topically.    flu vacc [id number] 6mos up,PF, (FLUARIX QUAD [id number], PF,) 60 mcg (15 mcg x 4)/0.5 mL Syrg admister as directed    fluticasone propionate (FLONASE) 50 mcg/actuation nasal spray 1 spray daily by Nasal route    metFORMIN (GLUCOPHAGE) 500 MG tablet Take 1 tablet (500 mg total) by mouth 2 (two) times daily with meals.    multivitamin (THERAGRAN) per tablet Take 1 tablet by mouth once daily.    multivitamin with minerals (HAIR,SKIN AND NAILS ORAL) Take by mouth.    onabotulinumtoxinA (BOTOX INJ) Inject as directed. [id number]    prasterone, dhea, (INTRAROSA) 6.5 mg Inst Place 6.5 mg vaginally every evening.    traZODone (DESYREL) 50 MG tablet Take 1 tablet (50 mg total) by mouth every evening.    tretinoin (ALTRALIN) 0.05 % gel SMARTSIG:Sparingly Topical Every Night    UNABLE TO FIND medication name: Valuim vaginal suppository 5mg  Place one vaginally 30 mins before intercourse    valACYclovir (VALTREX) 1000 MG tablet Take 1 tablet (1,000 mg total) by mouth every 12 (twelve) hours.    vitamin E 100 UNIT capsule Take 100 Units by mouth once daily.    [DISCONTINUED] cetirizine (ZYRTEC) 10 MG tablet Take 1 tablet daily by mouth    [DISCONTINUED] doxycycline (VIBRAMYCIN) 100 MG Cap Take 100 mg by mouth 2 (two) times daily.    [DISCONTINUED] phenazopyridine (PYRIDIUM) 200 MG tablet Take 1 tablet 3 (three) times daily as needed by mouth for Pain for up to 3 days     Family History       Problem Relation (Age of Onset)    Breast cancer Other    Cancer Maternal Grandmother (40)    Cervical cancer Mother    Colon polyps Father    Ovarian cancer Other          Tobacco Use     Smoking status: Never    Smokeless tobacco: Never   Substance and Sexual Activity    Alcohol use: Yes     Comment: socially    Drug use: No    Sexual activity: Yes     Partners: Male     Birth control/protection: None     Review of Systems   Constitutional:  Positive for activity change. Negative for chills, diaphoresis, fatigue and fever.   HENT:  Positive for tinnitus. Negative for congestion, facial swelling, rhinorrhea and sore throat.    Eyes:  Positive for photophobia and visual disturbance. Negative for itching.   Respiratory:  Negative for cough, chest tightness, shortness of breath and wheezing.    Cardiovascular:  Negative for chest pain, palpitations and leg swelling.   Gastrointestinal:  Negative for abdominal distention, abdominal pain, blood in stool, constipation, diarrhea, nausea and vomiting.   Genitourinary:  Negative for difficulty urinating, dysuria, frequency, hematuria and urgency.   Musculoskeletal:  Negative for arthralgias, back pain and neck stiffness.   Neurological:  Positive for numbness (upper and lower extremities) and headaches. Negative for dizziness, tremors, seizures, syncope, weakness and light-headedness.   Psychiatric/Behavioral:  Negative for agitation, confusion and hallucinations.    Objective:     Vital Signs (Most Recent):  Temp: 98 °F (36.7 °C) (03/04/23 1443)  Pulse: 61 (03/04/23 1443)  Resp: 17 (03/04/23 1443)  BP: 127/82 (03/04/23 1443)  SpO2: 99 % (03/04/23 1443) Vital Signs (24h Range):  Temp:  [97.7 °F (36.5 °C)-98 °F (36.7 °C)] 98 °F (36.7 °C)  Pulse:  [61-85] 61  Resp:  [16-17] 17  SpO2:  [99 %-100 %] 99 %  BP: (127-132)/(81-82) 127/82     Weight: 65.8 kg (145 lb)  Body mass index is 24.13 kg/m².    Physical Exam  Vitals and nursing note reviewed.   Constitutional:       General: She is not in acute distress.     Appearance: She is well-developed. She is not diaphoretic.   HENT:      Head: Normocephalic and atraumatic.      Right Ear: External ear normal.       Left Ear: External ear normal.      Nose: Nose normal. No congestion.      Mouth/Throat:      Pharynx: Oropharynx is clear.   Eyes:      General: No scleral icterus.     Extraocular Movements: Extraocular movements intact and EOM normal.      Pupils: Pupils are equal, round, and reactive to light.   Cardiovascular:      Rate and Rhythm: Normal rate and regular rhythm.      Pulses: Normal pulses.      Heart sounds: Normal heart sounds. No murmur heard.  Pulmonary:      Effort: Pulmonary effort is normal. No respiratory distress.      Breath sounds: Normal breath sounds. No wheezing or rales.   Abdominal:      General: Bowel sounds are normal. There is no distension.      Palpations: Abdomen is soft.      Tenderness: There is no abdominal tenderness. There is no guarding or rebound.   Musculoskeletal:      Cervical back: Normal range of motion.      Right lower leg: No edema.      Left lower leg: No edema.   Skin:     General: Skin is warm and dry.      Capillary Refill: Capillary refill takes less than 2 seconds.   Neurological:      General: No focal deficit present.      Mental Status: She is alert and oriented to person, place, and time. Mental status is at baseline.      Cranial Nerves: Cranial nerves 2-12 are intact.      Motor: Motor strength is normal.   Psychiatric:         Mood and Affect: Mood normal.         Speech: Speech normal.         Behavior: Behavior normal.         Thought Content: Thought content normal.       NEUROLOGICAL EXAMINATION:     MENTAL STATUS   Oriented to person, place, and time.   Attention: normal. Concentration: normal.   Speech: speech is normal   Level of consciousness: alert  Normal comprehension.     CRANIAL NERVES   Cranial nerves II through XII intact.     CN III, IV, VI   Pupils are equal, round, and reactive to light.  Extraocular motions are normal.     CN VII   Facial expression full, symmetric.     CN VIII   Hearing: intact    CN XI   CN XI normal.     MOTOR EXAM   Muscle  bulk: normal  Overall muscle tone: normal    Strength   Strength 5/5 throughout.     GAIT AND COORDINATION     Tremor   Resting tremor: absent  Intention tremor: absent  Action tremor: absent    Significant Labs: CBC:   Recent Labs   Lab 03/04/23  1223   WBC 13.71*   HGB 13.0   HCT 39.5        CMP:   Recent Labs   Lab 03/04/23  1223         K 3.9      CO2 20*   BUN 12   CREATININE 1.0   CALCIUM 9.8   ANIONGAP 9       Significant Imaging: I have reviewed and interpreted all pertinent imaging results/findings within the past 24 hours.    Assessment and Plan:     * Intractable chronic migraine without aura and with status migrainosus  Recently saw Dr. Gonzalez for intractable migraine. Patient reports onset intractable headache on Sunday, proximally 6 days ago. Reports 8/10 intractable headache that is mainly located to her frontal and right head. She endorses associated photophobia, phonophobia, nausea, hand and feet paresthesias, floaters, tunnel vision which are typical with her migraines. Pt took Imitrex and Fioricet without relief.       AFVSS, CBC and CMP WNL. CTH from [facility] on 3/1/23 reports no acute findings and no masses given Breast cx history. s/p 1L LR bolus, IV mag sulfate, IV phenergan in ED      -Start dihydroergotamine 0.5 mg IV q8h for 2 doses, then up to 1mg q8h thereafter  -IVF gtt : 1/2 NS @75cc/hr   -VPA 1g q8 PO because of IV shortage  -Continue prednisone 40mg for 1day and then 20mg for 4 days to complete outpatient treatment  -IV benadryl and IV Reglan q8hr; zofran prn   - IV Mg 1g Q12hr  -trend CBC, BMP, Mg daily  -Cardiac telemetry      Intractable headache  Please see Intractable chronic migraine without aura and with status migrainosus    Nausea  Please see Intractable chronic migraine without aura and with status migrainosus    Moderate episode of recurrent major depressive disorder  Cont home meds    Malignant neoplasm of upper-outer quadrant of right breast in  female, estrogen receptor positive  Cont home meds        VTE Risk Mitigation (From admission, onward)         Ordered     heparin (porcine) injection 5,000 Units  Every 8 hours         03/04/23 1343     IP VTE HIGH RISK PATIENT  Once         03/04/23 1343     Place sequential compression device  Until discontinued         03/04/23 1343                Thank you for your consult. I will follow-up with patient. Please contact us if you have any additional questions.    Zoe Winston MD  Neurology  [facility] - Emergency Dept

## 2023-03-04 NOTE — SUBJECTIVE & OBJECTIVE
Past Medical History:   Diagnosis Date    Abnormal Pap smear of cervix     ADHD     Allergy     seasonal    Anorexia     Anxiety     Asthma     BRCA1 positive 2020    Bulimia     Depression     Fever blister     Gastroparesis     GERD (gastroesophageal reflux disease)     History of posttraumatic stress disorder (PTSD)     Hypertension     Gestational Hypertension    Invasive ductal carcinoma of right breast 2019    Mastitis     Migraine headache     PONV (postoperative nausea and vomiting)     Status post mastectomy, bilateral 2020       Past Surgical History:   Procedure Laterality Date    BILATERAL MASTECTOMY Bilateral 2020    Procedure: MASTECTOMY, BILATERAL - BILATERAL SKIN SPARING MASTECTOMY;  Surgeon: Percy Ayers MD;  Location: [facility];  Service: General;  Laterality: Bilateral;    BIOPSY OF AXILLARY NODE Right 2020    Procedure: BIOPSY, LYMPH NODE, AXILLARY;  Surgeon: Percy Ayers MD;  Location: [facility];  Service: General;  Laterality: Right;    BONE MARROW ASPIRATION      x 3    BREAST SURGERY      CERVICAL BIOPSY  W/ LOOP ELECTRODE EXCISION       SECTION  2016     SECTION N/A 2019    Procedure:  SECTION;  Surgeon: Kirit Hernandez MD;  Location: [facility];  Service: OB/GYN;  Laterality: N/A;    COLPOSCOPY      ESOPHAGOGASTRODUODENOSCOPY N/A 2021    Procedure: EGD (ESOPHAGOGASTRODUODENOSCOPY);  Surgeon: Lj Santos MD;  Location: [address]);  Service: Endoscopy;  Laterality: N/A;  COVID test on 21 at [facility]    ESOPHAGOGASTRODUODENOSCOPY N/A 2021    Procedure: ESOPHAGOGASTRODUODENOSCOPY (EGD);  Surgeon: Camila Wiley MD;  Location: [facility];  Service: Endoscopy;  Laterality: N/A;    INJECTION FOR SENTINEL NODE IDENTIFICATION Right 2020    Procedure: INJECTION, FOR SENTINEL NODE IDENTIFICATION;  Surgeon: Percy Ayers MD;  Location: [facility];  Service: General;  Laterality: Right;    INSERTION  "OF BREAST TISSUE EXPANDER Bilateral 7/1/2020    Procedure: INSERTION, TISSUE EXPANDER, BREAST;  Surgeon: Kiko Aranda MD;  Location: [facility];  Service: Plastics;  Laterality: Bilateral;    INSERTION OF TUNNELED CENTRAL VENOUS CATHETER (CVC) WITH SUBCUTANEOUS PORT Left 12/27/2019    Procedure: [id number]-Left neck or chest wall;  Surgeon: Percy Ayers MD;  Location: [address];  Service: General;  Laterality: Left;    MASTECTOMY      MEDIPORT REMOVAL N/A 7/1/2020    Procedure: REMOVAL, CATHETER, CENTRAL VENOUS, TUNNELED, WITH PORT;  Surgeon: Percy Ayers MD;  Location: [facility];  Service: General;  Laterality: N/A;    ROBOT-ASSISTED LAPAROSCOPIC ABDOMINAL HYSTERECTOMY USING DA HIMA XI N/A 12/18/2020    Procedure: XI ROBOTIC HYSTERECTOMY;  Surgeon: Emili Smith MD;  Location: [facility];  Service: OB/GYN;  Laterality: N/A;    ROBOT-ASSISTED LAPAROSCOPIC SALPINGO-OOPHORECTOMY USING DA HIMA XI Bilateral 12/18/2020    Procedure: XI ROBOTIC SALPINGO-OOPHORECTOMY;  Surgeon: Emili Smith MD;  Location: [facility];  Service: OB/GYN;  Laterality: Bilateral;    SHOULDER SURGERY Left     x 2    SINUS SURGERY      age 19    STOMACH SURGERY      TISSUE EXPANDER REMOVAL Right 10/3/2020    Procedure: REMOVAL, TISSUE EXPANDER;  Surgeon: Kiko Aranda MD;  Location: [facility];  Service: Plastics;  Laterality: Right;    TONSILLECTOMY      UPPER GASTROINTESTINAL ENDOSCOPY         Review of patient's allergies indicates:   Allergen Reactions    Amoxicillin Hives    Penicillins Hives and Rash    Diazepam Anxiety and Other (See Comments)     "makes hyper"         Current Facility-Administered Medications on File Prior to Encounter   Medication    onabotulinumtoxina injection 200 Units    testosterone cypionate injection 50 mg     Current Outpatient Medications on File Prior to Encounter   Medication Sig    anastrozole (ARIMIDEX) 1 mg Tab Take 1 tablet (1 mg total) by mouth once daily.    " butalbital-acetaminophen-caffeine -40 mg (FIORICET, ESGIC) -40 mg per tablet Take 1 tablet by mouth every 4 (four) hours as needed for Pain.    citalopram (CELEXA) 40 MG tablet TAKE 1 TABLET(40 MG) BY MOUTH EVERY DAY    dextroamphetamine-amphetamine (ADDERALL XR) 30 MG 24 hr capsule Take 1 capsule (30 mg total) by mouth every morning.    diazePAM (DIASTAT) 2.5 mg Kit Place rectally.    loratadine (CLARITIN) 10 mg tablet Take 10 mg by mouth once daily.    magnesium 30 mg Tab Take by mouth once.    metoclopramide HCl (REGLAN) 10 MG tablet Take 1 tablet 3 (three) times daily before meals by mouth    ondansetron (ZOFRAN-ODT) 4 MG TbDL Take 1 tablet every 6 (six) hours as needed by mouth for Nausea for up to 7 days    pantoprazole (PROTONIX) 40 MG tablet Take 1 tablet (40 mg total) by mouth once daily.    predniSONE (DELTASONE) 20 MG tablet Take 2 tablets by mouth daily for 4 days then 1 tablet daily for 4 days    prochlorperazine (COMPAZINE) 10 MG tablet Take 1 tablet (10 mg total) by mouth 3 (three) times daily as needed (Headache and nausea).    spironolactone (ALDACTONE) 100 MG tablet Take 1 tablet by mouth once a day    sucralfate (CARAFATE) 1 gram tablet Take 1 tablet (1 g total) by mouth 4 (four) times daily. Can be crushed if cannot swallow.    sumatriptan (IMITREX) 100 MG tablet Take 1/2 to 1 tab at onset of headache.  If no improvement in 2 hours, take another.  Do not take more than 2 in 24 hours.    topiramate (TOPAMAX) 200 MG Tab Take 1 tablet (200 mg total) by mouth every evening.    albuterol (PROVENTIL/VENTOLIN HFA) 90 mcg/actuation inhaler Inhale 1 puff every 6 (six) hours  into the lungs for Wheezing (As Needed) for up to 60 doses    ALPRAZolam (XANAX) 0.25 MG tablet Take 1 tablet (0.25 mg total) by mouth daily as needed for Anxiety.    benzonatate (TESSALON) 100 MG capsule Take 100 mg by mouth 3 (three) times daily as needed.    cefdinir (OMNICEF) 300 MG capsule Take 300 mg by mouth every  12 (twelve) hours.    cholecalciferol, vitamin D3, (VITAMIN D3) 25 mcg (1,000 unit) capsule Take 1,000 Units by mouth 2 (two) times a day.    clobetasoL (TEMOVATE) 0.05 % cream Apply to affected area twice a day for 2 weeks then stop    cranberry 500 mg Cap Take by mouth.    dapsone (ACZONE) 7.5 % GlwP Apply topically.    flu vacc [id number] 6mos up,PF, (FLUARIX QUAD [id number], PF,) 60 mcg (15 mcg x 4)/0.5 mL Syrg admister as directed    fluticasone propionate (FLONASE) 50 mcg/actuation nasal spray 1 spray daily by Nasal route    metFORMIN (GLUCOPHAGE) 500 MG tablet Take 1 tablet (500 mg total) by mouth 2 (two) times daily with meals.    multivitamin (THERAGRAN) per tablet Take 1 tablet by mouth once daily.    multivitamin with minerals (HAIR,SKIN AND NAILS ORAL) Take by mouth.    onabotulinumtoxinA (BOTOX INJ) Inject as directed. [id number]    prasterone, dhea, (INTRAROSA) 6.5 mg Inst Place 6.5 mg vaginally every evening.    traZODone (DESYREL) 50 MG tablet Take 1 tablet (50 mg total) by mouth every evening.    tretinoin (ALTRALIN) 0.05 % gel SMARTSIG:Sparingly Topical Every Night    UNABLE TO FIND medication name: Valuim vaginal suppository 5mg  Place one vaginally 30 mins before intercourse    valACYclovir (VALTREX) 1000 MG tablet Take 1 tablet (1,000 mg total) by mouth every 12 (twelve) hours.    vitamin E 100 UNIT capsule Take 100 Units by mouth once daily.    [DISCONTINUED] cetirizine (ZYRTEC) 10 MG tablet Take 1 tablet daily by mouth    [DISCONTINUED] doxycycline (VIBRAMYCIN) 100 MG Cap Take 100 mg by mouth 2 (two) times daily.    [DISCONTINUED] phenazopyridine (PYRIDIUM) 200 MG tablet Take 1 tablet 3 (three) times daily as needed by mouth for Pain for up to 3 days     Family History       Problem Relation (Age of Onset)    Breast cancer Other    Cancer Maternal Grandmother (40)    Cervical cancer Mother    Colon polyps Father    Ovarian cancer Other          Tobacco Use    Smoking status: Never    Smokeless  tobacco: Never   Substance and Sexual Activity    Alcohol use: Yes     Comment: socially    Drug use: No    Sexual activity: Yes     Partners: Male     Birth control/protection: None     Review of Systems   Constitutional:  Positive for activity change. Negative for chills, diaphoresis, fatigue and fever.   HENT:  Positive for tinnitus. Negative for congestion, facial swelling, rhinorrhea and sore throat.    Eyes:  Positive for photophobia and visual disturbance. Negative for itching.   Respiratory:  Negative for cough, chest tightness, shortness of breath and wheezing.    Cardiovascular:  Negative for chest pain, palpitations and leg swelling.   Gastrointestinal:  Negative for abdominal distention, abdominal pain, blood in stool, constipation, diarrhea, nausea and vomiting.   Genitourinary:  Negative for difficulty urinating, dysuria, frequency, hematuria and urgency.   Musculoskeletal:  Negative for arthralgias, back pain and neck stiffness.   Neurological:  Positive for numbness (upper and lower extremities) and headaches. Negative for dizziness, tremors, seizures, syncope, weakness and light-headedness.   Psychiatric/Behavioral:  Negative for agitation, confusion and hallucinations.    Objective:     Vital Signs (Most Recent):  Temp: 98 °F (36.7 °C) (03/04/23 1443)  Pulse: 61 (03/04/23 1443)  Resp: 17 (03/04/23 1443)  BP: 127/82 (03/04/23 1443)  SpO2: 99 % (03/04/23 1443) Vital Signs (24h Range):  Temp:  [97.7 °F (36.5 °C)-98 °F (36.7 °C)] 98 °F (36.7 °C)  Pulse:  [61-85] 61  Resp:  [16-17] 17  SpO2:  [99 %-100 %] 99 %  BP: (127-132)/(81-82) 127/82     Weight: 65.8 kg (145 lb)  Body mass index is 24.13 kg/m².    Physical Exam  Vitals and nursing note reviewed.   Constitutional:       General: She is not in acute distress.     Appearance: She is well-developed. She is not diaphoretic.   HENT:      Head: Normocephalic and atraumatic.      Right Ear: External ear normal.      Left Ear: External ear normal.       Nose: Nose normal. No congestion.      Mouth/Throat:      Pharynx: Oropharynx is clear.   Eyes:      General: No scleral icterus.     Extraocular Movements: Extraocular movements intact and EOM normal.      Pupils: Pupils are equal, round, and reactive to light.   Cardiovascular:      Rate and Rhythm: Normal rate and regular rhythm.      Pulses: Normal pulses.      Heart sounds: Normal heart sounds. No murmur heard.  Pulmonary:      Effort: Pulmonary effort is normal. No respiratory distress.      Breath sounds: Normal breath sounds. No wheezing or rales.   Abdominal:      General: Bowel sounds are normal. There is no distension.      Palpations: Abdomen is soft.      Tenderness: There is no abdominal tenderness. There is no guarding or rebound.   Musculoskeletal:      Cervical back: Normal range of motion.      Right lower leg: No edema.      Left lower leg: No edema.   Skin:     General: Skin is warm and dry.      Capillary Refill: Capillary refill takes less than 2 seconds.   Neurological:      General: No focal deficit present.      Mental Status: She is alert and oriented to person, place, and time. Mental status is at baseline.      Cranial Nerves: Cranial nerves 2-12 are intact.      Motor: Motor strength is normal.   Psychiatric:         Mood and Affect: Mood normal.         Speech: Speech normal.         Behavior: Behavior normal.         Thought Content: Thought content normal.       NEUROLOGICAL EXAMINATION:     MENTAL STATUS   Oriented to person, place, and time.   Attention: normal. Concentration: normal.   Speech: speech is normal   Level of consciousness: alert  Normal comprehension.     CRANIAL NERVES   Cranial nerves II through XII intact.     CN III, IV, VI   Pupils are equal, round, and reactive to light.  Extraocular motions are normal.     CN VII   Facial expression full, symmetric.     CN VIII   Hearing: intact    CN XI   CN XI normal.     MOTOR EXAM   Muscle bulk: normal  Overall muscle tone:  normal    Strength   Strength 5/5 throughout.     GAIT AND COORDINATION     Tremor   Resting tremor: absent  Intention tremor: absent  Action tremor: absent    Significant Labs: CBC:   Recent Labs   Lab 03/04/23  1223   WBC 13.71*   HGB 13.0   HCT 39.5        CMP:   Recent Labs   Lab 03/04/23  1223         K 3.9      CO2 20*   BUN 12   CREATININE 1.0   CALCIUM 9.8   ANIONGAP 9       Significant Imaging: I have reviewed and interpreted all pertinent imaging results/findings within the past 24 hours.

## 2023-03-04 NOTE — PLAN OF CARE
03/04/23 1336   Post-Acute Status   Post-Acute Authorization Other   Other Status No Post-Acute Service Needs   Discharge Delays None known at this time   Discharge Plan   Discharge Plan A Home     No post acute services required at this time      May Jauregui CD, MSW, LMSW, RSW   Case Management  [facility]  Email: [email or web address]

## 2023-03-04 NOTE — HPI
Yolanda Norman is a 37 y.o. female with hx of breast cancer, migraines with history of status migrainosus, HTN, ADHD, anxiety, asthma, and GERD who presents w/ complaints of intractable headache. Patient reports onset of this headache on Sunday, proximally 6 days ago. Reports 8/10 intractable headache that is mainly located to her frontal and right head. She endorses associated photophobia, phonophobia, nausea, hand and feet paresthesias, floaters, tunnel vision which are typical with her headaches. Pt took Imitrex and Fioricet without relief. She tried to stay hydrated and sleep but her headache would not improve. Then she went to [facility] ED 3 days ago and received IV fluids, IV Toradol and IV Compazine with some improvement but her headache returned. There, her CT head was without acute processes. She followed up with her neurologist who performed a nerve block as well as discharged her on Compazine and prednisone which she has been compliant with. She is on day 3 of prednisone 40 mg. She notified her neurologist, Dr. Gonzalez, who instructed her to report to the ED for emergent evaluation and likely IV admission for migraine treatment. Pt reports taking prednisone and Topamax today. Denies f/c, lightheadedness, syncope, head trauma, chest pain, SOB, vomiting, abdominal pain, diarrhea, dysuria.

## 2023-03-04 NOTE — H&P
[facility] - Emergency Dept  [facility] Medicine  History & Physical    Patient Name: Yolanda Norman  MRN: [id number]  Patient Class: OP- Observation  Admission Date: 3/4/2023  Attending Physician: Jonathan Kapoor MD   Primary Care Provider: Primary Doctor No         Patient information was obtained from patient, past medical records and ER records.     Subjective:     Principal Problem:Intractable headache    Chief Complaint:   Chief Complaint   Patient presents with    Headache     X 7 days, +bilateral extremity numbness, nausea, floaters, photophobia, sound sensitivity, tunnel vision; sent by Dr. Gonzalez for DHE 45 infusion and admission        HPI: Ms. Norman is a 37-year-old female with a history of migraine headache, HTN, ADHD, anxiety, asthma, GERD who presents w/ complaints of intractable headache. Patient reports onset of this headache on Sunday, proximally 6 days ago. Reports 8/10 intractable headache that is mainly located to her frontal and right head. She endorses associated photophobia, phonophobia, nausea, hand and feet paresthesias, floaters, tunnel vision which are typical with her headaches. Pt took Imitrex and Fioricet without relief. She tried to stay hydrated and sleep but her headache would not improve. Of note, She went to [facility] ED 3 days ago and received IV fluids, Toradol and Compazine with some improvement but her headache returned. There, her CT head was without acute processes. She followed up with her neurologist who performed a nerve block as well as discharged her on Compazine and prednisone which she has been compliant with. She is on day 3 of prednisone 40 mg. She notified her neurologist who instructed her to report to the ED for emergent evaluation and likely IV admission for DHE 45 which she has had in the past before for intractable pain. Pt reports taking prednisone and Topamax today. Denies f/c, lightheadedness, syncope, head trauma, chest pain, SOB, vomiting, abdominal pain,  diarrhea, dysuria.    In ED, Pt AFVSS. CBC w/ WBC 13.71. CMP w/o significant electrolyte abnormalities. Initially treated w/ 1L LR bolus, IV phenergan, IV mag sulfate. Neurology consulted.      Past Medical History:   Diagnosis Date    Abnormal Pap smear of cervix     ADHD     Allergy     seasonal    Anorexia     Anxiety     Asthma     BRCA1 positive 2020    Bulimia     Depression     Fever blister     Gastroparesis     GERD (gastroesophageal reflux disease)     History of posttraumatic stress disorder (PTSD)     Hypertension     Gestational Hypertension    Invasive ductal carcinoma of right breast 2019    Mastitis     Migraine headache     PONV (postoperative nausea and vomiting)     Status post mastectomy, bilateral 2020       Past Surgical History:   Procedure Laterality Date    BILATERAL MASTECTOMY Bilateral 2020    Procedure: MASTECTOMY, BILATERAL - BILATERAL SKIN SPARING MASTECTOMY;  Surgeon: Percy Ayers MD;  Location: [facility];  Service: General;  Laterality: Bilateral;    BIOPSY OF AXILLARY NODE Right 2020    Procedure: BIOPSY, LYMPH NODE, AXILLARY;  Surgeon: Percy Ayers MD;  Location: [facility];  Service: General;  Laterality: Right;    BONE MARROW ASPIRATION      x 3    BREAST SURGERY      CERVICAL BIOPSY  W/ LOOP ELECTRODE EXCISION       SECTION  2016     SECTION N/A 2019    Procedure:  SECTION;  Surgeon: Kirit Hernandez MD;  Location: [facility];  Service: OB/GYN;  Laterality: N/A;    COLPOSCOPY      ESOPHAGOGASTRODUODENOSCOPY N/A 2021    Procedure: EGD (ESOPHAGOGASTRODUODENOSCOPY);  Surgeon: Lj Santos MD;  Location: [address]);  Service: Endoscopy;  Laterality: N/A;  C[facility] on [facility] at [facility]    ESOPHAGOGASTRODUODENOSCOPY N/A 2021    Procedure: ESOPHAGOGASTRODUODENOSCOPY (EGD);  Surgeon: Caimla Wiley MD;  Location: [facility];  Service: Endoscopy;  Laterality: N/A;  "   INJECTION FOR SENTINEL NODE IDENTIFICATION Right 7/1/2020    Procedure: INJECTION, FOR SENTINEL NODE IDENTIFICATION;  Surgeon: Percy Ayers MD;  Location: [facility];  Service: General;  Laterality: Right;    INSERTION OF BREAST TISSUE EXPANDER Bilateral 7/1/2020    Procedure: INSERTION, TISSUE EXPANDER, BREAST;  Surgeon: Kiko Aranda MD;  Location: [facility];  Service: Plastics;  Laterality: Bilateral;    INSERTION OF TUNNELED CENTRAL VENOUS CATHETER (CVC) WITH SUBCUTANEOUS PORT Left 12/27/2019    Procedure: EEBPURMEL-LOWH-G-CATH-Left neck or chest wall;  Surgeon: Percy Ayers MD;  [address];  Service: General;  Laterality: Left;    MASTECTOMY      MEDIPORT REMOVAL N/A 7/1/2020    Procedure: REMOVAL, CATHETER, CENTRAL VENOUS, TUNNELED, WITH PORT;  Surgeon: Percy Ayers MD;  Location: [facility];  Service: General;  Laterality: N/A;    ROBOT-ASSISTED LAPAROSCOPIC ABDOMINAL HYSTERECTOMY USING DA HIMA XI N/A 12/18/2020    Procedure: XI ROBOTIC HYSTERECTOMY;  Surgeon: Emili Smith MD;  Location: [facility];  Service: OB/GYN;  Laterality: N/A;    ROBOT-ASSISTED LAPAROSCOPIC SALPINGO-OOPHORECTOMY USING DA HIMA XI Bilateral 12/18/2020    Procedure: XI ROBOTIC SALPINGO-OOPHORECTOMY;  Surgeon: Emili Smith MD;  Location: [facility];  Service: OB/GYN;  Laterality: Bilateral;    SHOULDER SURGERY Left     x 2    SINUS SURGERY      age 19    STOMACH SURGERY      TISSUE EXPANDER REMOVAL Right 10/3/2020    Procedure: REMOVAL, TISSUE EXPANDER;  Surgeon: Kiko Aranda MD;  Location: [facility];  Service: Plastics;  Laterality: Right;    TONSILLECTOMY      UPPER GASTROINTESTINAL ENDOSCOPY         Review of patient's allergies indicates:   Allergen Reactions    Amoxicillin Hives    Penicillins Hives and Rash    Diazepam Anxiety and Other (See Comments)     "makes hyper"       Current Facility-Administered Medications on File Prior to Encounter   Medication    onabotulinumtoxina injection " 200 Units    testosterone cypionate injection 50 mg     Current Outpatient Medications on File Prior to Encounter   Medication Sig    anastrozole (ARIMIDEX) 1 mg Tab Take 1 tablet (1 mg total) by mouth once daily.    butalbital-acetaminophen-caffeine -40 mg (FIORICET, ESGIC) -40 mg per tablet Take 1 tablet by mouth every 4 (four) hours as needed for Pain.    citalopram (CELEXA) 40 MG tablet TAKE 1 TABLET(40 MG) BY MOUTH EVERY DAY    dextroamphetamine-amphetamine (ADDERALL XR) 30 MG 24 hr capsule Take 1 capsule (30 mg total) by mouth every morning.    diazePAM (DIASTAT) 2.5 mg Kit Place rectally.    loratadine (CLARITIN) 10 mg tablet Take 10 mg by mouth once daily.    magnesium 30 mg Tab Take by mouth once.    metoclopramide HCl (REGLAN) 10 MG tablet Take 1 tablet 3 (three) times daily before meals by mouth    ondansetron (ZOFRAN-ODT) 4 MG TbDL Take 1 tablet every 6 (six) hours as needed by mouth for Nausea for up to 7 days    pantoprazole (PROTONIX) 40 MG tablet Take 1 tablet (40 mg total) by mouth once daily.    predniSONE (DELTASONE) 20 MG tablet Take 2 tablets by mouth daily for 4 days then 1 tablet daily for 4 days    prochlorperazine (COMPAZINE) 10 MG tablet Take 1 tablet (10 mg total) by mouth 3 (three) times daily as needed (Headache and nausea).    spironolactone (ALDACTONE) 100 MG tablet Take 1 tablet by mouth once a day    sucralfate (CARAFATE) 1 gram tablet Take 1 tablet (1 g total) by mouth 4 (four) times daily. Can be crushed if cannot swallow.    sumatriptan (IMITREX) 100 MG tablet Take 1/2 to 1 tab at onset of headache.  If no improvement in 2 hours, take another.  Do not take more than 2 in 24 hours.    topiramate (TOPAMAX) 200 MG Tab Take 1 tablet (200 mg total) by mouth every evening.    albuterol (PROVENTIL/VENTOLIN HFA) 90 mcg/actuation inhaler Inhale 1 puff every 6 (six) hours  into the lungs for Wheezing (As Needed) for up to 60 doses    ALPRAZolam (XANAX) 0.25 MG  tablet Take 1 tablet (0.25 mg total) by mouth daily as needed for Anxiety.    benzonatate (TESSALON) 100 MG capsule Take 100 mg by mouth 3 (three) times daily as needed.    cefdinir (OMNICEF) 300 MG capsule Take 300 mg by mouth every 12 (twelve) hours.    cholecalciferol, vitamin D3, (VITAMIN D3) 25 mcg (1,000 unit) capsule Take 1,000 Units by mouth 2 (two) times a day.    clobetasoL (TEMOVATE) 0.05 % cream Apply to affected area twice a day for 2 weeks then stop    cranberry 500 mg Cap Take by mouth.    dapsone (ACZONE) 7.5 % GlwP Apply topically.    flu vacc [id number] 6mos up,PF, (FLUARIX QUAD [id number], PF,) 60 mcg (15 mcg x 4)/0.5 mL Syrg admister as directed    fluticasone propionate (FLONASE) 50 mcg/actuation nasal spray 1 spray daily by Nasal route    metFORMIN (GLUCOPHAGE) 500 MG tablet Take 1 tablet (500 mg total) by mouth 2 (two) times daily with meals.    multivitamin (THERAGRAN) per tablet Take 1 tablet by mouth once daily.    multivitamin with minerals (HAIR,SKIN AND NAILS ORAL) Take by mouth.    onabotulinumtoxinA (BOTOX INJ) Inject as directed. [id number]    prasterone, dhea, (INTRAROSA) 6.5 mg Inst Place 6.5 mg vaginally every evening.    traZODone (DESYREL) 50 MG tablet Take 1 tablet (50 mg total) by mouth every evening.    tretinoin (ALTRALIN) 0.05 % gel SMARTSIG:Sparingly Topical Every Night    UNABLE TO FIND medication name: Valuim vaginal suppository 5mg  Place one vaginally 30 mins before intercourse    valACYclovir (VALTREX) 1000 MG tablet Take 1 tablet (1,000 mg total) by mouth every 12 (twelve) hours.    vitamin E 100 UNIT capsule Take 100 Units by mouth once daily.    [DISCONTINUED] cetirizine (ZYRTEC) 10 MG tablet Take 1 tablet daily by mouth    [DISCONTINUED] doxycycline (VIBRAMYCIN) 100 MG Cap Take 100 mg by mouth 2 (two) times daily.    [DISCONTINUED] phenazopyridine (PYRIDIUM) 200 MG tablet Take 1 tablet 3 (three) times daily as needed by mouth for Pain for up to 3  days     Family History       Problem Relation (Age of Onset)    Breast cancer Other    Cancer Maternal Grandmother (40)    Cervical cancer Mother    Colon polyps Father    Ovarian cancer Other          Tobacco Use    Smoking status: Never    Smokeless tobacco: Never   Substance and Sexual Activity    Alcohol use: Yes     Comment: socially    Drug use: No    Sexual activity: Yes     Partners: Male     Birth control/protection: None     Review of Systems   Constitutional:  Positive for activity change. Negative for chills, diaphoresis, fatigue and fever.   HENT:  Positive for tinnitus. Negative for congestion, facial swelling, rhinorrhea and sore throat.    Eyes:  Positive for photophobia and visual disturbance. Negative for itching.   Respiratory:  Negative for cough, chest tightness, shortness of breath and wheezing.    Cardiovascular:  Negative for chest pain, palpitations and leg swelling.   Gastrointestinal:  Negative for abdominal distention, abdominal pain, blood in stool, constipation, diarrhea, nausea and vomiting.   Genitourinary:  Negative for difficulty urinating, dysuria, frequency, hematuria and urgency.   Musculoskeletal:  Negative for arthralgias, back pain and neck stiffness.   Neurological:  Positive for numbness (upper and lower extremities) and headaches. Negative for dizziness, tremors, seizures, syncope, weakness and light-headedness.   Psychiatric/Behavioral:  Negative for agitation, confusion and hallucinations.    Objective:     Vital Signs (Most Recent):  Temp: 97.7 °F (36.5 °C) (03/04/23 1112)  Pulse: 85 (03/04/23 1112)  Resp: 16 (03/04/23 1112)  BP: 132/81 (03/04/23 1112)  SpO2: 100 % (03/04/23 1112) Vital Signs (24h Range):  Temp:  [97.7 °F (36.5 °C)] 97.7 °F (36.5 °C)  Pulse:  [85] 85  Resp:  [16] 16  SpO2:  [100 %] 100 %  BP: (132)/(81) 132/81     Weight: 65.8 kg (145 lb)  Body mass index is 24.13 kg/m².    Physical Exam  Vitals and nursing note reviewed.   Constitutional:        General: She is not in acute distress.     Appearance: She is well-developed. She is not diaphoretic.   HENT:      Head: Normocephalic and atraumatic.      Right Ear: External ear normal.      Left Ear: External ear normal.      Nose: Nose normal. No congestion.      Mouth/Throat:      Pharynx: Oropharynx is clear.   Eyes:      General: No scleral icterus.     Extraocular Movements: Extraocular movements intact.   Cardiovascular:      Rate and Rhythm: Normal rate and regular rhythm.      Pulses: Normal pulses.      Heart sounds: Normal heart sounds. No murmur heard.  Pulmonary:      Effort: Pulmonary effort is normal. No respiratory distress.      Breath sounds: Normal breath sounds. No wheezing or rales.   Abdominal:      General: Bowel sounds are normal. There is no distension.      Palpations: Abdomen is soft.      Tenderness: There is no abdominal tenderness. There is no guarding or rebound.   Musculoskeletal:      Cervical back: Normal range of motion.      Right lower leg: No edema.      Left lower leg: No edema.   Skin:     General: Skin is warm and dry.      Capillary Refill: Capillary refill takes less than 2 seconds.   Neurological:      General: No focal deficit present.      Mental Status: She is alert and oriented to person, place, and time. Mental status is at baseline.   Psychiatric:         Mood and Affect: Mood normal.         Behavior: Behavior normal.         Thought Content: Thought content normal.           Significant Labs: All pertinent labs within the past 24 hours have been reviewed.  CBC:   Recent Labs   Lab 03/04/23  1223   WBC 13.71*   HGB 13.0   HCT 39.5        CMP:   Recent Labs   Lab 03/04/23  1223      K 3.9      CO2 20*      BUN 12   CREATININE 1.0   CALCIUM 9.8   ANIONGAP 9       Significant Imaging: I have reviewed all pertinent imaging results/findings within the past 24 hours.    Assessment/Plan:     * Intractable headache  Nausea  Pt presents w/  complaints of intractable headache. Patient reports onset of this headache on Sunday, proximally 6 days ago. Reports 8/10 intractable headache that is mainly located to her frontal and right head. She endorses associated photophobia, phonophobia, nausea, hand and feet paresthesias, floaters, tunnel vision which are typical with her headaches. Pt took Imitrex and Fioricet without relief.     -AFVSS  -WBC 13.71, no significant electrolyte abnormalities  -CT head 3/1/23 showed no acute ischemic changes or infarct. No intrathecal hemorrhage contusion. No mass, mass effect or midline shift, edema, extra fluid collection. Gray-white matter adjacent maintained. The cerebral sulci and basal cisterns are normal. No hydrocephalus.    -s/p 1L LR bolus, IV mag sulfate  -dihydroergotamine 0.5 mg IV q8h  -neurology consulted, appreciate recs  -zofran prn   -trend CBC and BMP daily    Moderate episode of recurrent major depressive disorder  Generalized anxiety disorder  Patient has recurrent depression which is moderate and is currently controlled. Will Continue anti-depressant medications. We will not consult psychiatry at this time. Patient does not display psychosis at this time. Continue to monitor closely and adjust plan of care as needed.    -citalopram 40 mg qd  -xanax 0.25 mg qd prn    Malignant neoplasm of upper-outer quadrant of right breast in female, estrogen receptor positive  -continue home anastrozole 1 mg qam      VTE Risk Mitigation (From admission, onward)         Ordered     heparin (porcine) injection 5,000 Units  Every 8 hours         03/04/23 1343     IP VTE HIGH RISK PATIENT  Once         03/04/23 1343     Place sequential compression device  Until discontinued         03/04/23 1343                   Delio Brooke PA-C  Department of Hospital Medicine   [facility] - Emergency Dept

## 2023-03-04 NOTE — HPI
Ms. Norman is a 37-year-old female with a history of migraine headache, HTN, ADHD, anxiety, asthma, GERD who presents w/ complaints of intractable headache. Patient reports onset of this headache on Sunday, proximally 6 days ago. Reports 8/10 intractable headache that is mainly located to her frontal and right head. She endorses associated photophobia, phonophobia, nausea, hand and feet paresthesias, floaters, tunnel vision which are typical with her headaches. Pt took Imitrex and Fioricet without relief. She tried to stay hydrated and sleep but her headache would not improve. Of note, She went to [facility] ED 3 days ago and received IV fluids, Toradol and Compazine with some improvement but her headache returned. There, her CT head was without acute processes. She followed up with her neurologist who performed a nerve block as well as discharged her on Compazine and prednisone which she has been compliant with. She is on day 3 of prednisone 40 mg. She notified her neurologist who instructed her to report to the ED for emergent evaluation and likely IV admission for DHE 45 which she has had in the past before for intractable pain. Pt reports taking prednisone and Topamax today. Denies f/c, lightheadedness, syncope, head trauma, chest pain, SOB, vomiting, abdominal pain, diarrhea, dysuria.    In ED, Pt AFVSS. CBC w/ WBC 13.71. CMP w/o significant electrolyte abnormalities. Initially treated w/ 1L LR bolus, IV phenergan, IV mag sulfate. Neurology consulted.

## 2023-03-04 NOTE — PLAN OF CARE
SW re-visited with pt to let her know about her status.  Pt stated that neuro had just left and that she will require multiple infusions over a few days.  SW asked pt if there was anything she required from SW and pt stated she did not.      May Jauregui CD, MSW, LMSW, RSW   Case Management  [facility]  Email: [email or web address]

## 2023-03-04 NOTE — ED NOTES
LOC: The patient is awake, alert, and oriented to self, place, time, and situation. Pt is calm and cooperative. Affect is appropriate.  Speech is appropriate and clear.     APPEARANCE: Patient resting uncomfortably, reporting migraine headache,  in no acute distress.  Patient is clean and well groomed.    SKIN: The skin is warm and dry; color consistent with ethnicity.  Patient has normal skin turgor and moist mucus membranes.  Skin intact; no breakdown or bruising noted.     MUSCULOSKELETAL: Patient moving upper and lower extremities without difficulty; denies pain in the extremities or back.  Denies weakness.     RESPIRATORY: Airway is open and patent. Respirations spontaneous, even, easy, and non-labored.  Patient has a normal effort and rate.  No accessory muscle use noted. Denies cough.     CARDIAC:  No peripheral edema noted. No complaints of chest pain.     ABDOMEN: Soft and non tender to palpation.  No distention noted. Pt denies abdominal pain; denies nausea, vomiting, diarrhea, or constipation.    NEUROLOGIC: Eyes open spontaneously.  Behavior appropriate to situation.  Follows commands; facial expression symmetrical.  Purposeful motor response noted; normal sensation in all extremities. Pt reporting  headache; denies lightheadedness or dizziness; denies visual disturbances; denies loss of balance; denies unilateral weakness.

## 2023-03-04 NOTE — ASSESSMENT & PLAN NOTE
Recently saw Dr. Gonzalez for intractable migraine. Patient reports onset intractable headache on Sunday, proximally 6 days ago. Reports 8/10 intractable headache that is mainly located to her frontal and right head. She endorses associated photophobia, phonophobia, nausea, hand and feet paresthesias, floaters, tunnel vision which are typical with her migraines. Pt took Imitrex and Fioricet without relief.       AFVSS, CBC and CMP WNL. CTH from [facility] on 3/1/23 reports no acute findings and no masses given Breast cx history. s/p 1L LR bolus, IV mag sulfate, IV phenergan in ED      -Start dihydroergotamine 0.5 mg IV q8h for 2 doses, then up to 1mg q8h thereafter  -IVF gtt : 1/2 NS @75cc/hr   -VPA 1g q8 PO because of IV shortage  -Continue prednisone 40mg for 1day and then 20mg for 4 days to complete outpatient treatment  -IV benadryl and IV Reglan q8hr; zofran prn   - IV Mg 1g Q12hr  -trend CBC, BMP, Mg daily  -Cardiac telemetry

## 2023-03-04 NOTE — ASSESSMENT & PLAN NOTE
Nausea  Pt presents w/ complaints of intractable headache. Patient reports onset of this headache on Sunday, proximally 6 days ago. Reports 8/10 intractable headache that is mainly located to her frontal and right head. She endorses associated photophobia, phonophobia, nausea, hand and feet paresthesias, floaters, tunnel vision which are typical with her headaches. Pt took Imitrex and Fioricet without relief.     -AFVSS  -WBC 13.71, no significant electrolyte abnormalities  -CT head 3/1/23 showed no acute ischemic changes or infarct. No intrathecal hemorrhage contusion. No mass, mass effect or midline shift, edema, extra fluid collection. Gray-white matter adjacent maintained. The cerebral sulci and basal cisterns are normal. No hydrocephalus.    -s/p 1L LR bolus, IV mag sulfate  -dihydroergotamine 0.5 mg IV q8h  -neurology consulted, appreciate recs  -zofran prn   -trend CBC and BMP daily

## 2023-03-05 VITALS
DIASTOLIC BLOOD PRESSURE: 69 MMHG | RESPIRATION RATE: 19 BRPM | TEMPERATURE: 99 F | BODY MASS INDEX: 24.16 KG/M2 | OXYGEN SATURATION: 97 % | HEART RATE: 56 BPM | WEIGHT: 145 LBS | SYSTOLIC BLOOD PRESSURE: 109 MMHG | HEIGHT: 65 IN

## 2023-03-05 LAB
ANION GAP SERPL CALC-SCNC: 8 MMOL/L (ref 8–16)
BASOPHILS # BLD AUTO: 0.02 K/UL (ref 0–0.2)
BASOPHILS NFR BLD: 0.3 % (ref 0–1.9)
BUN SERPL-MCNC: 8 MG/DL (ref 6–20)
CALCIUM SERPL-MCNC: 8.5 MG/DL (ref 8.7–10.5)
CHLORIDE SERPL-SCNC: 115 MMOL/L (ref 95–110)
CO2 SERPL-SCNC: 20 MMOL/L (ref 23–29)
CREAT SERPL-MCNC: 0.8 MG/DL (ref 0.5–1.4)
DIFFERENTIAL METHOD: ABNORMAL
EOSINOPHIL # BLD AUTO: 0 K/UL (ref 0–0.5)
EOSINOPHIL NFR BLD: 0.3 % (ref 0–8)
ERYTHROCYTE [DISTWIDTH] IN BLOOD BY AUTOMATED COUNT: 13.2 % (ref 11.5–14.5)
EST. GFR  (NO RACE VARIABLE): >60 ML/MIN/1.73 M^2
GLUCOSE SERPL-MCNC: 104 MG/DL (ref 70–110)
HCT VFR BLD AUTO: 36.2 % (ref 37–48.5)
HGB BLD-MCNC: 11.9 G/DL (ref 12–16)
IMM GRANULOCYTES # BLD AUTO: 0.04 K/UL (ref 0–0.04)
IMM GRANULOCYTES NFR BLD AUTO: 0.6 % (ref 0–0.5)
LYMPHOCYTES # BLD AUTO: 2.1 K/UL (ref 1–4.8)
LYMPHOCYTES NFR BLD: 30.2 % (ref 18–48)
MAGNESIUM SERPL-MCNC: 2.1 MG/DL (ref 1.6–2.6)
MCH RBC QN AUTO: 30.4 PG (ref 27–31)
MCHC RBC AUTO-ENTMCNC: 32.9 G/DL (ref 32–36)
MCV RBC AUTO: 93 FL (ref 82–98)
MONOCYTES # BLD AUTO: 0.4 K/UL (ref 0.3–1)
MONOCYTES NFR BLD: 5.9 % (ref 4–15)
NEUTROPHILS # BLD AUTO: 4.3 K/UL (ref 1.8–7.7)
NEUTROPHILS NFR BLD: 62.7 % (ref 38–73)
NRBC BLD-RTO: 0 /100 WBC
PLATELET # BLD AUTO: 199 K/UL (ref 150–450)
PMV BLD AUTO: 9.4 FL (ref 9.2–12.9)
POTASSIUM SERPL-SCNC: 3.5 MMOL/L (ref 3.5–5.1)
RBC # BLD AUTO: 3.91 M/UL (ref 4–5.4)
SODIUM SERPL-SCNC: 143 MMOL/L (ref 136–145)
WBC # BLD AUTO: 6.79 K/UL (ref 3.9–12.7)

## 2023-03-05 PROCEDURE — 99238 PR HOSPITAL DISCHARGE DAY,<30 MIN: ICD-10-PCS | Mod: ,,, | Performed by: INTERNAL MEDICINE

## 2023-03-05 PROCEDURE — 25000003 PHARM REV CODE 250: Performed by: PHYSICIAN ASSISTANT

## 2023-03-05 PROCEDURE — 83735 ASSAY OF MAGNESIUM: CPT | Performed by: PHYSICIAN ASSISTANT

## 2023-03-05 PROCEDURE — G0378 HOSPITAL OBSERVATION PER HR: HCPCS

## 2023-03-05 PROCEDURE — 80048 BASIC METABOLIC PNL TOTAL CA: CPT | Performed by: PHYSICIAN ASSISTANT

## 2023-03-05 PROCEDURE — 96372 THER/PROPH/DIAG INJ SC/IM: CPT | Performed by: PHYSICIAN ASSISTANT

## 2023-03-05 PROCEDURE — 96376 TX/PRO/DX INJ SAME DRUG ADON: CPT

## 2023-03-05 PROCEDURE — 36415 COLL VENOUS BLD VENIPUNCTURE: CPT | Performed by: PHYSICIAN ASSISTANT

## 2023-03-05 PROCEDURE — 96361 HYDRATE IV INFUSION ADD-ON: CPT

## 2023-03-05 PROCEDURE — 25000003 PHARM REV CODE 250: Performed by: NURSE PRACTITIONER

## 2023-03-05 PROCEDURE — 25000003 PHARM REV CODE 250

## 2023-03-05 PROCEDURE — 99238 HOSP IP/OBS DSCHRG MGMT 30/<: CPT | Mod: ,,, | Performed by: INTERNAL MEDICINE

## 2023-03-05 PROCEDURE — A4216 STERILE WATER/SALINE, 10 ML: HCPCS | Performed by: PHYSICIAN ASSISTANT

## 2023-03-05 PROCEDURE — 63600175 PHARM REV CODE 636 W HCPCS

## 2023-03-05 PROCEDURE — 63600175 PHARM REV CODE 636 W HCPCS: Performed by: PHYSICIAN ASSISTANT

## 2023-03-05 PROCEDURE — 85025 COMPLETE CBC W/AUTO DIFF WBC: CPT | Performed by: PHYSICIAN ASSISTANT

## 2023-03-05 RX ADMIN — DIPHENHYDRAMINE HYDROCHLORIDE 25 MG: 50 INJECTION, SOLUTION INTRAMUSCULAR; INTRAVENOUS at 06:03

## 2023-03-05 RX ADMIN — SODIUM CHLORIDE: 4.5 INJECTION, SOLUTION INTRAVENOUS at 09:03

## 2023-03-05 RX ADMIN — PANTOPRAZOLE SODIUM 40 MG: 40 TABLET, DELAYED RELEASE ORAL at 09:03

## 2023-03-05 RX ADMIN — DIHYDROERGOTAMINE MESYLATE 1 MG: 1 INJECTION, SOLUTION INTRAMUSCULAR; INTRAVENOUS; SUBCUTANEOUS at 06:03

## 2023-03-05 RX ADMIN — TOPIRAMATE 200 MG: 200 TABLET, FILM COATED ORAL at 09:03

## 2023-03-05 RX ADMIN — SODIUM CHLORIDE 10 ML: 9 INJECTION, SOLUTION INTRAMUSCULAR; INTRAVENOUS; SUBCUTANEOUS at 06:03

## 2023-03-05 RX ADMIN — PROCHLORPERAZINE EDISYLATE 5 MG: 5 INJECTION INTRAMUSCULAR; INTRAVENOUS at 06:03

## 2023-03-05 RX ADMIN — DIVALPROEX SODIUM 1000 MG: 250 TABLET, DELAYED RELEASE ORAL at 09:03

## 2023-03-05 RX ADMIN — ANASTROZOLE 1 MG: 1 TABLET, COATED ORAL at 09:03

## 2023-03-05 RX ADMIN — SPIRONOLACTONE 100 MG: 25 TABLET, FILM COATED ORAL at 09:03

## 2023-03-05 RX ADMIN — HEPARIN SODIUM 5000 UNITS: 5000 INJECTION INTRAVENOUS; SUBCUTANEOUS at 12:03

## 2023-03-05 RX ADMIN — DIVALPROEX SODIUM 1000 MG: 250 TABLET, DELAYED RELEASE ORAL at 12:03

## 2023-03-05 RX ADMIN — HEPARIN SODIUM 5000 UNITS: 5000 INJECTION INTRAVENOUS; SUBCUTANEOUS at 06:03

## 2023-03-05 NOTE — CARE UPDATE
Yolanda Norman is a 37 y.o. female presented with intractable status migrainosus that has now been effectively treated.  The patient is no longer with headache.  Upon exam today she was interested in discharging home.    Discussed with the patient that if her symptoms do return, to please reach out to her outpatient provider Dr. Gonzalez who can assist her with further management of her migraines.      Neurology will sign off.  Thank you for the consult and including us in the care of this patient.  Please reach out if there are any further questions or change in neuro status.

## 2023-03-07 ENCOUNTER — PATIENT MESSAGE (OUTPATIENT)
Dept: PSYCHIATRY | Facility: CLINIC | Age: 38
End: 2023-03-07
Payer: COMMERCIAL

## 2023-03-07 DIAGNOSIS — F90.0 ADHD (ATTENTION DEFICIT HYPERACTIVITY DISORDER), INATTENTIVE TYPE: ICD-10-CM

## 2023-03-07 RX ORDER — DEXTROAMPHETAMINE SACCHARATE, AMPHETAMINE ASPARTATE MONOHYDRATE, DEXTROAMPHETAMINE SULFATE AND AMPHETAMINE SULFATE 7.5; 7.5; 7.5; 7.5 MG/1; MG/1; MG/1; MG/1
30 CAPSULE, EXTENDED RELEASE ORAL EVERY MORNING
Qty: 30 CAPSULE | Refills: 0 | Status: SHIPPED | OUTPATIENT
Start: 2023-03-07 | End: 2023-03-20 | Stop reason: ALTCHOICE

## 2023-03-08 RX ORDER — METHYLPREDNISOLONE ACETATE 80 MG/ML
80 INJECTION, SUSPENSION INTRA-ARTICULAR; INTRALESIONAL; INTRAMUSCULAR; SOFT TISSUE
Status: COMPLETED | OUTPATIENT
Start: 2023-03-08 | End: 2023-03-09

## 2023-03-09 DIAGNOSIS — G43.711 INTRACTABLE CHRONIC MIGRAINE WITHOUT AURA AND WITH STATUS MIGRAINOSUS: Primary | ICD-10-CM

## 2023-03-09 RX ORDER — KETOROLAC TROMETHAMINE 10 MG/1
10 TABLET, FILM COATED ORAL 2 TIMES DAILY PRN
Qty: 20 TABLET | Refills: 5 | Status: SHIPPED | OUTPATIENT
Start: 2023-03-09 | End: 2023-03-15

## 2023-03-09 RX ADMIN — METHYLPREDNISOLONE ACETATE 80 MG: 80 INJECTION, SUSPENSION INTRA-ARTICULAR; INTRALESIONAL; INTRAMUSCULAR; SOFT TISSUE at 12:03

## 2023-03-11 ENCOUNTER — PATIENT MESSAGE (OUTPATIENT)
Dept: NEUROLOGY | Facility: CLINIC | Age: 38
End: 2023-03-11
Payer: COMMERCIAL

## 2023-03-13 DIAGNOSIS — R51.9 INTRACTABLE HEADACHE, UNSPECIFIED CHRONICITY PATTERN, UNSPECIFIED HEADACHE TYPE: Primary | ICD-10-CM

## 2023-03-13 RX ORDER — TIZANIDINE 2 MG/1
4 TABLET ORAL NIGHTLY
Qty: 30 TABLET | Refills: 11 | Status: SHIPPED | OUTPATIENT
Start: 2023-03-13 | End: 2023-03-13 | Stop reason: SDUPTHER

## 2023-03-13 RX ORDER — TIZANIDINE 2 MG/1
2 TABLET ORAL NIGHTLY
Qty: 30 TABLET | Refills: 11 | Status: SHIPPED | OUTPATIENT
Start: 2023-03-13 | End: 2023-06-22

## 2023-03-15 NOTE — HOSPITAL COURSE
Ms. Norman is a 37-year-old female with a history of migraine headache, HTN, ADHD, anxiety, asthma, GERD who presents w/ complaints of intractable headache. Patient reports onset of this headache on Sunday, proximally 6 days ago. Reports 8/10 intractable headache that is mainly located to her frontal and right head. She endorses associated photophobia, phonophobia, nausea, hand and feet paresthesias, floaters, tunnel vision which are typical with her headaches. Pt took Imitrex and Fioricet without relief. She tried to stay hydrated and sleep but her headache would not improve. Of note, She went to [facility] ED 3 days ago and received IV fluids, Toradol and Compazine with some improvement but her headache returned. There, her CT head was without acute processes. She followed up with her neurologist who performed a nerve block as well as discharged her on Compazine and prednisone which she has been compliant with. She is on day 3 of prednisone 40 mg. She notified her neurologist who instructed her to report to the ED for emergent evaluation and likely IV admission for DHE 45 which she has had in the past before for intractable pain. Pt reports taking prednisone and Topamax today. Denies f/c, lightheadedness, syncope, head trauma, chest pain, SOB, vomiting, abdominal pain, diarrhea, dysuria.     In ED, Pt AFVSS. CBC w/ WBC 13.71. CMP w/o significant electrolyte abnormalities. Initially treated w/ 1L LR bolus, IV phenergan, IV mag sulfate. Neurology consulted.    HA resolved by next morning and  was discharged home.

## 2023-03-16 ENCOUNTER — PATIENT MESSAGE (OUTPATIENT)
Dept: PSYCHIATRY | Facility: CLINIC | Age: 38
End: 2023-03-16
Payer: COMMERCIAL

## 2023-03-16 NOTE — PROGRESS NOTES
Subjective:       Patient ID: Yolanda Norman is a 37 y.o. female.    Chief Complaint: No chief complaint on file.      HPI 37 year old female, who returns for follow-up of carcinoma of the right breast, ER +,HER 2 negative.   She has been on anastrozole since March 2022 after prior letrozole and exemestane.    Her biggest recent issue has been migraines - sees Neurology for that.Getting ready to start several new meds from Dr Grey.  She has some arthralgias but no other significant pain. She has some post COVID respiratory symptoms (LYONS, cough) which are resolved or improving.          Breast history:    Mammogram and ultrasound on December 11th, 2019 showed right breast mass 7 cm from the nipple.  By ultrasound this mass measured 33 x 32 x 21 mm.    Right breast biopsy on December 13 showed high-grade infiltrating ductal carcinoma (histologic grade 3, nuclear grade 3, mitotic index 3) there were medullary features.  The cancer was 25% ER positive and OH and HER2 negative.  Ki-67 was 90%.    She completed 4 cycles of neoadjuvant Adriamycin Cytoxan February 13, 2020.  She completed 12 weeks of weekly Taxol on May 20, 2020.    On July 1, 2020 she underwent bilateral mastectomy.    The right breast showed no residual malignancy, 5 right axillary sentinel lymph nodes were all negative for malignancy.  Final pathological stage yp T0 N0.  The left breast showed no atypia or malignancy.    Radiation therapy was completed 11/25/20.      She had a hysterectomy  December 18th, 2020.  Letrozole was started December 2020..  She was then changed to Exemestane in early 2021 due to arthralgias.  Changed to anastrazole in March 2022 due to arthralgias and fatigue.    She had DORIAN reconstruction in February 2021.    Review of Systems   Constitutional:  Positive for fatigue. Negative for activity change, appetite change, fever and unexpected weight change.   Eyes:  Negative for visual disturbance.   Respiratory:  Positive for  shortness of breath (LYONS). Negative for cough.    Cardiovascular:  Negative for chest pain.   Gastrointestinal:  Negative for abdominal distention, abdominal pain, constipation, diarrhea and rectal pain.   Genitourinary:  Negative for frequency.   Musculoskeletal:  Positive for arthralgias. Negative for back pain.   Skin:  Negative for rash.   Neurological:  Positive for numbness and headaches.   Hematological:  Negative for adenopathy.   Psychiatric/Behavioral:  Negative for dysphoric mood. The patient is not nervous/anxious.      Objective:      Physical Exam  Vitals reviewed.   Constitutional:       General: She is not in acute distress.     Appearance: Normal appearance. She is well-developed.   Eyes:      General: No scleral icterus.  Cardiovascular:      Rate and Rhythm: Normal rate and regular rhythm.   Pulmonary:      Effort: Pulmonary effort is normal. No respiratory distress.      Breath sounds: Normal breath sounds. No wheezing or rales.   Chest:       Abdominal:      Palpations: Abdomen is soft. There is no mass.      Tenderness: There is no abdominal tenderness.   Lymphadenopathy:      Cervical: No cervical adenopathy.      Upper Body:      Right upper body: No supraclavicular or axillary adenopathy.      Left upper body: No supraclavicular or axillary adenopathy.   Skin:     Findings: Rash:    .   Neurological:      Mental Status: She is alert and oriented to person, place, and time.   Psychiatric:         Mood and Affect: Mood normal.         Behavior: Behavior normal.         Thought Content: Thought content normal.         Judgment: Judgment normal.       Assessment:       1. Malignant neoplasm of upper-outer quadrant of right breast in female, estrogen receptor positive        Plan:        Continue anastrozole and RTC 4 M.          Route Chart for Scheduling    Med Onc Chart Routing      Follow up with physician 4 months.   Follow up with ADRIAN    Infusion scheduling note    Injection scheduling note     Labs    Imaging    Pharmacy appointment    Other referrals

## 2023-03-20 ENCOUNTER — OFFICE VISIT (OUTPATIENT)
Dept: HEMATOLOGY/ONCOLOGY | Facility: CLINIC | Age: 38
End: 2023-03-20
Payer: COMMERCIAL

## 2023-03-20 VITALS
BODY MASS INDEX: 25.93 KG/M2 | DIASTOLIC BLOOD PRESSURE: 79 MMHG | OXYGEN SATURATION: 99 % | HEART RATE: 78 BPM | SYSTOLIC BLOOD PRESSURE: 116 MMHG | HEIGHT: 65 IN | WEIGHT: 155.63 LBS | RESPIRATION RATE: 18 BRPM

## 2023-03-20 DIAGNOSIS — C50.411 MALIGNANT NEOPLASM OF UPPER-OUTER QUADRANT OF RIGHT BREAST IN FEMALE, ESTROGEN RECEPTOR POSITIVE: Primary | Chronic | ICD-10-CM

## 2023-03-20 DIAGNOSIS — Z17.0 MALIGNANT NEOPLASM OF UPPER-OUTER QUADRANT OF RIGHT BREAST IN FEMALE, ESTROGEN RECEPTOR POSITIVE: Primary | Chronic | ICD-10-CM

## 2023-03-20 PROCEDURE — 3074F SYST BP LT 130 MM HG: CPT | Mod: CPTII,S$GLB,, | Performed by: INTERNAL MEDICINE

## 2023-03-20 PROCEDURE — 99999 PR PBB SHADOW E&M-EST. PATIENT-LVL V: CPT | Mod: PBBFAC,,, | Performed by: INTERNAL MEDICINE

## 2023-03-20 PROCEDURE — 1159F MED LIST DOCD IN RCRD: CPT | Mod: CPTII,S$GLB,, | Performed by: INTERNAL MEDICINE

## 2023-03-20 PROCEDURE — 3074F PR MOST RECENT SYSTOLIC BLOOD PRESSURE < 130 MM HG: ICD-10-PCS | Mod: CPTII,S$GLB,, | Performed by: INTERNAL MEDICINE

## 2023-03-20 PROCEDURE — 3078F DIAST BP <80 MM HG: CPT | Mod: CPTII,S$GLB,, | Performed by: INTERNAL MEDICINE

## 2023-03-20 PROCEDURE — 99213 OFFICE O/P EST LOW 20 MIN: CPT | Mod: S$GLB,,, | Performed by: INTERNAL MEDICINE

## 2023-03-20 PROCEDURE — 3008F PR BODY MASS INDEX (BMI) DOCUMENTED: ICD-10-PCS | Mod: CPTII,S$GLB,, | Performed by: INTERNAL MEDICINE

## 2023-03-20 PROCEDURE — 99213 PR OFFICE/OUTPT VISIT, EST, LEVL III, 20-29 MIN: ICD-10-PCS | Mod: S$GLB,,, | Performed by: INTERNAL MEDICINE

## 2023-03-20 PROCEDURE — 3078F PR MOST RECENT DIASTOLIC BLOOD PRESSURE < 80 MM HG: ICD-10-PCS | Mod: CPTII,S$GLB,, | Performed by: INTERNAL MEDICINE

## 2023-03-20 PROCEDURE — 3008F BODY MASS INDEX DOCD: CPT | Mod: CPTII,S$GLB,, | Performed by: INTERNAL MEDICINE

## 2023-03-20 PROCEDURE — 1159F PR MEDICATION LIST DOCUMENTED IN MEDICAL RECORD: ICD-10-PCS | Mod: CPTII,S$GLB,, | Performed by: INTERNAL MEDICINE

## 2023-03-20 PROCEDURE — 99999 PR PBB SHADOW E&M-EST. PATIENT-LVL V: ICD-10-PCS | Mod: PBBFAC,,, | Performed by: INTERNAL MEDICINE

## 2023-03-20 RX ORDER — DEXTROAMPHETAMINE SULFATE, DEXTROAMPHETAMINE SACCHARATE, AMPHETAMINE ASPARTATE MONOHYDRATE, AND AMPHETAMINE SULFATE 9.375; 9.375; 9.375; 9.375 MG/1; MG/1; MG/1; MG/1
37.5 CAPSULE, EXTENDED RELEASE ORAL DAILY
Qty: 30 EACH | Refills: 0 | Status: SHIPPED | OUTPATIENT
Start: 2023-03-20 | End: 2023-04-16 | Stop reason: SDUPTHER

## 2023-03-22 ENCOUNTER — PATIENT MESSAGE (OUTPATIENT)
Dept: HEMATOLOGY/ONCOLOGY | Facility: CLINIC | Age: 38
End: 2023-03-22
Payer: COMMERCIAL

## 2023-03-22 DIAGNOSIS — C50.411 MALIGNANT NEOPLASM OF UPPER-OUTER QUADRANT OF RIGHT BREAST IN FEMALE, ESTROGEN RECEPTOR POSITIVE: Primary | Chronic | ICD-10-CM

## 2023-03-22 DIAGNOSIS — Z17.0 MALIGNANT NEOPLASM OF UPPER-OUTER QUADRANT OF RIGHT BREAST IN FEMALE, ESTROGEN RECEPTOR POSITIVE: Primary | Chronic | ICD-10-CM

## 2023-03-22 RX ORDER — ANASTROZOLE 1 MG/1
1 TABLET ORAL DAILY
Qty: 90 TABLET | Refills: 3 | Status: SHIPPED | OUTPATIENT
Start: 2023-03-22 | End: 2024-03-19 | Stop reason: SDUPTHER

## 2023-03-23 ENCOUNTER — TELEPHONE (OUTPATIENT)
Dept: PHARMACY | Facility: CLINIC | Age: 38
End: 2023-03-23
Payer: COMMERCIAL

## 2023-03-23 ENCOUNTER — PATIENT MESSAGE (OUTPATIENT)
Dept: HEMATOLOGY/ONCOLOGY | Facility: CLINIC | Age: 38
End: 2023-03-23
Payer: COMMERCIAL

## 2023-03-23 ENCOUNTER — PATIENT MESSAGE (OUTPATIENT)
Dept: OBSTETRICS AND GYNECOLOGY | Facility: CLINIC | Age: 38
End: 2023-03-23
Payer: COMMERCIAL

## 2023-03-23 ENCOUNTER — CLINICAL SUPPORT (OUTPATIENT)
Dept: OBSTETRICS AND GYNECOLOGY | Facility: CLINIC | Age: 38
End: 2023-03-23
Payer: COMMERCIAL

## 2023-03-23 DIAGNOSIS — N95.1 MENOPAUSAL SYMPTOMS: Primary | ICD-10-CM

## 2023-03-23 PROCEDURE — 99999 PR PBB SHADOW E&M-EST. PATIENT-LVL I: CPT | Mod: PBBFAC,,,

## 2023-03-23 PROCEDURE — 96372 THER/PROPH/DIAG INJ SC/IM: CPT | Mod: S$GLB,,, | Performed by: OBSTETRICS & GYNECOLOGY

## 2023-03-23 PROCEDURE — 96372 PR INJECTION,THERAP/PROPH/DIAG2ST, IM OR SUBCUT: ICD-10-PCS | Mod: S$GLB,,, | Performed by: OBSTETRICS & GYNECOLOGY

## 2023-03-23 PROCEDURE — 99999 PR PBB SHADOW E&M-EST. PATIENT-LVL I: ICD-10-PCS | Mod: PBBFAC,,,

## 2023-03-23 RX ADMIN — TESTOSTERONE CYPIONATE 50 MG: 200 INJECTION, SOLUTION INTRAMUSCULAR at 08:03

## 2023-03-23 NOTE — PROGRESS NOTES
Here for hormone therapy injection, no complaints at this time. Injection given as ordered, tolerated well, no report of pain prior to or after injection. Return to clinic as scheduled    Site:LB    Testosterone   50   mg         [facility] supplied medication

## 2023-04-10 ENCOUNTER — PATIENT MESSAGE (OUTPATIENT)
Dept: OBSTETRICS AND GYNECOLOGY | Facility: CLINIC | Age: 38
End: 2023-04-10
Payer: COMMERCIAL

## 2023-04-13 ENCOUNTER — CLINICAL SUPPORT (OUTPATIENT)
Dept: OBSTETRICS AND GYNECOLOGY | Facility: CLINIC | Age: 38
End: 2023-04-13
Payer: COMMERCIAL

## 2023-04-13 DIAGNOSIS — N95.1 MENOPAUSAL SYMPTOMS: Primary | ICD-10-CM

## 2023-04-13 PROCEDURE — 99999 PR PBB SHADOW E&M-EST. PATIENT-LVL I: CPT | Mod: PBBFAC,,,

## 2023-04-13 PROCEDURE — 96372 THER/PROPH/DIAG INJ SC/IM: CPT | Mod: S$GLB,,, | Performed by: OBSTETRICS & GYNECOLOGY

## 2023-04-13 PROCEDURE — 96372 PR INJECTION,THERAP/PROPH/DIAG2ST, IM OR SUBCUT: ICD-10-PCS | Mod: S$GLB,,, | Performed by: OBSTETRICS & GYNECOLOGY

## 2023-04-13 PROCEDURE — 99999 PR PBB SHADOW E&M-EST. PATIENT-LVL I: ICD-10-PCS | Mod: PBBFAC,,,

## 2023-04-13 RX ADMIN — TESTOSTERONE CYPIONATE 50 MG: 200 INJECTION, SOLUTION INTRAMUSCULAR at 02:04

## 2023-04-13 NOTE — PROGRESS NOTES
Here for hormone therapy injection, no complaints at this time. Injection given as ordered, tolerated well no reports of pain prior to or post injection. Advised to return to clinic as scheduled      Site:rb    Testerone:50mg    CLINIC SUPPLIED MEDICATION

## 2023-04-17 RX ORDER — DEXTROAMPHETAMINE SULFATE, DEXTROAMPHETAMINE SACCHARATE, AMPHETAMINE ASPARTATE MONOHYDRATE, AND AMPHETAMINE SULFATE 9.375; 9.375; 9.375; 9.375 MG/1; MG/1; MG/1; MG/1
37.5 CAPSULE, EXTENDED RELEASE ORAL DAILY
Qty: 30 EACH | Refills: 0 | Status: SHIPPED | OUTPATIENT
Start: 2023-04-17 | End: 2023-05-18 | Stop reason: SDUPTHER

## 2023-05-05 ENCOUNTER — CLINICAL SUPPORT (OUTPATIENT)
Dept: OBSTETRICS AND GYNECOLOGY | Facility: CLINIC | Age: 38
End: 2023-05-05
Payer: COMMERCIAL

## 2023-05-05 DIAGNOSIS — N95.1 MENOPAUSAL SYMPTOMS: Primary | ICD-10-CM

## 2023-05-05 PROCEDURE — 96372 PR INJECTION,THERAP/PROPH/DIAG2ST, IM OR SUBCUT: ICD-10-PCS | Mod: S$GLB,,, | Performed by: OBSTETRICS & GYNECOLOGY

## 2023-05-05 PROCEDURE — 96372 THER/PROPH/DIAG INJ SC/IM: CPT | Mod: S$GLB,,, | Performed by: OBSTETRICS & GYNECOLOGY

## 2023-05-05 PROCEDURE — 99999 PR PBB SHADOW E&M-EST. PATIENT-LVL I: CPT | Mod: PBBFAC,,,

## 2023-05-05 PROCEDURE — 99999 PR PBB SHADOW E&M-EST. PATIENT-LVL I: ICD-10-PCS | Mod: PBBFAC,,,

## 2023-05-05 RX ADMIN — TESTOSTERONE CYPIONATE 50 MG: 200 INJECTION, SOLUTION INTRAMUSCULAR at 08:05

## 2023-05-12 ENCOUNTER — TELEPHONE (OUTPATIENT)
Dept: PSYCHIATRY | Facility: CLINIC | Age: 38
End: 2023-05-12
Payer: COMMERCIAL

## 2023-05-12 NOTE — TELEPHONE ENCOUNTER
Pt called requesting to reschedule her in person psychiatry appointment from Monday 5/15/23 to 6/12/23 at 1 pm with Dr Keller. Pt verbalized understanding.

## 2023-05-18 RX ORDER — DEXTROAMPHETAMINE SULFATE, DEXTROAMPHETAMINE SACCHARATE, AMPHETAMINE ASPARTATE MONOHYDRATE, AND AMPHETAMINE SULFATE 9.375; 9.375; 9.375; 9.375 MG/1; MG/1; MG/1; MG/1
37.5 CAPSULE, EXTENDED RELEASE ORAL DAILY
Qty: 30 EACH | Refills: 0 | Status: SHIPPED | OUTPATIENT
Start: 2023-05-18 | End: 2023-06-12 | Stop reason: SDUPTHER

## 2023-05-19 ENCOUNTER — OFFICE VISIT (OUTPATIENT)
Dept: PRIMARY CARE CLINIC | Facility: CLINIC | Age: 38
End: 2023-05-19
Payer: COMMERCIAL

## 2023-05-19 DIAGNOSIS — J01.90 ACUTE NON-RECURRENT SINUSITIS, UNSPECIFIED LOCATION: Primary | ICD-10-CM

## 2023-05-19 DIAGNOSIS — J40 BRONCHITIS: ICD-10-CM

## 2023-05-19 PROCEDURE — 1160F RVW MEDS BY RX/DR IN RCRD: CPT | Mod: CPTII,95,, | Performed by: FAMILY MEDICINE

## 2023-05-19 PROCEDURE — 1159F MED LIST DOCD IN RCRD: CPT | Mod: CPTII,95,, | Performed by: FAMILY MEDICINE

## 2023-05-19 PROCEDURE — 1160F PR REVIEW ALL MEDS BY PRESCRIBER/CLIN PHARMACIST DOCUMENTED: ICD-10-PCS | Mod: CPTII,95,, | Performed by: FAMILY MEDICINE

## 2023-05-19 PROCEDURE — 99214 PR OFFICE/OUTPT VISIT, EST, LEVL IV, 30-39 MIN: ICD-10-PCS | Mod: 95,,, | Performed by: FAMILY MEDICINE

## 2023-05-19 PROCEDURE — 99214 OFFICE O/P EST MOD 30 MIN: CPT | Mod: 95,,, | Performed by: FAMILY MEDICINE

## 2023-05-19 PROCEDURE — 1159F PR MEDICATION LIST DOCUMENTED IN MEDICAL RECORD: ICD-10-PCS | Mod: CPTII,95,, | Performed by: FAMILY MEDICINE

## 2023-05-19 RX ORDER — CEFDINIR 300 MG/1
300 CAPSULE ORAL 2 TIMES DAILY
Qty: 20 CAPSULE | Refills: 0 | Status: SHIPPED | OUTPATIENT
Start: 2023-05-19 | End: 2023-06-02

## 2023-05-19 RX ORDER — BENZONATATE 200 MG/1
200 CAPSULE ORAL 3 TIMES DAILY PRN
Qty: 30 CAPSULE | Refills: 1 | Status: SHIPPED | OUTPATIENT
Start: 2023-05-19 | End: 2023-11-07

## 2023-05-19 NOTE — PROGRESS NOTES
Subjective:       Patient ID: Yolanda Norman is a 37 y.o. female.    Chief Complaint: No chief complaint on file.      URI   This is a new problem. The current episode started in the past 7 days. The problem has been gradually worsening. Associated symptoms include congestion, coughing, headaches and a sore throat. Pertinent negatives include no diarrhea, nausea, sinus pain, vomiting or wheezing.   The patient location is: [place]  The chief complaint leading to consultation is: sinusitis/bronchitis    Visit type: audiovisual    Face to Face time with patient: 9 min  15 minutes of total time spent on the encounter, which includes face to face time and non-face to face time preparing to see the patient (eg, review of tests), Obtaining and/or reviewing separately obtained history, Documenting clinical information in the electronic or other health record, Independently interpreting results (not separately reported) and communicating results to the patient/family/caregiver, or Care coordination (not separately reported).         Each patient to whom he or she provides medical services by telemedicine is:  (1) informed of the relationship between the physician and patient and the respective role of any other health care provider with respect to management of the patient; and (2) notified that he or she may decline to receive medical services by telemedicine and may withdraw from such care at any time.    Notes:    Review of Systems   Constitutional:  Positive for fatigue. Negative for fever.   HENT:  Positive for congestion, postnasal drip, sinus pressure, sore throat and voice change. Negative for sinus pain and trouble swallowing.    Respiratory:  Positive for cough. Negative for wheezing.    Gastrointestinal:  Negative for diarrhea, nausea and vomiting.   Musculoskeletal:  Negative for myalgias.   Neurological:  Positive for headaches.     Objective:      There were no vitals filed for this visit.  Physical  Exam  Constitutional:       General: She is not in acute distress.     Appearance: Normal appearance. She is well-developed.   Pulmonary:      Effort: No respiratory distress.   Neurological:      Mental Status: She is alert and oriented to person, place, and time.   Psychiatric:         Behavior: Behavior normal.       Lab Results   Component Value Date    WBC 6.79 03/05/2023    HGB 11.9 (L) 03/05/2023    HCT 36.2 (L) 03/05/2023     03/05/2023    CHOL 221 (H) 03/23/2023    TRIG 55 03/23/2023    HDL 61 (H) 03/23/2023    ALT 14 05/05/2022    AST 14 05/05/2022     03/05/2023    K 3.5 03/05/2023     (H) 03/05/2023    CREATININE 0.8 03/05/2023    BUN 8 03/05/2023    CO2 20 (L) 03/05/2023    TSH 0.931 05/05/2022    INR 1.1 12/04/2019    HGBA1C 5.2 03/23/2023      Assessment:       1. Acute non-recurrent sinusitis, unspecified location    2. Bronchitis        Plan:       Acute non-recurrent sinusitis, unspecified location  -     cefdinir (OMNICEF) 300 MG capsule; Take 1 capsule (300 mg total) by mouth 2 (two) times daily. for 10 days  Dispense: 20 capsule; Refill: 0  -     benzonatate (TESSALON) 200 MG capsule; Take 1 capsule (200 mg total) by mouth 3 (three) times daily as needed for Cough.  Dispense: 30 capsule; Refill: 1    Bronchitis  -     cefdinir (OMNICEF) 300 MG capsule; Take 1 capsule (300 mg total) by mouth 2 (two) times daily. for 10 days  Dispense: 20 capsule; Refill: 0  -     benzonatate (TESSALON) 200 MG capsule; Take 1 capsule (200 mg total) by mouth 3 (three) times daily as needed for Cough.  Dispense: 30 capsule; Refill: 1      Medication List with Changes/Refills   New Medications    BENZONATATE (TESSALON) 200 MG CAPSULE    Take 1 capsule (200 mg total) by mouth 3 (three) times daily as needed for Cough.    CEFDINIR (OMNICEF) 300 MG CAPSULE    Take 1 capsule (300 mg total) by mouth 2 (two) times daily. for 10 days   Current Medications    ALBUTEROL (PROVENTIL/VENTOLIN HFA) 90  MCG/ACTUATION INHALER    Inhale 1 puff every 6 (six) hours  into the lungs for Wheezing (As Needed) for up to 60 doses    ALPRAZOLAM (XANAX) 0.25 MG TABLET    Take 1 tablet (0.25 mg total) by mouth daily as needed for Anxiety.    ANASTROZOLE (ARIMIDEX) 1 MG TAB    Take 1 tablet (1 mg total) by mouth once daily.    ATOGEPANT (QULIPTA) 60 MG TAB    Take 1 tablet every day by oral route.    CITALOPRAM (CELEXA) 40 MG TABLET    TAKE 1 TABLET(40 MG) BY MOUTH EVERY DAY    DEXTROAMPHETAMINE-AMPHETAMINE (MYDAYIS) 37.5 MG CT24    Take 1 capsule (37.5 mg) by mouth once Daily.    LORATADINE (CLARITIN) 10 MG TABLET    Take 10 mg by mouth once daily.    MAGNESIUM 30 MG TAB    Take by mouth once.    METFORMIN (GLUCOPHAGE) 500 MG TABLET    Take 1 tablet (500 mg total) by mouth 2 (two) times daily with meals.    METOCLOPRAMIDE HCL (REGLAN) 10 MG TABLET    Take 1 tablet 3 (three) times daily before meals by mouth    MULTIVITAMIN (THERAGRAN) PER TABLET    Take 1 tablet by mouth once daily.    ONABOTULINUMTOXINA (BOTOX INJ)    Inject as directed. [id number]    ONDANSETRON (ZOFRAN-ODT) 4 MG TBDL    Take 1 tablet every 6 (six) hours as needed by mouth for Nausea for up to 7 days    PANTOPRAZOLE (PROTONIX) 40 MG TABLET    Take 1 tablet (40 mg total) by mouth once daily. Patient needs follow up appointment for any further refills.    PRASTERONE, DHEA, (INTRAROSA) 6.5 MG INST    Place 6.5 mg vaginally every evening.    PROCHLORPERAZINE (COMPAZINE) 10 MG TABLET    Take 1 tablet (10 mg total) by mouth 3 (three) times daily as needed (Headache and nausea).    ROSUVASTATIN (CRESTOR) 5 MG TABLET    Take 1 tablet daily by mouth    SPIRONOLACTONE (ALDACTONE) 100 MG TABLET    Take 1 tablet by mouth every day    SUCRALFATE (CARAFATE) 1 GRAM TABLET    Take 1 tablet (1 g total) by mouth 4 (four) times daily. Can be crushed if cannot swallow.    TIZANIDINE (ZANAFLEX) 2 MG TABLET    Take 1 tablet (2 mg total) by mouth every evening.    TOPIRAMATE  (TOPAMAX) 200 MG TAB    Take 1 tablet (200 mg total) by mouth every evening.    TRAZODONE (DESYREL) 50 MG TABLET    Take 1 tablet (50 mg total) by mouth every evening.    TRETINOIN (ALTRALIN) 0.05 % GEL    SMARTSIG:Sparingly Topical Every Night    UBROGEPANT (UBRELVY) 100 MG TABLET    Take 1 tablet twice a day by oral route as needed.    VALACYCLOVIR (VALTREX) 1000 MG TABLET    Take 1 tablet (1,000 mg total) by mouth every 12 (twelve) hours.   Discontinued Medications    FLU VACC [id number] 6MOS UP,PF, (FLUARIX QUAD 5187-2565, PF,) 60 MCG (15 MCG X 4)/0.5 ML SYRG    admister as directed    OMEPRAZOLE (PRILOSEC) 40 MG CAPSULE    Take 1 capsule daily by mouth    PREDNISONE (DELTASONE) 20 MG TABLET    Take 2 tablets by mouth daily for 4 days then 1 tablet daily for 4 days

## 2023-05-25 ENCOUNTER — PATIENT MESSAGE (OUTPATIENT)
Dept: OBSTETRICS AND GYNECOLOGY | Facility: CLINIC | Age: 38
End: 2023-05-25
Payer: COMMERCIAL

## 2023-05-26 ENCOUNTER — CLINICAL SUPPORT (OUTPATIENT)
Dept: OBSTETRICS AND GYNECOLOGY | Facility: CLINIC | Age: 38
End: 2023-05-26
Payer: COMMERCIAL

## 2023-05-26 DIAGNOSIS — N95.1 MENOPAUSAL SYMPTOMS: Primary | ICD-10-CM

## 2023-05-26 PROCEDURE — 99999 PR PBB SHADOW E&M-EST. PATIENT-LVL I: ICD-10-PCS | Mod: PBBFAC,,,

## 2023-05-26 PROCEDURE — 96372 THER/PROPH/DIAG INJ SC/IM: CPT | Mod: S$GLB,,, | Performed by: OBSTETRICS & GYNECOLOGY

## 2023-05-26 PROCEDURE — 96372 PR INJECTION,THERAP/PROPH/DIAG2ST, IM OR SUBCUT: ICD-10-PCS | Mod: S$GLB,,, | Performed by: OBSTETRICS & GYNECOLOGY

## 2023-05-26 PROCEDURE — 99999 PR PBB SHADOW E&M-EST. PATIENT-LVL I: CPT | Mod: PBBFAC,,,

## 2023-05-26 RX ORDER — TESTOSTERONE CYPIONATE 200 MG/ML
50 INJECTION, SOLUTION INTRAMUSCULAR
Status: COMPLETED | OUTPATIENT
Start: 2023-05-26 | End: 2023-05-26

## 2023-05-26 RX ADMIN — TESTOSTERONE CYPIONATE 50 MG: 200 INJECTION, SOLUTION INTRAMUSCULAR at 03:05

## 2023-06-08 ENCOUNTER — TELEPHONE (OUTPATIENT)
Dept: PSYCHIATRY | Facility: CLINIC | Age: 38
End: 2023-06-08
Payer: COMMERCIAL

## 2023-06-08 ENCOUNTER — OFFICE VISIT (OUTPATIENT)
Dept: OBSTETRICS AND GYNECOLOGY | Facility: CLINIC | Age: 38
End: 2023-06-08
Attending: OBSTETRICS & GYNECOLOGY
Payer: COMMERCIAL

## 2023-06-08 ENCOUNTER — PATIENT MESSAGE (OUTPATIENT)
Dept: OBSTETRICS AND GYNECOLOGY | Facility: CLINIC | Age: 38
End: 2023-06-08

## 2023-06-08 ENCOUNTER — PATIENT MESSAGE (OUTPATIENT)
Dept: PSYCHIATRY | Facility: CLINIC | Age: 38
End: 2023-06-08
Payer: COMMERCIAL

## 2023-06-08 VITALS
HEART RATE: 77 BPM | DIASTOLIC BLOOD PRESSURE: 85 MMHG | WEIGHT: 151 LBS | HEIGHT: 65 IN | SYSTOLIC BLOOD PRESSURE: 128 MMHG | BODY MASS INDEX: 25.16 KG/M2

## 2023-06-08 DIAGNOSIS — E88.810 METABOLIC SYNDROME: ICD-10-CM

## 2023-06-08 DIAGNOSIS — N89.8 VAGINAL ITCHING: ICD-10-CM

## 2023-06-08 DIAGNOSIS — N39.0 URINARY TRACT INFECTION WITHOUT HEMATURIA, SITE UNSPECIFIED: Primary | ICD-10-CM

## 2023-06-08 DIAGNOSIS — N39.0 RECURRENT UTI: ICD-10-CM

## 2023-06-08 LAB
BACTERIA #/AREA URNS AUTO: ABNORMAL /HPF
BACTERIAL VAGINOSIS DNA: NEGATIVE
BILIRUB UR QL STRIP: NEGATIVE
CANDIDA GLABRATA DNA: NEGATIVE
CANDIDA KRUSEI DNA: NEGATIVE
CANDIDA RRNA VAG QL PROBE: NEGATIVE
CLARITY UR REFRACT.AUTO: ABNORMAL
COLOR UR AUTO: YELLOW
GLUCOSE UR QL STRIP: NEGATIVE
HGB UR QL STRIP: ABNORMAL
KETONES UR QL STRIP: NEGATIVE
LEUKOCYTE ESTERASE UR QL STRIP: ABNORMAL
MICROSCOPIC COMMENT: ABNORMAL
NITRITE UR QL STRIP: POSITIVE
PH UR STRIP: 6 [PH] (ref 5–8)
PROT UR QL STRIP: NEGATIVE
RBC #/AREA URNS AUTO: 0 /HPF (ref 0–4)
SP GR UR STRIP: 1 (ref 1–1.03)
SQUAMOUS #/AREA URNS AUTO: 0 /HPF
T VAGINALIS RRNA GENITAL QL PROBE: NEGATIVE
URN SPEC COLLECT METH UR: ABNORMAL
WBC #/AREA URNS AUTO: 100 /HPF (ref 0–5)

## 2023-06-08 PROCEDURE — 81514 NFCT DS BV&VAGINITIS DNA ALG: CPT | Performed by: OBSTETRICS & GYNECOLOGY

## 2023-06-08 PROCEDURE — 3074F SYST BP LT 130 MM HG: CPT | Mod: CPTII,S$GLB,, | Performed by: OBSTETRICS & GYNECOLOGY

## 2023-06-08 PROCEDURE — 1159F MED LIST DOCD IN RCRD: CPT | Mod: CPTII,S$GLB,, | Performed by: OBSTETRICS & GYNECOLOGY

## 2023-06-08 PROCEDURE — 3008F PR BODY MASS INDEX (BMI) DOCUMENTED: ICD-10-PCS | Mod: CPTII,S$GLB,, | Performed by: OBSTETRICS & GYNECOLOGY

## 2023-06-08 PROCEDURE — 87086 URINE CULTURE/COLONY COUNT: CPT | Performed by: OBSTETRICS & GYNECOLOGY

## 2023-06-08 PROCEDURE — 81001 URINALYSIS AUTO W/SCOPE: CPT | Performed by: OBSTETRICS & GYNECOLOGY

## 2023-06-08 PROCEDURE — 99999 PR PBB SHADOW E&M-EST. PATIENT-LVL V: ICD-10-PCS | Mod: PBBFAC,,, | Performed by: OBSTETRICS & GYNECOLOGY

## 2023-06-08 PROCEDURE — 3008F BODY MASS INDEX DOCD: CPT | Mod: CPTII,S$GLB,, | Performed by: OBSTETRICS & GYNECOLOGY

## 2023-06-08 PROCEDURE — 3079F DIAST BP 80-89 MM HG: CPT | Mod: CPTII,S$GLB,, | Performed by: OBSTETRICS & GYNECOLOGY

## 2023-06-08 PROCEDURE — 3074F PR MOST RECENT SYSTOLIC BLOOD PRESSURE < 130 MM HG: ICD-10-PCS | Mod: CPTII,S$GLB,, | Performed by: OBSTETRICS & GYNECOLOGY

## 2023-06-08 PROCEDURE — 3079F PR MOST RECENT DIASTOLIC BLOOD PRESSURE 80-89 MM HG: ICD-10-PCS | Mod: CPTII,S$GLB,, | Performed by: OBSTETRICS & GYNECOLOGY

## 2023-06-08 PROCEDURE — 99213 PR OFFICE/OUTPT VISIT, EST, LEVL III, 20-29 MIN: ICD-10-PCS | Mod: S$GLB,,, | Performed by: OBSTETRICS & GYNECOLOGY

## 2023-06-08 PROCEDURE — 99999 PR PBB SHADOW E&M-EST. PATIENT-LVL V: CPT | Mod: PBBFAC,,, | Performed by: OBSTETRICS & GYNECOLOGY

## 2023-06-08 PROCEDURE — 1159F PR MEDICATION LIST DOCUMENTED IN MEDICAL RECORD: ICD-10-PCS | Mod: CPTII,S$GLB,, | Performed by: OBSTETRICS & GYNECOLOGY

## 2023-06-08 PROCEDURE — 99213 OFFICE O/P EST LOW 20 MIN: CPT | Mod: S$GLB,,, | Performed by: OBSTETRICS & GYNECOLOGY

## 2023-06-08 RX ORDER — NITROFURANTOIN 25; 75 MG/1; MG/1
100 CAPSULE ORAL 2 TIMES DAILY
Qty: 14 CAPSULE | Refills: 0 | Status: SHIPPED | OUTPATIENT
Start: 2023-06-08 | End: 2023-08-23 | Stop reason: ALTCHOICE

## 2023-06-08 RX ORDER — METFORMIN HYDROCHLORIDE 500 MG/1
500 TABLET ORAL 2 TIMES DAILY WITH MEALS
Qty: 60 TABLET | Refills: 11 | Status: SHIPPED | OUTPATIENT
Start: 2023-06-08 | End: 2023-11-07 | Stop reason: SDUPTHER

## 2023-06-08 RX ORDER — BUTALBITAL, ASPIRIN AND CAFFEINE 50; 325; 40 MG/1; MG/1; MG/1
TABLET ORAL
COMMUNITY

## 2023-06-08 RX ORDER — DIHYDROERGOTAMINE MESYLATE 4 MG/ML
SPRAY, METERED NASAL
COMMUNITY
Start: 2023-04-17

## 2023-06-08 RX ORDER — PRASTERONE 6.5 MG/1
6.5 INSERT VAGINAL NIGHTLY
Qty: 30 EACH | Refills: 11 | Status: SHIPPED | OUTPATIENT
Start: 2023-06-08

## 2023-06-08 RX ORDER — CALCIUM CARBONATE/VITAMIN D3 500-10/5ML
LIQUID (ML) ORAL
COMMUNITY

## 2023-06-08 NOTE — TELEPHONE ENCOUNTER
NIRAJ in attempt to confirm pt in person appointment for 9/12/23 at 1 pm with Dr Keller. LM for pt to call clinic back whenever she gets a chance.

## 2023-06-08 NOTE — PROGRESS NOTES
SUBJECTIVE:   37 y.o. female   presents today complaining of pain with urination, urgency, frequency, foul smelling urine Patient's last menstrual period was 2020..  She reports having frequent UTIs since November. She has been seen and treated at [facility]. She works at [employer]. She reprots having more frequent yeast infections as well. She self treated with 3 day monistat. She denies discharge and reports itching is better.    She took Pyridium    Past Medical History:   Diagnosis Date    Abnormal Pap smear of cervix     ADHD     Allergy     seasonal    Anorexia     Anxiety     Asthma     BRCA1 positive 2020    Bulimia     Depression     Fever blister     Gastroparesis     GERD (gastroesophageal reflux disease)     History of posttraumatic stress disorder (PTSD)     Hypertension     Gestational Hypertension    Invasive ductal carcinoma of right breast 2019    Mastitis     Migraine headache     PONV (postoperative nausea and vomiting)     Status post mastectomy, bilateral 2020     Past Surgical History:   Procedure Laterality Date    BILATERAL MASTECTOMY Bilateral 2020    Procedure: MASTECTOMY, BILATERAL - BILATERAL SKIN SPARING MASTECTOMY;  Surgeon: Percy Ayers MD;  Location: [facility];  Service: General;  Laterality: Bilateral;    BIOPSY OF AXILLARY NODE Right 2020    Procedure: BIOPSY, LYMPH NODE, AXILLARY;  Surgeon: Percy Ayers MD;  Location: [facility];  Service: General;  Laterality: Right;    BONE MARROW ASPIRATION      x 3    BREAST SURGERY      CERVICAL BIOPSY  W/ LOOP ELECTRODE EXCISION       SECTION  2016     SECTION N/A 2019    Procedure:  SECTION;  Surgeon: Kirit Hernandez MD;  Location: [facility];  Service: OB/GYN;  Laterality: N/A;    COLPOSCOPY      ESOPHAGOGASTRODUODENOSCOPY N/A 2021    Procedure: EGD (ESOPHAGOGASTRODUODENOSCOPY);  Surgeon: Lj Santos MD;  Location: [address]);  Service:  Endoscopy;  Laterality: N/A;  COVID [facility] on 1/8/21 at [facility]    ESOPHAGOGASTRODUODENOSCOPY N/A 2/9/2021    Procedure: ESOPHAGOGASTRODUODENOSCOPY (EGD);  Surgeon: Camila Wiley MD;  Location: [facility];  Service: Endoscopy;  Laterality: N/A;    INJECTION FOR SENTINEL NODE IDENTIFICATION Right 7/1/2020    Procedure: INJECTION, FOR SENTINEL NODE IDENTIFICATION;  Surgeon: Percy Ayers MD;  Location: [facility];  Service: General;  Laterality: Right;    INSERTION OF BREAST TISSUE EXPANDER Bilateral 7/1/2020    Procedure: INSERTION, TISSUE EXPANDER, BREAST;  Surgeon: Kiko Aranda MD;  Location: [facility];  Service: Plastics;  Laterality: Bilateral;    INSERTION OF TUNNELED CENTRAL VENOUS CATHETER (CVC) WITH SUBCUTANEOUS PORT Left 12/27/2019    Procedure: [id number]-Left neck or chest wall;  Surgeon: Percy Ayers MD;  Location: [address];  Service: General;  Laterality: Left;    MASTECTOMY      MEDIPORT REMOVAL N/A 7/1/2020    Procedure: REMOVAL, CATHETER, CENTRAL VENOUS, TUNNELED, WITH PORT;  Surgeon: Percy Ayers MD;  Location: [facility];  Service: General;  Laterality: N/A;    ROBOT-ASSISTED LAPAROSCOPIC ABDOMINAL HYSTERECTOMY USING DA HIMA XI N/A 12/18/2020    Procedure: XI ROBOTIC HYSTERECTOMY;  Surgeon: Emili Smith MD;  Location: [facility];  Service: OB/GYN;  Laterality: N/A;    ROBOT-ASSISTED LAPAROSCOPIC SALPINGO-OOPHORECTOMY USING DA HIMA XI Bilateral 12/18/2020    Procedure: XI ROBOTIC SALPINGO-OOPHORECTOMY;  Surgeon: Emili Smith MD;  Location: [facility];  Service: OB/GYN;  Laterality: Bilateral;    SHOULDER SURGERY Left     x 2    SINUS SURGERY      age 19    STOMACH SURGERY      TISSUE EXPANDER REMOVAL Right 10/3/2020    Procedure: REMOVAL, TISSUE EXPANDER;  Surgeon: Kiko Aranda MD;  Location: [facility];  Service: Plastics;  Laterality: Right;    TONSILLECTOMY      UPPER GASTROINTESTINAL ENDOSCOPY       Social History     Socioeconomic History    Marital status:     Tobacco Use    Smoking status: Never    Smokeless tobacco: Never   Substance and Sexual Activity    Alcohol use: Yes     Comment: socially    Drug use: No    Sexual activity: Yes     Partners: Male     Birth control/protection: None   Social History Narrative    No longer working at [employer] as of 10/2020 as her son has been suffering with depression/anxiety     Social Determinants of Health     Financial Resource Strain: Low Risk     Difficulty of Paying Living Expenses: Not very hard   Food Insecurity: No Food Insecurity    Worried About Running Out of Food in the Last Year: Never true    Ran Out of Food in the Last Year: Never true   Transportation Needs: No Transportation Needs    Lack of Transportation (Medical): No    Lack of Transportation (Non-Medical): No   Physical Activity: Sufficiently Active    Days of Exercise per Week: 5 days    Minutes of Exercise per Session: 60 min   Stress: No Stress Concern Present    Feeling of Stress : Only a little   Social Connections: Unknown    Frequency of Communication with Friends and Family: More than three times a week    Frequency of Social Gatherings with Friends and Family: Three times a week    Active Member of Clubs or Organizations: Yes    Attends Club or Organization Meetings: More than 4 times per year    Marital Status:    Housing Stability: Low Risk     Unable to Pay for Housing in the Last Year: No    Number of Places Lived in the Last Year: 1    Unstable Housing in the Last Year: No     Family History   Problem Relation Age of Onset    Cancer Maternal Grandmother 40        cervical    Cervical cancer Mother     Breast cancer Other     Ovarian cancer Other     Colon polyps Father     Colon cancer Neg Hx     Melanoma Neg Hx      OB History    Para Term  AB Living   2 2 2     2   SAB IAB Ectopic Multiple Live Births         0 2      # Outcome Date GA Lbr Bull/2nd Weight Sex Delivery Anes PTL Lv   2 Term 19 39w1d  3.43 kg (7 lb 9 oz)  M CS-LTranv Spinal N JOLLY   1 Term 11/21/16 38w1d  2.96 kg (6 lb 8.4 oz) M CS-LTranv EPI N JOLLY      Complications: Failure to Progress in First Stage           Current Outpatient Medications   Medication Sig Dispense Refill    albuterol (PROVENTIL/VENTOLIN HFA) 90 mcg/actuation inhaler Inhale 1 puff every 6 (six) hours  into the lungs for Wheezing (As Needed) for up to 60 doses (Patient not taking: Reported on 3/4/2023) 18 g 0    ALPRAZolam (XANAX) 0.25 MG tablet Take 1 tablet (0.25 mg total) by mouth daily as needed for Anxiety. 30 tablet 0    anastrozole (ARIMIDEX) 1 mg Tab Take 1 tablet (1 mg total) by mouth once daily. 90 tablet 3    atogepant (QULIPTA) 60 mg Tab Take 1 tablet every day by oral route. 30 tablet 2    benzonatate (TESSALON) 200 MG capsule Take 1 capsule (200 mg total) by mouth 3 (three) times daily as needed for Cough. 30 capsule 1    butalbital-aspirin-caffeine -40 mg Tab butalbital-aspirin-caffeine 50 mg-325 mg-40 mg tablet   Take 1 tablet every 4 hours by oral route as needed.      citalopram (CELEXA) 40 MG tablet TAKE 1 TABLET(40 MG) BY MOUTH EVERY DAY 90 tablet 2    dextroamphetamine-amphetamine (MYDAYIS) 37.5 mg CT24 Take 1 capsule (37.5 mg) by mouth once Daily. 30 each 0    loratadine (CLARITIN) 10 mg tablet Take 10 mg by mouth once daily.      magnesium 30 mg Tab Take by mouth once.      magnesium oxide 400 mg magnesium Cap magnesium 400 mg (as magnesium oxide) capsule   Take by oral route.      metFORMIN (GLUCOPHAGE) 500 MG tablet Take 1 tablet (500 mg total) by mouth 2 (two) times daily with meals. 60 tablet 11    metoclopramide HCl (REGLAN) 10 MG tablet Take 1 tablet 3 (three) times daily before meals by mouth 90 tablet 0    multivitamin (THERAGRAN) per tablet Take 1 tablet by mouth once daily.      nitrofurantoin, macrocrystal-monohydrate, (MACROBID) 100 MG capsule 1 capsule every 12 hours for 7 days.  Take with food 2 times a day until gone 14 capsule 0    onabotulinumtoxinA  (BOTOX INJ) Inject as directed. [id number]      ondansetron (ZOFRAN-ODT) 4 MG TbDL Take 1 tablet every 6 (six) hours as needed by mouth for Nausea for up to 7 days 20 tablet 0    pantoprazole (PROTONIX) 40 MG tablet Take 1 tablet (40 mg total) by mouth once daily. Patient needs follow up appointment for any further refills. 30 tablet 0    phenazopyridine (PYRIDIUM) 100 MG tablet 1 tablet 3 times a day for 2 days.  Take as needed after meals for pain 6 tablet 0    prasterone, dhea, (INTRAROSA) 6.5 mg Inst Place 6.5 mg vaginally every evening. 30 each 11    prochlorperazine (COMPAZINE) 10 MG tablet Take 1 tablet (10 mg total) by mouth 3 (three) times daily as needed (Headache and nausea). 30 tablet 2    rosuvastatin (CRESTOR) 5 MG tablet Take 1 tablet daily by mouth 90 tablet 3    spironolactone (ALDACTONE) 100 MG tablet Take 1 tablet by mouth every day 30 tablet 3    sucralfate (CARAFATE) 1 gram tablet Take 1 tablet (1 g total) by mouth 4 (four) times daily. Can be crushed if cannot swallow. 120 tablet 0    tiZANidine (ZANAFLEX) 2 MG tablet Take 1 tablet (2 mg total) by mouth every evening. 30 tablet 11    topiramate (TOPAMAX) 200 MG Tab Take 1 tablet (200 mg total) by mouth every evening. 90 tablet 3    traZODone (DESYREL) 50 MG tablet Take 1 tablet (50 mg total) by mouth every evening. 30 tablet 0    tretinoin (ALTRALIN) 0.05 % gel SMARTSIG:Sparingly Topical Every Night      TRUDHESA 0.725 mg/pump act. (4 mg/mL) Spry by Each Nostril route.      ubrogepant (UBRELVY) 100 mg tablet Take 1 tablet twice a day by oral route as needed. 16 tablet 2    valACYclovir (VALTREX) 1000 MG tablet Take 1 tablet (1,000 mg total) by mouth every 12 (twelve) hours. 60 tablet 0     Current Facility-Administered Medications   Medication Dose Route Frequency Provider Last Rate Last Admin    onabotulinumtoxina injection 200 Units  200 Units Intramuscular Q90 Days Dillon Gonzalze MD   200 Units at 06/25/21 0751     Allergies: Amoxicillin,  Penicillins, and Diazepam     The ASCVD Risk score (Edilson JOHNSON, et al., 2019) failed to calculate for the following reasons:    The 2019 ASCVD risk score is only valid for ages 40 to 79      ROS:  Constitutional: no weight loss, weight gain, fever, fatigue    Respiratory: No wheezing, coughing blood, shortness of breath, or cough  Gastrointestinal: No diarrhea, bloody stool, nausea/vomiting, constipation, gas, hemorrhoids  Genitourinary: No blood in urine,+ painful urination, +urgency of urination, +frequency of urination, incomplete emptying, incontinence, abnormal bleeding, painful periods, heavy periods, vaginal discharge, vaginal odor, painful intercourse, sexual problems, bleeding after intercourse.  Musculoskeletal: No muscle weakness  Skin/Breast: No painful breasts, nipple discharge, masses, rash, ulcers  Neurological: No passing out, seizures, numbness, headache  Endocrine: No diabetes, hypothyroid, hyperthyroid, hot flashes, hair loss, abnormal hair growth, acne  Psychiatric: No depression, crying  Hematologic: No bruises, bleeding, swollen lymph nodes, anemia.      Physical Exam  General- well developed, well nourished  Vulva- no masses, no lesions  Vagina-  no masses, no lesions, +scant erythema, no discharge  Cervix- absent surgically        ASSESSMENT:   1. Urinary tract infection without hematuria, site unspecified  Urinalysis    Urine culture      2. Vaginal itching  Vaginosis Screen by DNA Probe      3. Recurrent UTI  Ambulatory referral/consult to Urology            PLAN:   Will send in Macrobid  Follow up UA with C&S- could not dip in the office because she took Pyridium  Recommend consult with Urology for recurrent UTI

## 2023-06-10 LAB
BACTERIA UR CULT: NORMAL
BACTERIA UR CULT: NORMAL

## 2023-06-12 ENCOUNTER — OFFICE VISIT (OUTPATIENT)
Dept: PSYCHIATRY | Facility: CLINIC | Age: 38
End: 2023-06-12
Payer: COMMERCIAL

## 2023-06-12 VITALS
HEIGHT: 65 IN | BODY MASS INDEX: 25.54 KG/M2 | SYSTOLIC BLOOD PRESSURE: 117 MMHG | DIASTOLIC BLOOD PRESSURE: 77 MMHG | WEIGHT: 153.31 LBS | HEART RATE: 98 BPM

## 2023-06-12 DIAGNOSIS — F41.9 ANXIETY DISORDER, UNSPECIFIED TYPE: Primary | ICD-10-CM

## 2023-06-12 DIAGNOSIS — F41.1 GENERALIZED ANXIETY DISORDER: ICD-10-CM

## 2023-06-12 DIAGNOSIS — F90.0 ATTENTION DEFICIT HYPERACTIVITY DISORDER, INATTENTIVE TYPE: ICD-10-CM

## 2023-06-12 PROCEDURE — 1159F PR MEDICATION LIST DOCUMENTED IN MEDICAL RECORD: ICD-10-PCS | Mod: CPTII,S$GLB,, | Performed by: PSYCHOLOGIST

## 2023-06-12 PROCEDURE — 90833 PSYTX W PT W E/M 30 MIN: CPT | Mod: S$GLB,,, | Performed by: PSYCHOLOGIST

## 2023-06-12 PROCEDURE — 99214 PR OFFICE/OUTPT VISIT, EST, LEVL IV, 30-39 MIN: ICD-10-PCS | Mod: S$GLB,,, | Performed by: PSYCHOLOGIST

## 2023-06-12 PROCEDURE — 3008F BODY MASS INDEX DOCD: CPT | Mod: CPTII,S$GLB,, | Performed by: PSYCHOLOGIST

## 2023-06-12 PROCEDURE — 90833 PR PSYCHOTHERAPY W/PATIENT W/E&M, 30 MIN (ADD ON): ICD-10-PCS | Mod: S$GLB,,, | Performed by: PSYCHOLOGIST

## 2023-06-12 PROCEDURE — 3074F PR MOST RECENT SYSTOLIC BLOOD PRESSURE < 130 MM HG: ICD-10-PCS | Mod: CPTII,S$GLB,, | Performed by: PSYCHOLOGIST

## 2023-06-12 PROCEDURE — 3078F DIAST BP <80 MM HG: CPT | Mod: CPTII,S$GLB,, | Performed by: PSYCHOLOGIST

## 2023-06-12 PROCEDURE — 3074F SYST BP LT 130 MM HG: CPT | Mod: CPTII,S$GLB,, | Performed by: PSYCHOLOGIST

## 2023-06-12 PROCEDURE — 99999 PR PBB SHADOW E&M-EST. PATIENT-LVL V: ICD-10-PCS | Mod: PBBFAC,,, | Performed by: PSYCHOLOGIST

## 2023-06-12 PROCEDURE — 3008F PR BODY MASS INDEX (BMI) DOCUMENTED: ICD-10-PCS | Mod: CPTII,S$GLB,, | Performed by: PSYCHOLOGIST

## 2023-06-12 PROCEDURE — 3078F PR MOST RECENT DIASTOLIC BLOOD PRESSURE < 80 MM HG: ICD-10-PCS | Mod: CPTII,S$GLB,, | Performed by: PSYCHOLOGIST

## 2023-06-12 PROCEDURE — 1159F MED LIST DOCD IN RCRD: CPT | Mod: CPTII,S$GLB,, | Performed by: PSYCHOLOGIST

## 2023-06-12 PROCEDURE — 99214 OFFICE O/P EST MOD 30 MIN: CPT | Mod: S$GLB,,, | Performed by: PSYCHOLOGIST

## 2023-06-12 PROCEDURE — 99999 PR PBB SHADOW E&M-EST. PATIENT-LVL V: CPT | Mod: PBBFAC,,, | Performed by: PSYCHOLOGIST

## 2023-06-12 RX ORDER — CITALOPRAM 40 MG/1
TABLET, FILM COATED ORAL
Qty: 90 TABLET | Refills: 2 | Status: SHIPPED | OUTPATIENT
Start: 2023-06-12 | End: 2024-05-18

## 2023-06-12 RX ORDER — DEXTROAMPHETAMINE SULFATE, DEXTROAMPHETAMINE SACCHARATE, AMPHETAMINE ASPARTATE MONOHYDRATE, AND AMPHETAMINE SULFATE 9.375; 9.375; 9.375; 9.375 MG/1; MG/1; MG/1; MG/1
37.5 CAPSULE, EXTENDED RELEASE ORAL DAILY
Qty: 30 EACH | Refills: 0 | Status: SHIPPED | OUTPATIENT
Start: 2023-06-12 | End: 2023-07-19 | Stop reason: SDUPTHER

## 2023-06-12 RX ORDER — ALPRAZOLAM 0.25 MG/1
0.25 TABLET ORAL DAILY PRN
Qty: 30 TABLET | Refills: 0 | Status: SHIPPED | OUTPATIENT
Start: 2023-06-12 | End: 2024-01-09 | Stop reason: SDUPTHER

## 2023-06-12 NOTE — PROGRESS NOTES
"Outpatient Psychiatry Follow-Up Visit    Clinical Status of Patient: Outpatient (Ambulatory)  06/12/2023     Chief Complaint:  presenting today for a follow-up.       Interval History and Content of Current Session:  Interim Events/Subjective Report/Content of Current Session:  follow-up appointment.    Pt is a 36 y/o female with past psychiatric hx of anxiety and ADHD who presents for follow-up treatment. Pt reported that things at work became stressful during the past couple of months. Pt stated that the  was trying to get her fired and pt recorded threatening messages. Pt played them for HR resulting in the termination of the . Pt discussed increased anxiety and depression symptoms during the stressful period. Went to 100% remote work. Was taking 2 tablets of alprazolam (0.5 mg total) about 3-4x per week. Was able to recognize that her symptoms were situational. Pt stated that she is feeling better now but wants to remain working remotely as the work-place continues to feel hostile.     Past Psychiatric hx: effexor xr ("my mood was the best on that but I was having panic attacks and bld press was really negar"), pristiq (for headaches- effective), cymbalta (ineffective), lexapro and zoloft (effective but worsened headaches), klonopin 1mg (effective- for sleep and anxiety)  For sleep- elavil (ineffective), trazodone (worsened h/s's), ambien (nightmares), lunesta (nightmares), seroquel, prazosin, xanax  For headache- topomax    Past Medical hx:   Past Medical History:   Diagnosis Date    Abnormal Pap smear of cervix 2009    ADHD     Allergy     seasonal    Anorexia     Anxiety     Asthma     BRCA1 positive 12/18/2020    Bulimia     Depression     Fever blister     Gastroparesis     GERD (gastroesophageal reflux disease)     History of posttraumatic stress disorder (PTSD)     Hypertension     Gestational Hypertension    Invasive ductal carcinoma of right breast 12/18/2019    Mastitis     Migraine headache     " PONV (postoperative nausea and vomiting)     Status post mastectomy, bilateral 7/29/2020        Interim hx:  Medication changes last visit: D/C Adderall XR and start trial of Mydayis.   Anxiety: stable  Depression: stable     Denies suicidal/homicidal ideations.  Denies hopelessness/worthlessness.    Denies auditory/visual hallucinations      Alcohol: Pt denied  Drug: Pt denied  Caffeine: Not assessed  Tobacco: Pt denied      Review of Systems   PSYCHIATRIC: Pertinent items are noted in the narrative.        CONSTITUTIONAL: weight stable    Past Medical, Family and Social History: The patient's past medical, family and social history have been reviewed and updated as appropriate within the electronic medical record. See encounter notes.     Current Psychiatric Medication:  Celexa 40mg po qhs, Mydayis 37.5 mg po qam, xanax 0.125mg po daily PRN anxiety (takes maybe once every 3 months), trazodone 25mg po qhs PRN insomnia (not currently taking)     Compliance: yes      Side effects: Pt denied     Risk Parameters:  Patient reports no suicidal ideation  Patient reports no homicidal ideation  Patient reports no self-injurious behavior  Patient reports no violent behavior     Exam (detailed: at least 9 elements; comprehensive: all 15 elements)   Constitutional  Vitals:  Most recent vital signs, dated less than 90 days prior to this appointment, were reviewed. Pulse:  [98]   BP: (117)/(77)       General:  unremarkable, age appropriate, casual attire, good eye contact, good rapport       Musculoskeletal  Muscle Strength/Tone:  no flaccidity, no tremor    Gait & Station:  normal      Psychiatric                       Speech:  normal tone, normal rate, rhythm, and volume   Mood & Affect:   Depressed, anxious         Thought Process:   Goal directed; Linear    Associations:   intact   Thought Content:   No SI/HI, delusions, or paranoia, no AV/VH   Insight & Judgement:   Good, adequate to circumstances   Orientation:   grossly  intact; alert and oriented x 4    Memory:  intact for content of interview    Language:  grossly intact, can repeat    Attention Span  : Grossly intact for content of interview   Fund of Knowledge:   intact and appropriate to age and level of education        Assessment and Diagnosis   Status/Progress: Based on the examination today, the patient's problem(s) is/are adequately controlled.  New problems have not been presented today. Co-morbidities are not complicating management of the primary condition. There are no active rule-out diagnoses for this patient at this time.      Impression: Pt appears to have had a recent increase in mood and anxiety symptoms due to occupational stressors. Pt noted that these stressors have reduced and her symptoms have as well. The transition to Mydayis appears to be going well. Will continue medication plan as is and monitor symptoms moving forward.     Diagnosis:   Anxiety disorder   Attention Deficit Hyperactivity Disorder - Inattentive Type   h/o PTSD (now in remission)   h/o anorexia and bulimia (both in remission)  Intervention/Counseling/Treatment Plan   Medication Management:      1. Celexa 40mg po qhs    2. Mydayis 37.5 mg po qam    3. xanax 0.125mg po daily PRN anxiety (takes maybe once every 3 months)    4. trazodone 25mg po qhs PRN insomnia (not currently taking)     5. Call to report any worsening of symptoms or problems with the medication. Pt instructed to go to ER with thoughts of harming self, others     6. Patient given contact # for psychotherapists at [facility] and also instructed to check with insurance for list of providers.     Psychotherapy: 20 minutes  Target symptoms: ADHD, anxiety, mood  Why chosen therapy is appropriate versus another modality: CBT used; relevant to diagnosis, patient responds to this modality  Outcome monitoring methods: self-report, observation  Therapeutic intervention type: Cognitive Behavioral Therapy  Topics discussed/themes:  building skills sets for symptom management, symptom recognition, nutrition, exercise  The patient's response to the intervention is accepting  Patient's response to treatment is: good.   The patient's progress toward treatment goals: stable     Return to clinic: 3 months    -Cognitive-Behavioral/Supportive therapy and psychoeducation provided  -R/B/SE's of medications discussed with the pt who expresses understanding and chooses to take medications as prescribed.   -Pt instructed to call clinic, 911 or go to nearest emergency room if sxs worsen or pt is in   crisis. The pt expresses understanding.    Gokul Keller, PhD, MP     Antidepressant/Antianxiety Medication Initiation:  Patient informed of risks, benefits, and potential side effects of medication and accepts informed consent.  Common side effects include nausea, fatigue, headache, insomnia., Specifically discussed the possibility of new or worsening suicidal thoughts/depression.  Patient instructed to stop the medication immediately and seek urgent treatment if this occurs. Patient instructed not to abruptly discontinue medication without physician guidance except in cases of sudden onset or worsening of SI.       Stimulant Medication Initiation:  Patient advised of risks, benefits, and side effects of medication and accepts informed consent.  Common side effects include insomnia, irritability, jittery feeling, dry mouth, and agitation/hostility., Patient advised of potential addictive nature of medication and controlled substance classification.  Instructed to safeguard medication as no early refills can be given for lost or stolen medications.       Benzodiazepine Initiation:  Patient advised of the risks, benefits, and common side effects of medication and has accepted informed consent.  Common side effects include drowsiness, impaired coordination, possible memory loss., Patient advised NOT to operate a vehicle or machinery untiil they are sure how the  medication will affec them.  Client also advised of danger of mixing this medication with alcohol., Patient advised of potential addictive nature of medication and need to safeguard medication as no early refills for lost or stolen medications can be authorized.       Pregnancy Warning:  Patient denies current pregnancy possibility.  Patient made aware that medications have not been proven safe in pregnancy and that she must maintain adequate birth control.  Patient instructed to alert us immediately if she becomes pregnant.       Sleep Aid Initiation:  Patient advised of potential side effects of medication including sleep walking or other complex behaviors.  Patient advised not to mix medication with alcohol, to go to bed immediately after taking, and to stop at first sign of any unusual behaviors.

## 2023-06-15 ENCOUNTER — PATIENT MESSAGE (OUTPATIENT)
Dept: OBSTETRICS AND GYNECOLOGY | Facility: CLINIC | Age: 38
End: 2023-06-15
Payer: COMMERCIAL

## 2023-06-15 ENCOUNTER — CLINICAL SUPPORT (OUTPATIENT)
Dept: OBSTETRICS AND GYNECOLOGY | Facility: CLINIC | Age: 38
End: 2023-06-15
Payer: COMMERCIAL

## 2023-06-15 ENCOUNTER — TELEPHONE (OUTPATIENT)
Dept: OBSTETRICS AND GYNECOLOGY | Facility: CLINIC | Age: 38
End: 2023-06-15
Payer: COMMERCIAL

## 2023-06-15 DIAGNOSIS — R32 INCONTINENCE IN FEMALE: Primary | ICD-10-CM

## 2023-06-15 DIAGNOSIS — N95.1 MENOPAUSAL SYMPTOMS: Primary | ICD-10-CM

## 2023-06-15 PROCEDURE — 96372 THER/PROPH/DIAG INJ SC/IM: CPT | Mod: S$GLB,,, | Performed by: OBSTETRICS & GYNECOLOGY

## 2023-06-15 PROCEDURE — 96372 PR INJECTION,THERAP/PROPH/DIAG2ST, IM OR SUBCUT: ICD-10-PCS | Mod: S$GLB,,, | Performed by: OBSTETRICS & GYNECOLOGY

## 2023-06-15 PROCEDURE — 99999 PR PBB SHADOW E&M-EST. PATIENT-LVL I: ICD-10-PCS | Mod: PBBFAC,,,

## 2023-06-15 PROCEDURE — 99999 PR PBB SHADOW E&M-EST. PATIENT-LVL I: CPT | Mod: PBBFAC,,,

## 2023-06-15 RX ORDER — TESTOSTERONE CYPIONATE 200 MG/ML
50 INJECTION, SOLUTION INTRAMUSCULAR
Status: SHIPPED | OUTPATIENT
Start: 2023-06-15 | End: 2023-10-19

## 2023-06-15 RX ADMIN — TESTOSTERONE CYPIONATE 50 MG: 200 INJECTION, SOLUTION INTRAMUSCULAR at 08:06

## 2023-06-16 ENCOUNTER — OFFICE VISIT (OUTPATIENT)
Dept: UROLOGY | Facility: CLINIC | Age: 38
End: 2023-06-16
Payer: COMMERCIAL

## 2023-06-16 VITALS
SYSTOLIC BLOOD PRESSURE: 127 MMHG | DIASTOLIC BLOOD PRESSURE: 83 MMHG | WEIGHT: 150 LBS | BODY MASS INDEX: 24.99 KG/M2 | HEIGHT: 65 IN | HEART RATE: 97 BPM

## 2023-06-16 DIAGNOSIS — R10.2 PELVIC PAIN: Primary | ICD-10-CM

## 2023-06-16 DIAGNOSIS — N39.0 RECURRENT UTI: ICD-10-CM

## 2023-06-16 DIAGNOSIS — Z87.442 HISTORY OF KIDNEY STONES: ICD-10-CM

## 2023-06-16 DIAGNOSIS — N94.10 DYSPAREUNIA, FEMALE: ICD-10-CM

## 2023-06-16 LAB
BILIRUB SERPL-MCNC: NEGATIVE MG/DL
BLOOD URINE, POC: NEGATIVE
CLARITY, POC UA: CLEAR
COLOR, POC UA: YELLOW
GLUCOSE UR QL STRIP: NEGATIVE
KETONES UR QL STRIP: NEGATIVE
LEUKOCYTE ESTERASE URINE, POC: NEGATIVE
NITRITE, POC UA: NEGATIVE
PH, POC UA: 5
PROTEIN, POC: NEGATIVE
SPECIFIC GRAVITY, POC UA: 1.02
UROBILINOGEN, POC UA: NEGATIVE

## 2023-06-16 PROCEDURE — 3079F PR MOST RECENT DIASTOLIC BLOOD PRESSURE 80-89 MM HG: ICD-10-PCS | Mod: CPTII,S$GLB,,

## 2023-06-16 PROCEDURE — 87086 URINE CULTURE/COLONY COUNT: CPT

## 2023-06-16 PROCEDURE — 1160F RVW MEDS BY RX/DR IN RCRD: CPT | Mod: CPTII,S$GLB,,

## 2023-06-16 PROCEDURE — 3074F PR MOST RECENT SYSTOLIC BLOOD PRESSURE < 130 MM HG: ICD-10-PCS | Mod: CPTII,S$GLB,,

## 2023-06-16 PROCEDURE — 81002 URINALYSIS NONAUTO W/O SCOPE: CPT | Mod: S$GLB,,,

## 2023-06-16 PROCEDURE — 3079F DIAST BP 80-89 MM HG: CPT | Mod: CPTII,S$GLB,,

## 2023-06-16 PROCEDURE — 99999 PR PBB SHADOW E&M-EST. PATIENT-LVL V: CPT | Mod: PBBFAC,,,

## 2023-06-16 PROCEDURE — 81002 POCT URINE DIPSTICK WITHOUT MICROSCOPE: ICD-10-PCS | Mod: S$GLB,,,

## 2023-06-16 PROCEDURE — 99999 PR PBB SHADOW E&M-EST. PATIENT-LVL V: ICD-10-PCS | Mod: PBBFAC,,,

## 2023-06-16 PROCEDURE — 1159F MED LIST DOCD IN RCRD: CPT | Mod: CPTII,S$GLB,,

## 2023-06-16 PROCEDURE — 1159F PR MEDICATION LIST DOCUMENTED IN MEDICAL RECORD: ICD-10-PCS | Mod: CPTII,S$GLB,,

## 2023-06-16 PROCEDURE — 99204 PR OFFICE/OUTPT VISIT, NEW, LEVL IV, 45-59 MIN: ICD-10-PCS | Mod: S$GLB,,,

## 2023-06-16 PROCEDURE — 3008F BODY MASS INDEX DOCD: CPT | Mod: CPTII,S$GLB,,

## 2023-06-16 PROCEDURE — 3008F PR BODY MASS INDEX (BMI) DOCUMENTED: ICD-10-PCS | Mod: CPTII,S$GLB,,

## 2023-06-16 PROCEDURE — 1160F PR REVIEW ALL MEDS BY PRESCRIBER/CLIN PHARMACIST DOCUMENTED: ICD-10-PCS | Mod: CPTII,S$GLB,,

## 2023-06-16 PROCEDURE — 3074F SYST BP LT 130 MM HG: CPT | Mod: CPTII,S$GLB,,

## 2023-06-16 PROCEDURE — 99204 OFFICE O/P NEW MOD 45 MIN: CPT | Mod: S$GLB,,,

## 2023-06-16 NOTE — PROGRESS NOTES
CHIEF COMPLAINT:  UTIs      HISTORY OF PRESENTING ILLINESS:  Yolanda Norman is a 37 y.o. female new to urology. Presents to clinic today as a referral from Dr. Polanco for UTIs. Her urinary symptoms began in November 2022 and include dysuria, urgency, frequency and malodorous urine. Most recent urine culture and vaginosis screen 6/8/23, both negative for infection although UA dip +nitrite. Urine culture 3/23/23 8,000 cfu/ml, not indicative of infection. She has completed course of Macrobid and it still experiencing bladder pressure and mild urgency. Reports being symptomatic while on Macrobid as well. She is interested in pelvic floor PT due to dyspareunia. Denies any changes in medication, exercise, or diet since 11/2022.     Hx includes abnormal pap, ADHD, anorexia, anxiety, asthma, BRCA1 positive, bulimia, depression, fever blisters, gastroparesis, GERD, PTSD, HTN, invasive ductal carcinoma of R breast, mastitis, migraine HA, PONA, bilateral mastectomy,       UTI association: intercourse last time, mostly s/s occur without intercourse or other identifiable causes   Shower/urinate before and after intercourse: urinates, rarely has intercourse   Complete bladder emptying: yes  Takes bubble baths: no  Wipes from front to back: yes  Constipated: yes - started around November 2022  Daily water intake: 2-3 16 oz bottles water/day  Daily probiotic: no  Cranberry supplements: daily started last week   Baseline urinary symptoms: none  UTI symptoms: bloating, urgency, frequency, malodorous urine, pelvis cramps, cloudy urine (1st symptom)  Hx of kidney stones: has have kidney stones, high school   Smoking history: none       REVIEW OF SYSTEMS:  Review of Systems   Constitutional:  Negative for chills and fever.   HENT:  Negative for congestion and sore throat.    Respiratory:  Negative for cough and shortness of breath.    Cardiovascular:  Negative for chest pain and palpitations.   Gastrointestinal:  Positive for  abdominal pain (bladder pressure). Negative for nausea and vomiting.   Genitourinary:  Positive for frequency and urgency. Negative for dysuria, flank pain and hematuria.   Skin:  Negative for itching and rash.   Neurological:  Negative for dizziness and headaches.       PATIENT HISTORY:  Past Medical History:   Diagnosis Date    Abnormal Pap smear of cervix     ADHD     Allergy     seasonal    Anorexia     Anxiety     Asthma     BRCA1 positive 2020    Bulimia     Depression     Fever blister     Gastroparesis     GERD (gastroesophageal reflux disease)     History of posttraumatic stress disorder (PTSD)     Hypertension     Gestational Hypertension    Invasive ductal carcinoma of right breast 2019    Mastitis     Migraine headache     PONV (postoperative nausea and vomiting)     Status post mastectomy, bilateral 2020       Past Surgical History:   Procedure Laterality Date    BILATERAL MASTECTOMY Bilateral 2020    Procedure: MASTECTOMY, BILATERAL - BILATERAL SKIN SPARING MASTECTOMY;  Surgeon: Percy Ayers MD;  Location: [facility];  Service: General;  Laterality: Bilateral;    BIOPSY OF AXILLARY NODE Right 2020    Procedure: BIOPSY, LYMPH NODE, AXILLARY;  Surgeon: Percy Ayers MD;  Location: [facility];  Service: General;  Laterality: Right;    BONE MARROW ASPIRATION      x 3    BREAST SURGERY      CERVICAL BIOPSY  W/ LOOP ELECTRODE EXCISION       SECTION  2016     SECTION N/A 2019    Procedure:  SECTION;  Surgeon: Kirit Hernandez MD;  Location: [facility];  Service: OB/GYN;  Laterality: N/A;    COLPOSCOPY      ESOPHAGOGASTRODUODENOSCOPY N/A 2021    Procedure: EGD (ESOPHAGOGASTRODUODENOSCOPY);  Surgeon: Lj Santos MD;  Location: [address];  Service: Endoscopy;  Laterality: N/A;  COVID test on 21 at [facility]    ESOPHAGOGASTRODUODENOSCOPY N/A 2021    Procedure: ESOPHAGOGASTRODUODENOSCOPY (EGD);  Surgeon: Camila Wiley  MD;  Location: [facility] END[facility];  Service: Endoscopy;  Laterality: N/A;    INJECTION FOR SENTINEL NODE IDENTIFICATION Right 7/1/2020    Procedure: INJECTION, FOR SENTINEL NODE IDENTIFICATION;  Surgeon: Percy Ayers MD;  Location: [facility];  Service: General;  Laterality: Right;    INSERTION OF BREAST TISSUE EXPANDER Bilateral 7/1/2020    Procedure: INSERTION, TISSUE EXPANDER, BREAST;  Surgeon: Kiko Aranda MD;  Location: [facility];  Service: Plastics;  Laterality: Bilateral;    INSERTION OF TUNNELED CENTRAL VENOUS CATHETER (CVC) WITH SUBCUTANEOUS PORT Left 12/27/2019    Procedure: [id number]-Left neck or chest wall;  Surgeon: Percy Ayers MD;  Location: [address];  Service: General;  Laterality: Left;    MASTECTOMY      MEDIPORT REMOVAL N/A 7/1/2020    Procedure: REMOVAL, CATHETER, CENTRAL VENOUS, TUNNELED, WITH PORT;  Surgeon: Percy Ayers MD;  Location: [facility];  Service: General;  Laterality: N/A;    ROBOT-ASSISTED LAPAROSCOPIC ABDOMINAL HYSTERECTOMY USING DA HIMA XI N/A 12/18/2020    Procedure: XI ROBOTIC HYSTERECTOMY;  Surgeon: Emili Smith MD;  Location: [facility];  Service: OB/GYN;  Laterality: N/A;    ROBOT-ASSISTED LAPAROSCOPIC SALPINGO-OOPHORECTOMY USING DA HIMA XI Bilateral 12/18/2020    Procedure: XI ROBOTIC SALPINGO-OOPHORECTOMY;  Surgeon: Emili Smith MD;  Location: [facility];  Service: OB/GYN;  Laterality: Bilateral;    SHOULDER SURGERY Left     x 2    SINUS SURGERY      age 19    STOMACH SURGERY      TISSUE EXPANDER REMOVAL Right 10/3/2020    Procedure: REMOVAL, TISSUE EXPANDER;  Surgeon: Kiko Aranda MD;  Location: [facility];  Service: Plastics;  Laterality: Right;    TONSILLECTOMY      UPPER GASTROINTESTINAL ENDOSCOPY         Family History   Problem Relation Age of Onset    Cancer Maternal Grandmother 40        cervical    Cervical cancer Mother     Breast cancer Other     Ovarian cancer Other     Colon polyps Father     Colon cancer Neg Hx     Melanoma Neg Hx         Social History     Socioeconomic History    Marital status:    Tobacco Use    Smoking status: Never    Smokeless tobacco: Never   Substance and Sexual Activity    Alcohol use: Yes     Comment: socially    Drug use: No    Sexual activity: Yes     Partners: Male     Birth control/protection: None   Social History Narrative    No longer working at [employer] as of 10/2020 as her son has been suffering with depression/anxiety     Social Determinants of Health     Financial Resource Strain: Low Risk     Difficulty of Paying Living Expenses: Not hard at all   Food Insecurity: No Food Insecurity    Worried About Running Out of Food in the Last Year: Never true    Ran Out of Food in the Last Year: Never true   Transportation Needs: No Transportation Needs    Lack of Transportation (Medical): No    Lack of Transportation (Non-Medical): No   Physical Activity: Sufficiently Active    Days of Exercise per Week: 5 days    Minutes of Exercise per Session: 90 min   Stress: No Stress Concern Present    Feeling of Stress : Not at all   Social Connections: Unknown    Frequency of Communication with Friends and Family: More than three times a week    Frequency of Social Gatherings with Friends and Family: More than three times a week    Active Member of Clubs or Organizations: Yes    Attends Club or Organization Meetings: More than 4 times per year    Marital Status:    Housing Stability: Low Risk     Unable to Pay for Housing in the Last Year: No    Number of Places Lived in the Last Year: 1    Unstable Housing in the Last Year: No       Allergies:  Amoxicillin, Penicillins, and Diazepam    Medications:    Current Outpatient Medications:     albuterol (PROVENTIL/VENTOLIN HFA) 90 mcg/actuation inhaler, Inhale 1 puff every 6 (six) hours  into the lungs for Wheezing (As Needed) for up to 60 doses, Disp: 18 g, Rfl: 0    ALPRAZolam (XANAX) 0.25 MG tablet, Take 1 tablet (0.25 mg total) by mouth daily as needed for  Anxiety., Disp: 30 tablet, Rfl: 0    anastrozole (ARIMIDEX) 1 mg Tab, Take 1 tablet (1 mg total) by mouth once daily., Disp: 90 tablet, Rfl: 3    atogepant (QULIPTA) 60 mg Tab, Take 1 tablet every day by oral route., Disp: 30 tablet, Rfl: 2    benzonatate (TESSALON) 200 MG capsule, Take 1 capsule (200 mg total) by mouth 3 (three) times daily as needed for Cough., Disp: 30 capsule, Rfl: 1    butalbital-aspirin-caffeine -40 mg Tab, butalbital-aspirin-caffeine 50 mg-325 mg-40 mg tablet  Take 1 tablet every 4 hours by oral route as needed., Disp: , Rfl:     citalopram (CELEXA) 40 MG tablet, TAKE 1 TABLET(40 MG) BY MOUTH EVERY DAY, Disp: 90 tablet, Rfl: 2    dextroamphetamine-amphetamine (MYDAYIS) 37.5 mg CT24, Take 1 capsule (37.5 mg) by mouth once Daily., Disp: 30 each, Rfl: 0    loratadine (CLARITIN) 10 mg tablet, Take 10 mg by mouth once daily., Disp: , Rfl:     magnesium 30 mg Tab, Take by mouth once., Disp: , Rfl:     magnesium oxide 400 mg magnesium Cap, magnesium 400 mg (as magnesium oxide) capsule  Take by oral route., Disp: , Rfl:     metFORMIN (GLUCOPHAGE) 500 MG tablet, Take 1 tablet (500 mg total) by mouth 2 (two) times daily with meals., Disp: 60 tablet, Rfl: 11    metoclopramide HCl (REGLAN) 10 MG tablet, Take 1 tablet 3 (three) times daily before meals by mouth, Disp: 90 tablet, Rfl: 0    multivitamin (THERAGRAN) per tablet, Take 1 tablet by mouth once daily., Disp: , Rfl:     nitrofurantoin, macrocrystal-monohydrate, (MACROBID) 100 MG capsule, 1 capsule every 12 hours for 7 days.  Take with food 2 times a day until gone, Disp: 14 capsule, Rfl: 0    nitrofurantoin, macrocrystal-monohydrate, (MACROBID) 100 MG capsule, Take 1 capsule (100 mg total) by mouth 2 (two) times daily., Disp: 14 capsule, Rfl: 0    onabotulinumtoxinA (BOTOX INJ), Inject as directed. [id number], Disp: , Rfl:     ondansetron (ZOFRAN-ODT) 4 MG TbDL, Take 1 tablet every 6 (six) hours as needed by mouth for Nausea for up to 7 days,  Disp: 20 tablet, Rfl: 0    pantoprazole (PROTONIX) 40 MG tablet, Take 1 tablet (40 mg total) by mouth once daily. Patient needs follow up appointment for any further refills., Disp: 30 tablet, Rfl: 0    phenazopyridine (PYRIDIUM) 100 MG tablet, 1 tablet 3 times a day for 2 days.  Take as needed after meals for pain, Disp: 6 tablet, Rfl: 0    prasterone, dhea, (INTRAROSA) 6.5 mg Inst, Place 6.5 mg vaginally every evening., Disp: 30 each, Rfl: 11    prasterone, dhea, (INTRAROSA) 6.5 mg Inst, Place 6.5 mg vaginally every evening., Disp: 30 each, Rfl: 11    prochlorperazine (COMPAZINE) 10 MG tablet, Take 1 tablet (10 mg total) by mouth 3 (three) times daily as needed (Headache and nausea)., Disp: 30 tablet, Rfl: 2    rosuvastatin (CRESTOR) 5 MG tablet, Take 1 tablet daily by mouth, Disp: 90 tablet, Rfl: 3    spironolactone (ALDACTONE) 100 MG tablet, Take 1 tablet by mouth every day, Disp: 30 tablet, Rfl: 3    sucralfate (CARAFATE) 1 gram tablet, Take 1 tablet (1 g total) by mouth 4 (four) times daily. Can be crushed if cannot swallow., Disp: 120 tablet, Rfl: 0    tiZANidine (ZANAFLEX) 2 MG tablet, Take 1 tablet (2 mg total) by mouth every evening., Disp: 30 tablet, Rfl: 11    topiramate (TOPAMAX) 200 MG Tab, Take 1 tablet (200 mg total) by mouth every evening., Disp: 90 tablet, Rfl: 3    tretinoin (ALTRALIN) 0.05 % gel, SMARTSIG:Sparingly Topical Every Night, Disp: , Rfl:     TRUDHESA 0.725 mg/pump act. (4 mg/mL) Spry, by Each Nostril route., Disp: , Rfl:     ubrogepant (UBRELVY) 100 mg tablet, Take 1 tablet twice a day by oral route as needed., Disp: 16 tablet, Rfl: 2    valACYclovir (VALTREX) 1000 MG tablet, Take 1 tablet (1,000 mg total) by mouth every 12 (twelve) hours., Disp: 60 tablet, Rfl: 0    Current Facility-Administered Medications:     onabotulinumtoxina injection 200 Units, 200 Units, Intramuscular, Q90 Days, Dillon Gonzalez MD, 200 Units at 06/25/21 0751    testosterone cypionate injection 50 mg, 50 mg,  Intramuscular, Q21 Days, Kaleigh Polanco MD, 50 mg at 06/15/23 0827    PHYSICAL EXAMINATION:  Physical Exam  Constitutional:       Appearance: Normal appearance.   HENT:      Head: Normocephalic and atraumatic.      Right Ear: External ear normal.      Left Ear: External ear normal.   Pulmonary:      Effort: Pulmonary effort is normal. No respiratory distress.   Skin:     General: Skin is warm and dry.   Neurological:      General: No focal deficit present.      Mental Status: She is alert and oriented to person, place, and time.   Psychiatric:         Mood and Affect: Mood normal.         Behavior: Behavior normal.       LABS:  UA trace protein     Lab Results   Component Value Date    CREATININE 0.8 03/05/2023    EGFRNORACEVR >60.0 03/05/2023         IMPRESSION:  Encounter Diagnoses   Name Primary?    Pelvic pain Yes    Recurrent UTI     History of kidney stones     Dyspareunia, female          Assessment:       1. Pelvic pain    2. Recurrent UTI    3. History of kidney stones    4. Dyspareunia, female        Plan:   - Cath urine samples recommended moving forward, pt states she would need sedation due to pelvic pain, rationale provided. Clean catch sent today for culture.   - UTI precautions and recommendations provided as well as a list of known bladder irritants, recommended daily metamucil and increased water intake due to constipation, further eval by GI may be required if she is unable to tolerate  - Renal US scheduled due to personal hx of kidney stones and recurrent  symptoms   - Referral placed to Dr. Barriga for further evaluation   - Referral placed to pelvic floor PT for  symptoms and dyspareunia - discussed internal evaluation by pelvic floor PT, pt aware of assessment process.     RTC 3 months for apt with Dr. Barriga or sooner if symptoms return or worsen    I spent 45 minutes with the patient of which more than half was spent in direct consultation with the patient in regards to our  treatment and plan.

## 2023-06-16 NOTE — PATIENT INSTRUCTIONS
UTI precautions:  Wipe front to back   Avoid constipation.  Avoid caffeine.  Drink 1-2 liter of water daily  Void every 3-4 hrs  Expel urine from vagina post void  Void soon after urge arises  No dryer sheets or harsh detergents with the undergarments  No bubble baths  Void before and after intercourse  Avoid hot tub use  Avoid tight fitting clothes and panty hose  D-Mannose 2 grams- helps to block bacteria from attaching to the bladder wall. 1 gram AM, 1 gram PM. Can be found at [employer] or [employer].        Avoid Bladder Irritants: Tea, coffee, caffeine, alcohol, artificial sweeteners, citrus, spicy foods, acidic foods, chocolate, tomato-based foods, smoking.

## 2023-06-17 LAB — BACTERIA UR CULT: NO GROWTH

## 2023-06-19 ENCOUNTER — PATIENT MESSAGE (OUTPATIENT)
Dept: UROLOGY | Facility: CLINIC | Age: 38
End: 2023-06-19
Payer: COMMERCIAL

## 2023-06-30 ENCOUNTER — PATIENT MESSAGE (OUTPATIENT)
Dept: HEMATOLOGY/ONCOLOGY | Facility: CLINIC | Age: 38
End: 2023-06-30
Payer: COMMERCIAL

## 2023-07-06 ENCOUNTER — CLINICAL SUPPORT (OUTPATIENT)
Dept: OBSTETRICS AND GYNECOLOGY | Facility: CLINIC | Age: 38
End: 2023-07-06
Payer: COMMERCIAL

## 2023-07-06 DIAGNOSIS — N95.1 MENOPAUSAL SYMPTOMS: Primary | ICD-10-CM

## 2023-07-06 PROCEDURE — 96372 THER/PROPH/DIAG INJ SC/IM: CPT | Mod: S$GLB,,, | Performed by: OBSTETRICS & GYNECOLOGY

## 2023-07-06 PROCEDURE — 99999 PR PBB SHADOW E&M-EST. PATIENT-LVL I: ICD-10-PCS | Mod: PBBFAC,,,

## 2023-07-06 PROCEDURE — 99999 PR PBB SHADOW E&M-EST. PATIENT-LVL I: CPT | Mod: PBBFAC,,,

## 2023-07-06 PROCEDURE — 96372 PR INJECTION,THERAP/PROPH/DIAG2ST, IM OR SUBCUT: ICD-10-PCS | Mod: S$GLB,,, | Performed by: OBSTETRICS & GYNECOLOGY

## 2023-07-06 RX ADMIN — TESTOSTERONE CYPIONATE 50 MG: 200 INJECTION, SOLUTION INTRAMUSCULAR at 08:07

## 2023-07-18 ENCOUNTER — CLINICAL SUPPORT (OUTPATIENT)
Dept: REHABILITATION | Facility: HOSPITAL | Age: 38
End: 2023-07-18
Payer: COMMERCIAL

## 2023-07-18 DIAGNOSIS — R10.2 PELVIC PAIN: ICD-10-CM

## 2023-07-18 DIAGNOSIS — N39.0 RECURRENT UTI: ICD-10-CM

## 2023-07-18 DIAGNOSIS — R27.9 LACK OF COORDINATION: ICD-10-CM

## 2023-07-18 DIAGNOSIS — M62.838 MUSCLE SPASM: ICD-10-CM

## 2023-07-18 DIAGNOSIS — M62.89 PELVIC FLOOR DYSFUNCTION: ICD-10-CM

## 2023-07-18 PROCEDURE — 97162 PT EVAL MOD COMPLEX 30 MIN: CPT | Mod: PO

## 2023-07-18 PROCEDURE — 97530 THERAPEUTIC ACTIVITIES: CPT | Mod: PO

## 2023-07-18 PROCEDURE — 97140 MANUAL THERAPY 1/> REGIONS: CPT | Mod: PO

## 2023-07-18 NOTE — PATIENT INSTRUCTIONS
Reverse Kegels/Pelvic Floor Drops - relaxation practice     The feeling of dropping your pelvic floor is similar to the moment of relief when you have reached the bathroom; when you urinate or have a bowel movement, you first drop your pelvic floor, and let the pelvic floor muscles (PFM) go. Pay attention to this, and see if you can feel that happening. The key to dropping your pelvic floor is visualization, and Deep Breathing. The best way to consciously release tension from the PFMs is to try to release the muscles while you inhale. When you inhale properly with diaphragmatic breathing, your diaphragm actually lowers to make room for the breath, so it is natural to also lower and relax the pelvic floor muscles at the same time. When you exhale, your diaphragm rises to push the air out, and you then naturally raise or contract your PFMs on the breath out. If you can get this Pelvic Floor Rhythm, reverse kegels will be much easier to do.     You can start by gently lloyd your pelvic floor to feel what tightening feels like. Let that go and feel how the tension releases. Really focus on the difference between tension and relaxation. Once you get this, you can go a step further and visualize the muscles between the pubic bone and tailbone lengthen and gently move downwards away from your body. You can visualize your pubic bone moving towards the ceiling and tailbone towards the surface (if you are laying on your back).      It is helpful to take a mirror to look at your contraction and relaxation. When you perform a pelvic floor contraction (Kegel) your clitoris should move slightly downward, your anus should wink, and the perineal body (area between the vagina and anus) should move up and in. On the reverse Kegel, you should see the anus release and your perineal body move downwards towards the mirror. It should also feel like you are creating more space between the pubic bone and tailbone. Dont make it  happen, visualize and let it happen!       Eventually, once you have mastered the art of relaxing your pelvic floor muscles, you will need to check in with your pelvic floor throughout the day, and let go of any tension that you discover.

## 2023-07-19 RX ORDER — DEXTROAMPHETAMINE SULFATE, DEXTROAMPHETAMINE SACCHARATE, AMPHETAMINE ASPARTATE MONOHYDRATE, AND AMPHETAMINE SULFATE 9.375; 9.375; 9.375; 9.375 MG/1; MG/1; MG/1; MG/1
37.5 CAPSULE, EXTENDED RELEASE ORAL DAILY
Qty: 30 EACH | Refills: 0 | Status: SHIPPED | OUTPATIENT
Start: 2023-07-19 | End: 2023-08-24 | Stop reason: SDUPTHER

## 2023-07-21 PROBLEM — M62.838 MUSCLE SPASM: Status: ACTIVE | Noted: 2023-07-21

## 2023-07-21 PROBLEM — M62.89 PELVIC FLOOR DYSFUNCTION: Status: ACTIVE | Noted: 2023-07-21

## 2023-07-21 PROBLEM — R10.2 PELVIC PAIN: Status: ACTIVE | Noted: 2023-07-21

## 2023-07-21 PROBLEM — R27.9 LACK OF COORDINATION: Status: ACTIVE | Noted: 2023-07-21

## 2023-07-21 NOTE — PLAN OF CARE
"[facility]  Pelvic Health Physical Therapy Initial Evaluation    Date: 2023   Name: Yolanda [place]  Clinic Number: [id number]  Therapy Diagnosis:   Encounter Diagnoses   Name Primary?    Recurrent UTI     Pelvic pain     Pelvic floor dysfunction     Muscle spasm     Lack of coordination      Physician: Leelee Coto NP    Physician Orders: Pelvic PT Eval and Treat  Medical Diagnosis from Referral: Recurrent UTI [N39.0], Pelvic pain [R10.2]  Evaluation Date: 2023  Authorization Period Expiration: TBD  Plan of Care Expiration: 23  Visit # / Visits authorized:   FOTO: 0/3        Time In: 11:00  Time Out: 11:55  Total Appointment Time (timed & untimed codes): 55 minutes    Precautions: universal    Subjective     Date of onset: several year    History of current condition - Juanis reports: She has had some type of pelvic floor dysfunction since she's had children, both of whom were born via , in  and . She remembers having some type of physical therapy after her first . She worked on core muscle coordination and exercises to improve sciatic pain. Nowadays, she is getting recurrent UTI and French (began around 2022). Taking cranberry supplement and will start D-mannose soon. Uses IntraRosa. Also notes pain with intercourse that she's had for 10 years but has recently worsened. Saw a sex therapist who recommended dilators - this has helped somewhat. She has been able to move up to size 4 recently, which still hurts for now. Pain with intercourse feels "burning, like something's ripping", "like I'm splitting apart." Pain is concentrated towards the front of the vaginal opening,  as well as deeper towards the vaginal cuff. Position wise, rear entry is the most painful. Denies pain with external stimulation. Lubricant does not help much - GCL water based has been the best so far. Also reports she has had severe constipation over the last 3 weeks - taking MOM " and suppositories to have bowel movements.    OB/GYN History: hysterectomy,  x2, mastectomy/reconstruction  Sexually active? Yes  Pain with vaginal exams, intercourse or tampon use? Yes    Bladder/Bowel History:   Frequency of urination:   Daytime: 1.5-2 hours           Nighttime: 1-2 times per night every night  Difficulty initiating urine stream: No  Urine stream: strong  Complete emptying: Yes  Bladder leakage: No  Frequency of incidents: none  Amount leaked (urine): n/a  Urinary Urgency: No  Able to delay the urge for several minute(s).  Frequency of bowel movements: usually once a day. Currently every 5 days if she's not taking laxatives  Difficulty initiating BM: Yes  Quality/Shape of BM: variable  Does Patient Feel Empty after BM? No  Fiber Supplements or Laxative Use?  Yes  Colon leakage: No    Prior Therapy/Previous treatment included: dilators  Social History: lives with their family  Current exercise: exercising regularly  Occupation: Pt works as a clinical research manager with [employer] and job-related duties vary depending on the day.  Prior Level of Function: independent  Current Level of Function: independent, with impaired ADL participation due to pain and dysfunction    Types of fluid intake: drinks lots of water, one diet coke per day, protein shakes  Diet: well rounded   Habitus:well developed, well nourished  Abuse/Neglect: No     PAIN:  Location: vagina, pelvis   Current 0/10, worst 7/10, best 0/10   Description: Burning and ripping  Aggravating Factors/Activities that cause symptoms: Vaginal exam/provocation and intercourse   Easing Factors: stopping the painful activity     Medical History: Juanis  has a past medical history of Abnormal Pap smear of cervix (), ADHD, Allergy, Anorexia, Anxiety, Asthma, BRCA1 positive (2020), Bulimia, Depression, Fever blister, Gastroparesis, GERD (gastroesophageal reflux disease), History of posttraumatic stress disorder (PTSD), Hypertension,  Invasive ductal carcinoma of right breast (2019), Mastitis, Migraine headache, PONV (postoperative nausea and vomiting), and Status post mastectomy, bilateral (2020).     Surgical History:  Yolanda [place]  has a past surgical history that includes Tonsillectomy; Bone marrow aspiration; Colposcopy; Cervical biopsy w/ loop electrode excision;  section (2016); Shoulder surgery (Left); Sinus surgery;  section (N/A, 2019); Insertion of tunneled central venous catheter (CVC) with subcutaneous port (Left, 2019); Bilateral mastectomy (Bilateral, 2020); Biopsy of axillary node (Right, 2020); Injection for sentinel node identification (Right, 2020); Mediport removal (N/A, 2020); Insertion of breast tissue expander (Bilateral, 2020); Breast surgery; Tissue expander removal (Right, 10/3/2020); Mastectomy; Robot-assisted laparoscopic abdominal hysterectomy using da Andie Xi (N/A, 2020); Robot-assisted laparoscopic salpingo-oophorectomy using da Andie Xi (Bilateral, 2020); Esophagogastroduodenoscopy (N/A, 2021); Upper gastrointestinal endoscopy; Esophagogastroduodenoscopy (N/A, 2021); and Stomach surgery.    Medications: Yolanda has a current medication list which includes the following prescription(s): albuterol, alprazolam, anastrozole, qulipta, benzonatate, butalbital-aspirin-caffeine, citalopram, mydayis, loratadine, magnesium, magnesium oxide, metformin, metoclopramide hcl, multivitamin, nitrofurantoin (macrocrystal-monohydrate), nitrofurantoin (macrocrystal-monohydrate), onabotulinumtoxina, ondansetron, pantoprazole, phenazopyridine, intrarosa, intrarosa, prochlorperazine, rosuvastatin, spironolactone, sucralfate, tizanidine, topiramate, topiramate, tretinoin, trudhesa, ubrelvy, and valacyclovir, and the following Facility-Administered Medications: onabotulinumtoxina and testosterone cypionate.    Allergies:   Review of patient's allergies  "indicates:   Allergen Reactions    Amoxicillin Hives    Penicillins Hives and Rash    Diazepam Anxiety and Other (See Comments)     "makes hyper"        Imaging See EMR for full imaging workup    Pts goals: to have pain free intercourse, and regular easy bowel movements    OBJECTIVE     See EMR under MEDIA for written consent provided 7/18/2023  Chaperone: declined    ORTHO SCREEN  Posture in sitting: WNL  Posture in standing: forward and rounded shoulders   Pelvic alignment: Not assessed today    SI Joint Palpation: Tenderness to the right SI joint palpation. Tenderness to the left SI joint palpation.  Adductor Palpation: increased tension     BREATHING MECHANICS ASSESSMENT   Thorax Assessment During Quiet Respiration: Decreased excursion of abdominal wall  and Decreased excursion bilaterally of lateral ribs   Thorax Assessment During Deep Respiration: WNL excursion of ribcage and WNL excursion of abdominal wall    VAGINAL PELVIC FLOOR EXAM    EXTERNAL ASSESSMENT  Introitus: WNL  Skin condition: WNL  Scarring: none   Sensation: WNL   Pain:  none  Voluntary contraction: visible lift  Voluntary relaxation: visible drop  Involuntary contraction: reflex tightening  Bearing down: bulge  Perineal descent: absent  Comments:        INTERNAL ASSESSMENT  Pain: trigger points as follows: B puborectalis, B OI L>>>R. Good tolerance to manual therapy   Sensation: able to localized pressure appropriately   Vaginal vault: WNL   Muscle Bulk: hypertonus - moderate  Muscle Power: 3/5  Muscle Endurance: 5 sec  # Reps To Fatigue: not tested    Fast Contractions in 10 seconds: not tested     Quality of contraction: slow relaxation and incomplete relaxation   Specificity: WNL   Coordination: WNL   Prolapse check:not assessed  Comments:         Limitation/Restriction for FOTO-not captured at eval       TREATMENT     Treatment Time In: 11:30  Treatment Time Out: 11:55  Total Treatment time (time-based codes) separate from Evaluation: 25 " minutes    Manual Therapy to develop flexibility, extensibility, and desensitization for 10 minutes including: soft tissue mobilization of B OI, B puborectalis    Therapeutic Activity Patient participated in dynamic functional therapeutic activities to improve functional performance for 15 minutes. Including: Education as described below.     Patient Education provided:   general anatomy/physiology of urinary/ bowel  system, benefits of treatment, risks of treatment, and alternative methods of treatment were discussed with the pt. Additionally, anatomy/physiology of pelvic floor and pelvic drops were reviewed.     Home Exercises Provided:  Written Home Exercises Provided: yes.  Exercises were reviewed and Juanis was able to demonstrate them prior to the end of the session.    Juanis demonstrated good  understanding of the education provided.     See EMR under Patient Instructions for exercises provided 7/18/2023.    Assessment     Yolanda is a 38 y.o. female referred to outpatient Physical Therapy with a medical diagnosis of Recurrent UTI [N39.0], Pelvic pain [R10.2]. Pt presents with pelvic floor tenderness, increased tension of the pelvic muscles, poor quality of pelvic muscle contraction, increased frequency of urination, dysfunctional defecation, chronic UTI due to dysfunctional voiding, and pelvic pain. Initial treatment to focus on downtraining and neuromuscular retraining of pelvic floor muscles to improve awareness, desensitization, and ability to participate in ADLs.     Pt prognosis is Good.   Pt will benefit from skilled outpatient Physical Therapy to address the deficits stated above and in the chart below, provide pt/family education, and to maximize pt's level of independence.     Plan of care discussed with patient: Yes  Pt's spiritual, cultural and educational needs considered and patient is agreeable to the plan of care and goals as stated below:       Anticipated Barriers for therapy:  scheduling    Medical Necessity is demonstrated by the following    History  Co-morbidities and personal factors that may impact the plan of care Co-morbidities:   History of breast CA, pelvic surgery, abdominal surgery, insomnia, PTSD    Personal Factors:   no deficits     moderate   Examination  Body Structures and Functions, activity limitations and participation restrictions that may impact the plan of care Body Regions/Systems/Functions:  pelvic floor tenderness, increased tension of the pelvic muscles, poor quality of pelvic muscle contraction, increased frequency of urination, dysfunctional defecation, chronic UTI due to dysfunctional voiding, and pelvic pain.     Activity Limitations:  initiating a BM, full bladder emptying, Pain with ADLs, and Participating in social activities and ADLs due to pelvic pressure    Participation Restrictions:  social activities with friends/family, relationship with spouse/partner, and regularly having a comfortable BM    Activity limitations:   Learning and applying knowledge  no deficits    General Tasks and Commands  no deficits    Communication  no deficits    Mobility  no deficits    Self care  no deficits    Domestic Life  no deficits    Interactions/Relationships  no deficits    Life Areas  no deficits    Community and Social Life  no deficits       moderate   Clinical Presentation evolving clinical presentation with changing clinical characteristics moderate   Decision Making/ Complexity Score: moderate     Short Term Goals: 6 weeks   Pt to demonstrate tolerance to gentle manual therapy to allow for pain-free gynecological exams.  Pt to demonstrate tolerance to dilator size 4 for 5 minutes to allow for tampon use.  Pt to demonstrate proper diaphragmatic breathing to help with calming the nervous system in order to decrease pain and to improve abdominal wall musculature extensibility.   Pt to report minimal pain with palpation of abdominal wall due to improvement in  soft tissue mobility from moderate to minimal restrictions.  Pt to demonstrate an improved score in the FOTO [TBD] survey  to at least [TBD] to demonstrate improving ADL participation.      Long Term Goals: 12 weeks   Pt to demonstrate tolerance to dilator size 6 to allow for pain-free intercourse.  Pt to report decreased incidence of PF tension at rest (no more than 20% of the time) to demonstrate functional mechanics and motor patterns.  Pt to be discharged with home plan for carry over after discharge.   Pt to report improved restorative sleeping, waking up no more than 1x/night from pain or urinary urge.  Pt to report being able to have a comfortable vaginal exam without significant increase in pelvic pain.  Pt will be independent with use of dilation in order to progress towards self management and intercourse.  Pt to demonstrate an improved score in the FOTO [TBD] survey  to at least [TBD] to demonstrate improving ADL participation.      Plan     Plan of care Certification: 7/18/2023 to 11/7/23.    Outpatient Physical Therapy 0-2 times weekly for 16 weeks to include the following interventions: therapeutic exercises, therapeutic activity, neuromuscular re-education, manual therapy, modalities PRN, patient/family education, dry needling, and self care/home management    I appreciate your consult and look forward to participating in this patient's care.    Mana Ayers, PT, DPT

## 2023-07-27 ENCOUNTER — PATIENT MESSAGE (OUTPATIENT)
Dept: OBSTETRICS AND GYNECOLOGY | Facility: CLINIC | Age: 38
End: 2023-07-27
Payer: COMMERCIAL

## 2023-08-01 NOTE — PROGRESS NOTES
Subjective:       Patient ID: Yolanda [place] is a 38 y.o. female.    Chief Complaint: No chief complaint on file.      HPI 38 year old female, who returns for follow-up of carcinoma of the right breast, ER +,HER 2 negative.   She has been on anastrozole since March 2022 after prior letrozole and exemestane.      She has started Quilipta   for her migraines and had Ubrelvy for breakthrough. Tapering Topamax.  She reports anhidrosis -? Med related.  Does crossfit for exercise.  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    Breast history:    Mammogram and ultrasound on December 11th, 2019 showed right breast mass 7 cm from the nipple.  By ultrasound this mass measured 33 x 32 x 21 mm.    Right breast biopsy on December 13 showed high-grade infiltrating ductal carcinoma (histologic grade 3, nuclear grade 3, mitotic index 3) there were medullary features.  The cancer was 25% ER positive and NC and HER2 negative.  Ki-67 was 90%.    She completed 4 cycles of neoadjuvant Adriamycin Cytoxan February 13, 2020.  She completed 12 weeks of weekly Taxol on May 20, 2020.    On July 1, 2020 she underwent bilateral mastectomy.    The right breast showed no residual malignancy, 5 right axillary sentinel lymph nodes were all negative for malignancy.  Final pathological stage yp T0 N0.  The left breast showed no atypia or malignancy.    Radiation therapy was completed 11/25/20.      She had a hysterectomy  December 18th, 2020.  Letrozole was started December 2020..  She was then changed to Exemestane in early 2021 due to arthralgias.  Changed to anastrazole in March 2022 due to arthralgias and fatigue.    She had DORIAN reconstruction in February 2021.    Review of Systems   Constitutional:  Negative for activity change, appetite change, fever and unexpected weight change.   HENT:  Negative for mouth sores.    Eyes:  Negative for visual disturbance.   Respiratory:  Negative for cough and shortness of breath.     Cardiovascular:  Negative for chest pain.   Gastrointestinal:  Negative for abdominal distention, abdominal pain, constipation, diarrhea and rectal pain.   Genitourinary:  Negative for frequency.   Musculoskeletal:  Positive for arthralgias. Negative for back pain.   Skin:  Negative for rash.   Neurological:  Positive for numbness and headaches.   Hematological:  Negative for adenopathy.   Psychiatric/Behavioral:  Negative for dysphoric mood. The patient is not nervous/anxious.        Objective:      Physical Exam  Vitals reviewed.   Constitutional:       General: She is not in acute distress.     Appearance: Normal appearance. She is well-developed.   Eyes:      General: No scleral icterus.  Cardiovascular:      Rate and Rhythm: Normal rate and regular rhythm.   Pulmonary:      Effort: Pulmonary effort is normal. No respiratory distress.      Breath sounds: Normal breath sounds. No wheezing or rales.   Chest:       Abdominal:      Palpations: Abdomen is soft. There is no mass.      Tenderness: There is no abdominal tenderness.   Lymphadenopathy:      Cervical: No cervical adenopathy.      Upper Body:      Right upper body: No supraclavicular or axillary adenopathy.      Left upper body: No supraclavicular or axillary adenopathy.   Neurological:      Mental Status: She is alert and oriented to person, place, and time.   Psychiatric:         Mood and Affect: Mood normal.         Behavior: Behavior normal.         Thought Content: Thought content normal.         Judgment: Judgment normal.         Assessment:       1. Malignant neoplasm of upper-outer quadrant of right breast in female, estrogen receptor positive        Plan:        Continue anastrozole and RTC 6 M.          Route Chart for Scheduling    Med Onc Chart Routing      Follow up with physician 6 months.   Follow up with ADRIAN    Infusion scheduling note    Injection scheduling note    Labs None   Scheduling:  Preferred lab:  Lab interval:     Imaging None       Pharmacy appointment    Other referrals     No additional referrals needed

## 2023-08-02 ENCOUNTER — OFFICE VISIT (OUTPATIENT)
Dept: HEMATOLOGY/ONCOLOGY | Facility: CLINIC | Age: 38
End: 2023-08-02
Payer: COMMERCIAL

## 2023-08-02 VITALS
DIASTOLIC BLOOD PRESSURE: 79 MMHG | HEIGHT: 65 IN | RESPIRATION RATE: 18 BRPM | WEIGHT: 151.88 LBS | SYSTOLIC BLOOD PRESSURE: 120 MMHG | OXYGEN SATURATION: 100 % | HEART RATE: 84 BPM | BODY MASS INDEX: 25.3 KG/M2

## 2023-08-02 DIAGNOSIS — C50.411 MALIGNANT NEOPLASM OF UPPER-OUTER QUADRANT OF RIGHT BREAST IN FEMALE, ESTROGEN RECEPTOR POSITIVE: Primary | Chronic | ICD-10-CM

## 2023-08-02 DIAGNOSIS — Z17.0 MALIGNANT NEOPLASM OF UPPER-OUTER QUADRANT OF RIGHT BREAST IN FEMALE, ESTROGEN RECEPTOR POSITIVE: Primary | Chronic | ICD-10-CM

## 2023-08-02 PROCEDURE — 3078F PR MOST RECENT DIASTOLIC BLOOD PRESSURE < 80 MM HG: ICD-10-PCS | Mod: CPTII,S$GLB,, | Performed by: INTERNAL MEDICINE

## 2023-08-02 PROCEDURE — 3074F SYST BP LT 130 MM HG: CPT | Mod: CPTII,S$GLB,, | Performed by: INTERNAL MEDICINE

## 2023-08-02 PROCEDURE — 3008F BODY MASS INDEX DOCD: CPT | Mod: CPTII,S$GLB,, | Performed by: INTERNAL MEDICINE

## 2023-08-02 PROCEDURE — 99999 PR PBB SHADOW E&M-EST. PATIENT-LVL V: CPT | Mod: PBBFAC,,, | Performed by: INTERNAL MEDICINE

## 2023-08-02 PROCEDURE — 99213 OFFICE O/P EST LOW 20 MIN: CPT | Mod: S$GLB,,, | Performed by: INTERNAL MEDICINE

## 2023-08-02 PROCEDURE — 99213 PR OFFICE/OUTPT VISIT, EST, LEVL III, 20-29 MIN: ICD-10-PCS | Mod: S$GLB,,, | Performed by: INTERNAL MEDICINE

## 2023-08-02 PROCEDURE — 3008F PR BODY MASS INDEX (BMI) DOCUMENTED: ICD-10-PCS | Mod: CPTII,S$GLB,, | Performed by: INTERNAL MEDICINE

## 2023-08-02 PROCEDURE — 3078F DIAST BP <80 MM HG: CPT | Mod: CPTII,S$GLB,, | Performed by: INTERNAL MEDICINE

## 2023-08-02 PROCEDURE — 99999 PR PBB SHADOW E&M-EST. PATIENT-LVL V: ICD-10-PCS | Mod: PBBFAC,,, | Performed by: INTERNAL MEDICINE

## 2023-08-02 PROCEDURE — 3074F PR MOST RECENT SYSTOLIC BLOOD PRESSURE < 130 MM HG: ICD-10-PCS | Mod: CPTII,S$GLB,, | Performed by: INTERNAL MEDICINE

## 2023-08-02 PROCEDURE — 1159F PR MEDICATION LIST DOCUMENTED IN MEDICAL RECORD: ICD-10-PCS | Mod: CPTII,S$GLB,, | Performed by: INTERNAL MEDICINE

## 2023-08-02 PROCEDURE — 1159F MED LIST DOCD IN RCRD: CPT | Mod: CPTII,S$GLB,, | Performed by: INTERNAL MEDICINE

## 2023-08-04 ENCOUNTER — CLINICAL SUPPORT (OUTPATIENT)
Dept: OBSTETRICS AND GYNECOLOGY | Facility: CLINIC | Age: 38
End: 2023-08-04
Payer: COMMERCIAL

## 2023-08-04 DIAGNOSIS — Z79.890 HORMONE REPLACEMENT THERAPY (HRT): Primary | ICD-10-CM

## 2023-08-04 PROCEDURE — 96372 THER/PROPH/DIAG INJ SC/IM: CPT | Mod: S$GLB,,, | Performed by: OBSTETRICS & GYNECOLOGY

## 2023-08-04 PROCEDURE — 96372 PR INJECTION,THERAP/PROPH/DIAG2ST, IM OR SUBCUT: ICD-10-PCS | Mod: S$GLB,,, | Performed by: OBSTETRICS & GYNECOLOGY

## 2023-08-04 PROCEDURE — 99999 PR PBB SHADOW E&M-EST. PATIENT-LVL I: CPT | Mod: PBBFAC,,,

## 2023-08-04 PROCEDURE — 99999 PR PBB SHADOW E&M-EST. PATIENT-LVL I: ICD-10-PCS | Mod: PBBFAC,,,

## 2023-08-04 RX ADMIN — TESTOSTERONE CYPIONATE 50 MG: 200 INJECTION, SOLUTION INTRAMUSCULAR at 08:08

## 2023-08-07 ENCOUNTER — CLINICAL SUPPORT (OUTPATIENT)
Dept: REHABILITATION | Facility: HOSPITAL | Age: 38
End: 2023-08-07
Payer: COMMERCIAL

## 2023-08-07 DIAGNOSIS — R27.9 LACK OF COORDINATION: ICD-10-CM

## 2023-08-07 DIAGNOSIS — R10.2 PELVIC PAIN: ICD-10-CM

## 2023-08-07 DIAGNOSIS — M62.89 PELVIC FLOOR DYSFUNCTION: Primary | ICD-10-CM

## 2023-08-07 DIAGNOSIS — M62.838 MUSCLE SPASM: ICD-10-CM

## 2023-08-07 PROCEDURE — 97140 MANUAL THERAPY 1/> REGIONS: CPT | Mod: PO

## 2023-08-07 PROCEDURE — 97112 NEUROMUSCULAR REEDUCATION: CPT | Mod: PO

## 2023-08-07 NOTE — PROGRESS NOTES
Pelvic Health Physical Therapy   Treatment Note     Name: Yolanda Norman  Clinic Number: [id number]    Therapy Diagnosis:   Encounter Diagnoses   Name Primary?    Pelvic floor dysfunction Yes    Lack of coordination     Muscle spasm     Pelvic pain      Physician: Leelee Coto NP    Visit Date: 8/7/2023    Physician Orders: Pelvic PT Eval and Treat  Medical Diagnosis from Referral: Recurrent UTI [N39.0], Pelvic pain [R10.2]  Evaluation Date: 7/18/2023  Authorization Period Expiration: 12/31/23  Plan of Care Expiration: 11/7/23  Visit # / Visits authorized: 1/20  Cancelled Visits: 1  No Show Visits: 0    Time In: 11:30  Time Out: 12:30  Total Billable Time: 60 minutes    Precautions: Standard    Subjective     Pt reports: she is taking in more fiber - she is now having a BM every single day. Taking Metamucil capsules. Still c/o burning aching pain with intercourse, but not as bad as it was in the past. Also still experiencing some cramping in the pelvis. No dysuria recently.   She was compliant with home exercise program.  Response to previous treatment/ Functional change: ongoing    Pain: 0/10  Location: denies pain at rest    Constitutional Symptoms Review: The patient denies having any constitutional symptoms.     Objective   Pt verbally consents to intravaginal treatment today.  Signed consent form already on file.     Juanis received therapeutic exercises to develop  strength, endurance, ROM, flexibility, posture, and core stabilization for 00 minutes including: see chart below    Juanis received the following manual therapy techniques: to develop flexibility, extensibility, and desensitization for 15 minutes including: see chart below    Juanis participated in neuromuscular re-education activities to develop Coordination, Control, Down training, Posture, Proprioception, Kinesthetic, Balance, and Sense for 40 minutes including: see chart below    Juanis participated in dynamic functional therapeutic activities to  improve functional performance for 00  minutes, including: see chart below      Type Intervention 23   NMR MFR/ STM to B puborectalis, B OI, with diaphragmatic breathing, contract-relax as needed x   Manual Cupping to low abdominal scar above and below  + gentle direct scar mobilization x                                          Home Exercises Provided and Patient Education Provided     Education provided:   - cont prior HEP  Discussed progression of plan of care with patient; educated pt in activity modification; reviewed HEP with pt. Pt demonstrated and verbalized understanding of all instruction and was not provided with a handout of HEP (see Patient Instructions).    Written Home Exercises Provided: Patient instructed to cont prior HEP.  Exercises were reviewed and Juanis was able to demonstrate them prior to the end of the session.  Juanis demonstrated good  understanding of the education provided.     See EMR under Patient Instructions for exercises provided prior visit.    Assessment     Juanis tolerated treatment well and demonstrated understanding of all education provided at today's visit. Patient presents with good  scar mobility, with more restrictions noted to L-most portion of deep flap low abdominal scar.   Juanis Is progressing well towards her goals.   Pt prognosis is Good.     Pt will continue to benefit from skilled outpatient physical therapy to address the deficits listed in the problem list box on initial evaluation, provide pt/family education and to maximize pt's level of independence in the home and community environment.     Pt's spiritual, cultural and educational needs considered and pt agreeable to plan of care and goals.     Anticipated barriers to physical therapy: scheduling    Short Term Goals: 6 weeks   Pt to demonstrate tolerance to gentle manual therapy to allow for pain-free gynecological exams.  Pt to demonstrate tolerance to dilator size 4 for 5 minutes to allow for tampon  use.  Pt to demonstrate proper diaphragmatic breathing to help with calming the nervous system in order to decrease pain and to improve abdominal wall musculature extensibility.   Pt to report minimal pain with palpation of abdominal wall due to improvement in soft tissue mobility from moderate to minimal restrictions.    Long Term Goals: 12 weeks   Pt to demonstrate tolerance to dilator size 6 to allow for pain-free intercourse.  Pt to report decreased incidence of PF tension at rest (no more than 20% of the time) to demonstrate functional mechanics and motor patterns.  Pt to be discharged with home plan for carry over after discharge.   Pt to report improved restorative sleeping, waking up no more than 1x/night from pain or urinary urge.  Pt to report being able to have a comfortable vaginal exam without significant increase in pelvic pain.  Pt will be independent with use of dilation in order to progress towards self management and intercourse.      Plan     Continue per established POC.    Mana Ayers, PT, DPT

## 2023-08-10 ENCOUNTER — PATIENT MESSAGE (OUTPATIENT)
Dept: UROLOGY | Facility: CLINIC | Age: 38
End: 2023-08-10
Payer: COMMERCIAL

## 2023-08-23 ENCOUNTER — CLINICAL SUPPORT (OUTPATIENT)
Dept: OBSTETRICS AND GYNECOLOGY | Facility: CLINIC | Age: 38
End: 2023-08-23
Payer: COMMERCIAL

## 2023-08-23 ENCOUNTER — ON-DEMAND VIRTUAL (OUTPATIENT)
Dept: URGENT CARE | Facility: CLINIC | Age: 38
End: 2023-08-23
Payer: COMMERCIAL

## 2023-08-23 DIAGNOSIS — E88.810 METABOLIC SYNDROME: ICD-10-CM

## 2023-08-23 DIAGNOSIS — Z79.890 HORMONE REPLACEMENT THERAPY (HRT): Primary | ICD-10-CM

## 2023-08-23 DIAGNOSIS — J01.90 ACUTE NON-RECURRENT SINUSITIS, UNSPECIFIED LOCATION: Primary | ICD-10-CM

## 2023-08-23 PROCEDURE — 96372 THER/PROPH/DIAG INJ SC/IM: CPT | Mod: S$GLB,,, | Performed by: OBSTETRICS & GYNECOLOGY

## 2023-08-23 PROCEDURE — 99213 PR OFFICE/OUTPT VISIT, EST, LEVL III, 20-29 MIN: ICD-10-PCS | Mod: 95,,, | Performed by: NURSE PRACTITIONER

## 2023-08-23 PROCEDURE — 99999 PR PBB SHADOW E&M-EST. PATIENT-LVL I: CPT | Mod: PBBFAC,,,

## 2023-08-23 PROCEDURE — 96372 PR INJECTION,THERAP/PROPH/DIAG2ST, IM OR SUBCUT: ICD-10-PCS | Mod: S$GLB,,, | Performed by: OBSTETRICS & GYNECOLOGY

## 2023-08-23 PROCEDURE — 99999 PR PBB SHADOW E&M-EST. PATIENT-LVL I: ICD-10-PCS | Mod: PBBFAC,,,

## 2023-08-23 PROCEDURE — 99213 OFFICE O/P EST LOW 20 MIN: CPT | Mod: 95,,, | Performed by: NURSE PRACTITIONER

## 2023-08-23 RX ORDER — CEFDINIR 300 MG/1
300 CAPSULE ORAL EVERY 12 HOURS
Qty: 20 CAPSULE | Refills: 0 | Status: SHIPPED | OUTPATIENT
Start: 2023-08-23 | End: 2023-09-04

## 2023-08-23 RX ADMIN — TESTOSTERONE CYPIONATE 50 MG: 200 INJECTION, SOLUTION INTRAMUSCULAR at 02:08

## 2023-08-23 NOTE — PROGRESS NOTES
Subjective:      Patient ID: Yolanda Norman is a 38 y.o. female.    Vitals:  vitals were not taken for this visit.     Chief Complaint: Sinus Problem      Visit Type: TELE AUDIOVISUAL    Present with the patient at the time of consultation: TELEMED PRESENT WITH PATIENT: None    Past Medical History:   Diagnosis Date    Abnormal Pap smear of cervix     ADHD     Allergy     seasonal    Anorexia     Anxiety     Asthma     BRCA1 positive 2020    Bulimia     Depression     Fever blister     Gastroparesis     GERD (gastroesophageal reflux disease)     History of posttraumatic stress disorder (PTSD)     Hypertension     Gestational Hypertension    Invasive ductal carcinoma of right breast 2019    Mastitis     Migraine headache     PONV (postoperative nausea and vomiting)     Status post mastectomy, bilateral 2020     Past Surgical History:   Procedure Laterality Date    BILATERAL MASTECTOMY Bilateral 2020    Procedure: MASTECTOMY, BILATERAL - BILATERAL SKIN SPARING MASTECTOMY;  Surgeon: Percy Ayers MD;  Location: [facility];  Service: General;  Laterality: Bilateral;    BIOPSY OF AXILLARY NODE Right 2020    Procedure: BIOPSY, LYMPH NODE, AXILLARY;  Surgeon: Percy Ayers MD;  Location: [facility];  Service: General;  Laterality: Right;    BONE MARROW ASPIRATION      x 3    BREAST SURGERY      CERVICAL BIOPSY  W/ LOOP ELECTRODE EXCISION       SECTION  2016     SECTION N/A 2019    Procedure:  SECTION;  Surgeon: Kirit Hernandez MD;  [facility];  Service: OB/GYN;  Laterality: N/A;    COLPOSCOPY      ESOPHAGOGASTRODUODENOSCOPY N/A 2021    Procedure: EGD (ESOPHAGOGASTRODUODENOSCOPY);  Surgeon: Lj Santos MD;  Location: [address];  Service: Endoscopy;  Laterality: N/A;  COVID test on 21 at [facility]    ESOPHAGOGASTRODUODENOSCOPY N/A 2021    Procedure: ESOPHAGOGASTRODUODENOSCOPY (EGD);  Surgeon: Camila Wiley MD;   "Location: [facility];  Service: Endoscopy;  Laterality: N/A;    INJECTION FOR SENTINEL NODE IDENTIFICATION Right 7/1/2020    Procedure: INJECTION, FOR SENTINEL NODE IDENTIFICATION;  Surgeon: Percy Ayers MD;  Location: [facility];  Service: General;  Laterality: Right;    INSERTION OF BREAST TISSUE EXPANDER Bilateral 7/1/2020    Procedure: INSERTION, TISSUE EXPANDER, BREAST;  Surgeon: Kiko Aranda MD;  Location: [facility];  Service: Plastics;  Laterality: Bilateral;    INSERTION OF TUNNELED CENTRAL VENOUS CATHETER (CVC) WITH SUBCUTANEOUS PORT Left 12/27/2019    Procedure: [id number]-Left neck or chest wall;  Surgeon: Percy Ayers MD;  [address];  Service: General;  Laterality: Left;    MASTECTOMY      MEDIPORT REMOVAL N/A 7/1/2020    Procedure: REMOVAL, CATHETER, CENTRAL VENOUS, TUNNELED, WITH PORT;  Surgeon: Percy Ayers MD;  Location: [facility];  Service: General;  Laterality: N/A;    ROBOT-ASSISTED LAPAROSCOPIC ABDOMINAL HYSTERECTOMY USING DA HIMA XI N/A 12/18/2020    Procedure: XI ROBOTIC HYSTERECTOMY;  Surgeon: Emili Smith MD;  Location: [facility];  Service: OB/GYN;  Laterality: N/A;    ROBOT-ASSISTED LAPAROSCOPIC SALPINGO-OOPHORECTOMY USING DA HIMA XI Bilateral 12/18/2020    Procedure: XI ROBOTIC SALPINGO-OOPHORECTOMY;  Surgeon: Emili Smith MD;  Location: [facility];  Service: OB/GYN;  Laterality: Bilateral;    SHOULDER SURGERY Left     x 2    SINUS SURGERY      age 19    STOMACH SURGERY      TISSUE EXPANDER REMOVAL Right 10/3/2020    Procedure: REMOVAL, TISSUE EXPANDER;  Surgeon: Kiko Aranda MD;  Location: [facility];  Service: Plastics;  Laterality: Right;    TONSILLECTOMY      UPPER GASTROINTESTINAL ENDOSCOPY       Review of patient's allergies indicates:   Allergen Reactions    Amoxicillin Hives    Penicillins Hives and Rash    Diazepam Anxiety and Other (See Comments)     "makes hyper"     Current Outpatient Medications on File Prior to Visit   Medication Sig " Dispense Refill    albuterol (PROVENTIL/VENTOLIN HFA) 90 mcg/actuation inhaler Inhale 1 puff every 6 (six) hours  into the lungs for Wheezing (As Needed) for up to 60 doses 18 g 0    ALPRAZolam (XANAX) 0.25 MG tablet Take 1 tablet (0.25 mg total) by mouth daily as needed for Anxiety. 30 tablet 0    anastrozole (ARIMIDEX) 1 mg Tab Take 1 tablet (1 mg total) by mouth once daily. 90 tablet 3    atogepant (QULIPTA) 60 mg Tab Take 1 tablet by mouth once daily. 30 tablet 2    benzonatate (TESSALON) 200 MG capsule Take 1 capsule (200 mg total) by mouth 3 (three) times daily as needed for Cough. 30 capsule 1    butalbital-aspirin-caffeine -40 mg Tab butalbital-aspirin-caffeine 50 mg-325 mg-40 mg tablet   Take 1 tablet every 4 hours by oral route as needed.      citalopram (CELEXA) 40 MG tablet TAKE 1 TABLET(40 MG) BY MOUTH EVERY DAY 90 tablet 2    dextroamphetamine-amphetamine (MYDAYIS) 37.5 mg CT24 Take 1 capsule (37.5 mg) by mouth once Daily. 30 each 0    loratadine (CLARITIN) 10 mg tablet Take 10 mg by mouth once daily.      magnesium 30 mg Tab Take by mouth once.      magnesium oxide 400 mg magnesium Cap magnesium 400 mg (as magnesium oxide) capsule   Take by oral route.      metFORMIN (GLUCOPHAGE) 500 MG tablet Take 1 tablet (500 mg total) by mouth 2 (two) times daily with meals. 60 tablet 11    metoclopramide HCl (REGLAN) 10 MG tablet Take 1 tablet 3 (three) times daily before meals by mouth 90 tablet 0    multivitamin (THERAGRAN) per tablet Take 1 tablet by mouth once daily.      onabotulinumtoxinA (BOTOX INJ) Inject as directed. [id number]      ondansetron (ZOFRAN-ODT) 4 MG TbDL Take 1 tablet every 6 (six) hours as needed by mouth for Nausea for up to 7 days 20 tablet 0    pantoprazole (PROTONIX) 40 MG tablet Take 1 tablet (40 mg total) by mouth once daily. Patient needs follow up appointment for any further refills. 30 tablet 0    phenazopyridine (PYRIDIUM) 100 MG tablet 1 tablet 3 times a day for 2 days.  Take  as needed after meals for pain 6 tablet 0    prasterone, dhea, (INTRAROSA) 6.5 mg Inst Place 6.5 mg vaginally every evening. 30 each 11    prasterone, dhea, (INTRAROSA) 6.5 mg Inst Place 6.5 mg vaginally every evening. 30 each 11    prochlorperazine (COMPAZINE) 10 MG tablet Take 1 tablet (10 mg total) by mouth 3 (three) times daily as needed (Headache and nausea). 30 tablet 2    rosuvastatin (CRESTOR) 5 MG tablet Take 1 tablet daily by mouth 90 tablet 3    spironolactone (ALDACTONE) 100 MG tablet Take 1 tablet (100 mg total) by mouth once daily. 30 tablet 3    sucralfate (CARAFATE) 1 gram tablet Take 1 tablet (1 g total) by mouth 4 (four) times daily. Can be crushed if cannot swallow. 120 tablet 0    tiZANidine (ZANAFLEX) 2 MG tablet Take 1 tablet every 6 hours by oral route. 30 tablet 2    topiramate (TOPAMAX) 200 MG Tab Take 1 tablet (200 mg total) by mouth every evening. 90 tablet 3    topiramate (TOPAMAX) 25 MG tablet Take 7 tablets by mouth daily for 1 week. Then 6 tabs daily x1 week. Then 5 tabs daily x1 week. Then 4 tabs daily x1 week. Then 3 tabs daily x1 week. Then 2 tabs daily x1 week. Then 1 tab daily x1 week 196 tablet 0    TRUDHESA 0.725 mg/pump act. (4 mg/mL) Spry by Each Nostril route.      ubrogepant (UBRELVY) 100 mg tablet Take 1 tablet twice a day by oral route as needed. 16 tablet 2    valACYclovir (VALTREX) 1000 MG tablet Take 1 tablet (1,000 mg total) by mouth every 12 (twelve) hours. 60 tablet 0    [DISCONTINUED] chlorproMAZINE (THORAZINE) 10 MG tablet Take 1 tablet 3 times a day by oral route for 3 days. 9 tablet 0    [DISCONTINUED] nitrofurantoin, macrocrystal-monohydrate, (MACROBID) 100 MG capsule 1 capsule every 12 hours for 7 days.  Take with food 2 times a day until gone 14 capsule 0    [DISCONTINUED] nitrofurantoin, macrocrystal-monohydrate, (MACROBID) 100 MG capsule Take 1 capsule (100 mg total) by mouth 2 (two) times daily. 14 capsule 0    [DISCONTINUED] tretinoin (ALTRALIN) 0.05 % gel  SMARTSIG:Sparingly Topical Every Night       Current Facility-Administered Medications on File Prior to Visit   Medication Dose Route Frequency Provider Last Rate Last Admin    onabotulinumtoxina injection 200 Units  200 Units Intramuscular Q90 Days Dillon Gonzalez MD   200 Units at 06/25/21 0751    testosterone cypionate injection 50 mg  50 mg Intramuscular Q21 Days Kaleigh Polanco MD   50 mg at 08/04/23 0850     Family History   Problem Relation Age of Onset    Cancer Maternal Grandmother 40        cervical    Cervical cancer Mother     Breast cancer Other     Ovarian cancer Other     Colon polyps Father     Colon cancer Neg Hx     Melanoma Neg Hx        Medications Ordered                [facility] [address] [address]    Telephone: [phone]   Fax: [phone]   Hours: Mon-Fri 7a-7p, Sat-Sun 10a-4p                         [facility] (1 of 1)              cefdinir (OMNICEF) 300 MG capsule    Sig: Take 1 capsule (300 mg total) by mouth every 12 (twelve) hours. for 10 days       Start: 8/23/23     Quantity: 20 capsule Refills: 0                           Ohs Peq Odvv Intake    8/23/2023  9:26 AM CDT - Filed by Patient   Describe your reason for todays visit Sinus infection   What is your current physical address in the event of a medical emergency? [address] [address]   Are you able to take your vital signs? No   Please attach any relevant images or files          Onset 1.5 weeks. Hx of seasonal allergies. Taking Claritin, Flonase, and sinus rinses with no relief.    Sinus Problem  Associated symptoms include congestion, coughing and sinus pressure. Pertinent negatives include no chills, ear pain, shortness of breath or sore throat.       Constitution: Positive for fatigue. Negative for chills and fever.   HENT:  Positive for congestion, postnasal drip, sinus pain and sinus pressure. Negative for ear pain and sore throat.    Neck:  Positive for painful lymph nodes.   Cardiovascular:  Negative for chest pain.   Respiratory:  Positive for cough. Negative for shortness of breath and wheezing.    Gastrointestinal:  Negative for nausea, vomiting and diarrhea.   Hematologic/Lymphatic: Positive for swollen lymph nodes.        Objective:   The physical exam was conducted virtually.  Physical Exam   Constitutional: She is oriented to person, place, and time. She does not appear ill. No distress.   HENT:   Head: Normocephalic and atraumatic.   Nose: Sinus tenderness present. Right sinus exhibits maxillary sinus tenderness and frontal sinus tenderness. Left sinus exhibits maxillary sinus tenderness and frontal sinus tenderness.   Eyes: Extraocular movement intact   Pulmonary/Chest: Effort normal.   Abdominal: Normal appearance.   Musculoskeletal: Normal range of motion.         General: Normal range of motion.   Neurological: no focal deficit. She is alert and oriented to person, place, and time.   Psychiatric: Her behavior is normal. Mood normal.   Vitals reviewed.      Assessment:     1. Acute non-recurrent sinusitis, unspecified location    2. Metabolic syndrome        Plan:   Patient encouraged to monitor symptoms closely and instructed to follow-up for new or worsening symptoms. Further, in-person, evaluation may be necessary for continued treatment. Please follow up with your primary care doctor or specialist as needed. Verbally discussed plan. Patient confirms understanding and is in agreement with treatment and plan.     You must understand that you've received a Virtual Care evaluation only and that you may be released before all your medical problems are known or treated. You, the patient, will arrange for follow up care as instructed.      Acute non-recurrent sinusitis, unspecified location  -     cefdinir (OMNICEF) 300 MG capsule; Take 1 capsule (300 mg total) by mouth every 12 (twelve) hours. for 10 days  Dispense: 20 capsule; Refill:  0    Metabolic syndrome

## 2023-08-24 ENCOUNTER — PATIENT MESSAGE (OUTPATIENT)
Dept: HEMATOLOGY/ONCOLOGY | Facility: CLINIC | Age: 38
End: 2023-08-24
Payer: COMMERCIAL

## 2023-08-24 RX ORDER — DEXTROAMPHETAMINE SULFATE, DEXTROAMPHETAMINE SACCHARATE, AMPHETAMINE ASPARTATE MONOHYDRATE, AND AMPHETAMINE SULFATE 9.375; 9.375; 9.375; 9.375 MG/1; MG/1; MG/1; MG/1
37.5 CAPSULE, EXTENDED RELEASE ORAL DAILY
Qty: 30 EACH | Refills: 0 | Status: SHIPPED | OUTPATIENT
Start: 2023-08-24 | End: 2023-09-19 | Stop reason: SDUPTHER

## 2023-08-25 ENCOUNTER — PATIENT MESSAGE (OUTPATIENT)
Dept: OBSTETRICS AND GYNECOLOGY | Facility: CLINIC | Age: 38
End: 2023-08-25
Payer: COMMERCIAL

## 2023-08-30 ENCOUNTER — PATIENT MESSAGE (OUTPATIENT)
Dept: REHABILITATION | Facility: HOSPITAL | Age: 38
End: 2023-08-30
Payer: COMMERCIAL

## 2023-09-08 ENCOUNTER — PATIENT MESSAGE (OUTPATIENT)
Dept: PSYCHIATRY | Facility: CLINIC | Age: 38
End: 2023-09-08
Payer: COMMERCIAL

## 2023-09-10 ENCOUNTER — TELEPHONE (OUTPATIENT)
Dept: URGENT CARE | Facility: CLINIC | Age: 38
End: 2023-09-10

## 2023-09-10 ENCOUNTER — OFFICE VISIT (OUTPATIENT)
Dept: URGENT CARE | Facility: CLINIC | Age: 38
End: 2023-09-10
Payer: COMMERCIAL

## 2023-09-10 VITALS
BODY MASS INDEX: 26.01 KG/M2 | TEMPERATURE: 98 F | OXYGEN SATURATION: 100 % | DIASTOLIC BLOOD PRESSURE: 72 MMHG | WEIGHT: 156.31 LBS | SYSTOLIC BLOOD PRESSURE: 123 MMHG | RESPIRATION RATE: 17 BRPM | HEART RATE: 76 BPM

## 2023-09-10 DIAGNOSIS — M79.671 RIGHT FOOT PAIN: Primary | ICD-10-CM

## 2023-09-10 DIAGNOSIS — Z85.3 HISTORY OF BREAST CANCER: ICD-10-CM

## 2023-09-10 PROCEDURE — 99213 OFFICE O/P EST LOW 20 MIN: CPT | Mod: S$GLB,,, | Performed by: NURSE PRACTITIONER

## 2023-09-10 PROCEDURE — 73630 XR FOOT COMPLETE 3 VIEW RIGHT: ICD-10-PCS | Mod: FY,RT,S$GLB, | Performed by: RADIOLOGY

## 2023-09-10 PROCEDURE — 73630 X-RAY EXAM OF FOOT: CPT | Mod: FY,RT,S$GLB, | Performed by: RADIOLOGY

## 2023-09-10 PROCEDURE — 99213 PR OFFICE/OUTPT VISIT, EST, LEVL III, 20-29 MIN: ICD-10-PCS | Mod: S$GLB,,, | Performed by: NURSE PRACTITIONER

## 2023-09-10 NOTE — PATIENT INSTRUCTIONS
Please go to [facility] for your xray  [address]  I will call you with your result, after radiology reads the image    Rest the foot  Ice packs  Compression (ace wrap)  Elevate foot when at rest  Wear your boot to help ambulate; and for comfort

## 2023-09-10 NOTE — PROGRESS NOTES
Subjective:      Patient ID: Yolanda Norman is a 38 y.o. female.    Vitals:  weight is 70.9 kg (156 lb 4.9 oz). Her oral temperature is 97.7 °F (36.5 °C). Her blood pressure is 123/72 and her pulse is 76. Her respiration is 17 and oxygen saturation is 100%.     Chief Complaint: Foot Swelling (Right foot.)    39 y/o female with h/o breast cancer, presents to  today with c/o right foot pain. Pt states that the pain started approximately 9 days ago, which she noticed after she went to a water park. Can not remember any exact injury, but she initially had bruising to the top of her foot. She c/o pain to the top of her right foot-more laterally; and to the bottom of her right foot-more medially.  Pain has been intermittent throughout the week, starting to improve, then worsening again. She has been limping, as well-on and off throughout the week. She has access to foot boot from previous injury. She also does cross-fit and low-intensity exercises. She is concerned of a possible stress fracture. Pt will have upcoming breast reconstruction surgery, where she states she will be able to rest the foot.     Foot Injury   The incident occurred more than 1 week ago. The incident occurred at home. The injury mechanism is unknown. The pain is present in the right foot. The quality of the pain is described as aching. The pain is at a severity of 8/10. The pain is moderate. The pain has been Constant since onset. Pertinent negatives include no loss of motion, loss of sensation or numbness. It is unknown if a foreign body is present. The symptoms are aggravated by movement. She has tried ice and heat for the symptoms. The treatment provided no relief.       Musculoskeletal:  Positive for pain.   Neurological:  Negative for numbness.      Objective:     Physical Exam   Constitutional: She is oriented to person, place, and time.  Non-toxic appearance. She does not appear ill. No distress.   HENT:   Head: Normocephalic.   Nose: Nose  normal.   Mouth/Throat: Mucous membranes are moist.   Cardiovascular: Normal rate and normal pulses.   Pulmonary/Chest: Effort normal.   Abdominal: Normal appearance.   Musculoskeletal: Normal range of motion.         General: Tenderness present. No swelling or deformity. Normal range of motion.      Right lower leg: No edema.      Left lower leg: No edema.      Comments: No obvious signs of trauma to the right foot.    Neurological: She is alert and oriented to person, place, and time.   Skin: Skin is warm, dry and not diaphoretic. Capillary refill takes less than 2 seconds.   Psychiatric: Her behavior is normal. Mood normal.   Nursing note and vitals reviewed.    X-Ray Foot Complete 3 view Right    Result Date: 9/10/2023  EXAMINATION: XR FOOT COMPLETE 3 VIEW RIGHT CLINICAL HISTORY: pain to anterior foot over 4th-5th MT; and pain to posterior foot, medially;. Pain in right foot TECHNIQUE: AP, lateral, and oblique views of the right foot were performed. COMPARISON: None. FINDINGS: Normal alignment.  Normal mineralization.  No fracture.  No osseous lesions.  No degenerative change.  The soft tissues appear normal.  No foreign body.     No abnormality seen. Electronically signed by: Mallory Figueroa MD Date:    09/10/2023 Time:    11:10    Assessment:     1. Right foot pain    2. History of breast cancer        Plan:       Right foot pain  -     X-Ray Foot Complete 3 view Right; Future; Expected date: 09/10/2023  -     Ambulatory referral/consult to Podiatry    History of breast cancer      Patient Instructions   Please go to [facility] for your xray  [address]  I will call you with your result, after radiology reads the image    Rest the foot  Ice packs  Compression (ace wrap)  Elevate foot when at rest  Wear your boot to help ambulate; and for comfort

## 2023-09-11 ENCOUNTER — OFFICE VISIT (OUTPATIENT)
Dept: PODIATRY | Facility: CLINIC | Age: 38
End: 2023-09-11
Payer: COMMERCIAL

## 2023-09-11 VITALS
DIASTOLIC BLOOD PRESSURE: 75 MMHG | WEIGHT: 156 LBS | BODY MASS INDEX: 25.99 KG/M2 | SYSTOLIC BLOOD PRESSURE: 108 MMHG | HEART RATE: 88 BPM | HEIGHT: 65 IN

## 2023-09-11 DIAGNOSIS — M79.671 RIGHT FOOT PAIN: ICD-10-CM

## 2023-09-11 DIAGNOSIS — M84.374A STRESS REACTION OF RIGHT FOOT, INITIAL ENCOUNTER: Primary | ICD-10-CM

## 2023-09-11 PROCEDURE — 3074F SYST BP LT 130 MM HG: CPT | Mod: CPTII,S$GLB,, | Performed by: PODIATRIST

## 2023-09-11 PROCEDURE — 1159F PR MEDICATION LIST DOCUMENTED IN MEDICAL RECORD: ICD-10-PCS | Mod: CPTII,S$GLB,, | Performed by: PODIATRIST

## 2023-09-11 PROCEDURE — 99203 PR OFFICE/OUTPT VISIT, NEW, LEVL III, 30-44 MIN: ICD-10-PCS | Mod: S$GLB,,, | Performed by: PODIATRIST

## 2023-09-11 PROCEDURE — 1160F RVW MEDS BY RX/DR IN RCRD: CPT | Mod: CPTII,S$GLB,, | Performed by: PODIATRIST

## 2023-09-11 PROCEDURE — 3008F BODY MASS INDEX DOCD: CPT | Mod: CPTII,S$GLB,, | Performed by: PODIATRIST

## 2023-09-11 PROCEDURE — 99203 OFFICE O/P NEW LOW 30 MIN: CPT | Mod: S$GLB,,, | Performed by: PODIATRIST

## 2023-09-11 PROCEDURE — 3074F PR MOST RECENT SYSTOLIC BLOOD PRESSURE < 130 MM HG: ICD-10-PCS | Mod: CPTII,S$GLB,, | Performed by: PODIATRIST

## 2023-09-11 PROCEDURE — 1159F MED LIST DOCD IN RCRD: CPT | Mod: CPTII,S$GLB,, | Performed by: PODIATRIST

## 2023-09-11 PROCEDURE — 3008F PR BODY MASS INDEX (BMI) DOCUMENTED: ICD-10-PCS | Mod: CPTII,S$GLB,, | Performed by: PODIATRIST

## 2023-09-11 PROCEDURE — 3078F PR MOST RECENT DIASTOLIC BLOOD PRESSURE < 80 MM HG: ICD-10-PCS | Mod: CPTII,S$GLB,, | Performed by: PODIATRIST

## 2023-09-11 PROCEDURE — 99999 PR PBB SHADOW E&M-EST. PATIENT-LVL V: CPT | Mod: PBBFAC,,, | Performed by: PODIATRIST

## 2023-09-11 PROCEDURE — 1160F PR REVIEW ALL MEDS BY PRESCRIBER/CLIN PHARMACIST DOCUMENTED: ICD-10-PCS | Mod: CPTII,S$GLB,, | Performed by: PODIATRIST

## 2023-09-11 PROCEDURE — 99999 PR PBB SHADOW E&M-EST. PATIENT-LVL V: ICD-10-PCS | Mod: PBBFAC,,, | Performed by: PODIATRIST

## 2023-09-11 PROCEDURE — 3078F DIAST BP <80 MM HG: CPT | Mod: CPTII,S$GLB,, | Performed by: PODIATRIST

## 2023-09-11 NOTE — PROGRESS NOTES
PODIATRY NOTE     PATIENT ID:  Yolanda Norman is a 38 y.o. female.     CHIEF CONCERN:   Foot Pain (Patient injured right foot on water slide 10 days ago have been ice,resting compression.)           MEDICAL DECISION MAKING:        ICD-10-CM ICD-9-CM    1. Stress reaction of right foot, initial encounter  M84.374A 733.95 MRI Foot (Midfoot) Right Without Contrast      2. Right foot pain  M79.671 729.5           I counseled the patient on the patient's conditions, their implications and medical management.    Xrays reviewed.   MRI further evaluation stress reaction/sprain/soft tissue injury  Continue rest, elevation, and compression in the meantime.  ACE bandage provided.   Continue OTC medication:  topical Voltaren gel as needed.   CAM boot she has at home.  Wear when weight bearing.  No need to wear when sleeping or driving.               HISTORY OF PRESENT ILLNESS:  Yolanda Norman is a 38 y.o. female with concerns regarding: Foot Pain (Patient injured right foot on water slide 10 days ago have been ice,resting compression.)  Patient reports symptoms/signs have been present 10 days.    Went to urgent care yesterday.    Using topical Voltaren gel.    She reports history of tarsal tunnel syndrome and plantar fasciitis.   She is wearing On athletic shoes today.   Can't take oral NSAIDs due to upcoming surgery.         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    Chronic bilateral low back pain without sciatica    Poor posture    Seasonal allergies    Menorrhagia with regular cycle    Malignant neoplasm of upper-outer quadrant of right breast in female, estrogen receptor positive    History of bulimia nervosa    History of anorexia nervosa    Chemotherapy-induced neuropathy    Shortness of breath    Status post  mastectomy, bilateral    BRCA positive    BRCA1 positive    s/p RA-TLH/BSO    Chronic migraine with aura    Moderate episode of recurrent major depressive disorder    Nausea    Family history of ischemic heart disease    Surgical menopause    Use of aromatase inhibitors    Vagina bleeding    Acute bronchitis    Cough in adult patient    Intractable headache    Intractable chronic migraine without aura and with status migrainosus    Pelvic floor dysfunction    Lack of coordination    Muscle spasm    Pelvic pain           Current Outpatient Medications on File Prior to Visit   Medication Sig Dispense Refill    albuterol (PROVENTIL/VENTOLIN HFA) 90 mcg/actuation inhaler Inhale 1 puff every 6 (six) hours  into the lungs for Wheezing (As Needed) for up to 60 doses 18 g 0    anastrozole (ARIMIDEX) 1 mg Tab Take 1 tablet (1 mg total) by mouth once daily. 90 tablet 3    atogepant (QULIPTA) 60 mg Tab Take 1 tablet by mouth once daily. 30 tablet 2    benzonatate (TESSALON) 200 MG capsule Take 1 capsule (200 mg total) by mouth 3 (three) times daily as needed for Cough. 30 capsule 1    butalbital-aspirin-caffeine -40 mg Tab butalbital-aspirin-caffeine 50 mg-325 mg-40 mg tablet   Take 1 tablet every 4 hours by oral route as needed.      celecoxib (CELEBREX) 200 MG capsule Take 2 capsules by mouth the morning of surgery and then take 1 capsule by mouth twice daily 20 capsule 0    cephalexin (KEFTAB) 500 mg tablet Take one tablet by mouth every 12 hours. 14 tablet 0    citalopram (CELEXA) 40 MG tablet TAKE 1 TABLET(40 MG) BY MOUTH EVERY DAY 90 tablet 2    dextroamphetamine-amphetamine (MYDAYIS) 37.5 mg CT24 Take 1 capsule (37.5 mg) by mouth once Daily. 30 each 0    gabapentin (NEURONTIN) 300 MG capsule Take 2 capsules by mouth the morning of surgery and then 1 capsule by mouth twice daily 40 capsule 0    loratadine (CLARITIN) 10 mg tablet Take 10 mg by mouth once daily.      magnesium 30 mg Tab Take by mouth once.       magnesium oxide 400 mg magnesium Cap magnesium 400 mg (as magnesium oxide) capsule   Take by oral route.      metFORMIN (GLUCOPHAGE) 500 MG tablet Take 1 tablet (500 mg total) by mouth 2 (two) times daily with meals. 60 tablet 11    metoclopramide HCl (REGLAN) 10 MG tablet Take 1 tablet 3 (three) times daily before meals by mouth 90 tablet 0    multivitamin (THERAGRAN) per tablet Take 1 tablet by mouth once daily.      onabotulinumtoxinA (BOTOX INJ) Inject as directed. [id number]      ondansetron (ZOFRAN-ODT) 4 MG TbDL Take 1 tablet every 6 (six) hours as needed by mouth for Nausea for up to 7 days 20 tablet 0    ondansetron (ZOFRAN-ODT) 8 MG TbDL Dissolve 1 by mouth the night before surgery and then dissolve 1 by mouth the morning of surgery and then 1 by mouth every 6 hours as needed for nausea. 10 tablet 0    pantoprazole (PROTONIX) 40 MG tablet Take 1 tablet (40 mg total) by mouth once daily. Patient needs follow up appointment for any further refills. 30 tablet 0    phenazopyridine (PYRIDIUM) 100 MG tablet 1 tablet 3 times a day for 2 days.  Take as needed after meals for pain 6 tablet 0    prasterone, dhea, (INTRAROSA) 6.5 mg Inst Place 6.5 mg vaginally every evening. 30 each 11    prasterone, dhea, (INTRAROSA) 6.5 mg Inst Place 6.5 mg vaginally every evening. 30 each 11    prochlorperazine (COMPAZINE) 10 MG tablet Take 1 tablet (10 mg total) by mouth 3 (three) times daily as needed (Headache and nausea). 30 tablet 2    rosuvastatin (CRESTOR) 5 MG tablet Take 1 tablet daily by mouth 90 tablet 3    spironolactone (ALDACTONE) 100 MG tablet Take 1 tablet (100 mg total) by mouth once daily. 30 tablet 3    sucralfate (CARAFATE) 1 gram tablet Take 1 tablet (1 g total) by mouth 4 (four) times daily. Can be crushed if cannot swallow. 120 tablet 0    tiZANidine (ZANAFLEX) 2 MG tablet Take 1 tablet every 6 hours by oral route. 30 tablet 2    topiramate (TOPAMAX) 200 MG Tab Take 1 tablet (200 mg total) by mouth every  "evening. 90 tablet 3    topiramate (TOPAMAX) 25 MG tablet Take 7 tablets by mouth daily for 1 week. Then 6 tabs daily x1 week. Then 5 tabs daily x1 week. Then 4 tabs daily x1 week. Then 3 tabs daily x1 week. Then 2 tabs daily x1 week. Then 1 tab daily x1 week 196 tablet 0    traMADoL (ULTRAM) 50 mg tablet Take 1 tablet (50 mg total) by mouth every 6 (six) hours as needed for pain. Take with food 40 tablet 0    TRUDHESA 0.725 mg/pump act. (4 mg/mL) Spry by Each Nostril route.      ubrogepant (UBRELVY) 100 mg tablet Take 1 tablet twice a day by oral route as needed. 16 tablet 2    valACYclovir (VALTREX) 1000 MG tablet Take 1 tablet (1,000 mg total) by mouth every 12 (twelve) hours. 60 tablet 0    ALPRAZolam (XANAX) 0.25 MG tablet Take 1 tablet (0.25 mg total) by mouth daily as needed for Anxiety. 30 tablet 0     Current Facility-Administered Medications on File Prior to Visit   Medication Dose Route Frequency Provider Last Rate Last Admin    onabotulinumtoxina injection 200 Units  200 Units Intramuscular Q90 Days Dillon Gonzalez MD   200 Units at 06/25/21 0751    testosterone cypionate injection 50 mg  50 mg Intramuscular Q21 Days Kaleigh Polanco MD   50 mg at 08/23/23 1427           Review of patient's allergies indicates:   Allergen Reactions    Amoxicillin Hives    Penicillins Hives and Rash    Diazepam Anxiety and Other (See Comments)     "makes hyper"               ROS:   General ROS: negative for - chills, fever or night sweats  Respiratory ROS: no cough, shortness of breath, or wheezing  Cardiovascular ROS: no chest pain or dyspnea on exertion      EXAM:     Vitals:    09/11/23 0826   BP: 108/75   Pulse: 88   Weight: 70.8 kg (156 lb)   Height: 5' 5" (1.651 m)        General:  Alert and Oriented x 3;  No acute distress      Bilateral  Lower extremity exam:    Vascular:   Dorsalis Pedis:  present   Posterior Tibial:  present  Capillary refill time:  3 seconds  Temperature of toes warm to touch  Edema:  " minimal to affected area.        Neurological:     Sharp touch:  normal  Light touch: normal  Tinels Sign:  Absent  Mulders Click:   Absent        Dermatological:   Skin: warm, moist, and appropriate for age  Wounds/Ulcers:  Absent  Bruising:  minimal to the right lateral midfoot near sinus tarsi aspect.   Erythema:  Absent      Musculoskeletal:   Metatarsophalangeal range of motion:   full range of motion  Subtalar joint range of motion: full range of motion  Ankle joint range of motion:  full range of motion  Bunions:  Absent  Hammertoes: Absent            9/10/2023  right Foot Xray Imaging: FINDINGS:  Normal alignment.  Normal mineralization.  No fracture.  No osseous lesions.  No degenerative change.  The soft tissues appear normal.  No foreign body.

## 2023-09-13 ENCOUNTER — CLINICAL SUPPORT (OUTPATIENT)
Dept: REHABILITATION | Facility: HOSPITAL | Age: 38
End: 2023-09-13
Payer: COMMERCIAL

## 2023-09-13 DIAGNOSIS — M62.89 PELVIC FLOOR DYSFUNCTION: Primary | ICD-10-CM

## 2023-09-13 DIAGNOSIS — M62.838 MUSCLE SPASM: ICD-10-CM

## 2023-09-13 DIAGNOSIS — R10.2 PELVIC PAIN: ICD-10-CM

## 2023-09-13 DIAGNOSIS — R27.9 LACK OF COORDINATION: ICD-10-CM

## 2023-09-13 PROCEDURE — 97112 NEUROMUSCULAR REEDUCATION: CPT | Mod: PO

## 2023-09-13 PROCEDURE — 97140 MANUAL THERAPY 1/> REGIONS: CPT | Mod: PO

## 2023-09-13 NOTE — PROGRESS NOTES
Pelvic Health Physical Therapy   Treatment Note     Name: Yolanda Norman  Clinic Number: [id number]    Therapy Diagnosis:   Encounter Diagnoses   Name Primary?    Pelvic floor dysfunction Yes    Lack of coordination     Muscle spasm     Pelvic pain      Physician: Leelee Coto NP    Visit Date: 9/13/2023    Physician Orders: Pelvic PT Eval and Treat  Medical Diagnosis from Referral: Recurrent UTI [N39.0], Pelvic pain [R10.2]  Evaluation Date: 7/18/2023  Authorization Period Expiration: 12/31/23  Plan of Care Expiration: 11/7/23  Visit # / Visits authorized: 2/20  Cancelled Visits: 3  No Show Visits: 1    Time In: 11:00  Time Out: 12:00  Total Billable Time: 60 minutes    Precautions: Standard    Subjective     Pt reports: She is still having pelvic pain. Still taking metamucil - only having a BM every 3 days.   She was compliant with home exercise program.  Response to previous treatment/ Functional change: ongoing    Pain: 0/10  Location: denies pain at rest    Constitutional Symptoms Review: The patient denies having any constitutional symptoms.     Objective   Pt verbally consents to intravaginal treatment today.  Signed consent form already on file.     Juanis received therapeutic exercises to develop  strength, endurance, ROM, flexibility, posture, and core stabilization for 00 minutes including: see chart below    Juanis received the following manual therapy techniques: to develop flexibility, extensibility, and desensitization for 25 minutes including: see chart below    Juanis participated in neuromuscular re-education activities to develop Coordination, Control, Down training, Posture, Proprioception, Kinesthetic, Balance, and Sense for 25 minutes including: see chart below    Juanis participated in dynamic functional therapeutic activities to improve functional performance for 00  minutes, including: see chart below      Type Intervention 8/7/23 9/13/23   NMR MFR/ STM to B puborectalis, B OI, with  diaphragmatic breathing, contract-relax as needed x x   Manual Cupping to low abdominal scar above and below  + gentle direct scar mobilization x x   Manual Bowel massage + ileocecal valve release  x                                           Home Exercises Provided and Patient Education Provided     Education provided:   - cont prior HEP  Discussed progression of plan of care with patient; educated pt in activity modification; reviewed HEP with pt. Pt demonstrated and verbalized understanding of all instruction and was not provided with a handout of HEP (see Patient Instructions).    Written Home Exercises Provided: Patient instructed to cont prior HEP.  Exercises were reviewed and Juanis was able to demonstrate them prior to the end of the session.  Juanis demonstrated good  understanding of the education provided.     See EMR under Patient Instructions for exercises provided prior visit.    Assessment     Juanis tolerated treatment well and demonstrated understanding of all education provided at today's visit. Tension noted globally throughout the pelvic floor muscles, with majority of tension to deep levator ani. She tolerated manual therapies well and voiced understanding of self bowel massage to be performed 2 times daily for constipation management.  Juanis Is progressing well towards her goals.   Pt prognosis is Good.     Pt will continue to benefit from skilled outpatient physical therapy to address the deficits listed in the problem list box on initial evaluation, provide pt/family education and to maximize pt's level of independence in the home and community environment.     Pt's spiritual, cultural and educational needs considered and pt agreeable to plan of care and goals.     Anticipated barriers to physical therapy: scheduling    Short Term Goals: 6 weeks   Pt to demonstrate tolerance to gentle manual therapy to allow for pain-free gynecological exams.  Pt to demonstrate tolerance to dilator size 4 for 5  minutes to allow for tampon use.  Pt to demonstrate proper diaphragmatic breathing to help with calming the nervous system in order to decrease pain and to improve abdominal wall musculature extensibility.   Pt to report minimal pain with palpation of abdominal wall due to improvement in soft tissue mobility from moderate to minimal restrictions.    Long Term Goals: 12 weeks   Pt to demonstrate tolerance to dilator size 6 to allow for pain-free intercourse.  Pt to report decreased incidence of PF tension at rest (no more than 20% of the time) to demonstrate functional mechanics and motor patterns.  Pt to be discharged with home plan for carry over after discharge.   Pt to report improved restorative sleeping, waking up no more than 1x/night from pain or urinary urge.  Pt to report being able to have a comfortable vaginal exam without significant increase in pelvic pain.  Pt will be independent with use of dilation in order to progress towards self management and intercourse.      Plan     Continue per established POC.    Mana Ayers, PT, DPT

## 2023-09-13 NOTE — PATIENT INSTRUCTIONS
ILU constipation/gas massage: Start right underneath the Left ribcage and scoop towards lower left pelvis (I). You can use small semi circles or scooping motions, whichever feels best. Perform this 10 times. Then begin scooping underneath your ribs from the right across to the left side, then back down to the left pelvis, and perform this 10 times (L). Lastly, start at the lower right pelvis, scoop upwards towards the ribcage, across from right to left, then down to lower left pelvis (U). Perform this 10 times.       Ileocecal valve release (optional): On your right side, find the spot long term between your belly button and the pointy part of your hip bone. Using your fingers, place moderate pressure on the area and use small semi-[place] movements 20-30x.     Perform for 5-10 minutes, morning and night.

## 2023-09-18 ENCOUNTER — PATIENT MESSAGE (OUTPATIENT)
Dept: PRIMARY CARE CLINIC | Facility: CLINIC | Age: 38
End: 2023-09-18
Payer: COMMERCIAL

## 2023-09-19 ENCOUNTER — PATIENT MESSAGE (OUTPATIENT)
Dept: REHABILITATION | Facility: HOSPITAL | Age: 38
End: 2023-09-19

## 2023-09-19 RX ORDER — DEXTROAMPHETAMINE SULFATE, DEXTROAMPHETAMINE SACCHARATE, AMPHETAMINE ASPARTATE MONOHYDRATE, AND AMPHETAMINE SULFATE 9.375; 9.375; 9.375; 9.375 MG/1; MG/1; MG/1; MG/1
37.5 CAPSULE, EXTENDED RELEASE ORAL DAILY
Qty: 30 EACH | Refills: 0 | Status: SHIPPED | OUTPATIENT
Start: 2023-09-19 | End: 2023-10-26 | Stop reason: SDUPTHER

## 2023-10-03 ENCOUNTER — PATIENT MESSAGE (OUTPATIENT)
Dept: PSYCHIATRY | Facility: CLINIC | Age: 38
End: 2023-10-03
Payer: COMMERCIAL

## 2023-10-03 ENCOUNTER — OFFICE VISIT (OUTPATIENT)
Dept: PSYCHIATRY | Facility: CLINIC | Age: 38
End: 2023-10-03
Payer: COMMERCIAL

## 2023-10-03 DIAGNOSIS — F90.0 ATTENTION DEFICIT HYPERACTIVITY DISORDER, INATTENTIVE TYPE: ICD-10-CM

## 2023-10-03 DIAGNOSIS — F41.9 ANXIETY DISORDER, UNSPECIFIED TYPE: Primary | ICD-10-CM

## 2023-10-03 PROCEDURE — 3044F HG A1C LEVEL LT 7.0%: CPT | Mod: CPTII,95,, | Performed by: PSYCHOLOGIST

## 2023-10-03 PROCEDURE — 1159F PR MEDICATION LIST DOCUMENTED IN MEDICAL RECORD: ICD-10-PCS | Mod: CPTII,95,, | Performed by: PSYCHOLOGIST

## 2023-10-03 PROCEDURE — 99214 OFFICE O/P EST MOD 30 MIN: CPT | Mod: 95,,, | Performed by: PSYCHOLOGIST

## 2023-10-03 PROCEDURE — 3044F PR MOST RECENT HEMOGLOBIN A1C LEVEL <7.0%: ICD-10-PCS | Mod: CPTII,95,, | Performed by: PSYCHOLOGIST

## 2023-10-03 PROCEDURE — 1159F MED LIST DOCD IN RCRD: CPT | Mod: CPTII,95,, | Performed by: PSYCHOLOGIST

## 2023-10-03 PROCEDURE — 99214 PR OFFICE/OUTPT VISIT, EST, LEVL IV, 30-39 MIN: ICD-10-PCS | Mod: 95,,, | Performed by: PSYCHOLOGIST

## 2023-10-03 NOTE — PROGRESS NOTES
"Outpatient Psychiatry Follow-Up Visit    Clinical Status of Patient: Outpatient (Ambulatory)  10/03/2023     Chief Complaint:  presenting today for a follow-up.       Interval History and Content of Current Session:  Interim Events/Subjective Report/Content of Current Session:  follow-up appointment.    Pt is a 36 y/o female with past psychiatric hx of anxiety and ADHD who presents for follow-up treatment. Pt reported that mood and anxiety are stable. Recently had breast reconstruction and pt noted that she is healing well. Occupational stress is manageable. Pt reported that she is eating a "healthy" diet and exercising frequently. Noted that Mydayis managing ADHD symptoms for the most part. Stated that she notices a decrease in medication around 4 pm but is able to focus at work for the last hour.     Past Psychiatric hx: effexor xr ("my mood was the best on that but I was having panic attacks and bld press was really negar"), pristiq (for headaches- effective), cymbalta (ineffective), lexapro and zoloft (effective but worsened headaches), klonopin 1mg (effective- for sleep and anxiety)  For sleep- elavil (ineffective), trazodone (worsened h/s's), ambien (nightmares), lunesta (nightmares), seroquel, prazosin, xanax  For headache- topomax    Past Medical hx:   Past Medical History:   Diagnosis Date    Abnormal Pap smear of cervix 2009    ADHD     Allergy     seasonal    Anorexia     Anxiety     Asthma     BRCA1 positive 12/18/2020    Bulimia     Depression     Fever blister     Gastroparesis     GERD (gastroesophageal reflux disease)     History of posttraumatic stress disorder (PTSD)     Hypertension     Gestational Hypertension    Invasive ductal carcinoma of right breast 12/18/2019    Mastitis     Migraine headache     PONV (postoperative nausea and vomiting)     Status post mastectomy, bilateral 7/29/2020        Interim hx:  Medication changes last visit: none  Anxiety: stable  Depression: stable     Denies " suicidal/homicidal ideations.  Denies hopelessness/worthlessness.    Denies auditory/visual hallucinations      Alcohol: Pt denied  Drug: Pt denied  Caffeine: Not assessed  Tobacco: Pt denied      Review of Systems   PSYCHIATRIC: Pertinent items are noted in the narrative.        CONSTITUTIONAL: weight stable    Past Medical, Family and Social History: The patient's past medical, family and social history have been reviewed and updated as appropriate within the electronic medical record. See encounter notes.     Current Psychiatric Medication:  Celexa 40mg po qhs, Mydayis 37.5 mg po qam, xanax 0.125mg po daily PRN anxiety (takes maybe once every 3 months), trazodone 25mg po qhs PRN insomnia (not currently taking)     Compliance: yes      Side effects: Pt denied     Risk Parameters:  Patient reports no suicidal ideation  Patient reports no homicidal ideation  Patient reports no self-injurious behavior  Patient reports no violent behavior     Exam (detailed: at least 9 elements; comprehensive: all 15 elements)   Constitutional  Vitals:  Most recent vital signs, dated less than 90 days prior to this appointment, were reviewed. BP: ()/()   Arterial Line BP: ()/()       General:  unremarkable, age appropriate, casual attire, good eye contact, good rapport       Musculoskeletal  Muscle Strength/Tone:  no flaccidity, no tremor    Gait & Station:  normal      Psychiatric                       Speech:  normal tone, normal rate, rhythm, and volume   Mood & Affect:   stable         Thought Process:   Goal directed; Linear    Associations:   intact   Thought Content:   No SI/HI, delusions, or paranoia, no AV/VH   Insight & Judgement:   Good, adequate to circumstances   Orientation:   grossly intact; alert and oriented x 4    Memory:  intact for content of interview    Language:  grossly intact, can repeat    Attention Span  : Grossly intact for content of interview   Fund of Knowledge:   intact and appropriate to age and level  of education        Assessment and Diagnosis   Status/Progress: Based on the examination today, the patient's problem(s) is/are adequately controlled.  New problems have not been presented today. Co-morbidities are not complicating management of the primary condition. There are no active rule-out diagnoses for this patient at this time.      Impression: Pt appears to stable with current medication plan. Occupational stressors have decreased and pt appears to be actively engaged in wellness. ADHD symptoms managed for the most part and no difficulty with access during national crises. Will continue medication plan as is and monitor symptoms moving forward.     Diagnosis:   Anxiety disorder   Attention Deficit Hyperactivity Disorder - Inattentive Type   h/o PTSD (now in remission)   h/o anorexia and bulimia (both in remission)  Intervention/Counseling/Treatment Plan   Medication Management:      1. Celexa 40mg po qhs    2. Mydayis 37.5 mg po qam    3. xanax 0.125mg po daily PRN anxiety (takes maybe once every 3 months)    4. Call to report any worsening of symptoms or problems with the medication. Pt instructed to go to ER with thoughts of harming self, others     5. Patient given contact # for psychotherapists at [facility] and also instructed to check with insurance for list of providers.     Psychotherapy: 20 minutes  Target symptoms: ADHD, anxiety, mood  Why chosen therapy is appropriate versus another modality: CBT used; relevant to diagnosis, patient responds to this modality  Outcome monitoring methods: self-report, observation  Therapeutic intervention type: Cognitive Behavioral Therapy  Topics discussed/themes: building skills sets for symptom management, symptom recognition, nutrition, exercise  The patient's response to the intervention is accepting  Patient's response to treatment is: good.   The patient's progress toward treatment goals: stable     Return to clinic: 3  months    -Cognitive-Behavioral/Supportive therapy and psychoeducation provided  -R/B/SE's of medications discussed with the pt who expresses understanding and chooses to take medications as prescribed.   -Pt instructed to call clinic, 911 or go to nearest emergency room if sxs worsen or pt is in   crisis. The pt expresses understanding.    Gokul Keller, PhD, MP     Antidepressant/Antianxiety Medication Initiation:  Patient informed of risks, benefits, and potential side effects of medication and accepts informed consent.  Common side effects include nausea, fatigue, headache, insomnia., Specifically discussed the possibility of new or worsening suicidal thoughts/depression.  Patient instructed to stop the medication immediately and seek urgent treatment if this occurs. Patient instructed not to abruptly discontinue medication without physician guidance except in cases of sudden onset or worsening of SI.       Stimulant Medication Initiation:  Patient advised of risks, benefits, and side effects of medication and accepts informed consent.  Common side effects include insomnia, irritability, jittery feeling, dry mouth, and agitation/hostility., Patient advised of potential addictive nature of medication and controlled substance classification.  Instructed to safeguard medication as no early refills can be given for lost or stolen medications.       Benzodiazepine Initiation:  Patient advised of the risks, benefits, and common side effects of medication and has accepted informed consent.  Common side effects include drowsiness, impaired coordination, possible memory loss., Patient advised NOT to operate a vehicle or machinery untiil they are sure how the medication will affec them.  Client also advised of danger of mixing this medication with alcohol., Patient advised of potential addictive nature of medication and need to safeguard medication as no early refills for lost or stolen medications can be authorized.        Pregnancy Warning:  Patient denies current pregnancy possibility.  Patient made aware that medications have not been proven safe in pregnancy and that she must maintain adequate birth control.  Patient instructed to alert us immediately if she becomes pregnant.

## 2023-10-04 ENCOUNTER — CLINICAL SUPPORT (OUTPATIENT)
Dept: REHABILITATION | Facility: HOSPITAL | Age: 38
End: 2023-10-04
Payer: COMMERCIAL

## 2023-10-04 DIAGNOSIS — R10.2 PELVIC PAIN: ICD-10-CM

## 2023-10-04 DIAGNOSIS — M62.89 PELVIC FLOOR DYSFUNCTION: Primary | ICD-10-CM

## 2023-10-04 DIAGNOSIS — R27.9 LACK OF COORDINATION: ICD-10-CM

## 2023-10-04 DIAGNOSIS — M62.838 MUSCLE SPASM: ICD-10-CM

## 2023-10-04 PROCEDURE — 97140 MANUAL THERAPY 1/> REGIONS: CPT | Mod: PO

## 2023-10-04 PROCEDURE — 97112 NEUROMUSCULAR REEDUCATION: CPT | Mod: PO

## 2023-10-04 NOTE — PROGRESS NOTES
Pelvic Health Physical Therapy   Treatment/Progress Note     Name: Yolanda Norman  Clinic Number: [id number]    Therapy Diagnosis:   Encounter Diagnoses   Name Primary?    Pelvic floor dysfunction Yes    Lack of coordination     Muscle spasm     Pelvic pain      Physician: Leelee Coto NP    Visit Date: 10/4/2023    Physician Orders: Pelvic PT Eval and Treat  Medical Diagnosis from Referral: Recurrent UTI [N39.0], Pelvic pain [R10.2]  Evaluation Date: 7/18/2023  Authorization Period Expiration: 12/31/23  Plan of Care Expiration: 11/7/23  Visit # / Visits authorized: 3/20  Cancelled Visits: 3  No Show Visits: 1    Time In: 8:00  Time Out: 8:50  Total Billable Time: 50 minutes    Precautions: Standard    Subjective     Pt reports: she got very constipated after surgery - used bowel massage technique. Was going daily and is now about every 2-3 days.   She was compliant with home exercise program.  Response to previous treatment/ Functional change: ongoing    Pain: 0/10  Location: denies pain at rest    Constitutional Symptoms Review: The patient denies having any constitutional symptoms.     Objective   Pt verbally consents to intravaginal treatment today.  Signed consent form already on file.     Juanis received therapeutic exercises to develop  strength, endurance, ROM, flexibility, posture, and core stabilization for 00 minutes including: see chart below    Juanis received the following manual therapy techniques: to develop flexibility, extensibility, and desensitization for 25 minutes including: see chart below    Juanis participated in neuromuscular re-education activities to develop Coordination, Control, Down training, Posture, Proprioception, Kinesthetic, Balance, and Sense for 25 minutes including: see chart below    Juanis participated in dynamic functional therapeutic activities to improve functional performance for 00  minutes, including: see chart below    Type Intervention 8/7/23 9/13/23 10/4/23   [facility]  [facility] to B puborectalis, B OI, with diaphragmatic breathing, contract-relax as needed x x x   Manual Cupping to low abdominal scar above and below  + gentle direct scar mobilization x x x   Manual Bowel massage + ileocecal valve release  x x                                                 Home Exercises Provided and Patient Education Provided     Education provided:   - cont prior HEP  Discussed progression of plan of care with patient; educated pt in activity modification; reviewed HEP with pt. Pt demonstrated and verbalized understanding of all instruction and was not provided with a handout of HEP (see Patient Instructions).    Written Home Exercises Provided: Patient instructed to cont prior HEP.  Exercises were reviewed and Juanis was able to demonstrate them prior to the end of the session.  Juanis demonstrated good  understanding of the education provided.     See EMR under Patient Instructions for exercises provided prior visit.    Assessment     Juanis tolerated treatment well and demonstrated understanding of all education provided at today's visit. Tension noted globally throughout the pelvic floor muscles, with majority of tension to deep levator ani. She tolerated manual therapies well and is to continue with regular bowel massage.  Juanis Is progressing well towards her goals.   Pt prognosis is Good.     Pt will continue to benefit from skilled outpatient physical therapy to address the deficits listed in the problem list box on initial evaluation, provide pt/family education and to maximize pt's level of independence in the home and community environment.     Pt's spiritual, cultural and educational needs considered and pt agreeable to plan of care and goals.     Anticipated barriers to physical therapy: scheduling    Short Term Goals: 6 weeks   Pt to demonstrate tolerance to gentle manual therapy to allow for pain-free gynecological exams. MET 10/4  Pt to demonstrate tolerance to dilator size 4 for 5 minutes  to allow for tampon use. PROGRESSING 10/4  Pt to demonstrate proper diaphragmatic breathing to help with calming the nervous system in order to decrease pain and to improve abdominal wall musculature extensibility. MET 10/4  Pt to report minimal pain with palpation of abdominal wall due to improvement in soft tissue mobility from moderate to minimal restrictions.MET 10/4    Long Term Goals: 12 weeks   Pt to demonstrate tolerance to dilator size 6 to allow for pain-free intercourse.  Pt to report decreased incidence of PF tension at rest (no more than 20% of the time) to demonstrate functional mechanics and motor patterns.  Pt to be discharged with home plan for carry over after discharge.   Pt to report improved restorative sleeping, waking up no more than 1x/night from pain or urinary urge.  Pt to report being able to have a comfortable vaginal exam without significant increase in pelvic pain.  Pt will be independent with use of dilation in order to progress towards self management and intercourse.      Plan     Continue per established POC.    Mana Ayers, PT, DPT

## 2023-10-05 ENCOUNTER — PATIENT MESSAGE (OUTPATIENT)
Dept: OBSTETRICS AND GYNECOLOGY | Facility: CLINIC | Age: 38
End: 2023-10-05

## 2023-10-05 ENCOUNTER — CLINICAL SUPPORT (OUTPATIENT)
Dept: OBSTETRICS AND GYNECOLOGY | Facility: CLINIC | Age: 38
End: 2023-10-05
Payer: COMMERCIAL

## 2023-10-05 DIAGNOSIS — M81.0 OSTEOPOROSIS, UNSPECIFIED OSTEOPOROSIS TYPE, UNSPECIFIED PATHOLOGICAL FRACTURE PRESENCE: ICD-10-CM

## 2023-10-05 DIAGNOSIS — Z79.890 HORMONE REPLACEMENT THERAPY (HRT): Primary | ICD-10-CM

## 2023-10-05 PROCEDURE — 96372 PR INJECTION,THERAP/PROPH/DIAG2ST, IM OR SUBCUT: ICD-10-PCS | Mod: S$GLB,,, | Performed by: OBSTETRICS & GYNECOLOGY

## 2023-10-05 PROCEDURE — 96372 THER/PROPH/DIAG INJ SC/IM: CPT | Mod: S$GLB,,, | Performed by: OBSTETRICS & GYNECOLOGY

## 2023-10-05 PROCEDURE — 99999 PR PBB SHADOW E&M-EST. PATIENT-LVL I: ICD-10-PCS | Mod: PBBFAC,,,

## 2023-10-05 PROCEDURE — 99999 PR PBB SHADOW E&M-EST. PATIENT-LVL I: CPT | Mod: PBBFAC,,,

## 2023-10-05 RX ADMIN — TESTOSTERONE CYPIONATE 50 MG: 200 INJECTION, SOLUTION INTRAMUSCULAR at 08:10

## 2023-10-05 NOTE — PROGRESS NOTES
Here for hormone therapy injection, no complaints at this time, Injection given as ordered, tolerated well, no report of pain prior to or after injection. Return to clinic as scheduled.     Site - LB    Testosterone 50 mg    Clinic Supplied Medication     Orders have been mailed to patient as per her request so she can get her labs completed on 10/19/2023 before her injection appointment on 10/26/2023. Will not be able to give injection if not completed. Message sent to patient to inform.

## 2023-10-06 ENCOUNTER — PATIENT MESSAGE (OUTPATIENT)
Dept: OBSTETRICS AND GYNECOLOGY | Facility: CLINIC | Age: 38
End: 2023-10-06
Payer: COMMERCIAL

## 2023-10-10 ENCOUNTER — HOSPITAL ENCOUNTER (OUTPATIENT)
Dept: RADIOLOGY | Facility: CLINIC | Age: 38
Discharge: HOME | End: 2023-10-10
Attending: OBSTETRICS & GYNECOLOGY
Payer: COMMERCIAL

## 2023-10-10 DIAGNOSIS — M81.0 OSTEOPOROSIS, UNSPECIFIED OSTEOPOROSIS TYPE, UNSPECIFIED PATHOLOGICAL FRACTURE PRESENCE: ICD-10-CM

## 2023-10-10 PROCEDURE — 77080 DXA BONE DENSITY AXIAL SKELETON 1 OR MORE SITES: ICD-10-PCS | Mod: 26,,, | Performed by: INTERNAL MEDICINE

## 2023-10-10 PROCEDURE — 77080 DXA BONE DENSITY AXIAL: CPT | Mod: 26,,, | Performed by: INTERNAL MEDICINE

## 2023-10-10 PROCEDURE — 77080 DXA BONE DENSITY AXIAL: CPT | Mod: TC

## 2023-10-18 ENCOUNTER — PATIENT MESSAGE (OUTPATIENT)
Dept: CARDIOLOGY | Facility: CLINIC | Age: 38
End: 2023-10-18
Payer: COMMERCIAL

## 2023-10-19 ENCOUNTER — PATIENT MESSAGE (OUTPATIENT)
Dept: OBSTETRICS AND GYNECOLOGY | Facility: CLINIC | Age: 38
End: 2023-10-19
Payer: COMMERCIAL

## 2023-10-19 ENCOUNTER — TELEPHONE (OUTPATIENT)
Dept: OBSTETRICS AND GYNECOLOGY | Facility: CLINIC | Age: 38
End: 2023-10-19
Payer: COMMERCIAL

## 2023-10-19 NOTE — TELEPHONE ENCOUNTER
----- Message from Hiral Calderon sent at 10/19/2023 12:35 PM CDT -----  Name of Who is Calling:CHUCK MONTOYA [[id number]]                   What is the request in detail: Pt needs to reschedule her appt she has covid. Please assist                   Can the clinic reply by [facility]: No                   What Number to Call Back [facility] not [facility][phone]

## 2023-10-20 NOTE — PATIENT INSTRUCTIONS
Shoulder Scaption  While standing squeeze your shoulder  blades together and downward while  raising your arm in a diagonal plane up  towards the ceiling. Point thumbs upwards.  Copyright © 9802-0808 [employer]        Median Nerve Glide  While standing and facing forward, position  your arm as if you are holding a tray with  your elbow bent and your palm facing the  ceiling. Slowly extend the arm until the  elbow is straight gently bend right wrist backwards 5 times.   this whole motion. Return to start. DO NOT  OVERSTRETCH THE NERVE! Perform 5 times.  Copyright © 9199-0133 [employer]ROM:   
Home
Valtrex Pregnancy And Lactation Text: this medication is Pregnancy Category B and is considered safe during pregnancy. This medication is not directly found in breast milk but it's metabolite acyclovir is present.

## 2023-10-22 ENCOUNTER — ON-DEMAND VIRTUAL (OUTPATIENT)
Dept: URGENT CARE | Facility: CLINIC | Age: 38
End: 2023-10-22
Payer: COMMERCIAL

## 2023-10-22 DIAGNOSIS — U07.1 BRONCHITIS DUE TO COVID-19 VIRUS: Primary | ICD-10-CM

## 2023-10-22 DIAGNOSIS — J32.9 SINUSITIS, UNSPECIFIED CHRONICITY, UNSPECIFIED LOCATION: ICD-10-CM

## 2023-10-22 DIAGNOSIS — J40 BRONCHITIS DUE TO COVID-19 VIRUS: Primary | ICD-10-CM

## 2023-10-22 PROCEDURE — 99213 PR OFFICE/OUTPT VISIT, EST, LEVL III, 20-29 MIN: ICD-10-PCS | Mod: 95,,, | Performed by: NURSE PRACTITIONER

## 2023-10-22 PROCEDURE — 99213 OFFICE O/P EST LOW 20 MIN: CPT | Mod: 95,,, | Performed by: NURSE PRACTITIONER

## 2023-10-22 RX ORDER — PREDNISONE 20 MG/1
20 TABLET ORAL DAILY
Qty: 5 TABLET | Refills: 0 | Status: SHIPPED | OUTPATIENT
Start: 2023-10-22 | End: 2023-10-27

## 2023-10-22 RX ORDER — BENZONATATE 100 MG/1
100 CAPSULE ORAL 3 TIMES DAILY PRN
Qty: 30 CAPSULE | Refills: 0 | Status: SHIPPED | OUTPATIENT
Start: 2023-10-22 | End: 2023-11-02

## 2023-10-22 RX ORDER — DOXYCYCLINE 100 MG/1
100 CAPSULE ORAL 2 TIMES DAILY
Qty: 20 CAPSULE | Refills: 0 | Status: SHIPPED | OUTPATIENT
Start: 2023-10-22 | End: 2023-11-01

## 2023-10-22 NOTE — PATIENT INSTRUCTIONS
PLEASE READ YOUR DISCHARGE INSTRUCTIONS ENTIRELY AS IT CONTAINS IMPORTANT INFORMATION.      Please take your steroids to completion.     Use the albuterol inhaler for wheezing.     Do not drive while taking the cough syrup - best to take it at night before going to sleep. However, you can take it during the day (every 4-6 hours) if you do not have to drive or operate machinery. This medication will make you drowsy. Try taking half a dose first to see how it affects you.      Please return or see your primary care doctor if you develop new or worsening symptoms.     Please arrange follow up with your primary medical clinic as soon as possible. You must understand that you've received an Urgent Care treatment only and that you may be released before all of your medical problems are known or treated. You, the patient, will arrange for follow up as instructed. If your symptoms worsen or fail to improve you should go to the Emergency Room.

## 2023-10-22 NOTE — PROGRESS NOTES
Subjective:      Patient ID: Yolanda Norman is a 38 y.o. female.    Vitals:  vitals were not taken for this visit.     Chief Complaint: Cough      Visit Type: TELE AUDIOVISUAL    Present with the patient at the time of consultation: TELEMED PRESENT WITH PATIENT: None    Past Medical History:   Diagnosis Date    Abnormal Pap smear of cervix     ADHD     Allergy     seasonal    Anorexia     Anxiety     Asthma     BRCA1 positive 2020    Bulimia     Depression     Fever blister     Gastroparesis     GERD (gastroesophageal reflux disease)     History of posttraumatic stress disorder (PTSD)     Hypertension     Gestational Hypertension    Invasive ductal carcinoma of right breast 2019    Mastitis     Migraine headache     PONV (postoperative nausea and vomiting)     Status post mastectomy, bilateral 2020     Past Surgical History:   Procedure Laterality Date    BILATERAL MASTECTOMY Bilateral 2020    Procedure: MASTECTOMY, BILATERAL - BILATERAL SKIN SPARING MASTECTOMY;  Surgeon: Percy Ayers MD;  Location: [facility];  Service: General;  Laterality: Bilateral;    BIOPSY OF AXILLARY NODE Right 2020    Procedure: BIOPSY, LYMPH NODE, AXILLARY;  Surgeon: Percy Ayers MD;  Location: [facility];  Service: General;  Laterality: Right;    BONE MARROW ASPIRATION      x 3    BREAST SURGERY      CERVICAL BIOPSY  W/ LOOP ELECTRODE EXCISION       SECTION  2016     SECTION N/A 2019    Procedure:  SECTION;  Surgeon: Kirit Hernandez MD;  [facility]: [facility];  Service: OB/GYN;  Laterality: N/A;    COLPOSCOPY      ESOPHAGOGASTRODUODENOSCOPY N/A 2021    Procedure: EGD (ESOPHAGOGASTRODUODENOSCOPY);  Surgeon: Lj Santos MD;  Location: [address];  Service: Endoscopy;  Laterality: N/A;  COVID test on 21 at [facility]    ESOPHAGOGASTRODUODENOSCOPY N/A 2021    Procedure: ESOPHAGOGASTRODUODENOSCOPY (EGD);  Surgeon: Camila Wiley MD;  Location: [facility]  "ENDO;  Service: Endoscopy;  Laterality: N/A;    INJECTION FOR SENTINEL NODE IDENTIFICATION Right 7/1/2020    Procedure: INJECTION, FOR SENTINEL NODE IDENTIFICATION;  Surgeon: Percy Ayers MD;  Location: [facility];  Service: General;  Laterality: Right;    INSERTION OF BREAST TISSUE EXPANDER Bilateral 7/1/2020    Procedure: INSERTION, TISSUE EXPANDER, BREAST;  Surgeon: Kiko Aranda MD;  Location: [facility];  Service: Plastics;  Laterality: Bilateral;    INSERTION OF TUNNELED CENTRAL VENOUS CATHETER (CVC) WITH SUBCUTANEOUS PORT Left 12/27/2019    Procedure: [id number]-Left neck or chest wall;  Surgeon: Percy Ayers MD;  Location: [address] [facility];  Service: General;  Laterality: Left;    MASTECTOMY      MEDIPORT REMOVAL N/A 7/1/2020    Procedure: REMOVAL, CATHETER, CENTRAL VENOUS, TUNNELED, WITH PORT;  Surgeon: Percy Ayers MD;  Location: [facility];  Service: General;  Laterality: N/A;    ROBOT-ASSISTED LAPAROSCOPIC ABDOMINAL HYSTERECTOMY USING DA HIMA XI N/A 12/18/2020    Procedure: XI ROBOTIC HYSTERECTOMY;  Surgeon: Emili Smith MD;  Location: [facility];  Service: OB/GYN;  Laterality: N/A;    ROBOT-ASSISTED LAPAROSCOPIC SALPINGO-OOPHORECTOMY USING DA HIMA XI Bilateral 12/18/2020    Procedure: XI ROBOTIC SALPINGO-OOPHORECTOMY;  Surgeon: Emili Smith MD;  Location: [facility];  Service: OB/GYN;  Laterality: Bilateral;    SHOULDER SURGERY Left     x 2    SINUS SURGERY      age 19    STOMACH SURGERY      TISSUE EXPANDER REMOVAL Right 10/3/2020    Procedure: REMOVAL, TISSUE EXPANDER;  Surgeon: Kiko Aranda MD;  Location: [facility];  Service: Plastics;  Laterality: Right;    TONSILLECTOMY      UPPER GASTROINTESTINAL ENDOSCOPY       Review of patient's allergies indicates:   Allergen Reactions    Amoxicillin Hives    Penicillins Hives and Rash    Diazepam Anxiety and Other (See Comments)     "makes hyper"     Current Outpatient Medications on File Prior to Visit   Medication Sig Dispense Refill    " albuterol (PROVENTIL/VENTOLIN HFA) 90 mcg/actuation inhaler Inhale 1 puff every 6 (six) hours  into the lungs for Wheezing (As Needed) for up to 60 doses 18 g 0    ALPRAZolam (XANAX) 0.25 MG tablet Take 1 tablet (0.25 mg total) by mouth daily as needed for Anxiety. 30 tablet 0    anastrozole (ARIMIDEX) 1 mg Tab Take 1 tablet (1 mg total) by mouth once daily. 90 tablet 3    atogepant (QULIPTA) 60 mg Tab Take 1 tablet every day by oral route. 30 tablet 2    benzonatate (TESSALON) 200 MG capsule Take 1 capsule (200 mg total) by mouth 3 (three) times daily as needed for Cough. 30 capsule 1    butalbital-aspirin-caffeine -40 mg (FIORINAL) -40 mg Cap take 1 capsule by mouth every 4 hours as needed 15 capsule 0    butalbital-aspirin-caffeine -40 mg Tab butalbital-aspirin-caffeine 50 mg-325 mg-40 mg tablet   Take 1 tablet every 4 hours by oral route as needed.      celecoxib (CELEBREX) 200 MG capsule Take 2 capsules by mouth the morning of surgery and then take 1 capsule by mouth twice daily 20 capsule 0    cephalexin (KEFTAB) 500 mg tablet Take one tablet by mouth every 12 hours. 14 tablet 0    ciclopirox (LOPROX) 0.77 % Crea Apply to affected areas of leg twice a day for two weeks 30 g 0    citalopram (CELEXA) 40 MG tablet TAKE 1 TABLET(40 MG) BY MOUTH EVERY DAY 90 tablet 2    clindamycin (CLEOCIN T) 1 % external solution Apply to affected area of scalp after showering 60 mL 0    dextroamphetamine-amphetamine (MYDAYIS) 37.5 mg CT24 Take 1 capsule (37.5 mg) by mouth once Daily. 30 each 0    gabapentin (NEURONTIN) 300 MG capsule Take 2 capsules by mouth the morning of surgery and then 1 capsule by mouth twice daily 40 capsule 0    loratadine (CLARITIN) 10 mg tablet Take 10 mg by mouth once daily.      magnesium 30 mg Tab Take by mouth once.      magnesium oxide 400 mg magnesium Cap magnesium 400 mg (as magnesium oxide) capsule   Take by oral route.      metFORMIN (GLUCOPHAGE) 500 MG tablet Take 1 tablet  (500 mg total) by mouth 2 (two) times daily with meals. 60 tablet 11    metoclopramide HCl (REGLAN) 10 MG tablet Take 1 tablet 3 (three) times daily before meals by mouth 90 tablet 0    multivitamin (THERAGRAN) per tablet Take 1 tablet by mouth once daily.      onabotulinumtoxinA (BOTOX INJ) Inject as directed. [id number]      ondansetron (ZOFRAN-ODT) 4 MG TbDL Take 1 tablet every 6 (six) hours as needed by mouth for Nausea for up to 7 days 20 tablet 0    ondansetron (ZOFRAN-ODT) 8 MG TbDL Dissolve 1 by mouth the night before surgery and then dissolve 1 by mouth the morning of surgery and then 1 by mouth every 6 hours as needed for nausea. 10 tablet 0    pantoprazole (PROTONIX) 40 MG tablet Take 1 tablet (40 mg total) by mouth once daily. Patient needs follow up appointment for any further refills. 30 tablet 0    phenazopyridine (PYRIDIUM) 100 MG tablet 1 tablet 3 times a day for 2 days.  Take as needed after meals for pain 6 tablet 0    prasterone, dhea, (INTRAROSA) 6.5 mg Inst Place 6.5 mg vaginally every evening. 30 each 11    prasterone, dhea, (INTRAROSA) 6.5 mg Inst Place 6.5 mg vaginally every evening. 30 each 11    prochlorperazine (COMPAZINE) 10 MG tablet Take 1 tablet (10 mg total) by mouth 3 (three) times daily as needed (Headache and nausea). 30 tablet 2    rosuvastatin (CRESTOR) 5 MG tablet Take 1 tablet daily by mouth 90 tablet 3    spironolactone (ALDACTONE) 100 MG tablet Take 1 tablet (100 mg total) by mouth once daily. 30 tablet 3    sucralfate (CARAFATE) 1 gram tablet Take 1 tablet (1 g total) by mouth 4 (four) times daily. Can be crushed if cannot swallow. 120 tablet 0    tiZANidine (ZANAFLEX) 2 MG tablet Take 1 tablet every 6 hours by oral route. 30 tablet 2    topiramate (TOPAMAX) 200 MG Tab Take 1 tablet (200 mg total) by mouth every evening. 90 tablet 3    topiramate (TOPAMAX) 25 MG tablet take 3 tabs by mouth once daily 90 tablet 0    traMADoL (ULTRAM) 50 mg tablet Take 1 tablet (50 mg total) by  mouth every 6 (six) hours as needed for pain. Take with food 40 tablet 0    TRUDHESA 0.725 mg/pump act. (4 mg/mL) Spry by Each Nostril route.      ubrogepant (UBRELVY) 100 mg tablet Take 1 tablet twice a day by oral route as needed. 16 tablet 2    valACYclovir (VALTREX) 1000 MG tablet Take 1 tablet (1,000 mg total) by mouth every 12 (twelve) hours. 60 tablet 0     Current Facility-Administered Medications on File Prior to Visit   Medication Dose Route Frequency Provider Last Rate Last Admin    onabotulinumtoxina injection 200 Units  200 Units Intramuscular Q90 Days Dillon Gonzalez MD   200 Units at 06/25/21 0751     Family History   Problem Relation Age of Onset    Cancer Maternal Grandmother 40        cervical    Cervical cancer Mother     Breast cancer Other     Ovarian cancer Other     Colon polyps Father     Colon cancer Neg Hx     Melanoma Neg Hx            Ohs Peq Odvv Intake    10/22/2023  4:48 PM CDT - Filed by Patient   Describe your reason for todays visit Post covid cough and congestion   What is your current physical address in the event of a medical emergency? [address] [facility] [facility] [address]   Are you able to take your vital signs? No   Please attach any relevant images or files          Ambulatory 37 yo female with c/o cough and congestion, with sinus pressure and congestion. She stats she had covid last week and now with productive cough , facial pain. She denies fever. Denies sob and wheezing.     Cough  Associated symptoms include headaches and postnasal drip. Pertinent negatives include no chills, fever, hemoptysis, shortness of breath or wheezing.       Constitution: Negative. Negative for chills and fever.   HENT:  Positive for congestion, postnasal drip, sinus pain and sinus pressure.    Cardiovascular: Negative.    Respiratory:  Positive for cough and sputum production. Negative for bloody sputum, shortness of breath and wheezing.    Gastrointestinal: Negative.    Endocrine: negative.    Genitourinary: Negative.  Negative for frequency and urgency.   Musculoskeletal: Negative.    Skin: Negative.    Allergic/Immunologic: Negative.    Neurological:  Positive for headaches.   Hematologic/Lymphatic: Negative.    Psychiatric/Behavioral: Negative.          Objective:   The physical exam was conducted virtually.  Physical Exam   Constitutional: She is oriented to person, place, and time. She appears well-developed.   HENT:   Head: Normocephalic and atraumatic.   Ears:   Right Ear: Hearing, tympanic membrane and external ear normal.   Left Ear: Hearing, tympanic membrane and external ear normal.   Nose: Nose normal.   Mouth/Throat: Uvula is midline, oropharynx is clear and moist and mucous membranes are normal.   Eyes: Conjunctivae and EOM are normal. Pupils are equal, round, and reactive to light.   Neck: Neck supple.   Cardiovascular: Normal rate.   Pulmonary/Chest: Effort normal and breath sounds normal.   Musculoskeletal: Normal range of motion.         General: Normal range of motion.   Neurological: She is alert and oriented to person, place, and time.   Skin: Skin is warm.   Psychiatric: Her behavior is normal. Thought content normal.   Nursing note and vitals reviewed.      Assessment:     1. Bronchitis due to COVID-19 virus    2. Sinusitis, unspecified chronicity, unspecified location        Plan:     Patient Instructions   PLEASE READ YOUR DISCHARGE INSTRUCTIONS ENTIRELY AS IT CONTAINS IMPORTANT INFORMATION.      Please take your steroids to completion.     Use the albuterol inhaler for wheezing.     Do not drive while taking the cough syrup - best to take it at night before going to sleep. However, you can take it during the day (every 4-6 hours) if you do not have to drive or operate machinery. This medication will make you drowsy. Try taking half a dose first to see how it affects you.      Please return or see your primary care doctor if you develop new or worsening symptoms.     Please  arrange follow up with your primary medical clinic as soon as possible. You must understand that you've received an Urgent Care treatment only and that you may be released before all of your medical problems are known or treated. You, the patient, will arrange for follow up as instructed. If your symptoms worsen or fail to improve you should go to the Emergency Room.        Bronchitis due to COVID-19 virus    Sinusitis, unspecified chronicity, unspecified location

## 2023-10-26 ENCOUNTER — CLINICAL SUPPORT (OUTPATIENT)
Dept: OBSTETRICS AND GYNECOLOGY | Facility: CLINIC | Age: 38
End: 2023-10-26
Payer: COMMERCIAL

## 2023-10-26 ENCOUNTER — PATIENT MESSAGE (OUTPATIENT)
Dept: PSYCHIATRY | Facility: CLINIC | Age: 38
End: 2023-10-26
Payer: COMMERCIAL

## 2023-10-26 DIAGNOSIS — Z79.890 HORMONE REPLACEMENT THERAPY (HRT): Primary | ICD-10-CM

## 2023-10-26 PROCEDURE — 96372 PR INJECTION,THERAP/PROPH/DIAG2ST, IM OR SUBCUT: ICD-10-PCS | Mod: S$GLB,,, | Performed by: OBSTETRICS & GYNECOLOGY

## 2023-10-26 PROCEDURE — 99999 PR PBB SHADOW E&M-EST. PATIENT-LVL I: CPT | Mod: PBBFAC,,,

## 2023-10-26 PROCEDURE — 96372 THER/PROPH/DIAG INJ SC/IM: CPT | Mod: S$GLB,,, | Performed by: OBSTETRICS & GYNECOLOGY

## 2023-10-26 PROCEDURE — 99999 PR PBB SHADOW E&M-EST. PATIENT-LVL I: ICD-10-PCS | Mod: PBBFAC,,,

## 2023-10-26 RX ORDER — TESTOSTERONE CYPIONATE 200 MG/ML
50 INJECTION, SOLUTION INTRAMUSCULAR
Status: COMPLETED | OUTPATIENT
Start: 2023-10-26 | End: 2023-10-26

## 2023-10-26 RX ORDER — DEXTROAMPHETAMINE SACCHARATE, AMPHETAMINE ASPARTATE MONOHYDRATE, DEXTROAMPHETAMINE SULFATE, AMPHETAMINE SULFATE 9.375; 9.375; 9.375; 9.375 MG/1; MG/1; MG/1; MG/1
37.5 CAPSULE, EXTENDED RELEASE ORAL DAILY
Qty: 30 EACH | Refills: 0 | Status: SHIPPED | OUTPATIENT
Start: 2023-10-31 | End: 2023-11-24 | Stop reason: SDUPTHER

## 2023-10-26 RX ADMIN — TESTOSTERONE CYPIONATE 50 MG: 200 INJECTION, SOLUTION INTRAMUSCULAR at 09:10

## 2023-10-26 NOTE — PROGRESS NOTES
Here for hormone therapy injection, no complaints at this time, Injection given as ordered, tolerated well, no report of pain prior to or after injection. Return to clinic as scheduled.     Site - RB    Testosterone  50 mg    Patient requested to have labs done here at [facility] instead of external orders. Scheduled for 11/9/2023 due to patient having covid and unable to get labs on 10/19/2023. Patient aware we need the labs to continue injections.    Clinic Supplied Medication

## 2023-10-27 ENCOUNTER — PATIENT MESSAGE (OUTPATIENT)
Dept: PSYCHIATRY | Facility: CLINIC | Age: 38
End: 2023-10-27
Payer: COMMERCIAL

## 2023-10-27 RX ORDER — DEXTROAMPHETAMINE SACCHARATE, AMPHETAMINE ASPARTATE MONOHYDRATE, DEXTROAMPHETAMINE SULFATE, AMPHETAMINE SULFATE 9.375; 9.375; 9.375; 9.375 MG/1; MG/1; MG/1; MG/1
37.5 CAPSULE, EXTENDED RELEASE ORAL DAILY
Qty: 30 EACH | Refills: 0 | Status: CANCELLED | OUTPATIENT
Start: 2023-10-27

## 2023-10-27 RX ORDER — DEXTROAMPHETAMINE SACCHARATE, AMPHETAMINE ASPARTATE MONOHYDRATE, DEXTROAMPHETAMINE SULFATE, AMPHETAMINE SULFATE 9.375; 9.375; 9.375; 9.375 MG/1; MG/1; MG/1; MG/1
37.5 CAPSULE, EXTENDED RELEASE ORAL DAILY
Qty: 30 EACH | Refills: 0 | Status: SHIPPED | OUTPATIENT
Start: 2023-10-27 | End: 2023-11-24 | Stop reason: ALTCHOICE

## 2023-11-07 ENCOUNTER — OFFICE VISIT (OUTPATIENT)
Dept: OBSTETRICS AND GYNECOLOGY | Facility: CLINIC | Age: 38
End: 2023-11-07
Payer: COMMERCIAL

## 2023-11-07 VITALS
SYSTOLIC BLOOD PRESSURE: 121 MMHG | WEIGHT: 156 LBS | DIASTOLIC BLOOD PRESSURE: 88 MMHG | HEART RATE: 84 BPM | BODY MASS INDEX: 25.99 KG/M2 | HEIGHT: 65 IN

## 2023-11-07 DIAGNOSIS — C50.411 MALIGNANT NEOPLASM OF UPPER-OUTER QUADRANT OF RIGHT BREAST IN FEMALE, ESTROGEN RECEPTOR POSITIVE: Primary | ICD-10-CM

## 2023-11-07 DIAGNOSIS — Z17.0 MALIGNANT NEOPLASM OF UPPER-OUTER QUADRANT OF RIGHT BREAST IN FEMALE, ESTROGEN RECEPTOR POSITIVE: Primary | ICD-10-CM

## 2023-11-07 DIAGNOSIS — N95.1 MENOPAUSAL SYMPTOMS: ICD-10-CM

## 2023-11-07 DIAGNOSIS — E88.810 METABOLIC SYNDROME: ICD-10-CM

## 2023-11-07 PROCEDURE — 3074F SYST BP LT 130 MM HG: CPT | Mod: CPTII,S$GLB,, | Performed by: PHYSICIAN ASSISTANT

## 2023-11-07 PROCEDURE — 3008F PR BODY MASS INDEX (BMI) DOCUMENTED: ICD-10-PCS | Mod: CPTII,S$GLB,, | Performed by: PHYSICIAN ASSISTANT

## 2023-11-07 PROCEDURE — 3008F BODY MASS INDEX DOCD: CPT | Mod: CPTII,S$GLB,, | Performed by: PHYSICIAN ASSISTANT

## 2023-11-07 PROCEDURE — 1160F RVW MEDS BY RX/DR IN RCRD: CPT | Mod: CPTII,S$GLB,, | Performed by: PHYSICIAN ASSISTANT

## 2023-11-07 PROCEDURE — 99214 OFFICE O/P EST MOD 30 MIN: CPT | Mod: S$GLB,,, | Performed by: PHYSICIAN ASSISTANT

## 2023-11-07 PROCEDURE — 3079F PR MOST RECENT DIASTOLIC BLOOD PRESSURE 80-89 MM HG: ICD-10-PCS | Mod: CPTII,S$GLB,, | Performed by: PHYSICIAN ASSISTANT

## 2023-11-07 PROCEDURE — 3074F PR MOST RECENT SYSTOLIC BLOOD PRESSURE < 130 MM HG: ICD-10-PCS | Mod: CPTII,S$GLB,, | Performed by: PHYSICIAN ASSISTANT

## 2023-11-07 PROCEDURE — 1159F PR MEDICATION LIST DOCUMENTED IN MEDICAL RECORD: ICD-10-PCS | Mod: CPTII,S$GLB,, | Performed by: PHYSICIAN ASSISTANT

## 2023-11-07 PROCEDURE — 3079F DIAST BP 80-89 MM HG: CPT | Mod: CPTII,S$GLB,, | Performed by: PHYSICIAN ASSISTANT

## 2023-11-07 PROCEDURE — 1160F PR REVIEW ALL MEDS BY PRESCRIBER/CLIN PHARMACIST DOCUMENTED: ICD-10-PCS | Mod: CPTII,S$GLB,, | Performed by: PHYSICIAN ASSISTANT

## 2023-11-07 PROCEDURE — 99999 PR PBB SHADOW E&M-EST. PATIENT-LVL V: CPT | Mod: PBBFAC,,, | Performed by: PHYSICIAN ASSISTANT

## 2023-11-07 PROCEDURE — 99999 PR PBB SHADOW E&M-EST. PATIENT-LVL V: ICD-10-PCS | Mod: PBBFAC,,, | Performed by: PHYSICIAN ASSISTANT

## 2023-11-07 PROCEDURE — 1159F MED LIST DOCD IN RCRD: CPT | Mod: CPTII,S$GLB,, | Performed by: PHYSICIAN ASSISTANT

## 2023-11-07 PROCEDURE — 99214 PR OFFICE/OUTPT VISIT, EST, LEVL IV, 30-39 MIN: ICD-10-PCS | Mod: S$GLB,,, | Performed by: PHYSICIAN ASSISTANT

## 2023-11-07 PROCEDURE — 3044F PR MOST RECENT HEMOGLOBIN A1C LEVEL <7.0%: ICD-10-PCS | Mod: CPTII,S$GLB,, | Performed by: PHYSICIAN ASSISTANT

## 2023-11-07 PROCEDURE — 3044F HG A1C LEVEL LT 7.0%: CPT | Mod: CPTII,S$GLB,, | Performed by: PHYSICIAN ASSISTANT

## 2023-11-07 RX ORDER — TESTOSTERONE CYPIONATE 200 MG/ML
50 INJECTION, SOLUTION INTRAMUSCULAR
Status: DISCONTINUED | OUTPATIENT
Start: 2023-11-07 | End: 2023-12-29

## 2023-11-07 RX ORDER — METFORMIN HYDROCHLORIDE 500 MG/1
500 TABLET ORAL 2 TIMES DAILY WITH MEALS
Qty: 60 TABLET | Refills: 11 | Status: SHIPPED | OUTPATIENT
Start: 2023-11-07 | End: 2024-11-06

## 2023-11-07 NOTE — PROGRESS NOTES
Subjective:      Yolanda Norman is a 38 y.o. female who presents for survivorship follow up.  History of right breast cancer s/p bilateral mastectomy on July 1, 2020 and currently on Airmidex. She is also s/p hyst/BSO. She is getting testosterone 50mg IM Q21 days for menopausal symptoms. She is feeling well with this and wishes to continue. Has labs scheduled this week.  She has been working hard on diet and exercising. Increased protein and getting about 145g per day. Doing strength workout with crossfit 4-5x per day. She logs calories but can not lose weight. Knows that she has gained muscle but frustrated that the scale is not moving. Taking metformin 500mg QD and thinks BID did help more. Avoiding GLP-1 due to gastroparesis.  Alk phos has recently been elevated on labs. Work up with GI was negative. LFTs normal and abdominal US negative. Denies bone or joint pain.     3/23/2023 TSH 1.41    Mammogram: s/p bilateral mastectomy  Pap: s/p hyst/bso  PCP: Michael Peacock MD  Routine Screening Labs: 3/23/2023  DEXA: 10/10/2023 normal, repeat in 2 years    No visits with results within 3 Month(s) from this visit.   Latest known visit with results is:   Office Visit on 06/16/2023   Component Date Value Ref Range Status    Color, UA 06/16/2023 Yellow   Final    pH, UA 06/16/2023 5   Final    WBC, UA 06/16/2023 negative   Final    Nitrite, UA 06/16/2023 negative   Final    Protein, POC 06/16/2023 negative   Final    Glucose, UA 06/16/2023 negative   Final    Ketones, UA 06/16/2023 negative   Final    Urobilinogen, UA 06/16/2023 negative   Final    Bilirubin, POC 06/16/2023 negative   Final    Blood, UA 06/16/2023 negative   Final    Clarity, UA 06/16/2023 Clear   Final    Spec Grav UA 06/16/2023 1.020   Final    Urine Culture, Routine 06/16/2023 No growth   Final       Past Medical History:   Diagnosis Date    Abnormal Pap smear of cervix 2009    ADHD     Allergy     seasonal    Anorexia     Anxiety     Asthma      BRCA1 positive 2020    Bulimia     Depression     Fever blister     Gastroparesis     GERD (gastroesophageal reflux disease)     History of posttraumatic stress disorder (PTSD)     Hypertension     Gestational Hypertension    Invasive ductal carcinoma of right breast 2019    Mastitis     Migraine headache     PONV (postoperative nausea and vomiting)     Status post mastectomy, bilateral 2020     Past Surgical History:   Procedure Laterality Date    BILATERAL MASTECTOMY Bilateral 2020    Procedure: MASTECTOMY, BILATERAL - BILATERAL SKIN SPARING MASTECTOMY;  Surgeon: Percy Ayers MD;  Location: [facility];  Service: General;  Laterality: Bilateral;    BIOPSY OF AXILLARY NODE Right 2020    Procedure: BIOPSY, LYMPH NODE, AXILLARY;  Surgeon: Percy Ayers MD;  Location: [facility];  Service: General;  Laterality: Right;    BONE MARROW ASPIRATION      x 3    BREAST SURGERY      CERVICAL BIOPSY  W/ LOOP ELECTRODE EXCISION       SECTION  2016     SECTION N/A 2019    Procedure:  SECTION;  Surgeon: Kirit Hernandez MD;  Location: [facility];  Service: OB/GYN;  Laterality: N/A;    COLPOSCOPY      ESOPHAGOGASTRODUODENOSCOPY N/A 2021    Procedure: EGD (ESOPHAGOGASTRODUODENOSCOPY);  Surgeon: Lj Santos MD;  Location: [address]);  Service: Endoscopy;  Laterality: N/A;  COVID test on 21 at [facility]    ESOPHAGOGASTRODUODENOSCOPY N/A 2021    Procedure: ESOPHAGOGASTRODUODENOSCOPY (EGD);  Surgeon: Camila Wiley MD;  Location: [facility];  Service: Endoscopy;  Laterality: N/A;    INJECTION FOR SENTINEL NODE IDENTIFICATION Right 2020    Procedure: INJECTION, FOR SENTINEL NODE IDENTIFICATION;  Surgeon: Percy Ayers MD;  Location: [facility];  Service: General;  Laterality: Right;    INSERTION OF BREAST TISSUE EXPANDER Bilateral 2020    Procedure: INSERTION, TISSUE EXPANDER, BREAST;  Surgeon: Kiko Aranda MD;  Location: [facility];   Service: Plastics;  Laterality: Bilateral;    INSERTION OF TUNNELED CENTRAL VENOUS CATHETER (CVC) WITH SUBCUTANEOUS PORT Left 2019    Procedure: [id number]-Left neck or chest wall;  Surgeon: Percy Ayers MD;  Location: [address];  Service: General;  Laterality: Left;    MASTECTOMY      MEDIPORT REMOVAL N/A 2020    Procedure: REMOVAL, CATHETER, CENTRAL VENOUS, TUNNELED, WITH PORT;  Surgeon: Percy Ayers MD;  Location: [facility];  Service: General;  Laterality: N/A;    ROBOT-ASSISTED LAPAROSCOPIC ABDOMINAL HYSTERECTOMY USING DA HIMA XI N/A 2020    Procedure: XI ROBOTIC HYSTERECTOMY;  Surgeon: Emili Smith MD;  Location: [facility];  Service: OB/GYN;  Laterality: N/A;    ROBOT-ASSISTED LAPAROSCOPIC SALPINGO-OOPHORECTOMY USING DA HIMA XI Bilateral 2020    Procedure: XI ROBOTIC SALPINGO-OOPHORECTOMY;  Surgeon: Emili Smith MD;  Location: [facility];  Service: OB/GYN;  Laterality: Bilateral;    SHOULDER SURGERY Left     x 2    SINUS SURGERY      age 19    STOMACH SURGERY      TISSUE EXPANDER REMOVAL Right 10/3/2020    Procedure: REMOVAL, TISSUE EXPANDER;  Surgeon: Kiko Aranda MD;  Location: [facility];  Service: Plastics;  Laterality: Right;    TONSILLECTOMY      UPPER GASTROINTESTINAL ENDOSCOPY       Social History     Tobacco Use    Smoking status: Never    Smokeless tobacco: Never   Substance Use Topics    Alcohol use: Yes     Comment: socially    Drug use: No     Family History   Problem Relation Age of Onset    Cancer Maternal Grandmother 40        cervical    Cervical cancer Mother     Breast cancer Other     Ovarian cancer Other     Colon polyps Father     Colon cancer Neg Hx     Melanoma Neg Hx      OB History    Para Term  AB Living   2 2 2     2   SAB IAB Ectopic Multiple Live Births         0 2      # Outcome Date GA Lbr Bull/2nd Weight Sex Delivery Anes PTL Lv   2 Term 19 39w1d  3.43 kg (7 lb 9 oz) M CS-LTranv Spinal N JOLLY   1 Term 18  38w1d  2.96 kg (6 lb 8.4 oz) M CS-LTranv EPI N JOLLY      Complications: Failure to Progress in First Stage       Current Outpatient Medications:     albuterol (PROVENTIL/VENTOLIN HFA) 90 mcg/actuation inhaler, Inhale 1 puff every 6 (six) hours  into the lungs for Wheezing (As Needed) for up to 60 doses (Patient not taking: Reported on 11/7/2023), Disp: 18 g, Rfl: 0    ALPRAZolam (XANAX) 0.25 MG tablet, Take 1 tablet (0.25 mg total) by mouth daily as needed for Anxiety., Disp: 30 tablet, Rfl: 0    anastrozole (ARIMIDEX) 1 mg Tab, Take 1 tablet (1 mg total) by mouth once daily., Disp: 90 tablet, Rfl: 3    atogepant (QULIPTA) 60 mg Tab, Take 1 tablet every day by oral route., Disp: 30 tablet, Rfl: 2    butalbital-aspirin-caffeine -40 mg (FIORINAL) -40 mg Cap, take 1 capsule by mouth every 4 hours as needed, Disp: 15 capsule, Rfl: 0    butalbital-aspirin-caffeine -40 mg Tab, butalbital-aspirin-caffeine 50 mg-325 mg-40 mg tablet  Take 1 tablet every 4 hours by oral route as needed., Disp: , Rfl:     ciclopirox (LOPROX) 0.77 % Crea, Apply to affected areas of leg twice a day for two weeks, Disp: 30 g, Rfl: 0    citalopram (CELEXA) 40 MG tablet, TAKE 1 TABLET(40 MG) BY MOUTH EVERY DAY, Disp: 90 tablet, Rfl: 2    clindamycin (CLEOCIN T) 1 % external solution, Apply to affected area of scalp after showering, Disp: 60 mL, Rfl: 0    dextroamphetamine-amphetamine (MYDAYIS) 37.5 mg CT24, Take 37.5 mg by mouth Daily., Disp: 30 each, Rfl: 0    dextroamphetamine-amphetamine (MYDAYIS) 37.5 mg CT24, Take 1 capsule (37.5 mg) by mouth once Daily., Disp: 30 each, Rfl: 0    loratadine (CLARITIN) 10 mg tablet, Take 10 mg by mouth once daily., Disp: , Rfl:     magnesium 30 mg Tab, Take by mouth once., Disp: , Rfl:     magnesium oxide 400 mg magnesium Cap, magnesium 400 mg (as magnesium oxide) capsule  Take by oral route., Disp: , Rfl:     metFORMIN (GLUCOPHAGE) 500 MG tablet, Take 1 tablet (500 mg total) by mouth 2 (two)  times daily with meals., Disp: 60 tablet, Rfl: 11    metoclopramide HCl (REGLAN) 10 MG tablet, Take 1 tablet 3 (three) times daily before meals by mouth, Disp: 90 tablet, Rfl: 0    multivitamin (THERAGRAN) per tablet, Take 1 tablet by mouth once daily., Disp: , Rfl:     onabotulinumtoxinA (BOTOX INJ), Inject as directed. [id number], Disp: , Rfl:     ondansetron (ZOFRAN-ODT) 4 MG TbDL, Take 1 tablet every 6 (six) hours as needed by mouth for Nausea for up to 7 days, Disp: 20 tablet, Rfl: 0    ondansetron (ZOFRAN-ODT) 8 MG TbDL, Dissolve 1 by mouth the night before surgery and then dissolve 1 by mouth the morning of surgery and then 1 by mouth every 6 hours as needed for nausea., Disp: 10 tablet, Rfl: 0    pantoprazole (PROTONIX) 40 MG tablet, Take 1 tablet (40 mg total) by mouth once daily. Patient needs follow up appointment for any further refills., Disp: 30 tablet, Rfl: 0    phenazopyridine (PYRIDIUM) 100 MG tablet, 1 tablet 3 times a day for 2 days.  Take as needed after meals for pain, Disp: 6 tablet, Rfl: 0    prasterone, dhea, (INTRAROSA) 6.5 mg Inst, Place 6.5 mg vaginally every evening., Disp: 30 each, Rfl: 11    prasterone, dhea, (INTRAROSA) 6.5 mg Inst, Place 6.5 mg vaginally every evening., Disp: 30 each, Rfl: 11    prochlorperazine (COMPAZINE) 10 MG tablet, Take 1 tablet (10 mg total) by mouth 3 (three) times daily as needed (Headache and nausea)., Disp: 30 tablet, Rfl: 2    rosuvastatin (CRESTOR) 5 MG tablet, Take 1 tablet daily by mouth, Disp: 90 tablet, Rfl: 3    spironolactone (ALDACTONE) 100 MG tablet, Take 1 tablet (100 mg total) by mouth once daily., Disp: 30 tablet, Rfl: 3    sucralfate (CARAFATE) 1 gram tablet, Take 1 tablet (1 g total) by mouth 4 (four) times daily. Can be crushed if cannot swallow., Disp: 120 tablet, Rfl: 0    tiZANidine (ZANAFLEX) 2 MG tablet, Take 1 tablet every 6 hours by oral route., Disp: 30 tablet, Rfl: 2    topiramate (TOPAMAX) 25 MG tablet, take 3 tabs by mouth once  "daily, Disp: 90 tablet, Rfl: 0    TRUDHESA 0.725 mg/pump act. (4 mg/mL) Spry, by Each Nostril route., Disp: , Rfl:     ubrogepant (UBRELVY) 100 mg tablet, Take 1 tablet twice a day by oral route as needed., Disp: 16 tablet, Rfl: 2    valACYclovir (VALTREX) 1000 MG tablet, Take 1 tablet (1,000 mg total) by mouth every 12 (twelve) hours., Disp: 60 tablet, Rfl: 0    Current Facility-Administered Medications:     onabotulinumtoxina injection 200 Units, 200 Units, Intramuscular, Q90 Days, Dillon Gonzalez MD, 200 Units at 06/25/21 0751    testosterone cypionate injection 50 mg, 50 mg, Intramuscular, Q21 Days, Debbie Cuenca PA-C    Review of Systems:  General: No fever, chills, or weight loss.  Chest: No chest pain, shortness of breath, or palpitations.  Breast: No pain, masses, or nipple discharge.  Vulva: No pain, lesions, or itching.  Vagina: No relaxation, itching, discharge, or lesions.  Abdomen: No pain, nausea, vomiting, diarrhea, or constipation.  Urinary: No incontinence, nocturia, frequency, or dysuria.  Extremities:  No leg cramps, edema, or calf pain.  Neurologic: No headaches, dizziness, or visual changes.    Objective:     Vitals:    11/07/23 0823   BP: 121/88   Pulse: 84   Weight: 70.8 kg (156 lb)   Height: 5' 5" (1.651 m)   PainSc: 0-No pain     Body mass index is 25.96 kg/m².    PHYSICAL EXAM:  APPEARANCE: Well nourished, well developed, in no acute distress.  AFFECT: WNL, alert and oriented x 3      Assessment:      Malignant neoplasm of upper-outer quadrant of right breast in female, estrogen receptor positive    Metabolic syndrome  -     metFORMIN (GLUCOPHAGE) 500 MG tablet; Take 1 tablet (500 mg total) by mouth 2 (two) times daily with meals.  Dispense: 60 tablet; Refill: 11    Menopausal symptoms  -     testosterone cypionate injection 50 mg  -     Prior authorization Order    BMR of 1398 calories per day    Plan:   Continue Testosterone 50mg Q21 days.  Hormone labs scheduled this " week.  Stressed importance of labs Q6 months.  Continue high protein low glycemic diet.  Strength workouts 4-5x per week.  Discussed with being menopausal and on AI, needs to be at strict calorie deficit to see weight loss which I do not think is as important at this point. Try to not focus on scale.  Focus on body composition shifts and how she feels.  Doing great with increasing lean muscle to help improve her BMR long term.  Increase metformin to 500mg BID.  She will follow up with her oncologist about elevated alk phos.  Follow up in 6 months or sooner PRN    Instructed patient to call if she experiences any side effects or has any questions.    I spent a total of 30 minutes on the day of the visit.This includes face to face time and non-face to face time preparing to see the patient (eg, review of tests), obtaining and/or reviewing separately obtained history, documenting clinical information in the electronic or other health record, independently interpreting results and communicating results to the patient/family/caregiver, or care coordinator.     complains of pain/discomfort

## 2023-11-09 ENCOUNTER — LAB VISIT (OUTPATIENT)
Dept: LAB | Facility: OTHER | Age: 38
End: 2023-11-09
Attending: OBSTETRICS & GYNECOLOGY
Payer: COMMERCIAL

## 2023-11-09 DIAGNOSIS — Z79.890 HORMONE REPLACEMENT THERAPY (HRT): ICD-10-CM

## 2023-11-09 LAB — ESTRADIOL SERPL-MCNC: <10 PG/ML

## 2023-11-09 PROCEDURE — 36415 COLL VENOUS BLD VENIPUNCTURE: CPT | Performed by: OBSTETRICS & GYNECOLOGY

## 2023-11-09 PROCEDURE — 82670 ASSAY OF TOTAL ESTRADIOL: CPT | Performed by: OBSTETRICS & GYNECOLOGY

## 2023-11-09 PROCEDURE — 84402 ASSAY OF FREE TESTOSTERONE: CPT | Performed by: OBSTETRICS & GYNECOLOGY

## 2023-11-11 ENCOUNTER — PATIENT MESSAGE (OUTPATIENT)
Dept: HEMATOLOGY/ONCOLOGY | Facility: CLINIC | Age: 38
End: 2023-11-11
Payer: COMMERCIAL

## 2023-11-13 LAB — TESTOST FREE SERPL-MCNC: 1.7 PG/ML

## 2023-11-16 ENCOUNTER — TELEPHONE (OUTPATIENT)
Dept: OBSTETRICS AND GYNECOLOGY | Facility: CLINIC | Age: 38
End: 2023-11-16

## 2023-11-16 ENCOUNTER — CLINICAL SUPPORT (OUTPATIENT)
Dept: OBSTETRICS AND GYNECOLOGY | Facility: CLINIC | Age: 38
End: 2023-11-16
Payer: COMMERCIAL

## 2023-11-16 DIAGNOSIS — N95.1 MENOPAUSAL SYMPTOM: Primary | ICD-10-CM

## 2023-11-16 DIAGNOSIS — N95.1 MENOPAUSAL SYMPTOMS: Primary | ICD-10-CM

## 2023-11-16 PROCEDURE — 99999 PR PBB SHADOW E&M-EST. PATIENT-LVL I: CPT | Mod: PBBFAC,,,

## 2023-11-16 PROCEDURE — 96372 THER/PROPH/DIAG INJ SC/IM: CPT | Mod: S$GLB,,, | Performed by: PHYSICIAN ASSISTANT

## 2023-11-16 PROCEDURE — 96372 PR INJECTION,THERAP/PROPH/DIAG2ST, IM OR SUBCUT: ICD-10-PCS | Mod: S$GLB,,, | Performed by: PHYSICIAN ASSISTANT

## 2023-11-16 PROCEDURE — 99999 PR PBB SHADOW E&M-EST. PATIENT-LVL I: ICD-10-PCS | Mod: PBBFAC,,,

## 2023-11-16 RX ADMIN — TESTOSTERONE CYPIONATE 50 MG: 200 INJECTION, SOLUTION INTRAMUSCULAR at 09:11

## 2023-11-16 NOTE — TELEPHONE ENCOUNTER
----- Message from Liz Reyes LPN sent at 11/16/2023 10:15 AM CST -----  Needs a virtual per Dr Polanco

## 2023-11-16 NOTE — PROGRESS NOTES
Here for hormone therapy injection, no complaints at this time, Injection given as ordered, tolerated well, no report of pain prior to or after injection. Return to clinic as scheduled.     Site - [facility]    Testosterone  50 mg    Injections every 21 days    Clinic Supplied Medication

## 2023-11-17 ENCOUNTER — PATIENT MESSAGE (OUTPATIENT)
Dept: OBSTETRICS AND GYNECOLOGY | Facility: CLINIC | Age: 38
End: 2023-11-17
Payer: COMMERCIAL

## 2023-11-24 ENCOUNTER — PATIENT MESSAGE (OUTPATIENT)
Dept: PSYCHIATRY | Facility: CLINIC | Age: 38
End: 2023-11-24
Payer: COMMERCIAL

## 2023-11-24 RX ORDER — DEXTROAMPHETAMINE SACCHARATE, AMPHETAMINE ASPARTATE MONOHYDRATE, DEXTROAMPHETAMINE SULFATE, AMPHETAMINE SULFATE 9.375; 9.375; 9.375; 9.375 MG/1; MG/1; MG/1; MG/1
37.5 CAPSULE, EXTENDED RELEASE ORAL DAILY
Qty: 30 EACH | Refills: 0 | Status: SHIPPED | OUTPATIENT
Start: 2023-11-24 | End: 2023-12-27 | Stop reason: SDUPTHER

## 2023-12-05 ENCOUNTER — PATIENT MESSAGE (OUTPATIENT)
Dept: PSYCHIATRY | Facility: CLINIC | Age: 38
End: 2023-12-05
Payer: COMMERCIAL

## 2023-12-07 ENCOUNTER — CLINICAL SUPPORT (OUTPATIENT)
Dept: OBSTETRICS AND GYNECOLOGY | Facility: CLINIC | Age: 38
End: 2023-12-07
Payer: COMMERCIAL

## 2023-12-07 DIAGNOSIS — N95.1 MENOPAUSAL SYMPTOM: Primary | ICD-10-CM

## 2023-12-07 PROCEDURE — 96372 THER/PROPH/DIAG INJ SC/IM: CPT | Mod: S$GLB,,, | Performed by: PHYSICIAN ASSISTANT

## 2023-12-07 PROCEDURE — 99999 PR PBB SHADOW E&M-EST. PATIENT-LVL I: CPT | Mod: PBBFAC,,,

## 2023-12-07 PROCEDURE — 96372 PR INJECTION,THERAP/PROPH/DIAG2ST, IM OR SUBCUT: ICD-10-PCS | Mod: S$GLB,,, | Performed by: PHYSICIAN ASSISTANT

## 2023-12-07 PROCEDURE — 99999 PR PBB SHADOW E&M-EST. PATIENT-LVL I: ICD-10-PCS | Mod: PBBFAC,,,

## 2023-12-07 RX ADMIN — TESTOSTERONE CYPIONATE 50 MG: 200 INJECTION, SOLUTION INTRAMUSCULAR at 02:12

## 2023-12-27 ENCOUNTER — PATIENT MESSAGE (OUTPATIENT)
Dept: PSYCHIATRY | Facility: CLINIC | Age: 38
End: 2023-12-27
Payer: COMMERCIAL

## 2023-12-27 RX ORDER — DEXTROAMPHETAMINE SACCHARATE, AMPHETAMINE ASPARTATE MONOHYDRATE, DEXTROAMPHETAMINE SULFATE, AMPHETAMINE SULFATE 9.375; 9.375; 9.375; 9.375 MG/1; MG/1; MG/1; MG/1
37.5 CAPSULE, EXTENDED RELEASE ORAL DAILY
Qty: 30 EACH | Refills: 0 | Status: SHIPPED | OUTPATIENT
Start: 2023-12-27 | End: 2024-01-29 | Stop reason: SDUPTHER

## 2023-12-29 ENCOUNTER — OFFICE VISIT (OUTPATIENT)
Dept: OBSTETRICS AND GYNECOLOGY | Facility: CLINIC | Age: 38
End: 2023-12-29
Payer: COMMERCIAL

## 2023-12-29 ENCOUNTER — TELEPHONE (OUTPATIENT)
Dept: OBSTETRICS AND GYNECOLOGY | Facility: CLINIC | Age: 38
End: 2023-12-29

## 2023-12-29 ENCOUNTER — PATIENT MESSAGE (OUTPATIENT)
Dept: OBSTETRICS AND GYNECOLOGY | Facility: CLINIC | Age: 38
End: 2023-12-29

## 2023-12-29 DIAGNOSIS — Z17.0 MALIGNANT NEOPLASM OF UPPER-OUTER QUADRANT OF RIGHT BREAST IN FEMALE, ESTROGEN RECEPTOR POSITIVE: Primary | ICD-10-CM

## 2023-12-29 DIAGNOSIS — N95.1 MENOPAUSAL SYMPTOMS: ICD-10-CM

## 2023-12-29 DIAGNOSIS — C50.411 MALIGNANT NEOPLASM OF UPPER-OUTER QUADRANT OF RIGHT BREAST IN FEMALE, ESTROGEN RECEPTOR POSITIVE: Primary | ICD-10-CM

## 2023-12-29 PROCEDURE — 99213 PR OFFICE/OUTPT VISIT, EST, LEVL III, 20-29 MIN: ICD-10-PCS | Mod: 95,,, | Performed by: PHYSICIAN ASSISTANT

## 2023-12-29 PROCEDURE — 3044F HG A1C LEVEL LT 7.0%: CPT | Mod: CPTII,95,, | Performed by: PHYSICIAN ASSISTANT

## 2023-12-29 PROCEDURE — 1160F RVW MEDS BY RX/DR IN RCRD: CPT | Mod: CPTII,95,, | Performed by: PHYSICIAN ASSISTANT

## 2023-12-29 PROCEDURE — 1160F PR REVIEW ALL MEDS BY PRESCRIBER/CLIN PHARMACIST DOCUMENTED: ICD-10-PCS | Mod: CPTII,95,, | Performed by: PHYSICIAN ASSISTANT

## 2023-12-29 PROCEDURE — 3044F PR MOST RECENT HEMOGLOBIN A1C LEVEL <7.0%: ICD-10-PCS | Mod: CPTII,95,, | Performed by: PHYSICIAN ASSISTANT

## 2023-12-29 PROCEDURE — 1159F PR MEDICATION LIST DOCUMENTED IN MEDICAL RECORD: ICD-10-PCS | Mod: CPTII,95,, | Performed by: PHYSICIAN ASSISTANT

## 2023-12-29 PROCEDURE — 99213 OFFICE O/P EST LOW 20 MIN: CPT | Mod: 95,,, | Performed by: PHYSICIAN ASSISTANT

## 2023-12-29 PROCEDURE — 1159F MED LIST DOCD IN RCRD: CPT | Mod: CPTII,95,, | Performed by: PHYSICIAN ASSISTANT

## 2023-12-29 RX ORDER — TESTOSTERONE CYPIONATE 200 MG/ML
50 INJECTION, SOLUTION INTRAMUSCULAR
Status: SHIPPED | OUTPATIENT
Start: 2023-12-30 | End: 2024-06-15

## 2023-12-29 NOTE — PROGRESS NOTES
The patient location is: home  The chief complaint leading to consultation is: Follow up    Visit type: audiovisual    Face to Face time with patient: 10 minutes  15 minutes of total time spent on the encounter, which includes face to face time and non-face to face time preparing to see the patient (eg, review of tests), Obtaining and/or reviewing separately obtained history, Documenting clinical information in the electronic or other health record, Independently interpreting results (not separately reported) and communicating results to the patient/family/caregiver, or Care coordination (not separately reported).         Each patient to whom he or she provides medical services by telemedicine is:  (1) informed of the relationship between the physician and patient and the respective role of any other health care provider with respect to management of the patient; and (2) notified that he or she may decline to receive medical services by telemedicine and may withdraw from such care at any time.    Subjective:      Yolanda Norman is a 38 y.o. female who presents for Survivorship.  History of right breast cancer s/p bilateral mastectomy on July 1, 2020 and currently on Airmidex. She is also s/p hyst/BSO. She is getting testosterone 50mg IM Q21 days for menopausal symptoms.  However, friends and  have noticed increased irritability and high libido the week after her injection. Improves about one week after the injection.  Moved to Q21 days due to fatigue at 3 week lori but these symptoms have improved with other changes.     11/9/2023 LABS  Estradiol <10  Free Testosterone 1.7    Mammogram: s/p bilateral mastectomy  Pap: s/p hyst/bso  PCP: Michael Peacock MD  Routine Screening Labs: 3/23/2023  DEXA: 10/10/2023 normal, repeat in 2 years      Lab Visit on 11/09/2023   Component Date Value Ref Range Status    Testosterone, Free 11/09/2023 1.7  pg/mL Final    Estradiol 11/09/2023 <10 (A)  See Text pg/mL Final        Past Medical History:   Diagnosis Date    Abnormal Pap smear of cervix     ADHD     Allergy     seasonal    Anorexia     Anxiety     Asthma     BRCA1 positive 2020    Bulimia     Depression     Fever blister     Gastroparesis     GERD (gastroesophageal reflux disease)     History of posttraumatic stress disorder (PTSD)     Hypertension     Gestational Hypertension    Invasive ductal carcinoma of right breast 2019    Mastitis     Migraine headache     PONV (postoperative nausea and vomiting)     Status post mastectomy, bilateral 2020     Past Surgical History:   Procedure Laterality Date    BILATERAL MASTECTOMY Bilateral 2020    Procedure: MASTECTOMY, BILATERAL - BILATERAL SKIN SPARING MASTECTOMY;  Surgeon: Percy Ayers MD;  Location: [facility];  Service: General;  Laterality: Bilateral;    BIOPSY OF AXILLARY NODE Right 2020    Procedure: BIOPSY, LYMPH NODE, AXILLARY;  Surgeon: Percy Ayers MD;  Location: [facility];  Service: General;  Laterality: Right;    BONE MARROW ASPIRATION      x 3    BREAST SURGERY      CERVICAL BIOPSY  W/ LOOP ELECTRODE EXCISION       SECTION  2016     SECTION N/A 2019    Procedure:  SECTION;  Surgeon: Kirit Hernandez MD;  Location: [facility];  Service: OB/GYN;  Laterality: N/A;    COLPOSCOPY      ESOPHAGOGASTRODUODENOSCOPY N/A 2021    Procedure: EGD (ESOPHAGOGASTRODUODENOSCOPY);  Surgeon: Lj Santos MD;  Location: [address]);  Service: Endoscopy;  Laterality: N/A;  COVID test on 21 at [facility]    ESOPHAGOGASTRODUODENOSCOPY N/A 2021    Procedure: ESOPHAGOGASTRODUODENOSCOPY (EGD);  Surgeon: Camila Wiley MD;  Location: [facility];  Service: Endoscopy;  Laterality: N/A;    INJECTION FOR SENTINEL NODE IDENTIFICATION Right 2020    Procedure: INJECTION, FOR SENTINEL NODE IDENTIFICATION;  Surgeon: Percy Ayers MD;  Location: [facility];  Service: General;  Laterality: Right;     INSERTION OF BREAST TISSUE EXPANDER Bilateral 2020    Procedure: INSERTION, TISSUE EXPANDER, BREAST;  Surgeon: Kiko Aranda MD;  Location: [facility];  Service: Plastics;  Laterality: Bilateral;    INSERTION OF TUNNELED CENTRAL VENOUS CATHETER (CVC) WITH SUBCUTANEOUS PORT Left 2019    Procedure: [id number]-Left neck or chest wall;  Surgeon: Percy Ayers MD;  [address];  Service: General;  Laterality: Left;    MASTECTOMY      MEDIPORT REMOVAL N/A 2020    Procedure: REMOVAL, CATHETER, CENTRAL VENOUS, TUNNELED, WITH PORT;  Surgeon: Percy Ayers MD;  Location: [facility];  Service: General;  Laterality: N/A;    ROBOT-ASSISTED LAPAROSCOPIC ABDOMINAL HYSTERECTOMY USING DA HIMA XI N/A 2020    Procedure: XI ROBOTIC HYSTERECTOMY;  Surgeon: Emili Smith MD;  Location: [facility];  Service: OB/GYN;  Laterality: N/A;    ROBOT-ASSISTED LAPAROSCOPIC SALPINGO-OOPHORECTOMY USING DA HIMA XI Bilateral 2020    Procedure: XI ROBOTIC SALPINGO-OOPHORECTOMY;  Surgeon: Emili Smith MD;  Location: [facility];  Service: OB/GYN;  Laterality: Bilateral;    SHOULDER SURGERY Left     x 2    SINUS SURGERY      age 19    STOMACH SURGERY      TISSUE EXPANDER REMOVAL Right 10/3/2020    Procedure: REMOVAL, TISSUE EXPANDER;  Surgeon: Kiko Aranda MD;  Location: [facility];  Service: Plastics;  Laterality: Right;    TONSILLECTOMY      UPPER GASTROINTESTINAL ENDOSCOPY       Social History     Tobacco Use    Smoking status: Never    Smokeless tobacco: Never   Substance Use Topics    Alcohol use: Yes     Comment: socially    Drug use: No     Family History   Problem Relation Age of Onset    Cancer Maternal Grandmother 40        cervical    Cervical cancer Mother     Breast cancer Other     Ovarian cancer Other     Colon polyps Father     Colon cancer Neg Hx     Melanoma Neg Hx      OB History    Para Term  AB Living   2 2 2     2   SAB IAB Ectopic Multiple Live Births         0 2       # Outcome Date GA Lbr Bull/2nd Weight Sex Delivery Anes PTL Lv   2 Term 04/02/19 39w1d  3.43 kg (7 lb 9 oz) M CS-LTranv Spinal N JOLLY   1 Term 11/21/16 38w1d  2.96 kg (6 lb 8.4 oz) M CS-LTranv EPI N JOLLY      Complications: Failure to Progress in First Stage       Current Outpatient Medications:     albuterol (PROVENTIL/VENTOLIN HFA) 90 mcg/actuation inhaler, Inhale 1 puff every 6 (six) hours  into the lungs for Wheezing (As Needed) for up to 60 doses (Patient not taking: Reported on 11/7/2023), Disp: 18 g, Rfl: 0    ALPRAZolam (XANAX) 0.25 MG tablet, Take 1 tablet (0.25 mg total) by mouth daily as needed for Anxiety., Disp: 30 tablet, Rfl: 0    anastrozole (ARIMIDEX) 1 mg Tab, Take 1 tablet (1 mg total) by mouth once daily., Disp: 90 tablet, Rfl: 3    atogepant (QULIPTA) 60 mg Tab, Take 1 tablet every day by oral route., Disp: 30 tablet, Rfl: 2    butalbital-acetaminophen-caffeine -40 mg (FIORICET, ESGIC) -40 mg per tablet, take 1 tablet by mouth 4 times daily as needed, Disp: 20 tablet, Rfl: 0    butalbital-aspirin-caffeine -40 mg (FIORINAL) -40 mg Cap, take 1 capsule by mouth every 4 hours as needed, Disp: 15 capsule, Rfl: 0    butalbital-aspirin-caffeine -40 mg Tab, butalbital-aspirin-caffeine 50 mg-325 mg-40 mg tablet  Take 1 tablet every 4 hours by oral route as needed., Disp: , Rfl:     ciclopirox (LOPROX) 0.77 % Crea, Apply to affected areas of leg twice a day for two weeks, Disp: 30 g, Rfl: 0    citalopram (CELEXA) 40 MG tablet, TAKE 1 TABLET(40 MG) BY MOUTH EVERY DAY, Disp: 90 tablet, Rfl: 2    clindamycin (CLEOCIN T) 1 % external solution, Apply to affected area of scalp after showering, Disp: 60 mL, Rfl: 0    dextroamphetamine-amphetamine (MYDAYIS) 37.5 mg CT24, Take 1 capsule (37.5 mg) by mouth Daily., Disp: 30 each, Rfl: 0    loratadine (CLARITIN) 10 mg tablet, Take 10 mg by mouth once daily., Disp: , Rfl:     magnesium 30 mg Tab, Take by mouth once., Disp: , Rfl:      magnesium oxide 400 mg magnesium Cap, magnesium 400 mg (as magnesium oxide) capsule  Take by oral route., Disp: , Rfl:     metFORMIN (GLUCOPHAGE) 500 MG tablet, Take 1 tablet (500 mg total) by mouth 2 (two) times daily with meals., Disp: 60 tablet, Rfl: 11    metoclopramide HCl (REGLAN) 10 MG tablet, Take 1 tablet 3 (three) times daily before meals by mouth, Disp: 90 tablet, Rfl: 0    metoclopramide HCl (REGLAN) 10 MG tablet, Take 1 tablet by mouth as needed (nausea), Disp: 30 tablet, Rfl: 0    multivitamin (THERAGRAN) per tablet, Take 1 tablet by mouth once daily., Disp: , Rfl:     onabotulinumtoxinA (BOTOX INJ), Inject as directed. [id number], Disp: , Rfl:     ondansetron (ZOFRAN-ODT) 4 MG TbDL, Take 1 tablet every 6 (six) hours as needed by mouth for Nausea for up to 7 days, Disp: 20 tablet, Rfl: 0    ondansetron (ZOFRAN-ODT) 8 MG TbDL, Dissolve 1 by mouth the night before surgery and then dissolve 1 by mouth the morning of surgery and then 1 by mouth every 6 hours as needed for nausea., Disp: 10 tablet, Rfl: 0    pantoprazole (PROTONIX) 40 MG tablet, Take 1 tablet (40 mg total) by mouth once daily. Patient needs follow up appointment for any further refills., Disp: 30 tablet, Rfl: 0    phenazopyridine (PYRIDIUM) 100 MG tablet, 1 tablet 3 times a day for 2 days.  Take as needed after meals for pain, Disp: 6 tablet, Rfl: 0    prasterone, dhea, (INTRAROSA) 6.5 mg Inst, Place 6.5 mg vaginally every evening., Disp: 30 each, Rfl: 11    prasterone, dhea, (INTRAROSA) 6.5 mg Inst, Place 6.5 mg vaginally every evening., Disp: 30 each, Rfl: 11    prochlorperazine (COMPAZINE) 10 MG tablet, Take 1 tablet (10 mg total) by mouth 3 (three) times daily as needed (Headache and nausea)., Disp: 30 tablet, Rfl: 2    promethazine (PHENERGAN) 12.5 MG Tab, Take 1 tablet by mouth every 8 (eight) hours as needed for Nausea, Disp: 21 tablet, Rfl: 0    rosuvastatin (CRESTOR) 5 MG tablet, Take 1 tablet daily by mouth, Disp: 90 tablet, Rfl:  3    spironolactone (ALDACTONE) 100 MG tablet, Take 1 tablet by mouth once daily, Disp: 30 tablet, Rfl: 3    sucralfate (CARAFATE) 1 gram tablet, Take 1 tablet (1 g total) by mouth 4 (four) times daily. Can be crushed if cannot swallow., Disp: 120 tablet, Rfl: 0    tiZANidine (ZANAFLEX) 2 MG tablet, Take 1 tablet every 6 hours by oral route., Disp: 30 tablet, Rfl: 2    tiZANidine (ZANAFLEX) 2 MG tablet, Take 1 tablet every 6 hours by oral route., Disp: 30 tablet, Rfl: 2    topiramate (TOPAMAX) 25 MG tablet, Take 3 tablets by mouth once daily, Disp: 90 tablet, Rfl: 1    TRUDHESA 0.725 mg/pump act. (4 mg/mL) Spry, by Each Nostril route., Disp: , Rfl:     ubrogepant (UBRELVY) 100 mg tablet, Take 1 tablet twice a day by oral route as needed., Disp: 16 tablet, Rfl: 2    valACYclovir (VALTREX) 1000 MG tablet, Take 1 tablet (1,000 mg total) by mouth every 12 (twelve) hours., Disp: 60 tablet, Rfl: 0    Current Facility-Administered Medications:     onabotulinumtoxina injection 200 Units, 200 Units, Intramuscular, Q90 Days, Dillon Gonzalez MD, 200 Units at 06/25/21 0751    [START ON 12/30/2023] testosterone cypionate injection 50 mg, 50 mg, Intramuscular, Q28 Days, Debbie Cuenca PA-C    The ASCVD Risk score ([place] DK, et al., 2019) failed to calculate for the following reasons:    The 2019 ASCVD risk score is only valid for ages 40 to 79    Review of Systems:  General: No fever, chills, or weight loss.  Chest: No chest pain, shortness of breath, or palpitations.  Breast: No pain, masses, or nipple discharge.  Vulva: No pain, lesions, or itching.  Vagina: No relaxation, itching, discharge, or lesions.  Abdomen: No pain, nausea, vomiting, diarrhea, or constipation.  Urinary: No incontinence, nocturia, frequency, or dysuria.  Extremities:  No leg cramps, edema, or calf pain.  Neurologic: No headaches, dizziness, or visual changes.    Objective:   There were no vitals filed for this visit.  There is no height or weight  on file to calculate BMI.    PHYSICAL EXAM:  APPEARANCE: Well nourished, well developed, in no acute distress.  AFFECT: WNL, alert and oriented x 3      Assessment:    Malignant neoplasm of upper-outer quadrant of right breast in female, estrogen receptor positive    Menopausal symptoms  -     testosterone cypionate injection 50 mg  -     Prior authorization Order        Plan:   Reduce testosterone 50mg back to [id number]  Consider reducing dose if continues to have side effects.  Rescheduled next injection.  Follow up in 3-4 months scheduled or sooner PRN    Instructed patient to call if she experiences any side effects or has any questions.    I spent a total of 15 minutes on the day of the visit.This includes face to face time and non-face to face time preparing to see the patient (eg, review of tests), obtaining and/or reviewing separately obtained history, documenting clinical information in the electronic or other health record, independently interpreting results and communicating results to the patient/family/caregiver, or care coordinator.

## 2024-01-05 ENCOUNTER — CLINICAL SUPPORT (OUTPATIENT)
Dept: OBSTETRICS AND GYNECOLOGY | Facility: CLINIC | Age: 39
End: 2024-01-05
Payer: COMMERCIAL

## 2024-01-05 ENCOUNTER — PATIENT MESSAGE (OUTPATIENT)
Dept: PSYCHIATRY | Facility: CLINIC | Age: 39
End: 2024-01-05
Payer: COMMERCIAL

## 2024-01-05 DIAGNOSIS — N95.1 MENOPAUSAL SYMPTOM: Primary | ICD-10-CM

## 2024-01-05 PROCEDURE — 99999 PR PBB SHADOW E&M-EST. PATIENT-LVL I: CPT | Mod: PBBFAC,,,

## 2024-01-05 PROCEDURE — 96372 THER/PROPH/DIAG INJ SC/IM: CPT | Mod: S$GLB,,, | Performed by: PHYSICIAN ASSISTANT

## 2024-01-05 RX ADMIN — TESTOSTERONE CYPIONATE 50 MG: 200 INJECTION, SOLUTION INTRAMUSCULAR at 09:01

## 2024-01-09 ENCOUNTER — OFFICE VISIT (OUTPATIENT)
Dept: PSYCHIATRY | Facility: CLINIC | Age: 39
End: 2024-01-09
Payer: COMMERCIAL

## 2024-01-09 ENCOUNTER — PATIENT MESSAGE (OUTPATIENT)
Dept: PSYCHIATRY | Facility: CLINIC | Age: 39
End: 2024-01-09

## 2024-01-09 DIAGNOSIS — F41.9 ANXIETY DISORDER, UNSPECIFIED TYPE: Primary | ICD-10-CM

## 2024-01-09 DIAGNOSIS — F41.1 GENERALIZED ANXIETY DISORDER: ICD-10-CM

## 2024-01-09 DIAGNOSIS — Z86.59 HISTORY OF POSTTRAUMATIC STRESS DISORDER (PTSD): ICD-10-CM

## 2024-01-09 DIAGNOSIS — F90.0 ATTENTION DEFICIT HYPERACTIVITY DISORDER, INATTENTIVE TYPE: ICD-10-CM

## 2024-01-09 PROCEDURE — 1159F MED LIST DOCD IN RCRD: CPT | Mod: CPTII,95,, | Performed by: PSYCHOLOGIST

## 2024-01-09 PROCEDURE — 99214 OFFICE O/P EST MOD 30 MIN: CPT | Mod: 95,,, | Performed by: PSYCHOLOGIST

## 2024-01-09 PROCEDURE — 90833 PSYTX W PT W E/M 30 MIN: CPT | Mod: 95,,, | Performed by: PSYCHOLOGIST

## 2024-01-09 RX ORDER — ALPRAZOLAM 0.25 MG/1
0.25 TABLET ORAL DAILY PRN
Qty: 30 TABLET | Refills: 0 | Status: SHIPPED | OUTPATIENT
Start: 2024-01-09 | End: 2024-02-11

## 2024-01-09 NOTE — PROGRESS NOTES
"Outpatient Psychiatry Follow-Up Visit    Clinical Status of Patient: Outpatient (Ambulatory)  01/09/2024     Chief Complaint:  presenting today for a follow-up.       Interval History and Content of Current Session:  Interim Events/Subjective Report/Content of Current Session:  follow-up appointment.    Pt is a 38 y/o female with past psychiatric hx of anxiety and ADHD who presents for follow-up treatment. Pt reported an increased anxiety around the holidays. Stated that her son's behavior, possibly due to ADHD, has become more conflictual. Pt also noted waking in a panic about 3-4 nights per week. Pt attributed this to the anniversary of breast cancer diagnosis. Pt stated that she has been taking alprazolam about 5 or 6 times in the past two weeks. Pt noted that she had not taken alprazolam since September, until recently. Stated that she has an increase in mood and anxiety symptoms each year around this time.     Past Psychiatric hx: effexor xr ("my mood was the best on that but I was having panic attacks and bld press was really negar"), pristiq (for headaches- effective), cymbalta (ineffective), lexapro and zoloft (effective but worsened headaches), klonopin 1mg (effective- for sleep and anxiety)  For sleep- elavil (ineffective), trazodone (worsened h/s's), ambien (nightmares), lunesta (nightmares), seroquel, prazosin, xanax  For headache- topomax    Past Medical hx:   Past Medical History:   Diagnosis Date    Abnormal Pap smear of cervix 2009    ADHD     Allergy     seasonal    Anorexia     Anxiety     Asthma     BRCA1 positive 12/18/2020    Bulimia     Depression     Fever blister     Gastroparesis     GERD (gastroesophageal reflux disease)     History of posttraumatic stress disorder (PTSD)     Hypertension     Gestational Hypertension    Invasive ductal carcinoma of right breast 12/18/2019    Mastitis     Migraine headache     PONV (postoperative nausea and vomiting)     Status post mastectomy, bilateral " 7/29/2020        Interim hx:  Medication changes last visit: none  Anxiety: increased  Depression: stable     Denies suicidal/homicidal ideations.  Denies hopelessness/worthlessness.    Denies auditory/visual hallucinations      Alcohol: Pt denied  Drug: Pt denied  Caffeine: Not assessed  Tobacco: Pt denied      Review of Systems   PSYCHIATRIC: Pertinent items are noted in the narrative.        CONSTITUTIONAL: weight stable    Past Medical, Family and Social History: The patient's past medical, family and social history have been reviewed and updated as appropriate within the electronic medical record. See encounter notes.     Current Psychiatric Medication:  Celexa 40mg po qhs, Mydayis 37.5 mg po qam, xanax 0.125mg po daily PRN anxiety (takes maybe once every 3 months), trazodone 25mg po qhs PRN insomnia (not currently taking)     Compliance: yes      Side effects: Pt denied     Risk Parameters:  Patient reports no suicidal ideation  Patient reports no homicidal ideation  Patient reports no self-injurious behavior  Patient reports no violent behavior     Exam (detailed: at least 9 elements; comprehensive: all 15 elements)   Constitutional  Vitals:  Most recent vital signs, dated less than 90 days prior to this appointment, were reviewed. BP: ()/()   Arterial Line BP: ()/()       General:  unremarkable, age appropriate, casual attire, good eye contact, good rapport       Musculoskeletal  Muscle Strength/Tone:  no flaccidity, no tremor    Gait & Station:  normal      Psychiatric                       Speech:  normal tone, normal rate, rhythm, and volume   Mood & Affect:   stable         Thought Process:   Goal directed; Linear    Associations:   intact   Thought Content:   No SI/HI, delusions, or paranoia, no AV/VH   Insight & Judgement:   Good, adequate to circumstances   Orientation:   grossly intact; alert and oriented x 4    Memory:  intact for content of interview    Language:  grossly intact, can repeat     Attention Span  : Grossly intact for content of interview   Fund of Knowledge:   intact and appropriate to age and level of education        Assessment and Diagnosis   Status/Progress: Based on the examination today, the patient's problem(s) is/are adequately controlled.  New problems have not been presented today. Co-morbidities are not complicating management of the primary condition. There are no active rule-out diagnoses for this patient at this time.      Impression: Pt appears to have a recent increase in anxiety with the anniversary of past trauma (cancer diagnosis). Noted that she was doing well prior to this, with mood and anxiety stable and ADHD symptoms managed well. We discussed the impact of trauma on memory and normalized her experiences.  Will continue medication plan as is and monitor symptoms moving forward.     Diagnosis:   Anxiety disorder   Attention Deficit Hyperactivity Disorder - Inattentive Type   h/o PTSD (now in remission)   h/o anorexia and bulimia (both in remission)  Intervention/Counseling/Treatment Plan   Medication Management:      1. Celexa 40mg po qhs    2. Mydayis 37.5 mg po qam    3. xanax 0.125mg po daily PRN anxiety (infrequent)    4. Call to report any worsening of symptoms or problems with the medication. Pt instructed to go to ER with thoughts of harming self, others     5. Patient given contact # for psychotherapists at [facility] and also instructed to check with insurance for list of providers.     Psychotherapy: 20 minutes  Target symptoms: PTSD  Why chosen therapy is appropriate versus another modality: CBT used; relevant to diagnosis, patient responds to this modality  Outcome monitoring methods: self-report, observation  Therapeutic intervention type: Cognitive Behavioral Therapy, Psychoeducation  Topics discussed/themes: building skills sets for symptom management, symptom recognition, nutrition, exercise  The patient's response to the intervention is  accepting  Patient's response to treatment is: good.   The patient's progress toward treatment goals: stable     Return to clinic: 3 months    -Cognitive-Behavioral/Supportive therapy and psychoeducation provided  -R/B/SE's of medications discussed with the pt who expresses understanding and chooses to take medications as prescribed.   -Pt instructed to call clinic, 911 or go to nearest emergency room if sxs worsen or pt is in   crisis. The pt expresses understanding.    Gokul Keller, PhD, MP     Antidepressant/Antianxiety Medication Initiation:  Patient informed of risks, benefits, and potential side effects of medication and accepts informed consent.  Common side effects include nausea, fatigue, headache, insomnia., Specifically discussed the possibility of new or worsening suicidal thoughts/depression.  Patient instructed to stop the medication immediately and seek urgent treatment if this occurs. Patient instructed not to abruptly discontinue medication without physician guidance except in cases of sudden onset or worsening of SI.       Stimulant Medication Initiation:  Patient advised of risks, benefits, and side effects of medication and accepts informed consent.  Common side effects include insomnia, irritability, jittery feeling, dry mouth, and agitation/hostility., Patient advised of potential addictive nature of medication and controlled substance classification.  Instructed to safeguard medication as no early refills can be given for lost or stolen medications.       Benzodiazepine Initiation:  Patient advised of the risks, benefits, and common side effects of medication and has accepted informed consent.  Common side effects include drowsiness, impaired coordination, possible memory loss., Patient advised NOT to operate a vehicle or machinery untiil they are sure how the medication will affec them.  Client also advised of danger of mixing this medication with alcohol., Patient advised of potential  addictive nature of medication and need to safeguard medication as no early refills for lost or stolen medications can be authorized.       Pregnancy Warning:  Patient denies current pregnancy possibility.  Patient made aware that medications have not been proven safe in pregnancy and that she must maintain adequate birth control.  Patient instructed to alert us immediately if she becomes pregnant.

## 2024-01-24 NOTE — PROGRESS NOTES
Subjective:       Patient ID: Yolanda Norman is a 38 y.o. female.    Chief Complaint: No chief complaint on file.      HPI 38 year old female, who returns for follow-up of carcinoma of the right breast, ER +,HER 2 negative.   She has been on anastrozole since March 2022 after prior letrozole and exemestane.    Physically, she has been doing well.  She continues to exercise regularly.  She has had lots of pain in her wrists and in the soft tissues of her hands over the last several weeks.  She had recent labs with her primary care physician which were.    She does have some stress at home due to a son who has an eating disorder.  She had some recent plastic surgery with some fat grafting.    Breast history:    Mammogram and ultrasound on December 11th, 2019 showed right breast mass 7 cm from the nipple.  By ultrasound this mass measured 33 x 32 x 21 mm.    Right breast biopsy on December 13 showed high-grade infiltrating ductal carcinoma (histologic grade 3, nuclear grade 3, mitotic index 3) there were medullary features.  The cancer was 25% ER positive and FL and HER2 negative.  Ki-67 was 90%.    She completed 4 cycles of neoadjuvant Adriamycin Cytoxan February 13, 2020.  She completed 12 weeks of weekly Taxol on May 20, 2020.    On July 1, 2020 she underwent bilateral mastectomy.    The right breast showed no residual malignancy, 5 right axillary sentinel lymph nodes were all negative for malignancy.  Final pathological stage yp T0 N0.  The left breast showed no atypia or malignancy.    Radiation therapy was completed 11/25/20.      She had a hysterectomy  December 18th, 2020.  Letrozole was started December 2020..  She was then changed to Exemestane in early 2021 due to arthralgias.  Changed to anastrazole in March 2022 due to arthralgias and fatigue.    She had DORIAN reconstruction in February 2021.    Review of Systems   Constitutional:  Negative for activity change, appetite change, fever and unexpected weight  change.   HENT:  Negative for mouth sores.    Eyes:  Negative for visual disturbance.   Respiratory:  Negative for cough and shortness of breath.    Cardiovascular:  Negative for chest pain.   Gastrointestinal:  Negative for abdominal distention, abdominal pain, constipation, diarrhea and rectal pain.   Genitourinary:  Negative for frequency.   Musculoskeletal:  Positive for arthralgias. Negative for back pain.   Skin:  Negative for rash.   Neurological:  Positive for numbness.   Hematological:  Negative for adenopathy.   Psychiatric/Behavioral:  Negative for dysphoric mood. The patient is not nervous/anxious.        Objective:      Physical Exam  Vitals reviewed.   Constitutional:       General: She is not in acute distress.     Appearance: Normal appearance. She is well-developed.   Eyes:      General: No scleral icterus.  Cardiovascular:      Rate and Rhythm: Normal rate and regular rhythm.   Pulmonary:      Effort: Pulmonary effort is normal. No respiratory distress.      Breath sounds: Normal breath sounds. No wheezing or rales.   Chest:       Abdominal:      Palpations: Abdomen is soft. There is no mass.      Tenderness: There is no abdominal tenderness.   Lymphadenopathy:      Cervical: No cervical adenopathy.      Upper Body:      Right upper body: No supraclavicular or axillary adenopathy.      Left upper body: No supraclavicular or axillary adenopathy.   Neurological:      Mental Status: She is alert and oriented to person, place, and time.   Psychiatric:         Mood and Affect: Mood normal.         Behavior: Behavior normal.         Thought Content: Thought content normal.         Judgment: Judgment normal.       Assessment:       1. Malignant neoplasm of upper-outer quadrant of right breast in female, estrogen receptor positive        Plan:      She will take a 2 week break from anastrozole to see if that helps with her arthralgias in her hands.  Continue anastrozole and RTC 6 M.          Route Chart  for Scheduling    Med Onc Chart Routing      Follow up with physician 6 months.   Follow up with [facility]    Infusion scheduling note    Injection scheduling note    Labs None   Scheduling:  Preferred lab:  Lab interval:     Imaging None      Pharmacy appointment No pharmacy appointment needed      Other referrals no referral to On[facility] needed -  no Massage appointment needed    No additional referrals needed

## 2024-01-29 ENCOUNTER — OFFICE VISIT (OUTPATIENT)
Dept: HEMATOLOGY/ONCOLOGY | Facility: CLINIC | Age: 39
End: 2024-01-29
Payer: COMMERCIAL

## 2024-01-29 ENCOUNTER — PATIENT MESSAGE (OUTPATIENT)
Dept: PSYCHIATRY | Facility: CLINIC | Age: 39
End: 2024-01-29
Payer: COMMERCIAL

## 2024-01-29 VITALS
SYSTOLIC BLOOD PRESSURE: 132 MMHG | HEIGHT: 65 IN | OXYGEN SATURATION: 99 % | DIASTOLIC BLOOD PRESSURE: 78 MMHG | HEART RATE: 97 BPM | WEIGHT: 156.31 LBS | BODY MASS INDEX: 26.04 KG/M2 | RESPIRATION RATE: 17 BRPM | TEMPERATURE: 97 F

## 2024-01-29 DIAGNOSIS — Z17.0 MALIGNANT NEOPLASM OF UPPER-OUTER QUADRANT OF RIGHT BREAST IN FEMALE, ESTROGEN RECEPTOR POSITIVE: Primary | Chronic | ICD-10-CM

## 2024-01-29 DIAGNOSIS — C50.411 MALIGNANT NEOPLASM OF UPPER-OUTER QUADRANT OF RIGHT BREAST IN FEMALE, ESTROGEN RECEPTOR POSITIVE: Primary | Chronic | ICD-10-CM

## 2024-01-29 PROCEDURE — 3008F BODY MASS INDEX DOCD: CPT | Mod: CPTII,S$GLB,, | Performed by: INTERNAL MEDICINE

## 2024-01-29 PROCEDURE — 3078F DIAST BP <80 MM HG: CPT | Mod: CPTII,S$GLB,, | Performed by: INTERNAL MEDICINE

## 2024-01-29 PROCEDURE — 1159F MED LIST DOCD IN RCRD: CPT | Mod: CPTII,S$GLB,, | Performed by: INTERNAL MEDICINE

## 2024-01-29 PROCEDURE — 3044F HG A1C LEVEL LT 7.0%: CPT | Mod: CPTII,S$GLB,, | Performed by: INTERNAL MEDICINE

## 2024-01-29 PROCEDURE — 3075F SYST BP GE 130 - 139MM HG: CPT | Mod: CPTII,S$GLB,, | Performed by: INTERNAL MEDICINE

## 2024-01-29 PROCEDURE — 99213 OFFICE O/P EST LOW 20 MIN: CPT | Mod: S$GLB,,, | Performed by: INTERNAL MEDICINE

## 2024-01-29 PROCEDURE — 99999 PR PBB SHADOW E&M-EST. PATIENT-LVL V: CPT | Mod: PBBFAC,,, | Performed by: INTERNAL MEDICINE

## 2024-01-29 RX ORDER — DEXTROAMPHETAMINE SACCHARATE, AMPHETAMINE ASPARTATE MONOHYDRATE, DEXTROAMPHETAMINE SULFATE, AMPHETAMINE SULFATE 9.375; 9.375; 9.375; 9.375 MG/1; MG/1; MG/1; MG/1
37.5 CAPSULE, EXTENDED RELEASE ORAL DAILY
Qty: 30 EACH | Refills: 0 | Status: SHIPPED | OUTPATIENT
Start: 2024-01-29 | End: 2024-03-04 | Stop reason: SDUPTHER

## 2024-01-30 RX ORDER — DEXTROAMPHETAMINE SACCHARATE, AMPHETAMINE ASPARTATE MONOHYDRATE, DEXTROAMPHETAMINE SULFATE, AMPHETAMINE SULFATE 9.375; 9.375; 9.375; 9.375 MG/1; MG/1; MG/1; MG/1
37.5 CAPSULE, EXTENDED RELEASE ORAL DAILY
Qty: 30 EACH | Refills: 0 | OUTPATIENT
Start: 2024-01-30

## 2024-02-06 ENCOUNTER — TELEPHONE (OUTPATIENT)
Dept: OBSTETRICS AND GYNECOLOGY | Facility: CLINIC | Age: 39
End: 2024-02-06
Payer: COMMERCIAL

## 2024-02-06 NOTE — TELEPHONE ENCOUNTER
----- Message from Shola Azul sent at 2/6/2024  1:36 PM CST -----  Name of Who is Calling:CHUCK MONTOYA [[id number]]                What is the request in detail: Pt calling because she need to reschedule her apt for today because she have COVID.Please call back to further assist.                 Can the clinic reply by [facility]: No                What Number to Call Back if not in [facility]: [phone]

## 2024-02-12 ENCOUNTER — OFFICE VISIT (OUTPATIENT)
Dept: URGENT CARE | Facility: CLINIC | Age: 39
End: 2024-02-12

## 2024-02-12 VITALS
RESPIRATION RATE: 17 BRPM | BODY MASS INDEX: 25.99 KG/M2 | HEART RATE: 87 BPM | TEMPERATURE: 97 F | WEIGHT: 156 LBS | OXYGEN SATURATION: 97 % | DIASTOLIC BLOOD PRESSURE: 76 MMHG | SYSTOLIC BLOOD PRESSURE: 126 MMHG | HEIGHT: 65 IN

## 2024-02-12 DIAGNOSIS — M54.42 ACUTE BILATERAL LOW BACK PAIN WITH BILATERAL SCIATICA: Primary | ICD-10-CM

## 2024-02-12 DIAGNOSIS — M54.41 ACUTE BILATERAL LOW BACK PAIN WITH BILATERAL SCIATICA: Primary | ICD-10-CM

## 2024-02-12 PROCEDURE — 96372 THER/PROPH/DIAG INJ SC/IM: CPT | Mod: S$GLB,,, | Performed by: FAMILY MEDICINE

## 2024-02-12 PROCEDURE — 99203 OFFICE O/P NEW LOW 30 MIN: CPT | Mod: TIER,25,S$GLB, | Performed by: FAMILY MEDICINE

## 2024-02-12 RX ORDER — CYCLOBENZAPRINE HCL 10 MG
10 TABLET ORAL 3 TIMES DAILY PRN
Qty: 21 TABLET | Refills: 0 | Status: SHIPPED | OUTPATIENT
Start: 2024-02-12 | End: 2024-02-22

## 2024-02-12 RX ORDER — IBUPROFEN 600 MG/1
600 TABLET ORAL EVERY 8 HOURS PRN
Qty: 30 TABLET | Refills: 0 | Status: SHIPPED | OUTPATIENT
Start: 2024-02-12

## 2024-02-12 RX ORDER — KETOROLAC TROMETHAMINE 30 MG/ML
30 INJECTION, SOLUTION INTRAMUSCULAR; INTRAVENOUS
Status: COMPLETED | OUTPATIENT
Start: 2024-02-12 | End: 2024-02-12

## 2024-02-12 RX ADMIN — KETOROLAC TROMETHAMINE 30 MG: 30 INJECTION, SOLUTION INTRAMUSCULAR; INTRAVENOUS at 05:02

## 2024-02-12 NOTE — PROGRESS NOTES
"Subjective:      Patient ID: Yolanda Norman is a 38 y.o. female.    Vitals:  height is 5' 5" (1.651 m) and weight is 70.8 kg (156 lb). Her tympanic temperature is 97.2 °F (36.2 °C). Her blood pressure is 126/76 and her pulse is 87. Her respiration is 17 and oxygen saturation is 97%.     Chief Complaint: Low-back Pain    This is a 38 y.o female who presents today with chief complaint of sciatic nerve pain  and lower back pain and numbness on her Lt leg x12 days.   Patient reports that pain started after she went to the gym.  Patient reports that she has been going to the chiropractor for this pain.   Home tx: Advil, heat, and reports no improvement.     Back Pain  This is a chronic problem. The current episode started 1 to 4 weeks ago. The pain is present in the lumbar spine. The pain does not radiate. The symptoms are aggravated by standing. Pertinent negatives include no abdominal pain, bladder incontinence, bowel incontinence, chest pain, dysuria, fever, headaches, leg pain, numbness, paresis, paresthesias, pelvic pain, perianal numbness, tingling, weakness or weight loss. She has tried heat and NSAIDs for the symptoms.       Constitution: Negative for fever.   Cardiovascular:  Negative for chest pain.   Gastrointestinal:  Negative for abdominal pain and bowel incontinence.   Genitourinary:  Negative for dysuria, bladder incontinence and pelvic pain.   Musculoskeletal:  Positive for back pain.   Neurological:  Negative for headaches and numbness.      Objective:     Physical Exam   Constitutional: She is oriented to person, place, and time. She does not appear ill. No distress. normal  Abdominal: Normal appearance.   Musculoskeletal:         General: Signs of injury (+ straight leg raise at 15 degrees right side) present. No tenderness or deformity.   Neurological: no focal deficit. She is alert, oriented to person, place, and time and at baseline. She displays no weakness. Gait normal.   Nursing note and vitals " reviewed.      Assessment:     1. Acute bilateral low back pain with bilateral sciatica        Plan:       Acute bilateral low back pain with bilateral sciatica  -     ketorolac injection 30 mg  -     cyclobenzaprine (FLEXERIL) 10 MG tablet; Take 1 tablet (10 mg total) by mouth 3 (three) times daily as needed for Muscle spasms.  Dispense: 21 tablet; Refill: 0  -     ibuprofen (ADVIL,MOTRIN) 600 MG tablet; Take 1 tablet (600 mg total) by mouth every 8 (eight) hours as needed for Pain (with food).  Dispense: 30 tablet; Refill: 0      Discussed heat application to affected area

## 2024-02-14 ENCOUNTER — TELEPHONE (OUTPATIENT)
Dept: OBSTETRICS AND GYNECOLOGY | Facility: CLINIC | Age: 39
End: 2024-02-14
Payer: COMMERCIAL

## 2024-02-14 ENCOUNTER — PATIENT MESSAGE (OUTPATIENT)
Dept: OBSTETRICS AND GYNECOLOGY | Facility: CLINIC | Age: 39
End: 2024-02-14
Payer: COMMERCIAL

## 2024-02-16 ENCOUNTER — CLINICAL SUPPORT (OUTPATIENT)
Dept: OBSTETRICS AND GYNECOLOGY | Facility: CLINIC | Age: 39
End: 2024-02-16
Payer: COMMERCIAL

## 2024-02-16 DIAGNOSIS — N95.1 MENOPAUSAL SYMPTOM: Primary | ICD-10-CM

## 2024-02-16 PROCEDURE — 99999 PR PBB SHADOW E&M-EST. PATIENT-LVL I: CPT | Mod: PBBFAC,,,

## 2024-03-02 ENCOUNTER — PATIENT MESSAGE (OUTPATIENT)
Dept: PSYCHIATRY | Facility: CLINIC | Age: 39
End: 2024-03-02
Payer: COMMERCIAL

## 2024-03-04 RX ORDER — DEXTROAMPHETAMINE SACCHARATE, AMPHETAMINE ASPARTATE MONOHYDRATE, DEXTROAMPHETAMINE SULFATE, AMPHETAMINE SULFATE 9.375; 9.375; 9.375; 9.375 MG/1; MG/1; MG/1; MG/1
37.5 CAPSULE, EXTENDED RELEASE ORAL DAILY
Qty: 30 EACH | Refills: 0 | Status: SHIPPED | OUTPATIENT
Start: 2024-03-04 | End: 2024-04-03 | Stop reason: SDUPTHER

## 2024-03-14 ENCOUNTER — CLINICAL SUPPORT (OUTPATIENT)
Dept: OBSTETRICS AND GYNECOLOGY | Facility: CLINIC | Age: 39
End: 2024-03-14
Payer: COMMERCIAL

## 2024-03-14 DIAGNOSIS — N95.1 MENOPAUSAL SYMPTOM: Primary | ICD-10-CM

## 2024-03-14 PROCEDURE — 99999 PR PBB SHADOW E&M-EST. PATIENT-LVL I: CPT | Mod: PBBFAC,,,

## 2024-03-19 DIAGNOSIS — C50.411 MALIGNANT NEOPLASM OF UPPER-OUTER QUADRANT OF RIGHT BREAST IN FEMALE, ESTROGEN RECEPTOR POSITIVE: Chronic | ICD-10-CM

## 2024-03-19 DIAGNOSIS — Z17.0 MALIGNANT NEOPLASM OF UPPER-OUTER QUADRANT OF RIGHT BREAST IN FEMALE, ESTROGEN RECEPTOR POSITIVE: Chronic | ICD-10-CM

## 2024-03-19 RX ORDER — ANASTROZOLE 1 MG/1
1 TABLET ORAL DAILY
Qty: 90 TABLET | Refills: 3 | Status: SHIPPED | OUTPATIENT
Start: 2024-03-19 | End: 2025-03-19

## 2024-04-03 RX ORDER — DEXTROAMPHETAMINE SACCHARATE, AMPHETAMINE ASPARTATE MONOHYDRATE, DEXTROAMPHETAMINE SULFATE, AMPHETAMINE SULFATE 9.375; 9.375; 9.375; 9.375 MG/1; MG/1; MG/1; MG/1
37.5 CAPSULE, EXTENDED RELEASE ORAL DAILY
Qty: 30 EACH | Refills: 0 | Status: SHIPPED | OUTPATIENT
Start: 2024-04-03 | End: 2024-05-06 | Stop reason: SDUPTHER

## 2024-04-08 NOTE — PROGRESS NOTES
"Outpatient Psychiatry Follow-Up Visit    Clinical Status of Patient: Outpatient (Ambulatory)  04/09/2024     Chief Complaint:  presenting today for a follow-up.       Interval History and Content of Current Session:  Interim Events/Subjective Report/Content of Current Session:  follow-up appointment.    Pt is a 38 y/o female with past psychiatric hx of anxiety and ADHD who presents for follow-up treatment. Pt reported that things with her son are improving. He is back in therapy. Pt reported some concerns with anxiety that may be impacting sleep. Stated that she wakes in the night with worry and has difficulty falling asleep at times. Using alprazolam and 3 to 4 times per week. Has been using mostly to help get to sleep. Mood is stable. In therapy with Adelina (cannot remember last name).     Past Psychiatric hx: effexor xr ("my mood was the best on that but I was having panic attacks and bld press was really negar"), pristiq (for headaches- effective), cymbalta (ineffective), lexapro and zoloft (effective but worsened headaches), klonopin 1mg (effective- for sleep and anxiety)  For sleep- elavil (ineffective), trazodone (worsened h/s's), ambien (nightmares), lunesta (nightmares), seroquel, prazosin, xanax  For headache- topomax    Past Medical hx:   Past Medical History:   Diagnosis Date    Abnormal Pap smear of cervix 2009    ADHD     Allergy     seasonal    Anorexia     Anxiety     Asthma     BRCA1 positive 12/18/2020    Bulimia     Depression     Fever blister     Gastroparesis     GERD (gastroesophageal reflux disease)     History of posttraumatic stress disorder (PTSD)     Hypertension     Gestational Hypertension    Invasive ductal carcinoma of right breast 12/18/2019    Mastitis     Migraine headache     PONV (postoperative nausea and vomiting)     Status post mastectomy, bilateral 7/29/2020        Interim hx:  Medication changes last visit: none  Anxiety: increased  Depression: stable     Denies " suicidal/homicidal ideations.  Denies hopelessness/worthlessness.    Denies auditory/visual hallucinations      Alcohol: Pt denied  Drug: Pt denied  Caffeine: Not assessed  Tobacco: Pt denied      Review of Systems   PSYCHIATRIC: Pertinent items are noted in the narrative.        CONSTITUTIONAL: weight stable    Past Medical, Family and Social History: The patient's past medical, family and social history have been reviewed and updated as appropriate within the electronic medical record. See encounter notes.     Current Psychiatric Medication:  Celexa 40mg po qhs, Mydayis 37.5 mg po qam, xanax 0.125mg po daily PRN anxiety (takes maybe once every 3 months),      Compliance: yes      Side effects: Pt denied     Risk Parameters:  Patient reports no suicidal ideation  Patient reports no homicidal ideation  Patient reports no self-injurious behavior  Patient reports no violent behavior     Exam (detailed: at least 9 elements; comprehensive: all 15 elements)   Constitutional  Vitals:  Most recent vital signs, dated less than 90 days prior to this appointment, were reviewed. BP: ()/()   Arterial Line BP: ()/()       General:  unremarkable, age appropriate, casual attire, good eye contact, good rapport       Musculoskeletal  Muscle Strength/Tone:  no flaccidity, no tremor    Gait & Station:  normal      Psychiatric                       Speech:  normal tone, normal rate, rhythm, and volume   Mood & Affect:   stable         Thought Process:   Goal directed; Linear    Associations:   intact   Thought Content:   No SI/HI, delusions, or paranoia, no AV/VH   Insight & Judgement:   Good, adequate to circumstances   Orientation:   grossly intact; alert and oriented x 4    Memory:  intact for content of interview    Language:  grossly intact, can repeat    Attention Span  : Grossly intact for content of interview   Fund of Knowledge:   intact and appropriate to age and level of education        Assessment and Diagnosis    Status/Progress: Based on the examination today, the patient's problem(s) is/are adequately controlled.  New problems have not been presented today. Co-morbidities are not complicating management of the primary condition. There are no active rule-out diagnoses for this patient at this time.      Impression: Pt reported mood is stable but does have some anxiety. Requests restarting low dose trazodone for sleep and will do so. Pt has started therapy and was encouraged to continue. Will continue medication plan as is and monitor symptoms moving forward.     Diagnosis:   Anxiety disorder   Attention Deficit Hyperactivity Disorder - Inattentive Type   h/o PTSD (now in remission)   h/o anorexia and bulimia (both in remission)  Intervention/Counseling/Treatment Plan   Medication Management:      1. Celexa 40mg po qhs    2. Mydayis 37.5 mg po qam    3. xanax 0.125mg po daily PRN anxiety (infrequent)    4. Start a trial of trazodone 25 mg    5. Call to report any worsening of symptoms or problems with the medication. Pt instructed to go to ER with thoughts of harming self, others     6. Cont in therapy with Adelina (cannot remember last name)    Psychotherapy: none  Target symptoms: PTSD  Why chosen therapy is appropriate versus another modality: CBT used; relevant to diagnosis, patient responds to this modality  Outcome monitoring methods: self-report, observation  Therapeutic intervention type: Cognitive Behavioral Therapy, Psychoeducation  Topics discussed/themes: building skills sets for symptom management, symptom recognition, nutrition, exercise  The patient's response to the intervention is accepting  Patient's response to treatment is: good.   The patient's progress toward treatment goals: stable     Return to clinic: 3 months    -Cognitive-Behavioral/Supportive therapy and psychoeducation provided  -R/B/SE's of medications discussed with the pt who expresses understanding and chooses to take medications as  prescribed.   -Pt instructed to call clinic, 911 or go to nearest emergency room if sxs worsen or pt is in   crisis. The pt expresses understanding.    Gokul Keller, PhD, MP     Antidepressant/Antianxiety Medication Initiation:  Patient informed of risks, benefits, and potential side effects of medication and accepts informed consent.  Common side effects include nausea, fatigue, headache, insomnia., Specifically discussed the possibility of new or worsening suicidal thoughts/depression.  Patient instructed to stop the medication immediately and seek urgent treatment if this occurs. Patient instructed not to abruptly discontinue medication without physician guidance except in cases of sudden onset or worsening of SI.       Stimulant Medication Initiation:  Patient advised of risks, benefits, and side effects of medication and accepts informed consent.  Common side effects include insomnia, irritability, jittery feeling, dry mouth, and agitation/hostility., Patient advised of potential addictive nature of medication and controlled substance classification.  Instructed to safeguard medication as no early refills can be given for lost or stolen medications.       Benzodiazepine Initiation:  Patient advised of the risks, benefits, and common side effects of medication and has accepted informed consent.  Common side effects include drowsiness, impaired coordination, possible memory loss., Patient advised NOT to operate a vehicle or machinery untiil they are sure how the medication will affec them.  Client also advised of danger of mixing this medication with alcohol., Patient advised of potential addictive nature of medication and need to safeguard medication as no early refills for lost or stolen medications can be authorized.       Pregnancy Warning:  Patient denies current pregnancy possibility.  Patient made aware that medications have not been proven safe in pregnancy and that she must maintain adequate birth  control.  Patient instructed to alert us immediately if she becomes pregnant.

## 2024-04-09 ENCOUNTER — OFFICE VISIT (OUTPATIENT)
Dept: PSYCHIATRY | Facility: CLINIC | Age: 39
End: 2024-04-09
Payer: COMMERCIAL

## 2024-04-09 ENCOUNTER — TELEPHONE (OUTPATIENT)
Dept: PSYCHIATRY | Facility: CLINIC | Age: 39
End: 2024-04-09
Payer: COMMERCIAL

## 2024-04-09 DIAGNOSIS — F41.1 GENERALIZED ANXIETY DISORDER: ICD-10-CM

## 2024-04-09 DIAGNOSIS — F41.9 ANXIETY DISORDER, UNSPECIFIED TYPE: Primary | ICD-10-CM

## 2024-04-09 DIAGNOSIS — F90.0 ATTENTION DEFICIT HYPERACTIVITY DISORDER, INATTENTIVE TYPE: ICD-10-CM

## 2024-04-09 PROCEDURE — 3044F HG A1C LEVEL LT 7.0%: CPT | Mod: CPTII,95,, | Performed by: PSYCHOLOGIST

## 2024-04-09 PROCEDURE — 99214 OFFICE O/P EST MOD 30 MIN: CPT | Mod: 95,,, | Performed by: PSYCHOLOGIST

## 2024-04-09 PROCEDURE — 1159F MED LIST DOCD IN RCRD: CPT | Mod: CPTII,95,, | Performed by: PSYCHOLOGIST

## 2024-04-09 RX ORDER — ALPRAZOLAM 0.25 MG/1
0.25 TABLET ORAL DAILY PRN
Qty: 30 TABLET | Refills: 0 | Status: SHIPPED | OUTPATIENT
Start: 2024-04-09 | End: 2024-05-16

## 2024-04-09 RX ORDER — TRAZODONE HYDROCHLORIDE 50 MG/1
25 TABLET ORAL NIGHTLY
Qty: 45 TABLET | Refills: 1 | Status: SHIPPED | OUTPATIENT
Start: 2024-04-09 | End: 2025-04-09

## 2024-04-11 ENCOUNTER — CLINICAL SUPPORT (OUTPATIENT)
Dept: OBSTETRICS AND GYNECOLOGY | Facility: CLINIC | Age: 39
End: 2024-04-11
Payer: COMMERCIAL

## 2024-04-11 DIAGNOSIS — N95.1 MENOPAUSAL SYMPTOM: Primary | ICD-10-CM

## 2024-04-11 PROCEDURE — 99999 PR PBB SHADOW E&M-EST. PATIENT-LVL I: CPT | Mod: PBBFAC,,,

## 2024-04-30 ENCOUNTER — PATIENT MESSAGE (OUTPATIENT)
Dept: OBSTETRICS AND GYNECOLOGY | Facility: CLINIC | Age: 39
End: 2024-04-30
Payer: COMMERCIAL

## 2024-05-05 ENCOUNTER — PATIENT MESSAGE (OUTPATIENT)
Dept: PSYCHIATRY | Facility: CLINIC | Age: 39
End: 2024-05-05
Payer: COMMERCIAL

## 2024-05-06 ENCOUNTER — PATIENT MESSAGE (OUTPATIENT)
Dept: PSYCHIATRY | Facility: CLINIC | Age: 39
End: 2024-05-06
Payer: COMMERCIAL

## 2024-05-06 RX ORDER — DEXTROAMPHETAMINE SACCHARATE, AMPHETAMINE ASPARTATE MONOHYDRATE, DEXTROAMPHETAMINE SULFATE, AMPHETAMINE SULFATE 9.375; 9.375; 9.375; 9.375 MG/1; MG/1; MG/1; MG/1
37.5 CAPSULE, EXTENDED RELEASE ORAL DAILY
Qty: 30 EACH | Refills: 0 | Status: SHIPPED | OUTPATIENT
Start: 2024-05-06 | End: 2024-06-01 | Stop reason: SDUPTHER

## 2024-05-07 ENCOUNTER — PATIENT MESSAGE (OUTPATIENT)
Dept: OBSTETRICS AND GYNECOLOGY | Facility: CLINIC | Age: 39
End: 2024-05-07
Payer: COMMERCIAL

## 2024-05-13 ENCOUNTER — CLINICAL SUPPORT (OUTPATIENT)
Dept: OBSTETRICS AND GYNECOLOGY | Facility: CLINIC | Age: 39
End: 2024-05-13
Payer: COMMERCIAL

## 2024-05-13 DIAGNOSIS — N95.1 MENOPAUSAL SYMPTOM: Primary | ICD-10-CM

## 2024-05-13 PROCEDURE — 96372 THER/PROPH/DIAG INJ SC/IM: CPT | Mod: S$GLB,,, | Performed by: PHYSICIAN ASSISTANT

## 2024-05-13 PROCEDURE — 99999 PR PBB SHADOW E&M-EST. PATIENT-LVL I: CPT | Mod: PBBFAC,,,

## 2024-05-13 RX ADMIN — TESTOSTERONE CYPIONATE 50 MG: 200 INJECTION, SOLUTION INTRAMUSCULAR at 10:05

## 2024-05-18 DIAGNOSIS — F41.1 GENERALIZED ANXIETY DISORDER: ICD-10-CM

## 2024-05-18 RX ORDER — CITALOPRAM 40 MG/1
TABLET, FILM COATED ORAL
Qty: 90 TABLET | Refills: 2 | Status: CANCELLED | OUTPATIENT
Start: 2024-05-18 | End: 2024-08-16

## 2024-05-20 RX ORDER — CITALOPRAM 40 MG/1
TABLET, FILM COATED ORAL
Qty: 90 TABLET | Refills: 1 | Status: SHIPPED | OUTPATIENT
Start: 2024-05-20 | End: 2024-08-22

## 2024-06-01 RX ORDER — DEXTROAMPHETAMINE SACCHARATE, AMPHETAMINE ASPARTATE MONOHYDRATE, DEXTROAMPHETAMINE SULFATE, AMPHETAMINE SULFATE 9.375; 9.375; 9.375; 9.375 MG/1; MG/1; MG/1; MG/1
37.5 CAPSULE, EXTENDED RELEASE ORAL DAILY
Qty: 30 EACH | Refills: 0 | Status: CANCELLED | OUTPATIENT
Start: 2024-06-01

## 2024-06-03 RX ORDER — DEXTROAMPHETAMINE SACCHARATE, AMPHETAMINE ASPARTATE MONOHYDRATE, DEXTROAMPHETAMINE SULFATE, AMPHETAMINE SULFATE 9.375; 9.375; 9.375; 9.375 MG/1; MG/1; MG/1; MG/1
37.5 CAPSULE, EXTENDED RELEASE ORAL DAILY
Qty: 30 EACH | Refills: 0 | Status: SHIPPED | OUTPATIENT
Start: 2024-06-03

## 2024-06-04 ENCOUNTER — PATIENT MESSAGE (OUTPATIENT)
Dept: PSYCHIATRY | Facility: CLINIC | Age: 39
End: 2024-06-04
Payer: COMMERCIAL

## 2024-06-13 ENCOUNTER — CLINICAL SUPPORT (OUTPATIENT)
Dept: OBSTETRICS AND GYNECOLOGY | Facility: CLINIC | Age: 39
End: 2024-06-13
Payer: COMMERCIAL

## 2024-06-13 DIAGNOSIS — N95.1 MENOPAUSAL SYMPTOM: Primary | ICD-10-CM

## 2024-06-13 PROCEDURE — 99999 PR PBB SHADOW E&M-EST. PATIENT-LVL I: CPT | Mod: PBBFAC,,,

## 2024-06-15 ENCOUNTER — ON-DEMAND VIRTUAL (OUTPATIENT)
Dept: URGENT CARE | Facility: CLINIC | Age: 39
End: 2024-06-15
Payer: COMMERCIAL

## 2024-06-15 DIAGNOSIS — J32.9 SINUSITIS, UNSPECIFIED CHRONICITY, UNSPECIFIED LOCATION: Primary | ICD-10-CM

## 2024-06-15 PROCEDURE — 99213 OFFICE O/P EST LOW 20 MIN: CPT | Mod: 95,,, | Performed by: NURSE PRACTITIONER

## 2024-06-15 RX ORDER — PREDNISONE 20 MG/1
20 TABLET ORAL DAILY
Qty: 5 TABLET | Refills: 0 | Status: SHIPPED | OUTPATIENT
Start: 2024-06-15 | End: 2024-06-21

## 2024-06-15 NOTE — PROGRESS NOTES
Subjective:      Patient ID: Yolanda Norman is a 38 y.o. female.    Vitals:  vitals were not taken for this visit.     Chief Complaint: Sinus Problem      Visit Type: TELE AUDIOVISUAL    Present with the patient at the time of consultation: TELEMED PRESENT WITH PATIENT: None        Past Medical History:   Diagnosis Date    Abnormal Pap smear of cervix     ADHD     Allergy     seasonal    Anorexia     Anxiety     Asthma     BRCA1 positive 2020    Bulimia     Depression     Fever blister     Gastroparesis     GERD (gastroesophageal reflux disease)     History of posttraumatic stress disorder (PTSD)     Hypertension     Gestational Hypertension    Invasive ductal carcinoma of right breast 2019    Mastitis     Migraine headache     PONV (postoperative nausea and vomiting)     Status post mastectomy, bilateral 2020     Past Surgical History:   Procedure Laterality Date    BILATERAL MASTECTOMY Bilateral 2020    Procedure: MASTECTOMY, BILATERAL - BILATERAL SKIN SPARING MASTECTOMY;  Surgeon: Percy Ayers MD;  Location: [facility];  Service: General;  Laterality: Bilateral;    BIOPSY OF AXILLARY NODE Right 2020    Procedure: BIOPSY, LYMPH NODE, AXILLARY;  Surgeon: Percy Ayers MD;  Location: [facility];  Service: General;  Laterality: Right;    BONE MARROW ASPIRATION      x 3    BREAST SURGERY      CERVICAL BIOPSY  W/ LOOP ELECTRODE EXCISION       SECTION  2016     SECTION N/A 2019    Procedure:  SECTION;  Surgeon: Kirit Hernandez MD;  Location: [facility];  Service: OB/GYN;  Laterality: N/A;    COLPOSCOPY      ESOPHAGOGASTRODUODENOSCOPY N/A 2021    Procedure: EGD (ESOPHAGOGASTRODUODENOSCOPY);  Surgeon: Lj Santos MD;  Location: [address];  Service: Endoscopy;  Laterality: N/A;  COVID test on 21 at [facility]    ESOPHAGOGASTRODUODENOSCOPY N/A 2021    Procedure: ESOPHAGOGASTRODUODENOSCOPY (EGD);  Surgeon: Camila Wiley MD;   "Location: [facility];  Service: Endoscopy;  Laterality: N/A;    INJECTION FOR SENTINEL NODE IDENTIFICATION Right 7/1/2020    Procedure: INJECTION, FOR SENTINEL NODE IDENTIFICATION;  Surgeon: Percy Ayers MD;  Location: [facility];  Service: General;  Laterality: Right;    INSERTION OF BREAST TISSUE EXPANDER Bilateral 7/1/2020    Procedure: INSERTION, TISSUE EXPANDER, BREAST;  Surgeon: Kiko Aranda MD;  Location: [facility];  Service: Plastics;  Laterality: Bilateral;    INSERTION OF TUNNELED CENTRAL VENOUS CATHETER (CVC) WITH SUBCUTANEOUS PORT Left 12/27/2019    Procedure: [id number]-Left neck or chest wall;  Surgeon: Percy Ayers MD;  [address];  Service: General;  Laterality: Left;    MASTECTOMY      MEDIPORT REMOVAL N/A 7/1/2020    Procedure: REMOVAL, CATHETER, CENTRAL VENOUS, TUNNELED, WITH PORT;  Surgeon: Percy Ayers MD;  Location: [facility];  Service: General;  Laterality: N/A;    ROBOT-ASSISTED LAPAROSCOPIC ABDOMINAL HYSTERECTOMY USING DA HIMA XI N/A 12/18/2020    Procedure: XI ROBOTIC HYSTERECTOMY;  Surgeon: Emili Smith MD;  Location: [facility];  Service: OB/GYN;  Laterality: N/A;    ROBOT-ASSISTED LAPAROSCOPIC SALPINGO-OOPHORECTOMY USING DA HIMA XI Bilateral 12/18/2020    Procedure: XI ROBOTIC SALPINGO-OOPHORECTOMY;  Surgeon: Emili Smith MD;  Location: [facility];  Service: OB/GYN;  Laterality: Bilateral;    SHOULDER SURGERY Left     x 2    SINUS SURGERY      age 19    STOMACH SURGERY      TISSUE EXPANDER REMOVAL Right 10/3/2020    Procedure: REMOVAL, TISSUE EXPANDER;  Surgeon: Kiko Aranda MD;  Location: [facility];  Service: Plastics;  Laterality: Right;    TONSILLECTOMY      UPPER GASTROINTESTINAL ENDOSCOPY       Review of patient's allergies indicates:   Allergen Reactions    Amoxicillin Hives    Penicillins Hives and Rash    Diazepam Anxiety and Other (See Comments)     "makes hyper"     Current Outpatient Medications on File Prior to Visit   Medication Sig " Dispense Refill    albuterol (PROVENTIL/VENTOLIN HFA) 90 mcg/actuation inhaler Inhale 1 puff every 6 (six) hours  into the lungs for Wheezing (As Needed) for up to 60 doses (Patient not taking: Reported on 11/7/2023) 18 g 0    ALPRAZolam (XANAX) 0.25 MG tablet Take 1 tablet (0.25 mg total) by mouth daily as needed for Anxiety. 30 tablet 0    anastrozole (ARIMIDEX) 1 mg Tab Take 1 tablet (1 mg total) by mouth once daily. 90 tablet 3    atogepant (QULIPTA) 60 mg Tab Take 1 tablet every day by oral route. 90 tablet 0    butalbital-acetaminophen-caffeine -40 mg (FIORICET, ESGIC) -40 mg per tablet take 1 tablet by mouth 4 times daily as needed 20 tablet 0    butalbital-acetaminophen-caffeine -40 mg (FIORICET, ESGIC) -40 mg per tablet Take 1 tablet by mouth 4 (four) times daily as needed. 20 tablet 0    butalbital-aspirin-caffeine -40 mg (FIORINAL) -40 mg Cap take 1 capsule by mouth every 4 hours as needed (Patient not taking: Reported on 2/12/2024) 15 capsule 0    butalbital-aspirin-caffeine -40 mg Tab butalbital-aspirin-caffeine 50 mg-325 mg-40 mg tablet   Take 1 tablet every 4 hours by oral route as needed.      celecoxib (CELEBREX) 200 MG capsule Take 1 capsule by mouth 2 (two) times daily for 14 days Please take for 14 days and afterwards stop for 1 week to give GI tract and Kidneys rest. 28 capsule 1    ciclopirox (LOPROX) 0.77 % Crea Apply to affected areas of leg twice a day for two weeks (Patient not taking: Reported on 2/12/2024) 30 g 0    citalopram (CELEXA) 40 MG tablet TAKE 1 TABLET(40 MG) BY MOUTH EVERY DAY 90 tablet 1    clindamycin (CLEOCIN T) 1 % external solution Apply to affected area of scalp after showering 60 mL 0    dextroamphetamine-amphetamine (MYDAYIS) 37.5 mg CT24 Take 1 capsule (37.5 mg) by mouth Daily. 30 each 0    doxycycline (VIBRAMYCIN) 100 MG Cap Take one capsule by mouth once daily with food 30 capsule 1    doxycycline (VIBRAMYCIN) 100 MG Cap Take  1 capsule by mouth once daily with food 30 capsule 0    ibuprofen (ADVIL,MOTRIN) 600 MG tablet Take 1 tablet (600 mg total) by mouth every 8 (eight) hours as needed for Pain (with food). 30 tablet 0    loratadine (CLARITIN) 10 mg tablet Take 10 mg by mouth once daily.      magnesium 30 mg Tab Take by mouth once.      magnesium oxide 400 mg magnesium Cap magnesium 400 mg (as magnesium oxide) capsule   Take by oral route.      metFORMIN (GLUCOPHAGE) 500 MG tablet Take 1 tablet (500 mg total) by mouth 2 (two) times daily with meals. 60 tablet 11    metoclopramide HCl (REGLAN) 10 MG tablet Take 1 tablet 3 (three) times daily before meals by mouth 90 tablet 0    metoclopramide HCl (REGLAN) 10 MG tablet Take 1 tablet by mouth as needed (nausea) 30 tablet 0    metoclopramide HCl (REGLAN) 10 MG tablet Take 1 tablet by mouth as needed (nausea) 30 tablet 0    metoclopramide HCl (REGLAN) 10 MG tablet Take 1 tablet by mouth as needed (nausea) 30 tablet 0    metoclopramide HCl (REGLAN) 10 MG tablet Take 1 tablet by mouth 4 (four) times daily before meals and nightly 30 tablet 0    multivitamin (THERAGRAN) per tablet Take 1 tablet by mouth once daily.      onabotulinumtoxinA (BOTOX INJ) Inject as directed. [id number]      ondansetron (ZOFRAN-ODT) 4 MG TbDL Take 1 tablet every 6 (six) hours as needed by mouth for Nausea for up to 7 days 20 tablet 0    ondansetron (ZOFRAN-ODT) 4 MG TbDL Dissolve 1 tablet by mouth every 6 (six) hours as needed for Nausea for up to 7 days 20 tablet 0    ondansetron (ZOFRAN-ODT) 8 MG TbDL Dissolve 1 by mouth the night before surgery and then dissolve 1 by mouth the morning of surgery and then 1 by mouth every 6 hours as needed for nausea. 10 tablet 0    ondansetron (ZOFRAN-ODT) 8 MG TbDL Take 1 tablet by mouth every 8 (eight) hours as needed for Nausea 30 tablet 0    pantoprazole (PROTONIX) 40 MG tablet Take 1 tablet (40 mg total) by mouth once daily. Patient needs follow up appointment for any further  refills. 30 tablet 0    phenazopyridine (PYRIDIUM) 100 MG tablet 1 tablet 3 times a day for 2 days.  Take as needed after meals for pain 6 tablet 0    prasterone, dhea, (INTRAROSA) 6.5 mg Inst Place 6.5 mg vaginally every evening. 30 each 11    prasterone, dhea, (INTRAROSA) 6.5 mg Inst Place 6.5 mg vaginally every evening. 30 each 11    prochlorperazine (COMPAZINE) 10 MG tablet Take 1 tablet (10 mg total) by mouth 3 (three) times daily as needed (Headache and nausea). 30 tablet 2    promethazine (PHENERGAN) 12.5 MG Tab Take 1 tablet by mouth every 8 (eight) hours as needed for Nausea 21 tablet 0    rosuvastatin (CRESTOR) 5 MG tablet Take 1 tablet daily by mouth 90 tablet 3    rosuvastatin (CRESTOR) 5 MG tablet Take 1 tablet (5 mg total) by mouth once daily. 90 tablet 3    spironolactone (ALDACTONE) 100 MG tablet Take 1 tablet by mouth once daily 30 tablet 3    sucralfate (CARAFATE) 1 gram tablet Take 1 tablet (1 g total) by mouth 4 (four) times daily. Can be crushed if cannot swallow. 120 tablet 0    sucralfate (CARAFATE) 100 mg/mL suspension Take 10 mLs by mouth 4 (four) times daily 420 mL 0    tiZANidine (ZANAFLEX) 2 MG tablet Take 2 tablets every day by mouth 180 tablet 0    topiramate (TOPAMAX) 100 MG tablet Take 1 tablet every day by oral route for 90 days. 90 tablet 1    topiramate (TOPAMAX) 25 MG tablet Take 4 tablets (100 mg total) by mouth once daily. 360 tablet 0    traZODone (DESYREL) 50 MG tablet Take 0.5 tablets (25 mg total) by mouth every evening. 45 tablet 1    TRUDHESA 0.725 mg/pump act. (4 mg/mL) Spry by Each Nostril route.      ubrogepant (UBRELVY) 100 mg tablet Take 1 tablet twice a day by oral route as needed. 16 tablet 2    valACYclovir (VALTREX) 1000 MG tablet Take 1 tablet (1,000 mg total) by mouth every 12 (twelve) hours. 60 tablet 0     Current Facility-Administered Medications on File Prior to Visit   Medication Dose Route Frequency Provider Last Rate Last Admin    onabotulinumtoxina  injection 200 Units  200 Units Intramuscular Q90 Days Dillon Gonzalez MD   200 Units at 06/25/21 0751     Family History   Problem Relation Name Age of Onset    Cancer Maternal Grandmother  40        cervical    Cervical cancer Mother      Breast cancer Other 4 paternal great aunts     Ovarian cancer Other second cousin paternal     Colon polyps Father      Colon cancer Neg Hx      Melanoma Neg Hx         Medications Ordered                [facility]   [address] [facility] [address]    Telephone: [phone]   Fax: [phone]   Hours: Always Open                         Internal Pharmacy (1 of 1)              predniSONE (DELTASONE) 20 MG tablet    Sig: Take 1 tablet (20 mg total) by mouth once daily. for 5 days       Start: 6/15/24     Quantity: 5 tablet Refills: 0                           Ohs Peq Odvv Intake    6/15/2024  7:13 AM CDT - Filed by Patient   What is your current physical address in the event of a medical emergency? [facility]   Are you able to take your vital signs? No   Please attach any relevant images or files          37 yo female with c/o sinus pressure between forehead and under eyes. She states things feel heavy in head. She had some dizziness and nasal congestion . She has used flonase and claritin. She denies fever. She tried sudafed and it has not worked. She states nasal passages dry. Denies cough and fever. Positive pnd. She has hx of sinusitis.         Constitution: Negative.   HENT:  Positive for congestion.    Cardiovascular: Negative.    Respiratory: Negative.     Gastrointestinal: Negative.    Endocrine: negative.   Genitourinary: Negative.  Negative for frequency and urgency.   Musculoskeletal: Negative.    Skin: Negative.    Allergic/Immunologic: Negative.    Neurological:  Positive for dizziness.   Hematologic/Lymphatic: Negative.    Psychiatric/Behavioral: Negative.          Objective:   The physical exam was conducted virtually.  LOCATION OF  PATIENT [facility]  Physical Exam   Constitutional: She is oriented to person, place, and time. She appears well-developed.   HENT:   Head: Normocephalic and atraumatic.   Ears:   Right Ear: Hearing, tympanic membrane and external ear normal.   Left Ear: Hearing, tympanic membrane and external ear normal.   Nose: Nose normal.   Mouth/Throat: Uvula is midline, oropharynx is clear and moist and mucous membranes are normal.   Eyes: Conjunctivae and EOM are normal. Pupils are equal, round, and reactive to light.   Neck: Neck supple.   Cardiovascular: Normal rate.   Pulmonary/Chest: Effort normal and breath sounds normal.   Musculoskeletal: Normal range of motion.         General: Normal range of motion.   Neurological: She is alert and oriented to person, place, and time.   Skin: Skin is warm.   Psychiatric: Her behavior is normal. Thought content normal.   Nursing note and vitals reviewed.      Assessment:     1. Sinusitis, unspecified chronicity, unspecified location        Plan:     Hold qulipta for now    -Below are suggestions for symptomatic relief:              -Tylenol every 4 hours OR ibuprofen every 6 hours as needed for pain/fever.              -Salt water gargles to soothe throat pain.              -Chloroseptic spray also helps to numb throat pain.              -Nasal saline spray reduces inflammation and dryness.              -Warm face compresses to help with facial sinus pain/pressure.              -Vicks vapor rub at night.              -Flonase OTC or Nasacort OTC for nasal congestion.              -Simple foods like chicken noodle soup.              -Delsym helps with coughing at night              -Zyrtec/Claritin during the day & Benadryl at night may help with allergies.     If you DO NOT have Hypertension or any history of palpitations, it is ok to take over the counter Sudafed or Mucinex D or Allegra-D or Claritin-D or Zyrtec-D.  If you do take one of the above, it is ok to combine that with plain  over the counter Mucinex or Allegra or Claritin or Zyrtec. If, for example, you are taking Zyrtec -D, you can combine that with Mucinex, but not Mucinex-D.  If you are taking Mucinex-D, you can combine that with plain Allegra or Claritin or Zyrtec.   If you DO have Hypertension or palpitations, it is safe to take Coricidin HBP for relief of sinus symptoms.     Please follow up with your Primary care provider within 2-5 days if your signs and symptoms have not resolved or worsen.      If your condition worsens or fails to improve we recommend that you receive another evaluation at the emergency room immediately or contact your primary medical clinic to discuss your concerns.   You must understand that you have received an Urgent Care treatment only and that you may be released before all of your medical problems are known or treated. You, the patient, will arrange for follow up care as instructed.      RED FLAGS/WARNING SYMPTOMS DISCUSSED WITH PATIENT THAT WOULD WARRANT EMERGENT MEDICAL ATTENTION. PATIENT VERBALIZED UNDERSTANDING.    Sinusitis, unspecified chronicity, unspecified location  -     predniSONE (DELTASONE) 20 MG tablet; Take 1 tablet (20 mg total) by mouth once daily. for 5 days  Dispense: 5 tablet; Refill: 0

## 2024-07-03 ENCOUNTER — PATIENT MESSAGE (OUTPATIENT)
Dept: PSYCHIATRY | Facility: CLINIC | Age: 39
End: 2024-07-03
Payer: COMMERCIAL

## 2024-07-03 RX ORDER — DEXTROAMPHETAMINE SACCHARATE, AMPHETAMINE ASPARTATE MONOHYDRATE, DEXTROAMPHETAMINE SULFATE, AMPHETAMINE SULFATE 9.375; 9.375; 9.375; 9.375 MG/1; MG/1; MG/1; MG/1
37.5 CAPSULE, EXTENDED RELEASE ORAL DAILY
Qty: 30 EACH | Refills: 0 | Status: SHIPPED | OUTPATIENT
Start: 2024-07-03

## 2024-07-09 ENCOUNTER — PATIENT MESSAGE (OUTPATIENT)
Dept: PSYCHIATRY | Facility: CLINIC | Age: 39
End: 2024-07-09
Payer: COMMERCIAL

## 2024-07-09 ENCOUNTER — OFFICE VISIT (OUTPATIENT)
Dept: PSYCHIATRY | Facility: CLINIC | Age: 39
End: 2024-07-09
Payer: COMMERCIAL

## 2024-07-09 DIAGNOSIS — F41.9 ANXIETY DISORDER, UNSPECIFIED TYPE: Primary | ICD-10-CM

## 2024-07-09 DIAGNOSIS — F90.0 ATTENTION DEFICIT HYPERACTIVITY DISORDER, INATTENTIVE TYPE: ICD-10-CM

## 2024-07-09 PROCEDURE — 99214 OFFICE O/P EST MOD 30 MIN: CPT | Mod: 95,,, | Performed by: PSYCHOLOGIST

## 2024-07-09 PROCEDURE — 1159F MED LIST DOCD IN RCRD: CPT | Mod: CPTII,95,, | Performed by: PSYCHOLOGIST

## 2024-07-09 PROCEDURE — 3044F HG A1C LEVEL LT 7.0%: CPT | Mod: CPTII,95,, | Performed by: PSYCHOLOGIST

## 2024-07-09 NOTE — PROGRESS NOTES
"The patient location is:  [place], [place]  The patient location [place] is: [address]  The patient phone number is: [phone]  Visit type: Virtual visit with synchronous audio and video  Each patient to whom he or she provides medical services by telemedicine is:  (1) informed of the relationship between the physician and patient and the respective role of any other health care provider with respect to management of the patient; and (2) notified that he or she may decline to receive medical services by telemedicine and may withdraw from such care at any time.     Outpatient Psychiatry Follow-Up Visit    Clinical Status of Patient: Outpatient (Ambulatory)  07/09/2024     Chief Complaint:  presenting today for a follow-up.       Interval History and Content of Current Session:  Interim Events/Subjective Report/Content of Current Session:  follow-up appointment.    Pt is a 36 y/o female with past psychiatric hx of anxiety and ADHD who presents for follow-up treatment. Pt reported that she is doing well. Mood and anxiety are stable. Stated that she is taking 12.5 mg of trazodone and stated that it is working well for sleep and night time anxiety. Has not taken alprazolam since last visit. Pt reported that she will be going on vacation next week to [place]. Is having some work-related stress resulting tension migraines and is looking for a new position. Pt stated that she has been going to the gym in the mornings to help manage stress.     Past Psychiatric hx: effexor xr ("my mood was the best on that but I was having panic attacks and bld press was really negar"), pristiq (for headaches- effective), cymbalta (ineffective), lexapro and zoloft (effective but worsened headaches), klonopin 1mg (effective- for sleep and anxiety)  For sleep- elavil (ineffective), trazodone (worsened h/s's), ambien (nightmares), lunesta (nightmares), seroquel, prazosin, xanax  For headache- topomax    Past Medical hx:   Past Medical " History:   Diagnosis Date    Abnormal Pap smear of cervix 2009    ADHD     Allergy     seasonal    Anorexia     Anxiety     Asthma     BRCA1 positive 12/18/2020    Bulimia     Depression     Fever blister     Gastroparesis     GERD (gastroesophageal reflux disease)     History of posttraumatic stress disorder (PTSD)     Hypertension     Gestational Hypertension    Invasive ductal carcinoma of right breast 12/18/2019    Mastitis     Migraine headache     PONV (postoperative nausea and vomiting)     Status post mastectomy, bilateral 7/29/2020        Interim hx:  Medication changes last visit: Start a trial of trazodone 25 mg  Anxiety: decreased  Depression: stable     Denies suicidal/homicidal ideations.  Denies hopelessness/worthlessness.    Denies auditory/visual hallucinations      Alcohol: Pt denied  Drug: Pt denied  Caffeine: Not assessed  Tobacco: Pt denied      Review of Systems   PSYCHIATRIC: Pertinent items are noted in the narrative.        CONSTITUTIONAL: weight stable    Past Medical, Family and Social History: The patient's past medical, family and social history have been reviewed and updated as appropriate within the electronic medical record. See encounter notes.     Current Psychiatric Medication:  Celexa 40mg po qhs, Mydayis 37.5 mg po qam, xanax 0.125mg po daily PRN anxiety (takes maybe once every 3 months), trazodone 25 mg     Compliance: yes      Side effects: Pt denied     Risk Parameters:  Patient reports no suicidal ideation  Patient reports no homicidal ideation  Patient reports no self-injurious behavior  Patient reports no violent behavior     Exam (detailed: at least 9 elements; comprehensive: all 15 elements)   Constitutional  Vitals:  Most recent vital signs, dated less than 90 days prior to this appointment, were reviewed. BP: ()/()   Arterial Line BP: ()/()       General:  unremarkable, age appropriate, casual attire, good eye contact, good rapport       Musculoskeletal  Muscle  Strength/Tone:  no flaccidity, no tremor    Gait & Station:  normal      Psychiatric                       Speech:  normal tone, normal rate, rhythm, and volume   Mood & Affect:   stable         Thought Process:   Goal directed; Linear    Associations:   intact   Thought Content:   No SI/HI, delusions, or paranoia, no AV/VH   Insight & Judgement:   Good, adequate to circumstances   Orientation:   grossly intact; alert and oriented x 4    Memory:  intact for content of interview    Language:  grossly intact, can repeat    Attention Span  : Grossly intact for content of interview   Fund of Knowledge:   intact and appropriate to age and level of education        Assessment and Diagnosis   Status/Progress: Based on the examination today, the patient's problem(s) is/are adequately controlled.  New problems have not been presented today. Co-morbidities are not complicating management of the primary condition. There are no active rule-out diagnoses for this patient at this time.      Impression: Pt reported mood and anxiety are stable but noted some work-related stress. Appears to be hopeful about finding a new job to reduce the stress. Encouraged pt to continue in therapy. Will continue medication plan as is and monitor symptoms moving forward.     Diagnosis:   Anxiety disorder   Attention Deficit Hyperactivity Disorder - Inattentive Type   h/o PTSD (now in remission)   h/o anorexia and bulimia (both in remission)  Intervention/Counseling/Treatment Plan   Medication Management:      1. Celexa 40mg po qhs    2. Mydayis 37.5 mg po qam    3. xanax 0.125mg po daily PRN anxiety (infrequent)    4. trazodone 12.5 mg    5. Call to report any worsening of symptoms or problems with the medication. Pt instructed to go to ER with thoughts of harming self, others     6. Cont in therapy with Adelina (cannot remember last name)    Psychotherapy: none  Target symptoms: PTSD  Why chosen therapy is appropriate versus another modality: CBT  used; relevant to diagnosis, patient responds to this modality  Outcome monitoring methods: self-report, observation  Therapeutic intervention type: Cognitive Behavioral Therapy, Psychoeducation  Topics discussed/themes: building skills sets for symptom management, symptom recognition, nutrition, exercise  The patient's response to the intervention is accepting  Patient's response to treatment is: good.   The patient's progress toward treatment goals: stable     Return to clinic: 3 months    -Cognitive-Behavioral/Supportive therapy and psychoeducation provided  -R/B/SE's of medications discussed with the pt who expresses understanding and chooses to take medications as prescribed.   -Pt instructed to call clinic, 911 or go to nearest emergency room if sxs worsen or pt is in   crisis. The pt expresses understanding.    Gokul Keller, PhD, MP     Antidepressant/Antianxiety Medication Initiation:  Patient informed of risks, benefits, and potential side effects of medication and accepts informed consent.  Common side effects include nausea, fatigue, headache, insomnia., Specifically discussed the possibility of new or worsening suicidal thoughts/depression.  Patient instructed to stop the medication immediately and seek urgent treatment if this occurs. Patient instructed not to abruptly discontinue medication without physician guidance except in cases of sudden onset or worsening of SI.       Stimulant Medication Initiation:  Patient advised of risks, benefits, and side effects of medication and accepts informed consent.  Common side effects include insomnia, irritability, jittery feeling, dry mouth, and agitation/hostility., Patient advised of potential addictive nature of medication and controlled substance classification.  Instructed to safeguard medication as no early refills can be given for lost or stolen medications.       Benzodiazepine Initiation:  Patient advised of the risks, benefits, and common side  effects of medication and has accepted informed consent.  Common side effects include drowsiness, impaired coordination, possible memory loss., Patient advised NOT to operate a vehicle or machinery untiil they are sure how the medication will affec them.  Client also advised of danger of mixing this medication with alcohol., Patient advised of potential addictive nature of medication and need to safeguard medication as no early refills for lost or stolen medications can be authorized.       Pregnancy Warning:  Patient denies current pregnancy possibility.  Patient made aware that medications have not been proven safe in pregnancy and that she must maintain adequate birth control.  Patient instructed to alert us immediately if she becomes pregnant.

## 2024-07-11 ENCOUNTER — TELEPHONE (OUTPATIENT)
Dept: PSYCHIATRY | Facility: CLINIC | Age: 39
End: 2024-07-11
Payer: COMMERCIAL

## 2024-07-11 ENCOUNTER — CLINICAL SUPPORT (OUTPATIENT)
Dept: OBSTETRICS AND GYNECOLOGY | Facility: CLINIC | Age: 39
End: 2024-07-11
Payer: COMMERCIAL

## 2024-07-11 DIAGNOSIS — N95.1 MENOPAUSAL SYMPTOM: Primary | ICD-10-CM

## 2024-07-11 PROCEDURE — 99999 PR PBB SHADOW E&M-EST. PATIENT-LVL I: CPT | Mod: PBBFAC,,,

## 2024-07-11 PROCEDURE — 96372 THER/PROPH/DIAG INJ SC/IM: CPT | Mod: S$GLB,,, | Performed by: OBSTETRICS & GYNECOLOGY

## 2024-07-11 RX ORDER — TESTOSTERONE CYPIONATE 200 MG/ML
50 INJECTION, SOLUTION INTRAMUSCULAR
Status: COMPLETED | OUTPATIENT
Start: 2024-07-11 | End: 2024-07-11

## 2024-07-11 RX ADMIN — TESTOSTERONE CYPIONATE 50 MG: 200 INJECTION, SOLUTION INTRAMUSCULAR at 09:07

## 2024-07-11 NOTE — PROGRESS NOTES
BHCG is consistent with early pregnancy. Good level.   IF she prefers we can do an early viability- in 6th week  Here for hormone therapy injection, no complaints at this time, Injection given as ordered, tolerated well, no report of pain prior to or after injection. Return to clinic as scheduled.     Site - LB    Testosterone  50 mg    Patient instructed to get labs on 8/1 or no more injections will be given    Clinic Supplied Medication

## 2024-07-18 ENCOUNTER — PATIENT MESSAGE (OUTPATIENT)
Dept: OBSTETRICS AND GYNECOLOGY | Facility: CLINIC | Age: 39
End: 2024-07-18
Payer: COMMERCIAL

## 2024-07-24 NOTE — PROGRESS NOTES
Subjective:       Patient ID: Yolanda Norman is a 39 y.o. female.    Chief Complaint: No chief complaint on file.      HPI 39 year old female, who returns for follow-up of carcinoma of the right breast, ER +,HER 2 negative.   She has been on anastrozole since March 2022 after prior letrozole and exemestane.    Recent birthday!    Her biggest issue is diffuse arthralgias her wrists hands, shoulders, knees, back.  They sometimes make difficult for her to sleep.  They also interfere with her activities.    Sees Dr Grey for her headaches.  Those are worse recently.        Breast cancer history:    Mammogram and ultrasound on December 11th, 2019 showed right breast mass 7 cm from the nipple.  By ultrasound this mass measured 33 x 32 x 21 mm.    Right breast biopsy on December 13 showed high-grade infiltrating ductal carcinoma (histologic grade 3, nuclear grade 3, mitotic index 3) there were medullary features.  The cancer was 25% ER positive and VT and HER2 negative.  Ki-67 was 90%.    She completed 4 cycles of neoadjuvant Adriamycin Cytoxan February 13, 2020.  She completed 12 weeks of weekly Taxol on May 20, 2020.    On July 1, 2020 she underwent bilateral mastectomy.    The right breast showed no residual malignancy, 5 right axillary sentinel lymph nodes were all negative for malignancy.  Final pathological stage yp T0 N0.  The left breast showed no atypia or malignancy.    Radiation therapy was completed 11/25/20.      She had a hysterectomy  December 18th, 2020.  Letrozole was started December 2020..  She was then changed to Exemestane in early 2021 due to arthralgias.  Changed to anastrazole in March 2022 due to arthralgias and fatigue.    She had DORIAN reconstruction in February 2021.    Review of Systems   Constitutional:  Negative for activity change, appetite change, fever and unexpected weight change.   HENT:  Negative for mouth sores.    Eyes:  Negative for visual disturbance.   Respiratory:  Negative  for cough and shortness of breath.    Cardiovascular:  Negative for chest pain.   Gastrointestinal:  Negative for abdominal distention, abdominal pain, constipation, diarrhea and rectal pain.   Genitourinary:  Negative for frequency.   Musculoskeletal:  Positive for arthralgias (diffuse). Negative for back pain.   Skin:  Negative for rash.   Neurological:  Positive for numbness and headaches.   Hematological:  Negative for adenopathy.   Psychiatric/Behavioral:  Negative for dysphoric mood. The patient is not nervous/anxious.        Objective:      Physical Exam  Vitals reviewed.   Constitutional:       General: She is not in acute distress.     Appearance: Normal appearance. She is well-developed.   Eyes:      General: No scleral icterus.  Cardiovascular:      Rate and Rhythm: Normal rate and regular rhythm.   Pulmonary:      Effort: Pulmonary effort is normal. No respiratory distress.      Breath sounds: Normal breath sounds. No wheezing or rales.   Chest:       Abdominal:      Palpations: Abdomen is soft. There is no mass.      Tenderness: There is no abdominal tenderness.   Lymphadenopathy:      Cervical: No cervical adenopathy.      Upper Body:      Right upper body: No supraclavicular or axillary adenopathy.      Left upper body: No supraclavicular or axillary adenopathy.   Neurological:      Mental Status: She is alert and oriented to person, place, and time.   Psychiatric:         Mood and Affect: Mood normal.         Behavior: Behavior normal.         Thought Content: Thought content normal.         Judgment: Judgment normal.         Assessment:       1. Malignant neoplasm of upper-outer quadrant of right breast in female, estrogen receptor positive        Plan:        She will take a 1 month break from her anastrozole and report back to me.    RTC 6 M.          Route Chart for Scheduling    Med Onc Chart Routing      Follow up with physician 6 months.   Follow up with ADRIAN    Infusion scheduling note     Injection scheduling note    Labs None   Scheduling:  Preferred lab:  Lab interval:     Imaging None      Pharmacy appointment No pharmacy appointment needed      Other referrals no referral to Oncology Primary Care needed -  no Massage appointment needed    No additional referrals needed

## 2024-07-25 ENCOUNTER — OFFICE VISIT (OUTPATIENT)
Dept: HEMATOLOGY/ONCOLOGY | Facility: CLINIC | Age: 39
End: 2024-07-25
Payer: COMMERCIAL

## 2024-07-25 VITALS
WEIGHT: 159.19 LBS | HEART RATE: 80 BPM | HEIGHT: 65 IN | RESPIRATION RATE: 20 BRPM | BODY MASS INDEX: 26.52 KG/M2 | OXYGEN SATURATION: 99 % | DIASTOLIC BLOOD PRESSURE: 81 MMHG | SYSTOLIC BLOOD PRESSURE: 123 MMHG

## 2024-07-25 DIAGNOSIS — C50.411 MALIGNANT NEOPLASM OF UPPER-OUTER QUADRANT OF RIGHT BREAST IN FEMALE, ESTROGEN RECEPTOR POSITIVE: Primary | Chronic | ICD-10-CM

## 2024-07-25 DIAGNOSIS — Z17.0 MALIGNANT NEOPLASM OF UPPER-OUTER QUADRANT OF RIGHT BREAST IN FEMALE, ESTROGEN RECEPTOR POSITIVE: Primary | Chronic | ICD-10-CM

## 2024-07-25 PROCEDURE — 3008F BODY MASS INDEX DOCD: CPT | Mod: CPTII,S$GLB,, | Performed by: INTERNAL MEDICINE

## 2024-07-25 PROCEDURE — 3079F DIAST BP 80-89 MM HG: CPT | Mod: CPTII,S$GLB,, | Performed by: INTERNAL MEDICINE

## 2024-07-25 PROCEDURE — 3074F SYST BP LT 130 MM HG: CPT | Mod: CPTII,S$GLB,, | Performed by: INTERNAL MEDICINE

## 2024-07-25 PROCEDURE — 3044F HG A1C LEVEL LT 7.0%: CPT | Mod: CPTII,S$GLB,, | Performed by: INTERNAL MEDICINE

## 2024-07-25 PROCEDURE — 99999 PR PBB SHADOW E&M-EST. PATIENT-LVL V: CPT | Mod: PBBFAC,,, | Performed by: INTERNAL MEDICINE

## 2024-07-25 PROCEDURE — 99213 OFFICE O/P EST LOW 20 MIN: CPT | Mod: S$GLB,,, | Performed by: INTERNAL MEDICINE

## 2024-07-25 PROCEDURE — 1159F MED LIST DOCD IN RCRD: CPT | Mod: CPTII,S$GLB,, | Performed by: INTERNAL MEDICINE

## 2024-08-03 ENCOUNTER — LAB VISIT (OUTPATIENT)
Dept: LAB | Facility: HOSPITAL | Age: 39
End: 2024-08-03
Payer: COMMERCIAL

## 2024-08-03 DIAGNOSIS — N95.1 MENOPAUSAL SYMPTOM: ICD-10-CM

## 2024-08-03 DIAGNOSIS — N95.1 SYMPTOMATIC MENOPAUSAL OR FEMALE CLIMACTERIC STATES: Primary | ICD-10-CM

## 2024-08-03 LAB — ESTRADIOL SERPL-MCNC: <10 PG/ML

## 2024-08-03 PROCEDURE — 84270 ASSAY OF SEX HORMONE GLOBUL: CPT

## 2024-08-03 PROCEDURE — 84403 ASSAY OF TOTAL TESTOSTERONE: CPT

## 2024-08-03 PROCEDURE — 82670 ASSAY OF TOTAL ESTRADIOL: CPT | Performed by: OBSTETRICS & GYNECOLOGY

## 2024-08-03 PROCEDURE — 36415 COLL VENOUS BLD VENIPUNCTURE: CPT | Performed by: OBSTETRICS & GYNECOLOGY

## 2024-08-03 PROCEDURE — 82040 ASSAY OF SERUM ALBUMIN: CPT

## 2024-08-04 ENCOUNTER — PATIENT MESSAGE (OUTPATIENT)
Dept: PSYCHIATRY | Facility: CLINIC | Age: 39
End: 2024-08-04
Payer: COMMERCIAL

## 2024-08-05 RX ORDER — DEXTROAMPHETAMINE SACCHARATE, AMPHETAMINE ASPARTATE MONOHYDRATE, DEXTROAMPHETAMINE SULFATE, AMPHETAMINE SULFATE 9.375; 9.375; 9.375; 9.375 MG/1; MG/1; MG/1; MG/1
37.5 CAPSULE, EXTENDED RELEASE ORAL DAILY
Qty: 30 EACH | Refills: 0 | Status: SHIPPED | OUTPATIENT
Start: 2024-08-05

## 2024-08-08 ENCOUNTER — CLINICAL SUPPORT (OUTPATIENT)
Dept: OBSTETRICS AND GYNECOLOGY | Facility: CLINIC | Age: 39
End: 2024-08-08
Payer: COMMERCIAL

## 2024-08-08 DIAGNOSIS — N95.1 MENOPAUSAL SYMPTOM: Primary | ICD-10-CM

## 2024-08-08 PROCEDURE — 99999 PR PBB SHADOW E&M-EST. PATIENT-LVL I: CPT | Mod: PBBFAC,,,

## 2024-08-08 RX ORDER — TESTOSTERONE CYPIONATE 200 MG/ML
50 INJECTION, SOLUTION INTRAMUSCULAR ONCE
Status: COMPLETED | OUTPATIENT
Start: 2024-08-08 | End: 2024-08-08

## 2024-08-08 RX ADMIN — TESTOSTERONE CYPIONATE 50 MG: 200 INJECTION, SOLUTION INTRAMUSCULAR at 08:08

## 2024-08-15 LAB
ALBUMIN SERPL-MCNC: 4.7 G/DL (ref 3.6–5.1)
SHBG SERPL-SCNC: 77 NMOL/L (ref 17–124)
TESTOST FREE SERPL-MCNC: 1.5 PG/ML (ref 0.2–5)
TESTOST SERPL-MCNC: 27 NG/DL (ref 2–45)
TESTOSTERONE.FREE+WB SERPL-MCNC: 3.3 NG/DL (ref 0.5–8.5)

## 2024-08-23 ENCOUNTER — ON-DEMAND VIRTUAL (OUTPATIENT)
Dept: URGENT CARE | Facility: CLINIC | Age: 39
End: 2024-08-23
Payer: COMMERCIAL

## 2024-08-23 DIAGNOSIS — S91.209A: Primary | ICD-10-CM

## 2024-08-23 NOTE — PROGRESS NOTES
Subjective:      Patient ID: Yolanda Norman is a 39 y.o. female.    Vitals:  vitals were not taken for this visit.     Chief Complaint: Wound Infection      Visit Type: TELE AUDIOVISUAL    Present with the patient at the time of consultation: TELEMED PRESENT WITH PATIENT: None    Past Medical History:   Diagnosis Date    Abnormal Pap smear of cervix     ADHD     Allergy     seasonal    Anorexia     Anxiety     Asthma     BRCA1 positive 2020    Bulimia     Depression     Fever blister     Gastroparesis     GERD (gastroesophageal reflux disease)     History of posttraumatic stress disorder (PTSD)     Hypertension     Gestational Hypertension    Invasive ductal carcinoma of right breast 2019    Mastitis     Migraine headache     PONV (postoperative nausea and vomiting)     Status post mastectomy, bilateral 2020     Past Surgical History:   Procedure Laterality Date    BILATERAL MASTECTOMY Bilateral 2020    Procedure: MASTECTOMY, BILATERAL - BILATERAL SKIN SPARING MASTECTOMY;  Surgeon: Percy Ayers MD;  Location: [facility];  Service: General;  Laterality: Bilateral;    BIOPSY OF AXILLARY NODE Right 2020    Procedure: BIOPSY, LYMPH NODE, AXILLARY;  Surgeon: Percy Ayers MD;  Location: [facility];  Service: General;  Laterality: Right;    BONE MARROW ASPIRATION      x 3    BREAST SURGERY      CERVICAL BIOPSY  W/ LOOP ELECTRODE EXCISION       SECTION  2016     SECTION N/A 2019    Procedure:  SECTION;  Surgeon: Kirit Hernandez MD;  Location: [facility];  Service: OB/GYN;  Laterality: N/A;    COLPOSCOPY      ESOPHAGOGASTRODUODENOSCOPY N/A 2021    Procedure: EGD (ESOPHAGOGASTRODUODENOSCOPY);  Surgeon: Lj Santos MD;  Location: [address];  Service: Endoscopy;  Laterality: N/A;  C[facility] on 21 at [facility]    ESOPHAGOGASTRODUODENOSCOPY N/A 2021    Procedure: ESOPHAGOGASTRODUODENOSCOPY (EGD);  Surgeon: Camila Wiley MD;   "Location: [facility] ENDO;  Service: Endoscopy;  Laterality: N/A;    INJECTION FOR SENTINEL NODE IDENTIFICATION Right 7/1/2020    Procedure: INJECTION, FOR SENTINEL NODE IDENTIFICATION;  Surgeon: Percy Ayers MD;  Location: [facility];  Service: General;  Laterality: Right;    INSERTION OF BREAST TISSUE EXPANDER Bilateral 7/1/2020    Procedure: INSERTION, TISSUE EXPANDER, BREAST;  Surgeon: Kiko Aranda MD;  Location: [facility];  Service: Plastics;  Laterality: Bilateral;    INSERTION OF TUNNELED CENTRAL VENOUS CATHETER (CVC) WITH SUBCUTANEOUS PORT Left 12/27/2019    Procedure: [id number]-Left neck or chest wall;  Surgeon: Percy Ayers MD;  [address];  Service: General;  Laterality: Left;    MASTECTOMY      MEDIPORT REMOVAL N/A 7/1/2020    Procedure: REMOVAL, CATHETER, CENTRAL VENOUS, TUNNELED, WITH PORT;  Surgeon: Percy Ayers MD;  Location: [facility];  Service: General;  Laterality: N/A;    ROBOT-ASSISTED LAPAROSCOPIC ABDOMINAL HYSTERECTOMY USING DA HIMA XI N/A 12/18/2020    Procedure: XI ROBOTIC HYSTERECTOMY;  Surgeon: Emili Smith MD;  Location: [facility];  Service: OB/GYN;  Laterality: N/A;    ROBOT-ASSISTED LAPAROSCOPIC SALPINGO-OOPHORECTOMY USING DA HIMA XI Bilateral 12/18/2020    Procedure: XI ROBOTIC SALPINGO-OOPHORECTOMY;  Surgeon: Emili Smith MD;  Location: [facility];  Service: OB/GYN;  Laterality: Bilateral;    SHOULDER SURGERY Left     x 2    SINUS SURGERY      age 19    STOMACH SURGERY      TISSUE EXPANDER REMOVAL Right 10/3/2020    Procedure: REMOVAL, TISSUE EXPANDER;  Surgeon: Kiko Aranda MD;  Location: [facility];  Service: Plastics;  Laterality: Right;    TONSILLECTOMY      UPPER GASTROINTESTINAL ENDOSCOPY       Review of patient's allergies indicates:   Allergen Reactions    Amoxicillin Hives    Penicillins Hives and Rash    Diazepam Anxiety and Other (See Comments)     "makes hyper"     Current Outpatient Medications on File Prior to Visit   Medication Sig " Dispense Refill    albuterol (PROVENTIL/VENTOLIN HFA) 90 mcg/actuation inhaler Inhale 1 puff every 6 (six) hours  into the lungs for Wheezing (As Needed) for up to 60 doses 18 g 0    ALPRAZolam (XANAX) 0.25 MG tablet Take 1 tablet (0.25 mg total) by mouth daily as needed for Anxiety. 30 tablet 0    anastrozole (ARIMIDEX) 1 mg Tab Take 1 tablet (1 mg total) by mouth once daily. 90 tablet 3    atogepant (QULIPTA) 60 mg Tab Take 1 tablet every day by oral route. 90 tablet 0    azithromycin (Z-PADILLA) 250 MG tablet Take 2 tablets on day 1, then 1 tablet the next 4 days. 6 tablet 0    butalbital-acetaminophen-caffeine -40 mg (FIORICET, ESGIC) -40 mg per tablet take 1 tablet by mouth 4 times daily as needed 20 tablet 0    butalbital-acetaminophen-caffeine -40 mg (FIORICET, ESGIC) -40 mg per tablet Take 1 tablet by mouth 4 (four) times daily as needed. 20 tablet 0    butalbital-aspirin-caffeine -40 mg (FIORINAL) -40 mg Cap take 1 capsule by mouth every 4 hours as needed 15 capsule 0    butalbital-aspirin-caffeine -40 mg Tab butalbital-aspirin-caffeine 50 mg-325 mg-40 mg tablet   Take 1 tablet every 4 hours by oral route as needed.      celecoxib (CELEBREX) 200 MG capsule Take 1 capsule by mouth 2 (two) times daily for 14 days Please take for 14 days and afterwards stop for 1 week to give GI tract and Kidneys rest. 28 capsule 1    ciclopirox (LOPROX) 0.77 % Crea Apply to affected areas of leg twice a day for two weeks 30 g 0    citalopram (CELEXA) 40 MG tablet TAKE 1 TABLET(40 MG) BY MOUTH EVERY DAY 90 tablet 1    clindamycin (CLEOCIN T) 1 % external solution Apply to affected area of scalp after showering 60 mL 0    dextroamphetamine-amphetamine (MYDAYIS) 37.5 mg CT24 Take 1 capsule (37.5 mg) by mouth Daily. 30 each 0    doxycycline (VIBRAMYCIN) 100 MG Cap Take one capsule by mouth once daily with food 30 capsule 1    doxycycline (VIBRAMYCIN) 100 MG Cap Take 1 capsule by mouth once  daily with food 30 capsule 0    hydrOXYzine HCL (ATARAX) 10 MG Tab Take 1 tablet 3 times a day by oral route for 3 days. 9 tablet 0    ibuprofen (ADVIL,MOTRIN) 600 MG tablet Take 1 tablet (600 mg total) by mouth every 8 (eight) hours as needed for Pain (with food). 30 tablet 0    loratadine (CLARITIN) 10 mg tablet Take 10 mg by mouth once daily.      magnesium 30 mg Tab Take by mouth once.      magnesium oxide 400 mg magnesium Cap magnesium 400 mg (as magnesium oxide) capsule   Take by oral route.      metFORMIN (GLUCOPHAGE) 500 MG tablet Take 1 tablet (500 mg total) by mouth 2 (two) times daily with meals. 60 tablet 11    metoclopramide HCl (REGLAN) 10 MG tablet Take 1 tablet 3 (three) times daily before meals by mouth 90 tablet 0    metoclopramide HCl (REGLAN) 10 MG tablet Take 1 tablet by mouth as needed (nausea) 30 tablet 0    metoclopramide HCl (REGLAN) 10 MG tablet Take 1 tablet by mouth as needed (nausea) 30 tablet 0    metoclopramide HCl (REGLAN) 10 MG tablet Take 1 tablet by mouth as needed (nausea) 30 tablet 0    metoclopramide HCl (REGLAN) 10 MG tablet Take 1 tablet by mouth 4 (four) times daily before meals and nightly 30 tablet 0    multivitamin (THERAGRAN) per tablet Take 1 tablet by mouth once daily.      onabotulinumtoxinA (BOTOX INJ) Inject as directed. [id number]      ondansetron (ZOFRAN-ODT) 4 MG TbDL Take 1 tablet every 6 (six) hours as needed by mouth for Nausea for up to 7 days 20 tablet 0    ondansetron (ZOFRAN-ODT) 8 MG TbDL Dissolve 1 by mouth the night before surgery and then dissolve 1 by mouth the morning of surgery and then 1 by mouth every 6 hours as needed for nausea. 10 tablet 0    pantoprazole (PROTONIX) 40 MG tablet Take 1 tablet (40 mg total) by mouth once daily. Patient needs follow up appointment for any further refills. 30 tablet 0    phenazopyridine (PYRIDIUM) 100 MG tablet 1 tablet 3 times a day for 2 days.  Take as needed after meals for pain 6 tablet 0    prasterone, dhea,  (INTRAROSA) 6.5 mg Inst Place 6.5 mg vaginally every evening. 30 each 11    prochlorperazine (COMPAZINE) 10 MG tablet Take 1 tablet (10 mg total) by mouth 3 (three) times daily as needed (Headache and nausea). 30 tablet 2    promethazine (PHENERGAN) 12.5 MG Tab Take 1 tablet by mouth every 8 (eight) hours as needed for Nausea 21 tablet 0    rosuvastatin (CRESTOR) 5 MG tablet Take 1 tablet (5 mg total) by mouth once daily. 90 tablet 3    spironolactone (ALDACTONE) 100 MG tablet Take 1 tablet by mouth daily 90 tablet 1    sucralfate (CARAFATE) 1 gram tablet Take 1 tablet (1 g total) by mouth 4 (four) times daily. Can be crushed if cannot swallow. 120 tablet 0    sucralfate (CARAFATE) 100 mg/mL suspension Take 10 mLs by mouth 4 (four) times daily 420 mL 0    tiZANidine (ZANAFLEX) 2 MG tablet Take 2 tablets every day by oral route for 90 days. 180 tablet 0    topiramate (TOPAMAX) 100 MG tablet Take 1 tablet every day by oral route for 90 days. 90 tablet 1    topiramate (TOPAMAX) 100 MG tablet Take 1 tablet every day by oral route for 90 days. 90 tablet 1    topiramate (TOPAMAX) 25 MG tablet Take 4 tablets (100 mg total) by mouth once daily. 360 tablet 0    traZODone (DESYREL) 50 MG tablet Take 0.5 tablets (25 mg total) by mouth every evening. 45 tablet 1    TRUDHESA 0.725 mg/pump act. (4 mg/mL) Spry by Each Nostril route.      ubrogepant (UBRELVY) 100 mg tablet Take 1 tablet twice a day by oral route as needed. 16 tablet 2    valACYclovir (VALTREX) 1000 MG tablet Take 1 tablet (1,000 mg total) by mouth every 12 (twelve) hours. 60 tablet 0     Current Facility-Administered Medications on File Prior to Visit   Medication Dose Route Frequency Provider Last Rate Last Admin    onabotulinumtoxina injection 200 Units  200 Units Intramuscular Q90 Days Dillon Gonzalez MD   200 Units at 06/25/21 [id number]     Family History   Problem Relation Name Age of Onset    Cancer Maternal Grandmother  40        cervical    Cervical cancer  Mother      Breast cancer Other 4 paternal great aunts     Ovarian cancer Other second cousin paternal     Colon polyps Father      Colon cancer Neg Hx      Melanoma Neg Hx             Ohs Peq Odvv Intake    8/23/2024  2:06 PM CDT - Filed by Patient   What is your current physical address in the event of a medical emergency? [address]   Are you able to take your vital signs? No   Please attach any relevant images or files          In [place] via video conference in her car    40 y/o woman with a 5 day history where her son dropped an ipad on her toe. She had immediate pain with a cut and she washed it out and placed a band-aid on it. Over the last 2 days it has worsened where she cannot walk on it and the swelling in the toe has increased with increased redness. She has had some left over mupirocin and has been using it without much benefit. She has had pus come out of the area as well on occasion.         Constitution: Negative for fever.   Musculoskeletal:  Positive for joint pain and joint swelling.        Objective:   The physical exam was conducted virtually.  Physical Exam   Constitutional:  Non-toxic appearance. No distress.   HENT:   Nose: No congestion.   Pulmonary/Chest: Effort normal.   Musculoskeletal:      Comments: Right 4th toe with marked swelling and erythema. Possible abscess noted just below the nail bed. Not improving on topical mupirocin.    Neurological: She is alert.       Assessment:     1. Open wound of toe with damage to nail, initial encounter        Plan:       Open wound of toe with damage to nail, initial encounter    With this worsening on the topical mupirocin and with possible abscess formation, with possible paronychia, I recommend you go in for an in person visit for possible drainage based on in person examination. It may just require oral antibiotics but there appears to be a possible abscess that would require drainage first.

## 2024-09-05 ENCOUNTER — PATIENT MESSAGE (OUTPATIENT)
Dept: PSYCHIATRY | Facility: CLINIC | Age: 39
End: 2024-09-05
Payer: COMMERCIAL

## 2024-09-05 ENCOUNTER — OFFICE VISIT (OUTPATIENT)
Dept: SPORTS MEDICINE | Facility: CLINIC | Age: 39
End: 2024-09-05
Payer: COMMERCIAL

## 2024-09-05 VITALS
HEIGHT: 65 IN | HEART RATE: 94 BPM | DIASTOLIC BLOOD PRESSURE: 80 MMHG | SYSTOLIC BLOOD PRESSURE: 118 MMHG | WEIGHT: 159.63 LBS | BODY MASS INDEX: 26.6 KG/M2

## 2024-09-05 DIAGNOSIS — M79.669 LOWER LEG PAIN: ICD-10-CM

## 2024-09-05 DIAGNOSIS — G57.53 TARSAL TUNNEL SYNDROME, BILATERAL: Primary | ICD-10-CM

## 2024-09-05 PROCEDURE — 99204 OFFICE O/P NEW MOD 45 MIN: CPT | Mod: S$GLB,,, | Performed by: ORTHOPAEDIC SURGERY

## 2024-09-05 PROCEDURE — 99999 PR PBB SHADOW E&M-EST. PATIENT-LVL V: CPT | Mod: PBBFAC,,, | Performed by: ORTHOPAEDIC SURGERY

## 2024-09-05 PROCEDURE — 1159F MED LIST DOCD IN RCRD: CPT | Mod: CPTII,S$GLB,, | Performed by: ORTHOPAEDIC SURGERY

## 2024-09-05 PROCEDURE — 3008F BODY MASS INDEX DOCD: CPT | Mod: CPTII,S$GLB,, | Performed by: ORTHOPAEDIC SURGERY

## 2024-09-05 PROCEDURE — 3074F SYST BP LT 130 MM HG: CPT | Mod: CPTII,S$GLB,, | Performed by: ORTHOPAEDIC SURGERY

## 2024-09-05 PROCEDURE — 3044F HG A1C LEVEL LT 7.0%: CPT | Mod: CPTII,S$GLB,, | Performed by: ORTHOPAEDIC SURGERY

## 2024-09-05 PROCEDURE — 3079F DIAST BP 80-89 MM HG: CPT | Mod: CPTII,S$GLB,, | Performed by: ORTHOPAEDIC SURGERY

## 2024-09-05 RX ORDER — METHYLPREDNISOLONE 4 MG/1
TABLET ORAL
Qty: 21 EACH | Refills: 0 | Status: SHIPPED | OUTPATIENT
Start: 2024-09-05

## 2024-09-05 RX ORDER — MELOXICAM 15 MG/1
15 TABLET ORAL DAILY
Qty: 30 TABLET | Refills: 1 | Status: SHIPPED | OUTPATIENT
Start: 2024-09-05

## 2024-09-05 RX ORDER — DEXTROAMPHETAMINE SACCHARATE, AMPHETAMINE ASPARTATE MONOHYDRATE, DEXTROAMPHETAMINE SULFATE, AMPHETAMINE SULFATE 9.375; 9.375; 9.375; 9.375 MG/1; MG/1; MG/1; MG/1
37.5 CAPSULE, EXTENDED RELEASE ORAL DAILY
Qty: 30 EACH | Refills: 0 | Status: SHIPPED | OUTPATIENT
Start: 2024-09-05

## 2024-09-05 NOTE — PROGRESS NOTES
CC: Bilateral foot/ankle pain    39 y.o. Female who presents as a new patient to me. She works as an clinical research coordinator. Complaint is bilateral lower leg, ankle, and foot pain x 1 week with an atraumatic onset. Pain localizes to back of calf/lower leg region and hindfoot with extension into the medial plantar foot.  She reports a left calf strain in June.  She recovered from that and was back to running in August without any issues.  Runs typically 15 miles a week.  She also does CrossFit.  Currently training for a half marathon.  About 1 week ago again she developed the symptoms associated with some running.  She describes an achiness and generalized swelling feeling into the medial hindfoot/lower leg and ankle/foot region.  No numbness or tingling.  Attempted to run earlier this week and had some generalized discomfort and pain.  Notably she does have a history of prior diagnosed tarsal tunnel syndrome bilaterally by my colleague Dr. Jethro Antony.  She was seen as recently as 2018 by him.  She also was seen by a podiatrist who diagnosed her with posterior tib tendinitis.  Prior treatment has included oral anti-inflammatory medication and some therapy which was helpful.  She does have a history of cancer and some of the chemotherapy resulted in lower extremity peripheral neuropathy.    I have seen this patient's  in the past for his shoulder and she also has a friend of my former SMA Debbielaci Avilaisai.    PMHx notable for breast cancer currently taking anastrozole.   Negative for tobacco.   Negative for diabetes. Last A1C: 5.0 01/10/24    REVIEW OF SYSTEMS:   Constitution: Negative. Negative for chills, fever and night sweats.    Hematologic/Lymphatic: Negative for bleeding problem. Does not bruise/bleed easily.   Skin: Negative for dry skin, itching and rash.   Musculoskeletal: Negative for falls. Positive for bilateral foot/ankle pain and muscle weakness.     All other review of symptoms were  reviewed and found to be noncontributory.     PAST MEDICAL HISTORY:   Past Medical History:   Diagnosis Date    Abnormal Pap smear of cervix     ADHD     Allergy     seasonal    Anorexia     Anxiety     Asthma     BRCA1 positive 2020    Bulimia     Depression     Fever blister     Gastroparesis     GERD (gastroesophageal reflux disease)     History of posttraumatic stress disorder (PTSD)     Hypertension     Gestational Hypertension    Invasive ductal carcinoma of right breast 2019    Mastitis     Migraine headache     PONV (postoperative nausea and vomiting)     Status post mastectomy, bilateral 2020     PAST SURGICAL HISTORY:   Past Surgical History:   Procedure Laterality Date    BILATERAL MASTECTOMY Bilateral 2020    Procedure: MASTECTOMY, BILATERAL - BILATERAL SKIN SPARING MASTECTOMY;  Surgeon: Percy Ayers MD;  Location: [facility];  Service: General;  Laterality: Bilateral;    BIOPSY OF AXILLARY NODE Right 2020    Procedure: BIOPSY, LYMPH NODE, AXILLARY;  Surgeon: Percy Ayers MD;  Location: [facility];  Service: General;  Laterality: Right;    BONE MARROW ASPIRATION      x 3    BREAST SURGERY      CERVICAL BIOPSY  W/ LOOP ELECTRODE EXCISION       SECTION  2016     SECTION N/A 2019    Procedure:  SECTION;  Surgeon: Kirit Hernandez MD;  Location: [facility];  Service: OB/GYN;  Laterality: N/A;    COLPOSCOPY      ESOPHAGOGASTRODUODENOSCOPY N/A 2021    Procedure: EGD (ESOPHAGOGASTRODUODENOSCOPY);  Surgeon: Lj Santos MD;  Location: [address]);  Service: Endoscopy;  Laterality: N/A;  COVID test on 21 at [facility]    ESOPHAGOGASTRODUODENOSCOPY N/A 2021    Procedure: ESOPHAGOGASTRODUODENOSCOPY (EGD);  Surgeon: Camila Wiley MD;  Location: [facility];  Service: Endoscopy;  Laterality: N/A;    INJECTION FOR SENTINEL NODE IDENTIFICATION Right 2020    Procedure: INJECTION, FOR SENTINEL NODE IDENTIFICATION;  Surgeon:  Percy Ayers MD;  Location: [facility];  Service: General;  Laterality: Right;    INSERTION OF BREAST TISSUE EXPANDER Bilateral 7/1/2020    Procedure: INSERTION, TISSUE EXPANDER, BREAST;  Surgeon: Kiko Aranda MD;  Location: [facility];  Service: Plastics;  Laterality: Bilateral;    INSERTION OF TUNNELED CENTRAL VENOUS CATHETER (CVC) WITH SUBCUTANEOUS PORT Left 12/27/2019    Procedure: [id number]-Left neck or chest wall;  Surgeon: Percy Ayers MD;  [address];  Service: General;  Laterality: Left;    MASTECTOMY      MEDIPORT REMOVAL N/A 7/1/2020    Procedure: REMOVAL, CATHETER, CENTRAL VENOUS, TUNNELED, WITH PORT;  Surgeon: Percy Ayers MD;  Location: [facility];  Service: General;  Laterality: N/A;    ROBOT-ASSISTED LAPAROSCOPIC ABDOMINAL HYSTERECTOMY USING DA HIMA XI N/A 12/18/2020    Procedure: XI ROBOTIC HYSTERECTOMY;  Surgeon: Emili Smith MD;  Location: [facility];  Service: OB/GYN;  Laterality: N/A;    ROBOT-ASSISTED LAPAROSCOPIC SALPINGO-OOPHORECTOMY USING DA HIMA XI Bilateral 12/18/2020    Procedure: XI ROBOTIC SALPINGO-OOPHORECTOMY;  Surgeon: Emili Smith MD;  Location: [facility];  Service: OB/GYN;  Laterality: Bilateral;    SHOULDER SURGERY Left     x 2    SINUS SURGERY      age 19    STOMACH SURGERY      TISSUE EXPANDER REMOVAL Right 10/3/2020    Procedure: REMOVAL, TISSUE EXPANDER;  Surgeon: Kiko Aranda MD;  Location: [facility];  Service: Plastics;  Laterality: Right;    TONSILLECTOMY      UPPER GASTROINTESTINAL ENDOSCOPY       FAMILY HISTORY:   Family History   Problem Relation Name Age of Onset    Cancer Maternal Grandmother  40        cervical    Cervical cancer Mother      Breast cancer Other 4 paternal great aunts     Ovarian cancer Other second cousin paternal     Colon polyps Father      Colon cancer Neg Hx      Melanoma Neg Hx       SOCIAL HISTORY:   Social History     Socioeconomic History    Marital status:    Tobacco Use    Smoking status: Never     Smokeless tobacco: Never   Substance and Sexual Activity    Alcohol use: Yes     Comment: socially    Drug use: No    Sexual activity: Yes     Partners: Male     Birth control/protection: None   Social History Narrative    No longer working at [employer] as of 10/2020 as her son has been suffering with depression/anxiety     Social Determinants of Health     Financial Resource Strain: Low Risk  (1/9/2024)    Overall Financial Resource Strain (CARDIA)     Difficulty of Paying Living Expenses: Not hard at all   Food Insecurity: No Food Insecurity (1/9/2024)    Hunger Vital Sign     Worried About Running Out of Food in the Last Year: Never true     Ran Out of Food in the Last Year: Never true   Transportation Needs: No Transportation Needs (1/9/2024)    PRAPARE - Transportation     Lack of Transportation (Medical): No     Lack of Transportation (Non-Medical): No   Physical Activity: Sufficiently Active (8/14/2024)    Received from [facility]    Exercise Vital Sign     Days of Exercise per Week: 6 days     Minutes of Exercise per Session: 120 min   Stress: Stress Concern Present (8/14/2024)    Received from [facility]    [employer] [place] [employer] - Occupational Stress Questionnaire     Feeling of Stress : To some extent   Housing Stability: Low Risk  (1/9/2024)    Housing Stability Vital Sign     Unable to Pay for Housing in the Last Year: No     Number of Places Lived in the Last Year: 1     Unstable Housing in the Last Year: No     MEDICATIONS:     Current Outpatient Medications:     albuterol (PROVENTIL/VENTOLIN HFA) 90 mcg/actuation inhaler, Inhale 1 puff every 6 (six) hours  into the lungs for Wheezing (As Needed) for up to 60 doses, Disp: 18 g, Rfl: 0    ALPRAZolam (XANAX) 0.25 MG tablet, Take 1 tablet (0.25 mg total) by mouth daily as needed for Anxiety., Disp: 30 tablet, Rfl: 0    anastrozole (ARIMIDEX) 1 mg Tab, Take 1 tablet (1 mg total) by mouth once daily., Disp: 90 tablet, Rfl: 3     atogepant (QULIPTA) 60 mg Tab, Take 1 tablet every day by oral route., Disp: 90 tablet, Rfl: 0    azithromycin (Z-PADILLA) 250 MG tablet, Take 2 tablets on day 1, then 1 tablet the next 4 days., Disp: 6 tablet, Rfl: 0    butalbital-acetaminophen-caffeine -40 mg (FIORICET, ESGIC) -40 mg per tablet, take 1 tablet by mouth 4 times daily as needed, Disp: 20 tablet, Rfl: 0    butalbital-acetaminophen-caffeine -40 mg (FIORICET, ESGIC) -40 mg per tablet, Take 1 tablet by mouth 4 (four) times daily as needed., Disp: 20 tablet, Rfl: 0    butalbital-aspirin-caffeine -40 mg (FIORINAL) -40 mg Cap, take 1 capsule by mouth every 4 hours as needed, Disp: 15 capsule, Rfl: 0    butalbital-aspirin-caffeine -40 mg Tab, butalbital-aspirin-caffeine 50 mg-325 mg-40 mg tablet  Take 1 tablet every 4 hours by oral route as needed., Disp: , Rfl:     celecoxib (CELEBREX) 200 MG capsule, Take 1 capsule by mouth 2 (two) times daily for 14 days Please take for 14 days and afterwards stop for 1 week to give GI tract and Kidneys rest., Disp: 28 capsule, Rfl: 1    ciclopirox (LOPROX) 0.77 % Crea, Apply to affected areas of leg twice a day for two weeks, Disp: 30 g, Rfl: 0    citalopram (CELEXA) 40 MG tablet, TAKE 1 TABLET(40 MG) BY MOUTH EVERY DAY, Disp: 90 tablet, Rfl: 1    clindamycin (CLEOCIN T) 1 % external solution, Apply to affected area of scalp after showering, Disp: 60 mL, Rfl: 0    clindamycin (CLEOCIN) 300 MG capsule, Take 1 capsule by mouth 3 (three) times daily for 10 days, Disp: 30 capsule, Rfl: 0    dextroamphetamine-amphetamine (MYDAYIS) 37.5 mg CT24, Take 1 capsule (37.5 mg) by mouth Daily., Disp: 30 each, Rfl: 0    doxycycline (VIBRAMYCIN) 100 MG Cap, Take one capsule by mouth once daily with food, Disp: 30 capsule, Rfl: 1    doxycycline (VIBRAMYCIN) 100 MG Cap, Take 1 capsule by mouth once daily with food, Disp: 30 capsule, Rfl: 0    hydrOXYzine HCL (ATARAX) 10 MG Tab, Take 1 tablet 3 times a  day by oral route for 3 days., Disp: 9 tablet, Rfl: 0    ibuprofen (ADVIL,MOTRIN) 600 MG tablet, Take 1 tablet (600 mg total) by mouth every 8 (eight) hours as needed for Pain (with food)., Disp: 30 tablet, Rfl: 0    loratadine (CLARITIN) 10 mg tablet, Take 10 mg by mouth once daily., Disp: , Rfl:     magnesium 30 mg Tab, Take by mouth once., Disp: , Rfl:     magnesium oxide 400 mg magnesium Cap, magnesium 400 mg (as magnesium oxide) capsule  Take by oral route., Disp: , Rfl:     meloxicam (MOBIC) 15 MG tablet, Take 1 tablet (15 mg total) by mouth once daily., Disp: 30 tablet, Rfl: 1    metFORMIN (GLUCOPHAGE) 500 MG tablet, Take 1 tablet (500 mg total) by mouth 2 (two) times daily with meals., Disp: 60 tablet, Rfl: 11    methylPREDNISolone (MEDROL DOSEPACK) 4 mg tablet, Follow package directions, Disp: 21 each, Rfl: 0    metoclopramide HCl (REGLAN) 10 MG tablet, Take 1 tablet 3 (three) times daily before meals by mouth, Disp: 90 tablet, Rfl: 0    metoclopramide HCl (REGLAN) 10 MG tablet, Take 1 tablet by mouth as needed (nausea), Disp: 30 tablet, Rfl: 0    metoclopramide HCl (REGLAN) 10 MG tablet, Take 1 tablet by mouth as needed (nausea), Disp: 30 tablet, Rfl: 0    metoclopramide HCl (REGLAN) 10 MG tablet, Take 1 tablet by mouth as needed (nausea), Disp: 30 tablet, Rfl: 0    metoclopramide HCl (REGLAN) 10 MG tablet, Take 1 tablet by mouth 4 (four) times daily before meals and nightly, Disp: 30 tablet, Rfl: 0    multivitamin (THERAGRAN) per tablet, Take 1 tablet by mouth once daily., Disp: , Rfl:     onabotulinumtoxinA (BOTOX INJ), Inject as directed. [id number], Disp: , Rfl:     ondansetron (ZOFRAN-ODT) 4 MG TbDL, Take 1 tablet every 6 (six) hours as needed by mouth for Nausea for up to 7 days, Disp: 20 tablet, Rfl: 0    ondansetron (ZOFRAN-ODT) 8 MG TbDL, Dissolve 1 by mouth the night before surgery and then dissolve 1 by mouth the morning of surgery and then 1 by mouth every 6 hours as needed for nausea., Disp: 10  tablet, Rfl: 0    pantoprazole (PROTONIX) 40 MG tablet, Take 1 tablet (40 mg total) by mouth once daily. Patient needs follow up appointment for any further refills., Disp: 30 tablet, Rfl: 0    phenazopyridine (PYRIDIUM) 100 MG tablet, 1 tablet 3 times a day for 2 days.  Take as needed after meals for pain, Disp: 6 tablet, Rfl: 0    prasterone, dhea, (INTRAROSA) 6.5 mg Inst, Place 6.5 mg vaginally every evening., Disp: 30 each, Rfl: 11    prochlorperazine (COMPAZINE) 10 MG tablet, Take 1 tablet (10 mg total) by mouth 3 (three) times daily as needed (Headache and nausea)., Disp: 30 tablet, Rfl: 2    promethazine (PHENERGAN) 12.5 MG Tab, Take 1 tablet by mouth every 8 (eight) hours as needed for Nausea, Disp: 21 tablet, Rfl: 0    rosuvastatin (CRESTOR) 5 MG tablet, Take 1 tablet (5 mg total) by mouth once daily., Disp: 90 tablet, Rfl: 3    spironolactone (ALDACTONE) 100 MG tablet, Take 1 tablet by mouth daily, Disp: 90 tablet, Rfl: 1    sucralfate (CARAFATE) 1 gram tablet, Take 1 tablet (1 g total) by mouth 4 (four) times daily. Can be crushed if cannot swallow., Disp: 120 tablet, Rfl: 0    sucralfate (CARAFATE) 100 mg/mL suspension, Take 10 mLs by mouth 4 (four) times daily, Disp: 420 mL, Rfl: 0    tiZANidine (ZANAFLEX) 2 MG tablet, Take 2 tablets every day by oral route for 90 days., Disp: 180 tablet, Rfl: 0    topiramate (TOPAMAX) 100 MG tablet, Take 1 tablet every day by oral route for 90 days., Disp: 90 tablet, Rfl: 1    topiramate (TOPAMAX) 100 MG tablet, Take 1 tablet every day by oral route for 90 days., Disp: 90 tablet, Rfl: 1    topiramate (TOPAMAX) 25 MG tablet, Take 4 tablets (100 mg total) by mouth once daily., Disp: 360 tablet, Rfl: 0    traZODone (DESYREL) 50 MG tablet, Take 0.5 tablets (25 mg total) by mouth every evening., Disp: 45 tablet, Rfl: 1    TRUDHESA 0.725 mg/pump act. (4 mg/mL) Spry, by Each Nostril route., Disp: , Rfl:     ubrogepant (UBRELVY) 100 mg tablet, Take 1 tablet twice a day by oral  "route as needed., Disp: 16 tablet, Rfl: 2    valACYclovir (VALTREX) 1000 MG tablet, Take 1 tablet (1,000 mg total) by mouth every 12 (twelve) hours., Disp: 60 tablet, Rfl: 0    Current Facility-Administered Medications:     onabotulinumtoxina injection 200 Units, 200 Units, Intramuscular, Q90 Days, Dillon Gonzalez MD, 200 Units at 06/25/21 0751    ALLERGIES:   Review of patient's allergies indicates:   Allergen Reactions    Amoxicillin Hives    Penicillins Hives and Rash    Diazepam Anxiety and Other (See Comments)     "makes hyper"      PHYSICAL EXAMINATION:  /80   Pulse 94   Ht 5' 5" (1.651 m)   Wt 72.4 kg (159 lb 9.8 oz)   LMP 12/18/2020   BMI 26.56 kg/m²   General: Well-developed well-nourished 39 y.o. femalein no acute distress   Cardiovascular: Regular rhythm by palpation of distal pulse, normal color and temperature, no concerning varicosities on symptomatic side   Lungs: No labored breathing or wheezing appreciated   Neuro: Alert and oriented ×3   Psychiatric: well oriented to person, place and time, demonstrates normal mood and affect   Skin: No rashes, lesions or ulcers, normal temperature, turgor, and texture on involved extremity    Ortho/SPM Exam  Examination of the bilateral foot/ankle demonstrates - maybe perhaps some minimal swelling over the posteromedial hindfoot and ankle region.  Some generalized mild tenderness.  Completely nontender over the Achilles and calf region.  Intact Achilles.  Intact Perez test.  Tenderness over the posteromedial retro malleolar region more so on the right than the left with a positive Tinel sign over the posterior tibial nerve region on the right versus the left.  There is some mild puffiness in the area.  Notably she has 5/5 resisted inversion and eversion.  No pain on resisted inversion.  Able to demonstrate both intact double and single heel rise.  Mild pes planus.  Neutral hindfoot.  No specific pain over the plantar fat pad region bilaterally.  " Negative calcaneal squeeze test.  No pain along the plantar fascia region.  Negative plantar fascial stretch for pain.    IMAGING:  None - we decided to not perform any x-rays today.  No clinical concern for stress fracture.    ASSESSMENT:      ICD-10-CM ICD-9-CM   1. Tarsal tunnel syndrome, bilateral  G57.53 355.5   2. Lower leg pain  M79.669 729.5     PLAN:     -Findings and treatment options were discussed with the patient.  She has some subjective fullness and achy discomfort over the posteromedial hindfoot/ankle region with radiation into the medial arch.  Prior diagnosis of tarsal tunnel syndrome and certainly her current presentation and exam findings are suspicious for recurrent tarsal tunnel symptoms.  Right more so than left.  She does have a positive Tinel sign on the right tarsal tunnel region.  Symptoms for just a week.  Treatment options discussed.  I have recommended some oral medication and considerations for a medial arch support orthotic.  I would like for her to meet with 1 of our running therapy specialists to discuss treatment to include foam rolling, strengthening and stretching routine.  Running biomechanics analysis as well.  -Scripts for Medrol Dosepak and Meloxicam given.  She will start with a Medrol pack and take Mobic thereafter as needed.  Safe use instructions provided.  -PT [facility], Mark Gutierrez.   -RTC PRN  -All questions answered      Procedures

## 2024-09-06 ENCOUNTER — OFFICE VISIT (OUTPATIENT)
Dept: OBSTETRICS AND GYNECOLOGY | Facility: CLINIC | Age: 39
End: 2024-09-06
Payer: COMMERCIAL

## 2024-09-06 ENCOUNTER — CLINICAL SUPPORT (OUTPATIENT)
Dept: OBSTETRICS AND GYNECOLOGY | Facility: CLINIC | Age: 39
End: 2024-09-06
Payer: COMMERCIAL

## 2024-09-06 VITALS
BODY MASS INDEX: 27.05 KG/M2 | HEIGHT: 65 IN | DIASTOLIC BLOOD PRESSURE: 77 MMHG | HEART RATE: 91 BPM | WEIGHT: 162.38 LBS | SYSTOLIC BLOOD PRESSURE: 112 MMHG

## 2024-09-06 DIAGNOSIS — Z17.0 MALIGNANT NEOPLASM OF UPPER-OUTER QUADRANT OF RIGHT BREAST IN FEMALE, ESTROGEN RECEPTOR POSITIVE: Primary | ICD-10-CM

## 2024-09-06 DIAGNOSIS — N95.1 MENOPAUSAL SYMPTOM: Primary | ICD-10-CM

## 2024-09-06 DIAGNOSIS — N95.1 MENOPAUSAL SYMPTOM: ICD-10-CM

## 2024-09-06 DIAGNOSIS — C50.411 MALIGNANT NEOPLASM OF UPPER-OUTER QUADRANT OF RIGHT BREAST IN FEMALE, ESTROGEN RECEPTOR POSITIVE: Primary | ICD-10-CM

## 2024-09-06 PROCEDURE — 99999 PR PBB SHADOW E&M-EST. PATIENT-LVL V: CPT | Mod: PBBFAC,,, | Performed by: PHYSICIAN ASSISTANT

## 2024-09-06 PROCEDURE — 99999 PR PBB SHADOW E&M-EST. PATIENT-LVL I: CPT | Mod: PBBFAC,,,

## 2024-09-06 RX ORDER — TESTOSTERONE CYPIONATE 200 MG/ML
50 INJECTION, SOLUTION INTRAMUSCULAR
Status: SHIPPED | OUTPATIENT
Start: 2024-09-06 | End: 2025-02-21

## 2024-09-06 RX ADMIN — TESTOSTERONE CYPIONATE 50 MG: 200 INJECTION, SOLUTION INTRAMUSCULAR at 01:09

## 2024-09-06 NOTE — PROGRESS NOTES
Subjective:      Yolanda Norman is a 39 y.o. female who presents for survivorship follow up. She feels much better with the testosterone and wishes to continue. Denies adverse side effects. Bothersome joint pain with anastrozole. Had rheumatology work up as well but likely due to AI. Considering taking a short break. Will complete four of five years of therapy in 1/2025. Would like to continue testosterone even after stopping anastrozole.  Training for half marathon and doing crossfit. Stopped metformin because felt symptoms of hypoglycemia. Has been doing ok off and would like to see how she feels off it. Eating well and getting 140g of protein daily.     Oncology history: Right ER +,HER 2 negative breast cancer s/p bilateral mastectomy on July 1, 2020 and currently on Airmidex. She is also s/p hyst/BSO     Current tx:  Testosterone 50mg IM [id number]    8/3/2024 Labs  Estradiol <10  Testosterone 27  Free Testosterone 1.5    PCP: Frannie Lamb PA-C    Routine labs: 1/10/2024  WWE: 7/14/2022  Pap smear: s/p hyst/bso  Mammogram: s/p bilateral mastectomy  DEXA: 10/10/2023 normal, repeat in 2 years     Lab Visit on 08/03/2024   Component Date Value Ref Range Status    Estradiol 08/03/2024 <10 (A)  See Text pg/mL Final    Testosterone 08/03/2024 27  2 - 45 ng/dL Final    Testosterone, Free 08/03/2024 1.5  0.2 - 5.0 pg/mL Final    Testosterone, Bioavailable 08/03/2024 3.3  0.5 - 8.5 ng/dL Final    Sex Hormone Binding Globulin 08/03/2024 77  17 - 124 nmol/L Final    Albumin 08/03/2024 4.7  3.6 - 5.1 g/dL Final       Past Medical History:   Diagnosis Date    Abnormal Pap smear of cervix 2009    ADHD     Allergy     seasonal    Anorexia     Anxiety     Asthma     BRCA1 positive 12/18/2020    Bulimia     Depression     Fever blister     Gastroparesis     GERD (gastroesophageal reflux disease)     History of posttraumatic stress disorder (PTSD)     Hypertension     Gestational Hypertension    Invasive ductal carcinoma of  right breast 2019    Mastitis     Migraine headache     PONV (postoperative nausea and vomiting)     Status post mastectomy, bilateral 2020     Past Surgical History:   Procedure Laterality Date    BILATERAL MASTECTOMY Bilateral 2020    Procedure: MASTECTOMY, BILATERAL - BILATERAL SKIN SPARING MASTECTOMY;  Surgeon: Percy Ayers MD;  Location: [facility];  Service: General;  Laterality: Bilateral;    BIOPSY OF AXILLARY NODE Right 2020    Procedure: BIOPSY, LYMPH NODE, AXILLARY;  Surgeon: Percy Ayers MD;  Location: [facility];  Service: General;  Laterality: Right;    BONE MARROW ASPIRATION      x 3    BREAST SURGERY      CERVICAL BIOPSY  W/ LOOP ELECTRODE EXCISION       SECTION  2016     SECTION N/A 2019    Procedure:  SECTION;  Surgeon: Kirit Hernandez MD;  Location: [facility];  Service: OB/GYN;  Laterality: N/A;    COLPOSCOPY      ESOPHAGOGASTRODUODENOSCOPY N/A 2021    Procedure: EGD (ESOPHAGOGASTRODUODENOSCOPY);  Surgeon: Lj Santos MD;  Location: [facility] FLR);  Service: Endoscopy;  Laterality: N/A;  COVID test on 21 at [facility]    ESOPHAGOGASTRODUODENOSCOPY N/A 2021    Procedure: ESOPHAGOGASTRODUODENOSCOPY (EGD);  Surgeon: Camila Wiley MD;  Location: [facility];  Service: Endoscopy;  Laterality: N/A;    INJECTION FOR SENTINEL NODE IDENTIFICATION Right 2020    Procedure: INJECTION, FOR SENTINEL NODE IDENTIFICATION;  Surgeon: Percy Ayers MD;  Location: [facility];  Service: General;  Laterality: Right;    INSERTION OF BREAST TISSUE EXPANDER Bilateral 2020    Procedure: INSERTION, TISSUE EXPANDER, BREAST;  Surgeon: Kiko rAanda MD;  Location: [facility];  Service: Plastics;  Laterality: Bilateral;    INSERTION OF TUNNELED CENTRAL VENOUS CATHETER (CVC) WITH SUBCUTANEOUS PORT Left 2019    Procedure: [id number]-Left neck or chest wall;  Surgeon: Percy Ayers MD;  Location: [facility] 2ND FLR;  Service:  General;  Laterality: Left;    MASTECTOMY      MEDIPORT REMOVAL N/A 2020    Procedure: REMOVAL, CATHETER, CENTRAL VENOUS, TUNNELED, WITH PORT;  Surgeon: Percy Ayers MD;  Location: [facility];  Service: General;  Laterality: N/A;    ROBOT-ASSISTED LAPAROSCOPIC ABDOMINAL HYSTERECTOMY USING DA HIMA XI N/A 2020    Procedure: XI ROBOTIC HYSTERECTOMY;  Surgeon: Emili Smith MD;  Location: [facility];  Service: OB/GYN;  Laterality: N/A;    ROBOT-ASSISTED LAPAROSCOPIC SALPINGO-OOPHORECTOMY USING DA HIMA XI Bilateral 2020    Procedure: XI ROBOTIC SALPINGO-OOPHORECTOMY;  Surgeon: Emili Smith MD;  Location: [facility];  Service: OB/GYN;  Laterality: Bilateral;    SHOULDER SURGERY Left     x 2    SINUS SURGERY      age 19    STOMACH SURGERY      TISSUE EXPANDER REMOVAL Right 10/3/2020    Procedure: REMOVAL, TISSUE EXPANDER;  Surgeon: Kiko Aranda MD;  Location: [facility];  Service: Plastics;  Laterality: Right;    TONSILLECTOMY      UPPER GASTROINTESTINAL ENDOSCOPY       Social History     Tobacco Use    Smoking status: Never    Smokeless tobacco: Never   Substance Use Topics    Alcohol use: Yes     Comment: socially    Drug use: No     Family History   Problem Relation Name Age of Onset    Cancer Maternal Grandmother  40        cervical    Cervical cancer Mother      Breast cancer Other 4 paternal great aunts     Ovarian cancer Other second cousin paternal     Colon polyps Father      Colon cancer Neg Hx      Melanoma Neg Hx       OB History    Para Term  AB Living   2 2 2     2   SAB IAB Ectopic Multiple Live Births         0 2      # Outcome Date GA Lbr Bull/2nd Weight Sex Type Anes PTL Lv   2 Term 21 39w1d  3.43 kg (7 lb 9 oz) M CS-LTranv Spinal N JOLLY   1 Term 18 38w1d  2.96 kg (6 lb 8.4 oz) M CS-LTranv EPI N JOLLY      Complications: Failure to Progress in First Stage       Current Outpatient Medications:     albuterol (PROVENTIL/VENTOLIN HFA) 90 mcg/actuation inhaler, Inhale 1  puff every 6 (six) hours  into the lungs for Wheezing (As Needed) for up to 60 doses, Disp: 18 g, Rfl: 0    anastrozole (ARIMIDEX) 1 mg Tab, Take 1 tablet (1 mg total) by mouth once daily., Disp: 90 tablet, Rfl: 3    atogepant (QULIPTA) 60 mg Tab, Take 1 tablet every day by oral route., Disp: 90 tablet, Rfl: 0    butalbital-acetaminophen-caffeine -40 mg (FIORICET, ESGIC) -40 mg per tablet, Take 1 tablet by mouth 4 (four) times daily as needed., Disp: 20 tablet, Rfl: 0    citalopram (CELEXA) 40 MG tablet, TAKE 1 TABLET(40 MG) BY MOUTH EVERY DAY, Disp: 90 tablet, Rfl: 1    dextroamphetamine-amphetamine (MYDAYIS) 37.5 mg CT24, Take 1 capsule (37.5 mg) by mouth Daily., Disp: 30 each, Rfl: 0    loratadine (CLARITIN) 10 mg tablet, Take 10 mg by mouth once daily., Disp: , Rfl:     magnesium oxide 400 mg magnesium Cap, magnesium 400 mg (as magnesium oxide) capsule  Take by oral route., Disp: , Rfl:     meloxicam (MOBIC) 15 MG tablet, Take 1 tablet (15 mg total) by mouth once daily., Disp: 30 tablet, Rfl: 1    methylPREDNISolone (MEDROL DOSEPACK) 4 mg tablet, Follow package directions, Disp: 21 each, Rfl: 0    metoclopramide HCl (REGLAN) 10 MG tablet, Take 1 tablet by mouth as needed (nausea), Disp: 30 tablet, Rfl: 0    multivitamin (THERAGRAN) per tablet, Take 1 tablet by mouth once daily., Disp: , Rfl:     onabotulinumtoxinA (BOTOX INJ), Inject as directed. [id number], Disp: , Rfl:     ondansetron (ZOFRAN-ODT) 4 MG TbDL, Take 1 tablet every 6 (six) hours as needed by mouth for Nausea for up to 7 days, Disp: 20 tablet, Rfl: 0    ondansetron (ZOFRAN-ODT) 8 MG TbDL, Dissolve 1 by mouth the night before surgery and then dissolve 1 by mouth the morning of surgery and then 1 by mouth every 6 hours as needed for nausea., Disp: 10 tablet, Rfl: 0    prochlorperazine (COMPAZINE) 10 MG tablet, Take 1 tablet (10 mg total) by mouth 3 (three) times daily as needed (Headache and nausea)., Disp: 30 tablet, Rfl: 2    promethazine  (PHENERGAN) 12.5 MG Tab, Take 1 tablet by mouth every 8 (eight) hours as needed for Nausea, Disp: 21 tablet, Rfl: 0    rosuvastatin (CRESTOR) 5 MG tablet, Take 1 tablet (5 mg total) by mouth once daily., Disp: 90 tablet, Rfl: 3    spironolactone (ALDACTONE) 100 MG tablet, Take 1 tablet by mouth daily, Disp: 90 tablet, Rfl: 1    sucralfate (CARAFATE) 1 gram tablet, Take 1 tablet (1 g total) by mouth 4 (four) times daily. Can be crushed if cannot swallow., Disp: 120 tablet, Rfl: 0    sucralfate (CARAFATE) 100 mg/mL suspension, Take 10 mLs by mouth 4 (four) times daily, Disp: 420 mL, Rfl: 0    tiZANidine (ZANAFLEX) 2 MG tablet, Take 2 tablets every day by oral route for 90 days., Disp: 180 tablet, Rfl: 0    topiramate (TOPAMAX) 100 MG tablet, Take 1 tablet every day by oral route for 90 days., Disp: 90 tablet, Rfl: 1    traZODone (DESYREL) 50 MG tablet, Take 0.5 tablets (25 mg total) by mouth every evening., Disp: 45 tablet, Rfl: 1    ALPRAZolam (XANAX) 0.25 MG tablet, Take 1 tablet (0.25 mg total) by mouth daily as needed for Anxiety. (Patient not taking: Reported on 9/6/2024), Disp: 30 tablet, Rfl: 0    azithromycin (Z-PADILLA) 250 MG tablet, Take 2 tablets on day 1, then 1 tablet the next 4 days., Disp: 6 tablet, Rfl: 0    butalbital-acetaminophen-caffeine -40 mg (FIORICET, ESGIC) -40 mg per tablet, take 1 tablet by mouth 4 times daily as needed, Disp: 20 tablet, Rfl: 0    butalbital-aspirin-caffeine -40 mg Tab, butalbital-aspirin-caffeine 50 mg-325 mg-40 mg tablet  Take 1 tablet every 4 hours by oral route as needed., Disp: , Rfl:     celecoxib (CELEBREX) 200 MG capsule, Take 1 capsule by mouth 2 (two) times daily for 14 days Please take for 14 days and afterwards stop for 1 week to give GI tract and Kidneys rest., Disp: 28 capsule, Rfl: 1    ciclopirox (LOPROX) 0.77 % Crea, Apply to affected areas of leg twice a day for two weeks, Disp: 30 g, Rfl: 0    clindamycin (CLEOCIN) 300 MG capsule, Take 1  capsule by mouth 3 (three) times daily for 10 days, Disp: 30 capsule, Rfl: 0    doxycycline (VIBRAMYCIN) 100 MG Cap, Take 1 capsule by mouth once daily with food, Disp: 30 capsule, Rfl: 0    hydrOXYzine HCL (ATARAX) 10 MG Tab, Take 1 tablet 3 times a day by oral route for 3 days., Disp: 9 tablet, Rfl: 0    ibuprofen (ADVIL,MOTRIN) 600 MG tablet, Take 1 tablet (600 mg total) by mouth every 8 (eight) hours as needed for Pain (with food)., Disp: 30 tablet, Rfl: 0    metoclopramide HCl (REGLAN) 10 MG tablet, Take 1 tablet 3 (three) times daily before meals by mouth, Disp: 90 tablet, Rfl: 0    metoclopramide HCl (REGLAN) 10 MG tablet, Take 1 tablet by mouth as needed (nausea), Disp: 30 tablet, Rfl: 0    metoclopramide HCl (REGLAN) 10 MG tablet, Take 1 tablet by mouth as needed (nausea), Disp: 30 tablet, Rfl: 0    metoclopramide HCl (REGLAN) 10 MG tablet, Take 1 tablet by mouth 4 (four) times daily before meals and nightly, Disp: 30 tablet, Rfl: 0    phenazopyridine (PYRIDIUM) 100 MG tablet, 1 tablet 3 times a day for 2 days.  Take as needed after meals for pain (Patient not taking: Reported on 9/6/2024), Disp: 6 tablet, Rfl: 0    prasterone, dhea, (INTRAROSA) 6.5 mg Inst, Place 6.5 mg vaginally every evening. (Patient not taking: Reported on 9/6/2024), Disp: 30 each, Rfl: 11    topiramate (TOPAMAX) 100 MG tablet, Take 1 tablet every day by oral route for 90 days., Disp: 90 tablet, Rfl: 1    TRUDHESA 0.725 mg/pump act. (4 mg/mL) Spry, by Each Nostril route. (Patient not taking: Reported on 9/6/2024), Disp: , Rfl:     ubrogepant (UBRELVY) 100 mg tablet, Take 1 tablet twice a day by oral route as needed. (Patient not taking: Reported on 9/6/2024), Disp: 16 tablet, Rfl: 2    valACYclovir (VALTREX) 1000 MG tablet, Take 1 tablet (1,000 mg total) by mouth every 12 (twelve) hours. (Patient not taking: Reported on 9/6/2024), Disp: 60 tablet, Rfl: 0    Current Facility-Administered Medications:     onabotulinumtoxina injection 200  "Units, 200 Units, Intramuscular, Q90 Days, Dillon Gonzalez MD, 200 Units at 06/25/21 0751    testosterone cypionate injection 50 mg, 50 mg, Intramuscular, Q28 Days,     The ASCVD Risk score (Edilson DK, et al., 2019) failed to calculate for the following reasons:    The 2019 ASCVD risk score is only valid for ages 40 to 79    Review of Systems:  General: No fever, chills, or weight loss.  Chest: No chest pain, shortness of breath, or palpitations.  Breast: No pain, masses, or nipple discharge.  Vulva: No pain, lesions, or itching.  Vagina: No relaxation, itching, discharge, or lesions.  Abdomen: No pain, nausea, vomiting, diarrhea, or constipation.  Urinary: No incontinence, nocturia, frequency, or dysuria.  Extremities:  No leg cramps, edema, or calf pain.  Neurologic: No headaches, dizziness, or visual changes.    Objective:     Vitals:    09/06/24 1303   BP: 112/77   Pulse: 91   Weight: 73.7 kg (162 lb 6.4 oz)   Height: 5' 5" (1.651 m)   PainSc: 0-No pain     Body mass index is 27.02 kg/m².    PHYSICAL EXAM:  APPEARANCE: Well nourished, well developed, in no acute distress.  AFFECT: WNL, alert and oriented x 3      Assessment:    Malignant neoplasm of upper-outer quadrant of right breast in female, estrogen receptor positive    Menopausal symptom  -     testosterone cypionate injection 50 mg  -     Estradiol; Future; Expected date: 09/06/2024  -     Testosterone, free; Future; Expected date: 09/06/2024  -     Testosterone; Future; Expected date: 09/06/2024  -     Prior authorization Order        Plan:   Reviewed most recent hormone levels and appropriate.  Continue Testosterone 50mg IM [id number]  Hormone labs Q6months.  She will let us know if discontinues AI  Discussed risk of converting testosterone to estradiol.  Recommend testosterone pellets with AI pellet at that time.  Informed about patient assistance fund if needed.  She will consider but may want to continue IM and watch levels.  Follow up in 6 months " or PRN    Instructed patient to call if she experiences any side effects or has any questions.    I spent a total of 25 minutes on the day of the visit.This includes face to face time and non-face to face time preparing to see the patient (eg, review of tests), obtaining and/or reviewing separately obtained history, documenting clinical information in the electronic or other health record, independently interpreting results and communicating results to the patient/family/caregiver, or care coordinator.

## 2024-09-12 ENCOUNTER — TELEPHONE (OUTPATIENT)
Dept: SPORTS MEDICINE | Facility: CLINIC | Age: 39
End: 2024-09-12
Payer: COMMERCIAL

## 2024-09-12 NOTE — TELEPHONE ENCOUNTER
----- Message from Sandra Abdi sent at 9/12/2024 10:29 AM CDT -----  Good Morning/Afternoon,    We have received your request to get the above mentioned patient scheduled for physical therapy.    We have been unsuccessful in reaching the patient and wanted to make you aware. If your office speaks with the patient can you please ask that they call [phone] for scheduling.       Thank You,  Sandra Abdi

## 2024-09-19 ENCOUNTER — PATIENT MESSAGE (OUTPATIENT)
Dept: PRIMARY CARE CLINIC | Facility: CLINIC | Age: 39
End: 2024-09-19
Payer: COMMERCIAL

## 2024-09-23 ENCOUNTER — HOSPITAL ENCOUNTER (OUTPATIENT)
Dept: RADIOLOGY | Facility: HOSPITAL | Age: 39
Discharge: HOME | End: 2024-09-23
Attending: STUDENT IN AN ORGANIZED HEALTH CARE EDUCATION/TRAINING PROGRAM
Payer: COMMERCIAL

## 2024-09-23 ENCOUNTER — OFFICE VISIT (OUTPATIENT)
Dept: INTERNAL MEDICINE | Facility: CLINIC | Age: 39
End: 2024-09-23
Payer: COMMERCIAL

## 2024-09-23 VITALS
WEIGHT: 164.44 LBS | HEIGHT: 65 IN | HEART RATE: 85 BPM | SYSTOLIC BLOOD PRESSURE: 116 MMHG | OXYGEN SATURATION: 97 % | DIASTOLIC BLOOD PRESSURE: 78 MMHG | BODY MASS INDEX: 27.4 KG/M2

## 2024-09-23 DIAGNOSIS — M79.671 RIGHT FOOT PAIN: Primary | ICD-10-CM

## 2024-09-23 DIAGNOSIS — M79.671 RIGHT FOOT PAIN: ICD-10-CM

## 2024-09-23 DIAGNOSIS — G57.53 TARSAL TUNNEL SYNDROME OF BOTH LOWER EXTREMITIES: ICD-10-CM

## 2024-09-23 PROCEDURE — 1159F MED LIST DOCD IN RCRD: CPT | Mod: CPTII,S$GLB,, | Performed by: STUDENT IN AN ORGANIZED HEALTH CARE EDUCATION/TRAINING PROGRAM

## 2024-09-23 PROCEDURE — 3008F BODY MASS INDEX DOCD: CPT | Mod: CPTII,S$GLB,, | Performed by: STUDENT IN AN ORGANIZED HEALTH CARE EDUCATION/TRAINING PROGRAM

## 2024-09-23 PROCEDURE — 99999 PR PBB SHADOW E&M-EST. PATIENT-LVL V: CPT | Mod: PBBFAC,,, | Performed by: STUDENT IN AN ORGANIZED HEALTH CARE EDUCATION/TRAINING PROGRAM

## 2024-09-23 PROCEDURE — 99214 OFFICE O/P EST MOD 30 MIN: CPT | Mod: S$GLB,,, | Performed by: STUDENT IN AN ORGANIZED HEALTH CARE EDUCATION/TRAINING PROGRAM

## 2024-09-23 PROCEDURE — 73630 X-RAY EXAM OF FOOT: CPT | Mod: TC,RT

## 2024-09-23 PROCEDURE — 3078F DIAST BP <80 MM HG: CPT | Mod: CPTII,S$GLB,, | Performed by: STUDENT IN AN ORGANIZED HEALTH CARE EDUCATION/TRAINING PROGRAM

## 2024-09-23 PROCEDURE — 1160F RVW MEDS BY RX/DR IN RCRD: CPT | Mod: CPTII,S$GLB,, | Performed by: STUDENT IN AN ORGANIZED HEALTH CARE EDUCATION/TRAINING PROGRAM

## 2024-09-23 PROCEDURE — 3074F SYST BP LT 130 MM HG: CPT | Mod: CPTII,S$GLB,, | Performed by: STUDENT IN AN ORGANIZED HEALTH CARE EDUCATION/TRAINING PROGRAM

## 2024-09-23 PROCEDURE — 73630 X-RAY EXAM OF FOOT: CPT | Mod: 26,RT,, | Performed by: RADIOLOGY

## 2024-09-23 PROCEDURE — 3044F HG A1C LEVEL LT 7.0%: CPT | Mod: CPTII,S$GLB,, | Performed by: STUDENT IN AN ORGANIZED HEALTH CARE EDUCATION/TRAINING PROGRAM

## 2024-09-23 RX ORDER — OXYCODONE HYDROCHLORIDE 5 MG/1
5 TABLET ORAL EVERY 4 HOURS PRN
Qty: 14 TABLET | Refills: 0 | Status: SHIPPED | OUTPATIENT
Start: 2024-09-23 | End: 2024-09-30

## 2024-09-23 NOTE — PROGRESS NOTES
[facility] SAME DAY VISIT      CHIEF COMPLAINT:   Chief Complaint   Patient presents with    Foot Injury     Is also slightly swollen; rates pain a 10 when walking or standing; can feel it swell; feels like it needs to be cracked       HISTORY OF PRESENT ILLNESS: Yolanda Norman is a 39 y.o. female who presents here today for evaluation of right foot pain. Patient is currently marathon training and ran 12.4 miles on Saturday. She noticed some right foot pain afterwards but it was not too noticeable. She has bilateral tarsal tunnel syndrome at baseline but this pain felt different and was in different location (dorsal surface near lateral edge of hindfoot). Pain was only mild but when patient ran again on Sunday she noticed it became worse. Today it was even worse  which led to her coming in. Pain is especially bad with weight bearing. She has been icing, using voltaren gel, mobic with only minimal relief. She feels it is a little swollen today too. Patient has history of breast cancer on anastrozole; normal bone density on 2023 DXA.       REVIEW OF SYSTEMS:    ROS as in HPI.       MEDICAL HISTORY:    Past Medical History:   Diagnosis Date    Abnormal Pap smear of cervix 2009    ADHD     Allergy     seasonal    Anorexia     Anxiety     Asthma     BRCA1 positive 12/18/2020    Bulimia     Depression     Fever blister     Gastroparesis     GERD (gastroesophageal reflux disease)     History of posttraumatic stress disorder (PTSD)     Hypertension     Gestational Hypertension    Invasive ductal carcinoma of right breast 12/18/2019    Mastitis     Migraine headache     PONV (postoperative nausea and vomiting)     Status post mastectomy, bilateral 7/29/2020       MEDICATIONS:    Current Outpatient Medications on File Prior to Visit   Medication Sig Dispense Refill    anastrozole (ARIMIDEX) 1 mg Tab Take 1 tablet (1 mg total) by mouth once daily. 90 tablet 3    atogepant (QULIPTA) 60 mg Tab Take 1 tablet every day  by oral route. 90 tablet 0    butalbital-acetaminophen-caffeine -40 mg (FIORICET, ESGIC) -40 mg per tablet take 1 tablet by mouth 4 times daily as needed 20 tablet 0    butalbital-acetaminophen-caffeine -40 mg (FIORICET, ESGIC) -40 mg per tablet Take 1 tablet by mouth 4 (four) times daily as needed. 20 tablet 0    citalopram (CELEXA) 40 MG tablet TAKE 1 TABLET(40 MG) BY MOUTH EVERY DAY 90 tablet 1    dextroamphetamine-amphetamine (MYDAYIS) 37.5 mg CT24 Take 1 capsule (37.5 mg) by mouth Daily. 30 each 0    loratadine (CLARITIN) 10 mg tablet Take 10 mg by mouth once daily.      meloxicam (MOBIC) 15 MG tablet Take 1 tablet (15 mg total) by mouth once daily. 30 tablet 1    metoclopramide HCl (REGLAN) 10 MG tablet Take 1 tablet 3 (three) times daily before meals by mouth 90 tablet 0    metoclopramide HCl (REGLAN) 10 MG tablet Take 1 tablet by mouth as needed (nausea) 30 tablet 0    metoclopramide HCl (REGLAN) 10 MG tablet Take 1 tablet by mouth as needed (nausea) 30 tablet 0    metoclopramide HCl (REGLAN) 10 MG tablet Take 1 tablet by mouth as needed (nausea) 30 tablet 0    metoclopramide HCl (REGLAN) 10 MG tablet Take 1 tablet by mouth 4 (four) times daily before meals and nightly 30 tablet 0    onabotulinumtoxinA (BOTOX INJ) Inject as directed. [id number]      ondansetron (ZOFRAN-ODT) 4 MG TbDL Take 1 tablet every 6 (six) hours as needed by mouth for Nausea for up to 7 days 20 tablet 0    ondansetron (ZOFRAN-ODT) 8 MG TbDL Dissolve 1 by mouth the night before surgery and then dissolve 1 by mouth the morning of surgery and then 1 by mouth every 6 hours as needed for nausea. 10 tablet 0    rosuvastatin (CRESTOR) 5 MG tablet Take 1 tablet (5 mg total) by mouth once daily. 90 tablet 3    spironolactone (ALDACTONE) 100 MG tablet Take 1 tablet by mouth daily 90 tablet 1    tiZANidine (ZANAFLEX) 2 MG tablet Take 2 tablets every day by oral route for 90 days. 180 tablet 0    topiramate (TOPAMAX) 100 MG  tablet Take 1 tablet every day by oral route for 90 days. 90 tablet 1    topiramate (TOPAMAX) 100 MG tablet Take 1 tablet every day by oral route for 90 days. 90 tablet 1    traZODone (DESYREL) 50 MG tablet Take 0.5 tablets (25 mg total) by mouth every evening. 45 tablet 1    albuterol (PROVENTIL/VENTOLIN HFA) 90 mcg/actuation inhaler Inhale 1 puff every 6 (six) hours  into the lungs for Wheezing (As Needed) for up to 60 doses (Patient not taking: Reported on 9/23/2024) 18 g 0    ALPRAZolam (XANAX) 0.25 MG tablet Take 1 tablet (0.25 mg total) by mouth daily as needed for Anxiety. (Patient not taking: Reported on 9/6/2024) 30 tablet 0    azithromycin (Z-PADILLA) 250 MG tablet Take 2 tablets on day 1, then 1 tablet the next 4 days. 6 tablet 0    butalbital-aspirin-caffeine -40 mg Tab butalbital-aspirin-caffeine 50 mg-325 mg-40 mg tablet   Take 1 tablet every 4 hours by oral route as needed.      celecoxib (CELEBREX) 200 MG capsule Take 1 capsule by mouth 2 (two) times daily for 14 days Please take for 14 days and afterwards stop for 1 week to give GI tract and Kidneys rest. 28 capsule 1    ciclopirox (LOPROX) 0.77 % Crea Apply to affected areas of leg twice a day for two weeks 30 g 0    clindamycin (CLEOCIN) 300 MG capsule Take 1 capsule by mouth 3 (three) times daily for 10 days 30 capsule 0    doxycycline (VIBRAMYCIN) 100 MG Cap Take 1 capsule by mouth once daily with food 30 capsule 0    hydrOXYzine HCL (ATARAX) 10 MG Tab Take 1 tablet 3 times a day by oral route for 3 days. 9 tablet 0    ibuprofen (ADVIL,MOTRIN) 600 MG tablet Take 1 tablet (600 mg total) by mouth every 8 (eight) hours as needed for Pain (with food). 30 tablet 0    magnesium oxide 400 mg magnesium Cap magnesium 400 mg (as magnesium oxide) capsule   Take by oral route. (Patient not taking: Reported on 9/23/2024)      methylPREDNISolone (MEDROL DOSEPACK) 4 mg tablet Follow package directions (Patient not taking: Reported on 9/23/2024) 21 each 0     multivitamin (THERAGRAN) per tablet Take 1 tablet by mouth once daily. (Patient not taking: Reported on 9/23/2024)      phenazopyridine (PYRIDIUM) 100 MG tablet 1 tablet 3 times a day for 2 days.  Take as needed after meals for pain (Patient not taking: Reported on 9/23/2024) 6 tablet 0    prasterone, dhea, (INTRAROSA) 6.5 mg Inst Place 6.5 mg vaginally every evening. (Patient not taking: Reported on 9/6/2024) 30 each 11    prochlorperazine (COMPAZINE) 10 MG tablet Take 1 tablet (10 mg total) by mouth 3 (three) times daily as needed (Headache and nausea). (Patient not taking: Reported on 9/23/2024) 30 tablet 2    promethazine (PHENERGAN) 12.5 MG Tab Take 1 tablet by mouth every 8 (eight) hours as needed for Nausea (Patient not taking: Reported on 9/23/2024) 21 tablet 0    sucralfate (CARAFATE) 1 gram tablet Take 1 tablet (1 g total) by mouth 4 (four) times daily. Can be crushed if cannot swallow. (Patient not taking: Reported on 9/23/2024) 120 tablet 0    sucralfate (CARAFATE) 100 mg/mL suspension Take 10 mLs by mouth 4 (four) times daily (Patient not taking: Reported on 9/23/2024) 420 mL 0    TRUDHESA 0.725 mg/pump act. (4 mg/mL) Spry by Each Nostril route. (Patient not taking: Reported on 9/23/2024)      ubrogepant (UBRELVY) 100 mg tablet Take 1 tablet twice a day by oral route as needed. (Patient not taking: Reported on 9/6/2024) 16 tablet 2    valACYclovir (VALTREX) 1000 MG tablet Take 1 tablet (1,000 mg total) by mouth every 12 (twelve) hours. (Patient not taking: Reported on 9/6/2024) 60 tablet 0     Current Facility-Administered Medications on File Prior to Visit   Medication Dose Route Frequency Provider Last Rate Last Admin    onabotulinumtoxina injection 200 Units  200 Units Intramuscular Q90 Days Dillon Gonzalez MD   200 Units at 06/25/21 0751    testosterone cypionate injection 50 mg  50 mg Intramuscular Q28 Days    50 mg at 09/06/24 1328         PHYSICAL EXAM:    /78 (BP Location: Left arm,  "Patient Position: Sitting, BP Method: Medium (Manual))   Pulse 85   Ht 5' 5" (1.651 m)   Wt 74.6 kg (164 lb 7.4 oz)   LMP 12/18/2020   SpO2 97%   BMI 27.37 kg/m²     Physical Exam  Vitals and nursing note reviewed.   Constitutional:       General: She is not in acute distress.     Appearance: Normal appearance. She is not ill-appearing, toxic-appearing or diaphoretic.   HENT:      Head: Normocephalic and atraumatic.      Nose: Nose normal.   Eyes:      Extraocular Movements: Extraocular movements intact.      Conjunctiva/sclera: Conjunctivae normal.      Pupils: Pupils are equal, round, and reactive to light.   Cardiovascular:      Rate and Rhythm: Normal rate.   Pulmonary:      Effort: Pulmonary effort is normal. No respiratory distress.   Musculoskeletal:         General: No deformity. Normal range of motion.      Right foot: Tenderness present.        Legs:    Skin:     Findings: No lesion or rash.   Neurological:      General: No focal deficit present.      Mental Status: She is alert.      Motor: No weakness.      Gait: Gait normal.   Psychiatric:         Mood and Affect: Mood normal.         Behavior: Behavior normal.         Thought Content: Thought content normal.         Judgment: Judgment normal.               ASSESSMENT & PLAN:    Yolanda Betancur" was seen today for foot injury.    Diagnoses and all orders for this visit:    Right foot pain  Patient p/w right foot pain which started over the weekend. Of note, patient is currently marathon training and ran 12.4 miles prior to pain onset (she did work up to this gradually) however no specific injury. Worsening over past 3 days. Especially bad with weight bearing.   On exam, tenderness to dorsal surface of right hindfoot  Ddx: fracture including stress fracture, overuse injury, strain or sprain, exacerbation of tarsal tunnel, osteoarthritis  XR to evaluate further  Continue icing, voltaren gel, meloxicam  Oxycodone for severe pain -  reviewed, no " suspicious activity   Activity modification - avoid running, stay off feet as much as possible until evaluation is complete  -     X-Ray Foot Complete Right; Future  -     oxyCODONE (ROXICODONE) 5 MG immediate release tablet; Take 1 tablet (5 mg total) by mouth every 4 (four) hours as needed for Pain.    Tarsal tunnel syndrome of both lower extremities  Saw Sports Med 9/5 - continue current tx plan             Sue Galloway MD  [facility]

## 2024-09-23 NOTE — PATIENT INSTRUCTIONS
XR has been ordered - complete as scheduled.     For pain, continue ice, voltaren, meloxicam. Oxycodone x 14 tablets prescribed.

## 2024-09-23 NOTE — LETTER
September 23, 2024    Yolanda Norman  [address] [facility] [place] [address]         [facility]  [address]  [place] [address]  Phone: [phone]  Fax: [phone] September 23, 2024     Patient: Yolanda Norman   YOB: 1985   Date of Visit: 9/23/2024       To Whom It May Concern:    The above individual was evaluated in my office on 09/23/2024. I recommend she work from home remotely for the remainder of the week.      If you have any questions or concerns, please don't hesitate to call.    Sincerely,        Sue Galloway MD

## 2024-09-24 ENCOUNTER — PATIENT MESSAGE (OUTPATIENT)
Dept: INTERNAL MEDICINE | Facility: CLINIC | Age: 39
End: 2024-09-24
Payer: COMMERCIAL

## 2024-09-24 DIAGNOSIS — M79.671 RIGHT FOOT PAIN: Primary | ICD-10-CM

## 2024-10-01 ENCOUNTER — PATIENT MESSAGE (OUTPATIENT)
Dept: PSYCHIATRY | Facility: CLINIC | Age: 39
End: 2024-10-01
Payer: COMMERCIAL

## 2024-10-01 RX ORDER — DEXTROAMPHETAMINE SACCHARATE, AMPHETAMINE ASPARTATE MONOHYDRATE, DEXTROAMPHETAMINE SULFATE, AMPHETAMINE SULFATE 9.375; 9.375; 9.375; 9.375 MG/1; MG/1; MG/1; MG/1
37.5 CAPSULE, EXTENDED RELEASE ORAL DAILY
Qty: 30 EACH | Refills: 0 | Status: SHIPPED | OUTPATIENT
Start: 2024-10-01

## 2024-10-03 ENCOUNTER — PATIENT MESSAGE (OUTPATIENT)
Dept: OBSTETRICS AND GYNECOLOGY | Facility: CLINIC | Age: 39
End: 2024-10-03
Payer: COMMERCIAL

## 2024-10-04 ENCOUNTER — PATIENT MESSAGE (OUTPATIENT)
Dept: OBSTETRICS AND GYNECOLOGY | Facility: CLINIC | Age: 39
End: 2024-10-04
Payer: COMMERCIAL

## 2024-10-04 DIAGNOSIS — E88.810 METABOLIC SYNDROME: Primary | ICD-10-CM

## 2024-10-04 RX ORDER — METFORMIN HYDROCHLORIDE 500 MG/1
TABLET, EXTENDED RELEASE ORAL
Qty: 60 TABLET | Refills: 3 | Status: SHIPPED | OUTPATIENT
Start: 2024-10-04

## 2024-10-07 ENCOUNTER — CLINICAL SUPPORT (OUTPATIENT)
Dept: OBSTETRICS AND GYNECOLOGY | Facility: CLINIC | Age: 39
End: 2024-10-07
Payer: COMMERCIAL

## 2024-10-07 DIAGNOSIS — N95.1 MENOPAUSAL SYMPTOM: Primary | ICD-10-CM

## 2024-10-07 PROCEDURE — 99999 PR PBB SHADOW E&M-EST. PATIENT-LVL I: CPT | Mod: PBBFAC,,,

## 2024-10-07 PROCEDURE — 96372 THER/PROPH/DIAG INJ SC/IM: CPT | Mod: S$GLB,,, | Performed by: PHYSICIAN ASSISTANT

## 2024-10-07 RX ADMIN — TESTOSTERONE CYPIONATE 50 MG: 200 INJECTION, SOLUTION INTRAMUSCULAR at 01:10

## 2024-10-08 ENCOUNTER — OFFICE VISIT (OUTPATIENT)
Dept: PODIATRY | Facility: CLINIC | Age: 39
End: 2024-10-08
Payer: COMMERCIAL

## 2024-10-08 VITALS
DIASTOLIC BLOOD PRESSURE: 84 MMHG | HEART RATE: 90 BPM | SYSTOLIC BLOOD PRESSURE: 123 MMHG | BODY MASS INDEX: 26.96 KG/M2 | WEIGHT: 161.81 LBS | HEIGHT: 65 IN

## 2024-10-08 DIAGNOSIS — M79.671 FOOT PAIN, RIGHT: ICD-10-CM

## 2024-10-08 DIAGNOSIS — M76.71 PERONEAL TENDINITIS OF RIGHT LOWER EXTREMITY: Primary | ICD-10-CM

## 2024-10-08 PROCEDURE — 1159F MED LIST DOCD IN RCRD: CPT | Mod: CPTII,S$GLB,, | Performed by: PODIATRIST

## 2024-10-08 PROCEDURE — 3008F BODY MASS INDEX DOCD: CPT | Mod: CPTII,S$GLB,, | Performed by: PODIATRIST

## 2024-10-08 PROCEDURE — 99213 OFFICE O/P EST LOW 20 MIN: CPT | Mod: S$GLB,,, | Performed by: PODIATRIST

## 2024-10-08 PROCEDURE — 3044F HG A1C LEVEL LT 7.0%: CPT | Mod: CPTII,S$GLB,, | Performed by: PODIATRIST

## 2024-10-08 PROCEDURE — 3079F DIAST BP 80-89 MM HG: CPT | Mod: CPTII,S$GLB,, | Performed by: PODIATRIST

## 2024-10-08 PROCEDURE — 99999 PR PBB SHADOW E&M-EST. PATIENT-LVL IV: CPT | Mod: PBBFAC,,, | Performed by: PODIATRIST

## 2024-10-08 PROCEDURE — 3074F SYST BP LT 130 MM HG: CPT | Mod: CPTII,S$GLB,, | Performed by: PODIATRIST

## 2024-10-08 RX ORDER — METHYLPREDNISOLONE 4 MG/1
4 TABLET ORAL DAILY
Qty: 21 TABLET | Refills: 0 | Status: SHIPPED | OUTPATIENT
Start: 2024-10-08

## 2024-10-08 NOTE — PROGRESS NOTES
Subjective:     Patient ID: Yolanda Norman is a 39 y.o. female.    Chief Complaint: Foot Pain (Bilateral(pain mainly on the left))    Yolanda is a 39 y.o. female who presents to the podiatry clinic  with complaint of  bilateral foot pain. R>>L  Onset of the symptoms was several weeks ago. Precipitating event: increased activity. Current symptoms include: worsening symptoms after a period of activity. Aggravating factors: any weight bearing. Symptoms have waxed and waned. Patient has had prior foot problems. Evaluation to date: plain films: normal. Treatment to date: avoidance of offending activity.  She was prescribed an oral Medrol Dosepak which totally eliminated her pain.  She states that she was able to increase her distance running while and immediately after taking this medication.  She does report an increase in pain once she completed the oral steroids.  She also has a prescription for Mobic 15 mg q.day which she states is very helpful.    Review of Systems   Constitutional: Negative for chills, decreased appetite and fever.   Cardiovascular:  Negative for leg swelling.   Musculoskeletal:  Positive for myalgias. Negative for arthritis, joint pain and joint swelling.   Gastrointestinal:  Negative for nausea and vomiting.   Neurological:  Negative for loss of balance, numbness and paresthesias.         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    Chronic bilateral low back pain without sciatica    Poor posture    Seasonal allergies    Menorrhagia with regular cycle    Malignant neoplasm of upper-outer quadrant of right breast in female, estrogen receptor positive    History of bulimia nervosa    History of anorexia nervosa    Chemotherapy-induced neuropathy    Shortness of breath    Status post mastectomy,  bilateral    BRCA positive    BRCA1 positive    s/p RA-TLH/BSO    Chronic migraine with aura    Moderate episode of recurrent major depressive disorder    Nausea    Family history of ischemic heart disease    Surgical menopause    Use of aromatase inhibitors    Vagina bleeding    Acute bronchitis    Cough in adult patient    Intractable headache    Intractable chronic migraine without aura and with status migrainosus    Pelvic floor dysfunction    Lack of coordination    Muscle spasm    Pelvic pain       Current Outpatient Medications on File Prior to Visit   Medication Sig Dispense Refill    anastrozole (ARIMIDEX) 1 mg Tab Take 1 tablet (1 mg total) by mouth once daily. 90 tablet 3    atogepant (QULIPTA) 60 mg Tab Take 1 tablet every day by oral route. 90 tablet 0    butalbital-acetaminophen-caffeine -40 mg (FIORICET, ESGIC) -40 mg per tablet take 1 tablet by mouth 4 times daily as needed 20 tablet 0    citalopram (CELEXA) 40 MG tablet TAKE 1 TABLET(40 MG) BY MOUTH EVERY DAY 90 tablet 1    dextroamphetamine-amphetamine (MYDAYIS) 37.5 mg CT24 Take 1 capsule (37.5 mg) by mouth Daily. 30 each 0    loratadine (CLARITIN) 10 mg tablet Take 10 mg by mouth once daily.      meloxicam (MOBIC) 15 MG tablet Take 1 tablet (15 mg total) by mouth once daily. 30 tablet 1    metFORMIN (GLUCOPHAGE-XR) 500 MG ER 24hr tablet Take 1-2 tablets by mouth every day with food as directed 60 tablet 3    metoclopramide HCl (REGLAN) 10 MG tablet Take 1 tablet by mouth as needed (nausea) 30 tablet 0    onabotulinumtoxinA (BOTOX INJ) Inject as directed. [id number]      ondansetron (ZOFRAN-ODT) 4 MG TbDL Take 1 tablet every 6 (six) hours as needed by mouth for Nausea for up to 7 days 20 tablet 0    rosuvastatin (CRESTOR) 5 MG tablet Take 1 tablet (5 mg total) by mouth once daily. 90 tablet 3    spironolactone (ALDACTONE) 100 MG tablet Take 1 tablet by mouth daily 90 tablet 1    tiZANidine (ZANAFLEX) 2 MG tablet Take 2 tablets every day  by oral route for 90 days. 180 tablet 0    topiramate (TOPAMAX) 100 MG tablet Take 1 tablet every day by oral route for 90 days. 90 tablet 1    traZODone (DESYREL) 50 MG tablet Take 0.5 tablets (25 mg total) by mouth every evening. 45 tablet 1    albuterol (PROVENTIL/VENTOLIN HFA) 90 mcg/actuation inhaler Inhale 1 puff every 6 (six) hours  into the lungs for Wheezing (As Needed) for up to 60 doses (Patient not taking: Reported on 9/23/2024) 18 g 0    ALPRAZolam (XANAX) 0.25 MG tablet Take 1 tablet (0.25 mg total) by mouth daily as needed for Anxiety. (Patient not taking: Reported on 9/6/2024) 30 tablet 0    butalbital-acetaminophen-caffeine -40 mg (FIORICET, ESGIC) -40 mg per tablet Take 1 tablet by mouth 4 (four) times daily as needed. 20 tablet 0    magnesium oxide 400 mg magnesium Cap magnesium 400 mg (as magnesium oxide) capsule   Take by oral route. (Patient not taking: Reported on 9/23/2024)      metoclopramide HCl (REGLAN) 10 MG tablet Take 1 tablet 3 (three) times daily before meals by mouth 90 tablet 0    metoclopramide HCl (REGLAN) 10 MG tablet Take 1 tablet by mouth as needed (nausea) 30 tablet 0    metoclopramide HCl (REGLAN) 10 MG tablet Take 1 tablet by mouth as needed (nausea) 30 tablet 0    metoclopramide HCl (REGLAN) 10 MG tablet Take 1 tablet by mouth 4 (four) times daily before meals and nightly 30 tablet 0    multivitamin (THERAGRAN) per tablet Take 1 tablet by mouth once daily. (Patient not taking: Reported on 9/23/2024)      ondansetron (ZOFRAN-ODT) 8 MG TbDL Dissolve 1 by mouth the night before surgery and then dissolve 1 by mouth the morning of surgery and then 1 by mouth every 6 hours as needed for nausea. 10 tablet 0    phenazopyridine (PYRIDIUM) 100 MG tablet 1 tablet 3 times a day for 2 days.  Take as needed after meals for pain (Patient not taking: Reported on 10/8/2024) 6 tablet 0    prasterone, dhea, (INTRAROSA) 6.5 mg Inst Place 6.5 mg vaginally every evening. (Patient not  "taking: Reported on 10/8/2024) 30 each 11    prochlorperazine (COMPAZINE) 10 MG tablet Take 1 tablet (10 mg total) by mouth 3 (three) times daily as needed (Headache and nausea). (Patient not taking: Reported on 9/23/2024) 30 tablet 2    promethazine (PHENERGAN) 12.5 MG Tab Take 1 tablet by mouth every 8 (eight) hours as needed for Nausea (Patient not taking: Reported on 10/8/2024) 21 tablet 0    sucralfate (CARAFATE) 1 gram tablet Take 1 tablet (1 g total) by mouth 4 (four) times daily. Can be crushed if cannot swallow. (Patient not taking: Reported on 10/8/2024) 120 tablet 0    sucralfate (CARAFATE) 100 mg/mL suspension Take 10 mLs by mouth 4 (four) times daily (Patient not taking: Reported on 9/23/2024) 420 mL 0    topiramate (TOPAMAX) 100 MG tablet Take 1 tablet every day by oral route for 90 days. 90 tablet 1    TRUDHESA 0.725 mg/pump act. (4 mg/mL) Spry by Each Nostril route. (Patient not taking: Reported on 10/8/2024)      ubrogepant (UBRELVY) 100 mg tablet Take 1 tablet twice a day by oral route as needed. (Patient not taking: Reported on 10/8/2024) 16 tablet 2    valACYclovir (VALTREX) 1000 MG tablet Take 1 tablet (1,000 mg total) by mouth every 12 (twelve) hours. (Patient not taking: Reported on 10/8/2024) 60 tablet 0    [DISCONTINUED] methylPREDNISolone (MEDROL DOSEPACK) 4 mg tablet Follow package directions (Patient not taking: Reported on 9/23/2024) 21 each 0     Current Facility-Administered Medications on File Prior to Visit   Medication Dose Route Frequency Provider Last Rate Last Admin    onabotulinumtoxina injection 200 Units  200 Units Intramuscular Q90 Days Dillon Gonzalez MD   200 Units at 06/25/21 0751    testosterone cypionate injection 50 mg  50 mg Intramuscular Q28 Days    50 mg at 10/07/24 1334       Review of patient's allergies indicates:   Allergen Reactions    Amoxicillin Hives    Penicillins Hives and Rash    Diazepam Anxiety and Other (See Comments)     "makes hyper"       Past " Surgical History:   Procedure Laterality Date    BILATERAL MASTECTOMY Bilateral 2020    Procedure: MASTECTOMY, BILATERAL - BILATERAL SKIN SPARING MASTECTOMY;  Surgeon: Percy Ayers MD;  Location: [facility];  Service: General;  Laterality: Bilateral;    BIOPSY OF AXILLARY NODE Right 2020    Procedure: BIOPSY, LYMPH NODE, AXILLARY;  Surgeon: Percy Ayers MD;  Location: [facility];  Service: General;  Laterality: Right;    BONE MARROW ASPIRATION      x 3    BREAST SURGERY      CERVICAL BIOPSY  W/ LOOP ELECTRODE EXCISION       SECTION  2016     SECTION N/A 2019    Procedure:  SECTION;  Surgeon: Kirit Hernandez MD;  Location: [facility] L&D;  Service: OB/GYN;  Laterality: N/A;    COLPOSCOPY      ESOPHAGOGASTRODUODENOSCOPY N/A 2021    Procedure: EGD (ESOPHAGOGASTRODUODENOSCOPY);  Surgeon: Lj Santos MD;  Location: [facility] FLR);  Service: Endoscopy;  Laterality: N/A;  COVID test on 21 at [facility]    ESOPHAGOGASTRODUODENOSCOPY N/A 2021    Procedure: ESOPHAGOGASTRODUODENOSCOPY (EGD);  Surgeon: Camila Wiley MD;  Location: [facility];  Service: Endoscopy;  Laterality: N/A;    INJECTION FOR SENTINEL NODE IDENTIFICATION Right 2020    Procedure: INJECTION, FOR SENTINEL NODE IDENTIFICATION;  Surgeon: Percy Ayers MD;  Location: [facility];  Service: General;  Laterality: Right;    INSERTION OF BREAST TISSUE EXPANDER Bilateral 2020    Procedure: INSERTION, TISSUE EXPANDER, BREAST;  Surgeon: Kiko Aranda MD;  Location: [facility];  Service: Plastics;  Laterality: Bilateral;    INSERTION OF TUNNELED CENTRAL VENOUS CATHETER (CVC) WITH SUBCUTANEOUS PORT Left 2019    Procedure: [id number]-Left neck or chest wall;  Surgeon: Percy Ayers MD;  Location: [facility] 2ND FLR;  Service: General;  Laterality: Left;    MASTECTOMY      MEDIPORT REMOVAL N/A 2020    Procedure: REMOVAL, CATHETER, CENTRAL VENOUS, TUNNELED, WITH PORT;  Surgeon:  Percy Ayers MD;  Location: [facility];  Service: General;  Laterality: N/A;    ROBOT-ASSISTED LAPAROSCOPIC ABDOMINAL HYSTERECTOMY USING DA HIMA XI N/A 12/18/2020    Procedure: XI ROBOTIC HYSTERECTOMY;  Surgeon: Emili Smith MD;  Location: [facility];  Service: OB/GYN;  Laterality: N/A;    ROBOT-ASSISTED LAPAROSCOPIC SALPINGO-OOPHORECTOMY USING DA HIMA XI Bilateral 12/18/2020    Procedure: XI ROBOTIC SALPINGO-OOPHORECTOMY;  Surgeon: Emili Smith MD;  Location: [facility];  Service: OB/GYN;  Laterality: Bilateral;    SHOULDER SURGERY Left     x 2    SINUS SURGERY      age 19    STOMACH SURGERY      TISSUE EXPANDER REMOVAL Right 10/3/2020    Procedure: REMOVAL, TISSUE EXPANDER;  Surgeon: Kiko Aranda MD;  Location: [facility];  Service: Plastics;  Laterality: Right;    TONSILLECTOMY      UPPER GASTROINTESTINAL ENDOSCOPY         Family History   Problem Relation Name Age of Onset    Cancer Maternal Grandmother  40        cervical    Cervical cancer Mother      Breast cancer Other 4 paternal great aunts     Ovarian cancer Other second cousin paternal     Colon polyps Father      Colon cancer Neg Hx      Melanoma Neg Hx         Social History     Socioeconomic History    Marital status:    Tobacco Use    Smoking status: Never    Smokeless tobacco: Never   Substance and Sexual Activity    Alcohol use: Yes     Comment: socially    Drug use: No    Sexual activity: Yes     Partners: Male     Birth control/protection: None   Social History Narrative    No longer working at [employer] as of 10/2020 as her son has been suffering with depression/anxiety     Social Drivers of Health     Financial Resource Strain: Low Risk  (1/9/2024)    Overall Financial Resource Strain (CARDIA)     Difficulty of Paying Living Expenses: Not hard at all   Food Insecurity: No Food Insecurity (1/9/2024)    Hunger Vital Sign     Worried About Running Out of Food in the Last Year: Never true     Ran Out of Food in the Last Year: Never true  "  Transportation Needs: No Transportation Needs (1/9/2024)    PRAPARE - Transportation     Lack of Transportation (Medical): No     Lack of Transportation (Non-Medical): No   Physical Activity: Sufficiently Active (8/14/2024)    Received from [facility]    Exercise Vital Sign     Days of Exercise per Week: 6 days     Minutes of Exercise per Session: 120 min   Stress: Stress Concern Present (8/14/2024)    Received from [facility]    [employer] [place] [employer] - Occupational Stress Questionnaire     Feeling of Stress : To some extent   Housing Stability: Low Risk  (1/9/2024)    Housing Stability Vital Sign     Unable to Pay for Housing in the Last Year: No     Number of Places Lived in the Last Year: 1     Unstable Housing in the Last Year: No            Objective:     Vitals:    10/08/24 0828   BP: 123/84   Pulse: 90   Weight: 73.4 kg (161 lb 13.1 oz)   Height: 5' 5" (1.651 m)   PainSc:   8   PainLoc: Foot        Physical Exam  Constitutional:       Appearance: She is well-developed. She is not ill-appearing or diaphoretic.   Cardiovascular:      Pulses:           Dorsalis pedis pulses are 2+ on the right side and 2+ on the left side.        Posterior tibial pulses are 2+ on the right side and 2+ on the left side.   Musculoskeletal:         General: Tenderness present.      Right ankle: Normal.      Left ankle: Normal.      Right foot: No swelling, deformity or crepitus.      Left foot: No swelling, deformity or crepitus.      Comments: Pain on palpation to styloid process region of R foot. mild pain with eversion of affected foot.   No pain to TT  Mild discomfort to insertion of PT b/l   Adequate joint range of motion without pain, limitation, nor crepitation Bilateral feet and ankle joints. Muscle strength is 5/5 in all groups bilaterally.         Lymphadenopathy:      Comments: No palpable lymph nodes   Skin:     General: Skin is warm and dry.      Findings: No erythema or rash.      Nails: There " "is no clubbing.   Neurological:      Mental Status: She is alert and oriented to person, place, and time.   Psychiatric:         Behavior: Behavior normal. Behavior is cooperative.         Assessment:      Encounter Diagnoses   Name Primary?    Peroneal tendinitis of right lower extremity Yes    Foot pain, right      Plan:     Yolanda Betancur" was seen today for foot pain.    Diagnoses and all orders for this visit:    Peroneal tendinitis of right lower extremity  -     ORTHOPEDIC BRACING FOR HOME USE - LOWER EXTREMITY    Foot pain, right    Other orders  -     methylPREDNISolone (MEDROL DOSEPACK) 4 mg tablet; Use as instructed on dose pack      I counseled the patient on her conditions, their implications and medical management.    Independent review of patients previous clinical notes    Reviewed current medication(s), and lab(s) specific to foot ailment(s) with patient in detail. All questions answered     Patient instructed on adequate icing techniques. Patient should ice the affected area at least once per day x 10 minutes for 10 days . I advised the  patient that extra icing would also be beneficial to ensure adequate anti inflammatory effect     Patient fitted and dispensed Gauntlet style lace up and instructed on use    Medrol dose nick prescribed, advised patient to take meds as directed. Patient should complete medication. If problems occur notify the clinic .  Okay to delay start of oral steroid.  She has a marathon in 2 weeks.    We discussed switching to a more neutral style running shoe.  For now we will immobilize with the ankle brace on the right.  She will continue strapping her left foot and ankle.  Once the acute inflammatory process of the right peroneal tendon resolves I recommend evaluating running shoes and switching to a neutral shoe type.    "

## 2024-10-14 NOTE — PROGRESS NOTES
"The patient location is:  [place], [place]  The patient location [place] is: Asael Middleton  The patient phone number is: [phone]  Visit type: Virtual visit with synchronous audio and video  Each patient to whom he or she provides medical services by telemedicine is:  (1) informed of the relationship between the physician and patient and the respective role of any other health care provider with respect to management of the patient; and (2) notified that he or she may decline to receive medical services by telemedicine and may withdraw from such care at any time.     Outpatient Psychiatry Follow-Up Visit    Clinical Status of Patient: Outpatient (Ambulatory)  10/15/2024     Chief Complaint:  presenting today for a follow-up.       Interval History and Content of Current Session:  Interim Events/Subjective Report/Content of Current Session:  follow-up appointment.    Pt is a 36 y/o female with past psychiatric hx of anxiety and ADHD who presents for follow-up treatment. Pt reported that she is doing well. Mood and anxiety are stable. ADHD symptoms managed effectively. Continues to use 1/4 tablet of trazodone for sleep. Continues to have some occupational stress. Parents staying with family currently due to damage from hurricanes in [place].     Past Psychiatric hx: effexor xr ("my mood was the best on that but I was having panic attacks and bld press was really negar"), pristiq (for headaches- effective), cymbalta (ineffective), lexapro and zoloft (effective but worsened headaches), klonopin 1mg (effective- for sleep and anxiety)  For sleep- elavil (ineffective), trazodone (worsened h/s's), ambien (nightmares), lunesta (nightmares), seroquel, prazosin, xanax  For headache- topomax    Past Medical hx:   Past Medical History:   Diagnosis Date    Abnormal Pap smear of cervix 2009    ADHD     Allergy     seasonal    Anorexia     Anxiety     Asthma     BRCA1 positive 12/18/2020    Bulimia     Depression     Fever " blister     Gastroparesis     GERD (gastroesophageal reflux disease)     History of posttraumatic stress disorder (PTSD)     Hypertension     Gestational Hypertension    Invasive ductal carcinoma of right breast 12/18/2019    Mastitis     Migraine headache     PONV (postoperative nausea and vomiting)     Status post mastectomy, bilateral 7/29/2020        Interim hx:  Medication changes last visit: none  Anxiety: decreased  Depression: stable     Denies suicidal/homicidal ideations.  Denies hopelessness/worthlessness.    Denies auditory/visual hallucinations      Alcohol: Pt denied  Drug: Pt denied  Caffeine: Not assessed  Tobacco: Pt denied      Review of Systems   PSYCHIATRIC: Pertinent items are noted in the narrative.        CONSTITUTIONAL: weight stable    Past Medical, Family and Social History: The patient's past medical, family and social history have been reviewed and updated as appropriate within the electronic medical record. See encounter notes.     Current Psychiatric Medication:  Celexa 40mg po qhs, Mydayis 37.5 mg po qam, xanax 0.125mg po daily PRN anxiety (takes maybe once every 3 months), trazodone 25 mg     Compliance: yes      Side effects: Pt denied     Risk Parameters:  Patient reports no suicidal ideation  Patient reports no homicidal ideation  Patient reports no self-injurious behavior  Patient reports no violent behavior     Exam (detailed: at least 9 elements; comprehensive: all 15 elements)   Constitutional  Vitals:  Most recent vital signs, dated less than 90 days prior to this appointment, were reviewed. BP: ()/()   Arterial Line BP: ()/()       General:  unremarkable, age appropriate, casual attire, good eye contact, good rapport       Musculoskeletal  Muscle Strength/Tone:  no flaccidity, no tremor    Gait & Station:  normal      Psychiatric                       Speech:  normal tone, normal rate, rhythm, and volume   Mood & Affect:   stable         Thought Process:   Goal directed;  Linear    Associations:   intact   Thought Content:   No SI/HI, delusions, or paranoia, no AV/VH   Insight & Judgement:   Good, adequate to circumstances   Orientation:   grossly intact; alert and oriented x 4    Memory:  intact for content of interview    Language:  grossly intact, can repeat    Attention Span  : Grossly intact for content of interview   Fund of Knowledge:   intact and appropriate to age and level of education        Assessment and Diagnosis   Status/Progress: Based on the examination today, the patient's problem(s) is/are adequately controlled.  New problems have not been presented today. Co-morbidities are not complicating management of the primary condition. There are no active rule-out diagnoses for this patient at this time.      Impression: Pt reported mood and anxiety are stable but continues to have some work-related stress. ADHD managed well. Encouraged pt to continue in therapy. Will continue medication plan as is and monitor symptoms moving forward.     Diagnosis:   Anxiety disorder   Attention Deficit Hyperactivity Disorder - Inattentive Type   h/o PTSD (now in remission)   h/o anorexia and bulimia (both in remission)  Intervention/Counseling/Treatment Plan   Medication Management:      1. Celexa 40mg po qhs    2. Mydayis 37.5 mg po qam    3. xanax 0.125mg po daily PRN anxiety (infrequent)    4. trazodone 12.5 mg    5. Call to report any worsening of symptoms or problems with the medication. Pt instructed to go to ER with thoughts of harming self, others     6. Cont in therapy with Adelina (cannot remember last name)    Psychotherapy: none  Target symptoms: PTSD  Why chosen therapy is appropriate versus another modality: CBT used; relevant to diagnosis, patient responds to this modality  Outcome monitoring methods: self-report, observation  Therapeutic intervention type: Cognitive Behavioral Therapy, Psychoeducation  Topics discussed/themes: building skills sets for symptom management,  symptom recognition, nutrition, exercise  The patient's response to the intervention is accepting  Patient's response to treatment is: good.   The patient's progress toward treatment goals: stable     Return to clinic: 3 months    -Cognitive-Behavioral/Supportive therapy and psychoeducation provided  -R/B/SE's of medications discussed with the pt who expresses understanding and chooses to take medications as prescribed.   -Pt instructed to call clinic, 911 or go to nearest emergency room if sxs worsen or pt is in   crisis. The pt expresses understanding.    Gokul Keller, PhD, MP    Visit today included increased complexity associated with the care of the episodic problem ADHD, anxiety addressed and managing the longitudinal care of the patient due to the serious and/or complex managed problem(s) ADHD, anxiety.      Antidepressant/Antianxiety Medication Initiation:  Patient informed of risks, benefits, and potential side effects of medication and accepts informed consent.  Common side effects include nausea, fatigue, headache, insomnia., Specifically discussed the possibility of new or worsening suicidal thoughts/depression.  Patient instructed to stop the medication immediately and seek urgent treatment if this occurs. Patient instructed not to abruptly discontinue medication without physician guidance except in cases of sudden onset or worsening of SI.       Stimulant Medication Initiation:  Patient advised of risks, benefits, and side effects of medication and accepts informed consent.  Common side effects include insomnia, irritability, jittery feeling, dry mouth, and agitation/hostility., Patient advised of potential addictive nature of medication and controlled substance classification.  Instructed to safeguard medication as no early refills can be given for lost or stolen medications.       Benzodiazepine Initiation:  Patient advised of the risks, benefits, and common side effects of medication and has  accepted informed consent.  Common side effects include drowsiness, impaired coordination, possible memory loss., Patient advised NOT to operate a vehicle or machinery untiil they are sure how the medication will affec them.  Client also advised of danger of mixing this medication with alcohol., Patient advised of potential addictive nature of medication and need to safeguard medication as no early refills for lost or stolen medications can be authorized.       Pregnancy Warning:  Patient denies current pregnancy possibility.  Patient made aware that medications have not been proven safe in pregnancy and that she must maintain adequate birth control.  Patient instructed to alert us immediately if she becomes pregnant.

## 2024-10-15 ENCOUNTER — OFFICE VISIT (OUTPATIENT)
Dept: PSYCHIATRY | Facility: CLINIC | Age: 39
End: 2024-10-15
Payer: COMMERCIAL

## 2024-10-15 DIAGNOSIS — F41.9 ANXIETY DISORDER, UNSPECIFIED TYPE: Primary | ICD-10-CM

## 2024-10-15 DIAGNOSIS — F90.0 ATTENTION DEFICIT HYPERACTIVITY DISORDER, INATTENTIVE TYPE: ICD-10-CM

## 2024-10-15 PROCEDURE — 3044F HG A1C LEVEL LT 7.0%: CPT | Mod: CPTII,95,, | Performed by: PSYCHOLOGIST

## 2024-10-15 PROCEDURE — 1159F MED LIST DOCD IN RCRD: CPT | Mod: CPTII,95,, | Performed by: PSYCHOLOGIST

## 2024-10-15 PROCEDURE — 99214 OFFICE O/P EST MOD 30 MIN: CPT | Mod: 95,,, | Performed by: PSYCHOLOGIST

## 2024-10-15 PROCEDURE — G2211 COMPLEX E/M VISIT ADD ON: HCPCS | Mod: 95,,, | Performed by: PSYCHOLOGIST

## 2024-10-28 ENCOUNTER — PATIENT MESSAGE (OUTPATIENT)
Dept: PODIATRY | Facility: CLINIC | Age: 39
End: 2024-10-28
Payer: COMMERCIAL

## 2024-11-04 ENCOUNTER — PATIENT MESSAGE (OUTPATIENT)
Dept: PSYCHIATRY | Facility: CLINIC | Age: 39
End: 2024-11-04
Payer: COMMERCIAL

## 2024-11-04 DIAGNOSIS — F90.0 ATTENTION DEFICIT HYPERACTIVITY DISORDER, INATTENTIVE TYPE: Primary | ICD-10-CM

## 2024-11-04 RX ORDER — DEXTROAMPHETAMINE SACCHARATE, AMPHETAMINE ASPARTATE MONOHYDRATE, DEXTROAMPHETAMINE SULFATE, AMPHETAMINE SULFATE 9.375; 9.375; 9.375; 9.375 MG/1; MG/1; MG/1; MG/1
37.5 CAPSULE, EXTENDED RELEASE ORAL DAILY
Qty: 30 EACH | Refills: 0 | Status: SHIPPED | OUTPATIENT
Start: 2024-11-04

## 2024-11-05 ENCOUNTER — CLINICAL SUPPORT (OUTPATIENT)
Dept: OBSTETRICS AND GYNECOLOGY | Facility: CLINIC | Age: 39
End: 2024-11-05
Payer: COMMERCIAL

## 2024-11-05 DIAGNOSIS — N95.1 MENOPAUSAL SYMPTOM: Primary | ICD-10-CM

## 2024-11-05 PROCEDURE — 96372 THER/PROPH/DIAG INJ SC/IM: CPT | Mod: S$GLB,,, | Performed by: PHYSICIAN ASSISTANT

## 2024-11-05 PROCEDURE — 99999 PR PBB SHADOW E&M-EST. PATIENT-LVL I: CPT | Mod: PBBFAC,,,

## 2024-11-05 RX ADMIN — TESTOSTERONE CYPIONATE 50 MG: 200 INJECTION, SOLUTION INTRAMUSCULAR at 09:11

## 2024-11-13 ENCOUNTER — PATIENT MESSAGE (OUTPATIENT)
Dept: PSYCHIATRY | Facility: CLINIC | Age: 39
End: 2024-11-13
Payer: COMMERCIAL

## 2024-11-13 DIAGNOSIS — F41.1 GENERALIZED ANXIETY DISORDER: ICD-10-CM

## 2024-11-13 RX ORDER — CITALOPRAM 40 MG/1
TABLET, FILM COATED ORAL
Qty: 90 TABLET | Refills: 1 | Status: SHIPPED | OUTPATIENT
Start: 2024-11-13 | End: 2025-02-11

## 2024-11-20 ENCOUNTER — PATIENT MESSAGE (OUTPATIENT)
Dept: OBSTETRICS AND GYNECOLOGY | Facility: CLINIC | Age: 39
End: 2024-11-20
Payer: COMMERCIAL

## 2024-11-20 RX ORDER — METFORMIN HYDROCHLORIDE 500 MG/1
500 TABLET ORAL 2 TIMES DAILY WITH MEALS
Qty: 60 TABLET | Refills: 5 | Status: SHIPPED | OUTPATIENT
Start: 2024-11-20 | End: 2025-11-20

## 2024-12-03 ENCOUNTER — CLINICAL SUPPORT (OUTPATIENT)
Dept: OBSTETRICS AND GYNECOLOGY | Facility: CLINIC | Age: 39
End: 2024-12-03
Payer: COMMERCIAL

## 2024-12-03 DIAGNOSIS — N95.1 MENOPAUSAL SYMPTOM: Primary | ICD-10-CM

## 2024-12-03 PROCEDURE — 99999 PR PBB SHADOW E&M-EST. PATIENT-LVL I: CPT | Mod: PBBFAC,,,

## 2024-12-03 RX ADMIN — TESTOSTERONE CYPIONATE 50 MG: 200 INJECTION, SOLUTION INTRAMUSCULAR at 09:12

## 2024-12-10 ENCOUNTER — PATIENT MESSAGE (OUTPATIENT)
Dept: PSYCHIATRY | Facility: CLINIC | Age: 39
End: 2024-12-10
Payer: COMMERCIAL

## 2024-12-10 DIAGNOSIS — F90.0 ATTENTION DEFICIT HYPERACTIVITY DISORDER, INATTENTIVE TYPE: ICD-10-CM

## 2024-12-10 RX ORDER — DEXTROAMPHETAMINE SACCHARATE, AMPHETAMINE ASPARTATE MONOHYDRATE, DEXTROAMPHETAMINE SULFATE, AMPHETAMINE SULFATE 9.375; 9.375; 9.375; 9.375 MG/1; MG/1; MG/1; MG/1
37.5 CAPSULE, EXTENDED RELEASE ORAL DAILY
Qty: 30 EACH | Refills: 0 | Status: SHIPPED | OUTPATIENT
Start: 2024-12-10

## 2024-12-31 ENCOUNTER — CLINICAL SUPPORT (OUTPATIENT)
Dept: OBSTETRICS AND GYNECOLOGY | Facility: CLINIC | Age: 39
End: 2024-12-31
Payer: COMMERCIAL

## 2024-12-31 DIAGNOSIS — N95.1 MENOPAUSAL SYMPTOM: Primary | ICD-10-CM

## 2024-12-31 PROCEDURE — 99999 PR PBB SHADOW E&M-EST. PATIENT-LVL I: CPT | Mod: PBBFAC,,,

## 2024-12-31 RX ADMIN — TESTOSTERONE CYPIONATE 50 MG: 200 INJECTION, SOLUTION INTRAMUSCULAR at 09:12

## 2025-01-11 ENCOUNTER — PATIENT MESSAGE (OUTPATIENT)
Dept: PSYCHIATRY | Facility: CLINIC | Age: 40
End: 2025-01-11
Payer: COMMERCIAL

## 2025-01-13 DIAGNOSIS — F90.0 ATTENTION DEFICIT HYPERACTIVITY DISORDER, INATTENTIVE TYPE: ICD-10-CM

## 2025-01-13 RX ORDER — DEXTROAMPHETAMINE SACCHARATE, AMPHETAMINE ASPARTATE MONOHYDRATE, DEXTROAMPHETAMINE SULFATE, AMPHETAMINE SULFATE 9.375; 9.375; 9.375; 9.375 MG/1; MG/1; MG/1; MG/1
37.5 CAPSULE, EXTENDED RELEASE ORAL DAILY
Qty: 30 EACH | Refills: 0 | Status: SHIPPED | OUTPATIENT
Start: 2025-01-13

## 2025-01-13 NOTE — PROGRESS NOTES
"The patient location is:  [place], [place]  The patient location [place] is: Asael Middleton  The patient phone number is: [phone]  Visit type: Virtual visit with synchronous audio and video  Each patient to whom he or she provides medical services by telemedicine is:  (1) informed of the relationship between the physician and patient and the respective role of any other health care provider with respect to management of the patient; and (2) notified that he or she may decline to receive medical services by telemedicine and may withdraw from such care at any time.     Outpatient Psychiatry Follow-Up Visit    Clinical Status of Patient: Outpatient (Ambulatory)  01/14/2025     Chief Complaint:  presenting today for a follow-up.       Interval History and Content of Current Session:  Interim Events/Subjective Report/Content of Current Session:  follow-up appointment.    Pt is a 36 y/o female with past psychiatric hx of anxiety and ADHD who presents for follow-up treatment. Pt reported some occupational stress. Preparing for an FDA audit. Stated that she has been working 10 hour days for the past month. Pt reported that she is trying to set boundaries with her boss. Having increased anxiety and has taken alprazolam a few times. Pt reported that things have been a little chaotic. Has started running, exercising, and reading for coping. Stopped all etoh.    Past Psychiatric hx: effexor xr ("my mood was the best on that but I was having panic attacks and bld press was really negar"), pristiq (for headaches- effective), cymbalta (ineffective), lexapro and zoloft (effective but worsened headaches), klonopin 1mg (effective- for sleep and anxiety)  For sleep- elavil (ineffective), trazodone (worsened h/s's), ambien (nightmares), lunesta (nightmares), seroquel, prazosin, xanax  For headache- topomax    Past Medical hx:   Past Medical History:   Diagnosis Date    Abnormal Pap smear of cervix 2009    ADHD     Allergy     " seasonal    Anorexia     Anxiety     Asthma     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  Denies hopelessness/worthlessness.    Denies auditory/visual hallucinations      Alcohol: Pt denied  Drug: Pt denied  Caffeine: Not assessed  Tobacco: Pt denied      Review of Systems   PSYCHIATRIC: Pertinent items are noted in the narrative.        CONSTITUTIONAL: weight stable    Past Medical, Family and Social History: The patient's past medical, family and social history have been reviewed and updated as appropriate within the electronic medical record. See encounter notes.     Current Psychiatric Medication:  Celexa 40mg po qhs, Mydayis 37.5 mg po qam, xanax 0.125mg po daily PRN anxiety (takes maybe once every 3 months), trazodone 25 mg     Compliance: yes      Side effects: Pt denied     Risk Parameters:  Patient reports no suicidal ideation  Patient reports no homicidal ideation  Patient reports no self-injurious behavior  Patient reports no violent behavior     Exam (detailed: at least 9 elements; comprehensive: all 15 elements)   Constitutional  Vitals:  Most recent vital signs, dated less than 90 days prior to this appointment, were reviewed. BP: ()/()   Arterial Line BP: ()/()       General:  unremarkable, age appropriate, casual attire, good eye contact, good rapport       Musculoskeletal  Muscle Strength/Tone:  no flaccidity, no tremor    Gait & Station:  normal      Psychiatric                       Speech:   normal tone, normal rate, rhythm, and volume   Mood & Affect:   stable         Thought Process:   Goal directed; Linear    Associations:   intact   Thought Content:   No SI/HI, delusions, or paranoia, no AV/VH   Insight & Judgement:   Good, adequate to circumstances   Orientation:   grossly intact; alert and oriented x 4    Memory:  intact for content of interview    Language:  grossly intact, can repeat    Attention Span  : Grossly intact for content of interview   Fund of Knowledge:   intact and appropriate to age and level of education        Assessment and Diagnosis   Status/Progress: Based on the examination today, the patient's problem(s) is/are adequately controlled.  New problems have not been presented today. Co-morbidities are not complicating management of the primary condition. There are no active rule-out diagnoses for this patient at this time.      Impression: Pt reported some elevated anxiety due to occupational stressors. Pt appears to be practicing health coping strategies. ADHD managed well. Encouraged pt to continue in therapy. Will continue medication plan as is and monitor symptoms moving forward. [place]  checked.     Diagnosis:   Anxiety disorder   Attention Deficit Hyperactivity Disorder - Inattentive Type   h/o PTSD (now in remission)   h/o anorexia and bulimia (both in remission)  Intervention/Counseling/Treatment Plan   Medication Management:      1. Celexa 40mg po qhs    2. Mydayis 37.5 mg po qam    3. xanax 0.125mg po daily PRN anxiety (infrequent)    4. trazodone 12.5 mg    5. Call to report any worsening of symptoms or problems with the medication. Pt instructed to go to ER with thoughts of harming self, others     6. Cont in therapy with Adelina (cannot remember last name)    Psychotherapy: none  Target symptoms: PTSD  Why chosen therapy is appropriate versus another modality: CBT used; relevant to diagnosis, patient responds to this modality  Outcome monitoring methods: self-report,  observation  Therapeutic intervention type: Cognitive Behavioral Therapy, Psychoeducation  Topics discussed/themes: building skills sets for symptom management, symptom recognition, nutrition, exercise  The patient's response to the intervention is accepting  Patient's response to treatment is: good.   The patient's progress toward treatment goals: stable     Return to clinic: 3 months    -Cognitive-Behavioral/Supportive therapy and psychoeducation provided  -R/B/SE's of medications discussed with the pt who expresses understanding and chooses to take medications as prescribed.   -Pt instructed to call clinic, 911 or go to nearest emergency room if sxs worsen or pt is in   crisis. The pt expresses understanding.    Gokul Keller, PhD, MP    Visit today included increased complexity associated with the care of the episodic problem ADHD, anxiety addressed and managing the longitudinal care of the patient due to the serious and/or complex managed problem(s) ADHD, anxiety.      Antidepressant/Antianxiety Medication Initiation:  Patient informed of risks, benefits, and potential side effects of medication and accepts informed consent.  Common side effects include nausea, fatigue, headache, insomnia., Specifically discussed the possibility of new or worsening suicidal thoughts/depression.  Patient instructed to stop the medication immediately and seek urgent treatment if this occurs. Patient instructed not to abruptly discontinue medication without physician guidance except in cases of sudden onset or worsening of SI.       Stimulant Medication Initiation:  Patient advised of risks, benefits, and side effects of medication and accepts informed consent.  Common side effects include insomnia, irritability, jittery feeling, dry mouth, and agitation/hostility., Patient advised of potential addictive nature of medication and controlled substance classification.  Instructed to safeguard medication as no early refills can be  given for lost or stolen medications.       Benzodiazepine Initiation:  Patient advised of the risks, benefits, and common side effects of medication and has accepted informed consent.  Common side effects include drowsiness, impaired coordination, possible memory loss., Patient advised NOT to operate a vehicle or machinery untiil they are sure how the medication will affec them.  Client also advised of danger of mixing this medication with alcohol., Patient advised of potential addictive nature of medication and need to safeguard medication as no early refills for lost or stolen medications can be authorized.       Pregnancy Warning:  Patient denies current pregnancy possibility.  Patient made aware that medications have not been proven safe in pregnancy and that she must maintain adequate birth control.  Patient instructed to alert us immediately if she becomes pregnant.

## 2025-01-14 ENCOUNTER — PATIENT MESSAGE (OUTPATIENT)
Dept: PSYCHIATRY | Facility: CLINIC | Age: 40
End: 2025-01-14
Payer: COMMERCIAL

## 2025-01-14 ENCOUNTER — OFFICE VISIT (OUTPATIENT)
Dept: PSYCHIATRY | Facility: CLINIC | Age: 40
End: 2025-01-14
Payer: COMMERCIAL

## 2025-01-14 DIAGNOSIS — F90.0 ATTENTION DEFICIT HYPERACTIVITY DISORDER, INATTENTIVE TYPE: Primary | ICD-10-CM

## 2025-01-14 DIAGNOSIS — F41.1 GENERALIZED ANXIETY DISORDER: ICD-10-CM

## 2025-01-14 DIAGNOSIS — F41.9 ANXIETY DISORDER, UNSPECIFIED TYPE: ICD-10-CM

## 2025-01-14 RX ORDER — ALPRAZOLAM 0.25 MG/1
0.25 TABLET ORAL DAILY PRN
Qty: 30 TABLET | Refills: 0 | Status: SHIPPED | OUTPATIENT
Start: 2025-01-14 | End: 2025-02-16

## 2025-01-28 ENCOUNTER — CLINICAL SUPPORT (OUTPATIENT)
Dept: OBSTETRICS AND GYNECOLOGY | Facility: CLINIC | Age: 40
End: 2025-01-28
Payer: COMMERCIAL

## 2025-01-28 DIAGNOSIS — N95.1 MENOPAUSAL SYMPTOM: Primary | ICD-10-CM

## 2025-01-28 PROCEDURE — 99999 PR PBB SHADOW E&M-EST. PATIENT-LVL I: CPT | Mod: PBBFAC,,,

## 2025-01-28 PROCEDURE — 96372 THER/PROPH/DIAG INJ SC/IM: CPT | Mod: S$GLB,,, | Performed by: PHYSICIAN ASSISTANT

## 2025-01-28 RX ADMIN — TESTOSTERONE CYPIONATE 50 MG: 200 INJECTION, SOLUTION INTRAMUSCULAR at 09:01

## 2025-02-10 ENCOUNTER — TELEPHONE (OUTPATIENT)
Dept: OBSTETRICS AND GYNECOLOGY | Facility: CLINIC | Age: 40
End: 2025-02-10
Payer: COMMERCIAL

## 2025-02-10 ENCOUNTER — PATIENT MESSAGE (OUTPATIENT)
Dept: OBSTETRICS AND GYNECOLOGY | Facility: CLINIC | Age: 40
End: 2025-02-10

## 2025-02-10 ENCOUNTER — OFFICE VISIT (OUTPATIENT)
Dept: OBSTETRICS AND GYNECOLOGY | Facility: CLINIC | Age: 40
End: 2025-02-10
Payer: COMMERCIAL

## 2025-02-10 VITALS — WEIGHT: 170 LBS | HEIGHT: 65 IN | BODY MASS INDEX: 28.32 KG/M2

## 2025-02-10 DIAGNOSIS — C50.411 MALIGNANT NEOPLASM OF UPPER-OUTER QUADRANT OF RIGHT BREAST IN FEMALE, ESTROGEN RECEPTOR POSITIVE: Primary | ICD-10-CM

## 2025-02-10 DIAGNOSIS — Z17.0 MALIGNANT NEOPLASM OF UPPER-OUTER QUADRANT OF RIGHT BREAST IN FEMALE, ESTROGEN RECEPTOR POSITIVE: Primary | ICD-10-CM

## 2025-02-10 DIAGNOSIS — E88.810 METABOLIC SYNDROME: ICD-10-CM

## 2025-02-10 DIAGNOSIS — E66.3 OVERWEIGHT: ICD-10-CM

## 2025-02-10 RX ORDER — SEMAGLUTIDE 0.25 MG/.5ML
0.25 INJECTION, SOLUTION SUBCUTANEOUS
Qty: 2 ML | Refills: 3 | Status: SHIPPED | OUTPATIENT
Start: 2025-02-10

## 2025-02-10 NOTE — TELEPHONE ENCOUNTER
Spoke with patient in regards to [employer] message requesting a sooner appointment. Patient scheduled for a virtual appointment on today at 12:30. Patient has no further questions or complaints.

## 2025-02-10 NOTE — PROGRESS NOTES
The patient location is: home  The chief complaint leading to consultation is: follow up weight loss    Visit type: audiovisual    Face to Face time with patient: 15 minutes  20 minutes of total time spent on the encounter, which includes face to face time and non-face to face time preparing to see the patient (eg, review of tests), Obtaining and/or reviewing separately obtained history, Documenting clinical information in the electronic or other health record, Independently interpreting results (not separately reported) and communicating results to the patient/family/caregiver, or Care coordination (not separately reported).         Each patient to whom he or she provides medical services by telemedicine is:  (1) informed of the relationship between the physician and patient and the respective role of any other health care provider with respect to management of the patient; and (2) notified that he or she may decline to receive medical services by telemedicine and may withdraw from such care at any time.    Notes:    Subjective:      Yolanda Norman is a 39 y.o. female with history of right ER +,HER 2 negative breast cancer s/p bilateral mastectomy on July 1, 2020 and currently on Airmidex who presents for weight loss follow up. She is stopping arimidex [place] [facility] [place] Continues to exercise regularly with running and crossfit. Gaining weight so restarted metformin. She reports about 17 pound weight gain. No matter what she does, she continues to gain weight. She is maintaining a high protein low carb diet. She is having back pain which she thinks is related to weight gain. She does have history of gastroparesis but has not had flares in over a year. Oncologist suggested trying ozempic. She is discontinuing testosterone    PCP: Frannie Lamb PA-C    Routine labs: 1/10/2024  WWE: 7/14/2022  Pap smear: s/p hyst/bso  Mammogram: s/p bilateral mastectomy  DEXA: 10/10/2023 normal, repeat in 2 years     No  visits with results within 3 Month(s) from this visit.   Latest known visit with results is:   Lab Visit on 2024   Component Date Value Ref Range Status    Estradiol 2024 <10 (A)  See Text pg/mL Final    Testosterone 2024 27  2 - 45 ng/dL Final    Testosterone, Free 2024 1.5  0.2 - 5.0 pg/mL Final    Testosterone, Bioavailable 2024 3.3  0.5 - 8.5 ng/dL Final    Sex Hormone Binding Globulin 2024 77  17 - 124 nmol/L Final    Albumin 2024 4.7  3.6 - 5.1 g/dL Final       Past Medical History:   Diagnosis Date    Abnormal Pap smear of cervix     ADHD     Allergy     seasonal    Anorexia     Anxiety     Asthma     BRCA1 positive 2020    Bulimia     Depression     Fever blister     Gastroparesis     GERD (gastroesophageal reflux disease)     History of posttraumatic stress disorder (PTSD)     Hypertension     Gestational Hypertension    Invasive ductal carcinoma of right breast 2019    Mastitis     Migraine headache     PONV (postoperative nausea and vomiting)     Status post mastectomy, bilateral 2020     Past Surgical History:   Procedure Laterality Date    BILATERAL MASTECTOMY Bilateral 2020    Procedure: MASTECTOMY, BILATERAL - BILATERAL SKIN SPARING MASTECTOMY;  Surgeon: Percy Ayers MD;  Location: [facility] OR;  Service: General;  Laterality: Bilateral;    BIOPSY OF AXILLARY NODE Right 2020    Procedure: BIOPSY, LYMPH NODE, AXILLARY;  Surgeon: Percy Ayers MD;  Location: [facility] OR;  Service: General;  Laterality: Right;    BONE MARROW ASPIRATION      x 3    BREAST SURGERY      CERVICAL BIOPSY  W/ LOOP ELECTRODE EXCISION       SECTION  2016     SECTION N/A 2019    Procedure:  SECTION;  Surgeon: Kirit Hernandez MD;  Location: [facility] L&D;  Service: OB/GYN;  Laterality: N/A;    COLPOSCOPY      ESOPHAGOGASTRODUODENOSCOPY N/A 2021    Procedure: EGD (ESOPHAGOGASTRODUODENOSCOPY);  Surgeon: Lj Santos,  MD;  Location: [facility] (4TH FLR);  Service: Endoscopy;  Laterality: N/A;  COVID test on 1/8/21 at [facility]    ESOPHAGOGASTRODUODENOSCOPY N/A 2/9/2021    Procedure: ESOPHAGOGASTRODUODENOSCOPY (EGD);  Surgeon: Camila Wiley MD;  Location: [facility] ENDO;  Service: Endoscopy;  Laterality: N/A;    INJECTION FOR SENTINEL NODE IDENTIFICATION Right 7/1/2020    Procedure: INJECTION, FOR SENTINEL NODE IDENTIFICATION;  Surgeon: Percy Ayers MD;  Location: [facility];  Service: General;  Laterality: Right;    INSERTION OF BREAST TISSUE EXPANDER Bilateral 7/1/2020    Procedure: INSERTION, TISSUE EXPANDER, BREAST;  Surgeon: Kiko Aranda MD;  Location: [facility];  Service: Plastics;  Laterality: Bilateral;    INSERTION OF TUNNELED CENTRAL VENOUS CATHETER (CVC) WITH SUBCUTANEOUS PORT Left 12/27/2019    Procedure: [id number]-Left neck or chest wall;  Surgeon: Percy Ayers MD;  Location: [facility] 2ND FLR;  Service: General;  Laterality: Left;    MASTECTOMY      MEDIPORT REMOVAL N/A 7/1/2020    Procedure: REMOVAL, CATHETER, CENTRAL VENOUS, TUNNELED, WITH PORT;  Surgeon: Percy Ayers MD;  Location: [facility];  Service: General;  Laterality: N/A;    ROBOT-ASSISTED LAPAROSCOPIC ABDOMINAL HYSTERECTOMY USING DA HIMA XI N/A 12/18/2020    Procedure: XI ROBOTIC HYSTERECTOMY;  Surgeon: Emili Smith MD;  Location: [facility];  Service: OB/GYN;  Laterality: N/A;    ROBOT-ASSISTED LAPAROSCOPIC SALPINGO-OOPHORECTOMY USING DA HIMA XI Bilateral 12/18/2020    Procedure: XI ROBOTIC SALPINGO-OOPHORECTOMY;  Surgeon: Emili Smith MD;  Location: [facility];  Service: OB/GYN;  Laterality: Bilateral;    SHOULDER SURGERY Left     x 2    SINUS SURGERY      age 19    STOMACH SURGERY      TISSUE EXPANDER REMOVAL Right 10/3/2020    Procedure: REMOVAL, TISSUE EXPANDER;  Surgeon: Kiko Aranda MD;  Location: [facility];  Service: Plastics;  Laterality: Right;    TONSILLECTOMY      UPPER GASTROINTESTINAL ENDOSCOPY       Social History     Tobacco  Use    Smoking status: Never    Smokeless tobacco: Never   Substance Use Topics    Alcohol use: Yes     Comment: socially    Drug use: No     Family History   Problem Relation Name Age of Onset    Cancer Maternal Grandmother  40        cervical    Cervical cancer Mother      Breast cancer Other 4 paternal great aunts     Ovarian cancer Other second cousin paternal     Colon polyps Father      Colon cancer Neg Hx      Melanoma Neg Hx       OB History    Para Term  AB Living   2 2 2     2   SAB IAB Ectopic Multiple Live Births         0 2      # Outcome Date GA Lbr Bull/2nd Weight Sex Type Anes PTL Lv   2 Term 21 39w1d  3.43 kg (7 lb 9 oz) M CS-LTranv Spinal N JOLLY   1 Term 18 38w1d  2.96 kg (6 lb 8.4 oz) M CS-LTranv EPI N JOLLY      Complications: Failure to Progress in First Stage       Current Outpatient Medications:     albuterol (PROVENTIL/VENTOLIN HFA) 90 mcg/actuation inhaler, Inhale 1 puff every 6 (six) hours  into the lungs for Wheezing (As Needed) for up to 60 doses (Patient not taking: Reported on 2024), Disp: 18 g, Rfl: 0    ALPRAZolam (XANAX) 0.25 MG tablet, Take 1 tablet (0.25 mg total) by mouth daily as needed for Anxiety., Disp: 30 tablet, Rfl: 0    anastrozole (ARIMIDEX) 1 mg Tab, Take 1 tablet (1 mg total) by mouth once daily., Disp: 90 tablet, Rfl: 3    atogepant (QULIPTA) 60 mg Tab, Take 1 tablet every day by oral route., Disp: 90 tablet, Rfl: 0    atogepant (QULIPTA) 60 mg Tab, Take 1 tablet every day by oral route., Disp: 90 tablet, Rfl: 0    butalbital-acetaminophen-caffeine -40 mg (FIORICET, ESGIC) -40 mg per tablet, take 1 tablet by mouth 4 times daily as needed, Disp: 20 tablet, Rfl: 0    butalbital-acetaminophen-caffeine -40 mg (FIORICET, ESGIC) -40 mg per tablet, Take 1 tablet by mouth 4 (four) times daily as needed., Disp: 20 tablet, Rfl: 0    butalbital-acetaminophen-caffeine -40 mg (FIORICET, ESGIC) -40 mg per tablet, Take 2  tablets by mouth every 6 (six) hours as needed for Pain; Max Daily Amount: 2 tablets, Disp: 30 tablet, Rfl: 2    citalopram (CELEXA) 40 MG tablet, TAKE 1 TABLET(40 MG) BY MOUTH EVERY DAY, Disp: 90 tablet, Rfl: 1    cyclobenzaprine (FLEXERIL) 10 MG tablet, Take 1 tablet by mouth 3 (three) times daily as needed for Muscle spasms for up to 10 days, Disp: 30 tablet, Rfl: 0    dextroamphetamine-amphetamine (MYDAYIS) 37.5 mg CT24, Take 1 capsule (37.5 mg) by mouth Daily., Disp: 30 each, Rfl: 0    diclofenac sodium (SOLARAZE) 3 % gel, Apply topically 2 (two) times a day, Disp: 100 g, Rfl: 0    doxycycline (VIBRAMYCIN) 100 MG Cap, Take 1 capsule by mouth 2 (two) times daily for 7 days, Disp: 14 capsule, Rfl: 0    gabapentin (NEURONTIN) 100 MG capsule, Take 1 capsule by mouth three times daily as needed for nerve pain, may take up to 1-3 capules every night at bedtime, Disp: 50 capsule, Rfl: 0    ibuprofen (ADVIL,MOTRIN) 800 MG tablet, Take 1 tablet by mouth every 8 (eight) hours as needed for Pain (inflammation) for up to 10 days, Disp: 40 tablet, Rfl: 0    loratadine (CLARITIN) 10 mg tablet, Take 10 mg by mouth once daily., Disp: , Rfl:     magnesium oxide 400 mg magnesium Cap, magnesium 400 mg (as magnesium oxide) capsule  Take by oral route. (Patient not taking: Reported on 9/23/2024), Disp: , Rfl:     meloxicam (MOBIC) 15 MG tablet, Take 1 tablet every day by oral route., Disp: 90 tablet, Rfl: 1    methylPREDNISolone (MEDROL DOSEPACK) 4 mg tablet, Use as instructed on dose pack, Disp: 21 tablet, Rfl: 0    methylPREDNISolone (MEDROL DOSEPACK) 4 mg tablet, Take as directed by instructions on the package., Disp: 21 tablet, Rfl: 0    metoclopramide HCl (REGLAN) 10 MG tablet, Take 1 tablet 3 (three) times daily before meals by mouth, Disp: 90 tablet, Rfl: 0    metoclopramide HCl (REGLAN) 10 MG tablet, Take 1 tablet by mouth as needed (nausea), Disp: 30 tablet, Rfl: 0    metoclopramide HCl (REGLAN) 10 MG tablet, Take 1 tablet  by mouth as needed (nausea), Disp: 30 tablet, Rfl: 0    metoclopramide HCl (REGLAN) 10 MG tablet, Take 1 tablet by mouth as needed (nausea), Disp: 30 tablet, Rfl: 0    metoclopramide HCl (REGLAN) 10 MG tablet, Take 1 tablet by mouth 4 (four) times daily before meals and nightly, Disp: 30 tablet, Rfl: 0    metoclopramide HCl (REGLAN) 10 MG tablet, Take 1 tablet by mouth 4 (four) times daily before meals and nightly, Disp: 30 tablet, Rfl: 0    multivitamin (THERAGRAN) per tablet, Take 1 tablet by mouth once daily. (Patient not taking: Reported on 9/23/2024), Disp: , Rfl:     onabotulinumtoxinA (BOTOX INJ), Inject as directed. [id number], Disp: , Rfl:     ondansetron (ZOFRAN-ODT) 4 MG TbDL, Take 1 tablet every 6 (six) hours as needed by mouth for Nausea for up to 7 days, Disp: 20 tablet, Rfl: 0    ondansetron (ZOFRAN-ODT) 8 MG TbDL, Dissolve 1 by mouth the night before surgery and then dissolve 1 by mouth the morning of surgery and then 1 by mouth every 6 hours as needed for nausea., Disp: 10 tablet, Rfl: 0    phenazopyridine (PYRIDIUM) 100 MG tablet, 1 tablet 3 times a day for 2 days.  Take as needed after meals for pain (Patient not taking: Reported on 10/8/2024), Disp: 6 tablet, Rfl: 0    prasterone, dhea, (INTRAROSA) 6.5 mg Inst, Place 6.5 mg vaginally every evening. (Patient not taking: Reported on 10/8/2024), Disp: 30 each, Rfl: 11    predniSONE (DELTASONE) 20 MG tablet, Take 1 tablet by mouth daily for 3 days, Disp: 3 tablet, Rfl: 0    prochlorperazine (COMPAZINE) 10 MG tablet, Take 1 tablet (10 mg total) by mouth 3 (three) times daily as needed (Headache and nausea). (Patient not taking: Reported on 9/23/2024), Disp: 30 tablet, Rfl: 2    promethazine (PHENERGAN) 12.5 MG Tab, Take 1 tablet by mouth every 8 (eight) hours as needed for Nausea (Patient not taking: Reported on 10/8/2024), Disp: 21 tablet, Rfl: 0    rosuvastatin (CRESTOR) 5 MG tablet, Take 1 tablet (5 mg total) by mouth once daily., Disp: 90 tablet,  Rfl: 3    semaglutide, weight loss, (WEGOVY) 0.25 mg/0.5 mL PnIj, Inject 0.25 mg into the skin every 7 days., Disp: 2 mL, Rfl: 3    spironolactone (ALDACTONE) 100 MG tablet, Take 1 tablet by mouth daily, Disp: 90 tablet, Rfl: 1    spironolactone (ALDACTONE) 100 MG tablet, Take 1 tablet by mouth daily., Disp: 90 tablet, Rfl: 3    sucralfate (CARAFATE) 1 gram tablet, Take 1 tablet (1 g total) by mouth 4 (four) times daily. Can be crushed if cannot swallow. (Patient not taking: Reported on 10/8/2024), Disp: 120 tablet, Rfl: 0    sucralfate (CARAFATE) 100 mg/mL suspension, Take 10 mLs by mouth 4 (four) times daily (Patient not taking: Reported on 9/23/2024), Disp: 420 mL, Rfl: 0    tiZANidine (ZANAFLEX) 2 MG tablet, Take 2 tablets every day by oral route for 90 days., Disp: 180 tablet, Rfl: 0    tiZANidine (ZANAFLEX) 2 MG tablet, Take 1 tablet by mouth nightly, Disp: 30 tablet, Rfl: 3    topiramate (TOPAMAX) 100 MG tablet, Take 1 tablet every day by oral route for 90 days., Disp: 90 tablet, Rfl: 1    topiramate (TOPAMAX) 100 MG tablet, Take 1 tablet every day by oral route for 90 days., Disp: 90 tablet, Rfl: 1    traZODone (DESYREL) 50 MG tablet, Take 0.5 tablets (25 mg total) by mouth every evening., Disp: 45 tablet, Rfl: 1    TRUDHESA 0.725 mg/pump act. (4 mg/mL) Spry, by Each Nostril route. (Patient not taking: Reported on 10/8/2024), Disp: , Rfl:     ubrogepant (UBRELVY) 100 mg tablet, Take 1 tablet twice a day by oral route as needed. (Patient not taking: Reported on 10/8/2024), Disp: 16 tablet, Rfl: 2    valACYclovir (VALTREX) 1000 MG tablet, Take 1 tablet (1,000 mg total) by mouth every 12 (twelve) hours. (Patient not taking: Reported on 10/8/2024), Disp: 60 tablet, Rfl: 0    Current Facility-Administered Medications:     onabotulinumtoxina injection 200 Units, 200 Units, Intramuscular, Q90 Days, Dillon Gonzalez MD, 200 Units at 06/25/21 0751    Review of Systems:  General: No fever, chills.  Chest: No chest  "pain, shortness of breath, or palpitations.  Breast: No pain, masses, or nipple discharge.  Vulva: No pain, lesions, or itching.  Vagina: No relaxation, itching, discharge, or lesions.  Abdomen: No pain, nausea, vomiting, diarrhea, or constipation.  Urinary: No incontinence, nocturia, frequency, or dysuria.  Extremities:  No leg cramps, edema, or calf pain.  Neurologic: No headaches, dizziness, or visual changes.    Objective:     Vitals:    02/10/25 1231   Weight: 77.1 kg (170 lb)   Height: 5' 5" (1.651 m)     Body mass index is 28.29 kg/m².    PHYSICAL EXAM:  APPEARANCE: Well nourished, well developed, in no acute distress.  AFFECT: WNL, alert and oriented x 3      Assessment:      Malignant neoplasm of upper-outer quadrant of right breast in female, estrogen receptor positive    Metabolic syndrome  -     semaglutide, weight loss, (WEGOVY) 0.25 mg/0.5 mL PnIj; Inject 0.25 mg into the skin every 7 days.  Dispense: 2 mL; Refill: 3    Overweight  -     semaglutide, weight loss, (WEGOVY) 0.25 mg/0.5 mL PnIj; Inject 0.25 mg into the skin every 7 days.  Dispense: 2 mL; Refill: 3        Plan:   Continue low glycemic diet with lean protein at each meal.  Maintain hydration.  Continue cross fit and running for exercise  Start Wegovy 0.25mg SC weekly.   Reviewed side effects and risks of medications. No family or personal history of thyroid cancer. Denies personal history of pancreatitis or underlying problems with gallbladder.  Discussed that these are long term options for weight loss and risk of gaining weight back if discontinued.   If not covered by insurance, we did discuss compounded options.  Reviewed the differences between Wegovy/Ozempic and compounded Semaglutide-methylcobalamin and that these medications are not FDA regulated.  She can stop metformin with starting Wegovy.   Follow up in 8 weeks    Instructed patient to call if she experiences any side effects or has any questions.    I spent a total of 15 " minutes on the day of the visit.This includes face to face time and non-face to face time preparing to see the patient (eg, review of tests), obtaining and/or reviewing separately obtained history, documenting clinical information in the electronic or other health record, independently interpreting results and communicating results to the patient/family/caregiver, or care coordinator.

## 2025-02-11 ENCOUNTER — PATIENT MESSAGE (OUTPATIENT)
Dept: PSYCHIATRY | Facility: CLINIC | Age: 40
End: 2025-02-11
Payer: COMMERCIAL

## 2025-02-11 DIAGNOSIS — F90.0 ATTENTION DEFICIT HYPERACTIVITY DISORDER, INATTENTIVE TYPE: ICD-10-CM

## 2025-02-11 RX ORDER — DEXTROAMPHETAMINE SACCHARATE, AMPHETAMINE ASPARTATE MONOHYDRATE, DEXTROAMPHETAMINE SULFATE, AMPHETAMINE SULFATE 9.375; 9.375; 9.375; 9.375 MG/1; MG/1; MG/1; MG/1
37.5 CAPSULE, EXTENDED RELEASE ORAL DAILY
Qty: 30 EACH | Refills: 0 | Status: SHIPPED | OUTPATIENT
Start: 2025-02-11

## 2025-02-20 ENCOUNTER — PATIENT MESSAGE (OUTPATIENT)
Dept: OBSTETRICS AND GYNECOLOGY | Facility: CLINIC | Age: 40
End: 2025-02-20
Payer: COMMERCIAL

## 2025-03-15 ENCOUNTER — PATIENT MESSAGE (OUTPATIENT)
Dept: PSYCHIATRY | Facility: CLINIC | Age: 40
End: 2025-03-15
Payer: COMMERCIAL

## 2025-03-17 DIAGNOSIS — F90.0 ATTENTION DEFICIT HYPERACTIVITY DISORDER, INATTENTIVE TYPE: ICD-10-CM

## 2025-03-17 RX ORDER — TRAZODONE HYDROCHLORIDE 50 MG/1
25 TABLET ORAL NIGHTLY
Qty: 45 TABLET | Refills: 1 | Status: SHIPPED | OUTPATIENT
Start: 2025-03-17 | End: 2026-03-17

## 2025-03-17 RX ORDER — DEXTROAMPHETAMINE SACCHARATE, AMPHETAMINE ASPARTATE MONOHYDRATE, DEXTROAMPHETAMINE SULFATE, AMPHETAMINE SULFATE 9.375; 9.375; 9.375; 9.375 MG/1; MG/1; MG/1; MG/1
37.5 CAPSULE, EXTENDED RELEASE ORAL DAILY
Qty: 30 EACH | Refills: 0 | Status: SHIPPED | OUTPATIENT
Start: 2025-03-17

## 2025-04-10 ENCOUNTER — OFFICE VISIT (OUTPATIENT)
Dept: OBSTETRICS AND GYNECOLOGY | Facility: CLINIC | Age: 40
End: 2025-04-10
Payer: COMMERCIAL

## 2025-04-10 VITALS — WEIGHT: 164 LBS | BODY MASS INDEX: 27.32 KG/M2 | HEIGHT: 65 IN

## 2025-04-10 DIAGNOSIS — N89.8 VAGINAL DRYNESS: ICD-10-CM

## 2025-04-10 DIAGNOSIS — C50.411 MALIGNANT NEOPLASM OF UPPER-OUTER QUADRANT OF RIGHT BREAST IN FEMALE, ESTROGEN RECEPTOR POSITIVE: Primary | ICD-10-CM

## 2025-04-10 DIAGNOSIS — Z17.0 MALIGNANT NEOPLASM OF UPPER-OUTER QUADRANT OF RIGHT BREAST IN FEMALE, ESTROGEN RECEPTOR POSITIVE: Primary | ICD-10-CM

## 2025-04-10 DIAGNOSIS — E66.3 OVERWEIGHT: ICD-10-CM

## 2025-04-10 DIAGNOSIS — E88.810 METABOLIC SYNDROME: ICD-10-CM

## 2025-04-10 RX ORDER — PRASTERONE 6.5 MG/1
6.5 INSERT VAGINAL NIGHTLY
Qty: 28 EACH | Refills: 11 | Status: SHIPPED | OUTPATIENT
Start: 2025-04-10

## 2025-04-10 NOTE — PROGRESS NOTES
The patient location is: car  The chief complaint leading to consultation is: Follow up weight loss    Visit type: audiovisual    Face to Face time with patient: 15 minutes  20 minutes of total time spent on the encounter, which includes face to face time and non-face to face time preparing to see the patient (eg, review of tests), Obtaining and/or reviewing separately obtained history, Documenting clinical information in the electronic or other health record, Independently interpreting results (not separately reported) and communicating results to the patient/family/caregiver, or Care coordination (not separately reported).         Each patient to whom he or she provides medical services by telemedicine is:  (1) informed of the relationship between the physician and patient and the respective role of any other health care provider with respect to management of the patient; and (2) notified that he or she may decline to receive medical services by telemedicine and may withdraw from such care at any time.    Notes:    Subjective:      Yolanda Norman is a 39 y.o. female with history of right ER +,HER 2 negative breast cancer s/p bilateral mastectomy on July 1, 2020 who presents for weight loss follow up. She is taking compounded semaglutide 0.25mg SC weekly. Tolerating well without side effects. Sometimes decreased cravings. Continues to eat high protein of 130-140g per day. Regular exercise 5-6 times per week. She has hit plateau with her weight loss in the last 4 weeks.  She was able to stop Arimidex. She is feeling better off this and off testosterone. Reports increased rage with testosterone. She does report vaginal dryness, decreased libido and difficulty climaxing since stopping. Questions about topical testosterone.    PCP: Frannie Lamb PA-C    Routine labs: 1/10/2024  WWE: 7/14/2022  Pap smear: s/p hyst/bso  Mammogram: s/p bilateral mastectomy  DEXA: 10/10/2023 normal, repeat in 2 years     No visits with  results within 3 Month(s) from this visit.   Latest known visit with results is:   Lab Visit on 2024   Component Date Value Ref Range Status    Estradiol 2024 <10 (A)  See Text pg/mL Final    Testosterone 2024 27  2 - 45 ng/dL Final    Testosterone, Free 2024 1.5  0.2 - 5.0 pg/mL Final    Testosterone, Bioavailable 2024 3.3  0.5 - 8.5 ng/dL Final    Sex Hormone Binding Globulin 2024 77  17 - 124 nmol/L Final    Albumin 2024 4.7  3.6 - 5.1 g/dL Final       Past Medical History:   Diagnosis Date    Abnormal Pap smear of cervix     ADHD     Allergy     seasonal    Anorexia     Anxiety     Asthma     BRCA1 positive 2020    Bulimia     Depression     Fever blister     Gastroparesis     GERD (gastroesophageal reflux disease)     History of posttraumatic stress disorder (PTSD)     Hypertension     Gestational Hypertension    Invasive ductal carcinoma of right breast 2019    Mastitis     Migraine headache     PONV (postoperative nausea and vomiting)     Status post mastectomy, bilateral 2020     Past Surgical History:   Procedure Laterality Date    BILATERAL MASTECTOMY Bilateral 2020    Procedure: MASTECTOMY, BILATERAL - BILATERAL SKIN SPARING MASTECTOMY;  Surgeon: Percy Ayers MD;  Location: [facility] OR;  Service: General;  Laterality: Bilateral;    BIOPSY OF AXILLARY NODE Right 2020    Procedure: BIOPSY, LYMPH NODE, AXILLARY;  Surgeon: Percy Ayers MD;  Location: [facility] OR;  Service: General;  Laterality: Right;    BONE MARROW ASPIRATION      x 3    BREAST SURGERY      CERVICAL BIOPSY  W/ LOOP ELECTRODE EXCISION       SECTION  2016     SECTION N/A 2019    Procedure:  SECTION;  Surgeon: Kirit Hernandez MD;  Location: [facility] L&D;  Service: OB/GYN;  Laterality: N/A;    COLPOSCOPY      ESOPHAGOGASTRODUODENOSCOPY N/A 2021    Procedure: EGD (ESOPHAGOGASTRODUODENOSCOPY);  Surgeon: Lj Santos MD;   Location: [facility] (4TH FLR);  Service: Endoscopy;  Laterality: N/A;  COVID test on 1/8/21 at [facility]    ESOPHAGOGASTRODUODENOSCOPY N/A 2/9/2021    Procedure: ESOPHAGOGASTRODUODENOSCOPY (EGD);  Surgeon: Camila Wiley MD;  Location: [facility] ENDO;  Service: Endoscopy;  Laterality: N/A;    INJECTION FOR SENTINEL NODE IDENTIFICATION Right 7/1/2020    Procedure: INJECTION, FOR SENTINEL NODE IDENTIFICATION;  Surgeon: Percy Ayers MD;  Location: [facility];  Service: General;  Laterality: Right;    INSERTION OF BREAST TISSUE EXPANDER Bilateral 7/1/2020    Procedure: INSERTION, TISSUE EXPANDER, BREAST;  Surgeon: Kiko Aranda MD;  Location: [facility];  Service: Plastics;  Laterality: Bilateral;    INSERTION OF TUNNELED CENTRAL VENOUS CATHETER (CVC) WITH SUBCUTANEOUS PORT Left 12/27/2019    Procedure: [id number]-Left neck or chest wall;  Surgeon: Percy Ayers MD;  Location: [facility] 2ND FLR;  Service: General;  Laterality: Left;    MASTECTOMY      MEDIPORT REMOVAL N/A 7/1/2020    Procedure: REMOVAL, CATHETER, CENTRAL VENOUS, TUNNELED, WITH PORT;  Surgeon: Percy Ayers MD;  Location: [facility];  Service: General;  Laterality: N/A;    ROBOT-ASSISTED LAPAROSCOPIC ABDOMINAL HYSTERECTOMY USING DA HIMA XI N/A 12/18/2020    Procedure: XI ROBOTIC HYSTERECTOMY;  Surgeon: Emili Smith MD;  Location: [facility];  Service: OB/GYN;  Laterality: N/A;    ROBOT-ASSISTED LAPAROSCOPIC SALPINGO-OOPHORECTOMY USING DA HIMA XI Bilateral 12/18/2020    Procedure: XI ROBOTIC SALPINGO-OOPHORECTOMY;  Surgeon: Emili Smith MD;  Location: [facility];  Service: OB/GYN;  Laterality: Bilateral;    SHOULDER SURGERY Left     x 2    SINUS SURGERY      age 19    STOMACH SURGERY      TISSUE EXPANDER REMOVAL Right 10/3/2020    Procedure: REMOVAL, TISSUE EXPANDER;  Surgeon: Kiko Aranda MD;  Location: [facility];  Service: Plastics;  Laterality: Right;    TONSILLECTOMY      UPPER GASTROINTESTINAL ENDOSCOPY       Social History[1]  Family History  "  Problem Relation Name Age of Onset    Cancer Maternal Grandmother  40        cervical    Cervical cancer Mother      Breast cancer Other 4 paternal great aunts     Ovarian cancer Other second cousin paternal     Colon polyps Father      Colon cancer Neg Hx      Melanoma Neg Hx       OB History    Para Term  AB Living   2 2 2   2   SAB IAB Ectopic Multiple Live Births      0 2      # Outcome Date GA Lbr Bull/2nd Weight Sex Type Anes PTL Lv   2 Term 21 39w1d  3.43 kg (7 lb 9 oz) M CS-LTranv Spinal N JOLLY   1 Term 18 38w1d  2.96 kg (6 lb 8.4 oz) M CS-LTranv EPI N JOLLY      Complications: Failure to Progress in First Stage     Current Medications[2]    Review of Systems:  General: No fever, chills.  Chest: No chest pain, shortness of breath, or palpitations.  Breast: No pain, masses, or nipple discharge.  Vulva: No pain, lesions, or itching.  Vagina: No relaxation, itching, discharge, or lesions.  Abdomen: No pain, nausea, vomiting, diarrhea, or constipation.  Urinary: No incontinence, nocturia, frequency, or dysuria.  Extremities:  No leg cramps, edema, or calf pain.  Neurologic: No headaches, dizziness, or visual changes.    Objective:     Vitals:    04/10/25 0753   Weight: 74.4 kg (164 lb)   Height: 5' 5" (1.651 m)     Body mass index is 27.29 kg/m².    PHYSICAL EXAM:  APPEARANCE: Well nourished, well developed, in no acute distress.  AFFECT: WNL, alert and oriented x 3      Assessment:      Malignant neoplasm of upper-outer quadrant of right breast in female, estrogen receptor positive    Metabolic syndrome    Overweight  -     semaglutide, weight loss, 0.5 mg/0.5 mL PnIj; Inject 0.5 mg into the skin every 7 days.    Vaginal dryness  -     prasterone, dhea, (INTRAROSA) 6.5 mg Inst; Place 6.5 mg vaginally every evening.  Dispense: 30 each; Refill: 11        Plan:   Continue low glycemic diet with lean protein at each meal.  Goal of 4626-9012 calories per day with 130-140g protein  Maintain " hydration.  Continue exercise 5-6x per week  Increase compounded semaglutide to 0.5mg SC weekly- faxed to [facility]  Restart Intrarosa vaginally QHS  Discussed risks with topical testosterone converting to estradiol since she is no longer on AI.  Can consider scream cream PRN if needed  Follow up in 3 months for WWE or sooner PRN    Instructed patient to call if she experiences any side effects or has any questions.    I spent a total of 20 minutes on the day of the visit.This includes face to face time and non-face to face time preparing to see the patient (eg, review of tests), obtaining and/or reviewing separately obtained history, documenting clinical information in the electronic or other health record, independently interpreting results and communicating results to the patient/family/caregiver, or care coordinator.        [1]   Social History  Tobacco Use    Smoking status: Never    Smokeless tobacco: Never   Substance Use Topics    Alcohol use: Yes     Comment: socially    Drug use: No   [2]   Current Outpatient Medications:     albuterol (PROVENTIL/VENTOLIN HFA) 90 mcg/actuation inhaler, Inhale 1 puff every 6 (six) hours  into the lungs for Wheezing (As Needed) for up to 60 doses (Patient not taking: Reported on 9/23/2024), Disp: 18 g, Rfl: 0    ALPRAZolam (XANAX) 0.25 MG tablet, Take 1 tablet (0.25 mg total) by mouth daily as needed for Anxiety., Disp: 30 tablet, Rfl: 0    atogepant (QULIPTA) 60 mg Tab, Take 1 tablet every day by oral route., Disp: 90 tablet, Rfl: 0    atogepant (QULIPTA) 60 mg Tab, Take 1 tablet every day by oral route., Disp: 90 tablet, Rfl: 0    butalbital-acetaminophen-caffeine -40 mg (FIORICET, ESGIC) -40 mg per tablet, take 1 tablet by mouth 4 times daily as needed, Disp: 20 tablet, Rfl: 0    butalbital-acetaminophen-caffeine -40 mg (FIORICET, ESGIC) -40 mg per tablet, Take 1 tablet by mouth 4 (four) times daily as needed., Disp: 20 tablet, Rfl: 0     butalbital-acetaminophen-caffeine -40 mg (FIORICET, ESGIC) -40 mg per tablet, Take 2 tablets by mouth every 6 (six) hours as needed for Pain; Max Daily Amount: 2 tablets, Disp: 30 tablet, Rfl: 2    citalopram (CELEXA) 40 MG tablet, TAKE 1 TABLET(40 MG) BY MOUTH EVERY DAY, Disp: 90 tablet, Rfl: 1    cyclobenzaprine (FLEXERIL) 10 MG tablet, Take 1 tablet by mouth 3 (three) times daily as needed for Muscle spasms for up to 10 days, Disp: 30 tablet, Rfl: 0    dextroamphetamine-amphetamine (MYDAYIS) 37.5 mg CT24, Take 1 capsule (37.5 mg) by mouth Daily., Disp: 30 each, Rfl: 0    diclofenac sodium (SOLARAZE) 3 % gel, Apply topically 2 (two) times a day, Disp: 100 g, Rfl: 0    doxycycline (VIBRAMYCIN) 100 MG Cap, Take 1 capsule by mouth 2 (two) times daily for 7 days, Disp: 14 capsule, Rfl: 0    gabapentin (NEURONTIN) 100 MG capsule, Take 1 capsule by mouth three times daily as needed for nerve pain, may take up to 1-3 capules every night at bedtime, Disp: 50 capsule, Rfl: 0    ibuprofen (ADVIL,MOTRIN) 800 MG tablet, Take 1 tablet by mouth every 8 (eight) hours as needed for Pain (inflammation) for up to 10 days, Disp: 40 tablet, Rfl: 0    loratadine (CLARITIN) 10 mg tablet, Take 10 mg by mouth once daily., Disp: , Rfl:     magnesium oxide 400 mg magnesium Cap, magnesium 400 mg (as magnesium oxide) capsule  Take by oral route. (Patient not taking: Reported on 9/23/2024), Disp: , Rfl:     meloxicam (MOBIC) 15 MG tablet, Take 1 tablet every day by oral route., Disp: 90 tablet, Rfl: 1    methylPREDNISolone (MEDROL DOSEPACK) 4 mg tablet, Use as instructed on dose pack, Disp: 21 tablet, Rfl: 0    methylPREDNISolone (MEDROL DOSEPACK) 4 mg tablet, Take as directed by instructions on the package., Disp: 21 tablet, Rfl: 0    metoclopramide HCl (REGLAN) 10 MG tablet, Take 1 tablet 3 (three) times daily before meals by mouth, Disp: 90 tablet, Rfl: 0    metoclopramide HCl (REGLAN) 10 MG tablet, Take 1 tablet by mouth as  needed (nausea), Disp: 30 tablet, Rfl: 0    metoclopramide HCl (REGLAN) 10 MG tablet, Take 1 tablet by mouth as needed (nausea), Disp: 30 tablet, Rfl: 0    metoclopramide HCl (REGLAN) 10 MG tablet, Take 1 tablet by mouth as needed (nausea), Disp: 30 tablet, Rfl: 0    metoclopramide HCl (REGLAN) 10 MG tablet, Take 1 tablet by mouth 4 (four) times daily before meals and nightly, Disp: 30 tablet, Rfl: 0    metoclopramide HCl (REGLAN) 10 MG tablet, Take 1 tablet by mouth 4 (four) times daily as needed (gastroparesis), Disp: 30 tablet, Rfl: 2    multivitamin (THERAGRAN) per tablet, Take 1 tablet by mouth once daily. (Patient not taking: Reported on 9/23/2024), Disp: , Rfl:     onabotulinumtoxinA (BOTOX INJ), Inject as directed. [id number], Disp: , Rfl:     ondansetron (ZOFRAN-ODT) 4 MG TbDL, Take 1 tablet every 6 (six) hours as needed by mouth for Nausea for up to 7 days, Disp: 20 tablet, Rfl: 0    ondansetron (ZOFRAN-ODT) 8 MG TbDL, Dissolve 1 by mouth the night before surgery and then dissolve 1 by mouth the morning of surgery and then 1 by mouth every 6 hours as needed for nausea., Disp: 10 tablet, Rfl: 0    ondansetron (ZOFRAN-ODT) 8 MG TbDL, Take 1 tablet by mouth every 8 (eight) hours as needed for Nausea, Disp: 20 tablet, Rfl: 11    phenazopyridine (PYRIDIUM) 100 MG tablet, 1 tablet 3 times a day for 2 days.  Take as needed after meals for pain (Patient not taking: Reported on 10/8/2024), Disp: 6 tablet, Rfl: 0    prasterone, dhea, (INTRAROSA) 6.5 mg Inst, Place 6.5 mg vaginally every evening., Disp: 30 each, Rfl: 11    predniSONE (DELTASONE) 20 MG tablet, Take 1 tablet by mouth daily for 3 days, Disp: 3 tablet, Rfl: 0    prochlorperazine (COMPAZINE) 10 MG tablet, Take 1 tablet (10 mg total) by mouth 3 (three) times daily as needed (Headache and nausea). (Patient not taking: Reported on 9/23/2024), Disp: 30 tablet, Rfl: 2    promethazine (PHENERGAN) 12.5 MG Tab, Take 1 tablet by mouth every 8 (eight) hours as needed  for Nausea (Patient not taking: Reported on 10/8/2024), Disp: 21 tablet, Rfl: 0    rosuvastatin (CRESTOR) 5 MG tablet, Take 1 tablet (5 mg total) by mouth once daily., Disp: 90 tablet, Rfl: 3    semaglutide, weight loss, 0.5 mg/0.5 mL PnIj, Inject 0.5 mg into the skin every 7 days., Disp: , Rfl:     spironolactone (ALDACTONE) 100 MG tablet, Take 1 tablet by mouth daily, Disp: 90 tablet, Rfl: 1    spironolactone (ALDACTONE) 100 MG tablet, Take 1 tablet by mouth daily., Disp: 90 tablet, Rfl: 3    sucralfate (CARAFATE) 1 gram tablet, Take 1 tablet (1 g total) by mouth 4 (four) times daily. Can be crushed if cannot swallow. (Patient not taking: Reported on 10/8/2024), Disp: 120 tablet, Rfl: 0    sucralfate (CARAFATE) 1 gram tablet, Take 1 tablet by mouth 4 (four) times daily as needed (abdominal pain), Disp: 120 tablet, Rfl: 2    sucralfate (CARAFATE) 100 mg/mL suspension, Take 10 mLs by mouth 4 (four) times daily (Patient not taking: Reported on 9/23/2024), Disp: 420 mL, Rfl: 0    tiZANidine (ZANAFLEX) 2 MG tablet, Take 2 tablets every day by oral route for 90 days., Disp: 180 tablet, Rfl: 0    tiZANidine (ZANAFLEX) 2 MG tablet, Take 1 tablet by mouth nightly, Disp: 30 tablet, Rfl: 3    topiramate (TOPAMAX) 100 MG tablet, Take 1 tablet every day by oral route for 90 days., Disp: 90 tablet, Rfl: 1    topiramate (TOPAMAX) 100 MG tablet, Take 1 tablet every day by oral route for 90 days., Disp: 90 tablet, Rfl: 1    traZODone (DESYREL) 50 MG tablet, Take 0.5 tablets (25 mg total) by mouth every evening., Disp: 45 tablet, Rfl: 1    TRUDHESA 0.725 mg/pump act. (4 mg/mL) Spry, by Each Nostril route. (Patient not taking: Reported on 10/8/2024), Disp: , Rfl:     ubrogepant (UBRELVY) 100 mg tablet, Take 1 tablet twice a day by oral route as needed. (Patient not taking: Reported on 10/8/2024), Disp: 16 tablet, Rfl: 2    valACYclovir (VALTREX) 1000 MG tablet, Take 1 tablet (1,000 mg total) by mouth every 12 (twelve) hours.  (Patient not taking: Reported on 10/8/2024), Disp: 60 tablet, Rfl: 0    Current Facility-Administered Medications:     onabotulinumtoxina injection 200 Units, 200 Units, Intramuscular, Q90 Days, Dillon Gonzalez MD, 200 Units at 06/25/21 0757

## 2025-04-14 ENCOUNTER — PATIENT MESSAGE (OUTPATIENT)
Dept: PSYCHIATRY | Facility: CLINIC | Age: 40
End: 2025-04-14
Payer: COMMERCIAL

## 2025-04-14 DIAGNOSIS — F90.0 ATTENTION DEFICIT HYPERACTIVITY DISORDER, INATTENTIVE TYPE: ICD-10-CM

## 2025-04-14 RX ORDER — DEXTROAMPHETAMINE SACCHARATE, AMPHETAMINE ASPARTATE MONOHYDRATE, DEXTROAMPHETAMINE SULFATE, AMPHETAMINE SULFATE 9.375; 9.375; 9.375; 9.375 MG/1; MG/1; MG/1; MG/1
37.5 CAPSULE, EXTENDED RELEASE ORAL DAILY
Qty: 30 EACH | Refills: 0 | Status: SHIPPED | OUTPATIENT
Start: 2025-04-14

## 2025-05-09 ENCOUNTER — PATIENT MESSAGE (OUTPATIENT)
Dept: OBSTETRICS AND GYNECOLOGY | Facility: CLINIC | Age: 40
End: 2025-05-09
Payer: COMMERCIAL

## 2025-05-09 DIAGNOSIS — E66.3 OVERWEIGHT: ICD-10-CM

## 2025-05-11 DIAGNOSIS — F41.1 GENERALIZED ANXIETY DISORDER: ICD-10-CM

## 2025-05-12 RX ORDER — CITALOPRAM 40 MG/1
40 TABLET ORAL DAILY
Qty: 90 TABLET | Refills: 0 | Status: SHIPPED | OUTPATIENT
Start: 2025-05-12 | End: 2025-08-13

## 2025-05-20 ENCOUNTER — OFFICE VISIT (OUTPATIENT)
Dept: PSYCHIATRY | Facility: CLINIC | Age: 40
End: 2025-05-20
Payer: COMMERCIAL

## 2025-05-20 DIAGNOSIS — F41.1 GENERALIZED ANXIETY DISORDER: Primary | ICD-10-CM

## 2025-05-20 DIAGNOSIS — F90.0 ATTENTION DEFICIT HYPERACTIVITY DISORDER, INATTENTIVE TYPE: ICD-10-CM

## 2025-05-20 PROCEDURE — 1159F MED LIST DOCD IN RCRD: CPT | Mod: CPTII,95,, | Performed by: PSYCHOLOGIST

## 2025-05-20 PROCEDURE — 98006 SYNCH AUDIO-VIDEO EST MOD 30: CPT | Mod: 95,,, | Performed by: PSYCHOLOGIST

## 2025-05-20 PROCEDURE — G2211 COMPLEX E/M VISIT ADD ON: HCPCS | Mod: 95,,, | Performed by: PSYCHOLOGIST

## 2025-05-20 RX ORDER — CITALOPRAM 20 MG/1
20 TABLET ORAL DAILY
Qty: 90 TABLET | Refills: 0 | Status: SHIPPED | OUTPATIENT
Start: 2025-05-20 | End: 2025-08-21

## 2025-05-20 RX ORDER — DEXTROAMPHETAMINE SACCHARATE, AMPHETAMINE ASPARTATE MONOHYDRATE, DEXTROAMPHETAMINE SULFATE, AMPHETAMINE SULFATE 9.375; 9.375; 9.375; 9.375 MG/1; MG/1; MG/1; MG/1
37.5 CAPSULE, EXTENDED RELEASE ORAL DAILY
Qty: 30 EACH | Refills: 0 | Status: SHIPPED | OUTPATIENT
Start: 2025-05-20

## 2025-05-20 NOTE — PROGRESS NOTES
The patient location is: work  The chief complaint leading to consultation is: ADHD, mood, anxiety    Visit type: audiovisual    25 minutes of total time spent on the encounter, which includes face to face time and non-face to face time preparing to see the patient (eg, review of tests), Obtaining and/or reviewing separately obtained history, Documenting clinical information in the electronic or other health record, Independently interpreting results (not separately reported) and communicating results to the patient/family/caregiver, or Care coordination (not separately reported).     Each patient to whom he or she provides medical services by telemedicine is:  (1) informed of the relationship between the physician and patient and the respective role of any other health care provider with respect to management of the patient; and (2) notified that he or she may decline to receive medical services by telemedicine and may withdraw from such care at any time.    Notes:      Outpatient Psychiatry Follow-Up Visit    Clinical Status of Patient: Outpatient (Ambulatory)  05/20/2025     Chief Complaint:  presenting today for a follow-up.       Interval History and Content of Current Session:  Interim Events/Subjective Report/Content of Current Session:  follow-up appointment.    Pt is a 36 y/o female with past psychiatric hx of anxiety and ADHD who presents for follow-up treatment. Patient expresses a desire to reduce her citalopram dosage from the current 40mg to 20mg. She reports doing well, with no recent experiences of anxiety or depression. She mentions using Alprazolam infrequently, only twice since her last visit, and only half a dose when needed for anxiety when trazodone was ineffective. Patient reports some sleep disruption, which she attributes to discontinuing testosterone rather than anxiety or depression. She continues to take 12.5mg of trazodone at night for sleep. Patient reports positive experiences with  "Mydayis for ADHD symptom management, describing it as lasting throughout the day, with a smooth effect and good coverage from 4 AM to 6 PM. She prefers Mydayis to previous ADHD medications, including Strattera, Adderall, and Vyvanse, due to its longer-lasting and smoother effects. Patient mentions she has stopped taking testosterone, which has contributed to improvements in her overall well-being.     Past Psychiatric hx: effexor xr ("my mood was the best on that but I was having panic attacks and bld press was really negar"), pristiq (for headaches- effective), cymbalta (ineffective), lexapro and zoloft (effective but worsened headaches), klonopin 1mg (effective- for sleep and anxiety), straterra, Adderall XR, Vyvanse (did not work)  For sleep- elavil (ineffective), trazodone (worsened h/s's), ambien (nightmares), lunesta (nightmares), seroquel, prazosin, xanax  For headache- topomax    Past Medical hx:   Past Medical History:   Diagnosis Date    Abnormal Pap smear of cervix 2009    ADHD     Allergy     seasonal    Anorexia     Anxiety     Asthma     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  Denies hopelessness/worthlessness.    Denies auditory/visual hallucinations      Alcohol: Pt denied  Drug: Pt denied  Caffeine: Not assessed  Tobacco: Pt denied      Review of Systems   PSYCHIATRIC: Pertinent items are noted in the narrative.        CONSTITUTIONAL: weight stable    Past Medical, Family and Social History: The patient's past medical, family and social " history have been reviewed and updated as appropriate within the electronic medical record. See encounter notes.     Current Psychiatric Medication:  Celexa 40mg po qhs, Mydayis 37.5 mg po qam, xanax 0.125mg po daily PRN anxiety (takes maybe once every 3 months), trazodone 25 mg     Compliance: yes      Side effects: Pt denied     Risk Parameters:  Patient reports no suicidal ideation  Patient reports no homicidal ideation  Patient reports no self-injurious behavior  Patient reports no violent behavior     Exam (detailed: at least 9 elements; comprehensive: all 15 elements)   Constitutional  Vitals:  Most recent vital signs, dated less than 90 days prior to this appointment, were reviewed.        General:  unremarkable, age appropriate, casual attire, good eye contact, good rapport       Musculoskeletal  Muscle Strength/Tone:  no flaccidity, no tremor    Gait & Station:  normal      Psychiatric                       Speech:  normal tone, normal rate, rhythm, and volume   Mood & Affect:   stable         Thought Process:   Goal directed; Linear    Associations:   intact   Thought Content:   No SI/HI, delusions, or paranoia, no AV/VH   Insight & Judgement:   Good, adequate to circumstances   Orientation:   grossly intact; alert and oriented x 4    Memory:  intact for content of interview    Language:  grossly intact, can repeat    Attention Span  : Grossly intact for content of interview   Fund of Knowledge:   intact and appropriate to age and level of education        Assessment and Diagnosis   Status/Progress/Impression:     Assessment & Plan    IMPRESSION:  - Reduced citalopram dose from 40 mg to 20 mg based on request and improved symptoms.  - Continued current ADHD management with Mydayis, noting positive response compared to previous treatments.  - Continued trazodone 12.5 mg at night.  - Continued alprazolam as needed for sleep and anxiety management.    PLAN SUMMARY:  - Continue Mydayis for ADHD management. [place]   checked.   - Continue trazodone 12.5 mg at night  - Reduce citalopram dose from 40 mg to 20 mg  - Continue alprazolam as needed for anxiety    Diagnosis:   Anxiety disorder   Attention Deficit Hyperactivity Disorder - Inattentive Type   h/o PTSD (now in remission)   h/o anorexia and bulimia (both in remission)  Intervention/Counseling/Treatment Plan   Medication Management:      1. Decrease Celexa to 20 mg po qhs    2. Mydayis 37.5 mg po qam    3. xanax 0.125mg po daily PRN anxiety (infrequent)    4. trazodone 12.5 mg    5. Call to report any worsening of symptoms or problems with the medication. Pt instructed to go to ER with thoughts of harming self, others     6. Cont in therapy with Adelina (cannot remember last name)    Psychotherapy: none  Target symptoms: PTSD  Why chosen therapy is appropriate versus another modality: CBT used; relevant to diagnosis, patient responds to this modality  Outcome monitoring methods: self-report, observation  Therapeutic intervention type: Cognitive Behavioral Therapy, Psychoeducation  Topics discussed/themes: building skills sets for symptom management, symptom recognition, nutrition, exercise  The patient's response to the intervention is accepting  Patient's response to treatment is: good.   The patient's progress toward treatment goals: stable     Return to clinic: 3 months    -Cognitive-Behavioral/Supportive therapy and psychoeducation provided  -R/B/SE's of medications discussed with the pt who expresses understanding and chooses to take medications as prescribed.   -Pt instructed to call clinic, 911 or go to nearest emergency room if sxs worsen or pt is in   crisis. The pt expresses understanding.    Gokul Keller, PhD, MP    Visit today included increased complexity associated with the care of the episodic problem ADHD, anxiety addressed and managing the longitudinal care of the patient due to the serious and/or complex managed problem(s) ADHD, anxiety.      This note  was generated with the assistance of ambient listening technology. Verbal consent was obtained by the patient and accompanying visitor(s) for the recording of patient appointment to facilitate this note. I attest to having reviewed and edited the generated note for accuracy, though some syntax or spelling errors may persist. Please contact the author of this note for any clarification.     Antidepressant/Antianxiety Medication Initiation:  Patient informed of risks, benefits, and potential side effects of medication and accepts informed consent.  Common side effects include nausea, fatigue, headache, insomnia., Specifically discussed the possibility of new or worsening suicidal thoughts/depression.  Patient instructed to stop the medication immediately and seek urgent treatment if this occurs. Patient instructed not to abruptly discontinue medication without physician guidance except in cases of sudden onset or worsening of SI.       Stimulant Medication Initiation:  Patient advised of risks, benefits, and side effects of medication and accepts informed consent.  Common side effects include insomnia, irritability, jittery feeling, dry mouth, and agitation/hostility., Patient advised of potential addictive nature of medication and controlled substance classification.  Instructed to safeguard medication as no early refills can be given for lost or stolen medications.       Benzodiazepine Initiation:  Patient advised of the risks, benefits, and common side effects of medication and has accepted informed consent.  Common side effects include drowsiness, impaired coordination, possible memory loss., Patient advised NOT to operate a vehicle or machinery untiil they are sure how the medication will affec them.  Client also advised of danger of mixing this medication with alcohol., Patient advised of potential addictive nature of medication and need to safeguard medication as no early refills for lost or stolen medications  can be authorized.       Pregnancy Warning:  Patient denies current pregnancy possibility.  Patient made aware that medications have not been proven safe in pregnancy and that she must maintain adequate birth control.  Patient instructed to alert us immediately if she becomes pregnant.

## 2025-06-10 ENCOUNTER — PATIENT MESSAGE (OUTPATIENT)
Dept: PSYCHIATRY | Facility: CLINIC | Age: 40
End: 2025-06-10
Payer: COMMERCIAL

## 2025-06-18 ENCOUNTER — PATIENT MESSAGE (OUTPATIENT)
Dept: PSYCHIATRY | Facility: CLINIC | Age: 40
End: 2025-06-18
Payer: COMMERCIAL

## 2025-06-18 DIAGNOSIS — F90.0 ATTENTION DEFICIT HYPERACTIVITY DISORDER, INATTENTIVE TYPE: ICD-10-CM

## 2025-06-18 RX ORDER — DEXTROAMPHETAMINE SACCHARATE, AMPHETAMINE ASPARTATE MONOHYDRATE, DEXTROAMPHETAMINE SULFATE, AMPHETAMINE SULFATE 9.375; 9.375; 9.375; 9.375 MG/1; MG/1; MG/1; MG/1
37.5 CAPSULE, EXTENDED RELEASE ORAL DAILY
Qty: 30 EACH | Refills: 0 | Status: SHIPPED | OUTPATIENT
Start: 2025-06-18

## 2025-07-03 ENCOUNTER — TELEPHONE (OUTPATIENT)
Dept: OBSTETRICS AND GYNECOLOGY | Facility: CLINIC | Age: 40
End: 2025-07-03
Payer: COMMERCIAL

## 2025-07-03 DIAGNOSIS — E66.3 OVERWEIGHT: ICD-10-CM

## 2025-07-03 NOTE — TELEPHONE ENCOUNTER
Copied from CRM #[id number]. Topic: Medications - Pharmacy  >> Jul 2, 2025  4:53 PM Christie wrote:  Type: Patient Call Back    Who called:Shannan with   [facility] - [place], [place]      What is the request in detail: on the semaglutide RX's there has been changes to how the Rx's should be written and she would like a call back from one of the nurses to explain.     Can the clinic reply by [facility]? no    Would the patient rather a call back or a response via [facility]? Call back     Best call back number: [phone] (her direct line)

## 2025-07-03 NOTE — TELEPHONE ENCOUNTER
Sushant Lewis,    This patient is in need of refill with the specific compound drug name.     Thank you!

## 2025-07-21 ENCOUNTER — PATIENT MESSAGE (OUTPATIENT)
Dept: PSYCHIATRY | Facility: CLINIC | Age: 40
End: 2025-07-21
Payer: COMMERCIAL

## 2025-07-21 DIAGNOSIS — F90.0 ATTENTION DEFICIT HYPERACTIVITY DISORDER, INATTENTIVE TYPE: ICD-10-CM

## 2025-07-21 RX ORDER — DEXTROAMPHETAMINE SACCHARATE, AMPHETAMINE ASPARTATE MONOHYDRATE, DEXTROAMPHETAMINE SULFATE, AMPHETAMINE SULFATE 9.375; 9.375; 9.375; 9.375 MG/1; MG/1; MG/1; MG/1
37.5 CAPSULE, EXTENDED RELEASE ORAL DAILY
Qty: 30 EACH | Refills: 0 | Status: SHIPPED | OUTPATIENT
Start: 2025-07-21

## 2025-08-01 ENCOUNTER — OFFICE VISIT (OUTPATIENT)
Dept: OBSTETRICS AND GYNECOLOGY | Facility: CLINIC | Age: 40
End: 2025-08-01
Payer: COMMERCIAL

## 2025-08-01 ENCOUNTER — LAB VISIT (OUTPATIENT)
Dept: LAB | Facility: OTHER | Age: 40
End: 2025-08-01
Attending: PHYSICIAN ASSISTANT
Payer: COMMERCIAL

## 2025-08-01 VITALS
BODY MASS INDEX: 25.96 KG/M2 | SYSTOLIC BLOOD PRESSURE: 130 MMHG | DIASTOLIC BLOOD PRESSURE: 87 MMHG | HEIGHT: 65 IN | WEIGHT: 155.81 LBS

## 2025-08-01 DIAGNOSIS — Z17.0 MALIGNANT NEOPLASM OF UPPER-OUTER QUADRANT OF RIGHT BREAST IN FEMALE, ESTROGEN RECEPTOR POSITIVE: ICD-10-CM

## 2025-08-01 DIAGNOSIS — E66.3 OVERWEIGHT: ICD-10-CM

## 2025-08-01 DIAGNOSIS — Z01.411 ENCOUNTER FOR GYNECOLOGICAL EXAMINATION WITH ABNORMAL FINDING: Primary | ICD-10-CM

## 2025-08-01 DIAGNOSIS — C50.411 MALIGNANT NEOPLASM OF UPPER-OUTER QUADRANT OF RIGHT BREAST IN FEMALE, ESTROGEN RECEPTOR POSITIVE: ICD-10-CM

## 2025-08-01 DIAGNOSIS — N95.1 MENOPAUSAL SYMPTOMS: ICD-10-CM

## 2025-08-01 LAB
ESTRADIOL SERPL HS-MCNC: <10 PG/ML
TESTOST SERPL-MCNC: <4 NG/DL (ref 5–73)

## 2025-08-01 PROCEDURE — 99999 PR PBB SHADOW E&M-EST. PATIENT-LVL V: CPT | Mod: PBBFAC,,, | Performed by: PHYSICIAN ASSISTANT

## 2025-08-01 PROCEDURE — 84403 ASSAY OF TOTAL TESTOSTERONE: CPT

## 2025-08-01 PROCEDURE — 36415 COLL VENOUS BLD VENIPUNCTURE: CPT

## 2025-08-01 PROCEDURE — 82670 ASSAY OF TOTAL ESTRADIOL: CPT

## 2025-08-01 RX ORDER — TESTOSTERONE 20.25 MG/1.25G
GEL TOPICAL
Qty: 75 G | Refills: 0 | Status: SHIPPED | OUTPATIENT
Start: 2025-08-01

## 2025-08-01 NOTE — PROGRESS NOTES
CC: Well woman exam    Yolanda Norman is a 40 y.o. female  presents for well woman exam.  LMP: Patient's last menstrual period was 2020. She is s/p hyst/BSO. Reports tendonitis and bursitis since stopping testosterone IM. [place] [facility] [place]. Was told ok to restart testosterone. She is hesitant to do pellets or injections due to side effects and increased irritability with testosterone 50mg IM [id number] in the past. Would prefer transdermal testosterone. Understands risk of converting testosterone to estradiol.   She is taking compounded semaglutide 0.5mg SC weekly for weight loss. Continues to eat high protein of 130-140g per day. Regular exercise 5-6 times per week. However, she has hit plateau with her weight loss and wishes to increase the dose. Tolerating the medication well.     Oncology history: Right ER +,HER 2 negative breast cancer s/p bilateral mastectomy on 2020 and currently on Airmidex. She is also s/p hyst/BSO     PCP: Frannie Lamb PA-C    Routine labs: 2/10/2025  WWE: 2022  Pap smear: s/p hyst/bso  Mammogram: s/p bilateral mastectomy  DEXA: 10/10/2023 normal, repeat in 2 years      Past Medical History:   Diagnosis Date    Abnormal Pap smear of cervix     ADHD     Allergy     seasonal    Anorexia     Anxiety     Asthma     BRCA1 positive 2020    Bulimia     Depression     Fever blister     Gastroparesis     GERD (gastroesophageal reflux disease)     History of posttraumatic stress disorder (PTSD)     Hypertension     Gestational Hypertension    Invasive ductal carcinoma of right breast 2019    Mastitis     Migraine headache     PONV (postoperative nausea and vomiting)     Status post mastectomy, bilateral 2020     Past Surgical History:   Procedure Laterality Date    BILATERAL MASTECTOMY Bilateral 2020    Procedure: MASTECTOMY, BILATERAL - BILATERAL SKIN SPARING MASTECTOMY;  Surgeon: Percy Ayers MD;   Location: [facility];  Service: General;  Laterality: Bilateral;    BIOPSY OF AXILLARY NODE Right 2020    Procedure: BIOPSY, LYMPH NODE, AXILLARY;  Surgeon: Percy Ayers MD;  Location: [facility];  Service: General;  Laterality: Right;    BONE MARROW ASPIRATION      x 3    BREAST SURGERY      CERVICAL BIOPSY  W/ LOOP ELECTRODE EXCISION       SECTION  2016     SECTION N/A 2019    Procedure:  SECTION;  Surgeon: Kirit Hernandez MD;  Location: [facility] L&[facility];  Service: OB/GYN;  Laterality: N/A;    COLPOSCOPY      ESOPHAGOGASTRODUODENOSCOPY N/A 2021    Procedure: EGD (ESOPHAGOGASTRODUODENOSCOPY);  Surgeon: Lj Santos MD;  Location: [facility] (4TH FLR);  Service: Endoscopy;  Laterality: N/A;  COVID test on 21 at [facility]    ESOPHAGOGASTRODUODENOSCOPY N/A 2021    Procedure: ESOPHAGOGASTRODUODENOSCOPY (EGD);  Surgeon: Camila Wiley MD;  Location: [facility];  Service: Endoscopy;  Laterality: N/A;    INJECTION FOR SENTINEL NODE IDENTIFICATION Right 2020    Procedure: INJECTION, FOR SENTINEL NODE IDENTIFICATION;  Surgeon: Percy Ayers MD;  Location: [facility];  Service: General;  Laterality: Right;    INSERTION OF BREAST TISSUE EXPANDER Bilateral 2020    Procedure: INSERTION, TISSUE EXPANDER, BREAST;  Surgeon: Kiko Aranda MD;  Location: [facility];  Service: Plastics;  Laterality: Bilateral;    INSERTION OF TUNNELED CENTRAL VENOUS CATHETER (CVC) WITH SUBCUTANEOUS PORT Left 2019    Procedure: [id number]-Left neck or chest wall;  Surgeon: Percy Ayers MD;  Location: [facility] 2ND FLR;  Service: General;  Laterality: Left;    MASTECTOMY      MEDIPORT REMOVAL N/A 2020    Procedure: REMOVAL, CATHETER, CENTRAL VENOUS, TUNNELED, WITH PORT;  Surgeon: Percy Ayers MD;  Location: [facility];  Service: General;  Laterality: N/A;    ROBOT-ASSISTED LAPAROSCOPIC ABDOMINAL HYSTERECTOMY USING DA HIMA XI N/A 2020    Procedure: XI ROBOTIC  "HYSTERECTOMY;  Surgeon: Emili Smith MD;  Location: [facility];  Service: OB/GYN;  Laterality: N/A;    ROBOT-ASSISTED LAPAROSCOPIC SALPINGO-OOPHORECTOMY USING DA HIMA XI Bilateral 2020    Procedure: XI ROBOTIC SALPINGO-OOPHORECTOMY;  Surgeon: Emili Smith MD;  Location: [facility] OR;  Service: OB/GYN;  Laterality: Bilateral;    SHOULDER SURGERY Left     x 2    SINUS SURGERY      age 19    STOMACH SURGERY      TISSUE EXPANDER REMOVAL Right 10/3/2020    Procedure: REMOVAL, TISSUE EXPANDER;  Surgeon: Kiko Aranda MD;  Location: [facility];  Service: Plastics;  Laterality: Right;    TONSILLECTOMY      UPPER GASTROINTESTINAL ENDOSCOPY       Social History[1]  Family History   Problem Relation Name Age of Onset    Cancer Maternal Grandmother  40        cervical    Cervical cancer Mother      Breast cancer Other 4 paternal great aunts     Ovarian cancer Other second cousin paternal     Colon polyps Father      Colon cancer Neg Hx      Melanoma Neg Hx       OB History          2    Para   2    Term   2            AB        Living   2         SAB        IAB        Ectopic        Multiple   0    Live Births   2               Current Medications[2]    The ASCVD Risk score (Edilson JOHNSON, et al., 2019) failed to calculate for the following reasons:    The 2019 ASCVD risk score is only valid for ages 40 to 79    /87 (Patient Position: Sitting)   Ht 5' 5" (1.651 m)   Wt 70.7 kg (155 lb 12.8 oz)   LMP 2020   BMI 25.93 kg/m²       ROS:  GENERAL: Denies weight gain.  SKIN: Denies rash or lesions.   HEAD: Denies head injury or headache.   NODES: Denies enlarged lymph nodes.   CHEST: Denies chest pain or shortness of breath.   CARDIOVASCULAR: Denies palpitations or left sided chest pain.   ABDOMEN: No abdominal pain, constipation, diarrhea, nausea, vomiting or rectal bleeding.   URINARY: No frequency, dysuria, hematuria, or burning on urination.  REPRODUCTIVE: See HPI.   BREASTS: Denies pain, lumps, or " nipple discharge.   HEMATOLOGIC: No easy bruisability or excessive bleeding.   MUSCULOSKELETAL: +joint pain  NEUROLOGIC: Denies syncope or weakness.   PSYCHIATRIC: Denies depression, anxiety or mood swings.    PHYSICAL EXAM:  APPEARANCE: Well nourished, well developed, in no acute distress.  AFFECT: WNL, alert and oriented x 3  CHEST: Good respiratory effect  ABDOMEN: Soft.  No tenderness or masses.  No hepatosplenomegaly.  No hernias.  BREASTS: No skin changes or visible lesions.  No palpable masses. S/p bilateral mastectomy with reconstruction  PELVIC: Normal external genitalia without lesions.  Normal hair distribution.  Adequate perineal body, normal urethral meatus.  Vagina moist and well rugated without lesions or discharge.  Cervix and uterus are surgically absent.  Adnexa without masses or tenderness.    EXTREMITIES: No edema.    ASSESSMENT:   Encounter for gynecological examination with abnormal finding    Menopausal symptoms  -     Testosterone; Future; Expected date: 08/01/2025  -     Estradiol; Future; Expected date: 08/01/2025  -     testosterone (ANDROGEL) 20.25 mg/1.25 gram (1.62 %) GlPm; Apply 1/2 pump to the upper thigh daily  Dispense: 75 g; Refill: 0    Malignant neoplasm of upper-outer quadrant of right breast in female, estrogen receptor positive    Overweight  -     semaglutide, weight loss, 1 mg/0.5 mL PnIj; Inject 1 mg into the skin every 7 days.          PLAN:   Discussed risk of converting testosterone to estradiol since no longer on AI.  Recommend testosterone pellet with anastrozole 8mg pellet. Can use patient assistance if needed.  She prefer transdermal and will watch levels closely.  Baseline testosterone levels today.  Start Androgel 1/2 pump daily to upper thigh- counseled on use  Will monitor levels every 3 months initially  Continue low glycemic, high protein diet  Continue regular exercise with strength and cardio  Increase compounded semaglutide to 1.0mg SC weekly ([facility])  Follow  up in 3 months.    Patient was counseled today on A.C.S. Pap guidelines and recommendations for yearly pelvic exams, mammograms and monthly self breast exams; to see her PCP for other health maintenance. Patient is aware that if issue are addressed during an WWE, she will also be billed an OV.                            [1]   Social History  Socioeconomic History    Marital status:    Tobacco Use    Smoking status: Never    Smokeless tobacco: Never   Substance and Sexual Activity    Alcohol use: Yes     Comment: socially    Drug use: No    Sexual activity: Yes     Partners: Male     Birth control/protection: None   Social History Narrative    No longer working at [employer] as of 10/2020 as her son has been suffering with depression/anxiety     Social Drivers of Health     Financial Resource Strain: Low Risk  (5/20/2025)    Overall Financial Resource Strain (CARDIA)     Difficulty of Paying Living Expenses: Not hard at all   Food Insecurity: No Food Insecurity (5/20/2025)    Hunger Vital Sign     Worried About Running Out of Food in the Last Year: Never true     Ran Out of Food in the Last Year: Never true   Transportation Needs: No Transportation Needs (5/20/2025)    PRAPARE - Transportation     Lack of Transportation (Medical): No     Lack of Transportation (Non-Medical): No   Physical Activity: Sufficiently Active (5/20/2025)    Exercise Vital Sign     Days of Exercise per Week: 7 days     Minutes of Exercise per Session: 90 min   Stress: Stress Concern Present (5/20/2025)    [employer] [place] [employer] - Occupational Stress Questionnaire     Feeling of Stress : To some extent   Housing Stability: Low Risk  (5/20/2025)    Housing Stability Vital Sign     Unable to Pay for Housing in the Last Year: No     Number of Times Moved in the Last Year: 0     Homeless in the Last Year: No   [2]   Current Outpatient Medications:     albuterol (PROVENTIL/VENTOLIN HFA) 90 mcg/actuation inhaler, Inhale 1 puff  every 6 (six) hours  into the lungs for Wheezing (As Needed) for up to 60 doses (Patient not taking: Reported on 9/23/2024), Disp: 18 g, Rfl: 0    ALPRAZolam (XANAX) 0.25 MG tablet, Take 1 tablet (0.25 mg total) by mouth daily as needed for Anxiety., Disp: 30 tablet, Rfl: 0    atogepant (QULIPTA) 60 mg Tab, Take 1 tablet every day by oral route., Disp: 90 tablet, Rfl: 0    atogepant (QULIPTA) 60 mg Tab, Take 1 tablet every day by oral route., Disp: 90 tablet, Rfl: 0    butalbital-acetaminophen-caffeine -40 mg (FIORICET, ESGIC) -40 mg per tablet, take 1 tablet by mouth 4 times daily as needed, Disp: 20 tablet, Rfl: 0    butalbital-acetaminophen-caffeine -40 mg (FIORICET, ESGIC) -40 mg per tablet, Take 1 tablet by mouth 4 (four) times daily as needed., Disp: 20 tablet, Rfl: 0    butalbital-acetaminophen-caffeine -40 mg (FIORICET, ESGIC) -40 mg per tablet, Take 2 tablets by mouth every 6 (six) hours as needed for Pain; Max Daily Amount: 2 tablets, Disp: 30 tablet, Rfl: 2    cyclobenzaprine (FLEXERIL) 10 MG tablet, Take 1 tablet by mouth 3 (three) times daily as needed for Muscle spasms for up to 10 days, Disp: 30 tablet, Rfl: 0    dextroamphetamine-amphetamine (MYDAYIS) 37.5 mg CT24, Take 1 capsule (37.5 mg) by mouth Daily., Disp: 30 each, Rfl: 0    diclofenac sodium (SOLARAZE) 3 % gel, Apply topically 2 (two) times a day, Disp: 100 g, Rfl: 0    diclofenac sodium (VOLTAREN) 1 % Gel, Apply topically 3 (three) times daily as needed, Disp: 200 g, Rfl: 0    doxycycline (VIBRAMYCIN) 100 MG Cap, Take 1 capsule by mouth 2 (two) times daily for 7 days, Disp: 14 capsule, Rfl: 0    gabapentin (NEURONTIN) 100 MG capsule, Take 1 capsule by mouth three times daily as needed for nerve pain, may take up to 1-3 capules every night at bedtime, Disp: 50 capsule, Rfl: 0    ibuprofen (ADVIL,MOTRIN) 800 MG tablet, Take 1 tablet by mouth every 8 (eight) hours as needed for Pain (inflammation) for up to 10  days, Disp: 40 tablet, Rfl: 0    levoFLOXacin (LEVAQUIN) 500 MG tablet, Take 1 tablet by mouth daily for 7 days, Disp: 7 tablet, Rfl: 0    loratadine (CLARITIN) 10 mg tablet, Take 10 mg by mouth once daily., Disp: , Rfl:     magnesium oxide 400 mg magnesium Cap, magnesium 400 mg (as magnesium oxide) capsule  Take by oral route. (Patient not taking: Reported on 9/23/2024), Disp: , Rfl:     meclizine (ANTIVERT) 25 mg tablet, Take 1 tablet by mouth 3 (three) times daily as needed (As need for dizziness/nausea) for up to 14 days, Disp: 30 tablet, Rfl: 0    meloxicam (MOBIC) 15 MG tablet, Take 1 tablet every day by oral route., Disp: 90 tablet, Rfl: 1    meloxicam (MOBIC) 7.5 MG tablet, Take 1 tablet by mouth twice a day with food for 5 days then as needed., Disp: 30 tablet, Rfl: 1    meloxicam (MOBIC) 7.5 MG tablet, Take 1 tablet by mouth twice a day with food for 5 days then as needed., Disp: 30 tablet, Rfl: 1    methylPREDNISolone (MEDROL DOSEPACK) 4 mg tablet, Use as instructed on dose pack, Disp: 21 tablet, Rfl: 0    methylPREDNISolone (MEDROL DOSEPACK) 4 mg tablet, Take as directed by instructions on the package., Disp: 21 tablet, Rfl: 0    methylPREDNISolone (MEDROL DOSEPACK) 4 mg tablet, follow package directions, Disp: 21 tablet, Rfl: 0    metoclopramide HCl (REGLAN) 10 MG tablet, Take 1 tablet 3 (three) times daily before meals by mouth, Disp: 90 tablet, Rfl: 0    metoclopramide HCl (REGLAN) 10 MG tablet, Take 1 tablet by mouth as needed (nausea), Disp: 30 tablet, Rfl: 0    metoclopramide HCl (REGLAN) 10 MG tablet, Take 1 tablet by mouth as needed (nausea), Disp: 30 tablet, Rfl: 0    metoclopramide HCl (REGLAN) 10 MG tablet, Take 1 tablet by mouth as needed (nausea), Disp: 30 tablet, Rfl: 0    metoclopramide HCl (REGLAN) 10 MG tablet, Take 1 tablet by mouth 4 (four) times daily before meals and nightly, Disp: 30 tablet, Rfl: 0    metoclopramide HCl (REGLAN) 10 MG tablet, Take 1 tablet by mouth 4 (four) times  daily as needed (gastroparesis), Disp: 30 tablet, Rfl: 2    montelukast (SINGULAIR) 10 mg tablet, Take 1 tablet by mouth nightly, Disp: 30 tablet, Rfl: 0    multivitamin (THERAGRAN) per tablet, Take 1 tablet by mouth once daily. (Patient not taking: Reported on 9/23/2024), Disp: , Rfl:     onabotulinumtoxinA (BOTOX INJ), Inject as directed. [id number], Disp: , Rfl:     ondansetron (ZOFRAN-ODT) 4 MG TbDL, Take 1 tablet every 6 (six) hours as needed by mouth for Nausea for up to 7 days, Disp: 20 tablet, Rfl: 0    ondansetron (ZOFRAN-ODT) 8 MG TbDL, Dissolve 1 by mouth the night before surgery and then dissolve 1 by mouth the morning of surgery and then 1 by mouth every 6 hours as needed for nausea., Disp: 10 tablet, Rfl: 0    ondansetron (ZOFRAN-ODT) 8 MG TbDL, Take 1 tablet by mouth every 8 (eight) hours as needed for Nausea, Disp: 20 tablet, Rfl: 11    phenazopyridine (PYRIDIUM) 100 MG tablet, 1 tablet 3 times a day for 2 days.  Take as needed after meals for pain (Patient not taking: Reported on 10/8/2024), Disp: 6 tablet, Rfl: 0    prasterone, dhea, (INTRAROSA) 6.5 mg Inst, Place 6.5 mg vaginally every evening., Disp: 28 each, Rfl: 11    predniSONE (DELTASONE) 20 MG tablet, Take 1 tablet by mouth daily for 3 days, Disp: 3 tablet, Rfl: 0    prochlorperazine (COMPAZINE) 10 MG tablet, Take 1 tablet (10 mg total) by mouth 3 (three) times daily as needed (Headache and nausea). (Patient not taking: Reported on 9/23/2024), Disp: 30 tablet, Rfl: 2    promethazine (PHENERGAN) 12.5 MG Tab, Take 1 tablet by mouth every 8 (eight) hours as needed for Nausea (Patient not taking: Reported on 10/8/2024), Disp: 21 tablet, Rfl: 0    rosuvastatin (CRESTOR) 5 MG tablet, Take 1 tablet (5 mg total) by mouth once daily., Disp: 90 tablet, Rfl: 3    rosuvastatin (CRESTOR) 5 MG tablet, Take 1 tablet (5 mg total) by mouth once daily., Disp: 60 tablet, Rfl: 0    rosuvastatin (CRESTOR) 5 MG tablet, Take 1 tablet by mouth daily, Disp: 90 tablet,  Rfl: 3    semaglutide, weight loss, 1 mg/0.5 mL PnIj, Inject 1 mg into the skin every 7 days., Disp: , Rfl:     spironolactone (ALDACTONE) 100 MG tablet, Take 1 tablet by mouth daily, Disp: 90 tablet, Rfl: 1    spironolactone (ALDACTONE) 100 MG tablet, Take 1 tablet by mouth daily., Disp: 90 tablet, Rfl: 3    sucralfate (CARAFATE) 1 gram tablet, Take 1 tablet (1 g total) by mouth 4 (four) times daily. Can be crushed if cannot swallow., Disp: 120 tablet, Rfl: 0    sucralfate (CARAFATE) 1 gram tablet, Take 1 tablet by mouth 4 (four) times daily as needed (abdominal pain), Disp: 120 tablet, Rfl: 2    sucralfate (CARAFATE) 100 mg/mL suspension, Take 10 mLs by mouth 4 (four) times daily (Patient not taking: Reported on 9/23/2024), Disp: 420 mL, Rfl: 0    testosterone (ANDROGEL) 20.25 mg/1.25 gram (1.62 %) GlPm, Apply 1/2 pump to the upper thigh daily, Disp: 75 g, Rfl: 0    tiZANidine (ZANAFLEX) 2 MG tablet, Take 1 tablet by mouth nightly, Disp: 30 tablet, Rfl: 3    tiZANidine (ZANAFLEX) 2 MG tablet, Take 1 tablet by mouth nightly, Disp: 30 tablet, Rfl: 3    topiramate (TOPAMAX) 100 MG tablet, Take 1 tablet by mouth 2 (two) times daily, Disp: 180 tablet, Rfl: 3    traZODone (DESYREL) 50 MG tablet, Take 0.5 tablets (25 mg total) by mouth every evening., Disp: 45 tablet, Rfl: 1    TRUDHESA 0.725 mg/pump act. (4 mg/mL) Spry, by Each Nostril route. (Patient not taking: Reported on 10/8/2024), Disp: , Rfl:     ubrogepant (UBRELVY) 100 mg tablet, Take 1 tablet twice a day by oral route as needed. (Patient not taking: Reported on 10/8/2024), Disp: 16 tablet, Rfl: 2    valACYclovir (VALTREX) 1000 MG tablet, Take 1 tablet (1,000 mg total) by mouth every 12 (twelve) hours. (Patient not taking: Reported on 10/8/2024), Disp: 60 tablet, Rfl: 0    Current Facility-Administered Medications:     onabotulinumtoxina injection 200 Units, 200 Units, Intramuscular, Q90 Days, Dillon Gonzalez MD, 200 Units at 06/25/21 0757

## 2025-08-12 ENCOUNTER — OFFICE VISIT (OUTPATIENT)
Dept: PSYCHIATRY | Facility: CLINIC | Age: 40
End: 2025-08-12
Payer: COMMERCIAL

## 2025-08-12 DIAGNOSIS — F41.1 GENERALIZED ANXIETY DISORDER: ICD-10-CM

## 2025-08-12 DIAGNOSIS — F90.0 ATTENTION DEFICIT HYPERACTIVITY DISORDER, INATTENTIVE TYPE: Primary | ICD-10-CM

## 2025-08-12 PROCEDURE — G2211 COMPLEX E/M VISIT ADD ON: HCPCS | Mod: 95,,, | Performed by: PSYCHOLOGIST

## 2025-08-12 PROCEDURE — 98006 SYNCH AUDIO-VIDEO EST MOD 30: CPT | Mod: 95,,, | Performed by: PSYCHOLOGIST

## 2025-08-12 PROCEDURE — 1159F MED LIST DOCD IN RCRD: CPT | Mod: CPTII,95,, | Performed by: PSYCHOLOGIST

## 2025-08-12 RX ORDER — CITALOPRAM 40 MG/1
40 TABLET ORAL DAILY
Qty: 30 TABLET | Refills: 2 | Status: SHIPPED | OUTPATIENT
Start: 2025-08-12 | End: 2025-11-10

## 2025-08-18 ENCOUNTER — PATIENT MESSAGE (OUTPATIENT)
Dept: OBSTETRICS AND GYNECOLOGY | Facility: CLINIC | Age: 40
End: 2025-08-18
Payer: COMMERCIAL

## 2025-08-19 ENCOUNTER — PATIENT MESSAGE (OUTPATIENT)
Dept: PSYCHIATRY | Facility: CLINIC | Age: 40
End: 2025-08-19
Payer: COMMERCIAL

## 2025-08-19 DIAGNOSIS — F90.0 ATTENTION DEFICIT HYPERACTIVITY DISORDER, INATTENTIVE TYPE: ICD-10-CM

## 2025-08-19 RX ORDER — DEXTROAMPHETAMINE SACCHARATE, AMPHETAMINE ASPARTATE MONOHYDRATE, DEXTROAMPHETAMINE SULFATE, AMPHETAMINE SULFATE 9.375; 9.375; 9.375; 9.375 MG/1; MG/1; MG/1; MG/1
37.5 CAPSULE, EXTENDED RELEASE ORAL DAILY
Qty: 30 EACH | Refills: 0 | Status: SHIPPED | OUTPATIENT
Start: 2025-08-19

## 2025-08-20 ENCOUNTER — TELEPHONE (OUTPATIENT)
Dept: OPTOMETRY | Facility: CLINIC | Age: 40
End: 2025-08-20
Payer: COMMERCIAL

## (undated) DEVICE — DRAPE THYROID WITH ARMBOARD

## (undated) DEVICE — ELECTRODE BLADE INSULATED 1 IN

## (undated) DEVICE — UNDERGLOVE BIOGEL PI SZ 6.5 LF

## (undated) DEVICE — SEE MEDLINE ITEM 157110

## (undated) DEVICE — PACK UNIV PROCEDURE

## (undated) DEVICE — SOL NS 1000CC

## (undated) DEVICE — DRAIN CHANNEL ROUND 19FR

## (undated) DEVICE — SOL ELECTROLUBE ANTI-STIC

## (undated) DEVICE — SOL 9P NACL IRR PIC IL

## (undated) DEVICE — NDL HYPO REG 25G X 1 1/2

## (undated) DEVICE — GLOVE BIOGEL SKINSENSE PI 7.0

## (undated) DEVICE — BLADE PEAK PLASMA

## (undated) DEVICE — DRAPE C ARM 42 X 120 10/BX

## (undated) DEVICE — Device

## (undated) DEVICE — APPLIER LIGACLIP MED 11IN

## (undated) DEVICE — SEE MEDLINE ITEM 153151

## (undated) DEVICE — GLOVE BIOGEL SKINSENSE PI 8.0

## (undated) DEVICE — SUT MCRYL PLUS 4-0 PS2 27IN

## (undated) DEVICE — IRRIGATOR ENDOSCOPY DISP.

## (undated) DEVICE — APPLIER LIGACLIP SM 9.38IN

## (undated) DEVICE — NDL INSUF ULTRA VERESS 120MM

## (undated) DEVICE — ELECTRODE REM PLYHSV RETURN 9

## (undated) DEVICE — ADHESIVE DERMABOND ADVANCED

## (undated) DEVICE — DEVICE ANC SW STAT FOLEY 6-24

## (undated) DEVICE — SYR 10CC LUER LOCK

## (undated) DEVICE — SOL CLEARIFY VISUALIZATION LAP

## (undated) DEVICE — SEE MEDLINE ITEM 156923

## (undated) DEVICE — DRESSING AQUACEL AG ADV 3.5X6

## (undated) DEVICE — COVER PROBE US 5.5X58L NON LTX

## (undated) DEVICE — SUT 2-0 VICRYL / SH (J417)

## (undated) DEVICE — PORT ACCESS 8MM W/120MM LOW

## (undated) DEVICE — SET TRI-LUMEN FILTERED TUBE

## (undated) DEVICE — GLOVE BIOGEL SKINSENSE PI 6.5

## (undated) DEVICE — PAD ABD 8X10 STERILE

## (undated) DEVICE — MANIPULATOR VCARE PLUS 32MM SM

## (undated) DEVICE — SUT 2-0 VICRYL / CT-1

## (undated) DEVICE — SOL NACL 0.9% INJ PF/50151

## (undated) DEVICE — SUT VICRYL PLUS 3-0 SH 18IN

## (undated) DEVICE — SPONGE LAP 18X18 PREWASHED

## (undated) DEVICE — STAPLER SKIN ROTATING HEAD

## (undated) DEVICE — DRAPE COLUMN DAVINCI XI

## (undated) DEVICE — CONTAINER SPECIMEN STRL 4OZ

## (undated) DEVICE — SUT 2/0 30IN SILK BLK BRAI

## (undated) DEVICE — DRESSING AQUACEL AG ADV 3.5X12

## (undated) DEVICE — ELECTRODE BLADE TEFLON 6

## (undated) DEVICE — POSITIONER HEEL FOAM CONVOLTD

## (undated) DEVICE — OBTURATOR BLADELESS 8MM XI CLR

## (undated) DEVICE — JELLY SURGILUBE 5GR

## (undated) DEVICE — COVER TIP CURVED SCISSORS XI

## (undated) DEVICE — SKIN MARKER DEVON 160

## (undated) DEVICE — SUT ETHILON 2-0 FS 18IN BLK

## (undated) DEVICE — SEE MEDLINE ITEM 152622

## (undated) DEVICE — KIT WING PAD POSITIONING

## (undated) DEVICE — SEE MEDLINE ITEM 152530

## (undated) DEVICE — SUT VICRYL 3-0 27 SH

## (undated) DEVICE — DRESSING AQUACEL AG 3.5X10IN

## (undated) DEVICE — DRESSING LEUKOPLAST FLEX 1X3IN

## (undated) DEVICE — APPLIER CLIP LIAGCLIP 9.375IN

## (undated) DEVICE — SUT STRATAFIX PGAPCL 3 FS-1

## (undated) DEVICE — TRAY FOLEY 16FR INFECTION CONT

## (undated) DEVICE — TRAY DRY SKIN SCRUB PREP

## (undated) DEVICE — ELECTRODE BLD EXT INSUL 1

## (undated) DEVICE — SUT PROLENE 4-0 FS-2 BL MON

## (undated) DEVICE — DRESSING TRANS 4X4 TEGADERM

## (undated) DEVICE — GLOVE BIOGEL SKINSENSE PI 7.5

## (undated) DEVICE — SUT MONOCRYL PLUS UD 3-0 27

## (undated) DEVICE — NDL SAFETY 21G X 1 1/2 ECLPSE

## (undated) DEVICE — BRA CLASSIC COMFORM BLK SZ 38

## (undated) DEVICE — GOWN SMART IMP BREATHABLE XXLG

## (undated) DEVICE — POSITIONER IV ARMBOARD FOAM

## (undated) DEVICE — SUT V-LOC 180 ABD 2/0 GS-21

## (undated) DEVICE — UNDERGLOVES BIOGEL PI SZ 7 LF

## (undated) DEVICE — CLOSURE SKIN STERI STRIP 1/4X4

## (undated) DEVICE — SEE MEDLINE ITEM 146417

## (undated) DEVICE — APPLICATOR CHLORAPREP ORN 26ML

## (undated) DEVICE — SEAL UNIVERSAL 5MM-8MM XI

## (undated) DEVICE — HOLDER DRAIN POUCH PINK

## (undated) DEVICE — INSERT CUSHIONPRONE VIEW LARGE

## (undated) DEVICE — SPONGE DERMACEA GAUZE 4X4

## (undated) DEVICE — SEE MEDLINE ITEM 157131

## (undated) DEVICE — DRAPE ARM DAVINCI XI

## (undated) DEVICE — SUT MONOCRYL 4-0 PS-2

## (undated) DEVICE — SUT PROLENE 0 CT1 30IN BLUE

## (undated) DEVICE — EVACUATOR WOUND BULB 100CC

## (undated) DEVICE — TRAY MINOR GEN SURG

## (undated) DEVICE — SOL WATER STRL IRR 1000ML